# Patient Record
Sex: FEMALE | Race: WHITE | NOT HISPANIC OR LATINO | Employment: OTHER | URBAN - METROPOLITAN AREA
[De-identification: names, ages, dates, MRNs, and addresses within clinical notes are randomized per-mention and may not be internally consistent; named-entity substitution may affect disease eponyms.]

---

## 2019-10-14 LAB
CHOLEST SERPL-MSCNC: 119 MG/DL (ref 0–200)
CHOLEST/HDLC SERPL: 2.59 {RATIO}
HBA1C MFR BLD: 6.4 %
HDLC SERPL-MCNC: 46 MG/DL
LDLC SERPL CALC-MCNC: 55 MG/DL
TRIGL SERPL-MCNC: 89 MG/DL

## 2019-11-05 ENCOUNTER — PATIENT OUTREACH (OUTPATIENT)
Dept: ADMINISTRATIVE | Facility: HOSPITAL | Age: 69
End: 2019-11-05

## 2019-11-05 ENCOUNTER — OFFICE VISIT (OUTPATIENT)
Dept: INTERNAL MEDICINE | Facility: CLINIC | Age: 69
End: 2019-11-05
Payer: MEDICARE

## 2019-11-05 VITALS
OXYGEN SATURATION: 95 % | WEIGHT: 285.69 LBS | TEMPERATURE: 97 F | HEART RATE: 79 BPM | BODY MASS INDEX: 47.6 KG/M2 | DIASTOLIC BLOOD PRESSURE: 72 MMHG | SYSTOLIC BLOOD PRESSURE: 138 MMHG | HEIGHT: 65 IN

## 2019-11-05 DIAGNOSIS — Z79.4 TYPE 2 DIABETES MELLITUS WITH MICROALBUMINURIA, WITH LONG-TERM CURRENT USE OF INSULIN: ICD-10-CM

## 2019-11-05 DIAGNOSIS — Z12.31 ENCOUNTER FOR SCREENING MAMMOGRAM FOR BREAST CANCER: ICD-10-CM

## 2019-11-05 DIAGNOSIS — E11.29 TYPE 2 DIABETES MELLITUS WITH MICROALBUMINURIA, WITH LONG-TERM CURRENT USE OF INSULIN: ICD-10-CM

## 2019-11-05 DIAGNOSIS — I10 ESSENTIAL HYPERTENSION: ICD-10-CM

## 2019-11-05 DIAGNOSIS — Z12.11 COLON CANCER SCREENING: ICD-10-CM

## 2019-11-05 DIAGNOSIS — E03.9 HYPOTHYROIDISM, UNSPECIFIED TYPE: ICD-10-CM

## 2019-11-05 DIAGNOSIS — R80.9 TYPE 2 DIABETES MELLITUS WITH MICROALBUMINURIA, WITH LONG-TERM CURRENT USE OF INSULIN: ICD-10-CM

## 2019-11-05 DIAGNOSIS — Z78.0 POSTMENOPAUSAL: Primary | ICD-10-CM

## 2019-11-05 DIAGNOSIS — Z01.818 PREOP EXAMINATION: ICD-10-CM

## 2019-11-05 PROCEDURE — 99204 OFFICE O/P NEW MOD 45 MIN: CPT | Mod: 25,S$GLB,, | Performed by: FAMILY MEDICINE

## 2019-11-05 PROCEDURE — 90662 FLU VACCINE - HIGH DOSE (65+) PRESERVATIVE FREE IM: ICD-10-PCS | Mod: S$GLB,,, | Performed by: FAMILY MEDICINE

## 2019-11-05 PROCEDURE — 90662 IIV NO PRSV INCREASED AG IM: CPT | Mod: S$GLB,,, | Performed by: FAMILY MEDICINE

## 2019-11-05 PROCEDURE — 3078F DIAST BP <80 MM HG: CPT | Mod: S$GLB,,, | Performed by: FAMILY MEDICINE

## 2019-11-05 PROCEDURE — G0008 ADMIN INFLUENZA VIRUS VAC: HCPCS | Mod: S$GLB,,, | Performed by: FAMILY MEDICINE

## 2019-11-05 PROCEDURE — 99999 PR PBB SHADOW E&M-NEW PATIENT-LVL V: ICD-10-PCS | Mod: PBBFAC,,, | Performed by: FAMILY MEDICINE

## 2019-11-05 PROCEDURE — 99999 PR PBB SHADOW E&M-NEW PATIENT-LVL V: CPT | Mod: PBBFAC,,, | Performed by: FAMILY MEDICINE

## 2019-11-05 PROCEDURE — 99499 UNLISTED E&M SERVICE: CPT | Mod: S$GLB,,, | Performed by: FAMILY MEDICINE

## 2019-11-05 PROCEDURE — 1101F PR PT FALLS ASSESS DOC 0-1 FALLS W/OUT INJ PAST YR: ICD-10-PCS | Mod: S$GLB,,, | Performed by: FAMILY MEDICINE

## 2019-11-05 PROCEDURE — G0008 FLU VACCINE - HIGH DOSE (65+) PRESERVATIVE FREE IM: ICD-10-PCS | Mod: S$GLB,,, | Performed by: FAMILY MEDICINE

## 2019-11-05 PROCEDURE — 3078F PR MOST RECENT DIASTOLIC BLOOD PRESSURE < 80 MM HG: ICD-10-PCS | Mod: S$GLB,,, | Performed by: FAMILY MEDICINE

## 2019-11-05 PROCEDURE — 3044F HG A1C LEVEL LT 7.0%: CPT | Mod: S$GLB,,, | Performed by: FAMILY MEDICINE

## 2019-11-05 PROCEDURE — 1101F PT FALLS ASSESS-DOCD LE1/YR: CPT | Mod: S$GLB,,, | Performed by: FAMILY MEDICINE

## 2019-11-05 PROCEDURE — 3044F PR MOST RECENT HEMOGLOBIN A1C LEVEL <7.0%: ICD-10-PCS | Mod: S$GLB,,, | Performed by: FAMILY MEDICINE

## 2019-11-05 PROCEDURE — 99204 PR OFFICE/OUTPT VISIT, NEW, LEVL IV, 45-59 MIN: ICD-10-PCS | Mod: 25,S$GLB,, | Performed by: FAMILY MEDICINE

## 2019-11-05 PROCEDURE — 99499 RISK ADDL DX/OHS AUDIT: ICD-10-PCS | Mod: S$GLB,,, | Performed by: FAMILY MEDICINE

## 2019-11-05 PROCEDURE — 3075F SYST BP GE 130 - 139MM HG: CPT | Mod: S$GLB,,, | Performed by: FAMILY MEDICINE

## 2019-11-05 PROCEDURE — 3075F PR MOST RECENT SYSTOLIC BLOOD PRESS GE 130-139MM HG: ICD-10-PCS | Mod: S$GLB,,, | Performed by: FAMILY MEDICINE

## 2019-11-05 RX ORDER — BENZONATATE 100 MG/1
1-2 CAPSULE ORAL
COMMUNITY
Start: 2019-11-01 | End: 2020-10-28 | Stop reason: ALTCHOICE

## 2019-11-05 NOTE — PROGRESS NOTES
"Subjective:       Patient ID: Lakshmi Campbell is a 68 y.o. female.    Chief Complaint: Establish Care and Pre op clearance eye surgery    HPI  Here for preop but also wants to establish care.  Has past medical history of diabetes hypothyroidism, cataracts and hypertension.  Taking medications as listed below.  Has been mostly managed by Endocrinology.  Getting cataract sx. With lamendola.  Sees endocrine (Dr. Prince). Last a1c was 6.4.     Family History   Problem Relation Age of Onset    Diabetes Mother     Leukemia Father     Diabetes Father        Current Outpatient Medications:     benzonatate (TESSALON PERLES) 100 MG capsule, 1-2 capsules., Disp: , Rfl:     blood sugar diagnostic (ONE TOUCH ULTRA TEST) Strp, Use ac bid, Disp: , Rfl:     blood-glucose meter kit, Use as instructed, Disp: , Rfl:     hydrochlorothiazide (HYDRODIURIL) 25 MG tablet, , Disp: , Rfl:     INSULIN NPL/INSULIN LISPRO (HUMALOG MIX 75-25 KWIKPEN SUBQ), Inject 50 Units into the skin Twice daily., Disp: , Rfl:     levothyroxine (SYNTHROID) 125 MCG tablet, , Disp: , Rfl:     metformin (GLUCOPHAGE) 1000 MG tablet, , Disp: , Rfl:     perindopril erbumine (ACEON) 4 mg tablet, , Disp: , Rfl:     pravastatin (PRAVACHOL) 20 MG tablet, , Disp: , Rfl: 11    SURE COMFORT PEN NEEDLE 31 X 5/16 " Ndle, , Disp: , Rfl: 0    albuterol 90 mcg/actuation inhaler, Inhale 2 puffs into the lungs every 6 (six) hours as needed for Wheezing., Disp: 18 g, Rfl: 0    aspirin (ECOTRIN) 81 MG EC tablet, , Disp: , Rfl:     cetirizine (ZYRTEC) 10 MG tablet, Take 1 tablet (10 mg total) by mouth once daily., Disp: 30 tablet, Rfl: 0    fluticasone (FLONASE) 50 mcg/actuation nasal spray, 1 spray by Each Nare route once daily. (Patient not taking: Reported on 11/5/2019), Disp: 1 Bottle, Rfl: 0    Review of Systems   Constitutional: Negative for chills and fever.   HENT: Negative for congestion and sore throat.    Eyes: Negative for visual disturbance.   Respiratory: " "Negative for cough and shortness of breath.    Cardiovascular: Negative for chest pain.   Gastrointestinal: Negative for abdominal pain, constipation and diarrhea.   Endocrine: Negative for polydipsia and polyuria.   Genitourinary: Negative for difficulty urinating and menstrual problem.   Skin: Negative for rash.   Neurological: Negative for dizziness.   Hematological: Does not bruise/bleed easily.       Objective:   /72 Comment: manual on right arm  Pulse 79   Temp 96.5 °F (35.8 °C) (Tympanic)   Ht 5' 5" (1.651 m)   Wt 129.6 kg (285 lb 11.5 oz)   SpO2 95%   BMI 47.55 kg/m²      Physical Exam   Constitutional: She is oriented to person, place, and time. She appears well-developed and well-nourished. No distress.   HENT:   Head: Normocephalic and atraumatic.   Nose: Nose normal.   Eyes: Pupils are equal, round, and reactive to light. Conjunctivae and EOM are normal. Right eye exhibits no discharge. Left eye exhibits no discharge.   Neck: No thyromegaly present.   Cardiovascular: Normal rate and regular rhythm.   No murmur heard.  Pulmonary/Chest: Effort normal and breath sounds normal. No respiratory distress.   Abdominal: Soft. She exhibits no distension.   Musculoskeletal: She exhibits no edema.   Neurological: She is alert and oriented to person, place, and time.   Skin: No rash noted. She is not diaphoretic.   Psychiatric: She has a normal mood and affect. Her behavior is normal.       Assessment & Plan     Problem List Items Addressed This Visit        Cardiac/Vascular    Essential hypertension    Overview     ICD-10 Transition         Current Assessment & Plan     Quite elevated today.  Have her follow up in 2 weeks for recheck.  For now continue taking current medication.            Endocrine    Diabetes mellitus type II    Overview     Dx updated per 2019 IMO Load         Current Assessment & Plan     Has been well controlled.  Most recent A1c was 6.4.  She continues to follow with " Endocrinology.         Hypothyroidism    Current Assessment & Plan     Continues to take levothyroxine and managed by Endocrinology.            Other    Preop examination    Current Assessment & Plan     No concerns for her to have cataract surgery            Other Visit Diagnoses     Postmenopausal    -  Primary    Relevant Orders    DXA Bone Density Spine And Hip    Encounter for screening mammogram for breast cancer        Relevant Orders    Mammo Digital Screening Bilat    Colon cancer screening        Relevant Orders    Case request GI: COLONOSCOPY (Completed)            No follow-ups on file.    Disclaimer:  This note may have been prepared using voice recognition software, it may have not been extensively proofed, as such there could be errors within the text such as sound alike errors.

## 2019-11-06 ENCOUNTER — TELEPHONE (OUTPATIENT)
Dept: ENDOSCOPY | Facility: HOSPITAL | Age: 69
End: 2019-11-06

## 2019-11-07 ENCOUNTER — TELEPHONE (OUTPATIENT)
Dept: ENDOSCOPY | Facility: HOSPITAL | Age: 69
End: 2019-11-07

## 2019-11-07 PROBLEM — Z01.818 PREOP EXAMINATION: Status: ACTIVE | Noted: 2019-11-07

## 2019-11-07 PROBLEM — S92.302A CLOSED FRACTURE OF METATARSAL BONE OF LEFT FOOT: Status: ACTIVE | Noted: 2019-11-07

## 2019-11-07 PROBLEM — E78.5 HYPERLIPIDEMIA: Status: ACTIVE | Noted: 2019-11-07

## 2019-11-07 PROBLEM — I10 ESSENTIAL HYPERTENSION: Status: ACTIVE | Noted: 2019-11-07

## 2019-11-07 NOTE — ASSESSMENT & PLAN NOTE
Quite elevated today.  Have her follow up in 2 weeks for recheck.  For now continue taking current medication.

## 2019-11-08 NOTE — PROGRESS NOTES
Patient, Lakshmi Campbell (MRN #[id number]), presented with a recorded BMI of 47.55 kg/m^2 consistent with the definition of morbid obesity (ICD-10 E66.01). The patient's morbid obesity was monitored, evaluated, addressed and/or treated. This addendum to the medical record is made on 11/08/2019.

## 2019-11-11 ENCOUNTER — PES CALL (OUTPATIENT)
Dept: ADMINISTRATIVE | Facility: CLINIC | Age: 69
End: 2019-11-11

## 2019-11-19 ENCOUNTER — PES CALL (OUTPATIENT)
Dept: ADMINISTRATIVE | Facility: CLINIC | Age: 69
End: 2019-11-19

## 2019-11-29 ENCOUNTER — PES CALL (OUTPATIENT)
Dept: ADMINISTRATIVE | Facility: CLINIC | Age: 69
End: 2019-11-29

## 2019-12-04 ENCOUNTER — PES CALL (OUTPATIENT)
Dept: ADMINISTRATIVE | Facility: CLINIC | Age: 69
End: 2019-12-04

## 2020-01-07 ENCOUNTER — LAB VISIT (OUTPATIENT)
Dept: LAB | Facility: HOSPITAL | Age: 70
End: 2020-01-07
Attending: FAMILY MEDICINE
Payer: MEDICARE

## 2020-01-07 ENCOUNTER — OFFICE VISIT (OUTPATIENT)
Dept: INTERNAL MEDICINE | Facility: CLINIC | Age: 70
End: 2020-01-07
Payer: MEDICARE

## 2020-01-07 VITALS
RESPIRATION RATE: 19 BRPM | SYSTOLIC BLOOD PRESSURE: 162 MMHG | WEIGHT: 280.63 LBS | DIASTOLIC BLOOD PRESSURE: 75 MMHG | HEIGHT: 65 IN | OXYGEN SATURATION: 95 % | TEMPERATURE: 98 F | BODY MASS INDEX: 46.75 KG/M2 | HEART RATE: 79 BPM

## 2020-01-07 DIAGNOSIS — Z11.59 ENCOUNTER FOR HEPATITIS C SCREENING TEST FOR LOW RISK PATIENT: ICD-10-CM

## 2020-01-07 DIAGNOSIS — Z79.4 TYPE 2 DIABETES MELLITUS WITH MICROALBUMINURIA, WITH LONG-TERM CURRENT USE OF INSULIN: ICD-10-CM

## 2020-01-07 DIAGNOSIS — E11.29 TYPE 2 DIABETES MELLITUS WITH MICROALBUMINURIA, WITH LONG-TERM CURRENT USE OF INSULIN: ICD-10-CM

## 2020-01-07 DIAGNOSIS — Z01.818 PREOP EXAMINATION: ICD-10-CM

## 2020-01-07 DIAGNOSIS — Z12.11 COLON CANCER SCREENING: ICD-10-CM

## 2020-01-07 DIAGNOSIS — Z11.59 ENCOUNTER FOR HEPATITIS C SCREENING TEST FOR LOW RISK PATIENT: Primary | ICD-10-CM

## 2020-01-07 DIAGNOSIS — R80.9 TYPE 2 DIABETES MELLITUS WITH MICROALBUMINURIA, WITH LONG-TERM CURRENT USE OF INSULIN: ICD-10-CM

## 2020-01-07 PROCEDURE — 90670 PCV13 VACCINE IM: CPT | Mod: S$GLB,,, | Performed by: FAMILY MEDICINE

## 2020-01-07 PROCEDURE — 36415 COLL VENOUS BLD VENIPUNCTURE: CPT | Mod: PO

## 2020-01-07 PROCEDURE — G0009 PNEUMOCOCCAL CONJUGATE VACCINE 13-VALENT LESS THAN 5YO & GREATER THAN: ICD-10-PCS | Mod: S$GLB,,, | Performed by: FAMILY MEDICINE

## 2020-01-07 PROCEDURE — 86803 HEPATITIS C AB TEST: CPT

## 2020-01-07 PROCEDURE — 1126F PR PAIN SEVERITY QUANTIFIED, NO PAIN PRESENT: ICD-10-PCS | Mod: S$GLB,,, | Performed by: FAMILY MEDICINE

## 2020-01-07 PROCEDURE — 99999 PR PBB SHADOW E&M-EST. PATIENT-LVL V: CPT | Mod: PBBFAC,,, | Performed by: FAMILY MEDICINE

## 2020-01-07 PROCEDURE — 1159F PR MEDICATION LIST DOCUMENTED IN MEDICAL RECORD: ICD-10-PCS | Mod: S$GLB,,, | Performed by: FAMILY MEDICINE

## 2020-01-07 PROCEDURE — 1159F MED LIST DOCD IN RCRD: CPT | Mod: S$GLB,,, | Performed by: FAMILY MEDICINE

## 2020-01-07 PROCEDURE — 1101F PT FALLS ASSESS-DOCD LE1/YR: CPT | Mod: S$GLB,,, | Performed by: FAMILY MEDICINE

## 2020-01-07 PROCEDURE — 3078F DIAST BP <80 MM HG: CPT | Mod: S$GLB,,, | Performed by: FAMILY MEDICINE

## 2020-01-07 PROCEDURE — 3078F PR MOST RECENT DIASTOLIC BLOOD PRESSURE < 80 MM HG: ICD-10-PCS | Mod: S$GLB,,, | Performed by: FAMILY MEDICINE

## 2020-01-07 PROCEDURE — 3044F HG A1C LEVEL LT 7.0%: CPT | Mod: S$GLB,,, | Performed by: FAMILY MEDICINE

## 2020-01-07 PROCEDURE — 1101F PR PT FALLS ASSESS DOC 0-1 FALLS W/OUT INJ PAST YR: ICD-10-PCS | Mod: S$GLB,,, | Performed by: FAMILY MEDICINE

## 2020-01-07 PROCEDURE — 1126F AMNT PAIN NOTED NONE PRSNT: CPT | Mod: S$GLB,,, | Performed by: FAMILY MEDICINE

## 2020-01-07 PROCEDURE — G0009 ADMIN PNEUMOCOCCAL VACCINE: HCPCS | Mod: S$GLB,,, | Performed by: FAMILY MEDICINE

## 2020-01-07 PROCEDURE — 3077F PR MOST RECENT SYSTOLIC BLOOD PRESSURE >= 140 MM HG: ICD-10-PCS | Mod: S$GLB,,, | Performed by: FAMILY MEDICINE

## 2020-01-07 PROCEDURE — 3044F PR MOST RECENT HEMOGLOBIN A1C LEVEL <7.0%: ICD-10-PCS | Mod: S$GLB,,, | Performed by: FAMILY MEDICINE

## 2020-01-07 PROCEDURE — 99999 PR PBB SHADOW E&M-EST. PATIENT-LVL V: ICD-10-PCS | Mod: PBBFAC,,, | Performed by: FAMILY MEDICINE

## 2020-01-07 PROCEDURE — 90670 PNEUMOCOCCAL CONJUGATE VACCINE 13-VALENT LESS THAN 5YO & GREATER THAN: ICD-10-PCS | Mod: S$GLB,,, | Performed by: FAMILY MEDICINE

## 2020-01-07 PROCEDURE — 99214 PR OFFICE/OUTPT VISIT, EST, LEVL IV, 30-39 MIN: ICD-10-PCS | Mod: 25,S$GLB,, | Performed by: FAMILY MEDICINE

## 2020-01-07 PROCEDURE — 99214 OFFICE O/P EST MOD 30 MIN: CPT | Mod: 25,S$GLB,, | Performed by: FAMILY MEDICINE

## 2020-01-07 PROCEDURE — 3077F SYST BP >= 140 MM HG: CPT | Mod: S$GLB,,, | Performed by: FAMILY MEDICINE

## 2020-01-07 RX ORDER — LEVOTHYROXINE SODIUM 175 UG/1
TABLET ORAL
COMMUNITY
Start: 2019-12-27 | End: 2023-09-25 | Stop reason: SDUPTHER

## 2020-01-07 NOTE — PROGRESS NOTES
"Subjective:       Patient ID: Lakshmi Campbell is a 69 y.o. female.    Chief Complaint: Pre-op Exam    HPI  Here today for preop evaluation.  Will be having cataract surgery in 2 days.  She already had the other eye done and had no problems.  No chest pain or shortness of breath.  No other problems.    Past Surgical History:   Procedure Laterality Date    CATARACT EXTRACTION       Family History   Problem Relation Age of Onset    Diabetes Mother     Leukemia Father     Diabetes Father        Current Outpatient Medications:     blood sugar diagnostic (ONE TOUCH ULTRA TEST) Strp, Use ac bid, Disp: , Rfl:     blood-glucose meter kit, Use as instructed, Disp: , Rfl:     cetirizine (ZYRTEC) 10 MG tablet, Take 1 tablet (10 mg total) by mouth once daily., Disp: 30 tablet, Rfl: 0    fluticasone (FLONASE) 50 mcg/actuation nasal spray, 1 spray by Each Nare route once daily., Disp: 1 Bottle, Rfl: 0    hydrochlorothiazide (HYDRODIURIL) 25 MG tablet, Take 25 mg by mouth once daily. , Disp: , Rfl:     INSULIN NPL/INSULIN LISPRO (HUMALOG MIX 75-25 KWIKPEN SUBQ), Inject 85 Units into the skin Twice daily. , Disp: , Rfl:     levothyroxine (SYNTHROID) 175 MCG tablet, TAKE 1 TABLET BY MOUTH DAILY, Disp: , Rfl:     metformin (GLUCOPHAGE) 1000 MG tablet, 1,000 mg 2 (two) times daily with meals. , Disp: , Rfl:     perindopril erbumine (ACEON) 4 mg tablet, Take 4 mg by mouth once daily. , Disp: , Rfl:     pravastatin (PRAVACHOL) 20 MG tablet, Take 20 mg by mouth once daily. , Disp: , Rfl: 11    SURE COMFORT PEN NEEDLE 31 X 5/16 " Ndle, , Disp: , Rfl: 0    albuterol 90 mcg/actuation inhaler, Inhale 2 puffs into the lungs every 6 (six) hours as needed for Wheezing., Disp: 18 g, Rfl: 0    aspirin (ECOTRIN) 81 MG EC tablet, , Disp: , Rfl:     benzonatate (TESSALON PERLES) 100 MG capsule, 1-2 capsules., Disp: , Rfl:     Review of Systems   Constitutional: Negative for chills and fever.   Respiratory: Negative for cough and " "shortness of breath.    Cardiovascular: Negative for chest pain.   Gastrointestinal: Negative for abdominal pain.   Skin: Negative for rash.   Neurological: Negative for dizziness.       Objective:   BP (!) 162/75 (BP Location: Right arm, Patient Position: Sitting, BP Method: Large (Automatic))   Pulse 79   Temp 97.5 °F (36.4 °C) (Tympanic)   Resp 19   Ht 5' 5" (1.651 m)   Wt 127.3 kg (280 lb 10.3 oz)   SpO2 95%   BMI 46.70 kg/m²      Physical Exam   Constitutional: She is oriented to person, place, and time. She appears well-developed and well-nourished.   HENT:   Head: Normocephalic and atraumatic.   Eyes: Conjunctivae are normal.   Cardiovascular: Normal rate.   Pulmonary/Chest: Effort normal. No respiratory distress.   Musculoskeletal: She exhibits no edema.   Neurological: She is alert and oriented to person, place, and time. Coordination normal.   Skin: Skin is warm and dry. No rash noted.   Psychiatric: She has a normal mood and affect. Her behavior is normal.   Vitals reviewed.      Assessment & Plan     Problem List Items Addressed This Visit        Endocrine    Type 2 diabetes mellitus with microalbuminuria, with long-term current use of insulin    Overview     Dx updated per 2019 IMO Load         Current Assessment & Plan     Diabetes has been well controlled.  Continue on same medication.  Not yet due for labs at this time.         Body mass index (BMI) 45.0-49.9, adult    Current Assessment & Plan     Counseled on importance on diet and exercise to decrease risk of comorbid conditions and for improved quality of life.              Other    Preop examination    Current Assessment & Plan     Cleared for low risk cataract procedure.           Other Visit Diagnoses     Encounter for hepatitis C screening test for low risk patient    -  Primary    Relevant Orders    Hepatitis C antibody    Colon cancer screening        Relevant Orders    Case request GI: COLONOSCOPY (Completed)            No " follow-ups on file.    Disclaimer:  This note may have been prepared using voice recognition software, it may have not been extensively proofed, as such there could be errors within the text such as sound alike errors.

## 2020-01-07 NOTE — ASSESSMENT & PLAN NOTE
Diabetes has been well controlled.  Continue on same medication.  Not yet due for labs at this time.

## 2020-01-08 LAB — HCV AB SERPL QL IA: NEGATIVE

## 2020-03-13 ENCOUNTER — HOSPITAL ENCOUNTER (OUTPATIENT)
Dept: RADIOLOGY | Facility: HOSPITAL | Age: 70
Discharge: HOME | End: 2020-03-13
Attending: FAMILY MEDICINE
Payer: MEDICARE

## 2020-03-13 VITALS — WEIGHT: 280.63 LBS | BODY MASS INDEX: 46.75 KG/M2 | HEIGHT: 65 IN

## 2020-03-13 DIAGNOSIS — Z12.31 ENCOUNTER FOR SCREENING MAMMOGRAM FOR BREAST CANCER: ICD-10-CM

## 2020-03-13 PROCEDURE — 77067 SCR MAMMO BI INCL CAD: CPT | Mod: 26,,, | Performed by: RADIOLOGY

## 2020-03-13 PROCEDURE — 77067 MAMMO DIGITAL SCREENING BILAT WITH TOMOSYNTHESIS_CAD: ICD-10-PCS | Mod: 26,,, | Performed by: RADIOLOGY

## 2020-03-13 PROCEDURE — 77063 MAMMO DIGITAL SCREENING BILAT WITH TOMOSYNTHESIS_CAD: ICD-10-PCS | Mod: 26,,, | Performed by: RADIOLOGY

## 2020-03-13 PROCEDURE — 77063 BREAST TOMOSYNTHESIS BI: CPT | Mod: 26,,, | Performed by: RADIOLOGY

## 2020-03-13 PROCEDURE — 77067 SCR MAMMO BI INCL CAD: CPT | Mod: TC,PO

## 2020-03-20 ENCOUNTER — TELEPHONE (OUTPATIENT)
Dept: RADIOLOGY | Facility: HOSPITAL | Age: 70
End: 2020-03-20

## 2020-04-02 ENCOUNTER — TELEPHONE (OUTPATIENT)
Dept: INTERNAL MEDICINE | Facility: CLINIC | Age: 70
End: 2020-04-02

## 2020-04-02 ENCOUNTER — TELEPHONE (OUTPATIENT)
Dept: RADIOLOGY | Facility: HOSPITAL | Age: 70
End: 2020-04-02

## 2020-04-02 DIAGNOSIS — R92.2 INCONCLUSIVE MAMMOGRAM: Primary | ICD-10-CM

## 2020-04-02 NOTE — TELEPHONE ENCOUNTER
Spoke with patient on the phone regarding mammogram from 3/14/20, patient states that she will call back to schedule diagnostic mammogram after the COVID-19 situation, patient has my contact information.

## 2020-04-09 ENCOUNTER — TELEPHONE (OUTPATIENT)
Dept: INTERNAL MEDICINE | Facility: CLINIC | Age: 70
End: 2020-04-09

## 2020-04-09 NOTE — TELEPHONE ENCOUNTER
Home number not a working number  LM on mobile for pt to return call  Emergency contact number not a working number.

## 2020-04-09 NOTE — TELEPHONE ENCOUNTER
Can we please try reaching out to her again. I know last week efforts were made to contact her. May need to call emergency contact or send a letter.    She is still needing follow up imaging for inconclusive mammogram.     Must get done soon as if she has cancer it cannot wait.

## 2020-04-09 NOTE — LETTER
April 16, 2020    Lakshmi Campbell  [address] [place] [address]             [facility]  [address]  [place] [address]  Phone: [phone]  Fax: [phone] Dear Mrs. Campbell:    We have been making multiple attempts to reach you via phone without success. Your recent mammogram needs follow up as there were some areas that need better imaging. It is important to do this in a timely manner. If it is breast cancer we need to get you started on treatment. If it is benign (not cancer) then we can relax with the reassurance knowing we can continue routine follow up. We cannot make decisions until we get you in for follow up testing.     Despite the COVID crisis we have a safe way to get you taken care of. Please call ASAP.    If you have any questions or concerns, please don't hesitate to call.    Sincerely,        Karan Ribeiro, DO

## 2020-04-16 ENCOUNTER — PATIENT MESSAGE (OUTPATIENT)
Dept: INTERNAL MEDICINE | Facility: CLINIC | Age: 70
End: 2020-04-16

## 2020-04-30 ENCOUNTER — TELEPHONE (OUTPATIENT)
Dept: RADIOLOGY | Facility: HOSPITAL | Age: 70
End: 2020-04-30

## 2020-06-26 DIAGNOSIS — E11.9 TYPE 2 DIABETES MELLITUS WITHOUT COMPLICATION: ICD-10-CM

## 2020-09-23 ENCOUNTER — PATIENT OUTREACH (OUTPATIENT)
Dept: ADMINISTRATIVE | Facility: HOSPITAL | Age: 70
End: 2020-09-23

## 2020-09-23 DIAGNOSIS — I10 ESSENTIAL HYPERTENSION: Primary | ICD-10-CM

## 2020-09-23 NOTE — PROGRESS NOTES
Working HTN Report       Called Pt for BP Reading, No answer Portal Message sent       Ashley OLSEN LPN Care Coordinator  Care Coordination Department  [facility]  [phone]

## 2020-10-06 ENCOUNTER — PATIENT MESSAGE (OUTPATIENT)
Dept: ADMINISTRATIVE | Facility: HOSPITAL | Age: 70
End: 2020-10-06

## 2020-10-08 ENCOUNTER — PATIENT OUTREACH (OUTPATIENT)
Dept: ADMINISTRATIVE | Facility: HOSPITAL | Age: 70
End: 2020-10-08

## 2020-10-12 LAB — HBA1C MFR BLD: 5.7 % (ref 4.2–5.8)

## 2020-10-14 ENCOUNTER — PATIENT OUTREACH (OUTPATIENT)
Dept: ADMINISTRATIVE | Facility: HOSPITAL | Age: 70
End: 2020-10-14

## 2020-10-14 NOTE — PROGRESS NOTES
Working HTN Report   2nd Attempt to contact pt L/M for Pt to call office     Pt Scheduled 10/30/2020    Ashley OLSEN LPN Care Coordinator  Care Coordination Department  [facility]  [phone]

## 2020-10-16 DIAGNOSIS — E11.9 TYPE 2 DIABETES MELLITUS WITHOUT COMPLICATION: ICD-10-CM

## 2020-10-24 ENCOUNTER — HOSPITAL ENCOUNTER (EMERGENCY)
Facility: HOSPITAL | Age: 70
Discharge: HOME | End: 2020-10-24
Attending: FAMILY MEDICINE
Payer: MEDICARE

## 2020-10-24 VITALS
HEART RATE: 86 BPM | SYSTOLIC BLOOD PRESSURE: 173 MMHG | RESPIRATION RATE: 16 BRPM | BODY MASS INDEX: 46.65 KG/M2 | OXYGEN SATURATION: 97 % | HEIGHT: 65 IN | WEIGHT: 280 LBS | TEMPERATURE: 98 F | DIASTOLIC BLOOD PRESSURE: 73 MMHG

## 2020-10-24 DIAGNOSIS — S82.041A CLOSED DISPLACED COMMINUTED FRACTURE OF RIGHT PATELLA, INITIAL ENCOUNTER: Primary | ICD-10-CM

## 2020-10-24 DIAGNOSIS — M25.561 RIGHT KNEE PAIN: ICD-10-CM

## 2020-10-24 LAB — HCV AB SERPL QL IA: NEGATIVE

## 2020-10-24 PROCEDURE — 90471 IMMUNIZATION ADMIN: CPT | Performed by: FAMILY MEDICINE

## 2020-10-24 PROCEDURE — 25000003 PHARM REV CODE 250: Performed by: FAMILY MEDICINE

## 2020-10-24 PROCEDURE — 96372 THER/PROPH/DIAG INJ SC/IM: CPT | Mod: 59

## 2020-10-24 PROCEDURE — 90715 TDAP VACCINE 7 YRS/> IM: CPT | Performed by: FAMILY MEDICINE

## 2020-10-24 PROCEDURE — 63600175 PHARM REV CODE 636 W HCPCS: Performed by: FAMILY MEDICINE

## 2020-10-24 PROCEDURE — 29505 APPLICATION LONG LEG SPLINT: CPT

## 2020-10-24 PROCEDURE — 99284 EMERGENCY DEPT VISIT MOD MDM: CPT | Mod: 25

## 2020-10-24 PROCEDURE — 86803 HEPATITIS C AB TEST: CPT

## 2020-10-24 PROCEDURE — 96374 THER/PROPH/DIAG INJ IV PUSH: CPT | Mod: 59

## 2020-10-24 PROCEDURE — 96375 TX/PRO/DX INJ NEW DRUG ADDON: CPT | Mod: 59

## 2020-10-24 PROCEDURE — 29580 STRAPPING UNNA BOOT: CPT | Mod: RT

## 2020-10-24 RX ORDER — HYDROCODONE BITARTRATE AND ACETAMINOPHEN 5; 325 MG/1; MG/1
1 TABLET ORAL
Status: COMPLETED | OUTPATIENT
Start: 2020-10-24 | End: 2020-10-24

## 2020-10-24 RX ORDER — KETOROLAC TROMETHAMINE 30 MG/ML
30 INJECTION, SOLUTION INTRAMUSCULAR; INTRAVENOUS
Status: COMPLETED | OUTPATIENT
Start: 2020-10-24 | End: 2020-10-24

## 2020-10-24 RX ORDER — HYDROCODONE BITARTRATE AND ACETAMINOPHEN 5; 325 MG/1; MG/1
1 TABLET ORAL EVERY 8 HOURS PRN
Qty: 15 TABLET | Refills: 0 | Status: ON HOLD | OUTPATIENT
Start: 2020-10-24 | End: 2020-11-11

## 2020-10-24 RX ORDER — MORPHINE SULFATE 4 MG/ML
4 INJECTION, SOLUTION INTRAMUSCULAR; INTRAVENOUS
Status: DISCONTINUED | OUTPATIENT
Start: 2020-10-24 | End: 2020-10-24

## 2020-10-24 RX ORDER — HYDRALAZINE HYDROCHLORIDE 20 MG/ML
10 INJECTION INTRAMUSCULAR; INTRAVENOUS
Status: COMPLETED | OUTPATIENT
Start: 2020-10-24 | End: 2020-10-24

## 2020-10-24 RX ORDER — ONDANSETRON 2 MG/ML
4 INJECTION INTRAMUSCULAR; INTRAVENOUS
Status: COMPLETED | OUTPATIENT
Start: 2020-10-24 | End: 2020-10-24

## 2020-10-24 RX ORDER — ONDANSETRON 4 MG/1
4 TABLET, FILM COATED ORAL EVERY 6 HOURS
Qty: 20 TABLET | Refills: 0 | Status: ON HOLD | OUTPATIENT
Start: 2020-10-24 | End: 2020-11-11 | Stop reason: HOSPADM

## 2020-10-24 RX ADMIN — KETOROLAC TROMETHAMINE 30 MG: 30 INJECTION, SOLUTION INTRAMUSCULAR; INTRAVENOUS at 09:10

## 2020-10-24 RX ADMIN — ONDANSETRON 4 MG: 2 INJECTION INTRAMUSCULAR; INTRAVENOUS at 10:10

## 2020-10-24 RX ADMIN — HYDRALAZINE HYDROCHLORIDE 10 MG: 20 INJECTION, SOLUTION INTRAMUSCULAR; INTRAVENOUS at 10:10

## 2020-10-24 RX ADMIN — HYDROCODONE BITARTRATE AND ACETAMINOPHEN 1 TABLET: 5; 325 TABLET ORAL at 10:10

## 2020-10-24 RX ADMIN — CLOSTRIDIUM TETANI TOXOID ANTIGEN (FORMALDEHYDE INACTIVATED), CORYNEBACTERIUM DIPHTHERIAE TOXOID ANTIGEN (FORMALDEHYDE INACTIVATED), BORDETELLA PERTUSSIS TOXOID ANTIGEN (GLUTARALDEHYDE INACTIVATED), BORDETELLA PERTUSSIS FILAMENTOUS HEMAGGLUTININ ANTIGEN (FORMALDEHYDE INACTIVATED), BORDETELLA PERTUSSIS PERTACTIN ANTIGEN, AND BORDETELLA PERTUSSIS FIMBRIAE 2/3 ANTIGEN 0.5 ML: 5; 2; 2.5; 5; 3; 5 INJECTION, SUSPENSION INTRAMUSCULAR at 09:10

## 2020-10-25 NOTE — ED PROVIDER NOTES
SCRIBE #1 NOTE: I, Cecy Pettit, am scribing for, and in the presence of, Carline Tan MD. I have scribed the entire note.       History     Chief Complaint   Patient presents with    Fall     pt reports tripping over a towel that was on floor, pt reports hitting her face, mild reddness noted L side of face. + pain to R knee, abrasion and swelling noted. pt reports pain to rib cage after fall.      Review of patient's allergies indicates:   Allergen Reactions    Codeine Nausea Only     Other reaction(s): Nausea  Other reaction(s): Elevated blood pressure         History of Present Illness     HPI    10/24/2020, 9:01 PM  History obtained from the patient      History of Present Illness: Lakshmi Campbell is a 69 y.o. female patient with a PMHx of cataract, DM Type 1, HTN, and thyroid disease who presents to the Emergency Department for evaluation s/p mechanical fall which onset gradually just PTA. Pt states she slipped on a towel and hit her face. Reports she could not stand up due to the pain in her R knee. Pt also c/o chest soreness. Symptoms are constant and moderate in severity. No mitigating or exacerbating factors reported. Patient denies any fever, chills, LOC, SOB, cough, HA, dizziness, lightheadedness, extremity weakness/numbnes, visual disturbance, abd pain, n/v, neck pain/stiffness, back pain and all other sxs at this time. No prior Tx included. Pt is not on blood thinners and does not remember date of her most recent tetanus shot. No further complaints or concerns at this time.       Arrival mode: AASI    PCP: Karan Ribeiro DO        Past Medical History:  Past Medical History:   Diagnosis Date    Cataract     Diabetes mellitus type I     Hypertension     Thyroid disease        Past Surgical History:  Past Surgical History:   Procedure Laterality Date    APPENDECTOMY      BREAST BIOPSY      CATARACT EXTRACTION      EYE SURGERY      HERNIA REPAIR           Family History:  Family History    Problem Relation Age of Onset    Diabetes Mother     Leukemia Father     Diabetes Father        Social History:  Social History     Tobacco Use    Smoking status: Former Smoker    Smokeless tobacco: Never Used   Substance and Sexual Activity    Alcohol use: No    Drug use: No    Sexual activity: Yes     Partners: Male        Review of Systems     Review of Systems   Constitutional: Negative for chills and fever.   HENT: Negative for sore throat.    Eyes: Negative for visual disturbance.   Respiratory: Negative for cough and shortness of breath.    Cardiovascular: Positive for chest pain (soreness).   Gastrointestinal: Negative for abdominal pain, nausea and vomiting.   Genitourinary: Negative for dysuria.   Musculoskeletal: Negative for back pain, neck pain and neck stiffness.        (+) R knee pain   Skin: Negative for rash.   Neurological: Negative for dizziness, syncope, weakness, light-headedness, numbness and headaches.   Hematological: Does not bruise/bleed easily.   All other systems reviewed and are negative.       Physical Exam     Initial Vitals [10/24/20 2058]   BP Pulse Resp Temp SpO2   133/64 78 18 98.2 °F (36.8 °C) 98 %      MAP       --          Physical Exam  Nursing Notes and Vital Signs Reviewed.  Constitutional: Patient is in no acute distress. Obese.  Head: Normocephalic. Small abrasion to L maxillary region.  Eyes: PERRL. EOM intact. Conjunctivae are not pale. No scleral icterus. Negative [place] eyes.  ENT: Mucous membranes are moist. No hemotympanum. Negative Garland's sign.  Neck: Supple. Full ROM.  Cardiovascular: Regular rate. Regular rhythm. No murmurs, rubs, or gallops. Distal pulses are 2+ and symmetric.  Pulmonary/Chest: No respiratory distress. Clear to auscultation bilaterally. No wheezing or rales.  Abdominal: Soft and non-distended.  There is no tenderness.  No rebound, guarding, or rigidity. Good bowel sounds.  Genitourinary: No CVA tenderness  Musculoskeletal: Moves all  "extremities. No obvious deformities. TTP to midsternum, no ecchymosis. TTP to medial and lateral aspect to R patella. Moderate effusion to R knee, limited ROM secondary to pain. No calf tenderness.  Skin: Warm and dry.  Neurological:  Alert, awake, and appropriate. GCS 15. Cranial nerves 2-12 intact. Normal speech.  No acute focal neurological deficits are appreciated.  Psychiatric: Normal affect. Good eye contact. Appropriate in content.     ED Course   Procedures  ED Vital Signs:  Vitals:    10/24/20 2058 10/24/20 2110 10/24/20 2120 10/24/20 2137   BP: 133/64 (!) 206/93 (!) 217/79 (!) 193/81   Pulse: 78 77 77 82   Resp: 18      Temp: 98.2 °F (36.8 °C)      TempSrc: Oral      SpO2: 98% 98% 99% 99%   Weight: 127 kg (280 lb)      Height: 5' 5" (1.651 m)       10/24/20 2150 10/24/20 2210 10/24/20 2220 10/24/20 2230   BP: (!) 224/91 (!) 185/75 (!) 194/79 (!) 188/81   Pulse: 81 79 84 89   Resp:    16   Temp:       TempSrc:       SpO2: 97% 98% 98% 98%   Weight:       Height:        10/24/20 2300   BP: (!) 173/73   Pulse: 86   Resp:    Temp:    TempSrc:    SpO2: 97%   Weight:    Height:          Imaging Results:  Imaging Results          X-Ray Knee Complete 4 Or More Views Right (Final result)  Result time 10/24/20 22:04:27    Final result by Chepe Barnes MD (10/24/20 22:04:27)                 Impression:      Comminuted fracture of the patella as above.      Electronically signed by: Chepe Barnes  Date:    10/24/2020  Time:    22:04             Narrative:    EXAMINATION:  XR KNEE COMP 4 OR MORE VIEWS RIGHT    CLINICAL HISTORY:  Pain in right knee    TECHNIQUE:  Five views of the right knee.    COMPARISON:  None    FINDINGS:  There is a comminuted fracture of the patella with both transverse and longitudinal components.  Displacement measures approximately 6 mm at maximum.    Expected joint effusion.    No other fracture identified.                               X-Ray Chest AP Portable (Final result)  Result time " 10/24/20 21:34:41    Final result by Chepe Barnes MD (10/24/20 21:34:41)                 Impression:      No acute abnormality.      Electronically signed by: Chepe Barnes  Date:    10/24/2020  Time:    21:34             Narrative:    EXAMINATION:  XR CHEST AP PORTABLE    CLINICAL HISTORY:  fall;    TECHNIQUE:  Single frontal view of the chest was performed.    COMPARISON:  10/19/2011    FINDINGS:  The lungs are clear, with normal appearance of pulmonary vasculature and no pleural effusion or pneumothorax.    The cardiac silhouette is borderline enlarged possibly exaggerated by AP technique.  The hilar and mediastinal contours are unremarkable.    Bones are grossly intact.                               CT Head Without Contrast (Final result)  Result time 10/24/20 21:27:45    Final result by Chepe Barnes MD (10/24/20 21:27:45)                 Impression:      No acute intracranial CT abnormality.    Mild microvascular ischemic changes.    All CT scans at this facility are performed  using dose modulation techniques as appropriate to performed exam including the following:  automated exposure control; adjustment of mA and/or kV according to the patients size (this includes techniques or standardized protocols for targeted exams where dose is matched to indication/reason for exam: i.e. extremities or head);  iterative reconstruction technique.      Electronically signed by: Chepe Barnes  Date:    10/24/2020  Time:    21:27             Narrative:    EXAMINATION:  CT HEAD WITHOUT CONTRAST    CLINICAL HISTORY:  s/p fall;    TECHNIQUE:  Low dose axial CT images obtained throughout the head without intravenous contrast. Sagittal and coronal reconstructions were performed.    COMPARISON:  None.    FINDINGS:  Intracranial compartment:    Ventricles and sulci are normal in size for age without evidence of hydrocephalus. No extra-axial blood or fluid collections.    Mild microvascular ischemic changes.  No  parenchymal mass, hemorrhage, edema or major vascular distribution infarct.    Skull/extracranial contents (limited evaluation): No fracture. Mastoid air cells and paranasal sinuses are essentially clear.                                      The Emergency Provider reviewed the vital signs and test results, which are outlined above.     ED Discussion     10:19 PM: Discussed pt's case with Dr. Phillip (Orthopedic Surgery) who recommends knee immbolizer and f/u in clinic.    10:30 PM: Reassessed pt at this time. Discussed with pt all pertinent ED information and results. Discussed pt dx and plan of tx. Gave pt all f/u and return to the ED instructions. All questions and concerns were addressed at this time. Pt expresses understanding of information and instructions, and is comfortable with plan to discharge. Pt is stable for discharge.    I discussed with patient and/or family/caretaker that evaluation in the ED does not suggest any emergent or life threatening medical conditions requiring immediate intervention beyond what was provided in the ED, and I believe patient is safe for discharge.  Regardless, an unremarkable evaluation in the ED does not preclude the development or presence of a serious of life threatening condition. As such, patient was instructed to return immediately for any worsening or change in current symptoms.       Medical Decision Making:   Clinical Tests:   Radiological Study: Ordered and Reviewed           ED Medication(s):  Medications   Tdap vaccine injection 0.5 mL (0.5 mLs Intramuscular Given 10/24/20 2138)   ketorolac injection 30 mg (30 mg Intramuscular Given 10/24/20 2139)   hydrALAZINE injection 10 mg (10 mg Intravenous Given 10/24/20 2201)   ondansetron injection 4 mg (4 mg Intravenous Given 10/24/20 2230)   HYDROcodone-acetaminophen 5-325 mg per tablet 1 tablet (1 tablet Oral Given 10/24/20 2230)       Discharge Medication List as of 10/24/2020 10:57 PM      START taking these  "medications    Details   HYDROcodone-acetaminophen (NORCO) 5-325 mg per tablet Take 1 tablet by mouth every 8 (eight) hours as needed for Pain., Starting Sat 10/24/2020, Until Thu 10/29/2020, Print      ondansetron (ZOFRAN) 4 MG tablet Take 1 tablet (4 mg total) by mouth every 6 (six) hours. for 5 days, Starting Sat 10/24/2020, Until Thu 10/29/2020, Print             Follow-up Information     Bunyn Phillip DO. Call in 1 day.    Specialty: Orthopedic Surgery  Contact information:  [address] [place] [address] [address] [place] [address]  [phone]                       Scribe Attestation:   Scribe #1: I performed the above scribed service and the documentation accurately describes the services I performed. I attest to the accuracy of the note.     Attending:   Physician Attestation Statement for Scribe #1: I, Carline Tan MD, personally performed the services described in this documentation, as scribed by Cecy Pettit, in my presence, and it is both accurate and complete.           Clinical Impression       ICD-10-CM ICD-9-CM   1. Closed displaced comminuted fracture of right patella, initial encounter  S82.041A 822.0   2. Right knee pain  M25.561 719.46       Disposition:   Disposition: Discharged  Condition: Stable    Portions of this note may have been created with voice recognition software. Occasional "wrong-word" or "sound-a-like" substitutions may have occurred due to the inherent limitations of voice recognition software. Please, read the note carefully and recognize, using context, where substitutions have occurred.          Carline Tan MD  10/25/20 3614    "

## 2020-10-25 NOTE — DISCHARGE INSTRUCTIONS
Please f/u with orthopedics, take norco as needed for pain and zofran for nausea. You may take ibuprofen if you do not want to take norco. No weight bearing on right knee until cleared by ortho.

## 2020-10-26 DIAGNOSIS — M25.561 RIGHT KNEE PAIN, UNSPECIFIED CHRONICITY: Primary | ICD-10-CM

## 2020-10-27 ENCOUNTER — OFFICE VISIT (OUTPATIENT)
Dept: ORTHOPEDICS | Facility: CLINIC | Age: 70
DRG: 493 | End: 2020-10-27
Payer: MEDICARE

## 2020-10-27 ENCOUNTER — HOSPITAL ENCOUNTER (OUTPATIENT)
Dept: RADIOLOGY | Facility: HOSPITAL | Age: 70
Discharge: HOME | DRG: 493 | End: 2020-10-27
Attending: STUDENT IN AN ORGANIZED HEALTH CARE EDUCATION/TRAINING PROGRAM
Payer: MEDICARE

## 2020-10-27 ENCOUNTER — HOSPITAL ENCOUNTER (OUTPATIENT)
Dept: PREADMISSION TESTING | Facility: HOSPITAL | Age: 70
Discharge: HOME | DRG: 493 | End: 2020-10-27
Attending: STUDENT IN AN ORGANIZED HEALTH CARE EDUCATION/TRAINING PROGRAM
Payer: MEDICARE

## 2020-10-27 VITALS
HEART RATE: 68 BPM | WEIGHT: 280 LBS | BODY MASS INDEX: 46.65 KG/M2 | HEIGHT: 65 IN | SYSTOLIC BLOOD PRESSURE: 185 MMHG | DIASTOLIC BLOOD PRESSURE: 75 MMHG

## 2020-10-27 VITALS
OXYGEN SATURATION: 96 % | TEMPERATURE: 97 F | DIASTOLIC BLOOD PRESSURE: 78 MMHG | HEART RATE: 70 BPM | SYSTOLIC BLOOD PRESSURE: 188 MMHG | RESPIRATION RATE: 16 BRPM

## 2020-10-27 DIAGNOSIS — E11.29 TYPE 2 DIABETES MELLITUS WITH MICROALBUMINURIA, WITH LONG-TERM CURRENT USE OF INSULIN: ICD-10-CM

## 2020-10-27 DIAGNOSIS — S82.041A CLOSED DISPLACED COMMINUTED FRACTURE OF RIGHT PATELLA, INITIAL ENCOUNTER: ICD-10-CM

## 2020-10-27 DIAGNOSIS — M25.561 RIGHT KNEE PAIN, UNSPECIFIED CHRONICITY: ICD-10-CM

## 2020-10-27 DIAGNOSIS — I10 ESSENTIAL HYPERTENSION: ICD-10-CM

## 2020-10-27 DIAGNOSIS — Z01.818 PREOP EXAMINATION: Primary | ICD-10-CM

## 2020-10-27 DIAGNOSIS — R80.9 TYPE 2 DIABETES MELLITUS WITH MICROALBUMINURIA, WITH LONG-TERM CURRENT USE OF INSULIN: ICD-10-CM

## 2020-10-27 DIAGNOSIS — M25.562 ACUTE PAIN OF LEFT KNEE: ICD-10-CM

## 2020-10-27 DIAGNOSIS — Z12.11 SPECIAL SCREENING FOR MALIGNANT NEOPLASM OF COLON: Primary | ICD-10-CM

## 2020-10-27 DIAGNOSIS — S82.041A CLOSED DISPLACED COMMINUTED FRACTURE OF RIGHT PATELLA, INITIAL ENCOUNTER: Primary | ICD-10-CM

## 2020-10-27 DIAGNOSIS — Z79.4 TYPE 2 DIABETES MELLITUS WITH MICROALBUMINURIA, WITH LONG-TERM CURRENT USE OF INSULIN: ICD-10-CM

## 2020-10-27 LAB
ALBUMIN SERPL BCP-MCNC: 3.4 G/DL (ref 3.5–5.2)
ALP SERPL-CCNC: 70 U/L (ref 55–135)
ALT SERPL W/O P-5'-P-CCNC: 19 U/L (ref 10–44)
ANION GAP SERPL CALC-SCNC: 13 MMOL/L (ref 8–16)
AST SERPL-CCNC: 26 U/L (ref 10–40)
BASOPHILS # BLD AUTO: 0.03 K/UL (ref 0–0.2)
BASOPHILS NFR BLD: 0.5 % (ref 0–1.9)
BILIRUB SERPL-MCNC: 0.9 MG/DL (ref 0.1–1)
BUN SERPL-MCNC: 21 MG/DL (ref 8–23)
CALCIUM SERPL-MCNC: 9.5 MG/DL (ref 8.7–10.5)
CHLORIDE SERPL-SCNC: 103 MMOL/L (ref 95–110)
CO2 SERPL-SCNC: 28 MMOL/L (ref 23–29)
CREAT SERPL-MCNC: 0.9 MG/DL (ref 0.5–1.4)
DIFFERENTIAL METHOD: ABNORMAL
EOSINOPHIL # BLD AUTO: 0.1 K/UL (ref 0–0.5)
EOSINOPHIL NFR BLD: 1.7 % (ref 0–8)
ERYTHROCYTE [DISTWIDTH] IN BLOOD BY AUTOMATED COUNT: 13.4 % (ref 11.5–14.5)
EST. GFR  (AFRICAN AMERICAN): >60 ML/MIN/1.73 M^2
EST. GFR  (NON AFRICAN AMERICAN): >60 ML/MIN/1.73 M^2
GLUCOSE SERPL-MCNC: 83 MG/DL (ref 70–110)
HCT VFR BLD AUTO: 37.8 % (ref 37–48.5)
HGB BLD-MCNC: 12.7 G/DL (ref 12–16)
IMM GRANULOCYTES # BLD AUTO: 0.01 K/UL (ref 0–0.04)
IMM GRANULOCYTES NFR BLD AUTO: 0.2 % (ref 0–0.5)
LYMPHOCYTES # BLD AUTO: 1.2 K/UL (ref 1–4.8)
LYMPHOCYTES NFR BLD: 19.9 % (ref 18–48)
MCH RBC QN AUTO: 29.5 PG (ref 27–31)
MCHC RBC AUTO-ENTMCNC: 33.6 G/DL (ref 32–36)
MCV RBC AUTO: 88 FL (ref 82–98)
MONOCYTES # BLD AUTO: 0.4 K/UL (ref 0.3–1)
MONOCYTES NFR BLD: 6.7 % (ref 4–15)
NEUTROPHILS # BLD AUTO: 4.3 K/UL (ref 1.8–7.7)
NEUTROPHILS NFR BLD: 71 % (ref 38–73)
NRBC BLD-RTO: 0 /100 WBC
PLATELET # BLD AUTO: 76 K/UL (ref 150–350)
PLATELET BLD QL SMEAR: ABNORMAL
PMV BLD AUTO: 11 FL (ref 9.2–12.9)
POTASSIUM SERPL-SCNC: 3.8 MMOL/L (ref 3.5–5.1)
PROT SERPL-MCNC: 6.9 G/DL (ref 6–8.4)
RBC # BLD AUTO: 4.3 M/UL (ref 4–5.4)
SODIUM SERPL-SCNC: 144 MMOL/L (ref 136–145)
WBC # BLD AUTO: 5.98 K/UL (ref 3.9–12.7)

## 2020-10-27 PROCEDURE — 93010 EKG 12-LEAD: ICD-10-PCS | Mod: ,,, | Performed by: INTERNAL MEDICINE

## 2020-10-27 PROCEDURE — 73560 X-RAY EXAM OF KNEE 1 OR 2: CPT | Mod: TC,RT

## 2020-10-27 PROCEDURE — 3008F BODY MASS INDEX DOCD: CPT | Mod: S$GLB,,, | Performed by: STUDENT IN AN ORGANIZED HEALTH CARE EDUCATION/TRAINING PROGRAM

## 2020-10-27 PROCEDURE — 93010 ELECTROCARDIOGRAM REPORT: CPT | Mod: ,,, | Performed by: INTERNAL MEDICINE

## 2020-10-27 PROCEDURE — 85025 COMPLETE CBC W/AUTO DIFF WBC: CPT

## 2020-10-27 PROCEDURE — 3078F PR MOST RECENT DIASTOLIC BLOOD PRESSURE < 80 MM HG: ICD-10-PCS | Mod: S$GLB,,, | Performed by: STUDENT IN AN ORGANIZED HEALTH CARE EDUCATION/TRAINING PROGRAM

## 2020-10-27 PROCEDURE — 1125F PR PAIN SEVERITY QUANTIFIED, PAIN PRESENT: ICD-10-PCS | Mod: S$GLB,,, | Performed by: STUDENT IN AN ORGANIZED HEALTH CARE EDUCATION/TRAINING PROGRAM

## 2020-10-27 PROCEDURE — 1101F PT FALLS ASSESS-DOCD LE1/YR: CPT | Mod: S$GLB,,, | Performed by: STUDENT IN AN ORGANIZED HEALTH CARE EDUCATION/TRAINING PROGRAM

## 2020-10-27 PROCEDURE — 1159F PR MEDICATION LIST DOCUMENTED IN MEDICAL RECORD: ICD-10-PCS | Mod: S$GLB,,, | Performed by: STUDENT IN AN ORGANIZED HEALTH CARE EDUCATION/TRAINING PROGRAM

## 2020-10-27 PROCEDURE — 99999 PR PBB SHADOW E&M-EST. PATIENT-LVL V: CPT | Mod: PBBFAC,,, | Performed by: STUDENT IN AN ORGANIZED HEALTH CARE EDUCATION/TRAINING PROGRAM

## 2020-10-27 PROCEDURE — 3078F DIAST BP <80 MM HG: CPT | Mod: S$GLB,,, | Performed by: STUDENT IN AN ORGANIZED HEALTH CARE EDUCATION/TRAINING PROGRAM

## 2020-10-27 PROCEDURE — 73560 X-RAY EXAM OF KNEE 1 OR 2: CPT | Mod: 26,RT,, | Performed by: RADIOLOGY

## 2020-10-27 PROCEDURE — 3077F PR MOST RECENT SYSTOLIC BLOOD PRESSURE >= 140 MM HG: ICD-10-PCS | Mod: S$GLB,,, | Performed by: STUDENT IN AN ORGANIZED HEALTH CARE EDUCATION/TRAINING PROGRAM

## 2020-10-27 PROCEDURE — 73560 XR KNEE 1 OR 2 VIEW RIGHT: ICD-10-PCS | Mod: 26,RT,, | Performed by: RADIOLOGY

## 2020-10-27 PROCEDURE — 80053 COMPREHEN METABOLIC PANEL: CPT

## 2020-10-27 PROCEDURE — 99999 PR PBB SHADOW E&M-EST. PATIENT-LVL V: ICD-10-PCS | Mod: PBBFAC,,, | Performed by: STUDENT IN AN ORGANIZED HEALTH CARE EDUCATION/TRAINING PROGRAM

## 2020-10-27 PROCEDURE — 99203 OFFICE O/P NEW LOW 30 MIN: CPT | Mod: S$GLB,,, | Performed by: STUDENT IN AN ORGANIZED HEALTH CARE EDUCATION/TRAINING PROGRAM

## 2020-10-27 PROCEDURE — 99203 PR OFFICE/OUTPT VISIT, NEW, LEVL III, 30-44 MIN: ICD-10-PCS | Mod: S$GLB,,, | Performed by: STUDENT IN AN ORGANIZED HEALTH CARE EDUCATION/TRAINING PROGRAM

## 2020-10-27 PROCEDURE — 73700 CT LOWER EXTREMITY W/O DYE: CPT | Mod: TC,RT

## 2020-10-27 PROCEDURE — 1159F MED LIST DOCD IN RCRD: CPT | Mod: S$GLB,,, | Performed by: STUDENT IN AN ORGANIZED HEALTH CARE EDUCATION/TRAINING PROGRAM

## 2020-10-27 PROCEDURE — 3008F PR BODY MASS INDEX (BMI) DOCUMENTED: ICD-10-PCS | Mod: S$GLB,,, | Performed by: STUDENT IN AN ORGANIZED HEALTH CARE EDUCATION/TRAINING PROGRAM

## 2020-10-27 PROCEDURE — 3077F SYST BP >= 140 MM HG: CPT | Mod: S$GLB,,, | Performed by: STUDENT IN AN ORGANIZED HEALTH CARE EDUCATION/TRAINING PROGRAM

## 2020-10-27 PROCEDURE — 93005 ELECTROCARDIOGRAM TRACING: CPT

## 2020-10-27 PROCEDURE — 1101F PR PT FALLS ASSESS DOC 0-1 FALLS W/OUT INJ PAST YR: ICD-10-PCS | Mod: S$GLB,,, | Performed by: STUDENT IN AN ORGANIZED HEALTH CARE EDUCATION/TRAINING PROGRAM

## 2020-10-27 PROCEDURE — 73700 CT KNEE WITHOUT CONTRAST RIGHT: ICD-10-PCS | Mod: 26,RT,, | Performed by: RADIOLOGY

## 2020-10-27 PROCEDURE — 73700 CT LOWER EXTREMITY W/O DYE: CPT | Mod: 26,RT,, | Performed by: RADIOLOGY

## 2020-10-27 PROCEDURE — 1125F AMNT PAIN NOTED PAIN PRSNT: CPT | Mod: S$GLB,,, | Performed by: STUDENT IN AN ORGANIZED HEALTH CARE EDUCATION/TRAINING PROGRAM

## 2020-10-27 RX ORDER — HUMAN INSULIN 100 [USP'U]/ML
INJECTION, SUSPENSION SUBCUTANEOUS
COMMUNITY
Start: 2020-10-14 | End: 2020-10-28 | Stop reason: SDUPTHER

## 2020-10-27 RX ORDER — SYRINGE,SAFETY WITH NEEDLE,1ML 25GX1"
SYRINGE (EA) MISCELLANEOUS
COMMUNITY
Start: 2020-06-08

## 2020-10-27 NOTE — ASSESSMENT & PLAN NOTE
The patient is scheduled for ORIF of the Right knee, procedure will be performed by Dr. Wolf on 10/29/2020.     Known risk factors for perioperative complications: Morbid obesity, Hypertension.     Difficulty with intubation is not anticipated.  Mallampati 1   Cardiac Risk Estimation: Low risk for low risk surgery.     1.) Preoperative workup as follows: CBC, CMP, EKG, Rapid COVID-19 will be performed morning of surgery .  2.) Change in medication regimen before surgery: Discontinue NSAIDs 5 days before surgery, discontinue Metformin the day of surgery, Patient will receive 80% of long acting insulin dose the night before surgery, discontinue ACE inhibitor morning of surgery to avoid hypotension from anesthesia   3.) Prophylaxis for cardiac events with perioperative beta-blockers: not indicated.  4.) Invasive hemodynamic monitoring perioperatively: at the discretion of anesthesiologist.  5.) Deep vein thrombosis prophylaxis postoperatively: regimen to be chosen by surgical team.  6.) Surveillance for postoperative MI with ECG immediately postoperatively and on postoperati ve days 1 and 2 AND troponin levels 24 hours postoperatively and on day 4 or hospital discharge (whichever comes first): not indicated.  7.) Current medications which may produce withdrawal symptoms if withheld perioperatively: None  8.) Other measures: None

## 2020-10-27 NOTE — ASSESSMENT & PLAN NOTE
BMI 46.59.  Normal rang of motion of the neck.  Mallampati 1.  Recommend calorie reduction and weight loss.

## 2020-10-27 NOTE — ASSESSMENT & PLAN NOTE
Uncontrolled with current medication regiment. 188/78 in clinic today, patient is a-symptomatic.   Patient reports she is complaint with current medication regiment.  Continue HCTZ and ACE inhibitor.  Patient will follow-up ASAP with PCP Dr. Ribeiro for medication adjustment for uncontrolled blood pressure.

## 2020-10-27 NOTE — H&P
Preoperative History and Physical                                                             Hospital Medicine      Chief Complaint: Preoperative evaluation     History of Present Illness:      Lakshmi Campbell is a 69 y.o. female who presents to the office today for a preoperative consultation at the request of Dr. Wolf who plans on performing ORIF of the Right Knee on .     Functional Status:      The patient is able to climb a flight of stairs prior to left knee injury on 10/24/20. The patient is able to ambulate >500 feet without difficulty prior to left knee injury on 10/24/20. The patient's functional status is affected by the surgical problem. The patient's functional status is not affected by shortness of breath, chest pain, dyspnea on exertion and fatigue.    MET score greater than 4    Past Medical History:      Past Medical History:   Diagnosis Date    Cataract     Diabetes mellitus type I     Hyperlipidemia     Hypertension     Morbid obesity     Right knee pain     Thyroid disease         Past Surgical History:      Past Surgical History:   Procedure Laterality Date    APPENDECTOMY      BREAST BIOPSY      CATARACT EXTRACTION      EYE SURGERY      HERNIA REPAIR          Social History:      Social History     Socioeconomic History    Marital status:      Spouse name: Not on file    Number of children: Not on file    Years of education: Not on file    Highest education level: Not on file   Occupational History    Not on file   Social Needs    Financial resource strain: Not on file    Food insecurity     Worry: Not on file     Inability: Not on file    Transportation needs     Medical: Not on file     Non-medical: Not on file   Tobacco Use    Smoking status: Former Smoker     Packs/day: 1.00     Types: Cigarettes     Quit date:      Years since quittin.8    Smokeless tobacco: Never Used   Substance and Sexual  "Activity    Alcohol use: Yes     Frequency: Monthly or less    Drug use: No    Sexual activity: Yes     Partners: Male   Lifestyle    Physical activity     Days per week: Not on file     Minutes per session: Not on file    Stress: Not at all   Relationships    Social connections     Talks on phone: Not on file     Gets together: Not on file     Attends Mosque service: Not on file     Active member of club or organization: Not on file     Attends meetings of clubs or organizations: Not on file     Relationship status: Not on file   Other Topics Concern    Not on file   Social History Narrative    Not on file        Family History:      Family History   Problem Relation Age of Onset    Diabetes Mother     Leukemia Father     Diabetes Father        Allergies:      Review of patient's allergies indicates:   Allergen Reactions    Codeine Nausea Only     Other reaction(s): Nausea  Other reaction(s): Elevated blood pressure       Medications:      Current Outpatient Medications   Medication Sig    blood sugar diagnostic (ONE TOUCH ULTRA TEST) Strp Use ac bid    hydrochlorothiazide (HYDRODIURIL) 25 MG tablet Take 25 mg by mouth once daily.     HYDROcodone-acetaminophen (NORCO) 5-325 mg per tablet Take 1 tablet by mouth every 8 (eight) hours as needed for Pain.    insulin NPH-insulin regular, 70/30, (NOVOLIN 70/30 U-100 INSULIN) 100 unit/mL (70-30) injection Inject 75 units sc ac supper.    insulin syringe-needle U-100 1 mL 29 gauge x 1/2" Syrg Use bid    levothyroxine (SYNTHROID) 175 MCG tablet TAKE 1 TABLET BY MOUTH DAILY    metformin (GLUCOPHAGE) 1000 MG tablet 1,000 mg 2 (two) times daily with meals.     ondansetron (ZOFRAN) 4 MG tablet Take 1 tablet (4 mg total) by mouth every 6 (six) hours. for 5 days    perindopril erbumine (ACEON) 4 mg tablet Take 4 mg by mouth once daily.     pravastatin (PRAVACHOL) 20 MG tablet Take 20 mg by mouth once daily.     SURE COMFORT PEN NEEDLE 31 X 5/16 " Ndle  "    albuterol 90 mcg/actuation inhaler Inhale 2 puffs into the lungs every 6 (six) hours as needed for Wheezing.    aspirin (ECOTRIN) 81 MG EC tablet     benzonatate (TESSALON PERLES) 100 MG capsule 1-2 capsules.    blood-glucose meter kit Use as instructed    cetirizine (ZYRTEC) 10 MG tablet Take 1 tablet (10 mg total) by mouth once daily.    fluticasone (FLONASE) 50 mcg/actuation nasal spray 1 spray by Each Nare route once daily. (Patient not taking: Reported on 10/27/2020)    INSULIN NPL/INSULIN LISPRO (HUMALOG MIX 75-25 KWIKPEN SUBQ) Inject 85 Units into the skin Twice daily.     NOVOLIN 70/30 U-100 INSULIN 100 unit/mL (70-30) injection      No current facility-administered medications for this encounter.        Vitals:      Vitals:    10/27/20 1424   BP: (!) 188/78   Pulse:    Resp:    Temp:        Review of Systems:        Constitutional: Negative for fever, chills, weight loss, malaise/fatigue and diaphoresis.   HENT: Negative for hearing loss, ear pain, nosebleeds, congestion, sore throat, neck pain, tinnitus and ear discharge.    Eyes: Negative for blurred vision, double vision, photophobia, pain, discharge and redness.   Respiratory: Negative for cough, hemoptysis, sputum production, shortness of breath, wheezing and stridor.    Cardiovascular: Negative for chest pain, palpitations, orthopnea, claudication, leg swelling and PND.   Gastrointestinal: Negative for heartburn, nausea, vomiting, abdominal pain, diarrhea, constipation, blood in stool and melena.   Genitourinary: Negative for dysuria, urgency, frequency, hematuria and flank pain.   Musculoskeletal: Negative for myalgias, back pain. Positive for slip and fall on 10/24/20, with trauma to the right knee, reports right knee pain, patient is scheduled for an ORIF of the right knee with Dr. Wolf on 10/29/2020.   Skin: Negative for itching and rash.   Neurological: Negative for dizziness, tingling, tremors, sensory change, speech change, focal  weakness, seizures, loss of consciousness, weakness and headaches.   Endo/Heme/Allergies: Negative for environmental allergies and polydipsia. Does not bruise/bleed easily.   Psychiatric/Behavioral: Negative for depression, suicidal ideas, hallucinations, memory loss and substance abuse. The patient is not nervous/anxious and does not have insomnia.    All 14 systems reviewed and negative except as noted above.    Physical Exam:      Constitutional: Appears well-developed, well-nourished and in no acute distress.  Patient is oriented to person, place, and time.   Head: Normocephalic and atraumatic. Mucous membranes moist.  Neck: Neck supple no mass.   Cardiovascular: Normal rate and regular rhythm.  S1 S2 appreciated by ascultation.  Pulmonary/Chest: Effort normal and clear to auscultation bilaterally. No respiratory distress.   Abdomen: Soft. Non-tender and non-distended. Bowel sounds are normal.   Neurological: Patient is alert and oriented to person, place and time. Moves all extremities.  Skin: Warm and dry. No lesions.  Extremities: No clubbing, cyanosis or edema. Right knee immobilizer in place, bruising noted at the right knee.     Laboratory data:      Reviewed and noted in plan where applicable. Please see chart for full laboratory data.    No results for input(s): CPK, CPKMB, TROPONINI, MB in the last 24 hours. No results for input(s): POCTGLUCOSE in the last 24 hours.     No results found for: INR, PROTIME    Lab Results   Component Value Date    WBC 5.98 10/27/2020    HGB 12.7 10/27/2020    HCT 37.8 10/27/2020    MCV 88 10/27/2020    PLT 76 (L) 10/27/2020       No results for input(s): GLU, NA, K, CL, CO2, BUN, CREATININE, CALCIUM, MG in the last 24 hours.    Predictors of intubation difficulty:       Morbid obesity? yes - BMI 46.5. Mallampati 1. Normal ROM of the neck. The patient is active.    Anatomically abnormal facies? no   Prominent incisors? no   Receding mandible? no   Short, thick neck? no    Neck range of motion: normal   Dentition: No chipped, loose, or missing teeth.    Cardiographics:      ECG: Normal Sinus Rhythm  Echocardiogram: Not indicated.     Imaging:      Chest x-ray:   10/24/20  The lungs are clear, with normal appearance of pulmonary vasculature and no pleural effusion or pneumothorax.     The cardiac silhouette is borderline enlarged possibly exaggerated by AP technique.  The hilar and mediastinal contours are unremarkable.     Bones are grossly intact.     Impression:     No acute abnormality.    Assessment and Plan:      Left knee pain  The patient is scheduled for ORIF of the Right knee, procedure will be performed by Dr. Wolf on 10/29/2020.     Known risk factors for perioperative complications: Morbid obesity, Hypertension.     Difficulty with intubation is not anticipated.  Mallampati 1   Cardiac Risk Estimation: Low risk for low risk surgery.     1.) Preoperative workup as follows: CBC, CMP, EKG, Rapid COVID-19 will be performed morning of surgery .  2.) Change in medication regimen before surgery: Discontinue NSAIDs 5 days before surgery, discontinue Metformin the day of surgery, Patient will receive 80% of long acting insulin dose the night before surgery, discontinue ACE inhibitor morning of surgery to avoid hypotension from anesthesia   3.) Prophylaxis for cardiac events with perioperative beta-blockers: not indicated.  4.) Invasive hemodynamic monitoring perioperatively: at the discretion of anesthesiologist.  5.) Deep vein thrombosis prophylaxis postoperatively: regimen to be chosen by surgical team.  6.) Surveillance for postoperative MI with ECG immediately postoperatively and on postoperati ve days 1 and 2 AND troponin levels 24 hours postoperatively and on day 4 or hospital discharge (whichever comes first): not indicated.  7.) Current medications which may produce withdrawal symptoms if withheld perioperatively: None  8.) Other measures: None    Essential  hypertension  Uncontrolled with current medication regiment. 188/78 in clinic today, patient is a-symptomatic. Denies chest pain, SOB, palpitations, fever, malaise, weakness, fatigue, syncope, dizziness, lightheadedness, tremors, falls, abdominal pain, constipation, diarrhea, and edema.    Patient reports she is complaint with current medication regiment.  Continue HCTZ and ACE inhibitor.  Patient will follow-up ASAP with PCP Dr. Ribeiro for medication adjustment for uncontrolled blood pressure.   Discussed with the patient in great detail the importance of urgent follow-up with PCP for poorly controlled BP. Discussed signs and symptoms to prompting presentation to the ED in great detail. Patient confirmed understanding.     Body mass index (BMI) 45.0-49.9, adult  BMI 46.59.  Normal rang of motion of the neck.  Mallampati 1.  Recommend calorie reduction and weight loss.     Type 2 diabetes mellitus with microalbuminuria, with long-term current use of insulin  Controlled with medication.  HgA1C 5.7.  Continue insulin and metformin.  Reduce NPH insulin dose to 80% the night before surgery. Hold metformin the morning of surgery.

## 2020-10-27 NOTE — ASSESSMENT & PLAN NOTE
Controlled with medication.  HgA1C 5.7.  Continue insulin and metformin.  Reduce NPH insulin dose to 80% the night before surgery. Hold metformin the morning of surgery.

## 2020-10-27 NOTE — PROGRESS NOTES
Orthopaedics Sports Medicine     Knee Initial Visit         10/27/2020  4:47 PM    Referring MD: Self, Aaareferral    Chief Complaint   Patient presents with    Right Knee - Pain       History of Present Illness:   Lakshmi Campbell is a 69 y.o. year old female patient presents with pain and dysfunction involving the RIGHT knee. Onset of the symptoms was 10/24/2020. Inciting event:fall directly onto knee. Current symptoms include pain, swelling, inability to ambulate. Pain is aggravated by movement of the leg.  Evaluation to date: XR. Treatment to date: rest, activity modification, bracing.    This is a pleasant 69-year-old female who had a fall directly onto her right knee over the weekend.  She presents to the emergency department and was found to have a comminuted right patella fracture.  She is placed into a knee immobilizer and she is here today in clinic for follow-up.        Past Medical History:   Past Medical History:   Diagnosis Date    Cataract     Diabetes mellitus type I     Hyperlipidemia     Hypertension     Morbid obesity     Right knee pain     Thyroid disease        Past Surgical History:   Past Surgical History:   Procedure Laterality Date    APPENDECTOMY      BREAST BIOPSY      CATARACT EXTRACTION      EYE SURGERY      HERNIA REPAIR         Medications:  Patient's Medications   New Prescriptions    No medications on file   Previous Medications    ALBUTEROL 90 MCG/ACTUATION INHALER    Inhale 2 puffs into the lungs every 6 (six) hours as needed for Wheezing.    ASPIRIN (ECOTRIN) 81 MG EC TABLET        BENZONATATE (TESSALON PERLES) 100 MG CAPSULE    1-2 capsules.    BLOOD SUGAR DIAGNOSTIC (ONE TOUCH ULTRA TEST) STRP    Use ac bid    BLOOD-GLUCOSE METER KIT    Use as instructed    CETIRIZINE (ZYRTEC) 10 MG TABLET    Take 1 tablet (10 mg total) by mouth once daily.    FLUTICASONE (FLONASE) 50 MCG/ACTUATION NASAL SPRAY    1 spray by Each Nare route once daily.    HYDROCHLOROTHIAZIDE  "(HYDRODIURIL) 25 MG TABLET    Take 25 mg by mouth once daily.     HYDROCODONE-ACETAMINOPHEN (NORCO) 5-325 MG PER TABLET    Take 1 tablet by mouth every 8 (eight) hours as needed for Pain.    INSULIN NPH-INSULIN REGULAR, 70/30, (NOVOLIN 70/30 U-100 INSULIN) 100 UNIT/ML (70-30) INJECTION    Inject 75 units sc ac supper.    INSULIN NPL/INSULIN LISPRO (HUMALOG MIX 75-25 KWIKPEN SUBQ)    Inject 85 Units into the skin Twice daily.     INSULIN SYRINGE-NEEDLE U-100 1 ML 29 GAUGE X 1/2" SYRG    Use bid    LEVOTHYROXINE (SYNTHROID) 175 MCG TABLET    TAKE 1 TABLET BY MOUTH DAILY    METFORMIN (GLUCOPHAGE) 1000 MG TABLET    1,000 mg 2 (two) times daily with meals.     NOVOLIN 70/30 U-100 INSULIN 100 UNIT/ML (70-30) INJECTION        ONDANSETRON (ZOFRAN) 4 MG TABLET    Take 1 tablet (4 mg total) by mouth every 6 (six) hours. for 5 days    PERINDOPRIL ERBUMINE (ACEON) 4 MG TABLET    Take 4 mg by mouth once daily.     PRAVASTATIN (PRAVACHOL) 20 MG TABLET    Take 20 mg by mouth once daily.     SURE COMFORT PEN NEEDLE 31 X 5/16 " NDLE       Modified Medications    No medications on file   Discontinued Medications    No medications on file        Allergies:   Review of patient's allergies indicates:   Allergen Reactions    Codeine Nausea Only     Other reaction(s): Nausea  Other reaction(s): Elevated blood pressure       Social History:   [place]: claudia lilly  occupation: retired  alcohol use: She reports current alcohol use.  tobacco use: She reports that she quit smoking about 31 years ago. Her smoking use included cigarettes. She smoked 1.00 pack per day. She has never used smokeless tobacco.    Review of systems:  history of recent illness, fevers, shakes, or chills: no  history of cardiac problems or chest pain: no  history of of pulmonary problems or asthma: no  history of diabetes: YES  history of prior DVT or clotting problems: no  history of sleep apnea: no    Physical Examination:  Estimated body mass index is 46.59 kg/m² " "as calculated from the following:    Height as of this encounter: 5' 5" (1.651 m).    Weight as of this encounter: 127 kg (280 lb).    Skin intact, significant swelling and ecchymosis    Knee      RIGHT  LEFT  Skin:     Intact   Intact  ROM:     0  0-130  Effusion:    ++   Neg  Batsheva:     Neg   Neg  Patella crepitus:   Neg   Neg  Patella tenderness:   +++   Neg  N-V               intact  intact  Hip:    nml    nml   Lower extremity edema: Negative negative    Neurovascular exam  - motor function grossly intact bilaterally to hip flexion, knee extension and flexion, ankle dorsiflexion and plantarflexion  - sensation intact to light touch bilaterally to femoral, tibial, tibial and peroneal distributions  - symmetrical pedal pulses    Imaging:  X-Ray Knee 1 or 2 View Right  Narrative: EXAMINATION:  XR KNEE 1 OR 2 VIEW RIGHT    CLINICAL HISTORY:  Pain in right knee    TECHNIQUE:  AP and lateral views of the right knee were performed.    COMPARISON:  October 24, 2020    FINDINGS:  Comminuted fracture of the patella again noted with mild displacement of the fracture fragments.  Persistent effusion.  Juxta-articular osteopenia.  Articulation appears maintained at the patellofemoral and femoral tibial joints.  Impression: As above.    Electronically signed by: Ismael Ritchie MD  Date:    10/27/2020  Time:    15:30  CT Knee Without Contrast Right  Narrative: EXAMINATION:  CT KNEE WITHOUT CONTRAST RIGHT    CLINICAL HISTORY:  comminuted patella fracture, preop evaluation;  Displaced comminuted fracture of right patella, initial encounter for closed fracture    TECHNIQUE:  Transaxial computed tomographic images of the right knee were performed without the administration of intravenous contrast.  Subsequently, coronal and sagittal reconstructions were performed.    COMPARISON:  Knee radiographs October 27, 2020.    FINDINGS:  There is a comminuted right patellar fracture.  There is 2 cm x 1.4 cm central gap within the patella " about which the patellar fracture fragments are circumferentially positioned.  Distance spanning the margin of the most superior fracture fragment to the most inferior fracture fragment is approximately 4.7 cm.  Distance spanning the margin of the medial most fracture fragment to the lateral-most fracture fragment measures approximately 4.2 cm.    Generalized osteopenia is present.  No fracture identified within the visualized femur, tibia, and fibula.  Small right knee effusion.  Soft tissue stranding is seen adjacent to the right patellar fracture.  No CT evidence of disruption of the distal quadriceps and patellar tendons.  Atherosclerotic calcifications are seen within the visualized femoral, popliteal, and calf arteries.  Impression: Acute comminuted right patellar fracture with associated right knee effusion.    Electronically signed by: Valentín Melendez  Date:    10/27/2020  Time:    14:12       Impression:  69 y.o. female with     ICD-10-CM ICD-9-CM    1. Closed displaced comminuted fracture of right patella, initial encounter  S82.041A 822.0 Case Request Operating Room: ORIF, FRACTURE, PATELLA      CT Knee Without Contrast Right       Plan:  Discussed with her and her  today that her patella fracture is displaced and will require operative fixation.  She has significant comminution to it that I can see on the x-ray and so I would like to get a CT scan for better evaluation of the fracture fragments and preoperative planning.  I think we should do the surgery sooner rather than later so I would like to do it this Thursday.  I anticipate that this will be an outpatient surgery but she will have to be nonweightbearing with her knee immobilized afterwards.        Sage Wolf MD

## 2020-10-27 NOTE — DISCHARGE INSTRUCTIONS
To confirm, Your doctor has instructed you that surgery is scheduled for 10/29/20 **.       Please report to [facility] .  Pre admit office will call afternoon prior to surgery with final arrival time    INSTRUCTIONS IMPORTANT!!!   Do not eat, drink, or smoke after 12 midnight-including water. OK to brush teeth, no gum, candy or mints!    ¨ Take only these medicines with a small swallow of water-morning of surgery.   Synthroid, Hydrochlorothiazide    Pre operative instructions:  Please review the Pre-Operative Instruction booklet that you were given.        Bathing Instructions--See page 6 in the Pre-operative booklet.      Prevention of surgical site infections:     -Keep incisions clean and dry.   -Do not soak/submerge incisions in water until completely healed.   -Do not apply lotions, powders, creams, or deodorants to site.   -Always make sure hands are cleaned with antibacterial soap/ alcohol-based  prior to touching the surgical site.  (This includes doctors, nurses, staff, and yourself.)    Signs and symptoms:   -Redness and pain around the area where you had surgery   -Drainage of cloudy fluid from your surgical wound   -Fever over 100.4       I have read or had read and explained to me, and understand the above information.  Additional comments or instructions:  Received a copy of Pre-operative instructions booklet, FAQ surgical site infection sheet, and packets of hibiclens (if indicated).

## 2020-10-28 ENCOUNTER — OFFICE VISIT (OUTPATIENT)
Dept: INTERNAL MEDICINE | Facility: CLINIC | Age: 70
DRG: 493 | End: 2020-10-28
Payer: MEDICARE

## 2020-10-28 ENCOUNTER — TELEPHONE (OUTPATIENT)
Dept: PREADMISSION TESTING | Facility: HOSPITAL | Age: 70
End: 2020-10-28

## 2020-10-28 VITALS
TEMPERATURE: 97 F | DIASTOLIC BLOOD PRESSURE: 88 MMHG | OXYGEN SATURATION: 96 % | HEART RATE: 66 BPM | HEIGHT: 65 IN | SYSTOLIC BLOOD PRESSURE: 156 MMHG | BODY MASS INDEX: 46.59 KG/M2

## 2020-10-28 DIAGNOSIS — R80.9 TYPE 2 DIABETES MELLITUS WITH MICROALBUMINURIA, WITH LONG-TERM CURRENT USE OF INSULIN: ICD-10-CM

## 2020-10-28 DIAGNOSIS — S82.041A CLOSED DISPLACED COMMINUTED FRACTURE OF RIGHT PATELLA, INITIAL ENCOUNTER: ICD-10-CM

## 2020-10-28 DIAGNOSIS — E11.29 TYPE 2 DIABETES MELLITUS WITH MICROALBUMINURIA, WITH LONG-TERM CURRENT USE OF INSULIN: ICD-10-CM

## 2020-10-28 DIAGNOSIS — Z79.4 TYPE 2 DIABETES MELLITUS WITH MICROALBUMINURIA, WITH LONG-TERM CURRENT USE OF INSULIN: ICD-10-CM

## 2020-10-28 DIAGNOSIS — I10 ESSENTIAL HYPERTENSION: Primary | ICD-10-CM

## 2020-10-28 PROBLEM — S92.302A CLOSED FRACTURE OF METATARSAL BONE OF LEFT FOOT: Status: RESOLVED | Noted: 2019-11-07 | Resolved: 2020-10-28

## 2020-10-28 PROCEDURE — 3008F PR BODY MASS INDEX (BMI) DOCUMENTED: ICD-10-PCS | Mod: S$GLB,,, | Performed by: NURSE PRACTITIONER

## 2020-10-28 PROCEDURE — 1101F PT FALLS ASSESS-DOCD LE1/YR: CPT | Mod: S$GLB,,, | Performed by: NURSE PRACTITIONER

## 2020-10-28 PROCEDURE — 3077F SYST BP >= 140 MM HG: CPT | Mod: S$GLB,,, | Performed by: NURSE PRACTITIONER

## 2020-10-28 PROCEDURE — 99499 UNLISTED E&M SERVICE: CPT | Mod: S$GLB,,, | Performed by: NURSE PRACTITIONER

## 2020-10-28 PROCEDURE — 3008F BODY MASS INDEX DOCD: CPT | Mod: S$GLB,,, | Performed by: NURSE PRACTITIONER

## 2020-10-28 PROCEDURE — 3077F PR MOST RECENT SYSTOLIC BLOOD PRESSURE >= 140 MM HG: ICD-10-PCS | Mod: S$GLB,,, | Performed by: NURSE PRACTITIONER

## 2020-10-28 PROCEDURE — 99499 RISK ADDL DX/OHS AUDIT: ICD-10-PCS | Mod: S$GLB,,, | Performed by: NURSE PRACTITIONER

## 2020-10-28 PROCEDURE — 1101F PR PT FALLS ASSESS DOC 0-1 FALLS W/OUT INJ PAST YR: ICD-10-PCS | Mod: S$GLB,,, | Performed by: NURSE PRACTITIONER

## 2020-10-28 PROCEDURE — 99999 PR PBB SHADOW E&M-EST. PATIENT-LVL IV: ICD-10-PCS | Mod: PBBFAC,,, | Performed by: NURSE PRACTITIONER

## 2020-10-28 PROCEDURE — 1125F AMNT PAIN NOTED PAIN PRSNT: CPT | Mod: S$GLB,,, | Performed by: NURSE PRACTITIONER

## 2020-10-28 PROCEDURE — 99214 PR OFFICE/OUTPT VISIT, EST, LEVL IV, 30-39 MIN: ICD-10-PCS | Mod: S$GLB,,, | Performed by: NURSE PRACTITIONER

## 2020-10-28 PROCEDURE — 1159F PR MEDICATION LIST DOCUMENTED IN MEDICAL RECORD: ICD-10-PCS | Mod: S$GLB,,, | Performed by: NURSE PRACTITIONER

## 2020-10-28 PROCEDURE — 1125F PR PAIN SEVERITY QUANTIFIED, PAIN PRESENT: ICD-10-PCS | Mod: S$GLB,,, | Performed by: NURSE PRACTITIONER

## 2020-10-28 PROCEDURE — 99999 PR PBB SHADOW E&M-EST. PATIENT-LVL IV: CPT | Mod: PBBFAC,,, | Performed by: NURSE PRACTITIONER

## 2020-10-28 PROCEDURE — 3079F DIAST BP 80-89 MM HG: CPT | Mod: S$GLB,,, | Performed by: NURSE PRACTITIONER

## 2020-10-28 PROCEDURE — 3079F PR MOST RECENT DIASTOLIC BLOOD PRESSURE 80-89 MM HG: ICD-10-PCS | Mod: S$GLB,,, | Performed by: NURSE PRACTITIONER

## 2020-10-28 PROCEDURE — 99214 OFFICE O/P EST MOD 30 MIN: CPT | Mod: S$GLB,,, | Performed by: NURSE PRACTITIONER

## 2020-10-28 PROCEDURE — 1159F MED LIST DOCD IN RCRD: CPT | Mod: S$GLB,,, | Performed by: NURSE PRACTITIONER

## 2020-10-28 RX ORDER — AMLODIPINE BESYLATE 5 MG/1
5 TABLET ORAL DAILY
Qty: 30 TABLET | Refills: 0 | Status: ON HOLD | OUTPATIENT
Start: 2020-10-28 | End: 2020-11-11 | Stop reason: HOSPADM

## 2020-10-28 NOTE — PROGRESS NOTES
Subjective:       Patient ID: Lakshmi Campbell is a 69 y.o. female.    Chief Complaint: Hypertension    Mrs. Campbell presents to clinic for HTN. She is supposed to have R patella fracture sx tomorrow, but surgery was post postoned to 11/5/2020 due to elevated BP and low platelet count. She had preop labs and evaluation with Dr. Wolf, orthopedics. Labs stable except thrombocytopenia. She has appt with hem/onc tomorrow for further evaluation of this. She has had significantly elevated BP at most recent ER visit, last appt with Dr. Ribeiro, and appt with ortho. Taking HCTZ.     Hypertension  This is a chronic problem. The problem has been waxing and waning since onset. The problem is uncontrolled. Pertinent negatives include no anxiety, blurred vision, chest pain, headaches, malaise/fatigue, palpitations, peripheral edema or shortness of breath. There are no associated agents to hypertension. Risk factors for coronary artery disease include obesity, sedentary lifestyle and diabetes mellitus. Past treatments include diuretics. The current treatment provides moderate improvement. There are no compliance problems.  Hypertensive end-organ damage includes kidney disease. There is no history of angina, CAD/MI, CVA, heart failure or PVD.       Patient Active Problem List   Diagnosis    Type 2 diabetes mellitus with microalbuminuria, with long-term current use of insulin    Hypothyroidism    Essential hypertension    Hyperlipidemia    Microalbuminuria    Preop examination    Body mass index (BMI) 45.0-49.9, adult    Left knee pain    Closed displaced comminuted fracture of right patella       Family History   Problem Relation Age of Onset    Diabetes Mother     Leukemia Father     Diabetes Father      Past Surgical History:   Procedure Laterality Date    APPENDECTOMY      BREAST BIOPSY      CATARACT EXTRACTION      EYE SURGERY      HERNIA REPAIR           Current Outpatient Medications:     blood sugar  "diagnostic (ONE TOUCH ULTRA TEST) Strp, Use ac bid, Disp: , Rfl:     blood-glucose meter kit, Use as instructed, Disp: , Rfl:     hydrochlorothiazide (HYDRODIURIL) 25 MG tablet, Take 25 mg by mouth once daily. , Disp: , Rfl:     HYDROcodone-acetaminophen (NORCO) 5-325 mg per tablet, Take 1 tablet by mouth every 8 (eight) hours as needed for Pain., Disp: 15 tablet, Rfl: 0    insulin NPH-insulin regular, 70/30, (NOVOLIN 70/30 U-100 INSULIN) 100 unit/mL (70-30) injection, Inject 75 units sc ac supper., Disp: , Rfl:     insulin syringe-needle U-100 1 mL 29 gauge x 1/2" Syrg, Use bid, Disp: , Rfl:     levothyroxine (SYNTHROID) 175 MCG tablet, TAKE 1 TABLET BY MOUTH DAILY, Disp: , Rfl:     metformin (GLUCOPHAGE) 1000 MG tablet, 1,000 mg 2 (two) times daily with meals. , Disp: , Rfl:     ondansetron (ZOFRAN) 4 MG tablet, Take 1 tablet (4 mg total) by mouth every 6 (six) hours. for 5 days, Disp: 20 tablet, Rfl: 0    perindopril erbumine (ACEON) 4 mg tablet, Take 4 mg by mouth once daily. , Disp: , Rfl:     pravastatin (PRAVACHOL) 20 MG tablet, Take 20 mg by mouth once daily. , Disp: , Rfl: 11    SURE COMFORT PEN NEEDLE 31 X 5/16 " Ndle, , Disp: , Rfl: 0    amLODIPine (NORVASC) 5 MG tablet, Take 1 tablet (5 mg total) by mouth once daily., Disp: 30 tablet, Rfl: 0    Review of Systems   Constitutional: Negative for chills, diaphoresis, fatigue, fever and malaise/fatigue.   Eyes: Negative for blurred vision.   Respiratory: Negative for shortness of breath.    Cardiovascular: Negative for chest pain and palpitations.   Musculoskeletal: Positive for arthralgias and gait problem (patellar fraction). Negative for joint swelling.   Neurological: Negative for dizziness, light-headedness and headaches.       Objective:   BP (!) 156/88 (BP Location: Left arm, Patient Position: Sitting, BP Method: Medium (Manual))   Pulse 66   Temp 97.3 °F (36.3 °C) (Temporal)   Ht 5' 5" (1.651 m)   SpO2 96%   BMI 46.59 kg/m²    " "  Physical Exam  Constitutional:       General: She is not in acute distress.     Appearance: Normal appearance. She is not ill-appearing.   HENT:      Head: Normocephalic and atraumatic.   Cardiovascular:      Rate and Rhythm: Normal rate and regular rhythm.      Pulses: Normal pulses.      Heart sounds: Normal heart sounds. No murmur. No friction rub. No gallop.    Pulmonary:      Effort: Pulmonary effort is normal. No respiratory distress.      Breath sounds: Normal breath sounds.   Skin:     General: Skin is warm and dry.      Coloration: Skin is not pale.      Findings: No erythema.   Neurological:      General: No focal deficit present.      Mental Status: She is alert and oriented to person, place, and time.         Assessment & Plan     Problem List Items Addressed This Visit        Cardiac/Vascular    Essential hypertension - Primary    Current Assessment & Plan     Multiple elevated readings again today, and in most recent visits with ortho and ER. Follow up on Monday to evaluate prior to sx.          Relevant Medications    amLODIPine (NORVASC) 5 MG tablet       Endocrine    Type 2 diabetes mellitus with microalbuminuria, with long-term current use of insulin    Current Assessment & Plan     Most recent A1C 5.7 with Dr. Reynolds. Managed by endocrine. Follows up every 6 months.             Orthopedic    Closed displaced comminuted fracture of right patella    Current Assessment & Plan     Upcoming surgery on 11/5/2020. Sx was rescheduled due to thrombocytopenia and HTN.              Follow up in about 5 days (around 11/2/2020).            Portions of this note may have been created with voice recognition software. Occasional "wrong-word" or "sound-a-like" substitutions may have occurred due to the inherent limitations of voice recognition software. Please, read the note carefully and recognize, using context, where substitutions have occurred.       "

## 2020-10-28 NOTE — TELEPHONE ENCOUNTER
Spoke with pt. Pt notified of surgery being postponed until next Thursday. Will keep Hem/Onc appointment tomorrow morning for clearance. Pt agreed.

## 2020-10-28 NOTE — ASSESSMENT & PLAN NOTE
Multiple elevated readings again today, and in most recent visits with ortho and ER. Follow up on Monday to evaluate prior to sx.

## 2020-10-28 NOTE — PROGRESS NOTES
Patient, Lakshmi Campbell (MRN #[id number]), presented with a recent Platelet count less than 150 K/uL consistent with the definition of thrombocytopenia (ICD10 - D69.6).    Platelets   Date Value Ref Range Status   10/27/2020 76 (L) 150 - 350 K/uL Final     The patient's thrombocytopenia was monitored, evaluated, addressed and/or treated. This addendum to the medical record is made on 10/28/2020.

## 2020-10-29 ENCOUNTER — HOSPITAL ENCOUNTER (OUTPATIENT)
Dept: RADIOLOGY | Facility: HOSPITAL | Age: 70
Discharge: HOME | End: 2020-10-29
Attending: INTERNAL MEDICINE | Admitting: STUDENT IN AN ORGANIZED HEALTH CARE EDUCATION/TRAINING PROGRAM
Payer: MEDICARE

## 2020-10-29 ENCOUNTER — PATIENT OUTREACH (OUTPATIENT)
Dept: ADMINISTRATIVE | Facility: HOSPITAL | Age: 70
End: 2020-10-29

## 2020-10-29 ENCOUNTER — OFFICE VISIT (OUTPATIENT)
Dept: HEMATOLOGY/ONCOLOGY | Facility: CLINIC | Age: 70
DRG: 493 | End: 2020-10-29
Payer: MEDICARE

## 2020-10-29 VITALS
BODY MASS INDEX: 44.98 KG/M2 | HEART RATE: 75 BPM | DIASTOLIC BLOOD PRESSURE: 72 MMHG | TEMPERATURE: 97 F | HEIGHT: 65 IN | OXYGEN SATURATION: 98 % | SYSTOLIC BLOOD PRESSURE: 180 MMHG | WEIGHT: 270 LBS | RESPIRATION RATE: 18 BRPM

## 2020-10-29 DIAGNOSIS — D69.6 THROMBOCYTOPENIA: ICD-10-CM

## 2020-10-29 DIAGNOSIS — R93.2 ABNORMAL FINDINGS ON DIAGNOSTIC IMAGING OF LIVER AND BILIARY TRACT: ICD-10-CM

## 2020-10-29 DIAGNOSIS — R92.8 ABNORMAL MAMMOGRAM OF LEFT BREAST: ICD-10-CM

## 2020-10-29 DIAGNOSIS — D69.6 THROMBOCYTOPENIA: Primary | ICD-10-CM

## 2020-10-29 DIAGNOSIS — K76.89 LIVER NODULE: ICD-10-CM

## 2020-10-29 DIAGNOSIS — K76.0 FATTY LIVER: ICD-10-CM

## 2020-10-29 PROCEDURE — 3078F DIAST BP <80 MM HG: CPT | Mod: S$GLB,,, | Performed by: INTERNAL MEDICINE

## 2020-10-29 PROCEDURE — 1159F PR MEDICATION LIST DOCUMENTED IN MEDICAL RECORD: ICD-10-PCS | Mod: S$GLB,,, | Performed by: INTERNAL MEDICINE

## 2020-10-29 PROCEDURE — 1101F PR PT FALLS ASSESS DOC 0-1 FALLS W/OUT INJ PAST YR: ICD-10-PCS | Mod: S$GLB,,, | Performed by: INTERNAL MEDICINE

## 2020-10-29 PROCEDURE — 1159F MED LIST DOCD IN RCRD: CPT | Mod: S$GLB,,, | Performed by: INTERNAL MEDICINE

## 2020-10-29 PROCEDURE — 3077F SYST BP >= 140 MM HG: CPT | Mod: S$GLB,,, | Performed by: INTERNAL MEDICINE

## 2020-10-29 PROCEDURE — 99999 PR PBB SHADOW E&M-EST. PATIENT-LVL V: ICD-10-PCS | Mod: PBBFAC,,, | Performed by: INTERNAL MEDICINE

## 2020-10-29 PROCEDURE — 3008F BODY MASS INDEX DOCD: CPT | Mod: S$GLB,,, | Performed by: INTERNAL MEDICINE

## 2020-10-29 PROCEDURE — 99204 PR OFFICE/OUTPT VISIT, NEW, LEVL IV, 45-59 MIN: ICD-10-PCS | Mod: S$GLB,,, | Performed by: INTERNAL MEDICINE

## 2020-10-29 PROCEDURE — 76705 ECHO EXAM OF ABDOMEN: CPT | Mod: TC

## 2020-10-29 PROCEDURE — 3008F PR BODY MASS INDEX (BMI) DOCUMENTED: ICD-10-PCS | Mod: S$GLB,,, | Performed by: INTERNAL MEDICINE

## 2020-10-29 PROCEDURE — 1101F PT FALLS ASSESS-DOCD LE1/YR: CPT | Mod: S$GLB,,, | Performed by: INTERNAL MEDICINE

## 2020-10-29 PROCEDURE — 99999 PR PBB SHADOW E&M-EST. PATIENT-LVL V: CPT | Mod: PBBFAC,,, | Performed by: INTERNAL MEDICINE

## 2020-10-29 PROCEDURE — 3077F PR MOST RECENT SYSTOLIC BLOOD PRESSURE >= 140 MM HG: ICD-10-PCS | Mod: S$GLB,,, | Performed by: INTERNAL MEDICINE

## 2020-10-29 PROCEDURE — 99204 OFFICE O/P NEW MOD 45 MIN: CPT | Mod: S$GLB,,, | Performed by: INTERNAL MEDICINE

## 2020-10-29 PROCEDURE — 1125F AMNT PAIN NOTED PAIN PRSNT: CPT | Mod: S$GLB,,, | Performed by: INTERNAL MEDICINE

## 2020-10-29 PROCEDURE — 1125F PR PAIN SEVERITY QUANTIFIED, PAIN PRESENT: ICD-10-PCS | Mod: S$GLB,,, | Performed by: INTERNAL MEDICINE

## 2020-10-29 PROCEDURE — 3078F PR MOST RECENT DIASTOLIC BLOOD PRESSURE < 80 MM HG: ICD-10-PCS | Mod: S$GLB,,, | Performed by: INTERNAL MEDICINE

## 2020-10-29 NOTE — Clinical Note
Please call to let her know repeat blood work showed improvement in platelet count from 76 to 101 which is good news.  Please let her know we will need to contact her surgeon about these results and if higher platelet count is needed for surgery may consider platelet transfusion just prior to procedure depending on their discretion.  As for cause for her low platelet count the ultrasound of her abdomen does show potential changes in the liver which may be early cirrhosis.  I would like some additional blood work and CT of the abdomen.  Katheryn carmichael you please arrange for this additional labs and CT and follow-up with me afterwards.  Please also arrange for hepatology referral.

## 2020-10-29 NOTE — PATIENT INSTRUCTIONS
Labs today  Us abd liver and spleen ->  CT abdomen    hepatology referral  RV in 3 months with cbc prior

## 2020-10-29 NOTE — PROGRESS NOTES
Subjective:      DATE OF VISIT: 10/29/20     ?  Patient ID:?Lakshmi Campbell is a 69 y.o. female.?? MR#: [id number]   ?   REFERRING PROVIDER: No referring provider defined for this encounter.     ? Primary Care Providers:  Karan Ribeiro, , DO (General)     CHIEF COMPLAINT: ?  Thrombocytopenia??   ?   HPI    I had the pleasure meeting Ms. Campbell a 69-year-old woman with type 2 diabetes, hypertension, obesity.  She suffered mechanical fall requiring surgery to right knee.  Preoperative testing revealed thrombocytopenia to 76 K for which she was referred to Hematology.    Per review of lab work her last CBC was in 2012. Platelet count from 7331-0461 range 150-200 K.      She notes longstanding  bilateral arms with ecchymoses/trace excoriation.    She denies prior notable alcohol use, known liver disease/cirrhosis.  No fevers, chills, night sweats or unintentional weight loss.    Review of Systems    ?   A comprehensive 14-point review of systems was reviewed with patient and was negative other than as specified above.   ?   PAST MEDICAL HISTORY:   Past Medical History:   Diagnosis Date    Cataract     Diabetes mellitus type I     Hyperlipidemia     Hypertension     Morbid obesity     Right knee pain     Thyroid disease     ?     PAST SURGICAL HISTORY:   Past Surgical History:   Procedure Laterality Date    APPENDECTOMY      BREAST BIOPSY      CATARACT EXTRACTION      EYE SURGERY      HERNIA REPAIR        ?   ALLERGIES:   Allergies as of 10/29/2020 - Reviewed 10/29/2020   Allergen Reaction Noted    Codeine Nausea Only 09/23/2013      ?   MEDICATIONS:?   Outpatient Medications Marked as Taking for the 10/29/20 encounter (Office Visit) with Ivon Lala MD   Medication Sig Dispense Refill    amLODIPine (NORVASC) 5 MG tablet Take 1 tablet (5 mg total) by mouth once daily. 30 tablet 0    blood sugar diagnostic (ONE TOUCH ULTRA TEST) Strp Use ac bid      hydrochlorothiazide (HYDRODIURIL) 25 MG tablet  "Take 25 mg by mouth once daily.       HYDROcodone-acetaminophen (NORCO) 5-325 mg per tablet Take 1 tablet by mouth every 8 (eight) hours as needed for Pain. 15 tablet 0    insulin NPH-insulin regular, 70/30, (NOVOLIN 70/30 U-100 INSULIN) 100 unit/mL (70-30) injection Inject 75 units sc ac supper.      insulin syringe-needle U-100 1 mL 29 gauge x 1/2" Syrg Use bid      levothyroxine (SYNTHROID) 175 MCG tablet TAKE 1 TABLET BY MOUTH DAILY      metformin (GLUCOPHAGE) 1000 MG tablet 1,000 mg 2 (two) times daily with meals.       ondansetron (ZOFRAN) 4 MG tablet Take 1 tablet (4 mg total) by mouth every 6 (six) hours. for 5 days 20 tablet 0    perindopril erbumine (ACEON) 4 mg tablet Take 4 mg by mouth once daily.       pravastatin (PRAVACHOL) 20 MG tablet Take 20 mg by mouth once daily.   11    SURE COMFORT PEN NEEDLE 31 X 5/16 " Ndle   0      ?   SOCIAL HISTORY:?   Social History     Tobacco Use    Smoking status: Former Smoker     Packs/day: 1.00     Types: Cigarettes     Quit date:      Years since quittin.8    Smokeless tobacco: Never Used   Substance Use Topics    Alcohol use: Yes     Frequency: Monthly or less      ?      ?   FAMILY HISTORY:   family history includes Diabetes in her father and mother; Leukemia in her father.   ?        Objective:      Physical Exam      ?   Vitals:    10/29/20 0803   BP: (!) 180/72   Pulse: 75   Resp: 18   Temp: 97 °F (36.1 °C)      ?   ECOG:?2   General appearance: Generally well appearing, in no acute distress, sitting in wheelchair.   Head, eyes, ears, nose, and throat: Pupils round and equally reactive to light. Oropharynx clear with moist mucous membranes.   Lymph: No palpable cervical or supraclavicular lymphadenopathy.   Cardiovascular: Regular rate and rhythm, S1, S2, no audible murmurs.   Respiratory: Lungs clear to auscultation bilaterally.   Abdomen:  Obese, nontender, nondistended  Extremities: Warm, upper extremities with mild excoriations, " ecchymosis on right knee with overlying brace.  Neurologic: Alert and oriented. Grossly normal strength, coordination, and gait.   Skin: No rashes, ecchymoses or petechial lesion.   ?     ?   Laboratory:  ?   Lab Visit on 10/29/2020   Component Date Value Ref Range Status    WBC 10/29/2020 5.58  3.90 - 12.70 K/uL Final    RBC 10/29/2020 4.07  4.00 - 5.40 M/uL Final    Hemoglobin 10/29/2020 11.9* 12.0 - 16.0 g/dL Final    Hematocrit 10/29/2020 35.7* 37.0 - 48.5 % Final    MCV 10/29/2020 88  82 - 98 fL Final    MCH 10/29/2020 29.2  27.0 - 31.0 pg Final    MCHC 10/29/2020 33.3  32.0 - 36.0 g/dL Final    RDW 10/29/2020 13.2  11.5 - 14.5 % Final    Platelets 10/29/2020 104* 150 - 350 K/uL Final    MPV 10/29/2020 9.9  9.2 - 12.9 fL Final    Immature Granulocytes 10/29/2020 0.4  0.0 - 0.5 % Final    Gran # (ANC) 10/29/2020 4.0  1.8 - 7.7 K/uL Final    Immature Grans (Abs) 10/29/2020 0.02  0.00 - 0.04 K/uL Final    Lymph # 10/29/2020 1.1  1.0 - 4.8 K/uL Final    Mono # 10/29/2020 0.4  0.3 - 1.0 K/uL Final    Eos # 10/29/2020 0.1  0.0 - 0.5 K/uL Final    Baso # 10/29/2020 0.04  0.00 - 0.20 K/uL Final    nRBC 10/29/2020 0  0 /100 WBC Final    Gran % 10/29/2020 71.0  38.0 - 73.0 % Final    Lymph % 10/29/2020 19.5  18.0 - 48.0 % Final    Mono % 10/29/2020 6.8  4.0 - 15.0 % Final    Eosinophil % 10/29/2020 1.6  0.0 - 8.0 % Final    Basophil % 10/29/2020 0.7  0.0 - 1.9 % Final    Differential Method 10/29/2020 Automated   Final    Sodium 10/29/2020 142  136 - 145 mmol/L Final    Potassium 10/29/2020 3.9  3.5 - 5.1 mmol/L Final    Chloride 10/29/2020 102  95 - 110 mmol/L Final    CO2 10/29/2020 32* 23 - 29 mmol/L Final    Glucose 10/29/2020 138* 70 - 110 mg/dL Final    BUN 10/29/2020 19  8 - 23 mg/dL Final    Creatinine 10/29/2020 0.9  0.5 - 1.4 mg/dL Final    Calcium 10/29/2020 9.3  8.7 - 10.5 mg/dL Final    Total Protein 10/29/2020 6.6  6.0 - 8.4 g/dL Final    Albumin 10/29/2020 3.2* 3.5 - 5.2  g/dL Final    Total Bilirubin 10/29/2020 1.2* 0.1 - 1.0 mg/dL Final    Alkaline Phosphatase 10/29/2020 72  55 - 135 U/L Final    AST 10/29/2020 24  10 - 40 U/L Final    ALT 10/29/2020 16  10 - 44 U/L Final    Anion Gap 10/29/2020 8  8 - 16 mmol/L Final    eGFR if African American 10/29/2020 >60  >60 mL/min/1.73 m^2 Final    eGFR if non African American 10/29/2020 >60  >60 mL/min/1.73 m^2 Final    Pathologist Review 10/29/2020 Review required   Final   Patient Outreach on 10/29/2020   Component Date Value Ref Range Status    Hemoglobin A1C 10/12/2020 5.7  4.2 - 5.8 % Final      Platelets   Date Value Ref Range Status   10/29/2020 104 (L) 150 - 350 K/uL Final   10/27/2020 76 (L) 150 - 350 K/uL Final   03/05/2012 156 150 - 350 K/uL Final   10/19/2011 198 150 - 350 K/uL Final   09/06/2011 152 150 - 350 K/uL Final   04/18/2008 187 150 - 350 K/uL Final   08/03/2006 207 150 - 350 K/uL Final       IMAGING    10/29/20  US ABDOMEN LIMITED_LIVER     CLINICAL HISTORY:  Thrombocytopenia, unspecified     TECHNIQUE:  Limited ultrasound of the right upper quadrant of the abdomen (including pancreas, liver, gallbladder, common bile duct, and right kidney) was performed.     COMPARISON:  Abdomen CT 10/11/2011     FINDINGS:  Some areas of limited visualization lead body habitus/bowel gas.     Liver: Markedly heterogeneous.  Nodular contour.  Portions of the liver more notably the right lobe difficult to visualize.  Suggestion of a couple subcentimeter hypoechoic lesions left hepatic lobe.  Top-normal sized.     Gallbladder: Poorly visualized.     Biliary system: The common duct is not dilated, measuring 5 mm.  No intrahepatic ductal dilatation.     Pancreas: The visualized portions of pancreas appear normal.  Some areas obscured.     Right kidney: Normal in size with no hydronephrosis and measures 11.5 cm.     Miscellaneous: No ascites evident.  Spleen top normal size at 12.8 cm.     Impression:     Heterogeneous liver with  suspected cirrhosis.  Couple of possible subcentimeter hypoechoic lesions left hepatic lobe.  No ascites.  Spleen top normal size.     Further evaluation with a dedicated pre and post-contrast abdomen CT recommended with liver protocol to include arterial phase of enhancement.       ?   Assessment/Plan:   Thrombocytopenia  -     Hepatitis C Antibody; Future; Expected date: 10/29/2020  -     Hepatitis B Core AB Total (REFL); Future; Expected date: 10/29/2020  -     Hepatitis B Surface Ab, Qualitative; Future; Expected date: 10/29/2020  -     CBC Auto Differential; Future; Expected date: 10/29/2020  -     Comprehensive Metabolic Panel; Future; Expected date: 10/29/2020  -     Pathologist Interpretation Differential; Future; Expected date: 10/29/2020  -     HIV 1/2 Ag/Ab (4th Gen); Future; Expected date: 10/29/2020  -     US Abdomen Limited_Liver; Future; Expected date: 10/29/2020  -     Cancel: US Abdomen Limited_Spleen; Future; Expected date: 10/29/2020  -     CBC auto differential; Future; Expected date: 01/29/2021  -     Protime-INR; Future; Expected date: 10/29/2020  -     APTT; Future; Expected date: 10/29/2020  -     Fibrinogen; Future; Expected date: 10/29/2020  -     Ambulatory referral/consult to Hepatology; Future; Expected date: 11/05/2020    Abnormal mammogram of left breast  -     Ambulatory referral/consult to Hepatology; Future; Expected date: 11/05/2020    Fatty liver  -     CT Abdomen With Contrast; Future; Expected date: 10/29/2020  -     Ambulatory referral/consult to Hepatology; Future; Expected date: 11/05/2020    Liver nodule  -     AFP - tumor marker; Future; Expected date: 10/29/2020    Abnormal findings on diagnostic imaging of liver and biliary tract   -     AFP - tumor marker; Future; Expected date: 10/29/2020       1. Thrombocytopenia    2. Abnormal mammogram of left breast    3. Fatty liver    4. Liver nodule    5. Abnormal findings on diagnostic imaging of liver and biliary tract            Plan:     # thrombocytopenia:  There is a gap in available records from 2012 to present however [id number] platelet count 150-200 K. preoperative testing with platelet count 76 K. Repeat today 104 K.  We discussed differential diagnosis including occult liver disease/cirrhosis, viral, immune mediated and bone marrow disorder.  After clinic we obtained abdominal ultrasound which showed heterogeneous liver with suspected cirrhosis and couple of subcentimeter have a put lesions left hepatic lobe without ascites.  Recommend further evaluation with CT liver protocol further evaluation of liver lesions/ascites and GI referral accordingly.  Viral serologies hepatitis B, C and HIV pending.  Pathology review of peripheral blood smear pending.  Repeat platelet count 104 K which may be sufficient for proceeding with her planned surgery next week and if not surgery may consider platelet transfusion prior to procedure.    # calcification in left breast: in review of her records I notice 3/2020 screening mammogram with left calcifications in lower central position. I discussed with recommendation for dedicated diagnostic mammogram and ultrasound.  She has delayed due to concerns with COVID.  I discussed with her importance of following up on this   And she expressed understanding agree with this plan following orthopedic surgery upcoming.         Follow-Up:   Patient Instructions   Labs today  Us abd liver and spleen ->  CT abdomen    hepatology referral  RV in 3 months with cbc prior

## 2020-10-30 ENCOUNTER — HOSPITAL ENCOUNTER (INPATIENT)
Facility: HOSPITAL | Age: 70
LOS: 12 days | Discharge: NURSING, REHAB OR LONG-TERM CARE | DRG: 493 | End: 2020-11-11
Attending: EMERGENCY MEDICINE | Admitting: INTERNAL MEDICINE
Payer: MEDICARE

## 2020-10-30 DIAGNOSIS — E11.29 TYPE 2 DIABETES MELLITUS WITH MICROALBUMINURIA, WITH LONG-TERM CURRENT USE OF INSULIN: ICD-10-CM

## 2020-10-30 DIAGNOSIS — Z01.818 PREOP TESTING: ICD-10-CM

## 2020-10-30 DIAGNOSIS — M25.512 SHOULDER PAIN, LEFT: ICD-10-CM

## 2020-10-30 DIAGNOSIS — S82.041G CLOSED DISPLACED COMMINUTED FRACTURE OF RIGHT PATELLA WITH DELAYED HEALING, SUBSEQUENT ENCOUNTER: ICD-10-CM

## 2020-10-30 DIAGNOSIS — M25.522 ELBOW PAIN, LEFT: ICD-10-CM
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
## 2020-10-30 DIAGNOSIS — S52.572A OTHER CLOSED INTRA-ARTICULAR FRACTURE OF DISTAL END OF LEFT RADIUS, INITIAL ENCOUNTER: Primary | ICD-10-CM

## 2020-10-30 DIAGNOSIS — S42.222A CLOSED 2-PART DISPLACED FRACTURE OF SURGICAL NECK OF LEFT HUMERUS, INITIAL ENCOUNTER: ICD-10-CM

## 2020-10-30 DIAGNOSIS — K74.60 HEPATIC CIRRHOSIS, UNSPECIFIED HEPATIC CIRRHOSIS TYPE, UNSPECIFIED WHETHER ASCITES PRESENT: Primary | ICD-10-CM

## 2020-10-30 DIAGNOSIS — M25.532 WRIST PAIN, ACUTE, LEFT: ICD-10-CM

## 2020-10-30 DIAGNOSIS — S82.045A CLOSED NONDISPLACED COMMINUTED FRACTURE OF LEFT PATELLA, INITIAL ENCOUNTER: ICD-10-CM

## 2020-10-30 DIAGNOSIS — R80.9 TYPE 2 DIABETES MELLITUS WITH MICROALBUMINURIA, WITH LONG-TERM CURRENT USE OF INSULIN: ICD-10-CM
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
## 2020-10-30 DIAGNOSIS — S52.592A OTHER CLOSED FRACTURE OF DISTAL END OF LEFT RADIUS, INITIAL ENCOUNTER: ICD-10-CM

## 2020-10-30 DIAGNOSIS — S42.212A: ICD-10-CM

## 2020-10-30 DIAGNOSIS — Z79.4 TYPE 2 DIABETES MELLITUS WITH MICROALBUMINURIA, WITH LONG-TERM CURRENT USE OF INSULIN: ICD-10-CM

## 2020-10-30 PROBLEM — S52.92XA CLOSED LEFT RADIAL FRACTURE: Status: ACTIVE | Noted: 2020-10-30

## 2020-10-30 LAB
ALBUMIN SERPL BCP-MCNC: 3 G/DL (ref 3.5–5.2)
ALP SERPL-CCNC: 67 U/L (ref 55–135)
ALT SERPL W/O P-5'-P-CCNC: 16 U/L (ref 10–44)
ANION GAP SERPL CALC-SCNC: 11 MMOL/L (ref 8–16)
AST SERPL-CCNC: 24 U/L (ref 10–40)
BACTERIA #/AREA URNS HPF: NORMAL /HPF
BASOPHILS # BLD AUTO: 0.03 K/UL (ref 0–0.2)
BASOPHILS NFR BLD: 0.5 % (ref 0–1.9)
BILIRUB SERPL-MCNC: 1.1 MG/DL (ref 0.1–1)
BILIRUB UR QL STRIP: NEGATIVE
BUN SERPL-MCNC: 15 MG/DL (ref 8–23)
CALCIUM SERPL-MCNC: 9 MG/DL (ref 8.7–10.5)
CHLORIDE SERPL-SCNC: 102 MMOL/L (ref 95–110)
CLARITY UR: CLEAR
CO2 SERPL-SCNC: 28 MMOL/L (ref 23–29)
COLOR UR: YELLOW
CREAT SERPL-MCNC: 0.8 MG/DL (ref 0.5–1.4)
DIFFERENTIAL METHOD: ABNORMAL
EOSINOPHIL # BLD AUTO: 0.1 K/UL (ref 0–0.5)
EOSINOPHIL NFR BLD: 1.4 % (ref 0–8)
ERYTHROCYTE [DISTWIDTH] IN BLOOD BY AUTOMATED COUNT: 13.2 % (ref 11.5–14.5)
EST. GFR  (AFRICAN AMERICAN): >60 ML/MIN/1.73 M^2
EST. GFR  (NON AFRICAN AMERICAN): >60 ML/MIN/1.73 M^2
GLUCOSE SERPL-MCNC: 118 MG/DL (ref 70–110)
GLUCOSE UR QL STRIP: NEGATIVE
HCT VFR BLD AUTO: 35.4 % (ref 37–48.5)
HGB BLD-MCNC: 11.6 G/DL (ref 12–16)
HGB UR QL STRIP: ABNORMAL
HYALINE CASTS #/AREA URNS LPF: 0 /LPF
IMM GRANULOCYTES # BLD AUTO: 0.04 K/UL (ref 0–0.04)
IMM GRANULOCYTES NFR BLD AUTO: 0.6 % (ref 0–0.5)
KETONES UR QL STRIP: NEGATIVE
LEUKOCYTE ESTERASE UR QL STRIP: NEGATIVE
LYMPHOCYTES # BLD AUTO: 0.9 K/UL (ref 1–4.8)
LYMPHOCYTES NFR BLD: 13.6 % (ref 18–48)
MCH RBC QN AUTO: 29.1 PG (ref 27–31)
MCHC RBC AUTO-ENTMCNC: 32.8 G/DL (ref 32–36)
MCV RBC AUTO: 89 FL (ref 82–98)
MICROSCOPIC COMMENT: NORMAL
MONOCYTES # BLD AUTO: 0.4 K/UL (ref 0.3–1)
MONOCYTES NFR BLD: 5.8 % (ref 4–15)
NEUTROPHILS # BLD AUTO: 5.1 K/UL (ref 1.8–7.7)
NEUTROPHILS NFR BLD: 78.1 % (ref 38–73)
NITRITE UR QL STRIP: NEGATIVE
NRBC BLD-RTO: 0 /100 WBC
PH UR STRIP: 6 [PH] (ref 5–8)
PLATELET # BLD AUTO: 110 K/UL (ref 150–350)
PMV BLD AUTO: 9.7 FL (ref 9.2–12.9)
POCT GLUCOSE: 221 MG/DL (ref 70–110)
POTASSIUM SERPL-SCNC: 3.9 MMOL/L (ref 3.5–5.1)
PROT SERPL-MCNC: 6.3 G/DL (ref 6–8.4)
PROT UR QL STRIP: ABNORMAL
RBC # BLD AUTO: 3.99 M/UL (ref 4–5.4)
RBC #/AREA URNS HPF: 4 /HPF (ref 0–4)
SARS-COV-2 RDRP RESP QL NAA+PROBE: NEGATIVE
SODIUM SERPL-SCNC: 141 MMOL/L (ref 136–145)
SP GR UR STRIP: >=1.03 (ref 1–1.03)
URN SPEC COLLECT METH UR: ABNORMAL
UROBILINOGEN UR STRIP-ACNC: 1 EU/DL
WBC # BLD AUTO: 6.55 K/UL (ref 3.9–12.7)
WBC #/AREA URNS HPF: 2 /HPF (ref 0–5)

## 2020-10-30 PROCEDURE — 96374 THER/PROPH/DIAG INJ IV PUSH: CPT

## 2020-10-30 PROCEDURE — 93010 ELECTROCARDIOGRAM REPORT: CPT | Mod: ,,, | Performed by: INTERNAL MEDICINE

## 2020-10-30 PROCEDURE — 25000003 PHARM REV CODE 250: Performed by: EMERGENCY MEDICINE

## 2020-10-30 PROCEDURE — 63600175 PHARM REV CODE 636 W HCPCS: Performed by: EMERGENCY MEDICINE

## 2020-10-30 PROCEDURE — 85025 COMPLETE CBC W/AUTO DIFF WBC: CPT

## 2020-10-30 PROCEDURE — 99285 EMERGENCY DEPT VISIT HI MDM: CPT | Mod: 25

## 2020-10-30 PROCEDURE — 36415 COLL VENOUS BLD VENIPUNCTURE: CPT

## 2020-10-30 PROCEDURE — 25000003 PHARM REV CODE 250: Performed by: NURSE PRACTITIONER

## 2020-10-30 PROCEDURE — 93010 EKG 12-LEAD: ICD-10-PCS | Mod: ,,, | Performed by: INTERNAL MEDICINE

## 2020-10-30 PROCEDURE — 93005 ELECTROCARDIOGRAM TRACING: CPT

## 2020-10-30 PROCEDURE — 96372 THER/PROPH/DIAG INJ SC/IM: CPT

## 2020-10-30 PROCEDURE — 63600175 PHARM REV CODE 636 W HCPCS: Performed by: NURSE PRACTITIONER

## 2020-10-30 PROCEDURE — U0002 COVID-19 LAB TEST NON-CDC: HCPCS

## 2020-10-30 PROCEDURE — 63600175 PHARM REV CODE 636 W HCPCS: Performed by: ORTHOPAEDIC SURGERY

## 2020-10-30 PROCEDURE — C9399 UNCLASSIFIED DRUGS OR BIOLOG: HCPCS | Performed by: NURSE PRACTITIONER

## 2020-10-30 PROCEDURE — 11000001 HC ACUTE MED/SURG PRIVATE ROOM

## 2020-10-30 PROCEDURE — G0378 HOSPITAL OBSERVATION PER HR: HCPCS

## 2020-10-30 PROCEDURE — 29125 APPL SHORT ARM SPLINT STATIC: CPT | Mod: LT

## 2020-10-30 PROCEDURE — 96372 THER/PROPH/DIAG INJ SC/IM: CPT | Mod: 59

## 2020-10-30 PROCEDURE — 96376 TX/PRO/DX INJ SAME DRUG ADON: CPT

## 2020-10-30 PROCEDURE — 81000 URINALYSIS NONAUTO W/SCOPE: CPT

## 2020-10-30 PROCEDURE — 80053 COMPREHEN METABOLIC PANEL: CPT

## 2020-10-30 RX ORDER — HYDROCODONE BITARTRATE AND ACETAMINOPHEN 10; 325 MG/1; MG/1
1 TABLET ORAL EVERY 6 HOURS PRN
Status: DISCONTINUED | OUTPATIENT
Start: 2020-10-30 | End: 2020-11-11 | Stop reason: HOSPADM

## 2020-10-30 RX ORDER — IBUPROFEN 200 MG
16 TABLET ORAL
Status: DISCONTINUED | OUTPATIENT
Start: 2020-10-30 | End: 2020-11-11 | Stop reason: HOSPADM

## 2020-10-30 RX ORDER — HEPARIN SODIUM 5000 [USP'U]/ML
5000 INJECTION, SOLUTION INTRAVENOUS; SUBCUTANEOUS EVERY 8 HOURS
Status: COMPLETED | OUTPATIENT
Start: 2020-10-30 | End: 2020-11-01

## 2020-10-30 RX ORDER — LIDOCAINE HYDROCHLORIDE 10 MG/ML
1 INJECTION, SOLUTION EPIDURAL; INFILTRATION; INTRACAUDAL; PERINEURAL
Status: COMPLETED | OUTPATIENT
Start: 2020-10-30 | End: 2020-10-30

## 2020-10-30 RX ORDER — PANTOPRAZOLE SODIUM 40 MG/1
40 TABLET, DELAYED RELEASE ORAL DAILY
Status: DISCONTINUED | OUTPATIENT
Start: 2020-10-31 | End: 2020-11-11 | Stop reason: HOSPADM

## 2020-10-30 RX ORDER — MORPHINE SULFATE 2 MG/ML
2 INJECTION, SOLUTION INTRAMUSCULAR; INTRAVENOUS EVERY 4 HOURS PRN
Status: DISCONTINUED | OUTPATIENT
Start: 2020-10-30 | End: 2020-11-02

## 2020-10-30 RX ORDER — PRAVASTATIN SODIUM 20 MG/1
20 TABLET ORAL DAILY
Status: DISCONTINUED | OUTPATIENT
Start: 2020-10-31 | End: 2020-11-11 | Stop reason: HOSPADM

## 2020-10-30 RX ORDER — MAG HYDROX/ALUMINUM HYD/SIMETH 200-200-20
30 SUSPENSION, ORAL (FINAL DOSE FORM) ORAL EVERY 6 HOURS PRN
Status: DISCONTINUED | OUTPATIENT
Start: 2020-10-30 | End: 2020-11-11 | Stop reason: HOSPADM

## 2020-10-30 RX ORDER — BUPIVACAINE HYDROCHLORIDE 5 MG/ML
5 INJECTION, SOLUTION EPIDURAL; INTRACAUDAL
Status: COMPLETED | OUTPATIENT
Start: 2020-10-30 | End: 2020-10-30

## 2020-10-30 RX ORDER — MORPHINE SULFATE 4 MG/ML
4 INJECTION, SOLUTION INTRAMUSCULAR; INTRAVENOUS
Status: COMPLETED | OUTPATIENT
Start: 2020-10-30 | End: 2020-10-30

## 2020-10-30 RX ORDER — GLUCAGON 1 MG
1 KIT INJECTION
Status: DISCONTINUED | OUTPATIENT
Start: 2020-10-30 | End: 2020-11-11 | Stop reason: HOSPADM

## 2020-10-30 RX ORDER — ACETAMINOPHEN 325 MG/1
650 TABLET ORAL EVERY 6 HOURS PRN
Status: DISCONTINUED | OUTPATIENT
Start: 2020-10-30 | End: 2020-11-11 | Stop reason: HOSPADM

## 2020-10-30 RX ORDER — IBUPROFEN 200 MG
24 TABLET ORAL
Status: DISCONTINUED | OUTPATIENT
Start: 2020-10-30 | End: 2020-11-11 | Stop reason: HOSPADM

## 2020-10-30 RX ORDER — ONDANSETRON 2 MG/ML
4 INJECTION INTRAMUSCULAR; INTRAVENOUS EVERY 8 HOURS PRN
Status: DISCONTINUED | OUTPATIENT
Start: 2020-10-30 | End: 2020-11-11 | Stop reason: HOSPADM

## 2020-10-30 RX ORDER — AMLODIPINE BESYLATE 5 MG/1
5 TABLET ORAL DAILY
Status: DISCONTINUED | OUTPATIENT
Start: 2020-10-31 | End: 2020-11-08

## 2020-10-30 RX ORDER — DIPHENHYDRAMINE HCL 25 MG
25 CAPSULE ORAL EVERY 6 HOURS PRN
Status: DISCONTINUED | OUTPATIENT
Start: 2020-10-30 | End: 2020-10-30

## 2020-10-30 RX ORDER — INSULIN ASPART 100 [IU]/ML
1-10 INJECTION, SOLUTION INTRAVENOUS; SUBCUTANEOUS
Status: DISCONTINUED | OUTPATIENT
Start: 2020-10-30 | End: 2020-11-11 | Stop reason: HOSPADM

## 2020-10-30 RX ORDER — DIPHENHYDRAMINE HCL 25 MG
25 CAPSULE ORAL EVERY 6 HOURS PRN
Status: DISCONTINUED | OUTPATIENT
Start: 2020-10-30 | End: 2020-11-11 | Stop reason: HOSPADM

## 2020-10-30 RX ADMIN — INSULIN DETEMIR 10 UNITS: 100 INJECTION, SOLUTION SUBCUTANEOUS at 09:10

## 2020-10-30 RX ADMIN — MORPHINE SULFATE 2 MG: 2 INJECTION, SOLUTION INTRAMUSCULAR; INTRAVENOUS at 06:10

## 2020-10-30 RX ADMIN — INSULIN ASPART 2 UNITS: 100 INJECTION, SOLUTION INTRAVENOUS; SUBCUTANEOUS at 09:10

## 2020-10-30 RX ADMIN — MORPHINE SULFATE 4 MG: 4 INJECTION INTRAVENOUS at 12:10

## 2020-10-30 RX ADMIN — MORPHINE SULFATE 2 MG: 2 INJECTION, SOLUTION INTRAMUSCULAR; INTRAVENOUS at 10:10

## 2020-10-30 RX ADMIN — DIPHENHYDRAMINE HYDROCHLORIDE 25 MG: 25 CAPSULE ORAL at 10:10

## 2020-10-30 RX ADMIN — HEPARIN SODIUM 5000 UNITS: 5000 INJECTION INTRAVENOUS; SUBCUTANEOUS at 09:10

## 2020-10-30 RX ADMIN — LIDOCAINE HYDROCHLORIDE 10 MG: 10 INJECTION, SOLUTION EPIDURAL; INFILTRATION; INTRACAUDAL; PERINEURAL at 01:10

## 2020-10-30 RX ADMIN — BUPIVACAINE HYDROCHLORIDE 25 MG: 5 INJECTION, SOLUTION EPIDURAL; INTRACAUDAL; PERINEURAL at 01:10

## 2020-10-30 NOTE — ED NOTES
Report received from FROILAN Villa. Pt c/o pain to left shoulder and wrist at this time. Awaiting results of imaging. Family at bedside.

## 2020-10-30 NOTE — ASSESSMENT & PLAN NOTE
Lab Results   Component Value Date    HGBA1C 5.7 10/12/2020   accuchecks  Sliding scale insulin  Hold metformin for now  Continue Novolin but at a lower dose

## 2020-10-30 NOTE — CONSULTS
Asked by ED MD to evaluate this 69-year-old female status post fall onto her left side today.  The patient is well-known to the [facility] orthopedic service having been scheduled for an upcoming surgery due to a closed fracture of her right patella sustained earlier in the week.    The patient again fell today onto her left side.  She had severe pain in her left arm and deformity of her left wrist.  She was brought to oxygen or clinic emergency room where she was evaluated by ED staff.  Secondary survey revealed closed fractures of her left shoulder and wrist.  The wrist was treated with closed reduction and splinting in the emergency room.    There was no report of syncope.  No loss of consciousness.    Past Medical History:   Diagnosis Date    Cataract      Diabetes mellitus type I      Hyperlipidemia      Hypertension      Morbid obesity      Right knee pain      Thyroid disease                 Past Surgical History:   Procedure Laterality Date    APPENDECTOMY        BREAST BIOPSY        CATARACT EXTRACTION        EYE SURGERY        HERNIA REPAIR                   Review of patient's allergies indicates:   Allergen Reactions    Codeine Nausea Only       Other reaction(s): Nausea  Other reaction(s): Elevated blood pressure         No current facility-administered medications on file prior to encounter.            Current Outpatient Medications on File Prior to Encounter   Medication Sig    amLODIPine (NORVASC) 5 MG tablet Take 1 tablet (5 mg total) by mouth once daily.    blood sugar diagnostic (ONE TOUCH ULTRA TEST) Strp Use ac bid    blood-glucose meter kit Use as instructed    hydrochlorothiazide (HYDRODIURIL) 25 MG tablet Take 25 mg by mouth once daily.     [] HYDROcodone-acetaminophen (NORCO) 5-325 mg per tablet Take 1 tablet by mouth every 8 (eight) hours as needed for Pain.    insulin NPH-insulin regular, 70/30, (NOVOLIN 70/30 U-100 INSULIN) 100 unit/mL (70-30) injection Inject 75  "units sc ac supper.    insulin syringe-needle U-100 1 mL 29 gauge x 1/2" Syrg Use bid    levothyroxine (SYNTHROID) 175 MCG tablet TAKE 1 TABLET BY MOUTH DAILY    metformin (GLUCOPHAGE) 1000 MG tablet 1,000 mg 2 (two) times daily with meals.     [] ondansetron (ZOFRAN) 4 MG tablet Take 1 tablet (4 mg total) by mouth every 6 (six) hours. for 5 days    perindopril erbumine (ACEON) 4 mg tablet Take 4 mg by mouth once daily.     pravastatin (PRAVACHOL) 20 MG tablet Take 20 mg by mouth once daily.     SURE COMFORT PEN NEEDLE 31 X 5/16 " Ndle             Family History      Problem Relation (Age of Onset)     Diabetes Mother, Father     Leukemia Father                Tobacco Use    Smoking status: Former Smoker       Packs/day: 1.00       Types: Cigarettes       Quit date:        Years since quittin.    Smokeless tobacco: Never Used   Substance and Sexual Activity    Alcohol use: Yes       Frequency: Monthly or less    Drug use: No    Sexual activity: Yes       Partners: Male      Review of Systems   No nausea, vomiting, chest pain, shortness of breath, fever chills, night sweats, weight gain or weight loss.  No sensory changes to arms or legs.  No head ache, neck ache or visual field changes.      Objective:      Vital Signs (Most Recent):  Temp: 99.1 °F (37.3 °C) (10/30/20 1238)  Pulse: 87 (10/30/20 1454)  Resp: 18 (10/30/20 1454)  BP: 136/70 (10/30/20 1454)  SpO2: 96 % (10/30/20 1454) Vital Signs (24h Range):  Temp:  [98 °F (36.7 °C)-99.1 °F (37.3 °C)] 99.1 °F (37.3 °C)  Pulse:  [84-87] 87  Resp:  [18] 18  SpO2:  [95 %-99 %] 96 %  BP: (136-180)/(63-90) 136/70      Ht: 5' 5" (165.1 cm)   Last Wt: 123 kg (271 lb 2.7 oz)   BMI: 45.12 kg/m²      Significant Labs:   CBC:        Recent Labs   Lab 10/29/20  0855 10/30/20  1040   WBC 5.58 6.55   HGB 11.9* 11.6*   HCT 35.7* 35.4*   * 110*      CMP:        Recent Labs   Lab 10/29/20  0855 10/30/20  1040    141   K 3.9 3.9    102 "   CO2 32* 28   * 118*   BUN 19 15   CREATININE 0.9 0.8   CALCIUM 9.3 9.0   PROT 6.6 6.3   ALBUMIN 3.2* 3.0*   BILITOT 1.2* 1.1*   ALKPHOS 72 67   AST 24 24   ALT 16 16   ANIONGAP 8 11   EGFRNONAA >60 >60      All pertinent labs within the past 24 hours have been reviewed.       IMAGING:   CLINICAL HISTORY:  knee pain; fractures of the patella     COMPARISON:  10/27/2020     FINDINGS:  There has been no significant interval change in the appearance of the complex fractures of the patella.  There is no dislocation.  There is moderate narrowing of the joint space of the medial compartment of the knee.  There is a mild amount of atherosclerosis.  There is a small joint effusion in the right knee.     Impression:     1. There has been no significant interval change in the appearance of the complex fractures of the patella.  2. There is a small joint effusion in the right knee.  3. There is moderate narrowing of the joint space of the medial compartment of the knee.  4. There is a mild amount of atherosclerosis.   TECHNIQUE:  PA, lateral, and oblique views of the left wrist were performed.     COMPARISON:  None     FINDINGS:  Osteopenia.  There is a distal radial impaction fracture with dorsal angulation of the distal fracture fragment.  Associated ulnar styloid fracture.  No definite carpal fracture.     Impression:     Acute distal radius and ulnar fractures.   TECHNIQUE:  Three views of the left shoulder were performed.     COMPARISON  None     FINDINGS:  There is an acute fracture through the surgical neck of the humerus.  The distal fracture fragment is overriding by 3 cm.  It is displaced medially.  No sign of clavicular or scapular fracture.     Impression:     Displaced and overriding fracture of the surgical neck of the left humerus.     Electronically signed by: Kelby Saeed Jr., MD  Date:                                            10/30/2020      EXAM:  Pleasant, no distress.  Resting comfortably sitting  up in hospital bed propped up on pillows.  She appears stated age.  She is morbidly obese.     is at bedside      Left upper extremity no gross deformity of shoulder or arm.  + edema.  Sugar-tong sprint to left wrist is well applied.     (+) thumbs up wish okay    Distal light touch intact axillary median radial and ulnar nerve distributions    Fingers warm and well perfused    Right knee; edema negative wrinkle sign prepatellar region--knee immobilizer well applied    Calf soft non tender  Skin otherwise intact  Toes warm and well perfused  (+) Gastroc soleus, tibialis anterior, extensor hallucis longus  Distal light touch intact sural, plantar and tibial nerve distributions    Assessment and plan:  Closed fractures of right patella, left proximal humerus and left wrist--morbidly obese 69-year-old female with thrombocytopenia    The patient's left wrist has been temporized with a closed reduction performed in the emergency department.  A post reduction x-ray has not yet been performed.  This is now ordered.    She may prefer a sling to support her left shoulder which may provide additional comfort.    Ice pack and pain control    SCDs    SQ heparin    Multimodal pain control.      Treatment options with the patient's multiple injuries were reviewed.  Complexity of presentation is significant particularly given comorbidities and recent history of multiple falls.    Patient has made previous plans for surgical fixation of her right patella.  She requests today that her surgeon Dr. Sage Wolf be involved in her care.    Certainly her injuries are non emergent in nature and this is a reasonable request.    Discussion regarding recommendations for surgical or conservative care for her left wrist will require review of postreduction images.    Non weight-bearing left upper extremity.      For now the patient may continue her diet with any contemplation for any form of surgical care to be planned for  Monday at the earliest.

## 2020-10-30 NOTE — ED PROVIDER NOTES
SCRIBE #1 NOTE: I, Martha Bell, am scribing for, and in the presence of, Tani Palomo MD. I have scribed the entire note.       History     Chief Complaint   Patient presents with    Fall     fall and left shoulder pain     Review of patient's allergies indicates:   Allergen Reactions    Codeine Nausea Only     Other reaction(s): Nausea  Other reaction(s): Elevated blood pressure         History of Present Illness     HPI    10/30/2020, 10:06 AM  History obtained from the patient      History of Present Illness: Lakshmi Campbell is a 69 y.o. female patient with a PMHx of DM, HLD, and HTN who presents to the Emergency Department for evaluation of L shoulder pain which onset suddenly after a fall. States she lost her balance prior to the fall. Symptoms are constant and moderate in severity. No mitigating or exacerbating factors reported. No associated sxs. Patient denies any head injury/trauma, LOC, dizziness, lightheadedness, n/v, extremity weakness/numbness, SOB, CP, and all other sxs at this time. No prior Tx. Patient also had a fall on 10/24 and sustained a R patellar fracture. No further complaints or concerns at this time.       Arrival mode: AASI    PCP: Karan Ribeiro DO        Past Medical History:  Past Medical History:   Diagnosis Date    Cataract     Diabetes mellitus type I     Hyperlipidemia     Hypertension     Morbid obesity     Right knee pain     Thyroid disease        Past Surgical History:  Past Surgical History:   Procedure Laterality Date    APPENDECTOMY      BREAST BIOPSY      CATARACT EXTRACTION      EYE SURGERY      HERNIA REPAIR           Family History:  Family History   Problem Relation Age of Onset    Diabetes Mother     Leukemia Father     Diabetes Father        Social History:  Social History     Tobacco Use    Smoking status: Former Smoker     Packs/day: 1.00     Types: Cigarettes     Quit date:      Years since quittin.8    Smokeless tobacco: Never  Used   Substance and Sexual Activity    Alcohol use: Yes     Frequency: Monthly or less    Drug use: No    Sexual activity: Yes     Partners: Male        Review of Systems     Review of Systems   Constitutional: Negative for activity change, appetite change, chills, diaphoresis, fatigue and fever.   HENT: Negative for congestion, ear pain, nosebleeds, rhinorrhea, sinus pain, sore throat and trouble swallowing.    Eyes: Negative for pain and discharge.   Respiratory: Negative for cough, chest tightness, shortness of breath, wheezing and stridor.    Cardiovascular: Negative for chest pain, palpitations and leg swelling.   Gastrointestinal: Negative for abdominal distention, abdominal pain, blood in stool, constipation, diarrhea, nausea and vomiting.   Genitourinary: Negative for difficulty urinating, dysuria, flank pain, frequency, hematuria and urgency.   Musculoskeletal: Positive for arthralgias (L shoulder). Negative for back pain, myalgias and neck pain.   Skin: Negative for pallor, rash and wound.   Neurological: Negative for dizziness, syncope, weakness, light-headedness, numbness and headaches.        (-) head trauma/injury   Hematological: Does not bruise/bleed easily.   Psychiatric/Behavioral: Negative for confusion and self-injury.   All other systems reviewed and are negative.     Physical Exam     Initial Vitals [10/30/20 0945]   BP Pulse Resp Temp SpO2   (!) 180/90 87 18 98 °F (36.7 °C) 99 %      MAP       --          Physical Exam  Nursing Notes and Vital Signs Reviewed.  Constitutional: Patient is in no acute distress. Well-developed and well-nourished.  Head: Atraumatic. Normocephalic.  Eyes: PERRL. EOM intact. Conjunctivae are not pale. No scleral icterus.  ENT: Mucous membranes are moist. Oropharynx is clear and symmetric.    Neck: Supple. Full ROM. No lymphadenopathy.  Cardiovascular: Regular rate. Regular rhythm. No murmurs, rubs, or gallops. Distal pulses are 2+ and symmetric.  Pulmonary/Chest:  No respiratory distress. Clear to auscultation bilaterally. No wheezing or rales.  Abdominal: Soft and non-distended.  There is no tenderness.  No rebound, guarding, or rigidity. Good bowel sounds.  Genitourinary: No CVA tenderness  Musculoskeletal: Moves all extremities. No edema. No calf tenderness. Deformity, tenderness, and swelling to L shoulder.   Skin: Warm and dry.  Neurological:  Alert, awake, and appropriate.  Normal speech.  No acute focal neurological deficits are appreciated.  Psychiatric: Normal affect. Good eye contact. Appropriate in content.       ED Course   Splint Application    Date/Time: 10/30/2020 2:40 PM  Performed by: Tani Palomo MD  Authorized by: Tani Palomo MD   Location details: left arm  Splint type: sugar tong  Supplies used: cotton padding,  elastic bandage and Ortho-Glass  Post-procedure: The splinted body part was neurovascularly unchanged following the procedure.  Patient tolerance: Patient tolerated the procedure well with no immediate complications    Orthopedic Injury    Date/Time: 10/30/2020 2:40 PM  Performed by: Tani Palomo MD  Authorized by: Tani Palomo MD     Location procedure was performed:  [facility] EMERGENCY DEPARTMENT  Injury:     Injury location:  Wrist    Location details:  Left wrist    Injury type:  Fracture    Fracture type: distal radius      Fracture type: distal radius        Pre-procedure assessment:     Neurovascular status: Neurovascularly intact      Local anesthesia used?: Yes      Anesthesia:  Hematoma block    Local anesthetic:  Lidocaine 1% without epinephrine and bupivacaine 0.5% without epinephrine    Anesthetic total (ml):  10      Selections made in this section will also lock the Injury type section above.:     Manipulation performed?: Yes      Skin traction used?: Yes      Immobilization:  Splint    Splint type:  Sugar tong  Post-procedure assessment:     Neurovascular status: Neurovascularly intact      Patient tolerance:   Patient tolerated the procedure well with no immediate complications      ED Vital Signs:  Vitals:    10/30/20 0945 10/30/20 1209 10/30/20 1238   BP: (!) 180/90  (!) 149/63   Pulse: 87  84   Resp: 18 18 18   Temp: 98 °F (36.7 °C)  99.1 °F (37.3 °C)   TempSrc: Oral  Oral   SpO2: 99%  95%       Abnormal Lab Results:  Labs Reviewed   CBC W/ AUTO DIFFERENTIAL - Abnormal; Notable for the following components:       Result Value    RBC 3.99 (*)     Hemoglobin 11.6 (*)     Hematocrit 35.4 (*)     Platelets 110 (*)     Immature Granulocytes 0.6 (*)     Lymph # 0.9 (*)     Gran % 78.1 (*)     Lymph % 13.6 (*)     All other components within normal limits   COMPREHENSIVE METABOLIC PANEL - Abnormal; Notable for the following components:    Glucose 118 (*)     Albumin 3.0 (*)     Total Bilirubin 1.1 (*)     All other components within normal limits   URINALYSIS, REFLEX TO URINE CULTURE - Abnormal; Notable for the following components:    Specific Gravity, UA >=1.030 (*)     Protein, UA 3+ (*)     Occult Blood UA 1+ (*)     All other components within normal limits    Narrative:     Specimen Source->Urine   URINALYSIS MICROSCOPIC    Narrative:     Specimen Source->Urine   SARS-COV-2 RNA AMPLIFICATION, QUAL        All Lab Results:  Results for orders placed or performed during the hospital encounter of 10/30/20   CBC Auto Differential   Result Value Ref Range    WBC 6.55 3.90 - 12.70 K/uL    RBC 3.99 (L) 4.00 - 5.40 M/uL    Hemoglobin 11.6 (L) 12.0 - 16.0 g/dL    Hematocrit 35.4 (L) 37.0 - 48.5 %    MCV 89 82 - 98 fL    MCH 29.1 27.0 - 31.0 pg    MCHC 32.8 32.0 - 36.0 g/dL    RDW 13.2 11.5 - 14.5 %    Platelets 110 (L) 150 - 350 K/uL    MPV 9.7 9.2 - 12.9 fL    Immature Granulocytes 0.6 (H) 0.0 - 0.5 %    Gran # (ANC) 5.1 1.8 - 7.7 K/uL    Immature Grans (Abs) 0.04 0.00 - 0.04 K/uL    Lymph # 0.9 (L) 1.0 - 4.8 K/uL    Mono # 0.4 0.3 - 1.0 K/uL    Eos # 0.1 0.0 - 0.5 K/uL    Baso # 0.03 0.00 - 0.20 K/uL    nRBC 0 0 /100 WBC     Gran % 78.1 (H) 38.0 - 73.0 %    Lymph % 13.6 (L) 18.0 - 48.0 %    Mono % 5.8 4.0 - 15.0 %    Eosinophil % 1.4 0.0 - 8.0 %    Basophil % 0.5 0.0 - 1.9 %    Differential Method Automated    Comprehensive Metabolic Panel   Result Value Ref Range    Sodium 141 136 - 145 mmol/L    Potassium 3.9 3.5 - 5.1 mmol/L    Chloride 102 95 - 110 mmol/L    CO2 28 23 - 29 mmol/L    Glucose 118 (H) 70 - 110 mg/dL    BUN 15 8 - 23 mg/dL    Creatinine 0.8 0.5 - 1.4 mg/dL    Calcium 9.0 8.7 - 10.5 mg/dL    Total Protein 6.3 6.0 - 8.4 g/dL    Albumin 3.0 (L) 3.5 - 5.2 g/dL    Total Bilirubin 1.1 (H) 0.1 - 1.0 mg/dL    Alkaline Phosphatase 67 55 - 135 U/L    AST 24 10 - 40 U/L    ALT 16 10 - 44 U/L    Anion Gap 11 8 - 16 mmol/L    eGFR if African American >60 >60 mL/min/1.73 m^2    eGFR if non African American >60 >60 mL/min/1.73 m^2   Urinalysis, Reflex to Urine Culture Urine, Clean Catch    Specimen: Urine   Result Value Ref Range    Specimen UA Urine, Clean Catch     Color, UA Yellow Yellow, Straw, Saray    Appearance, UA Clear Clear    pH, UA 6.0 5.0 - 8.0    Specific Gravity, UA >=1.030 (A) 1.005 - 1.030    Protein, UA 3+ (A) Negative    Glucose, UA Negative Negative    Ketones, UA Negative Negative    Bilirubin (UA) Negative Negative    Occult Blood UA 1+ (A) Negative    Nitrite, UA Negative Negative    Urobilinogen, UA 1.0 <2.0 EU/dL    Leukocytes, UA Negative Negative   Urinalysis Microscopic   Result Value Ref Range    RBC, UA 4 0 - 4 /hpf    WBC, UA 2 0 - 5 /hpf    Bacteria Rare None-Occ /hpf    Hyaline Casts, UA 0 0-1/lpf /lpf    Microscopic Comment SEE COMMENT    COVID-19 Rapid Screening   Result Value Ref Range    SARS-CoV-2 RNA, Amplification, Qual Negative Negative         Imaging Results:  Imaging Results          X-Ray Knee 1 or 2 View Right (Final result)  Result time 10/30/20 14:24:11    Final result by LAYLA Meredith Sr., MD (10/30/20 14:24:11)                 Impression:      1. There has been no significant  interval change in the appearance of the complex fractures of the patella.  2. There is a small joint effusion in the right knee.  3. There is moderate narrowing of the joint space of the medial compartment of the knee.  4. There is a mild amount of atherosclerosis.      Electronically signed by: Michel Meredith MD  Date:    10/30/2020  Time:    14:24             Narrative:    EXAMINATION:  XR KNEE 1 OR 2 VIEW RIGHT    CLINICAL HISTORY:  knee pain; fractures of the patella    COMPARISON:  10/27/2020    FINDINGS:  There has been no significant interval change in the appearance of the complex fractures of the patella.  There is no dislocation.  There is moderate narrowing of the joint space of the medial compartment of the knee.  There is a mild amount of atherosclerosis.  There is a small joint effusion in the right knee.                               X-Ray Wrist Complete Left (Final result)  Result time 10/30/20 11:26:10    Final result by Kelby Saeed Jr., MD (10/30/20 11:26:10)                 Impression:      Acute distal radius and ulnar fractures.      Electronically signed by: Kelby Saeed Jr., MD  Date:    10/30/2020  Time:    11:26             Narrative:    EXAMINATION:  XR WRIST COMPLETE 3 VIEWS LEFT    CLINICAL HISTORY:  Pain in left wrist    TECHNIQUE:  PA, lateral, and oblique views of the left wrist were performed.    COMPARISON:  None    FINDINGS:  Osteopenia.  There is a distal radial impaction fracture with dorsal angulation of the distal fracture fragment.  Associated ulnar styloid fracture.  No definite carpal fracture.                               X-Ray Shoulder Trauma Left (Final result)  Result time 10/30/20 11:27:08    Final result by Kelby Saeed Jr., MD (10/30/20 11:27:08)                 Impression:      Displaced and overriding fracture of the surgical neck of the left humerus.      Electronically signed by: Kelby Saeed Jr., MD  Date:    10/30/2020  Time:    11:27              Narrative:    EXAMINATION:  XR SHOULDER TRAUMA 3 VIEW LEFT    CLINICAL HISTORY:  Pain in left shoulder    TECHNIQUE:  Three views of the left shoulder were performed.    COMPARISON  None    FINDINGS:  There is an acute fracture through the surgical neck of the humerus.  The distal fracture fragment is overriding by 3 cm.  It is displaced medially.  No sign of clavicular or scapular fracture.                               X-Ray Elbow Complete Left (Final result)  Result time 10/30/20 11:24:38    Final result by Adrian Taylor MD (10/30/20 11:24:38)                 Impression:      Limited single lateral view of the left elbow as above.      Electronically signed by: Adrian Taylor MD  Date:    10/30/2020  Time:    11:24             Narrative:    EXAMINATION:  XR ELBOW COMPLETE 3 VIEW LEFT    CLINICAL HISTORY:  XR ELBOW COMPLETE 3 VIEW LEFTPain in left elbow    COMPARISON:  None    FINDINGS:  Single lateral views of the left elbow were obtained.    No definite acute fracture or dislocation.  Bony mineralization is normal.  Soft tissues are unremarkable.   No gross joint effusion.  Mild degenerative changes.                                          The Emergency Provider reviewed the vital signs and test results, which are outlined above.     ED Discussion     11:54 AM: Discussed pt's case with Dr. Montesinos (ortho) who recommends admission for pain management. No recommendations from surgical standpoint. Pt may benefit from closed reduction of L wrist.    1:35 PM: Discussed case with Christine Dash NP (Hospital Medicine). Dr. Lizarraga agrees with current care and management of pt and accepts admission.   Admitting Service: hospital medicine  Admitting Physician: Dr. Lizarraga  Admit to: obs standard    1:36 PM: Re-evaluated pt. I have discussed test results, shared treatment plan, and the need for admission with patient and family at bedside. Pt and family express understanding at this time and agree with all  information. All questions answered. Pt and family have no further questions or concerns at this time. Pt is ready for admit.       Medical Decision Making:   Clinical Tests:   Lab Tests: Ordered and Reviewed  Radiological Study: Ordered and Reviewed           ED Medication(s):  Medications   lidocaine (PF) 10 mg/ml (1%) injection 10 mg (has no administration in time range)   bupivacaine (PF) 0.5% (5 mg/mL) injection 25 mg (has no administration in time range)   morphine injection 4 mg (4 mg Intravenous Given 10/30/20 1209)       Scribe Attestation:   Scribe #1: I performed the above scribed service and the documentation accurately describes the services I performed. I attest to the accuracy of the note.     Attending:   Physician Attestation Statement for Scribe #1: I, Tani Palomo MD, personally performed the services described in this documentation, as scribed by Martha Bell, in my presence, and it is both accurate and complete.           Clinical Impression       ICD-10-CM ICD-9-CM   1. Fall, initial encounter  W19.XXXA E888.9   2. Shoulder pain, left  M25.512 719.41   3. Elbow pain, left  M25.522 719.42   4. Wrist pain, acute, left  M25.532 719.43   5. Other closed fracture of distal end of left radius, initial encounter  S52.592A 813.42   6. Closed 2-part displaced fracture of surgical neck of left humerus, initial encounter  S42.222A 812.01   7. Closed nondisplaced comminuted fracture of left patella, initial encounter  S82.045A 822.0       Disposition:   Disposition: Placed in Observation  Condition: Fair         Tani Palomo MD  10/30/20 1401       Tani Palomo MD  10/30/20 1440

## 2020-10-30 NOTE — PLAN OF CARE
10/30/20 1228   ED Admissions Case Approval   ED Admissions Case Approval In Process       ADDITIONAL CLINICAL INFORMATION IS NEEDED IN ORDER TO COMPLETE A REVIEW FOR POSSIBLE HOSPITALIZATION.

## 2020-10-30 NOTE — H&P
[facility] Hospital Medicine  History & Physical    Patient Name: Lakshmi Campbell  MRN: [id number]  Admission Date: 10/30/2020  Attending Physician:Ori Lizarraga MD  Primary Care Provider: Karan Ribeiro DO         Patient information was obtained from patient, past medical records and ER records.     Subjective:     Principal Problem:Closed fracture of surgical neck of left humerus    Chief Complaint:   Chief Complaint   Patient presents with    Fall     fall and left shoulder pain        HPI: Pt is a 68 yo female with PMHx of DM II, HTN, HPL, hypothyroidism and morbid obesity who fractured her right patella 1 week ago and is wearing a knee immobilizer. Today, she had a second mishap when trying to get back in bed after using the bedside commode. Pt fell on her left side and immediately developed left shoulder and left wrist pain. She denies any presyncopal symptoms, fever, chest pain, gross neuro deficits, recent nausea/vomiting/diarrhea or urinary symptoms. Xrays found a left humerus fracture and left radius/ulnar fracture.   Vital signs stable and lab work largely normal except for thrombocytopenia which has improved from last level. Pt was placed on Observation for further evaluation by Orthopedics and possible need for surgical intervention.     Past Medical History:   Diagnosis Date    Cataract     Diabetes mellitus type I     Hyperlipidemia     Hypertension     Morbid obesity     Right knee pain     Thyroid disease        Past Surgical History:   Procedure Laterality Date    APPENDECTOMY      BREAST BIOPSY      CATARACT EXTRACTION      EYE SURGERY      HERNIA REPAIR         Review of patient's allergies indicates:   Allergen Reactions    Codeine Nausea Only     Other reaction(s): Nausea  Other reaction(s): Elevated blood pressure       No current facility-administered medications on file prior to encounter.      Current Outpatient Medications on File Prior to Encounter  "  Medication Sig    amLODIPine (NORVASC) 5 MG tablet Take 1 tablet (5 mg total) by mouth once daily.    blood sugar diagnostic (ONE TOUCH ULTRA TEST) Strp Use ac bid    blood-glucose meter kit Use as instructed    hydrochlorothiazide (HYDRODIURIL) 25 MG tablet Take 25 mg by mouth once daily.     [] HYDROcodone-acetaminophen (NORCO) 5-325 mg per tablet Take 1 tablet by mouth every 8 (eight) hours as needed for Pain.    insulin NPH-insulin regular, 70/30, (NOVOLIN 70/30 U-100 INSULIN) 100 unit/mL (70-30) injection Inject 75 units sc ac supper.    insulin syringe-needle U-100 1 mL 29 gauge x 1/2" Syrg Use bid    levothyroxine (SYNTHROID) 175 MCG tablet TAKE 1 TABLET BY MOUTH DAILY    metformin (GLUCOPHAGE) 1000 MG tablet 1,000 mg 2 (two) times daily with meals.     [] ondansetron (ZOFRAN) 4 MG tablet Take 1 tablet (4 mg total) by mouth every 6 (six) hours. for 5 days    perindopril erbumine (ACEON) 4 mg tablet Take 4 mg by mouth once daily.     pravastatin (PRAVACHOL) 20 MG tablet Take 20 mg by mouth once daily.     SURE COMFORT PEN NEEDLE 31 X 516 " Ndle      Family History     Problem Relation (Age of Onset)    Diabetes Mother, Father    Leukemia Father        Tobacco Use    Smoking status: Former Smoker     Packs/day: 1.00     Types: Cigarettes     Quit date:      Years since quittin.8    Smokeless tobacco: Never Used   Substance and Sexual Activity    Alcohol use: Yes     Frequency: Monthly or less    Drug use: No    Sexual activity: Yes     Partners: Male     Review of Systems   Constitutional: Positive for appetite change (decreased ). Negative for diaphoresis, fatigue and fever.   HENT: Negative.    Respiratory: Negative.    Cardiovascular: Negative.    Gastrointestinal: Negative.    Genitourinary: Negative.    Musculoskeletal: Positive for arthralgias, gait problem and myalgias.   Skin: Negative for pallor, rash and wound.   Neurological: Negative for dizziness, " syncope, speech difficulty, weakness and light-headedness.   Psychiatric/Behavioral: Negative.      Objective:     Vital Signs (Most Recent):  Temp: 99.1 °F (37.3 °C) (10/30/20 1238)  Pulse: 87 (10/30/20 1454)  Resp: 18 (10/30/20 1454)  BP: 136/70 (10/30/20 1454)  SpO2: 96 % (10/30/20 1454) Vital Signs (24h Range):  Temp:  [98 °F (36.7 °C)-99.1 °F (37.3 °C)] 99.1 °F (37.3 °C)  Pulse:  [84-87] 87  Resp:  [18] 18  SpO2:  [95 %-99 %] 96 %  BP: (136-180)/(63-90) 136/70        There is no height or weight on file to calculate BMI.    Physical Exam  Vitals signs and nursing note reviewed.   Constitutional:       Appearance: Normal appearance. She is well-developed. She is obese.   HENT:      Head: Normocephalic and atraumatic.      Nose: Nose normal.   Eyes:      General: No scleral icterus.     Conjunctiva/sclera: Conjunctivae normal.   Neck:      Musculoskeletal: Normal range of motion and neck supple. No muscular tenderness.   Cardiovascular:      Rate and Rhythm: Normal rate and regular rhythm.      Pulses: Normal pulses.      Heart sounds: Normal heart sounds. No murmur. No friction rub. No gallop.    Pulmonary:      Effort: Pulmonary effort is normal.      Breath sounds: Normal breath sounds.   Abdominal:      General: Bowel sounds are normal. There is no distension.      Palpations: Abdomen is soft.      Tenderness: There is no abdominal tenderness.   Musculoskeletal:         General: No tenderness.      Left wrist: She exhibits decreased range of motion, bony tenderness and deformity.      Right knee: She exhibits decreased range of motion and bony tenderness.      Left upper arm: She exhibits bony tenderness and deformity.      Comments: Bruising to anterior knee - knee immobilizer intact  No midline spinal tenderness   Skin:     General: Skin is warm and dry.   Neurological:      General: No focal deficit present.      Mental Status: She is alert and oriented to person, place, and time.   Psychiatric:          Behavior: Behavior normal.         Thought Content: Thought content normal.             Significant Labs:   CBC:   Recent Labs   Lab 10/29/20  0855 10/30/20  1040   WBC 5.58 6.55   HGB 11.9* 11.6*   HCT 35.7* 35.4*   * 110*     CMP:   Recent Labs   Lab 10/29/20  0855 10/30/20  1040    141   K 3.9 3.9    102   CO2 32* 28   * 118*   BUN 19 15   CREATININE 0.9 0.8   CALCIUM 9.3 9.0   PROT 6.6 6.3   ALBUMIN 3.2* 3.0*   BILITOT 1.2* 1.1*   ALKPHOS 72 67   AST 24 24   ALT 16 16   ANIONGAP 8 11   EGFRNONAA >60 >60     All pertinent labs within the past 24 hours have been reviewed.    Significant Imaging: I have reviewed all pertinent imaging results/findings within the past 24 hours.    Assessment/Plan:     Closed left radial fracture  Associated with ulnar styloid fracture  Ortho consult pending for possible surgical intervention  NPO after MN  Prn analgesia  PT/OT eval and treat after Ortho consult      Closed displaced comminuted fracture of right patella  Was planned for surgery on 10/28/2020 however, was postponed due to thrombocytopenia  Saw Dr. Bangura regarding  Right leg in knee immobilzer      Hyperlipidemia  Continue pravastatin      Essential hypertension  Elevated on arrival - trended to normal  Continue home meds      Hypothyroidism  Continue levothyroxine    Type 2 diabetes mellitus with microalbuminuria, with long-term current use of insulin  Lab Results   Component Value Date    HGBA1C 5.7 10/12/2020   accuchecks  Sliding scale insulin  Hold metformin for now  Continue Novolin but at a lower dose            VTE Risk Mitigation (From admission, onward)    None             Christine Dash, NP  Department of Hospital Medicine   [facility]

## 2020-10-30 NOTE — HPI
Pt is a 70 yo female with PMHx of DM II, HTN, HPL, hypothyroidism and morbid obesity who fractured her right patella 1 week ago and is wearing a knee immobilizer. Today, she had a second mishap when trying to get back in bed after using the bedside commode. Pt fell on her left side and immediately developed left shoulder and left wrist pain. She denies any presyncopal symptoms, fever, chest pain, gross neuro deficits, recent nausea/vomiting/diarrhea or urinary symptoms. Xrays found a left humerus fracture and left radius/ulnar fracture.   Vital signs stable and lab work largely normal except for thrombocytopenia which has improved from last level. Pt was placed on Observation for further evaluation by Orthopedics and possible need for surgical intervention.  On 11/6/2020, pt stable post procedure.  Social work consulted to assist with discharge planning and Rehab placement.

## 2020-10-30 NOTE — ED NOTES
meds at bedside for MD use. Pt repositioned in the bed. Pt resting in bed comfortably, VSS, pt in NAD. Call light in reach, bed in lowest position, rails up X2. Pt denies any other needs at this time. Will continue to monitor.

## 2020-10-30 NOTE — ASSESSMENT & PLAN NOTE
Associated with ulnar styloid fracture  Ortho consult pending for possible surgical intervention  NPO after MN  Prn analgesia  PT/OT eval and treat after Ortho consult

## 2020-10-30 NOTE — SUBJECTIVE & OBJECTIVE
"Past Medical History:   Diagnosis Date    Cataract     Diabetes mellitus type I     Hyperlipidemia     Hypertension     Morbid obesity     Right knee pain     Thyroid disease        Past Surgical History:   Procedure Laterality Date    APPENDECTOMY      BREAST BIOPSY      CATARACT EXTRACTION      EYE SURGERY      HERNIA REPAIR         Review of patient's allergies indicates:   Allergen Reactions    Codeine Nausea Only     Other reaction(s): Nausea  Other reaction(s): Elevated blood pressure       No current facility-administered medications on file prior to encounter.      Current Outpatient Medications on File Prior to Encounter   Medication Sig    amLODIPine (NORVASC) 5 MG tablet Take 1 tablet (5 mg total) by mouth once daily.    blood sugar diagnostic (ONE TOUCH ULTRA TEST) Strp Use ac bid    blood-glucose meter kit Use as instructed    hydrochlorothiazide (HYDRODIURIL) 25 MG tablet Take 25 mg by mouth once daily.     [] HYDROcodone-acetaminophen (NORCO) 5-325 mg per tablet Take 1 tablet by mouth every 8 (eight) hours as needed for Pain.    insulin NPH-insulin regular, 70/30, (NOVOLIN 70/30 U-100 INSULIN) 100 unit/mL (70-30) injection Inject 75 units sc ac supper.    insulin syringe-needle U-100 1 mL 29 gauge x 1/2" Syrg Use bid    levothyroxine (SYNTHROID) 175 MCG tablet TAKE 1 TABLET BY MOUTH DAILY    metformin (GLUCOPHAGE) 1000 MG tablet 1,000 mg 2 (two) times daily with meals.     [] ondansetron (ZOFRAN) 4 MG tablet Take 1 tablet (4 mg total) by mouth every 6 (six) hours. for 5 days    perindopril erbumine (ACEON) 4 mg tablet Take 4 mg by mouth once daily.     pravastatin (PRAVACHOL) 20 MG tablet Take 20 mg by mouth once daily.     SURE COMFORT PEN NEEDLE 31 X 5/16 " Ndle      Family History     Problem Relation (Age of Onset)    Diabetes Mother, Father    Leukemia Father        Tobacco Use    Smoking status: Former Smoker     Packs/day: 1.00     Types: Cigarettes     " Quit date:      Years since quittin.8    Smokeless tobacco: Never Used   Substance and Sexual Activity    Alcohol use: Yes     Frequency: Monthly or less    Drug use: No    Sexual activity: Yes     Partners: Male     Review of Systems   Constitutional: Positive for appetite change (decreased ). Negative for diaphoresis, fatigue and fever.   HENT: Negative.    Respiratory: Negative.    Cardiovascular: Negative.    Gastrointestinal: Negative.    Genitourinary: Negative.    Musculoskeletal: Positive for arthralgias, gait problem and myalgias.   Skin: Negative for pallor, rash and wound.   Neurological: Negative for dizziness, syncope, speech difficulty, weakness and light-headedness.   Psychiatric/Behavioral: Negative.      Objective:     Vital Signs (Most Recent):  Temp: 99.1 °F (37.3 °C) (10/30/20 1238)  Pulse: 87 (10/30/20 1454)  Resp: 18 (10/30/20 1454)  BP: 136/70 (10/30/20 1454)  SpO2: 96 % (10/30/20 1454) Vital Signs (24h Range):  Temp:  [98 °F (36.7 °C)-99.1 °F (37.3 °C)] 99.1 °F (37.3 °C)  Pulse:  [84-87] 87  Resp:  [18] 18  SpO2:  [95 %-99 %] 96 %  BP: (136-180)/(63-90) 136/70        There is no height or weight on file to calculate BMI.    Physical Exam  Vitals signs and nursing note reviewed.   Constitutional:       Appearance: Normal appearance. She is well-developed. She is obese.   HENT:      Head: Normocephalic and atraumatic.      Nose: Nose normal.   Eyes:      General: No scleral icterus.     Conjunctiva/sclera: Conjunctivae normal.   Neck:      Musculoskeletal: Normal range of motion and neck supple. No muscular tenderness.   Cardiovascular:      Rate and Rhythm: Normal rate and regular rhythm.      Pulses: Normal pulses.      Heart sounds: Normal heart sounds. No murmur. No friction rub. No gallop.    Pulmonary:      Effort: Pulmonary effort is normal.      Breath sounds: Normal breath sounds.   Abdominal:      General: Bowel sounds are normal. There is no distension.      Palpations:  Abdomen is soft.      Tenderness: There is no abdominal tenderness.   Musculoskeletal:         General: No tenderness.      Left wrist: She exhibits decreased range of motion, bony tenderness and deformity.      Right knee: She exhibits decreased range of motion and bony tenderness.      Left upper arm: She exhibits bony tenderness and deformity.      Comments: Bruising to anterior knee - knee immobilizer intact  No midline spinal tenderness   Skin:     General: Skin is warm and dry.   Neurological:      General: No focal deficit present.      Mental Status: She is alert and oriented to person, place, and time.   Psychiatric:         Behavior: Behavior normal.         Thought Content: Thought content normal.             Significant Labs:   CBC:   Recent Labs   Lab 10/29/20  0855 10/30/20  1040   WBC 5.58 6.55   HGB 11.9* 11.6*   HCT 35.7* 35.4*   * 110*     CMP:   Recent Labs   Lab 10/29/20  0855 10/30/20  1040    141   K 3.9 3.9    102   CO2 32* 28   * 118*   BUN 19 15   CREATININE 0.9 0.8   CALCIUM 9.3 9.0   PROT 6.6 6.3   ALBUMIN 3.2* 3.0*   BILITOT 1.2* 1.1*   ALKPHOS 72 67   AST 24 24   ALT 16 16   ANIONGAP 8 11   EGFRNONAA >60 >60     All pertinent labs within the past 24 hours have been reviewed.    Significant Imaging: I have reviewed all pertinent imaging results/findings within the past 24 hours.

## 2020-10-30 NOTE — ED NOTES
Patient identifiers verified and correct for Lakshmi Campbell.    LOC: The patient is awake, alert and aware of environment with an appropriate affect, the patient is oriented x 3 and speaking appropriately.  APPEARANCE: Patient resting comfortably and in no acute distress, patient is clean and well groomed, patient's clothing is properly fastened.  SKIN: The skin is warm and dry, color consistent with ethnicity, patient has normal skin turgor and moist mucus membranes, skin intact, no breakdown or bruising noted.  MUSCULOSKELETAL: Patient moving all extremities spontaneously.  RESPIRATORY: Airway is open and patent, respirations are spontaneous.  CARDIAC: Patient has a normal rate, no periphreal edema noted, capillary refill < 3 seconds.  ABDOMEN: Soft and non tender to palpation.    Pt reports fall Today with lt shoulder pain at this time.She also has a Rt knee immobilizer d/t a fall on Saturday. Family at the bedside.

## 2020-10-30 NOTE — ASSESSMENT & PLAN NOTE
Was planned for surgery on 10/28/2020 however, was postponed due to thrombocytopenia  Saw Dr. Bangura regarding  Right leg in knee immobilzer

## 2020-10-30 NOTE — PLAN OF CARE
Discussed poc with pt, pt verbalized understanding    modified visitor policy per CDC guidelines  allowing one visitor from 8a-6pm    Purposeful rounding every 2hours    VS wnl    Blood glucose monitoring   Fall precautions in place, remains injury free  Pt denies c/o nausea   Pain under control with PRN meds    Accurate I&Os    Bed locked at lowest position  Call light within reach    Chart check complete  Will cont to monitor

## 2020-10-30 NOTE — ED NOTES
Pt assisted with using the bedpan. Pillow provided for support of left arm/shoulder per pt request. Pt resting in bed comfortably, VSS, pt in NAD. Call light in reach, bed in lowest position, rails up X2. Pt denies any other needs at this time. Will continue to monitor.

## 2020-10-31 PROBLEM — M25.512 SHOULDER PAIN, LEFT: Status: ACTIVE | Noted: 2020-10-31

## 2020-10-31 LAB
ALBUMIN SERPL BCP-MCNC: 2.7 G/DL (ref 3.5–5.2)
ALP SERPL-CCNC: 60 U/L (ref 55–135)
ALT SERPL W/O P-5'-P-CCNC: 14 U/L (ref 10–44)
ANION GAP SERPL CALC-SCNC: 10 MMOL/L (ref 8–16)
AST SERPL-CCNC: 20 U/L (ref 10–40)
BASOPHILS # BLD AUTO: 0.05 K/UL (ref 0–0.2)
BASOPHILS NFR BLD: 0.7 % (ref 0–1.9)
BILIRUB SERPL-MCNC: 1.2 MG/DL (ref 0.1–1)
BUN SERPL-MCNC: 19 MG/DL (ref 8–23)
CALCIUM SERPL-MCNC: 8.4 MG/DL (ref 8.7–10.5)
CHLORIDE SERPL-SCNC: 99 MMOL/L (ref 95–110)
CO2 SERPL-SCNC: 29 MMOL/L (ref 23–29)
CREAT SERPL-MCNC: 0.9 MG/DL (ref 0.5–1.4)
DIFFERENTIAL METHOD: ABNORMAL
EOSINOPHIL # BLD AUTO: 0.2 K/UL (ref 0–0.5)
EOSINOPHIL NFR BLD: 2.5 % (ref 0–8)
ERYTHROCYTE [DISTWIDTH] IN BLOOD BY AUTOMATED COUNT: 13.8 % (ref 11.5–14.5)
EST. GFR  (AFRICAN AMERICAN): >60 ML/MIN/1.73 M^2
EST. GFR  (NON AFRICAN AMERICAN): >60 ML/MIN/1.73 M^2
GLUCOSE SERPL-MCNC: 151 MG/DL (ref 70–110)
HCT VFR BLD AUTO: 30.8 % (ref 37–48.5)
HGB BLD-MCNC: 10.4 G/DL (ref 12–16)
IMM GRANULOCYTES # BLD AUTO: 0.02 K/UL (ref 0–0.04)
IMM GRANULOCYTES NFR BLD AUTO: 0.3 % (ref 0–0.5)
LYMPHOCYTES # BLD AUTO: 1.9 K/UL (ref 1–4.8)
LYMPHOCYTES NFR BLD: 27.3 % (ref 18–48)
MAGNESIUM SERPL-MCNC: 1.3 MG/DL (ref 1.6–2.6)
MCH RBC QN AUTO: 29.7 PG (ref 27–31)
MCHC RBC AUTO-ENTMCNC: 33.8 G/DL (ref 32–36)
MCV RBC AUTO: 88 FL (ref 82–98)
MONOCYTES # BLD AUTO: 0.7 K/UL (ref 0.3–1)
MONOCYTES NFR BLD: 10.3 % (ref 4–15)
NEUTROPHILS # BLD AUTO: 4.2 K/UL (ref 1.8–7.7)
NEUTROPHILS NFR BLD: 58.9 % (ref 38–73)
NRBC BLD-RTO: 0 /100 WBC
PHOSPHATE SERPL-MCNC: 3.9 MG/DL (ref 2.7–4.5)
PLATELET # BLD AUTO: 129 K/UL (ref 150–350)
PMV BLD AUTO: 10.3 FL (ref 9.2–12.9)
POCT GLUCOSE: 150 MG/DL (ref 70–110)
POCT GLUCOSE: 157 MG/DL (ref 70–110)
POCT GLUCOSE: 276 MG/DL (ref 70–110)
POCT GLUCOSE: 297 MG/DL (ref 70–110)
POTASSIUM SERPL-SCNC: 4.1 MMOL/L (ref 3.5–5.1)
PROT SERPL-MCNC: 5.7 G/DL (ref 6–8.4)
RBC # BLD AUTO: 3.5 M/UL (ref 4–5.4)
SODIUM SERPL-SCNC: 138 MMOL/L (ref 136–145)
WBC # BLD AUTO: 7.1 K/UL (ref 3.9–12.7)

## 2020-10-31 PROCEDURE — 63600175 PHARM REV CODE 636 W HCPCS: Performed by: ORTHOPAEDIC SURGERY

## 2020-10-31 PROCEDURE — 80053 COMPREHEN METABOLIC PANEL: CPT

## 2020-10-31 PROCEDURE — 96376 TX/PRO/DX INJ SAME DRUG ADON: CPT

## 2020-10-31 PROCEDURE — 63600175 PHARM REV CODE 636 W HCPCS: Performed by: NURSE PRACTITIONER

## 2020-10-31 PROCEDURE — 83735 ASSAY OF MAGNESIUM: CPT

## 2020-10-31 PROCEDURE — 36415 COLL VENOUS BLD VENIPUNCTURE: CPT

## 2020-10-31 PROCEDURE — 25000003 PHARM REV CODE 250: Performed by: NURSE PRACTITIONER

## 2020-10-31 PROCEDURE — 85025 COMPLETE CBC W/AUTO DIFF WBC: CPT

## 2020-10-31 PROCEDURE — 96372 THER/PROPH/DIAG INJ SC/IM: CPT

## 2020-10-31 PROCEDURE — 11000001 HC ACUTE MED/SURG PRIVATE ROOM

## 2020-10-31 PROCEDURE — 84100 ASSAY OF PHOSPHORUS: CPT

## 2020-10-31 RX ADMIN — MORPHINE SULFATE 2 MG: 2 INJECTION, SOLUTION INTRAMUSCULAR; INTRAVENOUS at 09:10

## 2020-10-31 RX ADMIN — PRAVASTATIN SODIUM 20 MG: 20 TABLET ORAL at 08:10

## 2020-10-31 RX ADMIN — INSULIN DETEMIR 10 UNITS: 100 INJECTION, SOLUTION SUBCUTANEOUS at 09:10

## 2020-10-31 RX ADMIN — MORPHINE SULFATE 2 MG: 2 INJECTION, SOLUTION INTRAMUSCULAR; INTRAVENOUS at 12:10

## 2020-10-31 RX ADMIN — HEPARIN SODIUM 5000 UNITS: 5000 INJECTION INTRAVENOUS; SUBCUTANEOUS at 01:10

## 2020-10-31 RX ADMIN — PANTOPRAZOLE SODIUM 40 MG: 40 TABLET, DELAYED RELEASE ORAL at 08:10

## 2020-10-31 RX ADMIN — HEPARIN SODIUM 5000 UNITS: 5000 INJECTION INTRAVENOUS; SUBCUTANEOUS at 06:10

## 2020-10-31 RX ADMIN — AMLODIPINE BESYLATE 5 MG: 5 TABLET ORAL at 08:10

## 2020-10-31 RX ADMIN — INSULIN ASPART 2 UNITS: 100 INJECTION, SOLUTION INTRAVENOUS; SUBCUTANEOUS at 06:10

## 2020-10-31 RX ADMIN — INSULIN ASPART 4 UNITS: 100 INJECTION, SOLUTION INTRAVENOUS; SUBCUTANEOUS at 05:10

## 2020-10-31 RX ADMIN — MORPHINE SULFATE 2 MG: 2 INJECTION, SOLUTION INTRAMUSCULAR; INTRAVENOUS at 07:10

## 2020-10-31 RX ADMIN — LEVOTHYROXINE SODIUM 175 MCG: 150 TABLET ORAL at 06:10

## 2020-10-31 RX ADMIN — INSULIN ASPART 3 UNITS: 100 INJECTION, SOLUTION INTRAVENOUS; SUBCUTANEOUS at 09:10

## 2020-10-31 RX ADMIN — DIPHENHYDRAMINE HYDROCHLORIDE 25 MG: 25 CAPSULE ORAL at 09:10

## 2020-10-31 RX ADMIN — HEPARIN SODIUM 5000 UNITS: 5000 INJECTION INTRAVENOUS; SUBCUTANEOUS at 09:10

## 2020-10-31 NOTE — ASSESSMENT & PLAN NOTE
--ortho surgery consulted (Dr. Montesinos)  --no surgery as new ortho on call tomorrow  --PRN analgesia  --NPO after MN in the event that Ortho wants to perform surgery tomorrow

## 2020-10-31 NOTE — PROGRESS NOTES
No new events overnight.  No chest pain or shortness of breath.    Vital Signs     Report  View Graph    10/30 0700   10/31 0659 10/31 0700   10/31 1158  Most Recent     Temp (°F) 96.5-99.1 98.6  98.6 (37)  10/31 0736   Pulse 67-87 70  70  10/31 0736   Resp 16-19 15  15  10/31 0755   //77 169/74  169/74   10/31 0736   MAP (mmHg)  106  106  10/31 0736   SpO2 (%) 92-99 92  92   10/31 0736   Weight (kg) 123             EXAM: Good spirits.  Alert and oriented.      Left wrist splint clean and intact   (+) thumbs up wish okay  Distal light touch intact median radial and ulnar nerve distributions  Fingers warm and well perfused    Right leg knee immobilizer well applied    Calf soft non tender  Skin intact  Toes warm and well perfused  (+) Gastroc soleus, tibialis anterior, extensor hallucis longus  Distal light touch intact sural, plantar and tibial nerve distributions    + SCDs        Medications      amLODIPine tablet 5 mg   5 mg, Oral, Daily   Last Action:  Given, 5 mg at 10/31 0833   heparin (porcine) injection 5,000 Units   5,000 Units, SubQ, Q8H   Last Action:  Given, 5,000 Units at 10/31 0601   insulin detemir U-100 pen 10 Units   10 Units, SubQ, QHS   Last Action:  Given, 10 Units at 10/30 2138   levothyroxine tablet 175 mcg   175 mcg, Oral, Before breakfast   Last Action:  Given, 175 mcg at 10/31 0601   pantoprazole EC tablet 40 mg   40 mg, Oral, Daily   Last Action:  Given, 40 mg at 10/31 0833   pravastatin tablet 20 mg   20 mg, Oral, Daily   Last Action:  Given, 20 mg at 10/31 0832         IMAGING--POST REDUCTION LEFT WRIST  TECHNIQUE:  Two views of the left wrist.     COMPARISON:  Radiographs from October 30, 2020.     FINDINGS:  Cast material has been placed about the left wrist.  Again demonstrated is the comminuted distal radial impaction fracture.  The fracture fragments are improved alignment compared to the prior study.  Likely associated ulnar styloid fracture.  No definite carpal  fracture or dislocation.  Degenerative change of the trapezial metacarpal joint.     Impression:     Cast material about the wrist with improved alignment of the distal radial impaction fracture.        Electronically signed by: Kelby Saeed Jr., MD  Date:                                            10/31/2020  Time:                                           08:09    LABS:     Magnesium  1.3  10/31 0716  WBC  7.10  10/31 0716  Hemoglobin  10.4  10/31 0716  Hematocrit  30.8  10/31 0716  Platelets  129  10/31 0716    A/P multiple closed fractures--left wrist, left proximal humerus and right patella.      In my opinion injuries to the right patella and left wrist are surgical in nature.  In addition, additional fixation for the left proximal humerus would likely be beneficial during a single anesthesia event.    SCD and Sub Q heparin prophylaxis    Pain control    Monitor platelets--trend during Heparin administration--patient states recent low of 70.    Bed mobility    Plan of care and hand-off reviewed with Hospital Medicine                              ---TRANSITION OF CARE NOTE---      Patient is informed that ongoing clinical care with be assumed by [facility] (Dr. King) FROM 7AM on November 1st (tomorrow)  and that myself, and other members of [facility] will not be participating in Mrs. Campbell's care from that point in time going forward:  Accordingly all questions and concerns should be addressed through [facility].       Reasonable insight and understanding of plan of care as described above was achieved.

## 2020-10-31 NOTE — PLAN OF CARE
Patient received on shift sitting up in bed awake, x4, in no acute distress. Vitals present within stable limits. Medications/pain management accepted and tolerated well. Safety precautions maintained; brace to RLE remains in place.  Will continue to monitor progress.

## 2020-10-31 NOTE — HOSPITAL COURSE
Patient was kept on OBS for closed fx of surgical neck of L humerus under the care of hospital medicine. Ortho surgery was consulted. 10/31: Patient asking for Dr. Wolf to be involved in her care. I explained that he is not on call today - sent him secure chat but he likely will not see it until Monday. Ordered diet as no surgical intervention today. Will discuss with Dr. King, who is on call tomorrow for ortho. 11/1: Discussed with Dr. King - who spoke with Dr. Wolf. Pt will have surgery performed by Dr. Wolf tomorrow afternoon. Will be NPO after MN and stop heparin at MN. 11/2: Patient scheduled for surgery this afternoon by Dr. Wolf. She is currently NPO with spouse at bedside. Pain is controlled. 11/3: Patient had surgery to R knee and L wrist yesterday. PRN analgesia. Plans for L shoulder surgery on Thursday of this week. DVT profil restarted. PT/OT pending 11/4: patient to have L shoulder surgery tomorrow. She will dc early next week to rehab - Case management to start working on placement tomorrow. PT/OT to see her post op. Spoke with Dr. Wolf via telephone - he recommends weight based lovenox post op for DVT prophilaxis. Daughter at bedside, verbalized understanding of all. 11/5: Patient scheduled for L shoulder repair this afternoon. Currently NPO. Heparin held. PT/OT aware pt requesting rehab on dc. She sat on side of bed today for 90 minutes. Case management working on placement - likely Mon. Dr. Wolf requesting lovenox injections for DVT prophylaxis as she will need weight based.   On 11/7/2020, H/H stable with mild decline noted.  Lovenox given.  CM to assist with discharge planning and placement.  On 11/8/2020, pt stable with current plan of care continued.  H/H stable.  Pain at surgical sites and back improved on prescribed narcotic regime.  On 11/9/2020, placement to Rehab vs SNF in process due to NWB.  CM assisting with placement.  H/H stable.  BP elevated with Norvasc  dose increased and Hydralazine as needed.  HCTZ also resumed.  On 11/10/2020, case discussed with Orthopedic Surgery and pt/ updated on current plan of care for Ortho per Dr. Borrero ([facility]).  Pt accepted to [facility] with authorization pending.  On 11/11/2020,pt remained stable with no signs of acute distress.  Pt seen and examined on the date of discharge and deemed stable for discharge to SNF for continued care.  Current medications resumed with Norvasc dose adjusted and Colace and Lovenox prescribed.  Pt instructed to follow up with PCP and Orthopedic Surgery upon discharge for further evaluation.

## 2020-10-31 NOTE — PLAN OF CARE
Informed patient of observation status , advised patient status may change per provider if needed . Patient understand ,signed , and received written notification . Informed patient of financial services contact number [phone] if needed. No further action taken

## 2020-10-31 NOTE — PLAN OF CARE
Patient remains free from injury/fall, BG being monitored, pain controlled at this time, will continue to monitor, administer meds as ordered, provide comfort/safety measures and notify MD of any changes in condition.

## 2020-10-31 NOTE — SUBJECTIVE & OBJECTIVE
Interval History: Patient asking for Dr. Wolf to be involved in her care. I explained that he is not on call today - sent him secure chat but he likely will not see it until Monday. Ordered diet as no surgical intervention today. Will discuss with Dr. King, who is on call tomorrow for ortho.    Review of Systems   Constitutional: Negative for chills, diaphoresis, fatigue and fever.   HENT: Negative for congestion, rhinorrhea and sore throat.    Eyes: Negative for pain, discharge and itching.   Respiratory: Negative for cough, shortness of breath and wheezing.    Cardiovascular: Negative for chest pain, palpitations and leg swelling.   Gastrointestinal: Negative for abdominal distention, abdominal pain, diarrhea, nausea and vomiting.   Endocrine: Negative for cold intolerance and heat intolerance.   Genitourinary: Negative for difficulty urinating, dysuria and flank pain.   Musculoskeletal: Positive for arthralgias, gait problem and myalgias.   Skin: Negative for color change and wound.   Allergic/Immunologic: Negative.    Neurological: Negative for dizziness, syncope, speech difficulty, weakness and light-headedness.   Hematological: Negative.    Psychiatric/Behavioral: Negative for agitation, behavioral problems and confusion.     Objective:     Vital Signs (Most Recent):  Temp: 98.6 °F (37 °C) (10/31/20 0736)  Pulse: 70 (10/31/20 0736)  Resp: 15 (10/31/20 0755)  BP: (!) 169/74 (10/31/20 0736)  SpO2: (!) 92 % (10/31/20 0736) Vital Signs (24h Range):  Temp:  [96.5 °F (35.8 °C)-99.1 °F (37.3 °C)] 98.6 °F (37 °C)  Pulse:  [67-87] 70  Resp:  [15-19] 15  SpO2:  [92 %-96 %] 92 %  BP: (136-186)/(63-77) 169/74     Weight: 123 kg (271 lb 2.7 oz)  Body mass index is 45.12 kg/m².    Intake/Output Summary (Last 24 hours) at 10/31/2020 1209  Last data filed at 10/31/2020 0600  Gross per 24 hour   Intake 100 ml   Output 400 ml   Net -300 ml      Physical Exam  Vitals signs and nursing note reviewed.   Constitutional:        Appearance: Normal appearance. She is well-developed. She is morbidly obese.   HENT:      Head: Normocephalic and atraumatic.      Nose: Nose normal.   Eyes:      General: No scleral icterus.     Extraocular Movements: Extraocular movements intact.      Conjunctiva/sclera: Conjunctivae normal.   Neck:      Musculoskeletal: Normal range of motion and neck supple. No muscular tenderness.   Cardiovascular:      Rate and Rhythm: Normal rate and regular rhythm.      Pulses: Normal pulses.      Heart sounds: Normal heart sounds. No murmur. No friction rub. No gallop.    Pulmonary:      Effort: Pulmonary effort is normal.      Breath sounds: Normal breath sounds.   Abdominal:      General: Bowel sounds are normal. There is no distension.      Palpations: Abdomen is soft.      Tenderness: There is no abdominal tenderness.   Genitourinary:     Comments: deferred  Musculoskeletal:         General: No tenderness.      Left wrist: She exhibits decreased range of motion, bony tenderness and deformity.      Right knee: She exhibits decreased range of motion and bony tenderness.      Left upper arm: She exhibits bony tenderness and deformity.      Comments: Bruising to anterior knee - knee immobilizer intact  No midline spinal tenderness   Skin:     General: Skin is warm and dry.      Capillary Refill: Capillary refill takes 2 to 3 seconds.   Neurological:      General: No focal deficit present.      Mental Status: She is alert and oriented to person, place, and time.   Psychiatric:         Mood and Affect: Mood normal.         Behavior: Behavior normal.         Thought Content: Thought content normal.         Significant Labs:   Recent Lab Results       10/31/20  1138   10/31/20  0716   10/31/20  0601   10/30/20  2133   10/30/20  1215        Albumin   2.7           Alkaline Phosphatase   60           ALT   14           Anion Gap   10           AST   20           Baso #   0.05           Basophil %   0.7           BILIRUBIN TOTAL    1.2  Comment:  For infants and newborns, interpretation of results should be based  on gestational age, weight and in agreement with clinical  observations.  Premature Infant recommended reference ranges:  Up to 24 hours.............<8.0 mg/dL  Up to 48 hours............<12.0 mg/dL  3-5 days..................<15.0 mg/dL  6-29 days.................<15.0 mg/dL             BUN   19           Calcium   8.4           Chloride   99           CO2   29           Creatinine   0.9           Differential Method   Automated           eGFR if    >60           eGFR if non    >60  Comment:  Calculation used to obtain the estimated glomerular filtration  rate (eGFR) is the CKD-EPI equation.              Eos #   0.2           Eosinophil %   2.5           Glucose   151           Gran # (ANC)   4.2           Gran %   58.9           Hematocrit   30.8           Hemoglobin   10.4           Immature Grans (Abs)   0.02  Comment:  Mild elevation in immature granulocytes is non specific and   can be seen in a variety of conditions including stress response,   acute inflammation, trauma and pregnancy. Correlation with other   laboratory and clinical findings is essential.             Immature Granulocytes   0.3           Lymph #   1.9           Lymph %   27.3           Magnesium   1.3           MCH   29.7           MCHC   33.8           MCV   88           Mono #   0.7           Mono %   10.3           MPV   10.3           nRBC   0           Phosphorus   3.9           Platelets   129           POCT Glucose 150   157 221       Potassium   4.1           PROTEIN TOTAL   5.7           RBC   3.50           RDW   13.8           SARS-CoV-2 RNA, Amplification, Qual         Negative  Comment:  This test utilizes isothermal nucleic acid amplification   technology to detect the SARS-CoV-2 RdRp nucleic acid segment.   The analytical sensitivity (limit of detection) is 125 genome   equivalents/mL.   A POSITIVE result implies  infection with the SARS-CoV-2 virus;  the patient is presumed to be contagious.    A NEGATIVE result means that SARS-CoV-2 nucleic acids are not  present above the limit of detection. A NEGATIVE result should be   treated as presumptive. It does not rule out the possibility of   COVID-19 and should not be the sole basis for treatment decisions.   If COVID-19 is strongly suspected based on clinical and exposure   history, re-testing using an alternate molecular assay should be   considered.   This test is only for use under the Food and Drug   Administration s Emergency Use Authorization (EUA).   Commercial kits are provided by [employer].   Performance characteristics of the EUA have been independently  verified by [facility] Department of  Pathology and Laboratory Medicine.   _________________________________________________________________  The ID NOW COVID-19 Letter of Authorization, along with the   authorized Fact Sheet for Healthcare Providers, the authorized Fact  Sheet for Patients, and authorized labeling are available on the FDA   website:  www.fda.gov/MedicalDevices/Safety/EmergencySituations/xwl820941.htm       Sodium   138           WBC   7.10                              All pertinent labs within the past 24 hours have been reviewed.    Significant Imaging: I have reviewed all pertinent imaging results/findings within the past 24 hours.

## 2020-10-31 NOTE — ASSESSMENT & PLAN NOTE
--Was planned for surgery on 10/28/2020 with Dr. Wolf - was postponed due to thrombocytopenia  --Right leg in knee immobilzer

## 2020-10-31 NOTE — PROGRESS NOTES
[facility] Hospital Medicine  Progress Note    Patient Name: Lakshmi Campbell  MRN: [id number]  Patient Class: OP- Observation   Admission Date: 10/30/2020  Length of Stay: 0 days  Attending Physician: Ori Lizarraga MD  Primary Care Provider: Karan Ribeiro DO        Subjective:     Principal Problem:Closed fracture of surgical neck of left humerus        HPI:  Pt is a 68 yo female with PMHx of DM II, HTN, HPL, hypothyroidism and morbid obesity who fractured her right patella 1 week ago and is wearing a knee immobilizer. Today, she had a second mishap when trying to get back in bed after using the bedside commode. Pt fell on her left side and immediately developed left shoulder and left wrist pain. She denies any presyncopal symptoms, fever, chest pain, gross neuro deficits, recent nausea/vomiting/diarrhea or urinary symptoms. Xrays found a left humerus fracture and left radius/ulnar fracture.   Vital signs stable and lab work largely normal except for thrombocytopenia which has improved from last level. Pt was placed on Observation for further evaluation by Orthopedics and possible need for surgical intervention.     Overview/Hospital Course:  Patient was kept on OBS for closed fx of surgical neck of L humerus under the care of hospital medicine. Ortho surgery was consulted.    Interval History: Patient asking for Dr. Wolf to be involved in her care. I explained that he is not on call today - sent him secure chat but he likely will not see it until Monday. Ordered diet as no surgical intervention today. Will discuss with Dr. King, who is on call tomorrow for ortho.    Review of Systems   Constitutional: Negative for chills, diaphoresis, fatigue and fever.   HENT: Negative for congestion, rhinorrhea and sore throat.    Eyes: Negative for pain, discharge and itching.   Respiratory: Negative for cough, shortness of breath and wheezing.    Cardiovascular: Negative for chest pain, palpitations  and leg swelling.   Gastrointestinal: Negative for abdominal distention, abdominal pain, diarrhea, nausea and vomiting.   Endocrine: Negative for cold intolerance and heat intolerance.   Genitourinary: Negative for difficulty urinating, dysuria and flank pain.   Musculoskeletal: Positive for arthralgias, gait problem and myalgias.   Skin: Negative for color change and wound.   Allergic/Immunologic: Negative.    Neurological: Negative for dizziness, syncope, speech difficulty, weakness and light-headedness.   Hematological: Negative.    Psychiatric/Behavioral: Negative for agitation, behavioral problems and confusion.     Objective:     Vital Signs (Most Recent):  Temp: 98.6 °F (37 °C) (10/31/20 0736)  Pulse: 70 (10/31/20 0736)  Resp: 15 (10/31/20 0755)  BP: (!) 169/74 (10/31/20 0736)  SpO2: (!) 92 % (10/31/20 0736) Vital Signs (24h Range):  Temp:  [96.5 °F (35.8 °C)-99.1 °F (37.3 °C)] 98.6 °F (37 °C)  Pulse:  [67-87] 70  Resp:  [15-19] 15  SpO2:  [92 %-96 %] 92 %  BP: (136-186)/(63-77) 169/74     Weight: 123 kg (271 lb 2.7 oz)  Body mass index is 45.12 kg/m².    Intake/Output Summary (Last 24 hours) at 10/31/2020 1209  Last data filed at 10/31/2020 0600  Gross per 24 hour   Intake 100 ml   Output 400 ml   Net -300 ml      Physical Exam  Vitals signs and nursing note reviewed.   Constitutional:       Appearance: Normal appearance. She is well-developed. She is morbidly obese.   HENT:      Head: Normocephalic and atraumatic.      Nose: Nose normal.   Eyes:      General: No scleral icterus.     Extraocular Movements: Extraocular movements intact.      Conjunctiva/sclera: Conjunctivae normal.   Neck:      Musculoskeletal: Normal range of motion and neck supple. No muscular tenderness.   Cardiovascular:      Rate and Rhythm: Normal rate and regular rhythm.      Pulses: Normal pulses.      Heart sounds: Normal heart sounds. No murmur. No friction rub. No gallop.    Pulmonary:      Effort: Pulmonary effort is normal.       Breath sounds: Normal breath sounds.   Abdominal:      General: Bowel sounds are normal. There is no distension.      Palpations: Abdomen is soft.      Tenderness: There is no abdominal tenderness.   Genitourinary:     Comments: deferred  Musculoskeletal:         General: No tenderness.      Left wrist: She exhibits decreased range of motion, bony tenderness and deformity.      Right knee: She exhibits decreased range of motion and bony tenderness.      Left upper arm: She exhibits bony tenderness and deformity.      Comments: Bruising to anterior knee - knee immobilizer intact  No midline spinal tenderness   Skin:     General: Skin is warm and dry.      Capillary Refill: Capillary refill takes 2 to 3 seconds.   Neurological:      General: No focal deficit present.      Mental Status: She is alert and oriented to person, place, and time.   Psychiatric:         Mood and Affect: Mood normal.         Behavior: Behavior normal.         Thought Content: Thought content normal.         Significant Labs:   Recent Lab Results       10/31/20  1138   10/31/20  0716   10/31/20  0601   10/30/20  2133   10/30/20  1215        Albumin   2.7           Alkaline Phosphatase   60           ALT   14           Anion Gap   10           AST   20           Baso #   0.05           Basophil %   0.7           BILIRUBIN TOTAL   1.2  Comment:  For infants and newborns, interpretation of results should be based  on gestational age, weight and in agreement with clinical  observations.  Premature Infant recommended reference ranges:  Up to 24 hours.............<8.0 mg/dL  Up to 48 hours............<12.0 mg/dL  3-5 days..................<15.0 mg/dL  6-29 days.................<15.0 mg/dL             BUN   19           Calcium   8.4           Chloride   99           CO2   29           Creatinine   0.9           Differential Method   Automated           eGFR if    >60           eGFR if non    >60  Comment:  Calculation  used to obtain the estimated glomerular filtration  rate (eGFR) is the CKD-EPI equation.              Eos #   0.2           Eosinophil %   2.5           Glucose   151           Gran # (ANC)   4.2           Gran %   58.9           Hematocrit   30.8           Hemoglobin   10.4           Immature Grans (Abs)   0.02  Comment:  Mild elevation in immature granulocytes is non specific and   can be seen in a variety of conditions including stress response,   acute inflammation, trauma and pregnancy. Correlation with other   laboratory and clinical findings is essential.             Immature Granulocytes   0.3           Lymph #   1.9           Lymph %   27.3           Magnesium   1.3           MCH   29.7           MCHC   33.8           MCV   88           Mono #   0.7           Mono %   10.3           MPV   10.3           nRBC   0           Phosphorus   3.9           Platelets   129           POCT Glucose 150   157 221       Potassium   4.1           PROTEIN TOTAL   5.7           RBC   3.50           RDW   13.8           SARS-CoV-2 RNA, Amplification, Qual         Negative  Comment:  This test utilizes isothermal nucleic acid amplification   technology to detect the SARS-CoV-2 RdRp nucleic acid segment.   The analytical sensitivity (limit of detection) is 125 genome   equivalents/mL.   A POSITIVE result implies infection with the SARS-CoV-2 virus;  the patient is presumed to be contagious.    A NEGATIVE result means that SARS-CoV-2 nucleic acids are not  present above the limit of detection. A NEGATIVE result should be   treated as presumptive. It does not rule out the possibility of   COVID-19 and should not be the sole basis for treatment decisions.   If COVID-19 is strongly suspected based on clinical and exposure   history, re-testing using an alternate molecular assay should be   considered.   This test is only for use under the Food and Drug   Administration s Emergency Use Authorization (EUA).   Commercial kits are  provided by [employer].   Performance characteristics of the EUA have been independently  verified by [facility] Department of  Pathology and Laboratory Medicine.   _________________________________________________________________  The ID NOW COVID-19 Letter of Authorization, along with the   authorized Fact Sheet for Healthcare Providers, the authorized Fact  Sheet for Patients, and authorized labeling are available on the FDA   website:  www.fda.gov/MedicalDevices/Safety/EmergencySituations/fst966921.htm       Sodium   138           WBC   7.10                              All pertinent labs within the past 24 hours have been reviewed.    Significant Imaging: I have reviewed all pertinent imaging results/findings within the past 24 hours.      Assessment/Plan:      * Closed fracture of surgical neck of left humerus  --ortho surgery consulted (Dr. Montesinos)  --no surgery as new ortho on call tomorrow  --PRN analgesia  --NPO after MN in the event that Ortho wants to perform surgery tomorrow      Closed left radial fracture  --Associated with ulnar styloid fracture  --Ortho consulted  --NPO after MN  --Prn analgesia  --PT/OT eval and treat after Ortho consult      Closed displaced comminuted fracture of right patella  --Was planned for surgery on 10/28/2020 with Dr. Wolf - was postponed due to thrombocytopenia  --Right leg in knee immobilzer      Hyperlipidemia  --continue statin  --cardiac diet      Essential hypertension  --continue amlodipine  --cardiac diet      Hypothyroidism  Continue levothyroxine    Type 2 diabetes mellitus with microalbuminuria, with long-term current use of insulin  --accuchecks and SSI  --diabetic diet  --continue novolin at lower dose  --last HA1C 5.8            VTE Risk Mitigation (From admission, onward)         Ordered     heparin (porcine) injection 5,000 Units  Every 8 hours      10/30/20 9449     Place sequential compression device  Until discontinued       10/30/20 1855                Discharge Planning   KYA:      Code Status: Not on file   Is the patient medically ready for discharge?:     Reason for patient still in hospital (select all that apply): Patient trending condition and Consult recommendations                     BRUNA Kruse  Department of Hospital Medicine   [facility]

## 2020-10-31 NOTE — ASSESSMENT & PLAN NOTE
--Associated with ulnar styloid fracture  --Ortho consulted  --NPO after MN  --Prn analgesia  --PT/OT eval and treat after Ortho consult

## 2020-11-01 LAB
ALBUMIN SERPL BCP-MCNC: 2.7 G/DL (ref 3.5–5.2)
ALP SERPL-CCNC: 60 U/L (ref 55–135)
ALT SERPL W/O P-5'-P-CCNC: 12 U/L (ref 10–44)
ANION GAP SERPL CALC-SCNC: 6 MMOL/L (ref 8–16)
AST SERPL-CCNC: 16 U/L (ref 10–40)
BASOPHILS # BLD AUTO: 0.03 K/UL (ref 0–0.2)
BASOPHILS NFR BLD: 0.5 % (ref 0–1.9)
BILIRUB SERPL-MCNC: 1.1 MG/DL (ref 0.1–1)
BUN SERPL-MCNC: 17 MG/DL (ref 8–23)
CALCIUM SERPL-MCNC: 8.6 MG/DL (ref 8.7–10.5)
CHLORIDE SERPL-SCNC: 100 MMOL/L (ref 95–110)
CO2 SERPL-SCNC: 32 MMOL/L (ref 23–29)
CREAT SERPL-MCNC: 0.8 MG/DL (ref 0.5–1.4)
DIFFERENTIAL METHOD: ABNORMAL
EOSINOPHIL # BLD AUTO: 0.2 K/UL (ref 0–0.5)
EOSINOPHIL NFR BLD: 2.9 % (ref 0–8)
ERYTHROCYTE [DISTWIDTH] IN BLOOD BY AUTOMATED COUNT: 13.9 % (ref 11.5–14.5)
EST. GFR  (AFRICAN AMERICAN): >60 ML/MIN/1.73 M^2
EST. GFR  (NON AFRICAN AMERICAN): >60 ML/MIN/1.73 M^2
GLUCOSE SERPL-MCNC: 170 MG/DL (ref 70–110)
HCT VFR BLD AUTO: 31.2 % (ref 37–48.5)
HGB BLD-MCNC: 10.3 G/DL (ref 12–16)
IMM GRANULOCYTES # BLD AUTO: 0.04 K/UL (ref 0–0.04)
IMM GRANULOCYTES NFR BLD AUTO: 0.6 % (ref 0–0.5)
LYMPHOCYTES # BLD AUTO: 1.6 K/UL (ref 1–4.8)
LYMPHOCYTES NFR BLD: 26.2 % (ref 18–48)
MCH RBC QN AUTO: 28.9 PG (ref 27–31)
MCHC RBC AUTO-ENTMCNC: 33 G/DL (ref 32–36)
MCV RBC AUTO: 88 FL (ref 82–98)
MONOCYTES # BLD AUTO: 0.7 K/UL (ref 0.3–1)
MONOCYTES NFR BLD: 10.9 % (ref 4–15)
NEUTROPHILS # BLD AUTO: 3.7 K/UL (ref 1.8–7.7)
NEUTROPHILS NFR BLD: 58.9 % (ref 38–73)
NRBC BLD-RTO: 0 /100 WBC
PLATELET # BLD AUTO: 109 K/UL (ref 150–350)
PMV BLD AUTO: 9.6 FL (ref 9.2–12.9)
POCT GLUCOSE: 187 MG/DL (ref 70–110)
POCT GLUCOSE: 214 MG/DL (ref 70–110)
POCT GLUCOSE: 282 MG/DL (ref 70–110)
POCT GLUCOSE: 342 MG/DL (ref 70–110)
POTASSIUM SERPL-SCNC: 4 MMOL/L (ref 3.5–5.1)
PROT SERPL-MCNC: 5.7 G/DL (ref 6–8.4)
RBC # BLD AUTO: 3.56 M/UL (ref 4–5.4)
SODIUM SERPL-SCNC: 138 MMOL/L (ref 136–145)
WBC # BLD AUTO: 6.25 K/UL (ref 3.9–12.7)

## 2020-11-01 PROCEDURE — 63600175 PHARM REV CODE 636 W HCPCS: Performed by: NURSE PRACTITIONER

## 2020-11-01 PROCEDURE — 96372 THER/PROPH/DIAG INJ SC/IM: CPT

## 2020-11-01 PROCEDURE — 11000001 HC ACUTE MED/SURG PRIVATE ROOM

## 2020-11-01 PROCEDURE — 25000003 PHARM REV CODE 250: Performed by: NURSE PRACTITIONER

## 2020-11-01 PROCEDURE — 80053 COMPREHEN METABOLIC PANEL: CPT

## 2020-11-01 PROCEDURE — 36415 COLL VENOUS BLD VENIPUNCTURE: CPT

## 2020-11-01 PROCEDURE — 85025 COMPLETE CBC W/AUTO DIFF WBC: CPT

## 2020-11-01 PROCEDURE — 63600175 PHARM REV CODE 636 W HCPCS: Performed by: ORTHOPAEDIC SURGERY

## 2020-11-01 RX ORDER — LABETALOL HYDROCHLORIDE 5 MG/ML
10 INJECTION, SOLUTION INTRAVENOUS EVERY 6 HOURS PRN
Status: DISCONTINUED | OUTPATIENT
Start: 2020-11-01 | End: 2020-11-01

## 2020-11-01 RX ORDER — HYDRALAZINE HYDROCHLORIDE 25 MG/1
25 TABLET, FILM COATED ORAL EVERY 8 HOURS PRN
Status: DISCONTINUED | OUTPATIENT
Start: 2020-11-01 | End: 2020-11-09

## 2020-11-01 RX ADMIN — INSULIN ASPART 4 UNITS: 100 INJECTION, SOLUTION INTRAVENOUS; SUBCUTANEOUS at 04:11

## 2020-11-01 RX ADMIN — PANTOPRAZOLE SODIUM 40 MG: 40 TABLET, DELAYED RELEASE ORAL at 08:11

## 2020-11-01 RX ADMIN — PRAVASTATIN SODIUM 20 MG: 20 TABLET ORAL at 08:11

## 2020-11-01 RX ADMIN — INSULIN DETEMIR 10 UNITS: 100 INJECTION, SOLUTION SUBCUTANEOUS at 09:11

## 2020-11-01 RX ADMIN — MORPHINE SULFATE 2 MG: 2 INJECTION, SOLUTION INTRAMUSCULAR; INTRAVENOUS at 08:11

## 2020-11-01 RX ADMIN — HEPARIN SODIUM 5000 UNITS: 5000 INJECTION INTRAVENOUS; SUBCUTANEOUS at 05:11

## 2020-11-01 RX ADMIN — LEVOTHYROXINE SODIUM 175 MCG: 150 TABLET ORAL at 05:11

## 2020-11-01 RX ADMIN — INSULIN ASPART 2 UNITS: 100 INJECTION, SOLUTION INTRAVENOUS; SUBCUTANEOUS at 05:11

## 2020-11-01 RX ADMIN — DIPHENHYDRAMINE HYDROCHLORIDE 25 MG: 25 CAPSULE ORAL at 10:11

## 2020-11-01 RX ADMIN — INSULIN ASPART 6 UNITS: 100 INJECTION, SOLUTION INTRAVENOUS; SUBCUTANEOUS at 10:11

## 2020-11-01 RX ADMIN — MORPHINE SULFATE 2 MG: 2 INJECTION, SOLUTION INTRAMUSCULAR; INTRAVENOUS at 04:11

## 2020-11-01 RX ADMIN — INSULIN ASPART 4 UNITS: 100 INJECTION, SOLUTION INTRAVENOUS; SUBCUTANEOUS at 09:11

## 2020-11-01 RX ADMIN — HEPARIN SODIUM 5000 UNITS: 5000 INJECTION INTRAVENOUS; SUBCUTANEOUS at 01:11

## 2020-11-01 RX ADMIN — AMLODIPINE BESYLATE 5 MG: 5 TABLET ORAL at 08:11

## 2020-11-01 RX ADMIN — MORPHINE SULFATE 2 MG: 2 INJECTION, SOLUTION INTRAMUSCULAR; INTRAVENOUS at 02:11

## 2020-11-01 RX ADMIN — HYDRALAZINE HYDROCHLORIDE 25 MG: 25 TABLET, FILM COATED ORAL at 01:11

## 2020-11-01 RX ADMIN — HEPARIN SODIUM 5000 UNITS: 5000 INJECTION INTRAVENOUS; SUBCUTANEOUS at 09:11

## 2020-11-01 NOTE — ASSESSMENT & PLAN NOTE
--Was planned for surgery on 10/28/2020 with Dr. Wolf - was postponed due to thrombocytopenia  --Right leg in knee immobilzer  11/1:   --Surgery with Dr. Wolf tomorrow afternoon

## 2020-11-01 NOTE — PLAN OF CARE
CM spoke with pt to complete initial assessment.  Pt independent of ADLs.  Has WC, glucometer, BSC and bath bench in home.  May need home health, SNF or rehab pending therapy recommendations.  Pt lives with  Dillon and adult grand son who are her help at home.  Pt drove to appointments and managed healthcare needs independently prior to admit.  No advanced directives.  Denies discharge needs at this time.  Updated white board with CM contact information provided.  Transitional care folder given to pt, information on bedside delivery, [facility], discharge begins on admit pamphlet, and  advance directive information provided to pt.  Instructed to call CM with questions or concerns.       D/c plan: home with home health vs SNF vs Rehab  Transport home: family  PCP: [facility]in [facility]sey [facility]: [facility]  Bedside delivery: declined  [facility]:  active     11/01/20 0935   Discharge Assessment   Assessment Type Discharge Planning Assessment   Confirmed/corrected address and phone number on facesheet? Yes   Assessment information obtained from? Patient   Expected Length of Stay (days)   (TBD)   Communicated expected length of stay with patient/caregiver yes   Prior to hospitilization cognitive status: Alert/Oriented;No Deficits   Prior to hospitalization functional status: Independent   Current cognitive status: Alert/Oriented;No Deficits   Current Functional Status: Independent   Facility Arrived From: home   Lives With spouse;grandchild(karuna)   Able to Return to Prior Arrangements yes   Is patient able to care for self after discharge? Unable to determine at this time (comments)   Who are your caregiver(s) and their phone number(s)? Dillon Campbell, spouse, [phone]   Patient's perception of discharge disposition home or selfcare   Readmission Within the Last 30 Days no previous admission in last 30 days   Patient currently being followed by outpatient case management? No   Patient currently receives any other  outside agency services? No   Equipment Currently Used at Home glucometer;wheelchair;bedside commode;bath bench   Part D Coverage NA   Do you have any problems affording any of your prescribed medications? No   Is the patient taking medications as prescribed? yes   Does the patient have transportation home? Yes   Transportation Anticipated family or friend will provide   Dialysis Name and Scheduled days NA   Does the patient receive services at the [facility]? No   Discharge Plan A Home;Home with family;Home Health   Discharge Plan B Skilled Nursing Facility;Rehab   DME Needed Upon Discharge  other (see comments)  (TBD)   Patient/Family in Agreement with Plan yes

## 2020-11-01 NOTE — PLAN OF CARE
Patient received on shift sleeping but easily awoken, x4, in no acute distress. Vitals present within stable limits. Medications/pain management accepted and tolerated well. Frequent weight shifting encouraged by staff and pt. Verbalized understanding and that she will call for assistance when required. Safety precautions maintained; brace to RLE remains in place.  Will continue to monitor progress.

## 2020-11-01 NOTE — ASSESSMENT & PLAN NOTE
--ortho surgery consulted (Dr. Montesinos)  --no surgery as new ortho on call tomorrow  --PRN analgesia  11/1:   --Surgery with Dr. Wolf tomorrow afternoon

## 2020-11-01 NOTE — PROGRESS NOTES
Dr. Jared King dictating orthopedic consult follow up. November 1, 2020. Patient with displaced fracture left proximal humerus, comminuted displaced fracture left distal radius, comminuted stellate fracture with displacement of the right patella. Currently shes comfortable complaining primarily of pain at the left shoulder. Scheduled for surgery tomorrow p.m.    Left shoulder was swelling, tenderness, bruising into the upper arm . Left wrist is in a splint. The fingers are neurovascularly intact. Moves them well. Right knee with swelling and bruising. The limb is neurovascularly intact.      Discussed with Dr. Wolf. He plans surgery tomorrow. Will just continue heparin after midnight. Patient is aware of this.

## 2020-11-01 NOTE — SUBJECTIVE & OBJECTIVE
Interval History: Discussed with Dr. King - who spoke with Dr. Wolf. Pt will have surgery performed by Dr. Wolf tomorrow afternoon. Will be NPO after MN and stop heparin at MN.      Review of Systems   Constitutional: Negative for chills, diaphoresis, fatigue and fever.   HENT: Negative for congestion, rhinorrhea and sore throat.    Eyes: Negative for pain, discharge and itching.   Respiratory: Negative for cough, shortness of breath and wheezing.    Cardiovascular: Negative for chest pain, palpitations and leg swelling.   Gastrointestinal: Negative for abdominal distention, abdominal pain, diarrhea, nausea and vomiting.   Endocrine: Negative for cold intolerance and heat intolerance.   Genitourinary: Negative for difficulty urinating, dysuria and flank pain.   Musculoskeletal: Positive for arthralgias, gait problem and myalgias.   Skin: Negative for color change and wound.   Allergic/Immunologic: Negative.    Neurological: Negative for dizziness, syncope, speech difficulty, weakness and light-headedness.   Hematological: Negative.    Psychiatric/Behavioral: Negative for agitation, behavioral problems and confusion.     Objective:     Vital Signs (Most Recent):  Temp: 98.6 °F (37 °C) (11/01/20 0721)  Pulse: 70 (11/01/20 0721)  Resp: 15 (11/01/20 0721)  BP: (!) 194/77 (11/01/20 0721)  SpO2: (!) 91 % (11/01/20 0721) Vital Signs (24h Range):  Temp:  [97.7 °F (36.5 °C)-98.6 °F (37 °C)] 98.6 °F (37 °C)  Pulse:  [70-86] 70  Resp:  [14-18] 15  SpO2:  [91 %-95 %] 91 %  BP: (138-198)/(64-78) 194/77     Weight: 123 kg (271 lb 2.7 oz)  Body mass index is 45.12 kg/m².    Intake/Output Summary (Last 24 hours) at 11/1/2020 1110  Last data filed at 11/1/2020 1000  Gross per 24 hour   Intake 180 ml   Output 1000 ml   Net -820 ml      Physical Exam  Vitals signs and nursing note reviewed.   Constitutional:       Appearance: Normal appearance. She is well-developed. She is morbidly obese.   HENT:      Head: Normocephalic  and atraumatic.      Nose: Nose normal.   Eyes:      General: No scleral icterus.     Extraocular Movements: Extraocular movements intact.      Conjunctiva/sclera: Conjunctivae normal.   Neck:      Musculoskeletal: Normal range of motion and neck supple. No muscular tenderness.   Cardiovascular:      Rate and Rhythm: Normal rate and regular rhythm.      Pulses: Normal pulses.      Heart sounds: Normal heart sounds. No murmur. No friction rub. No gallop.    Pulmonary:      Effort: Pulmonary effort is normal.      Breath sounds: Normal breath sounds.   Abdominal:      General: Bowel sounds are normal. There is no distension.      Palpations: Abdomen is soft.      Tenderness: There is no abdominal tenderness.   Genitourinary:     Comments: deferred  Musculoskeletal:         General: No tenderness.      Left wrist: She exhibits decreased range of motion, bony tenderness and deformity.      Right knee: She exhibits decreased range of motion and bony tenderness.      Left upper arm: She exhibits bony tenderness and deformity.      Comments: Bruising to anterior knee - knee immobilizer intact  No midline spinal tenderness   Skin:     General: Skin is warm and dry.      Capillary Refill: Capillary refill takes 2 to 3 seconds.   Neurological:      General: No focal deficit present.      Mental Status: She is alert and oriented to person, place, and time.   Psychiatric:         Mood and Affect: Mood normal.         Behavior: Behavior normal.         Thought Content: Thought content normal.         Significant Labs:   Recent Lab Results       11/01/20  1028   11/01/20  0709   11/01/20  0535   10/31/20  2107   10/31/20  1746        Albumin   2.7           Alkaline Phosphatase   60           ALT   12           Anion Gap   6           AST   16           Baso #   0.03           Basophil %   0.5           BILIRUBIN TOTAL   1.1  Comment:  For infants and newborns, interpretation of results should be based  on gestational age, weight  and in agreement with clinical  observations.  Premature Infant recommended reference ranges:  Up to 24 hours.............<8.0 mg/dL  Up to 48 hours............<12.0 mg/dL  3-5 days..................<15.0 mg/dL  6-29 days.................<15.0 mg/dL             BUN   17           Calcium   8.6           Chloride   100           CO2   32           Creatinine   0.8           Differential Method   Automated           eGFR if    >60           eGFR if non    >60  Comment:  Calculation used to obtain the estimated glomerular filtration  rate (eGFR) is the CKD-EPI equation.              Eos #   0.2           Eosinophil %   2.9           Glucose   170           Gran # (ANC)   3.7           Gran %   58.9           Hematocrit   31.2           Hemoglobin   10.3           Immature Grans (Abs)   0.04  Comment:  Mild elevation in immature granulocytes is non specific and   can be seen in a variety of conditions including stress response,   acute inflammation, trauma and pregnancy. Correlation with other   laboratory and clinical findings is essential.             Immature Granulocytes   0.6           Lymph #   1.6           Lymph %   26.2           MCH   28.9           MCHC   33.0           MCV   88           Mono #   0.7           Mono %   10.9           MPV   9.6           nRBC   0           Platelets   109           POCT Glucose 282   187 276 297     Potassium   4.0           PROTEIN TOTAL   5.7           RBC   3.56           RDW   13.9           Sodium   138           WBC   6.25                              All pertinent labs within the past 24 hours have been reviewed.    Significant Imaging: I have reviewed all pertinent imaging results/findings within the past 24 hours.

## 2020-11-01 NOTE — PROGRESS NOTES
[facility] Medicine  Progress Note    Patient Name: Lakshmi Campbell  MRN: [id number]  Patient Class: IP- Inpatient   Admission Date: 10/30/2020  Length of Stay: 1 days  Attending Physician: Ori Lizarraga MD  Primary Care Provider: Karan Ribeiro DO        Subjective:     Principal Problem:Closed fracture of surgical neck of left humerus        HPI:  Pt is a 70 yo female with PMHx of DM II, HTN, HPL, hypothyroidism and morbid obesity who fractured her right patella 1 week ago and is wearing a knee immobilizer. Today, she had a second mishap when trying to get back in bed after using the bedside commode. Pt fell on her left side and immediately developed left shoulder and left wrist pain. She denies any presyncopal symptoms, fever, chest pain, gross neuro deficits, recent nausea/vomiting/diarrhea or urinary symptoms. Xrays found a left humerus fracture and left radius/ulnar fracture.   Vital signs stable and lab work largely normal except for thrombocytopenia which has improved from last level. Pt was placed on Observation for further evaluation by Orthopedics and possible need for surgical intervention.     Overview/Hospital Course:  Patient was kept on OBS for closed fx of surgical neck of L humerus under the care of hospital medicine. Ortho surgery was consulted. 10/31: Patient asking for Dr. Wolf to be involved in her care. I explained that he is not on call today - sent him secure chat but he likely will not see it until Monday. Ordered diet as no surgical intervention today. Will discuss with Dr. King, who is on call tomorrow for ortho    Interval History: Discussed with Dr. King - who spoke with Dr. Wolf. Pt will have surgery performed by Dr. Wolf tomorrow afternoon. Will be NPO after MN and stop heparin at MN.      Review of Systems   Constitutional: Negative for chills, diaphoresis, fatigue and fever.   HENT: Negative for congestion, rhinorrhea and sore throat.     Eyes: Negative for pain, discharge and itching.   Respiratory: Negative for cough, shortness of breath and wheezing.    Cardiovascular: Negative for chest pain, palpitations and leg swelling.   Gastrointestinal: Negative for abdominal distention, abdominal pain, diarrhea, nausea and vomiting.   Endocrine: Negative for cold intolerance and heat intolerance.   Genitourinary: Negative for difficulty urinating, dysuria and flank pain.   Musculoskeletal: Positive for arthralgias, gait problem and myalgias.   Skin: Negative for color change and wound.   Allergic/Immunologic: Negative.    Neurological: Negative for dizziness, syncope, speech difficulty, weakness and light-headedness.   Hematological: Negative.    Psychiatric/Behavioral: Negative for agitation, behavioral problems and confusion.     Objective:     Vital Signs (Most Recent):  Temp: 98.6 °F (37 °C) (11/01/20 0721)  Pulse: 70 (11/01/20 0721)  Resp: 15 (11/01/20 0721)  BP: (!) 194/77 (11/01/20 0721)  SpO2: (!) 91 % (11/01/20 0721) Vital Signs (24h Range):  Temp:  [97.7 °F (36.5 °C)-98.6 °F (37 °C)] 98.6 °F (37 °C)  Pulse:  [70-86] 70  Resp:  [14-18] 15  SpO2:  [91 %-95 %] 91 %  BP: (138-198)/(64-78) 194/77     Weight: 123 kg (271 lb 2.7 oz)  Body mass index is 45.12 kg/m².    Intake/Output Summary (Last 24 hours) at 11/1/2020 1110  Last data filed at 11/1/2020 1000  Gross per 24 hour   Intake 180 ml   Output 1000 ml   Net -820 ml      Physical Exam  Vitals signs and nursing note reviewed.   Constitutional:       Appearance: Normal appearance. She is well-developed. She is morbidly obese.   HENT:      Head: Normocephalic and atraumatic.      Nose: Nose normal.   Eyes:      General: No scleral icterus.     Extraocular Movements: Extraocular movements intact.      Conjunctiva/sclera: Conjunctivae normal.   Neck:      Musculoskeletal: Normal range of motion and neck supple. No muscular tenderness.   Cardiovascular:      Rate and Rhythm: Normal rate and regular  rhythm.      Pulses: Normal pulses.      Heart sounds: Normal heart sounds. No murmur. No friction rub. No gallop.    Pulmonary:      Effort: Pulmonary effort is normal.      Breath sounds: Normal breath sounds.   Abdominal:      General: Bowel sounds are normal. There is no distension.      Palpations: Abdomen is soft.      Tenderness: There is no abdominal tenderness.   Genitourinary:     Comments: deferred  Musculoskeletal:         General: No tenderness.      Left wrist: She exhibits decreased range of motion, bony tenderness and deformity.      Right knee: She exhibits decreased range of motion and bony tenderness.      Left upper arm: She exhibits bony tenderness and deformity.      Comments: Bruising to anterior knee - knee immobilizer intact  No midline spinal tenderness   Skin:     General: Skin is warm and dry.      Capillary Refill: Capillary refill takes 2 to 3 seconds.   Neurological:      General: No focal deficit present.      Mental Status: She is alert and oriented to person, place, and time.   Psychiatric:         Mood and Affect: Mood normal.         Behavior: Behavior normal.         Thought Content: Thought content normal.         Significant Labs:   Recent Lab Results       11/01/20  1028   11/01/20  0709   11/01/20  0535   10/31/20  2107   10/31/20  1746        Albumin   2.7           Alkaline Phosphatase   60           ALT   12           Anion Gap   6           AST   16           Baso #   0.03           Basophil %   0.5           BILIRUBIN TOTAL   1.1  Comment:  For infants and newborns, interpretation of results should be based  on gestational age, weight and in agreement with clinical  observations.  Premature Infant recommended reference ranges:  Up to 24 hours.............<8.0 mg/dL  Up to 48 hours............<12.0 mg/dL  3-5 days..................<15.0 mg/dL  6-29 days.................<15.0 mg/dL             BUN   17           Calcium   8.6           Chloride   100           CO2   32            Creatinine   0.8           Differential Method   Automated           eGFR if    >60           eGFR if non    >60  Comment:  Calculation used to obtain the estimated glomerular filtration  rate (eGFR) is the CKD-EPI equation.              Eos #   0.2           Eosinophil %   2.9           Glucose   170           Gran # (ANC)   3.7           Gran %   58.9           Hematocrit   31.2           Hemoglobin   10.3           Immature Grans (Abs)   0.04  Comment:  Mild elevation in immature granulocytes is non specific and   can be seen in a variety of conditions including stress response,   acute inflammation, trauma and pregnancy. Correlation with other   laboratory and clinical findings is essential.             Immature Granulocytes   0.6           Lymph #   1.6           Lymph %   26.2           MCH   28.9           MCHC   33.0           MCV   88           Mono #   0.7           Mono %   10.9           MPV   9.6           nRBC   0           Platelets   109           POCT Glucose 282   187 276 297     Potassium   4.0           PROTEIN TOTAL   5.7           RBC   3.56           RDW   13.9           Sodium   138           WBC   6.25                              All pertinent labs within the past 24 hours have been reviewed.    Significant Imaging: I have reviewed all pertinent imaging results/findings within the past 24 hours.      Assessment/Plan:      * Closed fracture of surgical neck of left humerus  --ortho surgery consulted (Dr. Montesinos)  --no surgery as new ortho on call tomorrow  --PRN analgesia  11/1:   --Surgery with Dr. Wolf tomorrow afternoon      Closed left radial fracture  --Associated with ulnar styloid fracture  --Ortho consulted  --NPO after MN  --Prn analgesia  --PT/OT eval and treat after Ortho consult  11/1:   --Surgery with Dr. Wolf tomorrow afternoon      Closed displaced comminuted fracture of right patella  --Was planned for surgery on 10/28/2020 with  Dr. Wolf - was postponed due to thrombocytopenia  --Right leg in knee immobilzer  11/1:   --Surgery with Dr. Wolf tomorrow afternoon      Hyperlipidemia  --continue statin  --cardiac diet      Essential hypertension  --continue amlodipine  --cardiac diet      Hypothyroidism  Continue levothyroxine    Type 2 diabetes mellitus with microalbuminuria, with long-term current use of insulin  --accuchecks and SSI  --diabetic diet  --continue novolin at lower dose  --last HA1C 5.8            VTE Risk Mitigation (From admission, onward)         Ordered     heparin (porcine) injection 5,000 Units  Every 8 hours      10/30/20 1749     Place sequential compression device  Until discontinued      10/30/20 1635                Discharge Planning   KYA:      Code Status: Not on file   Is the patient medically ready for discharge?:     Reason for patient still in hospital (select all that apply): Patient trending condition and Consult recommendations                     MOO Kruse-C  Department of Hospital Medicine   [facility] -

## 2020-11-01 NOTE — ASSESSMENT & PLAN NOTE
--Associated with ulnar styloid fracture  --Ortho consulted  --NPO after MN  --Prn analgesia  --PT/OT eval and treat after Ortho consult  11/1:   --Surgery with Dr. Wolf tomorrow afternoon

## 2020-11-02 ENCOUNTER — ANESTHESIA EVENT (OUTPATIENT)
Dept: SURGERY | Facility: HOSPITAL | Age: 70
DRG: 493 | End: 2020-11-02
Payer: MEDICARE

## 2020-11-02 ENCOUNTER — ANESTHESIA (OUTPATIENT)
Dept: SURGERY | Facility: HOSPITAL | Age: 70
DRG: 493 | End: 2020-11-02
Payer: MEDICARE

## 2020-11-02 LAB
ABO + RH BLD: NORMAL
ALBUMIN SERPL BCP-MCNC: 2.5 G/DL (ref 3.5–5.2)
ALP SERPL-CCNC: 67 U/L (ref 55–135)
ALT SERPL W/O P-5'-P-CCNC: 12 U/L (ref 10–44)
ANION GAP SERPL CALC-SCNC: 6 MMOL/L (ref 8–16)
AST SERPL-CCNC: 14 U/L (ref 10–40)
BASOPHILS # BLD AUTO: 0.04 K/UL (ref 0–0.2)
BASOPHILS NFR BLD: 0.7 % (ref 0–1.9)
BILIRUB SERPL-MCNC: 1.1 MG/DL (ref 0.1–1)
BLD GP AB SCN CELLS X3 SERPL QL: NORMAL
BUN SERPL-MCNC: 17 MG/DL (ref 8–23)
CALCIUM SERPL-MCNC: 8.4 MG/DL (ref 8.7–10.5)
CHLORIDE SERPL-SCNC: 101 MMOL/L (ref 95–110)
CO2 SERPL-SCNC: 30 MMOL/L (ref 23–29)
CREAT SERPL-MCNC: 0.9 MG/DL (ref 0.5–1.4)
DIFFERENTIAL METHOD: ABNORMAL
EOSINOPHIL # BLD AUTO: 0.2 K/UL (ref 0–0.5)
EOSINOPHIL NFR BLD: 2.4 % (ref 0–8)
ERYTHROCYTE [DISTWIDTH] IN BLOOD BY AUTOMATED COUNT: 14.1 % (ref 11.5–14.5)
EST. GFR  (AFRICAN AMERICAN): >60 ML/MIN/1.73 M^2
EST. GFR  (NON AFRICAN AMERICAN): >60 ML/MIN/1.73 M^2
GLUCOSE SERPL-MCNC: 225 MG/DL (ref 70–110)
HCT VFR BLD AUTO: 30.5 % (ref 37–48.5)
HGB BLD-MCNC: 9.7 G/DL (ref 12–16)
IMM GRANULOCYTES # BLD AUTO: 0.03 K/UL (ref 0–0.04)
IMM GRANULOCYTES NFR BLD AUTO: 0.5 % (ref 0–0.5)
LYMPHOCYTES # BLD AUTO: 1.6 K/UL (ref 1–4.8)
LYMPHOCYTES NFR BLD: 26.2 % (ref 18–48)
MCH RBC QN AUTO: 28.2 PG (ref 27–31)
MCHC RBC AUTO-ENTMCNC: 31.8 G/DL (ref 32–36)
MCV RBC AUTO: 89 FL (ref 82–98)
MONOCYTES # BLD AUTO: 0.7 K/UL (ref 0.3–1)
MONOCYTES NFR BLD: 11.1 % (ref 4–15)
NEUTROPHILS # BLD AUTO: 3.6 K/UL (ref 1.8–7.7)
NEUTROPHILS NFR BLD: 59.1 % (ref 38–73)
NRBC BLD-RTO: 0 /100 WBC
PLATELET # BLD AUTO: 117 K/UL (ref 150–350)
PMV BLD AUTO: 9.7 FL (ref 9.2–12.9)
POCT GLUCOSE: 205 MG/DL (ref 70–110)
POCT GLUCOSE: 227 MG/DL (ref 70–110)
POCT GLUCOSE: 248 MG/DL (ref 70–110)
POTASSIUM SERPL-SCNC: 4.2 MMOL/L (ref 3.5–5.1)
PROT SERPL-MCNC: 5.7 G/DL (ref 6–8.4)
RBC # BLD AUTO: 3.44 M/UL (ref 4–5.4)
SODIUM SERPL-SCNC: 137 MMOL/L (ref 136–145)
WBC # BLD AUTO: 6.15 K/UL (ref 3.9–12.7)

## 2020-11-02 PROCEDURE — 63600175 PHARM REV CODE 636 W HCPCS: Performed by: NURSE PRACTITIONER

## 2020-11-02 PROCEDURE — 27524 PR OPEN RX PATELLA FX: ICD-10-PCS | Mod: RT,,, | Performed by: STUDENT IN AN ORGANIZED HEALTH CARE EDUCATION/TRAINING PROGRAM

## 2020-11-02 PROCEDURE — 80053 COMPREHEN METABOLIC PANEL: CPT

## 2020-11-02 PROCEDURE — 25000003 PHARM REV CODE 250: Performed by: NURSE PRACTITIONER

## 2020-11-02 PROCEDURE — 36415 COLL VENOUS BLD VENIPUNCTURE: CPT

## 2020-11-02 PROCEDURE — 36000710: Performed by: STUDENT IN AN ORGANIZED HEALTH CARE EDUCATION/TRAINING PROGRAM

## 2020-11-02 PROCEDURE — 27201423 OPTIME MED/SURG SUP & DEVICES STERILE SUPPLY: Performed by: STUDENT IN AN ORGANIZED HEALTH CARE EDUCATION/TRAINING PROGRAM

## 2020-11-02 PROCEDURE — C1713 ANCHOR/SCREW BN/BN,TIS/BN: HCPCS | Performed by: STUDENT IN AN ORGANIZED HEALTH CARE EDUCATION/TRAINING PROGRAM

## 2020-11-02 PROCEDURE — C1769 GUIDE WIRE: HCPCS | Performed by: STUDENT IN AN ORGANIZED HEALTH CARE EDUCATION/TRAINING PROGRAM

## 2020-11-02 PROCEDURE — 25000003 PHARM REV CODE 250: Performed by: NURSE ANESTHETIST, CERTIFIED REGISTERED

## 2020-11-02 PROCEDURE — 37000009 HC ANESTHESIA EA ADD 15 MINS: Performed by: STUDENT IN AN ORGANIZED HEALTH CARE EDUCATION/TRAINING PROGRAM

## 2020-11-02 PROCEDURE — 63600175 PHARM REV CODE 636 W HCPCS: Performed by: ANESTHESIOLOGY

## 2020-11-02 PROCEDURE — 63600175 PHARM REV CODE 636 W HCPCS: Performed by: STUDENT IN AN ORGANIZED HEALTH CARE EDUCATION/TRAINING PROGRAM

## 2020-11-02 PROCEDURE — 36000711: Performed by: STUDENT IN AN ORGANIZED HEALTH CARE EDUCATION/TRAINING PROGRAM

## 2020-11-02 PROCEDURE — 25000003 PHARM REV CODE 250: Performed by: ANESTHESIOLOGY

## 2020-11-02 PROCEDURE — 85025 COMPLETE CBC W/AUTO DIFF WBC: CPT

## 2020-11-02 PROCEDURE — 25609 OPTX DST RD XART FX/EP SEP3+: CPT | Mod: 51,LT,, | Performed by: STUDENT IN AN ORGANIZED HEALTH CARE EDUCATION/TRAINING PROGRAM

## 2020-11-02 PROCEDURE — 27524 TREAT KNEECAP FRACTURE: CPT | Mod: RT,,, | Performed by: STUDENT IN AN ORGANIZED HEALTH CARE EDUCATION/TRAINING PROGRAM

## 2020-11-02 PROCEDURE — 25609 PR OPEN RX DISTAL RADIUS FX, INTRA-ARTICULAR, 3+ FRAG: ICD-10-PCS | Mod: 51,LT,, | Performed by: STUDENT IN AN ORGANIZED HEALTH CARE EDUCATION/TRAINING PROGRAM

## 2020-11-02 PROCEDURE — 11000001 HC ACUTE MED/SURG PRIVATE ROOM

## 2020-11-02 PROCEDURE — 86850 RBC ANTIBODY SCREEN: CPT

## 2020-11-02 PROCEDURE — 27800903 OPTIME MED/SURG SUP & DEVICES OTHER IMPLANTS: Performed by: STUDENT IN AN ORGANIZED HEALTH CARE EDUCATION/TRAINING PROGRAM

## 2020-11-02 PROCEDURE — 63600175 PHARM REV CODE 636 W HCPCS: Performed by: NURSE ANESTHETIST, CERTIFIED REGISTERED

## 2020-11-02 PROCEDURE — 21400001 HC TELEMETRY ROOM

## 2020-11-02 PROCEDURE — 71000033 HC RECOVERY, INTIAL HOUR: Performed by: STUDENT IN AN ORGANIZED HEALTH CARE EDUCATION/TRAINING PROGRAM

## 2020-11-02 PROCEDURE — 37000008 HC ANESTHESIA 1ST 15 MINUTES: Performed by: STUDENT IN AN ORGANIZED HEALTH CARE EDUCATION/TRAINING PROGRAM

## 2020-11-02 DEVICE — SCREW STRDRV REC T8 2.4X12 SS
Type: IMPLANTABLE DEVICE | Site: RADIUS | Status: NON-FUNCTIONAL
Removed: 2021-03-11

## 2020-11-02 DEVICE — SCREW STRDRV REC T8 2.4X14 SS
Type: IMPLANTABLE DEVICE | Site: RADIUS | Status: NON-FUNCTIONAL
Removed: 2021-03-11

## 2020-11-02 DEVICE — IMPLANTABLE DEVICE: Type: IMPLANTABLE DEVICE | Site: PATELLA | Status: FUNCTIONAL

## 2020-11-02 DEVICE — SCREW LCK STRDRV REC 2.4X12 SS
Type: IMPLANTABLE DEVICE | Site: RADIUS | Status: NON-FUNCTIONAL
Removed: 2021-03-11

## 2020-11-02 RX ORDER — ONDANSETRON 2 MG/ML
INJECTION INTRAMUSCULAR; INTRAVENOUS
Status: DISCONTINUED | OUTPATIENT
Start: 2020-11-02 | End: 2020-11-02

## 2020-11-02 RX ORDER — HYDROMORPHONE HYDROCHLORIDE 2 MG/ML
0.2 INJECTION, SOLUTION INTRAMUSCULAR; INTRAVENOUS; SUBCUTANEOUS EVERY 5 MIN PRN
Status: DISCONTINUED | OUTPATIENT
Start: 2020-11-02 | End: 2020-11-03

## 2020-11-02 RX ORDER — ONDANSETRON 2 MG/ML
4 INJECTION INTRAMUSCULAR; INTRAVENOUS DAILY PRN
Status: DISCONTINUED | OUTPATIENT
Start: 2020-11-02 | End: 2020-11-05 | Stop reason: HOSPADM

## 2020-11-02 RX ORDER — MIDAZOLAM HYDROCHLORIDE 1 MG/ML
INJECTION, SOLUTION INTRAMUSCULAR; INTRAVENOUS
Status: DISCONTINUED | OUTPATIENT
Start: 2020-11-02 | End: 2020-11-02

## 2020-11-02 RX ORDER — VECURONIUM BROMIDE FOR INJECTION 1 MG/ML
INJECTION, POWDER, LYOPHILIZED, FOR SOLUTION INTRAVENOUS
Status: DISCONTINUED | OUTPATIENT
Start: 2020-11-02 | End: 2020-11-02

## 2020-11-02 RX ORDER — PROPOFOL 10 MG/ML
VIAL (ML) INTRAVENOUS
Status: DISCONTINUED | OUTPATIENT
Start: 2020-11-02 | End: 2020-11-02

## 2020-11-02 RX ORDER — SODIUM CHLORIDE, SODIUM LACTATE, POTASSIUM CHLORIDE, CALCIUM CHLORIDE 600; 310; 30; 20 MG/100ML; MG/100ML; MG/100ML; MG/100ML
INJECTION, SOLUTION INTRAVENOUS CONTINUOUS PRN
Status: DISCONTINUED | OUTPATIENT
Start: 2020-11-02 | End: 2020-11-02

## 2020-11-02 RX ORDER — CEFAZOLIN SODIUM 1 G/3ML
INJECTION, POWDER, FOR SOLUTION INTRAMUSCULAR; INTRAVENOUS
Status: DISCONTINUED | OUTPATIENT
Start: 2020-11-02 | End: 2020-11-02

## 2020-11-02 RX ORDER — FENTANYL CITRATE 50 UG/ML
INJECTION, SOLUTION INTRAMUSCULAR; INTRAVENOUS
Status: DISCONTINUED | OUTPATIENT
Start: 2020-11-02 | End: 2020-11-02

## 2020-11-02 RX ORDER — MORPHINE SULFATE 4 MG/ML
4 INJECTION, SOLUTION INTRAMUSCULAR; INTRAVENOUS EVERY 4 HOURS PRN
Status: DISCONTINUED | OUTPATIENT
Start: 2020-11-02 | End: 2020-11-11 | Stop reason: HOSPADM

## 2020-11-02 RX ORDER — NEOSTIGMINE METHYLSULFATE 1 MG/ML
INJECTION, SOLUTION INTRAVENOUS
Status: DISCONTINUED | OUTPATIENT
Start: 2020-11-02 | End: 2020-11-02

## 2020-11-02 RX ORDER — CEFAZOLIN SODIUM 1 G/50ML
1 SOLUTION INTRAVENOUS
Status: DISCONTINUED | OUTPATIENT
Start: 2020-11-02 | End: 2020-11-11 | Stop reason: HOSPADM

## 2020-11-02 RX ORDER — LIDOCAINE HYDROCHLORIDE 10 MG/ML
INJECTION, SOLUTION EPIDURAL; INFILTRATION; INTRACAUDAL; PERINEURAL
Status: DISCONTINUED | OUTPATIENT
Start: 2020-11-02 | End: 2020-11-02

## 2020-11-02 RX ORDER — SUCCINYLCHOLINE CHLORIDE 20 MG/ML
INJECTION INTRAMUSCULAR; INTRAVENOUS
Status: DISCONTINUED | OUTPATIENT
Start: 2020-11-02 | End: 2020-11-02

## 2020-11-02 RX ADMIN — SODIUM CHLORIDE, SODIUM LACTATE, POTASSIUM CHLORIDE, AND CALCIUM CHLORIDE: 600; 310; 30; 20 INJECTION, SOLUTION INTRAVENOUS at 05:11

## 2020-11-02 RX ADMIN — HYDROMORPHONE HYDROCHLORIDE 0.2 MG: 2 INJECTION INTRAMUSCULAR; INTRAVENOUS; SUBCUTANEOUS at 10:11

## 2020-11-02 RX ADMIN — LIDOCAINE HYDROCHLORIDE 50 MG: 10 INJECTION, SOLUTION EPIDURAL; INFILTRATION; INTRACAUDAL; PERINEURAL at 05:11

## 2020-11-02 RX ADMIN — HYDRALAZINE HYDROCHLORIDE 25 MG: 25 TABLET, FILM COATED ORAL at 12:11

## 2020-11-02 RX ADMIN — CEFAZOLIN SODIUM 1 G: 1 SOLUTION INTRAVENOUS at 11:11

## 2020-11-02 RX ADMIN — GLYCOPYRROLATE 0.6 MG: 0.2 INJECTION, SOLUTION INTRAMUSCULAR; INTRAVENOUS at 09:11

## 2020-11-02 RX ADMIN — FENTANYL CITRATE 50 MCG: 50 INJECTION, SOLUTION INTRAMUSCULAR; INTRAVENOUS at 06:11

## 2020-11-02 RX ADMIN — AMLODIPINE BESYLATE 5 MG: 5 TABLET ORAL at 09:11

## 2020-11-02 RX ADMIN — PRAVASTATIN SODIUM 20 MG: 20 TABLET ORAL at 09:11

## 2020-11-02 RX ADMIN — PANTOPRAZOLE SODIUM 40 MG: 40 TABLET, DELAYED RELEASE ORAL at 09:11

## 2020-11-02 RX ADMIN — MORPHINE SULFATE 2 MG: 2 INJECTION, SOLUTION INTRAMUSCULAR; INTRAVENOUS at 12:11

## 2020-11-02 RX ADMIN — INSULIN ASPART 4 UNITS: 100 INJECTION, SOLUTION INTRAVENOUS; SUBCUTANEOUS at 06:11

## 2020-11-02 RX ADMIN — MORPHINE SULFATE 4 MG: 4 INJECTION INTRAVENOUS at 04:11

## 2020-11-02 RX ADMIN — SODIUM CHLORIDE, SODIUM LACTATE, POTASSIUM CHLORIDE, AND CALCIUM CHLORIDE: 600; 310; 30; 20 INJECTION, SOLUTION INTRAVENOUS at 08:11

## 2020-11-02 RX ADMIN — VECURONIUM BROMIDE 5 MG: 10 INJECTION, POWDER, LYOPHILIZED, FOR SOLUTION INTRAVENOUS at 05:11

## 2020-11-02 RX ADMIN — PROPOFOL 150 MG: 10 INJECTION, EMULSION INTRAVENOUS at 05:11

## 2020-11-02 RX ADMIN — FENTANYL CITRATE 50 MCG: 50 INJECTION, SOLUTION INTRAMUSCULAR; INTRAVENOUS at 09:11

## 2020-11-02 RX ADMIN — NEOSTIGMINE METHYLSULFATE 4 MG: 1 INJECTION INTRAVENOUS at 09:11

## 2020-11-02 RX ADMIN — INSULIN ASPART 4 UNITS: 100 INJECTION, SOLUTION INTRAVENOUS; SUBCUTANEOUS at 10:11

## 2020-11-02 RX ADMIN — MORPHINE SULFATE 4 MG: 4 INJECTION INTRAVENOUS at 09:11

## 2020-11-02 RX ADMIN — MIDAZOLAM 2 MG: 1 INJECTION INTRAMUSCULAR; INTRAVENOUS at 05:11

## 2020-11-02 RX ADMIN — INSULIN DETEMIR 10 UNITS: 100 INJECTION, SOLUTION SUBCUTANEOUS at 11:11

## 2020-11-02 RX ADMIN — SUCCINYLCHOLINE CHLORIDE 140 MG: 20 INJECTION, SOLUTION INTRAMUSCULAR; INTRAVENOUS at 05:11

## 2020-11-02 RX ADMIN — SODIUM CHLORIDE, SODIUM LACTATE, POTASSIUM CHLORIDE, AND CALCIUM CHLORIDE: 600; 310; 30; 20 INJECTION, SOLUTION INTRAVENOUS at 07:11

## 2020-11-02 RX ADMIN — FENTANYL CITRATE 50 MCG: 50 INJECTION, SOLUTION INTRAMUSCULAR; INTRAVENOUS at 07:11

## 2020-11-02 RX ADMIN — CEFAZOLIN 3 G: 1 INJECTION, POWDER, FOR SOLUTION INTRAMUSCULAR; INTRAVENOUS at 06:11

## 2020-11-02 RX ADMIN — FENTANYL CITRATE 100 MCG: 50 INJECTION, SOLUTION INTRAMUSCULAR; INTRAVENOUS at 05:11

## 2020-11-02 RX ADMIN — INSULIN HUMAN 4 UNITS: 100 INJECTION, SOLUTION PARENTERAL at 11:11

## 2020-11-02 RX ADMIN — HYDRALAZINE HYDROCHLORIDE 25 MG: 25 TABLET, FILM COATED ORAL at 11:11

## 2020-11-02 RX ADMIN — VECURONIUM BROMIDE 2 MG: 10 INJECTION, POWDER, LYOPHILIZED, FOR SOLUTION INTRAVENOUS at 08:11

## 2020-11-02 RX ADMIN — ONDANSETRON 8 MG: 2 INJECTION, SOLUTION INTRAMUSCULAR; INTRAVENOUS at 09:11

## 2020-11-02 RX ADMIN — HYDRALAZINE HYDROCHLORIDE 25 MG: 25 TABLET, FILM COATED ORAL at 09:11

## 2020-11-02 NOTE — PROGRESS NOTES
[facility] Medicine  Progress Note    Patient Name: Lakshmi Campbell  MRN: [id number]  Patient Class: IP- Inpatient   Admission Date: 10/30/2020  Length of Stay: 2 days  Attending Physician: Ori Lizarraga MD  Primary Care Provider: Karan Ribeiro DO        Subjective:     Principal Problem:Closed fracture of surgical neck of left humerus        HPI:  Pt is a 70 yo female with PMHx of DM II, HTN, HPL, hypothyroidism and morbid obesity who fractured her right patella 1 week ago and is wearing a knee immobilizer. Today, she had a second mishap when trying to get back in bed after using the bedside commode. Pt fell on her left side and immediately developed left shoulder and left wrist pain. She denies any presyncopal symptoms, fever, chest pain, gross neuro deficits, recent nausea/vomiting/diarrhea or urinary symptoms. Xrays found a left humerus fracture and left radius/ulnar fracture.   Vital signs stable and lab work largely normal except for thrombocytopenia which has improved from last level. Pt was placed on Observation for further evaluation by Orthopedics and possible need for surgical intervention.     Overview/Hospital Course:  Patient was kept on OBS for closed fx of surgical neck of L humerus under the care of hospital medicine. Ortho surgery was consulted. 10/31: Patient asking for Dr. Wolf to be involved in her care. I explained that he is not on call today - sent him secure chat but he likely will not see it until Monday. Ordered diet as no surgical intervention today. Will discuss with Dr. King, who is on call tomorrow for ortho. 11/1: Discussed with Dr. King - who spoke with Dr. Wolf. Pt will have surgery performed by Dr. Wolf tomorrow afternoon. Will be NPO after MN and stop heparin at MN.    Interval History: Patient scheduled for surgery this afternoon by Dr. Wolf. She is currently NPO with spouse at bedside. Pain is controlled.     Review of Systems    Constitutional: Negative for chills, diaphoresis, fatigue and fever.   HENT: Negative for congestion, rhinorrhea and sore throat.    Eyes: Negative for pain, discharge and itching.   Respiratory: Negative for cough, shortness of breath and wheezing.    Cardiovascular: Negative for chest pain, palpitations and leg swelling.   Gastrointestinal: Negative for abdominal distention, abdominal pain, diarrhea, nausea and vomiting.   Endocrine: Negative for cold intolerance and heat intolerance.   Genitourinary: Negative for difficulty urinating, dysuria and flank pain.   Musculoskeletal: Positive for arthralgias, gait problem and myalgias.   Skin: Negative for color change and wound.   Allergic/Immunologic: Negative.    Neurological: Negative for dizziness, syncope, speech difficulty, weakness and light-headedness.   Hematological: Negative.    Psychiatric/Behavioral: Negative for agitation, behavioral problems and confusion.     Objective:     Vital Signs (Most Recent):  Temp: 98.3 °F (36.8 °C) (11/02/20 0715)  Pulse: 72 (11/02/20 0715)  Resp: 14 (11/02/20 0907)  BP: (!) 168/72 (11/02/20 0715)  SpO2: (!) 93 % (11/02/20 0715) Vital Signs (24h Range):  Temp:  [96.4 °F (35.8 °C)-98.7 °F (37.1 °C)] 98.3 °F (36.8 °C)  Pulse:  [72-99] 72  Resp:  [14-18] 14  SpO2:  [93 %-96 %] 93 %  BP: (141-202)/(70-97) 168/72     Weight: 123 kg (271 lb 2.7 oz)  Body mass index is 45.12 kg/m².    Intake/Output Summary (Last 24 hours) at 11/2/2020 1403  Last data filed at 11/2/2020 0500  Gross per 24 hour   Intake 200 ml   Output 1450 ml   Net -1250 ml      Physical Exam  Vitals signs and nursing note reviewed.   Constitutional:       Appearance: Normal appearance. She is well-developed. She is morbidly obese.   HENT:      Head: Normocephalic and atraumatic.      Nose: Nose normal.   Eyes:      General: No scleral icterus.     Extraocular Movements: Extraocular movements intact.      Conjunctiva/sclera: Conjunctivae normal.   Neck:       Musculoskeletal: Normal range of motion and neck supple. No muscular tenderness.   Cardiovascular:      Rate and Rhythm: Normal rate and regular rhythm.      Pulses: Normal pulses.      Heart sounds: Normal heart sounds. No murmur. No friction rub. No gallop.    Pulmonary:      Effort: Pulmonary effort is normal.      Breath sounds: Normal breath sounds.   Abdominal:      General: Bowel sounds are normal. There is no distension.      Palpations: Abdomen is soft.      Tenderness: There is no abdominal tenderness.   Genitourinary:     Comments: deferred  Musculoskeletal:         General: No tenderness.      Left wrist: She exhibits decreased range of motion, bony tenderness and deformity.      Right knee: She exhibits decreased range of motion and bony tenderness.      Left upper arm: She exhibits bony tenderness and deformity.      Comments: Bruising to anterior knee - knee immobilizer intact  No midline spinal tenderness   Skin:     General: Skin is warm and dry.      Capillary Refill: Capillary refill takes 2 to 3 seconds.   Neurological:      General: No focal deficit present.      Mental Status: She is alert and oriented to person, place, and time.   Psychiatric:         Mood and Affect: Mood normal.         Behavior: Behavior normal.         Thought Content: Thought content normal.         Significant Labs:   Recent Lab Results       11/02/20  1046   11/02/20  0644   11/02/20  0603   11/01/20  2116   11/01/20  1647        Albumin   2.5           Alkaline Phosphatase   67           ALT   12           Anion Gap   6           AST   14           Baso #   0.04           Basophil %   0.7           BILIRUBIN TOTAL   1.1  Comment:  For infants and newborns, interpretation of results should be based  on gestational age, weight and in agreement with clinical  observations.  Premature Infant recommended reference ranges:  Up to 24 hours.............<8.0 mg/dL  Up to 48 hours............<12.0 mg/dL  3-5  days..................<15.0 mg/dL  6-29 days.................<15.0 mg/dL             BUN   17           Calcium   8.4           Chloride   101           CO2   30           Creatinine   0.9           Differential Method   Automated           eGFR if    >60           eGFR if non    >60  Comment:  Calculation used to obtain the estimated glomerular filtration  rate (eGFR) is the CKD-EPI equation.              Eos #   0.2           Eosinophil %   2.4           Glucose   225           Gran # (ANC)   3.6           Gran %   59.1           Hematocrit   30.5           Hemoglobin   9.7           Immature Grans (Abs)   0.03  Comment:  Mild elevation in immature granulocytes is non specific and   can be seen in a variety of conditions including stress response,   acute inflammation, trauma and pregnancy. Correlation with other   laboratory and clinical findings is essential.             Immature Granulocytes   0.5           Lymph #   1.6           Lymph %   26.2           MCH   28.2           MCHC   31.8           MCV   89           Mono #   0.7           Mono %   11.1           MPV   9.7           nRBC   0           Platelets   117           POCT Glucose 205   227 342 214     Potassium   4.2           PROTEIN TOTAL   5.7           RBC   3.44           RDW   14.1           Sodium   137           WBC   6.15                              All pertinent labs within the past 24 hours have been reviewed.    Significant Imaging: I have reviewed all pertinent imaging results/findings within the past 24 hours.      Assessment/Plan:      * Closed fracture of surgical neck of left humerus  --ortho surgery consulted (Dr. Montesinos)  --no surgery as new ortho on call tomorrow  --PRN analgesia  11/2:  --surgery with Dr. Wolf today      Closed left radial fracture  --Associated with ulnar styloid fracture  --Ortho consulted  --NPO after MN  --Prn analgesia  --PT/OT eval and treat after Ortho  consult  11/2:  --surgery with Dr. Wolf today      Closed displaced comminuted fracture of right patella  --Was planned for surgery on 10/28/2020 with Dr. Wolf - was postponed due to thrombocytopenia  --Right leg in knee immobilzer  11/2:  --surgery with Dr. Wolf today      Hyperlipidemia  --continue statin  --cardiac diet      Essential hypertension  --continue amlodipine  --cardiac diet      Hypothyroidism  Continue levothyroxine    Type 2 diabetes mellitus with microalbuminuria, with long-term current use of insulin  --accuchecks and SSI  --diabetic diet  --continue novolin at lower dose  --last HA1C 5.8          VTE Risk Mitigation (From admission, onward)         Ordered     Place sequential compression device  Until discontinued      10/30/20 9779                Discharge Planning   KYA:      Code Status: Not on file   Is the patient medically ready for discharge?:     Reason for patient still in hospital (select all that apply): Patient trending condition and Consult recommendations  Discharge Plan A: Home, Home with family, Home Health                  BRUNA Kruse  Department of Hospital Medicine   [facility] -

## 2020-11-02 NOTE — NURSING
Patient assessed for diabetes educational needs following chart review   at bedside for info  She reports A1C normally 5.5-6    Her insulin was recently decreased  She has a home glucose monitor and is consistent with administering her insulin  She does not identify any diabetes educational needs at this time

## 2020-11-02 NOTE — ASSESSMENT & PLAN NOTE
--Associated with ulnar styloid fracture  --Ortho consulted  --NPO after MN  --Prn analgesia  --PT/OT eval and treat after Ortho consult  11/2:  --surgery with Dr. Wolf today

## 2020-11-02 NOTE — NURSING
Pct notified me of elevated BP.  Notified ANIL Hubbard NP.  NP concern that pain may be inadequately controlled. NP increased morphine dosage from 2 mg to 4 mg.  Will have dose administered and will check BP 1 hour post administration.

## 2020-11-02 NOTE — ASSESSMENT & PLAN NOTE
--ortho surgery consulted (Dr. Montesinos)  --no surgery as new ortho on call tomorrow  --PRN analgesia  11/2:  --surgery with Dr. Wolf today

## 2020-11-02 NOTE — ANESTHESIA PREPROCEDURE EVALUATION
11/02/2020  Lakshmi Campbell is a 69 y.o., female.      Patient Active Problem List   Diagnosis    Type 2 diabetes mellitus with microalbuminuria, with long-term current use of insulin    Hypothyroidism    Essential hypertension    Hyperlipidemia    Microalbuminuria    Preop examination    Body mass index (BMI) 45.0-49.9, adult    Left knee pain    Closed displaced comminuted fracture of right patella    Closed fracture of surgical neck of left humerus    Closed left radial fracture    Shoulder pain, left         Anesthesia Evaluation    I have reviewed the Patient Summary Reports.    I have reviewed the Nursing Notes.    I have reviewed the Medications.     Review of Systems  Anesthesia Hx:  No problems with previous Anesthesia    Social:  Non-Smoker    Hematology/Oncology:         -- Anemia:   Cardiovascular:  Cardiovascular Normal Hypertension  hyperlipidemia ECG has been reviewed.    Pulmonary:  Pulmonary Normal    Renal/:  Renal/ Normal     Hepatic/GI:  Hepatic/GI Normal    Musculoskeletal:   Closed displaced comminuted fracture of right patella  Closed fracture of surgical neck of left humerus  Closed left radial fracture  Shoulder pain, left     Neurological:  Neurology Normal    Endocrine:   Diabetes Hypothyroidism        Physical Exam  General:  Morbid Obesity    Airway/Jaw/Neck:  Airway Findings: Mouth Opening: Normal Tongue: Normal  General Airway Assessment: Adult  Mallampati: II  TM Distance: 4 - 6 cm  Jaw/Neck Findings:  Neck ROM: Normal ROM      Dental:  Dental Findings: In tact   Chest/Lungs:  Chest/Lungs Findings: Clear to auscultation, Normal Respiratory Rate     Heart/Vascular:  Heart Findings: Rate: Normal  Rhythm: Regular Rhythm  Sounds: Normal        Mental Status:  Mental Status Findings:  Cooperative, Alert and Oriented         Anesthesia Plan  Type of Anesthesia, risks &  benefits discussed:  Anesthesia Type:  general  Patient's Preference:   Intra-op Monitoring Plan: standard ASA monitors  Intra-op Monitoring Plan Comments: Possible central line, depending on IV access.  Post Op Pain Control Plan: multimodal analgesia and per primary service following discharge from PACU  Post Op Pain Control Plan Comments:   Induction:   IV  Beta Blocker:  Patient is not currently on a Beta-Blocker (No further documentation required).       Informed Consent: Patient understands risks and agrees with Anesthesia plan.  Questions answered. Anesthesia consent signed with patient.  ASA Score: 2     Day of Surgery Review of History & Physical:  There are no significant changes.          Ready For Surgery From Anesthesia Perspective.       Chemistry        Component Value Date/Time     11/02/2020 0644    K 4.2 11/02/2020 0644     11/02/2020 0644    CO2 30 (H) 11/02/2020 0644    BUN 17 11/02/2020 0644    CREATININE 0.9 11/02/2020 0644     (H) 11/02/2020 0644        Component Value Date/Time    CALCIUM 8.4 (L) 11/02/2020 0644    ALKPHOS 67 11/02/2020 0644    AST 14 11/02/2020 0644    ALT 12 11/02/2020 0644    BILITOT 1.1 (H) 11/02/2020 0644    ESTGFRAFRICA >60 11/02/2020 0644    EGFRNONAA >60 11/02/2020 0644          Lab Results   Component Value Date    WBC 6.15 11/02/2020    HGB 9.7 (L) 11/02/2020    HCT 30.5 (L) 11/02/2020    MCV 89 11/02/2020     (L) 11/02/2020         Normal sinus rhythm   Minimal voltage criteria for LVH, may be normal variant   Borderline Abnormal ECG   When compared with ECG of 27-OCT-2020 14:50,   No significant change was found   Confirmed by MIGDALIA GROSS MD ([id number]) on 11/1/2020 5:17:01 PM

## 2020-11-02 NOTE — SUBJECTIVE & OBJECTIVE
Interval History: Patient scheduled for surgery this afternoon by Dr. Wolf. She is currently NPO with spouse at bedside. Pain is controlled.     Review of Systems   Constitutional: Negative for chills, diaphoresis, fatigue and fever.   HENT: Negative for congestion, rhinorrhea and sore throat.    Eyes: Negative for pain, discharge and itching.   Respiratory: Negative for cough, shortness of breath and wheezing.    Cardiovascular: Negative for chest pain, palpitations and leg swelling.   Gastrointestinal: Negative for abdominal distention, abdominal pain, diarrhea, nausea and vomiting.   Endocrine: Negative for cold intolerance and heat intolerance.   Genitourinary: Negative for difficulty urinating, dysuria and flank pain.   Musculoskeletal: Positive for arthralgias, gait problem and myalgias.   Skin: Negative for color change and wound.   Allergic/Immunologic: Negative.    Neurological: Negative for dizziness, syncope, speech difficulty, weakness and light-headedness.   Hematological: Negative.    Psychiatric/Behavioral: Negative for agitation, behavioral problems and confusion.     Objective:     Vital Signs (Most Recent):  Temp: 98.3 °F (36.8 °C) (11/02/20 0715)  Pulse: 72 (11/02/20 0715)  Resp: 14 (11/02/20 0907)  BP: (!) 168/72 (11/02/20 0715)  SpO2: (!) 93 % (11/02/20 0715) Vital Signs (24h Range):  Temp:  [96.4 °F (35.8 °C)-98.7 °F (37.1 °C)] 98.3 °F (36.8 °C)  Pulse:  [72-99] 72  Resp:  [14-18] 14  SpO2:  [93 %-96 %] 93 %  BP: (141-202)/(70-97) 168/72     Weight: 123 kg (271 lb 2.7 oz)  Body mass index is 45.12 kg/m².    Intake/Output Summary (Last 24 hours) at 11/2/2020 1403  Last data filed at 11/2/2020 0500  Gross per 24 hour   Intake 200 ml   Output 1450 ml   Net -1250 ml      Physical Exam  Vitals signs and nursing note reviewed.   Constitutional:       Appearance: Normal appearance. She is well-developed. She is morbidly obese.   HENT:      Head: Normocephalic and atraumatic.      Nose: Nose  normal.   Eyes:      General: No scleral icterus.     Extraocular Movements: Extraocular movements intact.      Conjunctiva/sclera: Conjunctivae normal.   Neck:      Musculoskeletal: Normal range of motion and neck supple. No muscular tenderness.   Cardiovascular:      Rate and Rhythm: Normal rate and regular rhythm.      Pulses: Normal pulses.      Heart sounds: Normal heart sounds. No murmur. No friction rub. No gallop.    Pulmonary:      Effort: Pulmonary effort is normal.      Breath sounds: Normal breath sounds.   Abdominal:      General: Bowel sounds are normal. There is no distension.      Palpations: Abdomen is soft.      Tenderness: There is no abdominal tenderness.   Genitourinary:     Comments: deferred  Musculoskeletal:         General: No tenderness.      Left wrist: She exhibits decreased range of motion, bony tenderness and deformity.      Right knee: She exhibits decreased range of motion and bony tenderness.      Left upper arm: She exhibits bony tenderness and deformity.      Comments: Bruising to anterior knee - knee immobilizer intact  No midline spinal tenderness   Skin:     General: Skin is warm and dry.      Capillary Refill: Capillary refill takes 2 to 3 seconds.   Neurological:      General: No focal deficit present.      Mental Status: She is alert and oriented to person, place, and time.   Psychiatric:         Mood and Affect: Mood normal.         Behavior: Behavior normal.         Thought Content: Thought content normal.         Significant Labs:   Recent Lab Results       11/02/20  1046   11/02/20  0644   11/02/20  0603   11/01/20  2116   11/01/20  1647        Albumin   2.5           Alkaline Phosphatase   67           ALT   12           Anion Gap   6           AST   14           Baso #   0.04           Basophil %   0.7           BILIRUBIN TOTAL   1.1  Comment:  For infants and newborns, interpretation of results should be based  on gestational age, weight and in agreement with  clinical  observations.  Premature Infant recommended reference ranges:  Up to 24 hours.............<8.0 mg/dL  Up to 48 hours............<12.0 mg/dL  3-5 days..................<15.0 mg/dL  6-29 days.................<15.0 mg/dL             BUN   17           Calcium   8.4           Chloride   101           CO2   30           Creatinine   0.9           Differential Method   Automated           eGFR if    >60           eGFR if non    >60  Comment:  Calculation used to obtain the estimated glomerular filtration  rate (eGFR) is the CKD-EPI equation.              Eos #   0.2           Eosinophil %   2.4           Glucose   225           Gran # (ANC)   3.6           Gran %   59.1           Hematocrit   30.5           Hemoglobin   9.7           Immature Grans (Abs)   0.03  Comment:  Mild elevation in immature granulocytes is non specific and   can be seen in a variety of conditions including stress response,   acute inflammation, trauma and pregnancy. Correlation with other   laboratory and clinical findings is essential.             Immature Granulocytes   0.5           Lymph #   1.6           Lymph %   26.2           MCH   28.2           MCHC   31.8           MCV   89           Mono #   0.7           Mono %   11.1           MPV   9.7           nRBC   0           Platelets   117           POCT Glucose 205   227 342 214     Potassium   4.2           PROTEIN TOTAL   5.7           RBC   3.44           RDW   14.1           Sodium   137           WBC   6.15                              All pertinent labs within the past 24 hours have been reviewed.    Significant Imaging: I have reviewed all pertinent imaging results/findings within the past 24 hours.

## 2020-11-02 NOTE — PLAN OF CARE
Safety precautions and comfort measures in place. VSS. Brace to R knee at all times.  BG monitoring.  Weight changing positions encouraged with assistance.  Verbalized understanding. NPO. Will continue to monitor.  Chart reviewed.

## 2020-11-02 NOTE — INTERVAL H&P NOTE
The patient has been examined and the H&P has been reviewed:    I concur with the findings and no changes have occurred since H&P was written.    Surgery risks, benefits and alternative options discussed and understood by patient/family.          Active Hospital Problems    Diagnosis  POA    *Closed fracture of surgical neck of left humerus [S42.212A]  Yes    Shoulder pain, left [M25.512]  Yes    Closed left radial fracture [S52.92XA]  Yes    Closed displaced comminuted fracture of right patella [S82.041A]  Yes    Essential hypertension [I10]  Yes    Hyperlipidemia [E78.5]  Yes     ICD-10 Transition      Type 2 diabetes mellitus with microalbuminuria, with long-term current use of insulin [E11.29, R80.9, Z79.4]  Not Applicable    Hypothyroidism [E03.9]  Yes      Resolved Hospital Problems   No resolved problems to display.

## 2020-11-02 NOTE — NURSING
BSR done with FROILAN Andre. Attempted to do 4 eyes on skin. Pt refused to fully turn in either direction. Only partial view of sacral area possible d/t pt's refusal.

## 2020-11-03 LAB
ALBUMIN SERPL BCP-MCNC: 2.6 G/DL (ref 3.5–5.2)
ALP SERPL-CCNC: 76 U/L (ref 55–135)
ALT SERPL W/O P-5'-P-CCNC: 13 U/L (ref 10–44)
ANION GAP SERPL CALC-SCNC: 10 MMOL/L (ref 8–16)
AST SERPL-CCNC: 17 U/L (ref 10–40)
BASOPHILS # BLD AUTO: 0.02 K/UL (ref 0–0.2)
BASOPHILS NFR BLD: 0.2 % (ref 0–1.9)
BILIRUB SERPL-MCNC: 1.4 MG/DL (ref 0.1–1)
BUN SERPL-MCNC: 20 MG/DL (ref 8–23)
CALCIUM SERPL-MCNC: 8.6 MG/DL (ref 8.7–10.5)
CHLORIDE SERPL-SCNC: 102 MMOL/L (ref 95–110)
CO2 SERPL-SCNC: 24 MMOL/L (ref 23–29)
CREAT SERPL-MCNC: 1 MG/DL (ref 0.5–1.4)
DIFFERENTIAL METHOD: ABNORMAL
EOSINOPHIL # BLD AUTO: 0 K/UL (ref 0–0.5)
EOSINOPHIL NFR BLD: 0.1 % (ref 0–8)
ERYTHROCYTE [DISTWIDTH] IN BLOOD BY AUTOMATED COUNT: 14.5 % (ref 11.5–14.5)
EST. GFR  (AFRICAN AMERICAN): >60 ML/MIN/1.73 M^2
EST. GFR  (NON AFRICAN AMERICAN): 58 ML/MIN/1.73 M^2
GLUCOSE SERPL-MCNC: 377 MG/DL (ref 70–110)
HCT VFR BLD AUTO: 32.5 % (ref 37–48.5)
HGB BLD-MCNC: 10.6 G/DL (ref 12–16)
IMM GRANULOCYTES # BLD AUTO: 0.05 K/UL (ref 0–0.04)
IMM GRANULOCYTES NFR BLD AUTO: 0.5 % (ref 0–0.5)
LYMPHOCYTES # BLD AUTO: 0.7 K/UL (ref 1–4.8)
LYMPHOCYTES NFR BLD: 6.2 % (ref 18–48)
MCH RBC QN AUTO: 28.9 PG (ref 27–31)
MCHC RBC AUTO-ENTMCNC: 32.6 G/DL (ref 32–36)
MCV RBC AUTO: 89 FL (ref 82–98)
MONOCYTES # BLD AUTO: 0.9 K/UL (ref 0.3–1)
MONOCYTES NFR BLD: 8.7 % (ref 4–15)
NEUTROPHILS # BLD AUTO: 9.1 K/UL (ref 1.8–7.7)
NEUTROPHILS NFR BLD: 84.3 % (ref 38–73)
NRBC BLD-RTO: 0 /100 WBC
PLATELET # BLD AUTO: 146 K/UL (ref 150–350)
PMV BLD AUTO: 9.9 FL (ref 9.2–12.9)
POCT GLUCOSE: 277 MG/DL (ref 70–110)
POCT GLUCOSE: 316 MG/DL (ref 70–110)
POCT GLUCOSE: 324 MG/DL (ref 70–110)
POCT GLUCOSE: 366 MG/DL (ref 70–110)
POTASSIUM SERPL-SCNC: 4.4 MMOL/L (ref 3.5–5.1)
PROT SERPL-MCNC: 6.2 G/DL (ref 6–8.4)
RBC # BLD AUTO: 3.67 M/UL (ref 4–5.4)
SODIUM SERPL-SCNC: 136 MMOL/L (ref 136–145)
WBC # BLD AUTO: 10.82 K/UL (ref 3.9–12.7)

## 2020-11-03 PROCEDURE — 25000003 PHARM REV CODE 250: Performed by: NURSE PRACTITIONER

## 2020-11-03 PROCEDURE — 80053 COMPREHEN METABOLIC PANEL: CPT

## 2020-11-03 PROCEDURE — 63600175 PHARM REV CODE 636 W HCPCS: Performed by: NURSE PRACTITIONER

## 2020-11-03 PROCEDURE — 85025 COMPLETE CBC W/AUTO DIFF WBC: CPT

## 2020-11-03 PROCEDURE — 21400001 HC TELEMETRY ROOM

## 2020-11-03 PROCEDURE — 36415 COLL VENOUS BLD VENIPUNCTURE: CPT

## 2020-11-03 PROCEDURE — 96372 THER/PROPH/DIAG INJ SC/IM: CPT

## 2020-11-03 PROCEDURE — 63600175 PHARM REV CODE 636 W HCPCS: Performed by: STUDENT IN AN ORGANIZED HEALTH CARE EDUCATION/TRAINING PROGRAM

## 2020-11-03 RX ORDER — LABETALOL HYDROCHLORIDE 5 MG/ML
10 INJECTION, SOLUTION INTRAVENOUS EVERY 4 HOURS PRN
Status: DISCONTINUED | OUTPATIENT
Start: 2020-11-03 | End: 2020-11-11 | Stop reason: HOSPADM

## 2020-11-03 RX ORDER — HEPARIN SODIUM 5000 [USP'U]/ML
5000 INJECTION, SOLUTION INTRAVENOUS; SUBCUTANEOUS EVERY 8 HOURS
Status: COMPLETED | OUTPATIENT
Start: 2020-11-03 | End: 2020-11-04

## 2020-11-03 RX ADMIN — HYDRALAZINE HYDROCHLORIDE 25 MG: 25 TABLET, FILM COATED ORAL at 10:11

## 2020-11-03 RX ADMIN — CEFAZOLIN SODIUM 1 G: 1 SOLUTION INTRAVENOUS at 02:11

## 2020-11-03 RX ADMIN — LEVOTHYROXINE SODIUM 175 MCG: 150 TABLET ORAL at 05:11

## 2020-11-03 RX ADMIN — INSULIN ASPART 8 UNITS: 100 INJECTION, SOLUTION INTRAVENOUS; SUBCUTANEOUS at 11:11

## 2020-11-03 RX ADMIN — MORPHINE SULFATE 4 MG: 4 INJECTION INTRAVENOUS at 08:11

## 2020-11-03 RX ADMIN — MORPHINE SULFATE 4 MG: 4 INJECTION INTRAVENOUS at 05:11

## 2020-11-03 RX ADMIN — PRAVASTATIN SODIUM 20 MG: 20 TABLET ORAL at 08:11

## 2020-11-03 RX ADMIN — LABETALOL HYDROCHLORIDE 10 MG: 5 INJECTION INTRAVENOUS at 05:11

## 2020-11-03 RX ADMIN — CEFAZOLIN SODIUM 1 G: 1 SOLUTION INTRAVENOUS at 09:11

## 2020-11-03 RX ADMIN — DIPHENHYDRAMINE HYDROCHLORIDE 25 MG: 25 CAPSULE ORAL at 11:11

## 2020-11-03 RX ADMIN — MORPHINE SULFATE 4 MG: 4 INJECTION INTRAVENOUS at 01:11

## 2020-11-03 RX ADMIN — PANTOPRAZOLE SODIUM 40 MG: 40 TABLET, DELAYED RELEASE ORAL at 08:11

## 2020-11-03 RX ADMIN — HEPARIN SODIUM 5000 UNITS: 5000 INJECTION INTRAVENOUS; SUBCUTANEOUS at 09:11

## 2020-11-03 RX ADMIN — DIPHENHYDRAMINE HYDROCHLORIDE 25 MG: 25 CAPSULE ORAL at 04:11

## 2020-11-03 RX ADMIN — INSULIN ASPART 8 UNITS: 100 INJECTION, SOLUTION INTRAVENOUS; SUBCUTANEOUS at 03:11

## 2020-11-03 RX ADMIN — INSULIN DETEMIR 10 UNITS: 100 INJECTION, SOLUTION SUBCUTANEOUS at 08:11

## 2020-11-03 RX ADMIN — INSULIN ASPART 3 UNITS: 100 INJECTION, SOLUTION INTRAVENOUS; SUBCUTANEOUS at 08:11

## 2020-11-03 RX ADMIN — INSULIN ASPART 10 UNITS: 100 INJECTION, SOLUTION INTRAVENOUS; SUBCUTANEOUS at 06:11

## 2020-11-03 RX ADMIN — AMLODIPINE BESYLATE 5 MG: 5 TABLET ORAL at 08:11

## 2020-11-03 RX ADMIN — MORPHINE SULFATE 4 MG: 4 INJECTION INTRAVENOUS at 10:11

## 2020-11-03 RX ADMIN — CEFAZOLIN SODIUM 1 G: 1 SOLUTION INTRAVENOUS at 11:11

## 2020-11-03 RX ADMIN — MORPHINE SULFATE 4 MG: 4 INJECTION INTRAVENOUS at 03:11

## 2020-11-03 NOTE — HOSPITAL COURSE
Admitted over the weekend after a fall resulted in multiple left upper extremity fractures.  She underwent operative fixation of right patella and left distal radius yesterday.  Plan for left proximal humerus fixation on Thursday.

## 2020-11-03 NOTE — ANESTHESIA POSTPROCEDURE EVALUATION
Anesthesia Post Evaluation    Patient: Lakshmi Campebll    Procedure(s) Performed: Procedure(s) (LRB):  ORIF, FRACTURE, PATELLA (Right)  ORIF, FRACTURE, RADIUS, DISTAL (Left)  APPLICATION, ACELLULAR HUMAN DERMAL ALLOGRAFT (Right)    Final Anesthesia Type: general    Patient location during evaluation: PACU  Patient participation: Yes- Able to Participate  Level of consciousness: awake and alert  Post-procedure vital signs: reviewed and stable  Pain management: adequate  Airway patency: patent  TISHA mitigation strategies: Extubation while patient is awake  PONV status at discharge: No PONV  Anesthetic complications: no      Cardiovascular status: hemodynamically stable  Respiratory status: spontaneous ventilation  Hydration status: euvolemic  Follow-up not needed.          Vitals Value Taken Time   /55 11/03/20 0845   Temp 36.7 °C (98.1 °F) 11/03/20 0743   Pulse 67 11/03/20 0847   Resp 18 11/03/20 0856   SpO2 93 % 11/03/20 0847   Vitals shown include unvalidated device data.      Event Time   Out of Recovery 11/02/2020 23:01:53         Pain/Jordana Score: Pain Rating Prior to Med Admin: 10 (11/3/2020  8:56 AM)  Pain Rating Post Med Admin: 3 (11/3/2020  4:21 AM)  Jordana Score: 8 (11/2/2020 10:45 PM)

## 2020-11-03 NOTE — PLAN OF CARE
Pt resting on bed s/p ORIF Rt patella, open fixation lt distal wrist performed under general anesthesia by Dr. Wolf. Respirations even and unlabored on room air and tolerating well with o2 sats of 92%. VSS. Will cont to monitor.

## 2020-11-03 NOTE — PLAN OF CARE
RLE dressing, CDI. LUE dressing, CDI. PRN medication administered for pain, insomnia, and BP (tmax 185/79). Waffle mattress in use. Pt refuses to turn with wedge. Pt refused heel boots. Educated pt on the important of wedge, turning and heel boots, pt states she understands the risks and still declines. Hassan catheter intact hanging to gravity. Afebrile. Cardiac monitoring NSR - sinus tachy. Monitoring blood glucose per SSI. No acute adverse events noted. Pt remained free from falls this shift. Will continue to monitor. Chart reviewed.

## 2020-11-03 NOTE — ASSESSMENT & PLAN NOTE
--ortho surgery consulted (Dr. Montesinos)  --no surgery as new ortho on call tomorrow  --PRN analgesia  11/2:  --surgery with Dr. Wolf today  11/3:  --Non-weight bearing right leg, brace at all times, locked in extension  --Non-weight bearing left arm, gauntlet brace  --Ok to restart dvt ppx in am  --Plan to return to OR, likely 10/5 for ORIF left proximal humerus

## 2020-11-03 NOTE — ASSESSMENT & PLAN NOTE
--Associated with ulnar styloid fracture  --Ortho consulted  --NPO after MN  --Prn analgesia  --PT/OT eval and treat after Ortho consult  11/2:  --surgery with Dr. Wolf today  11/3:  --Non-weight bearing right leg, brace at all times, locked in extension  --Non-weight bearing left arm, gauntlet brace  --Ok to restart dvt ppx in am  --Plan to return to OR, likely 10/5 for ORIF left proximal humerus

## 2020-11-03 NOTE — SUBJECTIVE & OBJECTIVE
Interval History: Patient had surgery to R knee and L wrist yesterday. PRN analgesia. Plans for L shoulder surgery on Thursday of this week. DVT profil restarted. PT/OT pending    Review of Systems   Constitutional: Negative for chills, diaphoresis, fatigue and fever.   HENT: Negative for congestion, rhinorrhea and sore throat.    Eyes: Negative for pain, discharge and itching.   Respiratory: Negative for cough, shortness of breath and wheezing.    Cardiovascular: Negative for chest pain, palpitations and leg swelling.   Gastrointestinal: Negative for abdominal distention, abdominal pain, diarrhea, nausea and vomiting.   Endocrine: Negative for cold intolerance and heat intolerance.   Genitourinary: Negative for difficulty urinating, dysuria and flank pain.   Musculoskeletal: Positive for arthralgias, gait problem and myalgias.   Skin: Negative for color change and wound.   Allergic/Immunologic: Negative.    Neurological: Negative for dizziness, syncope, speech difficulty, weakness and light-headedness.   Hematological: Negative.    Psychiatric/Behavioral: Negative for agitation, behavioral problems and confusion.     Objective:     Vital Signs (Most Recent):  Temp: 98.4 °F (36.9 °C) (11/03/20 1227)  Pulse: 92 (11/03/20 1227)  Resp: 18 (11/03/20 1330)  BP: (!) 144/65 (11/03/20 1227)  SpO2: (!) 93 % (11/03/20 1227) Vital Signs (24h Range):  Temp:  [96.5 °F (35.8 °C)-98.4 °F (36.9 °C)] 98.4 °F (36.9 °C)  Pulse:  [] 92  Resp:  [11-35] 18  SpO2:  [91 %-100 %] 93 %  BP: (144-231)/(65-97) 144/65     Weight: 123 kg (271 lb 2.7 oz)  Body mass index is 45.12 kg/m².    Intake/Output Summary (Last 24 hours) at 11/3/2020 1400  Last data filed at 11/3/2020 0518  Gross per 24 hour   Intake 2600 ml   Output 700 ml   Net 1900 ml      Physical Exam  Vitals signs and nursing note reviewed.   Constitutional:       Appearance: Normal appearance. She is well-developed. She is morbidly obese.   HENT:      Head: Normocephalic and  atraumatic.      Nose: Nose normal.   Eyes:      General: No scleral icterus.     Extraocular Movements: Extraocular movements intact.      Conjunctiva/sclera: Conjunctivae normal.   Neck:      Musculoskeletal: Normal range of motion and neck supple. No muscular tenderness.   Cardiovascular:      Rate and Rhythm: Normal rate and regular rhythm.      Pulses: Normal pulses.      Heart sounds: Normal heart sounds. No murmur. No friction rub. No gallop.    Pulmonary:      Effort: Pulmonary effort is normal.      Breath sounds: Normal breath sounds.   Abdominal:      General: Bowel sounds are normal. There is no distension.      Palpations: Abdomen is soft.      Tenderness: There is no abdominal tenderness.   Genitourinary:     Comments: deferred  Musculoskeletal:         General: No tenderness.      Left wrist: She exhibits decreased range of motion, bony tenderness and deformity.      Right knee: She exhibits decreased range of motion and bony tenderness.      Left upper arm: She exhibits bony tenderness and deformity.      Comments: Bruising to anterior knee - knee immobilizer intact  No midline spinal tenderness   Skin:     General: Skin is warm and dry.      Capillary Refill: Capillary refill takes 2 to 3 seconds.   Neurological:      General: No focal deficit present.      Mental Status: She is alert and oriented to person, place, and time.   Psychiatric:         Mood and Affect: Mood normal.         Behavior: Behavior normal.         Thought Content: Thought content normal.         Significant Labs:   Recent Lab Results       11/03/20  1125   11/03/20  0645   11/03/20  0631   11/02/20  2221   11/02/20  1622        Albumin   2.6           Alkaline Phosphatase   76           ALT   13           Anion Gap   10           AST   17           Baso #   0.02           Basophil %   0.2           BILIRUBIN TOTAL   1.4  Comment:  For infants and newborns, interpretation of results should be based  on gestational age, weight  and in agreement with clinical  observations.  Premature Infant recommended reference ranges:  Up to 24 hours.............<8.0 mg/dL  Up to 48 hours............<12.0 mg/dL  3-5 days..................<15.0 mg/dL  6-29 days.................<15.0 mg/dL             BUN   20           Calcium   8.6           Chloride   102           CO2   24           Creatinine   1.0           Differential Method   Automated           eGFR if    >60           eGFR if non    58  Comment:  Calculation used to obtain the estimated glomerular filtration  rate (eGFR) is the CKD-EPI equation.              Eos #   0.0           Eosinophil %   0.1           Glucose   377           Gran # (ANC)   9.1           Gran %   84.3           Group & Rh         B POS     Hematocrit   32.5           Hemoglobin   10.6           Immature Grans (Abs)   0.05  Comment:  Mild elevation in immature granulocytes is non specific and   can be seen in a variety of conditions including stress response,   acute inflammation, trauma and pregnancy. Correlation with other   laboratory and clinical findings is essential.             Immature Granulocytes   0.5           INDIRECT RONALD         NEG     Lymph #   0.7           Lymph %   6.2           MCH   28.9           MCHC   32.6           MCV   89           Mono #   0.9           Mono %   8.7           MPV   9.9           nRBC   0           Platelets   146           POCT Glucose 316   366 248       Potassium   4.4           PROTEIN TOTAL   6.2           RBC   3.67           RDW   14.5           Sodium   136           WBC   10.82                              All pertinent labs within the past 24 hours have been reviewed.    Significant Imaging: I have reviewed all pertinent imaging results/findings within the past 24 hours.

## 2020-11-03 NOTE — ASSESSMENT & PLAN NOTE
Status post ORIF of right patella fracture, postop day 1  Continue knee immobilizer, keep knee locked in extension  Nonweightbearing right lower extremity  Dressing is clean, dry, and intact.  Will plan to change on postop day 2 or 3  DVT prophylaxis per primary team

## 2020-11-03 NOTE — BRIEF OP NOTE
[facility] -   Brief Operative Note  Orthopaedic Surgery    SUMMARY     Surgery Date: 11/2/2020     Surgeon(s) and Role:     * Sage Wolf MD - Primary    Assisting Surgeon: None    Pre-op Diagnosis:   1. Closed displaced comminuted fracture of right patella with delayed healing, subsequent encounter [S82.041G]  2. Closed displaced comminuted fracture of left distal radius  3. Closed displaced transverse fracture of left proximal humerus    Post-op Diagnosis: Post-Op Diagnosis Codes:  1. Closed displaced comminuted fracture of right patella with delayed healing, subsequent encounter [S82.041G]  2. Closed displaced comminuted fracture of left distal radius  3. Closed displaced transverse fracture of left proximal humerus    Procedure Performed:     Procedure(s) (LRB):  ORIF, FRACTURE, PATELLA (Right)  ORIF, FRACTURE, RADIUS, DISTAL (Left)  APPLICATION, ACELLULAR HUMAN DERMAL ALLOGRAFT (Right)      Estimated Blood Loss: 150cc         Specimens:   Specimen (12h ago, onward)    None        Plan:  Non-weight bearing right leg, brace at all times, locked in extension  Non-weight bearing left arm, gauntlet brace  Ok to restart dvt ppx in am  Plan to return to OR, likely 10/5 for ORIF left proximal humerus        Sage Wolf MD

## 2020-11-03 NOTE — TRANSFER OF CARE
"Anesthesia Transfer of Care Note    Patient: Lakshmi Campbell    Procedure(s) Performed: Procedure(s) (LRB):  ORIF, FRACTURE, PATELLA (Right)  ORIF, FRACTURE, RADIUS, DISTAL (Left)    Patient location: PACU    Anesthesia Type: general    Transport from OR: Transported from OR on room air with adequate spontaneous ventilation    Post pain: adequate analgesia    Post assessment: no apparent anesthetic complications    Post vital signs: stable    Level of consciousness: responds to stimulation    Nausea/Vomiting: no nausea/vomiting    Complications: none    Transfer of care protocol was followed      Last vitals:   Visit Vitals  BP (!) 163/67 (BP Location: Right arm, Patient Position: Lying)   Pulse 83   Temp 36.7 °C (98.1 °F) (Oral)   Resp 18   Ht 5' 5" (1.651 m)   Wt 123 kg (271 lb 2.7 oz)   SpO2 97%   Breastfeeding No   BMI 45.12 kg/m²     "

## 2020-11-03 NOTE — HPI
69-year-old female with right patella fracture status post ORIF, left distal radius fracture status post ORIF, left proximal humerus fracture

## 2020-11-03 NOTE — ASSESSMENT & PLAN NOTE
Status post ORIF of left distal radius fracture, postop day 1  Continue wrist gauntlet brace  Nonweightbearing left wrist  Finger range of motion encouraged, would benefit from OT  Dressing is clean, dry, and intact.  Will plan to change on postop day 2 or 3  DVT prophylaxis per primary team

## 2020-11-03 NOTE — PROGRESS NOTES
[facility]  Orthopedics  Progress Note    Patient Name: Lakshmi Campbell  MRN: [id number]  Admission Date: 10/30/2020  Hospital Length of Stay: 3 days  Attending Provider: Ori Lizarraga MD  Primary Care Provider: Karan Ribeiro DO  Follow-up For: Procedure(s) (LRB):  ORIF, FRACTURE, PATELLA (Right)  ORIF, FRACTURE, RADIUS, DISTAL (Left)  APPLICATION, ACELLULAR HUMAN DERMAL ALLOGRAFT (Right)    Post-Operative Day: 1 Day Post-Op  Subjective:     Principal Problem:  Polytrauma with multiple medical comorbidities, a right patella fracture, left distal radius fracture, left proximal humerus fracture    Principal Orthopedic Problem:  Right patella fracture, left distal radius fracture, left proximal humerus fracture    Interval History:  Postop day 1 after right patella open reduction internal fixation, left distal radius open reduction internal fixation    Review of patient's allergies indicates:   Allergen Reactions    Codeine Nausea Only     Other reaction(s): Nausea  Other reaction(s): Elevated blood pressure       Current Facility-Administered Medications   Medication    acetaminophen tablet 650 mg    aluminum-magnesium hydroxide-simethicone 200-200-20 mg/5 mL suspension 30 mL    amLODIPine tablet 5 mg    ceFAZolin (ANCEF) 1 gram in dextrose 5 % 50 mL IVPB (premix)    dextrose 50% injection 12.5 g    dextrose 50% injection 25 g    diphenhydrAMINE capsule 25 mg    glucagon (human recombinant) injection 1 mg    glucose chewable tablet 16 g    glucose chewable tablet 24 g    heparin (porcine) injection 5,000 Units    hydrALAZINE tablet 25 mg    HYDROcodone-acetaminophen  mg per tablet 1 tablet    influenza (QUADRIVALENT ADJUVANTED PF) vaccine 0.5 mL    insulin aspart U-100 pen 1-10 Units    insulin detemir U-100 pen 10 Units    labetaloL injection 10 mg    levothyroxine tablet 175 mcg    morphine injection 4 mg    nozaseptin (NOZIN) nasal     ondansetron injection  "4 mg    ondansetron injection 4 mg    pantoprazole EC tablet 40 mg    pravastatin tablet 20 mg    promethazine (PHENERGAN) 6.25 mg in dextrose 5 % 50 mL IVPB     Objective:     Vital Signs (Most Recent):  Temp: 98.4 °F (36.9 °C) (11/03/20 1227)  Pulse: 92 (11/03/20 1227)  Resp: 18 (11/03/20 1330)  BP: (!) 144/65 (11/03/20 1227)  SpO2: (!) 93 % (11/03/20 1227) Vital Signs (24h Range):  Temp:  [96.5 °F (35.8 °C)-98.4 °F (36.9 °C)] 98.4 °F (36.9 °C)  Pulse:  [] 92  Resp:  [11-35] 18  SpO2:  [91 %-100 %] 93 %  BP: (144-231)/(65-97) 144/65     Weight: 123 kg (271 lb 2.7 oz)  Height: 5' 5" (165.1 cm)  Body mass index is 45.12 kg/m².      Intake/Output Summary (Last 24 hours) at 11/3/2020 1628  Last data filed at 11/3/2020 0518  Gross per 24 hour   Intake 2600 ml   Output 700 ml   Net 1900 ml       General    Vitals reviewed.  Constitutional: She is oriented to person, place, and time.   Neurological: She is alert and oriented to person, place, and time.           Right Knee Exam     Comments:  Dressing in place clean, dry, intact  Knee immobilizer in place locked in extension  Sensation intact to light touch over the dorsum of the foot, 1st webspace, and plantar foot  Demonstrates ability to extend the toes and dorsiflex and plantar flex the footLeft Hand/Wrist Exam     Comments:  Left wrist dressing in place clean, dry, and intact  Gauntlet brace intact  Sensation intact to light touch over the fingers  Able to flex the index finger at the DIP joint, thumb at the IP joint, able to extend the thumb and fingers  Fingers are warm and well perfused with good capillary refill          Left Shoulder Exam     Inspection/Observation   Swelling: present  Bruising: present        Significant Labs:   BMP:   Recent Labs   Lab 11/03/20  0645   *      K 4.4      CO2 24   BUN 20   CREATININE 1.0   CALCIUM 8.6*     All pertinent labs within the past 24 hours have been reviewed.    Significant Imaging: X-Ray: " I have reviewed all pertinent results/findings and my personal findings are:  Appropriate reduction and fixation of patella and distal radius fractures    Assessment/Plan:     * Closed fracture of surgical neck of left humerus  Plan for operative fixation 11/05/2020  Will need to be NPO after midnight on 11/04/2020  Will to hold heparin dose that morning    Closed left radial fracture  Status post ORIF of left distal radius fracture, postop day 1  Continue wrist gauntlet brace  Nonweightbearing left wrist  Finger range of motion encouraged, would benefit from OT  Dressing is clean, dry, and intact.  Will plan to change on postop day 2 or 3  DVT prophylaxis per primary team    Closed displaced comminuted fracture of right patella  Status post ORIF of right patella fracture, postop day 1  Continue knee immobilizer, keep knee locked in extension  Nonweightbearing right lower extremity  Dressing is clean, dry, and intact.  Will plan to change on postop day 2 or 3  DVT prophylaxis per primary team          Sage Wolf MD  Orthopedics  [facility]

## 2020-11-03 NOTE — OP NOTE
Patient Name: Lakshmi Campbell    Date of Surgery: 11/2/2020    Medical Record #: [id number]     Pre-operative Diagnosis:    1.  Comminuted right patella fracture  2.  Comminuted left distal radius and distal ulna fracture  3.  Transverse left proximal humerus fracture    Post-operative Diagnosis:    1.  Comminuted right patella fracture  2.  Comminuted left distal radius and distal ulna fracture  3.  Transverse left proximal humerus fracture    Procedure:    1.  Open reduction internal fixation of right patella fracture  2.  Open reduction internal fixation of left distal radius fracture    Primary Surgeon: Sage Wolf MD    Implants Utilized:    Patella:  [place] 2.7 mm screws, lagged in the patella  Anjel 0.6 mm thickness maxillofacial plate with assorted screws    Distal radius:   Synthes dorsal spanning wrist plate with assorted screws    Anesthesia:  General endotracheal anesthetic with accompanying regional block.    Complications:  None    Estimated Blood Loss: 150 mL    Indications for Procedure:   Lakshmi Campbell is a 69 y.o. year old female who presents at this time after a ground level fall.  She had originally sustained a right patella fracture from a previous ground level fall.  She then was trying to get back on her wheelchair when she fell onto her left side and sustained a distal radius and proximal humerus fracture.  She now has 3 fragility type fractures all which are displaced and comminuted and limiting her function.  Recommended operative treatment all fractures to give her the best chance of mobilizing soon and have a good outcome after this significant injury.     Description of Procedure:    The patient was intubated and positioned supine on the operative table.  All bony problems were well padded.  Patient was prepped and draped in standard sterile fashion.  A time-out was performed to correctly verify the patient, procedure, operative site, and operative side.  Once the time-out was  completed we began the procedure.    A vertical incision directly over the patella was made with the 10 blade scalpel.  This was carried down sharply through the subcutaneous layers and full-thickness skin flaps were raised.  Bovie electrocautery was then used to perform a subperiosteal dissection of the patella anteriorly.  The bony fragments of the fractured patella were visualized.  A combination of [place] elevator and rongeur was used to debride some of the clot and early callus.  Pointed reduction clamps were then used to sequentially reduce the smaller fragments to each other.  Once reduced, temporary fixation with K-wires was performed.  The K-wires were then switched out for lag screws.  Each fragment was lagged by technique using 2.7 mm screws.  Periodic x-rays were taken in both AP and lateral planes to assess fracture alignment.  After the proximal fragments were well fixed, there was still a distal pole articular step-off.  Therefore, a medial arthrotomy was created for direct visualization of the articular surface.  With the surface visualized and palpated, the inferior pole fragments were reduced and lag screw fixation was performed here as well.  Next, a Anjel maxillofacial plate was cut and used as a combination of neutralization type plate and anterior tension band type plate.  Lastly, a 5.  FiberWire was passed in a pursestring fashion around the patella to cerclage it together.  AP and lateral x-rays were taken which verified anatomic reduction of the articular surface.  The incision was then thoroughly irrigated and closed in a layered fashion.  A light sterile dressing was applied.  The lower extremity drapes were then taken down and the patient was repositioned for reduction fixation of left distal radius fracture.    The patient was prepped and draped in standard sterile fashion for the left distal radius.  Three dorsal incisions were made over the left hand and wrist.  One was made just  radial to the 3rd metacarpal shaft so as not to place an incision directly over the extensor tendon.  Access was then obtained to the 3rd metacarpal shaft.  Next an incision just ulnar to Zulema's tubercle was made in the wrist.  Full-thickness skin flaps were raised here in the dissection was carried down to the level of the extensor retinaculum.  The retinaculum was transected overlying the EPL tendon.  The EPL was transposed to allow for appropriate placement of the dorsal spanning plate.  From within the 3rd compartment incision was made ulnarly to get underneath the floor of the 4th compartment.  To assist with plate passage.  A [place] elevator was then used to connect the floor of the 4th compartment with the shaft of the 3rd metacarpal.  We then directed our attention to the forearm.  Incision was made at the level of the proximal holes of our dorsal spanning plate.  Care was taken not to damage the lateral antebrachial cutaneous nerve and the superficial radial nerve.  Dissection was carried down to the brachioradialis.  The interval between the brachioradialis and the extensor carpi radialis longus was then used to gain access to the radial shaft.  Both dorsal and volar access the shaft was obtained.  The dorsal spanning plate was then placed on the dorsal shaft of the 3rd metacarpal and slid proximally directly on top of the bone through the floor of the 4th compartment and dorsally on the shaft of the radius.  Position of the plate was verified with C-arm fluoroscopy.  Cortical screw was then placed in the 3rd metacarpal shaft.  With 1 screw in position, traction could be pulled on the hand and reduction maneuver was performed.  Once the fracture was reduced it was clamped on the radial shaft for temporary fixation.  A cortical screw was then placed through the plate on the radial shaft.  Reduction was verified in the AP and lateral planes.  Once we were satisfied with the reduction 2 additional screws  were placed both proximally and distally.  All these screws were locking screws.  Final fluoroscopic images were taken to again verify reduction.  Once this was complete the incisions were thoroughly irrigated with sterile saline.  The incisions were then closed in a layered fashion and a light sterile dressing was applied.    There were no intraoperative complications. Needles and sponge counts were correct. IV antibiotics were started and completed within 30 min of skin incision.    POSTOPERATIVE PLAN:    NWB RLE, LUE  RLE knee immobilizer locked in extension  LUE in gauntlet brace  DVT ppx per primary team  Plan for left proximal humerus ORIF 11/5/2020          [facility] [place]

## 2020-11-03 NOTE — ASSESSMENT & PLAN NOTE
11/3:  --Non-weight bearing right leg, brace at all times, locked in extension  --Non-weight bearing left arm, gauntlet brace  --Ok to restart dvt ppx in am  --Plan to return to OR, likely 10/5 for ORIF left proximal humerus

## 2020-11-03 NOTE — PROGRESS NOTES
[facility] Medicine  Progress Note    Patient Name: Lakshmi Campbell  MRN: [id number]  Patient Class: IP- Inpatient   Admission Date: 10/30/2020  Length of Stay: 3 days  Attending Physician: Ori Lizarraga MD  Primary Care Provider: Karan Ribeiro DO        Subjective:     Principal Problem:Closed fracture of surgical neck of left humerus        HPI:  Pt is a 70 yo female with PMHx of DM II, HTN, HPL, hypothyroidism and morbid obesity who fractured her right patella 1 week ago and is wearing a knee immobilizer. Today, she had a second mishap when trying to get back in bed after using the bedside commode. Pt fell on her left side and immediately developed left shoulder and left wrist pain. She denies any presyncopal symptoms, fever, chest pain, gross neuro deficits, recent nausea/vomiting/diarrhea or urinary symptoms. Xrays found a left humerus fracture and left radius/ulnar fracture.   Vital signs stable and lab work largely normal except for thrombocytopenia which has improved from last level. Pt was placed on Observation for further evaluation by Orthopedics and possible need for surgical intervention.     Overview/Hospital Course:  Patient was kept on OBS for closed fx of surgical neck of L humerus under the care of hospital medicine. Ortho surgery was consulted. 10/31: Patient asking for Dr. Wolf to be involved in her care. I explained that he is not on call today - sent him secure chat but he likely will not see it until Monday. Ordered diet as no surgical intervention today. Will discuss with Dr. King, who is on call tomorrow for ortho. 11/1: Discussed with Dr. King - who spoke with Dr. Wolf. Pt will have surgery performed by Dr. Wolf tomorrow afternoon. Will be NPO after MN and stop heparin at MN. 11/2: Patient scheduled for surgery this afternoon by Dr. Wolf. She is currently NPO with spouse at bedside. Pain is controlled.     Interval History: Patient had  surgery to R knee and L wrist yesterday. PRN analgesia. Plans for L shoulder surgery on Thursday of this week. DVT profil restarted. PT/OT pending    Review of Systems   Constitutional: Negative for chills, diaphoresis, fatigue and fever.   HENT: Negative for congestion, rhinorrhea and sore throat.    Eyes: Negative for pain, discharge and itching.   Respiratory: Negative for cough, shortness of breath and wheezing.    Cardiovascular: Negative for chest pain, palpitations and leg swelling.   Gastrointestinal: Negative for abdominal distention, abdominal pain, diarrhea, nausea and vomiting.   Endocrine: Negative for cold intolerance and heat intolerance.   Genitourinary: Negative for difficulty urinating, dysuria and flank pain.   Musculoskeletal: Positive for arthralgias, gait problem and myalgias.   Skin: Negative for color change and wound.   Allergic/Immunologic: Negative.    Neurological: Negative for dizziness, syncope, speech difficulty, weakness and light-headedness.   Hematological: Negative.    Psychiatric/Behavioral: Negative for agitation, behavioral problems and confusion.     Objective:     Vital Signs (Most Recent):  Temp: 98.4 °F (36.9 °C) (11/03/20 1227)  Pulse: 92 (11/03/20 1227)  Resp: 18 (11/03/20 1330)  BP: (!) 144/65 (11/03/20 1227)  SpO2: (!) 93 % (11/03/20 1227) Vital Signs (24h Range):  Temp:  [96.5 °F (35.8 °C)-98.4 °F (36.9 °C)] 98.4 °F (36.9 °C)  Pulse:  [] 92  Resp:  [11-35] 18  SpO2:  [91 %-100 %] 93 %  BP: (144-231)/(65-97) 144/65     Weight: 123 kg (271 lb 2.7 oz)  Body mass index is 45.12 kg/m².    Intake/Output Summary (Last 24 hours) at 11/3/2020 1400  Last data filed at 11/3/2020 0518  Gross per 24 hour   Intake 2600 ml   Output 700 ml   Net 1900 ml      Physical Exam  Vitals signs and nursing note reviewed.   Constitutional:       Appearance: Normal appearance. She is well-developed. She is morbidly obese.   HENT:      Head: Normocephalic and atraumatic.      Nose: Nose  normal.   Eyes:      General: No scleral icterus.     Extraocular Movements: Extraocular movements intact.      Conjunctiva/sclera: Conjunctivae normal.   Neck:      Musculoskeletal: Normal range of motion and neck supple. No muscular tenderness.   Cardiovascular:      Rate and Rhythm: Normal rate and regular rhythm.      Pulses: Normal pulses.      Heart sounds: Normal heart sounds. No murmur. No friction rub. No gallop.    Pulmonary:      Effort: Pulmonary effort is normal.      Breath sounds: Normal breath sounds.   Abdominal:      General: Bowel sounds are normal. There is no distension.      Palpations: Abdomen is soft.      Tenderness: There is no abdominal tenderness.   Genitourinary:     Comments: deferred  Musculoskeletal:         General: No tenderness.      Left wrist: She exhibits decreased range of motion, bony tenderness and deformity.      Right knee: She exhibits decreased range of motion and bony tenderness.      Left upper arm: She exhibits bony tenderness and deformity.      Comments: Bruising to anterior knee - knee immobilizer intact  No midline spinal tenderness   Skin:     General: Skin is warm and dry.      Capillary Refill: Capillary refill takes 2 to 3 seconds.   Neurological:      General: No focal deficit present.      Mental Status: She is alert and oriented to person, place, and time.   Psychiatric:         Mood and Affect: Mood normal.         Behavior: Behavior normal.         Thought Content: Thought content normal.         Significant Labs:   Recent Lab Results       11/03/20  1125   11/03/20  0645   11/03/20  0631   11/02/20  2221   11/02/20  1622        Albumin   2.6           Alkaline Phosphatase   76           ALT   13           Anion Gap   10           AST   17           Baso #   0.02           Basophil %   0.2           BILIRUBIN TOTAL   1.4  Comment:  For infants and newborns, interpretation of results should be based  on gestational age, weight and in agreement with  clinical  observations.  Premature Infant recommended reference ranges:  Up to 24 hours.............<8.0 mg/dL  Up to 48 hours............<12.0 mg/dL  3-5 days..................<15.0 mg/dL  6-29 days.................<15.0 mg/dL             BUN   20           Calcium   8.6           Chloride   102           CO2   24           Creatinine   1.0           Differential Method   Automated           eGFR if    >60           eGFR if non    58  Comment:  Calculation used to obtain the estimated glomerular filtration  rate (eGFR) is the CKD-EPI equation.              Eos #   0.0           Eosinophil %   0.1           Glucose   377           Gran # (ANC)   9.1           Gran %   84.3           Group & Rh         B POS     Hematocrit   32.5           Hemoglobin   10.6           Immature Grans (Abs)   0.05  Comment:  Mild elevation in immature granulocytes is non specific and   can be seen in a variety of conditions including stress response,   acute inflammation, trauma and pregnancy. Correlation with other   laboratory and clinical findings is essential.             Immature Granulocytes   0.5           INDIRECT RONALD         NEG     Lymph #   0.7           Lymph %   6.2           MCH   28.9           MCHC   32.6           MCV   89           Mono #   0.9           Mono %   8.7           MPV   9.9           nRBC   0           Platelets   146           POCT Glucose 316   366 248       Potassium   4.4           PROTEIN TOTAL   6.2           RBC   3.67           RDW   14.5           Sodium   136           WBC   10.82                              All pertinent labs within the past 24 hours have been reviewed.    Significant Imaging: I have reviewed all pertinent imaging results/findings within the past 24 hours.      Assessment/Plan:      * Closed fracture of surgical neck of left humerus  --ortho surgery consulted (Dr. Montesinos)  --no surgery as new ortho on call tomorrow  --PRN  analgesia  11/2:  --surgery with Dr. Wolf today  11/3:  --Non-weight bearing right leg, brace at all times, locked in extension  --Non-weight bearing left arm, gauntlet brace  --Ok to restart dvt ppx in am  --Plan to return to OR, likely 10/5 for ORIF left proximal humerus      Shoulder pain, left  11/3:  --Non-weight bearing right leg, brace at all times, locked in extension  --Non-weight bearing left arm, gauntlet brace  --Ok to restart dvt ppx in am  --Plan to return to OR, likely 10/5 for ORIF left proximal humerus      Closed left radial fracture  --Associated with ulnar styloid fracture  --Ortho consulted  --NPO after MN  --Prn analgesia  --PT/OT eval and treat after Ortho consult  11/2:  --surgery with Dr. Wolf today  11/3:  --Non-weight bearing right leg, brace at all times, locked in extension  --Non-weight bearing left arm, gauntlet brace  --Ok to restart dvt ppx in am  --Plan to return to OR, likely 10/5 for ORIF left proximal humerus      Closed displaced comminuted fracture of right patella  --Was planned for surgery on 10/28/2020 with Dr. Wolf - was postponed due to thrombocytopenia  --Right leg in knee immobilzer  11/2:  --surgery with Dr. Wolf today  11/3:  --Non-weight bearing right leg, brace at all times, locked in extension  --Non-weight bearing left arm, gauntlet brace  --Ok to restart dvt ppx in am  --Plan to return to OR, likely 10/5 for ORIF left proximal humerus      Hyperlipidemia  --continue statin  --cardiac diet      Essential hypertension  --continue amlodipine  --cardiac diet      Hypothyroidism  Continue levothyroxine    Type 2 diabetes mellitus with microalbuminuria, with long-term current use of insulin  --accuchecks and SSI  --diabetic diet  --continue novolin at lower dose  --last HA1C 5.8            VTE Risk Mitigation (From admission, onward)         Ordered     heparin (porcine) injection 5,000 Units  Every 8 hours      11/03/20 1423     Place sequential  compression device  Until discontinued      10/30/20 5808                Discharge Planning   KYA:      Code Status: Not on file   Is the patient medically ready for discharge?:     Reason for patient still in hospital (select all that apply): Patient trending condition and Consult recommendations  Discharge Plan A: Home, Home with family, Home Health                  BRUNA Kruse  Department of Hospital Medicine   [facility]

## 2020-11-03 NOTE — PLAN OF CARE
Free from falls and injuries this shift. Pain controlled at this time. Patient refuses to turn in bed. Patient educated on the importance of turning. Patient also refused the waffle mattress and had staff remove from her bed. Heel protectors refused also. She will allow the right heel protector on at times.   Hassan catheter inplace. Dressing to the right knee with knee immobilizer and dressing also to the  left wrist with brace secure. Patient did left me turn and do skin assessment this morning but doesn't want to turn.  at bedside. Patient also states she called for pain medication and had to wait 40 minutes. I educated patient on as needed pain medication and also informed patient to call my phone.  at bedside and informed patient that she just called 10 minutes before I was in room. Patient got upset when he said that and said nevermind. Patient sleeping at intervals. Will monitor.

## 2020-11-03 NOTE — ASSESSMENT & PLAN NOTE
--Was planned for surgery on 10/28/2020 with Dr. Wolf - was postponed due to thrombocytopenia  --Right leg in knee immobilzer  11/2:  --surgery with Dr. Wolf today  11/3:  --Non-weight bearing right leg, brace at all times, locked in extension  --Non-weight bearing left arm, gauntlet brace  --Ok to restart dvt ppx in am  --Plan to return to OR, likely 10/5 for ORIF left proximal humerus

## 2020-11-03 NOTE — ANESTHESIA PROCEDURE NOTES
Airway Management  Performed by: Bunny Monaco II, MD  Authorized by: Bunny Monaco II, MD     Intubation:     Induction:  Intravenous    Intubated:  Postinduction    Mask Ventilation:  Easy mask    Attempts:  1    Attempted By:  Staff anesthesiologist    Method of Intubation:  Direct    Blade:  Pena 2    Laryngeal View Grade: Grade I - full view of chords      Difficult Airway Encountered?: No      Complications:  None    Airway Device:  Oral endotracheal tube    Airway Device Size:  7.5    Style/Cuff Inflation:  Cuffed    Tube secured:  21    Secured at:  The lips    Placement Verified By:  Capnometry    Complicating Factors:  None    Findings Post-Intubation:  BS equal bilateral and atraumatic/condition of teeth unchanged

## 2020-11-03 NOTE — ASSESSMENT & PLAN NOTE
Plan for operative fixation 11/05/2020  Will need to be NPO after midnight on 11/04/2020  Will to hold heparin dose that morning

## 2020-11-03 NOTE — SUBJECTIVE & OBJECTIVE
Principal Problem:  Polytrauma with multiple medical comorbidities, a right patella fracture, left distal radius fracture, left proximal humerus fracture    Principal Orthopedic Problem:  Right patella fracture, left distal radius fracture, left proximal humerus fracture    Interval History:  Postop day 1 after right patella open reduction internal fixation, left distal radius open reduction internal fixation    Review of patient's allergies indicates:   Allergen Reactions    Codeine Nausea Only     Other reaction(s): Nausea  Other reaction(s): Elevated blood pressure       Current Facility-Administered Medications   Medication    acetaminophen tablet 650 mg    aluminum-magnesium hydroxide-simethicone 200-200-20 mg/5 mL suspension 30 mL    amLODIPine tablet 5 mg    ceFAZolin (ANCEF) 1 gram in dextrose 5 % 50 mL IVPB (premix)    dextrose 50% injection 12.5 g    dextrose 50% injection 25 g    diphenhydrAMINE capsule 25 mg    glucagon (human recombinant) injection 1 mg    glucose chewable tablet 16 g    glucose chewable tablet 24 g    heparin (porcine) injection 5,000 Units    hydrALAZINE tablet 25 mg    HYDROcodone-acetaminophen  mg per tablet 1 tablet    influenza (QUADRIVALENT ADJUVANTED PF) vaccine 0.5 mL    insulin aspart U-100 pen 1-10 Units    insulin detemir U-100 pen 10 Units    labetaloL injection 10 mg    levothyroxine tablet 175 mcg    morphine injection 4 mg    nozaseptin (NOZIN) nasal     ondansetron injection 4 mg    ondansetron injection 4 mg    pantoprazole EC tablet 40 mg    pravastatin tablet 20 mg    promethazine (PHENERGAN) 6.25 mg in dextrose 5 % 50 mL IVPB     Objective:     Vital Signs (Most Recent):  Temp: 98.4 °F (36.9 °C) (11/03/20 1227)  Pulse: 92 (11/03/20 1227)  Resp: 18 (11/03/20 1330)  BP: (!) 144/65 (11/03/20 1227)  SpO2: (!) 93 % (11/03/20 1227) Vital Signs (24h Range):  Temp:  [96.5 °F (35.8 °C)-98.4 °F (36.9 °C)] 98.4 °F (36.9 °C)  Pulse:   "[] 92  Resp:  [11-35] 18  SpO2:  [91 %-100 %] 93 %  BP: (144-231)/(65-97) 144/65     Weight: 123 kg (271 lb 2.7 oz)  Height: 5' 5" (165.1 cm)  Body mass index is 45.12 kg/m².      Intake/Output Summary (Last 24 hours) at 11/3/2020 1628  Last data filed at 11/3/2020 0518  Gross per 24 hour   Intake 2600 ml   Output 700 ml   Net 1900 ml       General    Vitals reviewed.  Constitutional: She is oriented to person, place, and time.   Neurological: She is alert and oriented to person, place, and time.           Right Knee Exam     Comments:  Dressing in place clean, dry, intact  Knee immobilizer in place locked in extension  Sensation intact to light touch over the dorsum of the foot, 1st webspace, and plantar foot  Demonstrates ability to extend the toes and dorsiflex and plantar flex the footLeft Hand/Wrist Exam     Comments:  Left wrist dressing in place clean, dry, and intact  Gauntlet brace intact  Sensation intact to light touch over the fingers  Able to flex the index finger at the DIP joint, thumb at the IP joint, able to extend the thumb and fingers  Fingers are warm and well perfused with good capillary refill          Left Shoulder Exam     Inspection/Observation   Swelling: present  Bruising: present        Significant Labs:   BMP:   Recent Labs   Lab 11/03/20  0645   *      K 4.4      CO2 24   BUN 20   CREATININE 1.0   CALCIUM 8.6*     All pertinent labs within the past 24 hours have been reviewed.    Significant Imaging: X-Ray: I have reviewed all pertinent results/findings and my personal findings are:  Appropriate reduction and fixation of patella and distal radius fractures  "

## 2020-11-04 LAB
ALBUMIN SERPL BCP-MCNC: 2.2 G/DL (ref 3.5–5.2)
ALP SERPL-CCNC: 67 U/L (ref 55–135)
ALT SERPL W/O P-5'-P-CCNC: 8 U/L (ref 10–44)
ANION GAP SERPL CALC-SCNC: 7 MMOL/L (ref 8–16)
AST SERPL-CCNC: 17 U/L (ref 10–40)
BASOPHILS # BLD AUTO: 0.04 K/UL (ref 0–0.2)
BASOPHILS NFR BLD: 0.6 % (ref 0–1.9)
BILIRUB SERPL-MCNC: 1.3 MG/DL (ref 0.1–1)
BUN SERPL-MCNC: 20 MG/DL (ref 8–23)
CALCIUM SERPL-MCNC: 8.5 MG/DL (ref 8.7–10.5)
CHLORIDE SERPL-SCNC: 102 MMOL/L (ref 95–110)
CO2 SERPL-SCNC: 27 MMOL/L (ref 23–29)
CREAT SERPL-MCNC: 0.8 MG/DL (ref 0.5–1.4)
DIFFERENTIAL METHOD: ABNORMAL
EOSINOPHIL # BLD AUTO: 0.1 K/UL (ref 0–0.5)
EOSINOPHIL NFR BLD: 2 % (ref 0–8)
ERYTHROCYTE [DISTWIDTH] IN BLOOD BY AUTOMATED COUNT: 14.5 % (ref 11.5–14.5)
EST. GFR  (AFRICAN AMERICAN): >60 ML/MIN/1.73 M^2
EST. GFR  (NON AFRICAN AMERICAN): >60 ML/MIN/1.73 M^2
GLUCOSE SERPL-MCNC: 204 MG/DL (ref 70–110)
HCT VFR BLD AUTO: 31 % (ref 37–48.5)
HGB BLD-MCNC: 9.9 G/DL (ref 12–16)
IMM GRANULOCYTES # BLD AUTO: 0.03 K/UL (ref 0–0.04)
IMM GRANULOCYTES NFR BLD AUTO: 0.4 % (ref 0–0.5)
LYMPHOCYTES # BLD AUTO: 1 K/UL (ref 1–4.8)
LYMPHOCYTES NFR BLD: 13.4 % (ref 18–48)
MCH RBC QN AUTO: 28.6 PG (ref 27–31)
MCHC RBC AUTO-ENTMCNC: 31.9 G/DL (ref 32–36)
MCV RBC AUTO: 90 FL (ref 82–98)
MONOCYTES # BLD AUTO: 0.7 K/UL (ref 0.3–1)
MONOCYTES NFR BLD: 9.8 % (ref 4–15)
NEUTROPHILS # BLD AUTO: 5.3 K/UL (ref 1.8–7.7)
NEUTROPHILS NFR BLD: 73.8 % (ref 38–73)
NRBC BLD-RTO: 0 /100 WBC
PLATELET # BLD AUTO: 117 K/UL (ref 150–350)
PMV BLD AUTO: 9.9 FL (ref 9.2–12.9)
POCT GLUCOSE: 206 MG/DL (ref 70–110)
POCT GLUCOSE: 242 MG/DL (ref 70–110)
POCT GLUCOSE: 256 MG/DL (ref 70–110)
POCT GLUCOSE: 292 MG/DL (ref 70–110)
POTASSIUM SERPL-SCNC: 4.2 MMOL/L (ref 3.5–5.1)
PROT SERPL-MCNC: 5.8 G/DL (ref 6–8.4)
RBC # BLD AUTO: 3.46 M/UL (ref 4–5.4)
SODIUM SERPL-SCNC: 136 MMOL/L (ref 136–145)
WBC # BLD AUTO: 7.14 K/UL (ref 3.9–12.7)

## 2020-11-04 PROCEDURE — 80053 COMPREHEN METABOLIC PANEL: CPT

## 2020-11-04 PROCEDURE — 63600175 PHARM REV CODE 636 W HCPCS: Performed by: NURSE PRACTITIONER

## 2020-11-04 PROCEDURE — 36415 COLL VENOUS BLD VENIPUNCTURE: CPT

## 2020-11-04 PROCEDURE — 85025 COMPLETE CBC W/AUTO DIFF WBC: CPT

## 2020-11-04 PROCEDURE — 63600175 PHARM REV CODE 636 W HCPCS: Performed by: STUDENT IN AN ORGANIZED HEALTH CARE EDUCATION/TRAINING PROGRAM

## 2020-11-04 PROCEDURE — 96372 THER/PROPH/DIAG INJ SC/IM: CPT

## 2020-11-04 PROCEDURE — 21400001 HC TELEMETRY ROOM

## 2020-11-04 PROCEDURE — 25000003 PHARM REV CODE 250: Performed by: NURSE PRACTITIONER

## 2020-11-04 RX ADMIN — INSULIN ASPART 6 UNITS: 100 INJECTION, SOLUTION INTRAVENOUS; SUBCUTANEOUS at 05:11

## 2020-11-04 RX ADMIN — LEVOTHYROXINE SODIUM 175 MCG: 150 TABLET ORAL at 05:11

## 2020-11-04 RX ADMIN — DIPHENHYDRAMINE HYDROCHLORIDE 25 MG: 25 CAPSULE ORAL at 09:11

## 2020-11-04 RX ADMIN — MORPHINE SULFATE 4 MG: 4 INJECTION INTRAVENOUS at 09:11

## 2020-11-04 RX ADMIN — CEFAZOLIN SODIUM 1 G: 1 SOLUTION INTRAVENOUS at 03:11

## 2020-11-04 RX ADMIN — CEFAZOLIN SODIUM 1 G: 1 SOLUTION INTRAVENOUS at 07:11

## 2020-11-04 RX ADMIN — INSULIN DETEMIR 10 UNITS: 100 INJECTION, SOLUTION SUBCUTANEOUS at 09:11

## 2020-11-04 RX ADMIN — INSULIN ASPART 4 UNITS: 100 INJECTION, SOLUTION INTRAVENOUS; SUBCUTANEOUS at 11:11

## 2020-11-04 RX ADMIN — AMLODIPINE BESYLATE 5 MG: 5 TABLET ORAL at 09:11

## 2020-11-04 RX ADMIN — HEPARIN SODIUM 5000 UNITS: 5000 INJECTION INTRAVENOUS; SUBCUTANEOUS at 05:11

## 2020-11-04 RX ADMIN — PRAVASTATIN SODIUM 20 MG: 20 TABLET ORAL at 09:11

## 2020-11-04 RX ADMIN — MORPHINE SULFATE 4 MG: 4 INJECTION INTRAVENOUS at 03:11

## 2020-11-04 RX ADMIN — INSULIN ASPART 4 UNITS: 100 INJECTION, SOLUTION INTRAVENOUS; SUBCUTANEOUS at 05:11

## 2020-11-04 RX ADMIN — MORPHINE SULFATE 4 MG: 4 INJECTION INTRAVENOUS at 07:11

## 2020-11-04 RX ADMIN — PANTOPRAZOLE SODIUM 40 MG: 40 TABLET, DELAYED RELEASE ORAL at 09:11

## 2020-11-04 RX ADMIN — HEPARIN SODIUM 5000 UNITS: 5000 INJECTION INTRAVENOUS; SUBCUTANEOUS at 03:11

## 2020-11-04 RX ADMIN — CEFAZOLIN SODIUM 1 G: 1 SOLUTION INTRAVENOUS at 11:11

## 2020-11-04 RX ADMIN — HEPARIN SODIUM 5000 UNITS: 5000 INJECTION INTRAVENOUS; SUBCUTANEOUS at 09:11

## 2020-11-04 RX ADMIN — INSULIN ASPART 3 UNITS: 100 INJECTION, SOLUTION INTRAVENOUS; SUBCUTANEOUS at 09:11

## 2020-11-04 NOTE — PLAN OF CARE
Chart reviewed, pt resting in bed with call light and personal belongings within reach.  Vitals stable, heart monitor 8591.  Pain controlled with ordered meds in MAR.  Blood sugar checks as ordered.  Diabetic diet.  Hassan cath in place, clean and intact.  Pt refuses to turn, only wears heel boot to RLE, requested waffle mattress be removed from bed, and refuses it being placed.  IV antibiotics given as ordered.  RLE immobilizer inplace, LUE dressing intact.  4 eyes on skin completed.  Pt absent of injury throughout shift, will continue to monitor.

## 2020-11-04 NOTE — ASSESSMENT & PLAN NOTE
11/3:  --Non-weight bearing right leg, brace at all times, locked in extension  --Non-weight bearing left arm, gauntlet brace  --Ok to restart dvt ppx in am  --Plan to return to OR, likely 10/5 for ORIF left proximal humerus  11/4  --surgery tomorrow by Dr. Wolf

## 2020-11-04 NOTE — PROGRESS NOTES
[facility] Medicine  Progress Note    Patient Name: Lakshmi Campbell  MRN: [id number]  Patient Class: IP- Inpatient   Admission Date: 10/30/2020  Length of Stay: 4 days  Attending Physician: Ori Lizarraga MD  Primary Care Provider: Karan Ribeiro DO        Subjective:     Principal Problem:Closed fracture of surgical neck of left humerus        HPI:  Pt is a 68 yo female with PMHx of DM II, HTN, HPL, hypothyroidism and morbid obesity who fractured her right patella 1 week ago and is wearing a knee immobilizer. Today, she had a second mishap when trying to get back in bed after using the bedside commode. Pt fell on her left side and immediately developed left shoulder and left wrist pain. She denies any presyncopal symptoms, fever, chest pain, gross neuro deficits, recent nausea/vomiting/diarrhea or urinary symptoms. Xrays found a left humerus fracture and left radius/ulnar fracture.   Vital signs stable and lab work largely normal except for thrombocytopenia which has improved from last level. Pt was placed on Observation for further evaluation by Orthopedics and possible need for surgical intervention.     Overview/Hospital Course:  Patient was kept on OBS for closed fx of surgical neck of L humerus under the care of hospital medicine. Ortho surgery was consulted. 10/31: Patient asking for Dr. Wolf to be involved in her care. I explained that he is not on call today - sent him secure chat but he likely will not see it until Monday. Ordered diet as no surgical intervention today. Will discuss with Dr. King, who is on call tomorrow for ortho. 11/1: Discussed with Dr. King - who spoke with Dr. Wolf. Pt will have surgery performed by Dr. Wolf tomorrow afternoon. Will be NPO after MN and stop heparin at MN. 11/2: Patient scheduled for surgery this afternoon by Dr. Wolf. She is currently NPO with spouse at bedside. Pain is controlled. 11/3: Patient had surgery to R knee  and L wrist yesterday. PRN analgesia. Plans for L shoulder surgery on Thursday of this week. DVT profil restarted. PT/OT pending       Interval History: patient to have L shoulder surgery tomorrow. She will dc early next week to rehab - Case management to start working on placement tomorrow. PT/OT to see her post op. Spoke with Dr. Wolf via telephone - he recommends weight based lovenox post op for DVT prophilaxis. Daughter at bedside, verbalized understanding of all.    Review of Systems   Constitutional: Negative for chills, diaphoresis, fatigue and fever.   HENT: Negative for congestion, rhinorrhea and sore throat.    Eyes: Negative for pain, discharge and itching.   Respiratory: Negative for cough, shortness of breath and wheezing.    Cardiovascular: Negative for chest pain, palpitations and leg swelling.   Gastrointestinal: Negative for abdominal distention, abdominal pain, diarrhea, nausea and vomiting.   Endocrine: Negative for cold intolerance and heat intolerance.   Genitourinary: Negative for difficulty urinating, dysuria and flank pain.   Musculoskeletal: Positive for arthralgias, gait problem and myalgias.   Skin: Negative for color change and wound.   Allergic/Immunologic: Negative.    Neurological: Negative for dizziness, syncope, speech difficulty, weakness and light-headedness.   Hematological: Negative.    Psychiatric/Behavioral: Negative for agitation, behavioral problems and confusion.     Objective:     Vital Signs (Most Recent):  Temp: 98.1 °F (36.7 °C) (11/04/20 1113)  Pulse: 97 (11/04/20 1142)  Resp: 14 (11/04/20 1113)  BP: (!) 152/66 (11/04/20 1142)  SpO2: (!) 92 % (11/04/20 1113) Vital Signs (24h Range):  Temp:  [98.1 °F (36.7 °C)-98.6 °F (37 °C)] 98.1 °F (36.7 °C)  Pulse:  [] 97  Resp:  [14-19] 14  SpO2:  [92 %-96 %] 92 %  BP: (132-169)/(60-78) 152/66     Weight: 123 kg (271 lb 2.7 oz)  Body mass index is 45.12 kg/m².    Intake/Output Summary (Last 24 hours) at 11/4/2020  1618  Last data filed at 11/4/2020 1300  Gross per 24 hour   Intake 640 ml   Output 1800 ml   Net -1160 ml      Physical Exam  Vitals signs and nursing note reviewed.   Constitutional:       Appearance: Normal appearance. She is well-developed. She is morbidly obese.   HENT:      Head: Normocephalic and atraumatic.      Nose: Nose normal.   Eyes:      General: No scleral icterus.     Extraocular Movements: Extraocular movements intact.      Conjunctiva/sclera: Conjunctivae normal.   Neck:      Musculoskeletal: Normal range of motion and neck supple. No muscular tenderness.   Cardiovascular:      Rate and Rhythm: Normal rate and regular rhythm.      Pulses: Normal pulses.      Heart sounds: Normal heart sounds. No murmur. No friction rub. No gallop.    Pulmonary:      Effort: Pulmonary effort is normal.      Breath sounds: Normal breath sounds.   Abdominal:      General: Bowel sounds are normal. There is no distension.      Palpations: Abdomen is soft.      Tenderness: There is no abdominal tenderness.   Genitourinary:     Comments: deferred  Musculoskeletal:         General: No tenderness.      Left wrist: She exhibits decreased range of motion, bony tenderness and deformity.      Right knee: She exhibits decreased range of motion and bony tenderness.      Left upper arm: She exhibits bony tenderness and deformity.      Comments: Bruising to anterior knee - knee immobilizer intact  No midline spinal tenderness   Skin:     General: Skin is warm and dry.      Capillary Refill: Capillary refill takes 2 to 3 seconds.   Neurological:      General: No focal deficit present.      Mental Status: She is alert and oriented to person, place, and time.   Psychiatric:         Mood and Affect: Mood normal.         Behavior: Behavior normal.         Thought Content: Thought content normal.         Significant Labs:   Recent Lab Results       11/04/20  1120   11/04/20  0708   11/04/20  0507   11/03/20  2057        Albumin   2.2          Alkaline Phosphatase   67         ALT   8         Anion Gap   7         AST   17         Baso #   0.04         Basophil %   0.6         BILIRUBIN TOTAL   1.3  Comment:  For infants and newborns, interpretation of results should be based  on gestational age, weight and in agreement with clinical  observations.  Premature Infant recommended reference ranges:  Up to 24 hours.............<8.0 mg/dL  Up to 48 hours............<12.0 mg/dL  3-5 days..................<15.0 mg/dL  6-29 days.................<15.0 mg/dL           BUN   20         Calcium   8.5         Chloride   102         CO2   27         Creatinine   0.8         Differential Method   Automated         eGFR if    >60         eGFR if non    >60  Comment:  Calculation used to obtain the estimated glomerular filtration  rate (eGFR) is the CKD-EPI equation.            Eos #   0.1         Eosinophil %   2.0         Glucose   204         Gran # (ANC)   5.3         Gran %   73.8         Hematocrit   31.0         Hemoglobin   9.9         Immature Grans (Abs)   0.03  Comment:  Mild elevation in immature granulocytes is non specific and   can be seen in a variety of conditions including stress response,   acute inflammation, trauma and pregnancy. Correlation with other   laboratory and clinical findings is essential.           Immature Granulocytes   0.4         Lymph #   1.0         Lymph %   13.4         MCH   28.6         MCHC   31.9         MCV   90         Mono #   0.7         Mono %   9.8         MPV   9.9         nRBC   0         Platelets   117         POCT Glucose 242   206 277     Potassium   4.2         PROTEIN TOTAL   5.8         RBC   3.46         RDW   14.5         Sodium   136         WBC   7.14             All pertinent labs within the past 24 hours have been reviewed.    Significant Imaging: I have reviewed all pertinent imaging results/findings within the past 24 hours.      Assessment/Plan:      * Closed fracture  of surgical neck of left humerus  --ortho surgery consulted (Dr. Montesinos)  --no surgery as new ortho on call tomorrow  --PRN analgesia  11/2:  --surgery with Dr. Wolf today  11/3:  --Non-weight bearing right leg, brace at all times, locked in extension  --Non-weight bearing left arm, gauntlet brace  --Ok to restart dvt ppx in am  --Plan to return to OR, likely 10/5 for ORIF left proximal humerus      Shoulder pain, left  11/3:  --Non-weight bearing right leg, brace at all times, locked in extension  --Non-weight bearing left arm, gauntlet brace  --Ok to restart dvt ppx in am  --Plan to return to OR, likely 10/5 for ORIF left proximal humerus  11/4  --surgery tomorrow by Dr. Wolf      Closed left radial fracture  --Associated with ulnar styloid fracture  --Ortho consulted  --NPO after MN  --Prn analgesia  --PT/OT eval and treat after Ortho consult  11/2:  --surgery with Dr. Wolf today  11/3:  --Non-weight bearing right leg, brace at all times, locked in extension  --Non-weight bearing left arm, gauntlet brace  --Ok to restart dvt ppx in am  --Plan to return to OR, likely 10/5 for ORIF left proximal humerus      Closed displaced comminuted fracture of right patella  --Was planned for surgery on 10/28/2020 with Dr. Wolf - was postponed due to thrombocytopenia  --Right leg in knee immobilzer  11/2:  --surgery with Dr. Wolf today  11/3:  --Non-weight bearing right leg, brace at all times, locked in extension  --Non-weight bearing left arm, gauntlet brace  --Ok to restart dvt ppx in am  --Plan to return to OR, likely 10/5 for ORIF left proximal humerus      Hyperlipidemia  --continue statin  --cardiac diet      Essential hypertension  --continue amlodipine  --cardiac diet      Hypothyroidism  Continue levothyroxine    Type 2 diabetes mellitus with microalbuminuria, with long-term current use of insulin  --accuchecks and SSI  --diabetic diet  --continue novolin at lower dose  --last HA1C  5.8            VTE Risk Mitigation (From admission, onward)         Ordered     heparin (porcine) injection 5,000 Units  Every 8 hours      11/03/20 1423     Place sequential compression device  Until discontinued      10/30/20 1855                Discharge Planning   KYA:      Code Status: Not on file   Is the patient medically ready for discharge?:     Reason for patient still in hospital (select all that apply): Patient trending condition, Consult recommendations and Pending disposition  Discharge Plan A: Home, Home with family, Home Health                  BRUNA Kruse  Department of Hospital Medicine   [facility]

## 2020-11-04 NOTE — PLAN OF CARE
"Pt refused to turn, stated " I move side to side in the bed, I don't need to fully turn." Refused to use wedge and the waffle mattress. Educated pt on importance of turning and risks for breakdown.   "

## 2020-11-04 NOTE — SUBJECTIVE & OBJECTIVE
Interval History: patient to have L shoulder surgery tomorrow. She will dc early next week to rehab - Case management to start working on placement tomorrow. PT/OT to see her post op. Spoke with Dr. Wolf via telephone - he recommends weight based lovenox post op for DVT prophilaxis. Daughter at bedside, verbalized understanding of all.    Review of Systems   Constitutional: Negative for chills, diaphoresis, fatigue and fever.   HENT: Negative for congestion, rhinorrhea and sore throat.    Eyes: Negative for pain, discharge and itching.   Respiratory: Negative for cough, shortness of breath and wheezing.    Cardiovascular: Negative for chest pain, palpitations and leg swelling.   Gastrointestinal: Negative for abdominal distention, abdominal pain, diarrhea, nausea and vomiting.   Endocrine: Negative for cold intolerance and heat intolerance.   Genitourinary: Negative for difficulty urinating, dysuria and flank pain.   Musculoskeletal: Positive for arthralgias, gait problem and myalgias.   Skin: Negative for color change and wound.   Allergic/Immunologic: Negative.    Neurological: Negative for dizziness, syncope, speech difficulty, weakness and light-headedness.   Hematological: Negative.    Psychiatric/Behavioral: Negative for agitation, behavioral problems and confusion.     Objective:     Vital Signs (Most Recent):  Temp: 98.1 °F (36.7 °C) (11/04/20 1113)  Pulse: 97 (11/04/20 1142)  Resp: 14 (11/04/20 1113)  BP: (!) 152/66 (11/04/20 1142)  SpO2: (!) 92 % (11/04/20 1113) Vital Signs (24h Range):  Temp:  [98.1 °F (36.7 °C)-98.6 °F (37 °C)] 98.1 °F (36.7 °C)  Pulse:  [] 97  Resp:  [14-19] 14  SpO2:  [92 %-96 %] 92 %  BP: (132-169)/(60-78) 152/66     Weight: 123 kg (271 lb 2.7 oz)  Body mass index is 45.12 kg/m².    Intake/Output Summary (Last 24 hours) at 11/4/2020 1618  Last data filed at 11/4/2020 1300  Gross per 24 hour   Intake 640 ml   Output 1800 ml   Net -1160 ml      Physical Exam  Vitals signs and  nursing note reviewed.   Constitutional:       Appearance: Normal appearance. She is well-developed. She is morbidly obese.   HENT:      Head: Normocephalic and atraumatic.      Nose: Nose normal.   Eyes:      General: No scleral icterus.     Extraocular Movements: Extraocular movements intact.      Conjunctiva/sclera: Conjunctivae normal.   Neck:      Musculoskeletal: Normal range of motion and neck supple. No muscular tenderness.   Cardiovascular:      Rate and Rhythm: Normal rate and regular rhythm.      Pulses: Normal pulses.      Heart sounds: Normal heart sounds. No murmur. No friction rub. No gallop.    Pulmonary:      Effort: Pulmonary effort is normal.      Breath sounds: Normal breath sounds.   Abdominal:      General: Bowel sounds are normal. There is no distension.      Palpations: Abdomen is soft.      Tenderness: There is no abdominal tenderness.   Genitourinary:     Comments: deferred  Musculoskeletal:         General: No tenderness.      Left wrist: She exhibits decreased range of motion, bony tenderness and deformity.      Right knee: She exhibits decreased range of motion and bony tenderness.      Left upper arm: She exhibits bony tenderness and deformity.      Comments: Bruising to anterior knee - knee immobilizer intact  No midline spinal tenderness   Skin:     General: Skin is warm and dry.      Capillary Refill: Capillary refill takes 2 to 3 seconds.   Neurological:      General: No focal deficit present.      Mental Status: She is alert and oriented to person, place, and time.   Psychiatric:         Mood and Affect: Mood normal.         Behavior: Behavior normal.         Thought Content: Thought content normal.         Significant Labs:   Recent Lab Results       11/04/20  1120   11/04/20  0708   11/04/20  0507   11/03/20  2057        Albumin   2.2         Alkaline Phosphatase   67         ALT   8         Anion Gap   7         AST   17         Baso #   0.04         Basophil %   0.6          BILIRUBIN TOTAL   1.3  Comment:  For infants and newborns, interpretation of results should be based  on gestational age, weight and in agreement with clinical  observations.  Premature Infant recommended reference ranges:  Up to 24 hours.............<8.0 mg/dL  Up to 48 hours............<12.0 mg/dL  3-5 days..................<15.0 mg/dL  6-29 days.................<15.0 mg/dL           BUN   20         Calcium   8.5         Chloride   102         CO2   27         Creatinine   0.8         Differential Method   Automated         eGFR if    >60         eGFR if non    >60  Comment:  Calculation used to obtain the estimated glomerular filtration  rate (eGFR) is the CKD-EPI equation.            Eos #   0.1         Eosinophil %   2.0         Glucose   204         Gran # (ANC)   5.3         Gran %   73.8         Hematocrit   31.0         Hemoglobin   9.9         Immature Grans (Abs)   0.03  Comment:  Mild elevation in immature granulocytes is non specific and   can be seen in a variety of conditions including stress response,   acute inflammation, trauma and pregnancy. Correlation with other   laboratory and clinical findings is essential.           Immature Granulocytes   0.4         Lymph #   1.0         Lymph %   13.4         MCH   28.6         MCHC   31.9         MCV   90         Mono #   0.7         Mono %   9.8         MPV   9.9         nRBC   0         Platelets   117         POCT Glucose 242   206 277     Potassium   4.2         PROTEIN TOTAL   5.8         RBC   3.46         RDW   14.5         Sodium   136         WBC   7.14             All pertinent labs within the past 24 hours have been reviewed.    Significant Imaging: I have reviewed all pertinent imaging results/findings within the past 24 hours.

## 2020-11-04 NOTE — NURSING
"Pt is refusing to turn. She refuses heel boots, only has RLE in heel boots. Pt requested to have waffle mattress removed and did not want it on. Education was given regarding the importance to prevent skin breakdown. Pt responded with, " she shifts in the bed enough to prevent any damage".  Will continue to offer pt to turn every 2 hour rounding.  "

## 2020-11-04 NOTE — PLAN OF CARE
Pt refuses to turn or use wedge. Knee immobilizer to RLE. Splint to left wrist. Hassan cath intact. Accu checks AC&HS. PRN pain meds adm as needed to manage pain level. Call light in reach. Side rails up x2.

## 2020-11-05 ENCOUNTER — ANESTHESIA EVENT (OUTPATIENT)
Dept: SURGERY | Facility: HOSPITAL | Age: 70
DRG: 493 | End: 2020-11-05
Payer: MEDICARE

## 2020-11-05 ENCOUNTER — ANESTHESIA (OUTPATIENT)
Dept: SURGERY | Facility: HOSPITAL | Age: 70
DRG: 493 | End: 2020-11-05
Payer: MEDICARE

## 2020-11-05 LAB
ALBUMIN SERPL BCP-MCNC: 2 G/DL (ref 3.5–5.2)
ALP SERPL-CCNC: 77 U/L (ref 55–135)
ALT SERPL W/O P-5'-P-CCNC: 10 U/L (ref 10–44)
ANION GAP SERPL CALC-SCNC: 8 MMOL/L (ref 8–16)
AST SERPL-CCNC: 16 U/L (ref 10–40)
BASOPHILS # BLD AUTO: 0.03 K/UL (ref 0–0.2)
BASOPHILS NFR BLD: 0.6 % (ref 0–1.9)
BILIRUB SERPL-MCNC: 1.1 MG/DL (ref 0.1–1)
BUN SERPL-MCNC: 20 MG/DL (ref 8–23)
CALCIUM SERPL-MCNC: 8.4 MG/DL (ref 8.7–10.5)
CHLORIDE SERPL-SCNC: 102 MMOL/L (ref 95–110)
CO2 SERPL-SCNC: 25 MMOL/L (ref 23–29)
CREAT SERPL-MCNC: 0.8 MG/DL (ref 0.5–1.4)
DIFFERENTIAL METHOD: ABNORMAL
EOSINOPHIL # BLD AUTO: 0.2 K/UL (ref 0–0.5)
EOSINOPHIL NFR BLD: 3.7 % (ref 0–8)
ERYTHROCYTE [DISTWIDTH] IN BLOOD BY AUTOMATED COUNT: 13.9 % (ref 11.5–14.5)
EST. GFR  (AFRICAN AMERICAN): >60 ML/MIN/1.73 M^2
EST. GFR  (NON AFRICAN AMERICAN): >60 ML/MIN/1.73 M^2
GLUCOSE SERPL-MCNC: 213 MG/DL (ref 70–110)
HCT VFR BLD AUTO: 28.6 % (ref 37–48.5)
HGB BLD-MCNC: 9 G/DL (ref 12–16)
IMM GRANULOCYTES # BLD AUTO: 0.02 K/UL (ref 0–0.04)
IMM GRANULOCYTES NFR BLD AUTO: 0.4 % (ref 0–0.5)
LYMPHOCYTES # BLD AUTO: 1 K/UL (ref 1–4.8)
LYMPHOCYTES NFR BLD: 19.3 % (ref 18–48)
MCH RBC QN AUTO: 28.6 PG (ref 27–31)
MCHC RBC AUTO-ENTMCNC: 31.5 G/DL (ref 32–36)
MCV RBC AUTO: 91 FL (ref 82–98)
MONOCYTES # BLD AUTO: 0.5 K/UL (ref 0.3–1)
MONOCYTES NFR BLD: 10.3 % (ref 4–15)
NEUTROPHILS # BLD AUTO: 3.4 K/UL (ref 1.8–7.7)
NEUTROPHILS NFR BLD: 65.7 % (ref 38–73)
NRBC BLD-RTO: 0 /100 WBC
PLATELET # BLD AUTO: 115 K/UL (ref 150–350)
PMV BLD AUTO: 10.1 FL (ref 9.2–12.9)
POCT GLUCOSE: 160 MG/DL (ref 70–110)
POCT GLUCOSE: 181 MG/DL (ref 70–110)
POCT GLUCOSE: 201 MG/DL (ref 70–110)
POCT GLUCOSE: 225 MG/DL (ref 70–110)
POTASSIUM SERPL-SCNC: 3.9 MMOL/L (ref 3.5–5.1)
PROT SERPL-MCNC: 5.7 G/DL (ref 6–8.4)
RBC # BLD AUTO: 3.15 M/UL (ref 4–5.4)
SARS-COV-2 RDRP RESP QL NAA+PROBE: NEGATIVE
SODIUM SERPL-SCNC: 135 MMOL/L (ref 136–145)
WBC # BLD AUTO: 5.13 K/UL (ref 3.9–12.7)

## 2020-11-05 PROCEDURE — 25000003 PHARM REV CODE 250: Performed by: NURSE ANESTHETIST, CERTIFIED REGISTERED

## 2020-11-05 PROCEDURE — 21400001 HC TELEMETRY ROOM

## 2020-11-05 PROCEDURE — 85025 COMPLETE CBC W/AUTO DIFF WBC: CPT

## 2020-11-05 PROCEDURE — 76942 ECHO GUIDE FOR BIOPSY: CPT | Performed by: ANESTHESIOLOGY

## 2020-11-05 PROCEDURE — 63600175 PHARM REV CODE 636 W HCPCS: Performed by: NURSE ANESTHETIST, CERTIFIED REGISTERED

## 2020-11-05 PROCEDURE — 25000003 PHARM REV CODE 250: Performed by: NURSE PRACTITIONER

## 2020-11-05 PROCEDURE — 27201423 OPTIME MED/SURG SUP & DEVICES STERILE SUPPLY: Performed by: STUDENT IN AN ORGANIZED HEALTH CARE EDUCATION/TRAINING PROGRAM

## 2020-11-05 PROCEDURE — 37000009 HC ANESTHESIA EA ADD 15 MINS: Performed by: STUDENT IN AN ORGANIZED HEALTH CARE EDUCATION/TRAINING PROGRAM

## 2020-11-05 PROCEDURE — 36415 COLL VENOUS BLD VENIPUNCTURE: CPT

## 2020-11-05 PROCEDURE — 36000711: Performed by: STUDENT IN AN ORGANIZED HEALTH CARE EDUCATION/TRAINING PROGRAM

## 2020-11-05 PROCEDURE — 97530 THERAPEUTIC ACTIVITIES: CPT

## 2020-11-05 PROCEDURE — 63600175 PHARM REV CODE 636 W HCPCS: Performed by: NURSE PRACTITIONER

## 2020-11-05 PROCEDURE — 63600175 PHARM REV CODE 636 W HCPCS: Performed by: ANESTHESIOLOGY

## 2020-11-05 PROCEDURE — 36000710: Performed by: STUDENT IN AN ORGANIZED HEALTH CARE EDUCATION/TRAINING PROGRAM

## 2020-11-05 PROCEDURE — 63600175 PHARM REV CODE 636 W HCPCS: Performed by: STUDENT IN AN ORGANIZED HEALTH CARE EDUCATION/TRAINING PROGRAM

## 2020-11-05 PROCEDURE — 71000033 HC RECOVERY, INTIAL HOUR: Performed by: STUDENT IN AN ORGANIZED HEALTH CARE EDUCATION/TRAINING PROGRAM

## 2020-11-05 PROCEDURE — 23615 PR OPEN TREATMENT PROX HUMERAL FRACTURE: ICD-10-PCS | Mod: 58,LT,, | Performed by: STUDENT IN AN ORGANIZED HEALTH CARE EDUCATION/TRAINING PROGRAM

## 2020-11-05 PROCEDURE — 97163 PT EVAL HIGH COMPLEX 45 MIN: CPT

## 2020-11-05 PROCEDURE — 37000008 HC ANESTHESIA 1ST 15 MINUTES: Performed by: STUDENT IN AN ORGANIZED HEALTH CARE EDUCATION/TRAINING PROGRAM

## 2020-11-05 PROCEDURE — 23615 OPTX PROX HUMRL FX W/INT FIX: CPT | Mod: 58,LT,, | Performed by: STUDENT IN AN ORGANIZED HEALTH CARE EDUCATION/TRAINING PROGRAM

## 2020-11-05 PROCEDURE — C1713 ANCHOR/SCREW BN/BN,TIS/BN: HCPCS | Performed by: STUDENT IN AN ORGANIZED HEALTH CARE EDUCATION/TRAINING PROGRAM

## 2020-11-05 PROCEDURE — C1769 GUIDE WIRE: HCPCS | Performed by: STUDENT IN AN ORGANIZED HEALTH CARE EDUCATION/TRAINING PROGRAM

## 2020-11-05 PROCEDURE — 80053 COMPREHEN METABOLIC PANEL: CPT

## 2020-11-05 PROCEDURE — 97167 OT EVAL HIGH COMPLEX 60 MIN: CPT

## 2020-11-05 PROCEDURE — 96372 THER/PROPH/DIAG INJ SC/IM: CPT

## 2020-11-05 PROCEDURE — 64415 NJX AA&/STRD BRCH PLXS IMG: CPT | Performed by: ANESTHESIOLOGY

## 2020-11-05 PROCEDURE — U0002 COVID-19 LAB TEST NON-CDC: HCPCS

## 2020-11-05 DEVICE — SCREW LOCKING T2 F/T 5X32.5MM: Type: IMPLANTABLE DEVICE | Site: HUMERUS | Status: FUNCTIONAL

## 2020-11-05 DEVICE — IMPLANTABLE DEVICE
Type: IMPLANTABLE DEVICE | Site: HUMERUS | Status: NON-FUNCTIONAL
Removed: 2021-03-11

## 2020-11-05 DEVICE — GUIDEWIRE BALL TIP 2.5X800MM: Type: IMPLANTABLE DEVICE | Site: HUMERUS | Status: FUNCTIONAL

## 2020-11-05 DEVICE — SCREW LOCKING 5 X 42.5: Type: IMPLANTABLE DEVICE | Site: HUMERUS | Status: FUNCTIONAL

## 2020-11-05 DEVICE — GUIDE WIRE SMOOTH TIP 22X800MM: Type: IMPLANTABLE DEVICE | Site: HUMERUS | Status: FUNCTIONAL

## 2020-11-05 RX ORDER — ROPIVACAINE HYDROCHLORIDE 5 MG/ML
INJECTION, SOLUTION EPIDURAL; INFILTRATION; PERINEURAL
Status: DISCONTINUED | OUTPATIENT
Start: 2020-11-05 | End: 2020-11-05

## 2020-11-05 RX ORDER — ONDANSETRON 2 MG/ML
INJECTION INTRAMUSCULAR; INTRAVENOUS
Status: DISCONTINUED | OUTPATIENT
Start: 2020-11-05 | End: 2020-11-05

## 2020-11-05 RX ORDER — FENTANYL CITRATE 50 UG/ML
INJECTION, SOLUTION INTRAMUSCULAR; INTRAVENOUS
Status: DISCONTINUED | OUTPATIENT
Start: 2020-11-05 | End: 2020-11-05

## 2020-11-05 RX ORDER — CEFAZOLIN SODIUM 1 G/3ML
INJECTION, POWDER, FOR SOLUTION INTRAMUSCULAR; INTRAVENOUS
Status: DISCONTINUED | OUTPATIENT
Start: 2020-11-05 | End: 2020-11-05

## 2020-11-05 RX ORDER — MIDAZOLAM HYDROCHLORIDE 1 MG/ML
INJECTION, SOLUTION INTRAMUSCULAR; INTRAVENOUS
Status: DISCONTINUED | OUTPATIENT
Start: 2020-11-05 | End: 2020-11-05

## 2020-11-05 RX ORDER — PHENYLEPHRINE HYDROCHLORIDE 10 MG/ML
INJECTION INTRAVENOUS
Status: DISCONTINUED | OUTPATIENT
Start: 2020-11-05 | End: 2020-11-05

## 2020-11-05 RX ORDER — EPHEDRINE SULFATE 50 MG/ML
INJECTION, SOLUTION INTRAVENOUS
Status: DISCONTINUED | OUTPATIENT
Start: 2020-11-05 | End: 2020-11-05

## 2020-11-05 RX ORDER — KETAMINE HYDROCHLORIDE 10 MG/ML
INJECTION, SOLUTION INTRAMUSCULAR; INTRAVENOUS
Status: DISCONTINUED | OUTPATIENT
Start: 2020-11-05 | End: 2020-11-05

## 2020-11-05 RX ORDER — SUCCINYLCHOLINE CHLORIDE 20 MG/ML
INJECTION INTRAMUSCULAR; INTRAVENOUS
Status: DISCONTINUED | OUTPATIENT
Start: 2020-11-05 | End: 2020-11-05

## 2020-11-05 RX ORDER — NEOSTIGMINE METHYLSULFATE 1 MG/ML
INJECTION, SOLUTION INTRAVENOUS
Status: DISCONTINUED | OUTPATIENT
Start: 2020-11-05 | End: 2020-11-05

## 2020-11-05 RX ORDER — ROCURONIUM BROMIDE 10 MG/ML
INJECTION, SOLUTION INTRAVENOUS
Status: DISCONTINUED | OUTPATIENT
Start: 2020-11-05 | End: 2020-11-05

## 2020-11-05 RX ORDER — ONDANSETRON 2 MG/ML
4 INJECTION INTRAMUSCULAR; INTRAVENOUS DAILY PRN
Status: DISCONTINUED | OUTPATIENT
Start: 2020-11-05 | End: 2020-11-11 | Stop reason: HOSPADM

## 2020-11-05 RX ORDER — HYDROMORPHONE HYDROCHLORIDE 2 MG/ML
0.2 INJECTION, SOLUTION INTRAMUSCULAR; INTRAVENOUS; SUBCUTANEOUS EVERY 5 MIN PRN
Status: DISCONTINUED | OUTPATIENT
Start: 2020-11-05 | End: 2020-11-08

## 2020-11-05 RX ORDER — LIDOCAINE HYDROCHLORIDE 20 MG/ML
INJECTION, SOLUTION EPIDURAL; INFILTRATION; INTRACAUDAL; PERINEURAL
Status: DISCONTINUED | OUTPATIENT
Start: 2020-11-05 | End: 2020-11-05

## 2020-11-05 RX ORDER — SODIUM CHLORIDE, SODIUM LACTATE, POTASSIUM CHLORIDE, CALCIUM CHLORIDE 600; 310; 30; 20 MG/100ML; MG/100ML; MG/100ML; MG/100ML
INJECTION, SOLUTION INTRAVENOUS CONTINUOUS PRN
Status: DISCONTINUED | OUTPATIENT
Start: 2020-11-05 | End: 2020-11-05

## 2020-11-05 RX ORDER — PROPOFOL 10 MG/ML
VIAL (ML) INTRAVENOUS
Status: DISCONTINUED | OUTPATIENT
Start: 2020-11-05 | End: 2020-11-05

## 2020-11-05 RX ADMIN — FENTANYL CITRATE 50 MCG: 50 INJECTION, SOLUTION INTRAMUSCULAR; INTRAVENOUS at 04:11

## 2020-11-05 RX ADMIN — PRAVASTATIN SODIUM 20 MG: 20 TABLET ORAL at 08:11

## 2020-11-05 RX ADMIN — FENTANYL CITRATE 50 MCG: 50 INJECTION, SOLUTION INTRAMUSCULAR; INTRAVENOUS at 07:11

## 2020-11-05 RX ADMIN — PHENYLEPHRINE HYDROCHLORIDE 100 MCG: 10 INJECTION INTRAVENOUS at 05:11

## 2020-11-05 RX ADMIN — MORPHINE SULFATE 4 MG: 4 INJECTION INTRAVENOUS at 09:11

## 2020-11-05 RX ADMIN — INSULIN ASPART 1 UNITS: 100 INJECTION, SOLUTION INTRAVENOUS; SUBCUTANEOUS at 10:11

## 2020-11-05 RX ADMIN — ROPIVACAINE HYDROCHLORIDE 30 ML: 5 INJECTION, SOLUTION EPIDURAL; INFILTRATION; PERINEURAL at 04:11

## 2020-11-05 RX ADMIN — PHENYLEPHRINE HYDROCHLORIDE 200 MCG: 10 INJECTION INTRAVENOUS at 06:11

## 2020-11-05 RX ADMIN — CEFAZOLIN SODIUM 1 G: 1 SOLUTION INTRAVENOUS at 08:11

## 2020-11-05 RX ADMIN — GLYCOPYRROLATE 0.6 MG: 0.2 INJECTION, SOLUTION INTRAMUSCULAR; INTRAVENOUS at 07:11

## 2020-11-05 RX ADMIN — PHENYLEPHRINE HYDROCHLORIDE 100 MCG: 10 INJECTION INTRAVENOUS at 06:11

## 2020-11-05 RX ADMIN — ONDANSETRON 4 MG: 2 INJECTION, SOLUTION INTRAMUSCULAR; INTRAVENOUS at 05:11

## 2020-11-05 RX ADMIN — CEFAZOLIN 1 G: 1 INJECTION, POWDER, FOR SOLUTION INTRAMUSCULAR; INTRAVENOUS at 05:11

## 2020-11-05 RX ADMIN — PROPOFOL 150 MG: 10 INJECTION, EMULSION INTRAVENOUS at 04:11

## 2020-11-05 RX ADMIN — GLYCOPYRROLATE 0.2 MG: 0.2 INJECTION, SOLUTION INTRAMUSCULAR; INTRAVENOUS at 05:11

## 2020-11-05 RX ADMIN — EPHEDRINE SULFATE 20 MG: 50 INJECTION INTRAVENOUS at 06:11

## 2020-11-05 RX ADMIN — ROCURONIUM BROMIDE 25 MG: 10 INJECTION, SOLUTION INTRAVENOUS at 05:11

## 2020-11-05 RX ADMIN — ROCURONIUM BROMIDE 5 MG: 10 INJECTION, SOLUTION INTRAVENOUS at 04:11

## 2020-11-05 RX ADMIN — ROCURONIUM BROMIDE 10 MG: 10 INJECTION, SOLUTION INTRAVENOUS at 06:11

## 2020-11-05 RX ADMIN — CEFAZOLIN SODIUM 1 G: 1 SOLUTION INTRAVENOUS at 11:11

## 2020-11-05 RX ADMIN — NEOSTIGMINE METHYLSULFATE 4 MG: 1 INJECTION INTRAVENOUS at 07:11

## 2020-11-05 RX ADMIN — SODIUM CHLORIDE, SODIUM LACTATE, POTASSIUM CHLORIDE, AND CALCIUM CHLORIDE: .6; .31; .03; .02 INJECTION, SOLUTION INTRAVENOUS at 06:11

## 2020-11-05 RX ADMIN — EPHEDRINE SULFATE 10 MG: 50 INJECTION INTRAVENOUS at 06:11

## 2020-11-05 RX ADMIN — CALCIUM CHLORIDE 500 MG: 100 INJECTION, SOLUTION INTRAVENOUS at 05:11

## 2020-11-05 RX ADMIN — PANTOPRAZOLE SODIUM 40 MG: 40 TABLET, DELAYED RELEASE ORAL at 08:11

## 2020-11-05 RX ADMIN — ROCURONIUM BROMIDE 10 MG: 10 INJECTION, SOLUTION INTRAVENOUS at 05:11

## 2020-11-05 RX ADMIN — MIDAZOLAM 2 MG: 1 INJECTION INTRAMUSCULAR; INTRAVENOUS at 04:11

## 2020-11-05 RX ADMIN — KETAMINE HYDROCHLORIDE 30 MG: 10 INJECTION, SOLUTION INTRAMUSCULAR; INTRAVENOUS at 05:11

## 2020-11-05 RX ADMIN — LEVOTHYROXINE SODIUM 175 MCG: 150 TABLET ORAL at 05:11

## 2020-11-05 RX ADMIN — SUCCINYLCHOLINE CHLORIDE 200 MG: 20 INJECTION, SOLUTION INTRAMUSCULAR; INTRAVENOUS at 04:11

## 2020-11-05 RX ADMIN — CALCIUM CHLORIDE 500 MG: 100 INJECTION, SOLUTION INTRAVENOUS at 06:11

## 2020-11-05 RX ADMIN — PROPOFOL 50 MG: 10 INJECTION, EMULSION INTRAVENOUS at 06:11

## 2020-11-05 RX ADMIN — INSULIN ASPART 4 UNITS: 100 INJECTION, SOLUTION INTRAVENOUS; SUBCUTANEOUS at 11:11

## 2020-11-05 RX ADMIN — PHENYLEPHRINE HYDROCHLORIDE 200 MCG: 10 INJECTION INTRAVENOUS at 05:11

## 2020-11-05 RX ADMIN — LIDOCAINE HYDROCHLORIDE 100 MG: 20 INJECTION, SOLUTION EPIDURAL; INFILTRATION; INTRACAUDAL; PERINEURAL at 04:11

## 2020-11-05 RX ADMIN — SODIUM CHLORIDE, SODIUM LACTATE, POTASSIUM CHLORIDE, AND CALCIUM CHLORIDE: .6; .31; .03; .02 INJECTION, SOLUTION INTRAVENOUS at 04:11

## 2020-11-05 RX ADMIN — MORPHINE SULFATE 4 MG: 4 INJECTION INTRAVENOUS at 05:11

## 2020-11-05 RX ADMIN — INSULIN ASPART 4 UNITS: 100 INJECTION, SOLUTION INTRAVENOUS; SUBCUTANEOUS at 05:11

## 2020-11-05 RX ADMIN — MORPHINE SULFATE 4 MG: 4 INJECTION INTRAVENOUS at 10:11

## 2020-11-05 RX ADMIN — DIPHENHYDRAMINE HYDROCHLORIDE 25 MG: 25 CAPSULE ORAL at 10:11

## 2020-11-05 RX ADMIN — INSULIN DETEMIR 10 UNITS: 100 INJECTION, SOLUTION SUBCUTANEOUS at 10:11

## 2020-11-05 RX ADMIN — AMLODIPINE BESYLATE 5 MG: 5 TABLET ORAL at 08:11

## 2020-11-05 NOTE — SUBJECTIVE & OBJECTIVE
Interval History: Patient scheduled for L shoulder repair this afternoon. Currently NPO. Heparin held. PT/OT aware pt requesting rehab on dc. Case management working on placement - likely Mon. Dr. Wolf requesting lovenox injections for DVT prophilactive as she will need weight based.     Review of Systems   Constitutional: Negative for chills, diaphoresis, fatigue and fever.   HENT: Negative for congestion, rhinorrhea and sore throat.    Eyes: Negative for pain, discharge and itching.   Respiratory: Negative for cough, shortness of breath and wheezing.    Cardiovascular: Negative for chest pain, palpitations and leg swelling.   Gastrointestinal: Negative for abdominal distention, abdominal pain, diarrhea, nausea and vomiting.   Endocrine: Negative for cold intolerance and heat intolerance.   Genitourinary: Negative for difficulty urinating, dysuria and flank pain.   Musculoskeletal: Positive for arthralgias, gait problem and myalgias.   Skin: Negative for color change and wound.   Allergic/Immunologic: Negative.    Neurological: Negative for dizziness, syncope, speech difficulty, weakness and light-headedness.   Hematological: Negative.    Psychiatric/Behavioral: Negative for agitation, behavioral problems and confusion.     Objective:     Vital Signs (Most Recent):  Temp: 98.4 °F (36.9 °C) (11/05/20 0710)  Pulse: 80 (11/05/20 0710)  Resp: 16 (11/05/20 0710)  BP: (!) 163/65 (11/05/20 0710)  SpO2: 95 % (11/05/20 0710) Vital Signs (24h Range):  Temp:  [98 °F (36.7 °C)-98.7 °F (37.1 °C)] 98.4 °F (36.9 °C)  Pulse:  [75-99] 80  Resp:  [14-20] 16  SpO2:  [92 %-95 %] 95 %  BP: (148-171)/(65-95) 163/65     Weight: 123 kg (271 lb 2.7 oz)  Body mass index is 45.12 kg/m².    Intake/Output Summary (Last 24 hours) at 11/5/2020 0751  Last data filed at 11/5/2020 0400  Gross per 24 hour   Intake 970 ml   Output 1050 ml   Net -80 ml      Physical Exam  Vitals signs and nursing note reviewed.   Constitutional:       Appearance:  Normal appearance. She is well-developed. She is morbidly obese.   HENT:      Head: Normocephalic and atraumatic.      Nose: Nose normal.   Eyes:      General: No scleral icterus.     Extraocular Movements: Extraocular movements intact.      Conjunctiva/sclera: Conjunctivae normal.   Neck:      Musculoskeletal: Normal range of motion and neck supple. No muscular tenderness.   Cardiovascular:      Rate and Rhythm: Normal rate and regular rhythm.      Pulses: Normal pulses.      Heart sounds: Normal heart sounds. No murmur. No friction rub. No gallop.    Pulmonary:      Effort: Pulmonary effort is normal.      Breath sounds: Normal breath sounds.   Abdominal:      General: Bowel sounds are normal. There is no distension.      Palpations: Abdomen is soft.      Tenderness: There is no abdominal tenderness.   Genitourinary:     Comments: deferred  Musculoskeletal:         General: No tenderness.      Left wrist: She exhibits decreased range of motion, bony tenderness and deformity.      Right knee: She exhibits decreased range of motion and bony tenderness.      Left upper arm: She exhibits bony tenderness and deformity.      Comments: Bruising to anterior knee - knee immobilizer intact  No midline spinal tenderness   Skin:     General: Skin is warm and dry.      Capillary Refill: Capillary refill takes 2 to 3 seconds.   Neurological:      General: No focal deficit present.      Mental Status: She is alert and oriented to person, place, and time.   Psychiatric:         Mood and Affect: Mood normal.         Behavior: Behavior normal.         Thought Content: Thought content normal.         Significant Labs:   Recent Lab Results       11/05/20  0722   11/05/20  0511   11/04/20  2107   11/04/20  1730   11/04/20  1120        Baso # 0.03             Basophil % 0.6             Differential Method Automated             Eos # 0.2             Eosinophil % 3.7             Gran # (ANC) 3.4             Gran % 65.7              Hematocrit 28.6             Hemoglobin 9.0             Immature Grans (Abs) 0.02  Comment:  Mild elevation in immature granulocytes is non specific and   can be seen in a variety of conditions including stress response,   acute inflammation, trauma and pregnancy. Correlation with other   laboratory and clinical findings is essential.               Immature Granulocytes 0.4             Lymph # 1.0             Lymph % 19.3             MCH 28.6             MCHC 31.5             MCV 91             Mono # 0.5             Mono % 10.3             MPV 10.1             nRBC 0             Platelets 115             POCT Glucose   225 292 256 242     RBC 3.15             RDW 13.9             WBC 5.13                                All pertinent labs within the past 24 hours have been reviewed.    Significant Imaging: I have reviewed all pertinent imaging results/findings within the past 24 hours.

## 2020-11-05 NOTE — NURSING
Pt refused to turn or change position. Refused to have waffle mattress on bed. Would only wear heel boot to RLE. Education was given and pt refused.

## 2020-11-05 NOTE — ANESTHESIA PROCEDURE NOTES
Peripheral Block    Patient location during procedure: pre-op   Block not for primary anesthetic.  Reason for block: at surgeon's request and post-op pain management   Post-op Pain Location: Right brachial plexus  Start time: 11/5/2020 4:32 PM  Timeout: 11/5/2020 4:31 PM   End time: 11/5/2020 4:39 PM    Staffing  Authorizing Provider: Timoteo Ceballos MD  Performing Provider: Timoteo Ceballos MD    Staffing  Other anesthesia staff: Breanne Burk CRNA  Preanesthetic Checklist  Completed: patient identified, site marked, surgical consent, pre-op evaluation, timeout performed, IV checked, risks and benefits discussed and monitors and equipment checked  Peripheral Block  Patient position: supine  Prep: ChloraPrep  Patient monitoring: heart rate, continuous pulse ox and frequent blood pressure checks  Block type: supraclavicular  Laterality: right  Injection technique: single shot  Needle  Needle type: Stimuplex   Needle gauge: 21 G  Needle localization: anatomical landmarks and ultrasound guidance  Needle insertion depth: 2 cm   -ultrasound image captured on disc.  Assessment  Injection assessment: negative aspiration, negative parasthesia and local visualized surrounding nerve  Paresthesia pain: none  Heart rate change: no  Slow fractionated injection: yes

## 2020-11-05 NOTE — PLAN OF CARE
Swer met with pt at bedside to discuss rehab consult. Swer provided Rehab list. Swer asked that pt choose three options. Swer will f/u on rehab options.        11/05/20 1316   Post-Acute Status   Post-Acute Authorization Placement   Post-Acute Placement Status Patient List Provided   Patient choice form signed by patient/caregiver List from [employer] Compare   Discharge Plan   Discharge Plan A Rehab

## 2020-11-05 NOTE — PLAN OF CARE
Chart reviewed, pt resting in bed with call light and personal belongings within reach. Vitals stable, heart monitor 8591.  NPO since midnight.  Hassan in place and draining.  Blood sugar checks as ordered.  Pain controlled with ordered meds.  Surgical dressings intact with no drainage noted, immobilizer on LLE.  Pt refuses to turn or use waffle mattress. Pt will only wear heel boot on RLE.  4 eyes on skin completed.  Pt absent of injury throughout shift, will continue to monitor.

## 2020-11-05 NOTE — PT/OT/SLP EVAL
Physical Therapy Evaluation    Patient Name:  Lakshmi Campbell   MRN:  [id number]    Recommendations:     Discharge Recommendations:  nursing facility, skilled   Discharge Equipment Recommendations: wheelchair   Barriers to discharge: Decreased caregiver support    Assessment:     Lakshmi Campbell is a 69 y.o. female admitted with a medical diagnosis of Closed fracture of surgical neck of left humerus.  She presents with the following impairments/functional limitations:  weakness, impaired endurance, impaired self care skills, decreased lower extremity function, impaired balance, decreased ROM, orthopedic precautions, edema, decreased upper extremity function, pain, decreased safety awareness, impaired functional mobilty .    Rehab Prognosis: Good; patient would benefit from acute skilled PT services to address these deficits and reach maximum level of function.    Recent Surgery: Procedure(s) (LRB):  ORIF, FRACTURE, PATELLA (Right)  ORIF, FRACTURE, RADIUS, DISTAL (Left)  APPLICATION, ACELLULAR HUMAN DERMAL ALLOGRAFT (Right) 3 Days Post-Op    Plan:     During this hospitalization, patient to be seen   to address the identified rehab impairments via gait training, therapeutic activities, therapeutic exercises and progress toward the following goals:    · Plan of Care Expires:  11/12/20    Subjective     Chief Complaint: PAIN   Patient/Family Comments/goals: INC MOBILITY / DEC PAIN   Pain/Comfort:  · Pain Rating 1: 8/10  · Location - Side 1: Left  · Location 1: arm  · Pain Rating Post-Intervention 1: 8/10    Patients cultural, spiritual, Jehovah's witness conflicts given the current situation:      Living Environment:  PT LIVES AT HOME WITH  AND HAS NO STEPS TO ENTER HOME   Prior to admission, patients level of function was IND AND DRIVES.  Equipment used at home: none.  DME owned (not currently used): rolling walker, single point cane, bedside commode and TUB BENCH.  Upon discharge, patient will have assistance from  UNKNOWN.    Objective:     Communicated with NURSE STARKS AND [facility] CHART REVIEW  prior to session.  Patient found supine with telemetry, peripheral IV, berriso catheter, knee immobilizer  upon PT entry to room.    General Precautions: Standard, fall   Orthopedic Precautions:LUE non weight bearing, RLE non weight bearing   Braces: Knee immobilizer     Exams:  · RLE ROM: LIMITED AT KNEE WITH KNEE IMMOBILIZER ONE ,HIP AND ANKLE WFL  · RLE Strength: NA  · LLE ROM: WFL  · LLE Strength: WFL    Functional Mobility:  PT MET IN RM SUP>SIT EOB WITH MAX A X 2 AND INC CUES FOR BRIDGING WITH L LE TO SCOOT TO SIDE OF BED. PT EDUCATED ON NWB PRECAUTIONS AND GOALS FOR P.T. PT ALSO COMPLETED B LE AP AND L LE TKE SEATED EOB. PT LEFT SEATED EOB PROPPED WITH PILLOWS AND ALL NEEDS MET     AM-PAC 6 CLICK MOBILITY  Total Score:8     Patient left SEATED EOB  with call button in reach and NURSE RAUDEL notified.    GOALS:   Multidisciplinary Problems     Physical Therapy Goals        Problem: Physical Therapy Goal    Goal Priority Disciplines Outcome Goal Variances Interventions   Physical Therapy Goal     PT, PT/OT      Description: PT WILL BE SEEN FOR P.T. FOR A MIN OF 5 OUT OF 7 DAYS A WEEK  LT20  1. PT WILL COMPLETE BED MOBILITY WITH MOD A  2. PT WILL T/F TO CHAIR WITH MAX A X 2 NWB L UE AND R LE.  3. PT WILL COMPLETE B LE TE X 10 REPS FOR STRENGTHENING.                    History:     Past Medical History:   Diagnosis Date    Cataract     Diabetes mellitus type I     Hyperlipidemia     Hypertension     Morbid obesity     Right knee pain     Thyroid disease        Past Surgical History:   Procedure Laterality Date    APPENDECTOMY      BREAST BIOPSY      CATARACT EXTRACTION      EYE SURGERY      HERNIA REPAIR      OPEN REDUCTION AND INTERNAL FIXATION (ORIF) OF FRACTURE OF DISTAL RADIUS Left 2020    Procedure: ORIF, FRACTURE, RADIUS, DISTAL;  Surgeon: Sage Wolf MD;  Location: [facility] OR;  Service:  Orthopedics;  Laterality: Left;    OPEN REDUCTION AND INTERNAL FIXATION (ORIF) OF FRACTURE OF PATELLA Right 11/2/2020    Procedure: ORIF, FRACTURE, PATELLA;  Surgeon: Sage Wolf MD;  Location: [facility] OR;  Service: Orthopedics;  Laterality: Right;    PLACEMENT OF ACELLULAR HUMAN DERMAL ALLOGRAFT Right 11/2/2020    Procedure: APPLICATION, ACELLULAR HUMAN DERMAL ALLOGRAFT;  Surgeon: Sage Wolf MD;  Location: [facility];  Service: Orthopedics;  Laterality: Right;  Right Patella       Time Tracking:     PT Received On: 11/05/20  PT Start Time: 1005     PT Stop Time: 1030  PT Total Time (min): 25 min     Billable Minutes: Evaluation 15 and Therapeutic Activity 10      Luciana Trevino, PT  11/05/2020

## 2020-11-05 NOTE — PLAN OF CARE
IV ABT therapy remains in progress. No adverse reactions noted, remains afebrile. Able to sit up on the side of the bed with max assist. Pt refuses to turn or use wedge despite education of importance of turning.  Knee immobilizer to RLE. Splint to left wrist. Hassan cath intact. Accu checks AC&HS. PRN pain meds adm as needed to manage pain level. Call light in reach. Side rails up x2.

## 2020-11-05 NOTE — ANESTHESIA PREPROCEDURE EVALUATION
11/05/2020  Lakshmi Campbell is a 69 y.o., female.    Anesthesia Evaluation    I have reviewed the Patient Summary Reports.    I have reviewed the Nursing Notes. I have reviewed the NPO Status.   I have reviewed the Medications.     Review of Systems  Anesthesia Hx:  No problems with previous Anesthesia Denies Hx of Anesthetic complications  Denies Family Hx of Anesthesia complications.   Denies Personal Hx of Anesthesia complications.   Social:  Non-Smoker, No Alcohol Use    Cardiovascular:  Cardiovascular Normal  ECG has been reviewed.    Pulmonary:  Pulmonary Normal    Renal/:  Renal/ Normal     Hepatic/GI:  Hepatic/GI Normal    Neurological:  Neurology Normal    Endocrine:  Endocrine Normal    Psych:  Psychiatric Normal         Patient Active Problem List   Diagnosis    Type 2 diabetes mellitus with microalbuminuria, with long-term current use of insulin    Hypothyroidism    Essential hypertension    Hyperlipidemia    Microalbuminuria    Preop examination    Body mass index (BMI) 45.0-49.9, adult    Left knee pain    Closed displaced comminuted fracture of right patella    Closed fracture of surgical neck of left humerus    Closed left radial fracture    Shoulder pain, left     No current facility-administered medications on file prior to encounter.      Current Outpatient Medications on File Prior to Encounter   Medication Sig Dispense Refill    amLODIPine (NORVASC) 5 MG tablet Take 1 tablet (5 mg total) by mouth once daily. 30 tablet 0    blood sugar diagnostic (ONE TOUCH ULTRA TEST) Strp Use ac bid      blood-glucose meter kit Use as instructed      hydrochlorothiazide (HYDRODIURIL) 25 MG tablet Take 25 mg by mouth once daily.       insulin NPH-insulin regular, 70/30, (NOVOLIN 70/30 U-100 INSULIN) 100 unit/mL (70-30) injection Inject 75 units sc ac supper.      insulin syringe-needle  "U-100 1 mL 29 gauge x 1/2" Syrg Use bid      levothyroxine (SYNTHROID) 175 MCG tablet TAKE 1 TABLET BY MOUTH DAILY      metformin (GLUCOPHAGE) 1000 MG tablet 1,000 mg 2 (two) times daily with meals.       perindopril erbumine (ACEON) 4 mg tablet Take 4 mg by mouth once daily.       pravastatin (PRAVACHOL) 20 MG tablet Take 20 mg by mouth once daily.   11    SURE COMFORT PEN NEEDLE 31 X 5/16 " Ndle   0     Past Surgical History:   Procedure Laterality Date    APPENDECTOMY      BREAST BIOPSY      CATARACT EXTRACTION      EYE SURGERY      HERNIA REPAIR      OPEN REDUCTION AND INTERNAL FIXATION (ORIF) OF FRACTURE OF DISTAL RADIUS Left 11/2/2020    Procedure: ORIF, FRACTURE, RADIUS, DISTAL;  Surgeon: Sage Wolf MD;  Location: [facility] OR;  Service: Orthopedics;  Laterality: Left;    OPEN REDUCTION AND INTERNAL FIXATION (ORIF) OF FRACTURE OF PATELLA Right 11/2/2020    Procedure: ORIF, FRACTURE, PATELLA;  Surgeon: Sage Wolf MD;  Location: [facility] OR;  Service: Orthopedics;  Laterality: Right;    PLACEMENT OF ACELLULAR HUMAN DERMAL ALLOGRAFT Right 11/2/2020    Procedure: APPLICATION, ACELLULAR HUMAN DERMAL ALLOGRAFT;  Surgeon: Sage Wolf MD;  Location: [facility] OR;  Service: Orthopedics;  Laterality: Right;  Right Patella         Physical Exam  General:  Well nourished, Morbid Obesity    Airway/Jaw/Neck:  Airway Findings: Mouth Opening: Normal Tongue: Normal  General Airway Assessment: Adult  Mallampati: II  TM Distance: 4 - 6 cm  Jaw/Neck Findings:  Neck ROM: Normal ROM      Dental:  Dental Findings: In tact   Chest/Lungs:  Chest/Lungs Findings: Clear to auscultation, Normal Respiratory Rate     Heart/Vascular:  Heart Findings: Rate: Normal  Rhythm: Regular Rhythm  Sounds: Normal        Mental Status:  Mental Status Findings:  Cooperative, Alert and Oriented       Lab Results   Component Value Date    WBC 5.13 11/05/2020    HGB 9.0 (L) 11/05/2020    HCT 28.6 (L) 11/05/2020    " MCV 91 11/05/2020     (L) 11/05/2020         Chemistry        Component Value Date/Time     (L) 11/05/2020 0722    K 3.9 11/05/2020 0722     11/05/2020 0722    CO2 25 11/05/2020 0722    BUN 20 11/05/2020 0722    CREATININE 0.8 11/05/2020 0722     (H) 11/05/2020 0722        Component Value Date/Time    CALCIUM 8.4 (L) 11/05/2020 0722    ALKPHOS 77 11/05/2020 0722    AST 16 11/05/2020 0722    ALT 10 11/05/2020 0722    BILITOT 1.1 (H) 11/05/2020 0722    ESTGFRAFRICA >60 11/05/2020 0722    EGFRNONAA >60 11/05/2020 0722            Anesthesia Plan  Type of Anesthesia, risks & benefits discussed:  Anesthesia Type:  general  Patient's Preference:   Intra-op Monitoring Plan: standard ASA monitors  Intra-op Monitoring Plan Comments:   Post Op Pain Control Plan: per primary service following discharge from PACU and peripheral nerve block  Post Op Pain Control Plan Comments:   Induction:   IV  Beta Blocker:  Patient is not currently on a Beta-Blocker (No further documentation required).       Informed Consent: Patient understands risks and agrees with Anesthesia plan.  Questions answered. Anesthesia consent signed with patient.  ASA Score: 3     Day of Surgery Review of History & Physical: I have interviewed and examined the patient. I have reviewed the patient's H&P dated:  There are no significant changes.  H&P update referred to the surgeon.         Ready For Surgery From Anesthesia Perspective.

## 2020-11-05 NOTE — PROGRESS NOTES
[facility] Medicine  Progress Note    Patient Name: Lakshmi Campbell  MRN: [id number]  Patient Class: IP- Inpatient   Admission Date: 10/30/2020  Length of Stay: 5 days  Attending Physician: Ori Lizarraga MD  Primary Care Provider: Karan Ribeiro DO        Subjective:     Principal Problem:Closed fracture of surgical neck of left humerus        HPI:  Pt is a 70 yo female with PMHx of DM II, HTN, HPL, hypothyroidism and morbid obesity who fractured her right patella 1 week ago and is wearing a knee immobilizer. Today, she had a second mishap when trying to get back in bed after using the bedside commode. Pt fell on her left side and immediately developed left shoulder and left wrist pain. She denies any presyncopal symptoms, fever, chest pain, gross neuro deficits, recent nausea/vomiting/diarrhea or urinary symptoms. Xrays found a left humerus fracture and left radius/ulnar fracture.   Vital signs stable and lab work largely normal except for thrombocytopenia which has improved from last level. Pt was placed on Observation for further evaluation by Orthopedics and possible need for surgical intervention.     Overview/Hospital Course:  Patient was kept on OBS for closed fx of surgical neck of L humerus under the care of hospital medicine. Ortho surgery was consulted. 10/31: Patient asking for Dr. Wolf to be involved in her care. I explained that he is not on call today - sent him secure chat but he likely will not see it until Monday. Ordered diet as no surgical intervention today. Will discuss with Dr. King, who is on call tomorrow for ortho. 11/1: Discussed with Dr. King - who spoke with Dr. Wolf. Pt will have surgery performed by Dr. Wolf tomorrow afternoon. Will be NPO after MN and stop heparin at MN. 11/2: Patient scheduled for surgery this afternoon by Dr. Wolf. She is currently NPO with spouse at bedside. Pain is controlled. 11/3: Patient had surgery to R knee  and L wrist yesterday. PRN analgesia. Plans for L shoulder surgery on Thursday of this week. DVT profil restarted. PT/OT pending 11/4: patient to have L shoulder surgery tomorrow. She will dc early next week to rehab - Case management to start working on placement tomorrow. PT/OT to see her post op. Spoke with Dr. Wolf via telephone - he recommends weight based lovenox post op for DVT prophilaxis. Daughter at bedside, verbalized understanding of all.    Interval History: Patient scheduled for L shoulder repair this afternoon. Currently NPO. Heparin held. PT/OT aware pt requesting rehab on dc. She sat on side of bed today for 90 minutes. Case management working on placement - likely Mon. Dr. Wolf requesting lovenox injections for DVT prophilactive as she will need weight based.     Review of Systems   Constitutional: Negative for chills, diaphoresis, fatigue and fever.   HENT: Negative for congestion, rhinorrhea and sore throat.    Eyes: Negative for pain, discharge and itching.   Respiratory: Negative for cough, shortness of breath and wheezing.    Cardiovascular: Negative for chest pain, palpitations and leg swelling.   Gastrointestinal: Negative for abdominal distention, abdominal pain, diarrhea, nausea and vomiting.   Endocrine: Negative for cold intolerance and heat intolerance.   Genitourinary: Negative for difficulty urinating, dysuria and flank pain.   Musculoskeletal: Positive for arthralgias, gait problem and myalgias.   Skin: Negative for color change and wound.   Allergic/Immunologic: Negative.    Neurological: Negative for dizziness, syncope, speech difficulty, weakness and light-headedness.   Hematological: Negative.    Psychiatric/Behavioral: Negative for agitation, behavioral problems and confusion.     Objective:     Vital Signs (Most Recent):  Temp: 98.4 °F (36.9 °C) (11/05/20 0710)  Pulse: 80 (11/05/20 0710)  Resp: 16 (11/05/20 0710)  BP: (!) 163/65 (11/05/20 0710)  SpO2: 95 % (11/05/20  0710) Vital Signs (24h Range):  Temp:  [98 °F (36.7 °C)-98.7 °F (37.1 °C)] 98.4 °F (36.9 °C)  Pulse:  [75-99] 80  Resp:  [14-20] 16  SpO2:  [92 %-95 %] 95 %  BP: (148-171)/(65-95) 163/65     Weight: 123 kg (271 lb 2.7 oz)  Body mass index is 45.12 kg/m².    Intake/Output Summary (Last 24 hours) at 11/5/2020 0751  Last data filed at 11/5/2020 0400  Gross per 24 hour   Intake 970 ml   Output 1050 ml   Net -80 ml      Physical Exam  Vitals signs and nursing note reviewed.   Constitutional:       Appearance: Normal appearance. She is well-developed. She is morbidly obese.   HENT:      Head: Normocephalic and atraumatic.      Nose: Nose normal.   Eyes:      General: No scleral icterus.     Extraocular Movements: Extraocular movements intact.      Conjunctiva/sclera: Conjunctivae normal.   Neck:      Musculoskeletal: Normal range of motion and neck supple. No muscular tenderness.   Cardiovascular:      Rate and Rhythm: Normal rate and regular rhythm.      Pulses: Normal pulses.      Heart sounds: Normal heart sounds. No murmur. No friction rub. No gallop.    Pulmonary:      Effort: Pulmonary effort is normal.      Breath sounds: Normal breath sounds.   Abdominal:      General: Bowel sounds are normal. There is no distension.      Palpations: Abdomen is soft.      Tenderness: There is no abdominal tenderness.   Genitourinary:     Comments: deferred  Musculoskeletal:         General: No tenderness.      Left wrist: She exhibits decreased range of motion, bony tenderness and deformity.      Right knee: She exhibits decreased range of motion and bony tenderness.      Left upper arm: She exhibits bony tenderness and deformity.      Comments: Bruising to anterior knee - knee immobilizer intact  No midline spinal tenderness   Skin:     General: Skin is warm and dry.      Capillary Refill: Capillary refill takes 2 to 3 seconds.   Neurological:      General: No focal deficit present.      Mental Status: She is alert and oriented  to person, place, and time.   Psychiatric:         Mood and Affect: Mood normal.         Behavior: Behavior normal.         Thought Content: Thought content normal.         Significant Labs:   Recent Lab Results       11/05/20  0722   11/05/20  0511   11/04/20  2107   11/04/20  1730   11/04/20  1120        Baso # 0.03             Basophil % 0.6             Differential Method Automated             Eos # 0.2             Eosinophil % 3.7             Gran # (ANC) 3.4             Gran % 65.7             Hematocrit 28.6             Hemoglobin 9.0             Immature Grans (Abs) 0.02  Comment:  Mild elevation in immature granulocytes is non specific and   can be seen in a variety of conditions including stress response,   acute inflammation, trauma and pregnancy. Correlation with other   laboratory and clinical findings is essential.               Immature Granulocytes 0.4             Lymph # 1.0             Lymph % 19.3             MCH 28.6             MCHC 31.5             MCV 91             Mono # 0.5             Mono % 10.3             MPV 10.1             nRBC 0             Platelets 115             POCT Glucose   225 292 256 242     RBC 3.15             RDW 13.9             WBC 5.13                                All pertinent labs within the past 24 hours have been reviewed.    Significant Imaging: I have reviewed all pertinent imaging results/findings within the past 24 hours.      Assessment/Plan:      * Closed fracture of surgical neck of left humerus  --ortho surgery consulted (Dr. Montesinos)  --no surgery as new ortho on call tomorrow  --PRN analgesia  11/2:  --surgery with Dr. Wolf today  11/3:  --Non-weight bearing right leg, brace at all times, locked in extension  --Non-weight bearing left arm, gauntlet brace  --Ok to restart dvt ppx in am  --Plan to return to OR, likely 10/5 for ORIF left proximal humerus      Shoulder pain, left  11/3:  --Non-weight bearing right leg, brace at all times, locked in  extension  --Non-weight bearing left arm, gauntlet brace  --Ok to restart dvt ppx in am  --Plan to return to OR, likely 10/5 for ORIF left proximal humerus  11/5  --surgery today by Dr. Wolf      Closed left radial fracture  --Associated with ulnar styloid fracture  --Ortho consulted  --NPO after MN  --Prn analgesia  --PT/OT eval and treat after Ortho consult  11/2:  --surgery with Dr. Wolf today  11/3:  --Non-weight bearing right leg, brace at all times, locked in extension  --Non-weight bearing left arm, gauntlet brace  --Ok to restart dvt ppx in am  --Plan to return to OR, likely 10/5 for ORIF left proximal humerus      Closed displaced comminuted fracture of right patella  --Was planned for surgery on 10/28/2020 with Dr. Wolf - was postponed due to thrombocytopenia  --Right leg in knee immobilzer  11/2:  --surgery with Dr. Wolf today  11/3:  --Non-weight bearing right leg, brace at all times, locked in extension  --Non-weight bearing left arm, gauntlet brace  --Ok to restart dvt ppx in am  --Plan to return to OR, likely 10/5 for ORIF left proximal humerus      Hyperlipidemia  --continue statin  --cardiac diet      Essential hypertension  --continue amlodipine  --cardiac diet      Hypothyroidism  Continue levothyroxine    Type 2 diabetes mellitus with microalbuminuria, with long-term current use of insulin  --accuchecks and SSI  --diabetic diet  --continue novolin at lower dose  --last HA1C 5.8            VTE Risk Mitigation (From admission, onward)         Ordered     Place sequential compression device  Until discontinued      10/30/20 8965                Discharge Planning   KYA:      Code Status: Not on file   Is the patient medically ready for discharge?:     Reason for patient still in hospital (select all that apply): Patient trending condition, Consult recommendations and Pending disposition  Discharge Plan A: Rehab                  BRUNA Kruse  Department of [facility]  Medicine   [facility]

## 2020-11-05 NOTE — PT/OT/SLP EVAL
Occupational Therapy   Evaluation    Name: Lakshmi Campbell  MRN: [id number]  Admitting Diagnosis:  Closed fracture of surgical neck of left humerus 3 Days Post-Op    Recommendations:     Discharge Recommendations: nursing facility, skilled  Discharge Equipment Recommendations:  none(ACCESS TO RW, SPC, BSC, TTB)  Barriers to discharge:       Assessment:     Lakshmi Campbell is a 69 y.o. female with a medical diagnosis of Closed fracture of surgical neck of left humerus.  She presents with DEBILITY AND GENERALIZED WEAKNESS. Performance deficits affecting function: weakness, impaired functional mobilty, impaired endurance, impaired balance, impaired self care skills, decreased safety awareness.      Rehab Prognosis: Fair; patient would benefit from acute skilled OT services to address these deficits and reach maximum level of function.       Plan:     Patient to be seen 3 x/week to address the above listed problems via self-care/home management, therapeutic activities, therapeutic exercises  · Plan of Care Expires: 11/12/20  · Plan of Care Reviewed with: patient, spouse    Subjective     Chief Complaint: DEBILITY AND GENERALIZED WEAKNESS  Patient/Family Comments/goals:     Occupational Profile:  Living Environment: LIVES WITH SPOUSE  IN 1 STORY HOUSE   Previous level of function: (I) WITH ADL'S AND FUNCTIONAL MOBILITY . PT REPORTS STILL DRIVES  Roles and Routines: OCCUPATIONAL THERAPY  Equipment Used at Home:  none  Assistance upon Discharge:    Pain/Comfort:  · Pain Rating 1: 8/10  · Location - Orientation 1: lower  · Location 1: leg    Patients cultural, spiritual, Congregational conflicts given the current situation:      Objective:     Communicated with: NURSE AND Epic CHART REVIEW prior to session.  Patient found HOB elevated with telemetry, berrios catheter upon OT entry to room.    General Precautions: Standard, fall   Orthopedic Precautions:N/A   Braces: N/A     Occupational Performance:    Bed Mobility:    · Patient  completed Rolling/Turning to Right with maximal assistance and 2 persons  · Patient completed Scooting/Bridging with maximal assistance and 2 persons  · Patient completed Supine to Sit with maximal assistance and 2 persons    Activities of Daily Living:  · Upper Body Dressing: maximal assistance HOSPITAL GOWN  · Lower Body Dressing: total assistance JAIR/SOCKS  · Toileting: total assistance [employer] PLACEMENT    Cognitive/Visual Perceptual:  Cognitive/Psychosocial Skills:     -       Oriented to: Person, Place, Time and Situation   -       Follows Commands/attention:Follows one-step commands  -       Communication: clear/fluent  -       Memory: No Deficits noted  -       Safety awareness/insight to disability: impaired     Physical Exam:  Upper Extremity Range of Motion:     -       Right Upper Extremity: WFL  Upper Extremity Strength:    -       Right Upper Extremity: MMT: 4/5 GROSSLY  -       Left Upper Extremity: NOT TESTED   Strength: -       Right Upper Extremity: MMTY: 4/5 GROSSLY  -       Left Upper Extremity: NOT TESTED    AMPAC 6 Click ADL:  AMPAC Total Score: 11    Treatment & Education:  Education:  PT SUPINE IN BED WITH HOB ELEVATED UPON OT ARRIVAL. OT EVALUATION COMPLETED AS DESCRIBED ABOVE. PT EDUCATED ON ROLE OF OT AND OT POC. PT EDUCATED ON UB EXERCISES TO COMPLETE TO TOLERANCE. PT LEFT SITTING EOB WITH ALL NEEDS MET.    Patient left SITTING EOB with all lines intact, call button in reach, bed alarm on, NURSE RAUDEL notified and SPOUSE present    GOALS:   Multidisciplinary Problems     Occupational Therapy Goals        Problem: Occupational Therapy Goal    Goal Priority Disciplines Outcome Interventions   Occupational Therapy Goal     OT, PT/OT     Description: OT GOALS  TO BE MET BY 11-12-20  MOD A WITH UE DRESSING  MOD A WITH LE DRESSING  PT WILL TOLERATE 1 SET X 15 REPS L UE ROM EXERCISE                   History:     Past Medical History:   Diagnosis Date    Cataract     Diabetes mellitus  type I     Hyperlipidemia     Hypertension     Morbid obesity     Right knee pain     Thyroid disease        Past Surgical History:   Procedure Laterality Date    APPENDECTOMY      BREAST BIOPSY      CATARACT EXTRACTION      EYE SURGERY      HERNIA REPAIR      OPEN REDUCTION AND INTERNAL FIXATION (ORIF) OF FRACTURE OF DISTAL RADIUS Left 11/2/2020    Procedure: ORIF, FRACTURE, RADIUS, DISTAL;  Surgeon: Sage Wolf MD;  Location: [facility];  Service: Orthopedics;  Laterality: Left;    OPEN REDUCTION AND INTERNAL FIXATION (ORIF) OF FRACTURE OF PATELLA Right 11/2/2020    Procedure: ORIF, FRACTURE, PATELLA;  Surgeon: Sage Wolf MD;  Location: [facility];  Service: Orthopedics;  Laterality: Right;    PLACEMENT OF ACELLULAR HUMAN DERMAL ALLOGRAFT Right 11/2/2020    Procedure: APPLICATION, ACELLULAR HUMAN DERMAL ALLOGRAFT;  Surgeon: Sage Wolf MD;  Location: [facility];  Service: Orthopedics;  Laterality: Right;  Right Patella       Time Tracking:     OT Date of Treatment: 11/05/20  OT Start Time: 0926  OT Stop Time: 0950  OT Total Time (min): 24 min    Billable Minutes:Evaluation 10 MINUTES  Therapeutic Activity 14 MINUTES    Gloria Rosado OT  11/5/2020

## 2020-11-05 NOTE — ASSESSMENT & PLAN NOTE
11/3:  --Non-weight bearing right leg, brace at all times, locked in extension  --Non-weight bearing left arm, gauntlet brace  --Ok to restart dvt ppx in am  --Plan to return to OR, likely 10/5 for ORIF left proximal humerus  11/5  --surgery today by Dr. Wolf

## 2020-11-06 DIAGNOSIS — M25.561 RIGHT KNEE PAIN, UNSPECIFIED CHRONICITY: Primary | ICD-10-CM

## 2020-11-06 LAB
ALBUMIN SERPL BCP-MCNC: 2 G/DL (ref 3.5–5.2)
ALP SERPL-CCNC: 84 U/L (ref 55–135)
ALT SERPL W/O P-5'-P-CCNC: 9 U/L (ref 10–44)
ANION GAP SERPL CALC-SCNC: 9 MMOL/L (ref 8–16)
AST SERPL-CCNC: 17 U/L (ref 10–40)
BASOPHILS # BLD AUTO: 0.02 K/UL (ref 0–0.2)
BASOPHILS NFR BLD: 0.3 % (ref 0–1.9)
BILIRUB SERPL-MCNC: 1.5 MG/DL (ref 0.1–1)
BUN SERPL-MCNC: 22 MG/DL (ref 8–23)
CALCIUM SERPL-MCNC: 8.8 MG/DL (ref 8.7–10.5)
CHLORIDE SERPL-SCNC: 102 MMOL/L (ref 95–110)
CO2 SERPL-SCNC: 25 MMOL/L (ref 23–29)
CREAT SERPL-MCNC: 0.8 MG/DL (ref 0.5–1.4)
DIFFERENTIAL METHOD: ABNORMAL
EOSINOPHIL # BLD AUTO: 0.2 K/UL (ref 0–0.5)
EOSINOPHIL NFR BLD: 2.8 % (ref 0–8)
ERYTHROCYTE [DISTWIDTH] IN BLOOD BY AUTOMATED COUNT: 14 % (ref 11.5–14.5)
EST. GFR  (AFRICAN AMERICAN): >60 ML/MIN/1.73 M^2
EST. GFR  (NON AFRICAN AMERICAN): >60 ML/MIN/1.73 M^2
GLUCOSE SERPL-MCNC: 177 MG/DL (ref 70–110)
HCT VFR BLD AUTO: 27.8 % (ref 37–48.5)
HGB BLD-MCNC: 8.6 G/DL (ref 12–16)
IMM GRANULOCYTES # BLD AUTO: 0.02 K/UL (ref 0–0.04)
IMM GRANULOCYTES NFR BLD AUTO: 0.3 % (ref 0–0.5)
LYMPHOCYTES # BLD AUTO: 1 K/UL (ref 1–4.8)
LYMPHOCYTES NFR BLD: 16 % (ref 18–48)
MCH RBC QN AUTO: 28.2 PG (ref 27–31)
MCHC RBC AUTO-ENTMCNC: 30.9 G/DL (ref 32–36)
MCV RBC AUTO: 91 FL (ref 82–98)
MONOCYTES # BLD AUTO: 0.6 K/UL (ref 0.3–1)
MONOCYTES NFR BLD: 9.5 % (ref 4–15)
NEUTROPHILS # BLD AUTO: 4.3 K/UL (ref 1.8–7.7)
NEUTROPHILS NFR BLD: 71.1 % (ref 38–73)
NRBC BLD-RTO: 0 /100 WBC
PLATELET # BLD AUTO: 148 K/UL (ref 150–350)
PMV BLD AUTO: 9.8 FL (ref 9.2–12.9)
POCT GLUCOSE: 178 MG/DL (ref 70–110)
POCT GLUCOSE: 222 MG/DL (ref 70–110)
POCT GLUCOSE: 280 MG/DL (ref 70–110)
POCT GLUCOSE: 338 MG/DL (ref 70–110)
POTASSIUM SERPL-SCNC: 4 MMOL/L (ref 3.5–5.1)
PROT SERPL-MCNC: 5.5 G/DL (ref 6–8.4)
RBC # BLD AUTO: 3.05 M/UL (ref 4–5.4)
SODIUM SERPL-SCNC: 136 MMOL/L (ref 136–145)
WBC # BLD AUTO: 6.08 K/UL (ref 3.9–12.7)

## 2020-11-06 PROCEDURE — 97530 THERAPEUTIC ACTIVITIES: CPT

## 2020-11-06 PROCEDURE — 25000003 PHARM REV CODE 250: Performed by: NURSE PRACTITIONER

## 2020-11-06 PROCEDURE — C9399 UNCLASSIFIED DRUGS OR BIOLOG: HCPCS | Performed by: NURSE PRACTITIONER

## 2020-11-06 PROCEDURE — 80053 COMPREHEN METABOLIC PANEL: CPT

## 2020-11-06 PROCEDURE — 85025 COMPLETE CBC W/AUTO DIFF WBC: CPT

## 2020-11-06 PROCEDURE — 36415 COLL VENOUS BLD VENIPUNCTURE: CPT

## 2020-11-06 PROCEDURE — 63600175 PHARM REV CODE 636 W HCPCS: Performed by: STUDENT IN AN ORGANIZED HEALTH CARE EDUCATION/TRAINING PROGRAM

## 2020-11-06 PROCEDURE — 99900035 HC TECH TIME PER 15 MIN (STAT)

## 2020-11-06 PROCEDURE — 63600175 PHARM REV CODE 636 W HCPCS: Performed by: NURSE PRACTITIONER

## 2020-11-06 PROCEDURE — 21400001 HC TELEMETRY ROOM

## 2020-11-06 PROCEDURE — 27000221 HC OXYGEN, UP TO 24 HOURS

## 2020-11-06 PROCEDURE — 97110 THERAPEUTIC EXERCISES: CPT

## 2020-11-06 RX ADMIN — MORPHINE SULFATE 4 MG: 4 INJECTION INTRAVENOUS at 04:11

## 2020-11-06 RX ADMIN — MORPHINE SULFATE 4 MG: 4 INJECTION INTRAVENOUS at 09:11

## 2020-11-06 RX ADMIN — PANTOPRAZOLE SODIUM 40 MG: 40 TABLET, DELAYED RELEASE ORAL at 09:11

## 2020-11-06 RX ADMIN — DIPHENHYDRAMINE HYDROCHLORIDE 25 MG: 25 CAPSULE ORAL at 09:11

## 2020-11-06 RX ADMIN — INSULIN DETEMIR 10 UNITS: 100 INJECTION, SOLUTION SUBCUTANEOUS at 09:11

## 2020-11-06 RX ADMIN — CEFAZOLIN SODIUM 1 G: 1 SOLUTION INTRAVENOUS at 11:11

## 2020-11-06 RX ADMIN — INSULIN ASPART 2 UNITS: 100 INJECTION, SOLUTION INTRAVENOUS; SUBCUTANEOUS at 05:11

## 2020-11-06 RX ADMIN — LEVOTHYROXINE SODIUM 175 MCG: 150 TABLET ORAL at 05:11

## 2020-11-06 RX ADMIN — INSULIN ASPART 4 UNITS: 100 INJECTION, SOLUTION INTRAVENOUS; SUBCUTANEOUS at 11:11

## 2020-11-06 RX ADMIN — HYDROCODONE BITARTRATE AND ACETAMINOPHEN 1 TABLET: 10; 325 TABLET ORAL at 09:11

## 2020-11-06 RX ADMIN — CEFAZOLIN SODIUM 1 G: 1 SOLUTION INTRAVENOUS at 04:11

## 2020-11-06 RX ADMIN — PRAVASTATIN SODIUM 20 MG: 20 TABLET ORAL at 09:11

## 2020-11-06 RX ADMIN — INSULIN ASPART 4 UNITS: 100 INJECTION, SOLUTION INTRAVENOUS; SUBCUTANEOUS at 09:11

## 2020-11-06 RX ADMIN — MORPHINE SULFATE 4 MG: 4 INJECTION INTRAVENOUS at 03:11

## 2020-11-06 RX ADMIN — INSULIN ASPART 6 UNITS: 100 INJECTION, SOLUTION INTRAVENOUS; SUBCUTANEOUS at 05:11

## 2020-11-06 RX ADMIN — AMLODIPINE BESYLATE 5 MG: 5 TABLET ORAL at 09:11

## 2020-11-06 RX ADMIN — CEFAZOLIN SODIUM 1 G: 1 SOLUTION INTRAVENOUS at 09:11

## 2020-11-06 NOTE — SUBJECTIVE & OBJECTIVE
Interval History: pt stable post procedure.  Pain reported at surgical site and controlled on prescribed regime.  CM to assist with discharge planning and Rehab placement.     Review of Systems   Constitutional: Negative for chills, diaphoresis, fatigue and fever.   HENT: Negative for congestion, rhinorrhea and sore throat.    Eyes: Negative for pain, discharge and itching.   Respiratory: Negative for cough, shortness of breath and wheezing.    Cardiovascular: Negative for chest pain, palpitations and leg swelling.   Gastrointestinal: Negative for abdominal distention, abdominal pain, diarrhea, nausea and vomiting.   Endocrine: Negative for cold intolerance and heat intolerance.   Genitourinary: Negative for difficulty urinating, dysuria and flank pain.   Musculoskeletal: Positive for arthralgias, gait problem and myalgias.   Skin: Negative for color change and wound.   Allergic/Immunologic: Negative.    Neurological: Negative for dizziness, syncope, speech difficulty, weakness and light-headedness.   Hematological: Negative.    Psychiatric/Behavioral: Negative for agitation, behavioral problems, confusion and sleep disturbance. The patient is not nervous/anxious.      Objective:     Vital Signs (Most Recent):  Temp: 97.9 °F (36.6 °C) (11/06/20 1220)  Pulse: 93 (11/06/20 1220)  Resp: 18 (11/06/20 1220)  BP: 134/62 (11/06/20 1220)  SpO2: (!) 94 % (11/06/20 1220) Vital Signs (24h Range):  Temp:  [96.5 °F (35.8 °C)-99.3 °F (37.4 °C)] 97.9 °F (36.6 °C)  Pulse:  [] 93  Resp:  [14-22] 18  SpO2:  [94 %-100 %] 94 %  BP: (109-168)/(51-74) 134/62     Weight: 123 kg (271 lb 2.7 oz)  Body mass index is 45.12 kg/m².    Intake/Output Summary (Last 24 hours) at 11/6/2020 1237  Last data filed at 11/6/2020 0930  Gross per 24 hour   Intake 1600 ml   Output 1500 ml   Net 100 ml      Physical Exam  Vitals signs and nursing note reviewed.   Constitutional:       Appearance: Normal appearance. She is well-developed. She is obese.    HENT:      Head: Normocephalic and atraumatic.      Nose: Nose normal.      Mouth/Throat:      Mouth: Mucous membranes are moist.      Pharynx: Oropharynx is clear.   Eyes:      General: No scleral icterus.     Extraocular Movements: Extraocular movements intact.      Conjunctiva/sclera: Conjunctivae normal.   Neck:      Musculoskeletal: Normal range of motion and neck supple. No muscular tenderness.   Cardiovascular:      Rate and Rhythm: Normal rate and regular rhythm.      Pulses: Normal pulses.      Heart sounds: Normal heart sounds. No murmur. No friction rub. No gallop.    Pulmonary:      Effort: Pulmonary effort is normal.      Breath sounds: Normal breath sounds.   Abdominal:      General: Bowel sounds are normal. There is no distension.      Palpations: Abdomen is soft.      Tenderness: There is no abdominal tenderness.   Genitourinary:     Comments: deferred  Musculoskeletal:         General: No tenderness.      Left wrist: She exhibits decreased range of motion, bony tenderness and deformity.      Right knee: She exhibits decreased range of motion and bony tenderness.      Left upper arm: She exhibits bony tenderness and deformity.      Comments: Bruising to anterior knee - knee immobilizer intact  No midline spinal tenderness.  Limited ROM to L shoulder with surgical dressing and sling in place.     Skin:     General: Skin is warm and dry.      Capillary Refill: Capillary refill takes 2 to 3 seconds.      Comments: ACE wrap to knee with immobilizer in place.  Surgical dressing to L shoulder and L wrist    Neurological:      General: No focal deficit present.      Mental Status: She is alert and oriented to person, place, and time.   Psychiatric:         Mood and Affect: Mood normal.         Behavior: Behavior normal.         Thought Content: Thought content normal.         Significant Labs:   BMP:   Recent Labs   Lab 11/06/20  0653   *      K 4.0      CO2 25   BUN 22   CREATININE 0.8    CALCIUM 8.8     CBC:   Recent Labs   Lab 11/05/20  0722 11/06/20  0653   WBC 5.13 6.08   HGB 9.0* 8.6*   HCT 28.6* 27.8*   * 148*       Significant Imaging:   Imaging Results          X-Ray Knee 1 or 2 View Right (Final result)  Result time 10/30/20 14:24:11    Final result by LAYAL Meredith Sr., MD (10/30/20 14:24:11)                 Impression:      1. There has been no significant interval change in the appearance of the complex fractures of the patella.  2. There is a small joint effusion in the right knee.  3. There is moderate narrowing of the joint space of the medial compartment of the knee.  4. There is a mild amount of atherosclerosis.      Electronically signed by: Michel Meredith MD  Date:    10/30/2020  Time:    14:24             Narrative:    EXAMINATION:  XR KNEE 1 OR 2 VIEW RIGHT    CLINICAL HISTORY:  knee pain; fractures of the patella    COMPARISON:  10/27/2020    FINDINGS:  There has been no significant interval change in the appearance of the complex fractures of the patella.  There is no dislocation.  There is moderate narrowing of the joint space of the medial compartment of the knee.  There is a mild amount of atherosclerosis.  There is a small joint effusion in the right knee.                               X-Ray Wrist Complete Left (Final result)  Result time 10/30/20 11:26:10    Final result by Kelby Saeed Jr., MD (10/30/20 11:26:10)                 Impression:      Acute distal radius and ulnar fractures.      Electronically signed by: Kelby Saeed Jr., MD  Date:    10/30/2020  Time:    11:26             Narrative:    EXAMINATION:  XR WRIST COMPLETE 3 VIEWS LEFT    CLINICAL HISTORY:  Pain in left wrist    TECHNIQUE:  PA, lateral, and oblique views of the left wrist were performed.    COMPARISON:  None    FINDINGS:  Osteopenia.  There is a distal radial impaction fracture with dorsal angulation of the distal fracture fragment.  Associated ulnar styloid fracture.  No definite  carpal fracture.                               X-Ray Shoulder Trauma Left (Final result)  Result time 10/30/20 11:27:08    Final result by Kelby Saeed Jr., MD (10/30/20 11:27:08)                 Impression:      Displaced and overriding fracture of the surgical neck of the left humerus.      Electronically signed by: Kelby Saeed Jr., MD  Date:    10/30/2020  Time:    11:27             Narrative:    EXAMINATION:  XR SHOULDER TRAUMA 3 VIEW LEFT    CLINICAL HISTORY:  Pain in left shoulder    TECHNIQUE:  Three views of the left shoulder were performed.    COMPARISON  None    FINDINGS:  There is an acute fracture through the surgical neck of the humerus.  The distal fracture fragment is overriding by 3 cm.  It is displaced medially.  No sign of clavicular or scapular fracture.                               X-Ray Elbow Complete Left (Final result)  Result time 10/30/20 11:24:38    Final result by Adrian Taylor MD (10/30/20 11:24:38)                 Impression:      Limited single lateral view of the left elbow as above.      Electronically signed by: Adrian Taylor MD  Date:    10/30/2020  Time:    11:24             Narrative:    EXAMINATION:  XR ELBOW COMPLETE 3 VIEW LEFT    CLINICAL HISTORY:  XR ELBOW COMPLETE 3 VIEW LEFTPain in left elbow    COMPARISON:  None    FINDINGS:  Single lateral views of the left elbow were obtained.    No definite acute fracture or dislocation.  Bony mineralization is normal.  Soft tissues are unremarkable.   No gross joint effusion.  Mild degenerative changes.

## 2020-11-06 NOTE — NURSING
Informed of OC about suspected HAPI from IV site updated lda flowsheet and will consult wound care, informed primary nurse ty as well    Will fill out a sos

## 2020-11-06 NOTE — OP NOTE
Patient Name: Lakshmi Campbell    Date of Surgery: 11/6/2020    Medical Record #: [id number]     Pre-operative Diagnosis:  Left displaced proximal humerus fracture    Post-operative Diagnosis:  Left displaced proximal humerus fracture    Procedure:  Left proximal humerus closed reduction with intramedullary fixation    Primary Surgeon: Sage Wolf MD    Implants Utilized:    Anjel Left 8x150 PHN Nail, 3 proximal interlocking screws, 2 distal interlocking screws    Anesthesia:  General endotracheal anesthetic with accompanying regional block.    Complications:  None    Estimated Blood Loss: 150 mL    Indications for Procedure:   Lakshmi Campbell is a 69 y.o. year old female who sustained a fall onto her left upper extremity, resulting in multiple fractures including a left proximal humerus fracture and left distal radius fracture.  The distal radius fracture has already been treated operatively with internal fixation.  She has a 2 part fracture of the proximal humerus.  Considering her polytrauma status, I recommend treatment of the left proximal humerus fracture with operative fixation.  I think would be appropriate for intramedullary nail.  This will give her a better opportunity to mobilize earlier.    Description of Procedure:  This patient was taken to the operating room and placed in a supine position on the operating table.  The patient was correctly identified upon entry and the operative site had been marked by the operating surgeon, and this was verified.  Intravenous antibiotics were administered prior to skin incision.  At this time general endotracheal anesthesia was initiated by the attending anesthesiologist and after successful induction of anesthetic, the patient was then positioned in the modified upright beach chair position.  The head was placed at neutral, all bony prominences were well padded, and the arm was supported in an articulated arm positioner.  After timeout was called the operative  extremity was correctly identified by the operating surgeon and was subsequently sterilely prepped and draped in the usual surgical fashion and the procedure was then commenced.    After the patient was positioned in the modified beach chair, C-arm fluoroscopy was used to assess the closed reduction.  Closed reduction maneuver was performed and verified under C-arm fluoroscopy.  Orthogonal views were taken, but we were somewhat limit due to body habitus.  Once adequate closed reduction was obtained incision was made laterally over the shoulder near the center of the acromion.  This was carried down sharply to the level of the deltoid and full-thickness skin flaps were raised.  The deltoid was then split in line with its fibers.  Self-retaining retractor was placed to move the deltoid out of the field of view.  The supraspinatus was then visualized and this was also incised sharply in line with its fibers.  At this point, a view of the greater tuberosity was obtained.  A K-wire was then positioned midway between the lateral edge of the greater tuberosity and the articular surface of the cartilage for the starting point for the intramedullary nail.  Orthogonal C-arm views were then used to verify appropriate position of the pin both in the mediolateral plane and anterior posterior plane.  Once appropriate position was obtained of the K-wire was advanced into the proximal fragment.    The the the awl was then slid over the K-wire to create a starting point for the nail and to give a more rigid tool for positioning of the guidewire down the canal of the humerus shaft.  The K-wire was then removed and the ball-tipped guide was positioned through our starting point and into the distal shaft.  Position was verified using C-arm fluoroscopy in both the AP and lateral planes.  With ball-tip guide in place, the entry Reamer was advanced through the proximal fragment.  The ball-tipped guide was then exchanged for a center wire  so that the nail could be slid down over it.  At this point the nail was advanced over the wire and into appropriate position to maintain fracture reduction.  Position of the nail and reduction of the fracture were verified with C-arm fluoroscopy.  With the nail appropriately positioned, the interlocking screws were placed starting with the superior screws.  Three superior and locking screws were placed using the targeting guide.  Length of the screws was verified using C-arm fluoroscopy in all planes to ensure that they were not through the articular surface.  With the proximal screws placed the distal interlocking screws were then placed also using targeting guide.  Again, linked the screws was verified under C-arm fluoroscopy a bicortical fixation was achieved with the distal interlocking screws.  Once all the screws were locked in place, final fluoroscopic images were taken to verify maintain reduction of the fracture and also position of the screws.  We were happy with reduction and position of the implant.  Copious irrigation was then performed with sterile saline.    After copious irrigation we proceeded with superficial closure.  A layered closure was carried out utilizing 2 Fiberwire sutures to re-approximate the supraspinatus incision followed by 0 and 2-0 Vicryl sutures in a layered fashion for the deltoid, skin, and a sub-cuticular 3-0 prolene suture for final closure.  Steri-strips were placed and a sterile dressing was placed subsequently.  The patient was then placed in an ultra-sling device and brought into the supine position where she was successfully awakened from anesthetic, transferred to a gurney, and taken in stable condition to the recovery room.      There were no intraoperative complications. Needles and sponge counts were correct. IV antibiotics were started and completed within 30 min of skin incision.    POSTOPERATIVE PLAN:    Left proximal humerus IMN  TRAY KEENEE in sling  Ok to be out of  sling for pendulums, elbow, wrist ROM  ASA 81mg BID x 2wks for DVT ppx  F/u 10-14 days for wound check/suture removal        Sage Wolf MD

## 2020-11-06 NOTE — ANESTHESIA POSTPROCEDURE EVALUATION
Anesthesia Post Evaluation    Patient: Lakshmi Campbell    Procedure(s) Performed: Procedure(s) (LRB):  ORIF, FRACTURE, HUMERUS (Left)    Final Anesthesia Type: general    Patient location during evaluation: PACU  Patient participation: Yes- Able to Participate  Level of consciousness: awake and alert  Post-procedure vital signs: reviewed and stable  Pain management: adequate  Airway patency: patent  TISHA mitigation strategies: Extubation while patient is awake  PONV status at discharge: No PONV  Anesthetic complications: no      Cardiovascular status: hemodynamically stable  Respiratory status: spontaneous ventilation  Hydration status: euvolemic  Follow-up not needed.          Vitals Value Taken Time   /74 11/06/20 0740   Temp 36.9 °C (98.4 °F) 11/06/20 0740   Pulse 100 11/06/20 0740   Resp 18 11/06/20 0740   SpO2 95 % 11/06/20 0740         Event Time   Out of Recovery 11/05/2020 21:01:31         Pain/Jordana Score: Pain Rating Prior to Med Admin: 8 (11/6/2020  4:21 AM)  Pain Rating Post Med Admin: 3 (11/6/2020  4:51 AM)  Jordana Score: 9 (11/5/2020  8:45 PM)

## 2020-11-06 NOTE — TRANSFER OF CARE
"Anesthesia Transfer of Care Note    Patient: Lakshmi Campbell    Procedure(s) Performed: Procedure(s) (LRB):  ORIF, FRACTURE, HUMERUS (Left)    Patient location: PACU    Anesthesia Type: general    Transport from OR: Transported from OR on room air with adequate spontaneous ventilation    Post pain: adequate analgesia    Post assessment: no apparent anesthetic complications    Post vital signs: stable    Level of consciousness: awake    Complications: none    Transfer of care protocol was followed      Last vitals:   Visit Vitals  BP (!) 111/54   Pulse 89   Temp 37.4 °C (99.3 °F) (Temporal)   Resp 17   Ht 5' 5" (1.651 m)   Wt 123 kg (271 lb 2.7 oz)   SpO2 100%   Breastfeeding No   BMI 45.12 kg/m²     "

## 2020-11-06 NOTE — ASSESSMENT & PLAN NOTE
--Was planned for surgery on 10/28/2020 with Dr. Wolf - was postponed due to thrombocytopenia  --Right leg in knee immobilzer  11/2:  --surgery with Dr. Wolf today  11/3:  --Non-weight bearing right leg, brace at all times, locked in extension  --Non-weight bearing left arm, gauntlet brace  --Ok to restart dvt ppx in am  --Plan to return to OR, likely 10/5 for ORIF left proximal humerus  11/6-- stable with dressing and knee immobilizer in place

## 2020-11-06 NOTE — PLAN OF CARE
Problem: Adult Inpatient Plan of Care  Goal: Plan of Care Review  Outcome: Ongoing, Progressing  Flowsheets (Taken 11/6/2020 0312)  Plan of Care Reviewed With: patient  Pt had no adverse events during shift. Pt free from falls. Call light in reach. SR's x2. Pain well controlled w/ PRN meds. Pt refusing to turn and waffle mattress. IVF/abx administered as ordered. VTE prophylaxis - SCD's in use. VSS. Surgical dressing CDI. Heart monitor in use (NSR. 80's - 90's). Chart reviewed. Will continue to monitor.

## 2020-11-06 NOTE — ASSESSMENT & PLAN NOTE
--Associated with ulnar styloid fracture  --Ortho consulted  --NPO after MN  --Prn analgesia  --PT/OT eval and treat after Ortho consult  11/2:  --surgery with Dr. Wolf today  11/3:  --Non-weight bearing right leg, brace at all times, locked in extension  --Non-weight bearing left arm, gauntlet brace  --Ok to restart dvt ppx in am  --Plan to return to OR, likely 10/5 for ORIF left proximal humerus  11/6:  --dressing and sling in place with analgesia as needed-

## 2020-11-06 NOTE — PT/OT/SLP PROGRESS
Physical Therapy  Treatment    Lakshmi Campbell   MRN: [id number]   Admitting Diagnosis: Closed fracture of surgical neck of left humerus    PT Received On: 11/06/20  PT Start Time: 0800     PT Stop Time: 0825    PT Total Time (min): 25 min       Billable Minutes:  Therapeutic Activity 15 and Therapeutic Exercise 10    Treatment Type: Treatment  PT/PTA: PT     PTA Visit Number: 0       General Precautions: Standard, fall  Orthopedic Precautions: LUE non weight bearing, RLE non weight bearing   Braces: Knee immobilizer         Subjective:  Communicated with NURSE TY AND Epic CHART REVIEW  prior to session.   PT AGREED TO TX     Pain/Comfort  Pain Rating 1: 7/10  Location - Side 1: Left  Location 1: arm  Pain Rating Post-Intervention 1: 7/10  Pain Rating 2: 7/10  Location - Side 2: Right  Location 2: leg  Pain Rating Post-Intervention 2: 7/10    Objective:   Patient found with: telemetry, peripheral IV, berrios catheter, knee immobilizer    Functional Mobility:  PT MET IN RM BRIDGED WITH L LE AND SCOOTED TO EOB AND SEATED WITH MAX A . PT WITH INC TIME TO COMPLETE TASK. PT SEATED EOB AND COMPLETED B LE TE 2X 15 REPS OF GLUT SETS, AP, AND L LE TKE FOR STRENGTHENING. PT ALSO COMPLETED L UE HAND AND ELBOW FLEX/EXT X 10 REPS. PT COMPLETED T/F TRAINING ACTIVITY 2X10 REPS OF ANT WT SHIFT FOR WB THROUGH L LE AND R UE TO INITIATE STAND WITH MAX A AND UNABLE TO CLEAR BOTTOM FROM BED. PT PROPPED SEATED EOB WITH PILLOWS AND LEFT FOR SITTING TOLERANCE WITH R LE ELEVATED AND ALL NEEDS MET     AM-PAC 6 CLICK MOBILITY  How much help from another person does this patient currently need?   1 = Unable, Total/Dependent Assistance  2 = A lot, Maximum/Moderate Assistance  3 = A little, Minimum/Contact Guard/Supervision  4 = None, Modified [place]/Independent    Turning over in bed (including adjusting bedclothes, sheets and blankets)?: 2  Sitting down on and standing up from a chair with arms (e.g., wheelchair, bedside commode, etc.):  1  Moving from lying on back to sitting on the side of the bed?: 2  Moving to and from a bed to a chair (including a wheelchair)?: 1  Need to walk in hospital room?: 1  Climbing 3-5 steps with a railing?: 1  Basic Mobility Total Score: 8    AM-PAC Raw Score CMS G-Code Modifier Level of Impairment Assistance   6 % Total / Unable   7 - 9 CM 80 - 100% Maximal Assist   10 - 14 CL 60 - 80% Moderate Assist   15 - 19 CK 40 - 60% Moderate Assist   20 - 22 CJ 20 - 40% Minimal Assist   23 CI 1-20% SBA / CGA   24 CH 0% Independent/ Mod I     Patient left SEATED EOB with all lines intact and call button in reach.    Assessment:  PT PROGRESSING WITH BED MOBILITY     Rehab identified problem list/impairments: Rehab identified problem list/impairments: weakness, impaired endurance, impaired self care skills, decreased lower extremity function, decreased ROM, impaired balance, gait instability, impaired functional mobilty, decreased coordination, pain, orthopedic precautions, decreased safety awareness    Rehab potential is good.    Activity tolerance: Fair    Discharge recommendations: Discharge Facility/Level of Care Needs: nursing facility, skilled     Barriers to discharge:      Equipment recommendations: Equipment Needed After Discharge: wheelchair     GOALS:   Multidisciplinary Problems     Physical Therapy Goals        Problem: Physical Therapy Goal    Goal Priority Disciplines Outcome Goal Variances Interventions   Physical Therapy Goal     PT, PT/OT Ongoing, Progressing     Description: PT WILL BE SEEN FOR P.T. FOR A MIN OF 5 OUT OF 7 DAYS A WEEK  LT20  1. PT WILL COMPLETE BED MOBILITY WITH MOD A  2. PT WILL T/F TO CHAIR WITH MAX A X 2 NWB L UE AND R LE.  3. PT WILL COMPLETE B LE TE X 10 REPS FOR STRENGTHENING.                    PLAN:    Patient to be seen to address the above listed problems via therapeutic exercises, therapeutic activities, wheelchair management/training  Plan of Care expires:  11/12/20  Plan of Care reviewed with: patient         Luciana Trevino, PT  11/06/2020

## 2020-11-06 NOTE — CONSULTS
Screening performed on this 69 year old female patient. PMHx of DM II, HTN, HPL, hypothyroidism and morbid obesity. She is awake and alert. Sacrum, coccyx, bilateral buttocks and heels intact with no redness noted.  Patient refuses waffle mattress and heel boots and is resistant to turning. Preventative foam dressings applied. Educated patient on skin breakdown risk and encouraged frequent repositioning. Please re consult if needed.

## 2020-11-06 NOTE — BRIEF OP NOTE
[facility] -   Brief Operative Note    SUMMARY     Surgery Date: 11/5/2020     Surgeon(s) and Role:     * Sage Wolf MD - Primary    Assisting Surgeon: None    Pre-op Diagnosis:  Closed 2-part displaced fracture of surgical neck of left humerus, initial encounter [S42.222A]    Post-op Diagnosis:  Post-Op Diagnosis Codes:     * Closed 2-part displaced fracture of surgical neck of left humerus, initial encounter [S42.222A]    Procedure(s) (LRB):  ORIF, FRACTURE, HUMERUS (Left)    Anesthesia: General    Description of Procedure: ORIF left proximal humerus fracture with intramedullary nail    Description of the findings of the procedure: displaced proximal humerus fracture, poor bone mineralization    Estimated Blood Loss: 150 mL    Estimated Blood Loss has been documented.         Specimens:   Specimen (12h ago, onward)    None        Plan:  NWB JASSIE  Ok to come out of sling for elbow and finger ROM  Restart DVT ppx POD#1  D/c berrios POD#1  Continue prophylactic abx 24hrs post-op  PT/OT for mobilization out of bed          Sage Wolf MD

## 2020-11-06 NOTE — ASSESSMENT & PLAN NOTE
11/3:  --Non-weight bearing right leg, brace at all times, locked in extension  --Non-weight bearing left arm, gauntlet brace  --Ok to restart dvt ppx in am  --Plan to return to OR, likely 10/5 for ORIF left proximal humerus  11/5  --surgery today by Dr. Wolf  11/6-H/H stable post procedure- sling in place- analgesia as needed

## 2020-11-06 NOTE — PROGRESS NOTES
[facility]  Progress Note    Patient Name: Lakshmi Campbell  MRN: [id number]  Patient Class: IP- Inpatient   Admission Date: 10/30/2020  Length of Stay: 6 days  Attending Physician: Connie Ochoa MD  Primary Care Provider: Karan Ribeiro DO        Subjective:     Principal Problem:Closed fracture of surgical neck of left humerus        HPI:  Pt is a 68 yo female with PMHx of DM II, HTN, HPL, hypothyroidism and morbid obesity who fractured her right patella 1 week ago and is wearing a knee immobilizer. Today, she had a second mishap when trying to get back in bed after using the bedside commode. Pt fell on her left side and immediately developed left shoulder and left wrist pain. She denies any presyncopal symptoms, fever, chest pain, gross neuro deficits, recent nausea/vomiting/diarrhea or urinary symptoms. Xrays found a left humerus fracture and left radius/ulnar fracture.   Vital signs stable and lab work largely normal except for thrombocytopenia which has improved from last level. Pt was placed on Observation for further evaluation by Orthopedics and possible need for surgical intervention.  On 11/6/2020, pt stable post procedure.  Social work consulted to assist with discharge planning and Rehab placement.      Overview/Hospital Course:  Patient was kept on OBS for closed fx of surgical neck of L humerus under the care of hospital medicine. Ortho surgery was consulted. 10/31: Patient asking for Dr. Wolf to be involved in her care. I explained that he is not on call today - sent him secure chat but he likely will not see it until Monday. Ordered diet as no surgical intervention today. Will discuss with Dr. King, who is on call tomorrow for ortho. 11/1: Discussed with Dr. King - who spoke with Dr. Wolf. Pt will have surgery performed by Dr. Wolf tomorrow afternoon. Will be NPO after MN and stop heparin at MN. 11/2: Patient scheduled for surgery this afternoon by  Dr. Wolf. She is currently NPO with spouse at bedside. Pain is controlled. 11/3: Patient had surgery to R knee and L wrist yesterday. PRN analgesia. Plans for L shoulder surgery on Thursday of this week. DVT profil restarted. PT/OT pending 11/4: patient to have L shoulder surgery tomorrow. She will dc early next week to rehab - Case management to start working on placement tomorrow. PT/OT to see her post op. Spoke with Dr. Wolf via telephone - he recommends weight based lovenox post op for DVT prophilaxis. Daughter at bedside, verbalized understanding of all. 11/5: Patient scheduled for L shoulder repair this afternoon. Currently NPO. Heparin held. PT/OT aware pt requesting rehab on dc. She sat on side of bed today for 90 minutes. Case management working on placement - likely Mon. Dr. Wolf requesting lovenox injections for DVT prophilactive as she will need weight based.        Interval History: pt stable post procedure.  Pain reported at surgical site and controlled on prescribed regime.  CM to assist with discharge planning and Rehab placement.     Review of Systems   Constitutional: Negative for chills, diaphoresis, fatigue and fever.   HENT: Negative for congestion, rhinorrhea and sore throat.    Eyes: Negative for pain, discharge and itching.   Respiratory: Negative for cough, shortness of breath and wheezing.    Cardiovascular: Negative for chest pain, palpitations and leg swelling.   Gastrointestinal: Negative for abdominal distention, abdominal pain, diarrhea, nausea and vomiting.   Endocrine: Negative for cold intolerance and heat intolerance.   Genitourinary: Negative for difficulty urinating, dysuria and flank pain.   Musculoskeletal: Positive for arthralgias, gait problem and myalgias.   Skin: Negative for color change and wound.   Allergic/Immunologic: Negative.    Neurological: Negative for dizziness, syncope, speech difficulty, weakness and light-headedness.   Hematological: Negative.     Psychiatric/Behavioral: Negative for agitation, behavioral problems, confusion and sleep disturbance. The patient is not nervous/anxious.      Objective:     Vital Signs (Most Recent):  Temp: 97.9 °F (36.6 °C) (11/06/20 1220)  Pulse: 93 (11/06/20 1220)  Resp: 18 (11/06/20 1220)  BP: 134/62 (11/06/20 1220)  SpO2: (!) 94 % (11/06/20 1220) Vital Signs (24h Range):  Temp:  [96.5 °F (35.8 °C)-99.3 °F (37.4 °C)] 97.9 °F (36.6 °C)  Pulse:  [] 93  Resp:  [14-22] 18  SpO2:  [94 %-100 %] 94 %  BP: (109-168)/(51-74) 134/62     Weight: 123 kg (271 lb 2.7 oz)  Body mass index is 45.12 kg/m².    Intake/Output Summary (Last 24 hours) at 11/6/2020 1237  Last data filed at 11/6/2020 0930  Gross per 24 hour   Intake 1600 ml   Output 1500 ml   Net 100 ml      Physical Exam  Vitals signs and nursing note reviewed.   Constitutional:       Appearance: Normal appearance. She is well-developed. She is obese.   HENT:      Head: Normocephalic and atraumatic.      Nose: Nose normal.      Mouth/Throat:      Mouth: Mucous membranes are moist.      Pharynx: Oropharynx is clear.   Eyes:      General: No scleral icterus.     Extraocular Movements: Extraocular movements intact.      Conjunctiva/sclera: Conjunctivae normal.   Neck:      Musculoskeletal: Normal range of motion and neck supple. No muscular tenderness.   Cardiovascular:      Rate and Rhythm: Normal rate and regular rhythm.      Pulses: Normal pulses.      Heart sounds: Normal heart sounds. No murmur. No friction rub. No gallop.    Pulmonary:      Effort: Pulmonary effort is normal.      Breath sounds: Normal breath sounds.   Abdominal:      General: Bowel sounds are normal. There is no distension.      Palpations: Abdomen is soft.      Tenderness: There is no abdominal tenderness.   Genitourinary:     Comments: deferred  Musculoskeletal:         General: No tenderness.      Left wrist: She exhibits decreased range of motion, bony tenderness and deformity.      Right knee: She  exhibits decreased range of motion and bony tenderness.      Left upper arm: She exhibits bony tenderness and deformity.      Comments: Bruising to anterior knee - knee immobilizer intact  No midline spinal tenderness.  Limited ROM to L shoulder with surgical dressing and sling in place.     Skin:     General: Skin is warm and dry.      Capillary Refill: Capillary refill takes 2 to 3 seconds.      Comments: ACE wrap to knee with immobilizer in place.  Surgical dressing to L shoulder and L wrist    Neurological:      General: No focal deficit present.      Mental Status: She is alert and oriented to person, place, and time.   Psychiatric:         Mood and Affect: Mood normal.         Behavior: Behavior normal.         Thought Content: Thought content normal.         Significant Labs:   BMP:   Recent Labs   Lab 11/06/20  0653   *      K 4.0      CO2 25   BUN 22   CREATININE 0.8   CALCIUM 8.8     CBC:   Recent Labs   Lab 11/05/20  0722 11/06/20  0653   WBC 5.13 6.08   HGB 9.0* 8.6*   HCT 28.6* 27.8*   * 148*       Significant Imaging:   Imaging Results          X-Ray Knee 1 or 2 View Right (Final result)  Result time 10/30/20 14:24:11    Final result by LAYLA Meredith Sr., MD (10/30/20 14:24:11)                 Impression:      1. There has been no significant interval change in the appearance of the complex fractures of the patella.  2. There is a small joint effusion in the right knee.  3. There is moderate narrowing of the joint space of the medial compartment of the knee.  4. There is a mild amount of atherosclerosis.      Electronically signed by: Michel Meredith MD  Date:    10/30/2020  Time:    14:24             Narrative:    EXAMINATION:  XR KNEE 1 OR 2 VIEW RIGHT    CLINICAL HISTORY:  knee pain; fractures of the patella    COMPARISON:  10/27/2020    FINDINGS:  There has been no significant interval change in the appearance of the complex fractures of the patella.  There is no  dislocation.  There is moderate narrowing of the joint space of the medial compartment of the knee.  There is a mild amount of atherosclerosis.  There is a small joint effusion in the right knee.                               X-Ray Wrist Complete Left (Final result)  Result time 10/30/20 11:26:10    Final result by Kelby Saeed Jr., MD (10/30/20 11:26:10)                 Impression:      Acute distal radius and ulnar fractures.      Electronically signed by: Kelby Saeed Jr., MD  Date:    10/30/2020  Time:    11:26             Narrative:    EXAMINATION:  XR WRIST COMPLETE 3 VIEWS LEFT    CLINICAL HISTORY:  Pain in left wrist    TECHNIQUE:  PA, lateral, and oblique views of the left wrist were performed.    COMPARISON:  None    FINDINGS:  Osteopenia.  There is a distal radial impaction fracture with dorsal angulation of the distal fracture fragment.  Associated ulnar styloid fracture.  No definite carpal fracture.                               X-Ray Shoulder Trauma Left (Final result)  Result time 10/30/20 11:27:08    Final result by Kelby Saeed Jr., MD (10/30/20 11:27:08)                 Impression:      Displaced and overriding fracture of the surgical neck of the left humerus.      Electronically signed by: Kelby Saeed Jr., MD  Date:    10/30/2020  Time:    11:27             Narrative:    EXAMINATION:  XR SHOULDER TRAUMA 3 VIEW LEFT    CLINICAL HISTORY:  Pain in left shoulder    TECHNIQUE:  Three views of the left shoulder were performed.    COMPARISON  None    FINDINGS:  There is an acute fracture through the surgical neck of the humerus.  The distal fracture fragment is overriding by 3 cm.  It is displaced medially.  No sign of clavicular or scapular fracture.                               X-Ray Elbow Complete Left (Final result)  Result time 10/30/20 11:24:38    Final result by Adrian Taylor MD (10/30/20 11:24:38)                 Impression:      Limited single lateral view of the left elbow as  above.      Electronically signed by: Adrian Taylor MD  Date:    10/30/2020  Time:    11:24             Narrative:    EXAMINATION:  XR ELBOW COMPLETE 3 VIEW LEFT    CLINICAL HISTORY:  XR ELBOW COMPLETE 3 VIEW LEFTPain in left elbow    COMPARISON:  None    FINDINGS:  Single lateral views of the left elbow were obtained.    No definite acute fracture or dislocation.  Bony mineralization is normal.  Soft tissues are unremarkable.   No gross joint effusion.  Mild degenerative changes.                                Assessment/Plan:      * Closed fracture of surgical neck of left humerus  --ortho surgery consulted (Dr. Montesinos)  --no surgery as new ortho on call tomorrow  --PRN analgesia  11/2:  --surgery with Dr. Wolf today  11/3:  --Non-weight bearing right leg, brace at all times, locked in extension  --Non-weight bearing left arm, gauntlet brace  --Ok to restart dvt ppx in am  --Plan to return to OR, likely 11/5 for ORIF left proximal humerus  11/6: Surgical dressing intact with sling in place         Shoulder pain, left  11/3:  --Non-weight bearing right leg, brace at all times, locked in extension  --Non-weight bearing left arm, gauntlet brace  --Ok to restart dvt ppx in am  --Plan to return to OR, likely 10/5 for ORIF left proximal humerus  11/5  --surgery today by Dr. Wolf  11/6-H/H stable post procedure- sling in place- analgesia as needed       Closed left radial fracture  --Associated with ulnar styloid fracture  --Ortho consulted  --NPO after MN  --Prn analgesia  --PT/OT eval and treat after Ortho consult  11/2:  --surgery with Dr. Wolf today  11/3:  --Non-weight bearing right leg, brace at all times, locked in extension  --Non-weight bearing left arm, gauntlet brace  --Ok to restart dvt ppx in am  --Plan to return to OR, likely 10/5 for ORIF left proximal humerus  11/6:  --dressing and sling in place with analgesia as needed-       Closed displaced comminuted fracture of right patella  --Was  planned for surgery on 10/28/2020 with Dr. Wolf - was postponed due to thrombocytopenia  --Right leg in knee immobilzer  11/2:  --surgery with Dr. Wolf today  11/3:  --Non-weight bearing right leg, brace at all times, locked in extension  --Non-weight bearing left arm, gauntlet brace  --Ok to restart dvt ppx in am  --Plan to return to OR, likely 10/5 for ORIF left proximal humerus  11/6-- stable with dressing and knee immobilizer in place    Hyperlipidemia  --continue statin  --cardiac diet      Essential hypertension  --continue amlodipine  --cardiac diet      Hypothyroidism  Continue levothyroxine    Type 2 diabetes mellitus with microalbuminuria, with long-term current use of insulin  --accuchecks and SSI  --diabetic diet  --continue novolin at lower dose  --last HA1C 5.8            VTE Risk Mitigation (From admission, onward)         Ordered     Place sequential compression device  Until discontinued      10/30/20 5102                Discharge Planning   KYA:      Code Status: Not on file   Is the patient medically ready for discharge?:     Reason for patient still in hospital (select all that apply): Awaiting Rehab placement   Discharge Plan A: Rehab                  Ceci Pelaez NP  Department of Hospital Medicine   [facility]

## 2020-11-06 NOTE — PHYSICIAN QUERY
PT Name: Lakshmi Campbell  MR #: [id number]    HEMATOLOGY CLARIFICATION      CDS/: Akiko Oviedo RN               Contact information:[email or web address]    This form is a permanent document in the medical record.      Query Date: November 6, 2020    By submitting this query, we are merely seeking further clarification of documentation. Please utilize your independent clinical judgment when addressing the question(s) below.    The Medical Record contains the following:   Indicators  Supporting Clinical Findings Location in Medical Record   x Anemia documented Anemia Anesthesia record      x H&H H&H=  12.7/37.8  H&H=  11.6/35.4  H&H=    9.7/30.5  H&H=    9.0/28.6  H&H=    8.6/27.8  H&H=    8.3/26.1 Labs 10/27  Labs 10/30  Labs 11/2  Labs 11/5  Labs 11/6  Labs 11/7   x BP                    HR BP: (138-198)/(64-78)  Pulse:  [70-86]  BP  169/72  HR 94  BP  109/52  HR 95  BP  123/58  HR 95 [facility] 11/1  Vital signs 11/4@0350  Vital signs 11/5@2030  Vital signs 11/6@0422    GI bleeding documented      Acute bleeding (Non GI site)      Transfusion(s)     x Acute/Chronic illness DM2 with microalbuminuria, HTN, hypothyroidism, morbid obesity [facility] 11/1    Treatments     x Other -Left displaced proximal humerus fracture  -Left proximal humerus closed reduction with intramedullary fixation  -Estimated Blood Loss: 150 mL    1.  Comminuted right patella fracture  2.  Comminuted left distal radius and distal ulna fracture  3.  Transverse left proximal humerus fracture  Procedure:    1.  Open reduction internal fixation of right patella fracture  2.  Open reduction internal fixation of left distal radius fracture  Estimated Blood Loss: 150 mL OP note 11/5          OP note 11/2               Provider, please specify diagnosis or diagnoses associated with above clinical findings.   [   ] Acute blood loss anemia    [ x ] Acute blood loss anemia expected post-operatively    [   ] Other  Hematological Diagnosis (please specify): _________________   [   ] Clinically Undetermined            Please document in your progress notes daily for the duration of treatment, until resolved, and include in your discharge summary.    Form No. [id number]

## 2020-11-06 NOTE — PLAN OF CARE
Kathleen spoke with pt to obtain rehab choice. Pt [facility]. Kathleen sent referrals.     [facility] stated pt will benefit from snf, being that she in nwb on ue and le.    Kathleen spoke with Yumi Liaison with [facility], she verbalized concerns about pt being nwb and didn't know if she would be about to tolerate rehab. Yumi stated she will assess pt this afternoon. Kathleen met with pt at bedside. Pt reported to kathleen that she wasn't able to do a lot with therapy. Kathleen spoke about skilled nursing options. Kathleen informed Yumi that pt will probably not be able to tolerate rehab. Yumi agreed. Kathleen will work on snf placements.      11/06/20 1611   Post-Acute Status   Post-Acute Authorization Placement   Post-Acute Placement Status Referrals Sent   Discharge Plan   Discharge Plan A Rehab

## 2020-11-06 NOTE — ASSESSMENT & PLAN NOTE
--ortho surgery consulted (Dr. Montesinos)  --no surgery as new ortho on call tomorrow  --PRN analgesia  11/2:  --surgery with Dr. Wolf today  11/3:  --Non-weight bearing right leg, brace at all times, locked in extension  --Non-weight bearing left arm, gauntlet brace  --Ok to restart dvt ppx in am  --Plan to return to OR, likely 11/5 for ORIF left proximal humerus  11/6: Surgical dressing intact with sling in place

## 2020-11-07 LAB
ALBUMIN SERPL BCP-MCNC: 1.9 G/DL (ref 3.5–5.2)
ALP SERPL-CCNC: 100 U/L (ref 55–135)
ALT SERPL W/O P-5'-P-CCNC: 8 U/L (ref 10–44)
ANION GAP SERPL CALC-SCNC: 5 MMOL/L (ref 8–16)
AST SERPL-CCNC: 16 U/L (ref 10–40)
BASOPHILS # BLD AUTO: 0.03 K/UL (ref 0–0.2)
BASOPHILS NFR BLD: 0.6 % (ref 0–1.9)
BILIRUB SERPL-MCNC: 1.1 MG/DL (ref 0.1–1)
BUN SERPL-MCNC: 17 MG/DL (ref 8–23)
CALCIUM SERPL-MCNC: 8.4 MG/DL (ref 8.7–10.5)
CHLORIDE SERPL-SCNC: 101 MMOL/L (ref 95–110)
CO2 SERPL-SCNC: 26 MMOL/L (ref 23–29)
CREAT SERPL-MCNC: 0.8 MG/DL (ref 0.5–1.4)
DIFFERENTIAL METHOD: ABNORMAL
EOSINOPHIL # BLD AUTO: 0.2 K/UL (ref 0–0.5)
EOSINOPHIL NFR BLD: 3.4 % (ref 0–8)
ERYTHROCYTE [DISTWIDTH] IN BLOOD BY AUTOMATED COUNT: 14 % (ref 11.5–14.5)
EST. GFR  (AFRICAN AMERICAN): >60 ML/MIN/1.73 M^2
EST. GFR  (NON AFRICAN AMERICAN): >60 ML/MIN/1.73 M^2
GLUCOSE SERPL-MCNC: 205 MG/DL (ref 70–110)
HCT VFR BLD AUTO: 26.1 % (ref 37–48.5)
HGB BLD-MCNC: 8.3 G/DL (ref 12–16)
IMM GRANULOCYTES # BLD AUTO: 0.03 K/UL (ref 0–0.04)
IMM GRANULOCYTES NFR BLD AUTO: 0.6 % (ref 0–0.5)
LYMPHOCYTES # BLD AUTO: 0.9 K/UL (ref 1–4.8)
LYMPHOCYTES NFR BLD: 17.2 % (ref 18–48)
MCH RBC QN AUTO: 28.5 PG (ref 27–31)
MCHC RBC AUTO-ENTMCNC: 31.8 G/DL (ref 32–36)
MCV RBC AUTO: 90 FL (ref 82–98)
MONOCYTES # BLD AUTO: 0.6 K/UL (ref 0.3–1)
MONOCYTES NFR BLD: 11.8 % (ref 4–15)
NEUTROPHILS # BLD AUTO: 3.3 K/UL (ref 1.8–7.7)
NEUTROPHILS NFR BLD: 66.4 % (ref 38–73)
NRBC BLD-RTO: 0 /100 WBC
PLATELET # BLD AUTO: 135 K/UL (ref 150–350)
PMV BLD AUTO: 9.4 FL (ref 9.2–12.9)
POCT GLUCOSE: 209 MG/DL (ref 70–110)
POCT GLUCOSE: 225 MG/DL (ref 70–110)
POCT GLUCOSE: 242 MG/DL (ref 70–110)
POCT GLUCOSE: 242 MG/DL (ref 70–110)
POTASSIUM SERPL-SCNC: 3.8 MMOL/L (ref 3.5–5.1)
PROT SERPL-MCNC: 5.4 G/DL (ref 6–8.4)
RBC # BLD AUTO: 2.91 M/UL (ref 4–5.4)
SODIUM SERPL-SCNC: 132 MMOL/L (ref 136–145)
WBC # BLD AUTO: 5.01 K/UL (ref 3.9–12.7)

## 2020-11-07 PROCEDURE — 63600175 PHARM REV CODE 636 W HCPCS: Performed by: NURSE PRACTITIONER

## 2020-11-07 PROCEDURE — 25000003 PHARM REV CODE 250: Performed by: NURSE PRACTITIONER

## 2020-11-07 PROCEDURE — 94760 N-INVAS EAR/PLS OXIMETRY 1: CPT

## 2020-11-07 PROCEDURE — 36415 COLL VENOUS BLD VENIPUNCTURE: CPT

## 2020-11-07 PROCEDURE — 97530 THERAPEUTIC ACTIVITIES: CPT

## 2020-11-07 PROCEDURE — 63600175 PHARM REV CODE 636 W HCPCS: Performed by: STUDENT IN AN ORGANIZED HEALTH CARE EDUCATION/TRAINING PROGRAM

## 2020-11-07 PROCEDURE — 80053 COMPREHEN METABOLIC PANEL: CPT

## 2020-11-07 PROCEDURE — 96372 THER/PROPH/DIAG INJ SC/IM: CPT

## 2020-11-07 PROCEDURE — 85025 COMPLETE CBC W/AUTO DIFF WBC: CPT

## 2020-11-07 PROCEDURE — 21400001 HC TELEMETRY ROOM

## 2020-11-07 RX ORDER — ENOXAPARIN SODIUM 100 MG/ML
40 INJECTION SUBCUTANEOUS EVERY 24 HOURS
Status: DISCONTINUED | OUTPATIENT
Start: 2020-11-07 | End: 2020-11-11 | Stop reason: HOSPADM

## 2020-11-07 RX ADMIN — MORPHINE SULFATE 4 MG: 4 INJECTION INTRAVENOUS at 07:11

## 2020-11-07 RX ADMIN — LEVOTHYROXINE SODIUM 175 MCG: 150 TABLET ORAL at 05:11

## 2020-11-07 RX ADMIN — ENOXAPARIN SODIUM 40 MG: 40 INJECTION SUBCUTANEOUS at 04:11

## 2020-11-07 RX ADMIN — PRAVASTATIN SODIUM 20 MG: 20 TABLET ORAL at 08:11

## 2020-11-07 RX ADMIN — HYDRALAZINE HYDROCHLORIDE 25 MG: 25 TABLET, FILM COATED ORAL at 09:11

## 2020-11-07 RX ADMIN — INSULIN ASPART 2 UNITS: 100 INJECTION, SOLUTION INTRAVENOUS; SUBCUTANEOUS at 09:11

## 2020-11-07 RX ADMIN — INSULIN DETEMIR 10 UNITS: 100 INJECTION, SOLUTION SUBCUTANEOUS at 08:11

## 2020-11-07 RX ADMIN — MORPHINE SULFATE 4 MG: 4 INJECTION INTRAVENOUS at 09:11

## 2020-11-07 RX ADMIN — INSULIN ASPART 4 UNITS: 100 INJECTION, SOLUTION INTRAVENOUS; SUBCUTANEOUS at 05:11

## 2020-11-07 RX ADMIN — DIPHENHYDRAMINE HYDROCHLORIDE 25 MG: 25 CAPSULE ORAL at 07:11

## 2020-11-07 RX ADMIN — INSULIN ASPART 4 UNITS: 100 INJECTION, SOLUTION INTRAVENOUS; SUBCUTANEOUS at 04:11

## 2020-11-07 RX ADMIN — AMLODIPINE BESYLATE 5 MG: 5 TABLET ORAL at 08:11

## 2020-11-07 RX ADMIN — INSULIN ASPART 4 UNITS: 100 INJECTION, SOLUTION INTRAVENOUS; SUBCUTANEOUS at 12:11

## 2020-11-07 RX ADMIN — CEFAZOLIN SODIUM 1 G: 1 SOLUTION INTRAVENOUS at 04:11

## 2020-11-07 RX ADMIN — CEFAZOLIN SODIUM 1 G: 1 SOLUTION INTRAVENOUS at 08:11

## 2020-11-07 RX ADMIN — PANTOPRAZOLE SODIUM 40 MG: 40 TABLET, DELAYED RELEASE ORAL at 08:11

## 2020-11-07 NOTE — ASSESSMENT & PLAN NOTE
--ortho surgery consulted (Dr. Montesinos)  --no surgery as new ortho on call tomorrow  --PRN analgesia  11/2:  --surgery with Dr. Wolf today  11/3:  --Non-weight bearing right leg, brace at all times, locked in extension  --Non-weight bearing left arm, gauntlet brace  --Ok to restart dvt ppx in am  --Plan to return to OR, likely 11/5 for ORIF left proximal humerus  11/6: Surgical dressing intact with sling in place   11/7: Stable

## 2020-11-07 NOTE — PLAN OF CARE
Recommendations     Recommendation:   1. Continue with Diabetic & Cardiac diet restrictions for BG management/wound healing.   2. RD to follow up.      Goals: 1. Po intake continued at >75%  Nutrition Goal Status: new  Communication of RD Recs: (Plan of Care)    Bri Christian RD, LDN

## 2020-11-07 NOTE — PLAN OF CARE
Pt in bed and agreeable to PT. Pt was transferred from supine to sitting and pt performed seated exercises in all available planes. Pt left sitting with all needs with in reach and  present.

## 2020-11-07 NOTE — ASSESSMENT & PLAN NOTE
11/3:  --Non-weight bearing right leg, brace at all times, locked in extension  --Non-weight bearing left arm, gauntlet brace  --Ok to restart dvt ppx in am  --Plan to return to OR, likely 10/5 for ORIF left proximal humerus  11/5  --surgery today by Dr. Wolf  11/6-H/H stable post procedure- sling in place- analgesia as needed   11/7- stable

## 2020-11-07 NOTE — PT/OT/SLP PROGRESS
Physical Therapy  Treatment    Lakshmi Campbell   MRN: [id number]   Admitting Diagnosis: Closed fracture of surgical neck of left humerus    PT Received On: 11/07/20  PT Start Time: 1000     PT Stop Time: 1015    PT Total Time (min): 15 min       Billable Minutes:  Therapeutic Activity 10 and Therapeutic Exercise 5    Treatment Type: Treatment  PT/PTA: PTA     PTA Visit Number: 1       General Precautions: Standard, fall  Orthopedic Precautions: LUE non weight bearing, RLE non weight bearing   Braces: Knee immobilizer, UE Sling         Subjective:  Communicated with epic and nurse Crowley prior to session.      Pain/Comfort  Pain Rating 1: 0/10  Pain Rating Post-Intervention 1: 0/10    Objective:   Patient found with: oxygen, peripheral IV    Functional Mobility:  Bed Mobility:        Transfers:       Gait:        Stairs:          Balance:   Static Sit: FAIR: Maintains without assist, but unable to take any challenges   Dynamic Sit: FAIR+: Maintains balance through MINIMAL excursions of active trunk motion  Static Stand:   Dynamic stand:        Therapeutic Activities and Exercises:  Pt transferred to sitting EOB and performed seated exercises.      AM-PAC 6 CLICK MOBILITY  How much help from another person does this patient currently need?   1 = Unable, Total/Dependent Assistance  2 = A lot, Maximum/Moderate Assistance  3 = A little, Minimum/Contact Guard/Supervision  4 = None, Modified [place]/Independent    Turning over in bed (including adjusting bedclothes, sheets and blankets)?: 2  Sitting down on and standing up from a chair with arms (e.g., wheelchair, bedside commode, etc.): 1(NT)  Moving from lying on back to sitting on the side of the bed?: 2  Moving to and from a bed to a chair (including a wheelchair)?: 1(NT)  Need to walk in hospital room?: 1(NT)  Climbing 3-5 steps with a railing?: 1(NT)  Basic Mobility Total Score: 8    AM-PAC Raw Score CMS G-Code Modifier Level of Impairment Assistance   6 %  Total / Unable   7 - 9 CM 80 - 100% Maximal Assist   10 - 14 CL 60 - 80% Moderate Assist   15 - 19 CK 40 - 60% Moderate Assist   20 - 22 CJ 20 - 40% Minimal Assist   23 CI 1-20% SBA / CGA   24 CH 0% Independent/ Mod I     Patient left sitting EOB with all lines intact, call button in reach and  present.    Assessment:  Lakshmi Campbell is a 69 y.o. female with a medical diagnosis of Closed fracture of surgical neck of left humerus and presents with decreased mobility.    Rehab identified problem list/impairments: Rehab identified problem list/impairments: weakness, impaired endurance, impaired self care skills, impaired functional mobilty, impaired balance, gait instability, decreased upper extremity function, decreased lower extremity function, decreased safety awareness    Rehab potential is good.    Activity tolerance: Good    Discharge recommendations: Discharge Facility/Level of Care Needs: nursing facility, skilled, rehabilitation facility     Barriers to discharge:      Equipment recommendations:       GOALS:   Multidisciplinary Problems     Physical Therapy Goals        Problem: Physical Therapy Goal    Goal Priority Disciplines Outcome Goal Variances Interventions   Physical Therapy Goal     PT, PT/OT Ongoing, Progressing     Description: PT WILL BE SEEN FOR P.T. FOR A MIN OF 5 OUT OF 7 DAYS A WEEK  LT20  1. PT WILL COMPLETE BED MOBILITY WITH MOD A  2. PT WILL T/F TO CHAIR WITH MAX A X 2 NWB L UE AND R LE.  3. PT WILL COMPLETE B LE TE X 10 REPS FOR STRENGTHENING.                    PLAN:    Patient to be seen 5 x/week  to address the above listed problems via therapeutic exercises, therapeutic activities, wheelchair management/training  Plan of Care expires: 20  Plan of Care reviewed with: patient, spouse         Adrian Bib, PT  2020

## 2020-11-07 NOTE — ASSESSMENT & PLAN NOTE
--Associated with ulnar styloid fracture  --Ortho consulted  --NPO after MN  --Prn analgesia  --PT/OT eval and treat after Ortho consult  11/2:  --surgery with Dr. Wolf today  11/3:  --Non-weight bearing right leg, brace at all times, locked in extension  --Non-weight bearing left arm, gauntlet brace  --Ok to restart dvt ppx in am  --Plan to return to OR, likely 10/5 for ORIF left proximal humerus  11/6:  --dressing and sling in place with analgesia as needed-   11/7- stable-

## 2020-11-07 NOTE — PLAN OF CARE
VSS. Incision to RUE, CDI. Incision to RLE, CDI. Pt has refused to turn all day. Cardiac monitoring running NSR. Glucose monitoring ACHS. IV antibiotics. Pain control with pain meds PRN. Call light in reach. 12 hour chart check. Will continue to monitor.

## 2020-11-07 NOTE — SUBJECTIVE & OBJECTIVE
Interval History: pt stable and sitting up to side of bed during exam.      Review of Systems   Constitutional: Negative for chills, diaphoresis, fatigue and fever.   HENT: Negative for congestion, rhinorrhea and sore throat.    Eyes: Negative for pain, discharge and itching.   Respiratory: Negative for cough, shortness of breath and wheezing.    Cardiovascular: Negative for chest pain, palpitations and leg swelling.   Gastrointestinal: Negative for abdominal distention, abdominal pain, diarrhea, nausea and vomiting.   Endocrine: Negative for cold intolerance and heat intolerance.   Genitourinary: Negative for difficulty urinating, dysuria and flank pain.   Musculoskeletal: Positive for arthralgias, gait problem and myalgias.   Skin: Negative for color change and wound.   Allergic/Immunologic: Negative.    Neurological: Negative for dizziness, syncope, speech difficulty, weakness and light-headedness.   Hematological: Negative.    Psychiatric/Behavioral: Negative for agitation, behavioral problems, confusion and sleep disturbance. The patient is not nervous/anxious.      Objective:     Vital Signs (Most Recent):  Temp: 98.6 °F (37 °C) (11/07/20 1143)  Pulse: 91 (11/07/20 1143)  Resp: 16 (11/07/20 1143)  BP: (!) 168/69 (11/07/20 1143)  SpO2: (!) 92 % (11/07/20 1143) Vital Signs (24h Range):  Temp:  [97.3 °F (36.3 °C)-99.3 °F (37.4 °C)] 98.6 °F (37 °C)  Pulse:  [80-93] 91  Resp:  [16-19] 16  SpO2:  [90 %-100 %] 92 %  BP: (126-168)/(53-70) 168/69     Weight: 123 kg (271 lb 2.7 oz)  Body mass index is 45.12 kg/m².    Intake/Output Summary (Last 24 hours) at 11/7/2020 1208  Last data filed at 11/7/2020 0852  Gross per 24 hour   Intake 1170 ml   Output 150 ml   Net 1020 ml      Physical Exam  Vitals signs and nursing note reviewed.   Constitutional:       Appearance: Normal appearance. She is well-developed. She is obese.   HENT:      Head: Normocephalic and atraumatic.      Nose: Nose normal.      Mouth/Throat:       Mouth: Mucous membranes are moist.      Pharynx: Oropharynx is clear.   Eyes:      General: No scleral icterus.     Extraocular Movements: Extraocular movements intact.      Conjunctiva/sclera: Conjunctivae normal.   Neck:      Musculoskeletal: Normal range of motion and neck supple. No muscular tenderness.   Cardiovascular:      Rate and Rhythm: Normal rate and regular rhythm.      Pulses: Normal pulses.      Heart sounds: Normal heart sounds. No murmur. No friction rub. No gallop.    Pulmonary:      Effort: Pulmonary effort is normal.      Breath sounds: Examination of the right-lower field reveals decreased breath sounds. Examination of the left-lower field reveals decreased breath sounds. Decreased breath sounds present.   Abdominal:      General: Bowel sounds are normal. There is no distension.      Palpations: Abdomen is soft.      Tenderness: There is no abdominal tenderness.   Genitourinary:     Comments: deferred  Musculoskeletal:      Left wrist: She exhibits decreased range of motion. She exhibits no bony tenderness and no deformity.      Right knee: She exhibits decreased range of motion. She exhibits no bony tenderness.      Left upper arm: She exhibits tenderness and swelling. She exhibits no bony tenderness and no deformity.      Comments: Bruising to anterior knee - knee immobilizer intact  No midline spinal tenderness.  Limited ROM to L shoulder with surgical dressing and sling in place.     Skin:     General: Skin is warm and dry.      Capillary Refill: Capillary refill takes 2 to 3 seconds.      Comments: ACE wrap to knee with immobilizer in place.  Surgical dressing to L shoulder and L wrist    Neurological:      General: No focal deficit present.      Mental Status: She is alert and oriented to person, place, and time.   Psychiatric:         Mood and Affect: Mood normal.         Behavior: Behavior normal.         Thought Content: Thought content normal.         Significant Labs:   CBC:   Recent  Labs   Lab 11/06/20  0653 11/07/20  0722   WBC 6.08 5.01   HGB 8.6* 8.3*   HCT 27.8* 26.1*   * 135*     CMP:   Recent Labs   Lab 11/06/20  0653 11/07/20  0722    132*   K 4.0 3.8    101   CO2 25 26   * 205*   BUN 22 17   CREATININE 0.8 0.8   CALCIUM 8.8 8.4*   PROT 5.5* 5.4*   ALBUMIN 2.0* 1.9*   BILITOT 1.5* 1.1*   ALKPHOS 84 100   AST 17 16   ALT 9* 8*   ANIONGAP 9 5*   EGFRNONAA >60 >60       Significant Imaging:   Imaging Results          X-Ray Knee 1 or 2 View Right (Final result)  Result time 10/30/20 14:24:11    Final result by LAYLA Meredith Sr., MD (10/30/20 14:24:11)                 Impression:      1. There has been no significant interval change in the appearance of the complex fractures of the patella.  2. There is a small joint effusion in the right knee.  3. There is moderate narrowing of the joint space of the medial compartment of the knee.  4. There is a mild amount of atherosclerosis.      Electronically signed by: Michel Meredith MD  Date:    10/30/2020  Time:    14:24             Narrative:    EXAMINATION:  XR KNEE 1 OR 2 VIEW RIGHT    CLINICAL HISTORY:  knee pain; fractures of the patella    COMPARISON:  10/27/2020    FINDINGS:  There has been no significant interval change in the appearance of the complex fractures of the patella.  There is no dislocation.  There is moderate narrowing of the joint space of the medial compartment of the knee.  There is a mild amount of atherosclerosis.  There is a small joint effusion in the right knee.                               X-Ray Wrist Complete Left (Final result)  Result time 10/30/20 11:26:10    Final result by Kelby Saeed Jr., MD (10/30/20 11:26:10)                 Impression:      Acute distal radius and ulnar fractures.      Electronically signed by: Kelby Saeed Jr., MD  Date:    10/30/2020  Time:    11:26             Narrative:    EXAMINATION:  XR WRIST COMPLETE 3 VIEWS LEFT    CLINICAL HISTORY:  Pain in left  wrist    TECHNIQUE:  PA, lateral, and oblique views of the left wrist were performed.    COMPARISON:  None    FINDINGS:  Osteopenia.  There is a distal radial impaction fracture with dorsal angulation of the distal fracture fragment.  Associated ulnar styloid fracture.  No definite carpal fracture.                               X-Ray Shoulder Trauma Left (Final result)  Result time 10/30/20 11:27:08    Final result by Kelby Saeed Jr., MD (10/30/20 11:27:08)                 Impression:      Displaced and overriding fracture of the surgical neck of the left humerus.      Electronically signed by: Kelby Saeed Jr., MD  Date:    10/30/2020  Time:    11:27             Narrative:    EXAMINATION:  XR SHOULDER TRAUMA 3 VIEW LEFT    CLINICAL HISTORY:  Pain in left shoulder    TECHNIQUE:  Three views of the left shoulder were performed.    COMPARISON  None    FINDINGS:  There is an acute fracture through the surgical neck of the humerus.  The distal fracture fragment is overriding by 3 cm.  It is displaced medially.  No sign of clavicular or scapular fracture.                               X-Ray Elbow Complete Left (Final result)  Result time 10/30/20 11:24:38    Final result by Adrian Taylor MD (10/30/20 11:24:38)                 Impression:      Limited single lateral view of the left elbow as above.      Electronically signed by: Adrian Taylor MD  Date:    10/30/2020  Time:    11:24             Narrative:    EXAMINATION:  XR ELBOW COMPLETE 3 VIEW LEFT    CLINICAL HISTORY:  XR ELBOW COMPLETE 3 VIEW LEFTPain in left elbow    COMPARISON:  None    FINDINGS:  Single lateral views of the left elbow were obtained.    No definite acute fracture or dislocation.  Bony mineralization is normal.  Soft tissues are unremarkable.   No gross joint effusion.  Mild degenerative changes.

## 2020-11-07 NOTE — PROGRESS NOTES
[facility] Hospital Medicine  Progress Note    Patient Name: Lakshmi Campbell  MRN: [id number]  Patient Class: IP- Inpatient   Admission Date: 10/30/2020  Length of Stay: 7 days  Attending Physician: Connie Ochoa MD  Primary Care Provider: Karan Ribeiro DO        Subjective:     Principal Problem:Closed fracture of surgical neck of left humerus        HPI:  Pt is a 68 yo female with PMHx of DM II, HTN, HPL, hypothyroidism and morbid obesity who fractured her right patella 1 week ago and is wearing a knee immobilizer. Today, she had a second mishap when trying to get back in bed after using the bedside commode. Pt fell on her left side and immediately developed left shoulder and left wrist pain. She denies any presyncopal symptoms, fever, chest pain, gross neuro deficits, recent nausea/vomiting/diarrhea or urinary symptoms. Xrays found a left humerus fracture and left radius/ulnar fracture.   Vital signs stable and lab work largely normal except for thrombocytopenia which has improved from last level. Pt was placed on Observation for further evaluation by Orthopedics and possible need for surgical intervention.  On 11/6/2020, pt stable post procedure.  Social work consulted to assist with discharge planning and Rehab placement.      Overview/Hospital Course:  Patient was kept on OBS for closed fx of surgical neck of L humerus under the care of hospital medicine. Ortho surgery was consulted. 10/31: Patient asking for Dr. Wolf to be involved in her care. I explained that he is not on call today - sent him secure chat but he likely will not see it until Monday. Ordered diet as no surgical intervention today. Will discuss with Dr. King, who is on call tomorrow for ortho. 11/1: Discussed with Dr. King - who spoke with Dr. Wolf. Pt will have surgery performed by Dr. Wolf tomorrow afternoon. Will be NPO after MN and stop heparin at MN. 11/2: Patient scheduled for surgery this afternoon by  Dr. Wolf. She is currently NPO with spouse at bedside. Pain is controlled. 11/3: Patient had surgery to R knee and L wrist yesterday. PRN analgesia. Plans for L shoulder surgery on Thursday of this week. DVT profil restarted. PT/OT pending 11/4: patient to have L shoulder surgery tomorrow. She will dc early next week to rehab - Case management to start working on placement tomorrow. PT/OT to see her post op. Spoke with Dr. Wolf via telephone - he recommends weight based lovenox post op for DVT prophilaxis. Daughter at bedside, verbalized understanding of all. 11/5: Patient scheduled for L shoulder repair this afternoon. Currently NPO. Heparin held. PT/OT aware pt requesting rehab on dc. She sat on side of bed today for 90 minutes. Case management working on placement - likely Mon. Dr. Wolf requesting lovenox injections for DVT prophylaxis as she will need weight based.   On 11/7/2020, H/H stable with mild decline noted.  Lovenox given.  CM to assist with discharge planning and placement.         Interval History: pt stable and sitting up to side of bed during exam.      Review of Systems   Constitutional: Negative for chills, diaphoresis, fatigue and fever.   HENT: Negative for congestion, rhinorrhea and sore throat.    Eyes: Negative for pain, discharge and itching.   Respiratory: Negative for cough, shortness of breath and wheezing.    Cardiovascular: Negative for chest pain, palpitations and leg swelling.   Gastrointestinal: Negative for abdominal distention, abdominal pain, diarrhea, nausea and vomiting.   Endocrine: Negative for cold intolerance and heat intolerance.   Genitourinary: Negative for difficulty urinating, dysuria and flank pain.   Musculoskeletal: Positive for arthralgias, gait problem and myalgias.   Skin: Negative for color change and wound.   Allergic/Immunologic: Negative.    Neurological: Negative for dizziness, syncope, speech difficulty, weakness and light-headedness.    Hematological: Negative.    Psychiatric/Behavioral: Negative for agitation, behavioral problems, confusion and sleep disturbance. The patient is not nervous/anxious.      Objective:     Vital Signs (Most Recent):  Temp: 98.6 °F (37 °C) (11/07/20 1143)  Pulse: 91 (11/07/20 1143)  Resp: 16 (11/07/20 1143)  BP: (!) 168/69 (11/07/20 1143)  SpO2: (!) 92 % (11/07/20 1143) Vital Signs (24h Range):  Temp:  [97.3 °F (36.3 °C)-99.3 °F (37.4 °C)] 98.6 °F (37 °C)  Pulse:  [80-93] 91  Resp:  [16-19] 16  SpO2:  [90 %-100 %] 92 %  BP: (126-168)/(53-70) 168/69     Weight: 123 kg (271 lb 2.7 oz)  Body mass index is 45.12 kg/m².    Intake/Output Summary (Last 24 hours) at 11/7/2020 1208  Last data filed at 11/7/2020 0852  Gross per 24 hour   Intake 1170 ml   Output 150 ml   Net 1020 ml      Physical Exam  Vitals signs and nursing note reviewed.   Constitutional:       Appearance: Normal appearance. She is well-developed. She is obese.   HENT:      Head: Normocephalic and atraumatic.      Nose: Nose normal.      Mouth/Throat:      Mouth: Mucous membranes are moist.      Pharynx: Oropharynx is clear.   Eyes:      General: No scleral icterus.     Extraocular Movements: Extraocular movements intact.      Conjunctiva/sclera: Conjunctivae normal.   Neck:      Musculoskeletal: Normal range of motion and neck supple. No muscular tenderness.   Cardiovascular:      Rate and Rhythm: Normal rate and regular rhythm.      Pulses: Normal pulses.      Heart sounds: Normal heart sounds. No murmur. No friction rub. No gallop.    Pulmonary:      Effort: Pulmonary effort is normal.      Breath sounds: Examination of the right-lower field reveals decreased breath sounds. Examination of the left-lower field reveals decreased breath sounds. Decreased breath sounds present.   Abdominal:      General: Bowel sounds are normal. There is no distension.      Palpations: Abdomen is soft.      Tenderness: There is no abdominal tenderness.   Genitourinary:      Comments: deferred  Musculoskeletal:      Left wrist: She exhibits decreased range of motion. She exhibits no bony tenderness and no deformity.      Right knee: She exhibits decreased range of motion. She exhibits no bony tenderness.      Left upper arm: She exhibits tenderness and swelling. She exhibits no bony tenderness and no deformity.      Comments: Bruising to anterior knee - knee immobilizer intact  No midline spinal tenderness.  Limited ROM to L shoulder with surgical dressing and sling in place.     Skin:     General: Skin is warm and dry.      Capillary Refill: Capillary refill takes 2 to 3 seconds.      Comments: ACE wrap to knee with immobilizer in place.  Surgical dressing to L shoulder and L wrist    Neurological:      General: No focal deficit present.      Mental Status: She is alert and oriented to person, place, and time.   Psychiatric:         Mood and Affect: Mood normal.         Behavior: Behavior normal.         Thought Content: Thought content normal.         Significant Labs:   CBC:   Recent Labs   Lab 11/06/20  0653 11/07/20  0722   WBC 6.08 5.01   HGB 8.6* 8.3*   HCT 27.8* 26.1*   * 135*     CMP:   Recent Labs   Lab 11/06/20 0653 11/07/20  0722    132*   K 4.0 3.8    101   CO2 25 26   * 205*   BUN 22 17   CREATININE 0.8 0.8   CALCIUM 8.8 8.4*   PROT 5.5* 5.4*   ALBUMIN 2.0* 1.9*   BILITOT 1.5* 1.1*   ALKPHOS 84 100   AST 17 16   ALT 9* 8*   ANIONGAP 9 5*   EGFRNONAA >60 >60       Significant Imaging:   Imaging Results          X-Ray Knee 1 or 2 View Right (Final result)  Result time 10/30/20 14:24:11    Final result by LAYLA Meredith Sr., MD (10/30/20 14:24:11)                 Impression:      1. There has been no significant interval change in the appearance of the complex fractures of the patella.  2. There is a small joint effusion in the right knee.  3. There is moderate narrowing of the joint space of the medial compartment of the knee.  4. There is a mild  amount of atherosclerosis.      Electronically signed by: Michel Meredith MD  Date:    10/30/2020  Time:    14:24             Narrative:    EXAMINATION:  XR KNEE 1 OR 2 VIEW RIGHT    CLINICAL HISTORY:  knee pain; fractures of the patella    COMPARISON:  10/27/2020    FINDINGS:  There has been no significant interval change in the appearance of the complex fractures of the patella.  There is no dislocation.  There is moderate narrowing of the joint space of the medial compartment of the knee.  There is a mild amount of atherosclerosis.  There is a small joint effusion in the right knee.                               X-Ray Wrist Complete Left (Final result)  Result time 10/30/20 11:26:10    Final result by Kelby Saeed Jr., MD (10/30/20 11:26:10)                 Impression:      Acute distal radius and ulnar fractures.      Electronically signed by: Kelby Saeed Jr., MD  Date:    10/30/2020  Time:    11:26             Narrative:    EXAMINATION:  XR WRIST COMPLETE 3 VIEWS LEFT    CLINICAL HISTORY:  Pain in left wrist    TECHNIQUE:  PA, lateral, and oblique views of the left wrist were performed.    COMPARISON:  None    FINDINGS:  Osteopenia.  There is a distal radial impaction fracture with dorsal angulation of the distal fracture fragment.  Associated ulnar styloid fracture.  No definite carpal fracture.                               X-Ray Shoulder Trauma Left (Final result)  Result time 10/30/20 11:27:08    Final result by Kelby Saeed Jr., MD (10/30/20 11:27:08)                 Impression:      Displaced and overriding fracture of the surgical neck of the left humerus.      Electronically signed by: Kelby Saeed Jr., MD  Date:    10/30/2020  Time:    11:27             Narrative:    EXAMINATION:  XR SHOULDER TRAUMA 3 VIEW LEFT    CLINICAL HISTORY:  Pain in left shoulder    TECHNIQUE:  Three views of the left shoulder were performed.    COMPARISON  None    FINDINGS:  There is an acute fracture through the  surgical neck of the humerus.  The distal fracture fragment is overriding by 3 cm.  It is displaced medially.  No sign of clavicular or scapular fracture.                               X-Ray Elbow Complete Left (Final result)  Result time 10/30/20 11:24:38    Final result by Adrian Taylor MD (10/30/20 11:24:38)                 Impression:      Limited single lateral view of the left elbow as above.      Electronically signed by: Adrian Taylor MD  Date:    10/30/2020  Time:    11:24             Narrative:    EXAMINATION:  XR ELBOW COMPLETE 3 VIEW LEFT    CLINICAL HISTORY:  XR ELBOW COMPLETE 3 VIEW LEFTPain in left elbow    COMPARISON:  None    FINDINGS:  Single lateral views of the left elbow were obtained.    No definite acute fracture or dislocation.  Bony mineralization is normal.  Soft tissues are unremarkable.   No gross joint effusion.  Mild degenerative changes.                                Assessment/Plan:      * Closed fracture of surgical neck of left humerus  --ortho surgery consulted (Dr. Montesinos)  --no surgery as new ortho on call tomorrow  --PRN analgesia  11/2:  --surgery with Dr. Wolf today  11/3:  --Non-weight bearing right leg, brace at all times, locked in extension  --Non-weight bearing left arm, gauntlet brace  --Ok to restart dvt ppx in am  --Plan to return to OR, likely 11/5 for ORIF left proximal humerus  11/6: Surgical dressing intact with sling in place   11/7: Stable         Shoulder pain, left  11/3:  --Non-weight bearing right leg, brace at all times, locked in extension  --Non-weight bearing left arm, gauntlet brace  --Ok to restart dvt ppx in am  --Plan to return to OR, likely 10/5 for ORIF left proximal humerus  11/5  --surgery today by Dr. Wolf  11/6-H/H stable post procedure- sling in place- analgesia as needed   11/7- stable      Closed left radial fracture  --Associated with ulnar styloid fracture  --Ortho consulted  --NPO after MN  --Prn analgesia  --PT/OT eval and  treat after Ortho consult  11/2:  --surgery with Dr. Wolf today  11/3:  --Non-weight bearing right leg, brace at all times, locked in extension  --Non-weight bearing left arm, gauntlet brace  --Ok to restart dvt ppx in am  --Plan to return to OR, likely 10/5 for ORIF left proximal humerus  11/6:  --dressing and sling in place with analgesia as needed-   11/7- stable-      Closed displaced comminuted fracture of right patella  --Was planned for surgery on 10/28/2020 with Dr. Wolf - was postponed due to thrombocytopenia  --Right leg in knee immobilzer  11/2:  --surgery with Dr. Wolf today  11/3:  --Non-weight bearing right leg, brace at all times, locked in extension  --Non-weight bearing left arm, gauntlet brace  --Ok to restart dvt ppx in am  --Plan to return to OR, likely 10/5 for ORIF left proximal humerus  11/6-- stable with dressing and knee immobilizer in place  11/7-- stable     Hyperlipidemia  --continue statin  --cardiac diet      Essential hypertension  --continue amlodipine  --cardiac diet      Hypothyroidism  Continue levothyroxine    Type 2 diabetes mellitus with microalbuminuria, with long-term current use of insulin  --accuchecks and SSI  --diabetic diet  --continue novolin at lower dose  --last HA1C 5.8            VTE Risk Mitigation (From admission, onward)         Ordered     enoxaparin injection 40 mg  Every 24 hours      11/07/20 1204     Place sequential compression device  Until discontinued      10/30/20 1855                Discharge Planning   KYA:      Code Status: Not on file   Is the patient medically ready for discharge?:     Reason for patient still in hospital (select all that apply): Pending disposition- awaiting Rehab placement   Discharge Plan A: Rehab                  Ceci Pelaez NP  Department of Hospital Medicine   [facility] [place]

## 2020-11-07 NOTE — PLAN OF CARE
IV ABT therapy remains in progress. No adverse reactions noted, remains afebrile. Able to sit up on the side of the bed with max assist. Pt refuses to turn or use wedge despite education of importance of turning.  Knee immobilizer to RLE. Splint to left wrist, sling to left arm.  Accu checks AC&HS, sliding scale insulin adm as ordered. PRN pain meds adm as needed to manage pain level. Call light in reach. Side rails up x2.

## 2020-11-07 NOTE — CONSULTS
"  [facility] -   Adult Nutrition  Consult Note    SUMMARY     Recommendations    Recommendation/Intervention:   1. Continue with Diabetic & Cardiac diet restrictions for BG management/wound healing.   2. RD to follow up.     Goals: 1. Po intake continued at >75%  Nutrition Goal Status: new  Communication of RD Recs: (Plan of Care)    Reason for Assessment    Reason For Assessment: consult  Diagnosis: (Closed fracture of surgical neck of left humerus)  Relevant Medical History: DMI, Thyroid disease, morbid obesity, hyperlipidemia    General Information Comments: RD consulted to assess patient's nutrition status due to recent wounds. Procedure for R patella surgery on 11/3. She states that her appetite is good with no complaints of nvcd. LBM 3 days ago-she reports her "regular" bowel regimen as every 3-4 days. RD verbalized education for recovery (consuming lean sources of protein first, managing BG levels, and maintaining hydration status). She reports that her BG levels have been "higher than normal" s/p surgery. RD explained factors that can elevate BG levels. RD also encouraged patient to monitor BG levels and be more conscious of food choices that elevate Blood sugar to aid in  recovery-she verbalized understanding.      Nutrition Discharge Planning: Discharge goal: Home on usual diet as tolerated.     Nutrition Risk Screen    Nutrition Risk Screen: no indicators present    Nutrition/Diet History    Spiritual, Cultural Beliefs, Buddhism Practices, Values that Affect Care: no  Food Allergies: NKFA    Anthropometrics    Temp: 98.4 °F (36.9 °C)  Height Method: Stated  Height: 5' 5" (165.1 cm)  Height (inches): 65 in  Weight Method: Bed Scale  Weight: 123 kg (271 lb 2.7 oz)  Weight (lb): 271.17 lb  Ideal Body Weight (IBW), Female: 125 lb  % Ideal Body Weight, Female (lb): 216.94 %  BMI (Calculated): 45.1  BMI Grade: greater than 40 - morbid obesity       Lab/Procedures/Meds    Pertinent Labs Reviewed: " reviewed  Pertinent Labs Comments: Total Pro 5.5L; Alb 2.0L; Bilirubin 1.5H; HgbA1c 5.7 (3 weeks ago); ; 178; 280; 209; 205  Pertinent Medications Reviewed: reviewed  Pertinent Medications Comments: Pepcid, Pantoprazole    Physical Findings/Assessment      Multiple new incision sites from recent procedure.     Estimated/Assessed Needs    Weight Used For Calorie Calculations: 123 kg (271 lb 2.7 oz)  Energy Calorie Requirements (kcal): 1760  Energy Need Method: Hancock-St Jeor     Weight Used For Protein Calculations: 73 kg (160 lb 15 oz)(((CBW-IBW)*.25) + IBW)  Fluid Requirements (mL): 1760  Estimated Fluid Requirement Method: RDA Method(or PER MD/NP)  RDA Method (mL): 1760  CHO Requirement: 220      Nutrition Prescription Ordered    Current Diet Order: 2000 calorie diabetic; Cardiac    Evaluation of Received Nutrient/Fluid Intake      Intake/Output Summary (Last 24 hours) at 11/7/2020 0926  Last data filed at 11/7/2020 0600  Gross per 24 hour   Intake 810 ml   Output 600 ml   Net 210 ml     % Intake of Estimated Energy Needs: 50 - 75 %  % Meal Intake: 50 - 75 %    Nutrition Risk    Level of Risk/Frequency of Follow-up: (1x week)     Assessment and Plan    Nutrition Problem  Increased nutrient needs    Related to (etiology):   Increased demand for protein     Signs and Symptoms (as evidenced by):   Multiple new incisions s/p surgery;   Need for protein to help with elevated BG level management.     Interventions/Recommendations (treatment strategy):  Nutrition prescription: CHO consistent diet  Collaboration with other providers.     Nutrition Diagnosis Status:   New      Monitor and Evaluation    Food and Nutrient Intake: energy intake, food and beverage intake  Food and Nutrient Adminstration: diet order  Anthropometric Measurements: weight  Biochemical Data, Medical Tests and Procedures: electrolyte and renal panel, gastrointestinal profile, glucose/endocrine profile, inflammatory profile, lipid  profile  Nutrition-Focused Physical Findings: overall appearance     Malnutrition Assessment                 Orbital Region (Subcutaneous Fat Loss): well nourished  Upper Arm Region (Subcutaneous Fat Loss): well nourished   Mandaen Region (Muscle Loss): well nourished  Clavicle Bone Region (Muscle Loss): well nourished  Clavicle and Acromion Bone Region (Muscle Loss): well nourished                 Nutrition Follow-Up    RD Follow-up?: Yes     Bri Christian RD, REY

## 2020-11-07 NOTE — PLAN OF CARE
Fall prevention precautions maintained, pt remained free of falls throughout shift, call bell and personal items within reach, pt's pain adequately controlled by prn pain medication, pt refuses to have waffle mattress and heel boots, also refuses to turn despite staff repeatedly educating the need for it. Knee immobilizer on R knee and sling with wrist splint present to ELIZABETH. 24 hour chart check completed. Will continue to monitor

## 2020-11-07 NOTE — ASSESSMENT & PLAN NOTE
--Was planned for surgery on 10/28/2020 with Dr. Wolf - was postponed due to thrombocytopenia  --Right leg in knee immobilzer  11/2:  --surgery with Dr. Wolf today  11/3:  --Non-weight bearing right leg, brace at all times, locked in extension  --Non-weight bearing left arm, gauntlet brace  --Ok to restart dvt ppx in am  --Plan to return to OR, likely 10/5 for ORIF left proximal humerus  11/6-- stable with dressing and knee immobilizer in place  11/7-- stable

## 2020-11-08 LAB
ALBUMIN SERPL BCP-MCNC: 1.9 G/DL (ref 3.5–5.2)
ALP SERPL-CCNC: 112 U/L (ref 55–135)
ALT SERPL W/O P-5'-P-CCNC: 9 U/L (ref 10–44)
ANION GAP SERPL CALC-SCNC: 7 MMOL/L (ref 8–16)
AST SERPL-CCNC: 16 U/L (ref 10–40)
BASOPHILS # BLD AUTO: 0.03 K/UL (ref 0–0.2)
BASOPHILS NFR BLD: 0.6 % (ref 0–1.9)
BILIRUB SERPL-MCNC: 1.2 MG/DL (ref 0.1–1)
BUN SERPL-MCNC: 15 MG/DL (ref 8–23)
CALCIUM SERPL-MCNC: 8.5 MG/DL (ref 8.7–10.5)
CHLORIDE SERPL-SCNC: 102 MMOL/L (ref 95–110)
CO2 SERPL-SCNC: 26 MMOL/L (ref 23–29)
CREAT SERPL-MCNC: 0.8 MG/DL (ref 0.5–1.4)
DIFFERENTIAL METHOD: ABNORMAL
EOSINOPHIL # BLD AUTO: 0.1 K/UL (ref 0–0.5)
EOSINOPHIL NFR BLD: 2.4 % (ref 0–8)
ERYTHROCYTE [DISTWIDTH] IN BLOOD BY AUTOMATED COUNT: 13.8 % (ref 11.5–14.5)
EST. GFR  (AFRICAN AMERICAN): >60 ML/MIN/1.73 M^2
EST. GFR  (NON AFRICAN AMERICAN): >60 ML/MIN/1.73 M^2
GLUCOSE SERPL-MCNC: 204 MG/DL (ref 70–110)
HCT VFR BLD AUTO: 26.6 % (ref 37–48.5)
HGB BLD-MCNC: 8.6 G/DL (ref 12–16)
IMM GRANULOCYTES # BLD AUTO: 0.03 K/UL (ref 0–0.04)
IMM GRANULOCYTES NFR BLD AUTO: 0.6 % (ref 0–0.5)
LYMPHOCYTES # BLD AUTO: 0.9 K/UL (ref 1–4.8)
LYMPHOCYTES NFR BLD: 17.8 % (ref 18–48)
MCH RBC QN AUTO: 28.7 PG (ref 27–31)
MCHC RBC AUTO-ENTMCNC: 32.3 G/DL (ref 32–36)
MCV RBC AUTO: 89 FL (ref 82–98)
MONOCYTES # BLD AUTO: 0.5 K/UL (ref 0.3–1)
MONOCYTES NFR BLD: 9.2 % (ref 4–15)
NEUTROPHILS # BLD AUTO: 3.4 K/UL (ref 1.8–7.7)
NEUTROPHILS NFR BLD: 69.4 % (ref 38–73)
NRBC BLD-RTO: 0 /100 WBC
PLATELET # BLD AUTO: 147 K/UL (ref 150–350)
PMV BLD AUTO: 9.6 FL (ref 9.2–12.9)
POCT GLUCOSE: 205 MG/DL (ref 70–110)
POCT GLUCOSE: 291 MG/DL (ref 70–110)
POCT GLUCOSE: 298 MG/DL (ref 70–110)
POCT GLUCOSE: 300 MG/DL (ref 70–110)
POTASSIUM SERPL-SCNC: 3.8 MMOL/L (ref 3.5–5.1)
PROT SERPL-MCNC: 5.6 G/DL (ref 6–8.4)
RBC # BLD AUTO: 3 M/UL (ref 4–5.4)
SODIUM SERPL-SCNC: 135 MMOL/L (ref 136–145)
WBC # BLD AUTO: 4.9 K/UL (ref 3.9–12.7)

## 2020-11-08 PROCEDURE — 63600175 PHARM REV CODE 636 W HCPCS: Performed by: STUDENT IN AN ORGANIZED HEALTH CARE EDUCATION/TRAINING PROGRAM

## 2020-11-08 PROCEDURE — 80053 COMPREHEN METABOLIC PANEL: CPT

## 2020-11-08 PROCEDURE — 25000003 PHARM REV CODE 250: Performed by: NURSE PRACTITIONER

## 2020-11-08 PROCEDURE — 85025 COMPLETE CBC W/AUTO DIFF WBC: CPT

## 2020-11-08 PROCEDURE — 21400001 HC TELEMETRY ROOM

## 2020-11-08 PROCEDURE — 63600175 PHARM REV CODE 636 W HCPCS: Performed by: NURSE PRACTITIONER

## 2020-11-08 PROCEDURE — 36415 COLL VENOUS BLD VENIPUNCTURE: CPT

## 2020-11-08 PROCEDURE — 97530 THERAPEUTIC ACTIVITIES: CPT

## 2020-11-08 RX ORDER — AMLODIPINE BESYLATE 10 MG/1
10 TABLET ORAL DAILY
Status: DISCONTINUED | OUTPATIENT
Start: 2020-11-09 | End: 2020-11-11 | Stop reason: HOSPADM

## 2020-11-08 RX ADMIN — PRAVASTATIN SODIUM 20 MG: 20 TABLET ORAL at 08:11

## 2020-11-08 RX ADMIN — MORPHINE SULFATE 4 MG: 4 INJECTION INTRAVENOUS at 09:11

## 2020-11-08 RX ADMIN — CEFAZOLIN SODIUM 1 G: 1 SOLUTION INTRAVENOUS at 02:11

## 2020-11-08 RX ADMIN — LEVOTHYROXINE SODIUM 175 MCG: 150 TABLET ORAL at 05:11

## 2020-11-08 RX ADMIN — LABETALOL HYDROCHLORIDE 10 MG: 5 INJECTION INTRAVENOUS at 04:11

## 2020-11-08 RX ADMIN — HYDRALAZINE HYDROCHLORIDE 25 MG: 25 TABLET, FILM COATED ORAL at 05:11

## 2020-11-08 RX ADMIN — CEFAZOLIN SODIUM 1 G: 1 SOLUTION INTRAVENOUS at 07:11

## 2020-11-08 RX ADMIN — AMLODIPINE BESYLATE 5 MG: 5 TABLET ORAL at 08:11

## 2020-11-08 RX ADMIN — ENOXAPARIN SODIUM 40 MG: 40 INJECTION SUBCUTANEOUS at 05:11

## 2020-11-08 RX ADMIN — CEFAZOLIN SODIUM 1 G: 1 SOLUTION INTRAVENOUS at 11:11

## 2020-11-08 RX ADMIN — PANTOPRAZOLE SODIUM 40 MG: 40 TABLET, DELAYED RELEASE ORAL at 08:11

## 2020-11-08 RX ADMIN — INSULIN ASPART 3 UNITS: 100 INJECTION, SOLUTION INTRAVENOUS; SUBCUTANEOUS at 09:11

## 2020-11-08 RX ADMIN — INSULIN ASPART 6 UNITS: 100 INJECTION, SOLUTION INTRAVENOUS; SUBCUTANEOUS at 05:11

## 2020-11-08 RX ADMIN — INSULIN DETEMIR 10 UNITS: 100 INJECTION, SOLUTION SUBCUTANEOUS at 09:11

## 2020-11-08 RX ADMIN — CEFAZOLIN SODIUM 1 G: 1 SOLUTION INTRAVENOUS at 12:11

## 2020-11-08 RX ADMIN — INSULIN ASPART 4 UNITS: 100 INJECTION, SOLUTION INTRAVENOUS; SUBCUTANEOUS at 05:11

## 2020-11-08 RX ADMIN — DIPHENHYDRAMINE HYDROCHLORIDE 25 MG: 25 CAPSULE ORAL at 09:11

## 2020-11-08 RX ADMIN — MORPHINE SULFATE 4 MG: 4 INJECTION INTRAVENOUS at 08:11

## 2020-11-08 RX ADMIN — INSULIN ASPART 6 UNITS: 100 INJECTION, SOLUTION INTRAVENOUS; SUBCUTANEOUS at 11:11

## 2020-11-08 NOTE — ASSESSMENT & PLAN NOTE
--Associated with ulnar styloid fracture  --Ortho consulted  --NPO after MN  --Prn analgesia  --PT/OT eval and treat after Ortho consult  11/2:  --surgery with Dr. Wolf today  11/3:  --Non-weight bearing right leg, brace at all times, locked in extension  --Non-weight bearing left arm, gauntlet brace  --Ok to restart dvt ppx in am  --Plan to return to OR, likely 10/5 for ORIF left proximal humerus  11/6:  --dressing and sling in place with analgesia as needed-   11/7- stable  -stable

## 2020-11-08 NOTE — ASSESSMENT & PLAN NOTE
--ortho surgery consulted (Dr. Montesinos)  --no surgery as new ortho on call tomorrow  --PRN analgesia  11/2:  --surgery with Dr. Wolf today  11/3:  --Non-weight bearing right leg, brace at all times, locked in extension  --Non-weight bearing left arm, gauntlet brace  --Ok to restart dvt ppx in am  --Plan to return to OR, likely 11/5 for ORIF left proximal humerus  11/6: Surgical dressing intact with sling in place   11/7: Stable   -stable

## 2020-11-08 NOTE — PROGRESS NOTES
[facility] Hospital Medicine  Progress Note    Patient Name: Lakshmi Campbell  MRN: [id number]  Patient Class: IP- Inpatient   Admission Date: 10/30/2020  Length of Stay: 8 days  Attending Physician: Connie Ochoa MD  Primary Care Provider: Karan Ribeiro DO        Subjective:     Principal Problem:Closed fracture of surgical neck of left humerus        HPI:  Pt is a 70 yo female with PMHx of DM II, HTN, HPL, hypothyroidism and morbid obesity who fractured her right patella 1 week ago and is wearing a knee immobilizer. Today, she had a second mishap when trying to get back in bed after using the bedside commode. Pt fell on her left side and immediately developed left shoulder and left wrist pain. She denies any presyncopal symptoms, fever, chest pain, gross neuro deficits, recent nausea/vomiting/diarrhea or urinary symptoms. Xrays found a left humerus fracture and left radius/ulnar fracture.   Vital signs stable and lab work largely normal except for thrombocytopenia which has improved from last level. Pt was placed on Observation for further evaluation by Orthopedics and possible need for surgical intervention.  On 11/6/2020, pt stable post procedure.  Social work consulted to assist with discharge planning and Rehab placement.      Overview/Hospital Course:  Patient was kept on OBS for closed fx of surgical neck of L humerus under the care of hospital medicine. Ortho surgery was consulted. 10/31: Patient asking for Dr. Wolf to be involved in her care. I explained that he is not on call today - sent him secure chat but he likely will not see it until Monday. Ordered diet as no surgical intervention today. Will discuss with Dr. King, who is on call tomorrow for ortho. 11/1: Discussed with Dr. King - who spoke with Dr. Wolf. Pt will have surgery performed by Dr. Wolf tomorrow afternoon. Will be NPO after MN and stop heparin at MN. 11/2: Patient scheduled for surgery this afternoon by  Dr. Wolf. She is currently NPO with spouse at bedside. Pain is controlled. 11/3: Patient had surgery to R knee and L wrist yesterday. PRN analgesia. Plans for L shoulder surgery on Thursday of this week. DVT profil restarted. PT/OT pending 11/4: patient to have L shoulder surgery tomorrow. She will dc early next week to rehab - Case management to start working on placement tomorrow. PT/OT to see her post op. Spoke with Dr. Wolf via telephone - he recommends weight based lovenox post op for DVT prophilaxis. Daughter at bedside, verbalized understanding of all. 11/5: Patient scheduled for L shoulder repair this afternoon. Currently NPO. Heparin held. PT/OT aware pt requesting rehab on dc. She sat on side of bed today for 90 minutes. Case management working on placement - likely Mon. Dr. Wolf requesting lovenox injections for DVT prophylaxis as she will need weight based.   On 11/7/2020, H/H stable with mild decline noted.  Lovenox given.  CM to assist with discharge planning and placement.  On 11/8/2020, pt stable with current plan of care continued.  H/H stable.  Pain at surgical sites and back improved on prescribed narcotic regime.         Interval History: pt stable and sitting up to side of bed during exam.  H/H stable.  Pt awaiting placement.  Current plan of care continued.      Review of Systems   Constitutional: Negative for chills, diaphoresis, fatigue and fever.   HENT: Negative for congestion, rhinorrhea and sore throat.    Eyes: Negative for pain, discharge and itching.   Respiratory: Negative for cough, shortness of breath and wheezing.    Cardiovascular: Negative for chest pain, palpitations and leg swelling.   Gastrointestinal: Negative for abdominal distention, abdominal pain, diarrhea, nausea and vomiting.   Endocrine: Negative for cold intolerance and heat intolerance.   Genitourinary: Negative for difficulty urinating, dysuria and flank pain.   Musculoskeletal: Positive for arthralgias,  back pain, gait problem, joint swelling and myalgias.   Skin: Negative for color change and wound.   Allergic/Immunologic: Negative.    Neurological: Negative for dizziness, syncope, speech difficulty, weakness and light-headedness.   Hematological: Negative.    Psychiatric/Behavioral: Negative for agitation, behavioral problems, confusion and sleep disturbance. The patient is not nervous/anxious.      Objective:     Vital Signs (Most Recent):  Temp: 98 °F (36.7 °C) (11/08/20 1239)  Pulse: 78 (11/08/20 1302)  Resp: 18 (11/08/20 1239)  BP: (!) 166/69 (11/08/20 1239)  SpO2: (!) 94 % (11/08/20 1239) Vital Signs (24h Range):  Temp:  [98 °F (36.7 °C)-98.6 °F (37 °C)] 98 °F (36.7 °C)  Pulse:  [71-90] 78  Resp:  [14-20] 18  SpO2:  [92 %-94 %] 94 %  BP: (133-189)/() 166/69     Weight: 123 kg (271 lb 2.7 oz)  Body mass index is 45.12 kg/m².    Intake/Output Summary (Last 24 hours) at 11/8/2020 1438  Last data filed at 11/8/2020 0900  Gross per 24 hour   Intake 1010 ml   Output 1200 ml   Net -190 ml      Physical Exam  Vitals signs and nursing note reviewed.   Constitutional:       Appearance: Normal appearance. She is well-developed. She is obese.   HENT:      Head: Normocephalic and atraumatic.      Nose: Nose normal.      Mouth/Throat:      Mouth: Mucous membranes are moist.      Pharynx: Oropharynx is clear.   Eyes:      General: No scleral icterus.     Extraocular Movements: Extraocular movements intact.      Conjunctiva/sclera: Conjunctivae normal.   Neck:      Musculoskeletal: Normal range of motion and neck supple. No muscular tenderness.   Cardiovascular:      Rate and Rhythm: Normal rate and regular rhythm.      Pulses: Normal pulses.      Heart sounds: Normal heart sounds. No murmur. No friction rub. No gallop.    Pulmonary:      Effort: Pulmonary effort is normal.      Breath sounds: Examination of the right-lower field reveals decreased breath sounds. Examination of the left-lower field reveals decreased  breath sounds. Decreased breath sounds present.   Abdominal:      General: Bowel sounds are normal. There is no distension.      Palpations: Abdomen is soft.      Tenderness: There is no abdominal tenderness.   Genitourinary:     Comments: deferred  Musculoskeletal:      Left wrist: She exhibits decreased range of motion. She exhibits no bony tenderness and no deformity.      Right knee: She exhibits decreased range of motion. She exhibits no bony tenderness.      Left upper arm: She exhibits tenderness and swelling. She exhibits no bony tenderness and no deformity.      Comments: Bruising to anterior knee - knee immobilizer intact  No midline spinal tenderness.  Limited ROM to L shoulder with surgical dressing and sling in place.     Skin:     General: Skin is warm and dry.      Capillary Refill: Capillary refill takes 2 to 3 seconds.      Comments: ACE wrap to knee with immobilizer in place.  Surgical dressing to L shoulder and L wrist    Neurological:      General: No focal deficit present.      Mental Status: She is alert and oriented to person, place, and time.   Psychiatric:         Mood and Affect: Mood normal.         Behavior: Behavior normal.         Thought Content: Thought content normal.         Significant Labs:   CBC:   Recent Labs   Lab 11/07/20 0722 11/08/20  0656   WBC 5.01 4.90   HGB 8.3* 8.6*   HCT 26.1* 26.6*   * 147*     CMP:   Recent Labs   Lab 11/07/20 0722 11/08/20  0656   * 135*   K 3.8 3.8    102   CO2 26 26   * 204*   BUN 17 15   CREATININE 0.8 0.8   CALCIUM 8.4* 8.5*   PROT 5.4* 5.6*   ALBUMIN 1.9* 1.9*   BILITOT 1.1* 1.2*   ALKPHOS 100 112   AST 16 16   ALT 8* 9*   ANIONGAP 5* 7*   EGFRNONAA >60 >60       Significant Imaging:   Imaging Results          X-Ray Knee 1 or 2 View Right (Final result)  Result time 10/30/20 14:24:11    Final result by LAYLA Meredith Sr., MD (10/30/20 14:24:11)                 Impression:      1. There has been no significant  interval change in the appearance of the complex fractures of the patella.  2. There is a small joint effusion in the right knee.  3. There is moderate narrowing of the joint space of the medial compartment of the knee.  4. There is a mild amount of atherosclerosis.      Electronically signed by: Michel Meredith MD  Date:    10/30/2020  Time:    14:24             Narrative:    EXAMINATION:  XR KNEE 1 OR 2 VIEW RIGHT    CLINICAL HISTORY:  knee pain; fractures of the patella    COMPARISON:  10/27/2020    FINDINGS:  There has been no significant interval change in the appearance of the complex fractures of the patella.  There is no dislocation.  There is moderate narrowing of the joint space of the medial compartment of the knee.  There is a mild amount of atherosclerosis.  There is a small joint effusion in the right knee.                               X-Ray Wrist Complete Left (Final result)  Result time 10/30/20 11:26:10    Final result by Kelby Saeed Jr., MD (10/30/20 11:26:10)                 Impression:      Acute distal radius and ulnar fractures.      Electronically signed by: Kelby Saeed Jr., MD  Date:    10/30/2020  Time:    11:26             Narrative:    EXAMINATION:  XR WRIST COMPLETE 3 VIEWS LEFT    CLINICAL HISTORY:  Pain in left wrist    TECHNIQUE:  PA, lateral, and oblique views of the left wrist were performed.    COMPARISON:  None    FINDINGS:  Osteopenia.  There is a distal radial impaction fracture with dorsal angulation of the distal fracture fragment.  Associated ulnar styloid fracture.  No definite carpal fracture.                               X-Ray Shoulder Trauma Left (Final result)  Result time 10/30/20 11:27:08    Final result by Kelby Saeed Jr., MD (10/30/20 11:27:08)                 Impression:      Displaced and overriding fracture of the surgical neck of the left humerus.      Electronically signed by: Kelby Saeed Jr., MD  Date:    10/30/2020  Time:    11:27              Narrative:    EXAMINATION:  XR SHOULDER TRAUMA 3 VIEW LEFT    CLINICAL HISTORY:  Pain in left shoulder    TECHNIQUE:  Three views of the left shoulder were performed.    COMPARISON  None    FINDINGS:  There is an acute fracture through the surgical neck of the humerus.  The distal fracture fragment is overriding by 3 cm.  It is displaced medially.  No sign of clavicular or scapular fracture.                               X-Ray Elbow Complete Left (Final result)  Result time 10/30/20 11:24:38    Final result by Adrian Taylor MD (10/30/20 11:24:38)                 Impression:      Limited single lateral view of the left elbow as above.      Electronically signed by: Adrian Taylor MD  Date:    10/30/2020  Time:    11:24             Narrative:    EXAMINATION:  XR ELBOW COMPLETE 3 VIEW LEFT    CLINICAL HISTORY:  XR ELBOW COMPLETE 3 VIEW LEFTPain in left elbow    COMPARISON:  None    FINDINGS:  Single lateral views of the left elbow were obtained.    No definite acute fracture or dislocation.  Bony mineralization is normal.  Soft tissues are unremarkable.   No gross joint effusion.  Mild degenerative changes.                                Assessment/Plan:      * Closed fracture of surgical neck of left humerus  --ortho surgery consulted (Dr. Montesinos)  --no surgery as new ortho on call tomorrow  --PRN analgesia  11/2:  --surgery with Dr. Wolf today  11/3:  --Non-weight bearing right leg, brace at all times, locked in extension  --Non-weight bearing left arm, gauntlet brace  --Ok to restart dvt ppx in am  --Plan to return to OR, likely 11/5 for ORIF left proximal humerus  11/6: Surgical dressing intact with sling in place   11/7: Stable   -stable      Shoulder pain, left  11/3:  --Non-weight bearing right leg, brace at all times, locked in extension  --Non-weight bearing left arm, gauntlet brace  --Ok to restart dvt ppx in am  --Plan to return to OR, likely 10/5 for ORIF left proximal humerus  11/5  --surgery today  by Dr. Wolf  11/6-H/H stable post procedure- sling in place- analgesia as needed   11/7- stable  -stable       Closed left radial fracture  --Associated with ulnar styloid fracture  --Ortho consulted  --NPO after MN  --Prn analgesia  --PT/OT eval and treat after Ortho consult  11/2:  --surgery with Dr. Wolf today  11/3:  --Non-weight bearing right leg, brace at all times, locked in extension  --Non-weight bearing left arm, gauntlet brace  --Ok to restart dvt ppx in am  --Plan to return to OR, likely 10/5 for ORIF left proximal humerus  11/6:  --dressing and sling in place with analgesia as needed-   11/7- stable  -stable       Closed displaced comminuted fracture of right patella  --Was planned for surgery on 10/28/2020 with Dr. Wolf - was postponed due to thrombocytopenia  --Right leg in knee immobilzer  11/2:  --surgery with Dr. Wolf today  11/3:  --Non-weight bearing right leg, brace at all times, locked in extension  --Non-weight bearing left arm, gauntlet brace  --Ok to restart dvt ppx in am  --Plan to return to OR, likely 10/5 for ORIF left proximal humerus  11/6-- stable with dressing and knee immobilizer in place  11/7-- stable   -stable     Hyperlipidemia  --continue statin  --cardiac diet      Essential hypertension  --BP remains elevated   --continue amlodipine-dose increased   --cardiac diet      Hypothyroidism  Continue levothyroxine    Type 2 diabetes mellitus with microalbuminuria, with long-term current use of insulin  --accuchecks and SSI  --diabetic diet  --continue novolin at lower dose  --last HA1C 5.8            VTE Risk Mitigation (From admission, onward)         Ordered     enoxaparin injection 40 mg  Every 24 hours      11/07/20 1204     Place sequential compression device  Until discontinued      10/30/20 3845                Discharge Planning   KYA:      Code Status: Not on file   Is the patient medically ready for discharge?:     Reason for patient still in hospital (select  all that apply): Pending disposition  Discharge Plan A: Rehab                  Ceci Pelaez NP  Department of Hospital Medicine   [facility]

## 2020-11-08 NOTE — PLAN OF CARE
Fall prevention precautions maintained, pt remained free of falls throughout shift, call bell and personal items within reach, pt's pain adequately controlled by prn pain medication, pt refuses to turn in bed on every round and at bedside shift report. 24 hour chart check completed. Will continue to monitor

## 2020-11-08 NOTE — ASSESSMENT & PLAN NOTE
--Was planned for surgery on 10/28/2020 with Dr. Wolf - was postponed due to thrombocytopenia  --Right leg in knee immobilzer  11/2:  --surgery with Dr. Wolf today  11/3:  --Non-weight bearing right leg, brace at all times, locked in extension  --Non-weight bearing left arm, gauntlet brace  --Ok to restart dvt ppx in am  --Plan to return to OR, likely 10/5 for ORIF left proximal humerus  11/6-- stable with dressing and knee immobilizer in place  11/7-- stable   -stable

## 2020-11-08 NOTE — ASSESSMENT & PLAN NOTE
11/3:  --Non-weight bearing right leg, brace at all times, locked in extension  --Non-weight bearing left arm, gauntlet brace  --Ok to restart dvt ppx in am  --Plan to return to OR, likely 10/5 for ORIF left proximal humerus  11/5  --surgery today by Dr. Wolf  11/6-H/H stable post procedure- sling in place- analgesia as needed   11/7- stable  -stable

## 2020-11-08 NOTE — PT/OT/SLP PROGRESS
Physical Therapy  Treatment    Lakshmi Campbell   MRN: [id number]   Admitting Diagnosis: Closed fracture of surgical neck of left humerus    PT Received On: 11/08/20  PT Start Time: 1000     PT Stop Time: 1012    PT Total Time (min): 12 min       Billable Minutes:  Therapeutic Activity 8 and Therapeutic Exercise 4    Treatment Type: Treatment  PT/PTA: PTA     PTA Visit Number: 2       General Precautions: Standard, fall  Orthopedic Precautions: LUE non weight bearing, RLE non weight bearing   Braces: UE Sling, Knee immobilizer         Subjective:  Communicated with epic and nurse Ty prior to session.      Pain/Comfort  Pain Rating 1: 0/10  Pain Rating Post-Intervention 1: 0/10    Objective:   Patient found with: peripheral IV, oxygen    Functional Mobility:  Bed Mobility:        Transfers:       Gait:        Stairs:          Balance:   Static Sit: FAIR-: Maintains without assist but inconsistent   Dynamic Sit: FAIR+: Maintains balance through MINIMAL excursions of active trunk motion  Static Stand:   Dynamic stand:        Therapeutic Activities and Exercises:  Pt transferred to sitting EOB and performed seated exercises.      AM-PAC 6 CLICK MOBILITY  How much help from another person does this patient currently need?   1 = Unable, Total/Dependent Assistance  2 = A lot, Maximum/Moderate Assistance  3 = A little, Minimum/Contact Guard/Supervision  4 = None, Modified [place]/Independent    Turning over in bed (including adjusting bedclothes, sheets and blankets)?: 2  Sitting down on and standing up from a chair with arms (e.g., wheelchair, bedside commode, etc.): 1  Moving from lying on back to sitting on the side of the bed?: 2  Moving to and from a bed to a chair (including a wheelchair)?: 1  Need to walk in hospital room?: 1  Climbing 3-5 steps with a railing?: 1  Basic Mobility Total Score: 8    AM-PAC Raw Score CMS G-Code Modifier Level of Impairment Assistance   6 % Total / Unable   7 - 9 CM 80 - 100%  Maximal Assist   10 - 14 CL 60 - 80% Moderate Assist   15 - 19 CK 40 - 60% Moderate Assist   20 - 22 CJ 20 - 40% Minimal Assist   23 CI 1-20% SBA / CGA   24 CH 0% Independent/ Mod I     Patient left sitting EOB with all lines intact, call button in reach and  present.    Assessment:  Lakshmi Campbell is a 69 y.o. female with a medical diagnosis of Closed fracture of surgical neck of left humerus and presents with decreased mobility.    Rehab identified problem list/impairments: Rehab identified problem list/impairments: weakness, impaired endurance, impaired sensation, impaired self care skills, impaired functional mobilty, gait instability, impaired balance, decreased safety awareness, decreased upper extremity function, decreased lower extremity function    Rehab potential is good.    Activity tolerance: Good    Discharge recommendations: Discharge Facility/Level of Care Needs: nursing facility, skilled, rehabilitation facility     Barriers to discharge:      Equipment recommendations: Equipment Needed After Discharge: wheelchair     GOALS:   Multidisciplinary Problems     Physical Therapy Goals        Problem: Physical Therapy Goal    Goal Priority Disciplines Outcome Goal Variances Interventions   Physical Therapy Goal     PT, PT/OT Ongoing, Progressing     Description: PT WILL BE SEEN FOR P.T. FOR A MIN OF 5 OUT OF 7 DAYS A WEEK  LT20  1. PT WILL COMPLETE BED MOBILITY WITH MOD A  2. PT WILL T/F TO CHAIR WITH MAX A X 2 NWB L UE AND R LE.  3. PT WILL COMPLETE B LE TE X 10 REPS FOR STRENGTHENING.                    PLAN:    Patient to be seen 5 x/week  to address the above listed problems via gait training, therapeutic activities, therapeutic exercises  Plan of Care expires: 20  Plan of Care reviewed with: patient, spouse         Adrian Kanteriperlita, PT  2020

## 2020-11-08 NOTE — PLAN OF CARE
Pt in bed and agreeable to PT. Pt was transferred from supine to sitting and performed seated exercises in all available planes. Pt was left sitting EOB with all needs with in reach and  present.

## 2020-11-08 NOTE — SUBJECTIVE & OBJECTIVE
Interval History: pt stable and sitting up to side of bed during exam.  H/H stable.  Pt awaiting placement.  Current plan of care continued.      Review of Systems   Constitutional: Negative for chills, diaphoresis, fatigue and fever.   HENT: Negative for congestion, rhinorrhea and sore throat.    Eyes: Negative for pain, discharge and itching.   Respiratory: Negative for cough, shortness of breath and wheezing.    Cardiovascular: Negative for chest pain, palpitations and leg swelling.   Gastrointestinal: Negative for abdominal distention, abdominal pain, diarrhea, nausea and vomiting.   Endocrine: Negative for cold intolerance and heat intolerance.   Genitourinary: Negative for difficulty urinating, dysuria and flank pain.   Musculoskeletal: Positive for arthralgias, back pain, gait problem, joint swelling and myalgias.   Skin: Negative for color change and wound.   Allergic/Immunologic: Negative.    Neurological: Negative for dizziness, syncope, speech difficulty, weakness and light-headedness.   Hematological: Negative.    Psychiatric/Behavioral: Negative for agitation, behavioral problems, confusion and sleep disturbance. The patient is not nervous/anxious.      Objective:     Vital Signs (Most Recent):  Temp: 98 °F (36.7 °C) (11/08/20 1239)  Pulse: 78 (11/08/20 1302)  Resp: 18 (11/08/20 1239)  BP: (!) 166/69 (11/08/20 1239)  SpO2: (!) 94 % (11/08/20 1239) Vital Signs (24h Range):  Temp:  [98 °F (36.7 °C)-98.6 °F (37 °C)] 98 °F (36.7 °C)  Pulse:  [71-90] 78  Resp:  [14-20] 18  SpO2:  [92 %-94 %] 94 %  BP: (133-189)/() 166/69     Weight: 123 kg (271 lb 2.7 oz)  Body mass index is 45.12 kg/m².    Intake/Output Summary (Last 24 hours) at 11/8/2020 1438  Last data filed at 11/8/2020 0900  Gross per 24 hour   Intake 1010 ml   Output 1200 ml   Net -190 ml      Physical Exam  Vitals signs and nursing note reviewed.   Constitutional:       Appearance: Normal appearance. She is well-developed. She is obese.   HENT:       Head: Normocephalic and atraumatic.      Nose: Nose normal.      Mouth/Throat:      Mouth: Mucous membranes are moist.      Pharynx: Oropharynx is clear.   Eyes:      General: No scleral icterus.     Extraocular Movements: Extraocular movements intact.      Conjunctiva/sclera: Conjunctivae normal.   Neck:      Musculoskeletal: Normal range of motion and neck supple. No muscular tenderness.   Cardiovascular:      Rate and Rhythm: Normal rate and regular rhythm.      Pulses: Normal pulses.      Heart sounds: Normal heart sounds. No murmur. No friction rub. No gallop.    Pulmonary:      Effort: Pulmonary effort is normal.      Breath sounds: Examination of the right-lower field reveals decreased breath sounds. Examination of the left-lower field reveals decreased breath sounds. Decreased breath sounds present.   Abdominal:      General: Bowel sounds are normal. There is no distension.      Palpations: Abdomen is soft.      Tenderness: There is no abdominal tenderness.   Genitourinary:     Comments: deferred  Musculoskeletal:      Left wrist: She exhibits decreased range of motion. She exhibits no bony tenderness and no deformity.      Right knee: She exhibits decreased range of motion. She exhibits no bony tenderness.      Left upper arm: She exhibits tenderness and swelling. She exhibits no bony tenderness and no deformity.      Comments: Bruising to anterior knee - knee immobilizer intact  No midline spinal tenderness.  Limited ROM to L shoulder with surgical dressing and sling in place.     Skin:     General: Skin is warm and dry.      Capillary Refill: Capillary refill takes 2 to 3 seconds.      Comments: ACE wrap to knee with immobilizer in place.  Surgical dressing to L shoulder and L wrist    Neurological:      General: No focal deficit present.      Mental Status: She is alert and oriented to person, place, and time.   Psychiatric:         Mood and Affect: Mood normal.         Behavior: Behavior normal.          Thought Content: Thought content normal.         Significant Labs:   CBC:   Recent Labs   Lab 11/07/20  0722 11/08/20  0656   WBC 5.01 4.90   HGB 8.3* 8.6*   HCT 26.1* 26.6*   * 147*     CMP:   Recent Labs   Lab 11/07/20  0722 11/08/20  0656   * 135*   K 3.8 3.8    102   CO2 26 26   * 204*   BUN 17 15   CREATININE 0.8 0.8   CALCIUM 8.4* 8.5*   PROT 5.4* 5.6*   ALBUMIN 1.9* 1.9*   BILITOT 1.1* 1.2*   ALKPHOS 100 112   AST 16 16   ALT 8* 9*   ANIONGAP 5* 7*   EGFRNONAA >60 >60       Significant Imaging:   Imaging Results          X-Ray Knee 1 or 2 View Right (Final result)  Result time 10/30/20 14:24:11    Final result by LAYLA Meredith Sr., MD (10/30/20 14:24:11)                 Impression:      1. There has been no significant interval change in the appearance of the complex fractures of the patella.  2. There is a small joint effusion in the right knee.  3. There is moderate narrowing of the joint space of the medial compartment of the knee.  4. There is a mild amount of atherosclerosis.      Electronically signed by: Michel Meredith MD  Date:    10/30/2020  Time:    14:24             Narrative:    EXAMINATION:  XR KNEE 1 OR 2 VIEW RIGHT    CLINICAL HISTORY:  knee pain; fractures of the patella    COMPARISON:  10/27/2020    FINDINGS:  There has been no significant interval change in the appearance of the complex fractures of the patella.  There is no dislocation.  There is moderate narrowing of the joint space of the medial compartment of the knee.  There is a mild amount of atherosclerosis.  There is a small joint effusion in the right knee.                               X-Ray Wrist Complete Left (Final result)  Result time 10/30/20 11:26:10    Final result by Kelby Saeed Jr., MD (10/30/20 11:26:10)                 Impression:      Acute distal radius and ulnar fractures.      Electronically signed by: Kelby Saeed Jr., MD  Date:    10/30/2020  Time:    11:26              Narrative:    EXAMINATION:  XR WRIST COMPLETE 3 VIEWS LEFT    CLINICAL HISTORY:  Pain in left wrist    TECHNIQUE:  PA, lateral, and oblique views of the left wrist were performed.    COMPARISON:  None    FINDINGS:  Osteopenia.  There is a distal radial impaction fracture with dorsal angulation of the distal fracture fragment.  Associated ulnar styloid fracture.  No definite carpal fracture.                               X-Ray Shoulder Trauma Left (Final result)  Result time 10/30/20 11:27:08    Final result by Kelby Saeed Jr., MD (10/30/20 11:27:08)                 Impression:      Displaced and overriding fracture of the surgical neck of the left humerus.      Electronically signed by: Kelby Saeed Jr., MD  Date:    10/30/2020  Time:    11:27             Narrative:    EXAMINATION:  XR SHOULDER TRAUMA 3 VIEW LEFT    CLINICAL HISTORY:  Pain in left shoulder    TECHNIQUE:  Three views of the left shoulder were performed.    COMPARISON  None    FINDINGS:  There is an acute fracture through the surgical neck of the humerus.  The distal fracture fragment is overriding by 3 cm.  It is displaced medially.  No sign of clavicular or scapular fracture.                               X-Ray Elbow Complete Left (Final result)  Result time 10/30/20 11:24:38    Final result by Adrian Taylor MD (10/30/20 11:24:38)                 Impression:      Limited single lateral view of the left elbow as above.      Electronically signed by: Adrian Taylor MD  Date:    10/30/2020  Time:    11:24             Narrative:    EXAMINATION:  XR ELBOW COMPLETE 3 VIEW LEFT    CLINICAL HISTORY:  XR ELBOW COMPLETE 3 VIEW LEFTPain in left elbow    COMPARISON:  None    FINDINGS:  Single lateral views of the left elbow were obtained.    No definite acute fracture or dislocation.  Bony mineralization is normal.  Soft tissues are unremarkable.   No gross joint effusion.  Mild degenerative changes.

## 2020-11-09 LAB
ALBUMIN SERPL BCP-MCNC: 2 G/DL (ref 3.5–5.2)
ALP SERPL-CCNC: 122 U/L (ref 55–135)
ALT SERPL W/O P-5'-P-CCNC: 6 U/L (ref 10–44)
ANION GAP SERPL CALC-SCNC: 9 MMOL/L (ref 8–16)
AST SERPL-CCNC: 14 U/L (ref 10–40)
BASOPHILS # BLD AUTO: 0.03 K/UL (ref 0–0.2)
BASOPHILS NFR BLD: 0.6 % (ref 0–1.9)
BILIRUB SERPL-MCNC: 1.2 MG/DL (ref 0.1–1)
BUN SERPL-MCNC: 16 MG/DL (ref 8–23)
CALCIUM SERPL-MCNC: 8.3 MG/DL (ref 8.7–10.5)
CHLORIDE SERPL-SCNC: 102 MMOL/L (ref 95–110)
CO2 SERPL-SCNC: 25 MMOL/L (ref 23–29)
CREAT SERPL-MCNC: 0.8 MG/DL (ref 0.5–1.4)
DIFFERENTIAL METHOD: ABNORMAL
EOSINOPHIL # BLD AUTO: 0.2 K/UL (ref 0–0.5)
EOSINOPHIL NFR BLD: 3.2 % (ref 0–8)
ERYTHROCYTE [DISTWIDTH] IN BLOOD BY AUTOMATED COUNT: 14.1 % (ref 11.5–14.5)
EST. GFR  (AFRICAN AMERICAN): >60 ML/MIN/1.73 M^2
EST. GFR  (NON AFRICAN AMERICAN): >60 ML/MIN/1.73 M^2
GLUCOSE SERPL-MCNC: 234 MG/DL (ref 70–110)
HCT VFR BLD AUTO: 27.6 % (ref 37–48.5)
HGB BLD-MCNC: 8.8 G/DL (ref 12–16)
IMM GRANULOCYTES # BLD AUTO: 0.03 K/UL (ref 0–0.04)
IMM GRANULOCYTES NFR BLD AUTO: 0.6 % (ref 0–0.5)
LYMPHOCYTES # BLD AUTO: 0.9 K/UL (ref 1–4.8)
LYMPHOCYTES NFR BLD: 18.5 % (ref 18–48)
MCH RBC QN AUTO: 28.5 PG (ref 27–31)
MCHC RBC AUTO-ENTMCNC: 31.9 G/DL (ref 32–36)
MCV RBC AUTO: 89 FL (ref 82–98)
MONOCYTES # BLD AUTO: 0.5 K/UL (ref 0.3–1)
MONOCYTES NFR BLD: 9.5 % (ref 4–15)
NEUTROPHILS # BLD AUTO: 3.2 K/UL (ref 1.8–7.7)
NEUTROPHILS NFR BLD: 67.6 % (ref 38–73)
NRBC BLD-RTO: 0 /100 WBC
PLATELET # BLD AUTO: 160 K/UL (ref 150–350)
PMV BLD AUTO: 9.6 FL (ref 9.2–12.9)
POCT GLUCOSE: 212 MG/DL (ref 70–110)
POCT GLUCOSE: 267 MG/DL (ref 70–110)
POCT GLUCOSE: 277 MG/DL (ref 70–110)
POCT GLUCOSE: 288 MG/DL (ref 70–110)
POTASSIUM SERPL-SCNC: 3.9 MMOL/L (ref 3.5–5.1)
PROT SERPL-MCNC: 5.6 G/DL (ref 6–8.4)
RBC # BLD AUTO: 3.09 M/UL (ref 4–5.4)
SODIUM SERPL-SCNC: 136 MMOL/L (ref 136–145)
WBC # BLD AUTO: 4.76 K/UL (ref 3.9–12.7)

## 2020-11-09 PROCEDURE — 85025 COMPLETE CBC W/AUTO DIFF WBC: CPT

## 2020-11-09 PROCEDURE — 97110 THERAPEUTIC EXERCISES: CPT | Performed by: PHYSICAL THERAPIST

## 2020-11-09 PROCEDURE — 97530 THERAPEUTIC ACTIVITIES: CPT

## 2020-11-09 PROCEDURE — 63600175 PHARM REV CODE 636 W HCPCS: Performed by: STUDENT IN AN ORGANIZED HEALTH CARE EDUCATION/TRAINING PROGRAM

## 2020-11-09 PROCEDURE — 25000003 PHARM REV CODE 250: Performed by: NURSE PRACTITIONER

## 2020-11-09 PROCEDURE — 63600175 PHARM REV CODE 636 W HCPCS: Performed by: NURSE PRACTITIONER

## 2020-11-09 PROCEDURE — 36415 COLL VENOUS BLD VENIPUNCTURE: CPT

## 2020-11-09 PROCEDURE — 97530 THERAPEUTIC ACTIVITIES: CPT | Performed by: PHYSICAL THERAPIST

## 2020-11-09 PROCEDURE — 21400001 HC TELEMETRY ROOM

## 2020-11-09 PROCEDURE — 80053 COMPREHEN METABOLIC PANEL: CPT

## 2020-11-09 RX ORDER — HYDROCHLOROTHIAZIDE 25 MG/1
25 TABLET ORAL DAILY
Status: DISCONTINUED | OUTPATIENT
Start: 2020-11-09 | End: 2020-11-11 | Stop reason: HOSPADM

## 2020-11-09 RX ORDER — HYDRALAZINE HYDROCHLORIDE 50 MG/1
50 TABLET, FILM COATED ORAL EVERY 8 HOURS PRN
Status: DISCONTINUED | OUTPATIENT
Start: 2020-11-09 | End: 2020-11-11 | Stop reason: HOSPADM

## 2020-11-09 RX ADMIN — PRAVASTATIN SODIUM 20 MG: 20 TABLET ORAL at 08:11

## 2020-11-09 RX ADMIN — ENOXAPARIN SODIUM 40 MG: 40 INJECTION SUBCUTANEOUS at 04:11

## 2020-11-09 RX ADMIN — DIPHENHYDRAMINE HYDROCHLORIDE 25 MG: 25 CAPSULE ORAL at 08:11

## 2020-11-09 RX ADMIN — AMLODIPINE BESYLATE 10 MG: 10 TABLET ORAL at 08:11

## 2020-11-09 RX ADMIN — INSULIN ASPART 3 UNITS: 100 INJECTION, SOLUTION INTRAVENOUS; SUBCUTANEOUS at 08:11

## 2020-11-09 RX ADMIN — INSULIN ASPART 6 UNITS: 100 INJECTION, SOLUTION INTRAVENOUS; SUBCUTANEOUS at 11:11

## 2020-11-09 RX ADMIN — PANTOPRAZOLE SODIUM 40 MG: 40 TABLET, DELAYED RELEASE ORAL at 08:11

## 2020-11-09 RX ADMIN — MORPHINE SULFATE 4 MG: 4 INJECTION INTRAVENOUS at 08:11

## 2020-11-09 RX ADMIN — CEFAZOLIN SODIUM 1 G: 1 SOLUTION INTRAVENOUS at 08:11

## 2020-11-09 RX ADMIN — LEVOTHYROXINE SODIUM 175 MCG: 150 TABLET ORAL at 06:11

## 2020-11-09 RX ADMIN — INSULIN ASPART 6 UNITS: 100 INJECTION, SOLUTION INTRAVENOUS; SUBCUTANEOUS at 04:11

## 2020-11-09 RX ADMIN — CEFAZOLIN SODIUM 1 G: 1 SOLUTION INTRAVENOUS at 03:11

## 2020-11-09 RX ADMIN — HYDROCHLOROTHIAZIDE 25 MG: 25 TABLET ORAL at 11:11

## 2020-11-09 RX ADMIN — INSULIN ASPART 2 UNITS: 100 INJECTION, SOLUTION INTRAVENOUS; SUBCUTANEOUS at 06:11

## 2020-11-09 RX ADMIN — MORPHINE SULFATE 4 MG: 4 INJECTION INTRAVENOUS at 12:11

## 2020-11-09 RX ADMIN — HYDRALAZINE HYDROCHLORIDE 25 MG: 25 TABLET, FILM COATED ORAL at 03:11

## 2020-11-09 NOTE — PLAN OF CARE
VSS. Incision to LUE and RLE, CDI. Cardiac monitoring running NSR. Glucose monitoring. Pt to turn q 2 hr but still refuses. IV antibiotics. Pain control with PRN pain meds. Call light in reach. Will continue to monitor. 12 hour chart check.

## 2020-11-09 NOTE — ASSESSMENT & PLAN NOTE
--Associated with ulnar styloid fracture  --Ortho consulted  --NPO after MN  --Prn analgesia  --PT/OT eval and treat after Ortho consult  11/2:  --surgery with Dr. Wolf today  11/3:  --Non-weight bearing right leg, brace at all times, locked in extension  --Non-weight bearing left arm, gauntlet brace  --Ok to restart dvt ppx in am  --Plan to return to OR, likely 10/5 for ORIF left proximal humerus  11/6:  --dressing and sling in place with analgesia as needed-   11/7- stable  -stable-placement to Rehab pending

## 2020-11-09 NOTE — ASSESSMENT & PLAN NOTE
--Was planned for surgery on 10/28/2020 with Dr. Wolf - was postponed due to thrombocytopenia  --Right leg in knee immobilzer  11/2:  --surgery with Dr. Wolf today  11/3:  --Non-weight bearing right leg, brace at all times, locked in extension  --Non-weight bearing left arm, gauntlet brace  --Ok to restart dvt ppx in am  --Plan to return to OR, likely 10/5 for ORIF left proximal humerus  11/6-- stable with dressing and knee immobilizer in place  11/7-- stable   -stable-placement to Rehab pending

## 2020-11-09 NOTE — CONSULTS
New consult received for Ms Campbell. She is awake and alert, sitting up at bedside with P.T. right AC with small flaky dry scab measuring less than 0.5x0.5cm. no redness to periwoun, ss infection, or evidence of pressure injury. Other scattered scabs and ecchymosis to BUE. No intervention required at this time. Please re consult if needed.

## 2020-11-09 NOTE — SUBJECTIVE & OBJECTIVE
Interval History: pt sitting n bed during exam.  BP elevated with Norvasc increased and HCTZ resumed.  Hydralazine as needed.  Levemir dose increased to assist with glucose control. Pt awaiting acceptance to Rehab facility.     Review of Systems   Constitutional: Negative for chills, diaphoresis, fatigue and fever.   HENT: Negative for congestion, rhinorrhea and sore throat.    Eyes: Negative for pain, discharge and itching.   Respiratory: Negative for cough, shortness of breath and wheezing.    Cardiovascular: Negative for chest pain, palpitations and leg swelling.   Gastrointestinal: Negative for abdominal distention, abdominal pain, diarrhea, nausea and vomiting.   Endocrine: Negative for cold intolerance and heat intolerance.   Genitourinary: Negative for difficulty urinating, dysuria and flank pain.   Musculoskeletal: Positive for arthralgias, back pain, gait problem, joint swelling and myalgias.   Skin: Negative for color change and wound.   Allergic/Immunologic: Negative.    Neurological: Negative for dizziness, syncope, speech difficulty, weakness and light-headedness.   Hematological: Negative.    Psychiatric/Behavioral: Negative for agitation, behavioral problems, confusion and sleep disturbance. The patient is not nervous/anxious.      Objective:     Vital Signs (Most Recent):  Temp: 97.8 °F (36.6 °C) (11/09/20 1151)  Pulse: 95 (11/09/20 1151)  Resp: 18 (11/09/20 1151)  BP: (!) 169/85 (11/09/20 1151)  SpO2: (!) 94 % (11/09/20 1151) Vital Signs (24h Range):  Temp:  [97.8 °F (36.6 °C)-98.9 °F (37.2 °C)] 97.8 °F (36.6 °C)  Pulse:  [74-95] 95  Resp:  [18] 18  SpO2:  [93 %-96 %] 94 %  BP: (150-204)/(69-91) 169/85     Weight: 123 kg (271 lb 2.7 oz)  Body mass index is 45.12 kg/m².    Intake/Output Summary (Last 24 hours) at 11/9/2020 1232  Last data filed at 11/9/2020 0749  Gross per 24 hour   Intake 640 ml   Output 0 ml   Net 640 ml      Physical Exam  Vitals signs and nursing note reviewed.   Constitutional:        Appearance: Normal appearance. She is well-developed. She is obese.   HENT:      Head: Normocephalic and atraumatic.      Nose: Nose normal.      Mouth/Throat:      Mouth: Mucous membranes are moist.      Pharynx: Oropharynx is clear.   Eyes:      General: No scleral icterus.     Extraocular Movements: Extraocular movements intact.      Conjunctiva/sclera: Conjunctivae normal.   Neck:      Musculoskeletal: Normal range of motion and neck supple. No muscular tenderness.   Cardiovascular:      Rate and Rhythm: Normal rate and regular rhythm.      Pulses: Normal pulses.      Heart sounds: Normal heart sounds. No murmur. No friction rub. No gallop.    Pulmonary:      Effort: Pulmonary effort is normal.      Breath sounds: Examination of the right-lower field reveals decreased breath sounds. Examination of the left-lower field reveals decreased breath sounds. Decreased breath sounds present.   Abdominal:      General: Bowel sounds are normal. There is no distension.      Palpations: Abdomen is soft.      Tenderness: There is no abdominal tenderness.   Genitourinary:     Comments: deferred  Musculoskeletal:      Left wrist: She exhibits decreased range of motion. She exhibits no bony tenderness and no deformity.      Right knee: She exhibits decreased range of motion. She exhibits no bony tenderness.      Left upper arm: She exhibits tenderness and swelling. She exhibits no bony tenderness and no deformity.      Comments: ACE wrap in place with knee immobilizer intact  Limited ROM to L shoulder and wrist with surgical dressing and sling in place.     Skin:     General: Skin is warm and dry.      Capillary Refill: Capillary refill takes 2 to 3 seconds.      Comments: ACE wrap to knee with immobilizer in place.  Surgical dressing to L shoulder and L wrist    Neurological:      General: No focal deficit present.      Mental Status: She is alert and oriented to person, place, and time.   Psychiatric:         Mood and  Affect: Mood normal.         Behavior: Behavior normal.         Thought Content: Thought content normal.         Significant Labs:   CBC:   Recent Labs   Lab 11/08/20  0656 11/09/20  0701   WBC 4.90 4.76   HGB 8.6* 8.8*   HCT 26.6* 27.6*   * 160     CMP:   Recent Labs   Lab 11/08/20  0656 11/09/20  0701   * 136   K 3.8 3.9    102   CO2 26 25   * 234*   BUN 15 16   CREATININE 0.8 0.8   CALCIUM 8.5* 8.3*   PROT 5.6* 5.6*   ALBUMIN 1.9* 2.0*   BILITOT 1.2* 1.2*   ALKPHOS 112 122   AST 16 14   ALT 9* 6*   ANIONGAP 7* 9   EGFRNONAA >60 >60       Significant Imaging:   Imaging Results          X-Ray Knee 1 or 2 View Right (Final result)  Result time 10/30/20 14:24:11    Final result by LAYLA Meredith Sr., MD (10/30/20 14:24:11)                 Impression:      1. There has been no significant interval change in the appearance of the complex fractures of the patella.  2. There is a small joint effusion in the right knee.  3. There is moderate narrowing of the joint space of the medial compartment of the knee.  4. There is a mild amount of atherosclerosis.      Electronically signed by: Michel Meredith MD  Date:    10/30/2020  Time:    14:24             Narrative:    EXAMINATION:  XR KNEE 1 OR 2 VIEW RIGHT    CLINICAL HISTORY:  knee pain; fractures of the patella    COMPARISON:  10/27/2020    FINDINGS:  There has been no significant interval change in the appearance of the complex fractures of the patella.  There is no dislocation.  There is moderate narrowing of the joint space of the medial compartment of the knee.  There is a mild amount of atherosclerosis.  There is a small joint effusion in the right knee.                               X-Ray Wrist Complete Left (Final result)  Result time 10/30/20 11:26:10    Final result by Kelby Saeed Jr., MD (10/30/20 11:26:10)                 Impression:      Acute distal radius and ulnar fractures.      Electronically signed by: Kelby Saeed Jr.  MD  Date:    10/30/2020  Time:    11:26             Narrative:    EXAMINATION:  XR WRIST COMPLETE 3 VIEWS LEFT    CLINICAL HISTORY:  Pain in left wrist    TECHNIQUE:  PA, lateral, and oblique views of the left wrist were performed.    COMPARISON:  None    FINDINGS:  Osteopenia.  There is a distal radial impaction fracture with dorsal angulation of the distal fracture fragment.  Associated ulnar styloid fracture.  No definite carpal fracture.                               X-Ray Shoulder Trauma Left (Final result)  Result time 10/30/20 11:27:08    Final result by Kelby Saeed Jr., MD (10/30/20 11:27:08)                 Impression:      Displaced and overriding fracture of the surgical neck of the left humerus.      Electronically signed by: Kelby Saeed Jr., MD  Date:    10/30/2020  Time:    11:27             Narrative:    EXAMINATION:  XR SHOULDER TRAUMA 3 VIEW LEFT    CLINICAL HISTORY:  Pain in left shoulder    TECHNIQUE:  Three views of the left shoulder were performed.    COMPARISON  None    FINDINGS:  There is an acute fracture through the surgical neck of the humerus.  The distal fracture fragment is overriding by 3 cm.  It is displaced medially.  No sign of clavicular or scapular fracture.                               X-Ray Elbow Complete Left (Final result)  Result time 10/30/20 11:24:38    Final result by Adrian Taylor MD (10/30/20 11:24:38)                 Impression:      Limited single lateral view of the left elbow as above.      Electronically signed by: Adrian Taylor MD  Date:    10/30/2020  Time:    11:24             Narrative:    EXAMINATION:  XR ELBOW COMPLETE 3 VIEW LEFT    CLINICAL HISTORY:  XR ELBOW COMPLETE 3 VIEW LEFTPain in left elbow    COMPARISON:  None    FINDINGS:  Single lateral views of the left elbow were obtained.    No definite acute fracture or dislocation.  Bony mineralization is normal.  Soft tissues are unremarkable.   No gross joint effusion.  Mild degenerative changes.

## 2020-11-09 NOTE — ASSESSMENT & PLAN NOTE
--BP remains elevated   --continue amlodipine-dose increased   --HCTZ resumed   --Hydralazine as needed   --cardiac diet

## 2020-11-09 NOTE — PLAN OF CARE
"Pt had no adverse events during shift. Pt free of falls. Call light in reach. Side rails x 2. Pain well controlled w/ prn meds. Pt refusing q2 turns and skin assessment to back and buttocks; stated she "may" let it be assessed in morning if no pain. Pt educated on pressure injuries and need for frequent weight shifting. Pt verbalized understanding. Cardiac and glucose monitoring per orders. IVF/abx administered as ordered. VTE prophylaxis- . VSS. Safety promoted. Chart reviewed, will continue to monitor.    "

## 2020-11-09 NOTE — PLAN OF CARE
Swer met with pt and pt's spouse to discuss Skilled Nursing placements. Swer provided list. Swer to f/u on options.        11/09/20 5373   Post-Acute Status   Post-Acute Authorization Placement   Post-Acute Placement Status Patient List Provided   Discharge Plan   Discharge Plan A Skilled Nursing Facility

## 2020-11-09 NOTE — ASSESSMENT & PLAN NOTE
--ortho surgery consulted (Dr. Montesinos)  --no surgery as new ortho on call tomorrow  --PRN analgesia  11/2:  --surgery with Dr. Wolf today  11/3:  --Non-weight bearing right leg, brace at all times, locked in extension  --Non-weight bearing left arm, gauntlet brace  --Ok to restart dvt ppx in am  --Plan to return to OR, likely 11/5 for ORIF left proximal humerus  11/6: Surgical dressing intact with sling in place   11/7: Stable   -stable- placement to Rehab pending

## 2020-11-09 NOTE — PT/OT/SLP PROGRESS
Occupational Therapy   Treatment    Name: Lakshmi Campbell  MRN: [id number]  Admitting Diagnosis:  Closed fracture of surgical neck of left humerus  4 Days Post-Op    Recommendations:     Discharge Recommendations: nursing facility, skilled, rehabilitation facility  Discharge Equipment Recommendations:  wheelchair  Barriers to discharge:       Assessment:     Lakshmi Campbell is a 69 y.o. female with a medical diagnosis of Closed fracture of surgical neck of left humerus.  She presents with DEBILITY AND GENERALIZED WEAKNESS. Performance deficits affecting function are weakness, impaired functional mobilty, impaired endurance, impaired balance, impaired self care skills.     Rehab Prognosis:  Fair; patient would benefit from acute skilled OT services to address these deficits and reach maximum level of function.       Plan:     Patient to be seen 3 x/week to address the above listed problems via self-care/home management, therapeutic activities, therapeutic exercises  · Plan of Care Expires: 11/12/20  · Plan of Care Reviewed with: patient    Subjective     Pain/Comfort:  · Pain Rating 1: 0/10    Objective:     Communicated with: NURSE AND Epic CHART REVIEW prior to session.  Patient found HOB elevated with telemetry, berrios catheter upon OT entry to room.    General Precautions: Standard, fall   Orthopedic Precautions:LUE non weight bearing   Braces: UE Sling, Knee immobilizer     Occupational Performance:     Bed Mobility:    · Patient completed Rolling/Turning to Right with moderate assistance  · Patient completed Scooting/Bridging with moderate assistance and with leg lift  · Patient completed Supine to Sit with moderate assistance and with leg lift       Activities of Daily Living:  · Feeding:  supervision FOR SET UP ONLY  · Upper Body Dressing: maximal assistance  JAIR HOSPTAL GOWN  · Lower Body Dressing: minimum assistance JAIR/DOFF SOCKS      AMPA 6 Click ADL: 11    Treatment and Education:  Pt educated to remain  sitting EOB to increase endurance.     Patient left sitting eob with all lines intact, call button in reach, nurse notified and spouse presentEducation:      GOALS:   Multidisciplinary Problems     Occupational Therapy Goals        Problem: Occupational Therapy Goal    Goal Priority Disciplines Outcome Interventions   Occupational Therapy Goal     OT, PT/OT Ongoing, Progressing    Description: OT GOALS  TO BE MET BY 11-12-20  MOD A WITH UE DRESSING  MOD A WITH LE DRESSING  PT WILL TOLERATE 1 SET X 15 REPS L UE ROM EXERCISE                   Time Tracking:     OT Date of Treatment: 11/09/20  OT Start Time: 1140  OT Stop Time: 1205  OT Total Time (min): 25 min    Billable Minutes:Therapeutic Activity 25 minutes    Gloria Rosado OT  11/9/2020     Dr. Hayes [phone]

## 2020-11-09 NOTE — PLAN OF CARE
Swer spoke with pt to f/u on SNF options. Pt chose [facility] (1st), [facility] (2nd), and [facility] (3rd). Swer sent referrals and notified liaisons.        11/09/20 1600   Post-Acute Status   Post-Acute Authorization Placement   Post-Acute Placement Status Referrals Sent   Discharge Plan   Discharge Plan A Skilled Nursing Facility

## 2020-11-09 NOTE — ASSESSMENT & PLAN NOTE
--accuchecks and SSI  --diabetic diet  --Levemir dose increased   --continue novolin at lower dose  --last HA1C 5.8

## 2020-11-09 NOTE — PLAN OF CARE
VSS. Cardiac monitoring running NSR. Incision to LUE and RLE, CDI. Glucose monitoring ACHS. IV antibiotics. Pt continues to refuse to turn. Will continue to monitor. Call light in reach. 12 hour chart check.

## 2020-11-09 NOTE — PT/OT/SLP PROGRESS
Physical Therapy  Treatment    Lakshmi Campbell   MRN: [id number]   Admitting Diagnosis: Closed fracture of surgical neck of left humerus    PT Received On: 11/09/20  PT Start Time: 1130     PT Stop Time: 1153    PT Total Time (min): 23 min       Billable Minutes:  Therapeutic Activity 13 min and Therapeutic Exercise 10 min    Treatment Type: Treatment  PT/PTA: PT     PTA Visit Number: 0       General Precautions: Standard, fall  Orthopedic Precautions: RLE non weight bearing, LUE non weight bearing   Braces: UE Sling, Knee immobilizer         Subjective:  Communicated with Nurse Ty and epic chart review prior to session.  Pt found supine in bed and agreeable to tx at this time.     Pain/Comfort  Pain Rating 1: 0/10    Objective:   Patient found with: Derikck, peripheral IV    Functional Mobility:  Therapeutic Activities and Exercises:  Pt donned socks in bed with Total A, donned gown with Max A. Pt performed supine>sit with Mod A and extended time to complete, scooted to EOB with Mod A. Pt sat EOB and performed (L) LE therapeutic exercises 1 x 15 reps: MIP, TKE, AP; and (R) AP. Pt set up to eat lunch while sitting EOB.     AM-PAC 6 CLICK MOBILITY  How much help from another person does this patient currently need?   1 = Unable, Total/Dependent Assistance  2 = A lot, Maximum/Moderate Assistance  3 = A little, Minimum/Contact Guard/Supervision  4 = None, Modified [facility]/Independent    Turning over in bed (including adjusting bedclothes, sheets and blankets)?: 2  Sitting down on and standing up from a chair with arms (e.g., wheelchair, bedside commode, etc.): 1  Moving from lying on back to sitting on the side of the bed?: 2  Moving to and from a bed to a chair (including a wheelchair)?: 1  Need to walk in hospital room?: 1  Climbing 3-5 steps with a railing?: 1  Basic Mobility Total Score: 8    AM-PAC Raw Score CMS G-Code Modifier Level of Impairment Assistance   6 % Total / Unable   7 - 9 CM 80 - 100%  Maximal Assist   10 - 14 CL 60 - 80% Moderate Assist   15 - 19 CK 40 - 60% Moderate Assist   20 - 22 CJ 20 - 40% Minimal Assist   23 CI 1-20% SBA / CGA   24 CH 0% Independent/ Mod I     Patient left sitting EOB with all lines intact, call button in reach and Nurse TY notified.    Assessment:  Lakshmi Campbell is a 69 y.o. female with a medical diagnosis of Closed fracture of surgical neck of left humerus and presents with impaired functional mobility. Pt will benefit from continued skilled PT in order to address the listed impairments.     Rehab identified problem list/impairments: Rehab identified problem list/impairments: weakness, impaired endurance, impaired self care skills, impaired functional mobilty, gait instability, impaired balance, impaired cognition, decreased coordination, decreased upper extremity function, decreased lower extremity function, decreased safety awareness, pain, decreased ROM, edema, orthopedic precautions    Rehab potential is fair.    Activity tolerance: Fair    Discharge recommendations: Discharge Facility/Level of Care Needs: nursing facility, skilled, rehabilitation facility     Barriers to discharge:   UNKNOWN    Equipment recommendations: Equipment Needed After Discharge: wheelchair     GOALS:   Multidisciplinary Problems     Physical Therapy Goals        Problem: Physical Therapy Goal    Goal Priority Disciplines Outcome Goal Variances Interventions   Physical Therapy Goal     PT, PT/OT Ongoing, Progressing     Description: PT WILL BE SEEN FOR P.T. FOR A MIN OF 5 OUT OF 7 DAYS A WEEK  LT20  1. PT WILL COMPLETE BED MOBILITY WITH MOD A  2. PT WILL T/F TO CHAIR WITH MAX A X 2 NWB L UE AND R LE.  3. PT WILL COMPLETE B LE TE X 10 REPS FOR STRENGTHENING.                    PLAN:    Patient to be seen 5 x/week  to address the above listed problems via gait training, therapeutic activities, therapeutic exercises  Plan of Care expires: 20  Plan of Care reviewed with:  patient         Thea Hare, PT/OT  11/09/2020

## 2020-11-10 LAB
ALBUMIN SERPL BCP-MCNC: 2.1 G/DL (ref 3.5–5.2)
ALP SERPL-CCNC: 130 U/L (ref 55–135)
ALT SERPL W/O P-5'-P-CCNC: 9 U/L (ref 10–44)
ANION GAP SERPL CALC-SCNC: 7 MMOL/L (ref 8–16)
AST SERPL-CCNC: 16 U/L (ref 10–40)
BASOPHILS # BLD AUTO: 0.02 K/UL (ref 0–0.2)
BASOPHILS NFR BLD: 0.5 % (ref 0–1.9)
BILIRUB SERPL-MCNC: 1.2 MG/DL (ref 0.1–1)
BUN SERPL-MCNC: 14 MG/DL (ref 8–23)
CALCIUM SERPL-MCNC: 8.4 MG/DL (ref 8.7–10.5)
CHLORIDE SERPL-SCNC: 101 MMOL/L (ref 95–110)
CO2 SERPL-SCNC: 27 MMOL/L (ref 23–29)
CREAT SERPL-MCNC: 0.8 MG/DL (ref 0.5–1.4)
DIFFERENTIAL METHOD: ABNORMAL
EOSINOPHIL # BLD AUTO: 0.1 K/UL (ref 0–0.5)
EOSINOPHIL NFR BLD: 3.3 % (ref 0–8)
ERYTHROCYTE [DISTWIDTH] IN BLOOD BY AUTOMATED COUNT: 14.3 % (ref 11.5–14.5)
EST. GFR  (AFRICAN AMERICAN): >60 ML/MIN/1.73 M^2
EST. GFR  (NON AFRICAN AMERICAN): >60 ML/MIN/1.73 M^2
GLUCOSE SERPL-MCNC: 208 MG/DL (ref 70–110)
HCT VFR BLD AUTO: 29.2 % (ref 37–48.5)
HGB BLD-MCNC: 9.1 G/DL (ref 12–16)
IMM GRANULOCYTES # BLD AUTO: 0.04 K/UL (ref 0–0.04)
IMM GRANULOCYTES NFR BLD AUTO: 1 % (ref 0–0.5)
LYMPHOCYTES # BLD AUTO: 1.1 K/UL (ref 1–4.8)
LYMPHOCYTES NFR BLD: 25.2 % (ref 18–48)
MCH RBC QN AUTO: 28.3 PG (ref 27–31)
MCHC RBC AUTO-ENTMCNC: 31.2 G/DL (ref 32–36)
MCV RBC AUTO: 91 FL (ref 82–98)
MONOCYTES # BLD AUTO: 0.4 K/UL (ref 0.3–1)
MONOCYTES NFR BLD: 10 % (ref 4–15)
NEUTROPHILS # BLD AUTO: 2.5 K/UL (ref 1.8–7.7)
NEUTROPHILS NFR BLD: 60 % (ref 38–73)
NRBC BLD-RTO: 0 /100 WBC
PLATELET # BLD AUTO: 171 K/UL (ref 150–350)
PMV BLD AUTO: 9.8 FL (ref 9.2–12.9)
POCT GLUCOSE: 190 MG/DL (ref 70–110)
POCT GLUCOSE: 248 MG/DL (ref 70–110)
POCT GLUCOSE: 274 MG/DL (ref 70–110)
POCT GLUCOSE: 283 MG/DL (ref 70–110)
POTASSIUM SERPL-SCNC: 3.7 MMOL/L (ref 3.5–5.1)
PROT SERPL-MCNC: 5.9 G/DL (ref 6–8.4)
RBC # BLD AUTO: 3.22 M/UL (ref 4–5.4)
SODIUM SERPL-SCNC: 135 MMOL/L (ref 136–145)
WBC # BLD AUTO: 4.2 K/UL (ref 3.9–12.7)

## 2020-11-10 PROCEDURE — 25000003 PHARM REV CODE 250: Performed by: NURSE PRACTITIONER

## 2020-11-10 PROCEDURE — 85025 COMPLETE CBC W/AUTO DIFF WBC: CPT

## 2020-11-10 PROCEDURE — 63600175 PHARM REV CODE 636 W HCPCS: Performed by: NURSE PRACTITIONER

## 2020-11-10 PROCEDURE — 63600175 PHARM REV CODE 636 W HCPCS: Performed by: STUDENT IN AN ORGANIZED HEALTH CARE EDUCATION/TRAINING PROGRAM

## 2020-11-10 PROCEDURE — 80053 COMPREHEN METABOLIC PANEL: CPT

## 2020-11-10 PROCEDURE — 97110 THERAPEUTIC EXERCISES: CPT

## 2020-11-10 PROCEDURE — 96372 THER/PROPH/DIAG INJ SC/IM: CPT

## 2020-11-10 PROCEDURE — 21400001 HC TELEMETRY ROOM

## 2020-11-10 PROCEDURE — 36415 COLL VENOUS BLD VENIPUNCTURE: CPT

## 2020-11-10 PROCEDURE — 97530 THERAPEUTIC ACTIVITIES: CPT

## 2020-11-10 RX ORDER — DOCUSATE SODIUM 100 MG/1
100 CAPSULE, LIQUID FILLED ORAL DAILY
Status: DISCONTINUED | OUTPATIENT
Start: 2020-11-10 | End: 2020-11-11 | Stop reason: HOSPADM

## 2020-11-10 RX ADMIN — PANTOPRAZOLE SODIUM 40 MG: 40 TABLET, DELAYED RELEASE ORAL at 08:11

## 2020-11-10 RX ADMIN — CEFAZOLIN SODIUM 1 G: 1 SOLUTION INTRAVENOUS at 10:11

## 2020-11-10 RX ADMIN — HYDROCHLOROTHIAZIDE 25 MG: 25 TABLET ORAL at 08:11

## 2020-11-10 RX ADMIN — MORPHINE SULFATE 4 MG: 4 INJECTION INTRAVENOUS at 08:11

## 2020-11-10 RX ADMIN — HYDRALAZINE HYDROCHLORIDE 50 MG: 50 TABLET, FILM COATED ORAL at 06:11

## 2020-11-10 RX ADMIN — ENOXAPARIN SODIUM 40 MG: 40 INJECTION SUBCUTANEOUS at 05:11

## 2020-11-10 RX ADMIN — HYDRALAZINE HYDROCHLORIDE 50 MG: 50 TABLET, FILM COATED ORAL at 08:11

## 2020-11-10 RX ADMIN — LEVOTHYROXINE SODIUM 175 MCG: 150 TABLET ORAL at 06:11

## 2020-11-10 RX ADMIN — PRAVASTATIN SODIUM 20 MG: 20 TABLET ORAL at 08:11

## 2020-11-10 RX ADMIN — INSULIN ASPART 4 UNITS: 100 INJECTION, SOLUTION INTRAVENOUS; SUBCUTANEOUS at 05:11

## 2020-11-10 RX ADMIN — MORPHINE SULFATE 4 MG: 4 INJECTION INTRAVENOUS at 09:11

## 2020-11-10 RX ADMIN — AMLODIPINE BESYLATE 10 MG: 10 TABLET ORAL at 08:11

## 2020-11-10 RX ADMIN — CEFAZOLIN SODIUM 1 G: 1 SOLUTION INTRAVENOUS at 12:11

## 2020-11-10 RX ADMIN — DOCUSATE SODIUM 100 MG: 100 CAPSULE, LIQUID FILLED ORAL at 08:11

## 2020-11-10 RX ADMIN — INSULIN ASPART 6 UNITS: 100 INJECTION, SOLUTION INTRAVENOUS; SUBCUTANEOUS at 11:11

## 2020-11-10 RX ADMIN — DIPHENHYDRAMINE HYDROCHLORIDE 25 MG: 25 CAPSULE ORAL at 08:11

## 2020-11-10 RX ADMIN — INSULIN ASPART 2 UNITS: 100 INJECTION, SOLUTION INTRAVENOUS; SUBCUTANEOUS at 06:11

## 2020-11-10 RX ADMIN — CEFAZOLIN SODIUM 1 G: 1 SOLUTION INTRAVENOUS at 08:11

## 2020-11-10 RX ADMIN — INSULIN ASPART 3 UNITS: 100 INJECTION, SOLUTION INTRAVENOUS; SUBCUTANEOUS at 10:11

## 2020-11-10 RX ADMIN — CEFAZOLIN SODIUM 1 G: 1 SOLUTION INTRAVENOUS at 03:11

## 2020-11-10 NOTE — ASSESSMENT & PLAN NOTE
--Was planned for surgery on 10/28/2020 with Dr. Wolf - was postponed due to thrombocytopenia  --Right leg in knee immobilzer  11/2:  --surgery with Dr. Wolf today  11/3:  --Non-weight bearing right leg, brace at all times, locked in extension  --Non-weight bearing left arm, gauntlet brace  --Ok to restart dvt ppx in am  --Plan to return to OR, likely 10/5 for ORIF left proximal humerus  11/6-- stable with dressing and knee immobilizer in place  11/7-- stable   -stable-placement to [facility] with authorization pending

## 2020-11-10 NOTE — SUBJECTIVE & OBJECTIVE
Interval History: pt stable in bed upon exam with pain controlled on prescribed narcotic regime.  Pt/ updated on plan of care per Dr. Borrero (Orthopedic Surgery).  Pt accepted to [facility] for SNF with authorization pending.      Review of Systems   Constitutional: Negative for chills, diaphoresis, fatigue and fever.   HENT: Negative for congestion, rhinorrhea and sore throat.    Eyes: Negative for pain, discharge and itching.   Respiratory: Negative for cough, shortness of breath and wheezing.    Cardiovascular: Negative for chest pain, palpitations and leg swelling.   Gastrointestinal: Negative for abdominal distention, abdominal pain, diarrhea, nausea and vomiting.   Endocrine: Negative for cold intolerance and heat intolerance.   Genitourinary: Negative for difficulty urinating, dysuria and flank pain.   Musculoskeletal: Positive for arthralgias, back pain, gait problem, joint swelling and myalgias.   Skin: Negative for color change and wound.   Allergic/Immunologic: Negative.    Neurological: Negative for dizziness, syncope, speech difficulty, weakness and light-headedness.   Hematological: Negative.    Psychiatric/Behavioral: Negative for agitation, behavioral problems, confusion and sleep disturbance. The patient is not nervous/anxious.      Objective:     Vital Signs (Most Recent):  Temp: 98.1 °F (36.7 °C) (11/10/20 1116)  Pulse: 95 (11/10/20 1310)  Resp: 16 (11/10/20 1116)  BP: (!) 149/64 (11/10/20 1116)  SpO2: 95 % (11/10/20 1116) Vital Signs (24h Range):  Temp:  [97.8 °F (36.6 °C)-98.6 °F (37 °C)] 98.1 °F (36.7 °C)  Pulse:  [69-99] 95  Resp:  [16-18] 16  SpO2:  [94 %-96 %] 95 %  BP: (137-178)/(62-78) 149/64     Weight: 123 kg (271 lb 2.7 oz)  Body mass index is 45.12 kg/m².    Intake/Output Summary (Last 24 hours) at 11/10/2020 1527  Last data filed at 11/10/2020 0800  Gross per 24 hour   Intake 180 ml   Output 1000 ml   Net -820 ml      Physical Exam  Vitals signs and nursing note reviewed.    Constitutional:       Appearance: Normal appearance. She is well-developed. She is obese.   HENT:      Head: Normocephalic and atraumatic.      Nose: Nose normal.      Mouth/Throat:      Mouth: Mucous membranes are moist.      Pharynx: Oropharynx is clear.   Eyes:      General: No scleral icterus.     Extraocular Movements: Extraocular movements intact.      Conjunctiva/sclera: Conjunctivae normal.   Neck:      Musculoskeletal: Normal range of motion and neck supple. No muscular tenderness.   Cardiovascular:      Rate and Rhythm: Normal rate and regular rhythm.      Pulses: Normal pulses.      Heart sounds: Normal heart sounds. No murmur. No friction rub. No gallop.    Pulmonary:      Effort: Pulmonary effort is normal.      Breath sounds: Examination of the right-lower field reveals decreased breath sounds. Examination of the left-lower field reveals decreased breath sounds. Decreased breath sounds present.   Abdominal:      General: Bowel sounds are normal. There is no distension.      Palpations: Abdomen is soft.      Tenderness: There is no abdominal tenderness.   Genitourinary:     Comments: deferred  Musculoskeletal:      Left wrist: She exhibits decreased range of motion. She exhibits no bony tenderness and no deformity.      Right knee: She exhibits decreased range of motion. She exhibits no bony tenderness.      Left upper arm: She exhibits tenderness and swelling. She exhibits no bony tenderness and no deformity.      Comments: ACE wrap in place with knee immobilizer intact  Limited ROM to L shoulder and wrist with surgical dressing and sling in place.     Skin:     General: Skin is warm and dry.      Capillary Refill: Capillary refill takes 2 to 3 seconds.      Comments: ACE wrap to knee with immobilizer in place.  Surgical dressing to L shoulder and L wrist    Neurological:      General: No focal deficit present.      Mental Status: She is alert and oriented to person, place, and time.   Psychiatric:          Mood and Affect: Mood normal.         Behavior: Behavior normal.         Thought Content: Thought content normal.         Significant Labs:   CBC:   Recent Labs   Lab 11/09/20  0701 11/10/20  0745   WBC 4.76 4.20   HGB 8.8* 9.1*   HCT 27.6* 29.2*    171     CMP:   Recent Labs   Lab 11/09/20  0701 11/10/20  0745    135*   K 3.9 3.7    101   CO2 25 27   * 208*   BUN 16 14   CREATININE 0.8 0.8   CALCIUM 8.3* 8.4*   PROT 5.6* 5.9*   ALBUMIN 2.0* 2.1*   BILITOT 1.2* 1.2*   ALKPHOS 122 130   AST 14 16   ALT 6* 9*   ANIONGAP 9 7*   EGFRNONAA >60 >60       Significant Imaging:   Imaging Results          X-Ray Knee 1 or 2 View Right (Final result)  Result time 10/30/20 14:24:11    Final result by LAYLA Meredith Sr., MD (10/30/20 14:24:11)                 Impression:      1. There has been no significant interval change in the appearance of the complex fractures of the patella.  2. There is a small joint effusion in the right knee.  3. There is moderate narrowing of the joint space of the medial compartment of the knee.  4. There is a mild amount of atherosclerosis.      Electronically signed by: Michel Meredith MD  Date:    10/30/2020  Time:    14:24             Narrative:    EXAMINATION:  XR KNEE 1 OR 2 VIEW RIGHT    CLINICAL HISTORY:  knee pain; fractures of the patella    COMPARISON:  10/27/2020    FINDINGS:  There has been no significant interval change in the appearance of the complex fractures of the patella.  There is no dislocation.  There is moderate narrowing of the joint space of the medial compartment of the knee.  There is a mild amount of atherosclerosis.  There is a small joint effusion in the right knee.                               X-Ray Wrist Complete Left (Final result)  Result time 10/30/20 11:26:10    Final result by Kelby Saeed Jr., MD (10/30/20 11:26:10)                 Impression:      Acute distal radius and ulnar fractures.      Electronically signed by: Kelby  Christophe Wilson MD  Date:    10/30/2020  Time:    11:26             Narrative:    EXAMINATION:  XR WRIST COMPLETE 3 VIEWS LEFT    CLINICAL HISTORY:  Pain in left wrist    TECHNIQUE:  PA, lateral, and oblique views of the left wrist were performed.    COMPARISON:  None    FINDINGS:  Osteopenia.  There is a distal radial impaction fracture with dorsal angulation of the distal fracture fragment.  Associated ulnar styloid fracture.  No definite carpal fracture.                               X-Ray Shoulder Trauma Left (Final result)  Result time 10/30/20 11:27:08    Final result by Kelby Saeed Jr., MD (10/30/20 11:27:08)                 Impression:      Displaced and overriding fracture of the surgical neck of the left humerus.      Electronically signed by: Kelby Saeed Jr., MD  Date:    10/30/2020  Time:    11:27             Narrative:    EXAMINATION:  XR SHOULDER TRAUMA 3 VIEW LEFT    CLINICAL HISTORY:  Pain in left shoulder    TECHNIQUE:  Three views of the left shoulder were performed.    COMPARISON  None    FINDINGS:  There is an acute fracture through the surgical neck of the humerus.  The distal fracture fragment is overriding by 3 cm.  It is displaced medially.  No sign of clavicular or scapular fracture.                               X-Ray Elbow Complete Left (Final result)  Result time 10/30/20 11:24:38    Final result by Adrian Taylor MD (10/30/20 11:24:38)                 Impression:      Limited single lateral view of the left elbow as above.      Electronically signed by: Adrian Taylor MD  Date:    10/30/2020  Time:    11:24             Narrative:    EXAMINATION:  XR ELBOW COMPLETE 3 VIEW LEFT    CLINICAL HISTORY:  XR ELBOW COMPLETE 3 VIEW LEFTPain in left elbow    COMPARISON:  None    FINDINGS:  Single lateral views of the left elbow were obtained.    No definite acute fracture or dislocation.  Bony mineralization is normal.  Soft tissues are unremarkable.   No gross joint effusion.  Mild degenerative  changes.

## 2020-11-10 NOTE — PT/OT/SLP PROGRESS
Physical Therapy      Patient Name:  Lakshmi Campbell   MRN:  [id number]    [id number]  S: PT MET IN  AGREED TO TX HOWEVER REPORTED SHE DIDN'T WANT TO REMAIN SEATED AFTER TX WAS COMPLETED.   O: PT BRIDGED TO SCOOT TO RIGHT SIDE OF BED WITH L LE AND MIN A. PT SEATED EOB WITH MAX A AND GIVEN CUES FOR RIGHT WT SHIFT TO SCOOT FORWARD. P.T. DOFFED SHOULDER BRACE AS PT HAS DRAINAGE FROM LEFT ARM. P.T. NOTICED A SKIN TEAR AT LEFT ELBOW AND NURSING CALLED TO ADDRESS. PT COMPLETED LEFT ELBOW AND HAND FLEX/EXT X 10 REPS. P.T. DONNED SPLINT FOR CONT OF TX. PT COMPLETED B LE AP, GLUT SETS X 15 REPS OF L LE TKE AND MIP FOR LE STRENGTHENING. PT COMPLETED ANT WT SHIFT FOR PRE- T/F TRAINING. PT WITH L LE WB AND SCOOT TO HOB X 5 TRIALS WITH MOD A. PT RETURNED SUP IN BED WITH MOD A AND SCOOTED TO HOB WITH L LE BRIDGE AND R UE ASSIST AT RAILING. PT LEFT WITH ALL NEEDS MET AND CALL BELL IN REACH.   A: PT LUIS ALFREDO TX WELL   P: CONT PER POC      Luciana Trevino, PT,11/10/2020

## 2020-11-10 NOTE — PLAN OF CARE
[facility] accepted and submitted for auth.   Swer callet in [place] and faxed Pas[facility]; awaiting 142.      [facility] f/u with [facility] auth is still pending  Received 142; scanned 142 and pasrr into [email or web address]  Moncapri Rabago LMSW 11/10/2020 1:24 PM      11/10/20 1113   Post-Acute Status   Post-Acute Authorization Placement   Post-Acute Placement Status Pending Payor Review   Discharge Plan   Discharge Plan A Skilled Nursing Facility

## 2020-11-10 NOTE — PLAN OF CARE
Pt had no adverse events during shift. Pt free of falls. Call light in reach. Side rails x 2. Pain well controlled w/ prn meds.  Blood glucose monitored, Coverage per SSI.  Pt encouraged to turn q2 and assisted with weight shifts, yet pt still refusing turns.  IV abx administered as ordered. NSR on cardiac monitor.  VSS. Safety promoted. Will continue to monitor.

## 2020-11-10 NOTE — PLAN OF CARE
Pt had no adverse events during shift. Pt free of falls. Call light in reach. Side rails x 2. Pain well controlled w/ prn meds. Pt encouraged to turn q2 and assisted with weight shifts, yet pt still refusing turns. Pt educated on possible outcome of skin breakdown. IV abx administered as ordered. Cardiac and glucose monitoring per orders. VTE prophylaxis- . VSS. Safety promoted. Chart reviewed, will continue to monitor.

## 2020-11-10 NOTE — PROGRESS NOTES
Post repair fracture patella, left wrist, and left shoulder by Dr Wolf. Good results. Awaiting Placement in SNF. Plan is to change dressings  SNF.

## 2020-11-10 NOTE — ASSESSMENT & PLAN NOTE
--ortho surgery consulted (Dr. Montesinos)  --no surgery as new ortho on call tomorrow  --PRN analgesia  11/2:  --surgery with Dr. Wolf today  11/3:  --Non-weight bearing right leg, brace at all times, locked in extension  --Non-weight bearing left arm, gauntlet brace  --Ok to restart dvt ppx in am  --Plan to return to OR, likely 11/5 for ORIF left proximal humerus  11/6: Surgical dressing intact with sling in place   11/7: Stable   -stable- pt accepted to [facility] with authorization pending

## 2020-11-10 NOTE — ASSESSMENT & PLAN NOTE
--Associated with ulnar styloid fracture  --Ortho consulted  --NPO after MN  --Prn analgesia  --PT/OT eval and treat after Ortho consult  11/2:  --surgery with Dr. Wolf today  11/3:  --Non-weight bearing right leg, brace at all times, locked in extension  --Non-weight bearing left arm, gauntlet brace  --Ok to restart dvt ppx in am  --Plan to return to OR, likely 10/5 for ORIF left proximal humerus  11/6:  --dressing and sling in place with analgesia as needed-   11/7- stable  -stable-placement to [facility] for SNF pending authorization

## 2020-11-10 NOTE — ASSESSMENT & PLAN NOTE
--BP improved   --continue amlodipine  --HCTZ resumed   --Hydralazine as needed   --cardiac diet

## 2020-11-10 NOTE — PROGRESS NOTES
[facility] Hospital Medicine  Progress Note    Patient Name: Lakshmi Campbell  MRN: [id number]  Patient Class: IP- Inpatient   Admission Date: 10/30/2020  Length of Stay: 10 days  Attending Physician: Connie Ochoa MD  Primary Care Provider: Karan Ribeiro DO        Subjective:     Principal Problem:Closed fracture of surgical neck of left humerus        HPI:  Pt is a 70 yo female with PMHx of DM II, HTN, HPL, hypothyroidism and morbid obesity who fractured her right patella 1 week ago and is wearing a knee immobilizer. Today, she had a second mishap when trying to get back in bed after using the bedside commode. Pt fell on her left side and immediately developed left shoulder and left wrist pain. She denies any presyncopal symptoms, fever, chest pain, gross neuro deficits, recent nausea/vomiting/diarrhea or urinary symptoms. Xrays found a left humerus fracture and left radius/ulnar fracture.   Vital signs stable and lab work largely normal except for thrombocytopenia which has improved from last level. Pt was placed on Observation for further evaluation by Orthopedics and possible need for surgical intervention.  On 11/6/2020, pt stable post procedure.  Social work consulted to assist with discharge planning and Rehab placement.      Overview/Hospital Course:  Patient was kept on OBS for closed fx of surgical neck of L humerus under the care of hospital medicine. Ortho surgery was consulted. 10/31: Patient asking for Dr. Wolf to be involved in her care. I explained that he is not on call today - sent him secure chat but he likely will not see it until Monday. Ordered diet as no surgical intervention today. Will discuss with Dr. King, who is on call tomorrow for ortho. 11/1: Discussed with Dr. King - who spoke with Dr. Wolf. Pt will have surgery performed by Dr. Wolf tomorrow afternoon. Will be NPO after MN and stop heparin at MN. 11/2: Patient scheduled for surgery this afternoon by  Dr. Wolf. She is currently NPO with spouse at bedside. Pain is controlled. 11/3: Patient had surgery to R knee and L wrist yesterday. PRN analgesia. Plans for L shoulder surgery on Thursday of this week. DVT profil restarted. PT/OT pending 11/4: patient to have L shoulder surgery tomorrow. She will dc early next week to rehab - Case management to start working on placement tomorrow. PT/OT to see her post op. Spoke with Dr. Wolf via telephone - he recommends weight based lovenox post op for DVT prophilaxis. Daughter at bedside, verbalized understanding of all. 11/5: Patient scheduled for L shoulder repair this afternoon. Currently NPO. Heparin held. PT/OT aware pt requesting rehab on dc. She sat on side of bed today for 90 minutes. Case management working on placement - likely Mon. Dr. Wolf requesting lovenox injections for DVT prophylaxis as she will need weight based.   On 11/7/2020, H/H stable with mild decline noted.  Lovenox given.  CM to assist with discharge planning and placement.  On 11/8/2020, pt stable with current plan of care continued.  H/H stable.  Pain at surgical sites and back improved on prescribed narcotic regime.  On 11/9/2020, placement to Rehab vs SNF in process due to NWB.  CM assisting with placement.  H/H stable.  BP elevated with Norvasc dose increased and Hydralazine as needed.  HCTZ also resumed.  On 11/10/2020, case discussed with Orthopedic Surgery and pt/ updated on current plan of care for Ortho per Dr[facility]  Pt accepted to [facility] with authorization pending.         Interval History: pt stable in bed upon exam with pain controlled on prescribed narcotic regime.  Pt/ updated on plan of care per Dr. Borrero (Orthopedic Surgery).  Pt accepted to [facility] for SNF with authorization pending.      Review of Systems   Constitutional: Negative for chills, diaphoresis, fatigue and fever.   HENT: Negative for congestion, rhinorrhea  and sore throat.    Eyes: Negative for pain, discharge and itching.   Respiratory: Negative for cough, shortness of breath and wheezing.    Cardiovascular: Negative for chest pain, palpitations and leg swelling.   Gastrointestinal: Negative for abdominal distention, abdominal pain, diarrhea, nausea and vomiting.   Endocrine: Negative for cold intolerance and heat intolerance.   Genitourinary: Negative for difficulty urinating, dysuria and flank pain.   Musculoskeletal: Positive for arthralgias, back pain, gait problem, joint swelling and myalgias.   Skin: Negative for color change and wound.   Allergic/Immunologic: Negative.    Neurological: Negative for dizziness, syncope, speech difficulty, weakness and light-headedness.   Hematological: Negative.    Psychiatric/Behavioral: Negative for agitation, behavioral problems, confusion and sleep disturbance. The patient is not nervous/anxious.      Objective:     Vital Signs (Most Recent):  Temp: 98.1 °F (36.7 °C) (11/10/20 1116)  Pulse: 95 (11/10/20 1310)  Resp: 16 (11/10/20 1116)  BP: (!) 149/64 (11/10/20 1116)  SpO2: 95 % (11/10/20 1116) Vital Signs (24h Range):  Temp:  [97.8 °F (36.6 °C)-98.6 °F (37 °C)] 98.1 °F (36.7 °C)  Pulse:  [69-99] 95  Resp:  [16-18] 16  SpO2:  [94 %-96 %] 95 %  BP: (137-178)/(62-78) 149/64     Weight: 123 kg (271 lb 2.7 oz)  Body mass index is 45.12 kg/m².    Intake/Output Summary (Last 24 hours) at 11/10/2020 1527  Last data filed at 11/10/2020 0800  Gross per 24 hour   Intake 180 ml   Output 1000 ml   Net -820 ml      Physical Exam  Vitals signs and nursing note reviewed.   Constitutional:       Appearance: Normal appearance. She is well-developed. She is obese.   HENT:      Head: Normocephalic and atraumatic.      Nose: Nose normal.      Mouth/Throat:      Mouth: Mucous membranes are moist.      Pharynx: Oropharynx is clear.   Eyes:      General: No scleral icterus.     Extraocular Movements: Extraocular movements intact.       Conjunctiva/sclera: Conjunctivae normal.   Neck:      Musculoskeletal: Normal range of motion and neck supple. No muscular tenderness.   Cardiovascular:      Rate and Rhythm: Normal rate and regular rhythm.      Pulses: Normal pulses.      Heart sounds: Normal heart sounds. No murmur. No friction rub. No gallop.    Pulmonary:      Effort: Pulmonary effort is normal.      Breath sounds: Examination of the right-lower field reveals decreased breath sounds. Examination of the left-lower field reveals decreased breath sounds. Decreased breath sounds present.   Abdominal:      General: Bowel sounds are normal. There is no distension.      Palpations: Abdomen is soft.      Tenderness: There is no abdominal tenderness.   Genitourinary:     Comments: deferred  Musculoskeletal:      Left wrist: She exhibits decreased range of motion. She exhibits no bony tenderness and no deformity.      Right knee: She exhibits decreased range of motion. She exhibits no bony tenderness.      Left upper arm: She exhibits tenderness and swelling. She exhibits no bony tenderness and no deformity.      Comments: ACE wrap in place with knee immobilizer intact  Limited ROM to L shoulder and wrist with surgical dressing and sling in place.     Skin:     General: Skin is warm and dry.      Capillary Refill: Capillary refill takes 2 to 3 seconds.      Comments: ACE wrap to knee with immobilizer in place.  Surgical dressing to L shoulder and L wrist    Neurological:      General: No focal deficit present.      Mental Status: She is alert and oriented to person, place, and time.   Psychiatric:         Mood and Affect: Mood normal.         Behavior: Behavior normal.         Thought Content: Thought content normal.         Significant Labs:   CBC:   Recent Labs   Lab 11/09/20  0701 11/10/20  0745   WBC 4.76 4.20   HGB 8.8* 9.1*   HCT 27.6* 29.2*    171     CMP:   Recent Labs   Lab 11/09/20  0701 11/10/20  0745    135*   K 3.9 3.7     101   CO2 25 27   * 208*   BUN 16 14   CREATININE 0.8 0.8   CALCIUM 8.3* 8.4*   PROT 5.6* 5.9*   ALBUMIN 2.0* 2.1*   BILITOT 1.2* 1.2*   ALKPHOS 122 130   AST 14 16   ALT 6* 9*   ANIONGAP 9 7*   EGFRNONAA >60 >60       Significant Imaging:   Imaging Results          X-Ray Knee 1 or 2 View Right (Final result)  Result time 10/30/20 14:24:11    Final result by LAYLA Meredith Sr., MD (10/30/20 14:24:11)                 Impression:      1. There has been no significant interval change in the appearance of the complex fractures of the patella.  2. There is a small joint effusion in the right knee.  3. There is moderate narrowing of the joint space of the medial compartment of the knee.  4. There is a mild amount of atherosclerosis.      Electronically signed by: Michel Meredith MD  Date:    10/30/2020  Time:    14:24             Narrative:    EXAMINATION:  XR KNEE 1 OR 2 VIEW RIGHT    CLINICAL HISTORY:  knee pain; fractures of the patella    COMPARISON:  10/27/2020    FINDINGS:  There has been no significant interval change in the appearance of the complex fractures of the patella.  There is no dislocation.  There is moderate narrowing of the joint space of the medial compartment of the knee.  There is a mild amount of atherosclerosis.  There is a small joint effusion in the right knee.                               X-Ray Wrist Complete Left (Final result)  Result time 10/30/20 11:26:10    Final result by Kelby Saeed Jr., MD (10/30/20 11:26:10)                 Impression:      Acute distal radius and ulnar fractures.      Electronically signed by: Kelby Saeed Jr., MD  Date:    10/30/2020  Time:    11:26             Narrative:    EXAMINATION:  XR WRIST COMPLETE 3 VIEWS LEFT    CLINICAL HISTORY:  Pain in left wrist    TECHNIQUE:  PA, lateral, and oblique views of the left wrist were performed.    COMPARISON:  None    FINDINGS:  Osteopenia.  There is a distal radial impaction fracture with dorsal angulation of  the distal fracture fragment.  Associated ulnar styloid fracture.  No definite carpal fracture.                               X-Ray Shoulder Trauma Left (Final result)  Result time 10/30/20 11:27:08    Final result by Kelby Saeed Jr., MD (10/30/20 11:27:08)                 Impression:      Displaced and overriding fracture of the surgical neck of the left humerus.      Electronically signed by: Kelby Saeed Jr., MD  Date:    10/30/2020  Time:    11:27             Narrative:    EXAMINATION:  XR SHOULDER TRAUMA 3 VIEW LEFT    CLINICAL HISTORY:  Pain in left shoulder    TECHNIQUE:  Three views of the left shoulder were performed.    COMPARISON  None    FINDINGS:  There is an acute fracture through the surgical neck of the humerus.  The distal fracture fragment is overriding by 3 cm.  It is displaced medially.  No sign of clavicular or scapular fracture.                               X-Ray Elbow Complete Left (Final result)  Result time 10/30/20 11:24:38    Final result by Adrian Taylor MD (10/30/20 11:24:38)                 Impression:      Limited single lateral view of the left elbow as above.      Electronically signed by: Adrian Taylor MD  Date:    10/30/2020  Time:    11:24             Narrative:    EXAMINATION:  XR ELBOW COMPLETE 3 VIEW LEFT    CLINICAL HISTORY:  XR ELBOW COMPLETE 3 VIEW LEFTPain in left elbow    COMPARISON:  None    FINDINGS:  Single lateral views of the left elbow were obtained.    No definite acute fracture or dislocation.  Bony mineralization is normal.  Soft tissues are unremarkable.   No gross joint effusion.  Mild degenerative changes.                                Assessment/Plan:      * Closed fracture of surgical neck of left humerus  --ortho surgery consulted (Dr. Montesinos)  --no surgery as new ortho on call tomorrow  --PRN analgesia  11/2:  --surgery with Dr. Wolf today  11/3:  --Non-weight bearing right leg, brace at all times, locked in extension  --Non-weight bearing left  arm, gauntlet brace  --Ok to restart dvt ppx in am  --Plan to return to OR, likely 11/5 for ORIF left proximal humerus  11/6: Surgical dressing intact with sling in place   11/7: Stable   -stable- pt accepted to [facility] with authorization pending       Shoulder pain, left  11/3:  --Non-weight bearing right leg, brace at all times, locked in extension  --Non-weight bearing left arm, gauntlet brace  --Ok to restart dvt ppx in am  --Plan to return to OR, likely 10/5 for ORIF left proximal humerus  11/5  --surgery today by Dr. Wolf  11/6-H/H stable post procedure- sling in place- analgesia as needed   11/7- stable  -stable       Closed left radial fracture  --Associated with ulnar styloid fracture  --Ortho consulted  --NPO after MN  --Prn analgesia  --PT/OT eval and treat after Ortho consult  11/2:  --surgery with Dr. Wolf today  11/3:  --Non-weight bearing right leg, brace at all times, locked in extension  --Non-weight bearing left arm, gauntlet brace  --Ok to restart dvt ppx in am  --Plan to return to OR, likely 10/5 for ORIF left proximal humerus  11/6:  --dressing and sling in place with analgesia as needed-   11/7- stable  -stable-placement to [facility] for SNF pending authorization       Closed displaced comminuted fracture of right patella  --Was planned for surgery on 10/28/2020 with Dr. Wolf - was postponed due to thrombocytopenia  --Right leg in knee immobilzer  11/2:  --surgery with Dr. Wolf today  11/3:  --Non-weight bearing right leg, brace at all times, locked in extension  --Non-weight bearing left arm, gauntlet brace  --Ok to restart dvt ppx in am  --Plan to return to OR, likely 10/5 for ORIF left proximal humerus  11/6-- stable with dressing and knee immobilizer in place  11/7-- stable   -stable-placement to [facility] with authorization pending     Hyperlipidemia  --continue statin  --cardiac diet      Essential hypertension  --BP improved   --continue  amlodipine  --HCTZ resumed   --Hydralazine as needed   --cardiac diet      Hypothyroidism  Continue levothyroxine    Type 2 diabetes mellitus with microalbuminuria, with long-term current use of insulin  --accuchecks and SSI  --diabetic diet  --Levemir dose increased   --continue novolin at lower dose  --last HA1C 5.8            VTE Risk Mitigation (From admission, onward)         Ordered     enoxaparin injection 40 mg  Every 24 hours      11/07/20 1204     Place sequential compression device  Until discontinued      10/30/20 5509                Discharge Planning   KYA:      Code Status: Not on file   Is the patient medically ready for discharge?:     Reason for patient still in hospital (select all that apply): Patient trending condition and Pending disposition  Discharge Plan A: Skilled Nursing Facility                  Ceci Pelaez NP  Department of Hospital Medicine   [facility]

## 2020-11-10 NOTE — PLAN OF CARE
Natasha received a phone call from Jen at [facility]. Auth has been approved for [facility].  Auth number is [id number]. [facility] notified.        11/10/20 1735   Post-Acute Status   Post-Acute Authorization Placement   Post-Acute Placement Status Set-up Complete   Discharge Plan   Discharge Plan A Skilled Nursing Facility

## 2020-11-11 VITALS
DIASTOLIC BLOOD PRESSURE: 63 MMHG | HEART RATE: 82 BPM | HEIGHT: 65 IN | RESPIRATION RATE: 16 BRPM | SYSTOLIC BLOOD PRESSURE: 140 MMHG | BODY MASS INDEX: 45.18 KG/M2 | TEMPERATURE: 98 F | WEIGHT: 271.19 LBS | OXYGEN SATURATION: 96 %

## 2020-11-11 LAB
ALBUMIN SERPL BCP-MCNC: 2.2 G/DL (ref 3.5–5.2)
ALP SERPL-CCNC: 144 U/L (ref 55–135)
ALT SERPL W/O P-5'-P-CCNC: 9 U/L (ref 10–44)
ANION GAP SERPL CALC-SCNC: 7 MMOL/L (ref 8–16)
AST SERPL-CCNC: 21 U/L (ref 10–40)
BASOPHILS # BLD AUTO: 0.04 K/UL (ref 0–0.2)
BASOPHILS NFR BLD: 0.8 % (ref 0–1.9)
BILIRUB SERPL-MCNC: 1.2 MG/DL (ref 0.1–1)
BUN SERPL-MCNC: 14 MG/DL (ref 8–23)
CALCIUM SERPL-MCNC: 8.3 MG/DL (ref 8.7–10.5)
CHLORIDE SERPL-SCNC: 101 MMOL/L (ref 95–110)
CO2 SERPL-SCNC: 26 MMOL/L (ref 23–29)
CREAT SERPL-MCNC: 0.8 MG/DL (ref 0.5–1.4)
DIFFERENTIAL METHOD: ABNORMAL
EOSINOPHIL # BLD AUTO: 0.1 K/UL (ref 0–0.5)
EOSINOPHIL NFR BLD: 2.6 % (ref 0–8)
ERYTHROCYTE [DISTWIDTH] IN BLOOD BY AUTOMATED COUNT: 14.5 % (ref 11.5–14.5)
EST. GFR  (AFRICAN AMERICAN): >60 ML/MIN/1.73 M^2
EST. GFR  (NON AFRICAN AMERICAN): >60 ML/MIN/1.73 M^2
GLUCOSE SERPL-MCNC: 180 MG/DL (ref 70–110)
HCT VFR BLD AUTO: 28.9 % (ref 37–48.5)
HGB BLD-MCNC: 8.9 G/DL (ref 12–16)
IMM GRANULOCYTES # BLD AUTO: 0.04 K/UL (ref 0–0.04)
IMM GRANULOCYTES NFR BLD AUTO: 0.8 % (ref 0–0.5)
LYMPHOCYTES # BLD AUTO: 1.1 K/UL (ref 1–4.8)
LYMPHOCYTES NFR BLD: 21.7 % (ref 18–48)
MCH RBC QN AUTO: 28.2 PG (ref 27–31)
MCHC RBC AUTO-ENTMCNC: 30.8 G/DL (ref 32–36)
MCV RBC AUTO: 92 FL (ref 82–98)
MONOCYTES # BLD AUTO: 0.6 K/UL (ref 0.3–1)
MONOCYTES NFR BLD: 11.5 % (ref 4–15)
NEUTROPHILS # BLD AUTO: 3.1 K/UL (ref 1.8–7.7)
NEUTROPHILS NFR BLD: 62.6 % (ref 38–73)
NRBC BLD-RTO: 0 /100 WBC
PLATELET # BLD AUTO: 165 K/UL (ref 150–350)
PMV BLD AUTO: 9.5 FL (ref 9.2–12.9)
POCT GLUCOSE: 197 MG/DL (ref 70–110)
POCT GLUCOSE: 230 MG/DL (ref 70–110)
POTASSIUM SERPL-SCNC: 3.7 MMOL/L (ref 3.5–5.1)
PROT SERPL-MCNC: 5.8 G/DL (ref 6–8.4)
RBC # BLD AUTO: 3.16 M/UL (ref 4–5.4)
SODIUM SERPL-SCNC: 134 MMOL/L (ref 136–145)
WBC # BLD AUTO: 4.94 K/UL (ref 3.9–12.7)

## 2020-11-11 PROCEDURE — 36415 COLL VENOUS BLD VENIPUNCTURE: CPT

## 2020-11-11 PROCEDURE — 80053 COMPREHEN METABOLIC PANEL: CPT

## 2020-11-11 PROCEDURE — 97530 THERAPEUTIC ACTIVITIES: CPT

## 2020-11-11 PROCEDURE — 63600175 PHARM REV CODE 636 W HCPCS: Performed by: NURSE PRACTITIONER

## 2020-11-11 PROCEDURE — 63600175 PHARM REV CODE 636 W HCPCS: Performed by: STUDENT IN AN ORGANIZED HEALTH CARE EDUCATION/TRAINING PROGRAM

## 2020-11-11 PROCEDURE — 97110 THERAPEUTIC EXERCISES: CPT

## 2020-11-11 PROCEDURE — 25000003 PHARM REV CODE 250: Performed by: NURSE PRACTITIONER

## 2020-11-11 PROCEDURE — 85025 COMPLETE CBC W/AUTO DIFF WBC: CPT

## 2020-11-11 RX ORDER — AMLODIPINE BESYLATE 10 MG/1
10 TABLET ORAL DAILY
Qty: 30 TABLET | Refills: 0 | Status: SHIPPED | OUTPATIENT
Start: 2020-11-12 | End: 2020-11-24

## 2020-11-11 RX ORDER — HYDROCODONE BITARTRATE AND ACETAMINOPHEN 5; 325 MG/1; MG/1
1 TABLET ORAL EVERY 8 HOURS PRN
Qty: 9 TABLET | Refills: 0 | Status: SHIPPED | OUTPATIENT
Start: 2020-11-11 | End: 2020-11-14

## 2020-11-11 RX ORDER — ENOXAPARIN SODIUM 100 MG/ML
40 INJECTION SUBCUTANEOUS DAILY
Start: 2020-11-11 | End: 2021-03-02

## 2020-11-11 RX ORDER — FUROSEMIDE 10 MG/ML
40 INJECTION INTRAMUSCULAR; INTRAVENOUS ONCE
Status: COMPLETED | OUTPATIENT
Start: 2020-11-11 | End: 2020-11-11

## 2020-11-11 RX ORDER — DOCUSATE SODIUM 100 MG/1
100 CAPSULE, LIQUID FILLED ORAL DAILY
Qty: 30 CAPSULE | Refills: 0 | Status: SHIPPED | OUTPATIENT
Start: 2020-11-12 | End: 2020-12-12

## 2020-11-11 RX ADMIN — AMLODIPINE BESYLATE 10 MG: 10 TABLET ORAL at 08:11

## 2020-11-11 RX ADMIN — MORPHINE SULFATE 4 MG: 4 INJECTION INTRAVENOUS at 04:11

## 2020-11-11 RX ADMIN — LEVOTHYROXINE SODIUM 175 MCG: 150 TABLET ORAL at 05:11

## 2020-11-11 RX ADMIN — HYDROCHLOROTHIAZIDE 25 MG: 25 TABLET ORAL at 08:11

## 2020-11-11 RX ADMIN — FUROSEMIDE 40 MG: 10 INJECTION, SOLUTION INTRAMUSCULAR; INTRAVENOUS at 04:11

## 2020-11-11 RX ADMIN — DOCUSATE SODIUM 100 MG: 100 CAPSULE, LIQUID FILLED ORAL at 08:11

## 2020-11-11 RX ADMIN — INSULIN ASPART 2 UNITS: 100 INJECTION, SOLUTION INTRAVENOUS; SUBCUTANEOUS at 04:11

## 2020-11-11 RX ADMIN — CEFAZOLIN SODIUM 1 G: 1 SOLUTION INTRAVENOUS at 08:11

## 2020-11-11 RX ADMIN — PRAVASTATIN SODIUM 20 MG: 20 TABLET ORAL at 08:11

## 2020-11-11 RX ADMIN — PANTOPRAZOLE SODIUM 40 MG: 40 TABLET, DELAYED RELEASE ORAL at 08:11

## 2020-11-11 NOTE — NURSING
Pt Iv removed and tele box returned to basket at nursing station.  Pt discharged to [facility].  Transported to facility wheelchair via danette lift.  Pt left unit via wheel chair by [facility] staff.

## 2020-11-11 NOTE — PT/OT/SLP PROGRESS
Occupational Therapy   Treatment    Name: Lakshmi Campbell  MRN: [id number]  Admitting Diagnosis:  Closed fracture of surgical neck of left humerus  6 Days Post-Op    Recommendations:     Discharge Recommendations: nursing facility, skilled  Discharge Equipment Recommendations:  wheelchair  Barriers to discharge:  None    Assessment:     Lakshmi Campbell is a 69 y.o. female with a medical diagnosis of Closed fracture of surgical neck of left humerus.  She presents with DEBILITY AND GENERALIZED WEAKNESS. Performance deficits affecting function are weakness, gait instability, decreased upper extremity function, impaired endurance, impaired balance, impaired self care skills, impaired functional mobilty.     Rehab Prognosis:  Good; patient would benefit from acute skilled OT services to address these deficits and reach maximum level of function.       Plan:     Patient to be seen 3 x/week to address the above listed problems via self-care/home management, therapeutic activities, therapeutic exercises  · Plan of Care Expires: 11/12/20  · Plan of Care Reviewed with: patient    Subjective     Pain/Comfort:  · Pain Rating 1: 0/10    Objective:     Communicated with: DEBILITY AND GENERALIZED WEAKNESS prior to session.  Patient found HOB elevated with PureWick, peripheral IV upon OT entry to room.    General Precautions: Standard, fall   Orthopedic Precautions:RLE non weight bearing, LUE non weight bearing   Braces: Knee immobilizer     Occupational Performance:     Bed Mobility:    · Patient completed Rolling/Turning to Right with moderate assistance  · Patient completed Scooting/Bridging with moderate assistance  · Patient completed Supine to Sit with moderate assistance     Activities of Daily Living:  · Upper Body Dressing: maximal assistance L UE BRACE AND HOSPITAL GOWN  · Lower Body Dressing: total assistance JAIR SOCKS      [facility] 6 Click ADL: 11    Treatment & Education:  PT EDUCATED ON CHAIR PUSH UPS. PT VERBALIZED  UNDERSTANDING AND RETURNED DEMONSTRATION WITH ASSISTANCE. PT PERFORMED 2 SET X 5 REPS CHAIR PUS ON L LE AND R UE ONLY.    Patient left up in chair with all lines intact, call button in reach, bed alarm on and NURSE notifiedEducation:      GOALS:   Multidisciplinary Problems     Occupational Therapy Goals        Problem: Occupational Therapy Goal    Goal Priority Disciplines Outcome Interventions   Occupational Therapy Goal     OT, PT/OT Ongoing, Progressing    Description: OT GOALS  TO BE MET BY 11-12-20  MOD A WITH UE DRESSING  MOD A WITH LE DRESSING  PT WILL TOLERATE 1 SET X 15 REPS L UE ROM EXERCISE                   Time Tracking:     OT Date of Treatment: 11/11/20  OT Start Time: 1106  OT Stop Time: 1130  OT Total Time (min): 24 min    Billable Minutes:Therapeutic Activity 24 MINUTES    Gloria Rosado OT  11/11/2020

## 2020-11-11 NOTE — PLAN OF CARE
11/11/20 1354   Final Note   Assessment Type Final Discharge Note   Anticipated Discharge Disposition SNF   Right Care Referral Info   Post Acute Recommendation SNF / Sub-Acute Rehab   Facility Name [facility]

## 2020-11-11 NOTE — PLAN OF CARE
Swer notified [facility] of pt's discharge. Swer sent d/c orders, summary, avs to [facility] and faxed hard script. Swer placed packet on side on pt's blue folder to take to [facility].      Swer gave pt's nurse number for report ([phone]). Transport is en route. Kelli Rabago LMSW 11/11/2020 1:54 PM        11/11/20 1240   Post-Acute Status   Post-Acute Authorization Placement   Post-Acute Placement Status Set-up Complete   Discharge Plan   Discharge Plan A Skilled Nursing Facility

## 2020-11-11 NOTE — PLAN OF CARE
Fall prevention precautions maintained, pt remained free of falls throughout shift, call bell and personal items within reach, pt's pain adequately controlled by prn pain medication, pt refuses to turn with staff. 24 hour chart check completed. Will continue to monitor

## 2020-11-11 NOTE — PT/OT/SLP PROGRESS
Physical Therapy  Treatment    Lakshmi Campbell   MRN: [id number]   Admitting Diagnosis: Closed fracture of surgical neck of left humerus    PT Received On: 11/11/20  PT Start Time: 1150     PT Stop Time: 1215    PT Total Time (min): 25 min       Billable Minutes:  Therapeutic Activity 15 and Therapeutic Exercise 10    Treatment Type: Treatment  PT/PTA: PT     PTA Visit Number: 0       General Precautions: Standard, fall  Orthopedic Precautions: RLE non weight bearing, LUE non weight bearing   Braces: Knee immobilizer, UE Sling         Subjective:  Communicated with NURSE TREVIN AND Epic CHART REVIEW  prior to session.   PT AGREED TO TX     Pain/Comfort  Pain Rating 1: 0/10  Pain Rating Post-Intervention 1: 0/10  Pain Rating 2: 0/10    Objective:   Patient found with: peripheral IV, PureWick    Functional Mobility:  PT MET IN RM SUP.SIT EOB WITH MOD A AND DONNED SLING . PT SEATED EOB FOR B LE TE X 15 REPS OF AP, AND L LE TKE AND MIP. PT WITH ANT WT SHIFT PRE-STAND HOWEVER UNABLE TO CLEAR BOTTOM 2X5 TRIALS. PT SCOOTED TO HOB X 3 WITH MOD A. PT SUP IN BED WITH MOD A AND SCOOTED TO HOB WITH MIN A AND L UNILATERAL BRIDGING. PT LEFT WITH HOB ELEVATED AND ALL NEEDS MET.     AM-PAC 6 CLICK MOBILITY  How much help from another person does this patient currently need?   1 = Unable, Total/Dependent Assistance  2 = A lot, Maximum/Moderate Assistance  3 = A little, Minimum/Contact Guard/Supervision  4 = None, Modified Kimberly/Independent    Turning over in bed (including adjusting bedclothes, sheets and blankets)?: 2  Sitting down on and standing up from a chair with arms (e.g., wheelchair, bedside commode, etc.): 1  Moving from lying on back to sitting on the side of the bed?: 2  Moving to and from a bed to a chair (including a wheelchair)?: 1  Need to walk in hospital room?: 1  Climbing 3-5 steps with a railing?: 1  Basic Mobility Total Score: 8    AM-PAC Raw Score CMS G-Code Modifier Level of Impairment Assistance   6 %  Total / Unable   7 - 9 CM 80 - 100% Maximal Assist   10 - 14 CL 60 - 80% Moderate Assist   15 - 19 CK 40 - 60% Moderate Assist   20 - 22 CJ 20 - 40% Minimal Assist   23 CI 1-20% SBA / CGA   24 CH 0% Independent/ Mod I     Patient left HOB elevated with call button in reach and bed alarm on.    Assessment:  PT LUIS ALFREDO TX WELL   Rehab identified problem list/impairments: Rehab identified problem list/impairments: weakness, impaired endurance, impaired self care skills, impaired functional mobilty, gait instability, impaired balance, pain, decreased safety awareness, decreased lower extremity function, decreased upper extremity function, decreased ROM    Rehab potential is good.    Activity tolerance: Fair    Discharge recommendations: Discharge Facility/Level of Care Needs: nursing facility, skilled     Barriers to discharge:      Equipment recommendations: Equipment Needed After Discharge: wheelchair     GOALS:   Multidisciplinary Problems     Physical Therapy Goals        Problem: Physical Therapy Goal    Goal Priority Disciplines Outcome Goal Variances Interventions   Physical Therapy Goal     PT, PT/OT Ongoing, Progressing     Description: PT WILL BE SEEN FOR P.T. FOR A MIN OF 5 OUT OF 7 DAYS A WEEK  LT20  1. PT WILL COMPLETE BED MOBILITY WITH MOD A  2. PT WILL T/F TO CHAIR WITH MAX A X 2 NWB L UE AND R LE.  3. PT WILL COMPLETE B LE TE X 10 REPS FOR STRENGTHENING.                    PLAN:    Patient to be seen 5 x/week  to address the above listed problems via therapeutic activities, therapeutic exercises  Plan of Care expires: 20  Plan of Care reviewed with: patient         Luciana Trevino, PT  2020

## 2020-11-11 NOTE — DISCHARGE SUMMARY
[facility] Hospital Medicine  Discharge Summary      Patient Name: Lakshmi Campbell  MRN: [id number]  Admission Date: 10/30/2020  Hospital Length of Stay: 12 days  Discharge Date and Time:  11/17/2020 10:18 AM  Attending Physician: Dr. Connie Ochoa    Discharging Provider: Ceci Pelaez NP  Primary Care Provider: Karan Ribeiro DO      HPI:   Pt is a 68 yo female with PMHx of DM II, HTN, HPL, hypothyroidism and morbid obesity who fractured her right patella 1 week ago and is wearing a knee immobilizer. Today, she had a second mishap when trying to get back in bed after using the bedside commode. Pt fell on her left side and immediately developed left shoulder and left wrist pain. She denies any presyncopal symptoms, fever, chest pain, gross neuro deficits, recent nausea/vomiting/diarrhea or urinary symptoms. Xrays found a left humerus fracture and left radius/ulnar fracture.   Vital signs stable and lab work largely normal except for thrombocytopenia which has improved from last level. Pt was placed on Observation for further evaluation by Orthopedics and possible need for surgical intervention.  On 11/6/2020, pt stable post procedure.  Social work consulted to assist with discharge planning and Rehab placement.      Procedure(s) (LRB):  ORIF, FRACTURE, HUMERUS (Left)      Hospital Course:   Patient was kept on OBS for closed fx of surgical neck of L humerus under the care of hospital medicine. Ortho surgery was consulted. 10/31: Patient asking for Dr. Wolf to be involved in her care. I explained that he is not on call today - sent him secure chat but he likely will not see it until Monday. Ordered diet as no surgical intervention today. Will discuss with Dr. King, who is on call tomorrow for ortho. 11/1: Discussed with Dr. King - who spoke with Dr. Wolf. Pt will have surgery performed by Dr. Wolf tomorrow afternoon. Will be NPO after MN and stop heparin at MN. 11/2: Patient scheduled  for surgery this afternoon by Dr. Wolf. She is currently NPO with spouse at bedside. Pain is controlled. 11/3: Patient had surgery to R knee and L wrist yesterday. PRN analgesia. Plans for L shoulder surgery on Thursday of this week. DVT profil restarted. PT/OT pending 11/4: patient to have L shoulder surgery tomorrow. She will dc early next week to rehab - Case management to start working on placement tomorrow. PT/OT to see her post op. Spoke with Dr. Wolf via telephone - he recommends weight based lovenox post op for DVT prophilaxis. Daughter at bedside, verbalized understanding of all. 11/5: Patient scheduled for L shoulder repair this afternoon. Currently NPO. Heparin held. PT/OT aware pt requesting rehab on dc. She sat on side of bed today for 90 minutes. Case management working on placement - likely Mon. Dr. Wolf requesting lovenox injections for DVT prophylaxis as she will need weight based.   On 11/7/2020, H/H stable with mild decline noted.  Lovenox given.  CM to assist with discharge planning and placement.  On 11/8/2020, pt stable with current plan of care continued.  H/H stable.  Pain at surgical sites and back improved on prescribed narcotic regime.  On 11/9/2020, placement to Rehab vs SNF in process due to NWB.  CM assisting with placement.  H/H stable.  BP elevated with Norvasc dose increased and Hydralazine as needed.  HCTZ also resumed.  On 11/10/2020, case discussed with Orthopedic Surgery and pt/ updated on current [facility]  Pt accepted to [facility] with authorization pending.  On 11/11/2020,pt remained stable with no signs of acute distress.  Pt seen and examined on the date of discharge and deemed stable for discharge to SNF for continued care.  Current medications resumed with Norvasc dose adjusted and Colace and Lovenox prescribed.  Pt instructed to follow up with PCP and Orthopedic Surgery upon discharge for further  evaluation.          Consults:   Consults (From admission, onward)        Status Ordering Provider     Inpatient consult to Registered Dietitian/Nutritionist  Once     Provider:  (Not yet assigned)    Completed PRIAYNKA HAJI     Inpatient consult to Social Work  Once     Provider:  (Not yet assigned)    Completed LISA BUI consult to case management  Once     Provider:  (Not yet assigned)    Completed ROBIN CHAN          Final Active Diagnoses:    Diagnosis Date Noted POA    PRINCIPAL PROBLEM:  Closed fracture of surgical neck of left humerus [S42.212A] 10/30/2020 Yes    Shoulder pain, left [M25.512] 10/31/2020 Yes    Closed left radial fracture [S52.92XA] 10/30/2020 Yes    Closed displaced comminuted fracture of right patella [S82.041A] 10/28/2020 Yes    Essential hypertension [I10] 11/07/2019 Yes    Hyperlipidemia [E78.5] 11/07/2019 Yes    Type 2 diabetes mellitus with microalbuminuria, with long-term current use of insulin [E11.29, R80.9, Z79.4] 09/23/2013 Not Applicable    Hypothyroidism [E03.9] 09/23/2013 Yes      Problems Resolved During this Admission:       Discharged Condition: stable    Disposition: Skilled Nursing Facility    Follow Up:  Follow-up Information     Karan Ribeiro DO In 3 days.    Specialty: Family Medicine  Why: -hospital follow up and medication review  Contact information:  [address] [address] [address] [phone]             Sage Wolf MD In 2 weeks.    Specialties: Orthopedic Surgery, Sports Medicine  Why: -hospital follow up   Contact information:  [address]  [place] [address] [phone]                 Patient Instructions:      Diet Cardiac     Diet diabetic     Notify your health care provider if you experience any of the following:  temperature >100.4     Notify your health care provider if you experience any of the following:  persistent nausea and vomiting or diarrhea     Notify your health care  "provider if you experience any of the following:  redness, tenderness, or signs of infection (pain, swelling, redness, odor or green/yellow discharge around incision site)     Notify your health care provider if you experience any of the following:  severe uncontrolled pain     Notify your health care provider if you experience any of the following:  increased confusion or weakness     Notify your health care provider if you experience any of the following:  persistent dizziness, light-headedness, or visual disturbances     Notify your health care provider if you experience any of the following:  difficulty breathing or increased cough     Activity as tolerated       Significant Diagnostic Studies: Labs: All labs within the past 24 hours have been reviewed    Pending Diagnostic Studies:     None         Medications:  Reconciled Home Medications:      Medication List      START taking these medications    docusate sodium 100 MG capsule  Commonly known as: COLACE  Take 1 capsule (100 mg total) by mouth once daily.     enoxaparin 40 mg/0.4 mL Syrg  Commonly known as: LOVENOX  Inject 0.4 mLs (40 mg total) into the skin once daily.        CHANGE how you take these medications    amLODIPine 10 MG tablet  Commonly known as: NORVASC  Take 1 tablet (10 mg total) by mouth once daily.  What changed:   · medication strength  · how much to take        CONTINUE taking these medications    ACEON 4 mg tablet  Generic drug: perindopril erbumine  Take 4 mg by mouth once daily.     blood-glucose meter kit  Use as instructed     hydroCHLOROthiazide 25 MG tablet  Commonly known as: HYDRODIURIL  Take 25 mg by mouth once daily.     insulin syringe-needle U-100 1 mL 29 gauge x 1/2" Syrg  Use bid     metFORMIN 1000 MG tablet  Commonly known as: GLUCOPHAGE  1,000 mg 2 (two) times daily with meals.     NovoLIN 70/30 U-100 Insulin 100 unit/mL (70-30) injection  Generic drug: insulin NPH-insulin regular (70/30)  Inject 75 units sc ac supper.   " "  ONETOUCH ULTRA TEST Strp  Generic drug: blood sugar diagnostic  Use ac bid     pravastatin 20 MG tablet  Commonly known as: PRAVACHOL  Take 20 mg by mouth once daily.     SURE COMFORT PEN NEEDLE 31 gauge x 5/16" Ndle  Generic drug: pen needle, diabetic     SYNTHROID 175 MCG tablet  Generic drug: levothyroxine  TAKE 1 TABLET BY MOUTH DAILY        STOP taking these medications    HYDROcodone-acetaminophen 5-325 mg per tablet  Commonly known as: NORCO     ondansetron 4 MG tablet  Commonly known as: ZOFRAN        ASK your doctor about these medications    HYDROcodone-acetaminophen 5-325 mg per tablet  Commonly known as: NORCO  Take 1 tablet by mouth every 8 (eight) hours as needed for Pain.  Ask about: Should I take this medication?            Indwelling Lines/Drains at time of discharge:   Lines/Drains/Airways     Drain            Female External Urinary Catheter 11/06/20 1600 4 days                Time spent on the discharge of patient: > 42 minutes  Patient was seen and examined on the date of discharge and determined to be suitable for discharge.         Ceci Pelaez NP  Department of Hospital Medicine  [facility]  "

## 2020-11-18 ENCOUNTER — TELEPHONE (OUTPATIENT)
Dept: ORTHOPEDICS | Facility: CLINIC | Age: 70
End: 2020-11-18

## 2020-11-18 DIAGNOSIS — M79.602 LEFT ARM PAIN: ICD-10-CM

## 2020-11-18 DIAGNOSIS — M25.532 LEFT WRIST PAIN: ICD-10-CM

## 2020-11-18 DIAGNOSIS — S82.041A CLOSED DISPLACED COMMINUTED FRACTURE OF RIGHT PATELLA, INITIAL ENCOUNTER: Primary | ICD-10-CM

## 2020-11-18 NOTE — TELEPHONE ENCOUNTER
Spoke w/ Sarah at [facility] to schedule a folllow up appt for Ms. Campbell. Patient was scheduled and was notified to arrive 30min prior to appt to have xrays done. Sarah verbalized understanding and was grateful for the call_DD      ----- Message from Marilia Bella sent at 11/18/2020  9:55 AM CST -----  Contact: Sarah Smith from [facility] is calling to schedule a follow up appointment for Lakshmi. Please call her back at [phone].        Thanks  DD

## 2020-11-20 ENCOUNTER — PES CALL (OUTPATIENT)
Dept: ADMINISTRATIVE | Facility: CLINIC | Age: 70
End: 2020-11-20

## 2020-11-23 ENCOUNTER — PES CALL (OUTPATIENT)
Dept: ADMINISTRATIVE | Facility: CLINIC | Age: 70
End: 2020-11-23

## 2020-11-24 ENCOUNTER — HOSPITAL ENCOUNTER (OUTPATIENT)
Dept: RADIOLOGY | Facility: HOSPITAL | Age: 70
Discharge: HOME | End: 2020-11-24
Attending: STUDENT IN AN ORGANIZED HEALTH CARE EDUCATION/TRAINING PROGRAM
Payer: MEDICARE

## 2020-11-24 ENCOUNTER — OFFICE VISIT (OUTPATIENT)
Dept: ORTHOPEDICS | Facility: CLINIC | Age: 70
End: 2020-11-24
Payer: MEDICARE

## 2020-11-24 VITALS
BODY MASS INDEX: 45.15 KG/M2 | HEIGHT: 65 IN | DIASTOLIC BLOOD PRESSURE: 70 MMHG | WEIGHT: 271 LBS | SYSTOLIC BLOOD PRESSURE: 151 MMHG | HEART RATE: 73 BPM

## 2020-11-24 DIAGNOSIS — S82.041A CLOSED DISPLACED COMMINUTED FRACTURE OF RIGHT PATELLA, INITIAL ENCOUNTER: Primary | ICD-10-CM

## 2020-11-24 DIAGNOSIS — M79.602 LEFT ARM PAIN: ICD-10-CM

## 2020-11-24 DIAGNOSIS — S52.502S CLOSED FRACTURE OF DISTAL ENDS OF LEFT RADIUS AND ULNA, SEQUELA: ICD-10-CM

## 2020-11-24 DIAGNOSIS — S52.602S CLOSED FRACTURE OF DISTAL ENDS OF LEFT RADIUS AND ULNA, SEQUELA: ICD-10-CM

## 2020-11-24 DIAGNOSIS — S82.041A CLOSED DISPLACED COMMINUTED FRACTURE OF RIGHT PATELLA, INITIAL ENCOUNTER: ICD-10-CM

## 2020-11-24 DIAGNOSIS — S42.292S OTHER CLOSED DISPLACED FRACTURE OF PROXIMAL END OF LEFT HUMERUS, SEQUELA: ICD-10-CM

## 2020-11-24 DIAGNOSIS — M25.532 LEFT WRIST PAIN: ICD-10-CM

## 2020-11-24 PROCEDURE — 73060 XR HUMERUS 2 VIEW LEFT: ICD-10-PCS | Mod: 26,LT,, | Performed by: RADIOLOGY

## 2020-11-24 PROCEDURE — 1100F PTFALLS ASSESS-DOCD GE2>/YR: CPT | Mod: S$GLB,,, | Performed by: STUDENT IN AN ORGANIZED HEALTH CARE EDUCATION/TRAINING PROGRAM

## 2020-11-24 PROCEDURE — 73110 X-RAY EXAM OF WRIST: CPT | Mod: 26,LT,, | Performed by: RADIOLOGY

## 2020-11-24 PROCEDURE — 1126F PR PAIN SEVERITY QUANTIFIED, NO PAIN PRESENT: ICD-10-PCS | Mod: S$GLB,,, | Performed by: STUDENT IN AN ORGANIZED HEALTH CARE EDUCATION/TRAINING PROGRAM

## 2020-11-24 PROCEDURE — 73060 X-RAY EXAM OF HUMERUS: CPT | Mod: TC,LT

## 2020-11-24 PROCEDURE — 99024 POSTOP FOLLOW-UP VISIT: CPT | Mod: S$GLB,,, | Performed by: STUDENT IN AN ORGANIZED HEALTH CARE EDUCATION/TRAINING PROGRAM

## 2020-11-24 PROCEDURE — 3288F FALL RISK ASSESSMENT DOCD: CPT | Mod: S$GLB,,, | Performed by: STUDENT IN AN ORGANIZED HEALTH CARE EDUCATION/TRAINING PROGRAM

## 2020-11-24 PROCEDURE — 73060 X-RAY EXAM OF HUMERUS: CPT | Mod: 26,LT,, | Performed by: RADIOLOGY

## 2020-11-24 PROCEDURE — 73562 X-RAY EXAM OF KNEE 3: CPT | Mod: TC,LT

## 2020-11-24 PROCEDURE — 73564 X-RAY EXAM KNEE 4 OR MORE: CPT | Mod: 26,RT,, | Performed by: RADIOLOGY

## 2020-11-24 PROCEDURE — 73110 XR WRIST COMPLETE 3 VIEWS LEFT: ICD-10-PCS | Mod: 26,LT,, | Performed by: RADIOLOGY

## 2020-11-24 PROCEDURE — 73562 XR KNEE ORTHO RIGHT WITH FLEXION: ICD-10-PCS | Mod: 26,59,LT, | Performed by: RADIOLOGY

## 2020-11-24 PROCEDURE — 1126F AMNT PAIN NOTED NONE PRSNT: CPT | Mod: S$GLB,,, | Performed by: STUDENT IN AN ORGANIZED HEALTH CARE EDUCATION/TRAINING PROGRAM

## 2020-11-24 PROCEDURE — 99024 PR POST-OP FOLLOW-UP VISIT: ICD-10-PCS | Mod: S$GLB,,, | Performed by: STUDENT IN AN ORGANIZED HEALTH CARE EDUCATION/TRAINING PROGRAM

## 2020-11-24 PROCEDURE — 99999 PR PBB SHADOW E&M-EST. PATIENT-LVL IV: ICD-10-PCS | Mod: PBBFAC,,, | Performed by: STUDENT IN AN ORGANIZED HEALTH CARE EDUCATION/TRAINING PROGRAM

## 2020-11-24 PROCEDURE — 3288F PR FALLS RISK ASSESSMENT DOCUMENTED: ICD-10-PCS | Mod: S$GLB,,, | Performed by: STUDENT IN AN ORGANIZED HEALTH CARE EDUCATION/TRAINING PROGRAM

## 2020-11-24 PROCEDURE — 99999 PR PBB SHADOW E&M-EST. PATIENT-LVL IV: CPT | Mod: PBBFAC,,, | Performed by: STUDENT IN AN ORGANIZED HEALTH CARE EDUCATION/TRAINING PROGRAM

## 2020-11-24 PROCEDURE — 73564 XR KNEE ORTHO RIGHT WITH FLEXION: ICD-10-PCS | Mod: 26,RT,, | Performed by: RADIOLOGY

## 2020-11-24 PROCEDURE — 73110 X-RAY EXAM OF WRIST: CPT | Mod: TC,LT

## 2020-11-24 PROCEDURE — 3008F BODY MASS INDEX DOCD: CPT | Mod: S$GLB,,, | Performed by: STUDENT IN AN ORGANIZED HEALTH CARE EDUCATION/TRAINING PROGRAM

## 2020-11-24 PROCEDURE — 1100F PR PT FALLS ASSESS DOC 2+ FALLS/FALL W/INJURY/YR: ICD-10-PCS | Mod: S$GLB,,, | Performed by: STUDENT IN AN ORGANIZED HEALTH CARE EDUCATION/TRAINING PROGRAM

## 2020-11-24 PROCEDURE — 3008F PR BODY MASS INDEX (BMI) DOCUMENTED: ICD-10-PCS | Mod: S$GLB,,, | Performed by: STUDENT IN AN ORGANIZED HEALTH CARE EDUCATION/TRAINING PROGRAM

## 2020-11-24 PROCEDURE — 73562 X-RAY EXAM OF KNEE 3: CPT | Mod: 26,59,LT, | Performed by: RADIOLOGY

## 2020-11-24 NOTE — PROGRESS NOTES
Orthopaedics  Post-operative follow-up    Procedure Performed:   11/3/2020  1.  Open reduction internal fixation of right patella fracture  2.  Open reduction internal fixation of left distal radius fracture    11/6/2020  2.  Left proximal humerus closed reduction with intramedullary fixation    Subjective: Lakshmi Campbell is now almost 3 weeks out from her multiple surgeries.  She is doing well with no specific complaints other than the expected post-operative pain and stiffness.  She has been compliant with post-operative restrictions.  She is currently staying in a skilled nursing facility.  She is doing some PT there.  She has been ambulating with assistance and using a side rail.  She is ambulating with the right knee locked in extension and no weight-bearing on the left upper extremity.    Exam:  RLE:  Sutures removed from right knee today, all incision sites sites benign with no drainage or redness  Mild bruising as anticipated  ROM fluid in the right knee from 0-30  Ft is warm and well perfused  Calf is nontender, tolerates ankle range of motion  Motor and sensory is intact distally    LUE:  left elbow and shoulder range of motion fluid  Axillary, median, radial nerve sensation and motor intact  Strong radial pulse, fingers warm and well perfused    Imaging:  X-rays of the right knee, left wrist, left shoulder show no interval displacement of fractures, hardware is in place with no evidence of loosening or failure    Impression:  Polytrauma patient, right patella ORIF, left distal radius ORIF, left proximal humerus IMN, initial post-operative visit - doing well    Plan:  Discussed surgical findings, operative procedure  Reviewed post-operative instructions, restrictions, and rehabilitation  Weightbearing as tolerated right lower extremity while locked in extension  She may begin progressing range of motion of the right knee with therapy.  Range of motion 0-50 degrees now, may progress 10-15 degrees per week  with the goal of 90° at 6 weeks.  Continue nonweightbearing to left upper extremity  Elbow and shoulder range of motion as tolerated  Encouraged finger range of motion  Symptomatic treatment for pain / swelling  Instructed patient to call clinic if questions or concerns    Follow-up 3 weeks with x-rays of the right knee, left wrist, left shoulder        Sage Wolf MD

## 2020-12-11 ENCOUNTER — DOCUMENT SCAN (OUTPATIENT)
Dept: HOME HEALTH SERVICES | Facility: HOSPITAL | Age: 70
End: 2020-12-11
Payer: MEDICARE

## 2020-12-14 NOTE — PROGRESS NOTES
PreVisit Chart Audit Performed         Ashley OLSEN LPN Care Coordinator  Care Coordination Department  [facility]  [phone]    Enbrel Counseling:  I discussed with the patient the risks of etanercept including but not limited to myelosuppression, immunosuppression, autoimmune hepatitis, demyelinating diseases, lymphoma, and infections.  The patient understands that monitoring is required including a PPD at baseline and must alert us or the primary physician if symptoms of infection or other concerning signs are noted.

## 2020-12-15 ENCOUNTER — HOSPITAL ENCOUNTER (OUTPATIENT)
Dept: RADIOLOGY | Facility: HOSPITAL | Age: 70
Discharge: HOME | End: 2020-12-15
Attending: STUDENT IN AN ORGANIZED HEALTH CARE EDUCATION/TRAINING PROGRAM
Payer: MEDICARE

## 2020-12-15 ENCOUNTER — OFFICE VISIT (OUTPATIENT)
Dept: ORTHOPEDICS | Facility: CLINIC | Age: 70
End: 2020-12-15
Payer: MEDICARE

## 2020-12-15 VITALS
SYSTOLIC BLOOD PRESSURE: 157 MMHG | DIASTOLIC BLOOD PRESSURE: 76 MMHG | HEIGHT: 65 IN | HEART RATE: 78 BPM | BODY MASS INDEX: 45.15 KG/M2 | WEIGHT: 271 LBS

## 2020-12-15 DIAGNOSIS — S82.041S CLOSED DISPLACED COMMINUTED FRACTURE OF RIGHT PATELLA, SEQUELA: Primary | ICD-10-CM

## 2020-12-15 DIAGNOSIS — M25.532 LEFT WRIST PAIN: ICD-10-CM

## 2020-12-15 DIAGNOSIS — Z01.818 PREOPERATIVE CLEARANCE: Primary | ICD-10-CM

## 2020-12-15 DIAGNOSIS — S82.041A CLOSED DISPLACED COMMINUTED FRACTURE OF RIGHT PATELLA, INITIAL ENCOUNTER: ICD-10-CM

## 2020-12-15 DIAGNOSIS — M79.602 LEFT ARM PAIN: ICD-10-CM

## 2020-12-15 PROCEDURE — 99024 PR POST-OP FOLLOW-UP VISIT: ICD-10-PCS | Mod: S$GLB,,, | Performed by: STUDENT IN AN ORGANIZED HEALTH CARE EDUCATION/TRAINING PROGRAM

## 2020-12-15 PROCEDURE — 99999 PR PBB SHADOW E&M-EST. PATIENT-LVL V: CPT | Mod: PBBFAC,,, | Performed by: STUDENT IN AN ORGANIZED HEALTH CARE EDUCATION/TRAINING PROGRAM

## 2020-12-15 PROCEDURE — 1126F PR PAIN SEVERITY QUANTIFIED, NO PAIN PRESENT: ICD-10-PCS | Mod: S$GLB,,, | Performed by: STUDENT IN AN ORGANIZED HEALTH CARE EDUCATION/TRAINING PROGRAM

## 2020-12-15 PROCEDURE — 1126F AMNT PAIN NOTED NONE PRSNT: CPT | Mod: S$GLB,,, | Performed by: STUDENT IN AN ORGANIZED HEALTH CARE EDUCATION/TRAINING PROGRAM

## 2020-12-15 PROCEDURE — 73060 XR HUMERUS 2 VIEW LEFT: ICD-10-PCS | Mod: 26,LT,, | Performed by: RADIOLOGY

## 2020-12-15 PROCEDURE — 73110 X-RAY EXAM OF WRIST: CPT | Mod: 26,LT,, | Performed by: RADIOLOGY

## 2020-12-15 PROCEDURE — 73560 XR KNEE 1 OR 2 VIEW RIGHT: ICD-10-PCS | Mod: 26,RT,, | Performed by: RADIOLOGY

## 2020-12-15 PROCEDURE — 73060 X-RAY EXAM OF HUMERUS: CPT | Mod: TC,LT

## 2020-12-15 PROCEDURE — 73560 X-RAY EXAM OF KNEE 1 OR 2: CPT | Mod: 26,RT,, | Performed by: RADIOLOGY

## 2020-12-15 PROCEDURE — 73110 X-RAY EXAM OF WRIST: CPT | Mod: TC,LT

## 2020-12-15 PROCEDURE — 73060 X-RAY EXAM OF HUMERUS: CPT | Mod: 26,LT,, | Performed by: RADIOLOGY

## 2020-12-15 PROCEDURE — 1100F PTFALLS ASSESS-DOCD GE2>/YR: CPT | Mod: S$GLB,,, | Performed by: STUDENT IN AN ORGANIZED HEALTH CARE EDUCATION/TRAINING PROGRAM

## 2020-12-15 PROCEDURE — 73560 X-RAY EXAM OF KNEE 1 OR 2: CPT | Mod: TC,RT

## 2020-12-15 PROCEDURE — 3008F BODY MASS INDEX DOCD: CPT | Mod: S$GLB,,, | Performed by: STUDENT IN AN ORGANIZED HEALTH CARE EDUCATION/TRAINING PROGRAM

## 2020-12-15 PROCEDURE — 99999 PR PBB SHADOW E&M-EST. PATIENT-LVL V: ICD-10-PCS | Mod: PBBFAC,,, | Performed by: STUDENT IN AN ORGANIZED HEALTH CARE EDUCATION/TRAINING PROGRAM

## 2020-12-15 PROCEDURE — 3008F PR BODY MASS INDEX (BMI) DOCUMENTED: ICD-10-PCS | Mod: S$GLB,,, | Performed by: STUDENT IN AN ORGANIZED HEALTH CARE EDUCATION/TRAINING PROGRAM

## 2020-12-15 PROCEDURE — 3288F FALL RISK ASSESSMENT DOCD: CPT | Mod: S$GLB,,, | Performed by: STUDENT IN AN ORGANIZED HEALTH CARE EDUCATION/TRAINING PROGRAM

## 2020-12-15 PROCEDURE — 1100F PR PT FALLS ASSESS DOC 2+ FALLS/FALL W/INJURY/YR: ICD-10-PCS | Mod: S$GLB,,, | Performed by: STUDENT IN AN ORGANIZED HEALTH CARE EDUCATION/TRAINING PROGRAM

## 2020-12-15 PROCEDURE — 99024 POSTOP FOLLOW-UP VISIT: CPT | Mod: S$GLB,,, | Performed by: STUDENT IN AN ORGANIZED HEALTH CARE EDUCATION/TRAINING PROGRAM

## 2020-12-15 PROCEDURE — 73110 XR WRIST COMPLETE 3 VIEWS LEFT: ICD-10-PCS | Mod: 26,LT,, | Performed by: RADIOLOGY

## 2020-12-15 PROCEDURE — 3288F PR FALLS RISK ASSESSMENT DOCUMENTED: ICD-10-PCS | Mod: S$GLB,,, | Performed by: STUDENT IN AN ORGANIZED HEALTH CARE EDUCATION/TRAINING PROGRAM

## 2020-12-15 RX ORDER — FUROSEMIDE 20 MG/1
TABLET ORAL
COMMUNITY
Start: 2020-12-10 | End: 2021-03-02

## 2020-12-15 RX ORDER — NYSTATIN 100000 U/G
OINTMENT TOPICAL
COMMUNITY
Start: 2020-11-30 | End: 2021-03-02

## 2020-12-16 ENCOUNTER — LAB VISIT (OUTPATIENT)
Dept: OTOLARYNGOLOGY | Facility: CLINIC | Age: 70
End: 2020-12-16
Payer: MEDICARE

## 2020-12-16 DIAGNOSIS — S52.502S CLOSED FRACTURE OF DISTAL ENDS OF LEFT RADIUS AND ULNA, SEQUELA: ICD-10-CM

## 2020-12-16 DIAGNOSIS — S52.602S CLOSED FRACTURE OF DISTAL ENDS OF LEFT RADIUS AND ULNA, SEQUELA: ICD-10-CM

## 2020-12-16 DIAGNOSIS — Z01.818 PREOPERATIVE CLEARANCE: ICD-10-CM

## 2020-12-16 DIAGNOSIS — S82.041S CLOSED DISPLACED COMMINUTED FRACTURE OF RIGHT PATELLA, SEQUELA: Primary | ICD-10-CM

## 2020-12-16 DIAGNOSIS — S42.292S OTHER CLOSED DISPLACED FRACTURE OF PROXIMAL END OF LEFT HUMERUS, SEQUELA: ICD-10-CM

## 2020-12-16 PROCEDURE — U0003 INFECTIOUS AGENT DETECTION BY NUCLEIC ACID (DNA OR RNA); SEVERE ACUTE RESPIRATORY SYNDROME CORONAVIRUS 2 (SARS-COV-2) (CORONAVIRUS DISEASE [COVID-19]), AMPLIFIED PROBE TECHNIQUE, MAKING USE OF HIGH THROUGHPUT TECHNOLOGIES AS DESCRIBED BY CMS-2020-01-R: HCPCS

## 2020-12-16 NOTE — PROGRESS NOTES
Orthopaedics  Post-operative follow-up    Procedure Performed:   11/3/2020  1.  Open reduction internal fixation of right patella fracture  2.  Open reduction internal fixation of left distal radius fracture    11/6/2020  2.  Left proximal humerus closed reduction with intramedullary fixation    Subjective: Lakshmi Campbell is now almost 6 weeks out from her multiple surgeries.  She is doing well with no specific complaints other than the expected post-operative pain and stiffness.  She has returned home and is working with physical therapy.  She is using a amaris walker on the right side for ambulation.  She reports using her knee brace while she is up and about, however, she does take it off at night and I am not tried much she is bending at that time. She has still been nonweightbearing on the left upper extremity and is requesting some physical therapy guidance regarding that.    Exam:  RLE:  Skin is intact with no abrasions, there is some slight erythema over the anterior knee where it appears her knee brace straps has been rubbing  ROM fluid in the right knee from 0-60  She is able to extend the knee from 60 to full extension actively  Ft is warm and well perfused  Calf is nontender, tolerates ankle range of motion  Motor and sensory is intact distally    LUE:  left elbow and shoulder range of motion fluid  Axillary, median, radial nerve sensation and motor intact  Strong radial pulse, fingers warm and well perfused    Imaging:  X-Ray Knee 1 or 2 View Right  Narrative: EXAMINATION:  XR KNEE 1 OR 2 VIEW RIGHT    CLINICAL HISTORY:  Displaced comminuted fracture of right patella, initial encounter for closed fracture    TECHNIQUE:  AP and lateral views of the right knee were performed.    COMPARISON:  November 24, 2020    FINDINGS:  Postsurgical changes ORIF the comminuted patellar fracture.  There is note made of the metallic mesh in numerous threaded screws.  There is note made of displaced fracture fragments with  cephalad extension of the is superior fragment.  Moderate effusion and diffuse soft tissue swelling noted.  Best visualized on the lateral view is additional note made of the superior screw which appears to have backed out slightly.  Correlate clinically.  Impression: Postsurgical changes ORIF the patellar fracture with the displaced or distracted fragments as detailed above.    Moderate suprapatellar effusion and soft tissue swelling.    Additional findings as above.    This report was flagged in Epic as abnormal.    Electronically signed by: Ismael Ritchie MD  Date:    12/15/2020  Time:    13:54  X-Ray Humerus 2 View Left  Narrative: EXAMINATION:  XR HUMERUS 2 VIEW LEFT    CLINICAL HISTORY:  Displaced comminuted fracture of right patella, initial encounter for closed fracture    COMPARISON:  November 24, 2020    FINDINGS:  Postsurgical changes ORIF comminuted fracture proximal humeral head neck.  There is callus formation along the fracture fragments and stable positioning of the orthopedic hardware and fracture fragments.  Articulation maintained at the shoulder and elbow.  Remaining included osseous structures appear intact with normal articulation at the elbow.  Degenerative changes at the glenohumeral and AC joints noted.  Impression: As above.    Electronically signed by: Ismael Ritchie MD  Date:    12/15/2020  Time:    13:50  X-Ray Wrist Complete Left  Narrative: EXAMINATION:  XR WRIST COMPLETE 3 VIEWS LEFT    CLINICAL HISTORY:  Displaced comminuted fracture of right patella, initial encounter for closed fracture    TECHNIQUE:  PA, lateral, and oblique views of the left wrist were performed.    COMPARISON:  November 24, 2020    FINDINGS:  Juxta-articular osteopenia.  Postsurgical changes again noted with stabilization plate extending from the mid 3rd of the radius to the shaft of the 3rd metacarpal and stabilized by 3 proximal and distal metallic screws.  There is stable approximation the fracture fragments  involving the distal radial metaphysis with intra-articular extension laterally.  Radiocarpal joint maintained.  Bony resorption along fracture line of the base of the ulnar styloid noted.  Remaining osseous structures well demineralized otherwise appear intact.  Mild persistent soft tissue swelling.  Impression: As above.    Electronically signed by: Ismael Ritchie MD  Date:    12/15/2020  Time:    13:48        Impression:  Polytrauma patient, right patella ORIF, left distal radius ORIF, left proximal humerus IMN, interval displacement of right patella fracture    Plan:  Discussed her right knee x-rays at length today.  Her right patella fracture has displaced since last imaging.  It was a comminuted, stellate pattern, and it appears that two of the proximal fragments have not healed.  She has been taking off her brace at night, and I am not sure how much range of motion she has been doing with it off.  I discussed with her that we cannot leave the patella that displaced.  I recommend surgical treatment with revision fixation of the right patella.  I would prefer to do it sooner rather than later.  I am hopeful that several of the fragments are healed enough that they will behave as 1 unit and I can use a more traditional patella fixation technique.  I did inform her that I would put her into a cylinder cast after this surgery as her brace does not fit very well, which is why she removes it.  All risks, benefits, limitations, and alternatives were discussed with her today.  Despite the risks, she elected proceed for with the surgery and the consent was freely signed.  We will plan to do the surgery 12/18/2020 in the early afternoon.  It will be outpatient, she will go home after that.      Sage Wolf MD

## 2020-12-17 ENCOUNTER — ANESTHESIA EVENT (OUTPATIENT)
Dept: SURGERY | Facility: HOSPITAL | Age: 70
End: 2020-12-17
Payer: MEDICARE

## 2020-12-17 ENCOUNTER — TELEPHONE (OUTPATIENT)
Dept: PREADMISSION TESTING | Facility: HOSPITAL | Age: 70
End: 2020-12-17

## 2020-12-17 LAB — SARS-COV-2 RNA RESP QL NAA+PROBE: NOT DETECTED

## 2020-12-18 ENCOUNTER — HOSPITAL ENCOUNTER (OUTPATIENT)
Facility: HOSPITAL | Age: 70
Discharge: HOME | End: 2020-12-18
Attending: STUDENT IN AN ORGANIZED HEALTH CARE EDUCATION/TRAINING PROGRAM | Admitting: STUDENT IN AN ORGANIZED HEALTH CARE EDUCATION/TRAINING PROGRAM
Payer: MEDICARE

## 2020-12-18 ENCOUNTER — HOSPITAL ENCOUNTER (OUTPATIENT)
Dept: RADIOLOGY | Facility: HOSPITAL | Age: 70
Discharge: HOME | End: 2020-12-18
Attending: STUDENT IN AN ORGANIZED HEALTH CARE EDUCATION/TRAINING PROGRAM | Admitting: STUDENT IN AN ORGANIZED HEALTH CARE EDUCATION/TRAINING PROGRAM
Payer: MEDICARE

## 2020-12-18 ENCOUNTER — ANESTHESIA (OUTPATIENT)
Dept: SURGERY | Facility: HOSPITAL | Age: 70
End: 2020-12-18
Payer: MEDICARE

## 2020-12-18 ENCOUNTER — TELEPHONE (OUTPATIENT)
Dept: ORTHOPEDICS | Facility: CLINIC | Age: 70
End: 2020-12-18

## 2020-12-18 VITALS
HEIGHT: 65 IN | OXYGEN SATURATION: 94 % | HEART RATE: 83 BPM | WEIGHT: 260.31 LBS | TEMPERATURE: 98 F | DIASTOLIC BLOOD PRESSURE: 65 MMHG | BODY MASS INDEX: 43.37 KG/M2 | RESPIRATION RATE: 16 BRPM | SYSTOLIC BLOOD PRESSURE: 155 MMHG

## 2020-12-18 DIAGNOSIS — S82.041S CLOSED DISPLACED COMMINUTED FRACTURE OF RIGHT PATELLA, SEQUELA: Primary | ICD-10-CM

## 2020-12-18 DIAGNOSIS — K74.60 HEPATIC CIRRHOSIS, UNSPECIFIED HEPATIC CIRRHOSIS TYPE, UNSPECIFIED WHETHER ASCITES PRESENT: ICD-10-CM

## 2020-12-18 LAB
INR PPP: 1 (ref 0.8–1.2)
POCT GLUCOSE: 84 MG/DL (ref 70–110)
POCT GLUCOSE: 86 MG/DL (ref 70–110)
PROTHROMBIN TIME: 11.1 SEC (ref 9–12.5)

## 2020-12-18 PROCEDURE — 88300 SURGICAL PATH GROSS: CPT | Performed by: PATHOLOGY

## 2020-12-18 PROCEDURE — 73560 XR KNEE 1 OR 2 VIEW RIGHT: ICD-10-PCS | Mod: 26,RT,, | Performed by: RADIOLOGY

## 2020-12-18 PROCEDURE — C1713 ANCHOR/SCREW BN/BN,TIS/BN: HCPCS | Performed by: STUDENT IN AN ORGANIZED HEALTH CARE EDUCATION/TRAINING PROGRAM

## 2020-12-18 PROCEDURE — 27524 TREAT KNEECAP FRACTURE: CPT | Mod: 78,RT,, | Performed by: STUDENT IN AN ORGANIZED HEALTH CARE EDUCATION/TRAINING PROGRAM

## 2020-12-18 PROCEDURE — 64450 NJX AA&/STRD OTHER PN/BRANCH: CPT | Performed by: STUDENT IN AN ORGANIZED HEALTH CARE EDUCATION/TRAINING PROGRAM

## 2020-12-18 PROCEDURE — C1769 GUIDE WIRE: HCPCS | Performed by: STUDENT IN AN ORGANIZED HEALTH CARE EDUCATION/TRAINING PROGRAM

## 2020-12-18 PROCEDURE — 63600175 PHARM REV CODE 636 W HCPCS: Performed by: ANESTHESIOLOGY

## 2020-12-18 PROCEDURE — 25000003 PHARM REV CODE 250: Performed by: STUDENT IN AN ORGANIZED HEALTH CARE EDUCATION/TRAINING PROGRAM

## 2020-12-18 PROCEDURE — 82962 GLUCOSE BLOOD TEST: CPT | Performed by: STUDENT IN AN ORGANIZED HEALTH CARE EDUCATION/TRAINING PROGRAM

## 2020-12-18 PROCEDURE — D9220A PRA ANESTHESIA: Mod: CRNA,,, | Performed by: NURSE ANESTHETIST, CERTIFIED REGISTERED

## 2020-12-18 PROCEDURE — 73560 X-RAY EXAM OF KNEE 1 OR 2: CPT | Mod: TC,RT

## 2020-12-18 PROCEDURE — 71000015 HC POSTOP RECOV 1ST HR: Performed by: STUDENT IN AN ORGANIZED HEALTH CARE EDUCATION/TRAINING PROGRAM

## 2020-12-18 PROCEDURE — 88300 SURGICAL PATH GROSS: CPT | Mod: 26,,, | Performed by: PATHOLOGY

## 2020-12-18 PROCEDURE — 71000033 HC RECOVERY, INTIAL HOUR: Performed by: STUDENT IN AN ORGANIZED HEALTH CARE EDUCATION/TRAINING PROGRAM

## 2020-12-18 PROCEDURE — 63600175 PHARM REV CODE 636 W HCPCS: Performed by: STUDENT IN AN ORGANIZED HEALTH CARE EDUCATION/TRAINING PROGRAM

## 2020-12-18 PROCEDURE — 88300 PR  SURG PATH,GROSS,LEVEL I: ICD-10-PCS | Mod: 26,,, | Performed by: PATHOLOGY

## 2020-12-18 PROCEDURE — 27200651 HC AIRWAY, LMA: Performed by: ANESTHESIOLOGY

## 2020-12-18 PROCEDURE — 25000003 PHARM REV CODE 250: Performed by: NURSE ANESTHETIST, CERTIFIED REGISTERED

## 2020-12-18 PROCEDURE — 36000710: Performed by: STUDENT IN AN ORGANIZED HEALTH CARE EDUCATION/TRAINING PROGRAM

## 2020-12-18 PROCEDURE — 37000008 HC ANESTHESIA 1ST 15 MINUTES: Performed by: STUDENT IN AN ORGANIZED HEALTH CARE EDUCATION/TRAINING PROGRAM

## 2020-12-18 PROCEDURE — 37000009 HC ANESTHESIA EA ADD 15 MINS: Performed by: STUDENT IN AN ORGANIZED HEALTH CARE EDUCATION/TRAINING PROGRAM

## 2020-12-18 PROCEDURE — 36000711: Performed by: STUDENT IN AN ORGANIZED HEALTH CARE EDUCATION/TRAINING PROGRAM

## 2020-12-18 PROCEDURE — D9220A PRA ANESTHESIA: ICD-10-PCS | Mod: ANES,,, | Performed by: ANESTHESIOLOGY

## 2020-12-18 PROCEDURE — 63600175 PHARM REV CODE 636 W HCPCS: Performed by: NURSE ANESTHETIST, CERTIFIED REGISTERED

## 2020-12-18 PROCEDURE — D9220A PRA ANESTHESIA: Mod: ANES,,, | Performed by: ANESTHESIOLOGY

## 2020-12-18 PROCEDURE — 27524 PR OPEN RX PATELLA FX: ICD-10-PCS | Mod: 78,RT,, | Performed by: STUDENT IN AN ORGANIZED HEALTH CARE EDUCATION/TRAINING PROGRAM

## 2020-12-18 PROCEDURE — 85610 PROTHROMBIN TIME: CPT

## 2020-12-18 PROCEDURE — 73560 X-RAY EXAM OF KNEE 1 OR 2: CPT | Mod: 26,RT,, | Performed by: RADIOLOGY

## 2020-12-18 PROCEDURE — 27201423 OPTIME MED/SURG SUP & DEVICES STERILE SUPPLY: Performed by: STUDENT IN AN ORGANIZED HEALTH CARE EDUCATION/TRAINING PROGRAM

## 2020-12-18 PROCEDURE — D9220A PRA ANESTHESIA: ICD-10-PCS | Mod: CRNA,,, | Performed by: NURSE ANESTHETIST, CERTIFIED REGISTERED

## 2020-12-18 DEVICE — IMPLANTABLE DEVICE: Type: IMPLANTABLE DEVICE | Site: KNEE | Status: FUNCTIONAL

## 2020-12-18 RX ORDER — PROPOFOL 10 MG/ML
INJECTION, EMULSION INTRAVENOUS
Status: DISCONTINUED | OUTPATIENT
Start: 2020-12-18 | End: 2020-12-18

## 2020-12-18 RX ORDER — MIDAZOLAM HYDROCHLORIDE 1 MG/ML
INJECTION INTRAMUSCULAR; INTRAVENOUS
Status: DISCONTINUED | OUTPATIENT
Start: 2020-12-18 | End: 2020-12-18

## 2020-12-18 RX ORDER — LIDOCAINE HCL/PF 100 MG/5ML
SYRINGE (ML) INTRAVENOUS
Status: DISCONTINUED | OUTPATIENT
Start: 2020-12-18 | End: 2020-12-18

## 2020-12-18 RX ORDER — OXYCODONE HYDROCHLORIDE 5 MG/1
5 TABLET ORAL ONCE AS NEEDED
Status: COMPLETED | OUTPATIENT
Start: 2020-12-18 | End: 2020-12-18

## 2020-12-18 RX ORDER — OXYCODONE HYDROCHLORIDE 5 MG/1
5 TABLET ORAL EVERY 6 HOURS PRN
Qty: 30 TABLET | Refills: 0 | Status: SHIPPED | OUTPATIENT
Start: 2020-12-18 | End: 2021-03-02

## 2020-12-18 RX ORDER — ONDANSETRON 2 MG/ML
INJECTION INTRAMUSCULAR; INTRAVENOUS
Status: DISCONTINUED | OUTPATIENT
Start: 2020-12-18 | End: 2020-12-18

## 2020-12-18 RX ORDER — HYDROMORPHONE HYDROCHLORIDE 2 MG/ML
0.2 INJECTION, SOLUTION INTRAMUSCULAR; INTRAVENOUS; SUBCUTANEOUS EVERY 5 MIN PRN
Status: DISCONTINUED | OUTPATIENT
Start: 2020-12-18 | End: 2020-12-18 | Stop reason: HOSPADM

## 2020-12-18 RX ORDER — ONDANSETRON 2 MG/ML
4 INJECTION INTRAMUSCULAR; INTRAVENOUS ONCE
Status: COMPLETED | OUTPATIENT
Start: 2020-12-18 | End: 2020-12-18

## 2020-12-18 RX ORDER — SODIUM CHLORIDE, SODIUM LACTATE, POTASSIUM CHLORIDE, CALCIUM CHLORIDE 600; 310; 30; 20 MG/100ML; MG/100ML; MG/100ML; MG/100ML
INJECTION, SOLUTION INTRAVENOUS CONTINUOUS PRN
Status: DISCONTINUED | OUTPATIENT
Start: 2020-12-18 | End: 2020-12-18

## 2020-12-18 RX ORDER — CEFAZOLIN SODIUM 1 G/3ML
INJECTION, POWDER, FOR SOLUTION INTRAMUSCULAR; INTRAVENOUS
Status: DISCONTINUED | OUTPATIENT
Start: 2020-12-18 | End: 2020-12-18

## 2020-12-18 RX ORDER — FENTANYL CITRATE 50 UG/ML
INJECTION, SOLUTION INTRAMUSCULAR; INTRAVENOUS
Status: DISCONTINUED | OUTPATIENT
Start: 2020-12-18 | End: 2020-12-18

## 2020-12-18 RX ORDER — PHENYLEPHRINE HYDROCHLORIDE 10 MG/ML
INJECTION INTRAVENOUS
Status: DISCONTINUED | OUTPATIENT
Start: 2020-12-18 | End: 2020-12-18

## 2020-12-18 RX ORDER — EPHEDRINE SULFATE 50 MG/ML
INJECTION, SOLUTION INTRAVENOUS
Status: DISCONTINUED | OUTPATIENT
Start: 2020-12-18 | End: 2020-12-18

## 2020-12-18 RX ORDER — ASPIRIN 81 MG/1
81 TABLET ORAL 2 TIMES DAILY
Qty: 28 TABLET | Refills: 0 | Status: SHIPPED | OUTPATIENT
Start: 2020-12-18 | End: 2021-03-10 | Stop reason: CLARIF

## 2020-12-18 RX ORDER — ONDANSETRON 2 MG/ML
INJECTION INTRAMUSCULAR; INTRAVENOUS
Status: DISCONTINUED
Start: 2020-12-18 | End: 2020-12-18 | Stop reason: HOSPADM

## 2020-12-18 RX ADMIN — EPHEDRINE SULFATE 10 MG: 50 INJECTION INTRAVENOUS at 01:12

## 2020-12-18 RX ADMIN — PROPOFOL 50 MG: 10 INJECTION, EMULSION INTRAVENOUS at 01:12

## 2020-12-18 RX ADMIN — HYDROMORPHONE HYDROCHLORIDE 0.2 MG: 2 INJECTION INTRAMUSCULAR; INTRAVENOUS; SUBCUTANEOUS at 04:12

## 2020-12-18 RX ADMIN — ONDANSETRON 4 MG: 2 INJECTION INTRAMUSCULAR; INTRAVENOUS at 04:12

## 2020-12-18 RX ADMIN — PHENYLEPHRINE HYDROCHLORIDE 100 MCG: 10 INJECTION INTRAVENOUS at 01:12

## 2020-12-18 RX ADMIN — EPHEDRINE SULFATE 5 MG: 50 INJECTION INTRAVENOUS at 01:12

## 2020-12-18 RX ADMIN — CEFAZOLIN 3 G: 1 INJECTION, POWDER, FOR SOLUTION INTRAMUSCULAR; INTRAVENOUS at 01:12

## 2020-12-18 RX ADMIN — FENTANYL CITRATE 50 MCG: 50 INJECTION, SOLUTION INTRAMUSCULAR; INTRAVENOUS at 02:12

## 2020-12-18 RX ADMIN — SODIUM CHLORIDE, SODIUM LACTATE, POTASSIUM CHLORIDE, AND CALCIUM CHLORIDE: 600; 310; 30; 20 INJECTION, SOLUTION INTRAVENOUS at 12:12

## 2020-12-18 RX ADMIN — MIDAZOLAM HYDROCHLORIDE 1 MG: 1 INJECTION INTRAMUSCULAR; INTRAVENOUS at 12:12

## 2020-12-18 RX ADMIN — PHENYLEPHRINE HYDROCHLORIDE 200 MCG: 10 INJECTION INTRAVENOUS at 01:12

## 2020-12-18 RX ADMIN — ONDANSETRON 4 MG: 2 INJECTION, SOLUTION INTRAMUSCULAR; INTRAVENOUS at 03:12

## 2020-12-18 RX ADMIN — OXYCODONE HYDROCHLORIDE 5 MG: 5 TABLET ORAL at 04:12

## 2020-12-18 RX ADMIN — FENTANYL CITRATE 50 MCG: 50 INJECTION, SOLUTION INTRAMUSCULAR; INTRAVENOUS at 03:12

## 2020-12-18 RX ADMIN — PROPOFOL 100 MG: 10 INJECTION, EMULSION INTRAVENOUS at 01:12

## 2020-12-18 RX ADMIN — Medication 100 MG: at 01:12

## 2020-12-18 RX ADMIN — GLYCOPYRROLATE 0.2 MG: 0.2 INJECTION, SOLUTION INTRAMUSCULAR; INTRAVITREAL at 01:12

## 2020-12-18 RX ADMIN — FENTANYL CITRATE 50 MCG: 50 INJECTION, SOLUTION INTRAMUSCULAR; INTRAVENOUS at 01:12

## 2020-12-18 RX ADMIN — FENTANYL CITRATE 50 MCG: 50 INJECTION, SOLUTION INTRAMUSCULAR; INTRAVENOUS at 12:12

## 2020-12-18 NOTE — ANESTHESIA PROCEDURE NOTES
Intubation  Performed by: Fatuma Amor CRNA  Authorized by: Denys Gimenez MD     Intubation:     Induction:  Intravenous    Intubated:  Postinduction    Mask Ventilation:  Easy mask    Attempts:  1    Attempted By:  CRNA    Difficult Airway Encountered?: No      Complications:  None    Airway Device:  Supraglottic airway/LMA    Airway Device Size:  4.0    Style/Cuff Inflation:  Cuffed    Secured at:  The lips    Placement Verified By:  Capnometry    Complicating Factors:  None    Findings Post-Intubation:  Atraumatic/condition of teeth unchanged

## 2020-12-18 NOTE — OP NOTE
Patient Name: Lakshmi Campbell     Date of Surgery: 12/18/2020     Medical Record #: [id number]      Pre-operative Diagnosis:    1.  Right patella fracture after previous open reduction, internal fixation     Post-operative Diagnosis:    1.  Right patella fracture after previous open reduction, internal fixation     Procedure:    1.  Open reduction internal fixation of right patella fracture  2.  Removal of deep/buried hardware     Primary Surgeon: Sage Wolf MD     Implants Utilized:    Patella:  Anjel 4.0 mm cannulated screws  Arthrex FiberTape for tension band     Anesthesia:  LMA with accompanying regional block.     Complications:  None     Estimated Blood Loss: 50 mL     Indications for Procedure:   Lakshmi Campbell is a 69 y.o. year old female who presented to clinic about 5 weeks after her original patella ORIF.  Her right patella fracture has displaced since last imaging.  It was a comminuted, stellate pattern, and it appears that two of the proximal fragments have not healed. I discussed with her that we cannot leave the patella that displaced.  I recommend surgical treatment with revision fixation of the right patella.      Description of Procedure:    The patient was intubated and positioned supine on the operative table.  All bony problems were well padded.  Patient was prepped and draped in standard sterile fashion.  A time-out was performed to correctly verify the patient, procedure, operative site, and operative side.  Once the time-out was completed we began the procedure.     A vertical incision directly over the patella was made with the 10 blade scalpel.  This was carried down sharply through the subcutaneous layers and full-thickness skin flaps were raised.  Bovie electrocautery was then used to perform a subperiosteal dissection of the patella anteriorly.  The previous hardware was encountered and exposed. The faceplate proximal screws were removed with a hemostat as they no longer had any  fixation. The distal faceplate screws were backed out using a freer and the plate was removed. There were 2 x 2.7mm screws that had also lost fixation and these were removed as well. The bony fragments of the fractured patella were visualized.  A combination of [place] elevator and rongeur was used to debride some of the clot and callus.  Pointed reduction clamps were then used to reduce the  fragments to each other.  Once reduced, temporary fixation with K-wires was performed.  Orthogonal views were used to verify appropriate placement of the wires. A cannulated drill was then used to over-drill the wires and 4.0mm cannulated screws were placed. Fibertape was shuttled through the cannulated screws to create a tension band construct. It was tied over the top of the patella. Additional #5 FiberWire was passed in a pursestring fashion around the patella to cerclage it together and another was tied medial to lateral in a figure of 8 configuration around the patella.  AP and lateral x-rays were taken which verified anatomic reduction of the articular surface.  The incision was then thoroughly irrigated and closed in a layered fashion.  A light sterile dressing was applied.     The patient was then placed into a straight leg cast and a window was cut for visualization of the incision.        There were no intraoperative complications. Needles and sponge counts were correct. IV antibiotics were started and completed within 30 min of skin incision.     POSTOPERATIVE PLAN:    WBAT RLE using amaris-walker  RLE cast in place  ASA 81mg BID for dvt ppx  F/u 10-14 days with XR in cast

## 2020-12-18 NOTE — DISCHARGE INSTRUCTIONS
DISCHARGE INSTRUCTIONS FOR   PATELLA ORIF    Contact the [facility] at [phone] if you have questions about your instructions or follow-up appointment.    DIET:   Start with clear liquids and light foods to minimize nausea. Once these are tolerated, advance to a regular diet.     TRIP HOME:   To encourage blood flow and decrease your risk of blood clots, do the following on your ride home:    If possible, perform ankle pumps by moving your foot up and down.    If your ride home will be longer than 2 hours, it is best to stop at least once to get out of the car and move your leg.    While in the car, consider riding in the back seat with your surgical leg elevated.     DRESSING AND WOUND CARE:   Keep the cast and dressing clean and dry. It is normal for there to be some drainage after surgery since the knee was irrigated with large amounts of fluid. Reinforce with additional gauze and/or ACE wraps as necessary.  Keep the cast and dressing in place. Keep your incisions covered until you follow up in clinic.   If you have Steri-Strips in place of stitches, allow them to stay in place as long as possible. Steri-Strips are made of a fabric material that can get wet in the shower and pat dry with a towel. They usually fall off on their own within 7 to 10 days. You may trim the edges as they begin to curl.      BATHING:   You may bathe or shower but must keep the cast dry, but do not scrub or soak the incisions. Dry the area by gently blotting it with a gauze or towel. After it is completely dry, cover the wound with clean gauze or Band-Aids®. Do NOT submerge the incisions (bath/swim) until after the sutures are removed and the wound has completely healed.     ACTIVITY:    Ice should be applied to the knee for 20-30 minutes, 5-6 times a day, to help control pain and swelling. Apply additional times as needed, especially after exercise, for the first 3-4 weeks. Do not apply ice directly to the  skin; use a thin barrier in between. Also, do not use heat.    Compression applied to the knee with an ACE bandage can help with swelling. When wrapping, start below the knee and work up.   Elevate the leg when possible with pillows under the ankle. While it may seem comfortable, do not put pillows under the knee for long periods of time. It is important to be able to straighten your knee and having the knee bent while elevating can make this difficult. Elevation, as a rule, should be higher than your heart. You can stop elevating when comfortable.   Use crutches to walk with weight bearing as tolerated. You must use both crutches at all times. It is important to move after surgery. Do not plan to walk long distances, climb multiple flights of stairs, kneel, squat, crawl, or carry heavy objects until cleared by your surgeon.   Physical therapy will be started after your 1st follow-up visit. At that time, you will be given a prescription & rehabilitation protocol to take to the PT clinic of your choice. Plan to visit with a physical therapist within 3 days after your follow-up visit.     PAIN CONTROL:   It is important to stay ahead of pain as it becomes challenging to get under control if you fall behind. Ice and elevation can help and should be used as much as possible in the first few days.   Narcotic pain medications, such as hydrocodone or oxycodone, should be taken as prescribed. Wean off as soon as possible. Take these with food to decrease the chances of nausea and vomiting. Do not drink alcohol, drive a vehicle, or use heavy machinery while taking narcotic pain medications.    NSAID medications are used for pain control and to decrease inflammation. You may be prescribed an NSAID such as ketorolac (Toradol). Take as instructed. Other NSAID medications such as ibuprofen, Motrin, Advil, naproxen, or Aleve can be used once you have finished the Toradol, or if a prescription for Toradol was not  provided.   Acetaminophen (Tylenol) is an effective over-the-counter pain medication that can be used with NSAID medications and non-acetaminophen containing narcotics such as plain oxycodone.    ASPIRIN FOR PREVENTION OF BLOOD CLOTS:   You should take one 81 mg baby aspirin twice daily for two weeks starting the evening of the day you have surgery unless instructed otherwise or taking a different blood thinner such as enoxaparin or warfarin. If you are aware that you are at high risk for a blood clot, notify your physician as soon as possible.  Take aspirin at least 30 minutes before taking ibuprofen or Toradol.    CONSTIPATION PREVENTION:  Anesthesia and pain medications, changes in eating and drinking, and less activity can all lead to constipation after surgery. To prevent or reduce constipation, take an over-the-counter stool softener (brands include Colace and Miralax).  Follow the directions on the bottle. Drink plenty of water and eat high fiber foods including whole grains, fresh fruits, vegetables, beans, prunes or prune juice.     PROBLEMS TO REPORT:  1. Persistent bloody drainage that soaks through reinforced dressings.  2. Fever greater than 101F or 38C.   3. Incision that is very red, swollen, draining pus, shows red streaks, or feels hot.  4. Inability to urinate within 8 hours of surgery (a rare effect of the anesthesia).  5. If you develop a rash, generalized itching or swelling from the medications, STOP the medication and call the clinic or the orthopedic surgery resident on call.    Daytime calls should be directed to the [facility] at [phone].   Night-time and weekend calls should be directed to the after hours nurse on call, who can be reached through the at [phone].       FREQUENTLY ASKED QUESTIONS     WHAT DAILY ACTIVITIES CAN I DO?  After knee surgery, daily activities such as walking, going up/down stairs, or desk work should not cause damage to your knee. Use the  brace and crutches as directed.    CAN I DRIVE OR RIDE BY CAR/ TRAIN/ PLANE?   You should not drive until you are off crutches (6 weeks). If you had surgery on the right leg, you should not drive until you are no longer wearing the IROM brace. You should not drive while taking narcotic pain medications. You may ride in a car after surgery as needed. Keep the knee elevated with pillows under the ankle. You may take a train or even fly the day after your surgery as long as you feel secure and comfortable. If you do fly, expect some extra swelling due the drop in air pressure. A compressive bandage, ice, and elevation should help this resolve.    WHAT ABOUT WORK?   You may return to an office-type job or to school whenever comfortable. For most patients this occurs 1 week after surgery. For more active jobs that require extended walking, squatting, or lifting, you can wait until after your follow-up appointment. Any other unusual types of jobs should be discussed to determine a date for return to work.     WHAT ABOUT SWELLING?   Expect swelling as a normal process after surgery. Ice, compression, elevation, and other treatments provided at physical therapy will allow this to improve in time. Some swelling may remain for several weeks, and this is normal.     WHAT IF IT REALLY HURTS TOO MUCH?   Surgery hurts and you cannot expect to be pain free, but our goal is for it to be tolerable. Try to use all available pain therapies such as narcotics, NSAIDS, and acetaminophen. Always try more ice and elevation. If the pain is not tolerable, call the clinic or the after hours nurse on call.

## 2020-12-18 NOTE — DISCHARGE SUMMARY
[facility]  Discharge Note  Short Stay    Procedure(s) (LRB):  ORIF, FRACTURE, PATELLA (Right)    OUTCOME: Patient tolerated treatment/procedure well without complication and is now ready for discharge.    DISPOSITION: Home or Self Care    FINAL DIAGNOSIS:    1.  Open reduction internal fixation of right patella fracture  2.  Removal of deep/buried hardware    FOLLOWUP: In clinic    DISCHARGE INSTRUCTIONS:  No discharge procedures on file.     Clinical Reference Documents Added to Patient Instructions       Document    ASPIRIN, ASA CHEWABLE TABLETS (ENGLISH)    OXYCODONE TABLETS OR CAPSULES (ENGLISH)

## 2020-12-18 NOTE — TELEPHONE ENCOUNTER
----- Message from Andreina Khan sent at 12/18/2020  3:27 PM CST -----  Contact: [facility]  .Type:  Needs Medical Advice    Who Called: candy   Symptoms (please be specific)   How long has patient had these symptoms:   Pharmacy name and phone #:    Would the patient rather a call back or a response via [facility]?  Call    Best Call Back Number:   [phone] or fax [phone]  Additional Information: Caller is requesting a call back from the nurse in regards to them knowing if there are any home health orders for the pt with them please

## 2020-12-18 NOTE — BRIEF OP NOTE
[facility]  Brief Operative Note    Surgery Date: 12/18/2020     Surgeon(s) and Role:     * Sage Wolf MD - Primary    Assisting Surgeon: None    Pre-op Diagnosis:  Closed displaced comminuted fracture of right patella, sequela [S82.041S]    Post-op Diagnosis:  Post-Op Diagnosis Codes:     * Closed displaced comminuted fracture of right patella, sequela [S82.041S]    Procedure(s) (LRB):  ORIF, FRACTURE, PATELLA (Right)    Anesthesia: General    Description of the findings of the procedure(s): revision right patella open reduction internal fixation, removal of previous hardward    Estimated Blood Loss: 50 cc         Specimens:   Specimen (12h ago, onward)    None            Discharge Note    OUTCOME: Patient tolerated treatment/procedure well without complication and is now ready for discharge.    DISPOSITION: Home or Self Care    FINAL DIAGNOSIS:  <principal problem not specified>    FOLLOWUP: In clinic    DISCHARGE INSTRUCTIONS:  No discharge procedures on file.     Clinical Reference Documents Added to Patient Instructions       Document    ASPIRIN, ASA CHEWABLE TABLETS (ENGLISH)    OXYCODONE TABLETS OR CAPSULES (ENGLISH)

## 2020-12-18 NOTE — PLAN OF CARE
Peripheral nerve block complete at this time with Jenifer Acevedo RN and myself at bedside. Cardiac monitoring in place. Patient tolerated well.

## 2020-12-18 NOTE — ANESTHESIA PREPROCEDURE EVALUATION
"                                                                                                             12/18/2020  Lakshmi Campbell is a 69 y.o., female.  Pre-operative evaluation for Procedure(s) (LRB):  ORIF, FRACTURE, PATELLA (Right)    Lakshmi Campbell is a 69 y.o. female with history of falling injuring right patella. Denies ongoing acute comorbidity. She ambulates poorly with brace. Denies SOB or chst pain.    LDA:     Prev airway:     Drips:     Patient Active Problem List   Diagnosis    Type 2 diabetes mellitus with microalbuminuria, with long-term current use of insulin    Hypothyroidism    Essential hypertension    Hyperlipidemia    Microalbuminuria    Preop examination    Body mass index (BMI) 45.0-49.9, adult    Left knee pain    Closed displaced comminuted fracture of right patella    Closed fracture of surgical neck of left humerus    Closed left radial fracture    Shoulder pain, left       Review of patient's allergies indicates:   Allergen Reactions    Codeine Nausea Only     Other reaction(s): Nausea  Other reaction(s): Elevated blood pressure        No current facility-administered medications on file prior to encounter.      Current Outpatient Medications on File Prior to Encounter   Medication Sig Dispense Refill    amLODIPine (NORVASC) 10 MG tablet Take 0.5 tablets (5 mg total) by mouth once daily. 30 tablet 0    blood sugar diagnostic (ONE TOUCH ULTRA TEST) Strp Use ac bid      blood-glucose meter kit Use as instructed      enoxaparin (LOVENOX) 40 mg/0.4 mL Syrg Inject 0.4 mLs (40 mg total) into the skin once daily.      furosemide (LASIX) 20 MG tablet       hydrochlorothiazide (HYDRODIURIL) 25 MG tablet Take 25 mg by mouth once daily.       insulin NPH-insulin regular, 70/30, (NOVOLIN 70/30 U-100 INSULIN) 100 unit/mL (70-30) injection Inject 75 units sc ac supper.      insulin syringe-needle U-100 1 mL 29 gauge x 1/2" Syrg Use bid      levothyroxine (SYNTHROID) 175 MCG " "tablet TAKE 1 TABLET BY MOUTH DAILY      metformin (GLUCOPHAGE) 1000 MG tablet 1,000 mg 2 (two) times daily with meals.       nystatin (MYCOSTATIN) ointment SEBASTIÁN EXT AA BID      perindopril erbumine (ACEON) 4 mg tablet Take 4 mg by mouth once daily.       pravastatin (PRAVACHOL) 20 MG tablet Take 20 mg by mouth once daily.   11    SURE COMFORT PEN NEEDLE 31 X 5/16 " Ndle   0       Past Surgical History:   Procedure Laterality Date    APPENDECTOMY      BREAST BIOPSY      CATARACT EXTRACTION      EYE SURGERY      HERNIA REPAIR      OPEN REDUCTION AND INTERNAL FIXATION (ORIF) OF FRACTURE OF DISTAL RADIUS Left 2020    Procedure: ORIF, FRACTURE, RADIUS, DISTAL;  Surgeon: Sage Wolf MD;  Location: [facility] OR;  Service: Orthopedics;  Laterality: Left;    OPEN REDUCTION AND INTERNAL FIXATION (ORIF) OF FRACTURE OF PATELLA Right 2020    Procedure: ORIF, FRACTURE, PATELLA;  Surgeon: Sage Wolf MD;  Location: [facility] OR;  Service: Orthopedics;  Laterality: Right;    ORIF HUMERUS FRACTURE Left 2020    Procedure: ORIF, FRACTURE, HUMERUS;  Surgeon: Sage Wolf MD;  Location: [facility] OR;  Service: Orthopedics;  Laterality: Left;    PLACEMENT OF ACELLULAR HUMAN DERMAL ALLOGRAFT Right 2020    Procedure: APPLICATION, ACELLULAR HUMAN DERMAL ALLOGRAFT;  Surgeon: Sage Wolf MD;  Location: [facility] OR;  Service: Orthopedics;  Laterality: Right;  Right Patella           Vital Signs Range (Last 24H):  Temp:  [36.8 °C (98.2 °F)]   Pulse:  [69]   Resp:  [18]   BP: (198)/(88)   SpO2:  [97 %]             Diagnostic Studies:      EKD Echo:        Anesthesia Evaluation    I have reviewed the Patient Summary Reports.    I have reviewed the Nursing Notes.    I have reviewed the Medications.     Review of Systems  Anesthesia Hx:  No problems with previous Anesthesia    Social:  Non-Smoker    Hematology/Oncology:  Hematology Normal   Oncology Normal "     EENT/Dental:EENT/Dental Normal   Pulmonary:  Pulmonary Normal    Renal/:  Renal/ Normal     Hepatic/GI:  Hepatic/GI Normal    Musculoskeletal:  Musculoskeletal Normal    Neurological:  Neurology Normal    Dermatological:  Skin Normal    Psych:  Psychiatric Normal           Physical Exam  General:  Obesity, Morbid Obesity    Airway/Jaw/Neck:  Airway Findings: Mouth Opening: Normal Tongue: Normal  General Airway Assessment: Adult  Mallampati: III  Improves to II with phonation.  TM Distance: Normal, at least 6 cm      Dental:  Dental Findings: In tact        Mental Status:  Mental Status Findings:  Cooperative, Alert and Oriented         Anesthesia Plan  Type of Anesthesia, risks & benefits discussed:  Anesthesia Type:  general  Patient's Preference:   Intra-op Monitoring Plan:   Intra-op Monitoring Plan Comments:   Post Op Pain Control Plan:   Post Op Pain Control Plan Comments:   Induction:   IV  Beta Blocker:         Informed Consent: Patient understands risks and agrees with Anesthesia plan.  Questions answered. Anesthesia consent signed with patient.  ASA Score: 3     Day of Surgery Review of History & Physical:            Ready For Surgery From Anesthesia Perspective.

## 2020-12-18 NOTE — TRANSFER OF CARE
"Anesthesia Transfer of Care Note    Patient: Lakshmi Campbell    Procedure(s) Performed: Procedure(s) (LRB):  ORIF, FRACTURE, PATELLA (Right)    Patient location: PACU    Anesthesia Type: general    Transport from OR: Transported from OR on room air with adequate spontaneous ventilation    Post pain: adequate analgesia    Post assessment: no apparent anesthetic complications and tolerated procedure well    Post vital signs: stable    Level of consciousness: awake, alert and oriented    Nausea/Vomiting: no nausea/vomiting    Complications: none    Transfer of care protocol was followed      Last vitals:   Visit Vitals  BP (!) 155/67 (BP Location: Right arm, Patient Position: Lying)   Pulse 80   Temp 36.7 °C (98.1 °F) (Temporal)   Resp 14   Ht 5' 5" (1.651 m)   Wt 118.1 kg (260 lb 5 oz)   SpO2 100%   Breastfeeding No   BMI 43.32 kg/m²     "

## 2020-12-19 ENCOUNTER — HOSPITAL ENCOUNTER (EMERGENCY)
Facility: HOSPITAL | Age: 70
Discharge: HOME | End: 2020-12-19
Attending: EMERGENCY MEDICINE
Payer: MEDICARE

## 2020-12-19 ENCOUNTER — NURSE TRIAGE (OUTPATIENT)
Dept: ADMINISTRATIVE | Facility: CLINIC | Age: 70
End: 2020-12-19

## 2020-12-19 VITALS
RESPIRATION RATE: 18 BRPM | OXYGEN SATURATION: 96 % | SYSTOLIC BLOOD PRESSURE: 197 MMHG | HEART RATE: 96 BPM | DIASTOLIC BLOOD PRESSURE: 79 MMHG | WEIGHT: 261 LBS | TEMPERATURE: 98 F | BODY MASS INDEX: 43.43 KG/M2

## 2020-12-19 DIAGNOSIS — Z47.89 CAST DISCOMFORT: Primary | ICD-10-CM

## 2020-12-19 PROCEDURE — 99282 EMERGENCY DEPT VISIT SF MDM: CPT | Mod: ER

## 2020-12-19 NOTE — ANESTHESIA POSTPROCEDURE EVALUATION
Anesthesia Post Evaluation    Patient: Lakshmi Campbell    Procedure(s) Performed: Procedure(s) (LRB):  ORIF, FRACTURE, PATELLA (Right)    Final Anesthesia Type: general      Patient location during evaluation: PACU  Patient participation: Yes- Able to Participate  Level of consciousness: awake and alert  Post-procedure vital signs: reviewed and stable  Pain management: adequate  Airway patency: patent    PONV status at discharge: No PONV  Anesthetic complications: no      Cardiovascular status: blood pressure returned to baseline  Respiratory status: unassisted  Hydration status: euvolemic  Follow-up not needed.          Vitals Value Taken Time   /65 12/18/20 1710   Temp 36.6 °C (97.9 °F) 12/18/20 1710   Pulse 83 12/18/20 1710   Resp 16 12/18/20 1710   SpO2 94 % 12/18/20 1710         Event Time   Out of Recovery 16:55:00         Pain/Jordana Score: Pain Rating Prior to Med Admin: 7 (12/18/2020  4:49 PM)  Jordana Score: 10 (12/18/2020  5:15 PM)

## 2020-12-19 NOTE — TELEPHONE ENCOUNTER
Reason for Disposition   [1] Increasing pain under cast AND [2] cast put on > 24 hours ago AND [3] not improved after elevation    Additional Information   Negative: Chest pain   Negative: Difficulty breathing   Negative: [1] SEVERE pain (e.g., excruciating, pain scale 8-10) under cast AND [2] not improved after pain medications and elevation   Negative: [1] SEVERE pain of fingers or toes AND [2] not improved after pain medications and elevation   Negative: [1] Numbness (loss of sensation) of fingers or toes AND [2] persists after 1 hour of elevation   Negative: [1] Tingling (pins and needles sensation) of fingers or toes AND [2] persists after 1 hour of elevation   Negative: Blueness or pallor of fingers or toes (compared to non-injured side)   Negative: Patient sounds very sick or weak to the triager    Protocols used: CAST SYMPTOMS AND [id number]-MD    Pt stated she has a cast on her leg that is too tight. Stated she has pain in her foot because it is so tight. Per triage protocol, advised to see a MD within 4 hrs. Pt verbalized understanding.

## 2020-12-20 NOTE — ED PROVIDER NOTES
Encounter Date: 12/19/2020       History     Chief Complaint   Patient presents with    Foot Pain     c/o back of foot pain onset yesterday. pt states cast is too tight and is causing pain. +2 pedal pulses.      The history is provided by the patient.   Leg Pain   Incident location: surgery  There was no injury mechanism. The incident occurred yesterday (right knee surgery, full cast from ankle to mid thigh, cast to right ankle causing pain ). The pain is present in the right ankle and right knee. The quality of the pain is described as aching. The pain is at a severity of 3/10. The pain has been intermittent since onset. Pertinent negatives include no numbness, no inability to bear weight, no loss of motion, no muscle weakness, no loss of sensation and no tingling. She reports no foreign bodies present. The symptoms are aggravated by activity. She has tried nothing for the symptoms. The treatment provided no relief.     Review of patient's allergies indicates:   Allergen Reactions    Codeine Nausea Only     Other reaction(s): Nausea  Other reaction(s): Elevated blood pressure     Past Medical History:   Diagnosis Date    Cataract     Diabetes mellitus type I     Hyperlipidemia     Hypertension     Morbid obesity     Right knee pain     Thyroid disease      Past Surgical History:   Procedure Laterality Date    APPENDECTOMY      BREAST BIOPSY      CATARACT EXTRACTION      EYE SURGERY      HERNIA REPAIR      OPEN REDUCTION AND INTERNAL FIXATION (ORIF) OF FRACTURE OF DISTAL RADIUS Left 11/2/2020    Procedure: ORIF, FRACTURE, RADIUS, DISTAL;  Surgeon: Sage Wolf MD;  Location: [facility] OR;  Service: Orthopedics;  Laterality: Left;    OPEN REDUCTION AND INTERNAL FIXATION (ORIF) OF FRACTURE OF PATELLA Right 11/2/2020    Procedure: ORIF, FRACTURE, PATELLA;  Surgeon: Sage Wolf MD;  Location: [facility] OR;  Service: Orthopedics;  Laterality: Right;    ORIF HUMERUS FRACTURE Left 11/5/2020     Procedure: ORIF, FRACTURE, HUMERUS;  Surgeon: Sage Wolf MD;  Location: [facility] OR;  Service: Orthopedics;  Laterality: Left;    PLACEMENT OF ACELLULAR HUMAN DERMAL ALLOGRAFT Right 2020    Procedure: APPLICATION, ACELLULAR HUMAN DERMAL ALLOGRAFT;  Surgeon: Sage Wolf MD;  Location: [facility] OR;  Service: Orthopedics;  Laterality: Right;  Right Patella     Family History   Problem Relation Age of Onset    Diabetes Mother     Leukemia Father     Diabetes Father      Social History     Tobacco Use    Smoking status: Former Smoker     Packs/day: 1.00     Types: Cigarettes     Quit date:      Years since quittin.9    Smokeless tobacco: Never Used   Substance Use Topics    Alcohol use: Yes     Frequency: Monthly or less    Drug use: No     Review of Systems   Constitutional: Negative for fever.   HENT: Negative for sore throat.    Respiratory: Negative for shortness of breath.    Cardiovascular: Negative for chest pain.   Gastrointestinal: Negative for nausea.   Genitourinary: Negative for dysuria.   Musculoskeletal: Negative for back pain.        Right ankle discomfort cast to tight    Skin: Negative for rash.   Neurological: Negative for tingling, weakness and numbness.   Hematological: Does not bruise/bleed easily.   All other systems reviewed and are negative.      Physical Exam     Initial Vitals [20 1821]   BP Pulse Resp Temp SpO2   (!) 197/79 96 18 98 °F (36.7 °C) 96 %      MAP       --         Physical Exam    Nursing note and vitals reviewed.  Constitutional: She appears well-developed and well-nourished.   HENT:   Head: Normocephalic and atraumatic.   Mouth/Throat: No oropharyngeal exudate.   Eyes: Conjunctivae, EOM and lids are normal. Pupils are equal, round, and reactive to light. Lids are everted and swept, no foreign bodies found.   Neck: Trachea normal, normal range of motion, full passive range of motion without pain and phonation normal. Neck supple.    Cardiovascular: Normal rate, regular rhythm, S1 normal, S2 normal, normal heart sounds, intact distal pulses and normal pulses.   Pulmonary/Chest: Effort normal and breath sounds normal. No respiratory distress.   Abdominal: Soft. Bowel sounds are normal.   Musculoskeletal:        Right foot: Tenderness present. No bony tenderness, swelling, crepitus, deformity or laceration.        Feet:       Comments: Window cut to right achilles area to allowed cast to be less constricting    Lymphadenopathy:     She has no cervical adenopathy.     She has no axillary adenopathy.   Neurological: She is alert and oriented to person, place, and time. She has normal strength and normal reflexes. No cranial nerve deficit or sensory deficit. She displays a negative Romberg sign.   Skin: Skin is warm and dry. Capillary refill takes less than 2 seconds.         ED Course   Procedures  Labs Reviewed - No data to display       Imaging Results    None                  Rest and elevate the affected painful area.  Apply cold compresses intermittently as needed.  As pain recedes, begin normal activities slowly as tolerated.  Call if symptoms persist.         All historical, clinical, radiographic, and laboratory findings were reviewed with the patient in detail.  Findings are consistent with a diagnosis of Cast Issues.  All remaining questions and concerns were addressed at that time.  Patient has been counseled regarding the need for follow-up as well as the indication to return to the emergency room should new or worrisome developments occur.              Clinical Impression:       ICD-10-CM ICD-9-CM   1. Cast discomfort  Z47.89 V54.89                          ED Disposition Condition    Discharge Stable        ED Prescriptions     None        Follow-up Information     Follow up With Specialties Details Why Contact Info    [facility] Emergency Medicine  If symptoms worsen [address]  [facility]  [id number]  [phone]    Sage Wolf MD Orthopedic Surgery, Sports Medicine In 1 day  [address]  [place] [address]  [phone]                                         Ashok Blanchard NP  12/19/20 0588

## 2020-12-22 ENCOUNTER — DOCUMENTATION ONLY (OUTPATIENT)
Dept: TRANSPLANT | Facility: CLINIC | Age: 70
End: 2020-12-22

## 2020-12-22 NOTE — NURSING
Pt records reviewed.   Pt will be referred to Hepatology.  Fatty liver  Thrombocytopenia  Initial referral received  from the workque.   Referring Provider/diagnosis  Ivon Lala MD      Referral letter sent to patient.

## 2020-12-22 NOTE — LETTER
December 22, 2020    Lakshmi Campbell  [id number]  [place] [address]      Dear Lakshmi Campbell:    Your doctor has referred you to the [facility]. We are sending this letter to remind you to make an appointment with us to complete the referral process.     Please call us at [phone] to schedule an appointment. We look forward to seeing you soon.     If you received a call and have been scheduled, please disregard this letter.       Sincerely,        [facility]   [address] [place] [id number]  [phone]

## 2020-12-23 ENCOUNTER — OFFICE VISIT (OUTPATIENT)
Dept: ORTHOPEDICS | Facility: CLINIC | Age: 70
End: 2020-12-23
Payer: MEDICARE

## 2020-12-23 DIAGNOSIS — S82.041S CLOSED DISPLACED COMMINUTED FRACTURE OF RIGHT PATELLA, SEQUELA: Primary | ICD-10-CM

## 2020-12-23 PROCEDURE — 99999 PR PBB SHADOW E&M-EST. PATIENT-LVL I: ICD-10-PCS | Mod: PBBFAC,,, | Performed by: STUDENT IN AN ORGANIZED HEALTH CARE EDUCATION/TRAINING PROGRAM

## 2020-12-23 PROCEDURE — 99999 PR PBB SHADOW E&M-EST. PATIENT-LVL I: CPT | Mod: PBBFAC,,, | Performed by: STUDENT IN AN ORGANIZED HEALTH CARE EDUCATION/TRAINING PROGRAM

## 2020-12-23 PROCEDURE — 99024 POSTOP FOLLOW-UP VISIT: CPT | Mod: S$GLB,,, | Performed by: STUDENT IN AN ORGANIZED HEALTH CARE EDUCATION/TRAINING PROGRAM

## 2020-12-23 PROCEDURE — 99024 PR POST-OP FOLLOW-UP VISIT: ICD-10-PCS | Mod: S$GLB,,, | Performed by: STUDENT IN AN ORGANIZED HEALTH CARE EDUCATION/TRAINING PROGRAM

## 2020-12-24 DIAGNOSIS — M25.561 RIGHT KNEE PAIN, UNSPECIFIED CHRONICITY: Primary | ICD-10-CM

## 2020-12-24 NOTE — PROGRESS NOTES
Orthopaedics  Post-operative follow-up    Procedure Performed:   Revision open reduction internal fixation of right patella fracture    Date of Surgery: 12/18/2020    Subjective: Lakshmi Campbell is now almost 1 week out from her knee surgery.  She presents to clinic because of an issue with her post-op cast. She reports that it has slipped down and is rubbing on her heel and ankle. She presented to the ED at [facility] where they modified her cast but she continued to have symptoms so she is here today to see what else can be done.     Exam:  Cast in place  Foot warm and well perfused  Cast has migrated distally, foot is in equinus, there is evidence of abrasion to the heel cord area  Cast removed, incision well approximated, sutures in place, no erythema or induration    Impression:  S/p revision patella ORIF, overall doing well    Plan:  Cast removed, dressing changed, transitioned to knee immobilizer  Discussed surgical findings, operative procedure  Reviewed post-operative instructions, rehabilitation, needs to be in knee immobilizer at all times  Weight bearing status: WBAT with knee locked in extension  Symptomatic treatment for pain / swelling  Instructed patient to call clinic if questions or concerns    Follow-up as scheduled in 1 week      Sage Wolf MD

## 2020-12-29 ENCOUNTER — HOSPITAL ENCOUNTER (OUTPATIENT)
Dept: RADIOLOGY | Facility: HOSPITAL | Age: 70
Discharge: HOME | End: 2020-12-29
Attending: STUDENT IN AN ORGANIZED HEALTH CARE EDUCATION/TRAINING PROGRAM
Payer: MEDICARE

## 2020-12-29 ENCOUNTER — OFFICE VISIT (OUTPATIENT)
Dept: ORTHOPEDICS | Facility: CLINIC | Age: 70
End: 2020-12-29
Payer: MEDICARE

## 2020-12-29 VITALS
WEIGHT: 261 LBS | DIASTOLIC BLOOD PRESSURE: 69 MMHG | SYSTOLIC BLOOD PRESSURE: 153 MMHG | HEART RATE: 75 BPM | HEIGHT: 65 IN | BODY MASS INDEX: 43.49 KG/M2

## 2020-12-29 DIAGNOSIS — M25.561 RIGHT KNEE PAIN, UNSPECIFIED CHRONICITY: Primary | ICD-10-CM

## 2020-12-29 DIAGNOSIS — M25.561 RIGHT KNEE PAIN, UNSPECIFIED CHRONICITY: ICD-10-CM

## 2020-12-29 DIAGNOSIS — S82.041S CLOSED DISPLACED COMMINUTED FRACTURE OF RIGHT PATELLA, SEQUELA: Primary | ICD-10-CM

## 2020-12-29 LAB
FINAL PATHOLOGIC DIAGNOSIS: NORMAL
GROSS: NORMAL
Lab: NORMAL

## 2020-12-29 PROCEDURE — 3288F FALL RISK ASSESSMENT DOCD: CPT | Mod: S$GLB,,, | Performed by: STUDENT IN AN ORGANIZED HEALTH CARE EDUCATION/TRAINING PROGRAM

## 2020-12-29 PROCEDURE — 3288F PR FALLS RISK ASSESSMENT DOCUMENTED: ICD-10-PCS | Mod: S$GLB,,, | Performed by: STUDENT IN AN ORGANIZED HEALTH CARE EDUCATION/TRAINING PROGRAM

## 2020-12-29 PROCEDURE — 99024 PR POST-OP FOLLOW-UP VISIT: ICD-10-PCS | Mod: S$GLB,,, | Performed by: STUDENT IN AN ORGANIZED HEALTH CARE EDUCATION/TRAINING PROGRAM

## 2020-12-29 PROCEDURE — 73560 XR KNEE 1 OR 2 VIEW RIGHT: ICD-10-PCS | Mod: 26,RT,, | Performed by: RADIOLOGY

## 2020-12-29 PROCEDURE — 1101F PT FALLS ASSESS-DOCD LE1/YR: CPT | Mod: S$GLB,,, | Performed by: STUDENT IN AN ORGANIZED HEALTH CARE EDUCATION/TRAINING PROGRAM

## 2020-12-29 PROCEDURE — 3008F BODY MASS INDEX DOCD: CPT | Mod: S$GLB,,, | Performed by: STUDENT IN AN ORGANIZED HEALTH CARE EDUCATION/TRAINING PROGRAM

## 2020-12-29 PROCEDURE — 1125F PR PAIN SEVERITY QUANTIFIED, PAIN PRESENT: ICD-10-PCS | Mod: S$GLB,,, | Performed by: STUDENT IN AN ORGANIZED HEALTH CARE EDUCATION/TRAINING PROGRAM

## 2020-12-29 PROCEDURE — 99999 PR PBB SHADOW E&M-EST. PATIENT-LVL IV: ICD-10-PCS | Mod: PBBFAC,,, | Performed by: STUDENT IN AN ORGANIZED HEALTH CARE EDUCATION/TRAINING PROGRAM

## 2020-12-29 PROCEDURE — 1125F AMNT PAIN NOTED PAIN PRSNT: CPT | Mod: S$GLB,,, | Performed by: STUDENT IN AN ORGANIZED HEALTH CARE EDUCATION/TRAINING PROGRAM

## 2020-12-29 PROCEDURE — 99024 POSTOP FOLLOW-UP VISIT: CPT | Mod: S$GLB,,, | Performed by: STUDENT IN AN ORGANIZED HEALTH CARE EDUCATION/TRAINING PROGRAM

## 2020-12-29 PROCEDURE — 99999 PR PBB SHADOW E&M-EST. PATIENT-LVL IV: CPT | Mod: PBBFAC,,, | Performed by: STUDENT IN AN ORGANIZED HEALTH CARE EDUCATION/TRAINING PROGRAM

## 2020-12-29 PROCEDURE — 73560 X-RAY EXAM OF KNEE 1 OR 2: CPT | Mod: TC,RT

## 2020-12-29 PROCEDURE — 3008F PR BODY MASS INDEX (BMI) DOCUMENTED: ICD-10-PCS | Mod: S$GLB,,, | Performed by: STUDENT IN AN ORGANIZED HEALTH CARE EDUCATION/TRAINING PROGRAM

## 2020-12-29 PROCEDURE — 73560 X-RAY EXAM OF KNEE 1 OR 2: CPT | Mod: 26,RT,, | Performed by: RADIOLOGY

## 2020-12-29 PROCEDURE — 1101F PR PT FALLS ASSESS DOC 0-1 FALLS W/OUT INJ PAST YR: ICD-10-PCS | Mod: S$GLB,,, | Performed by: STUDENT IN AN ORGANIZED HEALTH CARE EDUCATION/TRAINING PROGRAM

## 2020-12-31 ENCOUNTER — TELEPHONE (OUTPATIENT)
Dept: GASTROENTEROLOGY | Facility: CLINIC | Age: 70
End: 2020-12-31

## 2020-12-31 NOTE — TELEPHONE ENCOUNTER
Spoke with patient and she declined scheduling an appointment at this time. Patient states that she has recently underwent knee surgery and will have to postpone scheduling an appointment at this time.

## 2020-12-31 NOTE — TELEPHONE ENCOUNTER
----- Message from Akiko Borden LPN sent at 12/22/2020 12:32 PM CST -----  Pt records reviewed.   Pt will be referred to Hepatology.  Fatty liver  Thrombocytopenia  Initial referral received  from the [facility]que.   Referring Provider/diagnosis  Ivon Lala MD

## 2021-01-05 ENCOUNTER — DOCUMENT SCAN (OUTPATIENT)
Dept: HOME HEALTH SERVICES | Facility: HOSPITAL | Age: 71
End: 2021-01-05
Payer: MEDICARE

## 2021-01-13 ENCOUNTER — HOSPITAL ENCOUNTER (OUTPATIENT)
Dept: RADIOLOGY | Facility: HOSPITAL | Age: 71
Discharge: HOME | End: 2021-01-13
Attending: STUDENT IN AN ORGANIZED HEALTH CARE EDUCATION/TRAINING PROGRAM
Payer: MEDICARE

## 2021-01-13 ENCOUNTER — OFFICE VISIT (OUTPATIENT)
Dept: ORTHOPEDICS | Facility: CLINIC | Age: 71
End: 2021-01-13
Payer: COMMERCIAL

## 2021-01-13 VITALS — HEIGHT: 65 IN | BODY MASS INDEX: 43.49 KG/M2 | WEIGHT: 261 LBS

## 2021-01-13 DIAGNOSIS — S82.041S CLOSED DISPLACED COMMINUTED FRACTURE OF RIGHT PATELLA, SEQUELA: Primary | ICD-10-CM

## 2021-01-13 DIAGNOSIS — S52.502S CLOSED FRACTURE OF DISTAL ENDS OF LEFT RADIUS AND ULNA, SEQUELA: ICD-10-CM

## 2021-01-13 DIAGNOSIS — M25.561 RIGHT KNEE PAIN, UNSPECIFIED CHRONICITY: ICD-10-CM

## 2021-01-13 DIAGNOSIS — S42.292S OTHER CLOSED DISPLACED FRACTURE OF PROXIMAL END OF LEFT HUMERUS, SEQUELA: ICD-10-CM

## 2021-01-13 DIAGNOSIS — S52.602S CLOSED FRACTURE OF DISTAL ENDS OF LEFT RADIUS AND ULNA, SEQUELA: ICD-10-CM

## 2021-01-13 PROCEDURE — 99999 PR PBB SHADOW E&M-EST. PATIENT-LVL IV: ICD-10-PCS | Mod: PBBFAC,,, | Performed by: STUDENT IN AN ORGANIZED HEALTH CARE EDUCATION/TRAINING PROGRAM

## 2021-01-13 PROCEDURE — 73560 X-RAY EXAM OF KNEE 1 OR 2: CPT | Mod: TC,RT

## 2021-01-13 PROCEDURE — 73560 XR KNEE 1 OR 2 VIEW RIGHT: ICD-10-PCS | Mod: 26,RT,, | Performed by: RADIOLOGY

## 2021-01-13 PROCEDURE — 99024 POSTOP FOLLOW-UP VISIT: CPT | Mod: S$GLB,,, | Performed by: STUDENT IN AN ORGANIZED HEALTH CARE EDUCATION/TRAINING PROGRAM

## 2021-01-13 PROCEDURE — 99024 PR POST-OP FOLLOW-UP VISIT: ICD-10-PCS | Mod: S$GLB,,, | Performed by: STUDENT IN AN ORGANIZED HEALTH CARE EDUCATION/TRAINING PROGRAM

## 2021-01-13 PROCEDURE — 99999 PR PBB SHADOW E&M-EST. PATIENT-LVL IV: CPT | Mod: PBBFAC,,, | Performed by: STUDENT IN AN ORGANIZED HEALTH CARE EDUCATION/TRAINING PROGRAM

## 2021-01-13 PROCEDURE — 73560 X-RAY EXAM OF KNEE 1 OR 2: CPT | Mod: 26,RT,, | Performed by: RADIOLOGY

## 2021-01-13 RX ORDER — LANCETS 33 GAUGE
EACH MISCELLANEOUS 2 TIMES DAILY
COMMUNITY
Start: 2021-01-06 | End: 2021-10-04 | Stop reason: SDUPTHER

## 2021-01-13 RX ORDER — PEN NEEDLE, DIABETIC 30 GX3/16"
NEEDLE, DISPOSABLE MISCELLANEOUS
COMMUNITY
Start: 2021-01-08 | End: 2021-03-10 | Stop reason: CLARIF

## 2021-01-13 RX ORDER — LANCETS 33 GAUGE
EACH MISCELLANEOUS
COMMUNITY
Start: 2021-01-06

## 2021-01-15 ENCOUNTER — IMMUNIZATION (OUTPATIENT)
Dept: INTERNAL MEDICINE | Facility: CLINIC | Age: 71
End: 2021-01-15
Payer: MEDICARE

## 2021-01-15 DIAGNOSIS — Z23 NEED FOR VACCINATION: Primary | ICD-10-CM

## 2021-01-15 PROCEDURE — 91300 COVID-19, MRNA, LNP-S, PF, 30 MCG/0.3 ML DOSE VACCINE: CPT | Mod: PBBFAC | Performed by: FAMILY MEDICINE

## 2021-01-25 ENCOUNTER — PATIENT MESSAGE (OUTPATIENT)
Dept: HEPATOLOGY | Facility: CLINIC | Age: 71
End: 2021-01-25

## 2021-01-27 ENCOUNTER — HOSPITAL ENCOUNTER (OUTPATIENT)
Dept: RADIOLOGY | Facility: HOSPITAL | Age: 71
Discharge: HOME | End: 2021-01-27
Attending: STUDENT IN AN ORGANIZED HEALTH CARE EDUCATION/TRAINING PROGRAM
Payer: MEDICARE

## 2021-01-27 ENCOUNTER — TELEPHONE (OUTPATIENT)
Dept: ORTHOPEDICS | Facility: CLINIC | Age: 71
End: 2021-01-27

## 2021-01-27 ENCOUNTER — OFFICE VISIT (OUTPATIENT)
Dept: ORTHOPEDICS | Facility: CLINIC | Age: 71
End: 2021-01-27
Payer: MEDICARE

## 2021-01-27 VITALS — HEIGHT: 65 IN | WEIGHT: 261 LBS | BODY MASS INDEX: 43.49 KG/M2

## 2021-01-27 DIAGNOSIS — S52.502S CLOSED FRACTURE OF DISTAL ENDS OF LEFT RADIUS AND ULNA, SEQUELA: ICD-10-CM

## 2021-01-27 DIAGNOSIS — S42.292S OTHER CLOSED DISPLACED FRACTURE OF PROXIMAL END OF LEFT HUMERUS, SEQUELA: ICD-10-CM

## 2021-01-27 DIAGNOSIS — S52.602S CLOSED FRACTURE OF DISTAL ENDS OF LEFT RADIUS AND ULNA, SEQUELA: ICD-10-CM

## 2021-01-27 DIAGNOSIS — S82.041S CLOSED DISPLACED COMMINUTED FRACTURE OF RIGHT PATELLA, SEQUELA: ICD-10-CM

## 2021-01-27 DIAGNOSIS — S82.041S CLOSED DISPLACED COMMINUTED FRACTURE OF RIGHT PATELLA, SEQUELA: Primary | ICD-10-CM

## 2021-01-27 DIAGNOSIS — S52.502S CLOSED FRACTURE OF DISTAL ENDS OF LEFT RADIUS AND ULNA, SEQUELA: Primary | ICD-10-CM

## 2021-01-27 DIAGNOSIS — S52.602S CLOSED FRACTURE OF DISTAL ENDS OF LEFT RADIUS AND ULNA, SEQUELA: Primary | ICD-10-CM

## 2021-01-27 PROCEDURE — 1126F PR PAIN SEVERITY QUANTIFIED, NO PAIN PRESENT: ICD-10-PCS | Mod: S$GLB,,, | Performed by: STUDENT IN AN ORGANIZED HEALTH CARE EDUCATION/TRAINING PROGRAM

## 2021-01-27 PROCEDURE — 3008F BODY MASS INDEX DOCD: CPT | Mod: S$GLB,,, | Performed by: STUDENT IN AN ORGANIZED HEALTH CARE EDUCATION/TRAINING PROGRAM

## 2021-01-27 PROCEDURE — 99024 POSTOP FOLLOW-UP VISIT: CPT | Mod: S$GLB,,, | Performed by: STUDENT IN AN ORGANIZED HEALTH CARE EDUCATION/TRAINING PROGRAM

## 2021-01-27 PROCEDURE — 99999 PR PBB SHADOW E&M-EST. PATIENT-LVL IV: CPT | Mod: PBBFAC,,, | Performed by: STUDENT IN AN ORGANIZED HEALTH CARE EDUCATION/TRAINING PROGRAM

## 2021-01-27 PROCEDURE — 99999 PR PBB SHADOW E&M-EST. PATIENT-LVL IV: ICD-10-PCS | Mod: PBBFAC,,, | Performed by: STUDENT IN AN ORGANIZED HEALTH CARE EDUCATION/TRAINING PROGRAM

## 2021-01-27 PROCEDURE — 73030 X-RAY EXAM OF SHOULDER: CPT | Mod: TC,LT

## 2021-01-27 PROCEDURE — 3288F FALL RISK ASSESSMENT DOCD: CPT | Mod: S$GLB,,, | Performed by: STUDENT IN AN ORGANIZED HEALTH CARE EDUCATION/TRAINING PROGRAM

## 2021-01-27 PROCEDURE — 73030 XR SHOULDER COMPLETE 2 OR MORE VIEWS LEFT: ICD-10-PCS | Mod: 26,LT,, | Performed by: RADIOLOGY

## 2021-01-27 PROCEDURE — 3008F PR BODY MASS INDEX (BMI) DOCUMENTED: ICD-10-PCS | Mod: S$GLB,,, | Performed by: STUDENT IN AN ORGANIZED HEALTH CARE EDUCATION/TRAINING PROGRAM

## 2021-01-27 PROCEDURE — 73560 X-RAY EXAM OF KNEE 1 OR 2: CPT | Mod: TC,RT

## 2021-01-27 PROCEDURE — 1126F AMNT PAIN NOTED NONE PRSNT: CPT | Mod: S$GLB,,, | Performed by: STUDENT IN AN ORGANIZED HEALTH CARE EDUCATION/TRAINING PROGRAM

## 2021-01-27 PROCEDURE — 73110 XR WRIST COMPLETE 3 VIEWS LEFT: ICD-10-PCS | Mod: 26,LT,, | Performed by: RADIOLOGY

## 2021-01-27 PROCEDURE — 73560 X-RAY EXAM OF KNEE 1 OR 2: CPT | Mod: 26,RT,, | Performed by: RADIOLOGY

## 2021-01-27 PROCEDURE — 3288F PR FALLS RISK ASSESSMENT DOCUMENTED: ICD-10-PCS | Mod: S$GLB,,, | Performed by: STUDENT IN AN ORGANIZED HEALTH CARE EDUCATION/TRAINING PROGRAM

## 2021-01-27 PROCEDURE — 1100F PR PT FALLS ASSESS DOC 2+ FALLS/FALL W/INJURY/YR: ICD-10-PCS | Mod: S$GLB,,, | Performed by: STUDENT IN AN ORGANIZED HEALTH CARE EDUCATION/TRAINING PROGRAM

## 2021-01-27 PROCEDURE — 1100F PTFALLS ASSESS-DOCD GE2>/YR: CPT | Mod: S$GLB,,, | Performed by: STUDENT IN AN ORGANIZED HEALTH CARE EDUCATION/TRAINING PROGRAM

## 2021-01-27 PROCEDURE — 73030 X-RAY EXAM OF SHOULDER: CPT | Mod: 26,LT,, | Performed by: RADIOLOGY

## 2021-01-27 PROCEDURE — 73560 XR KNEE 1 OR 2 VIEW RIGHT: ICD-10-PCS | Mod: 26,RT,, | Performed by: RADIOLOGY

## 2021-01-27 PROCEDURE — 73110 X-RAY EXAM OF WRIST: CPT | Mod: 26,LT,, | Performed by: RADIOLOGY

## 2021-01-27 PROCEDURE — 73110 X-RAY EXAM OF WRIST: CPT | Mod: TC,LT

## 2021-01-27 PROCEDURE — 99024 PR POST-OP FOLLOW-UP VISIT: ICD-10-PCS | Mod: S$GLB,,, | Performed by: STUDENT IN AN ORGANIZED HEALTH CARE EDUCATION/TRAINING PROGRAM

## 2021-01-30 PROCEDURE — G0180 PR HOME HEALTH MD CERTIFICATION: ICD-10-PCS | Mod: ,,, | Performed by: FAMILY MEDICINE

## 2021-01-30 PROCEDURE — G0180 MD CERTIFICATION HHA PATIENT: HCPCS | Mod: ,,, | Performed by: FAMILY MEDICINE

## 2021-02-02 ENCOUNTER — DOCUMENT SCAN (OUTPATIENT)
Dept: HOME HEALTH SERVICES | Facility: HOSPITAL | Age: 71
End: 2021-02-02
Payer: MEDICARE

## 2021-02-05 ENCOUNTER — IMMUNIZATION (OUTPATIENT)
Dept: INTERNAL MEDICINE | Facility: CLINIC | Age: 71
End: 2021-02-05
Payer: MEDICARE

## 2021-02-05 DIAGNOSIS — Z23 NEED FOR VACCINATION: Primary | ICD-10-CM

## 2021-02-05 PROCEDURE — 91300 COVID-19, MRNA, LNP-S, PF, 30 MCG/0.3 ML DOSE VACCINE: CPT | Mod: PBBFAC | Performed by: FAMILY MEDICINE

## 2021-02-05 PROCEDURE — 0002A COVID-19, MRNA, LNP-S, PF, 30 MCG/0.3 ML DOSE VACCINE: CPT | Mod: PBBFAC | Performed by: FAMILY MEDICINE

## 2021-02-08 ENCOUNTER — EXTERNAL HOME HEALTH (OUTPATIENT)
Dept: HOME HEALTH SERVICES | Facility: HOSPITAL | Age: 71
End: 2021-02-08
Payer: MEDICARE

## 2021-03-02 ENCOUNTER — OFFICE VISIT (OUTPATIENT)
Dept: INTERNAL MEDICINE | Facility: CLINIC | Age: 71
End: 2021-03-02
Payer: MEDICARE

## 2021-03-02 VITALS
HEIGHT: 65 IN | OXYGEN SATURATION: 97 % | TEMPERATURE: 98 F | DIASTOLIC BLOOD PRESSURE: 72 MMHG | WEIGHT: 268.94 LBS | BODY MASS INDEX: 44.81 KG/M2 | HEART RATE: 63 BPM | SYSTOLIC BLOOD PRESSURE: 136 MMHG

## 2021-03-02 DIAGNOSIS — E03.9 HYPOTHYROIDISM, UNSPECIFIED TYPE: ICD-10-CM

## 2021-03-02 DIAGNOSIS — E11.29 TYPE 2 DIABETES MELLITUS WITH MICROALBUMINURIA, WITH LONG-TERM CURRENT USE OF INSULIN: ICD-10-CM

## 2021-03-02 DIAGNOSIS — D69.6 THROMBOCYTOPENIA: ICD-10-CM

## 2021-03-02 DIAGNOSIS — Z78.0 POSTMENOPAUSAL: Primary | ICD-10-CM

## 2021-03-02 DIAGNOSIS — I10 ESSENTIAL HYPERTENSION: ICD-10-CM

## 2021-03-02 DIAGNOSIS — R80.9 TYPE 2 DIABETES MELLITUS WITH MICROALBUMINURIA, WITH LONG-TERM CURRENT USE OF INSULIN: ICD-10-CM

## 2021-03-02 DIAGNOSIS — Z79.4 TYPE 2 DIABETES MELLITUS WITH MICROALBUMINURIA, WITH LONG-TERM CURRENT USE OF INSULIN: ICD-10-CM

## 2021-03-02 PROCEDURE — 1159F PR MEDICATION LIST DOCUMENTED IN MEDICAL RECORD: ICD-10-PCS | Mod: S$GLB,,, | Performed by: NURSE PRACTITIONER

## 2021-03-02 PROCEDURE — 1126F AMNT PAIN NOTED NONE PRSNT: CPT | Mod: S$GLB,,, | Performed by: NURSE PRACTITIONER

## 2021-03-02 PROCEDURE — 99214 OFFICE O/P EST MOD 30 MIN: CPT | Mod: S$GLB,,, | Performed by: NURSE PRACTITIONER

## 2021-03-02 PROCEDURE — 1126F PR PAIN SEVERITY QUANTIFIED, NO PAIN PRESENT: ICD-10-PCS | Mod: S$GLB,,, | Performed by: NURSE PRACTITIONER

## 2021-03-02 PROCEDURE — 3008F PR BODY MASS INDEX (BMI) DOCUMENTED: ICD-10-PCS | Mod: S$GLB,,, | Performed by: NURSE PRACTITIONER

## 2021-03-02 PROCEDURE — 3288F PR FALLS RISK ASSESSMENT DOCUMENTED: ICD-10-PCS | Mod: S$GLB,,, | Performed by: NURSE PRACTITIONER

## 2021-03-02 PROCEDURE — 3075F SYST BP GE 130 - 139MM HG: CPT | Mod: S$GLB,,, | Performed by: NURSE PRACTITIONER

## 2021-03-02 PROCEDURE — 3044F PR MOST RECENT HEMOGLOBIN A1C LEVEL <7.0%: ICD-10-PCS | Mod: S$GLB,,, | Performed by: NURSE PRACTITIONER

## 2021-03-02 PROCEDURE — 3078F DIAST BP <80 MM HG: CPT | Mod: S$GLB,,, | Performed by: NURSE PRACTITIONER

## 2021-03-02 PROCEDURE — 99999 PR PBB SHADOW E&M-EST. PATIENT-LVL V: ICD-10-PCS | Mod: PBBFAC,,, | Performed by: NURSE PRACTITIONER

## 2021-03-02 PROCEDURE — 1100F PR PT FALLS ASSESS DOC 2+ FALLS/FALL W/INJURY/YR: ICD-10-PCS | Mod: S$GLB,,, | Performed by: NURSE PRACTITIONER

## 2021-03-02 PROCEDURE — 99999 PR PBB SHADOW E&M-EST. PATIENT-LVL V: CPT | Mod: PBBFAC,,, | Performed by: NURSE PRACTITIONER

## 2021-03-02 PROCEDURE — 99214 PR OFFICE/OUTPT VISIT, EST, LEVL IV, 30-39 MIN: ICD-10-PCS | Mod: S$GLB,,, | Performed by: NURSE PRACTITIONER

## 2021-03-02 PROCEDURE — 3075F PR MOST RECENT SYSTOLIC BLOOD PRESS GE 130-139MM HG: ICD-10-PCS | Mod: S$GLB,,, | Performed by: NURSE PRACTITIONER

## 2021-03-02 PROCEDURE — 3078F PR MOST RECENT DIASTOLIC BLOOD PRESSURE < 80 MM HG: ICD-10-PCS | Mod: S$GLB,,, | Performed by: NURSE PRACTITIONER

## 2021-03-02 PROCEDURE — 1100F PTFALLS ASSESS-DOCD GE2>/YR: CPT | Mod: S$GLB,,, | Performed by: NURSE PRACTITIONER

## 2021-03-02 PROCEDURE — 3044F HG A1C LEVEL LT 7.0%: CPT | Mod: S$GLB,,, | Performed by: NURSE PRACTITIONER

## 2021-03-02 PROCEDURE — 1159F MED LIST DOCD IN RCRD: CPT | Mod: S$GLB,,, | Performed by: NURSE PRACTITIONER

## 2021-03-02 PROCEDURE — 3288F FALL RISK ASSESSMENT DOCD: CPT | Mod: S$GLB,,, | Performed by: NURSE PRACTITIONER

## 2021-03-02 PROCEDURE — 3008F BODY MASS INDEX DOCD: CPT | Mod: S$GLB,,, | Performed by: NURSE PRACTITIONER

## 2021-03-02 RX ORDER — AMLODIPINE BESYLATE 10 MG/1
5 TABLET ORAL DAILY
Qty: 30 TABLET | Refills: 0 | Status: CANCELLED | OUTPATIENT
Start: 2021-03-02

## 2021-03-02 RX ORDER — AMLODIPINE BESYLATE 10 MG/1
10 TABLET ORAL DAILY
Qty: 90 TABLET | Refills: 1 | Status: SHIPPED | OUTPATIENT
Start: 2021-03-02 | End: 2021-09-02

## 2021-03-03 ENCOUNTER — PATIENT MESSAGE (OUTPATIENT)
Dept: ORTHOPEDICS | Facility: CLINIC | Age: 71
End: 2021-03-03

## 2021-03-03 ENCOUNTER — OFFICE VISIT (OUTPATIENT)
Dept: ORTHOPEDICS | Facility: CLINIC | Age: 71
End: 2021-03-03
Payer: MEDICARE

## 2021-03-03 ENCOUNTER — HOSPITAL ENCOUNTER (OUTPATIENT)
Dept: RADIOLOGY | Facility: HOSPITAL | Age: 71
Discharge: HOME | End: 2021-03-03
Attending: STUDENT IN AN ORGANIZED HEALTH CARE EDUCATION/TRAINING PROGRAM
Payer: MEDICARE

## 2021-03-03 VITALS — WEIGHT: 268 LBS | BODY MASS INDEX: 44.65 KG/M2 | HEIGHT: 65 IN

## 2021-03-03 DIAGNOSIS — S42.292S OTHER CLOSED DISPLACED FRACTURE OF PROXIMAL END OF LEFT HUMERUS, SEQUELA: ICD-10-CM

## 2021-03-03 DIAGNOSIS — S82.041S CLOSED DISPLACED COMMINUTED FRACTURE OF RIGHT PATELLA, SEQUELA: ICD-10-CM

## 2021-03-03 DIAGNOSIS — S82.041S CLOSED DISPLACED COMMINUTED FRACTURE OF RIGHT PATELLA, SEQUELA: Primary | ICD-10-CM

## 2021-03-03 DIAGNOSIS — Z01.818 PREOP TESTING: Primary | ICD-10-CM

## 2021-03-03 DIAGNOSIS — S52.502S CLOSED FRACTURE OF DISTAL ENDS OF LEFT RADIUS AND ULNA, SEQUELA: ICD-10-CM

## 2021-03-03 DIAGNOSIS — S52.602S CLOSED FRACTURE OF DISTAL ENDS OF LEFT RADIUS AND ULNA, SEQUELA: ICD-10-CM

## 2021-03-03 PROCEDURE — 73030 X-RAY EXAM OF SHOULDER: CPT | Mod: 26,LT,, | Performed by: RADIOLOGY

## 2021-03-03 PROCEDURE — 3008F BODY MASS INDEX DOCD: CPT | Mod: S$GLB,,, | Performed by: STUDENT IN AN ORGANIZED HEALTH CARE EDUCATION/TRAINING PROGRAM

## 2021-03-03 PROCEDURE — 99999 PR PBB SHADOW E&M-EST. PATIENT-LVL III: CPT | Mod: PBBFAC,,, | Performed by: STUDENT IN AN ORGANIZED HEALTH CARE EDUCATION/TRAINING PROGRAM

## 2021-03-03 PROCEDURE — 1101F PR PT FALLS ASSESS DOC 0-1 FALLS W/OUT INJ PAST YR: ICD-10-PCS | Mod: S$GLB,,, | Performed by: STUDENT IN AN ORGANIZED HEALTH CARE EDUCATION/TRAINING PROGRAM

## 2021-03-03 PROCEDURE — 73030 X-RAY EXAM OF SHOULDER: CPT | Mod: TC,LT

## 2021-03-03 PROCEDURE — 73562 X-RAY EXAM OF KNEE 3: CPT | Mod: 26,LT,, | Performed by: RADIOLOGY

## 2021-03-03 PROCEDURE — 3288F FALL RISK ASSESSMENT DOCD: CPT | Mod: S$GLB,,, | Performed by: STUDENT IN AN ORGANIZED HEALTH CARE EDUCATION/TRAINING PROGRAM

## 2021-03-03 PROCEDURE — 1126F PR PAIN SEVERITY QUANTIFIED, NO PAIN PRESENT: ICD-10-PCS | Mod: S$GLB,,, | Performed by: STUDENT IN AN ORGANIZED HEALTH CARE EDUCATION/TRAINING PROGRAM

## 2021-03-03 PROCEDURE — 73564 XR KNEE ORTHO RIGHT WITH FLEXION: ICD-10-PCS | Mod: 26,RT,, | Performed by: RADIOLOGY

## 2021-03-03 PROCEDURE — 1126F AMNT PAIN NOTED NONE PRSNT: CPT | Mod: S$GLB,,, | Performed by: STUDENT IN AN ORGANIZED HEALTH CARE EDUCATION/TRAINING PROGRAM

## 2021-03-03 PROCEDURE — 73110 X-RAY EXAM OF WRIST: CPT | Mod: TC,LT

## 2021-03-03 PROCEDURE — 1101F PT FALLS ASSESS-DOCD LE1/YR: CPT | Mod: S$GLB,,, | Performed by: STUDENT IN AN ORGANIZED HEALTH CARE EDUCATION/TRAINING PROGRAM

## 2021-03-03 PROCEDURE — 73110 XR WRIST COMPLETE 3 VIEWS LEFT: ICD-10-PCS | Mod: 26,LT,, | Performed by: RADIOLOGY

## 2021-03-03 PROCEDURE — 73562 XR KNEE ORTHO RIGHT WITH FLEXION: ICD-10-PCS | Mod: 26,LT,, | Performed by: RADIOLOGY

## 2021-03-03 PROCEDURE — 99024 PR POST-OP FOLLOW-UP VISIT: ICD-10-PCS | Mod: S$GLB,,, | Performed by: STUDENT IN AN ORGANIZED HEALTH CARE EDUCATION/TRAINING PROGRAM

## 2021-03-03 PROCEDURE — 73110 X-RAY EXAM OF WRIST: CPT | Mod: 26,LT,, | Performed by: RADIOLOGY

## 2021-03-03 PROCEDURE — 3288F PR FALLS RISK ASSESSMENT DOCUMENTED: ICD-10-PCS | Mod: S$GLB,,, | Performed by: STUDENT IN AN ORGANIZED HEALTH CARE EDUCATION/TRAINING PROGRAM

## 2021-03-03 PROCEDURE — 73564 X-RAY EXAM KNEE 4 OR MORE: CPT | Mod: 26,RT,, | Performed by: RADIOLOGY

## 2021-03-03 PROCEDURE — 99024 POSTOP FOLLOW-UP VISIT: CPT | Mod: S$GLB,,, | Performed by: STUDENT IN AN ORGANIZED HEALTH CARE EDUCATION/TRAINING PROGRAM

## 2021-03-03 PROCEDURE — 73030 XR SHOULDER COMPLETE 2 OR MORE VIEWS LEFT: ICD-10-PCS | Mod: 26,LT,, | Performed by: RADIOLOGY

## 2021-03-03 PROCEDURE — 73564 X-RAY EXAM KNEE 4 OR MORE: CPT | Mod: TC,RT

## 2021-03-03 PROCEDURE — 3008F PR BODY MASS INDEX (BMI) DOCUMENTED: ICD-10-PCS | Mod: S$GLB,,, | Performed by: STUDENT IN AN ORGANIZED HEALTH CARE EDUCATION/TRAINING PROGRAM

## 2021-03-03 PROCEDURE — 99999 PR PBB SHADOW E&M-EST. PATIENT-LVL III: ICD-10-PCS | Mod: PBBFAC,,, | Performed by: STUDENT IN AN ORGANIZED HEALTH CARE EDUCATION/TRAINING PROGRAM

## 2021-03-08 ENCOUNTER — LAB VISIT (OUTPATIENT)
Dept: OTOLARYNGOLOGY | Facility: CLINIC | Age: 71
End: 2021-03-08
Payer: MEDICARE

## 2021-03-08 DIAGNOSIS — Z01.818 PREOP TESTING: ICD-10-CM

## 2021-03-08 PROCEDURE — U0003 INFECTIOUS AGENT DETECTION BY NUCLEIC ACID (DNA OR RNA); SEVERE ACUTE RESPIRATORY SYNDROME CORONAVIRUS 2 (SARS-COV-2) (CORONAVIRUS DISEASE [COVID-19]), AMPLIFIED PROBE TECHNIQUE, MAKING USE OF HIGH THROUGHPUT TECHNOLOGIES AS DESCRIBED BY CMS-2020-01-R: HCPCS | Performed by: STUDENT IN AN ORGANIZED HEALTH CARE EDUCATION/TRAINING PROGRAM

## 2021-03-08 PROCEDURE — U0005 INFEC AGEN DETEC AMPLI PROBE: HCPCS | Performed by: STUDENT IN AN ORGANIZED HEALTH CARE EDUCATION/TRAINING PROGRAM

## 2021-03-09 DIAGNOSIS — S42.292S OTHER CLOSED DISPLACED FRACTURE OF PROXIMAL END OF LEFT HUMERUS, SEQUELA: ICD-10-CM

## 2021-03-09 DIAGNOSIS — S52.502S CLOSED FRACTURE OF DISTAL ENDS OF LEFT RADIUS AND ULNA, SEQUELA: Primary | ICD-10-CM

## 2021-03-09 DIAGNOSIS — S52.602S CLOSED FRACTURE OF DISTAL ENDS OF LEFT RADIUS AND ULNA, SEQUELA: Primary | ICD-10-CM

## 2021-03-09 LAB — SARS-COV-2 RNA RESP QL NAA+PROBE: NOT DETECTED

## 2021-03-10 ENCOUNTER — LAB VISIT (OUTPATIENT)
Dept: LAB | Facility: HOSPITAL | Age: 71
End: 2021-03-10
Attending: STUDENT IN AN ORGANIZED HEALTH CARE EDUCATION/TRAINING PROGRAM
Payer: MEDICARE

## 2021-03-10 ENCOUNTER — TELEPHONE (OUTPATIENT)
Dept: PREADMISSION TESTING | Facility: HOSPITAL | Age: 71
End: 2021-03-10

## 2021-03-10 DIAGNOSIS — Z01.818 PREOP TESTING: Primary | ICD-10-CM

## 2021-03-10 DIAGNOSIS — E08.9 DIABETES MELLITUS DUE TO UNDERLYING CONDITION WITHOUT COMPLICATIONS: ICD-10-CM

## 2021-03-10 DIAGNOSIS — Z01.818 PREOP TESTING: ICD-10-CM

## 2021-03-10 LAB
ALBUMIN SERPL BCP-MCNC: 3.2 G/DL (ref 3.5–5.2)
ALP SERPL-CCNC: 91 U/L (ref 55–135)
ALT SERPL W/O P-5'-P-CCNC: 15 U/L (ref 10–44)
ANION GAP SERPL CALC-SCNC: 12 MMOL/L (ref 8–16)
AST SERPL-CCNC: 22 U/L (ref 10–40)
BILIRUB SERPL-MCNC: 0.8 MG/DL (ref 0.1–1)
BUN SERPL-MCNC: 10 MG/DL (ref 8–23)
CALCIUM SERPL-MCNC: 9.2 MG/DL (ref 8.7–10.5)
CHLORIDE SERPL-SCNC: 106 MMOL/L (ref 95–110)
CO2 SERPL-SCNC: 24 MMOL/L (ref 23–29)
CREAT SERPL-MCNC: 0.8 MG/DL (ref 0.5–1.4)
EST. GFR  (AFRICAN AMERICAN): >60 ML/MIN/1.73 M^2
EST. GFR  (NON AFRICAN AMERICAN): >60 ML/MIN/1.73 M^2
GLUCOSE SERPL-MCNC: 106 MG/DL (ref 70–110)
POTASSIUM SERPL-SCNC: 4 MMOL/L (ref 3.5–5.1)
PROT SERPL-MCNC: 6.5 G/DL (ref 6–8.4)
SODIUM SERPL-SCNC: 142 MMOL/L (ref 136–145)

## 2021-03-10 PROCEDURE — 80053 COMPREHEN METABOLIC PANEL: CPT | Performed by: STUDENT IN AN ORGANIZED HEALTH CARE EDUCATION/TRAINING PROGRAM

## 2021-03-10 PROCEDURE — 36415 COLL VENOUS BLD VENIPUNCTURE: CPT | Performed by: STUDENT IN AN ORGANIZED HEALTH CARE EDUCATION/TRAINING PROGRAM

## 2021-03-10 PROCEDURE — 85025 COMPLETE CBC W/AUTO DIFF WBC: CPT | Performed by: STUDENT IN AN ORGANIZED HEALTH CARE EDUCATION/TRAINING PROGRAM

## 2021-03-10 PROCEDURE — 83036 HEMOGLOBIN GLYCOSYLATED A1C: CPT | Performed by: STUDENT IN AN ORGANIZED HEALTH CARE EDUCATION/TRAINING PROGRAM

## 2021-03-11 ENCOUNTER — HOSPITAL ENCOUNTER (OUTPATIENT)
Dept: RADIOLOGY | Facility: HOSPITAL | Age: 71
Discharge: HOME | End: 2021-03-11
Attending: STUDENT IN AN ORGANIZED HEALTH CARE EDUCATION/TRAINING PROGRAM | Admitting: STUDENT IN AN ORGANIZED HEALTH CARE EDUCATION/TRAINING PROGRAM
Payer: MEDICARE

## 2021-03-11 ENCOUNTER — HOSPITAL ENCOUNTER (OUTPATIENT)
Facility: HOSPITAL | Age: 71
Discharge: HOME | End: 2021-03-11
Attending: STUDENT IN AN ORGANIZED HEALTH CARE EDUCATION/TRAINING PROGRAM | Admitting: STUDENT IN AN ORGANIZED HEALTH CARE EDUCATION/TRAINING PROGRAM
Payer: MEDICARE

## 2021-03-11 ENCOUNTER — ANESTHESIA (OUTPATIENT)
Dept: SURGERY | Facility: HOSPITAL | Age: 71
End: 2021-03-11
Payer: MEDICARE

## 2021-03-11 ENCOUNTER — ANESTHESIA EVENT (OUTPATIENT)
Dept: SURGERY | Facility: HOSPITAL | Age: 71
End: 2021-03-11
Payer: MEDICARE

## 2021-03-11 VITALS
DIASTOLIC BLOOD PRESSURE: 65 MMHG | TEMPERATURE: 98 F | WEIGHT: 270.5 LBS | OXYGEN SATURATION: 96 % | BODY MASS INDEX: 45.07 KG/M2 | SYSTOLIC BLOOD PRESSURE: 141 MMHG | HEIGHT: 65 IN | HEART RATE: 72 BPM | RESPIRATION RATE: 17 BRPM

## 2021-03-11 DIAGNOSIS — M25.632 WRIST STIFFNESS, LEFT: ICD-10-CM

## 2021-03-11 DIAGNOSIS — S52.572S OTHER CLOSED INTRA-ARTICULAR FRACTURE OF DISTAL END OF LEFT RADIUS, SEQUELA: Primary | ICD-10-CM

## 2021-03-11 DIAGNOSIS — S42.222S CLOSED 2-PART DISPLACED FRACTURE OF SURGICAL NECK OF LEFT HUMERUS, SEQUELA: ICD-10-CM

## 2021-03-11 LAB
BASOPHILS # BLD AUTO: 0.02 K/UL (ref 0–0.2)
BASOPHILS NFR BLD: 0.4 % (ref 0–1.9)
DIFFERENTIAL METHOD: ABNORMAL
EOSINOPHIL # BLD AUTO: 0.1 K/UL (ref 0–0.5)
EOSINOPHIL NFR BLD: 1.5 % (ref 0–8)
ERYTHROCYTE [DISTWIDTH] IN BLOOD BY AUTOMATED COUNT: 13.4 % (ref 11.5–14.5)
ESTIMATED AVG GLUCOSE: 117 MG/DL (ref 68–131)
HBA1C MFR BLD: 5.7 % (ref 4–5.6)
HCT VFR BLD AUTO: 36.8 % (ref 37–48.5)
HGB BLD-MCNC: 11.7 G/DL (ref 12–16)
IMM GRANULOCYTES # BLD AUTO: 0.01 K/UL (ref 0–0.04)
IMM GRANULOCYTES NFR BLD AUTO: 0.2 % (ref 0–0.5)
LYMPHOCYTES # BLD AUTO: 1.2 K/UL (ref 1–4.8)
LYMPHOCYTES NFR BLD: 25.1 % (ref 18–48)
MCH RBC QN AUTO: 28.5 PG (ref 27–31)
MCHC RBC AUTO-ENTMCNC: 31.8 G/DL (ref 32–36)
MCV RBC AUTO: 90 FL (ref 82–98)
MONOCYTES # BLD AUTO: 0.4 K/UL (ref 0.3–1)
MONOCYTES NFR BLD: 8.8 % (ref 4–15)
NEUTROPHILS # BLD AUTO: 3 K/UL (ref 1.8–7.7)
NEUTROPHILS NFR BLD: 64 % (ref 38–73)
NRBC BLD-RTO: 0 /100 WBC
PLATELET # BLD AUTO: 116 K/UL (ref 150–350)
PMV BLD AUTO: 10.5 FL (ref 9.2–12.9)
POCT GLUCOSE: 103 MG/DL (ref 70–110)
RBC # BLD AUTO: 4.11 M/UL (ref 4–5.4)
WBC # BLD AUTO: 4.67 K/UL (ref 3.9–12.7)

## 2021-03-11 PROCEDURE — 36000706: Performed by: STUDENT IN AN ORGANIZED HEALTH CARE EDUCATION/TRAINING PROGRAM

## 2021-03-11 PROCEDURE — D9220A PRA ANESTHESIA: Mod: CRNA,,, | Performed by: NURSE ANESTHETIST, CERTIFIED REGISTERED

## 2021-03-11 PROCEDURE — 82962 GLUCOSE BLOOD TEST: CPT | Performed by: STUDENT IN AN ORGANIZED HEALTH CARE EDUCATION/TRAINING PROGRAM

## 2021-03-11 PROCEDURE — 73060 X-RAY EXAM OF HUMERUS: CPT | Mod: 26,LT,, | Performed by: RADIOLOGY

## 2021-03-11 PROCEDURE — 37000009 HC ANESTHESIA EA ADD 15 MINS: Performed by: STUDENT IN AN ORGANIZED HEALTH CARE EDUCATION/TRAINING PROGRAM

## 2021-03-11 PROCEDURE — D9220A PRA ANESTHESIA: ICD-10-PCS | Mod: ANES,,, | Performed by: ANESTHESIOLOGY

## 2021-03-11 PROCEDURE — D9220A PRA ANESTHESIA: Mod: ANES,,, | Performed by: ANESTHESIOLOGY

## 2021-03-11 PROCEDURE — 71000015 HC POSTOP RECOV 1ST HR: Performed by: STUDENT IN AN ORGANIZED HEALTH CARE EDUCATION/TRAINING PROGRAM

## 2021-03-11 PROCEDURE — 63600175 PHARM REV CODE 636 W HCPCS: Performed by: NURSE ANESTHETIST, CERTIFIED REGISTERED

## 2021-03-11 PROCEDURE — 20680 REMOVAL OF IMPLANT DEEP: CPT | Mod: 78,,, | Performed by: STUDENT IN AN ORGANIZED HEALTH CARE EDUCATION/TRAINING PROGRAM

## 2021-03-11 PROCEDURE — 20680 PR REMOVAL DEEP IMPLANT: ICD-10-PCS | Mod: 78,,, | Performed by: STUDENT IN AN ORGANIZED HEALTH CARE EDUCATION/TRAINING PROGRAM

## 2021-03-11 PROCEDURE — 25000003 PHARM REV CODE 250: Performed by: NURSE ANESTHETIST, CERTIFIED REGISTERED

## 2021-03-11 PROCEDURE — 88300 PR  SURG PATH,GROSS,LEVEL I: ICD-10-PCS | Mod: 26,,, | Performed by: STUDENT IN AN ORGANIZED HEALTH CARE EDUCATION/TRAINING PROGRAM

## 2021-03-11 PROCEDURE — 88300 SURGICAL PATH GROSS: CPT | Performed by: STUDENT IN AN ORGANIZED HEALTH CARE EDUCATION/TRAINING PROGRAM

## 2021-03-11 PROCEDURE — 73060 X-RAY EXAM OF HUMERUS: CPT | Mod: TC,LT

## 2021-03-11 PROCEDURE — 73100 X-RAY EXAM OF WRIST: CPT | Mod: TC,LT

## 2021-03-11 PROCEDURE — 73100 XR WRIST 2 VIEW LEFT: ICD-10-PCS | Mod: 26,LT,, | Performed by: RADIOLOGY

## 2021-03-11 PROCEDURE — 73100 X-RAY EXAM OF WRIST: CPT | Mod: 26,LT,, | Performed by: RADIOLOGY

## 2021-03-11 PROCEDURE — 71000033 HC RECOVERY, INTIAL HOUR: Performed by: STUDENT IN AN ORGANIZED HEALTH CARE EDUCATION/TRAINING PROGRAM

## 2021-03-11 PROCEDURE — 73060 XR HUMERUS 2 VIEW LEFT: ICD-10-PCS | Mod: 26,LT,, | Performed by: RADIOLOGY

## 2021-03-11 PROCEDURE — 88300 SURGICAL PATH GROSS: CPT | Mod: 26,,, | Performed by: STUDENT IN AN ORGANIZED HEALTH CARE EDUCATION/TRAINING PROGRAM

## 2021-03-11 PROCEDURE — D9220A PRA ANESTHESIA: ICD-10-PCS | Mod: CRNA,,, | Performed by: NURSE ANESTHETIST, CERTIFIED REGISTERED

## 2021-03-11 PROCEDURE — 25000003 PHARM REV CODE 250: Performed by: STUDENT IN AN ORGANIZED HEALTH CARE EDUCATION/TRAINING PROGRAM

## 2021-03-11 PROCEDURE — 37000008 HC ANESTHESIA 1ST 15 MINUTES: Performed by: STUDENT IN AN ORGANIZED HEALTH CARE EDUCATION/TRAINING PROGRAM

## 2021-03-11 PROCEDURE — 63600175 PHARM REV CODE 636 W HCPCS: Performed by: STUDENT IN AN ORGANIZED HEALTH CARE EDUCATION/TRAINING PROGRAM

## 2021-03-11 PROCEDURE — 36000707: Performed by: STUDENT IN AN ORGANIZED HEALTH CARE EDUCATION/TRAINING PROGRAM

## 2021-03-11 RX ORDER — ACETAMINOPHEN 10 MG/ML
INJECTION, SOLUTION INTRAVENOUS
Status: DISCONTINUED | OUTPATIENT
Start: 2021-03-11 | End: 2021-03-11

## 2021-03-11 RX ORDER — SODIUM CHLORIDE, SODIUM LACTATE, POTASSIUM CHLORIDE, CALCIUM CHLORIDE 600; 310; 30; 20 MG/100ML; MG/100ML; MG/100ML; MG/100ML
INJECTION, SOLUTION INTRAVENOUS CONTINUOUS
Status: DISCONTINUED | OUTPATIENT
Start: 2021-03-11 | End: 2021-03-11 | Stop reason: HOSPADM

## 2021-03-11 RX ORDER — ASPIRIN 81 MG/1
81 TABLET ORAL 2 TIMES DAILY
Qty: 28 TABLET | Refills: 0 | Status: SHIPPED | OUTPATIENT
Start: 2021-03-11 | End: 2021-07-13

## 2021-03-11 RX ORDER — EPHEDRINE SULFATE 50 MG/ML
INJECTION, SOLUTION INTRAVENOUS
Status: DISCONTINUED | OUTPATIENT
Start: 2021-03-11 | End: 2021-03-11

## 2021-03-11 RX ORDER — FENTANYL CITRATE 50 UG/ML
INJECTION, SOLUTION INTRAMUSCULAR; INTRAVENOUS
Status: DISCONTINUED | OUTPATIENT
Start: 2021-03-11 | End: 2021-03-11

## 2021-03-11 RX ORDER — SODIUM CHLORIDE 0.9 % (FLUSH) 0.9 %
10 SYRINGE (ML) INJECTION
Status: DISCONTINUED | OUTPATIENT
Start: 2021-03-11 | End: 2021-03-11 | Stop reason: HOSPADM

## 2021-03-11 RX ORDER — BUPIVACAINE HYDROCHLORIDE 2.5 MG/ML
INJECTION, SOLUTION EPIDURAL; INFILTRATION; INTRACAUDAL
Status: DISCONTINUED
Start: 2021-03-11 | End: 2021-03-11 | Stop reason: HOSPADM

## 2021-03-11 RX ORDER — DEXAMETHASONE SODIUM PHOSPHATE 4 MG/ML
INJECTION, SOLUTION INTRA-ARTICULAR; INTRALESIONAL; INTRAMUSCULAR; INTRAVENOUS; SOFT TISSUE
Status: DISCONTINUED | OUTPATIENT
Start: 2021-03-11 | End: 2021-03-11

## 2021-03-11 RX ORDER — SUCCINYLCHOLINE CHLORIDE 20 MG/ML
INJECTION INTRAMUSCULAR; INTRAVENOUS
Status: DISCONTINUED | OUTPATIENT
Start: 2021-03-11 | End: 2021-03-11

## 2021-03-11 RX ORDER — CEFAZOLIN SODIUM 2 G/50ML
2 SOLUTION INTRAVENOUS
Status: DISCONTINUED | OUTPATIENT
Start: 2021-03-11 | End: 2021-03-11 | Stop reason: HOSPADM

## 2021-03-11 RX ORDER — PROPOFOL 10 MG/ML
VIAL (ML) INTRAVENOUS
Status: DISCONTINUED | OUTPATIENT
Start: 2021-03-11 | End: 2021-03-11

## 2021-03-11 RX ORDER — OXYCODONE HYDROCHLORIDE 5 MG/1
5 TABLET ORAL EVERY 4 HOURS PRN
Qty: 20 TABLET | Refills: 0 | Status: SHIPPED | OUTPATIENT
Start: 2021-03-11 | End: 2021-06-25

## 2021-03-11 RX ORDER — ONDANSETRON 2 MG/ML
INJECTION INTRAMUSCULAR; INTRAVENOUS
Status: DISCONTINUED | OUTPATIENT
Start: 2021-03-11 | End: 2021-03-11

## 2021-03-11 RX ORDER — NEOSTIGMINE METHYLSULFATE 1 MG/ML
INJECTION, SOLUTION INTRAVENOUS
Status: DISCONTINUED | OUTPATIENT
Start: 2021-03-11 | End: 2021-03-11

## 2021-03-11 RX ORDER — CHLORHEXIDINE GLUCONATE ORAL RINSE 1.2 MG/ML
10 SOLUTION DENTAL
Status: DISCONTINUED | OUTPATIENT
Start: 2021-03-11 | End: 2021-03-11 | Stop reason: HOSPADM

## 2021-03-11 RX ORDER — MIDAZOLAM HYDROCHLORIDE 1 MG/ML
INJECTION INTRAMUSCULAR; INTRAVENOUS
Status: DISCONTINUED | OUTPATIENT
Start: 2021-03-11 | End: 2021-03-11

## 2021-03-11 RX ORDER — ROCURONIUM BROMIDE 10 MG/ML
INJECTION, SOLUTION INTRAVENOUS
Status: DISCONTINUED | OUTPATIENT
Start: 2021-03-11 | End: 2021-03-11

## 2021-03-11 RX ORDER — LIDOCAINE HYDROCHLORIDE 20 MG/ML
INJECTION INTRAVENOUS
Status: DISCONTINUED | OUTPATIENT
Start: 2021-03-11 | End: 2021-03-11

## 2021-03-11 RX ORDER — FENTANYL CITRATE 50 UG/ML
25 INJECTION, SOLUTION INTRAMUSCULAR; INTRAVENOUS EVERY 5 MIN PRN
Status: DISCONTINUED | OUTPATIENT
Start: 2021-03-11 | End: 2021-03-11 | Stop reason: HOSPADM

## 2021-03-11 RX ORDER — SODIUM CHLORIDE 9 MG/ML
INJECTION, SOLUTION INTRAVENOUS CONTINUOUS
Status: DISCONTINUED | OUTPATIENT
Start: 2021-03-11 | End: 2021-03-11 | Stop reason: HOSPADM

## 2021-03-11 RX ADMIN — ROCURONIUM BROMIDE 5 MG: 10 INJECTION, SOLUTION INTRAVENOUS at 11:03

## 2021-03-11 RX ADMIN — ONDANSETRON 4 MG: 2 INJECTION, SOLUTION INTRAMUSCULAR; INTRAVENOUS at 12:03

## 2021-03-11 RX ADMIN — FENTANYL CITRATE 50 MCG: 50 INJECTION, SOLUTION INTRAMUSCULAR; INTRAVENOUS at 12:03

## 2021-03-11 RX ADMIN — PROPOFOL 200 MG: 10 INJECTION, EMULSION INTRAVENOUS at 11:03

## 2021-03-11 RX ADMIN — NEOSTIGMINE METHYLSULFATE 3 MG: 1 INJECTION INTRAVENOUS at 01:03

## 2021-03-11 RX ADMIN — PROPOFOL 50 MG: 10 INJECTION, EMULSION INTRAVENOUS at 02:03

## 2021-03-11 RX ADMIN — SUCCINYLCHOLINE CHLORIDE 140 MG: 20 INJECTION, SOLUTION INTRAMUSCULAR; INTRAVENOUS at 11:03

## 2021-03-11 RX ADMIN — ROCURONIUM BROMIDE 25 MG: 10 INJECTION, SOLUTION INTRAVENOUS at 11:03

## 2021-03-11 RX ADMIN — FENTANYL CITRATE 100 MCG: 50 INJECTION, SOLUTION INTRAMUSCULAR; INTRAVENOUS at 11:03

## 2021-03-11 RX ADMIN — EPHEDRINE SULFATE 15 MG: 50 INJECTION INTRAVENOUS at 11:03

## 2021-03-11 RX ADMIN — DEXAMETHASONE SODIUM PHOSPHATE 4 MG: 4 INJECTION, SOLUTION INTRA-ARTICULAR; INTRALESIONAL; INTRAMUSCULAR; INTRAVENOUS; SOFT TISSUE at 12:03

## 2021-03-11 RX ADMIN — Medication 60 MG: at 11:03

## 2021-03-11 RX ADMIN — PROPOFOL 50 MG: 10 INJECTION, EMULSION INTRAVENOUS at 01:03

## 2021-03-11 RX ADMIN — CHLORHEXIDINE GLUCONATE 0.12% ORAL RINSE 10 ML: 1.2 LIQUID ORAL at 09:03

## 2021-03-11 RX ADMIN — PROPOFOL 50 MG: 10 INJECTION, EMULSION INTRAVENOUS at 12:03

## 2021-03-11 RX ADMIN — GLYCOPYRROLATE 0.4 MG: 0.2 INJECTION, SOLUTION INTRAMUSCULAR; INTRAVITREAL at 01:03

## 2021-03-11 RX ADMIN — SODIUM CHLORIDE, SODIUM LACTATE, POTASSIUM CHLORIDE, AND CALCIUM CHLORIDE: .6; .31; .03; .02 INJECTION, SOLUTION INTRAVENOUS at 09:03

## 2021-03-11 RX ADMIN — EPHEDRINE SULFATE 10 MG: 50 INJECTION INTRAVENOUS at 12:03

## 2021-03-11 RX ADMIN — ACETAMINOPHEN 1000 MG: 10 INJECTION, SOLUTION INTRAVENOUS at 12:03

## 2021-03-11 RX ADMIN — DEXTROSE 3 G: 50 INJECTION, SOLUTION INTRAVENOUS at 11:03

## 2021-03-11 RX ADMIN — MIDAZOLAM HYDROCHLORIDE 2 MG: 1 INJECTION INTRAMUSCULAR; INTRAVENOUS at 11:03

## 2021-03-12 LAB
FINAL PATHOLOGIC DIAGNOSIS: NORMAL
GROSS: NORMAL
Lab: NORMAL
POCT GLUCOSE: 126 MG/DL (ref 70–110)

## 2021-03-17 ENCOUNTER — DOCUMENT SCAN (OUTPATIENT)
Dept: HOME HEALTH SERVICES | Facility: HOSPITAL | Age: 71
End: 2021-03-17
Payer: MEDICARE

## 2021-03-24 ENCOUNTER — OFFICE VISIT (OUTPATIENT)
Dept: ORTHOPEDICS | Facility: CLINIC | Age: 71
End: 2021-03-24
Payer: MEDICARE

## 2021-03-24 VITALS — WEIGHT: 270 LBS | HEIGHT: 65 IN | BODY MASS INDEX: 44.98 KG/M2

## 2021-03-24 DIAGNOSIS — S52.502S CLOSED FRACTURE OF DISTAL ENDS OF LEFT RADIUS AND ULNA, SEQUELA: Primary | ICD-10-CM

## 2021-03-24 DIAGNOSIS — S42.292S OTHER CLOSED DISPLACED FRACTURE OF PROXIMAL END OF LEFT HUMERUS, SEQUELA: ICD-10-CM

## 2021-03-24 DIAGNOSIS — S52.602S CLOSED FRACTURE OF DISTAL ENDS OF LEFT RADIUS AND ULNA, SEQUELA: Primary | ICD-10-CM

## 2021-03-24 PROCEDURE — 99024 PR POST-OP FOLLOW-UP VISIT: ICD-10-PCS | Mod: S$GLB,,, | Performed by: STUDENT IN AN ORGANIZED HEALTH CARE EDUCATION/TRAINING PROGRAM

## 2021-03-24 PROCEDURE — 1101F PR PT FALLS ASSESS DOC 0-1 FALLS W/OUT INJ PAST YR: ICD-10-PCS | Mod: S$GLB,,, | Performed by: STUDENT IN AN ORGANIZED HEALTH CARE EDUCATION/TRAINING PROGRAM

## 2021-03-24 PROCEDURE — 3288F FALL RISK ASSESSMENT DOCD: CPT | Mod: S$GLB,,, | Performed by: STUDENT IN AN ORGANIZED HEALTH CARE EDUCATION/TRAINING PROGRAM

## 2021-03-24 PROCEDURE — 99024 POSTOP FOLLOW-UP VISIT: CPT | Mod: S$GLB,,, | Performed by: STUDENT IN AN ORGANIZED HEALTH CARE EDUCATION/TRAINING PROGRAM

## 2021-03-24 PROCEDURE — 3288F PR FALLS RISK ASSESSMENT DOCUMENTED: ICD-10-PCS | Mod: S$GLB,,, | Performed by: STUDENT IN AN ORGANIZED HEALTH CARE EDUCATION/TRAINING PROGRAM

## 2021-03-24 PROCEDURE — 99999 PR PBB SHADOW E&M-EST. PATIENT-LVL III: ICD-10-PCS | Mod: PBBFAC,,, | Performed by: STUDENT IN AN ORGANIZED HEALTH CARE EDUCATION/TRAINING PROGRAM

## 2021-03-24 PROCEDURE — 1101F PT FALLS ASSESS-DOCD LE1/YR: CPT | Mod: S$GLB,,, | Performed by: STUDENT IN AN ORGANIZED HEALTH CARE EDUCATION/TRAINING PROGRAM

## 2021-03-24 PROCEDURE — 3008F BODY MASS INDEX DOCD: CPT | Mod: S$GLB,,, | Performed by: STUDENT IN AN ORGANIZED HEALTH CARE EDUCATION/TRAINING PROGRAM

## 2021-03-24 PROCEDURE — 99999 PR PBB SHADOW E&M-EST. PATIENT-LVL III: CPT | Mod: PBBFAC,,, | Performed by: STUDENT IN AN ORGANIZED HEALTH CARE EDUCATION/TRAINING PROGRAM

## 2021-03-24 PROCEDURE — 3008F PR BODY MASS INDEX (BMI) DOCUMENTED: ICD-10-PCS | Mod: S$GLB,,, | Performed by: STUDENT IN AN ORGANIZED HEALTH CARE EDUCATION/TRAINING PROGRAM

## 2021-03-24 PROCEDURE — 1125F AMNT PAIN NOTED PAIN PRSNT: CPT | Mod: S$GLB,,, | Performed by: STUDENT IN AN ORGANIZED HEALTH CARE EDUCATION/TRAINING PROGRAM

## 2021-03-24 PROCEDURE — 1125F PR PAIN SEVERITY QUANTIFIED, PAIN PRESENT: ICD-10-PCS | Mod: S$GLB,,, | Performed by: STUDENT IN AN ORGANIZED HEALTH CARE EDUCATION/TRAINING PROGRAM

## 2021-03-25 ENCOUNTER — PATIENT MESSAGE (OUTPATIENT)
Dept: HEMATOLOGY/ONCOLOGY | Facility: CLINIC | Age: 71
End: 2021-03-25

## 2021-04-29 ENCOUNTER — OFFICE VISIT (OUTPATIENT)
Dept: ORTHOPEDICS | Facility: CLINIC | Age: 71
End: 2021-04-29
Payer: MEDICARE

## 2021-04-29 VITALS — BODY MASS INDEX: 44.98 KG/M2 | WEIGHT: 270 LBS | HEIGHT: 65 IN

## 2021-04-29 DIAGNOSIS — S52.502S CLOSED FRACTURE OF DISTAL ENDS OF LEFT RADIUS AND ULNA, SEQUELA: Primary | ICD-10-CM

## 2021-04-29 DIAGNOSIS — M25.532 LEFT WRIST PAIN: ICD-10-CM

## 2021-04-29 DIAGNOSIS — S42.292S OTHER CLOSED DISPLACED FRACTURE OF PROXIMAL END OF LEFT HUMERUS, SEQUELA: ICD-10-CM

## 2021-04-29 DIAGNOSIS — S52.602S CLOSED FRACTURE OF DISTAL ENDS OF LEFT RADIUS AND ULNA, SEQUELA: Primary | ICD-10-CM

## 2021-04-29 DIAGNOSIS — M25.561 RIGHT KNEE PAIN, UNSPECIFIED CHRONICITY: ICD-10-CM

## 2021-04-29 DIAGNOSIS — M25.512 LEFT SHOULDER PAIN, UNSPECIFIED CHRONICITY: ICD-10-CM

## 2021-04-29 DIAGNOSIS — S82.041S CLOSED DISPLACED COMMINUTED FRACTURE OF RIGHT PATELLA, SEQUELA: ICD-10-CM

## 2021-04-29 PROCEDURE — 1125F PR PAIN SEVERITY QUANTIFIED, PAIN PRESENT: ICD-10-PCS | Mod: S$GLB,,, | Performed by: STUDENT IN AN ORGANIZED HEALTH CARE EDUCATION/TRAINING PROGRAM

## 2021-04-29 PROCEDURE — 3288F PR FALLS RISK ASSESSMENT DOCUMENTED: ICD-10-PCS | Mod: S$GLB,,, | Performed by: STUDENT IN AN ORGANIZED HEALTH CARE EDUCATION/TRAINING PROGRAM

## 2021-04-29 PROCEDURE — 1125F AMNT PAIN NOTED PAIN PRSNT: CPT | Mod: S$GLB,,, | Performed by: STUDENT IN AN ORGANIZED HEALTH CARE EDUCATION/TRAINING PROGRAM

## 2021-04-29 PROCEDURE — 1101F PT FALLS ASSESS-DOCD LE1/YR: CPT | Mod: S$GLB,,, | Performed by: STUDENT IN AN ORGANIZED HEALTH CARE EDUCATION/TRAINING PROGRAM

## 2021-04-29 PROCEDURE — 99999 PR PBB SHADOW E&M-EST. PATIENT-LVL III: ICD-10-PCS | Mod: PBBFAC,,, | Performed by: STUDENT IN AN ORGANIZED HEALTH CARE EDUCATION/TRAINING PROGRAM

## 2021-04-29 PROCEDURE — 99999 PR PBB SHADOW E&M-EST. PATIENT-LVL III: CPT | Mod: PBBFAC,,, | Performed by: STUDENT IN AN ORGANIZED HEALTH CARE EDUCATION/TRAINING PROGRAM

## 2021-04-29 PROCEDURE — 99024 POSTOP FOLLOW-UP VISIT: CPT | Mod: S$GLB,,, | Performed by: STUDENT IN AN ORGANIZED HEALTH CARE EDUCATION/TRAINING PROGRAM

## 2021-04-29 PROCEDURE — 99024 PR POST-OP FOLLOW-UP VISIT: ICD-10-PCS | Mod: S$GLB,,, | Performed by: STUDENT IN AN ORGANIZED HEALTH CARE EDUCATION/TRAINING PROGRAM

## 2021-04-29 PROCEDURE — 3008F PR BODY MASS INDEX (BMI) DOCUMENTED: ICD-10-PCS | Mod: S$GLB,,, | Performed by: STUDENT IN AN ORGANIZED HEALTH CARE EDUCATION/TRAINING PROGRAM

## 2021-04-29 PROCEDURE — 1101F PR PT FALLS ASSESS DOC 0-1 FALLS W/OUT INJ PAST YR: ICD-10-PCS | Mod: S$GLB,,, | Performed by: STUDENT IN AN ORGANIZED HEALTH CARE EDUCATION/TRAINING PROGRAM

## 2021-04-29 PROCEDURE — 3008F BODY MASS INDEX DOCD: CPT | Mod: S$GLB,,, | Performed by: STUDENT IN AN ORGANIZED HEALTH CARE EDUCATION/TRAINING PROGRAM

## 2021-04-29 PROCEDURE — 3288F FALL RISK ASSESSMENT DOCD: CPT | Mod: S$GLB,,, | Performed by: STUDENT IN AN ORGANIZED HEALTH CARE EDUCATION/TRAINING PROGRAM

## 2021-06-25 ENCOUNTER — OFFICE VISIT (OUTPATIENT)
Dept: INTERNAL MEDICINE | Facility: CLINIC | Age: 71
End: 2021-06-25
Payer: MEDICARE

## 2021-06-25 ENCOUNTER — LAB VISIT (OUTPATIENT)
Dept: LAB | Facility: HOSPITAL | Age: 71
End: 2021-06-25
Attending: FAMILY MEDICINE
Payer: MEDICARE

## 2021-06-25 VITALS
DIASTOLIC BLOOD PRESSURE: 60 MMHG | HEIGHT: 65 IN | SYSTOLIC BLOOD PRESSURE: 138 MMHG | OXYGEN SATURATION: 95 % | HEART RATE: 67 BPM | WEIGHT: 268.31 LBS | RESPIRATION RATE: 19 BRPM | BODY MASS INDEX: 44.7 KG/M2 | TEMPERATURE: 99 F

## 2021-06-25 DIAGNOSIS — Z12.31 ENCOUNTER FOR SCREENING MAMMOGRAM FOR BREAST CANCER: ICD-10-CM

## 2021-06-25 DIAGNOSIS — Z79.4 TYPE 2 DIABETES MELLITUS WITH MICROALBUMINURIA, WITH LONG-TERM CURRENT USE OF INSULIN: ICD-10-CM

## 2021-06-25 DIAGNOSIS — K74.60 HEPATIC CIRRHOSIS, UNSPECIFIED HEPATIC CIRRHOSIS TYPE, UNSPECIFIED WHETHER ASCITES PRESENT: ICD-10-CM

## 2021-06-25 DIAGNOSIS — R60.0 LOWER EXTREMITY EDEMA: ICD-10-CM

## 2021-06-25 DIAGNOSIS — I10 ESSENTIAL HYPERTENSION: ICD-10-CM

## 2021-06-25 DIAGNOSIS — E11.29 TYPE 2 DIABETES MELLITUS WITH MICROALBUMINURIA, WITH LONG-TERM CURRENT USE OF INSULIN: ICD-10-CM

## 2021-06-25 DIAGNOSIS — Z12.11 COLON CANCER SCREENING: Primary | ICD-10-CM

## 2021-06-25 DIAGNOSIS — R80.9 TYPE 2 DIABETES MELLITUS WITH MICROALBUMINURIA, WITH LONG-TERM CURRENT USE OF INSULIN: ICD-10-CM

## 2021-06-25 DIAGNOSIS — Z78.0 POSTMENOPAUSAL: ICD-10-CM

## 2021-06-25 LAB — D DIMER PPP IA.FEU-MCNC: 0.73 MG/L FEU

## 2021-06-25 PROCEDURE — 90732 PNEUMOCOCCAL POLYSACCHARIDE VACCINE 23-VALENT =>2YO SQ IM: ICD-10-PCS | Mod: S$GLB,,, | Performed by: FAMILY MEDICINE

## 2021-06-25 PROCEDURE — 1159F MED LIST DOCD IN RCRD: CPT | Mod: S$GLB,,, | Performed by: FAMILY MEDICINE

## 2021-06-25 PROCEDURE — 1126F AMNT PAIN NOTED NONE PRSNT: CPT | Mod: S$GLB,,, | Performed by: FAMILY MEDICINE

## 2021-06-25 PROCEDURE — 1159F PR MEDICATION LIST DOCUMENTED IN MEDICAL RECORD: ICD-10-PCS | Mod: S$GLB,,, | Performed by: FAMILY MEDICINE

## 2021-06-25 PROCEDURE — G0009 PNEUMOCOCCAL POLYSACCHARIDE VACCINE 23-VALENT =>2YO SQ IM: ICD-10-PCS | Mod: S$GLB,,, | Performed by: FAMILY MEDICINE

## 2021-06-25 PROCEDURE — 99999 PR PBB SHADOW E&M-EST. PATIENT-LVL V: CPT | Mod: PBBFAC,,, | Performed by: FAMILY MEDICINE

## 2021-06-25 PROCEDURE — 99214 PR OFFICE/OUTPT VISIT, EST, LEVL IV, 30-39 MIN: ICD-10-PCS | Mod: 25,S$GLB,, | Performed by: FAMILY MEDICINE

## 2021-06-25 PROCEDURE — 36415 COLL VENOUS BLD VENIPUNCTURE: CPT | Mod: PO | Performed by: FAMILY MEDICINE

## 2021-06-25 PROCEDURE — 3288F FALL RISK ASSESSMENT DOCD: CPT | Mod: S$GLB,,, | Performed by: FAMILY MEDICINE

## 2021-06-25 PROCEDURE — 85379 FIBRIN DEGRADATION QUANT: CPT | Mod: PO | Performed by: FAMILY MEDICINE

## 2021-06-25 PROCEDURE — 99999 PR PBB SHADOW E&M-EST. PATIENT-LVL V: ICD-10-PCS | Mod: PBBFAC,,, | Performed by: FAMILY MEDICINE

## 2021-06-25 PROCEDURE — 99214 OFFICE O/P EST MOD 30 MIN: CPT | Mod: 25,S$GLB,, | Performed by: FAMILY MEDICINE

## 2021-06-25 PROCEDURE — G0009 ADMIN PNEUMOCOCCAL VACCINE: HCPCS | Mod: S$GLB,,, | Performed by: FAMILY MEDICINE

## 2021-06-25 PROCEDURE — 3008F BODY MASS INDEX DOCD: CPT | Mod: S$GLB,,, | Performed by: FAMILY MEDICINE

## 2021-06-25 PROCEDURE — 3288F PR FALLS RISK ASSESSMENT DOCUMENTED: ICD-10-PCS | Mod: S$GLB,,, | Performed by: FAMILY MEDICINE

## 2021-06-25 PROCEDURE — 3008F PR BODY MASS INDEX (BMI) DOCUMENTED: ICD-10-PCS | Mod: S$GLB,,, | Performed by: FAMILY MEDICINE

## 2021-06-25 PROCEDURE — 1100F PR PT FALLS ASSESS DOC 2+ FALLS/FALL W/INJURY/YR: ICD-10-PCS | Mod: S$GLB,,, | Performed by: FAMILY MEDICINE

## 2021-06-25 PROCEDURE — 1100F PTFALLS ASSESS-DOCD GE2>/YR: CPT | Mod: S$GLB,,, | Performed by: FAMILY MEDICINE

## 2021-06-25 PROCEDURE — 90732 PPSV23 VACC 2 YRS+ SUBQ/IM: CPT | Mod: S$GLB,,, | Performed by: FAMILY MEDICINE

## 2021-06-25 PROCEDURE — 1126F PR PAIN SEVERITY QUANTIFIED, NO PAIN PRESENT: ICD-10-PCS | Mod: S$GLB,,, | Performed by: FAMILY MEDICINE

## 2021-06-25 RX ORDER — FUROSEMIDE 20 MG/1
20 TABLET ORAL 2 TIMES DAILY
Qty: 6 TABLET | Refills: 0 | Status: SHIPPED | OUTPATIENT
Start: 2021-06-25 | End: 2021-06-25 | Stop reason: SDUPTHER

## 2021-06-25 RX ORDER — FUROSEMIDE 20 MG/1
20 TABLET ORAL 2 TIMES DAILY
Qty: 6 TABLET | Refills: 0 | Status: SHIPPED | OUTPATIENT
Start: 2021-06-25 | End: 2021-07-26

## 2021-06-26 ENCOUNTER — PATIENT MESSAGE (OUTPATIENT)
Dept: ENDOSCOPY | Facility: HOSPITAL | Age: 71
End: 2021-06-26

## 2021-06-28 ENCOUNTER — HOSPITAL ENCOUNTER (OUTPATIENT)
Dept: RADIOLOGY | Facility: HOSPITAL | Age: 71
Discharge: HOME | End: 2021-06-28
Attending: FAMILY MEDICINE
Payer: MEDICARE

## 2021-06-28 ENCOUNTER — TELEPHONE (OUTPATIENT)
Dept: INTERNAL MEDICINE | Facility: CLINIC | Age: 71
End: 2021-06-28

## 2021-06-28 VITALS — HEIGHT: 65 IN | WEIGHT: 268.31 LBS | BODY MASS INDEX: 44.7 KG/M2

## 2021-06-28 DIAGNOSIS — R60.0 LOWER EXTREMITY EDEMA: ICD-10-CM

## 2021-06-28 DIAGNOSIS — R79.89 ELEVATED D-DIMER: Primary | ICD-10-CM

## 2021-06-28 DIAGNOSIS — Z12.31 ENCOUNTER FOR SCREENING MAMMOGRAM FOR BREAST CANCER: ICD-10-CM

## 2021-06-28 DIAGNOSIS — R79.89 ELEVATED D-DIMER: ICD-10-CM

## 2021-06-28 PROCEDURE — 77067 SCR MAMMO BI INCL CAD: CPT | Mod: TC,PO

## 2021-06-28 PROCEDURE — 77067 MAMMO DIGITAL SCREENING BILAT WITH TOMO: ICD-10-PCS | Mod: 26,,, | Performed by: RADIOLOGY

## 2021-06-28 PROCEDURE — 77063 MAMMO DIGITAL SCREENING BILAT WITH TOMO: ICD-10-PCS | Mod: 26,,, | Performed by: RADIOLOGY

## 2021-06-28 PROCEDURE — 93970 US LOWER EXTREMITY VEINS BILATERAL: ICD-10-PCS | Mod: 26,,, | Performed by: RADIOLOGY

## 2021-06-28 PROCEDURE — 77067 SCR MAMMO BI INCL CAD: CPT | Mod: 26,,, | Performed by: RADIOLOGY

## 2021-06-28 PROCEDURE — 93970 EXTREMITY STUDY: CPT | Mod: TC,PO

## 2021-06-28 PROCEDURE — 77063 BREAST TOMOSYNTHESIS BI: CPT | Mod: 26,,, | Performed by: RADIOLOGY

## 2021-06-28 PROCEDURE — 93970 EXTREMITY STUDY: CPT | Mod: 26,,, | Performed by: RADIOLOGY

## 2021-06-29 NOTE — PLAN OF CARE
11/01/20 1022   Final Note   Assessment Type Final Discharge Note   Anticipated Discharge Disposition Home   Right Care Referral Info   Post Acute Recommendation No Care       Donavon Wharton LMSW 11/1/2020 10:22 AM       Please follow up with your pulmonologist within 2 weeks of discharge from hospital. Please follow up with your pulmonologist and infectious disease within 2 weeks of discharge from hospital.

## 2021-06-30 ENCOUNTER — HOSPITAL ENCOUNTER (OUTPATIENT)
Dept: RADIOLOGY | Facility: HOSPITAL | Age: 71
Discharge: HOME | End: 2021-06-30
Attending: STUDENT IN AN ORGANIZED HEALTH CARE EDUCATION/TRAINING PROGRAM
Payer: MEDICARE

## 2021-06-30 ENCOUNTER — OFFICE VISIT (OUTPATIENT)
Dept: ORTHOPEDICS | Facility: CLINIC | Age: 71
End: 2021-06-30
Payer: MEDICARE

## 2021-06-30 DIAGNOSIS — S52.502S CLOSED FRACTURE OF DISTAL ENDS OF LEFT RADIUS AND ULNA, SEQUELA: Primary | ICD-10-CM

## 2021-06-30 DIAGNOSIS — M25.561 RIGHT KNEE PAIN, UNSPECIFIED CHRONICITY: ICD-10-CM

## 2021-06-30 DIAGNOSIS — S82.041S CLOSED DISPLACED COMMINUTED FRACTURE OF RIGHT PATELLA, SEQUELA: ICD-10-CM

## 2021-06-30 DIAGNOSIS — S52.602S CLOSED FRACTURE OF DISTAL ENDS OF LEFT RADIUS AND ULNA, SEQUELA: Primary | ICD-10-CM

## 2021-06-30 DIAGNOSIS — M25.512 LEFT SHOULDER PAIN, UNSPECIFIED CHRONICITY: ICD-10-CM

## 2021-06-30 DIAGNOSIS — M25.532 LEFT WRIST PAIN: ICD-10-CM

## 2021-06-30 DIAGNOSIS — S42.292S OTHER CLOSED DISPLACED FRACTURE OF PROXIMAL END OF LEFT HUMERUS, SEQUELA: ICD-10-CM

## 2021-06-30 PROCEDURE — 73564 XR KNEE ORTHO RIGHT WITH FLEXION: ICD-10-PCS | Mod: 26,RT,, | Performed by: RADIOLOGY

## 2021-06-30 PROCEDURE — 99999 PR PBB SHADOW E&M-EST. PATIENT-LVL I: CPT | Mod: PBBFAC,,, | Performed by: STUDENT IN AN ORGANIZED HEALTH CARE EDUCATION/TRAINING PROGRAM

## 2021-06-30 PROCEDURE — 73562 X-RAY EXAM OF KNEE 3: CPT | Mod: 26,LT,, | Performed by: RADIOLOGY

## 2021-06-30 PROCEDURE — 73110 XR WRIST COMPLETE 3 VIEWS LEFT: ICD-10-PCS | Mod: 26,LT,, | Performed by: RADIOLOGY

## 2021-06-30 PROCEDURE — 1159F PR MEDICATION LIST DOCUMENTED IN MEDICAL RECORD: ICD-10-PCS | Mod: S$GLB,,, | Performed by: STUDENT IN AN ORGANIZED HEALTH CARE EDUCATION/TRAINING PROGRAM

## 2021-06-30 PROCEDURE — 99214 OFFICE O/P EST MOD 30 MIN: CPT | Mod: S$GLB,,, | Performed by: STUDENT IN AN ORGANIZED HEALTH CARE EDUCATION/TRAINING PROGRAM

## 2021-06-30 PROCEDURE — 73564 X-RAY EXAM KNEE 4 OR MORE: CPT | Mod: TC,FY,PO,RT

## 2021-06-30 PROCEDURE — 73030 XR SHOULDER COMPLETE 2 OR MORE VIEWS LEFT: ICD-10-PCS | Mod: 26,LT,, | Performed by: RADIOLOGY

## 2021-06-30 PROCEDURE — 73110 X-RAY EXAM OF WRIST: CPT | Mod: 26,LT,, | Performed by: RADIOLOGY

## 2021-06-30 PROCEDURE — 73110 X-RAY EXAM OF WRIST: CPT | Mod: TC,FY,PO,LT

## 2021-06-30 PROCEDURE — 73564 X-RAY EXAM KNEE 4 OR MORE: CPT | Mod: 26,RT,, | Performed by: RADIOLOGY

## 2021-06-30 PROCEDURE — 99999 PR PBB SHADOW E&M-EST. PATIENT-LVL I: ICD-10-PCS | Mod: PBBFAC,,, | Performed by: STUDENT IN AN ORGANIZED HEALTH CARE EDUCATION/TRAINING PROGRAM

## 2021-06-30 PROCEDURE — 73030 X-RAY EXAM OF SHOULDER: CPT | Mod: TC,FY,PO,LT

## 2021-06-30 PROCEDURE — 73562 XR KNEE ORTHO RIGHT WITH FLEXION: ICD-10-PCS | Mod: 26,LT,, | Performed by: RADIOLOGY

## 2021-06-30 PROCEDURE — 73030 X-RAY EXAM OF SHOULDER: CPT | Mod: 26,LT,, | Performed by: RADIOLOGY

## 2021-06-30 PROCEDURE — 1159F MED LIST DOCD IN RCRD: CPT | Mod: S$GLB,,, | Performed by: STUDENT IN AN ORGANIZED HEALTH CARE EDUCATION/TRAINING PROGRAM

## 2021-06-30 PROCEDURE — 99214 PR OFFICE/OUTPT VISIT, EST, LEVL IV, 30-39 MIN: ICD-10-PCS | Mod: S$GLB,,, | Performed by: STUDENT IN AN ORGANIZED HEALTH CARE EDUCATION/TRAINING PROGRAM

## 2021-07-01 DIAGNOSIS — R92.8 ABNORMAL MAMMOGRAM: Primary | ICD-10-CM

## 2021-07-01 PROBLEM — R60.0 LOWER EXTREMITY EDEMA: Status: ACTIVE | Noted: 2021-07-01

## 2021-07-01 PROBLEM — K74.60 HEPATIC CIRRHOSIS: Status: ACTIVE | Noted: 2021-07-01

## 2021-07-08 ENCOUNTER — HOSPITAL ENCOUNTER (OUTPATIENT)
Dept: RADIOLOGY | Facility: HOSPITAL | Age: 71
Discharge: HOME | End: 2021-07-08
Attending: FAMILY MEDICINE
Payer: MEDICARE

## 2021-07-08 ENCOUNTER — TELEPHONE (OUTPATIENT)
Dept: RADIOLOGY | Facility: HOSPITAL | Age: 71
End: 2021-07-08

## 2021-07-08 DIAGNOSIS — R92.8 ABNORMAL MAMMOGRAM: ICD-10-CM

## 2021-07-08 PROCEDURE — 77061 BREAST TOMOSYNTHESIS UNI: CPT | Mod: TC,LT

## 2021-07-08 PROCEDURE — 77065 DX MAMMO INCL CAD UNI: CPT | Mod: 26,LT,, | Performed by: RADIOLOGY

## 2021-07-08 PROCEDURE — 77061 MAMMO DIGITAL DIAGNOSTIC LEFT WITH TOMO: ICD-10-PCS | Mod: 26,,, | Performed by: RADIOLOGY

## 2021-07-08 PROCEDURE — 77061 BREAST TOMOSYNTHESIS UNI: CPT | Mod: 26,,, | Performed by: RADIOLOGY

## 2021-07-08 PROCEDURE — 77065 MAMMO DIGITAL DIAGNOSTIC LEFT WITH TOMO: ICD-10-PCS | Mod: 26,LT,, | Performed by: RADIOLOGY

## 2021-07-13 ENCOUNTER — TELEPHONE (OUTPATIENT)
Dept: RADIOLOGY | Facility: HOSPITAL | Age: 71
End: 2021-07-13

## 2021-07-13 ENCOUNTER — LAB VISIT (OUTPATIENT)
Dept: LAB | Facility: HOSPITAL | Age: 71
End: 2021-07-13
Attending: INTERNAL MEDICINE
Payer: MEDICARE

## 2021-07-13 ENCOUNTER — OFFICE VISIT (OUTPATIENT)
Dept: HEMATOLOGY/ONCOLOGY | Facility: CLINIC | Age: 71
End: 2021-07-13
Payer: MEDICARE

## 2021-07-13 VITALS
HEIGHT: 65 IN | OXYGEN SATURATION: 97 % | HEART RATE: 53 BPM | DIASTOLIC BLOOD PRESSURE: 53 MMHG | WEIGHT: 267.88 LBS | RESPIRATION RATE: 18 BRPM | BODY MASS INDEX: 44.63 KG/M2 | SYSTOLIC BLOOD PRESSURE: 143 MMHG | TEMPERATURE: 98 F

## 2021-07-13 DIAGNOSIS — R92.1 BREAST CALCIFICATION SEEN ON MAMMOGRAM: ICD-10-CM

## 2021-07-13 DIAGNOSIS — D69.6 THROMBOCYTOPENIA: Primary | ICD-10-CM

## 2021-07-13 DIAGNOSIS — D69.6 THROMBOCYTOPENIA: ICD-10-CM

## 2021-07-13 DIAGNOSIS — K76.0 FATTY LIVER: ICD-10-CM

## 2021-07-13 LAB
BASOPHILS # BLD AUTO: 0.04 K/UL (ref 0–0.2)
BASOPHILS NFR BLD: 0.8 % (ref 0–1.9)
DIFFERENTIAL METHOD: ABNORMAL
EOSINOPHIL # BLD AUTO: 0.2 K/UL (ref 0–0.5)
EOSINOPHIL NFR BLD: 3.8 % (ref 0–8)
ERYTHROCYTE [DISTWIDTH] IN BLOOD BY AUTOMATED COUNT: 13 % (ref 11.5–14.5)
HCT VFR BLD AUTO: 36.9 % (ref 37–48.5)
HGB BLD-MCNC: 12.2 G/DL (ref 12–16)
IMM GRANULOCYTES # BLD AUTO: 0.01 K/UL (ref 0–0.04)
IMM GRANULOCYTES NFR BLD AUTO: 0.2 % (ref 0–0.5)
LYMPHOCYTES # BLD AUTO: 1.6 K/UL (ref 1–4.8)
LYMPHOCYTES NFR BLD: 30.1 % (ref 18–48)
MCH RBC QN AUTO: 28.6 PG (ref 27–31)
MCHC RBC AUTO-ENTMCNC: 33.1 G/DL (ref 32–36)
MCV RBC AUTO: 86 FL (ref 82–98)
MONOCYTES # BLD AUTO: 0.5 K/UL (ref 0.3–1)
MONOCYTES NFR BLD: 8.7 % (ref 4–15)
NEUTROPHILS # BLD AUTO: 3 K/UL (ref 1.8–7.7)
NEUTROPHILS NFR BLD: 56.4 % (ref 38–73)
NRBC BLD-RTO: 0 /100 WBC
PLATELET # BLD AUTO: 107 K/UL (ref 150–450)
PMV BLD AUTO: 10.1 FL (ref 9.2–12.9)
RBC # BLD AUTO: 4.27 M/UL (ref 4–5.4)
WBC # BLD AUTO: 5.29 K/UL (ref 3.9–12.7)

## 2021-07-13 PROCEDURE — 36415 COLL VENOUS BLD VENIPUNCTURE: CPT | Performed by: INTERNAL MEDICINE

## 2021-07-13 PROCEDURE — 99214 PR OFFICE/OUTPT VISIT, EST, LEVL IV, 30-39 MIN: ICD-10-PCS | Mod: S$GLB,,, | Performed by: INTERNAL MEDICINE

## 2021-07-13 PROCEDURE — 1101F PT FALLS ASSESS-DOCD LE1/YR: CPT | Mod: S$GLB,,, | Performed by: INTERNAL MEDICINE

## 2021-07-13 PROCEDURE — 3044F PR MOST RECENT HEMOGLOBIN A1C LEVEL <7.0%: ICD-10-PCS | Mod: S$GLB,,, | Performed by: INTERNAL MEDICINE

## 2021-07-13 PROCEDURE — 99214 OFFICE O/P EST MOD 30 MIN: CPT | Mod: S$GLB,,, | Performed by: INTERNAL MEDICINE

## 2021-07-13 PROCEDURE — 3288F FALL RISK ASSESSMENT DOCD: CPT | Mod: S$GLB,,, | Performed by: INTERNAL MEDICINE

## 2021-07-13 PROCEDURE — 85025 COMPLETE CBC W/AUTO DIFF WBC: CPT | Performed by: INTERNAL MEDICINE

## 2021-07-13 PROCEDURE — 3008F PR BODY MASS INDEX (BMI) DOCUMENTED: ICD-10-PCS | Mod: S$GLB,,, | Performed by: INTERNAL MEDICINE

## 2021-07-13 PROCEDURE — 1126F PR PAIN SEVERITY QUANTIFIED, NO PAIN PRESENT: ICD-10-PCS | Mod: S$GLB,,, | Performed by: INTERNAL MEDICINE

## 2021-07-13 PROCEDURE — 99999 PR PBB SHADOW E&M-EST. PATIENT-LVL V: CPT | Mod: PBBFAC,,, | Performed by: INTERNAL MEDICINE

## 2021-07-13 PROCEDURE — 1159F MED LIST DOCD IN RCRD: CPT | Mod: S$GLB,,, | Performed by: INTERNAL MEDICINE

## 2021-07-13 PROCEDURE — 1101F PR PT FALLS ASSESS DOC 0-1 FALLS W/OUT INJ PAST YR: ICD-10-PCS | Mod: S$GLB,,, | Performed by: INTERNAL MEDICINE

## 2021-07-13 PROCEDURE — 99999 PR PBB SHADOW E&M-EST. PATIENT-LVL V: ICD-10-PCS | Mod: PBBFAC,,, | Performed by: INTERNAL MEDICINE

## 2021-07-13 PROCEDURE — 1126F AMNT PAIN NOTED NONE PRSNT: CPT | Mod: S$GLB,,, | Performed by: INTERNAL MEDICINE

## 2021-07-13 PROCEDURE — 3044F HG A1C LEVEL LT 7.0%: CPT | Mod: S$GLB,,, | Performed by: INTERNAL MEDICINE

## 2021-07-13 PROCEDURE — 1159F PR MEDICATION LIST DOCUMENTED IN MEDICAL RECORD: ICD-10-PCS | Mod: S$GLB,,, | Performed by: INTERNAL MEDICINE

## 2021-07-13 PROCEDURE — 3288F PR FALLS RISK ASSESSMENT DOCUMENTED: ICD-10-PCS | Mod: S$GLB,,, | Performed by: INTERNAL MEDICINE

## 2021-07-13 PROCEDURE — 3008F BODY MASS INDEX DOCD: CPT | Mod: S$GLB,,, | Performed by: INTERNAL MEDICINE

## 2021-07-14 ENCOUNTER — HOSPITAL ENCOUNTER (OUTPATIENT)
Dept: RADIOLOGY | Facility: HOSPITAL | Age: 71
Discharge: HOME | End: 2021-07-14
Attending: INTERNAL MEDICINE
Payer: MEDICARE

## 2021-07-14 ENCOUNTER — TELEPHONE (OUTPATIENT)
Dept: HEMATOLOGY/ONCOLOGY | Facility: CLINIC | Age: 71
End: 2021-07-14

## 2021-07-14 DIAGNOSIS — D69.6 THROMBOCYTOPENIA: ICD-10-CM

## 2021-07-14 DIAGNOSIS — K76.0 FATTY LIVER: ICD-10-CM

## 2021-07-14 PROCEDURE — 76705 ECHO EXAM OF ABDOMEN: CPT | Mod: 26,,, | Performed by: RADIOLOGY

## 2021-07-14 PROCEDURE — 76705 ECHO EXAM OF ABDOMEN: CPT | Mod: TC

## 2021-07-14 PROCEDURE — 76705 US ABDOMEN LIMITED: ICD-10-PCS | Mod: 26,,, | Performed by: RADIOLOGY

## 2021-07-21 ENCOUNTER — OFFICE VISIT (OUTPATIENT)
Dept: SURGERY | Facility: CLINIC | Age: 71
End: 2021-07-21
Payer: MEDICARE

## 2021-07-21 ENCOUNTER — PATIENT OUTREACH (OUTPATIENT)
Dept: ADMINISTRATIVE | Facility: OTHER | Age: 71
End: 2021-07-21

## 2021-07-21 VITALS
TEMPERATURE: 98 F | BODY MASS INDEX: 44.48 KG/M2 | HEART RATE: 61 BPM | DIASTOLIC BLOOD PRESSURE: 63 MMHG | WEIGHT: 267 LBS | HEIGHT: 65 IN | SYSTOLIC BLOOD PRESSURE: 151 MMHG

## 2021-07-21 DIAGNOSIS — R92.8 ABNORMAL MAMMOGRAM OF LEFT BREAST: Primary | ICD-10-CM

## 2021-07-21 PROCEDURE — 1126F AMNT PAIN NOTED NONE PRSNT: CPT | Mod: S$GLB,,, | Performed by: SURGERY

## 2021-07-21 PROCEDURE — 99204 OFFICE O/P NEW MOD 45 MIN: CPT | Mod: S$GLB,,, | Performed by: SURGERY

## 2021-07-21 PROCEDURE — 3288F FALL RISK ASSESSMENT DOCD: CPT | Mod: S$GLB,,, | Performed by: SURGERY

## 2021-07-21 PROCEDURE — 1126F PR PAIN SEVERITY QUANTIFIED, NO PAIN PRESENT: ICD-10-PCS | Mod: S$GLB,,, | Performed by: SURGERY

## 2021-07-21 PROCEDURE — 3008F BODY MASS INDEX DOCD: CPT | Mod: S$GLB,,, | Performed by: SURGERY

## 2021-07-21 PROCEDURE — 1159F PR MEDICATION LIST DOCUMENTED IN MEDICAL RECORD: ICD-10-PCS | Mod: S$GLB,,, | Performed by: SURGERY

## 2021-07-21 PROCEDURE — 3008F PR BODY MASS INDEX (BMI) DOCUMENTED: ICD-10-PCS | Mod: S$GLB,,, | Performed by: SURGERY

## 2021-07-21 PROCEDURE — 3288F PR FALLS RISK ASSESSMENT DOCUMENTED: ICD-10-PCS | Mod: S$GLB,,, | Performed by: SURGERY

## 2021-07-21 PROCEDURE — 1100F PTFALLS ASSESS-DOCD GE2>/YR: CPT | Mod: S$GLB,,, | Performed by: SURGERY

## 2021-07-21 PROCEDURE — 99999 PR PBB SHADOW E&M-EST. PATIENT-LVL IV: ICD-10-PCS | Mod: PBBFAC,,, | Performed by: SURGERY

## 2021-07-21 PROCEDURE — 3044F PR MOST RECENT HEMOGLOBIN A1C LEVEL <7.0%: ICD-10-PCS | Mod: S$GLB,,, | Performed by: SURGERY

## 2021-07-21 PROCEDURE — 1100F PR PT FALLS ASSESS DOC 2+ FALLS/FALL W/INJURY/YR: ICD-10-PCS | Mod: S$GLB,,, | Performed by: SURGERY

## 2021-07-21 PROCEDURE — 3044F HG A1C LEVEL LT 7.0%: CPT | Mod: S$GLB,,, | Performed by: SURGERY

## 2021-07-21 PROCEDURE — 3078F PR MOST RECENT DIASTOLIC BLOOD PRESSURE < 80 MM HG: ICD-10-PCS | Mod: S$GLB,,, | Performed by: SURGERY

## 2021-07-21 PROCEDURE — 99999 PR PBB SHADOW E&M-EST. PATIENT-LVL IV: CPT | Mod: PBBFAC,,, | Performed by: SURGERY

## 2021-07-21 PROCEDURE — 1159F MED LIST DOCD IN RCRD: CPT | Mod: S$GLB,,, | Performed by: SURGERY

## 2021-07-21 PROCEDURE — 3078F DIAST BP <80 MM HG: CPT | Mod: S$GLB,,, | Performed by: SURGERY

## 2021-07-21 PROCEDURE — 3077F SYST BP >= 140 MM HG: CPT | Mod: S$GLB,,, | Performed by: SURGERY

## 2021-07-21 PROCEDURE — 99204 PR OFFICE/OUTPT VISIT, NEW, LEVL IV, 45-59 MIN: ICD-10-PCS | Mod: S$GLB,,, | Performed by: SURGERY

## 2021-07-21 PROCEDURE — 3077F PR MOST RECENT SYSTOLIC BLOOD PRESSURE >= 140 MM HG: ICD-10-PCS | Mod: S$GLB,,, | Performed by: SURGERY

## 2021-07-26 ENCOUNTER — OFFICE VISIT (OUTPATIENT)
Dept: INTERNAL MEDICINE | Facility: CLINIC | Age: 71
End: 2021-07-26
Payer: MEDICARE

## 2021-07-26 VITALS
WEIGHT: 268.31 LBS | HEART RATE: 62 BPM | RESPIRATION RATE: 18 BRPM | OXYGEN SATURATION: 98 % | TEMPERATURE: 98 F | DIASTOLIC BLOOD PRESSURE: 56 MMHG | BODY MASS INDEX: 44.7 KG/M2 | SYSTOLIC BLOOD PRESSURE: 136 MMHG | HEIGHT: 65 IN

## 2021-07-26 DIAGNOSIS — K74.60 HEPATIC CIRRHOSIS, UNSPECIFIED HEPATIC CIRRHOSIS TYPE, UNSPECIFIED WHETHER ASCITES PRESENT: ICD-10-CM

## 2021-07-26 DIAGNOSIS — R92.8 ABNORMAL MAMMOGRAM: ICD-10-CM

## 2021-07-26 DIAGNOSIS — R60.0 LOWER EXTREMITY EDEMA: ICD-10-CM

## 2021-07-26 DIAGNOSIS — I10 ESSENTIAL HYPERTENSION: ICD-10-CM

## 2021-07-26 PROBLEM — Z01.818 PREOP EXAMINATION: Status: RESOLVED | Noted: 2019-11-07 | Resolved: 2021-07-26

## 2021-07-26 PROCEDURE — 99214 OFFICE O/P EST MOD 30 MIN: CPT | Mod: S$GLB,,, | Performed by: FAMILY MEDICINE

## 2021-07-26 PROCEDURE — 3078F DIAST BP <80 MM HG: CPT | Mod: S$GLB,,, | Performed by: FAMILY MEDICINE

## 2021-07-26 PROCEDURE — 1159F MED LIST DOCD IN RCRD: CPT | Mod: S$GLB,,, | Performed by: FAMILY MEDICINE

## 2021-07-26 PROCEDURE — 3008F BODY MASS INDEX DOCD: CPT | Mod: S$GLB,,, | Performed by: FAMILY MEDICINE

## 2021-07-26 PROCEDURE — 1101F PT FALLS ASSESS-DOCD LE1/YR: CPT | Mod: S$GLB,,, | Performed by: FAMILY MEDICINE

## 2021-07-26 PROCEDURE — 1159F PR MEDICATION LIST DOCUMENTED IN MEDICAL RECORD: ICD-10-PCS | Mod: S$GLB,,, | Performed by: FAMILY MEDICINE

## 2021-07-26 PROCEDURE — 3288F PR FALLS RISK ASSESSMENT DOCUMENTED: ICD-10-PCS | Mod: S$GLB,,, | Performed by: FAMILY MEDICINE

## 2021-07-26 PROCEDURE — 3044F PR MOST RECENT HEMOGLOBIN A1C LEVEL <7.0%: ICD-10-PCS | Mod: S$GLB,,, | Performed by: FAMILY MEDICINE

## 2021-07-26 PROCEDURE — 1126F PR PAIN SEVERITY QUANTIFIED, NO PAIN PRESENT: ICD-10-PCS | Mod: S$GLB,,, | Performed by: FAMILY MEDICINE

## 2021-07-26 PROCEDURE — 99999 PR PBB SHADOW E&M-EST. PATIENT-LVL IV: ICD-10-PCS | Mod: PBBFAC,,, | Performed by: FAMILY MEDICINE

## 2021-07-26 PROCEDURE — 3008F PR BODY MASS INDEX (BMI) DOCUMENTED: ICD-10-PCS | Mod: S$GLB,,, | Performed by: FAMILY MEDICINE

## 2021-07-26 PROCEDURE — 1160F PR REVIEW ALL MEDS BY PRESCRIBER/CLIN PHARMACIST DOCUMENTED: ICD-10-PCS | Mod: S$GLB,,, | Performed by: FAMILY MEDICINE

## 2021-07-26 PROCEDURE — 3075F SYST BP GE 130 - 139MM HG: CPT | Mod: S$GLB,,, | Performed by: FAMILY MEDICINE

## 2021-07-26 PROCEDURE — 1126F AMNT PAIN NOTED NONE PRSNT: CPT | Mod: S$GLB,,, | Performed by: FAMILY MEDICINE

## 2021-07-26 PROCEDURE — 3288F FALL RISK ASSESSMENT DOCD: CPT | Mod: S$GLB,,, | Performed by: FAMILY MEDICINE

## 2021-07-26 PROCEDURE — 1160F RVW MEDS BY RX/DR IN RCRD: CPT | Mod: S$GLB,,, | Performed by: FAMILY MEDICINE

## 2021-07-26 PROCEDURE — 99999 PR PBB SHADOW E&M-EST. PATIENT-LVL IV: CPT | Mod: PBBFAC,,, | Performed by: FAMILY MEDICINE

## 2021-07-26 PROCEDURE — 99214 PR OFFICE/OUTPT VISIT, EST, LEVL IV, 30-39 MIN: ICD-10-PCS | Mod: S$GLB,,, | Performed by: FAMILY MEDICINE

## 2021-07-26 PROCEDURE — 1101F PR PT FALLS ASSESS DOC 0-1 FALLS W/OUT INJ PAST YR: ICD-10-PCS | Mod: S$GLB,,, | Performed by: FAMILY MEDICINE

## 2021-07-26 PROCEDURE — 3044F HG A1C LEVEL LT 7.0%: CPT | Mod: S$GLB,,, | Performed by: FAMILY MEDICINE

## 2021-07-26 PROCEDURE — 3078F PR MOST RECENT DIASTOLIC BLOOD PRESSURE < 80 MM HG: ICD-10-PCS | Mod: S$GLB,,, | Performed by: FAMILY MEDICINE

## 2021-07-26 PROCEDURE — 3075F PR MOST RECENT SYSTOLIC BLOOD PRESS GE 130-139MM HG: ICD-10-PCS | Mod: S$GLB,,, | Performed by: FAMILY MEDICINE

## 2021-08-04 ENCOUNTER — HOSPITAL ENCOUNTER (OUTPATIENT)
Dept: RADIOLOGY | Facility: HOSPITAL | Age: 71
Discharge: HOME | End: 2021-08-04
Attending: SURGERY
Payer: MEDICARE

## 2021-08-04 DIAGNOSIS — R92.8 ABNORMAL MAMMOGRAM OF LEFT BREAST: ICD-10-CM

## 2021-08-04 PROCEDURE — 88305 TISSUE EXAM BY PATHOLOGIST: CPT | Mod: 26,,, | Performed by: PATHOLOGY

## 2021-08-04 PROCEDURE — 88305 TISSUE EXAM BY PATHOLOGIST: ICD-10-PCS | Mod: 26,,, | Performed by: PATHOLOGY

## 2021-08-04 PROCEDURE — 19081 BX BREAST 1ST LESION STRTCTC: CPT | Mod: LT,,, | Performed by: RADIOLOGY

## 2021-08-04 PROCEDURE — 19081 BX BREAST 1ST LESION STRTCTC: CPT | Mod: LT

## 2021-08-04 PROCEDURE — 88305 TISSUE EXAM BY PATHOLOGIST: CPT | Performed by: PATHOLOGY

## 2021-08-04 PROCEDURE — 19081 MAMMO BREAST STEREOTACTIC BREAST BIOPSY LEFT: ICD-10-PCS | Mod: LT,,, | Performed by: RADIOLOGY

## 2021-08-05 ENCOUNTER — PATIENT MESSAGE (OUTPATIENT)
Dept: HEPATOLOGY | Facility: CLINIC | Age: 71
End: 2021-08-05

## 2021-08-06 ENCOUNTER — PATIENT MESSAGE (OUTPATIENT)
Dept: SURGERY | Facility: CLINIC | Age: 71
End: 2021-08-06

## 2021-08-06 DIAGNOSIS — R92.8 ABNORMAL MAMMOGRAM OF LEFT BREAST: Primary | ICD-10-CM

## 2021-08-06 LAB
FINAL PATHOLOGIC DIAGNOSIS: NORMAL
GROSS: NORMAL
Lab: NORMAL

## 2021-08-19 ENCOUNTER — PATIENT MESSAGE (OUTPATIENT)
Dept: HEPATOLOGY | Facility: CLINIC | Age: 71
End: 2021-08-19

## 2021-09-01 ENCOUNTER — LAB VISIT (OUTPATIENT)
Dept: LAB | Facility: HOSPITAL | Age: 71
End: 2021-09-01
Attending: NURSE PRACTITIONER
Payer: MEDICARE

## 2021-09-01 ENCOUNTER — OFFICE VISIT (OUTPATIENT)
Dept: HEPATOLOGY | Facility: CLINIC | Age: 71
End: 2021-09-01
Payer: MEDICARE

## 2021-09-01 VITALS
BODY MASS INDEX: 44.41 KG/M2 | HEART RATE: 71 BPM | HEIGHT: 65 IN | DIASTOLIC BLOOD PRESSURE: 74 MMHG | SYSTOLIC BLOOD PRESSURE: 116 MMHG | WEIGHT: 266.56 LBS

## 2021-09-01 DIAGNOSIS — K74.60 LIVER CIRRHOSIS SECONDARY TO NASH: Primary | ICD-10-CM

## 2021-09-01 DIAGNOSIS — K75.81 LIVER CIRRHOSIS SECONDARY TO NASH: Primary | ICD-10-CM

## 2021-09-01 DIAGNOSIS — E11.29 TYPE 2 DIABETES MELLITUS WITH MICROALBUMINURIA, WITH LONG-TERM CURRENT USE OF INSULIN: ICD-10-CM

## 2021-09-01 DIAGNOSIS — K74.60 LIVER CIRRHOSIS SECONDARY TO NASH: ICD-10-CM

## 2021-09-01 DIAGNOSIS — R80.9 TYPE 2 DIABETES MELLITUS WITH MICROALBUMINURIA, WITH LONG-TERM CURRENT USE OF INSULIN: ICD-10-CM

## 2021-09-01 DIAGNOSIS — Z79.4 TYPE 2 DIABETES MELLITUS WITH MICROALBUMINURIA, WITH LONG-TERM CURRENT USE OF INSULIN: ICD-10-CM

## 2021-09-01 DIAGNOSIS — D69.6 THROMBOCYTOPENIA: ICD-10-CM

## 2021-09-01 DIAGNOSIS — K75.81 LIVER CIRRHOSIS SECONDARY TO NASH: ICD-10-CM

## 2021-09-01 DIAGNOSIS — E78.5 HYPERLIPIDEMIA, UNSPECIFIED HYPERLIPIDEMIA TYPE: ICD-10-CM

## 2021-09-01 DIAGNOSIS — I10 ESSENTIAL HYPERTENSION: ICD-10-CM

## 2021-09-01 LAB
AFP SERPL-MCNC: 4 NG/ML (ref 0–8.4)
ALBUMIN SERPL BCP-MCNC: 3 G/DL (ref 3.5–5.2)
ALP SERPL-CCNC: 83 U/L (ref 55–135)
ALT SERPL W/O P-5'-P-CCNC: 19 U/L (ref 10–44)
ANION GAP SERPL CALC-SCNC: 9 MMOL/L (ref 8–16)
AST SERPL-CCNC: 19 U/L (ref 10–40)
BASOPHILS # BLD AUTO: 0.03 K/UL (ref 0–0.2)
BASOPHILS NFR BLD: 0.6 % (ref 0–1.9)
BILIRUB SERPL-MCNC: 0.7 MG/DL (ref 0.1–1)
BUN SERPL-MCNC: 21 MG/DL (ref 8–23)
CALCIUM SERPL-MCNC: 9.4 MG/DL (ref 8.7–10.5)
CHLORIDE SERPL-SCNC: 105 MMOL/L (ref 95–110)
CO2 SERPL-SCNC: 25 MMOL/L (ref 23–29)
CREAT SERPL-MCNC: 1 MG/DL (ref 0.5–1.4)
DIFFERENTIAL METHOD: ABNORMAL
EOSINOPHIL # BLD AUTO: 0.1 K/UL (ref 0–0.5)
EOSINOPHIL NFR BLD: 2.3 % (ref 0–8)
ERYTHROCYTE [DISTWIDTH] IN BLOOD BY AUTOMATED COUNT: 12.9 % (ref 11.5–14.5)
EST. GFR  (AFRICAN AMERICAN): >60 ML/MIN/1.73 M^2
EST. GFR  (NON AFRICAN AMERICAN): 57 ML/MIN/1.73 M^2
GLUCOSE SERPL-MCNC: 101 MG/DL (ref 70–110)
HCT VFR BLD AUTO: 35.8 % (ref 37–48.5)
HGB BLD-MCNC: 12.3 G/DL (ref 12–16)
IMM GRANULOCYTES # BLD AUTO: 0.02 K/UL (ref 0–0.04)
IMM GRANULOCYTES NFR BLD AUTO: 0.4 % (ref 0–0.5)
INR PPP: 1 (ref 0.8–1.2)
LYMPHOCYTES # BLD AUTO: 1.4 K/UL (ref 1–4.8)
LYMPHOCYTES NFR BLD: 27.1 % (ref 18–48)
MCH RBC QN AUTO: 28.9 PG (ref 27–31)
MCHC RBC AUTO-ENTMCNC: 34.4 G/DL (ref 32–36)
MCV RBC AUTO: 84 FL (ref 82–98)
MONOCYTES # BLD AUTO: 0.4 K/UL (ref 0.3–1)
MONOCYTES NFR BLD: 7.6 % (ref 4–15)
NEUTROPHILS # BLD AUTO: 3.3 K/UL (ref 1.8–7.7)
NEUTROPHILS NFR BLD: 62 % (ref 38–73)
NRBC BLD-RTO: 0 /100 WBC
PLATELET # BLD AUTO: 115 K/UL (ref 150–450)
PMV BLD AUTO: 9.7 FL (ref 9.2–12.9)
POTASSIUM SERPL-SCNC: 3.9 MMOL/L (ref 3.5–5.1)
PROT SERPL-MCNC: 6.6 G/DL (ref 6–8.4)
PROTHROMBIN TIME: 10.7 SEC (ref 9–12.5)
RBC # BLD AUTO: 4.25 M/UL (ref 4–5.4)
SODIUM SERPL-SCNC: 139 MMOL/L (ref 136–145)
WBC # BLD AUTO: 5.24 K/UL (ref 3.9–12.7)

## 2021-09-01 PROCEDURE — 3078F DIAST BP <80 MM HG: CPT | Mod: S$GLB,,, | Performed by: NURSE PRACTITIONER

## 2021-09-01 PROCEDURE — 1159F PR MEDICATION LIST DOCUMENTED IN MEDICAL RECORD: ICD-10-PCS | Mod: S$GLB,,, | Performed by: NURSE PRACTITIONER

## 2021-09-01 PROCEDURE — 1126F PR PAIN SEVERITY QUANTIFIED, NO PAIN PRESENT: ICD-10-PCS | Mod: S$GLB,,, | Performed by: NURSE PRACTITIONER

## 2021-09-01 PROCEDURE — 1100F PTFALLS ASSESS-DOCD GE2>/YR: CPT | Mod: S$GLB,,, | Performed by: NURSE PRACTITIONER

## 2021-09-01 PROCEDURE — 3008F PR BODY MASS INDEX (BMI) DOCUMENTED: ICD-10-PCS | Mod: S$GLB,,, | Performed by: NURSE PRACTITIONER

## 2021-09-01 PROCEDURE — 3078F PR MOST RECENT DIASTOLIC BLOOD PRESSURE < 80 MM HG: ICD-10-PCS | Mod: S$GLB,,, | Performed by: NURSE PRACTITIONER

## 2021-09-01 PROCEDURE — 1100F PR PT FALLS ASSESS DOC 2+ FALLS/FALL W/INJURY/YR: ICD-10-PCS | Mod: S$GLB,,, | Performed by: NURSE PRACTITIONER

## 2021-09-01 PROCEDURE — 3044F PR MOST RECENT HEMOGLOBIN A1C LEVEL <7.0%: ICD-10-PCS | Mod: S$GLB,,, | Performed by: NURSE PRACTITIONER

## 2021-09-01 PROCEDURE — 3074F PR MOST RECENT SYSTOLIC BLOOD PRESSURE < 130 MM HG: ICD-10-PCS | Mod: S$GLB,,, | Performed by: NURSE PRACTITIONER

## 2021-09-01 PROCEDURE — 3074F SYST BP LT 130 MM HG: CPT | Mod: S$GLB,,, | Performed by: NURSE PRACTITIONER

## 2021-09-01 PROCEDURE — 99999 PR PBB SHADOW E&M-EST. PATIENT-LVL IV: ICD-10-PCS | Mod: PBBFAC,,, | Performed by: NURSE PRACTITIONER

## 2021-09-01 PROCEDURE — 3288F FALL RISK ASSESSMENT DOCD: CPT | Mod: S$GLB,,, | Performed by: NURSE PRACTITIONER

## 2021-09-01 PROCEDURE — 99204 PR OFFICE/OUTPT VISIT, NEW, LEVL IV, 45-59 MIN: ICD-10-PCS | Mod: S$GLB,,, | Performed by: NURSE PRACTITIONER

## 2021-09-01 PROCEDURE — 3288F PR FALLS RISK ASSESSMENT DOCUMENTED: ICD-10-PCS | Mod: S$GLB,,, | Performed by: NURSE PRACTITIONER

## 2021-09-01 PROCEDURE — 85610 PROTHROMBIN TIME: CPT | Performed by: NURSE PRACTITIONER

## 2021-09-01 PROCEDURE — 1126F AMNT PAIN NOTED NONE PRSNT: CPT | Mod: S$GLB,,, | Performed by: NURSE PRACTITIONER

## 2021-09-01 PROCEDURE — 1159F MED LIST DOCD IN RCRD: CPT | Mod: S$GLB,,, | Performed by: NURSE PRACTITIONER

## 2021-09-01 PROCEDURE — 99204 OFFICE O/P NEW MOD 45 MIN: CPT | Mod: S$GLB,,, | Performed by: NURSE PRACTITIONER

## 2021-09-01 PROCEDURE — 36415 COLL VENOUS BLD VENIPUNCTURE: CPT | Performed by: NURSE PRACTITIONER

## 2021-09-01 PROCEDURE — 80053 COMPREHEN METABOLIC PANEL: CPT | Performed by: NURSE PRACTITIONER

## 2021-09-01 PROCEDURE — 99999 PR PBB SHADOW E&M-EST. PATIENT-LVL IV: CPT | Mod: PBBFAC,,, | Performed by: NURSE PRACTITIONER

## 2021-09-01 PROCEDURE — 3008F BODY MASS INDEX DOCD: CPT | Mod: S$GLB,,, | Performed by: NURSE PRACTITIONER

## 2021-09-01 PROCEDURE — 3044F HG A1C LEVEL LT 7.0%: CPT | Mod: S$GLB,,, | Performed by: NURSE PRACTITIONER

## 2021-09-01 PROCEDURE — 85025 COMPLETE CBC W/AUTO DIFF WBC: CPT | Performed by: NURSE PRACTITIONER

## 2021-09-01 PROCEDURE — 82105 ALPHA-FETOPROTEIN SERUM: CPT | Performed by: NURSE PRACTITIONER

## 2021-09-23 ENCOUNTER — LAB VISIT (OUTPATIENT)
Dept: PRIMARY CARE CLINIC | Facility: OTHER | Age: 71
End: 2021-09-23
Payer: MEDICARE

## 2021-09-23 DIAGNOSIS — R50.9 FEVER, UNSPECIFIED FEVER CAUSE: ICD-10-CM

## 2021-09-23 DIAGNOSIS — Z20.822 ENCOUNTER FOR LABORATORY TESTING FOR COVID-19 VIRUS: ICD-10-CM

## 2021-09-23 DIAGNOSIS — R68.83 CHILLS: ICD-10-CM

## 2021-09-23 DIAGNOSIS — R05.9 COUGH: Primary | ICD-10-CM

## 2021-09-23 PROCEDURE — U0003 INFECTIOUS AGENT DETECTION BY NUCLEIC ACID (DNA OR RNA); SEVERE ACUTE RESPIRATORY SYNDROME CORONAVIRUS 2 (SARS-COV-2) (CORONAVIRUS DISEASE [COVID-19]), AMPLIFIED PROBE TECHNIQUE, MAKING USE OF HIGH THROUGHPUT TECHNOLOGIES AS DESCRIBED BY CMS-2020-01-R: HCPCS | Performed by: INTERNAL MEDICINE

## 2021-09-24 DIAGNOSIS — U07.1 COVID-19 VIRUS DETECTED: ICD-10-CM

## 2021-09-24 LAB
SARS-COV-2 RNA RESP QL NAA+PROBE: DETECTED
SARS-COV-2- CYCLE NUMBER: 17

## 2021-09-28 ENCOUNTER — PATIENT MESSAGE (OUTPATIENT)
Dept: INTERNAL MEDICINE | Facility: CLINIC | Age: 71
End: 2021-09-28

## 2021-09-28 DIAGNOSIS — Z28.21 COVID-19 VIRUS VACCINATION DECLINED: Primary | ICD-10-CM

## 2021-09-28 DIAGNOSIS — U07.1 COVID-19: ICD-10-CM

## 2021-09-29 ENCOUNTER — INFUSION (OUTPATIENT)
Dept: INFECTIOUS DISEASES | Facility: HOSPITAL | Age: 71
End: 2021-09-29
Attending: FAMILY MEDICINE
Payer: MEDICARE

## 2021-09-29 ENCOUNTER — PATIENT OUTREACH (OUTPATIENT)
Dept: INFECTIOUS DISEASES | Facility: HOSPITAL | Age: 71
End: 2021-09-29

## 2021-09-29 VITALS
RESPIRATION RATE: 18 BRPM | HEART RATE: 58 BPM | TEMPERATURE: 98 F | SYSTOLIC BLOOD PRESSURE: 147 MMHG | DIASTOLIC BLOOD PRESSURE: 69 MMHG | OXYGEN SATURATION: 93 %

## 2021-09-29 DIAGNOSIS — U07.1 COVID-19: ICD-10-CM

## 2021-09-29 PROCEDURE — 25000003 PHARM REV CODE 250: Performed by: INTERNAL MEDICINE

## 2021-09-29 PROCEDURE — 63600175 PHARM REV CODE 636 W HCPCS: Performed by: INTERNAL MEDICINE

## 2021-09-29 PROCEDURE — M0243 CASIRIVI AND IMDEVI INFUSION: HCPCS | Performed by: INTERNAL MEDICINE

## 2021-09-29 RX ORDER — ACETAMINOPHEN 325 MG/1
650 TABLET ORAL ONCE AS NEEDED
Status: DISCONTINUED | OUTPATIENT
Start: 2021-09-29 | End: 2022-08-15

## 2021-09-29 RX ORDER — ONDANSETRON 4 MG/1
4 TABLET, ORALLY DISINTEGRATING ORAL ONCE AS NEEDED
Status: DISCONTINUED | OUTPATIENT
Start: 2021-09-29 | End: 2022-08-15

## 2021-09-29 RX ORDER — DIPHENHYDRAMINE HYDROCHLORIDE 50 MG/ML
25 INJECTION INTRAMUSCULAR; INTRAVENOUS ONCE AS NEEDED
Status: DISCONTINUED | OUTPATIENT
Start: 2021-09-29 | End: 2022-10-12

## 2021-09-29 RX ORDER — EPINEPHRINE 0.3 MG/.3ML
0.3 INJECTION SUBCUTANEOUS
Status: DISCONTINUED | OUTPATIENT
Start: 2021-09-29 | End: 2022-10-12

## 2021-09-29 RX ORDER — ALBUTEROL SULFATE 90 UG/1
2 AEROSOL, METERED RESPIRATORY (INHALATION)
Status: DISCONTINUED | OUTPATIENT
Start: 2021-09-29 | End: 2022-08-15

## 2021-09-29 RX ORDER — SODIUM CHLORIDE 0.9 % (FLUSH) 0.9 %
10 SYRINGE (ML) INJECTION
Status: DISCONTINUED | OUTPATIENT
Start: 2021-09-29 | End: 2022-10-12

## 2021-09-29 RX ADMIN — CASIRIVIMAB AND IMDEVIMAB 600 MG: 600; 600 INJECTION, SOLUTION, CONCENTRATE INTRAVENOUS at 09:09

## 2021-10-04 ENCOUNTER — OFFICE VISIT (OUTPATIENT)
Dept: INTERNAL MEDICINE | Facility: CLINIC | Age: 71
End: 2021-10-04
Payer: MEDICARE

## 2021-10-04 VITALS
WEIGHT: 258.63 LBS | BODY MASS INDEX: 43.09 KG/M2 | HEART RATE: 84 BPM | HEIGHT: 65 IN | DIASTOLIC BLOOD PRESSURE: 58 MMHG | TEMPERATURE: 99 F | RESPIRATION RATE: 19 BRPM | SYSTOLIC BLOOD PRESSURE: 136 MMHG | OXYGEN SATURATION: 95 %

## 2021-10-04 DIAGNOSIS — R80.9 TYPE 2 DIABETES MELLITUS WITH MICROALBUMINURIA, WITH LONG-TERM CURRENT USE OF INSULIN: Primary | ICD-10-CM

## 2021-10-04 DIAGNOSIS — E11.29 TYPE 2 DIABETES MELLITUS WITH MICROALBUMINURIA, WITH LONG-TERM CURRENT USE OF INSULIN: Primary | ICD-10-CM

## 2021-10-04 DIAGNOSIS — I10 ESSENTIAL HYPERTENSION: ICD-10-CM

## 2021-10-04 DIAGNOSIS — R92.8 ABNORMAL MAMMOGRAM: ICD-10-CM

## 2021-10-04 DIAGNOSIS — Z79.4 TYPE 2 DIABETES MELLITUS WITH MICROALBUMINURIA, WITH LONG-TERM CURRENT USE OF INSULIN: Primary | ICD-10-CM

## 2021-10-04 PROCEDURE — 3044F HG A1C LEVEL LT 7.0%: CPT | Mod: S$GLB,,, | Performed by: FAMILY MEDICINE

## 2021-10-04 PROCEDURE — 90694 VACC AIIV4 NO PRSRV 0.5ML IM: CPT | Mod: S$GLB,,, | Performed by: FAMILY MEDICINE

## 2021-10-04 PROCEDURE — 3075F PR MOST RECENT SYSTOLIC BLOOD PRESS GE 130-139MM HG: ICD-10-PCS | Mod: S$GLB,,, | Performed by: FAMILY MEDICINE

## 2021-10-04 PROCEDURE — 1160F RVW MEDS BY RX/DR IN RCRD: CPT | Mod: S$GLB,,, | Performed by: FAMILY MEDICINE

## 2021-10-04 PROCEDURE — 1126F AMNT PAIN NOTED NONE PRSNT: CPT | Mod: S$GLB,,, | Performed by: FAMILY MEDICINE

## 2021-10-04 PROCEDURE — 3008F PR BODY MASS INDEX (BMI) DOCUMENTED: ICD-10-PCS | Mod: S$GLB,,, | Performed by: FAMILY MEDICINE

## 2021-10-04 PROCEDURE — 3078F DIAST BP <80 MM HG: CPT | Mod: S$GLB,,, | Performed by: FAMILY MEDICINE

## 2021-10-04 PROCEDURE — 3288F PR FALLS RISK ASSESSMENT DOCUMENTED: ICD-10-PCS | Mod: S$GLB,,, | Performed by: FAMILY MEDICINE

## 2021-10-04 PROCEDURE — 3075F SYST BP GE 130 - 139MM HG: CPT | Mod: S$GLB,,, | Performed by: FAMILY MEDICINE

## 2021-10-04 PROCEDURE — G0008 ADMIN INFLUENZA VIRUS VAC: HCPCS | Mod: S$GLB,,, | Performed by: FAMILY MEDICINE

## 2021-10-04 PROCEDURE — 1159F PR MEDICATION LIST DOCUMENTED IN MEDICAL RECORD: ICD-10-PCS | Mod: S$GLB,,, | Performed by: FAMILY MEDICINE

## 2021-10-04 PROCEDURE — G0008 FLU VACCINE - QUADRIVALENT - ADJUVANTED: ICD-10-PCS | Mod: S$GLB,,, | Performed by: FAMILY MEDICINE

## 2021-10-04 PROCEDURE — 90694 FLU VACCINE - QUADRIVALENT - ADJUVANTED: ICD-10-PCS | Mod: S$GLB,,, | Performed by: FAMILY MEDICINE

## 2021-10-04 PROCEDURE — 1126F PR PAIN SEVERITY QUANTIFIED, NO PAIN PRESENT: ICD-10-PCS | Mod: S$GLB,,, | Performed by: FAMILY MEDICINE

## 2021-10-04 PROCEDURE — 99999 PR PBB SHADOW E&M-EST. PATIENT-LVL V: CPT | Mod: PBBFAC,,, | Performed by: FAMILY MEDICINE

## 2021-10-04 PROCEDURE — 4010F PR ACE/ARB THEARPY RXD/TAKEN: ICD-10-PCS | Mod: S$GLB,,, | Performed by: FAMILY MEDICINE

## 2021-10-04 PROCEDURE — 3288F FALL RISK ASSESSMENT DOCD: CPT | Mod: S$GLB,,, | Performed by: FAMILY MEDICINE

## 2021-10-04 PROCEDURE — 1101F PR PT FALLS ASSESS DOC 0-1 FALLS W/OUT INJ PAST YR: ICD-10-PCS | Mod: S$GLB,,, | Performed by: FAMILY MEDICINE

## 2021-10-04 PROCEDURE — 3044F PR MOST RECENT HEMOGLOBIN A1C LEVEL <7.0%: ICD-10-PCS | Mod: S$GLB,,, | Performed by: FAMILY MEDICINE

## 2021-10-04 PROCEDURE — 99214 PR OFFICE/OUTPT VISIT, EST, LEVL IV, 30-39 MIN: ICD-10-PCS | Mod: 25,S$GLB,, | Performed by: FAMILY MEDICINE

## 2021-10-04 PROCEDURE — 4010F ACE/ARB THERAPY RXD/TAKEN: CPT | Mod: S$GLB,,, | Performed by: FAMILY MEDICINE

## 2021-10-04 PROCEDURE — 3008F BODY MASS INDEX DOCD: CPT | Mod: S$GLB,,, | Performed by: FAMILY MEDICINE

## 2021-10-04 PROCEDURE — 99214 OFFICE O/P EST MOD 30 MIN: CPT | Mod: 25,S$GLB,, | Performed by: FAMILY MEDICINE

## 2021-10-04 PROCEDURE — 1159F MED LIST DOCD IN RCRD: CPT | Mod: S$GLB,,, | Performed by: FAMILY MEDICINE

## 2021-10-04 PROCEDURE — 1160F PR REVIEW ALL MEDS BY PRESCRIBER/CLIN PHARMACIST DOCUMENTED: ICD-10-PCS | Mod: S$GLB,,, | Performed by: FAMILY MEDICINE

## 2021-10-04 PROCEDURE — 99999 PR PBB SHADOW E&M-EST. PATIENT-LVL V: ICD-10-PCS | Mod: PBBFAC,,, | Performed by: FAMILY MEDICINE

## 2021-10-04 PROCEDURE — 1101F PT FALLS ASSESS-DOCD LE1/YR: CPT | Mod: S$GLB,,, | Performed by: FAMILY MEDICINE

## 2021-10-04 PROCEDURE — 3078F PR MOST RECENT DIASTOLIC BLOOD PRESSURE < 80 MM HG: ICD-10-PCS | Mod: S$GLB,,, | Performed by: FAMILY MEDICINE

## 2021-10-18 ENCOUNTER — OFFICE VISIT (OUTPATIENT)
Dept: HEMATOLOGY/ONCOLOGY | Facility: CLINIC | Age: 71
End: 2021-10-18
Payer: MEDICARE

## 2021-10-18 ENCOUNTER — LAB VISIT (OUTPATIENT)
Dept: LAB | Facility: HOSPITAL | Age: 71
End: 2021-10-18
Attending: INTERNAL MEDICINE
Payer: MEDICARE

## 2021-10-18 VITALS
HEIGHT: 65 IN | DIASTOLIC BLOOD PRESSURE: 80 MMHG | OXYGEN SATURATION: 94 % | WEIGHT: 259.25 LBS | SYSTOLIC BLOOD PRESSURE: 149 MMHG | BODY MASS INDEX: 43.19 KG/M2 | HEART RATE: 90 BPM | TEMPERATURE: 98 F

## 2021-10-18 DIAGNOSIS — D69.6 THROMBOCYTOPENIA: Primary | ICD-10-CM

## 2021-10-18 DIAGNOSIS — R92.8 ABNORMAL MAMMOGRAM: ICD-10-CM

## 2021-10-18 DIAGNOSIS — K76.0 FATTY LIVER: ICD-10-CM

## 2021-10-18 DIAGNOSIS — D69.6 THROMBOCYTOPENIA: ICD-10-CM

## 2021-10-18 DIAGNOSIS — K74.60 HEPATIC CIRRHOSIS, UNSPECIFIED HEPATIC CIRRHOSIS TYPE, UNSPECIFIED WHETHER ASCITES PRESENT: ICD-10-CM

## 2021-10-18 DIAGNOSIS — D72.819 LEUKOPENIA, UNSPECIFIED TYPE: ICD-10-CM

## 2021-10-18 LAB
BASOPHILS # BLD AUTO: 0.02 K/UL (ref 0–0.2)
BASOPHILS NFR BLD: 0.5 % (ref 0–1.9)
DIFFERENTIAL METHOD: ABNORMAL
EOSINOPHIL # BLD AUTO: 0.1 K/UL (ref 0–0.5)
EOSINOPHIL NFR BLD: 2.7 % (ref 0–8)
ERYTHROCYTE [DISTWIDTH] IN BLOOD BY AUTOMATED COUNT: 13.2 % (ref 11.5–14.5)
HCT VFR BLD AUTO: 39.1 % (ref 37–48.5)
HGB BLD-MCNC: 13.2 G/DL (ref 12–16)
IMM GRANULOCYTES # BLD AUTO: 0.01 K/UL (ref 0–0.04)
IMM GRANULOCYTES NFR BLD AUTO: 0.3 % (ref 0–0.5)
LYMPHOCYTES # BLD AUTO: 1.1 K/UL (ref 1–4.8)
LYMPHOCYTES NFR BLD: 29.7 % (ref 18–48)
MCH RBC QN AUTO: 28.9 PG (ref 27–31)
MCHC RBC AUTO-ENTMCNC: 33.8 G/DL (ref 32–36)
MCV RBC AUTO: 86 FL (ref 82–98)
MONOCYTES # BLD AUTO: 0.3 K/UL (ref 0.3–1)
MONOCYTES NFR BLD: 9.3 % (ref 4–15)
NEUTROPHILS # BLD AUTO: 2.1 K/UL (ref 1.8–7.7)
NEUTROPHILS NFR BLD: 57.5 % (ref 38–73)
NRBC BLD-RTO: 0 /100 WBC
PLATELET # BLD AUTO: 96 K/UL (ref 150–450)
PMV BLD AUTO: 10.3 FL (ref 9.2–12.9)
RBC # BLD AUTO: 4.57 M/UL (ref 4–5.4)
WBC # BLD AUTO: 3.67 K/UL (ref 3.9–12.7)

## 2021-10-18 PROCEDURE — 3044F HG A1C LEVEL LT 7.0%: CPT | Mod: S$GLB,,, | Performed by: INTERNAL MEDICINE

## 2021-10-18 PROCEDURE — 1160F PR REVIEW ALL MEDS BY PRESCRIBER/CLIN PHARMACIST DOCUMENTED: ICD-10-PCS | Mod: S$GLB,,, | Performed by: INTERNAL MEDICINE

## 2021-10-18 PROCEDURE — 3288F PR FALLS RISK ASSESSMENT DOCUMENTED: ICD-10-PCS | Mod: S$GLB,,, | Performed by: INTERNAL MEDICINE

## 2021-10-18 PROCEDURE — 1126F PR PAIN SEVERITY QUANTIFIED, NO PAIN PRESENT: ICD-10-PCS | Mod: S$GLB,,, | Performed by: INTERNAL MEDICINE

## 2021-10-18 PROCEDURE — 1126F AMNT PAIN NOTED NONE PRSNT: CPT | Mod: S$GLB,,, | Performed by: INTERNAL MEDICINE

## 2021-10-18 PROCEDURE — 99999 PR PBB SHADOW E&M-EST. PATIENT-LVL IV: ICD-10-PCS | Mod: PBBFAC,,, | Performed by: INTERNAL MEDICINE

## 2021-10-18 PROCEDURE — 3079F DIAST BP 80-89 MM HG: CPT | Mod: S$GLB,,, | Performed by: INTERNAL MEDICINE

## 2021-10-18 PROCEDURE — 3077F SYST BP >= 140 MM HG: CPT | Mod: S$GLB,,, | Performed by: INTERNAL MEDICINE

## 2021-10-18 PROCEDURE — 1160F RVW MEDS BY RX/DR IN RCRD: CPT | Mod: S$GLB,,, | Performed by: INTERNAL MEDICINE

## 2021-10-18 PROCEDURE — 3288F FALL RISK ASSESSMENT DOCD: CPT | Mod: S$GLB,,, | Performed by: INTERNAL MEDICINE

## 2021-10-18 PROCEDURE — 3008F PR BODY MASS INDEX (BMI) DOCUMENTED: ICD-10-PCS | Mod: S$GLB,,, | Performed by: INTERNAL MEDICINE

## 2021-10-18 PROCEDURE — 1101F PT FALLS ASSESS-DOCD LE1/YR: CPT | Mod: S$GLB,,, | Performed by: INTERNAL MEDICINE

## 2021-10-18 PROCEDURE — 3008F BODY MASS INDEX DOCD: CPT | Mod: S$GLB,,, | Performed by: INTERNAL MEDICINE

## 2021-10-18 PROCEDURE — 1101F PR PT FALLS ASSESS DOC 0-1 FALLS W/OUT INJ PAST YR: ICD-10-PCS | Mod: S$GLB,,, | Performed by: INTERNAL MEDICINE

## 2021-10-18 PROCEDURE — 4010F ACE/ARB THERAPY RXD/TAKEN: CPT | Mod: S$GLB,,, | Performed by: INTERNAL MEDICINE

## 2021-10-18 PROCEDURE — 99214 OFFICE O/P EST MOD 30 MIN: CPT | Mod: S$GLB,,, | Performed by: INTERNAL MEDICINE

## 2021-10-18 PROCEDURE — 99999 PR PBB SHADOW E&M-EST. PATIENT-LVL IV: CPT | Mod: PBBFAC,,, | Performed by: INTERNAL MEDICINE

## 2021-10-18 PROCEDURE — 85025 COMPLETE CBC W/AUTO DIFF WBC: CPT | Performed by: INTERNAL MEDICINE

## 2021-10-18 PROCEDURE — 99214 PR OFFICE/OUTPT VISIT, EST, LEVL IV, 30-39 MIN: ICD-10-PCS | Mod: S$GLB,,, | Performed by: INTERNAL MEDICINE

## 2021-10-18 PROCEDURE — 1159F MED LIST DOCD IN RCRD: CPT | Mod: S$GLB,,, | Performed by: INTERNAL MEDICINE

## 2021-10-18 PROCEDURE — 36415 COLL VENOUS BLD VENIPUNCTURE: CPT | Performed by: INTERNAL MEDICINE

## 2021-10-18 PROCEDURE — 3077F PR MOST RECENT SYSTOLIC BLOOD PRESSURE >= 140 MM HG: ICD-10-PCS | Mod: S$GLB,,, | Performed by: INTERNAL MEDICINE

## 2021-10-18 PROCEDURE — 1159F PR MEDICATION LIST DOCUMENTED IN MEDICAL RECORD: ICD-10-PCS | Mod: S$GLB,,, | Performed by: INTERNAL MEDICINE

## 2021-10-18 PROCEDURE — 3044F PR MOST RECENT HEMOGLOBIN A1C LEVEL <7.0%: ICD-10-PCS | Mod: S$GLB,,, | Performed by: INTERNAL MEDICINE

## 2021-10-18 PROCEDURE — 3079F PR MOST RECENT DIASTOLIC BLOOD PRESSURE 80-89 MM HG: ICD-10-PCS | Mod: S$GLB,,, | Performed by: INTERNAL MEDICINE

## 2021-10-18 PROCEDURE — 4010F PR ACE/ARB THEARPY RXD/TAKEN: ICD-10-PCS | Mod: S$GLB,,, | Performed by: INTERNAL MEDICINE

## 2021-10-20 DIAGNOSIS — E11.9 TYPE 2 DIABETES MELLITUS WITHOUT COMPLICATION: ICD-10-CM

## 2021-12-01 ENCOUNTER — OFFICE VISIT (OUTPATIENT)
Dept: HEPATOLOGY | Facility: CLINIC | Age: 71
End: 2021-12-01
Payer: MEDICARE

## 2021-12-01 VITALS
OXYGEN SATURATION: 98 % | SYSTOLIC BLOOD PRESSURE: 146 MMHG | HEIGHT: 65 IN | BODY MASS INDEX: 43.64 KG/M2 | DIASTOLIC BLOOD PRESSURE: 64 MMHG | HEART RATE: 60 BPM | WEIGHT: 261.94 LBS

## 2021-12-01 DIAGNOSIS — K75.81 LIVER CIRRHOSIS SECONDARY TO NASH: Primary | ICD-10-CM

## 2021-12-01 DIAGNOSIS — Z12.11 COLON CANCER SCREENING: ICD-10-CM

## 2021-12-01 DIAGNOSIS — K74.60 LIVER CIRRHOSIS SECONDARY TO NASH: Primary | ICD-10-CM

## 2021-12-01 PROCEDURE — 99214 PR OFFICE/OUTPT VISIT, EST, LEVL IV, 30-39 MIN: ICD-10-PCS | Mod: S$GLB,,, | Performed by: NURSE PRACTITIONER

## 2021-12-01 PROCEDURE — 4010F PR ACE/ARB THEARPY RXD/TAKEN: ICD-10-PCS | Mod: CPTII,S$GLB,, | Performed by: NURSE PRACTITIONER

## 2021-12-01 PROCEDURE — 99999 PR PBB SHADOW E&M-EST. PATIENT-LVL IV: ICD-10-PCS | Mod: PBBFAC,,, | Performed by: NURSE PRACTITIONER

## 2021-12-01 PROCEDURE — 99214 OFFICE O/P EST MOD 30 MIN: CPT | Mod: S$GLB,,, | Performed by: NURSE PRACTITIONER

## 2021-12-01 PROCEDURE — 4010F ACE/ARB THERAPY RXD/TAKEN: CPT | Mod: CPTII,S$GLB,, | Performed by: NURSE PRACTITIONER

## 2021-12-01 PROCEDURE — 99999 PR PBB SHADOW E&M-EST. PATIENT-LVL IV: CPT | Mod: PBBFAC,,, | Performed by: NURSE PRACTITIONER

## 2021-12-01 RX ORDER — SOD SULF/POT CHLORIDE/MAG SULF 1.479 G
12 TABLET ORAL DAILY
Qty: 24 TABLET | Refills: 0 | Status: ON HOLD | OUTPATIENT
Start: 2021-12-01 | End: 2021-12-21 | Stop reason: ALTCHOICE

## 2021-12-02 ENCOUNTER — TELEPHONE (OUTPATIENT)
Dept: HEPATOLOGY | Facility: CLINIC | Age: 71
End: 2021-12-02
Payer: MEDICARE

## 2021-12-02 ENCOUNTER — PATIENT MESSAGE (OUTPATIENT)
Dept: GASTROENTEROLOGY | Facility: CLINIC | Age: 71
End: 2021-12-02
Payer: MEDICARE

## 2021-12-08 DIAGNOSIS — I10 ESSENTIAL HYPERTENSION: ICD-10-CM

## 2021-12-09 RX ORDER — AMLODIPINE BESYLATE 10 MG/1
TABLET ORAL
Qty: 90 TABLET | Refills: 1 | Status: SHIPPED | OUTPATIENT
Start: 2021-12-09 | End: 2022-09-07

## 2021-12-17 ENCOUNTER — PATIENT MESSAGE (OUTPATIENT)
Dept: PREADMISSION TESTING | Facility: HOSPITAL | Age: 71
End: 2021-12-17
Payer: MEDICARE

## 2021-12-21 ENCOUNTER — ANESTHESIA EVENT (OUTPATIENT)
Dept: ENDOSCOPY | Facility: HOSPITAL | Age: 71
End: 2021-12-21
Payer: MEDICARE

## 2021-12-21 ENCOUNTER — ANESTHESIA (OUTPATIENT)
Dept: ENDOSCOPY | Facility: HOSPITAL | Age: 71
End: 2021-12-21
Payer: MEDICARE

## 2021-12-21 ENCOUNTER — HOSPITAL ENCOUNTER (OUTPATIENT)
Facility: HOSPITAL | Age: 71
Discharge: HOME | End: 2021-12-21
Attending: INTERNAL MEDICINE | Admitting: INTERNAL MEDICINE
Payer: MEDICARE

## 2021-12-21 VITALS
RESPIRATION RATE: 20 BRPM | WEIGHT: 260 LBS | BODY MASS INDEX: 43.32 KG/M2 | TEMPERATURE: 98 F | HEART RATE: 74 BPM | SYSTOLIC BLOOD PRESSURE: 140 MMHG | DIASTOLIC BLOOD PRESSURE: 89 MMHG | HEIGHT: 65 IN | OXYGEN SATURATION: 97 %

## 2021-12-21 DIAGNOSIS — Z12.11 COLON CANCER SCREENING: ICD-10-CM

## 2021-12-21 LAB
GLUCOSE SERPL-MCNC: 121 MG/DL (ref 70–110)
POCT GLUCOSE: 121 MG/DL (ref 70–110)

## 2021-12-21 PROCEDURE — 43239 PR EGD, FLEX, W/BIOPSY, SGL/MULTI: ICD-10-PCS | Mod: 51,,, | Performed by: INTERNAL MEDICINE

## 2021-12-21 PROCEDURE — 43239 EGD BIOPSY SINGLE/MULTIPLE: CPT | Performed by: INTERNAL MEDICINE

## 2021-12-21 PROCEDURE — 43239 EGD BIOPSY SINGLE/MULTIPLE: CPT | Mod: 51,,, | Performed by: INTERNAL MEDICINE

## 2021-12-21 PROCEDURE — 88342 IMHCHEM/IMCYTCHM 1ST ANTB: CPT | Performed by: PATHOLOGY

## 2021-12-21 PROCEDURE — 88342 CHG IMMUNOCYTOCHEMISTRY: ICD-10-PCS | Mod: 26,,, | Performed by: PATHOLOGY

## 2021-12-21 PROCEDURE — 45385 COLONOSCOPY W/LESION REMOVAL: CPT | Performed by: INTERNAL MEDICINE

## 2021-12-21 PROCEDURE — 88305 TISSUE EXAM BY PATHOLOGIST: CPT | Performed by: PATHOLOGY

## 2021-12-21 PROCEDURE — 63600175 PHARM REV CODE 636 W HCPCS: Performed by: NURSE ANESTHETIST, CERTIFIED REGISTERED

## 2021-12-21 PROCEDURE — 27201012 HC FORCEPS, HOT/COLD, DISP: Performed by: INTERNAL MEDICINE

## 2021-12-21 PROCEDURE — 25000003 PHARM REV CODE 250: Performed by: NURSE ANESTHETIST, CERTIFIED REGISTERED

## 2021-12-21 PROCEDURE — 88305 TISSUE EXAM BY PATHOLOGIST: CPT | Mod: 26,,, | Performed by: PATHOLOGY

## 2021-12-21 PROCEDURE — 88305 TISSUE EXAM BY PATHOLOGIST: ICD-10-PCS | Mod: 26,,, | Performed by: PATHOLOGY

## 2021-12-21 PROCEDURE — 37000009 HC ANESTHESIA EA ADD 15 MINS: Performed by: INTERNAL MEDICINE

## 2021-12-21 PROCEDURE — 45385 COLONOSCOPY W/LESION REMOVAL: CPT | Mod: PT,,, | Performed by: INTERNAL MEDICINE

## 2021-12-21 PROCEDURE — 88342 IMHCHEM/IMCYTCHM 1ST ANTB: CPT | Mod: 26,,, | Performed by: PATHOLOGY

## 2021-12-21 PROCEDURE — 37000008 HC ANESTHESIA 1ST 15 MINUTES: Performed by: INTERNAL MEDICINE

## 2021-12-21 PROCEDURE — 45385 PR COLONOSCOPY,REMV LESN,SNARE: ICD-10-PCS | Mod: PT,,, | Performed by: INTERNAL MEDICINE

## 2021-12-21 PROCEDURE — 27201089 HC SNARE, DISP (ANY): Performed by: INTERNAL MEDICINE

## 2021-12-21 RX ORDER — PROPOFOL 10 MG/ML
VIAL (ML) INTRAVENOUS
Status: DISCONTINUED | OUTPATIENT
Start: 2021-12-21 | End: 2021-12-21

## 2021-12-21 RX ORDER — LIDOCAINE HCL/PF 100 MG/5ML
SYRINGE (ML) INTRAVENOUS
Status: DISCONTINUED | OUTPATIENT
Start: 2021-12-21 | End: 2021-12-21

## 2021-12-21 RX ADMIN — SODIUM CHLORIDE, POTASSIUM CHLORIDE, SODIUM LACTATE AND CALCIUM CHLORIDE: 600; 310; 30; 20 INJECTION, SOLUTION INTRAVENOUS at 01:12

## 2021-12-21 RX ADMIN — LIDOCAINE HYDROCHLORIDE 50 MG: 20 INJECTION, SOLUTION INTRAVENOUS at 01:12

## 2021-12-21 RX ADMIN — PROPOFOL 50 MG: 10 INJECTION, EMULSION INTRAVENOUS at 01:12

## 2021-12-21 RX ADMIN — PROPOFOL 100 MG: 10 INJECTION, EMULSION INTRAVENOUS at 01:12

## 2021-12-22 DIAGNOSIS — E11.9 TYPE 2 DIABETES MELLITUS WITHOUT COMPLICATION: ICD-10-CM

## 2021-12-23 LAB
HBA1C MFR BLD: 6.9 % (ref 4.2–5.8)
LEFT EYE DM RETINOPATHY: POSITIVE
RIGHT EYE DM RETINOPATHY: POSITIVE

## 2021-12-29 LAB
FINAL PATHOLOGIC DIAGNOSIS: NORMAL
GROSS: NORMAL
Lab: NORMAL

## 2022-01-05 ENCOUNTER — PATIENT MESSAGE (OUTPATIENT)
Dept: INTERNAL MEDICINE | Facility: CLINIC | Age: 72
End: 2022-01-05
Payer: MEDICARE

## 2022-01-05 RX ORDER — OSELTAMIVIR PHOSPHATE 75 MG/1
75 CAPSULE ORAL DAILY
Qty: 10 CAPSULE | Refills: 0 | Status: SHIPPED | OUTPATIENT
Start: 2022-01-05 | End: 2022-01-15

## 2022-01-10 ENCOUNTER — PATIENT OUTREACH (OUTPATIENT)
Dept: ADMINISTRATIVE | Facility: OTHER | Age: 72
End: 2022-01-10
Payer: MEDICARE

## 2022-01-11 ENCOUNTER — OFFICE VISIT (OUTPATIENT)
Dept: ORTHOPEDICS | Facility: CLINIC | Age: 72
End: 2022-01-11
Payer: MEDICARE

## 2022-01-11 ENCOUNTER — HOSPITAL ENCOUNTER (OUTPATIENT)
Dept: RADIOLOGY | Facility: HOSPITAL | Age: 72
Discharge: HOME | End: 2022-01-11
Attending: STUDENT IN AN ORGANIZED HEALTH CARE EDUCATION/TRAINING PROGRAM
Payer: MEDICARE

## 2022-01-11 ENCOUNTER — PATIENT MESSAGE (OUTPATIENT)
Dept: ENDOSCOPY | Facility: HOSPITAL | Age: 72
End: 2022-01-11
Payer: MEDICARE

## 2022-01-11 VITALS — BODY MASS INDEX: 43.32 KG/M2 | HEIGHT: 65 IN | WEIGHT: 260 LBS

## 2022-01-11 DIAGNOSIS — S52.502S CLOSED FRACTURE OF DISTAL ENDS OF LEFT RADIUS AND ULNA, SEQUELA: ICD-10-CM

## 2022-01-11 DIAGNOSIS — S82.041S CLOSED DISPLACED COMMINUTED FRACTURE OF RIGHT PATELLA, SEQUELA: Primary | ICD-10-CM

## 2022-01-11 DIAGNOSIS — S82.041S CLOSED DISPLACED COMMINUTED FRACTURE OF RIGHT PATELLA, SEQUELA: ICD-10-CM

## 2022-01-11 DIAGNOSIS — S42.292S OTHER CLOSED DISPLACED FRACTURE OF PROXIMAL END OF LEFT HUMERUS, SEQUELA: ICD-10-CM

## 2022-01-11 DIAGNOSIS — S52.602S CLOSED FRACTURE OF DISTAL ENDS OF LEFT RADIUS AND ULNA, SEQUELA: ICD-10-CM

## 2022-01-11 PROCEDURE — 73562 X-RAY EXAM OF KNEE 3: CPT | Mod: TC,LT

## 2022-01-11 PROCEDURE — 1101F PT FALLS ASSESS-DOCD LE1/YR: CPT | Mod: CPTII,S$GLB,, | Performed by: STUDENT IN AN ORGANIZED HEALTH CARE EDUCATION/TRAINING PROGRAM

## 2022-01-11 PROCEDURE — 99213 PR OFFICE/OUTPT VISIT, EST, LEVL III, 20-29 MIN: ICD-10-PCS | Mod: S$GLB,,, | Performed by: STUDENT IN AN ORGANIZED HEALTH CARE EDUCATION/TRAINING PROGRAM

## 2022-01-11 PROCEDURE — 1159F PR MEDICATION LIST DOCUMENTED IN MEDICAL RECORD: ICD-10-PCS | Mod: CPTII,S$GLB,, | Performed by: STUDENT IN AN ORGANIZED HEALTH CARE EDUCATION/TRAINING PROGRAM

## 2022-01-11 PROCEDURE — 73564 X-RAY EXAM KNEE 4 OR MORE: CPT | Mod: 26,RT,, | Performed by: RADIOLOGY

## 2022-01-11 PROCEDURE — 1160F RVW MEDS BY RX/DR IN RCRD: CPT | Mod: CPTII,S$GLB,, | Performed by: STUDENT IN AN ORGANIZED HEALTH CARE EDUCATION/TRAINING PROGRAM

## 2022-01-11 PROCEDURE — 73562 X-RAY EXAM OF KNEE 3: CPT | Mod: 26,LT,, | Performed by: RADIOLOGY

## 2022-01-11 PROCEDURE — 99999 PR PBB SHADOW E&M-EST. PATIENT-LVL III: CPT | Mod: PBBFAC,,, | Performed by: STUDENT IN AN ORGANIZED HEALTH CARE EDUCATION/TRAINING PROGRAM

## 2022-01-11 PROCEDURE — 3008F BODY MASS INDEX DOCD: CPT | Mod: CPTII,S$GLB,, | Performed by: STUDENT IN AN ORGANIZED HEALTH CARE EDUCATION/TRAINING PROGRAM

## 2022-01-11 PROCEDURE — 1159F MED LIST DOCD IN RCRD: CPT | Mod: CPTII,S$GLB,, | Performed by: STUDENT IN AN ORGANIZED HEALTH CARE EDUCATION/TRAINING PROGRAM

## 2022-01-11 PROCEDURE — 73564 XR KNEE ORTHO RIGHT WITH FLEXION: ICD-10-PCS | Mod: 26,RT,, | Performed by: RADIOLOGY

## 2022-01-11 PROCEDURE — 99213 OFFICE O/P EST LOW 20 MIN: CPT | Mod: S$GLB,,, | Performed by: STUDENT IN AN ORGANIZED HEALTH CARE EDUCATION/TRAINING PROGRAM

## 2022-01-11 PROCEDURE — 3288F FALL RISK ASSESSMENT DOCD: CPT | Mod: CPTII,S$GLB,, | Performed by: STUDENT IN AN ORGANIZED HEALTH CARE EDUCATION/TRAINING PROGRAM

## 2022-01-11 PROCEDURE — 99999 PR PBB SHADOW E&M-EST. PATIENT-LVL III: ICD-10-PCS | Mod: PBBFAC,,, | Performed by: STUDENT IN AN ORGANIZED HEALTH CARE EDUCATION/TRAINING PROGRAM

## 2022-01-11 PROCEDURE — 73562 XR KNEE ORTHO RIGHT WITH FLEXION: ICD-10-PCS | Mod: 26,LT,, | Performed by: RADIOLOGY

## 2022-01-11 PROCEDURE — 1101F PR PT FALLS ASSESS DOC 0-1 FALLS W/OUT INJ PAST YR: ICD-10-PCS | Mod: CPTII,S$GLB,, | Performed by: STUDENT IN AN ORGANIZED HEALTH CARE EDUCATION/TRAINING PROGRAM

## 2022-01-11 PROCEDURE — 3288F PR FALLS RISK ASSESSMENT DOCUMENTED: ICD-10-PCS | Mod: CPTII,S$GLB,, | Performed by: STUDENT IN AN ORGANIZED HEALTH CARE EDUCATION/TRAINING PROGRAM

## 2022-01-11 PROCEDURE — 1160F PR REVIEW ALL MEDS BY PRESCRIBER/CLIN PHARMACIST DOCUMENTED: ICD-10-PCS | Mod: CPTII,S$GLB,, | Performed by: STUDENT IN AN ORGANIZED HEALTH CARE EDUCATION/TRAINING PROGRAM

## 2022-01-11 PROCEDURE — 3008F PR BODY MASS INDEX (BMI) DOCUMENTED: ICD-10-PCS | Mod: CPTII,S$GLB,, | Performed by: STUDENT IN AN ORGANIZED HEALTH CARE EDUCATION/TRAINING PROGRAM

## 2022-02-01 ENCOUNTER — PATIENT OUTREACH (OUTPATIENT)
Dept: ADMINISTRATIVE | Facility: HOSPITAL | Age: 72
End: 2022-02-01
Payer: MEDICARE

## 2022-02-09 ENCOUNTER — HOSPITAL ENCOUNTER (OUTPATIENT)
Dept: RADIOLOGY | Facility: HOSPITAL | Age: 72
Discharge: HOME | End: 2022-02-09
Attending: SURGERY
Payer: MEDICARE

## 2022-02-09 ENCOUNTER — OFFICE VISIT (OUTPATIENT)
Dept: SURGERY | Facility: CLINIC | Age: 72
End: 2022-02-09
Payer: MEDICARE

## 2022-02-09 VITALS
TEMPERATURE: 97 F | SYSTOLIC BLOOD PRESSURE: 146 MMHG | BODY MASS INDEX: 43.25 KG/M2 | DIASTOLIC BLOOD PRESSURE: 61 MMHG | HEART RATE: 62 BPM | WEIGHT: 259.94 LBS

## 2022-02-09 DIAGNOSIS — R92.8 ABNORMAL MAMMOGRAM: Primary | ICD-10-CM

## 2022-02-09 DIAGNOSIS — R92.8 ABNORMAL MAMMOGRAM OF LEFT BREAST: ICD-10-CM

## 2022-02-09 PROCEDURE — 1126F PR PAIN SEVERITY QUANTIFIED, NO PAIN PRESENT: ICD-10-PCS | Mod: CPTII,S$GLB,,

## 2022-02-09 PROCEDURE — 1159F PR MEDICATION LIST DOCUMENTED IN MEDICAL RECORD: ICD-10-PCS | Mod: CPTII,S$GLB,,

## 2022-02-09 PROCEDURE — 3008F PR BODY MASS INDEX (BMI) DOCUMENTED: ICD-10-PCS | Mod: CPTII,S$GLB,,

## 2022-02-09 PROCEDURE — 77065 DX MAMMO INCL CAD UNI: CPT | Mod: 26,LT,, | Performed by: RADIOLOGY

## 2022-02-09 PROCEDURE — 3077F SYST BP >= 140 MM HG: CPT | Mod: CPTII,S$GLB,,

## 2022-02-09 PROCEDURE — 99213 OFFICE O/P EST LOW 20 MIN: CPT | Mod: S$GLB,,,

## 2022-02-09 PROCEDURE — 1160F RVW MEDS BY RX/DR IN RCRD: CPT | Mod: CPTII,S$GLB,,

## 2022-02-09 PROCEDURE — 1160F PR REVIEW ALL MEDS BY PRESCRIBER/CLIN PHARMACIST DOCUMENTED: ICD-10-PCS | Mod: CPTII,S$GLB,,

## 2022-02-09 PROCEDURE — 77065 MAMMO DIGITAL DIAGNOSTIC LEFT WITH TOMO: ICD-10-PCS | Mod: 26,LT,, | Performed by: RADIOLOGY

## 2022-02-09 PROCEDURE — 99999 PR PBB SHADOW E&M-EST. PATIENT-LVL IV: ICD-10-PCS | Mod: PBBFAC,,,

## 2022-02-09 PROCEDURE — 99213 PR OFFICE/OUTPT VISIT, EST, LEVL III, 20-29 MIN: ICD-10-PCS | Mod: S$GLB,,,

## 2022-02-09 PROCEDURE — 77065 DX MAMMO INCL CAD UNI: CPT | Mod: TC,LT

## 2022-02-09 PROCEDURE — 3008F BODY MASS INDEX DOCD: CPT | Mod: CPTII,S$GLB,,

## 2022-02-09 PROCEDURE — 77061 MAMMO DIGITAL DIAGNOSTIC LEFT WITH TOMO: ICD-10-PCS | Mod: 26,LT,, | Performed by: RADIOLOGY

## 2022-02-09 PROCEDURE — 3077F PR MOST RECENT SYSTOLIC BLOOD PRESSURE >= 140 MM HG: ICD-10-PCS | Mod: CPTII,S$GLB,,

## 2022-02-09 PROCEDURE — 1159F MED LIST DOCD IN RCRD: CPT | Mod: CPTII,S$GLB,,

## 2022-02-09 PROCEDURE — 1126F AMNT PAIN NOTED NONE PRSNT: CPT | Mod: CPTII,S$GLB,,

## 2022-02-09 PROCEDURE — 99999 PR PBB SHADOW E&M-EST. PATIENT-LVL IV: CPT | Mod: PBBFAC,,,

## 2022-02-09 PROCEDURE — 77061 BREAST TOMOSYNTHESIS UNI: CPT | Mod: 26,LT,, | Performed by: RADIOLOGY

## 2022-02-09 PROCEDURE — 3078F DIAST BP <80 MM HG: CPT | Mod: CPTII,S$GLB,,

## 2022-02-09 PROCEDURE — 3078F PR MOST RECENT DIASTOLIC BLOOD PRESSURE < 80 MM HG: ICD-10-PCS | Mod: CPTII,S$GLB,,

## 2022-02-09 NOTE — PROGRESS NOTES
"Abnormal Mammogram Follow-up     SUBJECTIVE:     History of Present Illness:  Patient is a 71 y.o. female referred for abnormal mammogram of left breast.  She denies any breast masses, nipple discharge, breast pain or any other changes.  Prior breast biopsy reported benign No family history of breast or ovarian cancer.  Menarche age 15,  1st at 24, menopause at 50 no HRT    Chief Complaint   Patient presents with    Follow-up     Interval History:  Since the last clinic visit, the patient has done well. Her left breast biopsy returned as normal, and she had her 6 month follow-up left diagnostic mammogram today before her visit. She has not noticed any changes to her breast including nipple pain, nipple discharge, masses, skin changes. Denies fevers, chills, nausea, and vomiting.   Review of patient's allergies indicates:   Allergen Reactions    Codeine Nausea Only     Other reaction(s): Nausea  Other reaction(s): Elevated blood pressure       Current Outpatient Medications   Medication Sig Dispense Refill    amLODIPine (NORVASC) 10 MG tablet TAKE 1 TABLET(10 MG) BY MOUTH EVERY DAY 90 tablet 1    blood sugar diagnostic Strp Use ac bid      hydrochlorothiazide (HYDRODIURIL) 25 MG tablet Take 25 mg by mouth once daily.       insulin syringe-needle U-100 1 mL 29 gauge x 1/2" Syrg Use bid      lancets 33 gauge Misc Use as BID      levothyroxine (SYNTHROID, LEVOTHROID) 175 MCG tablet TAKE 1 TABLET BY MOUTH DAILY      metformin (GLUCOPHAGE) 1000 MG tablet 1,000 mg 2 (two) times daily with meals.       perindopril erbumine (ACEON) 4 mg tablet Take 4 mg by mouth once daily.       pravastatin (PRAVACHOL) 20 MG tablet Take 20 mg by mouth once daily.   11    blood-glucose meter kit Use as instructed      insulin NPH-insulin regular, 70/30, (NOVOLIN 70/30) 100 unit/mL (70-30) injection Inject 75 units sc ac supper.       Current Facility-Administered Medications   Medication Dose Route Frequency Provider Last " Rate Last Admin    acetaminophen tablet 650 mg  650 mg Oral Once PRN Fito Medrano MD        albuterol inhaler 2 puff  2 puff Inhalation Q20 Min PRN Fito Medrano MD        diphenhydrAMINE injection 25 mg  25 mg Intravenous Once PRN Fito Medrano MD        EPINEPHrine (EPIPEN) 0.3 mg/0.3 mL pen injection 0.3 mg  0.3 mg Intramuscular PRN Fito Medrano MD        methylPREDNISolone sodium succinate injection 40 mg  40 mg Intravenous Once PRN Fito Medrano MD        ondansetron disintegrating tablet 4 mg  4 mg Oral Once PRN Fito Medrano MD        sodium chloride 0.9% 500 mL flush bag   Intravenous PRN Fito Medrano MD        sodium chloride 0.9% flush 10 mL  10 mL Intravenous PRN Fito Medrano MD           Past Medical History:   Diagnosis Date    Cataract     Diabetes mellitus type I     Hyperlipidemia     Hypertension     Morbid obesity     Right knee pain     Thyroid disease      Past Surgical History:   Procedure Laterality Date    APPENDECTOMY      BREAST BIOPSY      CATARACT EXTRACTION      COLONOSCOPY N/A 12/21/2021    Procedure: COLONOSCOPY screening;  Surgeon: Moises Patterson MD;  Location: [facility];  Service: Endoscopy;  Laterality: N/A;    ESOPHAGOGASTRODUODENOSCOPY N/A 12/21/2021    Procedure: EGD (ESOPHAGOGASTRODUODENOSCOPY) EV screening;  Surgeon: Moises Patterson MD;  Location: [facility];  Service: Endoscopy;  Laterality: N/A;    EYE SURGERY      HERNIA REPAIR      OPEN REDUCTION AND INTERNAL FIXATION (ORIF) OF FRACTURE OF DISTAL RADIUS Left 11/2/2020    Procedure: ORIF, FRACTURE, RADIUS, DISTAL;  Surgeon: Sage Wolf MD;  Location: [facility];  Service: Orthopedics;  Laterality: Left;    OPEN REDUCTION AND INTERNAL FIXATION (ORIF) OF FRACTURE OF PATELLA Right 11/2/2020    Procedure: ORIF, FRACTURE, PATELLA;  Surgeon: Sage Wolf MD;  Location: [facility];  Service: Orthopedics;  Laterality: Right;    OPEN REDUCTION AND  INTERNAL FIXATION (ORIF) OF FRACTURE OF PATELLA Right 2020    Procedure: ORIF, FRACTURE, PATELLA;  Surgeon: Sage Wolf MD;  Location: [facility] OR;  Service: Orthopedics;  Laterality: Right;    ORIF HUMERUS FRACTURE Left 2020    Procedure: ORIF, FRACTURE, HUMERUS;  Surgeon: Sage Wolf MD;  Location: [facility] OR;  Service: Orthopedics;  Laterality: Left;    PLACEMENT OF ACELLULAR HUMAN DERMAL ALLOGRAFT Right 2020    Procedure: APPLICATION, ACELLULAR HUMAN DERMAL ALLOGRAFT;  Surgeon: Sage Wolf MD;  Location: [facility] OR;  Service: Orthopedics;  Laterality: Right;  Right Patella    REMOVAL OF HARDWARE FROM HAND Left 3/11/2021    Procedure: REMOVAL, HARDWARE, HAND;  Surgeon: Sage Wolf MD;  Location: [facility] OR;  Service: Orthopedics;  Laterality: Left;  Removal of dorsal spanning wrist plate left distal radius    REMOVAL OF HARDWARE FROM LOWER EXTREMITY Right 2020    Procedure: REMOVAL, HARDWARE, LOWER EXTREMITY;  Surgeon: Sage Wolf MD;  Location: [facility];  Service: Orthopedics;  Laterality: Right;     Family History   Problem Relation Age of Onset    Diabetes Mother     Leukemia Father     Diabetes Father      Social History     Tobacco Use    Smoking status: Former Smoker     Packs/day: 1.00     Years: 8.00     Pack years: 8.00     Types: Cigarettes     Quit date:      Years since quittin.1    Smokeless tobacco: Never Used   Substance Use Topics    Alcohol use: Yes    Drug use: No        Review of Systems:  Review of Systems   Constitutional: Negative for activity change, appetite change, chills, diaphoresis, fatigue, fever and unexpected weight change.   HENT: Negative for congestion, dental problem, hearing loss, rhinorrhea, sore throat and trouble swallowing.    Eyes: Negative for discharge and visual disturbance.   Respiratory: Negative for cough, chest tightness, shortness of breath and wheezing.    Cardiovascular:  Negative for chest pain, palpitations and leg swelling.   Gastrointestinal: Negative for abdominal distention, abdominal pain, blood in stool, constipation, diarrhea, nausea and vomiting.   Endocrine: Negative for cold intolerance, heat intolerance, polydipsia, polyphagia and polyuria.   Genitourinary: Negative for difficulty urinating, dysuria, frequency, hematuria, menstrual problem and urgency.   Musculoskeletal: Negative for arthralgias, gait problem, joint swelling, myalgias and neck pain.   Skin: Negative for color change, pallor, rash and wound.   Neurological: Negative for dizziness, syncope, weakness, light-headedness, numbness and headaches.   Hematological: Negative for adenopathy. Does not bruise/bleed easily.   Psychiatric/Behavioral: Negative for confusion, decreased concentration, dysphoric mood and sleep disturbance. The patient is not nervous/anxious.        OBJECTIVE:     Vital Signs (Most Recent)  Temp: 97.3 °F (36.3 °C) (02/09/22 1129)  Pulse: 62 (02/09/22 1129)  BP: (!) 146/61 (02/09/22 1129)     117.9 kg (259 lb 14.8 oz)     Physical Exam:  Physical Exam  Vitals reviewed.   Constitutional:       General: She is not in acute distress.     Appearance: She is well-developed. She is not diaphoretic.   HENT:      Head: Normocephalic and atraumatic.      Right Ear: External ear normal.      Left Ear: External ear normal.   Eyes:      General: No scleral icterus.     Conjunctiva/sclera: Conjunctivae normal.      Pupils: Pupils are equal, round, and reactive to light.   Neck:      Thyroid: No thyromegaly.      Trachea: No tracheal deviation.   Cardiovascular:      Rate and Rhythm: Normal rate and regular rhythm.      Heart sounds: Normal heart sounds. No murmur heard.  No friction rub. No gallop.    Pulmonary:      Effort: Pulmonary effort is normal. No respiratory distress.      Breath sounds: Normal breath sounds. No wheezing or rales.   Chest:      Chest wall: No tenderness.   Breasts:      Right:  No inverted nipple, mass, nipple discharge, skin change or tenderness.      Left: No inverted nipple, mass, nipple discharge, skin change or tenderness.       Abdominal:      General: Bowel sounds are normal. There is no distension.      Palpations: Abdomen is soft.      Tenderness: There is no abdominal tenderness.      Hernia: No hernia is present.   Musculoskeletal:         General: No tenderness or deformity. Normal range of motion.      Cervical back: Normal range of motion and neck supple.   Lymphadenopathy:      Cervical: No cervical adenopathy.   Skin:     General: Skin is warm and dry.      Coloration: Skin is not pale.      Findings: No erythema or rash.   Neurological:      Mental Status: She is alert and oriented to person, place, and time.   Psychiatric:         Behavior: Behavior normal.         Thought Content: Thought content normal.         Judgment: Judgment normal.         Diagnostic Results:  Result:   Mammo Digital Diagnostic Left with Alec     History:  Patient is 70 y.o. and is seen for diagnostic imaging.     Films Compared:  Compared to: 06/28/2021 Mammo Digital Screening Bilat w/ Alec and 03/13/2020 Mammo Digital Screening Bilat w/ Alec     Findings:  This procedure was performed using tomosynthesis. Computer-aided detection was utilized in the interpretation of this examination.  The left breast is almost entirely fatty.     There are coarse heterogeneous calcifications in a grouped distribution seen in the lower central region of the left breast.      Impression:  Left  Calcifications: Left breast calcifications at the lower central position. Assessment: 4 - Suspicious finding. Biopsy is recommended.      BI-RADS Category:   Overall: 4 - Suspicious     Recommendation:  Stereotactic Biopsy is recommended.        Your estimated lifetime risk of breast cancer (to age 85) based on Tyrer-Cuzick risk assessment model is Tyrer-Cuzick: 3.57 %. According to the [employer] Society, patients  with a lifetime breast cancer risk of 20% or higher might benefit from supplemental screening tests.     Pathology:  Final Pathologic Diagnosis LEFT BREAST, BIOPSY:   -Negative for atypia or malignancy.   -Benign breast tissue with fibroadenomatoid nodules and associated   microcalcifications.            ASSESSMENT/PLAN:     70-year-old female with abnormal mammogram of left breast followed by benign biopsy.    - Will call with results of follow-up left breast diagnostic mammogram.  - If above is normal, return to normal annual screening ( BL screening mammo in 6 mos.)  - Follow-up LOIS Love PA-C  [facility]

## 2022-02-18 ENCOUNTER — PATIENT MESSAGE (OUTPATIENT)
Dept: SURGERY | Facility: CLINIC | Age: 72
End: 2022-02-18
Payer: MEDICARE

## 2022-03-03 ENCOUNTER — OFFICE VISIT (OUTPATIENT)
Dept: HEPATOLOGY | Facility: CLINIC | Age: 72
End: 2022-03-03
Payer: MEDICARE

## 2022-03-03 ENCOUNTER — HOSPITAL ENCOUNTER (OUTPATIENT)
Dept: RADIOLOGY | Facility: HOSPITAL | Age: 72
Discharge: HOME | End: 2022-03-03
Attending: NURSE PRACTITIONER
Payer: MEDICARE

## 2022-03-03 VITALS
DIASTOLIC BLOOD PRESSURE: 64 MMHG | BODY MASS INDEX: 42.57 KG/M2 | WEIGHT: 255.5 LBS | HEIGHT: 65 IN | SYSTOLIC BLOOD PRESSURE: 120 MMHG | HEART RATE: 72 BPM

## 2022-03-03 DIAGNOSIS — K74.60 LIVER CIRRHOSIS SECONDARY TO NASH: Primary | ICD-10-CM

## 2022-03-03 DIAGNOSIS — K75.81 LIVER CIRRHOSIS SECONDARY TO NASH: Primary | ICD-10-CM

## 2022-03-03 DIAGNOSIS — K75.81 LIVER CIRRHOSIS SECONDARY TO NASH: ICD-10-CM

## 2022-03-03 DIAGNOSIS — K74.60 LIVER CIRRHOSIS SECONDARY TO NASH: ICD-10-CM

## 2022-03-03 PROCEDURE — 3078F DIAST BP <80 MM HG: CPT | Mod: CPTII,S$GLB,, | Performed by: NURSE PRACTITIONER

## 2022-03-03 PROCEDURE — 3078F PR MOST RECENT DIASTOLIC BLOOD PRESSURE < 80 MM HG: ICD-10-PCS | Mod: CPTII,S$GLB,, | Performed by: NURSE PRACTITIONER

## 2022-03-03 PROCEDURE — 76705 ECHO EXAM OF ABDOMEN: CPT | Mod: 26,,, | Performed by: RADIOLOGY

## 2022-03-03 PROCEDURE — 3008F BODY MASS INDEX DOCD: CPT | Mod: CPTII,S$GLB,, | Performed by: NURSE PRACTITIONER

## 2022-03-03 PROCEDURE — 99214 PR OFFICE/OUTPT VISIT, EST, LEVL IV, 30-39 MIN: ICD-10-PCS | Mod: S$GLB,,, | Performed by: NURSE PRACTITIONER

## 2022-03-03 PROCEDURE — 76705 US ABDOMEN LIMITED: ICD-10-PCS | Mod: 26,,, | Performed by: RADIOLOGY

## 2022-03-03 PROCEDURE — 3288F PR FALLS RISK ASSESSMENT DOCUMENTED: ICD-10-PCS | Mod: CPTII,S$GLB,, | Performed by: NURSE PRACTITIONER

## 2022-03-03 PROCEDURE — 1101F PT FALLS ASSESS-DOCD LE1/YR: CPT | Mod: CPTII,S$GLB,, | Performed by: NURSE PRACTITIONER

## 2022-03-03 PROCEDURE — 1159F PR MEDICATION LIST DOCUMENTED IN MEDICAL RECORD: ICD-10-PCS | Mod: CPTII,S$GLB,, | Performed by: NURSE PRACTITIONER

## 2022-03-03 PROCEDURE — 3074F SYST BP LT 130 MM HG: CPT | Mod: CPTII,S$GLB,, | Performed by: NURSE PRACTITIONER

## 2022-03-03 PROCEDURE — 99999 PR PBB SHADOW E&M-EST. PATIENT-LVL IV: ICD-10-PCS | Mod: PBBFAC,,, | Performed by: NURSE PRACTITIONER

## 2022-03-03 PROCEDURE — 1126F AMNT PAIN NOTED NONE PRSNT: CPT | Mod: CPTII,S$GLB,, | Performed by: NURSE PRACTITIONER

## 2022-03-03 PROCEDURE — 3288F FALL RISK ASSESSMENT DOCD: CPT | Mod: CPTII,S$GLB,, | Performed by: NURSE PRACTITIONER

## 2022-03-03 PROCEDURE — 76705 ECHO EXAM OF ABDOMEN: CPT | Mod: TC

## 2022-03-03 PROCEDURE — 99214 OFFICE O/P EST MOD 30 MIN: CPT | Mod: S$GLB,,, | Performed by: NURSE PRACTITIONER

## 2022-03-03 PROCEDURE — 1159F MED LIST DOCD IN RCRD: CPT | Mod: CPTII,S$GLB,, | Performed by: NURSE PRACTITIONER

## 2022-03-03 PROCEDURE — 1101F PR PT FALLS ASSESS DOC 0-1 FALLS W/OUT INJ PAST YR: ICD-10-PCS | Mod: CPTII,S$GLB,, | Performed by: NURSE PRACTITIONER

## 2022-03-03 PROCEDURE — 3008F PR BODY MASS INDEX (BMI) DOCUMENTED: ICD-10-PCS | Mod: CPTII,S$GLB,, | Performed by: NURSE PRACTITIONER

## 2022-03-03 PROCEDURE — 3074F PR MOST RECENT SYSTOLIC BLOOD PRESSURE < 130 MM HG: ICD-10-PCS | Mod: CPTII,S$GLB,, | Performed by: NURSE PRACTITIONER

## 2022-03-03 PROCEDURE — 99999 PR PBB SHADOW E&M-EST. PATIENT-LVL IV: CPT | Mod: PBBFAC,,, | Performed by: NURSE PRACTITIONER

## 2022-03-03 PROCEDURE — 1126F PR PAIN SEVERITY QUANTIFIED, NO PAIN PRESENT: ICD-10-PCS | Mod: CPTII,S$GLB,, | Performed by: NURSE PRACTITIONER

## 2022-03-03 NOTE — PROGRESS NOTES
Clinic Follow Up:  [facility] Follow Up Note    Reason for Follow Up:  The primary encounter diagnosis was Liver cirrhosis secondary to YO. A diagnosis of Body mass index (BMI) 45.0-49.9, adult was also pertinent to this visit.    PCP: Karan Ribeiro       HPI:  This is a 71 y.o. female here for follow up of the above  Pt states that she has been feeling overall well without any new complaints.   Waiting on labs and US to be resulted today  No upper GI bleeding.  No ascites or BLE.  No overt confusion.        Review of Systems   Constitutional: Negative for chills, fever, malaise/fatigue and weight loss.   Respiratory: Negative for cough.    Cardiovascular: Negative for chest pain.   Gastrointestinal:        Per HPI   Musculoskeletal: Negative for myalgias.   Skin: Negative for itching and rash.   Neurological: Negative for headaches.   Psychiatric/Behavioral: The patient is not nervous/anxious.        Medical History:  Past Medical History:   Diagnosis Date    Cataract     Diabetes mellitus type I     Hyperlipidemia     Hypertension     Morbid obesity     Right knee pain     Thyroid disease        Surgical History:   Past Surgical History:   Procedure Laterality Date    APPENDECTOMY      BREAST BIOPSY      CATARACT EXTRACTION      COLONOSCOPY N/A 12/21/2021    Procedure: COLONOSCOPY screening;  Surgeon: Moises Patterson MD;  Location: [facility];  Service: Endoscopy;  Laterality: N/A;    ESOPHAGOGASTRODUODENOSCOPY N/A 12/21/2021    Procedure: EGD (ESOPHAGOGASTRODUODENOSCOPY) EV screening;  Surgeon: Moises Patterson MD;  Location: [facility];  Service: Endoscopy;  Laterality: N/A;    EYE SURGERY      HERNIA REPAIR      OPEN REDUCTION AND INTERNAL FIXATION (ORIF) OF FRACTURE OF DISTAL RADIUS Left 11/2/2020    Procedure: ORIF, FRACTURE, RADIUS, DISTAL;  Surgeon: Sage Wolf MD;  Location: [facility];  Service: Orthopedics;  Laterality: Left;    OPEN REDUCTION AND  INTERNAL FIXATION (ORIF) OF FRACTURE OF PATELLA Right 2020    Procedure: ORIF, FRACTURE, PATELLA;  Surgeon: Sage Wolf MD;  Location: [facility] OR;  Service: Orthopedics;  Laterality: Right;    OPEN REDUCTION AND INTERNAL FIXATION (ORIF) OF FRACTURE OF PATELLA Right 2020    Procedure: ORIF, FRACTURE, PATELLA;  Surgeon: Sage Wolf MD;  Location: [facility] OR;  Service: Orthopedics;  Laterality: Right;    ORIF HUMERUS FRACTURE Left 2020    Procedure: ORIF, FRACTURE, HUMERUS;  Surgeon: Sage Wolf MD;  Location: [facility] OR;  Service: Orthopedics;  Laterality: Left;    PLACEMENT OF ACELLULAR HUMAN DERMAL ALLOGRAFT Right 2020    Procedure: APPLICATION, ACELLULAR HUMAN DERMAL ALLOGRAFT;  Surgeon: Sage Wolf MD;  Location: [facility] OR;  Service: Orthopedics;  Laterality: Right;  Right Patella    REMOVAL OF HARDWARE FROM HAND Left 3/11/2021    Procedure: REMOVAL, HARDWARE, HAND;  Surgeon: Sage Wolf MD;  Location: [facility] OR;  Service: Orthopedics;  Laterality: Left;  Removal of dorsal spanning wrist plate left distal radius    REMOVAL OF HARDWARE FROM LOWER EXTREMITY Right 2020    Procedure: REMOVAL, HARDWARE, LOWER EXTREMITY;  Surgeon: Sage Wolf MD;  Location: [facility];  Service: Orthopedics;  Laterality: Right;       Family History:   Family History   Problem Relation Age of Onset    Diabetes Mother     Leukemia Father     Diabetes Father        Social History:   Social History     Tobacco Use    Smoking status: Former Smoker     Packs/day: 1.00     Years: 8.00     Pack years: 8.00     Types: Cigarettes     Quit date:      Years since quittin.1    Smokeless tobacco: Never Used   Substance Use Topics    Alcohol use: Yes    Drug use: No       Allergies: Reviewed    Home Medications:  Current Outpatient Medications on File Prior to Visit   Medication Sig Dispense Refill    amLODIPine (NORVASC) 10 MG tablet TAKE 1  "TABLET(10 MG) BY MOUTH EVERY DAY 90 tablet 1    blood sugar diagnostic Strp Use ac bid      hydrochlorothiazide (HYDRODIURIL) 25 MG tablet Take 25 mg by mouth once daily.       insulin NPH-insulin regular, 70/30, (NOVOLIN 70/30) 100 unit/mL (70-30) injection Inject 75 units sc ac supper.      insulin syringe-needle U-100 1 mL 29 gauge x 1/2" Syrg Use bid      lancets 33 gauge Misc Use as BID      levothyroxine (SYNTHROID, LEVOTHROID) 175 MCG tablet TAKE 1 TABLET BY MOUTH DAILY      metformin (GLUCOPHAGE) 1000 MG tablet 1,000 mg 2 (two) times daily with meals.       perindopril erbumine (ACEON) 4 mg tablet Take 4 mg by mouth once daily.       pravastatin (PRAVACHOL) 20 MG tablet Take 20 mg by mouth once daily.   11    blood-glucose meter kit Use as instructed       Current Facility-Administered Medications on File Prior to Visit   Medication Dose Route Frequency Provider Last Rate Last Admin    acetaminophen tablet 650 mg  650 mg Oral Once PRN Fito Medrano MD        albuterol inhaler 2 puff  2 puff Inhalation Q20 Min PRN Fito Medrano MD        diphenhydrAMINE injection 25 mg  25 mg Intravenous Once PRN Fito Medrano MD        EPINEPHrine (EPIPEN) 0.3 mg/0.3 mL pen injection 0.3 mg  0.3 mg Intramuscular PRN Fito Medrano MD        methylPREDNISolone sodium succinate injection 40 mg  40 mg Intravenous Once PRN Fito Medrano MD        ondansetron disintegrating tablet 4 mg  4 mg Oral Once PRN Fito Medrano MD        sodium chloride 0.9% 500 mL flush bag   Intravenous PRN Fito Medrano MD        sodium chloride 0.9% flush 10 mL  10 mL Intravenous PRN Fito Medrano MD           Physical Exam:  Vital Signs:  /64   Pulse 72   Ht 5' 5" (1.651 m)   Wt 115.9 kg (255 lb 8.2 oz)   BMI 42.52 kg/m²   Body mass index is 42.52 kg/m².  Physical Exam  Vitals reviewed.   Constitutional:       Appearance: She is well-developed. She is obese.   HENT:      Head: Normocephalic. "   Eyes:      General: No scleral icterus.  Cardiovascular:      Rate and Rhythm: Normal rate.   Pulmonary:      Effort: Pulmonary effort is normal.   Abdominal:      General: There is no distension.   Musculoskeletal:         General: Normal range of motion.      Cervical back: Normal range of motion.   Skin:     General: Skin is dry.   Neurological:      Mental Status: She is alert and oriented to person, place, and time.         Labs: Pertinent labs reviewed.  MELD-Na score: 6 at 9/1/2021 10:14 AM  MELD score: 6 at 9/1/2021 10:14 AM  Calculated from:  Serum Creatinine: 1.0 mg/dL at 9/1/2021 10:14 AM  Serum Sodium: 139 mmol/L (Using max of 137 mmol/L) at 9/1/2021 10:14 AM  Total Bilirubin: 0.7 mg/dL (Using min of 1 mg/dL) at 9/1/2021 10:14 AM  INR(ratio): 1.0 at 9/1/2021 10:14 AM  Age: 70 years  EV: EGD 12/21 without EV  HCC: US 9/21 without lesion or mass       Assessment:  1. Liver cirrhosis secondary to YO    2. Body mass index (BMI) 45.0-49.9, adult        Recommendations:  Stable without any new complaints  - continue with MELD labs and HCC screening every 6 months  - weight loss encouraged with improved nutrition and exercise.   - repeat EGD 3 years for EV screening     Return to Clinic:    6 months with pre-clinic labs and imaging.

## 2022-03-07 ENCOUNTER — PATIENT MESSAGE (OUTPATIENT)
Dept: HEPATOLOGY | Facility: CLINIC | Age: 72
End: 2022-03-07
Payer: MEDICARE

## 2022-04-26 ENCOUNTER — PATIENT MESSAGE (OUTPATIENT)
Dept: ADMINISTRATIVE | Facility: HOSPITAL | Age: 72
End: 2022-04-26
Payer: MEDICARE

## 2022-06-29 LAB
CREATININE URINE: 82 MG/DL
HBA1C MFR BLD: 6.3 % (ref 4.2–5.8)
MICROALB/CREAT RATIO: 2140 (ref 0–30)

## 2022-07-01 LAB
LEFT EYE DM RETINOPATHY: POSITIVE
RIGHT EYE DM RETINOPATHY: POSITIVE

## 2022-08-15 ENCOUNTER — LAB VISIT (OUTPATIENT)
Dept: LAB | Facility: HOSPITAL | Age: 72
End: 2022-08-15
Attending: INTERNAL MEDICINE
Payer: MEDICARE

## 2022-08-15 ENCOUNTER — OFFICE VISIT (OUTPATIENT)
Dept: HEMATOLOGY/ONCOLOGY | Facility: CLINIC | Age: 72
End: 2022-08-15
Payer: MEDICARE

## 2022-08-15 VITALS
DIASTOLIC BLOOD PRESSURE: 61 MMHG | OXYGEN SATURATION: 97 % | SYSTOLIC BLOOD PRESSURE: 177 MMHG | BODY MASS INDEX: 42.42 KG/M2 | WEIGHT: 254.63 LBS | HEIGHT: 65 IN | TEMPERATURE: 97 F | HEART RATE: 53 BPM

## 2022-08-15 DIAGNOSIS — D69.6 THROMBOCYTOPENIA: Primary | ICD-10-CM

## 2022-08-15 DIAGNOSIS — K74.60 HEPATIC CIRRHOSIS, UNSPECIFIED HEPATIC CIRRHOSIS TYPE, UNSPECIFIED WHETHER ASCITES PRESENT: ICD-10-CM

## 2022-08-15 DIAGNOSIS — D72.819 LEUKOPENIA, UNSPECIFIED TYPE: ICD-10-CM

## 2022-08-15 DIAGNOSIS — K76.0 FATTY LIVER: ICD-10-CM

## 2022-08-15 DIAGNOSIS — D69.6 THROMBOCYTOPENIA: ICD-10-CM

## 2022-08-15 DIAGNOSIS — D64.9 ANEMIA, UNSPECIFIED TYPE: ICD-10-CM

## 2022-08-15 LAB
BASOPHILS # BLD AUTO: 0.02 K/UL (ref 0–0.2)
BASOPHILS NFR BLD: 0.4 % (ref 0–1.9)
DIFFERENTIAL METHOD: ABNORMAL
EOSINOPHIL # BLD AUTO: 0.1 K/UL (ref 0–0.5)
EOSINOPHIL NFR BLD: 2.4 % (ref 0–8)
ERYTHROCYTE [DISTWIDTH] IN BLOOD BY AUTOMATED COUNT: 12.3 % (ref 11.5–14.5)
HCT VFR BLD AUTO: 35 % (ref 37–48.5)
HGB BLD-MCNC: 11.5 G/DL (ref 12–16)
IMM GRANULOCYTES # BLD AUTO: 0.02 K/UL (ref 0–0.04)
IMM GRANULOCYTES NFR BLD AUTO: 0.4 % (ref 0–0.5)
LYMPHOCYTES # BLD AUTO: 1.3 K/UL (ref 1–4.8)
LYMPHOCYTES NFR BLD: 28.4 % (ref 18–48)
MCH RBC QN AUTO: 28.8 PG (ref 27–31)
MCHC RBC AUTO-ENTMCNC: 32.9 G/DL (ref 32–36)
MCV RBC AUTO: 88 FL (ref 82–98)
MONOCYTES # BLD AUTO: 0.3 K/UL (ref 0.3–1)
MONOCYTES NFR BLD: 7.5 % (ref 4–15)
NEUTROPHILS # BLD AUTO: 2.7 K/UL (ref 1.8–7.7)
NEUTROPHILS NFR BLD: 60.9 % (ref 38–73)
NRBC BLD-RTO: 0 /100 WBC
PLATELET # BLD AUTO: 117 K/UL (ref 150–450)
PMV BLD AUTO: 9.8 FL (ref 9.2–12.9)
RBC # BLD AUTO: 4 M/UL (ref 4–5.4)
WBC # BLD AUTO: 4.51 K/UL (ref 3.9–12.7)

## 2022-08-15 PROCEDURE — 3077F SYST BP >= 140 MM HG: CPT | Mod: CPTII,S$GLB,, | Performed by: INTERNAL MEDICINE

## 2022-08-15 PROCEDURE — 85025 COMPLETE CBC W/AUTO DIFF WBC: CPT | Performed by: INTERNAL MEDICINE

## 2022-08-15 PROCEDURE — 4010F ACE/ARB THERAPY RXD/TAKEN: CPT | Mod: CPTII,S$GLB,, | Performed by: INTERNAL MEDICINE

## 2022-08-15 PROCEDURE — 4010F PR ACE/ARB THEARPY RXD/TAKEN: ICD-10-PCS | Mod: CPTII,S$GLB,, | Performed by: INTERNAL MEDICINE

## 2022-08-15 PROCEDURE — 3288F PR FALLS RISK ASSESSMENT DOCUMENTED: ICD-10-PCS | Mod: CPTII,S$GLB,, | Performed by: INTERNAL MEDICINE

## 2022-08-15 PROCEDURE — 3008F PR BODY MASS INDEX (BMI) DOCUMENTED: ICD-10-PCS | Mod: CPTII,S$GLB,, | Performed by: INTERNAL MEDICINE

## 2022-08-15 PROCEDURE — 99214 OFFICE O/P EST MOD 30 MIN: CPT | Mod: S$GLB,,, | Performed by: INTERNAL MEDICINE

## 2022-08-15 PROCEDURE — 99214 PR OFFICE/OUTPT VISIT, EST, LEVL IV, 30-39 MIN: ICD-10-PCS | Mod: S$GLB,,, | Performed by: INTERNAL MEDICINE

## 2022-08-15 PROCEDURE — 1126F AMNT PAIN NOTED NONE PRSNT: CPT | Mod: CPTII,S$GLB,, | Performed by: INTERNAL MEDICINE

## 2022-08-15 PROCEDURE — 1159F MED LIST DOCD IN RCRD: CPT | Mod: CPTII,S$GLB,, | Performed by: INTERNAL MEDICINE

## 2022-08-15 PROCEDURE — 99999 PR PBB SHADOW E&M-EST. PATIENT-LVL IV: CPT | Mod: PBBFAC,,, | Performed by: INTERNAL MEDICINE

## 2022-08-15 PROCEDURE — 1101F PR PT FALLS ASSESS DOC 0-1 FALLS W/OUT INJ PAST YR: ICD-10-PCS | Mod: CPTII,S$GLB,, | Performed by: INTERNAL MEDICINE

## 2022-08-15 PROCEDURE — 3078F DIAST BP <80 MM HG: CPT | Mod: CPTII,S$GLB,, | Performed by: INTERNAL MEDICINE

## 2022-08-15 PROCEDURE — 1159F PR MEDICATION LIST DOCUMENTED IN MEDICAL RECORD: ICD-10-PCS | Mod: CPTII,S$GLB,, | Performed by: INTERNAL MEDICINE

## 2022-08-15 PROCEDURE — 3077F PR MOST RECENT SYSTOLIC BLOOD PRESSURE >= 140 MM HG: ICD-10-PCS | Mod: CPTII,S$GLB,, | Performed by: INTERNAL MEDICINE

## 2022-08-15 PROCEDURE — 1160F PR REVIEW ALL MEDS BY PRESCRIBER/CLIN PHARMACIST DOCUMENTED: ICD-10-PCS | Mod: CPTII,S$GLB,, | Performed by: INTERNAL MEDICINE

## 2022-08-15 PROCEDURE — 3008F BODY MASS INDEX DOCD: CPT | Mod: CPTII,S$GLB,, | Performed by: INTERNAL MEDICINE

## 2022-08-15 PROCEDURE — 1160F RVW MEDS BY RX/DR IN RCRD: CPT | Mod: CPTII,S$GLB,, | Performed by: INTERNAL MEDICINE

## 2022-08-15 PROCEDURE — 1126F PR PAIN SEVERITY QUANTIFIED, NO PAIN PRESENT: ICD-10-PCS | Mod: CPTII,S$GLB,, | Performed by: INTERNAL MEDICINE

## 2022-08-15 PROCEDURE — 3288F FALL RISK ASSESSMENT DOCD: CPT | Mod: CPTII,S$GLB,, | Performed by: INTERNAL MEDICINE

## 2022-08-15 PROCEDURE — 1101F PT FALLS ASSESS-DOCD LE1/YR: CPT | Mod: CPTII,S$GLB,, | Performed by: INTERNAL MEDICINE

## 2022-08-15 PROCEDURE — 36415 COLL VENOUS BLD VENIPUNCTURE: CPT | Performed by: INTERNAL MEDICINE

## 2022-08-15 PROCEDURE — 99999 PR PBB SHADOW E&M-EST. PATIENT-LVL IV: ICD-10-PCS | Mod: PBBFAC,,, | Performed by: INTERNAL MEDICINE

## 2022-08-15 PROCEDURE — 3078F PR MOST RECENT DIASTOLIC BLOOD PRESSURE < 80 MM HG: ICD-10-PCS | Mod: CPTII,S$GLB,, | Performed by: INTERNAL MEDICINE

## 2022-08-15 RX ORDER — DAPAGLIFLOZIN 5 MG/1
5 TABLET, FILM COATED ORAL
COMMUNITY
Start: 2022-04-05 | End: 2023-09-25 | Stop reason: SDUPTHER

## 2022-08-15 RX ORDER — SPIRONOLACTONE 25 MG/1
25 TABLET ORAL
COMMUNITY
Start: 2022-04-05 | End: 2023-09-25 | Stop reason: SDUPTHER

## 2022-08-15 NOTE — PROGRESS NOTES
Subjective:      DATE OF VISIT: 8/15/22     ?  Patient ID:?Lakshmi Campbell is a 71 y.o. female.?? MR#: [id number]   ?   REFERRING PROVIDER: No referring provider defined for this encounter.     ? Primary Care Providers:  Keely Silva, NP, NP (General)     CHIEF COMPLAINT: ?  Thrombocytopenia??   ?   HPI    No interval changes in her health.  Chronic ecchymoses, easy bruisability but no bleeding.    Review of Systems    ?   A comprehensive 14-point review of systems was reviewed with patient and was negative other than as specified above.   ?     Objective:      Physical Exam      ?   Vitals:    08/15/22 1422   BP: (!) 177/61   Pulse: (!) 53   Temp: 97.2 °F (36.2 °C)      ?   ECOG:?1   General appearance: Generally well appearing, in no acute distress.  Head, eyes, ears, nose, and throat: Pupils round and equally reactive to light. Oropharynx clear with moist mucous membranes.   Respiratory:  Normal work of breathing  Abdomen:  Obese, nontender, nondistended  Extremities: Warm, upper extremities with mild ecchymoses  Neurologic: Alert and oriented. Grossly normal strength, coordination, and gait.   Skin: No rashes, ecchymoses or petechial lesion.   ?     ?   Laboratory:  ?   Lab Visit on 08/15/2022   Component Date Value Ref Range Status    WBC 08/15/2022 4.51  3.90 - 12.70 K/uL Final    RBC 08/15/2022 4.00  4.00 - 5.40 M/uL Final    Hemoglobin 08/15/2022 11.5 (A) 12.0 - 16.0 g/dL Final    Hematocrit 08/15/2022 35.0 (A) 37.0 - 48.5 % Final    MCV 08/15/2022 88  82 - 98 fL Final    MCH 08/15/2022 28.8  27.0 - 31.0 pg Final    MCHC 08/15/2022 32.9  32.0 - 36.0 g/dL Final    RDW 08/15/2022 12.3  11.5 - 14.5 % Final    Platelets 08/15/2022 117 (A) 150 - 450 K/uL Final    MPV 08/15/2022 9.8  9.2 - 12.9 fL Final    Immature Granulocytes 08/15/2022 0.4  0.0 - 0.5 % Final    Gran # (ANC) 08/15/2022 2.7  1.8 - 7.7 K/uL Final    Immature Grans (Abs) 08/15/2022 0.02  0.00 - 0.04 K/uL Final    Lymph # 08/15/2022  1.3  1.0 - 4.8 K/uL Final    Mono # 08/15/2022 0.3  0.3 - 1.0 K/uL Final    Eos # 08/15/2022 0.1  0.0 - 0.5 K/uL Final    Baso # 08/15/2022 0.02  0.00 - 0.20 K/uL Final    nRBC 08/15/2022 0  0 /100 WBC Final    Gran % 08/15/2022 60.9  38.0 - 73.0 % Final    Lymph % 08/15/2022 28.4  18.0 - 48.0 % Final    Mono % 08/15/2022 7.5  4.0 - 15.0 % Final    Eosinophil % 08/15/2022 2.4  0.0 - 8.0 % Final    Basophil % 08/15/2022 0.4  0.0 - 1.9 % Final    Differential Method 08/15/2022 Automated   Final      Platelets   Date Value Ref Range Status   08/15/2022 117 (L) 150 - 450 K/uL Final   03/03/2022 140 (L) 150 - 450 K/uL Final   10/18/2021 96 (L) 150 - 450 K/uL Final   09/01/2021 115 (L) 150 - 450 K/uL Final   07/13/2021 107 (L) 150 - 450 K/uL Final   03/10/2021 116 (L) 150 - 350 K/uL Final   11/11/2020 165 150 - 350 K/uL Final   11/10/2020 171 150 - 350 K/uL Final   11/09/2020 160 150 - 350 K/uL Final   11/08/2020 147 (L) 150 - 350 K/uL Final   11/07/2020 135 (L) 150 - 350 K/uL Final   11/06/2020 148 (L) 150 - 350 K/uL Final   11/05/2020 115 (L) 150 - 350 K/uL Final   11/04/2020 117 (L) 150 - 350 K/uL Final   11/03/2020 146 (L) 150 - 350 K/uL Final   11/02/2020 117 (L) 150 - 350 K/uL Final   11/01/2020 109 (L) 150 - 350 K/uL Final   10/31/2020 129 (L) 150 - 350 K/uL Final   10/30/2020 110 (L) 150 - 350 K/uL Final   10/29/2020 104 (L) 150 - 350 K/uL Final       IMAGING    10/29/20  US ABDOMEN LIMITED_LIVER     CLINICAL HISTORY:  Thrombocytopenia, unspecified     TECHNIQUE:  Limited ultrasound of the right upper quadrant of the abdomen (including pancreas, liver, gallbladder, common bile duct, and right kidney) was performed.     COMPARISON:  Abdomen CT 10/11/2011     FINDINGS:  Some areas of limited visualization lead body habitus/bowel gas.     Liver: Markedly heterogeneous.  Nodular contour.  Portions of the liver more notably the right lobe difficult to visualize.  Suggestion of a couple subcentimeter  hypoechoic lesions left hepatic lobe.  Top-normal sized.     Gallbladder: Poorly visualized.     Biliary system: The common duct is not dilated, measuring 5 mm.  No intrahepatic ductal dilatation.     Pancreas: The visualized portions of pancreas appear normal.  Some areas obscured.     Right kidney: Normal in size with no hydronephrosis and measures 11.5 cm.     Miscellaneous: No ascites evident.  Spleen top normal size at 12.8 cm.     Impression:     Heterogeneous liver with suspected cirrhosis.  Couple of possible subcentimeter hypoechoic lesions left hepatic lobe.  No ascites.  Spleen top normal size.     Further evaluation with a dedicated pre and post-contrast abdomen CT recommended with liver protocol to include arterial phase of enhancement.       ?   Assessment/Plan:   Thrombocytopenia  -     CBC W/ AUTO DIFFERENTIAL; Future; Expected date: 02/15/2023    Fatty liver    Hepatic cirrhosis, unspecified hepatic cirrhosis type, unspecified whether ascites present    Leukopenia, unspecified type  -     CBC W/ AUTO DIFFERENTIAL; Future; Expected date: 02/15/2023    Anemia, unspecified type  -     CBC W/ AUTO DIFFERENTIAL; Future; Expected date: 02/15/2023       1. Thrombocytopenia    2. Fatty liver    3. Hepatic cirrhosis, unspecified hepatic cirrhosis type, unspecified whether ascites present    4. Leukopenia, unspecified type    5. Anemia, unspecified type          Plan:     # thrombocytopenia:  Likely secondary to hepatosplenomegaly, cirrhosis, recommend follow-up with hepatology.  Labs with relative stability.  No concerning bleeding.  Continue follow-up with hepatology and primary care.    # calcification in left breast:  Repeat mammogram with left breast lower central calcifications recommended biopsy 08/04/2021 returned without atypia or malignancy.  Recommend continued follow-up with surveillance/surgery.          Follow-Up:   Patient Instructions   RV in 6 mo labs cbc, prior, npok

## 2022-09-08 ENCOUNTER — OFFICE VISIT (OUTPATIENT)
Dept: HEPATOLOGY | Facility: CLINIC | Age: 72
End: 2022-09-08
Payer: MEDICARE

## 2022-09-08 ENCOUNTER — PATIENT MESSAGE (OUTPATIENT)
Dept: HEPATOLOGY | Facility: CLINIC | Age: 72
End: 2022-09-08

## 2022-09-08 ENCOUNTER — HOSPITAL ENCOUNTER (OUTPATIENT)
Dept: RADIOLOGY | Facility: HOSPITAL | Age: 72
Discharge: HOME | End: 2022-09-08
Attending: NURSE PRACTITIONER
Payer: MEDICARE

## 2022-09-08 VITALS
SYSTOLIC BLOOD PRESSURE: 118 MMHG | HEIGHT: 65 IN | DIASTOLIC BLOOD PRESSURE: 70 MMHG | HEART RATE: 66 BPM | BODY MASS INDEX: 40.96 KG/M2 | WEIGHT: 245.81 LBS

## 2022-09-08 DIAGNOSIS — K74.60 LIVER CIRRHOSIS SECONDARY TO NASH: ICD-10-CM

## 2022-09-08 DIAGNOSIS — R80.9 TYPE 2 DIABETES MELLITUS WITH MICROALBUMINURIA, WITH LONG-TERM CURRENT USE OF INSULIN: ICD-10-CM

## 2022-09-08 DIAGNOSIS — K74.60 LIVER CIRRHOSIS SECONDARY TO NASH: Primary | ICD-10-CM

## 2022-09-08 DIAGNOSIS — E78.5 HYPERLIPIDEMIA, UNSPECIFIED HYPERLIPIDEMIA TYPE: ICD-10-CM

## 2022-09-08 DIAGNOSIS — K75.81 LIVER CIRRHOSIS SECONDARY TO NASH: Primary | ICD-10-CM

## 2022-09-08 DIAGNOSIS — E11.29 TYPE 2 DIABETES MELLITUS WITH MICROALBUMINURIA, WITH LONG-TERM CURRENT USE OF INSULIN: ICD-10-CM

## 2022-09-08 DIAGNOSIS — K75.81 LIVER CIRRHOSIS SECONDARY TO NASH: ICD-10-CM

## 2022-09-08 DIAGNOSIS — I10 ESSENTIAL HYPERTENSION: ICD-10-CM

## 2022-09-08 DIAGNOSIS — Z79.4 TYPE 2 DIABETES MELLITUS WITH MICROALBUMINURIA, WITH LONG-TERM CURRENT USE OF INSULIN: ICD-10-CM

## 2022-09-08 PROCEDURE — 3288F PR FALLS RISK ASSESSMENT DOCUMENTED: ICD-10-PCS | Mod: CPTII,S$GLB,, | Performed by: NURSE PRACTITIONER

## 2022-09-08 PROCEDURE — 3078F PR MOST RECENT DIASTOLIC BLOOD PRESSURE < 80 MM HG: ICD-10-PCS | Mod: CPTII,S$GLB,, | Performed by: NURSE PRACTITIONER

## 2022-09-08 PROCEDURE — 1126F PR PAIN SEVERITY QUANTIFIED, NO PAIN PRESENT: ICD-10-PCS | Mod: CPTII,S$GLB,, | Performed by: NURSE PRACTITIONER

## 2022-09-08 PROCEDURE — 1159F MED LIST DOCD IN RCRD: CPT | Mod: CPTII,S$GLB,, | Performed by: NURSE PRACTITIONER

## 2022-09-08 PROCEDURE — 99214 OFFICE O/P EST MOD 30 MIN: CPT | Mod: S$GLB,,, | Performed by: NURSE PRACTITIONER

## 2022-09-08 PROCEDURE — 99999 PR PBB SHADOW E&M-EST. PATIENT-LVL IV: ICD-10-PCS | Mod: PBBFAC,,, | Performed by: NURSE PRACTITIONER

## 2022-09-08 PROCEDURE — 3008F PR BODY MASS INDEX (BMI) DOCUMENTED: ICD-10-PCS | Mod: CPTII,S$GLB,, | Performed by: NURSE PRACTITIONER

## 2022-09-08 PROCEDURE — 3074F SYST BP LT 130 MM HG: CPT | Mod: CPTII,S$GLB,, | Performed by: NURSE PRACTITIONER

## 2022-09-08 PROCEDURE — 3074F PR MOST RECENT SYSTOLIC BLOOD PRESSURE < 130 MM HG: ICD-10-PCS | Mod: CPTII,S$GLB,, | Performed by: NURSE PRACTITIONER

## 2022-09-08 PROCEDURE — 4010F ACE/ARB THERAPY RXD/TAKEN: CPT | Mod: CPTII,S$GLB,, | Performed by: NURSE PRACTITIONER

## 2022-09-08 PROCEDURE — 3078F DIAST BP <80 MM HG: CPT | Mod: CPTII,S$GLB,, | Performed by: NURSE PRACTITIONER

## 2022-09-08 PROCEDURE — 1101F PR PT FALLS ASSESS DOC 0-1 FALLS W/OUT INJ PAST YR: ICD-10-PCS | Mod: CPTII,S$GLB,, | Performed by: NURSE PRACTITIONER

## 2022-09-08 PROCEDURE — 3288F FALL RISK ASSESSMENT DOCD: CPT | Mod: CPTII,S$GLB,, | Performed by: NURSE PRACTITIONER

## 2022-09-08 PROCEDURE — 1101F PT FALLS ASSESS-DOCD LE1/YR: CPT | Mod: CPTII,S$GLB,, | Performed by: NURSE PRACTITIONER

## 2022-09-08 PROCEDURE — 1159F PR MEDICATION LIST DOCUMENTED IN MEDICAL RECORD: ICD-10-PCS | Mod: CPTII,S$GLB,, | Performed by: NURSE PRACTITIONER

## 2022-09-08 PROCEDURE — 3008F BODY MASS INDEX DOCD: CPT | Mod: CPTII,S$GLB,, | Performed by: NURSE PRACTITIONER

## 2022-09-08 PROCEDURE — 4010F PR ACE/ARB THEARPY RXD/TAKEN: ICD-10-PCS | Mod: CPTII,S$GLB,, | Performed by: NURSE PRACTITIONER

## 2022-09-08 PROCEDURE — 1126F AMNT PAIN NOTED NONE PRSNT: CPT | Mod: CPTII,S$GLB,, | Performed by: NURSE PRACTITIONER

## 2022-09-08 PROCEDURE — 76705 ECHO EXAM OF ABDOMEN: CPT | Mod: 26,,, | Performed by: RADIOLOGY

## 2022-09-08 PROCEDURE — 99214 PR OFFICE/OUTPT VISIT, EST, LEVL IV, 30-39 MIN: ICD-10-PCS | Mod: S$GLB,,, | Performed by: NURSE PRACTITIONER

## 2022-09-08 PROCEDURE — 99999 PR PBB SHADOW E&M-EST. PATIENT-LVL IV: CPT | Mod: PBBFAC,,, | Performed by: NURSE PRACTITIONER

## 2022-09-08 PROCEDURE — 76705 US ABDOMEN LIMITED: ICD-10-PCS | Mod: 26,,, | Performed by: RADIOLOGY

## 2022-09-08 PROCEDURE — 76705 ECHO EXAM OF ABDOMEN: CPT | Mod: TC

## 2022-09-08 NOTE — PROGRESS NOTES
Clinic Follow Up:  [facility] Follow Up Note    Reason for Follow Up:  The primary encounter diagnosis was Liver cirrhosis secondary to YO. Diagnoses of Type 2 diabetes mellitus with microalbuminuria, with long-term current use of insulin, Essential hypertension, and Hyperlipidemia, unspecified hyperlipidemia type were also pertinent to this visit.    PCP: Keely Silva       HPI:  This is a 71 y.o. female here for follow up of the above  Pt states that she has been feeling overall stable without any decompensating events  Labs and US today are pending results  No upper GI bleeding.  No ascites or BLE.  No overt confusion.      Review of Systems   Constitutional:  Negative for chills, fever, malaise/fatigue and weight loss.   Respiratory:  Negative for cough.    Cardiovascular:  Negative for chest pain.   Gastrointestinal:         Per HPI   Musculoskeletal:  Negative for myalgias.   Skin:  Negative for itching and rash.   Neurological:  Negative for headaches.   Psychiatric/Behavioral:  The patient is not nervous/anxious.      Medical History:  Past Medical History:   Diagnosis Date    Cataract     Diabetes mellitus type I     Hyperlipidemia     Hypertension     Morbid obesity     Right knee pain     Thyroid disease        Surgical History:   Past Surgical History:   Procedure Laterality Date    APPENDECTOMY      BREAST BIOPSY      CATARACT EXTRACTION      COLONOSCOPY N/A 12/21/2021    Procedure: COLONOSCOPY screening;  Surgeon: Moises Patterson MD;  Location: [facility];  Service: Endoscopy;  Laterality: N/A;    ESOPHAGOGASTRODUODENOSCOPY N/A 12/21/2021    Procedure: EGD (ESOPHAGOGASTRODUODENOSCOPY) EV screening;  Surgeon: Moises Patterson MD;  Location: [facility];  Service: Endoscopy;  Laterality: N/A;    EYE SURGERY      HERNIA REPAIR      OPEN REDUCTION AND INTERNAL FIXATION (ORIF) OF FRACTURE OF DISTAL RADIUS Left 11/2/2020    Procedure: ORIF, FRACTURE, RADIUS, DISTAL;  Surgeon:  Sage Wolf MD;  Location: [facility] OR;  Service: Orthopedics;  Laterality: Left;    OPEN REDUCTION AND INTERNAL FIXATION (ORIF) OF FRACTURE OF PATELLA Right 2020    Procedure: ORIF, FRACTURE, PATELLA;  Surgeon: Sage Wolf MD;  Location: [facility] OR;  Service: Orthopedics;  Laterality: Right;    OPEN REDUCTION AND INTERNAL FIXATION (ORIF) OF FRACTURE OF PATELLA Right 2020    Procedure: ORIF, FRACTURE, PATELLA;  Surgeon: Sage Wolf MD;  Location: [facility] OR;  Service: Orthopedics;  Laterality: Right;    ORIF HUMERUS FRACTURE Left 2020    Procedure: ORIF, FRACTURE, HUMERUS;  Surgeon: Sage Wolf MD;  Location: [facility] OR;  Service: Orthopedics;  Laterality: Left;    PLACEMENT OF ACELLULAR HUMAN DERMAL ALLOGRAFT Right 2020    Procedure: APPLICATION, ACELLULAR HUMAN DERMAL ALLOGRAFT;  Surgeon: Sage Wolf MD;  Location: [facility] OR;  Service: Orthopedics;  Laterality: Right;  Right Patella    REMOVAL OF HARDWARE FROM HAND Left 3/11/2021    Procedure: REMOVAL, HARDWARE, HAND;  Surgeon: Sage Wolf MD;  Location: [facility] OR;  Service: Orthopedics;  Laterality: Left;  Removal of dorsal spanning wrist plate left distal radius    REMOVAL OF HARDWARE FROM LOWER EXTREMITY Right 2020    Procedure: REMOVAL, HARDWARE, LOWER EXTREMITY;  Surgeon: Sage Wolf MD;  Location: [facility];  Service: Orthopedics;  Laterality: Right;       Family History:   Family History   Problem Relation Age of Onset    Diabetes Mother     Leukemia Father     Diabetes Father        Social History:   Social History     Tobacco Use    Smoking status: Former     Packs/day: 1.00     Years: 8.00     Pack years: 8.00     Types: Cigarettes     Quit date:      Years since quittin.7    Smokeless tobacco: Never   Substance Use Topics    Alcohol use: Yes    Drug use: No       Allergies: Reviewed    Home Medications:  Current Outpatient Medications on File  "Prior to Visit   Medication Sig Dispense Refill    amLODIPine (NORVASC) 10 MG tablet TAKE 1 TABLET(10 MG) BY MOUTH EVERY DAY 90 tablet 1    blood sugar diagnostic Strp Use ac bid      dapagliflozin (FARXIGA) 5 mg Tab tablet Take 5 mg by mouth.      hydrochlorothiazide (HYDRODIURIL) 25 MG tablet Take 25 mg by mouth once daily.       insulin syringe-needle U-100 1 mL 29 gauge x 1/2" Syrg Use bid      lancets 33 gauge Misc Use as BID      levothyroxine (SYNTHROID, LEVOTHROID) 175 MCG tablet TAKE 1 TABLET BY MOUTH DAILY      metformin (GLUCOPHAGE) 1000 MG tablet 1,000 mg 2 (two) times daily with meals.       perindopril erbumine (ACEON) 4 mg tablet Take 4 mg by mouth once daily.       pravastatin (PRAVACHOL) 20 MG tablet Take 20 mg by mouth once daily.   11    spironolactone (ALDACTONE) 25 MG tablet Take 25 mg by mouth.      blood-glucose meter kit Use as instructed      insulin NPH-insulin regular, 70/30, (NOVOLIN 70/30) 100 unit/mL (70-30) injection Inject 75 units sc ac supper.       Current Facility-Administered Medications on File Prior to Visit   Medication Dose Route Frequency Provider Last Rate Last Admin    diphenhydrAMINE injection 25 mg  25 mg Intravenous Once PRN Fito Medrano MD        EPINEPHrine (EPIPEN) 0.3 mg/0.3 mL pen injection 0.3 mg  0.3 mg Intramuscular PRN Fito Medrano MD        methylPREDNISolone sodium succinate injection 40 mg  40 mg Intravenous Once PRN Fito Medrano MD        sodium chloride 0.9% 500 mL flush bag   Intravenous PRN Fito Medrano MD        sodium chloride 0.9% flush 10 mL  10 mL Intravenous PRN Fito Medrano MD           Physical Exam:  Vital Signs:  /70 (BP Location: Left arm, Patient Position: Sitting, BP Method: Large (Manual))   Pulse 66   Ht 5' 5" (1.651 m)   Wt 111.5 kg (245 lb 13 oz)   BMI 40.91 kg/m²   Body mass index is 40.91 kg/m².  Physical Exam  Vitals reviewed.   Constitutional:       Appearance: She is well-developed. She is obese. "   HENT:      Head: Normocephalic.   Eyes:      General: No scleral icterus.  Cardiovascular:      Rate and Rhythm: Normal rate.   Pulmonary:      Effort: Pulmonary effort is normal.   Abdominal:      General: There is no distension.   Musculoskeletal:         General: Normal range of motion.      Cervical back: Normal range of motion.   Skin:     General: Skin is dry.   Neurological:      Mental Status: She is alert and oriented to person, place, and time.       Labs: Pertinent labs reviewed.  MELD-Na score: 6 at 3/3/2022  9:57 AM  MELD score: 6 at 3/3/2022  9:57 AM  Calculated from:  Serum Creatinine: 1.0 mg/dL at 3/3/2022  9:54 AM  Serum Sodium: 142 mmol/L (Using max of 137 mmol/L) at 3/3/2022  9:54 AM  Total Bilirubin: 0.5 mg/dL (Using min of 1 mg/dL) at 3/3/2022  9:54 AM  INR(ratio): 0.9 (Using min of 1) at 3/3/2022  9:57 AM  Age: 71 years  EV: EGD 2021 without EV, repeat 3 years  HCC: US pending today    Assessment:  1. Liver cirrhosis secondary to YO    2. Type 2 diabetes mellitus with microalbuminuria, with long-term current use of insulin    3. Essential hypertension    4. Hyperlipidemia, unspecified hyperlipidemia type        Recommendations:  Stable without any decompensating events  - labs and US pending.  Will update pt with results when available  - continue with MELD labs and HCC screening every 6 months  - repeat EGD 2024 for EV screening.   - continue POC per PCP for other underlying chronic disease.     Return to Clinic:  6 months with pre-clinic labs and imaging.

## 2022-10-12 ENCOUNTER — PATIENT OUTREACH (OUTPATIENT)
Dept: ADMINISTRATIVE | Facility: HOSPITAL | Age: 72
End: 2022-10-12
Payer: MEDICARE

## 2022-10-12 ENCOUNTER — OFFICE VISIT (OUTPATIENT)
Dept: INTERNAL MEDICINE | Facility: CLINIC | Age: 72
End: 2022-10-12
Payer: MEDICARE

## 2022-10-12 VITALS
BODY MASS INDEX: 41.03 KG/M2 | SYSTOLIC BLOOD PRESSURE: 114 MMHG | RESPIRATION RATE: 16 BRPM | OXYGEN SATURATION: 99 % | DIASTOLIC BLOOD PRESSURE: 62 MMHG | WEIGHT: 246.25 LBS | HEART RATE: 97 BPM | HEIGHT: 65 IN | TEMPERATURE: 98 F

## 2022-10-12 DIAGNOSIS — R80.9 MICROALBUMINURIA: ICD-10-CM

## 2022-10-12 DIAGNOSIS — E66.01 MORBID OBESITY WITH BMI OF 40.0-44.9, ADULT: ICD-10-CM

## 2022-10-12 DIAGNOSIS — R01.1 HEART MURMUR: ICD-10-CM

## 2022-10-12 DIAGNOSIS — I10 ESSENTIAL HYPERTENSION: Primary | ICD-10-CM

## 2022-10-12 DIAGNOSIS — E11.3293 MILD NONPROLIFERATIVE DIABETIC RETINOPATHY OF BOTH EYES WITHOUT MACULAR EDEMA ASSOCIATED WITH TYPE 2 DIABETES MELLITUS: ICD-10-CM

## 2022-10-12 DIAGNOSIS — Z79.4 TYPE 2 DIABETES MELLITUS WITH MICROALBUMINURIA, WITH LONG-TERM CURRENT USE OF INSULIN: ICD-10-CM

## 2022-10-12 DIAGNOSIS — E11.29 TYPE 2 DIABETES MELLITUS WITH MICROALBUMINURIA, WITH LONG-TERM CURRENT USE OF INSULIN: ICD-10-CM

## 2022-10-12 DIAGNOSIS — D69.6 THROMBOCYTOPENIA: ICD-10-CM

## 2022-10-12 DIAGNOSIS — E03.9 HYPOTHYROIDISM, UNSPECIFIED TYPE: ICD-10-CM

## 2022-10-12 DIAGNOSIS — K74.60 HEPATIC CIRRHOSIS, UNSPECIFIED HEPATIC CIRRHOSIS TYPE, UNSPECIFIED WHETHER ASCITES PRESENT: ICD-10-CM

## 2022-10-12 DIAGNOSIS — N18.32 STAGE 3B CHRONIC KIDNEY DISEASE: ICD-10-CM

## 2022-10-12 DIAGNOSIS — E78.5 HYPERLIPIDEMIA, UNSPECIFIED HYPERLIPIDEMIA TYPE: ICD-10-CM

## 2022-10-12 DIAGNOSIS — R80.9 TYPE 2 DIABETES MELLITUS WITH MICROALBUMINURIA, WITH LONG-TERM CURRENT USE OF INSULIN: ICD-10-CM

## 2022-10-12 PROBLEM — S42.212A CLOSED FRACTURE OF SURGICAL NECK OF LEFT HUMERUS: Status: RESOLVED | Noted: 2020-10-30 | Resolved: 2022-10-12

## 2022-10-12 PROBLEM — S82.041A CLOSED DISPLACED COMMINUTED FRACTURE OF RIGHT PATELLA: Status: RESOLVED | Noted: 2020-10-28 | Resolved: 2022-10-12

## 2022-10-12 PROBLEM — S52.92XA CLOSED LEFT RADIAL FRACTURE: Status: RESOLVED | Noted: 2020-10-30 | Resolved: 2022-10-12

## 2022-10-12 PROCEDURE — 3074F SYST BP LT 130 MM HG: CPT | Mod: CPTII,S$GLB,, | Performed by: NURSE PRACTITIONER

## 2022-10-12 PROCEDURE — 1101F PT FALLS ASSESS-DOCD LE1/YR: CPT | Mod: CPTII,S$GLB,, | Performed by: NURSE PRACTITIONER

## 2022-10-12 PROCEDURE — 99214 PR OFFICE/OUTPT VISIT, EST, LEVL IV, 30-39 MIN: ICD-10-PCS | Mod: S$GLB,,, | Performed by: NURSE PRACTITIONER

## 2022-10-12 PROCEDURE — 3288F FALL RISK ASSESSMENT DOCD: CPT | Mod: CPTII,S$GLB,, | Performed by: NURSE PRACTITIONER

## 2022-10-12 PROCEDURE — 90694 VACC AIIV4 NO PRSRV 0.5ML IM: CPT | Mod: S$GLB,,, | Performed by: NURSE PRACTITIONER

## 2022-10-12 PROCEDURE — 1126F PR PAIN SEVERITY QUANTIFIED, NO PAIN PRESENT: ICD-10-PCS | Mod: CPTII,S$GLB,, | Performed by: NURSE PRACTITIONER

## 2022-10-12 PROCEDURE — 3044F PR MOST RECENT HEMOGLOBIN A1C LEVEL <7.0%: ICD-10-PCS | Mod: CPTII,S$GLB,, | Performed by: NURSE PRACTITIONER

## 2022-10-12 PROCEDURE — G0008 FLU VACCINE - QUADRIVALENT - ADJUVANTED: ICD-10-PCS | Mod: S$GLB,,, | Performed by: NURSE PRACTITIONER

## 2022-10-12 PROCEDURE — 3044F HG A1C LEVEL LT 7.0%: CPT | Mod: CPTII,S$GLB,, | Performed by: NURSE PRACTITIONER

## 2022-10-12 PROCEDURE — 1159F PR MEDICATION LIST DOCUMENTED IN MEDICAL RECORD: ICD-10-PCS | Mod: CPTII,S$GLB,, | Performed by: NURSE PRACTITIONER

## 2022-10-12 PROCEDURE — 99999 PR PBB SHADOW E&M-EST. PATIENT-LVL V: ICD-10-PCS | Mod: PBBFAC,,, | Performed by: NURSE PRACTITIONER

## 2022-10-12 PROCEDURE — 1159F MED LIST DOCD IN RCRD: CPT | Mod: CPTII,S$GLB,, | Performed by: NURSE PRACTITIONER

## 2022-10-12 PROCEDURE — 3074F PR MOST RECENT SYSTOLIC BLOOD PRESSURE < 130 MM HG: ICD-10-PCS | Mod: CPTII,S$GLB,, | Performed by: NURSE PRACTITIONER

## 2022-10-12 PROCEDURE — 4010F PR ACE/ARB THEARPY RXD/TAKEN: ICD-10-PCS | Mod: CPTII,S$GLB,, | Performed by: NURSE PRACTITIONER

## 2022-10-12 PROCEDURE — 1160F PR REVIEW ALL MEDS BY PRESCRIBER/CLIN PHARMACIST DOCUMENTED: ICD-10-PCS | Mod: CPTII,S$GLB,, | Performed by: NURSE PRACTITIONER

## 2022-10-12 PROCEDURE — 3078F DIAST BP <80 MM HG: CPT | Mod: CPTII,S$GLB,, | Performed by: NURSE PRACTITIONER

## 2022-10-12 PROCEDURE — 3288F PR FALLS RISK ASSESSMENT DOCUMENTED: ICD-10-PCS | Mod: CPTII,S$GLB,, | Performed by: NURSE PRACTITIONER

## 2022-10-12 PROCEDURE — 90694 FLU VACCINE - QUADRIVALENT - ADJUVANTED: ICD-10-PCS | Mod: S$GLB,,, | Performed by: NURSE PRACTITIONER

## 2022-10-12 PROCEDURE — 1126F AMNT PAIN NOTED NONE PRSNT: CPT | Mod: CPTII,S$GLB,, | Performed by: NURSE PRACTITIONER

## 2022-10-12 PROCEDURE — 1160F RVW MEDS BY RX/DR IN RCRD: CPT | Mod: CPTII,S$GLB,, | Performed by: NURSE PRACTITIONER

## 2022-10-12 PROCEDURE — 3078F PR MOST RECENT DIASTOLIC BLOOD PRESSURE < 80 MM HG: ICD-10-PCS | Mod: CPTII,S$GLB,, | Performed by: NURSE PRACTITIONER

## 2022-10-12 PROCEDURE — 1101F PR PT FALLS ASSESS DOC 0-1 FALLS W/OUT INJ PAST YR: ICD-10-PCS | Mod: CPTII,S$GLB,, | Performed by: NURSE PRACTITIONER

## 2022-10-12 PROCEDURE — 4010F ACE/ARB THERAPY RXD/TAKEN: CPT | Mod: CPTII,S$GLB,, | Performed by: NURSE PRACTITIONER

## 2022-10-12 PROCEDURE — 99999 PR PBB SHADOW E&M-EST. PATIENT-LVL V: CPT | Mod: PBBFAC,,, | Performed by: NURSE PRACTITIONER

## 2022-10-12 PROCEDURE — G0008 ADMIN INFLUENZA VIRUS VAC: HCPCS | Mod: S$GLB,,, | Performed by: NURSE PRACTITIONER

## 2022-10-12 PROCEDURE — 99214 OFFICE O/P EST MOD 30 MIN: CPT | Mod: S$GLB,,, | Performed by: NURSE PRACTITIONER

## 2022-10-12 NOTE — ASSESSMENT & PLAN NOTE
Followed by nephrology. Most recent diabetic urine screening showed significant microalbuminuria.

## 2022-10-12 NOTE — PROGRESS NOTES
Subjective:       Patient ID: Lakshmi Campbell is a 71 y.o. female.    Chief Complaint: Annual Exam    Mrs. Campbell presents to visit for routine DM follow up. Labs reviewed from recent endo appt, and recent hepatology appt.  Also continues to follow up with Dr. Estrada, nephrology. Foot exam today.     Diabetes Management Status    Statin: Taking  ACE/ARB: Taking    Screening or Prevention Patient's value Goal Complete/Controlled?   HgA1C Testing and Control   Lab Results   Component Value Date    HGBA1C 6.3 (H) 06/29/2022      Annually/Less than 8% Yes     Lipid profile : 06/29/2022 Annually No     LDL control Lab Results   Component Value Date    LDLCALC 55 10/14/2019    Annually/Less than 100 mg/dl  No     Nephropathy screening Lab Results   Component Value Date    LABMICR 33 03/05/2012     Lab Results   Component Value Date    PROTEINUA 3+ (A) 10/30/2020     No results found for: UTPCR   Annually No     Blood pressure BP Readings from Last 1 Encounters:   10/12/22 114/62    Less than 140/90 Yes     Dilated retinal exam : 12/23/2021 Annually No     Foot exam   : 10/12/2022 Annually Yes         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 with type 2 diabetes mellitus       Family History   Problem Relation Age of Onset    Diabetes Mother     Leukemia Father     Diabetes Father      Past Surgical History:   Procedure Laterality Date    APPENDECTOMY      BREAST BIOPSY      CATARACT EXTRACTION      COLONOSCOPY N/A 12/21/2021     Procedure: COLONOSCOPY screening;  Surgeon: Moises Patterson MD;  Location: [facility];  Service: Endoscopy;  Laterality: N/A;    ESOPHAGOGASTRODUODENOSCOPY N/A 12/21/2021    Procedure: EGD (ESOPHAGOGASTRODUODENOSCOPY) EV screening;  Surgeon: Moises Patterson MD;  Location: [facility] ENDO;  Service: Endoscopy;  Laterality: N/A;    EYE SURGERY      HERNIA REPAIR      OPEN REDUCTION AND INTERNAL FIXATION (ORIF) OF FRACTURE OF DISTAL RADIUS Left 11/2/2020    Procedure: ORIF, FRACTURE, RADIUS, DISTAL;  Surgeon: Sage Wolf MD;  Location: [facility];  Service: Orthopedics;  Laterality: Left;    OPEN REDUCTION AND INTERNAL FIXATION (ORIF) OF FRACTURE OF PATELLA Right 11/2/2020    Procedure: ORIF, FRACTURE, PATELLA;  Surgeon: Sage Wolf MD;  Location: [facility];  Service: Orthopedics;  Laterality: Right;    OPEN REDUCTION AND INTERNAL FIXATION (ORIF) OF FRACTURE OF PATELLA Right 12/18/2020    Procedure: ORIF, FRACTURE, PATELLA;  Surgeon: Sage Wolf MD;  Location: [facility] OR;  Service: Orthopedics;  Laterality: Right;    ORIF HUMERUS FRACTURE Left 11/5/2020    Procedure: ORIF, FRACTURE, HUMERUS;  Surgeon: Sage Wolf MD;  Location: [facility] OR;  Service: Orthopedics;  Laterality: Left;    PLACEMENT OF ACELLULAR HUMAN DERMAL ALLOGRAFT Right 11/2/2020    Procedure: APPLICATION, ACELLULAR HUMAN DERMAL ALLOGRAFT;  Surgeon: Sage Wolf MD;  Location: [facility] OR;  Service: Orthopedics;  Laterality: Right;  Right Patella    REMOVAL OF HARDWARE FROM HAND Left 3/11/2021    Procedure: REMOVAL, HARDWARE, HAND;  Surgeon: Sage Wolf MD;  Location: [facility] OR;  Service: Orthopedics;  Laterality: Left;  Removal of dorsal spanning wrist plate left distal radius    REMOVAL OF HARDWARE FROM LOWER EXTREMITY Right 12/18/2020    Procedure: REMOVAL, HARDWARE, LOWER EXTREMITY;  Surgeon: Sage Wolf MD;  Location: [facility] OR;  Service: Orthopedics;  Laterality: Right;  "        Current Outpatient Medications:     amLODIPine (NORVASC) 10 MG tablet, TAKE 1 TABLET(10 MG) BY MOUTH EVERY DAY, Disp: 90 tablet, Rfl: 1    blood sugar diagnostic Strp, Use ac bid, Disp: , Rfl:     dapagliflozin (FARXIGA) 5 mg Tab tablet, Take 5 mg by mouth., Disp: , Rfl:     hydrochlorothiazide (HYDRODIURIL) 25 MG tablet, Take 25 mg by mouth once daily. , Disp: , Rfl:     insulin syringe-needle U-100 1 mL 29 gauge x 1/2" Syrg, Use bid, Disp: , Rfl:     lancets 33 gauge Misc, Use as BID, Disp: , Rfl:     levothyroxine (SYNTHROID, LEVOTHROID) 175 MCG tablet, TAKE 1 TABLET BY MOUTH DAILY, Disp: , Rfl:     metformin (GLUCOPHAGE) 1000 MG tablet, 1,000 mg 2 (two) times daily with meals. , Disp: , Rfl:     perindopril erbumine (ACEON) 4 mg tablet, Take 4 mg by mouth once daily. , Disp: , Rfl:     pravastatin (PRAVACHOL) 20 MG tablet, Take 20 mg by mouth once daily. , Disp: , Rfl: 11    spironolactone (ALDACTONE) 25 MG tablet, Take 25 mg by mouth., Disp: , Rfl:     blood-glucose meter kit, Use as instructed, Disp: , Rfl:     insulin NPH-insulin regular, 70/30, (NOVOLIN 70/30) 100 unit/mL (70-30) injection, Inject 75 units sc ac supper., Disp: , Rfl:     Review of Systems   Constitutional:  Negative for activity change, appetite change, chills, fatigue, fever and unexpected weight change.   HENT:  Negative for hearing loss, rhinorrhea and trouble swallowing.    Eyes:  Negative for discharge and visual disturbance.   Respiratory:  Negative for chest tightness, shortness of breath and wheezing.    Cardiovascular:  Negative for chest pain and palpitations.   Gastrointestinal:  Negative for abdominal pain, blood in stool, constipation, diarrhea, nausea and vomiting.   Endocrine: Negative for polydipsia and polyuria.   Genitourinary:  Negative for difficulty urinating, dysuria, frequency, hematuria and menstrual problem.   Musculoskeletal:  Negative for arthralgias, joint swelling and neck pain.   Neurological:  Negative " "for dizziness, weakness, light-headedness and headaches.   Psychiatric/Behavioral:  Negative for confusion and dysphoric mood.      Objective:   /62   Pulse 97   Temp 98.2 °F (36.8 °C) (Temporal)   Resp 16   Ht 5' 5" (1.651 m)   Wt 111.7 kg (246 lb 4.1 oz)   SpO2 99%   BMI 40.98 kg/m²      Physical Exam  Constitutional:       General: She is not in acute distress.     Appearance: Normal appearance. She is obese. She is not ill-appearing.   Cardiovascular:      Rate and Rhythm: Normal rate and regular rhythm.      Pulses:           Dorsalis pedis pulses are 2+ on the right side and 2+ on the left side.      Heart sounds: Murmur heard.   Pulmonary:      Effort: Pulmonary effort is normal. No respiratory distress.      Breath sounds: Normal breath sounds. No wheezing.   Abdominal:      Palpations: Abdomen is soft.      Tenderness: There is no abdominal tenderness.   Feet:      Right foot:      Protective Sensation: 8 sites tested.  8 sites sensed.      Skin integrity: Dry skin present. No ulcer or erythema.      Toenail Condition: Right toenails are normal.      Left foot:      Protective Sensation: 8 sites tested.  8 sites sensed.      Skin integrity: Dry skin present. No ulcer or erythema.      Toenail Condition: Left toenails are normal.   Skin:     General: Skin is warm and dry.      Coloration: Skin is not pale.      Findings: No erythema.   Neurological:      Mental Status: She is alert and oriented to person, place, and time.   Psychiatric:         Mood and Affect: Mood normal.         Behavior: Behavior normal.       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 Continue current medication.          Hyperlipidemia    Current " "Assessment & Plan     Labs reviewed from Dr. Reynolds. Stable on statin.             Renal/    Microalbuminuria    Current Assessment & Plan     Followed by nephrology. Most recent diabetic urine screening showed significant microalbuminuria.          Stage 3b chronic kidney disease    Current Assessment & Plan     Most recent GFR 44. Followed by endo and nephro.            Hematology    Thrombocytopenia    Current Assessment & Plan     Stable. Most recent platelets 117. Followed by hem/onc.             Endocrine    Type 2 diabetes mellitus with microalbuminuria, with long-term current use of insulin    Current Assessment & Plan     Labs reviewed from maryann. Kale.          Hypothyroidism    Current Assessment & Plan     Followed by Dr. Reynolds.          Morbid obesity with BMI of 40.0-44.9, adult    Current Assessment & Plan     Counseled on importance of diet and exercise in order to improve overall quality of life, and reduce risk of future comorbidities.              GI    Hepatic cirrhosis    Current Assessment & Plan     Followed by hepatology.           Other Visit Diagnoses       Heart murmur        Relevant Orders    Ambulatory referral/consult to Cardiology        Murmur noted on exam. Refer to cardio since she has not seen in past.     Follow up in about 6 months (around 4/12/2023) for diabetes. .            Portions of this note may have been created with voice recognition software. Occasional "wrong-word" or "sound-a-like" substitutions may have occurred due to the inherent limitations of voice recognition software. Please, read the note carefully and recognize, using context, where substitutions have occurred.         "

## 2023-01-10 ENCOUNTER — PATIENT OUTREACH (OUTPATIENT)
Dept: ADMINISTRATIVE | Facility: HOSPITAL | Age: 73
End: 2023-01-10
Payer: MEDICARE

## 2023-01-10 NOTE — PROGRESS NOTES
BR DM Eye exam:  Attempted to contact pt in regards to DM eye exam, no ans, lvm, last exam on file 12/23/21.

## 2023-01-11 DIAGNOSIS — E11.9 TYPE 2 DIABETES MELLITUS WITHOUT COMPLICATION: ICD-10-CM

## 2023-01-25 ENCOUNTER — PATIENT MESSAGE (OUTPATIENT)
Dept: ADMINISTRATIVE | Facility: HOSPITAL | Age: 73
End: 2023-01-25
Payer: MEDICARE

## 2023-02-13 ENCOUNTER — PATIENT OUTREACH (OUTPATIENT)
Dept: ADMINISTRATIVE | Facility: HOSPITAL | Age: 73
End: 2023-02-13
Payer: MEDICARE

## 2023-02-15 ENCOUNTER — OFFICE VISIT (OUTPATIENT)
Dept: HEMATOLOGY/ONCOLOGY | Facility: CLINIC | Age: 73
End: 2023-02-15
Payer: MEDICARE

## 2023-02-15 ENCOUNTER — LAB VISIT (OUTPATIENT)
Dept: LAB | Facility: HOSPITAL | Age: 73
End: 2023-02-15
Attending: INTERNAL MEDICINE
Payer: MEDICARE

## 2023-02-15 VITALS
SYSTOLIC BLOOD PRESSURE: 146 MMHG | HEIGHT: 65 IN | OXYGEN SATURATION: 95 % | BODY MASS INDEX: 40.4 KG/M2 | TEMPERATURE: 97 F | HEART RATE: 47 BPM | DIASTOLIC BLOOD PRESSURE: 64 MMHG | WEIGHT: 242.5 LBS

## 2023-02-15 DIAGNOSIS — E11.9 TYPE 2 DIABETES MELLITUS WITHOUT COMPLICATION: ICD-10-CM

## 2023-02-15 DIAGNOSIS — D69.6 THROMBOCYTOPENIA: ICD-10-CM

## 2023-02-15 DIAGNOSIS — D64.9 ANEMIA, UNSPECIFIED TYPE: ICD-10-CM

## 2023-02-15 DIAGNOSIS — R92.8 ABNORMAL MAMMOGRAM: ICD-10-CM

## 2023-02-15 DIAGNOSIS — D72.819 LEUKOPENIA, UNSPECIFIED TYPE: ICD-10-CM

## 2023-02-15 LAB
BASOPHILS # BLD AUTO: 0.04 K/UL (ref 0–0.2)
BASOPHILS NFR BLD: 0.6 % (ref 0–1.9)
DIFFERENTIAL METHOD: ABNORMAL
EOSINOPHIL # BLD AUTO: 0.2 K/UL (ref 0–0.5)
EOSINOPHIL NFR BLD: 2.2 % (ref 0–8)
ERYTHROCYTE [DISTWIDTH] IN BLOOD BY AUTOMATED COUNT: 12.4 % (ref 11.5–14.5)
HCT VFR BLD AUTO: 36.8 % (ref 37–48.5)
HGB BLD-MCNC: 12.3 G/DL (ref 12–16)
IMM GRANULOCYTES # BLD AUTO: 0.02 K/UL (ref 0–0.04)
IMM GRANULOCYTES NFR BLD AUTO: 0.3 % (ref 0–0.5)
LYMPHOCYTES # BLD AUTO: 1.7 K/UL (ref 1–4.8)
LYMPHOCYTES NFR BLD: 25.7 % (ref 18–48)
MCH RBC QN AUTO: 28.6 PG (ref 27–31)
MCHC RBC AUTO-ENTMCNC: 33.4 G/DL (ref 32–36)
MCV RBC AUTO: 86 FL (ref 82–98)
MONOCYTES # BLD AUTO: 0.4 K/UL (ref 0.3–1)
MONOCYTES NFR BLD: 6.6 % (ref 4–15)
NEUTROPHILS # BLD AUTO: 4.3 K/UL (ref 1.8–7.7)
NEUTROPHILS NFR BLD: 64.6 % (ref 38–73)
NRBC BLD-RTO: 0 /100 WBC
PLATELET # BLD AUTO: 160 K/UL (ref 150–450)
PMV BLD AUTO: 10 FL (ref 9.2–12.9)
RBC # BLD AUTO: 4.3 M/UL (ref 4–5.4)
WBC # BLD AUTO: 6.69 K/UL (ref 3.9–12.7)

## 2023-02-15 PROCEDURE — 36415 COLL VENOUS BLD VENIPUNCTURE: CPT | Performed by: INTERNAL MEDICINE

## 2023-02-15 PROCEDURE — 3288F PR FALLS RISK ASSESSMENT DOCUMENTED: ICD-10-PCS | Mod: CPTII,S$GLB,,

## 2023-02-15 PROCEDURE — 1159F PR MEDICATION LIST DOCUMENTED IN MEDICAL RECORD: ICD-10-PCS | Mod: CPTII,S$GLB,,

## 2023-02-15 PROCEDURE — 83036 HEMOGLOBIN GLYCOSYLATED A1C: CPT | Performed by: NURSE PRACTITIONER

## 2023-02-15 PROCEDURE — 1126F PR PAIN SEVERITY QUANTIFIED, NO PAIN PRESENT: ICD-10-PCS | Mod: CPTII,S$GLB,,

## 2023-02-15 PROCEDURE — 99214 PR OFFICE/OUTPT VISIT, EST, LEVL IV, 30-39 MIN: ICD-10-PCS | Mod: S$GLB,,,

## 2023-02-15 PROCEDURE — 3077F PR MOST RECENT SYSTOLIC BLOOD PRESSURE >= 140 MM HG: ICD-10-PCS | Mod: CPTII,S$GLB,,

## 2023-02-15 PROCEDURE — 1160F PR REVIEW ALL MEDS BY PRESCRIBER/CLIN PHARMACIST DOCUMENTED: ICD-10-PCS | Mod: CPTII,S$GLB,,

## 2023-02-15 PROCEDURE — 1101F PT FALLS ASSESS-DOCD LE1/YR: CPT | Mod: CPTII,S$GLB,,

## 2023-02-15 PROCEDURE — 99214 OFFICE O/P EST MOD 30 MIN: CPT | Mod: S$GLB,,,

## 2023-02-15 PROCEDURE — 3077F SYST BP >= 140 MM HG: CPT | Mod: CPTII,S$GLB,,

## 2023-02-15 PROCEDURE — 1101F PR PT FALLS ASSESS DOC 0-1 FALLS W/OUT INJ PAST YR: ICD-10-PCS | Mod: CPTII,S$GLB,,

## 2023-02-15 PROCEDURE — 1126F AMNT PAIN NOTED NONE PRSNT: CPT | Mod: CPTII,S$GLB,,

## 2023-02-15 PROCEDURE — 99999 PR PBB SHADOW E&M-EST. PATIENT-LVL IV: CPT | Mod: PBBFAC,,,

## 2023-02-15 PROCEDURE — 1160F RVW MEDS BY RX/DR IN RCRD: CPT | Mod: CPTII,S$GLB,,

## 2023-02-15 PROCEDURE — 3288F FALL RISK ASSESSMENT DOCD: CPT | Mod: CPTII,S$GLB,,

## 2023-02-15 PROCEDURE — 3008F BODY MASS INDEX DOCD: CPT | Mod: CPTII,S$GLB,,

## 2023-02-15 PROCEDURE — 3078F PR MOST RECENT DIASTOLIC BLOOD PRESSURE < 80 MM HG: ICD-10-PCS | Mod: CPTII,S$GLB,,

## 2023-02-15 PROCEDURE — 3008F PR BODY MASS INDEX (BMI) DOCUMENTED: ICD-10-PCS | Mod: CPTII,S$GLB,,

## 2023-02-15 PROCEDURE — 1159F MED LIST DOCD IN RCRD: CPT | Mod: CPTII,S$GLB,,

## 2023-02-15 PROCEDURE — 3078F DIAST BP <80 MM HG: CPT | Mod: CPTII,S$GLB,,

## 2023-02-15 PROCEDURE — 85025 COMPLETE CBC W/AUTO DIFF WBC: CPT | Performed by: INTERNAL MEDICINE

## 2023-02-15 PROCEDURE — 99999 PR PBB SHADOW E&M-EST. PATIENT-LVL IV: ICD-10-PCS | Mod: PBBFAC,,,

## 2023-02-15 NOTE — ASSESSMENT & PLAN NOTE
Calcification in left breast  Biopsy 08/04/2021 returned without atypia or malignancy.    -most recent MMG of left breast 02/09/22 with no mammographic evidence of malignancy in the left breast  --due for annual bilateral screening mammogram in July 2022--OVERDUE--orders entered  Recommend continued follow-up with surveillance/surgery

## 2023-02-15 NOTE — ASSESSMENT & PLAN NOTE
Lab Results   Component Value Date     02/15/2023   Most recent platelet count WNL  As previously noted, thrombocytopenia likely secondary to hepatosplenomegaly, cirrhosis.    Follow-up in 9 months with repeat labs a few days prior

## 2023-02-15 NOTE — PROGRESS NOTES
Subjective:      Patient ID: Lakshmi Campbell is a 72 y.o. female.    Chief Complaint: Thrombocytopenia      HPI: Ms. Campbell is a pleasant 72-year old female w ith DM II, HTN, liver cirrhosis secondary to YO, and HLD who presents today for follow-up of thrombocytopenia. Initially referred to Heme/Onc clinic 10/2020 after suffering a knee injury that required surgery when pre-op testing revealed a platelet count of 76k. She has been seen for routine surveillance since that time. Pt states overall she is feeling well and voices no complaints today. She does note ecchymoses/trace excoriation to bue which is not new or worsening. Denies n/v/d/c, fever, chills, night sweats, unintentional weight loss.      Social History     Socioeconomic History    Marital status:    Tobacco Use    Smoking status: Former     Packs/day: 1.00     Years: 8.00     Pack years: 8.00     Types: Cigarettes     Quit date:      Years since quittin.1    Smokeless tobacco: Never   Substance and Sexual Activity    Alcohol use: Yes    Drug use: No       Family History   Problem Relation Age of Onset    Diabetes Mother     Leukemia Father     Diabetes Father        Past Surgical History:   Procedure Laterality Date    APPENDECTOMY      BREAST BIOPSY      CATARACT EXTRACTION      COLONOSCOPY N/A 2021    Procedure: COLONOSCOPY screening;  Surgeon: Moises Patterson MD;  Location: [facility];  Service: Endoscopy;  Laterality: N/A;    ESOPHAGOGASTRODUODENOSCOPY N/A 2021    Procedure: EGD (ESOPHAGOGASTRODUODENOSCOPY) EV screening;  Surgeon: Moises Patterson MD;  Location: [facility];  Service: Endoscopy;  Laterality: N/A;    EYE SURGERY      HERNIA REPAIR      OPEN REDUCTION AND INTERNAL FIXATION (ORIF) OF FRACTURE OF DISTAL RADIUS Left 2020    Procedure: ORIF, FRACTURE, RADIUS, DISTAL;  Surgeon: Sage Wolf MD;  Location: [facility] OR;  Service: Orthopedics;  Laterality: Left;    OPEN REDUCTION AND INTERNAL  FIXATION (ORIF) OF FRACTURE OF PATELLA Right 11/2/2020    Procedure: ORIF, FRACTURE, PATELLA;  Surgeon: Sage Wolf MD;  Location: [facility] OR;  Service: Orthopedics;  Laterality: Right;    OPEN REDUCTION AND INTERNAL FIXATION (ORIF) OF FRACTURE OF PATELLA Right 12/18/2020    Procedure: ORIF, FRACTURE, PATELLA;  Surgeon: Sage Wolf MD;  Location: [facility] OR;  Service: Orthopedics;  Laterality: Right;    ORIF HUMERUS FRACTURE Left 11/5/2020    Procedure: ORIF, FRACTURE, HUMERUS;  Surgeon: Sage Wolf MD;  Location: [facility] OR;  Service: Orthopedics;  Laterality: Left;    PLACEMENT OF ACELLULAR HUMAN DERMAL ALLOGRAFT Right 11/2/2020    Procedure: APPLICATION, ACELLULAR HUMAN DERMAL ALLOGRAFT;  Surgeon: Sage Wolf MD;  Location: [facility] OR;  Service: Orthopedics;  Laterality: Right;  Right Patella    REMOVAL OF HARDWARE FROM HAND Left 3/11/2021    Procedure: REMOVAL, HARDWARE, HAND;  Surgeon: Sage Wolf MD;  Location: [facility] OR;  Service: Orthopedics;  Laterality: Left;  Removal of dorsal spanning wrist plate left distal radius    REMOVAL OF HARDWARE FROM LOWER EXTREMITY Right 12/18/2020    Procedure: REMOVAL, HARDWARE, LOWER EXTREMITY;  Surgeon: Sage Wolf MD;  Location: [facility];  Service: Orthopedics;  Laterality: Right;       Past Medical History:   Diagnosis Date    Cataract     Closed displaced comminuted fracture of right patella 10/28/2020    Closed fracture of surgical neck of left humerus 10/30/2020    Closed left radial fracture 10/30/2020    Diabetes mellitus type I     Hyperlipidemia     Hypertension     Morbid obesity     Right knee pain     Thyroid disease        Review of Systems   Constitutional:  Negative for activity change, appetite change, chills, fatigue, fever and unexpected weight change.   HENT:  Negative for hearing loss, rhinorrhea and trouble swallowing.    Eyes:  Negative for discharge and visual disturbance.   Respiratory:   "Negative for chest tightness, shortness of breath and wheezing.    Cardiovascular:  Negative for chest pain and palpitations.   Gastrointestinal:  Negative for abdominal pain, blood in stool, constipation, diarrhea, nausea and vomiting.   Endocrine: Negative for polydipsia and polyuria.   Genitourinary:  Negative for difficulty urinating, dysuria, frequency, hematuria and menstrual problem.   Musculoskeletal:  Negative for arthralgias, joint swelling and neck pain.   Neurological:  Negative for dizziness, weakness, light-headedness and headaches.   Psychiatric/Behavioral:  Negative for confusion and dysphoric mood.      Medication List with Changes/Refills   Current Medications    AMLODIPINE (NORVASC) 10 MG TABLET    TAKE 1 TABLET(10 MG) BY MOUTH EVERY DAY    BLOOD SUGAR DIAGNOSTIC STRP    Use ac bid    BLOOD-GLUCOSE METER KIT    Use as instructed    DAPAGLIFLOZIN (FARXIGA) 5 MG TAB TABLET    Take 5 mg by mouth.    HYDROCHLOROTHIAZIDE (HYDRODIURIL) 25 MG TABLET    Take 25 mg by mouth once daily.     INSULIN NPH-INSULIN REGULAR, 70/30, (NOVOLIN 70/30) 100 UNIT/ML (70-30) INJECTION    Inject 75 units sc ac supper.    INSULIN SYRINGE-NEEDLE U-100 1 ML 29 GAUGE X 1/2" SYRG    Use bid    LANCETS 33 GAUGE MISC    Use as BID    LEVOTHYROXINE (SYNTHROID, LEVOTHROID) 175 MCG TABLET    TAKE 1 TABLET BY MOUTH DAILY    METFORMIN (GLUCOPHAGE) 1000 MG TABLET    1,000 mg 2 (two) times daily with meals.     PERINDOPRIL ERBUMINE (ACEON) 4 MG TABLET    Take 4 mg by mouth once daily.     PRAVASTATIN (PRAVACHOL) 20 MG TABLET    Take 20 mg by mouth once daily.     SPIRONOLACTONE (ALDACTONE) 25 MG TABLET    Take 25 mg by mouth.        Objective:     Vitals:    02/15/23 1321   BP: (!) 146/64   Pulse: (!) 47   Temp: 96.9 °F (36.1 °C)       Physical Exam  Constitutional:       Appearance: Normal appearance.   HENT:      Head: Normocephalic.      Right Ear: External ear normal.      Left Ear: External ear normal.   Eyes:      " Conjunctiva/sclera: Conjunctivae normal.   Cardiovascular:      Rate and Rhythm: Bradycardia present.   Pulmonary:      Effort: Pulmonary effort is normal.   Musculoskeletal:         General: Normal range of motion.   Skin:     General: Skin is warm and dry.   Neurological:      Mental Status: She is alert.   Psychiatric:         Behavior: Behavior normal.         Judgment: Judgment normal.       Lab Results   Component Value Date    WBC 6.69 02/15/2023    HGB 12.3 02/15/2023    HCT 36.8 (L) 02/15/2023    MCV 86 02/15/2023     02/15/2023       Lab Results   Component Value Date     09/08/2022    K 4.5 09/08/2022     09/08/2022    CO2 25 09/08/2022    BUN 41 (H) 09/08/2022    CREATININE 1.3 09/08/2022    CALCIUM 9.7 09/08/2022    ANIONGAP 9 09/08/2022    ESTGFRAFRICA >60.0 03/03/2022    EGFRNONAA 56.8 (A) 03/03/2022     Lab Results   Component Value Date    ALT 16 09/08/2022    AST 18 09/08/2022    ALKPHOS 74 09/08/2022    BILITOT 0.9 09/08/2022       Assessment/Plan:     Problem List Items Addressed This Visit          Renal/    Abnormal mammogram     Calcification in left breast  Biopsy 08/04/2021 returned without atypia or malignancy.    -most recent MMG of left breast 02/09/22 with no mammographic evidence of malignancy in the left breast  --due for annual bilateral screening mammogram in July 2022--OVERDUE--orders entered  Recommend continued follow-up with surveillance/surgery           Relevant Orders    Mammo Digital Diagnostic Bilat       Hematology    Thrombocytopenia     Lab Results   Component Value Date     02/15/2023   Most recent platelet count WNL  As previously noted, thrombocytopenia likely secondary to hepatosplenomegaly, cirrhosis.    Follow-up in 9 months with repeat labs a few days prior          Relevant Orders    CBC Auto Differential           Med Onc Chart Routing      Follow up with physician    Follow up with [facility] Other. 9 months with repeat labs prior   Infusion  scheduling note    Injection scheduling note    Labs CBC   Lab interval:     Imaging    Pharmacy appointment    Other referrals           ADALGISA Hirsch, HUYENP-C  Hematology/Oncology

## 2023-02-16 LAB
ESTIMATED AVG GLUCOSE: 160 MG/DL (ref 68–131)
HBA1C MFR BLD: 7.2 % (ref 4–5.6)

## 2023-03-09 ENCOUNTER — OFFICE VISIT (OUTPATIENT)
Dept: HEPATOLOGY | Facility: CLINIC | Age: 73
End: 2023-03-09
Payer: MEDICARE

## 2023-03-09 ENCOUNTER — HOSPITAL ENCOUNTER (OUTPATIENT)
Dept: RADIOLOGY | Facility: HOSPITAL | Age: 73
Discharge: HOME | End: 2023-03-09
Attending: NURSE PRACTITIONER
Payer: MEDICARE

## 2023-03-09 VITALS
SYSTOLIC BLOOD PRESSURE: 152 MMHG | HEIGHT: 65 IN | DIASTOLIC BLOOD PRESSURE: 76 MMHG | WEIGHT: 241.88 LBS | BODY MASS INDEX: 40.3 KG/M2 | HEART RATE: 59 BPM

## 2023-03-09 DIAGNOSIS — E87.5 HYPERKALEMIA: ICD-10-CM

## 2023-03-09 DIAGNOSIS — K74.60 LIVER CIRRHOSIS SECONDARY TO NASH: Primary | ICD-10-CM

## 2023-03-09 DIAGNOSIS — K75.81 LIVER CIRRHOSIS SECONDARY TO NASH: Primary | ICD-10-CM

## 2023-03-09 DIAGNOSIS — K74.60 LIVER CIRRHOSIS SECONDARY TO NASH: ICD-10-CM

## 2023-03-09 DIAGNOSIS — K75.81 LIVER CIRRHOSIS SECONDARY TO NASH: ICD-10-CM

## 2023-03-09 PROCEDURE — 1159F PR MEDICATION LIST DOCUMENTED IN MEDICAL RECORD: ICD-10-PCS | Mod: CPTII,S$GLB,, | Performed by: NURSE PRACTITIONER

## 2023-03-09 PROCEDURE — 1126F AMNT PAIN NOTED NONE PRSNT: CPT | Mod: CPTII,S$GLB,, | Performed by: NURSE PRACTITIONER

## 2023-03-09 PROCEDURE — 3078F DIAST BP <80 MM HG: CPT | Mod: CPTII,S$GLB,, | Performed by: NURSE PRACTITIONER

## 2023-03-09 PROCEDURE — 1101F PR PT FALLS ASSESS DOC 0-1 FALLS W/OUT INJ PAST YR: ICD-10-PCS | Mod: CPTII,S$GLB,, | Performed by: NURSE PRACTITIONER

## 2023-03-09 PROCEDURE — 3051F PR MOST RECENT HEMOGLOBIN A1C LEVEL 7.0 - < 8.0%: ICD-10-PCS | Mod: CPTII,S$GLB,, | Performed by: NURSE PRACTITIONER

## 2023-03-09 PROCEDURE — 4010F ACE/ARB THERAPY RXD/TAKEN: CPT | Mod: CPTII,S$GLB,, | Performed by: NURSE PRACTITIONER

## 2023-03-09 PROCEDURE — 1101F PT FALLS ASSESS-DOCD LE1/YR: CPT | Mod: CPTII,S$GLB,, | Performed by: NURSE PRACTITIONER

## 2023-03-09 PROCEDURE — 3008F PR BODY MASS INDEX (BMI) DOCUMENTED: ICD-10-PCS | Mod: CPTII,S$GLB,, | Performed by: NURSE PRACTITIONER

## 2023-03-09 PROCEDURE — 76705 ECHO EXAM OF ABDOMEN: CPT | Mod: 26,,, | Performed by: RADIOLOGY

## 2023-03-09 PROCEDURE — 99999 PR PBB SHADOW E&M-EST. PATIENT-LVL IV: CPT | Mod: PBBFAC,,, | Performed by: NURSE PRACTITIONER

## 2023-03-09 PROCEDURE — 3078F PR MOST RECENT DIASTOLIC BLOOD PRESSURE < 80 MM HG: ICD-10-PCS | Mod: CPTII,S$GLB,, | Performed by: NURSE PRACTITIONER

## 2023-03-09 PROCEDURE — 1126F PR PAIN SEVERITY QUANTIFIED, NO PAIN PRESENT: ICD-10-PCS | Mod: CPTII,S$GLB,, | Performed by: NURSE PRACTITIONER

## 2023-03-09 PROCEDURE — 3288F FALL RISK ASSESSMENT DOCD: CPT | Mod: CPTII,S$GLB,, | Performed by: NURSE PRACTITIONER

## 2023-03-09 PROCEDURE — 99214 OFFICE O/P EST MOD 30 MIN: CPT | Mod: S$GLB,,, | Performed by: NURSE PRACTITIONER

## 2023-03-09 PROCEDURE — 76705 ECHO EXAM OF ABDOMEN: CPT | Mod: TC

## 2023-03-09 PROCEDURE — 3288F PR FALLS RISK ASSESSMENT DOCUMENTED: ICD-10-PCS | Mod: CPTII,S$GLB,, | Performed by: NURSE PRACTITIONER

## 2023-03-09 PROCEDURE — 1159F MED LIST DOCD IN RCRD: CPT | Mod: CPTII,S$GLB,, | Performed by: NURSE PRACTITIONER

## 2023-03-09 PROCEDURE — 76705 US ABDOMEN LIMITED: ICD-10-PCS | Mod: 26,,, | Performed by: RADIOLOGY

## 2023-03-09 PROCEDURE — 99999 PR PBB SHADOW E&M-EST. PATIENT-LVL IV: ICD-10-PCS | Mod: PBBFAC,,, | Performed by: NURSE PRACTITIONER

## 2023-03-09 PROCEDURE — 3077F PR MOST RECENT SYSTOLIC BLOOD PRESSURE >= 140 MM HG: ICD-10-PCS | Mod: CPTII,S$GLB,, | Performed by: NURSE PRACTITIONER

## 2023-03-09 PROCEDURE — 3051F HG A1C>EQUAL 7.0%<8.0%: CPT | Mod: CPTII,S$GLB,, | Performed by: NURSE PRACTITIONER

## 2023-03-09 PROCEDURE — 3008F BODY MASS INDEX DOCD: CPT | Mod: CPTII,S$GLB,, | Performed by: NURSE PRACTITIONER

## 2023-03-09 PROCEDURE — 4010F PR ACE/ARB THEARPY RXD/TAKEN: ICD-10-PCS | Mod: CPTII,S$GLB,, | Performed by: NURSE PRACTITIONER

## 2023-03-09 PROCEDURE — 99214 PR OFFICE/OUTPT VISIT, EST, LEVL IV, 30-39 MIN: ICD-10-PCS | Mod: S$GLB,,, | Performed by: NURSE PRACTITIONER

## 2023-03-09 PROCEDURE — 3077F SYST BP >= 140 MM HG: CPT | Mod: CPTII,S$GLB,, | Performed by: NURSE PRACTITIONER

## 2023-03-09 NOTE — PROGRESS NOTES
Clinic Follow Up:  [facility] Follow Up Note    Reason for Follow Up:  The primary encounter diagnosis was Liver cirrhosis secondary to YO. A diagnosis of Hyperkalemia was also pertinent to this visit.    PCP: Keely Silva       HPI:  This is a 72 y.o. female here for follow up of the above  Pt states that she has been feeling overall well without any decompensating events  Labs today are stable with the exception of elevated potassium and creatinine.   Pt denies any new or change in medication.  She reports eating a banana yesterday.  No other high potassium foods known.   She is on aldactone 25mg per day.    Labs otherwise stable  US without lesion or mass  No upper GI bleeding.  No ascites or BLE.  No overt confusion.      Review of Systems   Constitutional:  Negative for chills, fever, malaise/fatigue and weight loss.   Respiratory:  Negative for cough.    Cardiovascular:  Negative for chest pain.   Gastrointestinal:         Per HPI   Musculoskeletal:  Negative for myalgias.   Skin:  Negative for itching and rash.   Neurological:  Negative for headaches.   Psychiatric/Behavioral:  The patient is not nervous/anxious.      Medical History:  Past Medical History:   Diagnosis Date    Cataract     Closed displaced comminuted fracture of right patella 10/28/2020    Closed fracture of surgical neck of left humerus 10/30/2020    Closed left radial fracture 10/30/2020    Diabetes mellitus type I     Hyperlipidemia     Hypertension     Morbid obesity     Right knee pain     Thyroid disease        Surgical History:   Past Surgical History:   Procedure Laterality Date    APPENDECTOMY      BREAST BIOPSY      CATARACT EXTRACTION      COLONOSCOPY N/A 12/21/2021    Procedure: COLONOSCOPY screening;  Surgeon: Moises Patterson MD;  Location: [facility];  Service: Endoscopy;  Laterality: N/A;    ESOPHAGOGASTRODUODENOSCOPY N/A 12/21/2021    Procedure: EGD (ESOPHAGOGASTRODUODENOSCOPY) EV screening;   Surgeon: Mioses Patterson MD;  Location: [facility] ENDO;  Service: Endoscopy;  Laterality: N/A;    EYE SURGERY      HERNIA REPAIR      OPEN REDUCTION AND INTERNAL FIXATION (ORIF) OF FRACTURE OF DISTAL RADIUS Left 11/2/2020    Procedure: ORIF, FRACTURE, RADIUS, DISTAL;  Surgeon: Sage Wolf MD;  Location: [facility] OR;  Service: Orthopedics;  Laterality: Left;    OPEN REDUCTION AND INTERNAL FIXATION (ORIF) OF FRACTURE OF PATELLA Right 11/2/2020    Procedure: ORIF, FRACTURE, PATELLA;  Surgeon: Sage Wolf MD;  Location: [facility] OR;  Service: Orthopedics;  Laterality: Right;    OPEN REDUCTION AND INTERNAL FIXATION (ORIF) OF FRACTURE OF PATELLA Right 12/18/2020    Procedure: ORIF, FRACTURE, PATELLA;  Surgeon: Sage Wolf MD;  Location: [facility] OR;  Service: Orthopedics;  Laterality: Right;    ORIF HUMERUS FRACTURE Left 11/5/2020    Procedure: ORIF, FRACTURE, HUMERUS;  Surgeon: Sage Wolf MD;  Location: [facility] OR;  Service: Orthopedics;  Laterality: Left;    PLACEMENT OF ACELLULAR HUMAN DERMAL ALLOGRAFT Right 11/2/2020    Procedure: APPLICATION, ACELLULAR HUMAN DERMAL ALLOGRAFT;  Surgeon: Sage Wolf MD;  Location: [facility] OR;  Service: Orthopedics;  Laterality: Right;  Right Patella    REMOVAL OF HARDWARE FROM HAND Left 3/11/2021    Procedure: REMOVAL, HARDWARE, HAND;  Surgeon: Sage Wolf MD;  Location: [facility] OR;  Service: Orthopedics;  Laterality: Left;  Removal of dorsal spanning wrist plate left distal radius    REMOVAL OF HARDWARE FROM LOWER EXTREMITY Right 12/18/2020    Procedure: REMOVAL, HARDWARE, LOWER EXTREMITY;  Surgeon: Sage Wolf MD;  Location: [facility];  Service: Orthopedics;  Laterality: Right;       Family History:   Family History   Problem Relation Age of Onset    Diabetes Mother     Leukemia Father     Diabetes Father        Social History:   Social History     Tobacco Use    Smoking status: Former     Packs/day: 1.00     Years:  "8.00     Pack years: 8.00     Types: Cigarettes     Quit date:      Years since quittin.2    Smokeless tobacco: Never   Substance Use Topics    Alcohol use: Yes    Drug use: No       Allergies: Reviewed    Home Medications:  Current Outpatient Medications on File Prior to Visit   Medication Sig Dispense Refill    amLODIPine (NORVASC) 10 MG tablet TAKE 1 TABLET(10 MG) BY MOUTH EVERY DAY 90 tablet 1    blood sugar diagnostic Strp Use ac bid      dapagliflozin (FARXIGA) 5 mg Tab tablet Take 5 mg by mouth.      hydrochlorothiazide (HYDRODIURIL) 25 MG tablet Take 25 mg by mouth once daily.       insulin NPH-insulin regular, 70/30, (NOVOLIN 70/30) 100 unit/mL (70-30) injection Inject 75 units sc ac supper.      insulin syringe-needle U-100 1 mL 29 gauge x 1/2" Syrg Use bid      lancets 33 gauge Misc Use as BID      levothyroxine (SYNTHROID, LEVOTHROID) 175 MCG tablet TAKE 1 TABLET BY MOUTH DAILY      metformin (GLUCOPHAGE) 1000 MG tablet 1,000 mg 2 (two) times daily with meals.       perindopril erbumine (ACEON) 4 mg tablet Take 4 mg by mouth once daily.       pravastatin (PRAVACHOL) 20 MG tablet Take 20 mg by mouth once daily.   11    spironolactone (ALDACTONE) 25 MG tablet Take 25 mg by mouth.      blood-glucose meter kit Use as instructed       No current facility-administered medications on file prior to visit.       Physical Exam:  Vital Signs:  BP (!) 152/76 (BP Location: Right arm, Patient Position: Sitting, BP Method: Large (Manual))   Pulse (!) 59   Ht 5' 5" (1.651 m)   Wt 109.7 kg (241 lb 13.5 oz)   BMI 40.25 kg/m²   Body mass index is 40.25 kg/m².  Physical Exam  Vitals reviewed.   Constitutional:       Appearance: She is well-developed.   HENT:      Head: Normocephalic.   Eyes:      General: No scleral icterus.  Cardiovascular:      Rate and Rhythm: Normal rate.   Pulmonary:      Effort: Pulmonary effort is normal.   Abdominal:      General: There is no distension.   Musculoskeletal:         " General: Normal range of motion.      Cervical back: Normal range of motion.   Skin:     General: Skin is dry.   Neurological:      Mental Status: She is alert and oriented to person, place, and time.       Labs: Pertinent labs reviewed.  MELD-Na score: 12 at 3/9/2023  9:14 AM  MELD score: 12 at 3/9/2023  9:14 AM  Calculated from:  Serum Creatinine: 1.8 mg/dL at 3/9/2023  9:14 AM  Serum Sodium: 142 mmol/L (Using max of 137 mmol/L) at 3/9/2023  9:14 AM  Total Bilirubin: 0.8 mg/dL (Using min of 1 mg/dL) at 3/9/2023  9:14 AM  INR(ratio): 1.0 at 3/9/2023  9:14 AM  Age: 72 years  EV: repeat EGD 2024 for EV screening  HCC: US 3/23 without lesion or mass       Assessment:  1. Liver cirrhosis secondary to YO    2. Hyperkalemia        Recommendations:  Stable without decompensating events  - hold aldactone for now  - repeat BMP on Monday  - if K+ continues to be elevated will have her see PCP for further evaluation and management.   - otherwise, continue with MELD labs and HCC screening every 6 months  - repeat EGD 2024 for EV screening     Return to Clinic:  6 months with pre-clinic labs and imaging.

## 2023-03-13 ENCOUNTER — LAB VISIT (OUTPATIENT)
Dept: LAB | Facility: HOSPITAL | Age: 73
End: 2023-03-13
Attending: NURSE PRACTITIONER
Payer: MEDICARE

## 2023-03-13 DIAGNOSIS — E87.5 HYPERKALEMIA: ICD-10-CM

## 2023-03-13 LAB
ANION GAP SERPL CALC-SCNC: 9 MMOL/L (ref 8–16)
BUN SERPL-MCNC: 32 MG/DL (ref 8–23)
CALCIUM SERPL-MCNC: 9.6 MG/DL (ref 8.7–10.5)
CHLORIDE SERPL-SCNC: 108 MMOL/L (ref 95–110)
CO2 SERPL-SCNC: 21 MMOL/L (ref 23–29)
CREAT SERPL-MCNC: 1.4 MG/DL (ref 0.5–1.4)
EST. GFR  (NO RACE VARIABLE): 40 ML/MIN/1.73 M^2
GLUCOSE SERPL-MCNC: 207 MG/DL (ref 70–110)
POTASSIUM SERPL-SCNC: 5.3 MMOL/L (ref 3.5–5.1)
SODIUM SERPL-SCNC: 138 MMOL/L (ref 136–145)

## 2023-03-13 PROCEDURE — 36415 COLL VENOUS BLD VENIPUNCTURE: CPT | Mod: PO | Performed by: NURSE PRACTITIONER

## 2023-03-13 PROCEDURE — 80048 BASIC METABOLIC PNL TOTAL CA: CPT | Mod: PO | Performed by: NURSE PRACTITIONER

## 2023-03-16 ENCOUNTER — PATIENT OUTREACH (OUTPATIENT)
Dept: ADMINISTRATIVE | Facility: HOSPITAL | Age: 73
End: 2023-03-16
Payer: MEDICARE

## 2023-03-16 ENCOUNTER — LAB VISIT (OUTPATIENT)
Dept: LAB | Facility: HOSPITAL | Age: 73
End: 2023-03-16
Attending: NURSE PRACTITIONER
Payer: MEDICARE

## 2023-03-16 DIAGNOSIS — E87.5 HYPERKALEMIA: ICD-10-CM

## 2023-03-16 LAB
ANION GAP SERPL CALC-SCNC: 11 MMOL/L (ref 8–16)
BUN SERPL-MCNC: 33 MG/DL (ref 8–23)
CALCIUM SERPL-MCNC: 9 MG/DL (ref 8.7–10.5)
CHLORIDE SERPL-SCNC: 107 MMOL/L (ref 95–110)
CO2 SERPL-SCNC: 21 MMOL/L (ref 23–29)
CREAT SERPL-MCNC: 1.6 MG/DL (ref 0.5–1.4)
EST. GFR  (NO RACE VARIABLE): 34.1 ML/MIN/1.73 M^2
GLUCOSE SERPL-MCNC: 210 MG/DL (ref 70–110)
POTASSIUM SERPL-SCNC: 4.9 MMOL/L (ref 3.5–5.1)
SODIUM SERPL-SCNC: 139 MMOL/L (ref 136–145)

## 2023-03-16 PROCEDURE — 80048 BASIC METABOLIC PNL TOTAL CA: CPT | Mod: PO | Performed by: NURSE PRACTITIONER

## 2023-03-16 PROCEDURE — 36415 COLL VENOUS BLD VENIPUNCTURE: CPT | Mod: PO | Performed by: NURSE PRACTITIONER

## 2023-03-16 NOTE — PROGRESS NOTES
DM Eye Exam: Per chart review pt is overdue for exam, last DM eye exam 12/08/20 attempted to contact pt no ans, lvm.

## 2023-04-19 ENCOUNTER — PATIENT MESSAGE (OUTPATIENT)
Dept: ADMINISTRATIVE | Facility: HOSPITAL | Age: 73
End: 2023-04-19
Payer: MEDICARE

## 2023-05-22 ENCOUNTER — PATIENT MESSAGE (OUTPATIENT)
Dept: RESEARCH | Facility: HOSPITAL | Age: 73
End: 2023-05-22
Payer: MEDICARE

## 2023-06-01 ENCOUNTER — PATIENT OUTREACH (OUTPATIENT)
Dept: ADMINISTRATIVE | Facility: HOSPITAL | Age: 73
End: 2023-06-01
Payer: MEDICARE

## 2023-06-01 NOTE — PROGRESS NOTES
DM EYE EXAM REPORT: per chart review pt had DM eye exam at [facility] 07/01/22, manually uploaded and hyperlinked to HM.  Pt up to date.  Teams updated

## 2023-06-29 LAB — CREATININE RANDOM URINE: 77 MG/DL

## 2023-08-11 ENCOUNTER — TELEPHONE (OUTPATIENT)
Dept: GASTROENTEROLOGY | Facility: CLINIC | Age: 73
End: 2023-08-11
Payer: MEDICARE

## 2023-09-21 ENCOUNTER — TELEPHONE (OUTPATIENT)
Dept: HEPATOLOGY | Facility: CLINIC | Age: 73
End: 2023-09-21
Payer: MEDICARE

## 2023-09-21 ENCOUNTER — HOSPITAL ENCOUNTER (OUTPATIENT)
Dept: RADIOLOGY | Facility: HOSPITAL | Age: 73
Discharge: HOME | End: 2023-09-21
Attending: NURSE PRACTITIONER
Payer: MEDICARE

## 2023-09-21 DIAGNOSIS — K75.81 LIVER CIRRHOSIS SECONDARY TO NASH: ICD-10-CM

## 2023-09-21 DIAGNOSIS — K74.60 LIVER CIRRHOSIS SECONDARY TO NASH: ICD-10-CM

## 2023-09-21 PROCEDURE — 76705 US ABDOMEN LIMITED: ICD-10-PCS | Mod: 26,,, | Performed by: RADIOLOGY

## 2023-09-21 PROCEDURE — 76705 ECHO EXAM OF ABDOMEN: CPT | Mod: 26,,, | Performed by: RADIOLOGY

## 2023-09-21 PROCEDURE — 76705 ECHO EXAM OF ABDOMEN: CPT | Mod: TC

## 2023-09-21 NOTE — TELEPHONE ENCOUNTER
----- Message from Jennifer Brown MA sent at 9/15/2023  3:28 PM CDT -----  Is there anything else? Someone else scheduled there already. I will let the patient know once I get an update back from you.    ----- Message -----  From: Uzma Merchant, FROILAN  Sent: 9/15/2023   2:31 PM CDT  To: Mike SANCHEZ Staff    It looks like Graciela has availability for morning appointment on 09/18.  ----- Message -----  From: Jennifer Brown MA  Sent: 9/14/2023   3:16 PM CDT  To: [facility] Hepatology Coordinator    Good afternoon. I was rescheduling patients and I advised Graciela of the changes made. Graciela states she needed to see this patient within the next 2 weeks for her 6 month follow up. Patient had already been rescheduled from the Gastro next week schedule to Hepatology in November. Per BRUNA Cool,. Please over-ride an opening for patient within 2 weeks for her 6 month follow up.

## 2023-09-21 NOTE — TELEPHONE ENCOUNTER
Spoke with patient on [phone] in reference to scheduled appointment and further recommendations. Patient voices understanding.

## 2023-09-25 ENCOUNTER — OFFICE VISIT (OUTPATIENT)
Dept: INTERNAL MEDICINE | Facility: CLINIC | Age: 73
End: 2023-09-25
Payer: MEDICARE

## 2023-09-25 VITALS
HEART RATE: 56 BPM | DIASTOLIC BLOOD PRESSURE: 60 MMHG | WEIGHT: 229.5 LBS | TEMPERATURE: 98 F | BODY MASS INDEX: 38.24 KG/M2 | SYSTOLIC BLOOD PRESSURE: 126 MMHG | OXYGEN SATURATION: 96 % | HEIGHT: 65 IN

## 2023-09-25 DIAGNOSIS — Z79.4 TYPE 2 DIABETES MELLITUS WITH MICROALBUMINURIA, WITH LONG-TERM CURRENT USE OF INSULIN: ICD-10-CM

## 2023-09-25 DIAGNOSIS — I10 ESSENTIAL HYPERTENSION: ICD-10-CM

## 2023-09-25 DIAGNOSIS — J02.9 SORE THROAT: ICD-10-CM

## 2023-09-25 DIAGNOSIS — N18.32 STAGE 3B CHRONIC KIDNEY DISEASE: ICD-10-CM

## 2023-09-25 DIAGNOSIS — J06.9 VIRAL UPPER RESPIRATORY INFECTION: Primary | ICD-10-CM

## 2023-09-25 DIAGNOSIS — E11.29 TYPE 2 DIABETES MELLITUS WITH MICROALBUMINURIA, WITH LONG-TERM CURRENT USE OF INSULIN: ICD-10-CM

## 2023-09-25 DIAGNOSIS — R80.9 TYPE 2 DIABETES MELLITUS WITH MICROALBUMINURIA, WITH LONG-TERM CURRENT USE OF INSULIN: ICD-10-CM

## 2023-09-25 PROBLEM — E55.9 VITAMIN D DEFICIENCY: Status: ACTIVE | Noted: 2023-08-11

## 2023-09-25 LAB
CTP QC/QA: YES
CTP QC/QA: YES
MOLECULAR STREP A: NEGATIVE
SARS-COV-2 RDRP RESP QL NAA+PROBE: NEGATIVE

## 2023-09-25 PROCEDURE — 1125F PR PAIN SEVERITY QUANTIFIED, PAIN PRESENT: ICD-10-PCS | Mod: CPTII,S$GLB,, | Performed by: NURSE PRACTITIONER

## 2023-09-25 PROCEDURE — 3066F NEPHROPATHY DOC TX: CPT | Mod: CPTII,S$GLB,, | Performed by: NURSE PRACTITIONER

## 2023-09-25 PROCEDURE — 99214 OFFICE O/P EST MOD 30 MIN: CPT | Mod: S$GLB,,, | Performed by: NURSE PRACTITIONER

## 2023-09-25 PROCEDURE — 3061F PR NEG MICROALBUMINURIA RESULT DOCUMENTED/REVIEW: ICD-10-PCS | Mod: CPTII,S$GLB,, | Performed by: NURSE PRACTITIONER

## 2023-09-25 PROCEDURE — 87635: ICD-10-PCS | Mod: QW,S$GLB,, | Performed by: NURSE PRACTITIONER

## 2023-09-25 PROCEDURE — 3074F PR MOST RECENT SYSTOLIC BLOOD PRESSURE < 130 MM HG: ICD-10-PCS | Mod: CPTII,S$GLB,, | Performed by: NURSE PRACTITIONER

## 2023-09-25 PROCEDURE — 3008F BODY MASS INDEX DOCD: CPT | Mod: CPTII,S$GLB,, | Performed by: NURSE PRACTITIONER

## 2023-09-25 PROCEDURE — 99214 PR OFFICE/OUTPT VISIT, EST, LEVL IV, 30-39 MIN: ICD-10-PCS | Mod: S$GLB,,, | Performed by: NURSE PRACTITIONER

## 2023-09-25 PROCEDURE — 1160F PR REVIEW ALL MEDS BY PRESCRIBER/CLIN PHARMACIST DOCUMENTED: ICD-10-PCS | Mod: CPTII,S$GLB,, | Performed by: NURSE PRACTITIONER

## 2023-09-25 PROCEDURE — 3051F PR MOST RECENT HEMOGLOBIN A1C LEVEL 7.0 - < 8.0%: ICD-10-PCS | Mod: CPTII,S$GLB,, | Performed by: NURSE PRACTITIONER

## 2023-09-25 PROCEDURE — 1160F RVW MEDS BY RX/DR IN RCRD: CPT | Mod: CPTII,S$GLB,, | Performed by: NURSE PRACTITIONER

## 2023-09-25 PROCEDURE — 1101F PT FALLS ASSESS-DOCD LE1/YR: CPT | Mod: CPTII,S$GLB,, | Performed by: NURSE PRACTITIONER

## 2023-09-25 PROCEDURE — 1125F AMNT PAIN NOTED PAIN PRSNT: CPT | Mod: CPTII,S$GLB,, | Performed by: NURSE PRACTITIONER

## 2023-09-25 PROCEDURE — 87635 SARS-COV-2 COVID-19 AMP PRB: CPT | Mod: QW,S$GLB,, | Performed by: NURSE PRACTITIONER

## 2023-09-25 PROCEDURE — 4010F ACE/ARB THERAPY RXD/TAKEN: CPT | Mod: CPTII,S$GLB,, | Performed by: NURSE PRACTITIONER

## 2023-09-25 PROCEDURE — 3074F SYST BP LT 130 MM HG: CPT | Mod: CPTII,S$GLB,, | Performed by: NURSE PRACTITIONER

## 2023-09-25 PROCEDURE — 4010F PR ACE/ARB THEARPY RXD/TAKEN: ICD-10-PCS | Mod: CPTII,S$GLB,, | Performed by: NURSE PRACTITIONER

## 2023-09-25 PROCEDURE — 1159F PR MEDICATION LIST DOCUMENTED IN MEDICAL RECORD: ICD-10-PCS | Mod: CPTII,S$GLB,, | Performed by: NURSE PRACTITIONER

## 2023-09-25 PROCEDURE — 3066F PR DOCUMENTATION OF TREATMENT FOR NEPHROPATHY: ICD-10-PCS | Mod: CPTII,S$GLB,, | Performed by: NURSE PRACTITIONER

## 2023-09-25 PROCEDURE — 99999 PR PBB SHADOW E&M-EST. PATIENT-LVL IV: CPT | Mod: PBBFAC,,, | Performed by: NURSE PRACTITIONER

## 2023-09-25 PROCEDURE — 3078F PR MOST RECENT DIASTOLIC BLOOD PRESSURE < 80 MM HG: ICD-10-PCS | Mod: CPTII,S$GLB,, | Performed by: NURSE PRACTITIONER

## 2023-09-25 PROCEDURE — 1159F MED LIST DOCD IN RCRD: CPT | Mod: CPTII,S$GLB,, | Performed by: NURSE PRACTITIONER

## 2023-09-25 PROCEDURE — 3051F HG A1C>EQUAL 7.0%<8.0%: CPT | Mod: CPTII,S$GLB,, | Performed by: NURSE PRACTITIONER

## 2023-09-25 PROCEDURE — 3061F NEG MICROALBUMINURIA REV: CPT | Mod: CPTII,S$GLB,, | Performed by: NURSE PRACTITIONER

## 2023-09-25 PROCEDURE — 99999 PR PBB SHADOW E&M-EST. PATIENT-LVL IV: ICD-10-PCS | Mod: PBBFAC,,, | Performed by: NURSE PRACTITIONER

## 2023-09-25 PROCEDURE — 87651 POCT STREP A MOLECULAR: ICD-10-PCS | Mod: QW,S$GLB,, | Performed by: NURSE PRACTITIONER

## 2023-09-25 PROCEDURE — 1101F PR PT FALLS ASSESS DOC 0-1 FALLS W/OUT INJ PAST YR: ICD-10-PCS | Mod: CPTII,S$GLB,, | Performed by: NURSE PRACTITIONER

## 2023-09-25 PROCEDURE — 3288F FALL RISK ASSESSMENT DOCD: CPT | Mod: CPTII,S$GLB,, | Performed by: NURSE PRACTITIONER

## 2023-09-25 PROCEDURE — 3008F PR BODY MASS INDEX (BMI) DOCUMENTED: ICD-10-PCS | Mod: CPTII,S$GLB,, | Performed by: NURSE PRACTITIONER

## 2023-09-25 PROCEDURE — 3078F DIAST BP <80 MM HG: CPT | Mod: CPTII,S$GLB,, | Performed by: NURSE PRACTITIONER

## 2023-09-25 PROCEDURE — 3288F PR FALLS RISK ASSESSMENT DOCUMENTED: ICD-10-PCS | Mod: CPTII,S$GLB,, | Performed by: NURSE PRACTITIONER

## 2023-09-25 PROCEDURE — 87651 STREP A DNA AMP PROBE: CPT | Mod: QW,S$GLB,, | Performed by: NURSE PRACTITIONER

## 2023-09-25 RX ORDER — FLUTICASONE PROPIONATE 50 MCG
2 SPRAY, SUSPENSION (ML) NASAL DAILY
Qty: 16 G | Refills: 0 | Status: SHIPPED | OUTPATIENT
Start: 2023-09-25 | End: 2023-10-16

## 2023-09-25 RX ORDER — BENZONATATE 100 MG/1
100 CAPSULE ORAL 3 TIMES DAILY PRN
Qty: 30 CAPSULE | Refills: 0 | Status: SHIPPED | OUTPATIENT
Start: 2023-09-25

## 2023-09-25 RX ORDER — METFORMIN HYDROCHLORIDE 500 MG/1
500 TABLET ORAL 2 TIMES DAILY
COMMUNITY
Start: 2023-08-11

## 2023-09-25 RX ORDER — ERGOCALCIFEROL 1.25 MG/1
50000 CAPSULE ORAL
COMMUNITY
Start: 2023-08-11

## 2023-09-25 RX ORDER — SPIRONOLACTONE 25 MG/1
25 TABLET ORAL
COMMUNITY
Start: 2023-06-28 | End: 2024-06-27

## 2023-09-25 RX ORDER — CETIRIZINE HYDROCHLORIDE 10 MG/1
10 TABLET ORAL DAILY
Qty: 30 TABLET | Refills: 0 | Status: SHIPPED | OUTPATIENT
Start: 2023-09-25 | End: 2023-10-16

## 2023-09-25 RX ORDER — INSULIN HUMAN 100 [IU]/ML
INJECTION, SUSPENSION SUBCUTANEOUS
COMMUNITY
Start: 2023-09-12

## 2023-09-25 RX ORDER — EMPAGLIFLOZIN 25 MG/1
25 TABLET, FILM COATED ORAL EVERY MORNING
COMMUNITY
Start: 2023-09-15

## 2023-09-25 RX ORDER — CHLORTHALIDONE 25 MG/1
25 TABLET ORAL
COMMUNITY
Start: 2023-08-11 | End: 2024-08-10

## 2023-09-25 RX ORDER — LEVOTHYROXINE SODIUM 175 UG/1
1 TABLET ORAL DAILY
COMMUNITY
Start: 2023-08-23

## 2023-09-25 NOTE — PROGRESS NOTES
Subjective:       Patient ID: Lakshmi Campbell is a 72 y.o. female.    Chief Complaint: Sore Throat (Crusty eye in morning, nasal drip)    Mrs. Campbell presents to visit for complaint of sore throat and cough, onset approx 4 days ago. No relief with robitussin. Denies fever, chills or body aches. Son and grandchild had strep last week, wants to make sure it is not strep.     Sore Throat   This is a new problem. The current episode started in the past 7 days (4 days). The problem has been waxing and waning. The pain is worse on the right side. There has been no fever. The pain is at a severity of 6/10. The pain is mild. Associated symptoms include coughing. Pertinent negatives include no abdominal pain, congestion, diarrhea, drooling, ear discharge, ear pain, headaches, hoarse voice, plugged ear sensation, neck pain, shortness of breath, stridor, swollen glands, trouble swallowing or vomiting. She has had no exposure to strep or mono. She has tried gargles for the symptoms. The treatment provided no relief.       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 with type 2 diabetes mellitus    Vitamin D deficiency       Family History   Problem Relation Age of Onset    Diabetes Mother     Leukemia Father     Diabetes Father      Past Surgical History:   Procedure Laterality Date    APPENDECTOMY      BREAST BIOPSY      CATARACT EXTRACTION      COLONOSCOPY N/A 12/21/2021    Procedure: COLONOSCOPY screening;  Surgeon: Moises Patterson MD;   Location: [facility];  Service: Endoscopy;  Laterality: N/A;    ESOPHAGOGASTRODUODENOSCOPY N/A 12/21/2021    Procedure: EGD (ESOPHAGOGASTRODUODENOSCOPY) EV screening;  Surgeon: Moises Patterson MD;  Location: [facility];  Service: Endoscopy;  Laterality: N/A;    EYE SURGERY      HERNIA REPAIR      OPEN REDUCTION AND INTERNAL FIXATION (ORIF) OF FRACTURE OF DISTAL RADIUS Left 11/2/2020    Procedure: ORIF, FRACTURE, RADIUS, DISTAL;  Surgeon: Sage Wolf MD;  Location: [facility];  Service: Orthopedics;  Laterality: Left;    OPEN REDUCTION AND INTERNAL FIXATION (ORIF) OF FRACTURE OF PATELLA Right 11/2/2020    Procedure: ORIF, FRACTURE, PATELLA;  Surgeon: Sage Wolf MD;  Location: [facility];  Service: Orthopedics;  Laterality: Right;    OPEN REDUCTION AND INTERNAL FIXATION (ORIF) OF FRACTURE OF PATELLA Right 12/18/2020    Procedure: ORIF, FRACTURE, PATELLA;  Surgeon: Sage Wolf MD;  Location: [facility];  Service: Orthopedics;  Laterality: Right;    ORIF HUMERUS FRACTURE Left 11/5/2020    Procedure: ORIF, FRACTURE, HUMERUS;  Surgeon: Sage Wolf MD;  Location: [facility];  Service: Orthopedics;  Laterality: Left;    PLACEMENT OF ACELLULAR HUMAN DERMAL ALLOGRAFT Right 11/2/2020    Procedure: APPLICATION, ACELLULAR HUMAN DERMAL ALLOGRAFT;  Surgeon: Sage Wolf MD;  Location: [facility] OR;  Service: Orthopedics;  Laterality: Right;  Right Patella    REMOVAL OF HARDWARE FROM HAND Left 3/11/2021    Procedure: REMOVAL, HARDWARE, HAND;  Surgeon: Sage Wolf MD;  Location: [facility] OR;  Service: Orthopedics;  Laterality: Left;  Removal of dorsal spanning wrist plate left distal radius    REMOVAL OF HARDWARE FROM LOWER EXTREMITY Right 12/18/2020    Procedure: REMOVAL, HARDWARE, LOWER EXTREMITY;  Surgeon: Sage Wolf MD;  Location: [facility];  Service: Orthopedics;  Laterality: Right;         Current Outpatient Medications:     amLODIPine (NORVASC) 10 MG  "tablet, TAKE 1 TABLET(10 MG) BY MOUTH EVERY DAY, Disp: 90 tablet, Rfl: 1    blood sugar diagnostic Strp, Use ac bid, Disp: , Rfl:     chlorthalidone (HYGROTEN) 25 MG Tab, Take 25 mg by mouth., Disp: , Rfl:     ergocalciferol (ERGOCALCIFEROL) 50,000 unit Cap, Take 50,000 Units by mouth every 7 days., Disp: , Rfl:     HUMULIN 70/30 U-100 KWIKPEN 100 unit/mL (70-30) InPn pen, SMARTSI Unit(s) SUB-Q Every Evening, Disp: , Rfl:     insulin syringe-needle U-100 1 mL 29 gauge x 1/2" Syrg, Use bid, Disp: , Rfl:     JARDIANCE 25 mg tablet, Take 25 mg by mouth every morning., Disp: , Rfl:     lancets 33 gauge Misc, Use as BID, Disp: , Rfl:     levothyroxine (SYNTHROID, LEVOTHROID) 175 MCG tablet, Take 1 tablet by mouth once daily., Disp: , Rfl:     metFORMIN (GLUCOPHAGE) 500 MG tablet, Take 500 mg by mouth 2 (two) times daily., Disp: , Rfl:     perindopril erbumine (ACEON) 4 mg tablet, Take 4 mg by mouth once daily. , Disp: , Rfl:     pravastatin (PRAVACHOL) 20 MG tablet, Take 20 mg by mouth once daily. , Disp: , Rfl: 11    spironolactone (ALDACTONE) 25 MG tablet, Take 25 mg by mouth., Disp: , Rfl:     benzonatate (TESSALON) 100 MG capsule, Take 1 capsule (100 mg total) by mouth 3 (three) times daily as needed for Cough., Disp: 30 capsule, Rfl: 0    blood-glucose meter kit, Use as instructed, Disp: , Rfl:     cetirizine (ZYRTEC) 10 MG tablet, Take 1 tablet (10 mg total) by mouth once daily., Disp: 30 tablet, Rfl: 0    fluticasone propionate (FLONASE) 50 mcg/actuation nasal spray, 2 sprays (100 mcg total) by Each Nostril route once daily., Disp: 16 g, Rfl: 0    Review of Systems   Constitutional:  Negative for appetite change, chills, fatigue and fever.   HENT:  Positive for postnasal drip, rhinorrhea and sore throat. Negative for congestion, drooling, ear discharge, ear pain, hoarse voice, sinus pressure, sinus pain, sneezing and trouble swallowing.    Eyes:  Positive for discharge. Negative for pain and redness. " "  Respiratory:  Positive for cough. Negative for shortness of breath and stridor.    Cardiovascular:  Negative for chest pain and palpitations.   Gastrointestinal:  Negative for abdominal pain, diarrhea, nausea and vomiting.   Musculoskeletal:  Negative for neck pain.   Neurological:  Negative for dizziness, light-headedness and headaches.       Objective:   /60 (BP Location: Left arm, Patient Position: Sitting, BP Method: Large (Manual))   Pulse (!) 56   Temp 98.1 °F (36.7 °C) (Temporal)   Ht 5' 5" (1.651 m)   Wt 104.1 kg (229 lb 8 oz)   SpO2 96%   BMI 38.19 kg/m²      Physical Exam  Constitutional:       General: She is not in acute distress.     Appearance: Normal appearance. She is obese. She is not ill-appearing.   HENT:      Head: Normocephalic.      Right Ear: No middle ear effusion. Tympanic membrane is not erythematous or bulging.      Left Ear: A middle ear effusion is present. Tympanic membrane is bulging (ild). Tympanic membrane is not erythematous. Left ear retracted TM: mild.     Nose: Rhinorrhea present. No congestion.      Mouth/Throat:      Mouth: Mucous membranes are moist.      Pharynx: Oropharynx is clear. Posterior oropharyngeal erythema (mild) present. No oropharyngeal exudate.   Eyes:      General:         Right eye: No discharge.         Left eye: No discharge.      Extraocular Movements: Extraocular movements intact.      Conjunctiva/sclera: Conjunctivae normal.      Pupils: Pupils are equal, round, and reactive to light.   Cardiovascular:      Rate and Rhythm: Normal rate and regular rhythm.      Pulses: Normal pulses.      Heart sounds: Murmur heard.      No friction rub. No gallop.   Pulmonary:      Effort: Pulmonary effort is normal. No respiratory distress.      Breath sounds: Normal breath sounds. No wheezing.   Musculoskeletal:      Cervical back: Normal range of motion and neck supple. No tenderness.   Lymphadenopathy:      Cervical: No cervical adenopathy.   Skin:     " General: Skin is warm and dry.      Coloration: Skin is not pale.      Findings: No erythema.   Neurological:      Mental Status: She is alert and oriented to person, place, and time.   Psychiatric:         Mood and Affect: Mood normal.         Behavior: Behavior normal.         Assessment & Plan     Results for orders placed or performed in visit on 09/25/23   Microalbumin/Creatinine Ratio, Urine   Result Value Ref Range    Creatinine, Urine 77.0 mg/dL   POCT COVID-19 Rapid Screening   Result Value Ref Range    POC Rapid COVID Negative Negative     Acceptable Yes          Problem List Items Addressed This Visit          Cardiac/Vascular    Essential hypertension    Current Assessment & Plan     Blood pressure stable. Continue current medications.             Renal/    Stage 3b chronic kidney disease    Current Assessment & Plan     Stable. BP and DM controlled.             Endocrine    Type 2 diabetes mellitus with microalbuminuria, with long-term current use of insulin    Current Assessment & Plan     Lab Results   Component Value Date    HGBA1C 6.4 (H) 06/29/2023   Followed by maryann. Stable.          Relevant Medications    HUMULIN 70/30 U-100 KWIKPEN 100 unit/mL (70-30) InPn pen    metFORMIN (GLUCOPHAGE) 500 MG tablet     Other Visit Diagnoses       Viral upper respiratory infection    -  Primary    Relevant Medications    cetirizine (ZYRTEC) 10 MG tablet    fluticasone propionate (FLONASE) 50 mcg/actuation nasal spray    benzonatate (TESSALON) 100 MG capsule    Sore throat        Relevant Orders    POCT COVID-19 Rapid Screening (Completed)    POCT Strep A, Molecular        Tylenol/ibuprofen for pain and fever.   Hand hygiene.   Maintain adequate hydration.   Antihistamine and flonase for nasal congestion and upper respiratory symptoms.   Rest.   No abx needed at this time.     Follow up if symptoms worsen or fail to improve.          Portions of this note may have been created with voice  "recognition software. Occasional "wrong-word" or "sound-a-like" substitutions may have occurred due to the inherent limitations of voice recognition software. Please, read the note carefully and recognize, using context, where substitutions have occurred.       "

## 2023-10-16 DIAGNOSIS — J06.9 VIRAL UPPER RESPIRATORY INFECTION: ICD-10-CM

## 2023-10-16 RX ORDER — FLUTICASONE PROPIONATE 50 MCG
SPRAY, SUSPENSION (ML) NASAL
Qty: 48 G | Refills: 1 | Status: SHIPPED | OUTPATIENT
Start: 2023-10-16

## 2023-10-16 RX ORDER — CETIRIZINE HYDROCHLORIDE 10 MG/1
10 TABLET ORAL
Qty: 30 TABLET | Refills: 0 | Status: SHIPPED | OUTPATIENT
Start: 2023-10-16

## 2023-10-17 ENCOUNTER — OFFICE VISIT (OUTPATIENT)
Dept: HEPATOLOGY | Facility: CLINIC | Age: 73
End: 2023-10-17
Payer: MEDICARE

## 2023-10-17 VITALS
SYSTOLIC BLOOD PRESSURE: 132 MMHG | HEIGHT: 65 IN | DIASTOLIC BLOOD PRESSURE: 68 MMHG | BODY MASS INDEX: 37.54 KG/M2 | HEART RATE: 57 BPM | WEIGHT: 225.31 LBS

## 2023-10-17 DIAGNOSIS — K75.81 LIVER CIRRHOSIS SECONDARY TO NASH: Primary | ICD-10-CM

## 2023-10-17 DIAGNOSIS — K74.60 LIVER CIRRHOSIS SECONDARY TO NASH: Primary | ICD-10-CM

## 2023-10-17 PROCEDURE — 3078F DIAST BP <80 MM HG: CPT | Mod: CPTII,S$GLB,, | Performed by: NURSE PRACTITIONER

## 2023-10-17 PROCEDURE — 3075F SYST BP GE 130 - 139MM HG: CPT | Mod: CPTII,S$GLB,, | Performed by: NURSE PRACTITIONER

## 2023-10-17 PROCEDURE — 1159F MED LIST DOCD IN RCRD: CPT | Mod: CPTII,S$GLB,, | Performed by: NURSE PRACTITIONER

## 2023-10-17 PROCEDURE — 3288F PR FALLS RISK ASSESSMENT DOCUMENTED: ICD-10-PCS | Mod: CPTII,S$GLB,, | Performed by: NURSE PRACTITIONER

## 2023-10-17 PROCEDURE — 1126F AMNT PAIN NOTED NONE PRSNT: CPT | Mod: CPTII,S$GLB,, | Performed by: NURSE PRACTITIONER

## 2023-10-17 PROCEDURE — 99999 PR PBB SHADOW E&M-EST. PATIENT-LVL IV: ICD-10-PCS | Mod: PBBFAC,,, | Performed by: NURSE PRACTITIONER

## 2023-10-17 PROCEDURE — 1159F PR MEDICATION LIST DOCUMENTED IN MEDICAL RECORD: ICD-10-PCS | Mod: CPTII,S$GLB,, | Performed by: NURSE PRACTITIONER

## 2023-10-17 PROCEDURE — 3061F NEG MICROALBUMINURIA REV: CPT | Mod: CPTII,S$GLB,, | Performed by: NURSE PRACTITIONER

## 2023-10-17 PROCEDURE — 3066F NEPHROPATHY DOC TX: CPT | Mod: CPTII,S$GLB,, | Performed by: NURSE PRACTITIONER

## 2023-10-17 PROCEDURE — 99999 PR PBB SHADOW E&M-EST. PATIENT-LVL IV: CPT | Mod: PBBFAC,,, | Performed by: NURSE PRACTITIONER

## 2023-10-17 PROCEDURE — 3078F PR MOST RECENT DIASTOLIC BLOOD PRESSURE < 80 MM HG: ICD-10-PCS | Mod: CPTII,S$GLB,, | Performed by: NURSE PRACTITIONER

## 2023-10-17 PROCEDURE — 3008F PR BODY MASS INDEX (BMI) DOCUMENTED: ICD-10-PCS | Mod: CPTII,S$GLB,, | Performed by: NURSE PRACTITIONER

## 2023-10-17 PROCEDURE — 1101F PT FALLS ASSESS-DOCD LE1/YR: CPT | Mod: CPTII,S$GLB,, | Performed by: NURSE PRACTITIONER

## 2023-10-17 PROCEDURE — 1101F PR PT FALLS ASSESS DOC 0-1 FALLS W/OUT INJ PAST YR: ICD-10-PCS | Mod: CPTII,S$GLB,, | Performed by: NURSE PRACTITIONER

## 2023-10-17 PROCEDURE — 3044F PR MOST RECENT HEMOGLOBIN A1C LEVEL <7.0%: ICD-10-PCS | Mod: CPTII,S$GLB,, | Performed by: NURSE PRACTITIONER

## 2023-10-17 PROCEDURE — 4010F ACE/ARB THERAPY RXD/TAKEN: CPT | Mod: CPTII,S$GLB,, | Performed by: NURSE PRACTITIONER

## 2023-10-17 PROCEDURE — 99214 PR OFFICE/OUTPT VISIT, EST, LEVL IV, 30-39 MIN: ICD-10-PCS | Mod: S$GLB,,, | Performed by: NURSE PRACTITIONER

## 2023-10-17 PROCEDURE — 3066F PR DOCUMENTATION OF TREATMENT FOR NEPHROPATHY: ICD-10-PCS | Mod: CPTII,S$GLB,, | Performed by: NURSE PRACTITIONER

## 2023-10-17 PROCEDURE — 99214 OFFICE O/P EST MOD 30 MIN: CPT | Mod: S$GLB,,, | Performed by: NURSE PRACTITIONER

## 2023-10-17 PROCEDURE — 3008F BODY MASS INDEX DOCD: CPT | Mod: CPTII,S$GLB,, | Performed by: NURSE PRACTITIONER

## 2023-10-17 PROCEDURE — 3061F PR NEG MICROALBUMINURIA RESULT DOCUMENTED/REVIEW: ICD-10-PCS | Mod: CPTII,S$GLB,, | Performed by: NURSE PRACTITIONER

## 2023-10-17 PROCEDURE — 4010F PR ACE/ARB THEARPY RXD/TAKEN: ICD-10-PCS | Mod: CPTII,S$GLB,, | Performed by: NURSE PRACTITIONER

## 2023-10-17 PROCEDURE — 3044F HG A1C LEVEL LT 7.0%: CPT | Mod: CPTII,S$GLB,, | Performed by: NURSE PRACTITIONER

## 2023-10-17 PROCEDURE — 1126F PR PAIN SEVERITY QUANTIFIED, NO PAIN PRESENT: ICD-10-PCS | Mod: CPTII,S$GLB,, | Performed by: NURSE PRACTITIONER

## 2023-10-17 PROCEDURE — 3288F FALL RISK ASSESSMENT DOCD: CPT | Mod: CPTII,S$GLB,, | Performed by: NURSE PRACTITIONER

## 2023-10-17 PROCEDURE — 3075F PR MOST RECENT SYSTOLIC BLOOD PRESS GE 130-139MM HG: ICD-10-PCS | Mod: CPTII,S$GLB,, | Performed by: NURSE PRACTITIONER

## 2023-10-17 NOTE — PROGRESS NOTES
Clinic Follow Up:  [facility] Follow Up Note    Reason for Follow Up:  The encounter diagnosis was Liver cirrhosis secondary to YO.    PCP: Keely Silva       HPI:  This is a 72 y.o. female here for follow up of the above  Pt states that she has been feeling overall stable without any new complaints.   Recent labs and US are stable  Denies any abdominal pain.  No nausea or vomiting.  No change in bowel pattern.  No melena or hematochezia. No weight loss.  No upper GI bleeding.  No ascites or BLE.  No overt confusion.      Review of Systems   Constitutional:  Negative for chills, fever, malaise/fatigue and weight loss.   Respiratory:  Negative for cough.    Cardiovascular:  Negative for chest pain.   Gastrointestinal:         Per HPI   Musculoskeletal:  Negative for myalgias.   Skin:  Negative for itching and rash.   Neurological:  Negative for headaches.   Psychiatric/Behavioral:  The patient is not nervous/anxious.        Medical History:  Past Medical History:   Diagnosis Date    Cataract     Closed displaced comminuted fracture of right patella 10/28/2020    Closed fracture of surgical neck of left humerus 10/30/2020    Closed left radial fracture 10/30/2020    Diabetes mellitus type I     Hyperlipidemia     Hypertension     Morbid obesity     Right knee pain     Thyroid disease        Surgical History:   Past Surgical History:   Procedure Laterality Date    APPENDECTOMY      BREAST BIOPSY      CATARACT EXTRACTION      COLONOSCOPY N/A 12/21/2021    Procedure: COLONOSCOPY screening;  Surgeon: Moises Patterson MD;  Location: [facility];  Service: Endoscopy;  Laterality: N/A;    ESOPHAGOGASTRODUODENOSCOPY N/A 12/21/2021    Procedure: EGD (ESOPHAGOGASTRODUODENOSCOPY) EV screening;  Surgeon: Moises Patterson MD;  Location: [facility];  Service: Endoscopy;  Laterality: N/A;    EYE SURGERY      HERNIA REPAIR      OPEN REDUCTION AND INTERNAL FIXATION (ORIF) OF FRACTURE OF DISTAL RADIUS  Left 2020    Procedure: ORIF, FRACTURE, RADIUS, DISTAL;  Surgeon: Sage Wolf MD;  Location: [facility] OR;  Service: Orthopedics;  Laterality: Left;    OPEN REDUCTION AND INTERNAL FIXATION (ORIF) OF FRACTURE OF PATELLA Right 2020    Procedure: ORIF, FRACTURE, PATELLA;  Surgeon: Sage Wolf MD;  Location: [facility] OR;  Service: Orthopedics;  Laterality: Right;    OPEN REDUCTION AND INTERNAL FIXATION (ORIF) OF FRACTURE OF PATELLA Right 2020    Procedure: ORIF, FRACTURE, PATELLA;  Surgeon: Sage Wolf MD;  Location: [facility] OR;  Service: Orthopedics;  Laterality: Right;    ORIF HUMERUS FRACTURE Left 2020    Procedure: ORIF, FRACTURE, HUMERUS;  Surgeon: Sage Wolf MD;  Location: [facility] OR;  Service: Orthopedics;  Laterality: Left;    PLACEMENT OF ACELLULAR HUMAN DERMAL ALLOGRAFT Right 2020    Procedure: APPLICATION, ACELLULAR HUMAN DERMAL ALLOGRAFT;  Surgeon: Sage Wolf MD;  Location: [facility] OR;  Service: Orthopedics;  Laterality: Right;  Right Patella    REMOVAL OF HARDWARE FROM HAND Left 3/11/2021    Procedure: REMOVAL, HARDWARE, HAND;  Surgeon: Sage Wolf MD;  Location: [facility] OR;  Service: Orthopedics;  Laterality: Left;  Removal of dorsal spanning wrist plate left distal radius    REMOVAL OF HARDWARE FROM LOWER EXTREMITY Right 2020    Procedure: REMOVAL, HARDWARE, LOWER EXTREMITY;  Surgeon: Sage Wolf MD;  Location: [facility];  Service: Orthopedics;  Laterality: Right;       Family History:   Family History   Problem Relation Age of Onset    Diabetes Mother     Leukemia Father     Diabetes Father        Social History:   Social History     Tobacco Use    Smoking status: Former     Current packs/day: 0.00     Average packs/day: 1 pack/day for 8.0 years (8.0 ttl pk-yrs)     Types: Cigarettes     Start date:      Quit date:      Years since quittin.8    Smokeless tobacco: Never   Substance Use Topics  "   Alcohol use: Yes    Drug use: No       Allergies: Reviewed    Home Medications:  Current Outpatient Medications on File Prior to Visit   Medication Sig Dispense Refill    amLODIPine (NORVASC) 10 MG tablet TAKE 1 TABLET(10 MG) BY MOUTH EVERY DAY 90 tablet 1    benzonatate (TESSALON) 100 MG capsule Take 1 capsule (100 mg total) by mouth 3 (three) times daily as needed for Cough. 30 capsule 0    blood sugar diagnostic Strp Use ac bid      blood-glucose meter kit Use as instructed      cetirizine (ZYRTEC) 10 MG tablet TAKE 1 TABLET BY MOUTH ONCE DAILY 30 tablet 0    chlorthalidone (HYGROTEN) 25 MG Tab Take 25 mg by mouth.      ergocalciferol (ERGOCALCIFEROL) 50,000 unit Cap Take 50,000 Units by mouth every 7 days.      fluticasone propionate (FLONASE) 50 mcg/actuation nasal spray SHAKE LIQUID AND USE 2 SPRAYS(100 MCG) IN EACH NOSTRIL EVERY DAY 48 g 1    HUMULIN 70/30 U-100 KWIKPEN 100 unit/mL (70-30) InPn pen SMARTSI Unit(s) SUB-Q Every Evening      insulin syringe-needle U-100 1 mL 29 gauge x 1/2" Syrg Use bid      JARDIANCE 25 mg tablet Take 25 mg by mouth every morning.      lancets 33 gauge Misc Use as BID      levothyroxine (SYNTHROID, LEVOTHROID) 175 MCG tablet Take 1 tablet by mouth once daily.      metFORMIN (GLUCOPHAGE) 500 MG tablet Take 500 mg by mouth 2 (two) times daily.      perindopril erbumine (ACEON) 4 mg tablet Take 4 mg by mouth once daily.       pravastatin (PRAVACHOL) 20 MG tablet Take 20 mg by mouth once daily.   11    spironolactone (ALDACTONE) 25 MG tablet Take 25 mg by mouth.       No current facility-administered medications on file prior to visit.       Physical Exam:  Vital Signs:  /68 (BP Location: Left arm, Patient Position: Sitting, BP Method: Medium (Manual))   Pulse (!) 57   Ht 5' 5" (1.651 m)   Wt 102.2 kg (225 lb 5 oz)   BMI 37.49 kg/m²   Body mass index is 37.49 kg/m².  Physical Exam  Vitals reviewed.   Constitutional:       Appearance: She is well-developed. She is " obese.   HENT:      Head: Normocephalic.   Eyes:      General: No scleral icterus.  Cardiovascular:      Rate and Rhythm: Normal rate.   Pulmonary:      Effort: Pulmonary effort is normal.   Abdominal:      General: There is no distension.   Musculoskeletal:         General: Normal range of motion.      Cervical back: Normal range of motion.   Skin:     General: Skin is dry.   Neurological:      Mental Status: She is alert and oriented to person, place, and time.         Labs: Pertinent labs reviewed.  MELD 3.0: 14 at 9/21/2023  1:36 PM  MELD-Na: 12 at 9/21/2023  1:36 PM  Calculated from:  Serum Creatinine: 1.7 mg/dL at 9/21/2023  1:36 PM  Serum Sodium: 139 mmol/L (Using max of 137 mmol/L) at 9/21/2023  1:36 PM  Total Bilirubin: 0.5 mg/dL (Using min of 1 mg/dL) at 9/21/2023  1:36 PM  Serum Albumin: 3.2 g/dL at 9/21/2023  1:36 PM  INR(ratio): 1.0 at 9/21/2023  1:36 PM  Age at listing (hypothetical): 72 years  Sex: Female at 9/21/2023  1:36 PM  EV: EGD 2021 without EV, repeat 3 years  HCC: US 9/23 without lesion or mass         Assessment:  1. Liver cirrhosis secondary to YO        Recommendations:  Stable without any decompensating events  Continue with MELD labs and HCC screening every 6 monts.   Repeat EGD 2025 for EV screening     Return to Clinic:  6 months with pre-clinic labs and imaging.

## 2023-11-09 ENCOUNTER — PATIENT OUTREACH (OUTPATIENT)
Dept: ADMINISTRATIVE | Facility: HOSPITAL | Age: 73
End: 2023-11-09
Payer: MEDICARE

## 2023-11-09 NOTE — PROGRESS NOTES
Working eye exam report; Chart searched; Spoke with pt who states that she goes to [facility] for her eye exams. She is scheduled 12/2023. She will have her records faxed over once completed.

## 2023-11-21 NOTE — ASSESSMENT & PLAN NOTE
Problem: Adult Inpatient Plan of Care  Goal: Plan of Care Review  Outcome: Ongoing, Progressing  Goal: Patient-Specific Goal (Individualized)  Outcome: Ongoing, Progressing  Goal: Absence of Hospital-Acquired Illness or Injury  Outcome: Ongoing, Progressing  Goal: Optimal Comfort and Wellbeing  Outcome: Ongoing, Progressing  Goal: Readiness for Transition of Care  Outcome: Ongoing, Progressing     Problem: Pain Acute  Goal: Acceptable Pain Control and Functional Ability  Outcome: Ongoing, Progressing     Problem: Fall Injury Risk  Goal: Absence of Fall and Fall-Related Injury  Outcome: Ongoing, Progressing      11/3:  --Non-weight bearing right leg, brace at all times, locked in extension  --Non-weight bearing left arm, gauntlet brace  --Ok to restart dvt ppx in am  --Plan to return to OR, likely 10/5 for ORIF left proximal humerus  11/5  --surgery today by Dr. Wolf  11/6-H/H stable post procedure- sling in place- analgesia as needed   11/7- stable  -stable

## 2024-01-11 DIAGNOSIS — Z00.00 ENCOUNTER FOR MEDICARE ANNUAL WELLNESS EXAM: ICD-10-CM

## 2024-01-17 DIAGNOSIS — E11.9 TYPE 2 DIABETES MELLITUS WITHOUT COMPLICATION: ICD-10-CM

## 2024-03-28 ENCOUNTER — PATIENT MESSAGE (OUTPATIENT)
Dept: ADMINISTRATIVE | Facility: HOSPITAL | Age: 74
End: 2024-03-28
Payer: MEDICARE

## 2024-04-20 DIAGNOSIS — I10 ESSENTIAL HYPERTENSION: ICD-10-CM

## 2024-04-22 ENCOUNTER — HOSPITAL ENCOUNTER (OUTPATIENT)
Dept: RADIOLOGY | Facility: HOSPITAL | Age: 74
Discharge: HOME | End: 2024-04-22
Attending: NURSE PRACTITIONER
Payer: MEDICARE

## 2024-04-22 DIAGNOSIS — K75.81 LIVER CIRRHOSIS SECONDARY TO NASH: ICD-10-CM

## 2024-04-22 DIAGNOSIS — K74.60 LIVER CIRRHOSIS SECONDARY TO NASH: ICD-10-CM

## 2024-04-22 PROCEDURE — 76705 ECHO EXAM OF ABDOMEN: CPT | Mod: TC

## 2024-04-22 PROCEDURE — 76705 ECHO EXAM OF ABDOMEN: CPT | Mod: 26,,, | Performed by: RADIOLOGY

## 2024-04-22 RX ORDER — AMLODIPINE BESYLATE 10 MG/1
TABLET ORAL
Qty: 90 TABLET | Refills: 1 | Status: SHIPPED | OUTPATIENT
Start: 2024-04-22

## 2024-04-22 NOTE — TELEPHONE ENCOUNTER
Refill Routing Note   Medication(s) are not appropriate for processing by [facility] for the following reason(s):        Non-participating provider    [facility] action(s):  Route               Appointments  past 12m or future 3m with PCP    Date Provider   Last Visit   9/25/2023 Keely Silva NP   Next Visit   Visit date not found Keely Silva NP   ED visits in past 90 days: 0        Note composed:9:56 AM 04/22/2024

## 2024-06-26 ENCOUNTER — HOSPITAL ENCOUNTER (INPATIENT)
Facility: HOSPITAL | Age: 74
LOS: 5 days | Discharge: HOME HEALTH | DRG: 193 | End: 2024-07-02
Attending: FAMILY MEDICINE | Admitting: HOSPITALIST
Payer: MEDICARE

## 2024-06-26 DIAGNOSIS — J18.9 PNEUMONIA OF RIGHT LOWER LOBE DUE TO INFECTIOUS ORGANISM: Primary | ICD-10-CM

## 2024-06-26 DIAGNOSIS — J44.1 COPD WITH ACUTE EXACERBATION: ICD-10-CM

## 2024-06-26 DIAGNOSIS — R09.02 HYPOXIA: ICD-10-CM

## 2024-06-26 DIAGNOSIS — R79.89 ELEVATED TROPONIN: ICD-10-CM

## 2024-06-26 DIAGNOSIS — I50.31 ACUTE DIASTOLIC CONGESTIVE HEART FAILURE: ICD-10-CM

## 2024-06-26 DIAGNOSIS — J18.9 COMMUNITY ACQUIRED PNEUMONIA, UNSPECIFIED LATERALITY: ICD-10-CM

## 2024-06-26 DIAGNOSIS — I50.9 CHF (CONGESTIVE HEART FAILURE): ICD-10-CM

## 2024-06-26 DIAGNOSIS — R06.02 SOB (SHORTNESS OF BREATH): ICD-10-CM

## 2024-06-26 DIAGNOSIS — I50.9 CONGESTIVE HEART FAILURE, UNSPECIFIED HF CHRONICITY, UNSPECIFIED HEART FAILURE TYPE: ICD-10-CM

## 2024-06-26 DIAGNOSIS — J96.01 ACUTE HYPOXIC RESPIRATORY FAILURE: ICD-10-CM

## 2024-06-26 DIAGNOSIS — R07.9 CHEST PAIN: ICD-10-CM

## 2024-06-26 LAB
ALBUMIN SERPL BCP-MCNC: 3 G/DL (ref 3.5–5.2)
ALLENS TEST: ABNORMAL
ALP SERPL-CCNC: 53 U/L (ref 55–135)
ALT SERPL W/O P-5'-P-CCNC: 12 U/L (ref 10–44)
ANION GAP SERPL CALC-SCNC: 19 MMOL/L (ref 8–16)
ANISOCYTOSIS BLD QL SMEAR: SLIGHT
AST SERPL-CCNC: 14 U/L (ref 10–40)
BACTERIA #/AREA URNS HPF: NORMAL /HPF
BASOPHILS # BLD AUTO: 0.02 K/UL (ref 0–0.2)
BASOPHILS NFR BLD: 0.2 % (ref 0–1.9)
BILIRUB SERPL-MCNC: 1.9 MG/DL (ref 0.1–1)
BILIRUB UR QL STRIP: NEGATIVE
BNP SERPL-MCNC: 1003 PG/ML (ref 0–99)
BUN SERPL-MCNC: 45 MG/DL (ref 8–23)
CALCIUM SERPL-MCNC: 9.2 MG/DL (ref 8.7–10.5)
CHLORIDE SERPL-SCNC: 113 MMOL/L (ref 95–110)
CLARITY UR: CLEAR
CO2 SERPL-SCNC: 11 MMOL/L (ref 23–29)
COLOR UR: YELLOW
CREAT SERPL-MCNC: 2.2 MG/DL (ref 0.5–1.4)
DELSYS: ABNORMAL
DIFFERENTIAL METHOD BLD: ABNORMAL
EOSINOPHIL # BLD AUTO: 0 K/UL (ref 0–0.5)
EOSINOPHIL NFR BLD: 0 % (ref 0–8)
ERYTHROCYTE [DISTWIDTH] IN BLOOD BY AUTOMATED COUNT: 13 % (ref 11.5–14.5)
EST. GFR  (NO RACE VARIABLE): 23 ML/MIN/1.73 M^2
FIO2: 21
GLUCOSE SERPL-MCNC: 293 MG/DL (ref 70–110)
GLUCOSE UR QL STRIP: ABNORMAL
HCO3 UR-SCNC: 11.6 MMOL/L (ref 24–28)
HCT VFR BLD AUTO: 37.7 % (ref 37–48.5)
HGB BLD-MCNC: 12.1 G/DL (ref 12–16)
HGB UR QL STRIP: ABNORMAL
HYALINE CASTS #/AREA URNS LPF: 0 /LPF
IMM GRANULOCYTES # BLD AUTO: 0.01 K/UL (ref 0–0.04)
IMM GRANULOCYTES NFR BLD AUTO: 0.1 % (ref 0–0.5)
KETONES UR QL STRIP: NEGATIVE
LEUKOCYTE ESTERASE UR QL STRIP: NEGATIVE
LYMPHOCYTES # BLD AUTO: 0.5 K/UL (ref 1–4.8)
LYMPHOCYTES NFR BLD: 6 % (ref 18–48)
MCH RBC QN AUTO: 29.2 PG (ref 27–31)
MCHC RBC AUTO-ENTMCNC: 32.1 G/DL (ref 32–36)
MCV RBC AUTO: 91 FL (ref 82–98)
MICROSCOPIC COMMENT: NORMAL
MODE: ABNORMAL
MONOCYTES # BLD AUTO: 0.5 K/UL (ref 0.3–1)
MONOCYTES NFR BLD: 5.8 % (ref 4–15)
NEUTROPHILS # BLD AUTO: 7.5 K/UL (ref 1.8–7.7)
NEUTROPHILS NFR BLD: 87.9 % (ref 38–73)
NITRITE UR QL STRIP: NEGATIVE
NRBC BLD-RTO: 0 /100 WBC
PCO2 BLDA: 27.3 MMHG (ref 35–45)
PH SMN: 7.24 [PH] (ref 7.35–7.45)
PH UR STRIP: 6 [PH] (ref 5–8)
PLATELET # BLD AUTO: 89 K/UL (ref 150–450)
PLATELET BLD QL SMEAR: ABNORMAL
PMV BLD AUTO: 10.9 FL (ref 9.2–12.9)
PO2 BLDA: 60 MMHG (ref 80–100)
POC BE: -16 MMOL/L
POC SATURATED O2: 87 % (ref 95–100)
POTASSIUM SERPL-SCNC: 4.7 MMOL/L (ref 3.5–5.1)
PROT SERPL-MCNC: 6.1 G/DL (ref 6–8.4)
PROT UR QL STRIP: ABNORMAL
RBC # BLD AUTO: 4.15 M/UL (ref 4–5.4)
RBC #/AREA URNS HPF: 1 /HPF (ref 0–4)
SAMPLE: ABNORMAL
SITE: ABNORMAL
SODIUM SERPL-SCNC: 143 MMOL/L (ref 136–145)
SP GR UR STRIP: 1.02 (ref 1–1.03)
SQUAMOUS #/AREA URNS HPF: 1 /HPF
TROPONIN I SERPL DL<=0.01 NG/ML-MCNC: 0.05 NG/ML (ref 0–0.03)
URN SPEC COLLECT METH UR: ABNORMAL
UROBILINOGEN UR STRIP-ACNC: NEGATIVE EU/DL
WBC # BLD AUTO: 8.52 K/UL (ref 3.9–12.7)
WBC #/AREA URNS HPF: 1 /HPF (ref 0–5)
YEAST URNS QL MICRO: NORMAL

## 2024-06-26 PROCEDURE — 36600 WITHDRAWAL OF ARTERIAL BLOOD: CPT

## 2024-06-26 PROCEDURE — 81000 URINALYSIS NONAUTO W/SCOPE: CPT | Performed by: FAMILY MEDICINE

## 2024-06-26 PROCEDURE — 25000003 PHARM REV CODE 250: Performed by: FAMILY MEDICINE

## 2024-06-26 PROCEDURE — 93005 ELECTROCARDIOGRAM TRACING: CPT

## 2024-06-26 PROCEDURE — 93010 ELECTROCARDIOGRAM REPORT: CPT | Mod: ,,, | Performed by: STUDENT IN AN ORGANIZED HEALTH CARE EDUCATION/TRAINING PROGRAM

## 2024-06-26 PROCEDURE — 99900035 HC TECH TIME PER 15 MIN (STAT)

## 2024-06-26 PROCEDURE — 83036 HEMOGLOBIN GLYCOSYLATED A1C: CPT | Performed by: FAMILY MEDICINE

## 2024-06-26 PROCEDURE — 25000242 PHARM REV CODE 250 ALT 637 W/ HCPCS: Performed by: FAMILY MEDICINE

## 2024-06-26 PROCEDURE — 85025 COMPLETE CBC W/AUTO DIFF WBC: CPT | Performed by: FAMILY MEDICINE

## 2024-06-26 PROCEDURE — 83880 ASSAY OF NATRIURETIC PEPTIDE: CPT | Performed by: FAMILY MEDICINE

## 2024-06-26 PROCEDURE — 63600175 PHARM REV CODE 636 W HCPCS: Performed by: FAMILY MEDICINE

## 2024-06-26 PROCEDURE — 80053 COMPREHEN METABOLIC PANEL: CPT | Performed by: FAMILY MEDICINE

## 2024-06-26 PROCEDURE — 82803 BLOOD GASES ANY COMBINATION: CPT

## 2024-06-26 PROCEDURE — 84484 ASSAY OF TROPONIN QUANT: CPT | Performed by: FAMILY MEDICINE

## 2024-06-26 PROCEDURE — 94640 AIRWAY INHALATION TREATMENT: CPT | Mod: XB

## 2024-06-26 RX ORDER — IPRATROPIUM BROMIDE AND ALBUTEROL SULFATE 2.5; .5 MG/3ML; MG/3ML
3 SOLUTION RESPIRATORY (INHALATION)
Status: COMPLETED | OUTPATIENT
Start: 2024-06-26 | End: 2024-06-26

## 2024-06-26 RX ORDER — FUROSEMIDE 10 MG/ML
40 INJECTION INTRAMUSCULAR; INTRAVENOUS
Status: COMPLETED | OUTPATIENT
Start: 2024-06-26 | End: 2024-06-26

## 2024-06-26 RX ORDER — METHYLPREDNISOLONE SOD SUCC 125 MG
125 VIAL (EA) INJECTION
Status: COMPLETED | OUTPATIENT
Start: 2024-06-26 | End: 2024-06-26

## 2024-06-26 RX ADMIN — CEFTRIAXONE 1 G: 1 INJECTION, POWDER, FOR SOLUTION INTRAMUSCULAR; INTRAVENOUS at 10:06

## 2024-06-26 RX ADMIN — IPRATROPIUM BROMIDE AND ALBUTEROL SULFATE 3 ML: 2.5; .5 SOLUTION RESPIRATORY (INHALATION) at 10:06

## 2024-06-26 RX ADMIN — METHYLPREDNISOLONE SODIUM SUCCINATE 125 MG: 125 INJECTION, POWDER, FOR SOLUTION INTRAMUSCULAR; INTRAVENOUS at 10:06

## 2024-06-26 RX ADMIN — FUROSEMIDE 40 MG: 10 INJECTION, SOLUTION INTRAMUSCULAR; INTRAVENOUS at 10:06

## 2024-06-26 RX ADMIN — AZITHROMYCIN MONOHYDRATE 500 MG: 500 INJECTION, POWDER, LYOPHILIZED, FOR SOLUTION INTRAVENOUS at 11:06

## 2024-06-27 ENCOUNTER — DOCUMENTATION ONLY (OUTPATIENT)
Dept: CARDIOLOGY | Facility: CLINIC | Age: 74
End: 2024-06-27
Payer: MEDICARE

## 2024-06-27 PROBLEM — J18.9 COMMUNITY ACQUIRED PNEUMONIA: Status: ACTIVE | Noted: 2024-06-27

## 2024-06-27 PROBLEM — N18.9 ACUTE KIDNEY INJURY SUPERIMPOSED ON CHRONIC KIDNEY DISEASE: Status: ACTIVE | Noted: 2024-06-27

## 2024-06-27 PROBLEM — N17.9 ACUTE KIDNEY INJURY SUPERIMPOSED ON CHRONIC KIDNEY DISEASE: Status: ACTIVE | Noted: 2024-06-27

## 2024-06-27 PROBLEM — I50.9 NEW ONSET OF CONGESTIVE HEART FAILURE: Status: ACTIVE | Noted: 2024-06-27

## 2024-06-27 PROBLEM — J96.01 ACUTE HYPOXIC RESPIRATORY FAILURE: Status: ACTIVE | Noted: 2024-06-27

## 2024-06-27 LAB
ALBUMIN SERPL BCP-MCNC: 2.7 G/DL (ref 3.5–5.2)
ALP SERPL-CCNC: 47 U/L (ref 55–135)
ALT SERPL W/O P-5'-P-CCNC: 12 U/L (ref 10–44)
ANION GAP SERPL CALC-SCNC: 17 MMOL/L (ref 8–16)
AORTIC ROOT ANNULUS: 2.85 CM
ASCENDING AORTA: 3.12 CM
AST SERPL-CCNC: 12 U/L (ref 10–40)
AV INDEX (PROSTH): 0.45
AV MEAN GRADIENT: 17 MMHG
AV PEAK GRADIENT: 28 MMHG
AV VALVE AREA BY VELOCITY RATIO: 1.2 CM²
AV VALVE AREA: 1.24 CM²
AV VELOCITY RATIO: 0.44
BILIRUB SERPL-MCNC: 1.3 MG/DL (ref 0.1–1)
BSA FOR ECHO PROCEDURE: 2.22 M2
BUN SERPL-MCNC: 52 MG/DL (ref 8–23)
CALCIUM SERPL-MCNC: 8.9 MG/DL (ref 8.7–10.5)
CHLORIDE SERPL-SCNC: 112 MMOL/L (ref 95–110)
CHOLEST SERPL-MCNC: 71 MG/DL (ref 120–199)
CHOLEST/HDLC SERPL: 2.1 {RATIO} (ref 2–5)
CO2 SERPL-SCNC: 11 MMOL/L (ref 23–29)
CREAT SERPL-MCNC: 2.4 MG/DL (ref 0.5–1.4)
CV ECHO LV RWT: 0.44 CM
DOP CALC AO PEAK VEL: 2.66 M/S
DOP CALC AO VTI: 52.1 CM
DOP CALC LVOT AREA: 2.7 CM2
DOP CALC LVOT DIAMETER: 1.87 CM
DOP CALC LVOT PEAK VEL: 1.16 M/S
DOP CALC LVOT STROKE VOLUME: 64.78 CM3
DOP CALC MV VTI: 47.5 CM
DOP CALC RVOT PEAK VEL: 0.56 M/S
DOP CALC RVOT VTI: 11.6 CM
DOP CALCLVOT PEAK VEL VTI: 23.6 CM
E WAVE DECELERATION TIME: 169.37 MSEC
E/A RATIO: 0.9
E/E' RATIO: 23.33 M/S
ECHO LV POSTERIOR WALL: 1.15 CM (ref 0.6–1.1)
EJECTION FRACTION: 60 %
EST. GFR  (NO RACE VARIABLE): 21 ML/MIN/1.73 M^2
ESTIMATED AVG GLUCOSE: 123 MG/DL (ref 68–131)
FRACTIONAL SHORTENING: 32 % (ref 28–44)
GLUCOSE SERPL-MCNC: 337 MG/DL (ref 70–110)
HBA1C MFR BLD: 5.9 % (ref 4–5.6)
HDLC SERPL-MCNC: 34 MG/DL (ref 40–75)
HDLC SERPL: 47.9 % (ref 20–50)
INTERVENTRICULAR SEPTUM: 1.15 CM (ref 0.6–1.1)
IVC DIAMETER: 1.73 CM
IVRT: 43.77 MSEC
LA MAJOR: 5.82 CM
LA MINOR: 5.98 CM
LA WIDTH: 4.9 CM
LDLC SERPL CALC-MCNC: 24 MG/DL (ref 63–159)
LEFT ATRIUM SIZE: 4.45 CM
LEFT ATRIUM VOLUME INDEX: 51.3 ML/M2
LEFT ATRIUM VOLUME: 109.33 CM3
LEFT INTERNAL DIMENSION IN SYSTOLE: 3.57 CM (ref 2.1–4)
LEFT VENTRICLE DIASTOLIC VOLUME INDEX: 62.21 ML/M2
LEFT VENTRICLE DIASTOLIC VOLUME: 132.5 ML
LEFT VENTRICLE MASS INDEX: 112 G/M2
LEFT VENTRICLE SYSTOLIC VOLUME INDEX: 25 ML/M2
LEFT VENTRICLE SYSTOLIC VOLUME: 53.18 ML
LEFT VENTRICULAR INTERNAL DIMENSION IN DIASTOLE: 5.25 CM (ref 3.5–6)
LEFT VENTRICULAR MASS: 238.27 G
LV LATERAL E/E' RATIO: 28 M/S
LV SEPTAL E/E' RATIO: 20 M/S
LVED V (TEICH): 132.5 ML
LVES V (TEICH): 53.18 ML
LVOT MG: 2.87 MMHG
LVOT MV: 0.79 CM/S
MAGNESIUM SERPL-MCNC: 1.7 MG/DL (ref 1.6–2.6)
MV MEAN GRADIENT: 5 MMHG
MV PEAK A VEL: 1.55 M/S
MV PEAK E VEL: 1.4 M/S
MV PEAK GRADIENT: 11 MMHG
MV STENOSIS PRESSURE HALF TIME: 53.78 MS
MV VALVE AREA BY CONTINUITY EQUATION: 1.36 CM2
MV VALVE AREA P 1/2 METHOD: 4.09 CM2
NONHDLC SERPL-MCNC: 37 MG/DL
OHS QRS DURATION: 82 MS
OHS QTC CALCULATION: 423 MS
PISA MRMAX VEL: 4.48 M/S
POCT GLUCOSE: 255 MG/DL (ref 70–110)
POCT GLUCOSE: 297 MG/DL (ref 70–110)
POCT GLUCOSE: 312 MG/DL (ref 70–110)
POCT GLUCOSE: 314 MG/DL (ref 70–110)
POTASSIUM SERPL-SCNC: 4.5 MMOL/L (ref 3.5–5.1)
PROT SERPL-MCNC: 5.9 G/DL (ref 6–8.4)
PV MEAN GRADIENT: 1 MMHG
RA MAJOR: 4.79 CM
RA PRESSURE ESTIMATED: 3 MMHG
RA WIDTH: 3.4 CM
SODIUM SERPL-SCNC: 140 MMOL/L (ref 136–145)
STJ: 3.08 CM
T4 FREE SERPL-MCNC: 1.3 NG/DL (ref 0.71–1.51)
TDI LATERAL: 0.05 M/S
TDI SEPTAL: 0.07 M/S
TDI: 0.06 M/S
TRICUSPID ANNULAR PLANE SYSTOLIC EXCURSION: 2.7 CM
TRIGL SERPL-MCNC: 65 MG/DL (ref 30–150)
TROPONIN I SERPL DL<=0.01 NG/ML-MCNC: 0.05 NG/ML (ref 0–0.03)
TROPONIN I SERPL DL<=0.01 NG/ML-MCNC: 0.06 NG/ML (ref 0–0.03)
TSH SERPL DL<=0.005 MIU/L-ACNC: 0.03 UIU/ML (ref 0.4–4)
Z-SCORE OF LEFT VENTRICULAR DIMENSION IN END DIASTOLE: -2.57
Z-SCORE OF LEFT VENTRICULAR DIMENSION IN END SYSTOLE: -1.17

## 2024-06-27 PROCEDURE — 94799 UNLISTED PULMONARY SVC/PX: CPT

## 2024-06-27 PROCEDURE — 80061 LIPID PANEL: CPT | Performed by: NURSE PRACTITIONER

## 2024-06-27 PROCEDURE — 36415 COLL VENOUS BLD VENIPUNCTURE: CPT | Performed by: NURSE PRACTITIONER

## 2024-06-27 PROCEDURE — 80053 COMPREHEN METABOLIC PANEL: CPT | Performed by: STUDENT IN AN ORGANIZED HEALTH CARE EDUCATION/TRAINING PROGRAM

## 2024-06-27 PROCEDURE — 63600175 PHARM REV CODE 636 W HCPCS: Performed by: NURSE PRACTITIONER

## 2024-06-27 PROCEDURE — 27000221 HC OXYGEN, UP TO 24 HOURS

## 2024-06-27 PROCEDURE — 99900035 HC TECH TIME PER 15 MIN (STAT)

## 2024-06-27 PROCEDURE — 25000003 PHARM REV CODE 250: Performed by: NURSE PRACTITIONER

## 2024-06-27 PROCEDURE — 11000001 HC ACUTE MED/SURG PRIVATE ROOM

## 2024-06-27 PROCEDURE — 99223 1ST HOSP IP/OBS HIGH 75: CPT | Mod: 25,,, | Performed by: STUDENT IN AN ORGANIZED HEALTH CARE EDUCATION/TRAINING PROGRAM

## 2024-06-27 PROCEDURE — 36415 COLL VENOUS BLD VENIPUNCTURE: CPT | Mod: XB | Performed by: STUDENT IN AN ORGANIZED HEALTH CARE EDUCATION/TRAINING PROGRAM

## 2024-06-27 PROCEDURE — 84439 ASSAY OF FREE THYROXINE: CPT | Performed by: NURSE PRACTITIONER

## 2024-06-27 PROCEDURE — 83735 ASSAY OF MAGNESIUM: CPT | Performed by: NURSE PRACTITIONER

## 2024-06-27 PROCEDURE — 84484 ASSAY OF TROPONIN QUANT: CPT | Performed by: NURSE PRACTITIONER

## 2024-06-27 PROCEDURE — 94761 N-INVAS EAR/PLS OXIMETRY MLT: CPT

## 2024-06-27 PROCEDURE — 84443 ASSAY THYROID STIM HORMONE: CPT | Performed by: NURSE PRACTITIONER

## 2024-06-27 RX ORDER — SIMETHICONE 80 MG
1 TABLET,CHEWABLE ORAL 4 TIMES DAILY PRN
Status: DISCONTINUED | OUTPATIENT
Start: 2024-06-27 | End: 2024-07-02 | Stop reason: HOSPADM

## 2024-06-27 RX ORDER — ACETAMINOPHEN 650 MG/1
650 SUPPOSITORY RECTAL EVERY 4 HOURS PRN
Status: DISCONTINUED | OUTPATIENT
Start: 2024-06-27 | End: 2024-07-02 | Stop reason: HOSPADM

## 2024-06-27 RX ORDER — ACETAMINOPHEN 325 MG/1
650 TABLET ORAL EVERY 8 HOURS PRN
Status: DISCONTINUED | OUTPATIENT
Start: 2024-06-27 | End: 2024-07-02 | Stop reason: HOSPADM

## 2024-06-27 RX ORDER — IPRATROPIUM BROMIDE AND ALBUTEROL SULFATE 2.5; .5 MG/3ML; MG/3ML
3 SOLUTION RESPIRATORY (INHALATION) EVERY 4 HOURS PRN
Status: DISCONTINUED | OUTPATIENT
Start: 2024-06-27 | End: 2024-07-02 | Stop reason: HOSPADM

## 2024-06-27 RX ORDER — FUROSEMIDE 10 MG/ML
40 INJECTION INTRAMUSCULAR; INTRAVENOUS EVERY 12 HOURS
Status: DISCONTINUED | OUTPATIENT
Start: 2024-06-27 | End: 2024-06-28

## 2024-06-27 RX ORDER — ONDANSETRON HYDROCHLORIDE 2 MG/ML
4 INJECTION, SOLUTION INTRAVENOUS EVERY 8 HOURS PRN
Status: DISCONTINUED | OUTPATIENT
Start: 2024-06-27 | End: 2024-07-02 | Stop reason: HOSPADM

## 2024-06-27 RX ORDER — AMLODIPINE BESYLATE 10 MG/1
10 TABLET ORAL DAILY
Status: DISCONTINUED | OUTPATIENT
Start: 2024-06-27 | End: 2024-07-02 | Stop reason: HOSPADM

## 2024-06-27 RX ORDER — NALOXONE HCL 0.4 MG/ML
0.02 VIAL (ML) INJECTION
Status: DISCONTINUED | OUTPATIENT
Start: 2024-06-27 | End: 2024-07-02 | Stop reason: HOSPADM

## 2024-06-27 RX ORDER — IBUPROFEN 200 MG
16 TABLET ORAL
Status: DISCONTINUED | OUTPATIENT
Start: 2024-06-27 | End: 2024-07-02 | Stop reason: HOSPADM

## 2024-06-27 RX ORDER — ALUMINUM HYDROXIDE, MAGNESIUM HYDROXIDE, AND SIMETHICONE 1200; 120; 1200 MG/30ML; MG/30ML; MG/30ML
30 SUSPENSION ORAL 4 TIMES DAILY PRN
Status: DISCONTINUED | OUTPATIENT
Start: 2024-06-27 | End: 2024-07-02 | Stop reason: HOSPADM

## 2024-06-27 RX ORDER — SODIUM CHLORIDE 0.9 % (FLUSH) 0.9 %
3 SYRINGE (ML) INJECTION EVERY 12 HOURS PRN
Status: DISCONTINUED | OUTPATIENT
Start: 2024-06-27 | End: 2024-07-02 | Stop reason: HOSPADM

## 2024-06-27 RX ORDER — TALC
6 POWDER (GRAM) TOPICAL NIGHTLY PRN
Status: DISCONTINUED | OUTPATIENT
Start: 2024-06-27 | End: 2024-07-02 | Stop reason: HOSPADM

## 2024-06-27 RX ORDER — PRAVASTATIN SODIUM 20 MG/1
20 TABLET ORAL DAILY
Status: DISCONTINUED | OUTPATIENT
Start: 2024-06-27 | End: 2024-07-02 | Stop reason: HOSPADM

## 2024-06-27 RX ORDER — PROMETHAZINE HYDROCHLORIDE 25 MG/1
25 TABLET ORAL EVERY 6 HOURS PRN
Status: DISCONTINUED | OUTPATIENT
Start: 2024-06-27 | End: 2024-07-02 | Stop reason: HOSPADM

## 2024-06-27 RX ORDER — IBUPROFEN 200 MG
24 TABLET ORAL
Status: DISCONTINUED | OUTPATIENT
Start: 2024-06-27 | End: 2024-07-02 | Stop reason: HOSPADM

## 2024-06-27 RX ORDER — POLYETHYLENE GLYCOL 3350 17 G/17G
17 POWDER, FOR SOLUTION ORAL DAILY PRN
Status: DISCONTINUED | OUTPATIENT
Start: 2024-06-27 | End: 2024-07-02 | Stop reason: HOSPADM

## 2024-06-27 RX ORDER — INSULIN ASPART 100 [IU]/ML
0-5 INJECTION, SOLUTION INTRAVENOUS; SUBCUTANEOUS
Status: DISCONTINUED | OUTPATIENT
Start: 2024-06-27 | End: 2024-07-02 | Stop reason: HOSPADM

## 2024-06-27 RX ORDER — ENOXAPARIN SODIUM 100 MG/ML
30 INJECTION SUBCUTANEOUS EVERY 24 HOURS
Status: DISCONTINUED | OUTPATIENT
Start: 2024-06-27 | End: 2024-07-01

## 2024-06-27 RX ORDER — GLUCAGON 1 MG
1 KIT INJECTION
Status: DISCONTINUED | OUTPATIENT
Start: 2024-06-27 | End: 2024-07-02 | Stop reason: HOSPADM

## 2024-06-27 RX ORDER — BENZONATATE 100 MG/1
100 CAPSULE ORAL 3 TIMES DAILY PRN
Status: DISCONTINUED | OUTPATIENT
Start: 2024-06-27 | End: 2024-07-02 | Stop reason: HOSPADM

## 2024-06-27 RX ADMIN — PRAVASTATIN SODIUM 20 MG: 20 TABLET ORAL at 09:06

## 2024-06-27 RX ADMIN — FUROSEMIDE 40 MG: 10 INJECTION, SOLUTION INTRAMUSCULAR; INTRAVENOUS at 09:06

## 2024-06-27 RX ADMIN — DEXTROSE MONOHYDRATE 500 MG: 50 INJECTION, SOLUTION INTRAVENOUS at 10:06

## 2024-06-27 RX ADMIN — INSULIN ASPART 3 UNITS: 100 INJECTION, SOLUTION INTRAVENOUS; SUBCUTANEOUS at 05:06

## 2024-06-27 RX ADMIN — ENOXAPARIN SODIUM 30 MG: 40 INJECTION SUBCUTANEOUS at 04:06

## 2024-06-27 RX ADMIN — AMLODIPINE BESYLATE 10 MG: 10 TABLET ORAL at 09:06

## 2024-06-27 RX ADMIN — INSULIN ASPART 4 UNITS: 100 INJECTION, SOLUTION INTRAVENOUS; SUBCUTANEOUS at 12:06

## 2024-06-27 RX ADMIN — INSULIN ASPART 1 UNITS: 100 INJECTION, SOLUTION INTRAVENOUS; SUBCUTANEOUS at 09:06

## 2024-06-27 RX ADMIN — INSULIN ASPART 4 UNITS: 100 INJECTION, SOLUTION INTRAVENOUS; SUBCUTANEOUS at 05:06

## 2024-06-27 RX ADMIN — LEVOTHYROXINE SODIUM 175 MCG: 150 TABLET ORAL at 05:06

## 2024-06-27 NOTE — ASSESSMENT & PLAN NOTE
Patient has chronic hypothyroidism. TFTs reviewed-   Lab Results   Component Value Date    TSH 0.02 (L) 03/19/2024   . Will continue chronic levothyroxine and adjust for and clinical changes.

## 2024-06-27 NOTE — CONSULTS
[facility]  Cardiology  Consult Note    Patient Name: Lakshmi Campbell  MRN: [id number]  Admission Date: 6/26/2024  Hospital Length of Stay: 0 days  Code Status: Full Code   Attending Provider: Rich Hernadez MD   Consulting Provider: Radha Sanders NP  Primary Care Physician: Keely Silva NP  Principal Problem:Acute hypoxic respiratory failure    Patient information was obtained from patient and ER records.     Inpatient consult to Cardiology  Consult performed by: Radha Sanders NP  Consult ordered by: Leonardo Peña NP        Subjective:     Chief Complaint:  SOB     HPI:   Lakshmi Campbell is a 73 y.o. female with a PMH  has a past medical history of Cataract, Closed displaced comminuted fracture of right patella (10/28/2020), Closed fracture of surgical neck of left humerus (10/30/2020), Closed left radial fracture (10/30/2020), Diabetes mellitus type I, Hyperlipidemia, Hypertension, Morbid obesity, Right knee pain, and Thyroid disease.  Presented to the ER for evaluation of sudden onset of shortness of breath with nonproductive cough and 1 episode of nonbloody/nonbilious vomiting as a result from coughing.  Patient reports his symptoms worsened when she exerts herself and with lying flat.  Denies any recent illnesses or sick contacts.  Denies history of CHF for COPD.  Reports she was a previous smoker, but quit years ago.  Denies any use of home oxygen for use of CPAP/BiPAP to sleep at night.  Denies swelling in her lower extremities.  Denies fever, aches, chills, sweats, chest pain, palpitations, abdominal pain common bladder/bowel complaints, or any other symptoms at this time.     ER workup revealed CBC to be unremarkable, BUN/creatinine of 45/2.2 with CBG of 293 mg/dL, BNP of 1, 003, troponin of 0.054, UA negative, and ABG revealing pH of 7.237, pCO2 of 27.3, PO2 of 60, and HC03 of 11.6.  Chest x-ray findings consistent with multilobar pneumonia.  EKG revealed sinus rhythm with  first-degree AV block with a ventricular rate of 77 beats per minute and a QT/QTC of 374/423.  O2 sats initially 88% on room air, but improved to 97% on 2 liters/minute via nasal cannula.  Patient received a total of 3 DuoNebs, 500 mg azithromycin, 1 g Rocephin, 40 mg Lasix IV, 125 mg Solu-Medrol IV in ED. hospital Medicine consulted to admit patient for acute hypoxic respiratory failure in setting of new onset CHF and multilobar pneumonia.  Patient in agreement with treatment plan.  Patient admitted under inpatient status.     Cardiology consulted to assist with management. Pt seen and examined today sitting up in bed on O2 c/o SOB, cough and 1 episode of vomiting yesterday. Denies any significant cardiac history. Labs reviewed Mg 1.7, BNP 1003, Crt 2.2, Plt 89, troponin 0.054->.052->->055, echo pending    Past Medical History:   Diagnosis Date    Cataract     Closed displaced comminuted fracture of right patella 10/28/2020    Closed fracture of surgical neck of left humerus 10/30/2020    Closed left radial fracture 10/30/2020    Diabetes mellitus type I     Hyperlipidemia     Hypertension     Morbid obesity     Right knee pain     Thyroid disease        Past Surgical History:   Procedure Laterality Date    APPENDECTOMY      BREAST BIOPSY      CATARACT EXTRACTION      COLONOSCOPY N/A 12/21/2021    Procedure: COLONOSCOPY screening;  Surgeon: Moises aPtterson MD;  Location: [facility];  Service: Endoscopy;  Laterality: N/A;    ESOPHAGOGASTRODUODENOSCOPY N/A 12/21/2021    Procedure: EGD (ESOPHAGOGASTRODUODENOSCOPY) EV screening;  Surgeon: Moises Patterson MD;  Location: [facility];  Service: Endoscopy;  Laterality: N/A;    EYE SURGERY      HERNIA REPAIR      OPEN REDUCTION AND INTERNAL FIXATION (ORIF) OF FRACTURE OF DISTAL RADIUS Left 11/2/2020    Procedure: ORIF, FRACTURE, RADIUS, DISTAL;  Surgeon: Sage Wolf MD;  Location: [facility];  Service: Orthopedics;  Laterality: Left;    OPEN REDUCTION AND  INTERNAL FIXATION (ORIF) OF FRACTURE OF PATELLA Right 11/2/2020    Procedure: ORIF, FRACTURE, PATELLA;  Surgeon: Sage Wolf MD;  Location: [facility] OR;  Service: Orthopedics;  Laterality: Right;    OPEN REDUCTION AND INTERNAL FIXATION (ORIF) OF FRACTURE OF PATELLA Right 12/18/2020    Procedure: ORIF, FRACTURE, PATELLA;  Surgeon: Sage Wolf MD;  Location: [facility] OR;  Service: Orthopedics;  Laterality: Right;    ORIF HUMERUS FRACTURE Left 11/5/2020    Procedure: ORIF, FRACTURE, HUMERUS;  Surgeon: Sage Wolf MD;  Location: [facility] OR;  Service: Orthopedics;  Laterality: Left;    PLACEMENT OF ACELLULAR HUMAN DERMAL ALLOGRAFT Right 11/2/2020    Procedure: APPLICATION, ACELLULAR HUMAN DERMAL ALLOGRAFT;  Surgeon: Sage Wolf MD;  Location: [facility] OR;  Service: Orthopedics;  Laterality: Right;  Right Patella    REMOVAL OF HARDWARE FROM HAND Left 3/11/2021    Procedure: REMOVAL, HARDWARE, HAND;  Surgeon: Sage Wolf MD;  Location: [facility] OR;  Service: Orthopedics;  Laterality: Left;  Removal of dorsal spanning wrist plate left distal radius    REMOVAL OF HARDWARE FROM LOWER EXTREMITY Right 12/18/2020    Procedure: REMOVAL, HARDWARE, LOWER EXTREMITY;  Surgeon: Sage Wolf MD;  Location: [facility];  Service: Orthopedics;  Laterality: Right;       Review of patient's allergies indicates:   Allergen Reactions    Codeine Nausea Only     Other reaction(s): Nausea  Other reaction(s): Elevated blood pressure       No current facility-administered medications on file prior to encounter.     Current Outpatient Medications on File Prior to Encounter   Medication Sig    amLODIPine (NORVASC) 10 MG tablet TAKE 1 TABLET(10 MG) BY MOUTH EVERY DAY    benzonatate (TESSALON) 100 MG capsule Take 1 capsule (100 mg total) by mouth 3 (three) times daily as needed for Cough.    blood sugar diagnostic Strp Use ac bid    blood-glucose meter kit Use as instructed    cetirizine (ZYRTEC)  "10 MG tablet TAKE 1 TABLET BY MOUTH ONCE DAILY    chlorthalidone (HYGROTEN) 25 MG Tab Take 25 mg by mouth once daily.    ergocalciferol (ERGOCALCIFEROL) 50,000 unit Cap Take 50,000 Units by mouth every 7 days.    fluticasone propionate (FLONASE) 50 mcg/actuation nasal spray SHAKE LIQUID AND USE 2 SPRAYS(100 MCG) IN EACH NOSTRIL EVERY DAY    HUMULIN 70/30 U-100 KWIKPEN 100 unit/mL (70-30) InPn pen SMARTSI Unit(s) SUB-Q Every Evening    insulin syringe-needle U-100 1 mL 29 gauge x 1/2" Syrg Use bid    JARDIANCE 25 mg tablet Take 25 mg by mouth every morning.    lancets 33 gauge Misc Use as BID    levothyroxine (SYNTHROID) 150 MCG tablet Take 1 tablet by mouth once daily.    metFORMIN (GLUCOPHAGE) 500 MG tablet Take 500 mg by mouth 2 (two) times daily.    perindopril erbumine (ACEON) 4 mg tablet Take 4 mg by mouth once daily.     pravastatin (PRAVACHOL) 20 MG tablet Take 20 mg by mouth once daily.     spironolactone (ALDACTONE) 25 MG tablet Take 25 mg by mouth.     Family History       Problem Relation (Age of Onset)    Diabetes Mother, Father    Leukemia Father          Tobacco Use    Smoking status: Former     Current packs/day: 0.00     Average packs/day: 1 pack/day for 8.0 years (8.0 ttl pk-yrs)     Types: Cigarettes     Start date:      Quit date:      Years since quittin.5    Smokeless tobacco: Never   Substance and Sexual Activity    Alcohol use: Yes     Comment: rarely    Drug use: No    Sexual activity: Not on file     Review of Systems   Constitutional: Positive for malaise/fatigue.   HENT: Negative.     Eyes: Negative.    Cardiovascular:  Positive for dyspnea on exertion.   Respiratory:  Positive for cough and shortness of breath.    Skin: Negative.    Musculoskeletal:  Positive for arthritis, back pain, muscle cramps and stiffness.   Gastrointestinal:  Positive for abdominal pain and vomiting.   Genitourinary: Negative.    Neurological:  Positive for weakness.   Psychiatric/Behavioral: " Negative.       Objective:     Vital Signs (Most Recent):  Temp: 97.4 °F (36.3 °C) (06/27/24 1138)  Pulse: 82 (06/27/24 1138)  Resp: (!) 22 (06/27/24 1138)  BP: (!) 116/58 (06/27/24 1138)  SpO2: (!) 90 % (06/27/24 1138) Vital Signs (24h Range):  Temp:  [97.4 °F (36.3 °C)-98.9 °F (37.2 °C)] 97.4 °F (36.3 °C)  Pulse:  [70-84] 82  Resp:  [16-31] 22  SpO2:  [88 %-97 %] 90 %  BP: ()/(49-68) 116/58     Weight: 107.5 kg (237 lb)  Body mass index is 39.44 kg/m².    SpO2: (!) 90 %         Intake/Output Summary (Last 24 hours) at 6/27/2024 1359  Last data filed at 6/27/2024 0638  Gross per 24 hour   Intake --   Output 800 ml   Net -800 ml       Lines/Drains/Airways       Airway  Duration                  Airway - Non-Surgical 12/21/21 1256 919 days         Airway - Non-Surgical 12/21/21 1256 Nasal Cannula 919 days              Peripheral Intravenous Line  Duration                  Peripheral IV - Single Lumen 06/26/24 2146 20 G Left Antecubital <1 day                     Physical Exam  Vitals and nursing note reviewed.   Constitutional:       Appearance: Normal appearance. She is obese.   HENT:      Head: Normocephalic.   Eyes:      Pupils: Pupils are equal, round, and reactive to light.   Cardiovascular:      Rate and Rhythm: Normal rate and regular rhythm.      Heart sounds: Normal heart sounds, S1 normal and S2 normal. No murmur heard.     No S3 or S4 sounds.   Pulmonary:      Effort: Pulmonary effort is normal.      Breath sounds: Rales present.   Abdominal:      General: Bowel sounds are normal.      Palpations: Abdomen is soft.   Musculoskeletal:         General: Normal range of motion.      Cervical back: Normal range of motion.   Skin:     Capillary Refill: Capillary refill takes less than 2 seconds.   Neurological:      General: No focal deficit present.      Mental Status: She is alert and oriented to person, place, and time.      Motor: Weakness present.   Psychiatric:         Mood and Affect: Mood normal.     "     Behavior: Behavior normal.         Thought Content: Thought content normal.          Significant Labs: BMP:   Recent Labs   Lab 06/26/24 2145 06/27/24  0653   * 337*    140   K 4.7 4.5   * 112*   CO2 11* 11*   BUN 45* 52*   CREATININE 2.2* 2.4*   CALCIUM 9.2 8.9   MG  --  1.7   , CMP   Recent Labs   Lab 06/26/24 2145 06/27/24  0653    140   K 4.7 4.5   * 112*   CO2 11* 11*   * 337*   BUN 45* 52*   CREATININE 2.2* 2.4*   CALCIUM 9.2 8.9   PROT 6.1 5.9*   ALBUMIN 3.0* 2.7*   BILITOT 1.9* 1.3*   ALKPHOS 53* 47*   AST 14 12   ALT 12 12   ANIONGAP 19* 17*   , CBC   Recent Labs   Lab 06/26/24 2145   WBC 8.52   HGB 12.1   HCT 37.7   PLT 89*   , INR No results for input(s): "INR", "PROTIME" in the last 48 hours., Lipid Panel   Recent Labs   Lab 06/27/24  0539   CHOL 71*   HDL 34*   LDLCALC 24.0*   TRIG 65   CHOLHDL 47.9   , Troponin   Recent Labs   Lab 06/26/24 2145 06/27/24  0129 06/27/24  0539   TROPONINI 0.054* 0.052* 0.055*   , and All pertinent lab results from the last 24 hours have been reviewed.    Significant Imaging: Cardiac Cath: reviewed, Echocardiogram: Transthoracic echo (TTE) complete (Cupid Only):   Results for orders placed or performed during the hospital encounter of 06/26/24   Echo   Result Value Ref Range    BSA 2.22 m2    LVOT stroke volume 64.78 cm3    LVIDd 5.25 3.5 - 6.0 cm    LV Systolic Volume 53.18 mL    LV Systolic Volume Index 25.0 mL/m2    LVIDs 3.57 2.1 - 4.0 cm    LV Diastolic Volume 132.50 mL    LV Diastolic Volume Index 62.21 mL/m2    Left Ventricular End Systolic Volume by Teichholz Method 53.18 mL    Left Ventricular End Diastolic Volume by Teichholz Method 132.50 mL    IVS 1.15 (A) 0.6 - 1.1 cm    LVOT diameter 1.87 cm    LVOT area 2.7 cm2    FS 32 28 - 44 %    Left Ventricle Relative Wall Thickness 0.44 cm    Posterior Wall 1.15 (A) 0.6 - 1.1 cm    LV mass 238.27 g    LV Mass Index 112 g/m2    MV Peak E Tenzin 1.40 m/s    TDI LATERAL 0.05 m/s "    TDI SEPTAL 0.07 m/s    E/E' ratio 23.33 m/s    MV Peak A Tenzin 1.55 m/s    E/A ratio 0.90     IVRT 43.77 msec    E wave deceleration time 169.37 msec    LV SEPTAL E/E' RATIO 20.00 m/s    LV LATERAL E/E' RATIO 28.00 m/s    LVOT peak tenzin 1.16 m/s    Left Ventricular Outflow Tract Mean Velocity 0.79 cm/s    Left Ventricular Outflow Tract Mean Gradient 2.87 mmHg    RVOT peak VTI 11.6 cm    TAPSE 2.70 cm    LA size 4.45 cm    Left Atrium Minor Axis 5.98 cm    Left Atrium Major Axis 5.82 cm    RA Major Axis 4.79 cm    AV mean gradient 17 mmHg    AV peak gradient 28 mmHg    Ao peak tenzin 2.66 m/s    Ao VTI 52.10 cm    LVOT peak VTI 23.60 cm    AV valve area 1.24 cm²    AV Velocity Ratio 0.44     AV index (prosthetic) 0.45     LAYO by Velocity Ratio 1.20 cm²    Mr max tenzin 4.48 m/s    MV mean gradient 5 mmHg    MV peak gradient 11 mmHg    MV stenosis pressure 1/2 time 53.78 ms    MV valve area p 1/2 method 4.09 cm2    MV valve area by continuity eq 1.36 cm2    MV VTI 47.5 cm    PV mean gradient 1 mmHg    PV peak gradient 1     RVOT peak tenzin 0.56 m/s    Ao root annulus 2.85 cm    STJ 3.08 cm    Ascending aorta 3.12 cm    IVC diameter 1.73 cm    Mean e' 0.06 m/s    ZLVIDS -1.17     ZLVIDD -2.57     LA Volume Index 51.3 mL/m2    LA volume 109.33 cm3    LA WIDTH 4.9 cm    RA Width 3.4 cm   , EKG: reviewed, Stress Test: reviewed, and X-Ray: CXR: X-Ray Chest 1 View (CXR): No results found for this visit on 06/26/24.  Assessment and Plan:     Acute kidney injury superimposed on chronic kidney disease  Monitor with diuresis    New onset of congestive heart failure  BNP 1003  Echo pending  OMT IV diuresis  ACEi and aldaconte held given hypotension  No h/o CHF per pt  Strict I/Os  Low Na diet      Hyperlipidemia  statin    Essential hypertension  Titrate medications    Type 2 diabetes mellitus with microalbuminuria, with long-term current use of insulin  Management per primary team        VTE Risk Mitigation (From admission, onward)            Ordered     enoxaparin injection 30 mg  Daily         06/27/24 0103     IP VTE HIGH RISK PATIENT  Once         06/27/24 0103     Place sequential compression device  Until discontinued         06/27/24 0103                    Thank you for your consult. I will follow-up with patient. Please contact us if you have any additional questions.    Radha Sanders NP  Cardiology   [facility]

## 2024-06-27 NOTE — SUBJECTIVE & OBJECTIVE
Past Medical History:   Diagnosis Date    Cataract     Closed displaced comminuted fracture of right patella 10/28/2020    Closed fracture of surgical neck of left humerus 10/30/2020    Closed left radial fracture 10/30/2020    Diabetes mellitus type I     Hyperlipidemia     Hypertension     Morbid obesity     Right knee pain     Thyroid disease        Past Surgical History:   Procedure Laterality Date    APPENDECTOMY      BREAST BIOPSY      CATARACT EXTRACTION      COLONOSCOPY N/A 12/21/2021    Procedure: COLONOSCOPY screening;  Surgeon: Moises Patterson MD;  Location: [facility];  Service: Endoscopy;  Laterality: N/A;    ESOPHAGOGASTRODUODENOSCOPY N/A 12/21/2021    Procedure: EGD (ESOPHAGOGASTRODUODENOSCOPY) EV screening;  Surgeon: Moises Patterson MD;  Location: [facility] ENDO;  Service: Endoscopy;  Laterality: N/A;    EYE SURGERY      HERNIA REPAIR      OPEN REDUCTION AND INTERNAL FIXATION (ORIF) OF FRACTURE OF DISTAL RADIUS Left 11/2/2020    Procedure: ORIF, FRACTURE, RADIUS, DISTAL;  Surgeon: Sage Wolf MD;  Location: [facility];  Service: Orthopedics;  Laterality: Left;    OPEN REDUCTION AND INTERNAL FIXATION (ORIF) OF FRACTURE OF PATELLA Right 11/2/2020    Procedure: ORIF, FRACTURE, PATELLA;  Surgeon: Sage Wolf MD;  Location: [facility] OR;  Service: Orthopedics;  Laterality: Right;    OPEN REDUCTION AND INTERNAL FIXATION (ORIF) OF FRACTURE OF PATELLA Right 12/18/2020    Procedure: ORIF, FRACTURE, PATELLA;  Surgeon: Sage Wolf MD;  Location: [facility] OR;  Service: Orthopedics;  Laterality: Right;    ORIF HUMERUS FRACTURE Left 11/5/2020    Procedure: ORIF, FRACTURE, HUMERUS;  Surgeon: Sage Wolf MD;  Location: [facility] OR;  Service: Orthopedics;  Laterality: Left;    PLACEMENT OF ACELLULAR HUMAN DERMAL ALLOGRAFT Right 11/2/2020    Procedure: APPLICATION, ACELLULAR HUMAN DERMAL ALLOGRAFT;  Surgeon: Sage Wolf MD;  Location: [facility] OR;  Service: Orthopedics;   "Laterality: Right;  Right Patella    REMOVAL OF HARDWARE FROM HAND Left 3/11/2021    Procedure: REMOVAL, HARDWARE, HAND;  Surgeon: Sage Wolf MD;  Location: [facility] OR;  Service: Orthopedics;  Laterality: Left;  Removal of dorsal spanning wrist plate left distal radius    REMOVAL OF HARDWARE FROM LOWER EXTREMITY Right 2020    Procedure: REMOVAL, HARDWARE, LOWER EXTREMITY;  Surgeon: Sage Wolf MD;  Location: [facility] OR;  Service: Orthopedics;  Laterality: Right;       Review of patient's allergies indicates:   Allergen Reactions    Codeine Nausea Only     Other reaction(s): Nausea  Other reaction(s): Elevated blood pressure       No current facility-administered medications on file prior to encounter.     Current Outpatient Medications on File Prior to Encounter   Medication Sig    amLODIPine (NORVASC) 10 MG tablet TAKE 1 TABLET(10 MG) BY MOUTH EVERY DAY    benzonatate (TESSALON) 100 MG capsule Take 1 capsule (100 mg total) by mouth 3 (three) times daily as needed for Cough.    blood sugar diagnostic Strp Use ac bid    blood-glucose meter kit Use as instructed    cetirizine (ZYRTEC) 10 MG tablet TAKE 1 TABLET BY MOUTH ONCE DAILY    chlorthalidone (HYGROTEN) 25 MG Tab Take 25 mg by mouth once daily.    ergocalciferol (ERGOCALCIFEROL) 50,000 unit Cap Take 50,000 Units by mouth every 7 days.    fluticasone propionate (FLONASE) 50 mcg/actuation nasal spray SHAKE LIQUID AND USE 2 SPRAYS(100 MCG) IN EACH NOSTRIL EVERY DAY    HUMULIN 70/30 U-100 KWIKPEN 100 unit/mL (70-30) InPn pen SMARTSI Unit(s) SUB-Q Every Evening    insulin syringe-needle U-100 1 mL 29 gauge x 1/2" Syrg Use bid    JARDIANCE 25 mg tablet Take 25 mg by mouth every morning.    lancets 33 gauge Misc Use as BID    levothyroxine (SYNTHROID) 150 MCG tablet Take 1 tablet by mouth once daily.    metFORMIN (GLUCOPHAGE) 500 MG tablet Take 500 mg by mouth 2 (two) times daily.    perindopril erbumine (ACEON) 4 mg tablet Take 4 mg by " mouth once daily.     pravastatin (PRAVACHOL) 20 MG tablet Take 20 mg by mouth once daily.     spironolactone (ALDACTONE) 25 MG tablet Take 25 mg by mouth.     Family History       Problem Relation (Age of Onset)    Diabetes Mother, Father    Leukemia Father          Tobacco Use    Smoking status: Former     Current packs/day: 0.00     Average packs/day: 1 pack/day for 8.0 years (8.0 ttl pk-yrs)     Types: Cigarettes     Start date:      Quit date:      Years since quittin.5    Smokeless tobacco: Never   Substance and Sexual Activity    Alcohol use: Yes     Comment: rarely    Drug use: No    Sexual activity: Not on file     Review of Systems   Constitutional: Positive for malaise/fatigue.   HENT: Negative.     Eyes: Negative.    Cardiovascular:  Positive for dyspnea on exertion.   Respiratory:  Positive for cough and shortness of breath.    Skin: Negative.    Musculoskeletal:  Positive for arthritis, back pain, muscle cramps and stiffness.   Gastrointestinal:  Positive for abdominal pain and vomiting.   Genitourinary: Negative.    Neurological:  Positive for weakness.   Psychiatric/Behavioral: Negative.       Objective:     Vital Signs (Most Recent):  Temp: 97.4 °F (36.3 °C) (24 1138)  Pulse: 82 (24 1138)  Resp: (!) 22 (24 1138)  BP: (!) 116/58 (24 1138)  SpO2: (!) 90 % (24 1138) Vital Signs (24h Range):  Temp:  [97.4 °F (36.3 °C)-98.9 °F (37.2 °C)] 97.4 °F (36.3 °C)  Pulse:  [70-84] 82  Resp:  [16-31] 22  SpO2:  [88 %-97 %] 90 %  BP: ()/(49-68) 116/58     Weight: 107.5 kg (237 lb)  Body mass index is 39.44 kg/m².    SpO2: (!) 90 %         Intake/Output Summary (Last 24 hours) at 2024 1350  Last data filed at 2024 0638  Gross per 24 hour   Intake --   Output 800 ml   Net -800 ml       Lines/Drains/Airways       Airway  Duration                  Airway - Non-Surgical 21 1256 919 days         Airway - Non-Surgical 21 1256 Nasal Cannula 919 days  "             Peripheral Intravenous Line  Duration                  Peripheral IV - Single Lumen 06/26/24 2146 20 G Left Antecubital <1 day                     Physical Exam  Vitals and nursing note reviewed.   Constitutional:       Appearance: Normal appearance. She is obese.   HENT:      Head: Normocephalic.   Eyes:      Pupils: Pupils are equal, round, and reactive to light.   Cardiovascular:      Rate and Rhythm: Normal rate and regular rhythm.      Heart sounds: Normal heart sounds, S1 normal and S2 normal. No murmur heard.     No S3 or S4 sounds.   Pulmonary:      Effort: Pulmonary effort is normal.      Breath sounds: Rales present.   Abdominal:      General: Bowel sounds are normal.      Palpations: Abdomen is soft.   Musculoskeletal:         General: Normal range of motion.      Cervical back: Normal range of motion.   Skin:     Capillary Refill: Capillary refill takes less than 2 seconds.   Neurological:      General: No focal deficit present.      Mental Status: She is alert and oriented to person, place, and time.      Motor: Weakness present.   Psychiatric:         Mood and Affect: Mood normal.         Behavior: Behavior normal.         Thought Content: Thought content normal.          Significant Labs: BMP:   Recent Labs   Lab 06/26/24 2145 06/27/24  0653   * 337*    140   K 4.7 4.5   * 112*   CO2 11* 11*   BUN 45* 52*   CREATININE 2.2* 2.4*   CALCIUM 9.2 8.9   MG  --  1.7   , CMP   Recent Labs   Lab 06/26/24 2145 06/27/24  0653    140   K 4.7 4.5   * 112*   CO2 11* 11*   * 337*   BUN 45* 52*   CREATININE 2.2* 2.4*   CALCIUM 9.2 8.9   PROT 6.1 5.9*   ALBUMIN 3.0* 2.7*   BILITOT 1.9* 1.3*   ALKPHOS 53* 47*   AST 14 12   ALT 12 12   ANIONGAP 19* 17*   , CBC   Recent Labs   Lab 06/26/24 2145   WBC 8.52   HGB 12.1   HCT 37.7   PLT 89*   , INR No results for input(s): "INR", "PROTIME" in the last 48 hours., Lipid Panel   Recent Labs   Lab 06/27/24  0539   CHOL 71* "   HDL 34*   LDLCALC 24.0*   TRIG 65   CHOLHDL 47.9   , Troponin   Recent Labs   Lab 06/26/24  2145 06/27/24  0129 06/27/24  0539   TROPONINI 0.054* 0.052* 0.055*   , and All pertinent lab results from the last 24 hours have been reviewed.    Significant Imaging: Cardiac Cath: reviewed, Echocardiogram: Transthoracic echo (TTE) complete (Cupid Only):   Results for orders placed or performed during the hospital encounter of 06/26/24   Echo   Result Value Ref Range    BSA 2.22 m2    LVOT stroke volume 64.78 cm3    LVIDd 5.25 3.5 - 6.0 cm    LV Systolic Volume 53.18 mL    LV Systolic Volume Index 25.0 mL/m2    LVIDs 3.57 2.1 - 4.0 cm    LV Diastolic Volume 132.50 mL    LV Diastolic Volume Index 62.21 mL/m2    Left Ventricular End Systolic Volume by Teichholz Method 53.18 mL    Left Ventricular End Diastolic Volume by Teichholz Method 132.50 mL    IVS 1.15 (A) 0.6 - 1.1 cm    LVOT diameter 1.87 cm    LVOT area 2.7 cm2    FS 32 28 - 44 %    Left Ventricle Relative Wall Thickness 0.44 cm    Posterior Wall 1.15 (A) 0.6 - 1.1 cm    LV mass 238.27 g    LV Mass Index 112 g/m2    MV Peak E Tenzin 1.40 m/s    TDI LATERAL 0.05 m/s    TDI SEPTAL 0.07 m/s    E/E' ratio 23.33 m/s    MV Peak A Tenzin 1.55 m/s    E/A ratio 0.90     IVRT 43.77 msec    E wave deceleration time 169.37 msec    LV SEPTAL E/E' RATIO 20.00 m/s    LV LATERAL E/E' RATIO 28.00 m/s    LVOT peak tenzin 1.16 m/s    Left Ventricular Outflow Tract Mean Velocity 0.79 cm/s    Left Ventricular Outflow Tract Mean Gradient 2.87 mmHg    RVOT peak VTI 11.6 cm    TAPSE 2.70 cm    LA size 4.45 cm    Left Atrium Minor Axis 5.98 cm    Left Atrium Major Axis 5.82 cm    RA Major Axis 4.79 cm    AV mean gradient 17 mmHg    AV peak gradient 28 mmHg    Ao peak tenzin 2.66 m/s    Ao VTI 52.10 cm    LVOT peak VTI 23.60 cm    AV valve area 1.24 cm²    AV Velocity Ratio 0.44     AV index (prosthetic) 0.45     LAYO by Velocity Ratio 1.20 cm²    Mr max tenzin 4.48 m/s    MV mean gradient 5 mmHg    MV  peak gradient 11 mmHg    MV stenosis pressure 1/2 time 53.78 ms    MV valve area p 1/2 method 4.09 cm2    MV valve area by continuity eq 1.36 cm2    MV VTI 47.5 cm    PV mean gradient 1 mmHg    PV peak gradient 1     RVOT peak shane 0.56 m/s    Ao root annulus 2.85 cm    STJ 3.08 cm    Ascending aorta 3.12 cm    IVC diameter 1.73 cm    Mean e' 0.06 m/s    ZLVIDS -1.17     ZLVIDD -2.57     LA Volume Index 51.3 mL/m2    LA volume 109.33 cm3    LA WIDTH 4.9 cm    RA Width 3.4 cm   , EKG: reviewed, Stress Test: reviewed, and X-Ray: CXR: X-Ray Chest 1 View (CXR): No results found for this visit on 06/26/24.

## 2024-06-27 NOTE — CONSULTS
"                    Food & Nutrition Education    Diet Education: Heart Failure  Time Spent: 15 minutes.    Learners: Pt, spouse    Nutrition Education provided with handouts:  Healthy-Heart Nutrition Therapy, Fluid-Restricted Diet, Low-Sodium Nutrition Therapy ([email or web address])    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 with type 2 diabetes mellitus    Vitamin D deficiency    Acute hypoxic respiratory failure    New onset of congestive heart failure    Community acquired pneumonia    Acute kidney injury superimposed on chronic kidney disease     Past Medical History:   Diagnosis Date    Cataract     Closed displaced comminuted fracture of right patella 10/28/2020    Closed fracture of surgical neck of left humerus 10/30/2020    Closed left radial fracture 10/30/2020    Diabetes mellitus type I     Hyperlipidemia     Hypertension     Morbid obesity     Right knee pain     Thyroid disease      Nutrition Related Social Determinants of Health:   SDOH: Adequate food in home environment    Comments:  Dietitian educated patient on low sodium diet and fluid restriction related to hospital diagnosis. Discussed the importance of limiting sodium to 2,000 mg per day and reading food labels to avoid further complications of CHF. Discussed using salt free seasonings (Mrs. [employer]ac[employer] "No Salt") and other herbs and spices in meals to enhance flavor without additional sodium. Discussed 1500 ml fluid restriction per MD " and dietary sources of fluid. Dietitian recommended using a cup with measurements for fluids and to try to consume small sips spread throughout the day rather than a lot at one time.    The pt remained engaged throughout entire nutrition education. The pt states she has not followed fluid restrictions prior to current admission however, she states she understands the information presented and will begin accounting for all sources of fluids/sodium throughout the day. The pt states she has no additional questions currently and will reach out through provided contacts if she does. She states her appetite has been decreased since yesterday d/t fluid build-up. Pt states she was only able to eat half of a banana for breakfast this morning. I ordered the pt Suplena w/ carb steady BID. Dietitian will continue to follow pt and monitor nutritional status during current admission.     NFPE not performed, pt appears well nourished.  All questions and concerns answered.  Provided handout with dietitian's contact information.  Please re-consult as needed.    Thank You!   Efren Mayberry MS, Registration Eligible, Provisional LDN

## 2024-06-27 NOTE — PLAN OF CARE
[facility] - Med Surg  Initial Discharge Assessment       Primary Care Provider: Keely Silva NP    Admission Diagnosis: SOB (shortness of breath) [R06.02]  Hypoxia [R09.02]  Elevated troponin [R79.89]  COPD with acute exacerbation [J44.1]  Pneumonia of right lower lobe due to infectious organism [J18.9]    Admission Date: 6/26/2024  Expected Discharge Date: per attending         Payor: BCBS MGD MEDICARE / Plan: [facility] [employer] [place] / Product Type: Medicare Advantage /     Extended Emergency Contact Information  Primary Emergency Contact: Dillon Campbell  Address: [id number] [place] [address] [address] [place] [place] [address] [place]  Mobile Phone: [phone]  Relation: Spouse    Discharge Plan A: Home         [id number] - [place], [place] - [address] AT [facility]  [address] [place] [place] [address]  Phone: [phone] Fax: [phone]    [id number] - [place], [place] [facility] [facility] [place] [address] [facility]  [place] [address]  Phone: [phone] Fax: [phone]      Initial Assessment (most recent)       Adult Discharge Assessment - 06/27/24 1111          Discharge Assessment    Assessment Type Discharge Planning Assessment     Confirmed/corrected address, phone number and insurance Yes     Confirmed Demographics Correct on Facesheet     Source of Information patient     When was your last doctors appointment? --   n/a    Communicated KYA with patient/caregiver Date not available/Unable to determine     Reason For Admission sob     People in Home spouse     Do you expect to return to your current living situation? Yes     Do you have help at home or someone to help you manage your care at home? Yes     Who are your caregiver(s) and their phone number(s)? spouse     Prior to hospitilization cognitive status: Alert/Oriented     Current cognitive status: Alert/Oriented     Walking or Climbing Stairs  Difficulty no     Dressing/Bathing Difficulty no     Equipment Currently Used at Home none     Readmission within 30 days? No     Patient currently being followed by outpatient case management? No     Do you currently have service(s) that help you manage your care at home? No     Do you take prescription medications? Yes     Do you have prescription coverage? Yes     Coverage bcbs     Do you have any problems affording any of your prescribed medications? No     Is the patient taking medications as prescribed? yes     Who is going to help you get home at discharge? spouse     How do you get to doctors appointments? family or friend will provide;car, drives self     Are you on dialysis? No     Do you take coumadin? No     Discharge Plan A Home

## 2024-06-27 NOTE — ASSESSMENT & PLAN NOTE
Patient with Hypercapnic and Hypoxic Respiratory failure which is Acute.  she is not on home oxygen. Supplemental oxygen was provided and noted-      .   Signs/symptoms of respiratory failure include- tachypnea and respiratory distress. Contributing diagnoses includes - CHF and Pneumonia Labs and images were reviewed. Patient Has recent ABG, which has been reviewed. Will treat underlying causes and adjust management of respiratory failure as follows- supplemental oxygen, duonebs, steroids, diuretics, antibiotics.

## 2024-06-27 NOTE — HPI
Lakshmi Campbell is a 73 y.o. female with a PMH  has a past medical history of Cataract, Closed displaced comminuted fracture of right patella (10/28/2020), Closed fracture of surgical neck of left humerus (10/30/2020), Closed left radial fracture (10/30/2020), Diabetes mellitus type I, Hyperlipidemia, Hypertension, Morbid obesity, Right knee pain, and Thyroid disease.  Presented to the ER for evaluation of sudden onset of shortness of breath with nonproductive cough and 1 episode of nonbloody/nonbilious vomiting as a result from coughing.  Patient reports his symptoms worsened when she exerts herself and with lying flat.  Denies any recent illnesses or sick contacts.  Denies history of CHF for COPD.  Reports she was a previous smoker, but quit years ago.  Denies any use of home oxygen for use of CPAP/BiPAP to sleep at night.  Denies swelling in her lower extremities.  Denies fever, aches, chills, sweats, chest pain, palpitations, abdominal pain common bladder/bowel complaints, or any other symptoms at this time.    ER workup revealed CBC to be unremarkable, BUN/creatinine of 45/2.2 with CBG of 293 mg/dL, BNP of 1, 003, troponin of 0.054, UA negative, and ABG revealing pH of 7.237, pCO2 of 27.3, PO2 of 60, and HC03 of 11.6.  Chest x-ray findings consistent with multilobar pneumonia.  EKG revealed sinus rhythm with first-degree AV block with a ventricular rate of 77 beats per minute and a QT/QTC of 374/423.  O2 sats initially 88% on room air, but improved to 97% on 2 liters/minute via nasal cannula.  Patient received a total of 3 DuoNebs, 500 mg azithromycin, 1 g Rocephin, 40 mg Lasix IV, 125 mg Solu-Medrol IV in ED. hospital Medicine consulted to admit patient for acute hypoxic respiratory failure in setting of new onset CHF and multilobar pneumonia.  Patient in agreement with treatment plan.  Patient admitted under inpatient status.    PCP: Keely Silva

## 2024-06-27 NOTE — H&P
[facility]  History & Physical    Patient Name: Lakshmi Campbell  MRN: [id number]  Patient Class: IP- Inpatient  Admission Date: 6/26/2024  Attending Physician: Cedrick Peña MD   Primary Care Provider: Keely Silva NP         Patient information was obtained from patient, past medical records, and ER records.     Subjective:     Principal Problem:Acute hypoxic respiratory failure    Chief Complaint:   Chief Complaint   Patient presents with    Shortness of Breath     Pt c/o shortness of breath started this morning. Began the more pt was coughing and vomiting which also began this morning. Denies hx of COPD or HF.         HPI: Lakshmi Campbell is a 73 y.o. female with a PMH  has a past medical history of Cataract, Closed displaced comminuted fracture of right patella (10/28/2020), Closed fracture of surgical neck of left humerus (10/30/2020), Closed left radial fracture (10/30/2020), Diabetes mellitus type I, Hyperlipidemia, Hypertension, Morbid obesity, Right knee pain, and Thyroid disease.  Presented to the ER for evaluation of sudden onset of shortness of breath with nonproductive cough and 1 episode of nonbloody/nonbilious vomiting as a result from coughing.  Patient reports his symptoms worsened when she exerts herself and with lying flat.  Denies any recent illnesses or sick contacts.  Denies history of CHF for COPD.  Reports she was a previous smoker, but quit years ago.  Denies any use of home oxygen for use of CPAP/BiPAP to sleep at night.  Denies swelling in her lower extremities.  Denies fever, aches, chills, sweats, chest pain, palpitations, abdominal pain common bladder/bowel complaints, or any other symptoms at this time.    ER workup revealed CBC to be unremarkable, BUN/creatinine of 45/2.2 with CBG of 293 mg/dL, BNP of 1, 003, troponin of 0.054, UA negative, and ABG revealing pH of 7.237, pCO2 of 27.3, PO2 of 60, and HC03 of 11.6.  Chest x-ray findings consistent  with multilobar pneumonia.  EKG revealed sinus rhythm with first-degree AV block with a ventricular rate of 77 beats per minute and a QT/QTC of 374/423.  O2 sats initially 88% on room air, but improved to 97% on 2 liters/minute via nasal cannula.  Patient received a total of 3 DuoNebs, 500 mg azithromycin, 1 g Rocephin, 40 mg Lasix IV, 125 mg Solu-Medrol IV in ED. hospital Medicine consulted to admit patient for acute hypoxic respiratory failure in setting of new onset CHF and multilobar pneumonia.  Patient in agreement with treatment plan.  Patient admitted under inpatient status.    PCP: Keely Silva    Past Medical History:   Diagnosis Date    Cataract     Closed displaced comminuted fracture of right patella 10/28/2020    Closed fracture of surgical neck of left humerus 10/30/2020    Closed left radial fracture 10/30/2020    Diabetes mellitus type I     Hyperlipidemia     Hypertension     Morbid obesity     Right knee pain     Thyroid disease        Past Surgical History:   Procedure Laterality Date    APPENDECTOMY      BREAST BIOPSY      CATARACT EXTRACTION      COLONOSCOPY N/A 12/21/2021    Procedure: COLONOSCOPY screening;  Surgeon: Moises Patterson MD;  Location: [facility];  Service: Endoscopy;  Laterality: N/A;    ESOPHAGOGASTRODUODENOSCOPY N/A 12/21/2021    Procedure: EGD (ESOPHAGOGASTRODUODENOSCOPY) EV screening;  Surgeon: Moises Patterson MD;  Location: [facility];  Service: Endoscopy;  Laterality: N/A;    EYE SURGERY      HERNIA REPAIR      OPEN REDUCTION AND INTERNAL FIXATION (ORIF) OF FRACTURE OF DISTAL RADIUS Left 11/2/2020    Procedure: ORIF, FRACTURE, RADIUS, DISTAL;  Surgeon: Sage Wolf MD;  Location: [facility];  Service: Orthopedics;  Laterality: Left;    OPEN REDUCTION AND INTERNAL FIXATION (ORIF) OF FRACTURE OF PATELLA Right 11/2/2020    Procedure: ORIF, FRACTURE, PATELLA;  Surgeon: Sage Wolf MD;  Location: [facility];  Service: Orthopedics;  Laterality:  Right;    OPEN REDUCTION AND INTERNAL FIXATION (ORIF) OF FRACTURE OF PATELLA Right 12/18/2020    Procedure: ORIF, FRACTURE, PATELLA;  Surgeon: Sage Wolf MD;  Location: [facility];  Service: Orthopedics;  Laterality: Right;    ORIF HUMERUS FRACTURE Left 11/5/2020    Procedure: ORIF, FRACTURE, HUMERUS;  Surgeon: Sage Wolf MD;  Location: [facility] OR;  Service: Orthopedics;  Laterality: Left;    PLACEMENT OF ACELLULAR HUMAN DERMAL ALLOGRAFT Right 11/2/2020    Procedure: APPLICATION, ACELLULAR HUMAN DERMAL ALLOGRAFT;  Surgeon: Sage Wolf MD;  Location: [facility] OR;  Service: Orthopedics;  Laterality: Right;  Right Patella    REMOVAL OF HARDWARE FROM HAND Left 3/11/2021    Procedure: REMOVAL, HARDWARE, HAND;  Surgeon: Sage Wolf MD;  Location: [facility];  Service: Orthopedics;  Laterality: Left;  Removal of dorsal spanning wrist plate left distal radius    REMOVAL OF HARDWARE FROM LOWER EXTREMITY Right 12/18/2020    Procedure: REMOVAL, HARDWARE, LOWER EXTREMITY;  Surgeon: Sage Wolf MD;  Location: [facility];  Service: Orthopedics;  Laterality: Right;       Review of patient's allergies indicates:   Allergen Reactions    Codeine Nausea Only     Other reaction(s): Nausea  Other reaction(s): Elevated blood pressure       No current facility-administered medications on file prior to encounter.     Current Outpatient Medications on File Prior to Encounter   Medication Sig    amLODIPine (NORVASC) 10 MG tablet TAKE 1 TABLET(10 MG) BY MOUTH EVERY DAY    benzonatate (TESSALON) 100 MG capsule Take 1 capsule (100 mg total) by mouth 3 (three) times daily as needed for Cough.    blood sugar diagnostic Strp Use ac bid    blood-glucose meter kit Use as instructed    cetirizine (ZYRTEC) 10 MG tablet TAKE 1 TABLET BY MOUTH ONCE DAILY    chlorthalidone (HYGROTEN) 25 MG Tab Take 25 mg by mouth once daily.    ergocalciferol (ERGOCALCIFEROL) 50,000 unit Cap Take 50,000 Units by mouth  "every 7 days.    fluticasone propionate (FLONASE) 50 mcg/actuation nasal spray SHAKE LIQUID AND USE 2 SPRAYS(100 MCG) IN EACH NOSTRIL EVERY DAY    HUMULIN 70/30 U-100 KWIKPEN 100 unit/mL (70-30) InPn pen SMARTSI Unit(s) SUB-Q Every Evening    insulin syringe-needle U-100 1 mL 29 gauge x 1/2" Syrg Use bid    JARDIANCE 25 mg tablet Take 25 mg by mouth every morning.    lancets 33 gauge Misc Use as BID    levothyroxine (SYNTHROID) 150 MCG tablet Take 1 tablet by mouth once daily.    metFORMIN (GLUCOPHAGE) 500 MG tablet Take 500 mg by mouth 2 (two) times daily.    perindopril erbumine (ACEON) 4 mg tablet Take 4 mg by mouth once daily.     pravastatin (PRAVACHOL) 20 MG tablet Take 20 mg by mouth once daily.     spironolactone (ALDACTONE) 25 MG tablet Take 25 mg by mouth.     Family History       Problem Relation (Age of Onset)    Diabetes Mother, Father    Leukemia Father          Tobacco Use    Smoking status: Former     Current packs/day: 0.00     Average packs/day: 1 pack/day for 8.0 years (8.0 ttl pk-yrs)     Types: Cigarettes     Start date:      Quit date:      Years since quittin.5    Smokeless tobacco: Never   Substance and Sexual Activity    Alcohol use: Yes     Comment: rarely    Drug use: No    Sexual activity: Not on file     Review of Systems   Constitutional:  Negative for chills, diaphoresis, fatigue and fever.   HENT:  Negative for congestion and sore throat.    Respiratory:  Positive for cough and shortness of breath. Negative for wheezing.    Cardiovascular:  Negative for chest pain, palpitations and leg swelling.   Gastrointestinal:  Negative for abdominal pain, diarrhea, nausea and vomiting.   Genitourinary:  Negative for dysuria, flank pain, hematuria and urgency.   Neurological:  Negative for dizziness, light-headedness and headaches.   All other systems reviewed and are negative.    Objective:     Vital Signs (Most Recent):  Temp: 98.9 °F (37.2 °C) (24 0054)  Pulse: 82 " (06/27/24 0434)  Resp: (!) 24 (06/27/24 0054)  BP: 131/60 (06/27/24 0054)  SpO2: (!) 90 % (06/27/24 0054) Vital Signs (24h Range):  Temp:  [98.7 °F (37.1 °C)-98.9 °F (37.2 °C)] 98.9 °F (37.2 °C)  Pulse:  [70-83] 82  Resp:  [20-31] 24  SpO2:  [88 %-97 %] 90 %  BP: (108-137)/(58-66) 131/60     Weight: 107.5 kg (237 lb)  Body mass index is 39.44 kg/m².     Physical Exam  Vitals and nursing note reviewed.   Constitutional:       General: She is awake. She is not in acute distress.     Appearance: Normal appearance. She is well-developed and well-groomed. She is not ill-appearing, toxic-appearing or diaphoretic.   HENT:      Head: Normocephalic and atraumatic.   Eyes:      Extraocular Movements: Extraocular movements intact.      Conjunctiva/sclera: Conjunctivae normal.   Cardiovascular:      Rate and Rhythm: Normal rate and regular rhythm.      Heart sounds: Normal heart sounds. No murmur heard.  Pulmonary:      Effort: Pulmonary effort is normal. No respiratory distress.      Breath sounds: Rhonchi and rales present.      Comments: 94% on 2 liters/minute via nasal cannula.  Abdominal:      General: Bowel sounds are normal.      Palpations: Abdomen is soft.      Tenderness: There is no abdominal tenderness.   Musculoskeletal:      Cervical back: Normal range of motion and neck supple.      Right lower leg: No edema.      Left lower leg: No edema.      Comments: 5/5 strength throughout   Skin:     General: Skin is warm and dry.      Capillary Refill: Capillary refill takes less than 2 seconds.   Neurological:      General: No focal deficit present.      Mental Status: She is alert and oriented to person, place, and time. Mental status is at baseline.      GCS: GCS eye subscore is 4. GCS verbal subscore is 5. GCS motor subscore is 6.      Cranial Nerves: Cranial nerves 2-12 are intact.      Sensory: Sensation is intact.      Motor: Motor function is intact.   Psychiatric:         Mood and Affect: Mood normal.          Speech: Speech normal.         Behavior: Behavior normal. Behavior is cooperative.              LABS:  Recent Results (from the past 24 hour(s))   CBC auto differential    Collection Time: 06/26/24  9:45 PM   Result Value Ref Range    WBC 8.52 3.90 - 12.70 K/uL    RBC 4.15 4.00 - 5.40 M/uL    Hemoglobin 12.1 12.0 - 16.0 g/dL    Hematocrit 37.7 37.0 - 48.5 %    MCV 91 82 - 98 fL    MCH 29.2 27.0 - 31.0 pg    MCHC 32.1 32.0 - 36.0 g/dL    RDW 13.0 11.5 - 14.5 %    Platelets 89 (L) 150 - 450 K/uL    MPV 10.9 9.2 - 12.9 fL    Immature Granulocytes 0.1 0.0 - 0.5 %    Gran # (ANC) 7.5 1.8 - 7.7 K/uL    Immature Grans (Abs) 0.01 0.00 - 0.04 K/uL    Lymph # 0.5 (L) 1.0 - 4.8 K/uL    Mono # 0.5 0.3 - 1.0 K/uL    Eos # 0.0 0.0 - 0.5 K/uL    Baso # 0.02 0.00 - 0.20 K/uL    nRBC 0 0 /100 WBC    Gran % 87.9 (H) 38.0 - 73.0 %    Lymph % 6.0 (L) 18.0 - 48.0 %    Mono % 5.8 4.0 - 15.0 %    Eosinophil % 0.0 0.0 - 8.0 %    Basophil % 0.2 0.0 - 1.9 %    Platelet Estimate Decreased (A)     Aniso Slight     Differential Method Automated    Comprehensive metabolic panel    Collection Time: 06/26/24  9:45 PM   Result Value Ref Range    Sodium 143 136 - 145 mmol/L    Potassium 4.7 3.5 - 5.1 mmol/L    Chloride 113 (H) 95 - 110 mmol/L    CO2 11 (L) 23 - 29 mmol/L    Glucose 293 (H) 70 - 110 mg/dL    BUN 45 (H) 8 - 23 mg/dL    Creatinine 2.2 (H) 0.5 - 1.4 mg/dL    Calcium 9.2 8.7 - 10.5 mg/dL    Total Protein 6.1 6.0 - 8.4 g/dL    Albumin 3.0 (L) 3.5 - 5.2 g/dL    Total Bilirubin 1.9 (H) 0.1 - 1.0 mg/dL    Alkaline Phosphatase 53 (L) 55 - 135 U/L    AST 14 10 - 40 U/L    ALT 12 10 - 44 U/L    eGFR 23 (A) >60 mL/min/1.73 m^2    Anion Gap 19 (H) 8 - 16 mmol/L   B-Type natriuretic peptide (BNP)    Collection Time: 06/26/24  9:45 PM   Result Value Ref Range    BNP 1,003 (H) 0 - 99 pg/mL   Troponin I    Collection Time: 06/26/24  9:45 PM   Result Value Ref Range    Troponin I 0.054 (H) 0.000 - 0.026 ng/mL   ISTAT PROCEDURE    Collection Time:  06/26/24 10:03 PM   Result Value Ref Range    POC PH 7.237 (LL) 7.35 - 7.45    POC PCO2 27.3 (LL) 35 - 45 mmHg    POC PO2 60 (L) 80 - 100 mmHg    POC HCO3 11.6 (L) 24 - 28 mmol/L    POC BE -16 (L) -2 to 2 mmol/L    POC SATURATED O2 87 95 - 100 %    Sample ARTERIAL     Site LR     Allens Test Pass     DelSys Room Air     Mode SPONT     FiO2 21    Urinalysis - Clean Catch    Collection Time: 06/26/24 11:07 PM   Result Value Ref Range    Specimen UA Urine, Clean Catch     Color, UA Yellow Yellow, Straw, Saray    Appearance, UA Clear Clear    pH, UA 6.0 5.0 - 8.0    Specific Gravity, UA 1.020 1.005 - 1.030    Protein, UA 2+ (A) Negative    Glucose, UA 4+ (A) Negative    Ketones, UA Negative Negative    Bilirubin (UA) Negative Negative    Occult Blood UA Trace (A) Negative    Nitrite, UA Negative Negative    Urobilinogen, UA Negative <2.0 EU/dL    Leukocytes, UA Negative Negative   Urinalysis Microscopic    Collection Time: 06/26/24 11:07 PM   Result Value Ref Range    RBC, UA 1 0 - 4 /hpf    WBC, UA 1 0 - 5 /hpf    Bacteria Rare None-Occ /hpf    Yeast, UA None None    Squam Epithel, UA 1 /hpf    Hyaline Casts, UA 0 0-1/lpf /lpf    Microscopic Comment SEE COMMENT    Troponin I    Collection Time: 06/27/24  1:29 AM   Result Value Ref Range    Troponin I 0.052 (H) 0.000 - 0.026 ng/mL   POCT glucose    Collection Time: 06/27/24  5:38 AM   Result Value Ref Range    POCT Glucose 297 (H) 70 - 110 mg/dL       RADIOLOGY  X-Ray Chest AP Portable    Result Date: 6/26/2024  EXAMINATION: XR CHEST AP PORTABLE CLINICAL HISTORY: Chest Pain; TECHNIQUE: Single frontal portable view of the chest was performed. COMPARISON: 2 October 2020 FINDINGS: There is airspace opacity right midlung and lesser inferior thorax.  Left lung well aerated.  No sizable pleural effusion     Right lung airspace opacity consistent with pneumonia multilobar Electronically signed by: Nallely Long Date:    06/26/2024  Time:    22:41      EKG    MICROBIOLOGY    MDM     Amount and/or Complexity of Data Reviewed  Clinical lab tests: reviewed  Tests in the radiology section of CPT®: reviewed  Tests in the medicine section of CPT®: reviewed  Discussion of test results with the performing providers: yes  Decide to obtain previous medical records or to obtain history from someone other than the patient: yes  Obtain history from someone other than the patient: yes  Review and summarize past medical records: yes  Discuss the patient with other providers: yes  Independent visualization of images, tracings, or specimens: yes        Assessment/Plan:     * Acute hypoxic respiratory failure  Patient with Hypercapnic and Hypoxic Respiratory failure which is Acute.  she is not on home oxygen. Supplemental oxygen was provided and noted-      .   Signs/symptoms of respiratory failure include- tachypnea and respiratory distress. Contributing diagnoses includes - CHF and Pneumonia Labs and images were reviewed. Patient Has recent ABG, which has been reviewed. Will treat underlying causes and adjust management of respiratory failure as follows- supplemental oxygen, duonebs, steroids, diuretics, antibiotics.    New onset of congestive heart failure  Patient is identified as having  unspecified  heart failure that is Acute. CHF is currently uncontrolled due to Weight gain of unknown pounds, Dyspnea not returned to baseline after 40mg lasix doses of IV diuretic, Rales/crackles on pulmonary exam, and Pulmonary edema/pleural effusion on CXR. Latest ECHO performed and demonstrates- No results found for this or any previous visit.  . Continue ACE/ARB and Aldactone and monitor clinical status closely. Monitor on telemetry. Patient is on CHF pathway.  Monitor strict Is&Os and daily weights.  Place on fluid restriction of 1.5 L. Cardiology has been consulted. Continue to stress to patient importance of self efficacy and  on diet for CHF. Last BNP  reviewed- and noted below   Recent Labs   Lab 06/26/24  2145   BNP 1,003*       Community acquired pneumonia  Patient has a diagnosis of pneumonia. The cause of the pneumonia is unknown at this time. The pneumonia is stable. The patient has the following signs/symptoms of pneumonia: persistent hypoxia , cough, and shortness of breath. The patient does have a current oxygen requirement and the patient does not have a home oxygen requirement. I have reviewed the pertinent imaging. The following cultures have been collected: Blood cultures The culture results are listed below.     Current antimicrobial regimen consists of the antibiotics listed below. Will monitor patient closely and continue current treatment plan unchanged.    Antibiotics (From admission, onward)      Start     Stop Route Frequency Ordered    06/27/24 2300  cefTRIAXone (Rocephin) 1 g in D5W 100 mL IVPB (MB+)         06/29/24 2259 IV Every 24 hours (non-standard times) 06/27/24 0058    06/27/24 2300  azithromycin (ZITHROMAX) 500 mg in D5W 250 mL IVPB (Vial-Mate)         07/01/24 2259 IV Every 24 hours (non-standard times) 06/27/24 0058            Microbiology Results (last 7 days)       ** No results found for the last 168 hours. **            Acute kidney injury superimposed on chronic kidney disease  Patient with acute kidney injury/acute renal failure. MIKA is currently  being treated . Baseline creatinine  between 1.3-1.7  - Labs reviewed- Renal function/electrolytes with Estimated Creatinine Clearance: 27.8 mL/min (A) (based on SCr of 2.2 mg/dL (H)). according to latest data. Monitor urine output and serial BMP and adjust therapy as needed. Avoid nephrotoxins and renally dose meds for GFR listed above.    Type 2 diabetes mellitus with microalbuminuria, with long-term current use of insulin  Patient's FSGs are uncontrolled due to hyperglycemia on current medication regimen.  Last A1c reviewed-   Lab Results   Component Value Date    HGBA1C 6.8 (H)  03/19/2024     Most recent fingerstick glucose reviewed-   Recent Labs   Lab 06/27/24  0538   POCTGLUCOSE 297*     Current correctional scale  Low  Maintain anti-hyperglycemic dose as follows-   Antihyperglycemics (From admission, onward)      Start     Stop Route Frequency Ordered    06/27/24 0203  insulin aspart U-100 pen 0-5 Units         -- SubQ Before meals & nightly PRN 06/27/24 0103          Plan:  -SSI  -A1c  -Accu-checks  -Hold oral hypoglycemics while patient is in the hospital  -Continue home long-acting insulin at 20% decrease, titrate up as needed  -Hypoglycemic protocol          Essential hypertension  Chronic, controlled. Latest blood pressure and vitals reviewed-     Temp:  [98.7 °F (37.1 °C)-98.9 °F (37.2 °C)]   Pulse:  [70-83]   Resp:  [20-31]   BP: (108-137)/(58-66)   SpO2:  [88 %-97 %] .   Home meds for hypertension were reviewed and noted below.   Hypertension Medications               amLODIPine (NORVASC) 10 MG tablet TAKE 1 TABLET(10 MG) BY MOUTH EVERY DAY    chlorthalidone (HYGROTEN) 25 MG Tab Take 25 mg by mouth once daily.    perindopril erbumine (ACEON) 4 mg tablet Take 4 mg by mouth once daily.     spironolactone (ALDACTONE) 25 MG tablet Take 25 mg by mouth.            While in the hospital, will manage blood pressure as follows; Continue home antihypertensive regimen (norvasc), but hold chlorthalidone, Aldactone, Aceon. Receiving iv Lasix for diuresing of CHF exacerbation    Will utilize p.r.n. blood pressure medication only if patient's blood pressure greater than 160/100 and she develops symptoms such as worsening chest pain or shortness of breath.    Hypothyroidism  Patient has chronic hypothyroidism. TFTs reviewed-   Lab Results   Component Value Date    TSH 0.02 (L) 03/19/2024   . Will continue chronic levothyroxine and adjust for and clinical changes.        Hyperlipidemia  Patient is chronically on statin.will continue for now. Last Lipid Panel:   Lab Results   Component Value  Date    CHOL 119 10/14/2019    HDL 46 10/14/2019    LDLCALC 55 10/14/2019    TRIG 89 10/14/2019    CHOLHDL 29.1 03/05/2012     Plan:  -Continue home medication  -low fat/low calorie diet          VTE Risk Mitigation (From admission, onward)           Ordered     enoxaparin injection 30 mg  Daily         06/27/24 0103     IP VTE HIGH RISK PATIENT  Once         06/27/24 0103     Place sequential compression device  Until discontinued         06/27/24 0103                  //Core Measures   -DVT proph: SCDs, lovenox  -Code status Full    -Surrogate:none present    Components of this note were documented using a voice recognition system and are subject to errors not corrected at the time the document was proof read. Please contact the author for any clarifications.        Leonardo Peña NP  Department of Hospital Medicine  [facility]

## 2024-06-27 NOTE — ASSESSMENT & PLAN NOTE
Patient's FSGs are uncontrolled due to hyperglycemia on current medication regimen.  Last A1c reviewed-   Lab Results   Component Value Date    HGBA1C 6.8 (H) 03/19/2024     Most recent fingerstick glucose reviewed-   Recent Labs   Lab 06/27/24  0538   POCTGLUCOSE 297*     Current correctional scale  Low  Maintain anti-hyperglycemic dose as follows-   Antihyperglycemics (From admission, onward)      Start     Stop Route Frequency Ordered    06/27/24 0203  insulin aspart U-100 pen 0-5 Units         -- SubQ Before meals & nightly PRN 06/27/24 0103          Plan:  -SSI  -A1c  -Accu-checks  -Hold oral hypoglycemics while patient is in the hospital  -Continue home long-acting insulin at 20% decrease, titrate up as needed  -Hypoglycemic protocol

## 2024-06-27 NOTE — ASSESSMENT & PLAN NOTE
Patient is identified as having  unspecified  heart failure that is Acute. CHF is currently uncontrolled due to Weight gain of unknown pounds, Dyspnea not returned to baseline after 40mg lasix doses of IV diuretic, Rales/crackles on pulmonary exam, and Pulmonary edema/pleural effusion on CXR. Latest ECHO performed and demonstrates- No results found for this or any previous visit.  . Continue ACE/ARB and Aldactone and monitor clinical status closely. Monitor on telemetry. Patient is on CHF pathway.  Monitor strict Is&Os and daily weights.  Place on fluid restriction of 1.5 L. Cardiology has been consulted. Continue to stress to patient importance of self efficacy and  on diet for CHF. Last BNP reviewed- and noted below   Recent Labs   Lab 06/26/24  2145   BNP 1,003*

## 2024-06-27 NOTE — PROGRESS NOTES
"[facility]) nurse navigator notified of HFT[facility] candidate in need of education and introduction to 4-6 week program.      PT aao x 3 while lying in bed  with  at bedside. Introduced self to pt as HFT[facility] nurse navigator.     Patient given "Home Care Guide for Heart Failure Patients" , "[facility]" flyer and "Daily weight and symptom tracker".  Encouraged pt and  to review information.      Reviewed the following key points of HFT[facility] program with pt and family:   1.) Take your medications as directed.    2.) Weight yourself daily   3.) Follow low salt and limited fluid diet.    4.) Stop smoking and start exercising   5.) Go to your appointments and call your team.      Pt reminded to follow Symptom tracker and to call at the onset of symptoms according to tracker.     Reviewed plan for follow up once discharged to include phone calls, in person and virtual visits to assist pt optimizing their heart failure medication regimen and encouraging healthy lifestyle modifications.  Reminded pt that program will assist them over the next 4-6 weeks and then patient will be transferred to long term care provider .  Reminded pt how to contact HFT[facility] navigator via phone and or via [employer].     Pt given appointment or instructed appointment will be printed on hospital discharge paperwork.     Pt also reminded HF nurse will call 48-72 hours after discharge to check on them.     PT and  verbalize read back of information given.  Encouraged pt and family to read over information often and contact team with any questions or concerns.      "

## 2024-06-27 NOTE — ED PROVIDER NOTES
SCRIBE #1 NOTE: I, Roosevelt Maloney, am scribing for, and in the presence of, Tara Whitfield MD. I have scribed the entire note.       History     Chief Complaint   Patient presents with    Shortness of Breath     Pt c/o shortness of breath started this morning. Began the more pt was coughing and vomiting which also began this morning. Denies hx of COPD or HF.      Review of patient's allergies indicates:   Allergen Reactions    Codeine Nausea Only     Other reaction(s): Nausea  Other reaction(s): Elevated blood pressure         History of Present Illness     HPI    6/26/2024, 9:49 PM  History obtained from the patient      History of Present Illness: Lakshmi Campbell is a 73 y.o. female patient with a PMHx of DM and HTN who presents to the Emergency Department for evaluation of SOB which onset this morning PTA. Patient began coughing and vomiting.  Symptoms are constant and moderate in severity. No mitigating or exacerbating factors reported. Patient denies any fever, cp, ha, diarrhea, chills, and all other sxs at this time. No further complaints or concerns at this time.       Arrival mode: Personal vehicle    PCP: Keely Silva NP        Past Medical History:  Past Medical History:   Diagnosis Date    Cataract     Closed displaced comminuted fracture of right patella 10/28/2020    Closed fracture of surgical neck of left humerus 10/30/2020    Closed left radial fracture 10/30/2020    Diabetes mellitus type I     Hyperlipidemia     Hypertension     Morbid obesity     Right knee pain     Thyroid disease        Past Surgical History:  Past Surgical History:   Procedure Laterality Date    APPENDECTOMY      BREAST BIOPSY      CATARACT EXTRACTION      COLONOSCOPY N/A 12/21/2021    Procedure: COLONOSCOPY screening;  Surgeon: Moises Patterson MD;  Location: [facility];  Service: Endoscopy;  Laterality: N/A;    ESOPHAGOGASTRODUODENOSCOPY N/A 12/21/2021    Procedure: EGD (ESOPHAGOGASTRODUODENOSCOPY) EV screening;   Surgeon: Moises Patterson MD;  Location: [facility] ENDO;  Service: Endoscopy;  Laterality: N/A;    EYE SURGERY      HERNIA REPAIR      OPEN REDUCTION AND INTERNAL FIXATION (ORIF) OF FRACTURE OF DISTAL RADIUS Left 11/2/2020    Procedure: ORIF, FRACTURE, RADIUS, DISTAL;  Surgeon: Sage Wolf MD;  Location: [facility] OR;  Service: Orthopedics;  Laterality: Left;    OPEN REDUCTION AND INTERNAL FIXATION (ORIF) OF FRACTURE OF PATELLA Right 11/2/2020    Procedure: ORIF, FRACTURE, PATELLA;  Surgeon: Sage Wolf MD;  Location: [facility] OR;  Service: Orthopedics;  Laterality: Right;    OPEN REDUCTION AND INTERNAL FIXATION (ORIF) OF FRACTURE OF PATELLA Right 12/18/2020    Procedure: ORIF, FRACTURE, PATELLA;  Surgeon: Sage Wolf MD;  Location: [facility] OR;  Service: Orthopedics;  Laterality: Right;    ORIF HUMERUS FRACTURE Left 11/5/2020    Procedure: ORIF, FRACTURE, HUMERUS;  Surgeon: Sage Wolf MD;  Location: [facility] OR;  Service: Orthopedics;  Laterality: Left;    PLACEMENT OF ACELLULAR HUMAN DERMAL ALLOGRAFT Right 11/2/2020    Procedure: APPLICATION, ACELLULAR HUMAN DERMAL ALLOGRAFT;  Surgeon: Sage Wolf MD;  Location: [facility] OR;  Service: Orthopedics;  Laterality: Right;  Right Patella    REMOVAL OF HARDWARE FROM HAND Left 3/11/2021    Procedure: REMOVAL, HARDWARE, HAND;  Surgeon: Sage Wolf MD;  Location: [facility] OR;  Service: Orthopedics;  Laterality: Left;  Removal of dorsal spanning wrist plate left distal radius    REMOVAL OF HARDWARE FROM LOWER EXTREMITY Right 12/18/2020    Procedure: REMOVAL, HARDWARE, LOWER EXTREMITY;  Surgeon: Sage Wolf MD;  Location: [facility];  Service: Orthopedics;  Laterality: Right;         Family History:  Family History   Problem Relation Name Age of Onset    Diabetes Mother      Leukemia Father      Diabetes Father         Social History:  Social History     Tobacco Use    Smoking status: Former     Current packs/day:  0.00     Average packs/day: 1 pack/day for 8.0 years (8.0 ttl pk-yrs)     Types: Cigarettes     Start date:      Quit date:      Years since quittin.5    Smokeless tobacco: Never   Substance and Sexual Activity    Alcohol use: Yes     Comment: rarely    Drug use: No    Sexual activity: Not on file        Review of Systems     Review of Systems   Constitutional:  Negative for chills and fever.   HENT:  Negative for sore throat.    Respiratory:  Positive for cough and shortness of breath.    Cardiovascular:  Negative for chest pain.   Gastrointestinal:  Positive for vomiting. Negative for diarrhea and nausea.   Genitourinary:  Negative for dysuria.   Musculoskeletal:  Negative for back pain.   Skin:  Negative for rash.   Neurological:  Negative for weakness and headaches.   Hematological:  Does not bruise/bleed easily.   All other systems reviewed and are negative.       Physical Exam     Initial Vitals   BP Pulse Resp Temp SpO2   24 2130 24 2130 24 2130 24 2345 24 213   108/61 83 (!) 22 98.7 °F (37.1 °C) (!) 88 %      MAP       --                 Physical Exam  Nursing Notes and Vital Signs Reviewed.  Constitutional: Patient is in no apparent distress. Well-developed and well-nourished.  Head: Atraumatic. Normocephalic.  Eyes: PERRL. EOM intact. Conjunctivae are not pale. No scleral icterus.  ENT: Mucous membranes are moist. Oropharynx is clear and symmetric. 2 L nasal cannula   Neck: Supple. Full ROM. No lymphadenopathy.  Cardiovascular: Regular rate. Regular rhythm. No murmurs, rubs, or gallops. Distal pulses are 2+ and symmetric.  Pulmonary/Chest: No respiratory distress. Bilateral expiratory wheezing, crackles or rales greater on the R side.  Abdominal: Soft and non-distended.  There is no tenderness.  No rebound, guarding, or rigidity. Good bowel sounds.  Genitourinary: No CVA tenderness  Musculoskeletal: Moves all extremities. No obvious deformities. No edema. No calf  tenderness.  Skin: Warm and dry.  Neurological:  Alert, awake, and appropriate.  Normal speech.  No acute focal neurological deficits are appreciated.  Psychiatric: Normal affect. Good eye contact. Appropriate in content.     ED Course   Procedures  ED Vital Signs:  Vitals:    06/27/24 1600 06/27/24 1628 06/27/24 1701 06/27/24 2000   BP:  130/60     Pulse: 81 82 76 80   Resp:  (!) 22     Temp:  98 °F (36.7 °C)     TempSrc:  Oral     SpO2:  (!) 90%     Weight:       Height:        06/27/24 2007 06/27/24 2103 06/27/24 2337 06/28/24 0400   BP:  (!) 145/65 (!) 121/95    Pulse: 77 82 90 86   Resp: 20 18 18    Temp:  98.3 °F (36.8 °C) 97.7 °F (36.5 °C)    TempSrc:  Oral Oral    SpO2: 95% (!) 93% (!) 93%    Weight:       Height:        06/28/24 0432 06/28/24 0711 06/28/24 0758 06/28/24 0821   BP: 135/63 135/60     Pulse: 88 85  87   Resp: 18 16     Temp: 98.2 °F (36.8 °C) 97.9 °F (36.6 °C)     TempSrc: Oral      SpO2: (!) 92% (!) 94% (!) 94%    Weight:       Height:        06/28/24 1223 06/28/24 1549 06/28/24 1550   BP: (!) 152/65 (!) 142/65    Pulse: 81 85 89   Resp: 18 18    Temp: 100.2 °F (37.9 °C) 98.4 °F (36.9 °C)    TempSrc: Oral Oral    SpO2: (!) 93% (!) 92%    Weight:      Height:          Abnormal Lab Results:  Labs Reviewed   CBC W/ AUTO DIFFERENTIAL - Abnormal; Notable for the following components:       Result Value    Platelets 89 (*)     Lymph # 0.5 (*)     Gran % 87.9 (*)     Lymph % 6.0 (*)     Platelet Estimate Decreased (*)     All other components within normal limits   COMPREHENSIVE METABOLIC PANEL - Abnormal; Notable for the following components:    Chloride 113 (*)     CO2 11 (*)     Glucose 293 (*)     BUN 45 (*)     Creatinine 2.2 (*)     Albumin 3.0 (*)     Total Bilirubin 1.9 (*)     Alkaline Phosphatase 53 (*)     eGFR 23 (*)     Anion Gap 19 (*)     All other components within normal limits   URINALYSIS - Abnormal; Notable for the following components:    Protein, UA 2+ (*)     Glucose, UA 4+  (*)     Occult Blood UA Trace (*)     All other components within normal limits   B-TYPE NATRIURETIC PEPTIDE - Abnormal; Notable for the following components:    BNP 1,003 (*)     All other components within normal limits   TROPONIN I - Abnormal; Notable for the following components:    Troponin I 0.054 (*)     All other components within normal limits   ISTAT PROCEDURE - Abnormal; Notable for the following components:    POC PH 7.237 (*)     POC PCO2 27.3 (*)     POC PO2 60 (*)     POC HCO3 11.6 (*)     POC BE -16 (*)     All other components within normal limits   URINALYSIS MICROSCOPIC        All Lab Results:  Results for orders placed or performed during the hospital encounter of 06/26/24   CBC auto differential   Result Value Ref Range    WBC 8.52 3.90 - 12.70 K/uL    RBC 4.15 4.00 - 5.40 M/uL    Hemoglobin 12.1 12.0 - 16.0 g/dL    Hematocrit 37.7 37.0 - 48.5 %    MCV 91 82 - 98 fL    MCH 29.2 27.0 - 31.0 pg    MCHC 32.1 32.0 - 36.0 g/dL    RDW 13.0 11.5 - 14.5 %    Platelets 89 (L) 150 - 450 K/uL    MPV 10.9 9.2 - 12.9 fL    Immature Granulocytes 0.1 0.0 - 0.5 %    Gran # (ANC) 7.5 1.8 - 7.7 K/uL    Immature Grans (Abs) 0.01 0.00 - 0.04 K/uL    Lymph # 0.5 (L) 1.0 - 4.8 K/uL    Mono # 0.5 0.3 - 1.0 K/uL    Eos # 0.0 0.0 - 0.5 K/uL    Baso # 0.02 0.00 - 0.20 K/uL    nRBC 0 0 /100 WBC    Gran % 87.9 (H) 38.0 - 73.0 %    Lymph % 6.0 (L) 18.0 - 48.0 %    Mono % 5.8 4.0 - 15.0 %    Eosinophil % 0.0 0.0 - 8.0 %    Basophil % 0.2 0.0 - 1.9 %    Platelet Estimate Decreased (A)     Aniso Slight     Differential Method Automated    Comprehensive metabolic panel   Result Value Ref Range    Sodium 143 136 - 145 mmol/L    Potassium 4.7 3.5 - 5.1 mmol/L    Chloride 113 (H) 95 - 110 mmol/L    CO2 11 (L) 23 - 29 mmol/L    Glucose 293 (H) 70 - 110 mg/dL    BUN 45 (H) 8 - 23 mg/dL    Creatinine 2.2 (H) 0.5 - 1.4 mg/dL    Calcium 9.2 8.7 - 10.5 mg/dL    Total Protein 6.1 6.0 - 8.4 g/dL    Albumin 3.0 (L) 3.5 - 5.2 g/dL    Total  Bilirubin 1.9 (H) 0.1 - 1.0 mg/dL    Alkaline Phosphatase 53 (L) 55 - 135 U/L    AST 14 10 - 40 U/L    ALT 12 10 - 44 U/L    eGFR 23 (A) >60 mL/min/1.73 m^2    Anion Gap 19 (H) 8 - 16 mmol/L   Urinalysis - Clean Catch   Result Value Ref Range    Specimen UA Urine, Clean Catch     Color, UA Yellow Yellow, Straw, Saray    Appearance, UA Clear Clear    pH, UA 6.0 5.0 - 8.0    Specific Gravity, UA 1.020 1.005 - 1.030    Protein, UA 2+ (A) Negative    Glucose, UA 4+ (A) Negative    Ketones, UA Negative Negative    Bilirubin (UA) Negative Negative    Occult Blood UA Trace (A) Negative    Nitrite, UA Negative Negative    Urobilinogen, UA Negative <2.0 EU/dL    Leukocytes, UA Negative Negative   B-Type natriuretic peptide (BNP)   Result Value Ref Range    BNP 1,003 (H) 0 - 99 pg/mL   Troponin I   Result Value Ref Range    Troponin I 0.054 (H) 0.000 - 0.026 ng/mL   Urinalysis Microscopic   Result Value Ref Range    RBC, UA 1 0 - 4 /hpf    WBC, UA 1 0 - 5 /hpf    Bacteria Rare None-Occ /hpf    Yeast, UA None None    Squam Epithel, UA 1 /hpf    Hyaline Casts, UA 0 0-1/lpf /lpf    Microscopic Comment SEE COMMENT    Troponin I   Result Value Ref Range    Troponin I 0.052 (H) 0.000 - 0.026 ng/mL   Troponin I   Result Value Ref Range    Troponin I 0.055 (H) 0.000 - 0.026 ng/mL   Lipid panel   Result Value Ref Range    Cholesterol 71 (L) 120 - 199 mg/dL    Triglycerides 65 30 - 150 mg/dL    HDL 34 (L) 40 - 75 mg/dL    LDL Cholesterol 24.0 (L) 63.0 - 159.0 mg/dL    HDL/Cholesterol Ratio 47.9 20.0 - 50.0 %    Total Cholesterol/HDL Ratio 2.1 2.0 - 5.0    Non-HDL Cholesterol 37 mg/dL   Hemoglobin A1C   Result Value Ref Range    Hemoglobin A1C 5.9 (H) 4.0 - 5.6 %    Estimated Avg Glucose 123 68 - 131 mg/dL   Magnesium   Result Value Ref Range    Magnesium 1.7 1.6 - 2.6 mg/dL   TSH   Result Value Ref Range    TSH 0.032 (L) 0.400 - 4.000 uIU/mL   T4, Free   Result Value Ref Range    Free T4 1.30 0.71 - 1.51 ng/dL   Comprehensive metabolic  panel   Result Value Ref Range    Sodium 140 136 - 145 mmol/L    Potassium 4.5 3.5 - 5.1 mmol/L    Chloride 112 (H) 95 - 110 mmol/L    CO2 11 (L) 23 - 29 mmol/L    Glucose 337 (H) 70 - 110 mg/dL    BUN 52 (H) 8 - 23 mg/dL    Creatinine 2.4 (H) 0.5 - 1.4 mg/dL    Calcium 8.9 8.7 - 10.5 mg/dL    Total Protein 5.9 (L) 6.0 - 8.4 g/dL    Albumin 2.7 (L) 3.5 - 5.2 g/dL    Total Bilirubin 1.3 (H) 0.1 - 1.0 mg/dL    Alkaline Phosphatase 47 (L) 55 - 135 U/L    AST 12 10 - 40 U/L    ALT 12 10 - 44 U/L    eGFR 21 (A) >60 mL/min/1.73 m^2    Anion Gap 17 (H) 8 - 16 mmol/L   Basic metabolic panel   Result Value Ref Range    Sodium 139 136 - 145 mmol/L    Potassium 4.8 3.5 - 5.1 mmol/L    Chloride 108 95 - 110 mmol/L    CO2 17 (L) 23 - 29 mmol/L    Glucose 230 (H) 70 - 110 mg/dL    BUN 67 (H) 8 - 23 mg/dL    Creatinine 2.6 (H) 0.5 - 1.4 mg/dL    Calcium 8.8 8.7 - 10.5 mg/dL    Anion Gap 14 8 - 16 mmol/L    eGFR 19 (A) >60 mL/min/1.73 m^2   Brain natriuretic peptide   Result Value Ref Range     (H) 0 - 99 pg/mL   Procalcitonin   Result Value Ref Range    Procalcitonin 50.73 (H) <0.25 ng/mL   EKG 12-lead   Result Value Ref Range    QRS Duration 82 ms    OHS QTC Calculation 423 ms   Echo   Result Value Ref Range    BSA 2.22 m2    LVOT stroke volume 64.78 cm3    LVIDd 5.25 3.5 - 6.0 cm    LV Systolic Volume 53.18 mL    LV Systolic Volume Index 25.0 mL/m2    LVIDs 3.57 2.1 - 4.0 cm    LV Diastolic Volume 132.50 mL    LV Diastolic Volume Index 62.21 mL/m2    Left Ventricular End Systolic Volume by Teichholz Method 53.18 mL    Left Ventricular End Diastolic Volume by Teichholz Method 132.50 mL    IVS 1.15 (A) 0.6 - 1.1 cm    LVOT diameter 1.87 cm    LVOT area 2.7 cm2    FS 32 28 - 44 %    Left Ventricle Relative Wall Thickness 0.44 cm    Posterior Wall 1.15 (A) 0.6 - 1.1 cm    LV mass 238.27 g    LV Mass Index 112 g/m2    MV Peak E Tenzin 1.40 m/s    TDI LATERAL 0.05 m/s    TDI SEPTAL 0.07 m/s    E/E' ratio 23.33 m/s    MV Peak A Tenzin  1.55 m/s    E/A ratio 0.90     IVRT 43.77 msec    E wave deceleration time 169.37 msec    LV SEPTAL E/E' RATIO 20.00 m/s    LV LATERAL E/E' RATIO 28.00 m/s    LVOT peak shane 1.16 m/s    Left Ventricular Outflow Tract Mean Velocity 0.79 cm/s    Left Ventricular Outflow Tract Mean Gradient 2.87 mmHg    RVOT peak VTI 11.6 cm    TAPSE 2.70 cm    LA size 4.45 cm    Left Atrium Minor Axis 5.98 cm    Left Atrium Major Axis 5.82 cm    RA Major Axis 4.79 cm    AV mean gradient 17 mmHg    AV peak gradient 28 mmHg    Ao peak shane 2.66 m/s    Ao VTI 52.10 cm    LVOT peak VTI 23.60 cm    AV valve area 1.24 cm²    AV Velocity Ratio 0.44     AV index (prosthetic) 0.45     LAYO by Velocity Ratio 1.20 cm²    Mr max shane 4.48 m/s    MV mean gradient 5 mmHg    MV peak gradient 11 mmHg    MV stenosis pressure 1/2 time 53.78 ms    MV valve area p 1/2 method 4.09 cm2    MV valve area by continuity eq 1.36 cm2    MV VTI 47.5 cm    PV mean gradient 1 mmHg    RVOT peak shane 0.56 m/s    Ao root annulus 2.85 cm    STJ 3.08 cm    Ascending aorta 3.12 cm    IVC diameter 1.73 cm    Mean e' 0.06 m/s    ZLVIDS -1.17     ZLVIDD -2.57     LA Volume Index 51.3 mL/m2    LA volume 109.33 cm3    LA WIDTH 4.9 cm    RA Width 3.4 cm    EF 60 %    Est. RA pres 3 mmHg   ISTAT PROCEDURE   Result Value Ref Range    POC PH 7.237 (LL) 7.35 - 7.45    POC PCO2 27.3 (LL) 35 - 45 mmHg    POC PO2 60 (L) 80 - 100 mmHg    POC HCO3 11.6 (L) 24 - 28 mmol/L    POC BE -16 (L) -2 to 2 mmol/L    POC SATURATED O2 87 95 - 100 %    Sample ARTERIAL     Site LR     Allens Test Pass     DelSys Room Air     Mode SPONT     FiO2 21    POCT glucose   Result Value Ref Range    POCT Glucose 297 (H) 70 - 110 mg/dL   POCT glucose   Result Value Ref Range    POCT Glucose 312 (H) 70 - 110 mg/dL   POCT glucose   Result Value Ref Range    POCT Glucose 314 (H) 70 - 110 mg/dL   POCT glucose   Result Value Ref Range    POCT Glucose 255 (H) 70 - 110 mg/dL   POCT glucose   Result Value Ref Range     POCT Glucose 258 (H) 70 - 110 mg/dL   POCT glucose   Result Value Ref Range    POCT Glucose 202 (H) 70 - 110 mg/dL   POCT glucose   Result Value Ref Range    POCT Glucose 171 (H) 70 - 110 mg/dL         Imaging Results:  Imaging Results              X-Ray Chest AP Portable (Final result)  Result time 06/26/24 22:41:22      Final result by Nallely Long MD (06/26/24 22:41:22)                   Impression:      Right lung airspace opacity consistent with pneumonia multilobar      Electronically signed by: Nallely Long  Date:    06/26/2024  Time:    22:41               Narrative:    EXAMINATION:  XR CHEST AP PORTABLE    CLINICAL HISTORY:  Chest Pain;    TECHNIQUE:  Single frontal portable view of the chest was performed.    COMPARISON:  2 October 2020    FINDINGS:  There is airspace opacity right midlung and lesser inferior thorax.  Left lung well aerated.  No sizable pleural effusion                                       The EKG was ordered, reviewed, and independently interpreted by the ED provider.  Interpretation time: 09:44  Rate: 77 BPM  Rhythm: Sinus rhythm with 1st degree AV block.   Interpretation: Low voltage QRS. Cannot rule out Anterior infarct. No STEMI.             The Emergency Provider reviewed the vital signs and test results, which are outlined above.     ED Discussion     12:01 AM: Discussed case with Dr. Peña ([facility]). Dr. Peña agrees with current care and management of pt and accepts admission.   Admitting Service: Inpatient  Admitting Physician: Dr. Peña  Admit to: Med Tele             Medical Decision Making  72 yo female c/o SOB and coughing that started this am. CXR right multlobar pneumonia. Room air sats 88%, now on 2L NC. pH 7.237, pO2 60 pCO2 27.3. 1st troponin 0.054, bnp 1003. rocephin and zithromax given. Lasix 40 mg IV given for diuresis.    Amount and/or Complexity of Data Reviewed  Independent Historian: caregiver     Details: Family members at  bedside  External Data Reviewed: labs, radiology, ECG and notes.  Labs: ordered. Decision-making details documented in ED Course.  Radiology: ordered and independent interpretation performed. Decision-making details documented in ED Course.  ECG/medicine tests: ordered and independent interpretation performed. Decision-making details documented in ED Course.    Risk  OTC drugs.  Prescription drug management.  Decision regarding hospitalization.    Critical Care  Total time providing critical care: 60 minutes                ED Medication(s):  Medications   cefTRIAXone (Rocephin) 1 g in D5W 100 mL IVPB (MB+) (0 g Intravenous Stopped 6/28/24 9173)   azithromycin (ZITHROMAX) 500 mg in D5W 250 mL IVPB (Vial-Mate) (0 mg Intravenous Stopped 6/27/24 9452)   sodium chloride 0.9% flush 3 mL (has no administration in time range)   albuterol-ipratropium 2.5 mg-0.5 mg/3 mL nebulizer solution 3 mL (has no administration in time range)   melatonin tablet 6 mg (has no administration in time range)   ondansetron injection 4 mg (has no administration in time range)   promethazine tablet 25 mg (has no administration in time range)   polyethylene glycol packet 17 g (has no administration in time range)   acetaminophen tablet 650 mg (has no administration in time range)   simethicone chewable tablet 80 mg (has no administration in time range)   aluminum-magnesium hydroxide-simethicone 200-200-20 mg/5 mL suspension 30 mL (has no administration in time range)   acetaminophen suppository 650 mg (has no administration in time range)   naloxone 0.4 mg/mL injection 0.02 mg (has no administration in time range)   glucose chewable tablet 16 g (has no administration in time range)   glucose chewable tablet 24 g (has no administration in time range)   glucagon (human recombinant) injection 1 mg (has no administration in time range)   enoxaparin injection 30 mg (30 mg Subcutaneous Given 6/28/24 1601)   insulin aspart U-100 pen 0-5 Units (2 Units  Subcutaneous Given 6/28/24 1552)   dextrose 10% bolus 125 mL 125 mL (has no administration in time range)   dextrose 10% bolus 250 mL 250 mL (has no administration in time range)   amLODIPine tablet 10 mg (10 mg Oral Given 6/28/24 0844)   benzonatate capsule 100 mg (has no administration in time range)   pravastatin tablet 20 mg (20 mg Oral Given 6/28/24 0844)   arformoteroL nebulizer solution 15 mcg ( Nebulization Canceled Entry 6/28/24 1230)   insulin glargine U-100 (Lantus) pen 10 Units (has no administration in time range)   albuterol-ipratropium 2.5 mg-0.5 mg/3 mL nebulizer solution 3 mL (3 mLs Nebulization Given 6/26/24 2243)   methylPREDNISolone sodium succinate injection 125 mg (125 mg Intravenous Given 6/26/24 2218)   cefTRIAXone (Rocephin) 1 g in D5W 100 mL IVPB (MB+) (0 g Intravenous Stopped 6/26/24 2311)   azithromycin (ZITHROMAX) 500 mg in D5W 250 mL IVPB (Vial-Mate) (0 mg Intravenous Stopped 6/27/24 0020)   furosemide injection 40 mg (40 mg Intravenous Given 6/26/24 2251)       Current Discharge Medication List                  Scribe Attestation:   Scribe #1: I performed the above scribed service and the documentation accurately describes the services I performed. I attest to the accuracy of the note.     Attending:   Physician Attestation Statement for Scribe #1: I, Tara Whitfield MD, personally performed the services described in this documentation, as scribed by Roosevelt Maloney, in my presence, and it is both accurate and complete.           Clinical Impression       ICD-10-CM ICD-9-CM   1. Pneumonia of right lower lobe due to infectious organism  J18.9 486   2. SOB (shortness of breath)  R06.02 786.05   3. Hypoxia  R09.02 799.02   4. COPD with acute exacerbation  J44.1 491.21   5. Elevated troponin  R79.89 790.6   6. Chest pain  R07.9 786.50   7. CHF (congestive heart failure)  I50.9 428.0       Disposition:   Disposition: Admitted  Condition: Stable         Tara Whitfield MD  06/28/24  1973

## 2024-06-27 NOTE — ASSESSMENT & PLAN NOTE
Patient is chronically on statin.will continue for now. Last Lipid Panel:   Lab Results   Component Value Date    CHOL 119 10/14/2019    HDL 46 10/14/2019    LDLCALC 55 10/14/2019    TRIG 89 10/14/2019    CHOLHDL 29.1 03/05/2012     Plan:  -Continue home medication  -low fat/low calorie diet

## 2024-06-27 NOTE — ASSESSMENT & PLAN NOTE
Patient with acute kidney injury/acute renal failure. MIKA is currently  being treated . Baseline creatinine  between 1.3-1.7  - Labs reviewed- Renal function/electrolytes with Estimated Creatinine Clearance: 27.8 mL/min (A) (based on SCr of 2.2 mg/dL (H)). according to latest data. Monitor urine output and serial BMP and adjust therapy as needed. Avoid nephrotoxins and renally dose meds for GFR listed above.

## 2024-06-27 NOTE — PLAN OF CARE
Problem: Adult Inpatient Plan of Care  Goal: Plan of Care Review  6/27/2024 0354 by Cecilia Stafford RN  Outcome: Progressing  6/27/2024 0352 by Cecilia Stafford RN  Outcome: Progressing  Goal: Patient-Specific Goal (Individualized)  6/27/2024 0354 by Cecilia Stafford RN  Outcome: Progressing  6/27/2024 0352 by Cecilia Stafford RN  Outcome: Progressing  Goal: Absence of Hospital-Acquired Illness or Injury  6/27/2024 0354 by Cecilia Stafford RN  Outcome: Progressing  6/27/2024 0352 by Cecilia Stafford RN  Outcome: Progressing  Goal: Optimal Comfort and Wellbeing  6/27/2024 0354 by Cecilia Stafford RN  Outcome: Progressing  6/27/2024 0352 by Cecilia Stafford RN  Outcome: Progressing  Goal: Readiness for Transition of Care  6/27/2024 0354 by Cecilia Stafford RN  Outcome: Progressing  6/27/2024 0352 by Cecilia Stafford RN  Outcome: Progressing     Problem: Diabetes Comorbidity  Goal: Blood Glucose Level Within Targeted Range  6/27/2024 0354 by Cecilia Stafford RN  Outcome: Progressing  6/27/2024 0352 by Cecilia Stafford RN  Outcome: Progressing     Problem: Skin Injury Risk Increased  Goal: Skin Health and Integrity  6/27/2024 0354 by Cecilia Stafford RN  Outcome: Progressing  6/27/2024 0352 by Cecilia Stafford RN  Outcome: Progressing     Problem: Pneumonia  Goal: Fluid Balance  Outcome: Progressing  Goal: Resolution of Infection Signs and Symptoms  Outcome: Progressing  Goal: Effective Oxygenation and Ventilation  Outcome: Progressing

## 2024-06-27 NOTE — SUBJECTIVE & OBJECTIVE
Past Medical History:   Diagnosis Date    Cataract     Closed displaced comminuted fracture of right patella 10/28/2020    Closed fracture of surgical neck of left humerus 10/30/2020    Closed left radial fracture 10/30/2020    Diabetes mellitus type I     Hyperlipidemia     Hypertension     Morbid obesity     Right knee pain     Thyroid disease        Past Surgical History:   Procedure Laterality Date    APPENDECTOMY      BREAST BIOPSY      CATARACT EXTRACTION      COLONOSCOPY N/A 12/21/2021    Procedure: COLONOSCOPY screening;  Surgeon: Moises Patterson MD;  Location: [facility];  Service: Endoscopy;  Laterality: N/A;    ESOPHAGOGASTRODUODENOSCOPY N/A 12/21/2021    Procedure: EGD (ESOPHAGOGASTRODUODENOSCOPY) EV screening;  Surgeon: Moises Patterson MD;  Location: [facility] ENDO;  Service: Endoscopy;  Laterality: N/A;    EYE SURGERY      HERNIA REPAIR      OPEN REDUCTION AND INTERNAL FIXATION (ORIF) OF FRACTURE OF DISTAL RADIUS Left 11/2/2020    Procedure: ORIF, FRACTURE, RADIUS, DISTAL;  Surgeon: Sage Wolf MD;  Location: [facility];  Service: Orthopedics;  Laterality: Left;    OPEN REDUCTION AND INTERNAL FIXATION (ORIF) OF FRACTURE OF PATELLA Right 11/2/2020    Procedure: ORIF, FRACTURE, PATELLA;  Surgeon: Sage Wolf MD;  Location: [facility] OR;  Service: Orthopedics;  Laterality: Right;    OPEN REDUCTION AND INTERNAL FIXATION (ORIF) OF FRACTURE OF PATELLA Right 12/18/2020    Procedure: ORIF, FRACTURE, PATELLA;  Surgeon: Sage Wolf MD;  Location: [facility] OR;  Service: Orthopedics;  Laterality: Right;    ORIF HUMERUS FRACTURE Left 11/5/2020    Procedure: ORIF, FRACTURE, HUMERUS;  Surgeon: Sage Wolf MD;  Location: [facility] OR;  Service: Orthopedics;  Laterality: Left;    PLACEMENT OF ACELLULAR HUMAN DERMAL ALLOGRAFT Right 11/2/2020    Procedure: APPLICATION, ACELLULAR HUMAN DERMAL ALLOGRAFT;  Surgeon: Sage Wolf MD;  Location: [facility] OR;  Service: Orthopedics;   "Laterality: Right;  Right Patella    REMOVAL OF HARDWARE FROM HAND Left 3/11/2021    Procedure: REMOVAL, HARDWARE, HAND;  Surgeon: Sage Wolf MD;  Location: [facility] OR;  Service: Orthopedics;  Laterality: Left;  Removal of dorsal spanning wrist plate left distal radius    REMOVAL OF HARDWARE FROM LOWER EXTREMITY Right 2020    Procedure: REMOVAL, HARDWARE, LOWER EXTREMITY;  Surgeon: Sage Wolf MD;  Location: [facility] OR;  Service: Orthopedics;  Laterality: Right;       Review of patient's allergies indicates:   Allergen Reactions    Codeine Nausea Only     Other reaction(s): Nausea  Other reaction(s): Elevated blood pressure       No current facility-administered medications on file prior to encounter.     Current Outpatient Medications on File Prior to Encounter   Medication Sig    amLODIPine (NORVASC) 10 MG tablet TAKE 1 TABLET(10 MG) BY MOUTH EVERY DAY    benzonatate (TESSALON) 100 MG capsule Take 1 capsule (100 mg total) by mouth 3 (three) times daily as needed for Cough.    blood sugar diagnostic Strp Use ac bid    blood-glucose meter kit Use as instructed    cetirizine (ZYRTEC) 10 MG tablet TAKE 1 TABLET BY MOUTH ONCE DAILY    chlorthalidone (HYGROTEN) 25 MG Tab Take 25 mg by mouth once daily.    ergocalciferol (ERGOCALCIFEROL) 50,000 unit Cap Take 50,000 Units by mouth every 7 days.    fluticasone propionate (FLONASE) 50 mcg/actuation nasal spray SHAKE LIQUID AND USE 2 SPRAYS(100 MCG) IN EACH NOSTRIL EVERY DAY    HUMULIN 70/30 U-100 KWIKPEN 100 unit/mL (70-30) InPn pen SMARTSI Unit(s) SUB-Q Every Evening    insulin syringe-needle U-100 1 mL 29 gauge x 1/2" Syrg Use bid    JARDIANCE 25 mg tablet Take 25 mg by mouth every morning.    lancets 33 gauge Misc Use as BID    levothyroxine (SYNTHROID) 150 MCG tablet Take 1 tablet by mouth once daily.    metFORMIN (GLUCOPHAGE) 500 MG tablet Take 500 mg by mouth 2 (two) times daily.    perindopril erbumine (ACEON) 4 mg tablet Take 4 mg by " mouth once daily.     pravastatin (PRAVACHOL) 20 MG tablet Take 20 mg by mouth once daily.     spironolactone (ALDACTONE) 25 MG tablet Take 25 mg by mouth.     Family History       Problem Relation (Age of Onset)    Diabetes Mother, Father    Leukemia Father          Tobacco Use    Smoking status: Former     Current packs/day: 0.00     Average packs/day: 1 pack/day for 8.0 years (8.0 ttl pk-yrs)     Types: Cigarettes     Start date:      Quit date:      Years since quittin.5    Smokeless tobacco: Never   Substance and Sexual Activity    Alcohol use: Yes     Comment: rarely    Drug use: No    Sexual activity: Not on file     Review of Systems   Constitutional:  Negative for chills, diaphoresis, fatigue and fever.   HENT:  Negative for congestion and sore throat.    Respiratory:  Positive for cough and shortness of breath. Negative for wheezing.    Cardiovascular:  Negative for chest pain, palpitations and leg swelling.   Gastrointestinal:  Negative for abdominal pain, diarrhea, nausea and vomiting.   Genitourinary:  Negative for dysuria, flank pain, hematuria and urgency.   Neurological:  Negative for dizziness, light-headedness and headaches.   All other systems reviewed and are negative.    Objective:     Vital Signs (Most Recent):  Temp: 98.9 °F (37.2 °C) (24 005)  Pulse: 82 (24 0434)  Resp: (!) 24 (24)  BP: 131/60 (24)  SpO2: (!) 90 % (24) Vital Signs (24h Range):  Temp:  [98.7 °F (37.1 °C)-98.9 °F (37.2 °C)] 98.9 °F (37.2 °C)  Pulse:  [70-83] 82  Resp:  [20-31] 24  SpO2:  [88 %-97 %] 90 %  BP: (108-137)/(58-66) 131/60     Weight: 107.5 kg (237 lb)  Body mass index is 39.44 kg/m².     Physical Exam  Vitals and nursing note reviewed.   Constitutional:       General: She is awake. She is not in acute distress.     Appearance: Normal appearance. She is well-developed and well-groomed. She is not ill-appearing, toxic-appearing or diaphoretic.   HENT:       Head: Normocephalic and atraumatic.   Eyes:      Extraocular Movements: Extraocular movements intact.      Conjunctiva/sclera: Conjunctivae normal.   Cardiovascular:      Rate and Rhythm: Normal rate and regular rhythm.      Heart sounds: Normal heart sounds. No murmur heard.  Pulmonary:      Effort: Pulmonary effort is normal. No respiratory distress.      Breath sounds: Rhonchi and rales present.      Comments: 94% on 2 liters/minute via nasal cannula.  Abdominal:      General: Bowel sounds are normal.      Palpations: Abdomen is soft.      Tenderness: There is no abdominal tenderness.   Musculoskeletal:      Cervical back: Normal range of motion and neck supple.      Right lower leg: No edema.      Left lower leg: No edema.      Comments: 5/5 strength throughout   Skin:     General: Skin is warm and dry.      Capillary Refill: Capillary refill takes less than 2 seconds.   Neurological:      General: No focal deficit present.      Mental Status: She is alert and oriented to person, place, and time. Mental status is at baseline.      GCS: GCS eye subscore is 4. GCS verbal subscore is 5. GCS motor subscore is 6.      Cranial Nerves: Cranial nerves 2-12 are intact.      Sensory: Sensation is intact.      Motor: Motor function is intact.   Psychiatric:         Mood and Affect: Mood normal.         Speech: Speech normal.         Behavior: Behavior normal. Behavior is cooperative.              LABS:  Recent Results (from the past 24 hour(s))   CBC auto differential    Collection Time: 06/26/24  9:45 PM   Result Value Ref Range    WBC 8.52 3.90 - 12.70 K/uL    RBC 4.15 4.00 - 5.40 M/uL    Hemoglobin 12.1 12.0 - 16.0 g/dL    Hematocrit 37.7 37.0 - 48.5 %    MCV 91 82 - 98 fL    MCH 29.2 27.0 - 31.0 pg    MCHC 32.1 32.0 - 36.0 g/dL    RDW 13.0 11.5 - 14.5 %    Platelets 89 (L) 150 - 450 K/uL    MPV 10.9 9.2 - 12.9 fL    Immature Granulocytes 0.1 0.0 - 0.5 %    Gran # (ANC) 7.5 1.8 - 7.7 K/uL    Immature Grans (Abs) 0.01  0.00 - 0.04 K/uL    Lymph # 0.5 (L) 1.0 - 4.8 K/uL    Mono # 0.5 0.3 - 1.0 K/uL    Eos # 0.0 0.0 - 0.5 K/uL    Baso # 0.02 0.00 - 0.20 K/uL    nRBC 0 0 /100 WBC    Gran % 87.9 (H) 38.0 - 73.0 %    Lymph % 6.0 (L) 18.0 - 48.0 %    Mono % 5.8 4.0 - 15.0 %    Eosinophil % 0.0 0.0 - 8.0 %    Basophil % 0.2 0.0 - 1.9 %    Platelet Estimate Decreased (A)     Aniso Slight     Differential Method Automated    Comprehensive metabolic panel    Collection Time: 06/26/24  9:45 PM   Result Value Ref Range    Sodium 143 136 - 145 mmol/L    Potassium 4.7 3.5 - 5.1 mmol/L    Chloride 113 (H) 95 - 110 mmol/L    CO2 11 (L) 23 - 29 mmol/L    Glucose 293 (H) 70 - 110 mg/dL    BUN 45 (H) 8 - 23 mg/dL    Creatinine 2.2 (H) 0.5 - 1.4 mg/dL    Calcium 9.2 8.7 - 10.5 mg/dL    Total Protein 6.1 6.0 - 8.4 g/dL    Albumin 3.0 (L) 3.5 - 5.2 g/dL    Total Bilirubin 1.9 (H) 0.1 - 1.0 mg/dL    Alkaline Phosphatase 53 (L) 55 - 135 U/L    AST 14 10 - 40 U/L    ALT 12 10 - 44 U/L    eGFR 23 (A) >60 mL/min/1.73 m^2    Anion Gap 19 (H) 8 - 16 mmol/L   B-Type natriuretic peptide (BNP)    Collection Time: 06/26/24  9:45 PM   Result Value Ref Range    BNP 1,003 (H) 0 - 99 pg/mL   Troponin I    Collection Time: 06/26/24  9:45 PM   Result Value Ref Range    Troponin I 0.054 (H) 0.000 - 0.026 ng/mL   ISTAT PROCEDURE    Collection Time: 06/26/24 10:03 PM   Result Value Ref Range    POC PH 7.237 (LL) 7.35 - 7.45    POC PCO2 27.3 (LL) 35 - 45 mmHg    POC PO2 60 (L) 80 - 100 mmHg    POC HCO3 11.6 (L) 24 - 28 mmol/L    POC BE -16 (L) -2 to 2 mmol/L    POC SATURATED O2 87 95 - 100 %    Sample ARTERIAL     Site LR     Allens Test Pass     DelSys Room Air     Mode SPONT     FiO2 21    Urinalysis - Clean Catch    Collection Time: 06/26/24 11:07 PM   Result Value Ref Range    Specimen UA Urine, Clean Catch     Color, UA Yellow Yellow, Straw, Saray    Appearance, UA Clear Clear    pH, UA 6.0 5.0 - 8.0    Specific Gravity, UA 1.020 1.005 - 1.030    Protein, UA 2+ (A)  Negative    Glucose, UA 4+ (A) Negative    Ketones, UA Negative Negative    Bilirubin (UA) Negative Negative    Occult Blood UA Trace (A) Negative    Nitrite, UA Negative Negative    Urobilinogen, UA Negative <2.0 EU/dL    Leukocytes, UA Negative Negative   Urinalysis Microscopic    Collection Time: 06/26/24 11:07 PM   Result Value Ref Range    RBC, UA 1 0 - 4 /hpf    WBC, UA 1 0 - 5 /hpf    Bacteria Rare None-Occ /hpf    Yeast, UA None None    Squam Epithel, UA 1 /hpf    Hyaline Casts, UA 0 0-1/lpf /lpf    Microscopic Comment SEE COMMENT    Troponin I    Collection Time: 06/27/24  1:29 AM   Result Value Ref Range    Troponin I 0.052 (H) 0.000 - 0.026 ng/mL   POCT glucose    Collection Time: 06/27/24  5:38 AM   Result Value Ref Range    POCT Glucose 297 (H) 70 - 110 mg/dL       RADIOLOGY  X-Ray Chest AP Portable    Result Date: 6/26/2024  EXAMINATION: XR CHEST AP PORTABLE CLINICAL HISTORY: Chest Pain; TECHNIQUE: Single frontal portable view of the chest was performed. COMPARISON: 2 October 2020 FINDINGS: There is airspace opacity right midlung and lesser inferior thorax.  Left lung well aerated.  No sizable pleural effusion     Right lung airspace opacity consistent with pneumonia multilobar Electronically signed by: Nallely Long Date:    06/26/2024 Time:    22:41      EKG    MICROBIOLOGY    MDM     Amount and/or Complexity of Data Reviewed  Clinical lab tests: reviewed  Tests in the radiology section of CPT®: reviewed  Tests in the medicine section of CPT®: reviewed  Discussion of test results with the performing providers: yes  Decide to obtain previous medical records or to obtain history from someone other than the patient: yes  Obtain history from someone other than the patient: yes  Review and summarize past medical records: yes  Discuss the patient with other providers: yes  Independent visualization of images, tracings, or specimens: yes

## 2024-06-27 NOTE — ASSESSMENT & PLAN NOTE
Patient has a diagnosis of pneumonia. The cause of the pneumonia is unknown at this time. The pneumonia is stable. The patient has the following signs/symptoms of pneumonia: persistent hypoxia , cough, and shortness of breath. The patient does have a current oxygen requirement and the patient does not have a home oxygen requirement. I have reviewed the pertinent imaging. The following cultures have been collected: Blood cultures The culture results are listed below.     Current antimicrobial regimen consists of the antibiotics listed below. Will monitor patient closely and continue current treatment plan unchanged.    Antibiotics (From admission, onward)      Start     Stop Route Frequency Ordered    06/27/24 2300  cefTRIAXone (Rocephin) 1 g in D5W 100 mL IVPB (MB+)         06/29/24 2259 IV Every 24 hours (non-standard times) 06/27/24 0058    06/27/24 2300  azithromycin (ZITHROMAX) 500 mg in D5W 250 mL IVPB (Vial-Mate)         07/01/24 2259 IV Every 24 hours (non-standard times) 06/27/24 0058            Microbiology Results (last 7 days)       ** No results found for the last 168 hours. **

## 2024-06-27 NOTE — ASSESSMENT & PLAN NOTE
BNP 1003  Echo pending  OMT IV diuresis  ACEi and aldaconte held given hypotension  No h/o CHF per pt  Strict I/Os  Low Na diet

## 2024-06-27 NOTE — PLAN OF CARE
Discussed poc with pt, pt verbalized understanding    Purposeful rounding every 2hours    VS wnl  Cardiac monitoring in use, pt is NSR, tele monitor # [id number]  Blood glucose monitoring   Fall precautions in place, remains injury free  Pt denies c/o pain, nausea, SOB    Accurate I&Os  Bed locked at lowest position  Call light within reach    Chart check complete  Will cont with POC

## 2024-06-27 NOTE — ASSESSMENT & PLAN NOTE
Chronic, controlled. Latest blood pressure and vitals reviewed-     Temp:  [98.7 °F (37.1 °C)-98.9 °F (37.2 °C)]   Pulse:  [70-83]   Resp:  [20-31]   BP: (108-137)/(58-66)   SpO2:  [88 %-97 %] .   Home meds for hypertension were reviewed and noted below.   Hypertension Medications               amLODIPine (NORVASC) 10 MG tablet TAKE 1 TABLET(10 MG) BY MOUTH EVERY DAY    chlorthalidone (HYGROTEN) 25 MG Tab Take 25 mg by mouth once daily.    perindopril erbumine (ACEON) 4 mg tablet Take 4 mg by mouth once daily.     spironolactone (ALDACTONE) 25 MG tablet Take 25 mg by mouth.            While in the hospital, will manage blood pressure as follows; Continue home antihypertensive regimen (norvasc), but hold chlorthalidone, Aldactone, Aceon. Receiving iv Lasix for diuresing of CHF exacerbation    Will utilize p.r.n. blood pressure medication only if patient's blood pressure greater than 160/100 and she develops symptoms such as worsening chest pain or shortness of breath.

## 2024-06-27 NOTE — HPI
Lakshmi Campbell is a 73 y.o. female with a PMH  has a past medical history of Cataract, Closed displaced comminuted fracture of right patella (10/28/2020), Closed fracture of surgical neck of left humerus (10/30/2020), Closed left radial fracture (10/30/2020), Diabetes mellitus type I, Hyperlipidemia, Hypertension, Morbid obesity, Right knee pain, and Thyroid disease.  Presented to the ER for evaluation of sudden onset of shortness of breath with nonproductive cough and 1 episode of nonbloody/nonbilious vomiting as a result from coughing.  Patient reports his symptoms worsened when she exerts herself and with lying flat.  Denies any recent illnesses or sick contacts.  Denies history of CHF for COPD.  Reports she was a previous smoker, but quit years ago.  Denies any use of home oxygen for use of CPAP/BiPAP to sleep at night.  Denies swelling in her lower extremities.  Denies fever, aches, chills, sweats, chest pain, palpitations, abdominal pain common bladder/bowel complaints, or any other symptoms at this time.     ER workup revealed CBC to be unremarkable, BUN/creatinine of 45/2.2 with CBG of 293 mg/dL, BNP of 1, 003, troponin of 0.054, UA negative, and ABG revealing pH of 7.237, pCO2 of 27.3, PO2 of 60, and HC03 of 11.6.  Chest x-ray findings consistent with multilobar pneumonia.  EKG revealed sinus rhythm with first-degree AV block with a ventricular rate of 77 beats per minute and a QT/QTC of 374/423.  O2 sats initially 88% on room air, but improved to 97% on 2 liters/minute via nasal cannula.  Patient received a total of 3 DuoNebs, 500 mg azithromycin, 1 g Rocephin, 40 mg Lasix IV, 125 mg Solu-Medrol IV in ED. hospital Medicine consulted to admit patient for acute hypoxic respiratory failure in setting of new onset CHF and multilobar pneumonia.  Patient in agreement with treatment plan.  Patient admitted under inpatient status.     Cardiology consulted to assist with management. Pt seen and examined today  sitting up in bed on O2 c/o SOB, cough and 1 episode of vomiting yesterday. Denies any significant cardiac history. Labs reviewed Mg 1.7, BNP 1003, Crt 2.2, Plt 89, troponin 0.054->.052->->055, echo pending

## 2024-06-28 LAB
ANION GAP SERPL CALC-SCNC: 14 MMOL/L (ref 8–16)
BNP SERPL-MCNC: 297 PG/ML (ref 0–99)
BUN SERPL-MCNC: 67 MG/DL (ref 8–23)
CALCIUM SERPL-MCNC: 8.8 MG/DL (ref 8.7–10.5)
CHLORIDE SERPL-SCNC: 108 MMOL/L (ref 95–110)
CO2 SERPL-SCNC: 17 MMOL/L (ref 23–29)
CREAT SERPL-MCNC: 2.6 MG/DL (ref 0.5–1.4)
EST. GFR  (NO RACE VARIABLE): 19 ML/MIN/1.73 M^2
GLUCOSE SERPL-MCNC: 230 MG/DL (ref 70–110)
POCT GLUCOSE: 171 MG/DL (ref 70–110)
POCT GLUCOSE: 176 MG/DL (ref 70–110)
POCT GLUCOSE: 202 MG/DL (ref 70–110)
POCT GLUCOSE: 258 MG/DL (ref 70–110)
POTASSIUM SERPL-SCNC: 4.8 MMOL/L (ref 3.5–5.1)
PROCALCITONIN SERPL IA-MCNC: 50.73 NG/ML
SODIUM SERPL-SCNC: 139 MMOL/L (ref 136–145)

## 2024-06-28 PROCEDURE — 25000003 PHARM REV CODE 250: Performed by: NURSE PRACTITIONER

## 2024-06-28 PROCEDURE — 94761 N-INVAS EAR/PLS OXIMETRY MLT: CPT

## 2024-06-28 PROCEDURE — 94640 AIRWAY INHALATION TREATMENT: CPT

## 2024-06-28 PROCEDURE — 25000242 PHARM REV CODE 250 ALT 637 W/ HCPCS: Performed by: NURSE PRACTITIONER

## 2024-06-28 PROCEDURE — 84145 PROCALCITONIN (PCT): CPT | Performed by: NURSE PRACTITIONER

## 2024-06-28 PROCEDURE — 63600175 PHARM REV CODE 636 W HCPCS: Performed by: NURSE PRACTITIONER

## 2024-06-28 PROCEDURE — 94799 UNLISTED PULMONARY SVC/PX: CPT

## 2024-06-28 PROCEDURE — 11000001 HC ACUTE MED/SURG PRIVATE ROOM

## 2024-06-28 PROCEDURE — 36415 COLL VENOUS BLD VENIPUNCTURE: CPT | Performed by: NURSE PRACTITIONER

## 2024-06-28 PROCEDURE — 99900035 HC TECH TIME PER 15 MIN (STAT)

## 2024-06-28 PROCEDURE — 27000221 HC OXYGEN, UP TO 24 HOURS

## 2024-06-28 PROCEDURE — 36415 COLL VENOUS BLD VENIPUNCTURE: CPT | Mod: XB

## 2024-06-28 PROCEDURE — 80048 BASIC METABOLIC PNL TOTAL CA: CPT | Performed by: NURSE PRACTITIONER

## 2024-06-28 PROCEDURE — 36415 COLL VENOUS BLD VENIPUNCTURE: CPT | Mod: XB | Performed by: NURSE PRACTITIONER

## 2024-06-28 PROCEDURE — 83880 ASSAY OF NATRIURETIC PEPTIDE: CPT

## 2024-06-28 PROCEDURE — 87040 BLOOD CULTURE FOR BACTERIA: CPT | Mod: 59 | Performed by: NURSE PRACTITIONER

## 2024-06-28 RX ORDER — INSULIN GLARGINE 100 [IU]/ML
10 INJECTION, SOLUTION SUBCUTANEOUS NIGHTLY
Status: DISCONTINUED | OUTPATIENT
Start: 2024-06-28 | End: 2024-07-02 | Stop reason: HOSPADM

## 2024-06-28 RX ORDER — ARFORMOTEROL TARTRATE 15 UG/2ML
15 SOLUTION RESPIRATORY (INHALATION) 2 TIMES DAILY
Status: DISCONTINUED | OUTPATIENT
Start: 2024-06-28 | End: 2024-07-02 | Stop reason: HOSPADM

## 2024-06-28 RX ADMIN — INSULIN ASPART 3 UNITS: 100 INJECTION, SOLUTION INTRAVENOUS; SUBCUTANEOUS at 05:06

## 2024-06-28 RX ADMIN — ARFORMOTEROL TARTRATE 15 MCG: 15 SOLUTION RESPIRATORY (INHALATION) at 07:06

## 2024-06-28 RX ADMIN — INSULIN ASPART 2 UNITS: 100 INJECTION, SOLUTION INTRAVENOUS; SUBCUTANEOUS at 03:06

## 2024-06-28 RX ADMIN — PRAVASTATIN SODIUM 20 MG: 20 TABLET ORAL at 08:06

## 2024-06-28 RX ADMIN — INSULIN GLARGINE 10 UNITS: 100 INJECTION, SOLUTION SUBCUTANEOUS at 08:06

## 2024-06-28 RX ADMIN — DEXTROSE MONOHYDRATE 500 MG: 50 INJECTION, SOLUTION INTRAVENOUS at 11:06

## 2024-06-28 RX ADMIN — AMLODIPINE BESYLATE 10 MG: 10 TABLET ORAL at 08:06

## 2024-06-28 RX ADMIN — CEFTRIAXONE 1 G: 1 INJECTION, POWDER, FOR SOLUTION INTRAMUSCULAR; INTRAVENOUS at 03:06

## 2024-06-28 RX ADMIN — CEFTRIAXONE 1 G: 1 INJECTION, POWDER, FOR SOLUTION INTRAMUSCULAR; INTRAVENOUS at 10:06

## 2024-06-28 RX ADMIN — ENOXAPARIN SODIUM 30 MG: 40 INJECTION SUBCUTANEOUS at 04:06

## 2024-06-28 NOTE — ASSESSMENT & PLAN NOTE
Patient is identified as having  diastolic  heart failure that is Acute. CHF is currently uncontrolled due to Weight gain of unknown pounds, Dyspnea not returned to baseline after 40mg lasix doses of IV diuretic, Rales/crackles on pulmonary exam, and Pulmonary edema/pleural effusion on CXR. Latest ECHO performed and demonstrates- No results found for this or any previous visit.  . Continue ACE/ARB and Aldactone and monitor clinical status closely. Monitor on telemetry. Patient is on CHF pathway.  Monitor strict Is&Os and daily weights.  Place on fluid restriction of 1.5 L. Cardiology has been consulted. Continue to stress to patient importance of self efficacy and  on diet for CHF. Last BNP reviewed- and noted below   Recent Labs   Lab 06/28/24  1052   *     Cardiology consulted  BNP improving  Echo with normal EF  Strict I/Os  Renal diet  Cont treatment for PNA  Resume ACEi and aldactone once tolerating

## 2024-06-28 NOTE — ASSESSMENT & PLAN NOTE
Chronic, controlled. Latest blood pressure and vitals reviewed-     Temp:  [97.7 °F (36.5 °C)-100.2 °F (37.9 °C)]   Pulse:  [76-90]   Resp:  [16-22]   BP: (121-152)/(60-95)   SpO2:  [90 %-95 %] .   Home meds for hypertension were reviewed and noted below.   Hypertension Medications               amLODIPine (NORVASC) 10 MG tablet TAKE 1 TABLET(10 MG) BY MOUTH EVERY DAY    chlorthalidone (HYGROTEN) 25 MG Tab Take 25 mg by mouth once daily.    perindopril erbumine (ACEON) 4 mg tablet Take 4 mg by mouth once daily.     spironolactone (ALDACTONE) 25 MG tablet Take 25 mg by mouth.            While in the hospital, will manage blood pressure as follows; Continue home antihypertensive regimen (norvasc), but hold chlorthalidone, Aldactone, Aceon. Receiving iv Lasix for diuresing of CHF exacerbation    Will utilize p.r.n. blood pressure medication only if patient's blood pressure greater than 160/100 and she develops symptoms such as worsening chest pain or shortness of breath.

## 2024-06-28 NOTE — ASSESSMENT & PLAN NOTE
Patient with acute kidney injury/acute renal failure. MIKA is currently  being treated . Baseline creatinine  between 1.3-1.7  - Labs reviewed- Renal function/electrolytes with Estimated Creatinine Clearance: 23.5 mL/min (A) (based on SCr of 2.6 mg/dL (H)). according to latest data. Monitor urine output and serial BMP and adjust therapy as needed. Avoid nephrotoxins and renally dose meds for GFR listed above.

## 2024-06-28 NOTE — ASSESSMENT & PLAN NOTE
Patient has chronic hypothyroidism. TFTs reviewed-   Lab Results   Component Value Date    TSH 0.032 (L) 06/27/2024   . Will continue chronic levothyroxine and adjust for and clinical changes.

## 2024-06-28 NOTE — PLAN OF CARE
Discussed plan of care with patient and this patient , verbalized understanding.  Patient remains free from injury.  Safety and comfort precautions maintained this shift.   Call light and personal belongings within reach, bed in low position with bed wheels locked.  No s/s of acute distress.   Purposeful rounding continued this shift.  Pain levels are controlled per MD order. IVF infusing.  Cardiac monitoring in place,   Diet orders continue  Blood glucose monitoring continued this shift.  Vital signs continued per order.  Q2 repositioning   Chart and orders review completed.   Patient education about care completed.     Problem: Adult Inpatient Plan of Care  Goal: Plan of Care Review  Outcome: Progressing  Goal: Patient-Specific Goal (Individualized)  Outcome: Progressing  Flowsheets (Taken 6/28/2024 0230)  Individualized Care Needs: none at this time  Anxieties, Fears or Concerns: none at this time  Patient/Family-Specific Goals (Include Timeframe): none at this time  Goal: Absence of Hospital-Acquired Illness or Injury  Outcome: Progressing  Goal: Optimal Comfort and Wellbeing  Outcome: Progressing  Goal: Readiness for Transition of Care  Outcome: Progressing     Problem: Diabetes Comorbidity  Goal: Blood Glucose Level Within Targeted Range  Outcome: Progressing     Problem: Skin Injury Risk Increased  Goal: Skin Health and Integrity  Outcome: Progressing     Problem: Pneumonia  Goal: Fluid Balance  Outcome: Progressing  Goal: Resolution of Infection Signs and Symptoms  Outcome: Progressing  Goal: Effective Oxygenation and Ventilation  Outcome: Progressing     Problem: Acute Kidney Injury/Impairment  Goal: Fluid and Electrolyte Balance  Outcome: Progressing  Goal: Improved Oral Intake  Outcome: Progressing  Goal: Effective Renal Function  Outcome: Progressing

## 2024-06-28 NOTE — SUBJECTIVE & OBJECTIVE
Past Medical History:   Diagnosis Date    Cataract     Closed displaced comminuted fracture of right patella 10/28/2020    Closed fracture of surgical neck of left humerus 10/30/2020    Closed left radial fracture 10/30/2020    Diabetes mellitus type I     Hyperlipidemia     Hypertension     Morbid obesity     Right knee pain     Thyroid disease        Past Surgical History:   Procedure Laterality Date    APPENDECTOMY      BREAST BIOPSY      CATARACT EXTRACTION      COLONOSCOPY N/A 12/21/2021    Procedure: COLONOSCOPY screening;  Surgeon: Moises Patterson MD;  Location: [facility];  Service: Endoscopy;  Laterality: N/A;    ESOPHAGOGASTRODUODENOSCOPY N/A 12/21/2021    Procedure: EGD (ESOPHAGOGASTRODUODENOSCOPY) EV screening;  Surgeon: Moises Patterson MD;  Location: [facility] ENDO;  Service: Endoscopy;  Laterality: N/A;    EYE SURGERY      HERNIA REPAIR      OPEN REDUCTION AND INTERNAL FIXATION (ORIF) OF FRACTURE OF DISTAL RADIUS Left 11/2/2020    Procedure: ORIF, FRACTURE, RADIUS, DISTAL;  Surgeon: Sage Wolf MD;  Location: [facility];  Service: Orthopedics;  Laterality: Left;    OPEN REDUCTION AND INTERNAL FIXATION (ORIF) OF FRACTURE OF PATELLA Right 11/2/2020    Procedure: ORIF, FRACTURE, PATELLA;  Surgeon: Sage Wolf MD;  Location: [facility] OR;  Service: Orthopedics;  Laterality: Right;    OPEN REDUCTION AND INTERNAL FIXATION (ORIF) OF FRACTURE OF PATELLA Right 12/18/2020    Procedure: ORIF, FRACTURE, PATELLA;  Surgeon: Sage Wolf MD;  Location: [facility] OR;  Service: Orthopedics;  Laterality: Right;    ORIF HUMERUS FRACTURE Left 11/5/2020    Procedure: ORIF, FRACTURE, HUMERUS;  Surgeon: Sage Wolf MD;  Location: [facility] OR;  Service: Orthopedics;  Laterality: Left;    PLACEMENT OF ACELLULAR HUMAN DERMAL ALLOGRAFT Right 11/2/2020    Procedure: APPLICATION, ACELLULAR HUMAN DERMAL ALLOGRAFT;  Surgeon: Sage Wolf MD;  Location: [facility] OR;  Service: Orthopedics;   "Laterality: Right;  Right Patella    REMOVAL OF HARDWARE FROM HAND Left 3/11/2021    Procedure: REMOVAL, HARDWARE, HAND;  Surgeon: Sage Wofl MD;  Location: [facility] OR;  Service: Orthopedics;  Laterality: Left;  Removal of dorsal spanning wrist plate left distal radius    REMOVAL OF HARDWARE FROM LOWER EXTREMITY Right 2020    Procedure: REMOVAL, HARDWARE, LOWER EXTREMITY;  Surgeon: Sage Wolf MD;  Location: [facility] OR;  Service: Orthopedics;  Laterality: Right;       Review of patient's allergies indicates:   Allergen Reactions    Codeine Nausea Only     Other reaction(s): Nausea  Other reaction(s): Elevated blood pressure       No current facility-administered medications on file prior to encounter.     Current Outpatient Medications on File Prior to Encounter   Medication Sig    amLODIPine (NORVASC) 10 MG tablet TAKE 1 TABLET(10 MG) BY MOUTH EVERY DAY    benzonatate (TESSALON) 100 MG capsule Take 1 capsule (100 mg total) by mouth 3 (three) times daily as needed for Cough.    blood sugar diagnostic Strp Use ac bid    blood-glucose meter kit Use as instructed    cetirizine (ZYRTEC) 10 MG tablet TAKE 1 TABLET BY MOUTH ONCE DAILY    chlorthalidone (HYGROTEN) 25 MG Tab Take 25 mg by mouth once daily.    ergocalciferol (ERGOCALCIFEROL) 50,000 unit Cap Take 50,000 Units by mouth every 7 days.    fluticasone propionate (FLONASE) 50 mcg/actuation nasal spray SHAKE LIQUID AND USE 2 SPRAYS(100 MCG) IN EACH NOSTRIL EVERY DAY    HUMULIN 70/30 U-100 KWIKPEN 100 unit/mL (70-30) InPn pen SMARTSI Unit(s) SUB-Q Every Evening    insulin syringe-needle U-100 1 mL 29 gauge x 1/2" Syrg Use bid    JARDIANCE 25 mg tablet Take 25 mg by mouth every morning.    lancets 33 gauge Misc Use as BID    levothyroxine (SYNTHROID) 150 MCG tablet Take 1 tablet by mouth once daily.    metFORMIN (GLUCOPHAGE) 500 MG tablet Take 500 mg by mouth 2 (two) times daily.    perindopril erbumine (ACEON) 4 mg tablet Take 4 mg by " mouth once daily.     pravastatin (PRAVACHOL) 20 MG tablet Take 20 mg by mouth once daily.     spironolactone (ALDACTONE) 25 MG tablet Take 25 mg by mouth.     Family History       Problem Relation (Age of Onset)    Diabetes Mother, Father    Leukemia Father          Tobacco Use    Smoking status: Former     Current packs/day: 0.00     Average packs/day: 1 pack/day for 8.0 years (8.0 ttl pk-yrs)     Types: Cigarettes     Start date:      Quit date:      Years since quittin.5    Smokeless tobacco: Never   Substance and Sexual Activity    Alcohol use: Yes     Comment: rarely    Drug use: No    Sexual activity: Not on file     Review of Systems   Constitutional: Positive for malaise/fatigue.   HENT: Negative.     Eyes: Negative.    Cardiovascular:  Positive for dyspnea on exertion.   Respiratory:  Positive for cough and shortness of breath.    Skin: Negative.    Musculoskeletal:  Positive for arthritis, back pain, muscle cramps and stiffness.   Gastrointestinal:  Positive for abdominal pain and vomiting.   Genitourinary: Negative.    Neurological:  Positive for weakness.   Psychiatric/Behavioral: Negative.       Objective:     Vital Signs (Most Recent):  Temp: 100.2 °F (37.9 °C) (24 1223)  Pulse: 81 (24 1223)  Resp: 18 (24 1223)  BP: (!) 152/65 (24 1223)  SpO2: (!) 93 % (24 1223) Vital Signs (24h Range):  Temp:  [97.7 °F (36.5 °C)-100.2 °F (37.9 °C)] 100.2 °F (37.9 °C)  Pulse:  [76-90] 81  Resp:  [16-22] 18  SpO2:  [90 %-95 %] 93 %  BP: (121-152)/(60-95) 152/65     Weight: 107.5 kg (237 lb)  Body mass index is 39.44 kg/m².    SpO2: (!) 93 %         Intake/Output Summary (Last 24 hours) at 2024 1308  Last data filed at 2024 1223  Gross per 24 hour   Intake 387 ml   Output 750 ml   Net -363 ml       Lines/Drains/Airways       Airway  Duration                  Airway - Non-Surgical 21 1256 919 days         Airway - Non-Surgical 21 1256 Nasal Cannula 919 days               Peripheral Intravenous Line  Duration                  Peripheral IV - Single Lumen 06/26/24 2146 20 G Left Antecubital 1 day                     Physical Exam  Vitals and nursing note reviewed.   Constitutional:       Appearance: Normal appearance. She is obese.   HENT:      Head: Normocephalic.   Eyes:      Pupils: Pupils are equal, round, and reactive to light.   Cardiovascular:      Rate and Rhythm: Normal rate and regular rhythm.      Heart sounds: Normal heart sounds, S1 normal and S2 normal. No murmur heard.     No S3 or S4 sounds.   Pulmonary:      Effort: Pulmonary effort is normal.      Breath sounds: Rales present.   Abdominal:      General: Bowel sounds are normal.      Palpations: Abdomen is soft.   Musculoskeletal:         General: Normal range of motion.      Cervical back: Normal range of motion.   Skin:     Capillary Refill: Capillary refill takes less than 2 seconds.   Neurological:      General: No focal deficit present.      Mental Status: She is alert and oriented to person, place, and time.      Motor: Weakness present.   Psychiatric:         Mood and Affect: Mood normal.         Behavior: Behavior normal.         Thought Content: Thought content normal.          Significant Labs: BMP:   Recent Labs   Lab 06/26/24 2145 06/27/24  0653 06/28/24  0511   * 337* 230*    140 139   K 4.7 4.5 4.8   * 112* 108   CO2 11* 11* 17*   BUN 45* 52* 67*   CREATININE 2.2* 2.4* 2.6*   CALCIUM 9.2 8.9 8.8   MG  --  1.7  --    , CMP   Recent Labs   Lab 06/26/24 2145 06/27/24 0653 06/28/24  0511    140 139   K 4.7 4.5 4.8   * 112* 108   CO2 11* 11* 17*   * 337* 230*   BUN 45* 52* 67*   CREATININE 2.2* 2.4* 2.6*   CALCIUM 9.2 8.9 8.8   PROT 6.1 5.9*  --    ALBUMIN 3.0* 2.7*  --    BILITOT 1.9* 1.3*  --    ALKPHOS 53* 47*  --    AST 14 12  --    ALT 12 12  --    ANIONGAP 19* 17* 14   , CBC   Recent Labs   Lab 06/26/24 2145   WBC 8.52   HGB 12.1   HCT 37.7   PLT  "89*   , INR No results for input(s): "INR", "PROTIME" in the last 48 hours., Lipid Panel   Recent Labs   Lab 06/27/24  0539   CHOL 71*   HDL 34*   LDLCALC 24.0*   TRIG 65   CHOLHDL 47.9   , Troponin   Recent Labs   Lab 06/26/24  2145 06/27/24  0129 06/27/24  0539   TROPONINI 0.054* 0.052* 0.055*   , and All pertinent lab results from the last 24 hours have been reviewed.    Significant Imaging: Cardiac Cath: reviewed, Echocardiogram: Transthoracic echo (TTE) complete (Cupid Only):   Results for orders placed or performed during the hospital encounter of 06/26/24   Echo   Result Value Ref Range    BSA 2.22 m2    LVOT stroke volume 64.78 cm3    LVIDd 5.25 3.5 - 6.0 cm    LV Systolic Volume 53.18 mL    LV Systolic Volume Index 25.0 mL/m2    LVIDs 3.57 2.1 - 4.0 cm    LV Diastolic Volume 132.50 mL    LV Diastolic Volume Index 62.21 mL/m2    Left Ventricular End Systolic Volume by Teichholz Method 53.18 mL    Left Ventricular End Diastolic Volume by Teichholz Method 132.50 mL    IVS 1.15 (A) 0.6 - 1.1 cm    LVOT diameter 1.87 cm    LVOT area 2.7 cm2    FS 32 28 - 44 %    Left Ventricle Relative Wall Thickness 0.44 cm    Posterior Wall 1.15 (A) 0.6 - 1.1 cm    LV mass 238.27 g    LV Mass Index 112 g/m2    MV Peak E Tenzin 1.40 m/s    TDI LATERAL 0.05 m/s    TDI SEPTAL 0.07 m/s    E/E' ratio 23.33 m/s    MV Peak A Tenzin 1.55 m/s    E/A ratio 0.90     IVRT 43.77 msec    E wave deceleration time 169.37 msec    LV SEPTAL E/E' RATIO 20.00 m/s    LV LATERAL E/E' RATIO 28.00 m/s    LVOT peak tenzin 1.16 m/s    Left Ventricular Outflow Tract Mean Velocity 0.79 cm/s    Left Ventricular Outflow Tract Mean Gradient 2.87 mmHg    RVOT peak VTI 11.6 cm    TAPSE 2.70 cm    LA size 4.45 cm    Left Atrium Minor Axis 5.98 cm    Left Atrium Major Axis 5.82 cm    RA Major Axis 4.79 cm    AV mean gradient 17 mmHg    AV peak gradient 28 mmHg    Ao peak tenzin 2.66 m/s    Ao VTI 52.10 cm    LVOT peak VTI 23.60 cm    AV valve area 1.24 cm²    AV Velocity " Ratio 0.44     AV index (prosthetic) 0.45     LAYO by Velocity Ratio 1.20 cm²    Mr max shane 4.48 m/s    MV mean gradient 5 mmHg    MV peak gradient 11 mmHg    MV stenosis pressure 1/2 time 53.78 ms    MV valve area p 1/2 method 4.09 cm2    MV valve area by continuity eq 1.36 cm2    MV VTI 47.5 cm    PV mean gradient 1 mmHg    RVOT peak shane 0.56 m/s    Ao root annulus 2.85 cm    STJ 3.08 cm    Ascending aorta 3.12 cm    IVC diameter 1.73 cm    Mean e' 0.06 m/s    ZLVIDS -1.17     ZLVIDD -2.57     LA Volume Index 51.3 mL/m2    LA volume 109.33 cm3    LA WIDTH 4.9 cm    RA Width 3.4 cm    EF 60 %    Est. RA pres 3 mmHg    Narrative      Left Ventricle: The left ventricle is normal in size. Normal wall   thickness. There is concentric hypertrophy. Normal wall motion. Ejection   fraction by visual approximation is 60%. Grade II diastolic dysfunction.    Right Ventricle: Normal right ventricular cavity size. Wall thickness   is normal. Systolic function is normal.    Left Atrium: Left atrium is severely dilated.    Aortic Valve: The aortic valve is a trileaflet valve. Mildly restricted   motion. There is moderate stenosis. Aortic valve area by VTI is 1.24 cm².   Aortic valve peak velocity is 2.66 m/s. Mean gradient is 17 mmHg. The   dimensionless index is 0.45.    Mitral Valve: There is moderate mitral annular calcification present.   Mildly restricted motion. There is mild to moderate regurgitation.     , EKG: reviewed, Stress Test: reviewed, and X-Ray: CXR: X-Ray Chest 1 View (CXR): No results found for this visit on 06/26/24.

## 2024-06-28 NOTE — ASSESSMENT & PLAN NOTE
Patient with Hypercapnic and Hypoxic Respiratory failure which is Acute.  she is not on home oxygen. Supplemental oxygen was provided and noted- Oxygen Concentration (%):  [32] 32    Signs/symptoms of respiratory failure include- tachypnea and respiratory distress. Contributing diagnoses includes - CHF and Pneumonia Labs and images were reviewed. Patient Has recent ABG, which has been reviewed. Will treat underlying causes and adjust management of respiratory failure as follows- supplemental oxygen, duonebs, steroids, diuretics, antibiotics.    Pt on 3L NC (SpO2 93%)  IS at bedside  Brovana inhaler ordered  CT chest - pending

## 2024-06-28 NOTE — ASSESSMENT & PLAN NOTE
BNP 1003  Echo pending  OMT IV diuresis  ACEi and aldaconte held given hypotension  No h/o CHF per pt  Strict I/Os  Low Na diet    6/28/24  BNP improving  Cont treatment for PNA  Resume ACEi and aldactone once tolerating  Echo with nml EF

## 2024-06-28 NOTE — HOSPITAL COURSE
A 73 y.o. female patient with DM, HTN, HLP, CKD3, Thyroid disease, [place] Cirrhosis, Chronic thrombocytopenia, and Morbid obesity admitted for Acute hypoxic respiratory failure, Multilobar pneumonia, and diastolic CHF on IV Cefepime and Azithromycin. WBC normal. Afebrile. Pt required 4 liters o2 NC. CT Chest showed multilobar PNA most extensive in the right upper, right lower and left lower lobes, incidental cholelithiasis. Blood cultures showed NGTD. Sputum culture and RVP pending. Acapella, IS, and OOB ordered. On 6/30/24, IV abx changed to IV cefepime, Vanco and Azithromycin due to extensive PNA/Immunocompromised     Cardiology consulted. Echo showed moderate AS and diastolic dysfunction. Pt received IV lasix x 3 doses. BNP is down-trending from 1003 to 297. Troponin mildly elevated but flat (0.052>0.055). Cr elevated (1.7>2.6). Diuresis held. Cardiology recommended f/u in [facility] clinic and BB, po lasix as OP     MIKA/CKD3 slowly improving -hold diuresis for now

## 2024-06-28 NOTE — PROGRESS NOTES
[facility] Medicine  Progress Note    Patient Name: Lakshmi Campbell  MRN: [id number]  Patient Class: IP- Inpatient   Admission Date: 6/26/2024  Length of Stay: 1 days  Attending Physician: Rich Hernadez MD  Primary Care Provider: Keely Silva NP        Subjective:     Principal Problem:Acute hypoxic respiratory failure        HPI:  Lakshmi Campbell is a 73 y.o. female with a PMH  has a past medical history of Cataract, Closed displaced comminuted fracture of right patella (10/28/2020), Closed fracture of surgical neck of left humerus (10/30/2020), Closed left radial fracture (10/30/2020), Diabetes mellitus type I, Hyperlipidemia, Hypertension, Morbid obesity, Right knee pain, and Thyroid disease.  Presented to the ER for evaluation of sudden onset of shortness of breath with nonproductive cough and 1 episode of nonbloody/nonbilious vomiting as a result from coughing.  Patient reports his symptoms worsened when she exerts herself and with lying flat.  Denies any recent illnesses or sick contacts.  Denies history of CHF for COPD.  Reports she was a previous smoker, but quit years ago.  Denies any use of home oxygen for use of CPAP/BiPAP to sleep at night.  Denies swelling in her lower extremities.  Denies fever, aches, chills, sweats, chest pain, palpitations, abdominal pain common bladder/bowel complaints, or any other symptoms at this time.    ER workup revealed CBC to be unremarkable, BUN/creatinine of 45/2.2 with CBG of 293 mg/dL, BNP of 1, 003, troponin of 0.054, UA negative, and ABG revealing pH of 7.237, pCO2 of 27.3, PO2 of 60, and HC03 of 11.6.  Chest x-ray findings consistent with multilobar pneumonia.  EKG revealed sinus rhythm with first-degree AV block with a ventricular rate of 77 beats per minute and a QT/QTC of 374/423.  O2 sats initially 88% on room air, but improved to 97% on 2 liters/minute via nasal cannula.  Patient received a total of 3 DuoNebs, 500 mg azithromycin, 1 g  Rocephin, 40 mg Lasix IV, 125 mg Solu-Medrol IV in ED. hospital Medicine consulted to admit patient for acute hypoxic respiratory failure in setting of new onset CHF and multilobar pneumonia.  Patient in agreement with treatment plan.  Patient admitted under inpatient status.    PCP: Keely Silva    Overview/Hospital Course:  A 73 y.o. female patient with a hx of T1DM, HTN, HLP, thyroid disease, and morbid obesity admitted for pneumonia and  diastolic CHF    X-ray findings: Right multilobar pneumonia. CT Chest ordered to evaluate for possible pleural effusion - results pending. Afebrile. SpO2 94% (3L NC). Pt symptomatically SOB with conversational dyspnea and cough. Diffuse wheezing with diminished breath sounds. Brovana inhaler ordered. IS at bedside. IV Rocephin and azithromycin initiated.    Cardiology consulted. Echo showed moderate AS and diastolic dysfunction. BNP is down-trending from >1000 but remains elevated at 297. Troponin mildly elevated but flat (0.052>0.055). Crt elevated (1.7>2.2). Agree with POC. Hold Lasix for now       Interval History: 73 y.o.female patient admitted for multilobar pneumonia and acute diastolic CHF (EF 30%). Afebrile. + Cough. + Conversational dyspnea. + Diffuse wheezing and diminished breath sounds. Cardiology consulted. Echo performed - showed moderate AS and diastolic dysfunction. Awaiting results of CT chest for evaluation of pleural effusion. BNP remains elevated, but down-trending. Troponin elevated but flat (0.052>0.055). sCr 2.2. Continue IV azithromycin and rocephin. Continue IS and Brovana. Waiting on CT results     Review of Systems   Constitutional:  Positive for fatigue. Negative for appetite change, chills, diaphoresis, fever and unexpected weight change.   HENT:  Negative for congestion, nosebleeds, sinus pressure and sore throat.    Eyes:  Negative for pain, discharge and visual disturbance.   Respiratory:  Positive for cough, shortness of breath and  wheezing. Negative for chest tightness and stridor.    Cardiovascular:  Negative for chest pain, palpitations and leg swelling.   Gastrointestinal:  Positive for abdominal pain-resolved and vomiting-resolved. Negative for abdominal distention, blood in stool, constipation, diarrhea and nausea.   Endocrine: Negative for cold intolerance and heat intolerance.   Genitourinary:  Negative for difficulty urinating, dysuria, flank pain, frequency and urgency.   Musculoskeletal:  Positive for arthralgias and back pain. Negative for joint swelling, myalgias, neck pain and neck stiffness.   Skin:  Negative for rash and wound.   Allergic/Immunologic: Negative for food allergies and immunocompromised state.   Neurological:  Negative for dizziness, seizures, syncope, facial asymmetry, speech difficulty, weakness, light-headedness, numbness and headaches.   Hematological:  Negative for adenopathy.   Psychiatric/Behavioral:  Negative for agitation, confusion and hallucinations.      Objective:     Vital Signs (Most Recent):  Temp: 100.2 °F (37.9 °C) (06/28/24 1223)  Pulse: 81 (06/28/24 1223)  Resp: 18 (06/28/24 1223)  BP: (!) 152/65 (06/28/24 1223)  SpO2: (!) 93 % (06/28/24 1223) Vital Signs (24h Range):  Temp:  [97.7 °F (36.5 °C)-100.2 °F (37.9 °C)] 100.2 °F (37.9 °C)  Pulse:  [76-90] 81  Resp:  [16-22] 18  SpO2:  [90 %-95 %] 93 %  BP: (121-152)/(60-95) 152/65     Weight: 107.5 kg (237 lb)  Body mass index is 39.44 kg/m².    Intake/Output Summary (Last 24 hours) at 6/28/2024 1327  Last data filed at 6/28/2024 1223  Gross per 24 hour   Intake 387 ml   Output 750 ml   Net -363 ml         Physical Exam  Constitutional:       General: She is not in acute distress.     Appearance: She is well-developed. She is obese. She is not diaphoretic.   HENT:      Head: Normocephalic and atraumatic.      Nose: Nose normal.   Eyes:      General: No scleral icterus.     Conjunctiva/sclera: Conjunctivae normal.   Neck:      Trachea: No tracheal  deviation.   Cardiovascular:      Rate and Rhythm: Normal rate and regular rhythm.      Heart sounds: Normal heart sounds. No murmur heard.     No friction rub. No gallop.   Pulmonary:      Effort: Accessory muscle usage present. No respiratory distress.      Breath sounds: No stridor. Examination of the right-middle field reveals decreased breath sounds. Examination of the right-lower field reveals decreased breath sounds. Decreased breath sounds and wheezing present. No rales.      Comments: conversational dyspnea   Chest:      Chest wall: No tenderness.   Abdominal:      General: Bowel sounds are normal. There is no distension.      Palpations: Abdomen is soft. There is no mass.      Tenderness: There is no abdominal tenderness. There is no guarding or rebound.   Musculoskeletal:         General: No tenderness or deformity. Normal range of motion.      Cervical back: Normal range of motion and neck supple.   Skin:     General: Skin is warm and dry.      Coloration: Skin is not pale.      Findings: No erythema or rash.   Neurological:      Mental Status: She is alert and oriented to person, place, and time.      Cranial Nerves: No cranial nerve deficit.      Motor: No abnormal muscle tone.      Coordination: Coordination normal.   Psychiatric:         Behavior: Behavior normal.         Thought Content: Thought content normal.             Significant Labs: All pertinent labs within the past 24 hours have been reviewed.    Significant Imaging: I have reviewed all pertinent imaging results/findings within the past 24 hours.    Assessment/Plan:      * Acute hypoxic respiratory failure  Patient with Hypercapnic and Hypoxic Respiratory failure which is Acute.  she is not on home oxygen. Supplemental oxygen was provided and noted- Oxygen Concentration (%):  [32] 32    Signs/symptoms of respiratory failure include- tachypnea and respiratory distress. Contributing diagnoses includes - CHF and Pneumonia Labs and images were  reviewed. Patient Has recent ABG, which has been reviewed. Will treat underlying causes and adjust management of respiratory failure as follows- supplemental oxygen, duonebs, steroids, diuretics, antibiotics.    Pt on 3L NC (SpO2 93%)  Cont IV abx   IS at bedside  Brovana inhaler ordered  CT chest - pending    Community acquired pneumonia  Patient has a diagnosis of pneumonia. The cause of the pneumonia is unknown at this time. The pneumonia is stable. The patient has the following signs/symptoms of pneumonia: persistent hypoxia , cough, and shortness of breath. The patient does have a current oxygen requirement and the patient does not have a home oxygen requirement. I have reviewed the pertinent imaging. The following cultures have been collected: Blood cultures The culture results are listed below.     Current antimicrobial regimen consists of the antibiotics listed below. Will monitor patient closely and continue current treatment plan unchanged.    Antibiotics (From admission, onward)      Start     Stop Route Frequency Ordered    06/27/24 2300  cefTRIAXone (Rocephin) 1 g in D5W 100 mL IVPB (MB+)         06/29/24 2259 IV Every 24 hours (non-standard times) 06/27/24 0058    06/27/24 2300  azithromycin (ZITHROMAX) 500 mg in D5W 250 mL IVPB (Vial-Mate)         07/01/24 2259 IV Every 24 hours (non-standard times) 06/27/24 0058            Microbiology Results (last 7 days)       ** No results found for the last 168 hours. **            New onset of congestive heart failure  Patient is identified as having  diastolic  heart failure that is Acute. CHF is currently uncontrolled due to Weight gain of unknown pounds, Dyspnea not returned to baseline after 40mg lasix doses of IV diuretic, Rales/crackles on pulmonary exam, and Pulmonary edema/pleural effusion on CXR. Latest ECHO performed and demonstrates- No results found for this or any previous visit.  . Continue ACE/ARB and Aldactone and monitor clinical status  closely. Monitor on telemetry. Patient is on CHF pathway.  Monitor strict Is&Os and daily weights.  Place on fluid restriction of 1.5 L. Cardiology has been consulted. Continue to stress to patient importance of self efficacy and  on diet for CHF. Last BNP reviewed- and noted below   Recent Labs   Lab 06/28/24  1052   *     Cardiology consulted  BNP improving  Echo with normal EF, diastolic dysfunction, moderate AS  Strict I/Os  Renal diet  Cont treatment for PNA  Resume ACEi and aldactone once tolerating            Acute kidney injury superimposed on chronic kidney disease  Patient with acute kidney injury/acute renal failure. MIKA is currently  being treated . Baseline creatinine  between 1.3-1.7  - Labs reviewed- Renal function/electrolytes with Estimated Creatinine Clearance: 23.5 mL/min (A) (based on SCr of 2.6 mg/dL (H)). according to latest data. Monitor urine output and serial BMP and adjust therapy as needed. Avoid nephrotoxins and renally dose meds for GFR listed above.    Hyperlipidemia  Patient is chronically on statin.will continue for now. Last Lipid Panel:   Lab Results   Component Value Date    CHOL 71 (L) 06/27/2024    HDL 34 (L) 06/27/2024    LDLCALC 24.0 (L) 06/27/2024    TRIG 65 06/27/2024    CHOLHDL 47.9 06/27/2024     Plan:  -Continue home medication  -low fat/low calorie diet        Essential hypertension  Chronic, controlled. Latest blood pressure and vitals reviewed-     Temp:  [97.7 °F (36.5 °C)-100.2 °F (37.9 °C)]   Pulse:  [76-90]   Resp:  [16-22]   BP: (121-152)/(60-95)   SpO2:  [90 %-95 %] .   Home meds for hypertension were reviewed and noted below.   Hypertension Medications               amLODIPine (NORVASC) 10 MG tablet TAKE 1 TABLET(10 MG) BY MOUTH EVERY DAY    chlorthalidone (HYGROTEN) 25 MG Tab Take 25 mg by mouth once daily.    perindopril erbumine (ACEON) 4 mg tablet Take 4 mg by mouth once daily.     spironolactone (ALDACTONE) 25 MG tablet Take 25 mg by mouth.             While in the hospital, will manage blood pressure as follows; Continue home antihypertensive regimen (norvasc), but hold chlorthalidone, Aldactone, Aceon. Receiving iv Lasix for diuresing of CHF exacerbation    Will utilize p.r.n. blood pressure medication only if patient's blood pressure greater than 160/100 and she develops symptoms such as worsening chest pain or shortness of breath.    Hypothyroidism  Patient has chronic hypothyroidism. TFTs reviewed-   Lab Results   Component Value Date    TSH 0.032 (L) 06/27/2024   . Will continue chronic levothyroxine and adjust for and clinical changes.        Type 2 diabetes mellitus with microalbuminuria, with long-term current use of insulin  Patient's FSGs are uncontrolled due to hyperglycemia on current medication regimen.  Last A1c reviewed-   Lab Results   Component Value Date    HGBA1C 5.9 (H) 06/26/2024     Most recent fingerstick glucose reviewed-   Recent Labs   Lab 06/27/24  1720 06/27/24  2104 06/28/24  0505 06/28/24  1102   POCTGLUCOSE 314* 255* 258* 202*       Current correctional scale  Low  Maintain anti-hyperglycemic dose as follows-   Antihyperglycemics (From admission, onward)      Start     Stop Route Frequency Ordered    06/27/24 0203  insulin aspart U-100 pen 0-5 Units         -- SubQ Before meals & nightly PRN 06/27/24 0103          Plan:  -SSI  -A1c  -Accu-checks  -Hold oral hypoglycemics while patient is in the hospital  -Continue home long-acting insulin at 20% decrease, titrate up as needed  -Hypoglycemic protocol            VTE Risk Mitigation (From admission, onward)           Ordered     enoxaparin injection 30 mg  Daily         06/27/24 0103     IP VTE HIGH RISK PATIENT  Once         06/27/24 0103     Place sequential compression device  Until discontinued         06/27/24 0103                    Discharge Planning   KYA:      Code Status: Full Code   Is the patient medically ready for discharge?:     Reason for patient still in  hospital (select all that apply): Patient trending condition  Discharge Plan A: Home                  Yue Whitfield NP  Department of Hospital Medicine   [facility] [facility]

## 2024-06-28 NOTE — PROGRESS NOTES
[place] [facility]  Cardiology  Progress Note    Patient Name: Lakshmi Campbell  MRN: [id number]  Admission Date: 6/26/2024  Hospital Length of Stay: 1 days  Code Status: Full Code   Attending Physician: Rich Hernadez MD   Primary Care Physician: Keely Silva NP  Expected Discharge Date:   Principal Problem:Acute hypoxic respiratory failure    Subjective:     Hospital Course:   6/28/24 pt seen and examined today sitting up in bed, still SOB and coughing some chest discomfort from coughing. Labs reviewed, BNP down to 297, Crt bumped    Past Medical History:   Diagnosis Date    Cataract     Closed displaced comminuted fracture of right patella 10/28/2020    Closed fracture of surgical neck of left humerus 10/30/2020    Closed left radial fracture 10/30/2020    Diabetes mellitus type I     Hyperlipidemia     Hypertension     Morbid obesity     Right knee pain     Thyroid disease        Past Surgical History:   Procedure Laterality Date    APPENDECTOMY      BREAST BIOPSY      CATARACT EXTRACTION      COLONOSCOPY N/A 12/21/2021    Procedure: COLONOSCOPY screening;  Surgeon: Moises Patterson MD;  Location: [facility];  Service: Endoscopy;  Laterality: N/A;    ESOPHAGOGASTRODUODENOSCOPY N/A 12/21/2021    Procedure: EGD (ESOPHAGOGASTRODUODENOSCOPY) EV screening;  Surgeon: Moises Patterson MD;  Location: [facility];  Service: Endoscopy;  Laterality: N/A;    EYE SURGERY      HERNIA REPAIR      OPEN REDUCTION AND INTERNAL FIXATION (ORIF) OF FRACTURE OF DISTAL RADIUS Left 11/2/2020    Procedure: ORIF, FRACTURE, RADIUS, DISTAL;  Surgeon: Sage Wolf MD;  Location: [facility] OR;  Service: Orthopedics;  Laterality: Left;    OPEN REDUCTION AND INTERNAL FIXATION (ORIF) OF FRACTURE OF PATELLA Right 11/2/2020    Procedure: ORIF, FRACTURE, PATELLA;  Surgeon: Sage Wolf MD;  Location: [facility] OR;  Service: Orthopedics;  Laterality: Right;    OPEN REDUCTION AND INTERNAL FIXATION (ORIF) OF FRACTURE OF PATELLA  Right 12/18/2020    Procedure: ORIF, FRACTURE, PATELLA;  Surgeon: Sage Wolf MD;  Location: [facility] OR;  Service: Orthopedics;  Laterality: Right;    ORIF HUMERUS FRACTURE Left 11/5/2020    Procedure: ORIF, FRACTURE, HUMERUS;  Surgeon: Sage Wolf MD;  Location: [facility] OR;  Service: Orthopedics;  Laterality: Left;    PLACEMENT OF ACELLULAR HUMAN DERMAL ALLOGRAFT Right 11/2/2020    Procedure: APPLICATION, ACELLULAR HUMAN DERMAL ALLOGRAFT;  Surgeon: Sage Wolf MD;  Location: [facility] OR;  Service: Orthopedics;  Laterality: Right;  Right Patella    REMOVAL OF HARDWARE FROM HAND Left 3/11/2021    Procedure: REMOVAL, HARDWARE, HAND;  Surgeon: Sage Wolf MD;  Location: [facility] OR;  Service: Orthopedics;  Laterality: Left;  Removal of dorsal spanning wrist plate left distal radius    REMOVAL OF HARDWARE FROM LOWER EXTREMITY Right 12/18/2020    Procedure: REMOVAL, HARDWARE, LOWER EXTREMITY;  Surgeon: Sage Wofl MD;  Location: [facility];  Service: Orthopedics;  Laterality: Right;       Review of patient's allergies indicates:   Allergen Reactions    Codeine Nausea Only     Other reaction(s): Nausea  Other reaction(s): Elevated blood pressure       No current facility-administered medications on file prior to encounter.     Current Outpatient Medications on File Prior to Encounter   Medication Sig    amLODIPine (NORVASC) 10 MG tablet TAKE 1 TABLET(10 MG) BY MOUTH EVERY DAY    benzonatate (TESSALON) 100 MG capsule Take 1 capsule (100 mg total) by mouth 3 (three) times daily as needed for Cough.    blood sugar diagnostic Strp Use ac bid    blood-glucose meter kit Use as instructed    cetirizine (ZYRTEC) 10 MG tablet TAKE 1 TABLET BY MOUTH ONCE DAILY    chlorthalidone (HYGROTEN) 25 MG Tab Take 25 mg by mouth once daily.    ergocalciferol (ERGOCALCIFEROL) 50,000 unit Cap Take 50,000 Units by mouth every 7 days.    fluticasone propionate (FLONASE) 50 mcg/actuation nasal spray  "SHAKE LIQUID AND USE 2 SPRAYS(100 MCG) IN EACH NOSTRIL EVERY DAY    HUMULIN 70/30 U-100 KWIKPEN 100 unit/mL (70-30) InPn pen SMARTSI Unit(s) SUB-Q Every Evening    insulin syringe-needle U-100 1 mL 29 gauge x 1/2" Syrg Use bid    JARDIANCE 25 mg tablet Take 25 mg by mouth every morning.    lancets 33 gauge Misc Use as BID    levothyroxine (SYNTHROID) 150 MCG tablet Take 1 tablet by mouth once daily.    metFORMIN (GLUCOPHAGE) 500 MG tablet Take 500 mg by mouth 2 (two) times daily.    perindopril erbumine (ACEON) 4 mg tablet Take 4 mg by mouth once daily.     pravastatin (PRAVACHOL) 20 MG tablet Take 20 mg by mouth once daily.     spironolactone (ALDACTONE) 25 MG tablet Take 25 mg by mouth.     Family History       Problem Relation (Age of Onset)    Diabetes Mother, Father    Leukemia Father          Tobacco Use    Smoking status: Former     Current packs/day: 0.00     Average packs/day: 1 pack/day for 8.0 years (8.0 ttl pk-yrs)     Types: Cigarettes     Start date:      Quit date:      Years since quittin.5    Smokeless tobacco: Never   Substance and Sexual Activity    Alcohol use: Yes     Comment: rarely    Drug use: No    Sexual activity: Not on file     Review of Systems   Constitutional: Positive for malaise/fatigue.   HENT: Negative.     Eyes: Negative.    Cardiovascular:  Positive for dyspnea on exertion.   Respiratory:  Positive for cough and shortness of breath.    Skin: Negative.    Musculoskeletal:  Positive for arthritis, back pain, muscle cramps and stiffness.   Gastrointestinal:  Positive for abdominal pain and vomiting.   Genitourinary: Negative.    Neurological:  Positive for weakness.   Psychiatric/Behavioral: Negative.       Objective:     Vital Signs (Most Recent):  Temp: 100.2 °F (37.9 °C) (24 1223)  Pulse: 81 (24 1223)  Resp: 18 (24 1223)  BP: (!) 152/65 (24 1223)  SpO2: (!) 93 % (24 1223) Vital Signs (24h Range):  Temp:  [97.7 °F (36.5 °C)-100.2 °F " (37.9 °C)] 100.2 °F (37.9 °C)  Pulse:  [76-90] 81  Resp:  [16-22] 18  SpO2:  [90 %-95 %] 93 %  BP: (121-152)/(60-95) 152/65     Weight: 107.5 kg (237 lb)  Body mass index is 39.44 kg/m².    SpO2: (!) 93 %         Intake/Output Summary (Last 24 hours) at 6/28/2024 1308  Last data filed at 6/28/2024 1223  Gross per 24 hour   Intake 387 ml   Output 750 ml   Net -363 ml       Lines/Drains/Airways       Airway  Duration                  Airway - Non-Surgical 12/21/21 1256 919 days         Airway - Non-Surgical 12/21/21 1256 Nasal Cannula 919 days              Peripheral Intravenous Line  Duration                  Peripheral IV - Single Lumen 06/26/24 2146 20 G Left Antecubital 1 day                     Physical Exam  Vitals and nursing note reviewed.   Constitutional:       Appearance: Normal appearance. She is obese.   HENT:      Head: Normocephalic.   Eyes:      Pupils: Pupils are equal, round, and reactive to light.   Cardiovascular:      Rate and Rhythm: Normal rate and regular rhythm.      Heart sounds: Normal heart sounds, S1 normal and S2 normal. No murmur heard.     No S3 or S4 sounds.   Pulmonary:      Effort: Pulmonary effort is normal.      Breath sounds: Rales and wheezing present.   Abdominal:      General: Bowel sounds are normal.      Palpations: Abdomen is soft.   Musculoskeletal:         General: Normal range of motion.      Cervical back: Normal range of motion.   Skin:     Capillary Refill: Capillary refill takes less than 2 seconds.   Neurological:      General: No focal deficit present.      Mental Status: She is alert and oriented to person, place, and time.      Motor: Weakness present.   Psychiatric:         Mood and Affect: Mood normal.         Behavior: Behavior normal.         Thought Content: Thought content normal.          Significant Labs: BMP:   Recent Labs   Lab 06/26/24  2145 06/27/24  0653 06/28/24  0511   * 337* 230*    140 139   K 4.7 4.5 4.8   * 112* 108   CO2 11*  "11* 17*   BUN 45* 52* 67*   CREATININE 2.2* 2.4* 2.6*   CALCIUM 9.2 8.9 8.8   MG  --  1.7  --    , CMP   Recent Labs   Lab 06/26/24  2145 06/27/24  0653 06/28/24  0511    140 139   K 4.7 4.5 4.8   * 112* 108   CO2 11* 11* 17*   * 337* 230*   BUN 45* 52* 67*   CREATININE 2.2* 2.4* 2.6*   CALCIUM 9.2 8.9 8.8   PROT 6.1 5.9*  --    ALBUMIN 3.0* 2.7*  --    BILITOT 1.9* 1.3*  --    ALKPHOS 53* 47*  --    AST 14 12  --    ALT 12 12  --    ANIONGAP 19* 17* 14   , CBC   Recent Labs   Lab 06/26/24 2145   WBC 8.52   HGB 12.1   HCT 37.7   PLT 89*   , INR No results for input(s): "INR", "PROTIME" in the last 48 hours., Lipid Panel   Recent Labs   Lab 06/27/24  0539   CHOL 71*   HDL 34*   LDLCALC 24.0*   TRIG 65   CHOLHDL 47.9   , Troponin   Recent Labs   Lab 06/26/24  2145 06/27/24  0129 06/27/24  0539   TROPONINI 0.054* 0.052* 0.055*   , and All pertinent lab results from the last 24 hours have been reviewed.    Significant Imaging: Cardiac Cath: reviewed, Echocardiogram: Transthoracic echo (TTE) complete ([facility] Only):   Results for orders placed or performed during the hospital encounter of 06/26/24   Echo   Result Value Ref Range    BSA 2.22 m2    LVOT stroke volume 64.78 cm3    LVIDd 5.25 3.5 - 6.0 cm    LV Systolic Volume 53.18 mL    LV Systolic Volume Index 25.0 mL/m2    LVIDs 3.57 2.1 - 4.0 cm    LV Diastolic Volume 132.50 mL    LV Diastolic Volume Index 62.21 mL/m2    Left Ventricular End Systolic Volume by Teichholz Method 53.18 mL    Left Ventricular End Diastolic Volume by Teichholz Method 132.50 mL    IVS 1.15 (A) 0.6 - 1.1 cm    LVOT diameter 1.87 cm    LVOT area 2.7 cm2    FS 32 28 - 44 %    Left Ventricle Relative Wall Thickness 0.44 cm    Posterior Wall 1.15 (A) 0.6 - 1.1 cm    LV mass 238.27 g    LV Mass Index 112 g/m2    MV Peak E Tenzin 1.40 m/s    TDI LATERAL 0.05 m/s    TDI SEPTAL 0.07 m/s    E/E' ratio 23.33 m/s    MV Peak A Tenzin 1.55 m/s    E/A ratio 0.90     IVRT 43.77 msec    E wave " deceleration time 169.37 msec    LV SEPTAL E/E' RATIO 20.00 m/s    LV LATERAL E/E' RATIO 28.00 m/s    LVOT peak shane 1.16 m/s    Left Ventricular Outflow Tract Mean Velocity 0.79 cm/s    Left Ventricular Outflow Tract Mean Gradient 2.87 mmHg    RVOT peak VTI 11.6 cm    TAPSE 2.70 cm    LA size 4.45 cm    Left Atrium Minor Axis 5.98 cm    Left Atrium Major Axis 5.82 cm    RA Major Axis 4.79 cm    AV mean gradient 17 mmHg    AV peak gradient 28 mmHg    Ao peak shane 2.66 m/s    Ao VTI 52.10 cm    LVOT peak VTI 23.60 cm    AV valve area 1.24 cm²    AV Velocity Ratio 0.44     AV index (prosthetic) 0.45     LAYO by Velocity Ratio 1.20 cm²    Mr max shane 4.48 m/s    MV mean gradient 5 mmHg    MV peak gradient 11 mmHg    MV stenosis pressure 1/2 time 53.78 ms    MV valve area p 1/2 method 4.09 cm2    MV valve area by continuity eq 1.36 cm2    MV VTI 47.5 cm    PV mean gradient 1 mmHg    RVOT peak shane 0.56 m/s    Ao root annulus 2.85 cm    STJ 3.08 cm    Ascending aorta 3.12 cm    IVC diameter 1.73 cm    Mean e' 0.06 m/s    ZLVIDS -1.17     ZLVIDD -2.57     LA Volume Index 51.3 mL/m2    LA volume 109.33 cm3    LA WIDTH 4.9 cm    RA Width 3.4 cm    EF 60 %    Est. RA pres 3 mmHg    Narrative      Left Ventricle: The left ventricle is normal in size. Normal wall   thickness. There is concentric hypertrophy. Normal wall motion. Ejection   fraction by visual approximation is 60%. Grade II diastolic dysfunction.    Right Ventricle: Normal right ventricular cavity size. Wall thickness   is normal. Systolic function is normal.    Left Atrium: Left atrium is severely dilated.    Aortic Valve: The aortic valve is a trileaflet valve. Mildly restricted   motion. There is moderate stenosis. Aortic valve area by VTI is 1.24 cm².   Aortic valve peak velocity is 2.66 m/s. Mean gradient is 17 mmHg. The   dimensionless index is 0.45.    Mitral Valve: There is moderate mitral annular calcification present.   Mildly restricted motion. There is  mild to moderate regurgitation.     , EKG: reviewed, Stress Test: reviewed, and X-Ray: CXR: X-Ray Chest 1 View (CXR): No results found for this visit on 06/26/24.  Assessment and Plan:       Acute kidney injury superimposed on chronic kidney disease  Monitor with diuresis    New onset of congestive heart failure  BNP 1003  Echo pending  OMT IV diuresis  ACEi and aldaconte held given hypotension  No h/o CHF per pt  Strict I/Os  Low Na diet    6/28/24  BNP improving  Cont treatment for PNA  Resume ACEi and aldactone once tolerating  Echo with nml EF      Hyperlipidemia  statin    Essential hypertension  Titrate medications    Type 2 diabetes mellitus with microalbuminuria, with long-term current use of insulin  Management per primary team        VTE Risk Mitigation (From admission, onward)           Ordered     enoxaparin injection 30 mg  Daily         06/27/24 0103     IP VTE HIGH RISK PATIENT  Once         06/27/24 0103     Place sequential compression device  Until discontinued         06/27/24 0103                    Radha Sanders NP  Cardiology  [facility] - Med S[facility]

## 2024-06-28 NOTE — HOSPITAL COURSE
6/28/24 pt seen and examined today sitting up in bed, still SOB and coughing some chest discomfort from coughing. Labs reviewed, BNP down to 297, Crt bumped

## 2024-06-28 NOTE — SUBJECTIVE & OBJECTIVE
Interval History: 73 y.o.female patient admitted for multilobar pneumonia and acute diastolic CHF (EF 30%). Afebrile. + Cough. + Conversational dyspnea. + Diffuse wheezing and diminished breath sounds. Cardiology consulted. Echo performed - showed moderate AS and diastolic dysfunction. Awaiting results of CT chest for evaluation of pleural effusion. BNP remains elevated, but down-trending. Troponin elevated but flat (0.052>0.055). Crt 2.2. Continue IV azithromycin and rocephin. Continue IS and Brovana. Waiting on CT results before re-administering Lasix. Renal diet.     Review of Systems   Constitutional:  Positive for fatigue. Negative for appetite change, chills, diaphoresis, fever and unexpected weight change.   HENT:  Negative for congestion, nosebleeds, sinus pressure and sore throat.    Eyes:  Negative for pain, discharge and visual disturbance.   Respiratory:  Positive for cough, shortness of breath and wheezing. Negative for chest tightness and stridor.    Cardiovascular:  Negative for chest pain, palpitations and leg swelling.   Gastrointestinal:  Positive for abdominal pain and vomiting. Negative for abdominal distention, blood in stool, constipation, diarrhea and nausea.   Endocrine: Negative for cold intolerance and heat intolerance.   Genitourinary:  Negative for difficulty urinating, dysuria, flank pain, frequency and urgency.   Musculoskeletal:  Positive for arthralgias and back pain. Negative for joint swelling, myalgias, neck pain and neck stiffness.   Skin:  Negative for rash and wound.   Allergic/Immunologic: Negative for food allergies and immunocompromised state.   Neurological:  Negative for dizziness, seizures, syncope, facial asymmetry, speech difficulty, weakness, light-headedness, numbness and headaches.   Hematological:  Negative for adenopathy.   Psychiatric/Behavioral:  Negative for agitation, confusion and hallucinations.      Objective:     Vital Signs (Most Recent):  Temp: 100.2 °F  (37.9 °C) (06/28/24 1223)  Pulse: 81 (06/28/24 1223)  Resp: 18 (06/28/24 1223)  BP: (!) 152/65 (06/28/24 1223)  SpO2: (!) 93 % (06/28/24 1223) Vital Signs (24h Range):  Temp:  [97.7 °F (36.5 °C)-100.2 °F (37.9 °C)] 100.2 °F (37.9 °C)  Pulse:  [76-90] 81  Resp:  [16-22] 18  SpO2:  [90 %-95 %] 93 %  BP: (121-152)/(60-95) 152/65     Weight: 107.5 kg (237 lb)  Body mass index is 39.44 kg/m².    Intake/Output Summary (Last 24 hours) at 6/28/2024 1327  Last data filed at 6/28/2024 1223  Gross per 24 hour   Intake 387 ml   Output 750 ml   Net -363 ml         Physical Exam  Constitutional:       General: She is not in acute distress.     Appearance: She is well-developed. She is obese. She is not diaphoretic.   HENT:      Head: Normocephalic and atraumatic.      Nose: Nose normal.   Eyes:      General: No scleral icterus.     Conjunctiva/sclera: Conjunctivae normal.   Neck:      Trachea: No tracheal deviation.   Cardiovascular:      Rate and Rhythm: Normal rate and regular rhythm.      Heart sounds: Normal heart sounds. No murmur heard.     No friction rub. No gallop.   Pulmonary:      Effort: Accessory muscle usage present. No respiratory distress.      Breath sounds: No stridor. Examination of the right-middle field reveals decreased breath sounds. Examination of the right-lower field reveals decreased breath sounds. Decreased breath sounds and wheezing present. No rales.      Comments: conversational dyspnea   Chest:      Chest wall: No tenderness.   Abdominal:      General: Bowel sounds are normal. There is no distension.      Palpations: Abdomen is soft. There is no mass.      Tenderness: There is no abdominal tenderness. There is no guarding or rebound.   Musculoskeletal:         General: No tenderness or deformity. Normal range of motion.      Cervical back: Normal range of motion and neck supple.   Skin:     General: Skin is warm and dry.      Coloration: Skin is not pale.      Findings: No erythema or rash.    Neurological:      Mental Status: She is alert and oriented to person, place, and time.      Cranial Nerves: No cranial nerve deficit.      Motor: No abnormal muscle tone.      Coordination: Coordination normal.   Psychiatric:         Behavior: Behavior normal.         Thought Content: Thought content normal.             Significant Labs: All pertinent labs within the past 24 hours have been reviewed.    Significant Imaging: I have reviewed all pertinent imaging results/findings within the past 24 hours.

## 2024-06-28 NOTE — ASSESSMENT & PLAN NOTE
Patient's FSGs are uncontrolled due to hyperglycemia on current medication regimen.  Last A1c reviewed-   Lab Results   Component Value Date    HGBA1C 5.9 (H) 06/26/2024     Most recent fingerstick glucose reviewed-   Recent Labs   Lab 06/27/24  1720 06/27/24  2104 06/28/24  0505 06/28/24  1102   POCTGLUCOSE 314* 255* 258* 202*       Current correctional scale  Low  Maintain anti-hyperglycemic dose as follows-   Antihyperglycemics (From admission, onward)    Start     Stop Route Frequency Ordered    06/27/24 0203  insulin aspart U-100 pen 0-5 Units         -- SubQ Before meals & nightly PRN 06/27/24 0103        Plan:  -SSI  -A1c  -Accu-checks  -Hold oral hypoglycemics while patient is in the hospital  -Continue home long-acting insulin at 20% decrease, titrate up as needed  -Hypoglycemic protocol

## 2024-06-28 NOTE — ASSESSMENT & PLAN NOTE
Patient is chronically on statin.will continue for now. Last Lipid Panel:   Lab Results   Component Value Date    CHOL 71 (L) 06/27/2024    HDL 34 (L) 06/27/2024    LDLCALC 24.0 (L) 06/27/2024    TRIG 65 06/27/2024    CHOLHDL 47.9 06/27/2024     Plan:  -Continue home medication  -low fat/low calorie diet

## 2024-06-28 NOTE — PLAN OF CARE
Problem: Adult Inpatient Plan of Care  Goal: Plan of Care Review  Outcome: Not Progressing     Problem: Pneumonia  Goal: Fluid Balance  Outcome: Not Progressing  Goal: Resolution of Infection Signs and Symptoms  Outcome: Not Progressing  Goal: Effective Oxygenation and Ventilation  Outcome: Not Progressing

## 2024-06-29 PROBLEM — I50.31 ACUTE DIASTOLIC CONGESTIVE HEART FAILURE: Status: ACTIVE | Noted: 2024-06-27

## 2024-06-29 LAB
ANION GAP SERPL CALC-SCNC: 12 MMOL/L (ref 8–16)
ANISOCYTOSIS BLD QL SMEAR: SLIGHT
BASOPHILS # BLD AUTO: 0.04 K/UL (ref 0–0.2)
BASOPHILS NFR BLD: 0.6 % (ref 0–1.9)
BUN SERPL-MCNC: 78 MG/DL (ref 8–23)
CALCIUM SERPL-MCNC: 9 MG/DL (ref 8.7–10.5)
CHLORIDE SERPL-SCNC: 107 MMOL/L (ref 95–110)
CO2 SERPL-SCNC: 19 MMOL/L (ref 23–29)
CREAT SERPL-MCNC: 2.2 MG/DL (ref 0.5–1.4)
DIFFERENTIAL METHOD BLD: ABNORMAL
EOSINOPHIL # BLD AUTO: 0 K/UL (ref 0–0.5)
EOSINOPHIL NFR BLD: 0.6 % (ref 0–8)
ERYTHROCYTE [DISTWIDTH] IN BLOOD BY AUTOMATED COUNT: 13.1 % (ref 11.5–14.5)
EST. GFR  (NO RACE VARIABLE): 23 ML/MIN/1.73 M^2
GLUCOSE SERPL-MCNC: 152 MG/DL (ref 70–110)
HCT VFR BLD AUTO: 31.1 % (ref 37–48.5)
HGB BLD-MCNC: 10.2 G/DL (ref 12–16)
IMM GRANULOCYTES # BLD AUTO: 0.03 K/UL (ref 0–0.04)
IMM GRANULOCYTES NFR BLD AUTO: 0.5 % (ref 0–0.5)
LACTATE SERPL-SCNC: 0.8 MMOL/L (ref 0.5–2.2)
LYMPHOCYTES # BLD AUTO: 0.4 K/UL (ref 1–4.8)
LYMPHOCYTES NFR BLD: 6.4 % (ref 18–48)
MAGNESIUM SERPL-MCNC: 2.3 MG/DL (ref 1.6–2.6)
MCH RBC QN AUTO: 29.3 PG (ref 27–31)
MCHC RBC AUTO-ENTMCNC: 32.8 G/DL (ref 32–36)
MCV RBC AUTO: 89 FL (ref 82–98)
MONOCYTES # BLD AUTO: 0.2 K/UL (ref 0.3–1)
MONOCYTES NFR BLD: 3 % (ref 4–15)
NEUTROPHILS # BLD AUTO: 5.8 K/UL (ref 1.8–7.7)
NEUTROPHILS NFR BLD: 88.9 % (ref 38–73)
NRBC BLD-RTO: 0 /100 WBC
PHOSPHATE SERPL-MCNC: 4.5 MG/DL (ref 2.7–4.5)
PLATELET # BLD AUTO: 68 K/UL (ref 150–450)
PLATELET BLD QL SMEAR: ABNORMAL
PMV BLD AUTO: 11.3 FL (ref 9.2–12.9)
POCT GLUCOSE: 165 MG/DL (ref 70–110)
POCT GLUCOSE: 165 MG/DL (ref 70–110)
POCT GLUCOSE: 185 MG/DL (ref 70–110)
POCT GLUCOSE: 234 MG/DL (ref 70–110)
POTASSIUM SERPL-SCNC: 4.2 MMOL/L (ref 3.5–5.1)
RBC # BLD AUTO: 3.48 M/UL (ref 4–5.4)
SODIUM SERPL-SCNC: 138 MMOL/L (ref 136–145)
WBC # BLD AUTO: 6.57 K/UL (ref 3.9–12.7)

## 2024-06-29 PROCEDURE — 94761 N-INVAS EAR/PLS OXIMETRY MLT: CPT

## 2024-06-29 PROCEDURE — 11000001 HC ACUTE MED/SURG PRIVATE ROOM

## 2024-06-29 PROCEDURE — 97165 OT EVAL LOW COMPLEX 30 MIN: CPT

## 2024-06-29 PROCEDURE — 99900035 HC TECH TIME PER 15 MIN (STAT)

## 2024-06-29 PROCEDURE — 27000646 HC AEROBIKA DEVICE

## 2024-06-29 PROCEDURE — 97530 THERAPEUTIC ACTIVITIES: CPT

## 2024-06-29 PROCEDURE — 25000242 PHARM REV CODE 250 ALT 637 W/ HCPCS: Performed by: NURSE PRACTITIONER

## 2024-06-29 PROCEDURE — 83605 ASSAY OF LACTIC ACID: CPT | Performed by: NURSE PRACTITIONER

## 2024-06-29 PROCEDURE — 80048 BASIC METABOLIC PNL TOTAL CA: CPT | Performed by: NURSE PRACTITIONER

## 2024-06-29 PROCEDURE — 25000003 PHARM REV CODE 250: Performed by: NURSE PRACTITIONER

## 2024-06-29 PROCEDURE — 36415 COLL VENOUS BLD VENIPUNCTURE: CPT | Performed by: NURSE PRACTITIONER

## 2024-06-29 PROCEDURE — 83735 ASSAY OF MAGNESIUM: CPT | Performed by: NURSE PRACTITIONER

## 2024-06-29 PROCEDURE — 87070 CULTURE OTHR SPECIMN AEROBIC: CPT | Performed by: NURSE PRACTITIONER

## 2024-06-29 PROCEDURE — 27000221 HC OXYGEN, UP TO 24 HOURS

## 2024-06-29 PROCEDURE — 94799 UNLISTED PULMONARY SVC/PX: CPT

## 2024-06-29 PROCEDURE — 25000003 PHARM REV CODE 250: Performed by: STUDENT IN AN ORGANIZED HEALTH CARE EDUCATION/TRAINING PROGRAM

## 2024-06-29 PROCEDURE — 87205 SMEAR GRAM STAIN: CPT | Performed by: NURSE PRACTITIONER

## 2024-06-29 PROCEDURE — 94664 DEMO&/EVAL PT USE INHALER: CPT

## 2024-06-29 PROCEDURE — 97162 PT EVAL MOD COMPLEX 30 MIN: CPT

## 2024-06-29 PROCEDURE — 97535 SELF CARE MNGMENT TRAINING: CPT

## 2024-06-29 PROCEDURE — 84100 ASSAY OF PHOSPHORUS: CPT | Performed by: NURSE PRACTITIONER

## 2024-06-29 PROCEDURE — 85025 COMPLETE CBC W/AUTO DIFF WBC: CPT | Performed by: NURSE PRACTITIONER

## 2024-06-29 PROCEDURE — 94640 AIRWAY INHALATION TREATMENT: CPT

## 2024-06-29 PROCEDURE — 63600175 PHARM REV CODE 636 W HCPCS: Performed by: NURSE PRACTITIONER

## 2024-06-29 RX ORDER — FUROSEMIDE 20 MG/1
20 TABLET ORAL DAILY
Status: DISCONTINUED | OUTPATIENT
Start: 2024-06-30 | End: 2024-06-29

## 2024-06-29 RX ORDER — METOPROLOL SUCCINATE 25 MG/1
25 TABLET, EXTENDED RELEASE ORAL DAILY
Status: DISCONTINUED | OUTPATIENT
Start: 2024-06-29 | End: 2024-07-02 | Stop reason: HOSPADM

## 2024-06-29 RX ADMIN — METOPROLOL SUCCINATE 25 MG: 25 TABLET, EXTENDED RELEASE ORAL at 09:06

## 2024-06-29 RX ADMIN — ENOXAPARIN SODIUM 30 MG: 40 INJECTION SUBCUTANEOUS at 05:06

## 2024-06-29 RX ADMIN — INSULIN ASPART 2 UNITS: 100 INJECTION, SOLUTION INTRAVENOUS; SUBCUTANEOUS at 11:06

## 2024-06-29 RX ADMIN — ARFORMOTEROL TARTRATE 15 MCG: 15 SOLUTION RESPIRATORY (INHALATION) at 07:06

## 2024-06-29 RX ADMIN — AMLODIPINE BESYLATE 10 MG: 10 TABLET ORAL at 09:06

## 2024-06-29 RX ADMIN — INSULIN GLARGINE 10 UNITS: 100 INJECTION, SOLUTION SUBCUTANEOUS at 09:06

## 2024-06-29 RX ADMIN — PRAVASTATIN SODIUM 20 MG: 20 TABLET ORAL at 09:06

## 2024-06-29 RX ADMIN — ARFORMOTEROL TARTRATE 15 MCG: 15 SOLUTION RESPIRATORY (INHALATION) at 08:06

## 2024-06-29 NOTE — PLAN OF CARE
OT EVAL COMPLETE.  PATIENT FEELS NO SKILLED OT INTERVENTION REQUIRED AT ACUTE LEVEL.  PATIENT STATED SHE MAINLY NEEDS TO WORK ON HER BREATHING AND AMBULATION.  OT MAY BENEFIT FROM  OT HH AT D/C TO ASSESS SAFETY IN HER HOME ENVIRONMENT.

## 2024-06-29 NOTE — PLAN OF CARE
P.T. EVAL COMPLETE.  PT CURRENTLY REQUIRES SBA FOR BED MOBILITY AND TF'S, CG/SBA FOR GAIT WITH RW.  P.T. RECOMMENDS LOW INTENSITY THERAPY UPON DISCHARGE

## 2024-06-29 NOTE — PT/OT/SLP EVAL
Occupational Therapy   Evaluation and Discharge Note    Name: Lakshmi Campbell  MRN: [id number]  Admitting Diagnosis: Acute hypoxic respiratory failure  Recent Surgery: * No surgery found *      Recommendations:     Discharge Recommendations: Low Intensity Therapy  Discharge Equipment Recommendations: none  Barriers to discharge:  None    Assessment:     Lakshmi Campbell is a 73 y.o. female with a medical diagnosis of Acute hypoxic respiratory failure. At this time, patient is functioning at their prior level of function and does not require further acute OT services.     Plan:     During this hospitalization, patient does not require further acute OT services.  Please re-consult if situation changes.    Plan of Care Reviewed with: patient, spouse, daughter, grandchild(karuna)    Subjective     Chief Complaint: PATIENT C/O NEED TO IMPROVE HER BREATHING AND AMBULATION.  Patient/Family Comments/goals:  PATIENT C/O NEED TO IMPROVE HER BREATHING AND AMBULATION.    Occupational Profile:  Living Environment: PATIENT LIVES WITH HER  IN A 1 STORY HOUSE WITH NO STEPS.   Previous level of function: PATIENT STATED SHE WAS (I) WITH ALL LEVELS OF SELF CARE AND WAS DRIVING.   Roles and Routines: PATIENT STATED SHE USES NO DME NOR AMB DEVICES.  Equipment Used at home: none (PATIENT HAS RW AND CANE BUT NEVER USES IT.)  Assistance upon Discharge: /FAMILY    Pain/Comfort:  Pain Rating 1: 0/10    Patients cultural, spiritual, Buddhist conflicts given the current situation:      Objective:     Communicated with: PHYSICAL THERAPIST [place] prior to session.  Patient found supine with telemetry, oxygen, peripheral IV, PureWick upon OT entry to room.    General Precautions: Standard, fall, respiratory  Orthopedic Precautions: N/A  Braces: N/A  Respiratory Status: Nasal cannula, flow 3 L/min     Occupational Performance:    Bed Mobility:    SBA WITH ALL LEVELS OF BED MOBILITY WITH USE OF BED RAIL    Functional  "Mobility/Transfers:  Patient completed Sit <> Stand Transfer with stand by assistance  with  no assistive device   Patient completed Bed <> Chair Transfer using Stand Pivot technique with stand by assistance with USE OF B/S CHAIR ARMRESTS FOR BALANCE.  Patient completed Toilet Transfer Stand Pivot technique with contact guard assistance with  (A) OF O2 HOSE  Functional Mobility: SBA/CGA WITH STAND PIVOT T/F    Activities of Daily Living:  Feeding:  PATIENT DID NOT WANT TO EAT AT TIME OF EVAL DUE TO C/O NOT LIKING THE JAMBALYA    Grooming: SET UP    Lower Body Dressing: stand by assistance WITH FOOTWEAR MANAGEMENT TO DON/DOFF SOCKS SEATED EOB  Toileting: SBA/CGA      Cognitive/Visual Perceptual:  NO DEFICITS NOTED.  Physical Exam:  Balance:    -       SBA/CGA WITH STAND PIVOT T/F  Upper Extremity Range of Motion:     -       Right Upper Extremity: WFL  -       Left Upper Extremity: WFL    AMPAC 6 Click ADL:  AMPAC Total Score: 23    Treatment & Education:  OT EVAL PERFORMED.  PATIENT EDUCATED RE:  PURPOSE OF OT AND IMPORTANCE OF "CALL--DON'T FALL" TO DECREASE FALL RISK.  PATIENT PARTICIPATED IN [facility] MOBILITY AND SELF CARE TASKS.     Patient left HOB elevated with all lines intact, call button in reach, chair alarm off, and FAMILY present    GOALS:   Multidisciplinary Problems       Occupational Therapy Goals          Problem: Occupational Therapy    Goal Priority Disciplines Outcome Interventions   Occupational Therapy Goal     OT, PT/OT     Description: OT EVAL COMPLETE.  NO SKILLED OT INTERVENTION NEEDS IDENTIFIED. PATIENT STATED SHE ONLY NEEDS TO IMPROVE BREATHING AND AMBULATION.                         History:     Past Medical History:   Diagnosis Date    Cataract     Closed displaced comminuted fracture of right patella 10/28/2020    Closed fracture of surgical neck of left humerus 10/30/2020    Closed left radial fracture 10/30/2020    Diabetes mellitus type I     Hyperlipidemia     Hypertension     Morbid " obesity     Right knee pain     Thyroid disease          Past Surgical History:   Procedure Laterality Date    APPENDECTOMY      BREAST BIOPSY      CATARACT EXTRACTION      COLONOSCOPY N/A 12/21/2021    Procedure: COLONOSCOPY screening;  Surgeon: Moises Patterson MD;  Location: [facility];  Service: Endoscopy;  Laterality: N/A;    ESOPHAGOGASTRODUODENOSCOPY N/A 12/21/2021    Procedure: EGD (ESOPHAGOGASTRODUODENOSCOPY) EV screening;  Surgeon: Moises Patterson MD;  Location: [facility];  Service: Endoscopy;  Laterality: N/A;    EYE SURGERY      HERNIA REPAIR      OPEN REDUCTION AND INTERNAL FIXATION (ORIF) OF FRACTURE OF DISTAL RADIUS Left 11/2/2020    Procedure: ORIF, FRACTURE, RADIUS, DISTAL;  Surgeon: Sage Wolf MD;  Location: [facility];  Service: Orthopedics;  Laterality: Left;    OPEN REDUCTION AND INTERNAL FIXATION (ORIF) OF FRACTURE OF PATELLA Right 11/2/2020    Procedure: ORIF, FRACTURE, PATELLA;  Surgeon: Sage Wolf MD;  Location: [facility];  Service: Orthopedics;  Laterality: Right;    OPEN REDUCTION AND INTERNAL FIXATION (ORIF) OF FRACTURE OF PATELLA Right 12/18/2020    Procedure: ORIF, FRACTURE, PATELLA;  Surgeon: Sage Wolf MD;  Location: [facility] OR;  Service: Orthopedics;  Laterality: Right;    ORIF HUMERUS FRACTURE Left 11/5/2020    Procedure: ORIF, FRACTURE, HUMERUS;  Surgeon: Sage Wolf MD;  Location: [facility] OR;  Service: Orthopedics;  Laterality: Left;    PLACEMENT OF ACELLULAR HUMAN DERMAL ALLOGRAFT Right 11/2/2020    Procedure: APPLICATION, ACELLULAR HUMAN DERMAL ALLOGRAFT;  Surgeon: Sage Wolf MD;  Location: [facility] OR;  Service: Orthopedics;  Laterality: Right;  Right Patella    REMOVAL OF HARDWARE FROM HAND Left 3/11/2021    Procedure: REMOVAL, HARDWARE, HAND;  Surgeon: Sage Wolf MD;  Location: [facility] OR;  Service: Orthopedics;  Laterality: Left;  Removal of dorsal spanning wrist plate left distal radius    REMOVAL OF  HARDWARE FROM LOWER EXTREMITY Right 12/18/2020    Procedure: REMOVAL, HARDWARE, LOWER EXTREMITY;  Surgeon: Sage Wolf MD;  Location: [facility];  Service: Orthopedics;  Laterality: Right;       Time Tracking:     OT Date of Treatment: 06/29/24  OT Start Time: 1202  OT Stop Time: 1225  OT Total Time (min): 23 min    Billable Minutes:Evaluation 7  Therapeutic Activity 8          Self care 8    6/29/2024

## 2024-06-29 NOTE — SUBJECTIVE & OBJECTIVE
Interval History: Afebrile. WBC normal.  Currently on 3 liters NC + Cough. Mild improvement in SOB.     Review of Systems   Constitutional:  Positive for fatigue. Negative for appetite change, chills, diaphoresis, fever and unexpected weight change.   HENT:  Negative for congestion, nosebleeds, sinus pressure and sore throat.    Eyes:  Negative for pain, discharge and visual disturbance.   Respiratory:  Positive for cough and shortness of breath. Negative for chest tightness, wheezing and stridor.    Cardiovascular:  Negative for chest pain, palpitations and leg swelling.   Gastrointestinal:  Negative for abdominal distention, abdominal pain, blood in stool, constipation, diarrhea, nausea and vomiting.   Endocrine: Negative for cold intolerance and heat intolerance.   Genitourinary:  Negative for difficulty urinating, dysuria, flank pain, frequency and urgency.   Musculoskeletal:  Positive for arthralgias and back pain. Negative for joint swelling, myalgias, neck pain and neck stiffness.   Skin:  Negative for rash and wound.   Allergic/Immunologic: Negative for food allergies and immunocompromised state.   Neurological:  Negative for dizziness, seizures, syncope, facial asymmetry, speech difficulty, weakness, light-headedness, numbness and headaches.   Hematological:  Negative for adenopathy.   Psychiatric/Behavioral:  Negative for agitation, confusion and hallucinations.      Objective:     Vital Signs (Most Recent):  Temp: 97.9 °F (36.6 °C) (06/29/24 1154)  Pulse: 67 (06/29/24 1335)  Resp: 18 (06/29/24 1154)  BP: (!) 140/63 (06/29/24 1154)  SpO2: (!) 93 % (06/29/24 1154) Vital Signs (24h Range):  Temp:  [97.9 °F (36.6 °C)-98.7 °F (37.1 °C)] 97.9 °F (36.6 °C)  Pulse:  [62-90] 67  Resp:  [17-20] 18  SpO2:  [91 %-96 %] 93 %  BP: (135-175)/(62-75) 140/63     Weight: 107.5 kg (237 lb)  Body mass index is 39.44 kg/m².    Intake/Output Summary (Last 24 hours) at 6/29/2024 0517  Last data filed at 6/29/2024 0951  Gross  per 24 hour   Intake --   Output 1200 ml   Net -1200 ml         Physical Exam  Constitutional:       General: She is not in acute distress.     Appearance: She is well-developed. She is obese. She is not diaphoretic.   HENT:      Head: Normocephalic and atraumatic.      Nose: Nose normal.   Eyes:      General: No scleral icterus.     Conjunctiva/sclera: Conjunctivae normal.   Neck:      Trachea: No tracheal deviation.   Cardiovascular:      Rate and Rhythm: Normal rate and regular rhythm.      Heart sounds: Normal heart sounds. No murmur heard.     No friction rub. No gallop.   Pulmonary:      Breath sounds: No stridor. Rhonchi (mid to lower lobes) and rales (mid to lower lobes) present.      Comments: conversational dyspnea   Chest:      Chest wall: No tenderness.   Abdominal:      General: Bowel sounds are normal. There is no distension.      Palpations: Abdomen is soft. There is no mass.      Tenderness: There is no abdominal tenderness. There is no guarding or rebound.   Musculoskeletal:         General: No tenderness or deformity. Normal range of motion.      Cervical back: Normal range of motion and neck supple.   Skin:     General: Skin is warm and dry.      Coloration: Skin is not pale.      Findings: No erythema or rash.   Neurological:      Mental Status: She is alert and oriented to person, place, and time.      Cranial Nerves: No cranial nerve deficit.      Motor: No abnormal muscle tone.      Coordination: Coordination normal.   Psychiatric:         Behavior: Behavior normal.         Thought Content: Thought content normal.             Significant Labs: All pertinent labs within the past 24 hours have been reviewed.    Significant Imaging: I have reviewed all pertinent imaging results/findings within the past 24 hours.

## 2024-06-29 NOTE — ASSESSMENT & PLAN NOTE
Patient with acute kidney injury/acute renal failure. MIKA is currently  being treated . Baseline creatinine  between 1.3-1.7  - Labs reviewed- Renal function/electrolytes with Estimated Creatinine Clearance: 27.8 mL/min (A) (based on SCr of 2.2 mg/dL (H)). according to latest data. Monitor urine output and serial BMP and adjust therapy as needed. Avoid nephrotoxins and renally dose meds for GFR listed above.    Hold IV diuretics   Monitor

## 2024-06-29 NOTE — PROGRESS NOTES
[facility] Medicine  Progress Note    Patient Name: Lakshmi Campbell  MRN: [id number]  Patient Class: IP- Inpatient   Admission Date: 6/26/2024  Length of Stay: 2 days  Attending Physician: Madelaine Mercer MD  Primary Care Provider: Keely Silva NP        Subjective:     Principal Problem:Acute hypoxic respiratory failure        HPI:  Lakshmi Campbell is a 73 y.o. female with a PMH  has a past medical history of Cataract, Closed displaced comminuted fracture of right patella (10/28/2020), Closed fracture of surgical neck of left humerus (10/30/2020), Closed left radial fracture (10/30/2020), Diabetes mellitus type I, Hyperlipidemia, Hypertension, Morbid obesity, Right knee pain, and Thyroid disease.  Presented to the ER for evaluation of sudden onset of shortness of breath with nonproductive cough and 1 episode of nonbloody/nonbilious vomiting as a result from coughing.  Patient reports his symptoms worsened when she exerts herself and with lying flat.  Denies any recent illnesses or sick contacts.  Denies history of CHF for COPD.  Reports she was a previous smoker, but quit years ago.  Denies any use of home oxygen for use of CPAP/BiPAP to sleep at night.  Denies swelling in her lower extremities.  Denies fever, aches, chills, sweats, chest pain, palpitations, abdominal pain common bladder/bowel complaints, or any other symptoms at this time.    ER workup revealed CBC to be unremarkable, BUN/creatinine of 45/2.2 with CBG of 293 mg/dL, BNP of 1, 003, troponin of 0.054, UA negative, and ABG revealing pH of 7.237, pCO2 of 27.3, PO2 of 60, and HC03 of 11.6.  Chest x-ray findings consistent with multilobar pneumonia.  EKG revealed sinus rhythm with first-degree AV block with a ventricular rate of 77 beats per minute and a QT/QTC of 374/423.  O2 sats initially 88% on room air, but improved to 97% on 2 liters/minute via nasal cannula.  Patient received a total of 3 DuoNebs, 500 mg azithromycin, 1 g  Rocephin, 40 mg Lasix IV, 125 mg Solu-Medrol IV in ED. hospital Medicine consulted to admit patient for acute hypoxic respiratory failure in setting of new onset CHF and multilobar pneumonia.  Patient in agreement with treatment plan.  Patient admitted under inpatient status.    PCP: Keely Silva    Overview/Hospital Course:  A 73 y.o. female patient with DM, HTN, HLP, CKD3, thyroid disease, and morbid obesity admitted for Acute hypoxic respiratory failure, Multilobar pneumonia, and diastolic CHF on IV Rocephin and Azithromycin. WBC normal. Afebrile. Pt required 4 liters o2 NC. CT Chest showed multilobar PNA most extensive in the right upper, right lower and left lower lobes, incidental cholelithiasis. Blood cultures showed NGTD. Sputum culture pending. Procalcitonin over 50. Acapella, IS, and OOB ordered.     Cardiology consulted. Echo showed moderate AS and diastolic dysfunction. Pt received IV lasix x 3 doses. BNP is down-trending from 1003 to 297. Troponin mildly elevated but flat (0.052>0.055). Cr elevated (1.7>2.6). Diuresis held. Cardiology recommended f/u in [facility] clinic and BB, po lasix as OP     MIKA/CKD3 slowly improving -hold diuresis for now     Interval History: Afebrile. WBC normal.  Currently on 3 liters NC + Cough. Mild improvement in SOB.     Review of Systems   Constitutional:  Positive for fatigue. Negative for appetite change, chills, diaphoresis, fever and unexpected weight change.   HENT:  Negative for congestion, nosebleeds, sinus pressure and sore throat.    Eyes:  Negative for pain, discharge and visual disturbance.   Respiratory:  Positive for cough and shortness of breath. Negative for chest tightness, wheezing and stridor.    Cardiovascular:  Negative for chest pain, palpitations and leg swelling.   Gastrointestinal:  Negative for abdominal distention, abdominal pain, blood in stool, constipation, diarrhea, nausea and vomiting.   Endocrine: Negative for cold intolerance and heat  intolerance.   Genitourinary:  Negative for difficulty urinating, dysuria, flank pain, frequency and urgency.   Musculoskeletal:  Positive for arthralgias and back pain. Negative for joint swelling, myalgias, neck pain and neck stiffness.   Skin:  Negative for rash and wound.   Allergic/Immunologic: Negative for food allergies and immunocompromised state.   Neurological:  Negative for dizziness, seizures, syncope, facial asymmetry, speech difficulty, weakness, light-headedness, numbness and headaches.   Hematological:  Negative for adenopathy.   Psychiatric/Behavioral:  Negative for agitation, confusion and hallucinations.      Objective:     Vital Signs (Most Recent):  Temp: 97.9 °F (36.6 °C) (06/29/24 1154)  Pulse: 67 (06/29/24 1335)  Resp: 18 (06/29/24 1154)  BP: (!) 140/63 (06/29/24 1154)  SpO2: (!) 93 % (06/29/24 1154) Vital Signs (24h Range):  Temp:  [97.9 °F (36.6 °C)-98.7 °F (37.1 °C)] 97.9 °F (36.6 °C)  Pulse:  [62-90] 67  Resp:  [17-20] 18  SpO2:  [91 %-96 %] 93 %  BP: (135-175)/(62-75) 140/63     Weight: 107.5 kg (237 lb)  Body mass index is 39.44 kg/m².    Intake/Output Summary (Last 24 hours) at 6/29/2024 1540  Last data filed at 6/29/2024 0934  Gross per 24 hour   Intake --   Output 1200 ml   Net -1200 ml         Physical Exam  Constitutional:       General: She is not in acute distress.     Appearance: She is well-developed. She is obese. She is not diaphoretic.   HENT:      Head: Normocephalic and atraumatic.      Nose: Nose normal.   Eyes:      General: No scleral icterus.     Conjunctiva/sclera: Conjunctivae normal.   Neck:      Trachea: No tracheal deviation.   Cardiovascular:      Rate and Rhythm: Normal rate and regular rhythm.      Heart sounds: Normal heart sounds. No murmur heard.     No friction rub. No gallop.   Pulmonary:      Breath sounds: No stridor. Rhonchi (mid to lower lobes) and rales (mid to lower lobes) present.      Comments: conversational dyspnea   Chest:      Chest wall: No  tenderness.   Abdominal:      General: Bowel sounds are normal. There is no distension.      Palpations: Abdomen is soft. There is no mass.      Tenderness: There is no abdominal tenderness. There is no guarding or rebound.   Musculoskeletal:         General: No tenderness or deformity. Normal range of motion.      Cervical back: Normal range of motion and neck supple.   Skin:     General: Skin is warm and dry.      Coloration: Skin is not pale.      Findings: No erythema or rash.   Neurological:      Mental Status: She is alert and oriented to person, place, and time.      Cranial Nerves: No cranial nerve deficit.      Motor: No abnormal muscle tone.      Coordination: Coordination normal.   Psychiatric:         Behavior: Behavior normal.         Thought Content: Thought content normal.             Significant Labs: All pertinent labs within the past 24 hours have been reviewed.    Significant Imaging: I have reviewed all pertinent imaging results/findings within the past 24 hours.    Assessment/Plan:      * Acute hypoxic respiratory failure  Patient with Hypercapnic and Hypoxic Respiratory failure which is Acute.  she is not on home oxygen. Supplemental oxygen was provided and noted- Oxygen Concentration (%):  [32] 32    Signs/symptoms of respiratory failure include- tachypnea and respiratory distress. Contributing diagnoses includes - CHF and Pneumonia Labs and images were reviewed. Patient Has recent ABG, which has been reviewed. Will treat underlying causes and adjust management of respiratory failure as follows- supplemental oxygen, neb tx, IV antibiotics.        Community acquired pneumonia  Patient has a diagnosis of pneumonia. The cause of the pneumonia is unknown at this time. The pneumonia is stable. The patient has the following signs/symptoms of pneumonia: persistent hypoxia , cough, and shortness of breath. The patient does have a current oxygen requirement and the patient does not have a home oxygen  requirement. I have reviewed the pertinent imaging. The following cultures have been collected: Blood cultures The culture results are listed below.     Current antimicrobial regimen consists of the antibiotics listed below. Will monitor patient closely and continue current treatment plan unchanged.    Antibiotics (From admission, onward)      Start     Stop Route Frequency Ordered    06/27/24 2300  azithromycin (ZITHROMAX) 500 mg in D5W 250 mL IVPB (Vial-Mate)         07/01/24 2259 IV Every 24 hours (non-standard times) 06/27/24 0058            Microbiology Results (last 7 days)       Procedure Component Value Units Date/Time    Culture, Respiratory with Gram Stain [[id number]] Collected: 06/29/24 1147    Order Status: Sent Specimen: Respiratory from Sputum Updated: 06/29/24 1223    Blood culture [[id number]] Collected: 06/28/24 1559    Order Status: Completed Specimen: Blood Updated: 06/29/24 0355     Blood Culture, Routine No Growth to date    Blood culture [[id number]] Collected: 06/28/24 1559    Order Status: Completed Specimen: Blood Updated: 06/29/24 0355     Blood Culture, Routine No Growth to date          Extensive multilobar pneumonia   Cont IV Abx  Acapella and IS added   Encouraged OOB     Acute diastolic congestive heart failure  Patient is identified as having  diastolic  heart failure that is Acute. CHF is currently uncontrolled due to Weight gain of unknown pounds, Dyspnea not returned to baseline after 40mg lasix doses of IV diuretic, Rales/crackles on pulmonary exam, and Pulmonary edema/pleural effusion on CXR. Latest ECHO performed and demonstrates- No results found for this or any previous visit.  . Continue ACE/ARB and Aldactone and monitor clinical status closely. Monitor on telemetry. Patient is on CHF pathway.  Monitor strict Is&Os and daily weights.  Place on fluid restriction of 1.5 L. Cardiology has been consulted. Continue to stress to patient importance of self efficacy and  on  diet for CHF. Last BNP reviewed- and noted below   Recent Labs   Lab 06/28/24  1052   *     Cardiology consulted  BNP improved  Echo with normal EF, grade 2 diastolic dysfunction and Moderate AS  Strict I/Os  Resume ACEi when renal fxn improves     Cardiology recommended f/u in [facility] clinic and p.o. Lasix and BB    Acute kidney injury superimposed on chronic kidney disease  Patient with acute kidney injury/acute renal failure. MIKA is currently  being treated . Baseline creatinine  between 1.3-1.7  - Labs reviewed- Renal function/electrolytes with Estimated Creatinine Clearance: 27.8 mL/min (A) (based on SCr of 2.2 mg/dL (H)). according to latest data. Monitor urine output and serial BMP and adjust therapy as needed. Avoid nephrotoxins and renally dose meds for GFR listed above.    Hold IV diuretics   Monitor     Hyperlipidemia  Patient is chronically on statin.will continue for now. Last Lipid Panel:   Lab Results   Component Value Date    CHOL 71 (L) 06/27/2024    HDL 34 (L) 06/27/2024    LDLCALC 24.0 (L) 06/27/2024    TRIG 65 06/27/2024    CHOLHDL 47.9 06/27/2024     Plan:  -Continue home medication  -low fat/low calorie diet        Essential hypertension  Chronic, controlled. Latest blood pressure and vitals reviewed-     Temp:  [97.7 °F (36.5 °C)-100.2 °F (37.9 °C)]   Pulse:  [76-90]   Resp:  [16-22]   BP: (121-152)/(60-95)   SpO2:  [90 %-95 %] .   Home meds for hypertension were reviewed and noted below.   Hypertension Medications               amLODIPine (NORVASC) 10 MG tablet TAKE 1 TABLET(10 MG) BY MOUTH EVERY DAY    chlorthalidone (HYGROTEN) 25 MG Tab Take 25 mg by mouth once daily.    perindopril erbumine (ACEON) 4 mg tablet Take 4 mg by mouth once daily.     spironolactone (ALDACTONE) 25 MG tablet Take 25 mg by mouth.            While in the hospital, will manage blood pressure as follows; Continue home antihypertensive regimen (norvasc), but hold chlorthalidone, Aldactone, Ace, Diuresis for now      Will utilize p.r.n. blood pressure medication only if patient's blood pressure greater than 160/100 and she develops symptoms such as worsening chest pain or shortness of breath.    Hypothyroidism  Patient has chronic hypothyroidism. TFTs reviewed-   Lab Results   Component Value Date    TSH 0.032 (L) 06/27/2024   . Will continue chronic levothyroxine and adjust for and clinical changes.        Type 2 diabetes mellitus with microalbuminuria, with long-term current use of insulin  Patient's FSGs are uncontrolled due to hyperglycemia on current medication regimen.  Last A1c reviewed-   Lab Results   Component Value Date    HGBA1C 5.9 (H) 06/26/2024     Most recent fingerstick glucose reviewed-   Recent Labs   Lab 06/28/24  2050 06/29/24  0712 06/29/24  1126   POCTGLUCOSE 176* 165* 234*       Current correctional scale  Low  Maintain anti-hyperglycemic dose as follows-   Antihyperglycemics (From admission, onward)      Start     Stop Route Frequency Ordered    06/28/24 2100  insulin glargine U-100 (Lantus) pen 10 Units         -- SubQ Nightly 06/28/24 1402    06/27/24 0203  insulin aspart U-100 pen 0-5 Units         -- SubQ Before meals & nightly PRN 06/27/24 0103          -Continue home long-acting insulin at 20% decrease, titrate up as needed  -Hypoglycemic protocol            VTE Risk Mitigation (From admission, onward)           Ordered     enoxaparin injection 30 mg  Daily         06/27/24 0103     IP VTE HIGH RISK PATIENT  Once         06/27/24 0103     Place sequential compression device  Until discontinued         06/27/24 0103                    Discharge Planning   KYA:      Code Status: Full Code   Is the patient medically ready for discharge?:     Reason for patient still in hospital (select all that apply): Patient trending condition  Discharge Plan A: Home                  Yue Whitfield NP  Department of Hospital Medicine   [facility] - Med Surg

## 2024-06-29 NOTE — ASSESSMENT & PLAN NOTE
Patient is identified as having  diastolic  heart failure that is Acute. CHF is currently uncontrolled due to Weight gain of unknown pounds, Dyspnea not returned to baseline after 40mg lasix doses of IV diuretic, Rales/crackles on pulmonary exam, and Pulmonary edema/pleural effusion on CXR. Latest ECHO performed and demonstrates- No results found for this or any previous visit.  . Continue ACE/ARB and Aldactone and monitor clinical status closely. Monitor on telemetry. Patient is on CHF pathway.  Monitor strict Is&Os and daily weights.  Place on fluid restriction of 1.5 L. Cardiology has been consulted. Continue to stress to patient importance of self efficacy and  on diet for CHF. Last BNP reviewed- and noted below   Recent Labs   Lab 06/28/24  1052   *     Cardiology consulted  BNP improved  Echo with normal EF, grade 2 diastolic dysfunction and Moderate AS  Strict I/Os  Resume ACEi when renal fxn improves     Cardiology recommended f/u in [facility] clinic and p.o. Lasix and BB

## 2024-06-29 NOTE — ASSESSMENT & PLAN NOTE
Patient has a diagnosis of pneumonia. The cause of the pneumonia is unknown at this time. The pneumonia is stable. The patient has the following signs/symptoms of pneumonia: persistent hypoxia , cough, and shortness of breath. The patient does have a current oxygen requirement and the patient does not have a home oxygen requirement. I have reviewed the pertinent imaging. The following cultures have been collected: Blood cultures The culture results are listed below.     Current antimicrobial regimen consists of the antibiotics listed below. Will monitor patient closely and continue current treatment plan unchanged.    Antibiotics (From admission, onward)      Start     Stop Route Frequency Ordered    06/27/24 2300  azithromycin (ZITHROMAX) 500 mg in D5W 250 mL IVPB (Vial-Mate)         07/01/24 2259 IV Every 24 hours (non-standard times) 06/27/24 0058            Microbiology Results (last 7 days)       Procedure Component Value Units Date/Time    Culture, Respiratory with Gram Stain [[id number]] Collected: 06/29/24 1147    Order Status: Sent Specimen: Respiratory from Sputum Updated: 06/29/24 1223    Blood culture [[id number]] Collected: 06/28/24 1559    Order Status: Completed Specimen: Blood Updated: 06/29/24 0355     Blood Culture, Routine No Growth to date    Blood culture [[id number]] Collected: 06/28/24 1559    Order Status: Completed Specimen: Blood Updated: 06/29/24 0355     Blood Culture, Routine No Growth to date          Extensive multilobar pneumonia   Cont IV Abx  Acapella and IS added   Encouraged OOB

## 2024-06-29 NOTE — PLAN OF CARE
Discussed poc with pt, pt verbalized understanding    Purposeful rounding every 2hours    VS wnl  Cardiac monitoring in use, pt is NSR, tele monitor # [id number]  Blood glucose monitoring   Fall precautions in place, remains injury free    IVFs  Accurate I&Os    Bed locked at lowest position  Call light within reach    Chart check complete  Will cont with POC      Problem: Adult Inpatient Plan of Care  Goal: Plan of Care Review  Outcome: Progressing  Goal: Patient-Specific Goal (Individualized)  Outcome: Progressing  Flowsheets (Taken 6/29/2024 0123)  Individualized Care Needs: wants to rest  Goal: Absence of Hospital-Acquired Illness or Injury  Outcome: Progressing  Goal: Optimal Comfort and Wellbeing  Outcome: Progressing  Goal: Readiness for Transition of Care  Outcome: Progressing     Problem: Diabetes Comorbidity  Goal: Blood Glucose Level Within Targeted Range  Outcome: Progressing     Problem: Skin Injury Risk Increased  Goal: Skin Health and Integrity  Outcome: Progressing     Problem: Pneumonia  Goal: Fluid Balance  Outcome: Progressing  Goal: Resolution of Infection Signs and Symptoms  Outcome: Progressing  Goal: Effective Oxygenation and Ventilation  Outcome: Progressing     Problem: Acute Kidney Injury/Impairment  Goal: Fluid and Electrolyte Balance  Outcome: Progressing  Goal: Improved Oral Intake  Outcome: Progressing  Goal: Effective Renal Function  Outcome: Progressing

## 2024-06-29 NOTE — ASSESSMENT & PLAN NOTE
Patient with Hypercapnic and Hypoxic Respiratory failure which is Acute.  she is not on home oxygen. Supplemental oxygen was provided and noted- Oxygen Concentration (%):  [32] 32    Signs/symptoms of respiratory failure include- tachypnea and respiratory distress. Contributing diagnoses includes - CHF and Pneumonia Labs and images were reviewed. Patient Has recent ABG, which has been reviewed. Will treat underlying causes and adjust management of respiratory failure as follows- supplemental oxygen, neb tx, IV antibiotics.

## 2024-06-29 NOTE — ASSESSMENT & PLAN NOTE
Patient's FSGs are uncontrolled due to hyperglycemia on current medication regimen.  Last A1c reviewed-   Lab Results   Component Value Date    HGBA1C 5.9 (H) 06/26/2024     Most recent fingerstick glucose reviewed-   Recent Labs   Lab 06/28/24 2050 06/29/24  0712 06/29/24  1126   POCTGLUCOSE 176* 165* 234*       Current correctional scale  Low  Maintain anti-hyperglycemic dose as follows-   Antihyperglycemics (From admission, onward)      Start     Stop Route Frequency Ordered    06/28/24 2100  insulin glargine U-100 (Lantus) pen 10 Units         -- SubQ Nightly 06/28/24 1402    06/27/24 0203  insulin aspart U-100 pen 0-5 Units         -- SubQ Before meals & nightly PRN 06/27/24 0103          -Continue home long-acting insulin at 20% decrease, titrate up as needed  -Hypoglycemic protocol

## 2024-06-29 NOTE — PT/OT/SLP EVAL
Physical Therapy Evaluation and Treatment    Patient Name:  Lakshmi Campbell   MRN:  [id number]    Recommendations:     Discharge Recommendations: Low Intensity Therapy   Discharge Equipment Recommendations: none   Barriers to discharge: None    Assessment:     Lakshmi Campbell is a 73 y.o. female admitted with a medical diagnosis of Acute hypoxic respiratory failure.  She presents with the following impairments/functional limitations: weakness, impaired endurance, impaired balance, gait instability, impaired functional mobility, decreased safety awareness, decreased coordination.    Rehab Prognosis: Good; patient would benefit from acute skilled PT services to address these deficits and reach maximum level of function.    Recent Surgery: * No surgery found *     Plan:     During this hospitalization, patient to be seen 3 x/week to address the identified rehab impairments via gait training, therapeutic activities, therapeutic exercises and progress toward the following goals:    Plan of Care Expires:  07/13/24    Subjective     Chief Complaint: NONE, EAGER TO GET OOB  Patient/Family Comments/goals:   Pain/Comfort:  Pain Rating 1: 0/10    Patients cultural, spiritual, Anabaptism conflicts given the current situation:      Living Environment:  PT LIVES WITH  1 STORY HOUSE NO STEPS TO ENTER, PT AMB INDEP COMMUNITY DISTANCES, DRIVES, RETIRED, INDEP WITH ADL'S.  NOT O2 USED AT HOME PTA  Prior to admission, patients level of function was INDEP.  Equipment used at home: none (HAS RW AND SPC BUT DID NOT USE PTA).  DME owned (not currently used): rolling walker and single point cane.  Upon discharge, patient will have assistance from .    Objective:     Communicated with NURSE SARABIA prior to session.  Patient found supine with telemetry, oxygen, peripheral IV, PureWick  upon PT entry to room.    General Precautions: Standard, fall, respiratory  Orthopedic Precautions:N/A   Braces: N/A  Respiratory Status: Nasal  "cannula, flow 3 L/min    Exams:  Cognitive Exam:  Patient is oriented to Person, Place, Time, and Situation  Postural Exam:  Patient presented with the following abnormalities:    -       Rounded shoulders  Sensation:    -       Intact  RLE ROM: WFL  RLE Strength: WFL  LLE ROM: WFL  LLE Strength: WFL    Functional Mobility:  Bed Mobility:     Rolling Left:  stand by assistance  Scooting: stand by assistance  Supine to Sit: stand by assistance  Transfers:     Sit to Stand:  contact guard assistance with rolling walker  Bed to Chair: contact guard assistance with  rolling walker  using  Step Transfer  Gait: PT AMB 50' X 2 TRIALS WITH RW AND CG/SBA, SLOW PACE, QUICK TO FATIGUE, CUES FOR UPRIGHT POSTURE AND RW SAFETY, NO SOB WITH O2 IN TOW.  1 SMALL LOB INITIALLY REQUIRING LEROY TO RECOVER  Balance: GOOD SITTING BALANCE, FAIR DYNAMIC BALANCE DURING GAIT  PT EDUCATED IN AND PERFORMED SEATED BLE THEREX X 10 REPS: HIP FLEX/EXT, LAQ, AP'S    AM-PAC 6 CLICK MOBILITY  Total Score:20     Treatment & Education:  PT EDUCATION:  - ROLE OF P.T. AND POC IN ACUTE CARE HOSPITAL SETTING  - RW USE AND SAFETY DURING TF'S AND GAIT  - ENCOURAGED TO INCREASE TIME OOB IN CHAIR TO TOLERANCE   - TO CONTINUE THERAPUETIC EXERCISES THROUGHOUT THE DAY TO INCREASE ACTIVITY TOLERANCE AND DECREASE RISK FOR PNEUMONIA AND BLOOD CLOTS: HIP FLEX/EXT, HIP ABD/ADD, QUAD SET, HEEL SLIDE, AP  - RISK FOR FALLS DUE TO GENERALIZED WEAKNESS, EDUCATED ON "CALL DON'T FALL", ENCOURAGED TO CALL FOR ASSISTANCE WITH ALL NEEDS SUCH AS BED<>CHAIR TRANSFERS OR TRIPS TO BATHROOM, PT AGREEABLE TO SAFETY PRECAUTIONS    Patient left up in chair with all lines intact, call button in reach, chair alarm on, NURSE notified, and  present.    GOALS:   Multidisciplinary Problems       Physical Therapy Goals          Problem: Physical Therapy    Goal Priority Disciplines Outcome Goal Variances Interventions   Physical Therapy Goal     PT, PT/OT      Description: LTG'S TO BE " MET IN 14 DAYS (7-13-24)  PT WILL BE AUSTEN FOR BED MOBILITY  PT WILL BE AUSTEN FOR BED<>CHAIR TF'S  PT WILL  FEET WITH RW AND AUSTEN  PT WILL INC AMPAC SCORE BY 2 POINTS TO PROGRESS GROSS FUNC MOBILITY                         History:     Past Medical History:   Diagnosis Date    Cataract     Closed displaced comminuted fracture of right patella 10/28/2020    Closed fracture of surgical neck of left humerus 10/30/2020    Closed left radial fracture 10/30/2020    Diabetes mellitus type I     Hyperlipidemia     Hypertension     Morbid obesity     Right knee pain     Thyroid disease        Past Surgical History:   Procedure Laterality Date    APPENDECTOMY      BREAST BIOPSY      CATARACT EXTRACTION      COLONOSCOPY N/A 12/21/2021    Procedure: COLONOSCOPY screening;  Surgeon: Moises Patterson MD;  Location: [facility];  Service: Endoscopy;  Laterality: N/A;    ESOPHAGOGASTRODUODENOSCOPY N/A 12/21/2021    Procedure: EGD (ESOPHAGOGASTRODUODENOSCOPY) EV screening;  Surgeon: Moises Patterson MD;  Location: [facility];  Service: Endoscopy;  Laterality: N/A;    EYE SURGERY      HERNIA REPAIR      OPEN REDUCTION AND INTERNAL FIXATION (ORIF) OF FRACTURE OF DISTAL RADIUS Left 11/2/2020    Procedure: ORIF, FRACTURE, RADIUS, DISTAL;  Surgeon: Sage Wolf MD;  Location: [facility] OR;  Service: Orthopedics;  Laterality: Left;    OPEN REDUCTION AND INTERNAL FIXATION (ORIF) OF FRACTURE OF PATELLA Right 11/2/2020    Procedure: ORIF, FRACTURE, PATELLA;  Surgeon: Sage Wolf MD;  Location: [facility] OR;  Service: Orthopedics;  Laterality: Right;    OPEN REDUCTION AND INTERNAL FIXATION (ORIF) OF FRACTURE OF PATELLA Right 12/18/2020    Procedure: ORIF, FRACTURE, PATELLA;  Surgeon: Sage Wolf MD;  Location: [facility] OR;  Service: Orthopedics;  Laterality: Right;    ORIF HUMERUS FRACTURE Left 11/5/2020    Procedure: ORIF, FRACTURE, HUMERUS;  Surgeon: Sage Wolf MD;  Location: [facility] OR;  Service:  Orthopedics;  Laterality: Left;    PLACEMENT OF ACELLULAR HUMAN DERMAL ALLOGRAFT Right 11/2/2020    Procedure: APPLICATION, ACELLULAR HUMAN DERMAL ALLOGRAFT;  Surgeon: Sage Wolf MD;  Location: [facility] OR;  Service: Orthopedics;  Laterality: Right;  Right Patella    REMOVAL OF HARDWARE FROM HAND Left 3/11/2021    Procedure: REMOVAL, HARDWARE, HAND;  Surgeon: Sage Wolf MD;  Location: [facility] OR;  Service: Orthopedics;  Laterality: Left;  Removal of dorsal spanning wrist plate left distal radius    REMOVAL OF HARDWARE FROM LOWER EXTREMITY Right 12/18/2020    Procedure: REMOVAL, HARDWARE, LOWER EXTREMITY;  Surgeon: Sage Wolf MD;  Location: [facility] OR;  Service: Orthopedics;  Laterality: Right;       Time Tracking:     PT Received On: 06/29/24  PT Start Time: 1020     PT Stop Time: 1045  PT Total Time (min): 25 min     Billable Minutes: Evaluation 15 and Therapeutic Activity 10    06/29/2024

## 2024-06-30 PROBLEM — I50.9 CONGESTIVE HEART FAILURE: Status: ACTIVE | Noted: 2024-06-30

## 2024-06-30 PROBLEM — K75.81 LIVER CIRRHOSIS SECONDARY TO NASH: Status: ACTIVE | Noted: 2021-07-01

## 2024-06-30 LAB
ADENOVIRUS: NOT DETECTED
ALBUMIN SERPL BCP-MCNC: 2.2 G/DL (ref 3.5–5.2)
ALP SERPL-CCNC: 75 U/L (ref 55–135)
ALT SERPL W/O P-5'-P-CCNC: 18 U/L (ref 10–44)
ANION GAP SERPL CALC-SCNC: 11 MMOL/L (ref 8–16)
ANION GAP SERPL CALC-SCNC: 11 MMOL/L (ref 8–16)
AST SERPL-CCNC: 23 U/L (ref 10–40)
BASOPHILS # BLD AUTO: 0.03 K/UL (ref 0–0.2)
BASOPHILS NFR BLD: 0.5 % (ref 0–1.9)
BILIRUB SERPL-MCNC: 0.8 MG/DL (ref 0.1–1)
BORDETELLA PARAPERTUSSIS (IS1001): NOT DETECTED
BORDETELLA PERTUSSIS (PTXP): NOT DETECTED
BUN SERPL-MCNC: 82 MG/DL (ref 8–23)
BUN SERPL-MCNC: 82 MG/DL (ref 8–23)
CALCIUM SERPL-MCNC: 9.1 MG/DL (ref 8.7–10.5)
CALCIUM SERPL-MCNC: 9.1 MG/DL (ref 8.7–10.5)
CHLAMYDIA PNEUMONIAE: NOT DETECTED
CHLORIDE SERPL-SCNC: 106 MMOL/L (ref 95–110)
CHLORIDE SERPL-SCNC: 106 MMOL/L (ref 95–110)
CO2 SERPL-SCNC: 21 MMOL/L (ref 23–29)
CO2 SERPL-SCNC: 21 MMOL/L (ref 23–29)
CORONAVIRUS 229E, COMMON COLD VIRUS: NOT DETECTED
CORONAVIRUS HKU1, COMMON COLD VIRUS: NOT DETECTED
CORONAVIRUS NL63, COMMON COLD VIRUS: NOT DETECTED
CORONAVIRUS OC43, COMMON COLD VIRUS: NOT DETECTED
CREAT SERPL-MCNC: 2 MG/DL (ref 0.5–1.4)
CREAT SERPL-MCNC: 2 MG/DL (ref 0.5–1.4)
DIFFERENTIAL METHOD BLD: ABNORMAL
EOSINOPHIL # BLD AUTO: 0.1 K/UL (ref 0–0.5)
EOSINOPHIL NFR BLD: 1.8 % (ref 0–8)
ERYTHROCYTE [DISTWIDTH] IN BLOOD BY AUTOMATED COUNT: 12.7 % (ref 11.5–14.5)
EST. GFR  (NO RACE VARIABLE): 26 ML/MIN/1.73 M^2
EST. GFR  (NO RACE VARIABLE): 26 ML/MIN/1.73 M^2
FLUBV RNA NPH QL NAA+NON-PROBE: NOT DETECTED
GLUCOSE SERPL-MCNC: 170 MG/DL (ref 70–110)
GLUCOSE SERPL-MCNC: 170 MG/DL (ref 70–110)
HCT VFR BLD AUTO: 32.4 % (ref 37–48.5)
HGB BLD-MCNC: 10.4 G/DL (ref 12–16)
HPIV1 RNA NPH QL NAA+NON-PROBE: NOT DETECTED
HPIV2 RNA NPH QL NAA+NON-PROBE: NOT DETECTED
HPIV3 RNA NPH QL NAA+NON-PROBE: NOT DETECTED
HPIV4 RNA NPH QL NAA+NON-PROBE: NOT DETECTED
HUMAN METAPNEUMOVIRUS: NOT DETECTED
IMM GRANULOCYTES # BLD AUTO: 0.12 K/UL (ref 0–0.04)
IMM GRANULOCYTES NFR BLD AUTO: 2 % (ref 0–0.5)
INFLUENZA A (SUBTYPES H1,H1-2009,H3): NOT DETECTED
INR PPP: 0.9 (ref 0.8–1.2)
LYMPHOCYTES # BLD AUTO: 0.6 K/UL (ref 1–4.8)
LYMPHOCYTES NFR BLD: 10.4 % (ref 18–48)
MAGNESIUM SERPL-MCNC: 2.5 MG/DL (ref 1.6–2.6)
MCH RBC QN AUTO: 28.8 PG (ref 27–31)
MCHC RBC AUTO-ENTMCNC: 32.1 G/DL (ref 32–36)
MCV RBC AUTO: 90 FL (ref 82–98)
MONOCYTES # BLD AUTO: 0.4 K/UL (ref 0.3–1)
MONOCYTES NFR BLD: 6.3 % (ref 4–15)
MYCOPLASMA PNEUMONIAE: NOT DETECTED
NEUTROPHILS # BLD AUTO: 4.8 K/UL (ref 1.8–7.7)
NEUTROPHILS NFR BLD: 79 % (ref 38–73)
NRBC BLD-RTO: 0 /100 WBC
PHOSPHATE SERPL-MCNC: 3.8 MG/DL (ref 2.7–4.5)
PLATELET # BLD AUTO: 67 K/UL (ref 150–450)
PLATELET BLD QL SMEAR: ABNORMAL
PMV BLD AUTO: 11.4 FL (ref 9.2–12.9)
POCT GLUCOSE: 179 MG/DL (ref 70–110)
POCT GLUCOSE: 191 MG/DL (ref 70–110)
POCT GLUCOSE: 213 MG/DL (ref 70–110)
POCT GLUCOSE: 293 MG/DL (ref 70–110)
POTASSIUM SERPL-SCNC: 4.1 MMOL/L (ref 3.5–5.1)
POTASSIUM SERPL-SCNC: 4.1 MMOL/L (ref 3.5–5.1)
PROCALCITONIN SERPL IA-MCNC: 15.07 NG/ML
PROT SERPL-MCNC: 6.1 G/DL (ref 6–8.4)
PROTHROMBIN TIME: 10.4 SEC (ref 9–12.5)
RBC # BLD AUTO: 3.61 M/UL (ref 4–5.4)
RESPIRATORY INFECTION PANEL SOURCE: ABNORMAL
RSV RNA NPH QL NAA+NON-PROBE: NOT DETECTED
RV+EV RNA NPH QL NAA+NON-PROBE: DETECTED
SARS-COV-2 RNA RESP QL NAA+PROBE: NOT DETECTED
SODIUM SERPL-SCNC: 138 MMOL/L (ref 136–145)
SODIUM SERPL-SCNC: 138 MMOL/L (ref 136–145)
WBC # BLD AUTO: 6.05 K/UL (ref 3.9–12.7)

## 2024-06-30 PROCEDURE — 85025 COMPLETE CBC W/AUTO DIFF WBC: CPT | Performed by: NURSE PRACTITIONER

## 2024-06-30 PROCEDURE — 94761 N-INVAS EAR/PLS OXIMETRY MLT: CPT

## 2024-06-30 PROCEDURE — 87449 NOS EACH ORGANISM AG IA: CPT | Mod: 91 | Performed by: NURSE PRACTITIONER

## 2024-06-30 PROCEDURE — 87385 HISTOPLASMA CAPSUL AG IA: CPT | Performed by: NURSE PRACTITIONER

## 2024-06-30 PROCEDURE — 87798 DETECT AGENT NOS DNA AMP: CPT | Performed by: NURSE PRACTITIONER

## 2024-06-30 PROCEDURE — 85610 PROTHROMBIN TIME: CPT | Performed by: NURSE PRACTITIONER

## 2024-06-30 PROCEDURE — 80053 COMPREHEN METABOLIC PANEL: CPT | Performed by: NURSE PRACTITIONER

## 2024-06-30 PROCEDURE — 94640 AIRWAY INHALATION TREATMENT: CPT

## 2024-06-30 PROCEDURE — 25000003 PHARM REV CODE 250: Performed by: NURSE PRACTITIONER

## 2024-06-30 PROCEDURE — 25000003 PHARM REV CODE 250: Performed by: STUDENT IN AN ORGANIZED HEALTH CARE EDUCATION/TRAINING PROGRAM

## 2024-06-30 PROCEDURE — 83735 ASSAY OF MAGNESIUM: CPT | Performed by: NURSE PRACTITIONER

## 2024-06-30 PROCEDURE — 87205 SMEAR GRAM STAIN: CPT | Performed by: INTERNAL MEDICINE

## 2024-06-30 PROCEDURE — 94664 DEMO&/EVAL PT USE INHALER: CPT

## 2024-06-30 PROCEDURE — 36415 COLL VENOUS BLD VENIPUNCTURE: CPT | Performed by: NURSE PRACTITIONER

## 2024-06-30 PROCEDURE — 94618 PULMONARY STRESS TESTING: CPT

## 2024-06-30 PROCEDURE — 87449 NOS EACH ORGANISM AG IA: CPT | Performed by: NURSE PRACTITIONER

## 2024-06-30 PROCEDURE — 11000001 HC ACUTE MED/SURG PRIVATE ROOM

## 2024-06-30 PROCEDURE — 84145 PROCALCITONIN (PCT): CPT | Performed by: NURSE PRACTITIONER

## 2024-06-30 PROCEDURE — 84100 ASSAY OF PHOSPHORUS: CPT | Performed by: NURSE PRACTITIONER

## 2024-06-30 PROCEDURE — 63600175 PHARM REV CODE 636 W HCPCS: Performed by: NURSE PRACTITIONER

## 2024-06-30 PROCEDURE — 25000242 PHARM REV CODE 250 ALT 637 W/ HCPCS: Performed by: NURSE PRACTITIONER

## 2024-06-30 PROCEDURE — 86403 PARTICLE AGGLUT ANTBDY SCRN: CPT | Performed by: NURSE PRACTITIONER

## 2024-06-30 PROCEDURE — 94799 UNLISTED PULMONARY SVC/PX: CPT | Mod: XB

## 2024-06-30 PROCEDURE — 27000221 HC OXYGEN, UP TO 24 HOURS

## 2024-06-30 PROCEDURE — 87070 CULTURE OTHR SPECIMN AEROBIC: CPT | Performed by: INTERNAL MEDICINE

## 2024-06-30 PROCEDURE — 87899 AGENT NOS ASSAY W/OPTIC: CPT | Performed by: NURSE PRACTITIONER

## 2024-06-30 PROCEDURE — 25000003 PHARM REV CODE 250: Performed by: INTERNAL MEDICINE

## 2024-06-30 PROCEDURE — 99900035 HC TECH TIME PER 15 MIN (STAT)

## 2024-06-30 PROCEDURE — 63600175 PHARM REV CODE 636 W HCPCS: Performed by: INTERNAL MEDICINE

## 2024-06-30 PROCEDURE — 87081 CULTURE SCREEN ONLY: CPT | Performed by: INTERNAL MEDICINE

## 2024-06-30 RX ORDER — FAMOTIDINE 20 MG/1
20 TABLET, FILM COATED ORAL DAILY
Status: DISCONTINUED | OUTPATIENT
Start: 2024-07-01 | End: 2024-07-02 | Stop reason: HOSPADM

## 2024-06-30 RX ADMIN — IPRATROPIUM BROMIDE AND ALBUTEROL SULFATE 3 ML: 2.5; .5 SOLUTION RESPIRATORY (INHALATION) at 07:06

## 2024-06-30 RX ADMIN — PRAVASTATIN SODIUM 20 MG: 20 TABLET ORAL at 08:06

## 2024-06-30 RX ADMIN — VANCOMYCIN HYDROCHLORIDE 2000 MG: 500 INJECTION, POWDER, LYOPHILIZED, FOR SOLUTION INTRAVENOUS at 06:06

## 2024-06-30 RX ADMIN — ARFORMOTEROL TARTRATE 15 MCG: 15 SOLUTION RESPIRATORY (INHALATION) at 07:06

## 2024-06-30 RX ADMIN — METOPROLOL SUCCINATE 25 MG: 25 TABLET, EXTENDED RELEASE ORAL at 08:06

## 2024-06-30 RX ADMIN — CEFEPIME 1 G: 1 INJECTION, POWDER, FOR SOLUTION INTRAMUSCULAR; INTRAVENOUS at 04:06

## 2024-06-30 RX ADMIN — DEXTROSE MONOHYDRATE 500 MG: 50 INJECTION, SOLUTION INTRAVENOUS at 12:06

## 2024-06-30 RX ADMIN — AMLODIPINE BESYLATE 10 MG: 10 TABLET ORAL at 08:06

## 2024-06-30 RX ADMIN — INSULIN ASPART 2 UNITS: 100 INJECTION, SOLUTION INTRAVENOUS; SUBCUTANEOUS at 04:06

## 2024-06-30 RX ADMIN — CEFTRIAXONE 1 G: 1 INJECTION, POWDER, FOR SOLUTION INTRAMUSCULAR; INTRAVENOUS at 02:06

## 2024-06-30 RX ADMIN — ENOXAPARIN SODIUM 30 MG: 40 INJECTION SUBCUTANEOUS at 04:06

## 2024-06-30 RX ADMIN — INSULIN GLARGINE 10 UNITS: 100 INJECTION, SOLUTION SUBCUTANEOUS at 09:06

## 2024-06-30 RX ADMIN — INSULIN ASPART 1 UNITS: 100 INJECTION, SOLUTION INTRAVENOUS; SUBCUTANEOUS at 09:06

## 2024-06-30 NOTE — ASSESSMENT & PLAN NOTE
Body mass index is 39.44 kg/m². Morbid obesity complicates all aspects of disease management from diagnostic modalities to treatment. Weight loss encouraged and health benefits explained to patient.        Problem: Ventilation Defect:  Goal: Ability to achieve and maintain unassisted ventilation or tolerate decreased levels of ventilator support  Outcome: PROGRESSING AS EXPECTED  PICU Ventilation Update    Vent Day: Chronic vent.  [place] Vent Mode: PSIMV (11/30/17 1358)     Rate (breaths/min): 24 (11/30/17 1358)  Aux Vent Rate: 5 (10/04/17 0741)  Vt Target (mL): 24 (11/13/17 1039)  FiO2: 30 (11/30/17 1358)  PEEP/CPAP: 6 (11/30/17 1358)  P Control (PIP): 18 (11/30/17 1358)  Cough: Productive (11/30/17 1358)  Sputum Amount: Small (11/30/17 1358)  Sputum Color: White (11/30/17 1358)  Sputum Consistency: Thick;Thin (11/30/17 1358)    Events/Summary/Plan: Pt. Stable on vent. Will continue to monitor.

## 2024-06-30 NOTE — RESPIRATORY THERAPY
Home Oxygen Evaluation - [facility] - Cardiopulmonary Department      Date Performed: 6/30/2024      1) Patient's Home O2 Sat on room air, while at rest: Room Air SpO2 At Rest: 90 %        If O2 sats on room air at rest are 88% or below, patient qualifies.  Document O2 liter flow needed in Step 2.  If O2 sats are 89% or above, complete Step 3.        2)  If patient is not ambulated and O2 sats are <88%, what is the O2 liter flow required to meet ordered saturation?      If O2 sats on room air while exercising remain 89% or above patient does not qualify, no further testing needed Document N/A in step 3. If O2 sats on room air while exercising are 88% or below, continue to Step 4.    3) Patient's O2 Sat on room air while exercising: Room Air SpO2 During Ambulation: (!) 87 %        4) Patient's O2 Sat while exercising on O2: SpO2 During Ambulation on O2: 92 % at Ambulation O2 LPM: 2 LPM         (Must show improvement from #4 for patients to qualify)

## 2024-06-30 NOTE — ASSESSMENT & PLAN NOTE
Patient is identified as having  diastolic  heart failure that is Acute. CHF is currently uncontrolled due to Weight gain of unknown pounds, Dyspnea not returned to baseline after 40mg lasix doses of IV diuretic, Rales/crackles on pulmonary exam, and Pulmonary edema/pleural effusion on CXR. Latest ECHO performed and demonstrates- No results found for this or any previous visit.  . Continue ACE/ARB and Aldactone and monitor clinical status closely. Monitor on telemetry. Patient is on CHF pathway.  Monitor strict Is&Os and daily weights.  Place on fluid restriction of 1.5 L. Cardiology has been consulted. Continue to stress to patient importance of self efficacy and  on diet for CHF. Last BNP reviewed- and noted below   Recent Labs   Lab 06/28/24  1052   *     Cardiology consulted  BNP improved  Echo with normal EF, grade 2 diastolic dysfunction and Moderate AS  Strict I/Os  Resume ACEi (Aceon) when renal fxn improves    Resume Spironolactone when renal fxn improves    Cardiology recommended f/u in [facility] clinic and p.o. Lasix 20mg and BB

## 2024-06-30 NOTE — ASSESSMENT & PLAN NOTE
Patient with known Cirrhosis with  Co-morbidities are present and inclusive of anemia/pancytopenia.  MELD-Na score calculated; MELD 3.0: 13 at 4/22/2024  8:14 AM  MELD-Na: 12 at 4/22/2024  8:14 AM  Calculated from:  Serum Creatinine: 1.7 mg/dL at 4/22/2024  8:14 AM  Serum Sodium: 140 mmol/L (Using max of 137 mmol/L) at 4/22/2024  8:14 AM  Total Bilirubin: 0.6 mg/dL (Using min of 1 mg/dL) at 4/22/2024  8:14 AM  Serum Albumin: 3.3 g/dL at 4/22/2024  8:14 AM  INR(ratio): 1.0 at 4/22/2024  8:14 AM  Age at listing (hypothetical): 73 years  Sex: Female at 4/22/2024  8:14 AM  Continue chronic meds. Etiology likely NAFLD. Will avoid any hepatotoxic meds, and monitor CBC/CMP/INR for synthetic function.

## 2024-06-30 NOTE — ASSESSMENT & PLAN NOTE
Patient with acute kidney injury/acute renal failure. MIKA is currently  being treated . Baseline creatinine  between 1.3-1.7  - Labs reviewed- Renal function/electrolytes with Estimated Creatinine Clearance: 30.5 mL/min (A) (based on SCr of 2 mg/dL (H)). according to latest data. Monitor urine output and serial BMP and adjust therapy as needed. Avoid nephrotoxins and renally dose meds for GFR listed above.    Hold IV diuretics   Hold home meds Chlorthalidone, Spironolactone, Aceon    Monitor

## 2024-06-30 NOTE — CONSULTS
Pharmacokinetic Initial Assessment: IV Vancomycin    Assessment/Plan:    Initiate intravenous vancomycin with loading dose of 2000 mg once with subsequent doses when random concentrations are less than 20 mcg/mL  Desired empiric serum trough concentration is 15 to 20 mcg/mL  Draw vancomycin random level on 7/1 at 1830.  Pharmacy will continue to follow and monitor vancomycin.      Please contact pharmacy at extension [phone]  with any questions regarding this assessment.     Thank you for the consult,   Ruby Trivedi       Patient brief summary:  Lakshmi Campbell is a 73 y.o. female initiated on antimicrobial therapy with IV Vancomycin for treatment of suspected lower respiratory infection    Drug Allergies:   Review of patient's allergies indicates:   Allergen Reactions    Codeine Nausea Only     Other reaction(s): Nausea  Other reaction(s): Elevated blood pressure       Actual Body Weight:   107.5 kg    Renal Function:   Estimated Creatinine Clearance: 30.5 mL/min (A) (based on SCr of 2 mg/dL (H)).,     Dialysis Method (if applicable):  N/A    CBC (last 72 hours):  Recent Labs   Lab Result Units 06/29/24  0611 06/30/24  0552   WBC K/uL 6.57 6.05   Hemoglobin g/dL 10.2* 10.4*   Hematocrit % 31.1* 32.4*   Platelets K/uL 68* 67*   Gran % % 88.9* 79.0*   Lymph % % 6.4* 10.4*   Mono % % 3.0* 6.3   Eosinophil % % 0.6 1.8   Basophil % % 0.6 0.5   Differential Method  Automated Automated       Metabolic Panel (last 72 hours):  Recent Labs   Lab Result Units 06/28/24  0511 06/29/24  0611 06/30/24  0552   Sodium mmol/L 139 138 138  138   Potassium mmol/L 4.8 4.2 4.1  4.1   Chloride mmol/L 108 107 106  106   CO2 mmol/L 17* 19* 21*  21*   Glucose mg/dL 230* 152* 170*  170*   BUN mg/dL 67* 78* 82*  82*   Creatinine mg/dL 2.6* 2.2* 2.0*  2.0*   Albumin g/dL  --   --  2.2*   Total Bilirubin mg/dL  --   --  0.8   Alkaline Phosphatase U/L  --   --  75   AST U/L  --   --  23   ALT U/L  --   --  18   Magnesium mg/dL  --   "2.3 2.5   Phosphorus mg/dL  --  4.5 3.8       Drug levels (last 3 results):  No results for input(s): "VANCOMYCINRA", "VANCORANDOM", "VANCOMYCINPE", "VANCOPEAK", "VANCOMYCINTR", "VANCOTROUGH" in the last 72 hours.    Microbiologic Results:  Microbiology Results (last 7 days)       Procedure Component Value Units Date/Time    Culture, Respiratory with Gram Stain [[id number]] Collected: 06/30/24 1832    Order Status: Sent Specimen: Sputum Updated: 06/30/24 1832    Culture, MRSA [[id number]] Collected: 06/30/24 1657    Order Status: Sent Specimen: MRSA source from Nares, Left Updated: 06/30/24 1806    Respiratory Infection Panel (PCR), Nasopharyngeal [[id number]] Collected: 06/30/24 1702    Order Status: Sent Specimen: Nasopharyngeal Swab Updated: 06/30/24 1757    Cryptococcal antigen [[id number]] Collected: 06/30/24 1633    Order Status: Sent Specimen: Blood, Venous     Culture, Respiratory with Gram Stain [[id number]] Collected: 06/29/24 1147    Order Status: Completed Specimen: Respiratory from Sputum Updated: 06/30/24 0612     Gram Stain (Respiratory) <10 epithelial cells per low power field.     Gram Stain (Respiratory) Rare WBC's     Gram Stain (Respiratory) Rare Gram positive cocci     Gram Stain (Respiratory) Rare Gram negative rods     Gram Stain (Respiratory) Rare Gram positive rods    Blood culture [[id number]] Collected: 06/28/24 1559    Order Status: Completed Specimen: Blood Updated: 06/29/24 2213     Blood Culture, Routine No Growth to date      No Growth to date    Blood culture [[id number]] Collected: 06/28/24 1559    Order Status: Completed Specimen: Blood Updated: 06/29/24 2213     Blood Culture, Routine No Growth to date      No Growth to date            "

## 2024-06-30 NOTE — ASSESSMENT & PLAN NOTE
Patient with Hypercapnic and Hypoxic Respiratory failure which is Acute.  she is not on home oxygen. Supplemental oxygen was provided and noted- Oxygen Concentration (%):  [28-32] 28    Signs/symptoms of respiratory failure include- tachypnea and respiratory distress. Contributing diagnoses includes - CHF and Pneumonia Labs and images were reviewed. Patient Has recent ABG, which has been reviewed. Will treat underlying causes and adjust management of respiratory failure as follows- supplemental oxygen, neb tx, IV antibiotics.

## 2024-06-30 NOTE — PROGRESS NOTES
Pharmacist Renal Dose Adjustment Note    Lakshmi Campbell is a 73 y.o. female being treated with the medication Cefepime     Patient Data:    Vital Signs (Most Recent):  Temp: 97.7 °F (36.5 °C) (06/30/24 1203)  Pulse: 60 (06/30/24 1203)  Resp: 19 (06/30/24 1203)  BP: (!) 163/72 (06/30/24 1203)  SpO2: (!) 93 % (06/30/24 1203) Vital Signs (72h Range):  Temp:  [97.7 °F (36.5 °C)-100.2 °F (37.9 °C)]   Pulse:  [56-90]   Resp:  [16-22]   BP: (121-175)/(60-95)   SpO2:  [90 %-97 %]      Recent Labs   Lab 06/28/24  0511 06/29/24  0611 06/30/24  0552   CREATININE 2.6* 2.2* 2.0*  2.0*     Serum creatinine: 2 mg/dL (H) 06/30/24 0552  Estimated creatinine clearance: 30.5 mL/min (A)    Medication:Cefepime dose: 1 gram frequency every 8 hours will be changed to medication: Cefepime dose:1 gram frequency:every 12 hours    Pharmacist's Name: Ruby Trivedi  Pharmacist's Extension: [phone]

## 2024-06-30 NOTE — SUBJECTIVE & OBJECTIVE
Interval History: Afebrile. WBC normal. Respiratory status slowly improving- currently on 2 liters NC + Cough. Mild improvement in SOB.     Review of Systems   Constitutional:  Positive for fatigue. Negative for appetite change, chills, diaphoresis, fever and unexpected weight change.   HENT:  Negative for congestion, nosebleeds, sinus pressure and sore throat.    Eyes:  Negative for pain, discharge and visual disturbance.   Respiratory:  Positive for cough and shortness of breath. Negative for chest tightness, wheezing and stridor.    Cardiovascular:  Negative for chest pain, palpitations and leg swelling.   Gastrointestinal:  Negative for abdominal distention, abdominal pain, blood in stool, constipation, diarrhea, nausea and vomiting.   Endocrine: Negative for cold intolerance and heat intolerance.   Genitourinary:  Negative for difficulty urinating, dysuria, flank pain, frequency and urgency.   Musculoskeletal:  Positive for arthralgias and back pain. Negative for joint swelling, myalgias, neck pain and neck stiffness.   Skin:  Negative for rash and wound.   Allergic/Immunologic: Negative for food allergies and immunocompromised state.   Neurological:  Negative for dizziness, seizures, syncope, facial asymmetry, speech difficulty, weakness, light-headedness, numbness and headaches.   Hematological:  Negative for adenopathy.   Psychiatric/Behavioral:  Negative for agitation, confusion and hallucinations.      Objective:     Vital Signs (Most Recent):  Temp: 97.7 °F (36.5 °C) (06/30/24 1203)  Pulse: 60 (06/30/24 1203)  Resp: 19 (06/30/24 1203)  BP: (!) 163/72 (06/30/24 1203)  SpO2: (!) 93 % (06/30/24 1203) Vital Signs (24h Range):  Temp:  [97.7 °F (36.5 °C)-98.3 °F (36.8 °C)] 97.7 °F (36.5 °C)  Pulse:  [56-72] 60  Resp:  [16-20] 19  SpO2:  [91 %-97 %] 93 %  BP: (130-174)/(61-72) 163/72     Weight: 107.5 kg (237 lb)  Body mass index is 39.44 kg/m².    Intake/Output Summary (Last 24 hours) at 6/30/2024 1540  Last  data filed at 6/30/2024 1413  Gross per 24 hour   Intake --   Output 1300 ml   Net -1300 ml         Physical Exam  Constitutional:       General: She is not in acute distress.     Appearance: She is well-developed. She is obese. She is not diaphoretic.   HENT:      Head: Normocephalic and atraumatic.      Nose: Nose normal.   Eyes:      General: No scleral icterus.     Conjunctiva/sclera: Conjunctivae normal.   Neck:      Trachea: No tracheal deviation.   Cardiovascular:      Rate and Rhythm: Normal rate and regular rhythm.      Heart sounds: Normal heart sounds. No murmur heard.     No friction rub. No gallop.   Pulmonary:      Breath sounds: No stridor. Rhonchi (mid to lower lobes R>L) and rales (mid to lower lobes R>L) present.   Chest:      Chest wall: No tenderness.   Abdominal:      General: Bowel sounds are normal. There is no distension.      Palpations: Abdomen is soft. There is no mass.      Tenderness: There is no abdominal tenderness. There is no guarding or rebound.   Musculoskeletal:         General: No tenderness or deformity. Normal range of motion.      Cervical back: Normal range of motion and neck supple.   Skin:     General: Skin is warm and dry.      Coloration: Skin is not pale.      Findings: No erythema or rash.   Neurological:      Mental Status: She is alert and oriented to person, place, and time.      Cranial Nerves: No cranial nerve deficit.      Motor: No abnormal muscle tone.      Coordination: Coordination normal.   Psychiatric:         Behavior: Behavior normal.         Thought Content: Thought content normal.             Significant Labs: All pertinent labs within the past 24 hours have been reviewed.    Significant Imaging: I have reviewed all pertinent imaging results/findings within the past 24 hours.

## 2024-06-30 NOTE — PLAN OF CARE
Discussed poc with pt, pt verbalized understanding    Purposeful rounding every 2hours    VS wnl  Cardiac monitoring in use, pt is NSR, tele monitor # [id number]  Blood glucose monitoring, covered by insulin  Fall precautions in place, remains injury free  Pt denies c/o pain and nausea    Accurate I&Os  Abx given as prescribed  Bed locked at lowest position  Call light within reach    Chart check complete  Will cont with POC

## 2024-06-30 NOTE — ASSESSMENT & PLAN NOTE
Patient was found to have thrombocytopenia, the likely etiology is secondary to cirrhosis/portal hypertension, will monitor the platelets Daily. Will transfuse if platelet count is <10k. Hold DVT prophylaxis if platelets are <50k. The patient's platelet results have been reviewed and are listed below.  Recent Labs   Lab 06/30/24  0552   PLT 67*

## 2024-06-30 NOTE — PLAN OF CARE
Free from falls. Family at bedside. IV abx maintained. No s/s of acute distress. 12hr chart check complete.

## 2024-06-30 NOTE — ASSESSMENT & PLAN NOTE
Patient has a diagnosis of pneumonia. The cause of the pneumonia is unknown at this time. The pneumonia is stable. The patient has the following signs/symptoms of pneumonia: persistent hypoxia , cough, and shortness of breath. The patient does have a current oxygen requirement and the patient does not have a home oxygen requirement. I have reviewed the pertinent imaging. The following cultures have been collected: Blood cultures The culture results are listed below.     Current antimicrobial regimen consists of the antibiotics listed below. Will monitor patient closely and continue current treatment plan unchanged.    Antibiotics (From admission, onward)      Start     Stop Route Frequency Ordered    06/30/24 1400  cefTRIAXone (Rocephin) 1 g in D5W 100 mL IVPB (MB+)         -- IV Every 24 hours (non-standard times) 06/30/24 1326    06/27/24 2300  azithromycin (ZITHROMAX) 500 mg in D5W 250 mL IVPB (Vial-Mate)         07/01/24 2259 IV Every 24 hours (non-standard times) 06/27/24 0058            Microbiology Results (last 7 days)       Procedure Component Value Units Date/Time    Culture, Respiratory with Gram Stain [[id number]] Collected: 06/29/24 1147    Order Status: Completed Specimen: Respiratory from Sputum Updated: 06/30/24 0612     Gram Stain (Respiratory) <10 epithelial cells per low power field.     Gram Stain (Respiratory) Rare WBC's     Gram Stain (Respiratory) Rare Gram positive cocci     Gram Stain (Respiratory) Rare Gram negative rods     Gram Stain (Respiratory) Rare Gram positive rods    Blood culture [[id number]] Collected: 06/28/24 1559    Order Status: Completed Specimen: Blood Updated: 06/29/24 2213     Blood Culture, Routine No Growth to date      No Growth to date    Blood culture [[id number]] Collected: 06/28/24 1559    Order Status: Completed Specimen: Blood Updated: 06/29/24 2213     Blood Culture, Routine No Growth to date      No Growth to date    Respiratory Infection Panel (PCR),  Nasopharyngeal [[id number]]     Order Status: No result Specimen: Nasopharyngeal Swab           Extensive multilobar pneumonia   Cont IV Rocephin and Azithromycin   Acapella and IS added   Encouraged OOB   Ambulatory pulse ox   Will need home oxygen and HH

## 2024-06-30 NOTE — ASSESSMENT & PLAN NOTE
"Patient has a diagnosis of pneumonia. The cause of the pneumonia is unknown at this time. The pneumonia is stable. The patient has the following signs/symptoms of pneumonia: persistent hypoxia , cough, and shortness of breath. The patient does have a current oxygen requirement and the patient does not have a home oxygen requirement. I have reviewed the pertinent imaging. The following cultures have been collected: Blood cultures The culture results are listed below.     Current antimicrobial regimen consists of the antibiotics listed below. Will monitor patient closely and continue current treatment plan unchanged.    Antibiotics (From admission, onward)      Start     Stop Route Frequency Ordered    06/30/24 1715  ceFEPIme (MAXIPIME) 1 g in D5W 100 mL IVPB (MB+)         -- IV Every 12 hours (non-standard times) 06/30/24 1608    06/30/24 1707  vancomycin - pharmacy to dose  (vancomycin IVPB (PEDS and ADULTS))        Placed in "And" Linked Group    -- IV pharmacy to manage frequency 06/30/24 1608    06/27/24 2300  azithromycin (ZITHROMAX) 500 mg in D5W 250 mL IVPB (Vial-Mate)         07/01/24 2259 IV Every 24 hours (non-standard times) 06/27/24 0058            Microbiology Results (last 7 days)       Procedure Component Value Units Date/Time    Culture, Respiratory with Gram Stain [[id number]]     Order Status: No result Specimen: Respiratory     Culture, MRSA [[id number]]     Order Status: No result Specimen: MRSA source from Nares, Left     Culture, Respiratory with Gram Stain [[id number]] Collected: 06/29/24 1147    Order Status: Completed Specimen: Respiratory from Sputum Updated: 06/30/24 0612     Gram Stain (Respiratory) <10 epithelial cells per low power field.     Gram Stain (Respiratory) Rare WBC's     Gram Stain (Respiratory) Rare Gram positive cocci     Gram Stain (Respiratory) Rare Gram negative rods     Gram Stain (Respiratory) Rare Gram positive rods    Blood culture [[id number]] Collected: 06/28/24 " 1559    Order Status: Completed Specimen: Blood Updated: 06/29/24 2213     Blood Culture, Routine No Growth to date      No Growth to date    Blood culture [[id number]] Collected: 06/28/24 1559    Order Status: Completed Specimen: Blood Updated: 06/29/24 2213     Blood Culture, Routine No Growth to date      No Growth to date    Respiratory Infection Panel (PCR), Nasopharyngeal [[id number]]     Order Status: No result Specimen: Nasopharyngeal Swab           Extensive multilobar pneumonia   IV Rocephin and Azithromycin -changed to IV Cefepime, Vanco and Azithromycin due to extensive PNA/Immunocompromised   Acapella and IS added   Encouraged OOB   Ambulatory pulse ox   Will need home oxygen and HH

## 2024-06-30 NOTE — PROGRESS NOTES
[facility] Medicine  Progress Note    Patient Name: Lakshmi Campbell  MRN: [id number]  Patient Class: IP- Inpatient   Admission Date: 6/26/2024  Length of Stay: 3 days  Attending Physician: Madelaine Mercer MD  Primary Care Provider: Keely Silva NP        Subjective:     Principal Problem:Acute hypoxic respiratory failure        HPI:  Lakshmi Campbell is a 73 y.o. female with a PMH  has a past medical history of Cataract, Closed displaced comminuted fracture of right patella (10/28/2020), Closed fracture of surgical neck of left humerus (10/30/2020), Closed left radial fracture (10/30/2020), Diabetes mellitus type I, Hyperlipidemia, Hypertension, Morbid obesity, Right knee pain, and Thyroid disease.  Presented to the ER for evaluation of sudden onset of shortness of breath with nonproductive cough and 1 episode of nonbloody/nonbilious vomiting as a result from coughing.  Patient reports his symptoms worsened when she exerts herself and with lying flat.  Denies any recent illnesses or sick contacts.  Denies history of CHF for COPD.  Reports she was a previous smoker, but quit years ago.  Denies any use of home oxygen for use of CPAP/BiPAP to sleep at night.  Denies swelling in her lower extremities.  Denies fever, aches, chills, sweats, chest pain, palpitations, abdominal pain common bladder/bowel complaints, or any other symptoms at this time.    ER workup revealed CBC to be unremarkable, BUN/creatinine of 45/2.2 with CBG of 293 mg/dL, BNP of 1, 003, troponin of 0.054, UA negative, and ABG revealing pH of 7.237, pCO2 of 27.3, PO2 of 60, and HC03 of 11.6.  Chest x-ray findings consistent with multilobar pneumonia.  EKG revealed sinus rhythm with first-degree AV block with a ventricular rate of 77 beats per minute and a QT/QTC of 374/423.  O2 sats initially 88% on room air, but improved to 97% on 2 liters/minute via nasal cannula.  Patient received a total of 3 DuoNebs, 500 mg azithromycin, 1 g  Rocephin, 40 mg Lasix IV, 125 mg Solu-Medrol IV in ED. hospital Medicine consulted to admit patient for acute hypoxic respiratory failure in setting of new onset CHF and multilobar pneumonia.  Patient in agreement with treatment plan.  Patient admitted under inpatient status.    PCP: Keely Silva    Overview/Hospital Course:  A 73 y.o. female patient with DM, HTN, HLP, CKD3, Thyroid disease, [place] Cirrhosis, Chronic thrombocytopenia, and Morbid obesity admitted for Acute hypoxic respiratory failure, Multilobar pneumonia, and diastolic CHF on IV Rocephin and Azithromycin. WBC normal. Afebrile. Pt required 4 liters o2 NC. CT Chest showed multilobar PNA most extensive in the right upper, right lower and left lower lobes, incidental cholelithiasis. Blood cultures showed NGTD. Sputum culture and RVP pending. Acapella, IS, and OOB ordered. IV abx changed to Cefepime, Vanco and Azithromycin due to extensive PNA    Cardiology consulted. Echo showed moderate AS and diastolic dysfunction. Pt received IV lasix x 3 doses. BNP is down-trending from 1003 to 297. Troponin mildly elevated but flat (0.052>0.055). Cr elevated (1.7>2.6). Diuresis held. Cardiology recommended f/u in [facility] clinic and BB, po lasix as OP     MIKA/CKD3 slowly improving -hold diuresis for now         Interval History: Afebrile. WBC normal. Respiratory status slowly improving- currently on 2 liters NC + Cough. Mild improvement in SOB.     Review of Systems   Constitutional:  Positive for fatigue. Negative for appetite change, chills, diaphoresis, fever and unexpected weight change.   HENT:  Negative for congestion, nosebleeds, sinus pressure and sore throat.    Eyes:  Negative for pain, discharge and visual disturbance.   Respiratory:  Positive for cough and shortness of breath. Negative for chest tightness, wheezing and stridor.    Cardiovascular:  Negative for chest pain, palpitations and leg swelling.   Gastrointestinal:  Negative for abdominal  distention, abdominal pain, blood in stool, constipation, diarrhea, nausea and vomiting.   Endocrine: Negative for cold intolerance and heat intolerance.   Genitourinary:  Negative for difficulty urinating, dysuria, flank pain, frequency and urgency.   Musculoskeletal:  Positive for arthralgias and back pain. Negative for joint swelling, myalgias, neck pain and neck stiffness.   Skin:  Negative for rash and wound.   Allergic/Immunologic: Negative for food allergies and immunocompromised state.   Neurological:  Negative for dizziness, seizures, syncope, facial asymmetry, speech difficulty, weakness, light-headedness, numbness and headaches.   Hematological:  Negative for adenopathy.   Psychiatric/Behavioral:  Negative for agitation, confusion and hallucinations.      Objective:     Vital Signs (Most Recent):  Temp: 97.7 °F (36.5 °C) (06/30/24 1203)  Pulse: 60 (06/30/24 1203)  Resp: 19 (06/30/24 1203)  BP: (!) 163/72 (06/30/24 1203)  SpO2: (!) 93 % (06/30/24 1203) Vital Signs (24h Range):  Temp:  [97.7 °F (36.5 °C)-98.3 °F (36.8 °C)] 97.7 °F (36.5 °C)  Pulse:  [56-72] 60  Resp:  [16-20] 19  SpO2:  [91 %-97 %] 93 %  BP: (130-174)/(61-72) 163/72     Weight: 107.5 kg (237 lb)  Body mass index is 39.44 kg/m².    Intake/Output Summary (Last 24 hours) at 6/30/2024 1540  Last data filed at 6/30/2024 1413  Gross per 24 hour   Intake --   Output 1300 ml   Net -1300 ml         Physical Exam  Constitutional:       General: She is not in acute distress.     Appearance: She is well-developed. She is obese. She is not diaphoretic.   HENT:      Head: Normocephalic and atraumatic.      Nose: Nose normal.   Eyes:      General: No scleral icterus.     Conjunctiva/sclera: Conjunctivae normal.   Neck:      Trachea: No tracheal deviation.   Cardiovascular:      Rate and Rhythm: Normal rate and regular rhythm.      Heart sounds: Normal heart sounds. No murmur heard.     No friction rub. No gallop.   Pulmonary:      Breath sounds: No  stridor. Rhonchi (mid to lower lobes R>L) and rales (mid to lower lobes R>L) present.   Chest:      Chest wall: No tenderness.   Abdominal:      General: Bowel sounds are normal. There is no distension.      Palpations: Abdomen is soft. There is no mass.      Tenderness: There is no abdominal tenderness. There is no guarding or rebound.   Musculoskeletal:         General: No tenderness or deformity. Normal range of motion.      Cervical back: Normal range of motion and neck supple.   Skin:     General: Skin is warm and dry.      Coloration: Skin is not pale.      Findings: No erythema or rash.   Neurological:      Mental Status: She is alert and oriented to person, place, and time.      Cranial Nerves: No cranial nerve deficit.      Motor: No abnormal muscle tone.      Coordination: Coordination normal.   Psychiatric:         Behavior: Behavior normal.         Thought Content: Thought content normal.             Significant Labs: All pertinent labs within the past 24 hours have been reviewed.    Significant Imaging: I have reviewed all pertinent imaging results/findings within the past 24 hours.    Assessment/Plan:      * Acute hypoxic respiratory failure  Patient with Hypercapnic and Hypoxic Respiratory failure which is Acute.  she is not on home oxygen. Supplemental oxygen was provided and noted- Oxygen Concentration (%):  [28-32] 28    Signs/symptoms of respiratory failure include- tachypnea and respiratory distress. Contributing diagnoses includes - CHF and Pneumonia Labs and images were reviewed. Patient Has recent ABG, which has been reviewed. Will treat underlying causes and adjust management of respiratory failure as follows- supplemental oxygen, neb tx, IV antibiotics.        Community acquired pneumonia  Patient has a diagnosis of pneumonia. The cause of the pneumonia is unknown at this time. The pneumonia is stable. The patient has the following signs/symptoms of pneumonia: persistent hypoxia , cough, and  shortness of breath. The patient does have a current oxygen requirement and the patient does not have a home oxygen requirement. I have reviewed the pertinent imaging. The following cultures have been collected: Blood cultures The culture results are listed below.     Current antimicrobial regimen consists of the antibiotics listed below. Will monitor patient closely and continue current treatment plan unchanged.    Antibiotics (From admission, onward)      Start     Stop Route Frequency Ordered    06/30/24 1400  cefTRIAXone (Rocephin) 1 g in D5W 100 mL IVPB (MB+)         -- IV Every 24 hours (non-standard times) 06/30/24 1326    06/27/24 2300  azithromycin (ZITHROMAX) 500 mg in D5W 250 mL IVPB (Vial-Mate)         07/01/24 2259 IV Every 24 hours (non-standard times) 06/27/24 0058            Microbiology Results (last 7 days)       Procedure Component Value Units Date/Time    Culture, Respiratory with Gram Stain [[id number]] Collected: 06/29/24 1147    Order Status: Completed Specimen: Respiratory from Sputum Updated: 06/30/24 0612     Gram Stain (Respiratory) <10 epithelial cells per low power field.     Gram Stain (Respiratory) Rare WBC's     Gram Stain (Respiratory) Rare Gram positive cocci     Gram Stain (Respiratory) Rare Gram negative rods     Gram Stain (Respiratory) Rare Gram positive rods    Blood culture [[id number]] Collected: 06/28/24 1559    Order Status: Completed Specimen: Blood Updated: 06/29/24 2213     Blood Culture, Routine No Growth to date      No Growth to date    Blood culture [[id number]] Collected: 06/28/24 1559    Order Status: Completed Specimen: Blood Updated: 06/29/24 2213     Blood Culture, Routine No Growth to date      No Growth to date    Respiratory Infection Panel (PCR), Nasopharyngeal [[id number]]     Order Status: No result Specimen: Nasopharyngeal Swab           Extensive multilobar pneumonia   IV Rocephin and Azithromycin changed to IV Cefepime, Vanco, and Azithromycin    Acapella and IS added   Encouraged OOB   Sputum cx and RVP pending   Blood cx show NGTD  Ambulatory pulse ox   Will need home oxygen and HH    Acute diastolic congestive heart failure  Patient is identified as having  diastolic  heart failure that is Acute. CHF is currently uncontrolled due to Weight gain of unknown pounds, Dyspnea not returned to baseline after 40mg lasix doses of IV diuretic, Rales/crackles on pulmonary exam, and Pulmonary edema/pleural effusion on CXR. Latest ECHO performed and demonstrates- No results found for this or any previous visit.  . Continue ACE/ARB and Aldactone and monitor clinical status closely. Monitor on telemetry. Patient is on CHF pathway.  Monitor strict Is&Os and daily weights.  Place on fluid restriction of 1.5 L. Cardiology has been consulted. Continue to stress to patient importance of self efficacy and  on diet for CHF. Last BNP reviewed- and noted below   Recent Labs   Lab 06/28/24  1052   *     Cardiology consulted  BNP improved  Echo with normal EF, grade 2 diastolic dysfunction and Moderate AS  Strict I/Os  Resume ACEi (Aceon) when renal fxn improves    Resume Spironolactone when renal fxn improves    Cardiology recommended f/u in [facility] clinic and p.o. Lasix 20mg and BB    Acute kidney injury superimposed on chronic kidney disease  Patient with acute kidney injury/acute renal failure. MIKA is currently  being treated . Baseline creatinine  between 1.3-1.7  - Labs reviewed- Renal function/electrolytes with Estimated Creatinine Clearance: 30.5 mL/min (A) (based on SCr of 2 mg/dL (H)). according to latest data. Monitor urine output and serial BMP and adjust therapy as needed. Avoid nephrotoxins and renally dose meds for GFR listed above.    Hold IV diuretics   Hold home meds Chlorthalidone, Spironolactone, Aceon    Monitor     Liver cirrhosis secondary to YO  Patient with known Cirrhosis with  Co-morbidities are present and inclusive of  anemia/pancytopenia.  MELD-Na score calculated; MELD 3.0: 13 at 4/22/2024  8:14 AM  MELD-Na: 12 at 4/22/2024  8:14 AM  Calculated from:  Serum Creatinine: 1.7 mg/dL at 4/22/2024  8:14 AM  Serum Sodium: 140 mmol/L (Using max of 137 mmol/L) at 4/22/2024  8:14 AM  Total Bilirubin: 0.6 mg/dL (Using min of 1 mg/dL) at 4/22/2024  8:14 AM  Serum Albumin: 3.3 g/dL at 4/22/2024  8:14 AM  INR(ratio): 1.0 at 4/22/2024  8:14 AM  Age at listing (hypothetical): 73 years  Sex: Female at 4/22/2024  8:14 AM  Continue chronic meds. Etiology likely NAFLD. Will avoid any hepatotoxic meds, and monitor CBC/CMP/INR for synthetic function.     Thrombocytopenia  Patient was found to have thrombocytopenia, the likely etiology is secondary to cirrhosis/portal hypertension, will monitor the platelets Daily. Will transfuse if platelet count is <10k. Hold DVT prophylaxis if platelets are <50k. The patient's platelet results have been reviewed and are listed below.  Recent Labs   Lab 06/30/24  0552   PLT 67*         Morbid obesity with BMI of 40.0-44.9, adult  Body mass index is 39.44 kg/m². Morbid obesity complicates all aspects of disease management from diagnostic modalities to treatment. Weight loss encouraged and health benefits explained to patient.         Hyperlipidemia  Patient is chronically on statin.will continue for now. Last Lipid Panel:   Lab Results   Component Value Date    CHOL 71 (L) 06/27/2024    HDL 34 (L) 06/27/2024    LDLCALC 24.0 (L) 06/27/2024    TRIG 65 06/27/2024    CHOLHDL 47.9 06/27/2024     Plan:  -Continue home medication  -low fat/low calorie diet        Essential hypertension  Chronic, controlled. Latest blood pressure and vitals reviewed-     Temp:  [97.7 °F (36.5 °C)-100.2 °F (37.9 °C)]   Pulse:  [76-90]   Resp:  [16-22]   BP: (121-152)/(60-95)   SpO2:  [90 %-95 %] .   Home meds for hypertension were reviewed and noted below.   Hypertension Medications               amLODIPine (NORVASC) 10 MG tablet TAKE 1  TABLET(10 MG) BY MOUTH EVERY DAY    chlorthalidone (HYGROTEN) 25 MG Tab Take 25 mg by mouth once daily.    perindopril erbumine (ACEON) 4 mg tablet Take 4 mg by mouth once daily.     spironolactone (ALDACTONE) 25 MG tablet Take 25 mg by mouth.            While in the hospital, will manage blood pressure as follows; Continue home antihypertensive regimen (norvasc), but hold chlorthalidone, Aldactone, Aceon. Receiving iv Lasix for diuresing of CHF exacerbation    Will utilize p.r.n. blood pressure medication only if patient's blood pressure greater than 160/100 and she develops symptoms such as worsening chest pain or shortness of breath.    Hypothyroidism  Patient has chronic hypothyroidism. TFTs reviewed-   Lab Results   Component Value Date    TSH 0.032 (L) 06/27/2024   . Will continue chronic levothyroxine and adjust for and clinical changes.        Type 2 diabetes mellitus with microalbuminuria, with long-term current use of insulin  Patient's FSGs are uncontrolled due to hyperglycemia on current medication regimen.  Last A1c reviewed-   Lab Results   Component Value Date    HGBA1C 5.9 (H) 06/26/2024     Most recent fingerstick glucose reviewed-   Recent Labs   Lab 06/28/24  2050 06/29/24  0712 06/29/24  1126   POCTGLUCOSE 176* 165* 234*       Current correctional scale  Low  Maintain anti-hyperglycemic dose as follows-   Antihyperglycemics (From admission, onward)      Start     Stop Route Frequency Ordered    06/28/24 2100  insulin glargine U-100 (Lantus) pen 10 Units         -- SubQ Nightly 06/28/24 1402    06/27/24 0203  insulin aspart U-100 pen 0-5 Units         -- SubQ Before meals & nightly PRN 06/27/24 0103          -Continue home long-acting insulin at 20% decrease, titrate up as needed  -Hypoglycemic protocol            VTE Risk Mitigation (From admission, onward)           Ordered     enoxaparin injection 30 mg  Daily         06/27/24 0103     IP VTE HIGH RISK PATIENT  Once         06/27/24 0103      Place sequential compression device  Until discontinued         06/27/24 0103                    Discharge Planning   KYA:      Code Status: Full Code   Is the patient medically ready for discharge?:     Reason for patient still in hospital (select all that apply): Patient trending condition  Discharge Plan A: Home                  Yue Whitfield NP  Department of Hospital Medicine   '[place] [facility]

## 2024-07-01 LAB
ALBUMIN SERPL BCP-MCNC: 2.1 G/DL (ref 3.5–5.2)
ALP SERPL-CCNC: 89 U/L (ref 55–135)
ALT SERPL W/O P-5'-P-CCNC: 17 U/L (ref 10–44)
ANION GAP SERPL CALC-SCNC: 11 MMOL/L (ref 8–16)
ANISOCYTOSIS BLD QL SMEAR: SLIGHT
AST SERPL-CCNC: 20 U/L (ref 10–40)
BASOPHILS # BLD AUTO: ABNORMAL K/UL (ref 0–0.2)
BASOPHILS NFR BLD: 0 % (ref 0–1.9)
BILIRUB SERPL-MCNC: 0.5 MG/DL (ref 0.1–1)
BUN SERPL-MCNC: 71 MG/DL (ref 8–23)
CALCIUM SERPL-MCNC: 9.1 MG/DL (ref 8.7–10.5)
CHLORIDE SERPL-SCNC: 105 MMOL/L (ref 95–110)
CO2 SERPL-SCNC: 20 MMOL/L (ref 23–29)
CREAT SERPL-MCNC: 1.7 MG/DL (ref 0.5–1.4)
CRYPTOC AG SER QL LA: NEGATIVE
DACRYOCYTES BLD QL SMEAR: ABNORMAL
DIFFERENTIAL METHOD BLD: ABNORMAL
EOSINOPHIL # BLD AUTO: ABNORMAL K/UL (ref 0–0.5)
EOSINOPHIL NFR BLD: 0 % (ref 0–8)
ERYTHROCYTE [DISTWIDTH] IN BLOOD BY AUTOMATED COUNT: 12.4 % (ref 11.5–14.5)
EST. GFR  (NO RACE VARIABLE): 31 ML/MIN/1.73 M^2
GLUCOSE SERPL-MCNC: 206 MG/DL (ref 70–110)
HCT VFR BLD AUTO: 31.5 % (ref 37–48.5)
HGB BLD-MCNC: 10.4 G/DL (ref 12–16)
IMM GRANULOCYTES # BLD AUTO: ABNORMAL K/UL (ref 0–0.04)
IMM GRANULOCYTES NFR BLD AUTO: ABNORMAL % (ref 0–0.5)
INR PPP: 1 (ref 0.8–1.2)
LYMPHOCYTES # BLD AUTO: ABNORMAL K/UL (ref 1–4.8)
LYMPHOCYTES NFR BLD: 25 % (ref 18–48)
MAGNESIUM SERPL-MCNC: 2.4 MG/DL (ref 1.6–2.6)
MCH RBC QN AUTO: 28.8 PG (ref 27–31)
MCHC RBC AUTO-ENTMCNC: 33 G/DL (ref 32–36)
MCV RBC AUTO: 87 FL (ref 82–98)
MONOCYTES # BLD AUTO: ABNORMAL K/UL (ref 0.3–1)
MONOCYTES NFR BLD: 6 % (ref 4–15)
NEUTROPHILS NFR BLD: 69 % (ref 38–73)
NRBC BLD-RTO: 0 /100 WBC
PHOSPHATE SERPL-MCNC: 3.3 MG/DL (ref 2.7–4.5)
PLATELET # BLD AUTO: 73 K/UL (ref 150–450)
PLATELET BLD QL SMEAR: ABNORMAL
PMV BLD AUTO: 10.7 FL (ref 9.2–12.9)
POCT GLUCOSE: 205 MG/DL (ref 70–110)
POCT GLUCOSE: 210 MG/DL (ref 70–110)
POCT GLUCOSE: 265 MG/DL (ref 70–110)
POCT GLUCOSE: 337 MG/DL (ref 70–110)
POTASSIUM SERPL-SCNC: 4.1 MMOL/L (ref 3.5–5.1)
PROCALCITONIN SERPL IA-MCNC: 5.89 NG/ML
PROT SERPL-MCNC: 6.2 G/DL (ref 6–8.4)
PROTHROMBIN TIME: 11.1 SEC (ref 9–12.5)
RBC # BLD AUTO: 3.61 M/UL (ref 4–5.4)
SCHISTOCYTES BLD QL SMEAR: PRESENT
SODIUM SERPL-SCNC: 136 MMOL/L (ref 136–145)
VANCOMYCIN SERPL-MCNC: 11.7 UG/ML
WBC # BLD AUTO: 5.19 K/UL (ref 3.9–12.7)

## 2024-07-01 PROCEDURE — 36415 COLL VENOUS BLD VENIPUNCTURE: CPT | Performed by: NURSE PRACTITIONER

## 2024-07-01 PROCEDURE — 63600175 PHARM REV CODE 636 W HCPCS: Performed by: NURSE PRACTITIONER

## 2024-07-01 PROCEDURE — 25000242 PHARM REV CODE 250 ALT 637 W/ HCPCS: Performed by: NURSE PRACTITIONER

## 2024-07-01 PROCEDURE — 80202 ASSAY OF VANCOMYCIN: CPT | Performed by: INTERNAL MEDICINE

## 2024-07-01 PROCEDURE — 84145 PROCALCITONIN (PCT): CPT | Performed by: NURSE PRACTITIONER

## 2024-07-01 PROCEDURE — 97530 THERAPEUTIC ACTIVITIES: CPT | Mod: CQ

## 2024-07-01 PROCEDURE — 85007 BL SMEAR W/DIFF WBC COUNT: CPT | Performed by: NURSE PRACTITIONER

## 2024-07-01 PROCEDURE — 97116 GAIT TRAINING THERAPY: CPT | Mod: CQ

## 2024-07-01 PROCEDURE — 94761 N-INVAS EAR/PLS OXIMETRY MLT: CPT

## 2024-07-01 PROCEDURE — 94664 DEMO&/EVAL PT USE INHALER: CPT

## 2024-07-01 PROCEDURE — 94640 AIRWAY INHALATION TREATMENT: CPT

## 2024-07-01 PROCEDURE — 85027 COMPLETE CBC AUTOMATED: CPT | Performed by: NURSE PRACTITIONER

## 2024-07-01 PROCEDURE — 99900035 HC TECH TIME PER 15 MIN (STAT)

## 2024-07-01 PROCEDURE — 25000003 PHARM REV CODE 250: Performed by: INTERNAL MEDICINE

## 2024-07-01 PROCEDURE — 63600175 PHARM REV CODE 636 W HCPCS: Performed by: INTERNAL MEDICINE

## 2024-07-01 PROCEDURE — 11000001 HC ACUTE MED/SURG PRIVATE ROOM

## 2024-07-01 PROCEDURE — 83735 ASSAY OF MAGNESIUM: CPT | Performed by: NURSE PRACTITIONER

## 2024-07-01 PROCEDURE — 85610 PROTHROMBIN TIME: CPT | Performed by: NURSE PRACTITIONER

## 2024-07-01 PROCEDURE — 36415 COLL VENOUS BLD VENIPUNCTURE: CPT | Performed by: INTERNAL MEDICINE

## 2024-07-01 PROCEDURE — 25000003 PHARM REV CODE 250: Performed by: NURSE PRACTITIONER

## 2024-07-01 PROCEDURE — 25000003 PHARM REV CODE 250: Performed by: STUDENT IN AN ORGANIZED HEALTH CARE EDUCATION/TRAINING PROGRAM

## 2024-07-01 PROCEDURE — 27000221 HC OXYGEN, UP TO 24 HOURS

## 2024-07-01 PROCEDURE — 84100 ASSAY OF PHOSPHORUS: CPT | Performed by: NURSE PRACTITIONER

## 2024-07-01 PROCEDURE — 27000646 HC AEROBIKA DEVICE

## 2024-07-01 PROCEDURE — 80053 COMPREHEN METABOLIC PANEL: CPT | Performed by: NURSE PRACTITIONER

## 2024-07-01 RX ORDER — HYDRALAZINE HYDROCHLORIDE 25 MG/1
25 TABLET, FILM COATED ORAL EVERY 8 HOURS
Status: DISCONTINUED | OUTPATIENT
Start: 2024-07-01 | End: 2024-07-02 | Stop reason: HOSPADM

## 2024-07-01 RX ORDER — ENOXAPARIN SODIUM 100 MG/ML
40 INJECTION SUBCUTANEOUS EVERY 24 HOURS
Status: DISCONTINUED | OUTPATIENT
Start: 2024-07-01 | End: 2024-07-02 | Stop reason: HOSPADM

## 2024-07-01 RX ADMIN — CEFEPIME 1 G: 1 INJECTION, POWDER, FOR SOLUTION INTRAMUSCULAR; INTRAVENOUS at 04:07

## 2024-07-01 RX ADMIN — METOPROLOL SUCCINATE 25 MG: 25 TABLET, EXTENDED RELEASE ORAL at 09:07

## 2024-07-01 RX ADMIN — FAMOTIDINE 20 MG: 20 TABLET ORAL at 09:07

## 2024-07-01 RX ADMIN — INSULIN GLARGINE 10 UNITS: 100 INJECTION, SOLUTION SUBCUTANEOUS at 09:07

## 2024-07-01 RX ADMIN — INSULIN ASPART 4 UNITS: 100 INJECTION, SOLUTION INTRAVENOUS; SUBCUTANEOUS at 11:07

## 2024-07-01 RX ADMIN — INSULIN ASPART 1 UNITS: 100 INJECTION, SOLUTION INTRAVENOUS; SUBCUTANEOUS at 09:07

## 2024-07-01 RX ADMIN — ARFORMOTEROL TARTRATE 15 MCG: 15 SOLUTION RESPIRATORY (INHALATION) at 07:07

## 2024-07-01 RX ADMIN — PRAVASTATIN SODIUM 20 MG: 20 TABLET ORAL at 09:07

## 2024-07-01 RX ADMIN — HYDRALAZINE HYDROCHLORIDE 25 MG: 25 TABLET ORAL at 09:07

## 2024-07-01 RX ADMIN — ARFORMOTEROL TARTRATE 15 MCG: 15 SOLUTION RESPIRATORY (INHALATION) at 08:07

## 2024-07-01 RX ADMIN — AMLODIPINE BESYLATE 10 MG: 10 TABLET ORAL at 09:07

## 2024-07-01 RX ADMIN — DEXTROSE MONOHYDRATE 500 MG: 50 INJECTION, SOLUTION INTRAVENOUS at 12:07

## 2024-07-01 RX ADMIN — INSULIN ASPART 2 UNITS: 100 INJECTION, SOLUTION INTRAVENOUS; SUBCUTANEOUS at 06:07

## 2024-07-01 RX ADMIN — ENOXAPARIN SODIUM 40 MG: 40 INJECTION SUBCUTANEOUS at 04:07

## 2024-07-01 RX ADMIN — INSULIN ASPART 1 UNITS: 100 INJECTION, SOLUTION INTRAVENOUS; SUBCUTANEOUS at 04:07

## 2024-07-01 NOTE — ASSESSMENT & PLAN NOTE
Patient was found to have thrombocytopenia, the likely etiology is secondary to cirrhosis/portal hypertension, will monitor the platelets Daily. Will transfuse if platelet count is <10k. Hold DVT prophylaxis if platelets are <50k. The patient's platelet results have been reviewed and are listed below.  Recent Labs   Lab 07/01/24  0606   PLT 73*

## 2024-07-01 NOTE — SUBJECTIVE & OBJECTIVE
Past Medical History:   Diagnosis Date    Cataract     Closed displaced comminuted fracture of right patella 10/28/2020    Closed fracture of surgical neck of left humerus 10/30/2020    Closed left radial fracture 10/30/2020    Diabetes mellitus type I     Hyperlipidemia     Hypertension     Morbid obesity     Right knee pain     Thyroid disease      Past Surgical History:   Procedure Laterality Date    APPENDECTOMY      BREAST BIOPSY      CATARACT EXTRACTION      COLONOSCOPY N/A 12/21/2021    Procedure: COLONOSCOPY screening;  Surgeon: Moises Patterson MD;  Location: [facility];  Service: Endoscopy;  Laterality: N/A;    ESOPHAGOGASTRODUODENOSCOPY N/A 12/21/2021    Procedure: EGD (ESOPHAGOGASTRODUODENOSCOPY) EV screening;  Surgeon: Moises Patterson MD;  Location: [facility] ENDO;  Service: Endoscopy;  Laterality: N/A;    EYE SURGERY      HERNIA REPAIR      OPEN REDUCTION AND INTERNAL FIXATION (ORIF) OF FRACTURE OF DISTAL RADIUS Left 11/2/2020    Procedure: ORIF, FRACTURE, RADIUS, DISTAL;  Surgeon: Sage Wolf MD;  Location: [facility];  Service: Orthopedics;  Laterality: Left;    OPEN REDUCTION AND INTERNAL FIXATION (ORIF) OF FRACTURE OF PATELLA Right 11/2/2020    Procedure: ORIF, FRACTURE, PATELLA;  Surgeon: Sage Wolf MD;  Location: [facility] OR;  Service: Orthopedics;  Laterality: Right;    OPEN REDUCTION AND INTERNAL FIXATION (ORIF) OF FRACTURE OF PATELLA Right 12/18/2020    Procedure: ORIF, FRACTURE, PATELLA;  Surgeon: Sage Wolf MD;  Location: [facility] OR;  Service: Orthopedics;  Laterality: Right;    ORIF HUMERUS FRACTURE Left 11/5/2020    Procedure: ORIF, FRACTURE, HUMERUS;  Surgeon: Sage Wolf MD;  Location: [facility] OR;  Service: Orthopedics;  Laterality: Left;    PLACEMENT OF ACELLULAR HUMAN DERMAL ALLOGRAFT Right 11/2/2020    Procedure: APPLICATION, ACELLULAR HUMAN DERMAL ALLOGRAFT;  Surgeon: Sage Wolf MD;  Location: [facility] OR;  Service: Orthopedics;   Laterality: Right;  Right Patella    REMOVAL OF HARDWARE FROM HAND Left 3/11/2021    Procedure: REMOVAL, HARDWARE, HAND;  Surgeon: Sage Wolf MD;  Location: [facility] OR;  Service: Orthopedics;  Laterality: Left;  Removal of dorsal spanning wrist plate left distal radius    REMOVAL OF HARDWARE FROM LOWER EXTREMITY Right 2020    Procedure: REMOVAL, HARDWARE, LOWER EXTREMITY;  Surgeon: Sage Wolf MD;  Location: [facility] OR;  Service: Orthopedics;  Laterality: Right;     Review of patient's allergies indicates:   Allergen Reactions    Codeine Nausea Only     Other reaction(s): Nausea  Other reaction(s): Elevated blood pressure     Family History       Problem Relation (Age of Onset)    Diabetes Mother, Father    Leukemia Father          Tobacco Use    Smoking status: Former     Current packs/day: 0.00     Average packs/day: 1 pack/day for 8.0 years (8.0 ttl pk-yrs)     Types: Cigarettes     Start date:      Quit date:      Years since quittin.5    Smokeless tobacco: Never   Substance and Sexual Activity    Alcohol use: Yes     Comment: rarely    Drug use: No    Sexual activity: Not on file       Review of Systems   Constitutional:  Positive for activity change and fatigue. Negative for chills, fever and unexpected weight change.   Respiratory:  Positive for cough and shortness of breath. Negative for wheezing.    Cardiovascular:  Negative for chest pain and leg swelling.   All other systems reviewed and are negative.    Objective:     Vital Signs (Most Recent):  Temp: 98.2 °F (36.8 °C) (24)  Pulse: (!) 59 (24)  Resp: 16 (24)  BP: (!) 177/74 (24)  SpO2: 99 % (24) Vital Signs (24h Range):  Temp:  [97.7 °F (36.5 °C)-98.6 °F (37 °C)] 98.2 °F (36.8 °C)  Pulse:  [59-70] 59  Resp:  [16-20] 16  SpO2:  [90 %-99 %] 99 %  BP: (157-179)/(70-74) 177/74     Weight: 107.5 kg (237 lb)  Body mass index is 39.44 kg/m².      Intake/Output Summary  (Last 24 hours) at 7/1/2024 0948  Last data filed at 7/1/2024 0549  Gross per 24 hour   Intake 1232.15 ml   Output 1600 ml   Net -367.85 ml        Physical Exam  Vitals and nursing note reviewed.   Constitutional:       Appearance: She is obese.   HENT:      Head: Normocephalic.   Eyes:      Conjunctiva/sclera: Conjunctivae normal.   Cardiovascular:      Rate and Rhythm: Normal rate.   Pulmonary:      Effort: Pulmonary effort is normal.      Breath sounds: Normal breath sounds.   Abdominal:      Palpations: Abdomen is soft.   Musculoskeletal:         General: Normal range of motion.      Cervical back: Normal range of motion and neck supple.   Skin:     General: Skin is warm and dry.   Neurological:      Mental Status: She is alert and oriented to person, place, and time.   Psychiatric:         Mood and Affect: Mood normal.        Significant Labs:  CBC/Anemia Profile:  Recent Labs   Lab 06/30/24  0552 07/01/24  0606   WBC 6.05 5.19   HGB 10.4* 10.4*   HCT 32.4* 31.5*   PLT 67* 73*   MCV 90 87   RDW 12.7 12.4   Chemistries:  Recent Labs   Lab 06/30/24  0552 07/01/24  0606     138 136   K 4.1  4.1 4.1     106 105   CO2 21*  21* 20*   BUN 82*  82* 71*   CREATININE 2.0*  2.0* 1.7*   CALCIUM 9.1  9.1 9.1   ALBUMIN 2.2* 2.1*   PROT 6.1 6.2   BILITOT 0.8 0.5   ALKPHOS 75 89   ALT 18 17   AST 23 20   MG 2.5 2.4   PHOS 3.8 3.3     Significant Imaging:   CT: I have reviewed all pertinent results/findings within the past 24 hours and my personal findings are:  Dense multifocal pneumonia right greater than left   Patient informed/Family informed

## 2024-07-01 NOTE — ASSESSMENT & PLAN NOTE
Patient is identified as having  diastolic  heart failure that is Acute. CHF is currently uncontrolled due to Weight gain of unknown pounds, Dyspnea not returned to baseline after 40mg lasix doses of IV diuretic, Rales/crackles on pulmonary exam, and Pulmonary edema/pleural effusion on CXR. Latest ECHO performed and demonstrates- No results found for this or any previous visit.  . Continue ACE/ARB and Aldactone and monitor clinical status closely. Monitor on telemetry. Patient is on CHF pathway.  Monitor strict Is&Os and daily weights.  Place on fluid restriction of 1.5 L. Cardiology has been consulted. Continue to stress to patient importance of self efficacy and  on diet for CHF. Last BNP reviewed- and noted below   Recent Labs   Lab 06/28/24  1052   *     Cardiology consult appreciated  BNP improved  Echo with normal EF, grade 2 diastolic dysfunction and Moderate AS  Strict I/Os  Resume ACEi (Aceon) when renal fxn improves    Resume Spironolactone when renal fxn improves    Cardiology recommended f/u in [facility] clinic and p.o. Lasix 20mg and BB

## 2024-07-01 NOTE — PT/OT/SLP PROGRESS
Physical Therapy  Treatment    Lakshmi Campbell   MRN: [id number]   Admitting Diagnosis: Acute hypoxic respiratory failure    PT Received On: 07/01/24  PT Start Time: 0740     PT Stop Time: 0805    PT Total Time (min): 25 min       Billable Minutes:  Gait Training 15 and Therapeutic Activity 10    Treatment Type: Treatment  PT/PTA: PTA     Number of PTA visits since last PT visit: 1       General Precautions: Standard, fall  Orthopedic Precautions: N/A  Braces: N/A  Respiratory Status: Nasal cannula, flow 2 L/min         Subjective:  Communicated with charge nurse, Maryann, and completed Epic chart review prior to session.  Patient agreed to PT session.     Pain/Comfort  Pain Rating 1: 0/10    Objective:   Patient found with: peripheral IV, oxygen, telemetry, PureWick    Supine > sit EOB: Modified Independent    Forward scoot towards EOB: Modified Independent    STS from EOB > RW: SBA    75ft w/ RW SBA    Stand pivot T/F to chair w/ RW: SBA    Completed x10 reps AROM TE to BLE: LAQ, Hip Flex, AP   Intermittent cues given as needed to maintain correct form during repetitions    Educated patient on importance of increased tolerance to upright position and direct impact on CV endurance and strength. Patient encouraged to sit up in chair/ EOB, for a minimum of 2 consecutive hours, 3x per day. Encouraged patient to perform AROM TE to BLE throughout the day within all available planes of motion. Re enforced importance of utilizing call light to meet needs in room and not attempt to get up without staff assistance. Patient verbalized understanding and agreed to comply.       AM-PAC 6 CLICK MOBILITY  How much help from another person does this patient currently need?   1 = Unable, Total/Dependent Assistance  2 = A lot, Maximum/Moderate Assistance  3 = A little, Minimum/Contact Guard/Supervision  4 = None, Modified [place]/Independent    Turning over in bed (including adjusting bedclothes, sheets and blankets)?: 4  Sitting  down on and standing up from a chair with arms (e.g., wheelchair, bedside commode, etc.): 3  Moving from lying on back to sitting on the side of the bed?: 4  Moving to and from a bed to a chair (including a wheelchair)?: 3  Need to walk in hospital room?: 3  Climbing 3-5 steps with a railing?: 1 (NT)  Basic Mobility Total Score: 18    AM-PAC Raw Score CMS G-Code Modifier Level of Impairment Assistance   6 % Total / Unable   7 - 9 CM 80 - 100% Maximal Assist   10 - 14 CL 60 - 80% Moderate Assist   15 - 19 CK 40 - 60% Moderate Assist   20 - 22 CJ 20 - 40% Minimal Assist   23 CI 1-20% SBA / CGA   24 CH 0% Independent/ Mod I     Patient left up in chair with call button in reach and chair alarm on.    Assessment:  Lakshmi Campbell is a 73 y.o. female with a medical diagnosis of Acute hypoxic respiratory failure and presents with overall decline in functional mobility. Patient would continue to benefit from skilled PT to address functional limitations listed below in order to return to PLOF/decrease caregiver burden.     Rehab identified problem list/impairments: weakness, impaired endurance, impaired functional mobility, gait instability, impaired balance, decreased safety awareness, decreased lower extremity function    Rehab potential is good.    Activity tolerance: Fair    Discharge recommendations: Low Intensity Therapy      Barriers to discharge:      Equipment recommendations: walker, rolling     GOALS:   Multidisciplinary Problems       Physical Therapy Goals          Problem: Physical Therapy    Goal Priority Disciplines Outcome Goal Variances Interventions   Physical Therapy Goal     PT, PT/OT      Description: LTG'S TO BE MET IN 14 DAYS (7-13-24)  PT WILL BE AUSTEN FOR BED MOBILITY  PT WILL BE AUSTEN FOR BED<>CHAIR TF'S  PT WILL  FEET WITH RW AND AUSTEN  PT WILL INC AMPAC SCORE BY 2 POINTS TO PROGRESS GROSS FUNC MOBILITY                         PLAN:    Patient to be seen 3 x/week to address the above  listed problems via gait training, therapeutic activities, therapeutic exercises  Plan of Care expires: 07/13/24  Plan of Care reviewed with: patient, spouse         07/01/2024

## 2024-07-01 NOTE — ASSESSMENT & PLAN NOTE
Patient's FSGs are uncontrolled due to hyperglycemia on current medication regimen.  Last A1c reviewed-   Lab Results   Component Value Date    HGBA1C 5.9 (H) 06/26/2024     Most recent fingerstick glucose reviewed-   Recent Labs   Lab 06/30/24  2115 07/01/24  0647 07/01/24  1143 07/01/24  1619   POCTGLUCOSE 293* 210* 337* 205*       Current correctional scale  Low  Maintain anti-hyperglycemic dose as follows-   Antihyperglycemics (From admission, onward)    Start     Stop Route Frequency Ordered    06/28/24 2100  insulin glargine U-100 (Lantus) pen 10 Units         -- SubQ Nightly 06/28/24 1402    06/27/24 0203  insulin aspart U-100 pen 0-5 Units         -- SubQ Before meals & nightly PRN 06/27/24 0103        -Continue home long-acting insulin at 20% decrease, titrate up as needed  -Hypoglycemic protocol

## 2024-07-01 NOTE — CONSULTS
[facility]  Pulmonology Consult Note      Patient Name: Lakshmi Campbell   MRN: [id number]  Admission Date: 6/26/2024   Hospital Length of Stay: 4 days  Code Status: Full Code    Attending Physician: Madealine Mercer MD    Inpatient consult to Pulmonology  Consult performed by: Melisa Khan NP  Consult ordered by: Madelaine Mercer MD        Subjective:   History of Present Illness:  Lakshmi Campbell is a 73-year-old female with a past medical history significant for cataracts, diabetes mellitus type 1, hyperlipidemia, hypertension, hypothyroidism, and morbid obesity.  Patient presented with sudden onset of shortness of breath with nonproductive cough and associated fatigue and weakness.  Patient reported her symptoms were exacerbated with exertion and lying flat.  At the time of her presentation, she denied any recent illnesses or sick contacts.  Denies history of CHF, asthma, or COPD.  Patient reports a smoking history of a pack per day but states she quit smoking in 1989.  She denies any use of home oxygen or PAP therapy.  Denies history of snoring or prior sleep apnea diagnosis.  Denied swelling in her lower extremities.  Denied any complaints of fever, chills, sweats, chest pain, palpitations, or GI complaints.  Patient endorsed some mild body aches prior to admission.  Chest imaging at the time of presentation demonstrated multilobar pneumonia.  Denies any home or environmental exposures. Recent travel to [place], [place] last week. Patient was admitted by Hospital Medicine and Pulmonary has been consulted for evaluation.    Past Medical History:   Diagnosis Date    Cataract     Closed displaced comminuted fracture of right patella 10/28/2020    Closed fracture of surgical neck of left humerus 10/30/2020    Closed left radial fracture 10/30/2020    Diabetes mellitus type I     Hyperlipidemia     Hypertension     Morbid obesity     Right knee pain     Thyroid disease       Past Surgical History:   Procedure Laterality Date    APPENDECTOMY      BREAST BIOPSY      CATARACT EXTRACTION      COLONOSCOPY N/A 12/21/2021    Procedure: COLONOSCOPY screening;  Surgeon: Moises Patterson MD;  Location: [facility];  Service: Endoscopy;  Laterality: N/A;    ESOPHAGOGASTRODUODENOSCOPY N/A 12/21/2021    Procedure: EGD (ESOPHAGOGASTRODUODENOSCOPY) EV screening;  Surgeon: Moises Patterson MD;  Location: [facility];  Service: Endoscopy;  Laterality: N/A;    EYE SURGERY      HERNIA REPAIR      OPEN REDUCTION AND INTERNAL FIXATION (ORIF) OF FRACTURE OF DISTAL RADIUS Left 11/2/2020    Procedure: ORIF, FRACTURE, RADIUS, DISTAL;  Surgeon: Sage Wolf MD;  Location: [facility] OR;  Service: Orthopedics;  Laterality: Left;    OPEN REDUCTION AND INTERNAL FIXATION (ORIF) OF FRACTURE OF PATELLA Right 11/2/2020    Procedure: ORIF, FRACTURE, PATELLA;  Surgeon: Sage Wolf MD;  Location: [facility] OR;  Service: Orthopedics;  Laterality: Right;    OPEN REDUCTION AND INTERNAL FIXATION (ORIF) OF FRACTURE OF PATELLA Right 12/18/2020    Procedure: ORIF, FRACTURE, PATELLA;  Surgeon: Sage Wolf MD;  Location: [facility] OR;  Service: Orthopedics;  Laterality: Right;    ORIF HUMERUS FRACTURE Left 11/5/2020    Procedure: ORIF, FRACTURE, HUMERUS;  Surgeon: Sage Wolf MD;  Location: [facility] OR;  Service: Orthopedics;  Laterality: Left;    PLACEMENT OF ACELLULAR HUMAN DERMAL ALLOGRAFT Right 11/2/2020    Procedure: APPLICATION, ACELLULAR HUMAN DERMAL ALLOGRAFT;  Surgeon: Sage Wolf MD;  Location: [facility] OR;  Service: Orthopedics;  Laterality: Right;  Right Patella    REMOVAL OF HARDWARE FROM HAND Left 3/11/2021    Procedure: REMOVAL, HARDWARE, HAND;  Surgeon: Sage Wolf MD;  Location: [facility] OR;  Service: Orthopedics;  Laterality: Left;  Removal of dorsal spanning wrist plate left distal radius    REMOVAL OF HARDWARE FROM LOWER EXTREMITY Right 12/18/2020     Procedure: REMOVAL, HARDWARE, LOWER EXTREMITY;  Surgeon: Sage Wolf MD;  Location: [facility];  Service: Orthopedics;  Laterality: Right;     Review of patient's allergies indicates:   Allergen Reactions    Codeine Nausea Only     Other reaction(s): Nausea  Other reaction(s): Elevated blood pressure     Family History       Problem Relation (Age of Onset)    Diabetes Mother, Father    Leukemia Father          Tobacco Use    Smoking status: Former     Current packs/day: 0.00     Average packs/day: 1 pack/day for 8.0 years (8.0 ttl pk-yrs)     Types: Cigarettes     Start date:      Quit date:      Years since quittin.5    Smokeless tobacco: Never   Substance and Sexual Activity    Alcohol use: Yes     Comment: rarely    Drug use: No    Sexual activity: Not on file       Review of Systems   Constitutional:  Positive for activity change and fatigue. Negative for chills, fever and unexpected weight change.   Respiratory:  Positive for cough and shortness of breath. Negative for wheezing.    Cardiovascular:  Negative for chest pain and leg swelling.   All other systems reviewed and are negative.    Objective:     Vital Signs (Most Recent):  Temp: 98.2 °F (36.8 °C) (24)  Pulse: (!) 59 (24)  Resp: 16 (24)  BP: (!) 177/74 (24)  SpO2: 99 % (24) Vital Signs (24h Range):  Temp:  [97.7 °F (36.5 °C)-98.6 °F (37 °C)] 98.2 °F (36.8 °C)  Pulse:  [59-70] 59  Resp:  [16-20] 16  SpO2:  [90 %-99 %] 99 %  BP: (157-179)/(70-74) 177/74     Weight: 107.5 kg (237 lb)  Body mass index is 39.44 kg/m².      Intake/Output Summary (Last 24 hours) at 2024 0948  Last data filed at 2024 0549  Gross per 24 hour   Intake 1232.15 ml   Output 1600 ml   Net -367.85 ml        Physical Exam  Vitals and nursing note reviewed.   Constitutional:       Appearance: She is obese.   HENT:      Head: Normocephalic.   Eyes:      Conjunctiva/sclera: Conjunctivae normal.    Cardiovascular:      Rate and Rhythm: Normal rate.   Pulmonary:      Effort: Pulmonary effort is normal.      Breath sounds: Normal breath sounds.   Abdominal:      Palpations: Abdomen is soft.   Musculoskeletal:         General: Normal range of motion.      Cervical back: Normal range of motion and neck supple.   Skin:     General: Skin is warm and dry.   Neurological:      Mental Status: She is alert and oriented to person, place, and time.   Psychiatric:         Mood and Affect: Mood normal.        Significant Labs:  CBC/Anemia Profile:  Recent Labs   Lab 06/30/24  0552 07/01/24  0606   WBC 6.05 5.19   HGB 10.4* 10.4*   HCT 32.4* 31.5*   PLT 67* 73*   MCV 90 87   RDW 12.7 12.4   Chemistries:  Recent Labs   Lab 06/30/24  0552 07/01/24  0606     138 136   K 4.1  4.1 4.1     106 105   CO2 21*  21* 20*   BUN 82*  82* 71*   CREATININE 2.0*  2.0* 1.7*   CALCIUM 9.1  9.1 9.1   ALBUMIN 2.2* 2.1*   PROT 6.1 6.2   BILITOT 0.8 0.5   ALKPHOS 75 89   ALT 18 17   AST 23 20   MG 2.5 2.4   PHOS 3.8 3.3     Significant Imaging:   CT: I have reviewed all pertinent results/findings within the past 24 hours and my personal findings are:  Dense multifocal pneumonia right greater than left    ABG  Recent Labs   Lab 06/26/24  2203   PH 7.237*   PO2 60*   PCO2 27.3*   HCO3 11.6*   BE -16*     Assessment/Plan:   Acute hypoxic respiratory failure  Rhinovirus infection  Community acquired pneumonia  Patient with acute hypoxic respiratory failure who is not on home oxygen. Supplemental oxygen was provided and currently requiring 2L/NC. Signs/symptoms of respiratory failure include increased work of breathing. Contributing diagnoses includes CHF, Obesity Hypoventilation, and Pneumonia Labs and images were reviewed. Patient has recent ABG, which has been reviewed.     Will treat underlying causes and adjust management of respiratory failure as follows:  CT chest reviewed; noted bilateral multifocal dense consolidations  right greater than left  Respiratory infection panel: +Rhinovirus  Sputum culture pending  MRSA swab pending  Titrate supplemental oxygen to maintain saturations 90% or greater  Continue antibiotics; follow cultures and de-escalate antibiotics as appropriate  Patient reports minimal cough and sputum production  Continue scheduled nebs  Follow fever and WBC trends  Follow chest imaging and ABGs  Outpatient pulmonary referral sent; needs follow-up with repeat imaging    Morbid obesity with BMI of 40.0-44.9, adult  Body mass index is 39.44 kg/m². Morbid obesity complicates all aspects of disease management from diagnostic modalities to treatment. Weight loss encouraged and health benefits explained to patient.     Thank you for your consult. I will follow-up with patient. Please contact us if you have any additional questions.     Melisa Khan NP  Pulmonary and Critical Care  [facility]

## 2024-07-01 NOTE — ASSESSMENT & PLAN NOTE
Patient with known Cirrhosis with  Co-morbidities are present and inclusive of anemia/pancytopenia.  MELD-Na score calculated; MELD 3.0: 15 at 7/1/2024  6:06 AM  MELD-Na: 13 at 7/1/2024  6:06 AM  Calculated from:  Serum Creatinine: 1.7 mg/dL at 7/1/2024  6:06 AM  Serum Sodium: 136 mmol/L at 7/1/2024  6:06 AM  Total Bilirubin: 0.5 mg/dL (Using min of 1 mg/dL) at 7/1/2024  6:06 AM  Serum Albumin: 2.1 g/dL at 7/1/2024  6:06 AM  INR(ratio): 1.0 at 7/1/2024  6:06 AM  Age at listing (hypothetical): 73 years  Sex: Female at 7/1/2024  6:06 AM  Continue chronic meds. Etiology likely NAFLD. Will avoid any hepatotoxic meds, and monitor CBC/CMP/INR for synthetic function.

## 2024-07-01 NOTE — ASSESSMENT & PLAN NOTE
Patient with acute hypoxic respiratory failure who is not on home oxygen. Supplemental oxygen was provided and currently requiring 2L/NC. Signs/symptoms of respiratory failure include increased work of breathing. Contributing diagnoses includes CHF, Obesity Hypoventilation, and Pneumonia Labs and images were reviewed. Patient has recent ABG, which has been reviewed.     Will treat underlying causes and adjust management of respiratory failure as follows:  CT chest reviewed; noted bilateral multifocal dense consolidations right greater than left  Respiratory infection panel: +Rhinovirus  Sputum culture pending  MRSA swab pending  Titrate supplemental oxygen to maintain saturations 90% or greater  Continue antibiotics; follow cultures and de-escalate antibiotics as appropriate  Patient reports minimal cough and sputum production  Continue scheduled nebs  Follow fever and WBC trends  Follow chest imaging and ABGs  Outpatient pulmonary referral sent; needs follow-up with repeat imaging

## 2024-07-01 NOTE — ASSESSMENT & PLAN NOTE
Patient with acute kidney injury/acute renal failure. MIKA is currently  being treated . Baseline creatinine  between 1.3-1.7  - Labs reviewed- Renal function/electrolytes with Estimated Creatinine Clearance: 35.9 mL/min (A) (based on SCr of 1.7 mg/dL (H)). according to latest data. Monitor urine output and serial BMP and adjust therapy as needed. Avoid nephrotoxins and renally dose meds for GFR listed above.    Hold IV diuretics   Hold home meds Chlorthalidone, Spironolactone, Aceon    Monitor   Relevant back to baseline we will reinstitute some antihypertensives

## 2024-07-01 NOTE — PROGRESS NOTES
Pharmacist Renal Dose Adjustment Note    Lakshmi Campbell is a 73 y.o. female being treated with the medication lovenox     Patient Data:    Vital Signs (Most Recent):  Temp: 97.7 °F (36.5 °C) (07/01/24 1219)  Pulse: (!) 55 (07/01/24 1219)  Resp: 18 (07/01/24 1219)  BP: (!) 170/74 (07/01/24 1219)  SpO2: (!) 94 % (07/01/24 1219) Vital Signs (72h Range):  Temp:  [97.7 °F (36.5 °C)-98.7 °F (37.1 °C)]   Pulse:  [55-90]   Resp:  [16-20]   BP: (130-179)/(61-75)   SpO2:  [90 %-99 %]      Recent Labs   Lab 06/29/24  0611 06/30/24  0552 07/01/24  0606   CREATININE 2.2* 2.0*  2.0* 1.7*     Serum creatinine: 1.7 mg/dL (H) 07/01/24 0606  Estimated creatinine clearance: 35.9 mL/min (A)    Medication:lovenox 30mg daily will be changed to lovenox 40mg daily for crcl > 30 ml/min    Pharmacist's Name: Christine Munoz  Pharmacist's Extension: [phone]

## 2024-07-01 NOTE — ASSESSMENT & PLAN NOTE
Chronic, controlled. Latest blood pressure and vitals reviewed-     Temp:  [97.7 °F (36.5 °C)-98.6 °F (37 °C)]   Pulse:  [55-70]   Resp:  [16-20]   BP: (157-197)/(70-79)   SpO2:  [89 %-99 %] .   Home meds for hypertension were reviewed and noted below.   Hypertension Medications               amLODIPine (NORVASC) 10 MG tablet TAKE 1 TABLET(10 MG) BY MOUTH EVERY DAY    chlorthalidone (HYGROTEN) 25 MG Tab Take 25 mg by mouth once daily.    perindopril erbumine (ACEON) 4 mg tablet Take 4 mg by mouth once daily.     spironolactone (ALDACTONE) 25 MG tablet Take 25 mg by mouth.            While in the hospital, will manage blood pressure as follows; Continue home antihypertensive regimen (norvasc), but hold chlorthalidone, Aldactone, Aceon. Receiving iv Lasix for diuresing of CHF exacerbation    Will utilize p.r.n. blood pressure medication only if patient's blood pressure greater than 160/100 and she develops symptoms such as worsening chest pain or shortness of breath.

## 2024-07-01 NOTE — PLAN OF CARE
07/01/24 0854   Rounds   Attendance Provider;Physical therapist;;Charge nurse;Nurse    Discharge Plan A Home Health   Why the patient remains in the hospital Requires continued medical care   Transition of Care Barriers None

## 2024-07-01 NOTE — ASSESSMENT & PLAN NOTE
Body mass index is 39.44 kg/m². Morbid obesity complicates all aspects of disease management from diagnostic modalities to treatment. Weight loss encouraged and health benefits explained to patient.

## 2024-07-01 NOTE — PLAN OF CARE
Free from falls. Family at bedside. IV abx maintained. No s/s of acute distress. 12hrs chart check complete.

## 2024-07-01 NOTE — SUBJECTIVE & OBJECTIVE
Interval History:  Patient seen and examined sitting in chair.  Reports the breathing is much better.  She denies any weakness or debility.  Still with positive cough    Review of Systems   Constitutional:  Positive for fatigue. Negative for fever.   Respiratory:  Positive for cough and shortness of breath.    Cardiovascular:  Negative for chest pain and leg swelling.   Gastrointestinal:  Negative for nausea and vomiting.   Neurological:  Negative for dizziness, weakness and light-headedness.   All other systems reviewed and are negative.    Objective:     Vital Signs (Most Recent):  Temp: 98 °F (36.7 °C) (07/01/24 1711)  Pulse: 63 (07/01/24 1711)  Resp: 16 (07/01/24 1711)  BP: (!) 197/78 (07/01/24 1711)  SpO2: 97 % (07/01/24 1711) Vital Signs (24h Range):  Temp:  [97.7 °F (36.5 °C)-98.6 °F (37 °C)] 98 °F (36.7 °C)  Pulse:  [55-70] 63  Resp:  [16-20] 16  SpO2:  [89 %-99 %] 97 %  BP: (157-197)/(70-79) 197/78     Weight: 107.5 kg (237 lb)  Body mass index is 39.44 kg/m².    Intake/Output Summary (Last 24 hours) at 7/1/2024 1820  Last data filed at 7/1/2024 0549  Gross per 24 hour   Intake 1232.15 ml   Output 900 ml   Net 332.15 ml         Physical Exam  Vitals reviewed.   Constitutional:       Appearance: Normal appearance. She is obese.   HENT:      Head: Normocephalic and atraumatic.      Mouth/Throat:      Mouth: Mucous membranes are moist.      Pharynx: Oropharynx is clear.   Eyes:      Extraocular Movements: Extraocular movements intact.      Conjunctiva/sclera: Conjunctivae normal.   Cardiovascular:      Rate and Rhythm: Normal rate and regular rhythm.      Pulses: Normal pulses.      Heart sounds: Normal heart sounds.   Pulmonary:      Effort: Pulmonary effort is normal.      Breath sounds: Normal breath sounds.   Abdominal:      General: Bowel sounds are normal.      Palpations: Abdomen is soft.   Musculoskeletal:         General: Normal range of motion.      Cervical back: Normal range of motion and neck  supple.   Skin:     General: Skin is warm and dry.   Neurological:      General: No focal deficit present.      Mental Status: She is alert and oriented to person, place, and time. Mental status is at baseline.   Psychiatric:         Mood and Affect: Mood normal.         Behavior: Behavior normal.         Thought Content: Thought content normal.             Significant Labs: All pertinent labs within the past 24 hours have been reviewed.    CBC:   Recent Labs   Lab 06/30/24  0552 07/01/24  0606   WBC 6.05 5.19   HGB 10.4* 10.4*   HCT 32.4* 31.5*   PLT 67* 73*     CMP:   Recent Labs   Lab 06/30/24  0552 07/01/24  0606     138 136   K 4.1  4.1 4.1     106 105   CO2 21*  21* 20*   *  170* 206*   BUN 82*  82* 71*   CREATININE 2.0*  2.0* 1.7*   CALCIUM 9.1  9.1 9.1   PROT 6.1 6.2   ALBUMIN 2.2* 2.1*   BILITOT 0.8 0.5   ALKPHOS 75 89   AST 23 20   ALT 18 17   ANIONGAP 11  11 11     Respiratory Culture:  Respiratory infection panel positive for rhino virus  Recent Labs   Lab 06/30/24  1832   GSRESP <10 epithelial cells per low power field.  Rare WBC's  Rare Gram positive rods  Rare Gram negative rods       Significant Imaging: I have reviewed all pertinent imaging results/findings within the past 24 hours.

## 2024-07-01 NOTE — ASSESSMENT & PLAN NOTE
Patient with Hypercapnic and Hypoxic Respiratory failure which is Acute.  she is not on home oxygen. Supplemental oxygen was provided and noted- Oxygen Concentration (%):  [28] 28    Signs/symptoms of respiratory failure include- tachypnea and respiratory distress. Contributing diagnoses includes - CHF and Pneumonia Labs and images were reviewed. Patient Has recent ABG, which has been reviewed. Will treat underlying causes and adjust management of respiratory failure as follows- supplemental oxygen, neb tx, IV antibiotics.    Multilobar pneumonia treated with antibiotics cultures pending we will deescalate as possible  Pulmonary consult appreciated

## 2024-07-01 NOTE — PROGRESS NOTES
[facility] Medicine  Progress Note    Patient Name: Lakshmi Campbell  MRN: [id number]  Patient Class: IP- Inpatient   Admission Date: 6/26/2024  Length of Stay: 4 days  Attending Physician: Madelaine Mercer MD  Primary Care Provider: Keely Silva NP        Subjective:     Principal Problem:Acute hypoxic respiratory failure        HPI:  Lakshmi Campbell is a 73 y.o. female with a PMH  has a past medical history of Cataract, Closed displaced comminuted fracture of right patella (10/28/2020), Closed fracture of surgical neck of left humerus (10/30/2020), Closed left radial fracture (10/30/2020), Diabetes mellitus type I, Hyperlipidemia, Hypertension, Morbid obesity, Right knee pain, and Thyroid disease.  Presented to the ER for evaluation of sudden onset of shortness of breath with nonproductive cough and 1 episode of nonbloody/nonbilious vomiting as a result from coughing.  Patient reports his symptoms worsened when she exerts herself and with lying flat.  Denies any recent illnesses or sick contacts.  Denies history of CHF for COPD.  Reports she was a previous smoker, but quit years ago.  Denies any use of home oxygen for use of CPAP/BiPAP to sleep at night.  Denies swelling in her lower extremities.  Denies fever, aches, chills, sweats, chest pain, palpitations, abdominal pain common bladder/bowel complaints, or any other symptoms at this time.    ER workup revealed CBC to be unremarkable, BUN/creatinine of 45/2.2 with CBG of 293 mg/dL, BNP of 1, 003, troponin of 0.054, UA negative, and ABG revealing pH of 7.237, pCO2 of 27.3, PO2 of 60, and HC03 of 11.6.  Chest x-ray findings consistent with multilobar pneumonia.  EKG revealed sinus rhythm with first-degree AV block with a ventricular rate of 77 beats per minute and a QT/QTC of 374/423.  O2 sats initially 88% on room air, but improved to 97% on 2 liters/minute via nasal cannula.  Patient received a total of 3 DuoNebs, 500 mg azithromycin, 1 g  Rocephin, 40 mg Lasix IV, 125 mg Solu-Medrol IV in ED. hospital Medicine consulted to admit patient for acute hypoxic respiratory failure in setting of new onset CHF and multilobar pneumonia.  Patient in agreement with treatment plan.  Patient admitted under inpatient status.    PCP: Keely Silva    Overview/Hospital Course:  A 73 y.o. female patient with DM, HTN, HLP, CKD3, Thyroid disease, [place] Cirrhosis, Chronic thrombocytopenia, and Morbid obesity admitted for Acute hypoxic respiratory failure, Multilobar pneumonia, and diastolic CHF on IV Cefepime and Azithromycin. WBC normal. Afebrile. Pt required 4 liters o2 NC. CT Chest showed multilobar PNA most extensive in the right upper, right lower and left lower lobes, incidental cholelithiasis. Blood cultures showed NGTD. Sputum culture and RVP pending. Acapella, IS, and OOB ordered. On 6/30/24, IV abx changed to IV cefepime, Vanco and Azithromycin due to extensive PNA/Immunocompromised     Cardiology consulted. Echo showed moderate AS and diastolic dysfunction. Pt received IV lasix x 3 doses. BNP is down-trending from 1003 to 297. Troponin mildly elevated but flat (0.052>0.055). Cr elevated (1.7>2.6). Diuresis held. Cardiology recommended f/u in [facility] clinic and BB, po lasix as OP     MIKA/CKD3 slowly improving -hold diuresis for now     Interval History:  Patient seen and examined sitting in chair.  Reports the breathing is much better.  She denies any weakness or debility.  Still with positive cough    Review of Systems   Constitutional:  Positive for fatigue. Negative for fever.   Respiratory:  Positive for cough and shortness of breath.    Cardiovascular:  Negative for chest pain and leg swelling.   Gastrointestinal:  Negative for nausea and vomiting.   Neurological:  Negative for dizziness, weakness and light-headedness.   All other systems reviewed and are negative.    Objective:     Vital Signs (Most Recent):  Temp: 98 °F (36.7 °C) (07/01/24 1711)  Pulse:  63 (07/01/24 1711)  Resp: 16 (07/01/24 1711)  BP: (!) 197/78 (07/01/24 1711)  SpO2: 97 % (07/01/24 1711) Vital Signs (24h Range):  Temp:  [97.7 °F (36.5 °C)-98.6 °F (37 °C)] 98 °F (36.7 °C)  Pulse:  [55-70] 63  Resp:  [16-20] 16  SpO2:  [89 %-99 %] 97 %  BP: (157-197)/(70-79) 197/78     Weight: 107.5 kg (237 lb)  Body mass index is 39.44 kg/m².    Intake/Output Summary (Last 24 hours) at 7/1/2024 1820  Last data filed at 7/1/2024 0549  Gross per 24 hour   Intake 1232.15 ml   Output 900 ml   Net 332.15 ml         Physical Exam  Vitals reviewed.   Constitutional:       Appearance: Normal appearance. She is obese.   HENT:      Head: Normocephalic and atraumatic.      Mouth/Throat:      Mouth: Mucous membranes are moist.      Pharynx: Oropharynx is clear.   Eyes:      Extraocular Movements: Extraocular movements intact.      Conjunctiva/sclera: Conjunctivae normal.   Cardiovascular:      Rate and Rhythm: Normal rate and regular rhythm.      Pulses: Normal pulses.      Heart sounds: Normal heart sounds.   Pulmonary:      Effort: Pulmonary effort is normal.      Breath sounds: Normal breath sounds.   Abdominal:      General: Bowel sounds are normal.      Palpations: Abdomen is soft.   Musculoskeletal:         General: Normal range of motion.      Cervical back: Normal range of motion and neck supple.   Skin:     General: Skin is warm and dry.   Neurological:      General: No focal deficit present.      Mental Status: She is alert and oriented to person, place, and time. Mental status is at baseline.   Psychiatric:         Mood and Affect: Mood normal.         Behavior: Behavior normal.         Thought Content: Thought content normal.             Significant Labs: All pertinent labs within the past 24 hours have been reviewed.    CBC:   Recent Labs   Lab 06/30/24 0552 07/01/24 0606   WBC 6.05 5.19   HGB 10.4* 10.4*   HCT 32.4* 31.5*   PLT 67* 73*     CMP:   Recent Labs   Lab 06/30/24 0552 07/01/24 0606     138  136   K 4.1  4.1 4.1     106 105   CO2 21*  21* 20*   *  170* 206*   BUN 82*  82* 71*   CREATININE 2.0*  2.0* 1.7*   CALCIUM 9.1  9.1 9.1   PROT 6.1 6.2   ALBUMIN 2.2* 2.1*   BILITOT 0.8 0.5   ALKPHOS 75 89   AST 23 20   ALT 18 17   ANIONGAP 11  11 11     Respiratory Culture:  Respiratory infection panel positive for rhino virus  Recent Labs   Lab 06/30/24  1832   GSRESP <10 epithelial cells per low power field.  Rare WBC's  Rare Gram positive rods  Rare Gram negative rods       Significant Imaging: I have reviewed all pertinent imaging results/findings within the past 24 hours.    Assessment/Plan:      * Acute hypoxic respiratory failure  Patient with Hypercapnic and Hypoxic Respiratory failure which is Acute.  she is not on home oxygen. Supplemental oxygen was provided and noted- Oxygen Concentration (%):  [28] 28    Signs/symptoms of respiratory failure include- tachypnea and respiratory distress. Contributing diagnoses includes - CHF and Pneumonia Labs and images were reviewed. Patient Has recent ABG, which has been reviewed. Will treat underlying causes and adjust management of respiratory failure as follows- supplemental oxygen, neb tx, IV antibiotics.    Multilobar pneumonia treated with antibiotics cultures pending we will deescalate as possible  Pulmonary consult appreciated        Acute kidney injury superimposed on chronic kidney disease  Patient with acute kidney injury/acute renal failure. MIKA is currently  being treated . Baseline creatinine  between 1.3-1.7  - Labs reviewed- Renal function/electrolytes with Estimated Creatinine Clearance: 35.9 mL/min (A) (based on SCr of 1.7 mg/dL (H)). according to latest data. Monitor urine output and serial BMP and adjust therapy as needed. Avoid nephrotoxins and renally dose meds for GFR listed above.    Hold IV diuretics   Hold home meds Chlorthalidone, Spironolactone, Aceon    Monitor   Relevant back to baseline we will reinstitute some  "antihypertensives    Community acquired pneumonia  Patient has a diagnosis of pneumonia. The cause of the pneumonia is unknown at this time. The pneumonia is stable. The patient has the following signs/symptoms of pneumonia: persistent hypoxia , cough, and shortness of breath. The patient does have a current oxygen requirement and the patient does not have a home oxygen requirement. I have reviewed the pertinent imaging. The following cultures have been collected: Blood cultures The culture results are listed below.     Current antimicrobial regimen consists of the antibiotics listed below. Will monitor patient closely and continue current treatment plan unchanged.    Antibiotics (From admission, onward)      Start     Stop Route Frequency Ordered    06/30/24 1715  ceFEPIme (MAXIPIME) 1 g in D5W 100 mL IVPB (MB+)         -- IV Every 12 hours (non-standard times) 06/30/24 1608    06/30/24 1707  vancomycin - pharmacy to dose  (vancomycin IVPB (PEDS and ADULTS))        Placed in "And" Linked Group    -- IV pharmacy to manage frequency 06/30/24 1608    06/27/24 2300  azithromycin (ZITHROMAX) 500 mg in D5W 250 mL IVPB (Vial-Mate)         07/01/24 2259 IV Every 24 hours (non-standard times) 06/27/24 0058            Microbiology Results (last 7 days)       Procedure Component Value Units Date/Time    Culture, Respiratory with Gram Stain [[id number]]     Order Status: No result Specimen: Respiratory     Culture, MRSA [[id number]]     Order Status: No result Specimen: MRSA source from Nares, Left     Culture, Respiratory with Gram Stain [[id number]] Collected: 06/29/24 1147    Order Status: Completed Specimen: Respiratory from Sputum Updated: 06/30/24 0612     Gram Stain (Respiratory) <10 epithelial cells per low power field.     Gram Stain (Respiratory) Rare WBC's     Gram Stain (Respiratory) Rare Gram positive cocci     Gram Stain (Respiratory) Rare Gram negative rods     Gram Stain (Respiratory) Rare Gram positive rods    " Blood culture [[id number]] Collected: 06/28/24 1559    Order Status: Completed Specimen: Blood Updated: 06/29/24 2213     Blood Culture, Routine No Growth to date      No Growth to date    Blood culture [[id number]] Collected: 06/28/24 1559    Order Status: Completed Specimen: Blood Updated: 06/29/24 2213     Blood Culture, Routine No Growth to date      No Growth to date    Respiratory Infection Panel (PCR), Nasopharyngeal [[id number]]     Order Status: No result Specimen: Nasopharyngeal Swab           Extensive multilobar pneumonia   IV Rocephin and Azithromycin -changed to IV Cefepime, Vanco and Azithromycin due to extensive PNA/Immunocompromised   Acapella and IS added   Encouraged OOB   Ambulatory pulse ox   Will need home oxygen and HH    Acute diastolic congestive heart failure  Patient is identified as having  diastolic  heart failure that is Acute. CHF is currently uncontrolled due to Weight gain of unknown pounds, Dyspnea not returned to baseline after 40mg lasix doses of IV diuretic, Rales/crackles on pulmonary exam, and Pulmonary edema/pleural effusion on CXR. Latest ECHO performed and demonstrates- No results found for this or any previous visit.  . Continue ACE/ARB and Aldactone and monitor clinical status closely. Monitor on telemetry. Patient is on CHF pathway.  Monitor strict Is&Os and daily weights.  Place on fluid restriction of 1.5 L. Cardiology has been consulted. Continue to stress to patient importance of self efficacy and  on diet for CHF. Last BNP reviewed- and noted below   Recent Labs   Lab 06/28/24  1052   *     Cardiology consult appreciated  BNP improved  Echo with normal EF, grade 2 diastolic dysfunction and Moderate AS  Strict I/Os  Resume ACEi (Aceon) when renal fxn improves    Resume Spironolactone when renal fxn improves    Cardiology recommended f/u in [facility] clinic and p.o. Lasix 20mg and BB    Liver cirrhosis secondary to YO  Patient with known Cirrhosis with   Co-morbidities are present and inclusive of anemia/pancytopenia.  MELD-Na score calculated; MELD 3.0: 15 at 7/1/2024  6:06 AM  MELD-Na: 13 at 7/1/2024  6:06 AM  Calculated from:  Serum Creatinine: 1.7 mg/dL at 7/1/2024  6:06 AM  Serum Sodium: 136 mmol/L at 7/1/2024  6:06 AM  Total Bilirubin: 0.5 mg/dL (Using min of 1 mg/dL) at 7/1/2024  6:06 AM  Serum Albumin: 2.1 g/dL at 7/1/2024  6:06 AM  INR(ratio): 1.0 at 7/1/2024  6:06 AM  Age at listing (hypothetical): 73 years  Sex: Female at 7/1/2024  6:06 AM  Continue chronic meds. Etiology likely NAFLD. Will avoid any hepatotoxic meds, and monitor CBC/CMP/INR for synthetic function.     Thrombocytopenia  Patient was found to have thrombocytopenia, the likely etiology is secondary to cirrhosis/portal hypertension, will monitor the platelets Daily. Will transfuse if platelet count is <10k. Hold DVT prophylaxis if platelets are <50k. The patient's platelet results have been reviewed and are listed below.  Recent Labs   Lab 07/01/24  0606   PLT 73*           Morbid obesity with BMI of 40.0-44.9, adult  Body mass index is 39.44 kg/m². Morbid obesity complicates all aspects of disease management from diagnostic modalities to treatment. Weight loss encouraged and health benefits explained to patient.         Hyperlipidemia  Patient is chronically on statin.will continue for now. Last Lipid Panel:   Lab Results   Component Value Date    CHOL 71 (L) 06/27/2024    HDL 34 (L) 06/27/2024    LDLCALC 24.0 (L) 06/27/2024    TRIG 65 06/27/2024    CHOLHDL 47.9 06/27/2024     Plan:  -Continue home medication  -low fat/low calorie diet        Essential hypertension  Chronic, controlled. Latest blood pressure and vitals reviewed-     Temp:  [97.7 °F (36.5 °C)-98.6 °F (37 °C)]   Pulse:  [55-70]   Resp:  [16-20]   BP: (157-197)/(70-79)   SpO2:  [89 %-99 %] .   Home meds for hypertension were reviewed and noted below.   Hypertension Medications               amLODIPine (NORVASC) 10 MG tablet  TAKE 1 TABLET(10 MG) BY MOUTH EVERY DAY    chlorthalidone (HYGROTEN) 25 MG Tab Take 25 mg by mouth once daily.    perindopril erbumine (ACEON) 4 mg tablet Take 4 mg by mouth once daily.     spironolactone (ALDACTONE) 25 MG tablet Take 25 mg by mouth.            While in the hospital, will manage blood pressure as follows; Continue home antihypertensive regimen (norvasc), but hold chlorthalidone, Aldactone, Aceon. Receiving iv Lasix for diuresing of CHF exacerbation    Will utilize p.r.n. blood pressure medication only if patient's blood pressure greater than 160/100 and she develops symptoms such as worsening chest pain or shortness of breath.    Hypothyroidism  Patient has chronic hypothyroidism. TFTs reviewed-   Lab Results   Component Value Date    TSH 0.032 (L) 06/27/2024   . Will continue chronic levothyroxine and adjust for and clinical changes.        Type 2 diabetes mellitus with microalbuminuria, with long-term current use of insulin  Patient's FSGs are uncontrolled due to hyperglycemia on current medication regimen.  Last A1c reviewed-   Lab Results   Component Value Date    HGBA1C 5.9 (H) 06/26/2024     Most recent fingerstick glucose reviewed-   Recent Labs   Lab 06/30/24  2115 07/01/24  0647 07/01/24  1143 07/01/24  1619   POCTGLUCOSE 293* 210* 337* 205*       Current correctional scale  Low  Maintain anti-hyperglycemic dose as follows-   Antihyperglycemics (From admission, onward)      Start     Stop Route Frequency Ordered    06/28/24 2100  insulin glargine U-100 (Lantus) pen 10 Units         -- SubQ Nightly 06/28/24 1402    06/27/24 0203  insulin aspart U-100 pen 0-5 Units         -- SubQ Before meals & nightly PRN 06/27/24 0103          -Continue home long-acting insulin at 20% decrease, titrate up as needed  -Hypoglycemic protocol            VTE Risk Mitigation (From admission, onward)           Ordered     enoxaparin injection 40 mg  Daily         07/01/24 1300     IP VTE HIGH RISK PATIENT  Once          06/27/24 0103     Place sequential compression device  Until discontinued         06/27/24 0103                    Discharge Planning   KYA:      Code Status: Full Code   Is the patient medically ready for discharge?:     Reason for patient still in hospital (select all that apply): Patient trending condition, Laboratory test, Treatment, and Consult recommendations  Discharge Plan A: Home Health                  Madelaine Malik MD  Department of Hospital Medicine   '[facility]

## 2024-07-01 NOTE — PLAN OF CARE
Discussed poc with pt, pt verbalized understanding    Purposeful rounding every 2hours    VS wnl  Cardiac monitoring in use, pt is NSR, tele monitor # [id number]  Blood glucose monitoring   Fall precautions in place, remains injury free  Pt denies c/o pain and shortness of breath    Accurate I&Os  Abx given as prescribed  Bed locked at lowest position  Call light within reach    Chart check complete  Will cont with POC

## 2024-07-01 NOTE — HPI
Lakshmi Campbell is a 73-year-old female with a past medical history significant for cataracts, diabetes mellitus type 1, hyperlipidemia, hypertension, hypothyroidism, and morbid obesity.  Patient presented with sudden onset of shortness of breath with nonproductive cough and associated fatigue and weakness.  Patient reported her symptoms were exacerbated with exertion and lying flat.  At the time of her presentation, she denied any recent illnesses or sick contacts.  Denies history of CHF, asthma, or COPD.  Patient reports a smoking history of a pack per day but states she quit smoking in 1989.  She denies any use of home oxygen or PAP therapy.  Denies history of snoring or prior sleep apnea diagnosis.  Denied swelling in her lower extremities.  Denied any complaints of fever, chills, sweats, chest pain, palpitations, or GI complaints.  Patient endorsed some mild body aches prior to admission.  Chest imaging at the time of presentation demonstrated multilobar pneumonia.  Denies any home or environmental exposures. Recent travel to [place], [place] last week. Patient was admitted by Hospital Medicine and Pulmonary has been consulted for evaluation.    Interval history, f/u chief complaint generalized weakness and fatigue:  7/2: Patient sitting up in bedside chair this morning without acute complaints. Anxious for discharge to home. Minimal cough and sputum production. Denies chest pain. Hemodynamics stable.

## 2024-07-02 VITALS
BODY MASS INDEX: 39.49 KG/M2 | RESPIRATION RATE: 17 BRPM | TEMPERATURE: 98 F | HEIGHT: 65 IN | HEART RATE: 63 BPM | DIASTOLIC BLOOD PRESSURE: 66 MMHG | SYSTOLIC BLOOD PRESSURE: 154 MMHG | WEIGHT: 237 LBS | OXYGEN SATURATION: 91 %

## 2024-07-02 LAB
1,3 BETA GLUCAN SER-MCNC: <31 PG/ML
ALBUMIN SERPL BCP-MCNC: 2.2 G/DL (ref 3.5–5.2)
ALP SERPL-CCNC: 91 U/L (ref 55–135)
ALT SERPL W/O P-5'-P-CCNC: 17 U/L (ref 10–44)
ANION GAP SERPL CALC-SCNC: 11 MMOL/L (ref 8–16)
AST SERPL-CCNC: 16 U/L (ref 10–40)
BACTERIA SPEC AEROBE CULT: NORMAL
BACTERIA SPEC AEROBE CULT: NORMAL
BASOPHILS # BLD AUTO: ABNORMAL K/UL (ref 0–0.2)
BASOPHILS NFR BLD: 0 % (ref 0–1.9)
BILIRUB SERPL-MCNC: 0.5 MG/DL (ref 0.1–1)
BUN SERPL-MCNC: 69 MG/DL (ref 8–23)
CALCIUM SERPL-MCNC: 9.3 MG/DL (ref 8.7–10.5)
CHLORIDE SERPL-SCNC: 107 MMOL/L (ref 95–110)
CO2 SERPL-SCNC: 20 MMOL/L (ref 23–29)
CREAT SERPL-MCNC: 1.6 MG/DL (ref 0.5–1.4)
DIFFERENTIAL METHOD BLD: ABNORMAL
EOSINOPHIL # BLD AUTO: ABNORMAL K/UL (ref 0–0.5)
EOSINOPHIL NFR BLD: 1 % (ref 0–8)
ERYTHROCYTE [DISTWIDTH] IN BLOOD BY AUTOMATED COUNT: 12.5 % (ref 11.5–14.5)
EST. GFR  (NO RACE VARIABLE): 34 ML/MIN/1.73 M^2
FUNGITELL COMMENTS: NEGATIVE
GLUCOSE SERPL-MCNC: 224 MG/DL (ref 70–110)
GRAM STN SPEC: NORMAL
HCT VFR BLD AUTO: 33.4 % (ref 37–48.5)
HGB BLD-MCNC: 11 G/DL (ref 12–16)
IMM GRANULOCYTES # BLD AUTO: ABNORMAL K/UL (ref 0–0.04)
IMM GRANULOCYTES NFR BLD AUTO: ABNORMAL % (ref 0–0.5)
LYMPHOCYTES # BLD AUTO: ABNORMAL K/UL (ref 1–4.8)
LYMPHOCYTES NFR BLD: 9 % (ref 18–48)
MCH RBC QN AUTO: 28.8 PG (ref 27–31)
MCHC RBC AUTO-ENTMCNC: 32.9 G/DL (ref 32–36)
MCV RBC AUTO: 87 FL (ref 82–98)
MONOCYTES # BLD AUTO: ABNORMAL K/UL (ref 0.3–1)
MONOCYTES NFR BLD: 15 % (ref 4–15)
MRSA SPEC QL CULT: NORMAL
MYELOCYTES NFR BLD MANUAL: 2 %
NEUTROPHILS NFR BLD: 72 % (ref 38–73)
NEUTS BAND NFR BLD MANUAL: 1 %
NRBC BLD-RTO: 0 /100 WBC
PLATELET # BLD AUTO: 80 K/UL (ref 150–450)
PMV BLD AUTO: 10.6 FL (ref 9.2–12.9)
POCT GLUCOSE: 348 MG/DL (ref 70–110)
POTASSIUM SERPL-SCNC: 4.1 MMOL/L (ref 3.5–5.1)
PROT SERPL-MCNC: 6.2 G/DL (ref 6–8.4)
RBC # BLD AUTO: 3.82 M/UL (ref 4–5.4)
SODIUM SERPL-SCNC: 138 MMOL/L (ref 136–145)
WBC # BLD AUTO: 6.42 K/UL (ref 3.9–12.7)

## 2024-07-02 PROCEDURE — 80053 COMPREHEN METABOLIC PANEL: CPT | Performed by: INTERNAL MEDICINE

## 2024-07-02 PROCEDURE — 94618 PULMONARY STRESS TESTING: CPT

## 2024-07-02 PROCEDURE — 97116 GAIT TRAINING THERAPY: CPT | Mod: CQ

## 2024-07-02 PROCEDURE — 94761 N-INVAS EAR/PLS OXIMETRY MLT: CPT

## 2024-07-02 PROCEDURE — 99900035 HC TECH TIME PER 15 MIN (STAT)

## 2024-07-02 PROCEDURE — 25000003 PHARM REV CODE 250: Performed by: STUDENT IN AN ORGANIZED HEALTH CARE EDUCATION/TRAINING PROGRAM

## 2024-07-02 PROCEDURE — 27000646 HC AEROBIKA DEVICE

## 2024-07-02 PROCEDURE — 63600175 PHARM REV CODE 636 W HCPCS: Performed by: NURSE PRACTITIONER

## 2024-07-02 PROCEDURE — 25000003 PHARM REV CODE 250: Performed by: NURSE PRACTITIONER

## 2024-07-02 PROCEDURE — 97530 THERAPEUTIC ACTIVITIES: CPT | Mod: CQ

## 2024-07-02 PROCEDURE — 27000221 HC OXYGEN, UP TO 24 HOURS

## 2024-07-02 PROCEDURE — 25000242 PHARM REV CODE 250 ALT 637 W/ HCPCS: Performed by: NURSE PRACTITIONER

## 2024-07-02 PROCEDURE — 63600175 PHARM REV CODE 636 W HCPCS: Performed by: INTERNAL MEDICINE

## 2024-07-02 PROCEDURE — 94640 AIRWAY INHALATION TREATMENT: CPT

## 2024-07-02 PROCEDURE — 25000003 PHARM REV CODE 250: Performed by: INTERNAL MEDICINE

## 2024-07-02 PROCEDURE — 85027 COMPLETE CBC AUTOMATED: CPT | Performed by: INTERNAL MEDICINE

## 2024-07-02 PROCEDURE — 85007 BL SMEAR W/DIFF WBC COUNT: CPT | Performed by: INTERNAL MEDICINE

## 2024-07-02 PROCEDURE — 94664 DEMO&/EVAL PT USE INHALER: CPT

## 2024-07-02 PROCEDURE — 36415 COLL VENOUS BLD VENIPUNCTURE: CPT | Performed by: INTERNAL MEDICINE

## 2024-07-02 RX ORDER — HYDRALAZINE HYDROCHLORIDE 25 MG/1
25 TABLET, FILM COATED ORAL EVERY 8 HOURS
Qty: 90 TABLET | Refills: 0 | Status: SHIPPED | OUTPATIENT
Start: 2024-07-02 | End: 2024-08-01

## 2024-07-02 RX ORDER — METOPROLOL SUCCINATE 25 MG/1
25 TABLET, EXTENDED RELEASE ORAL DAILY
Qty: 30 TABLET | Refills: 0 | Status: SHIPPED | OUTPATIENT
Start: 2024-07-03 | End: 2024-08-02

## 2024-07-02 RX ORDER — GUAIFENESIN 600 MG/1
1200 TABLET, EXTENDED RELEASE ORAL 2 TIMES DAILY
Qty: 40 TABLET | Refills: 0 | Status: SHIPPED | OUTPATIENT
Start: 2024-07-02 | End: 2024-07-12

## 2024-07-02 RX ORDER — CHLORTHALIDONE 25 MG/1
25 TABLET ORAL DAILY
Status: DISCONTINUED | OUTPATIENT
Start: 2024-07-02 | End: 2024-07-02 | Stop reason: HOSPADM

## 2024-07-02 RX ORDER — AMOXICILLIN AND CLAVULANATE POTASSIUM 875; 125 MG/1; MG/1
1 TABLET, FILM COATED ORAL 2 TIMES DAILY
Qty: 20 TABLET | Refills: 0 | Status: SHIPPED | OUTPATIENT
Start: 2024-07-02 | End: 2024-07-12

## 2024-07-02 RX ORDER — SPIRONOLACTONE 25 MG/1
25 TABLET ORAL DAILY
Status: DISCONTINUED | OUTPATIENT
Start: 2024-07-02 | End: 2024-07-02 | Stop reason: HOSPADM

## 2024-07-02 RX ADMIN — PRAVASTATIN SODIUM 20 MG: 20 TABLET ORAL at 08:07

## 2024-07-02 RX ADMIN — INSULIN ASPART 4 UNITS: 100 INJECTION, SOLUTION INTRAVENOUS; SUBCUTANEOUS at 06:07

## 2024-07-02 RX ADMIN — ARFORMOTEROL TARTRATE 15 MCG: 15 SOLUTION RESPIRATORY (INHALATION) at 07:07

## 2024-07-02 RX ADMIN — HYDRALAZINE HYDROCHLORIDE 25 MG: 25 TABLET ORAL at 06:07

## 2024-07-02 RX ADMIN — FAMOTIDINE 20 MG: 20 TABLET ORAL at 08:07

## 2024-07-02 RX ADMIN — SPIRONOLACTONE 25 MG: 25 TABLET, FILM COATED ORAL at 08:07

## 2024-07-02 RX ADMIN — METOPROLOL SUCCINATE 25 MG: 25 TABLET, EXTENDED RELEASE ORAL at 08:07

## 2024-07-02 RX ADMIN — CEFEPIME 1 G: 1 INJECTION, POWDER, FOR SOLUTION INTRAMUSCULAR; INTRAVENOUS at 06:07

## 2024-07-02 RX ADMIN — VANCOMYCIN HYDROCHLORIDE 1500 MG: 1.5 INJECTION, POWDER, LYOPHILIZED, FOR SOLUTION INTRAVENOUS at 12:07

## 2024-07-02 RX ADMIN — POLYETHYLENE GLYCOL 3350 17 G: 17 POWDER, FOR SOLUTION ORAL at 08:07

## 2024-07-02 RX ADMIN — CHLORTHALIDONE 25 MG: 25 TABLET ORAL at 08:07

## 2024-07-02 RX ADMIN — AMLODIPINE BESYLATE 10 MG: 10 TABLET ORAL at 08:07

## 2024-07-02 NOTE — ASSESSMENT & PLAN NOTE
Patient with acute hypoxic respiratory failure who is not on home oxygen. Supplemental oxygen was provided and currently requiring 2L/NC. Signs/symptoms of respiratory failure include increased work of breathing. Contributing diagnoses includes CHF, Obesity Hypoventilation, and Pneumonia Labs and images were reviewed. Patient has recent ABG, which has been reviewed.     Will treat underlying causes and adjust management of respiratory failure as follows:  CT chest reviewed; noted bilateral multifocal dense consolidations right greater than left  Respiratory infection panel: +Rhinovirus  Sputum culture: Normal respiratory letha  MRSA swab: no MRSA isolated  Titrating supplemental oxygen to maintain saturations 90% or greater  Home oxygen evaluation performed  Despite negative respiratory culture and negative MRSA swab; CT imaging demonstrates dense bilateral pneumonia  Patient should complete antibiotic course upon discharge  Augmentin RX provided per primary team  Outpatient pulmonary referral sent; appointment with Dr. Hendrickson scheduled July 10th

## 2024-07-02 NOTE — SUBJECTIVE & OBJECTIVE
Lakshmi Campbell is a 73-year-old female with a past medical history significant for cataracts, diabetes mellitus type 1, hyperlipidemia, hypertension, hypothyroidism, and morbid obesity.  Patient presented with sudden onset of shortness of breath with nonproductive cough and associated fatigue and weakness.  Patient reported her symptoms were exacerbated with exertion and lying flat.  At the time of her presentation, she denied any recent illnesses or sick contacts.  Denies history of CHF, asthma, or COPD.  Patient reports a smoking history of a pack per day but states she quit smoking in 1989.  She denies any use of home oxygen or PAP therapy.  Denies history of snoring or prior sleep apnea diagnosis.  Denied swelling in her lower extremities.  Denied any complaints of fever, chills, sweats, chest pain, palpitations, or GI complaints.  Patient endorsed some mild body aches prior to admission.  Chest imaging at the time of presentation demonstrated multilobar pneumonia.  Denies any home or environmental exposures. Recent travel to [place], [place] last week. Patient was admitted by Hospital Medicine and Pulmonary has been consulted for evaluation.    Interval history, f/u chief complaint generalized weakness and fatigue:  7/2: Patient sitting up in bedside chair this morning without acute complaints. Anxious for discharge to home. Minimal cough and sputum production. No wheezing noted on exam. Denies chest pain. Hemodynamics stable.     Objective:     Vital Signs (Most Recent):  Temp: 97.6 °F (36.4 °C) (07/02/24 0735)  Pulse: 63 (07/02/24 0910)  Resp: 17 (07/02/24 0735)  BP: (!) 154/66 (07/02/24 0735)  SpO2: (!) 91 % (07/02/24 0735) Vital Signs (24h Range):  Temp:  [97.6 °F (36.4 °C)-98 °F (36.7 °C)] 97.6 °F (36.4 °C)  Pulse:  [50-70] 63  Resp:  [16-19] 17  SpO2:  [89 %-97 %] 91 %  BP: (154-197)/(66-83) 154/66   Weight: 107.5 kg (237 lb);  Body mass index is 39.44 kg/m².    Intake/Output Summary (Last 24 hours) at 7/2/2024  0923  Last data filed at 7/2/2024 0422  Gross per 24 hour   Intake --   Output 700 ml   Net -700 ml      Physical Exam  Vitals and nursing note reviewed.   Constitutional:       Appearance: She is obese.   HENT:      Head: Normocephalic.   Eyes:      Conjunctiva/sclera: Conjunctivae normal.   Cardiovascular:      Rate and Rhythm: Normal rate.   Pulmonary:      Effort: Pulmonary effort is normal.      Breath sounds: Normal breath sounds.   Abdominal:      Palpations: Abdomen is soft.   Musculoskeletal:         General: Normal range of motion.      Cervical back: Normal range of motion.   Skin:     General: Skin is warm and dry.   Neurological:      Mental Status: She is alert and oriented to person, place, and time.   Psychiatric:         Mood and Affect: Mood normal.         Review of Systems: Negative except as indicated in HPI    Significant Labs:  CBC/Anemia Profile:  Recent Labs   Lab 07/01/24  0606 07/02/24  0624   WBC 5.19 6.42   HGB 10.4* 11.0*   HCT 31.5* 33.4*   PLT 73* 80*   MCV 87 87   RDW 12.4 12.5   Chemistries:  Recent Labs   Lab 07/01/24  0606 07/02/24  0624    138   K 4.1 4.1    107   CO2 20* 20*   BUN 71* 69*   CREATININE 1.7* 1.6*   CALCIUM 9.1 9.3   ALBUMIN 2.1* 2.2*   PROT 6.2 6.2   BILITOT 0.5 0.5   ALKPHOS 89 91   ALT 17 17   AST 20 16   MG 2.4  --    PHOS 3.3  --

## 2024-07-02 NOTE — DISCHARGE INSTRUCTIONS
Continue to use incentive spirometer and Acapella device  Use oxygen  Mucinex 1200 mg by mouth twice a day for 10 days  Keep follow up appointment with Pulmonary

## 2024-07-02 NOTE — PROGRESS NOTES
[facility]  Pulmonology Progress Note    Patient Name: Lakshmi Campbell   MRN: [id number]  Admission Date: 6/26/2024   Hospital Length of Stay: 5 days  Code Status: Full Code    Attending Provider: Madelaine Mercer MD  Subjective:   History of present illness:  Lakshmi Campbell is a 73-year-old female with a past medical history significant for cataracts, diabetes mellitus type 1, hyperlipidemia, hypertension, hypothyroidism, and morbid obesity.  Patient presented with sudden onset of shortness of breath with nonproductive cough and associated fatigue and weakness.  Patient reported her symptoms were exacerbated with exertion and lying flat.  At the time of her presentation, she denied any recent illnesses or sick contacts.  Denies history of CHF, asthma, or COPD.  Patient reports a smoking history of a pack per day but states she quit smoking in 1989.  She denies any use of home oxygen or PAP therapy.  Denies history of snoring or prior sleep apnea diagnosis.  Denied swelling in her lower extremities.  Denied any complaints of fever, chills, sweats, chest pain, palpitations, or GI complaints.  Patient endorsed some mild body aches prior to admission.  Chest imaging at the time of presentation demonstrated multilobar pneumonia.  Denies any home or environmental exposures. Recent travel to [place], [place] last week. Patient was admitted by Hospital Medicine and Pulmonary has been consulted for evaluation.    Interval history, f/u chief complaint generalized weakness and fatigue:  7/2: Patient sitting up in bedside chair this morning without acute complaints. Anxious for discharge to home. Minimal cough and sputum production. No wheezing noted on exam. Denies chest pain. Hemodynamics stable.     Objective:     Vital Signs (Most Recent):  Temp: 97.6 °F (36.4 °C) (07/02/24 0735)  Pulse: 63 (07/02/24 0910)  Resp: 17 (07/02/24 0735)  BP: (!) 154/66 (07/02/24 0735)  SpO2: (!) 91 % (07/02/24  0735) Vital Signs (24h Range):  Temp:  [97.6 °F (36.4 °C)-98 °F (36.7 °C)] 97.6 °F (36.4 °C)  Pulse:  [50-70] 63  Resp:  [16-19] 17  SpO2:  [89 %-97 %] 91 %  BP: (154-197)/(66-83) 154/66   Weight: 107.5 kg (237 lb);  Body mass index is 39.44 kg/m².    Intake/Output Summary (Last 24 hours) at 7/2/2024 0923  Last data filed at 7/2/2024 0422  Gross per 24 hour   Intake --   Output 700 ml   Net -700 ml      Physical Exam  Vitals and nursing note reviewed.   Constitutional:       Appearance: She is obese.   HENT:      Head: Normocephalic.   Eyes:      Conjunctiva/sclera: Conjunctivae normal.   Cardiovascular:      Rate and Rhythm: Normal rate.   Pulmonary:      Effort: Pulmonary effort is normal.      Breath sounds: Normal breath sounds.   Abdominal:      Palpations: Abdomen is soft.   Musculoskeletal:         General: Normal range of motion.      Cervical back: Normal range of motion.   Skin:     General: Skin is warm and dry.   Neurological:      Mental Status: She is alert and oriented to person, place, and time.   Psychiatric:         Mood and Affect: Mood normal.         Review of Systems: Negative except as indicated in HPI    Significant Labs:  CBC/Anemia Profile:  Recent Labs   Lab 07/01/24  0606 07/02/24  0624   WBC 5.19 6.42   HGB 10.4* 11.0*   HCT 31.5* 33.4*   PLT 73* 80*   MCV 87 87   RDW 12.4 12.5   Chemistries:  Recent Labs   Lab 07/01/24  0606 07/02/24  0624    138   K 4.1 4.1    107   CO2 20* 20*   BUN 71* 69*   CREATININE 1.7* 1.6*   CALCIUM 9.1 9.3   ALBUMIN 2.1* 2.2*   PROT 6.2 6.2   BILITOT 0.5 0.5   ALKPHOS 89 91   ALT 17 17   AST 20 16   MG 2.4  --    PHOS 3.3  --      ABG  Recent Labs   Lab 06/26/24  2203   PH 7.237*   PO2 60*   PCO2 27.3*   HCO3 11.6*   BE -16*     Assessment/Plan:   Acute hypoxic respiratory failure  Community acquired pneumonia  Patient with acute hypoxic respiratory failure who is not on home oxygen. Supplemental oxygen was provided and currently requiring 2L/NC.  Signs/symptoms of respiratory failure include increased work of breathing. Contributing diagnoses includes CHF, Obesity Hypoventilation, and Pneumonia Labs and images were reviewed. Patient has recent ABG, which has been reviewed.     Will treat underlying causes and adjust management of respiratory failure as follows:  CT chest reviewed; noted bilateral multifocal dense consolidations right greater than left  Question underlying OHS denies snoring or apnea while sleeping; may benefit from outpatient sleep evaluation  Case discussed with patient and family; will need repeat imaging in approximately 12 weeks for clearance  Respiratory infection panel: +Rhinovirus  Sputum culture: Normal respiratory letha  MRSA swab: no MRSA isolated  Titrating supplemental oxygen to maintain saturations 90% or greater  Home oxygen evaluation performed  Despite negative respiratory culture and negative MRSA swab; CT imaging demonstrates dense bilateral pneumonia  Patient should complete antibiotic course upon discharge  Augmentin RX provided per primary team  Outpatient pulmonary referral sent; appointment with Dr. Hendrickson scheduled July 10th    Morbid obesity with BMI of 40.0-44.9, adult  Body mass index is 39.44 kg/m². Morbid obesity complicates all aspects of disease management from diagnostic modalities to treatment. Weight loss encouraged and health benefits explained to patient.      Melisa Khan NP  Pulmonary and Critical Care  [facility]   Family

## 2024-07-02 NOTE — PLAN OF CARE
Free from falls. IV abx maintained. Cardiac and blood glucose monitored. No s/s of acute distress. 12hr chard check complete.

## 2024-07-02 NOTE — PLAN OF CARE
[facility] - Med Surg  Discharge Final Note    Primary Care Provider: Keely Silva NP    Expected Discharge Date: 7/2/2024    Final Discharge Note (most recent)       Final Note - 07/02/24 0937          Final Note    Assessment Type Final Discharge Note     Anticipated Discharge Disposition Home-Health Care [facility]     Hospital Resources/Appts/Education Provided Appointments scheduled and added to AVS        Post-Acute Status    Post-Acute Authorization Home Health     Home Health Status Referrals Sent     Coverage bc                              Contact Info       Keely Silva NP   Specialty: Family Medicine   Relationship: PCP - General    [address] [place] [address]   Phone: [phone]       Next Steps: Follow up in 3 day(s)        SW sent an hh referral to [facility]. Pending hh acceptance. Pt will dc home with o2 from [facility].

## 2024-07-02 NOTE — PROGRESS NOTES
Pharmacokinetic Assessment Follow Up: IV Vancomycin    Vancomycin serum concentration assessment(s):    The random level was drawn correctly and can be used to guide therapy at this time. The measurement is within the desired definitive target range of 10 to 20 mcg/mL.    Vancomycin Regimen Plan:    Administer vancomycin 1500 mg IV once and obtain a vancomycin random level at 0000 midnight on 7/3/24.    Drug levels (last 3 results):  Recent Labs   Lab Result Units 07/01/24  2115   Vancomycin, Random ug/mL 11.7       Pharmacy will continue to follow and monitor vancomycin.    Please contact pharmacy at extension [phone] for questions regarding this assessment.    Thank you for the consult,   Jose King       Patient brief summary:  Lakshmi Campbell is a 73 y.o. female initiated on antimicrobial therapy with IV Vancomycin for treatment of lower respiratory infection    The patient's current regimen is pulse dosing.    Drug Allergies:   Review of patient's allergies indicates:   Allergen Reactions    Codeine Nausea Only     Other reaction(s): Nausea  Other reaction(s): Elevated blood pressure       Actual Body Weight:   107.5 kg    Renal Function:   Estimated Creatinine Clearance: 35.9 mL/min (A) (based on SCr of 1.7 mg/dL (H)).,     Dialysis Method (if applicable):  N/A    CBC (last 72 hours):  Recent Labs   Lab Result Units 06/29/24  0611 06/30/24  0552 07/01/24  0606   WBC K/uL 6.57 6.05 5.19   Hemoglobin g/dL 10.2* 10.4* 10.4*   Hematocrit % 31.1* 32.4* 31.5*   Platelets K/uL 68* 67* 73*   Gran % % 88.9* 79.0* 69.0   Lymph % % 6.4* 10.4* 25.0   Mono % % 3.0* 6.3 6.0   Eosinophil % % 0.6 1.8 0.0   Basophil % % 0.6 0.5 0.0   Differential Method  Automated Automated Manual       Metabolic Panel (last 72 hours):  Recent Labs   Lab Result Units 06/29/24  0611 06/30/24  0552 07/01/24  0606   Sodium mmol/L 138 138  138 136   Potassium mmol/L 4.2 4.1  4.1 4.1   Chloride mmol/L 107 106  106 105   CO2 mmol/L 19* 21*  21*  20*   Glucose mg/dL 152* 170*  170* 206*   BUN mg/dL 78* 82*  82* 71*   Creatinine mg/dL 2.2* 2.0*  2.0* 1.7*   Albumin g/dL  --  2.2* 2.1*   Total Bilirubin mg/dL  --  0.8 0.5   Alkaline Phosphatase U/L  --  75 89   AST U/L  --  23 20   ALT U/L  --  18 17   Magnesium mg/dL 2.3 2.5 2.4   Phosphorus mg/dL 4.5 3.8 3.3       Vancomycin Administrations:  vancomycin given in the last 96 hours                     vancomycin 1,500 mg in D5W 250 mL IVPB (Vial-Mate) (mg) 1,500 mg New Bag 07/02/24 0000    vancomycin 2 g in dextrose 5 % 500 mL IVPB (mg) 2,000 mg New Bag 06/30/24 1824                    Microbiologic Results:  Microbiology Results (last 7 days)       Procedure Component Value Units Date/Time    Blood culture [[id number]] Collected: 06/28/24 1559    Order Status: Completed Specimen: Blood Updated: 07/01/24 2212     Blood Culture, Routine No Growth to date      No Growth to date      No Growth to date      No Growth to date    Blood culture [[id number]] Collected: 06/28/24 1559    Order Status: Completed Specimen: Blood Updated: 07/01/24 2212     Blood Culture, Routine No Growth to date      No Growth to date      No Growth to date      No Growth to date    Culture, Respiratory with Gram Stain [[id number]] Collected: 06/29/24 1147    Order Status: Completed Specimen: Respiratory from Sputum Updated: 07/01/24 1104     Respiratory Culture Normal respiratory letha     Gram Stain (Respiratory) <10 epithelial cells per low power field.     Gram Stain (Respiratory) Rare WBC's     Gram Stain (Respiratory) Rare Gram positive cocci     Gram Stain (Respiratory) Rare Gram negative rods     Gram Stain (Respiratory) Rare Gram positive rods    Cryptococcal antigen [[id number]] Collected: 06/30/24 1633    Order Status: Completed Specimen: Blood, Venous Updated: 07/01/24 0900     Cryptococcal Ag, Blood Negative    Culture, Respiratory with Gram Stain [[id number]] Collected: 06/30/24 1832    Order Status: Completed Specimen:  Sputum Updated: 07/01/24 0604     Gram Stain (Respiratory) <10 epithelial cells per low power field.     Gram Stain (Respiratory) Rare WBC's     Gram Stain (Respiratory) Rare Gram positive rods     Gram Stain (Respiratory) Rare Gram negative rods    Culture, MRSA [[id number]] Collected: 06/30/24 1657    Order Status: Sent Specimen: MRSA source from Nares, Left Updated: 07/01/24 0059    Respiratory Infection Panel (PCR), Nasopharyngeal [[id number]]  (Abnormal) Collected: 06/30/24 1702    Order Status: Completed Specimen: Nasopharyngeal Swab Updated: 06/30/24 1935     Respiratory Infection Panel Source NP Swab     Adenovirus Not Detected     Coronavirus 229E, Common Cold Virus Not Detected     Coronavirus HKU1, Common Cold Virus Not Detected     Coronavirus NL63, Common Cold Virus Not Detected     Coronavirus OC43, Common Cold Virus Not Detected     Comment: The Coronavirus strains detected in this test cause the common cold.  These strains are not the COVID-19 (novel Coronavirus)strain   associated with the respiratory disease outbreak.          SARS-CoV2 (COVID-19) Qualitative PCR Not Detected     Human Metapneumovirus Not Detected     Human Rhinovirus/Enterovirus Detected     Influenza A (subtypes H1, H1-2009,H3) Not Detected     Influenza B Not Detected     Parainfluenza Virus 1 Not Detected     Parainfluenza Virus 2 Not Detected     Parainfluenza Virus 3 Not Detected     Parainfluenza Virus 4 Not Detected     Respiratory Syncytial Virus Not Detected     Bordetella Parapertussis (NK7351) Not Detected     Bordetella pertussis (ptxP) Not Detected     Chlamydia pneumoniae Not Detected     Mycoplasma pneumoniae Not Detected     Comment: Respiratory Infection Panel testing performed by Multiplex PCR.       Narrative:      Assay not valid for lower respiratory specimens, alternate  testing required.

## 2024-07-02 NOTE — PT/OT/SLP PROGRESS
Physical Therapy  Treatment    Lakshmi Campbell   MRN: [id number]   Admitting Diagnosis: Acute hypoxic respiratory failure    PT Received On: 07/02/24  PT Start Time: 0730     PT Stop Time: 0755    PT Total Time (min): 25 min       Billable Minutes:  Gait Training 10 and Therapeutic Activity 15    Treatment Type: Treatment  PT/PTA: PTA     Number of PTA visits since last PT visit: 2       General Precautions: Standard, fall  Orthopedic Precautions: N/A  Braces: N/A  Respiratory Status: Nasal cannula, flow 2 L/min  RT present throughout session performing home O2 eval.          Subjective:  Communicated with charge nurse, Maryann, and completed Epic chart review prior to session.  Patient agreed to PT session.     Pain/Comfort  Pain Rating 1: 0/10    Objective:   Patient found with: peripheral IV, telemetry, PureWick, oxygen    Supine > sit EOB: Modified Independent    Forward scoot towards EOB: Modified Independent    STS from EOB > RW: SBA (VC for hand placement)    40ft w/ RW SBA (decreased gait speed overall)    Stand pivot T/F to chair w/ RW: SBA (VC for safety w/ RW mgmt and sequencing of task)    Completed x10 reps AROM TE to BLE: LAQ, Hip Flex, AP   Intermittent cues given as needed to maintain correct form during repetitions    Educated patient on importance of increased tolerance to upright position and direct impact on CV endurance and strength. Patient encouraged to sit up in chair/ EOB, for a minimum of 2 consecutive hours, 3x per day. Encouraged patient to perform AROM TE to BLE throughout the day within all available planes of motion. Re enforced importance of utilizing call light to meet needs in room and not attempt to get up without staff assistance. Patient verbalized understanding and agreed to comply.       AM-PAC 6 CLICK MOBILITY  How much help from another person does this patient currently need?   1 = Unable, Total/Dependent Assistance  2 = A lot, Maximum/Moderate Assistance  3 = A little,  Minimum/Contact Guard/Supervision  4 = None, Modified [place]/Independent    Turning over in bed (including adjusting bedclothes, sheets and blankets)?: 4  Sitting down on and standing up from a chair with arms (e.g., wheelchair, bedside commode, etc.): 3  Moving from lying on back to sitting on the side of the bed?: 4  Moving to and from a bed to a chair (including a wheelchair)?: 3  Need to walk in hospital room?: 3  Climbing 3-5 steps with a railing?: 1 (NT)  Basic Mobility Total Score: 18    AM-PAC Raw Score CMS G-Code Modifier Level of Impairment Assistance   6 % Total / Unable   7 - 9 CM 80 - 100% Maximal Assist   10 - 14 CL 60 - 80% Moderate Assist   15 - 19 CK 40 - 60% Moderate Assist   20 - 22 CJ 20 - 40% Minimal Assist   23 CI 1-20% SBA / CGA   24 CH 0% Independent/ Mod I     Patient left up in chair with call button in reach and chair alarm on.    Assessment:  Lakshmi Campbell is a 73 y.o. female with a medical diagnosis of Acute hypoxic respiratory failure and presents with overall decline in functional mobility. Patient would continue to benefit from skilled PT to address functional limitations listed below in order to return to PLOF/decrease caregiver burden.     Rehab identified problem list/impairments: weakness, impaired endurance, impaired functional mobility, gait instability, impaired balance, decreased safety awareness, decreased lower extremity function, impaired cardiopulmonary response to activity    Rehab potential is good.    Activity tolerance: Fair    Discharge recommendations: Low Intensity Therapy      Barriers to discharge:      Equipment recommendations: walker, rolling     GOALS:   Multidisciplinary Problems       Physical Therapy Goals          Problem: Physical Therapy    Goal Priority Disciplines Outcome Goal Variances Interventions   Physical Therapy Goal     PT, PT/OT      Description: LTG'S TO BE MET IN 14 DAYS (7-13-24)  PT WILL BE AUSTEN FOR BED MOBILITY  PT WILL BE  AUSTEN FOR BED<>CHAIR TF'S  PT WILL  FEET WITH RW AND AUSTEN  PT WILL INC AMPAC SCORE BY 2 POINTS TO PROGRESS GROSS FUNC MOBILITY                         PLAN:    Patient to be seen 3 x/week to address the above listed problems via gait training, therapeutic activities, therapeutic exercises  Plan of Care expires: 07/13/24  Plan of Care reviewed with: patient         07/02/2024

## 2024-07-02 NOTE — CARE UPDATE
Home Oxygen Evaluation - [facility] - Cardiopulmonary Department      Date Performed: 7/2/2024      1) Patient's Home O2 Sat on room air, while at rest: Room Air SpO2 At Rest: (!) 88 %        If O2 sats on room air at rest are 88% or below, patient qualifies.  Document O2 liter flow needed in Step 2.  If O2 sats are 89% or above, complete Step 3.        2)  If patient is not ambulated and O2 sats are <88%, what is the O2 liter flow required to meet ordered saturation?      If O2 sats on room air while exercising remain 89% or above patient does not qualify, no further testing needed Document N/A in step 3. If O2 sats on room air while exercising are 88% or below, continue to Step 4.    3) Patient's O2 Sat on room air while exercising: Room Air SpO2 During Ambulation: (!) 86 %        4) Patient's O2 Sat while exercising on O2: SpO2 During Ambulation on O2: 92 % at Ambulation O2 LPM: 4 LPM         (Must show improvement from #4 for patients to qualify)

## 2024-07-02 NOTE — PLAN OF CARE
Pt ready for discharge. No s/s of distress noted. Pt free from injury. IV discontinued. Tele monitor discontinued. Belongings with pt. Rx delivered to pt's bedside. O2 tank delivered to pt's bedside. Discharge instructions reviewed with pt. Pt verbalized understanding. F/u appts made.

## 2024-07-03 ENCOUNTER — PATIENT OUTREACH (OUTPATIENT)
Dept: ADMINISTRATIVE | Facility: CLINIC | Age: 74
End: 2024-07-03
Payer: MEDICARE

## 2024-07-03 ENCOUNTER — PATIENT OUTREACH (OUTPATIENT)
Dept: ADMINISTRATIVE | Facility: HOSPITAL | Age: 74
End: 2024-07-03
Payer: MEDICARE

## 2024-07-03 DIAGNOSIS — E11.9 TYPE 2 DIABETES MELLITUS WITHOUT COMPLICATION: ICD-10-CM

## 2024-07-03 LAB
BACTERIA BLD CULT: NORMAL
BACTERIA BLD CULT: NORMAL
BACTERIA SPEC AEROBE CULT: NORMAL
BACTERIA SPEC AEROBE CULT: NORMAL
GRAM STN SPEC: NORMAL
HISTOPLASMA/BLASTOMYCES AG VALUE: NOT DETECTED NG/ML
HISTOPLASMA/BLASTOMYCES RESULT: NOT DETECTED
L PNEUMO AG UR QL IA: NEGATIVE

## 2024-07-03 NOTE — PROGRESS NOTES
No answer, LVM.   VBHM Score: 4     Urine Screening  Eye Exam  Mammogram  Foot Exam    Shingles/Zoster Vaccine  RSV Vaccine                  Health Maintenance Topic(s) Outreach Outcomes & Actions Taken:       Additional Notes:                 Care Management, Digital Medicine, and/or Education Referrals    OPCM Risk Score: 30.2                 Additional Notes:

## 2024-07-03 NOTE — PROGRESS NOTES
C3 nurse spoke with Lakshmi Campbell for a TCC post hospital discharge follow up call. The patient has a scheduled HOSFU appointment with Keely Silva NP on 07/16/24 @ 8610.

## 2024-07-05 ENCOUNTER — TELEPHONE (OUTPATIENT)
Dept: CARDIOLOGY | Facility: CLINIC | Age: 74
End: 2024-07-05
Payer: MEDICARE

## 2024-07-05 NOTE — TELEPHONE ENCOUNTER
"[facility]) hospital discharge 48-72 hour phone follow up completed.     Most Recent Hospital Discharge Date: 7/2/24  Last admission Diagnosis/chief complaint:acute resp failure/New onset HF    TCC nurse Navigator spoke with pt        Pt reports the following:  [x]  Shortness of Breath with Activity-improving  []  Shortness of Breath at rest   []  Fatigue  []  Edema   [] Chest pain or tightness  [] Weight Increase since discharge  [] None of the above    Medications:   Discharge medication reviewed with pt.  Pt reports having medication list available and has all medications at home for use per list.     Education:   Confirmed pt received "Home Care Guide for Heart Failure Patients" while admitted.   Reviewed key points as listed below.     Recommend 2 -3 gram sodium restriction and 1500 cc-2000 cc fluid restriction.  Encourage physical activity with graded exercise program.  Requested patient to weigh themselves daily, and to notify us if their weight increases by more than 3 lbs in 1 day or 5 lbs in 3 days.   Reminded patient to use "Daily weight and symptom tracker" to record and guide patient on when and how to call [facility]. PT may also use symptom tracker if no scale available  Pt reports being in the green (color) Zone. If in yellow/red, reminded that they should be calling [facility] today or now.     Watch for these Signs and Symptoms: If any of these occur, contact [facility] immediately:   Increase in shortness of breath with movement   Increase in swelling in your legs and ankles   Weight gain of more than 3 pounds in a day or 5 pounds in 3 days.   Difficulty breathing when you are lying down   Worsening fatigue or tiredness   Stomach bloating, a full feeling or a loss of appetite   Increased coughing--especially when you are lying down      Pt was able to verbalize back to nurse in their own words correct diet/fluid restrictions, necessity for exercise, warning signs and symptoms, " when and how to contact their TCC team.      Pt educated on follow-up plan while in [facility] program to include:   Week 1 -  F/u appt with Provider and HF nurse (Date) appt on 7/8 with Rajwinder WOLFF confirmed.    Week 2-5 - In person/ Virtual/ phone call check ins    Week 5-7 - Pt will discharge from [facility] and transition to longterm care provider (Cardiology/PCP/ Advanced Heart Failure).      Patient active on myChart? Yes      Pt given the following contact information for ease of communication: [phone] (Mon-Fri, 8a-5p) & for urgent issues on the weekend call Gabriel on-call [phone] to page the Cardiology MD on call.  Pt also encouraged utilize [facility] messaging as well.      Will follow up with pt at first clinic visit and HF nurse navigator available for pt questions, issues or concerns.

## 2024-07-08 ENCOUNTER — OFFICE VISIT (OUTPATIENT)
Dept: CARDIOLOGY | Facility: CLINIC | Age: 74
End: 2024-07-08
Payer: MEDICARE

## 2024-07-08 VITALS
SYSTOLIC BLOOD PRESSURE: 140 MMHG | BODY MASS INDEX: 37.69 KG/M2 | HEIGHT: 65 IN | WEIGHT: 226.19 LBS | DIASTOLIC BLOOD PRESSURE: 54 MMHG | HEART RATE: 64 BPM

## 2024-07-08 DIAGNOSIS — I50.31 ACUTE DIASTOLIC CONGESTIVE HEART FAILURE: Primary | ICD-10-CM

## 2024-07-08 PROCEDURE — 3078F DIAST BP <80 MM HG: CPT | Mod: CPTII,S$GLB,, | Performed by: PHYSICIAN ASSISTANT

## 2024-07-08 PROCEDURE — 99496 TRANSJ CARE MGMT HIGH F2F 7D: CPT | Mod: S$GLB,,, | Performed by: PHYSICIAN ASSISTANT

## 2024-07-08 PROCEDURE — 3077F SYST BP >= 140 MM HG: CPT | Mod: CPTII,S$GLB,, | Performed by: PHYSICIAN ASSISTANT

## 2024-07-08 PROCEDURE — 99999 PR PBB SHADOW E&M-EST. PATIENT-LVL II: CPT | Mod: PBBFAC,,, | Performed by: PHYSICIAN ASSISTANT

## 2024-07-08 PROCEDURE — 3066F NEPHROPATHY DOC TX: CPT | Mod: CPTII,S$GLB,, | Performed by: PHYSICIAN ASSISTANT

## 2024-07-08 PROCEDURE — 4010F ACE/ARB THERAPY RXD/TAKEN: CPT | Mod: CPTII,S$GLB,, | Performed by: PHYSICIAN ASSISTANT

## 2024-07-08 PROCEDURE — 3044F HG A1C LEVEL LT 7.0%: CPT | Mod: CPTII,S$GLB,, | Performed by: PHYSICIAN ASSISTANT

## 2024-07-08 PROCEDURE — 3062F POS MACROALBUMINURIA REV: CPT | Mod: CPTII,S$GLB,, | Performed by: PHYSICIAN ASSISTANT

## 2024-07-08 RX ORDER — ACETAMINOPHEN 500 MG
1 TABLET ORAL
COMMUNITY

## 2024-07-08 NOTE — PROGRESS NOTES
HF [facility] Provider Note (Initial Clinic) Consult Note    Date of original referral: 7/2/2024  Age: 73 y.o.  Gender: female  Ethnicity: Not  or /a   Number of admissions for CHF within the preceding year: 1   Duration of CHF:   Type of Congestive Heart Failure: Diastolic   Etiology: Non-ischemic    Social history: none  Enrolled in Infusion suite: no    Diagnostic Labs:   EKG - 06/27/2024  CXR - 06/26/2024  ECHO - 06/27/2024  Stress test -   Stress echo -   Pharmacologic stress -   Cardiac catheterization -    Cardiac MRI -     Lab Results   Component Value Date     07/02/2024     07/01/2024    K 4.1 07/02/2024    K 4.1 07/01/2024     07/02/2024     07/01/2024    CO2 20 (L) 07/02/2024    CO2 20 (L) 07/01/2024     (H) 07/02/2024     (H) 07/01/2024    BUN 69 (H) 07/02/2024    BUN 71 (H) 07/01/2024    CREATININE 1.6 (H) 07/02/2024    CREATININE 1.7 (H) 07/01/2024    CALCIUM 9.3 07/02/2024    CALCIUM 9.1 07/01/2024    PROT 6.2 07/02/2024    PROT 6.2 07/01/2024    ALBUMIN 2.2 (L) 07/02/2024    ALBUMIN 2.1 (L) 07/01/2024    BILITOT 0.5 07/02/2024    BILITOT 0.5 07/01/2024    ALKPHOS 91 07/02/2024    ALKPHOS 89 07/01/2024    AST 16 07/02/2024    AST 20 07/01/2024    ALT 17 07/02/2024    ALT 17 07/01/2024    ANIONGAP 11 07/02/2024    ANIONGAP 11 07/01/2024    ESTGFRAFRICA >60.0 03/03/2022    ESTGFRAFRICA 52 12/23/2021    EGFRNONAA 56.8 (A) 03/03/2022    EGFRNONAA 57 (A) 09/01/2021       Lab Results   Component Value Date    WBC 6.42 07/02/2024    WBC 5.19 07/01/2024    RBC 3.82 (L) 07/02/2024    RBC 3.61 (L) 07/01/2024    HGB 11.0 (L) 07/02/2024    HGB 10.4 (L) 07/01/2024    HCT 33.4 (L) 07/02/2024    HCT 31.5 (L) 07/01/2024    MCV 87 07/02/2024    MCV 87 07/01/2024    MCH 28.8 07/02/2024    MCH 28.8 07/01/2024    MCHC 32.9 07/02/2024    MCHC 33.0 07/01/2024    RDW 12.5 07/02/2024    RDW 12.4 07/01/2024    PLT 80 (L) 07/02/2024    PLT 73 (L) 07/01/2024    MPV 10.6 07/02/2024     MPV 10.7 07/01/2024    IMMGR CANCELED 07/02/2024    IMMGR CANCELED 07/01/2024    IGABS CANCELED 07/02/2024    IGABS CANCELED 07/01/2024    LYMPH CANCELED 07/02/2024    LYMPH 9.0 (L) 07/02/2024    MONO CANCELED 07/02/2024    MONO 15.0 07/02/2024    EOS CANCELED 07/02/2024    EOS CANCELED 07/01/2024    BASO CANCELED 07/02/2024    BASO CANCELED 07/01/2024    NRBC 0 07/02/2024    NRBC 0 07/01/2024    GRAN 72.0 07/02/2024    GRAN 69.0 07/01/2024    EOSINOPHIL 1.0 07/02/2024    EOSINOPHIL 0.0 07/01/2024    BASOPHIL 0.0 07/02/2024    BASOPHIL 0.0 07/01/2024    PLTEST Decreased (A) 07/01/2024    PLTEST Decreased (A) 06/30/2024    ANISO Slight 07/01/2024    ANISO Slight 06/29/2024       Lab Results   Component Value Date     (H) 06/28/2024    BNP 1,003 (H) 06/26/2024    MG 2.4 07/01/2024    MG 2.5 06/30/2024    PHOS 3.3 07/01/2024    PHOS 3.8 06/30/2024    TROPONINI 0.055 (H) 06/27/2024    TROPONINI 0.052 (H) 06/27/2024    HGBA1C 5.9 (H) 06/26/2024    HGBA1C 6.8 (H) 03/19/2024    TSH 0.032 (L) 06/27/2024    TSH 0.02 (L) 03/19/2024    FREET4 1.30 06/27/2024    FREET4 1.41 03/05/2012       Lab Results   Component Value Date    CHOL 71 (L) 06/27/2024    TRIG 65 06/27/2024    HDL 34 (L) 06/27/2024    LDLCALC 24.0 (L) 06/27/2024    CHOLHDL 47.9 06/27/2024    TOTALCHOLEST 2.1 06/27/2024    NONHDLCHOL 37 06/27/2024    COLORU Yellow 06/26/2024    APPEARANCEUA Clear 06/26/2024    PHUR 6.0 06/26/2024    SPECGRAV 1.020 06/26/2024    PROTEINUA 2+ (A) 06/26/2024    GLUCUA 4+ (A) 06/26/2024    KETONESU Negative 06/26/2024    BILIRUBINUA Negative 06/26/2024    OCCULTUA Trace (A) 06/26/2024    NITRITE Negative 06/26/2024    LEUKOCYTESUR Negative 06/26/2024       List all implanted cardiac devices:       Current Outpatient Medications on File Prior to Visit   Medication Sig Dispense Refill    amLODIPine (NORVASC) 10 MG tablet TAKE 1 TABLET(10 MG) BY MOUTH EVERY DAY 90 tablet 1    amoxicillin-clavulanate 875-125mg (AUGMENTIN)  "875-125 mg per tablet Take 1 tablet by mouth 2 (two) times daily. for 10 days 20 tablet 0    benzonatate (TESSALON) 100 MG capsule Take 1 capsule (100 mg total) by mouth 3 (three) times daily as needed for Cough. (Patient not taking: Reported on 7/3/2024) 30 capsule 0    blood sugar diagnostic Strp Use ac bid      cetirizine (ZYRTEC) 10 MG tablet TAKE 1 TABLET BY MOUTH ONCE DAILY 30 tablet 0    chlorthalidone (HYGROTEN) 25 MG Tab Take 25 mg by mouth once daily.      ergocalciferol (ERGOCALCIFEROL) 50,000 unit Cap Take 50,000 Units by mouth every 7 days.      fluticasone propionate (FLONASE) 50 mcg/actuation nasal spray SHAKE LIQUID AND USE 2 SPRAYS(100 MCG) IN EACH NOSTRIL EVERY DAY 48 g 1    guaiFENesin (MUCINEX) 600 mg 12 hr tablet Take 2 tablets (1,200 mg total) by mouth 2 (two) times daily. for 10 days 40 tablet 0    HUMULIN 70/30 U-100 KWIKPEN 100 unit/mL (70-30) InPn pen SMARTSI Unit(s) SUB-Q Every Evening      hydrALAZINE (APRESOLINE) 25 MG tablet Take 1 tablet (25 mg total) by mouth every 8 (eight) hours. 90 tablet 0    insulin syringe-needle U-100 1 mL 29 gauge x 1/2" Syrg Use bid      JARDIANCE 25 mg tablet Take 25 mg by mouth every morning.      lancets 33 gauge Misc Use as BID      levothyroxine (SYNTHROID) 150 MCG tablet Take 1 tablet by mouth once daily.      metFORMIN (GLUCOPHAGE) 500 MG tablet Take 500 mg by mouth 2 (two) times daily.      metoprolol succinate (TOPROL-XL) 25 MG 24 hr tablet Take 1 tablet (25 mg total) by mouth once daily. 30 tablet 0    perindopril erbumine (ACEON) 4 mg tablet Take 4 mg by mouth once daily.  (Patient not taking: Reported on 7/3/2024)      pravastatin (PRAVACHOL) 20 MG tablet Take 20 mg by mouth once daily.   11    spironolactone (ALDACTONE) 25 MG tablet Take 25 mg by mouth.       No current facility-administered medications on file prior to visit.         HPI: Lakshmi Campbell is a 73-year-old female with a past medical history significant for cataracts, diabetes " mellitus type 1, hyperlipidemia, hypertension, hypothyroidism, and morbid obesity. Patient presented with sudden onset of shortness of breath with nonproductive cough and associated fatigue and weakness. Patient reported her symptoms were exacerbated with exertion and lying flat. At the time of her presentation, she denied any recent illnesses or sick contacts. Denies history of CHF, asthma, or COPD. Patient reports a smoking history of a pack per day but states she quit smoking in 1989. She denies any use of home oxygen or PAP therapy. Denies history of snoring or prior sleep apnea diagnosis. Denied swelling in her lower extremities. Denied any complaints of fever, chills, sweats, chest pain, palpitations, or GI complaints. Patient endorsed some mild body aches prior to admission. Chest imaging at the time of presentation demonstrated multilobar pneumonia. Denies any home or environmental exposures. Recent travel to [place], [place] last week. Patient was admitted by Hospital Medicine and Pulmonary has been consulted for evaluation       Transitional Care Note    Family and/or Caretaker present at visit?  No.  Diagnostic tests reviewed/disposition: I have reviewed all completed as well as pending diagnostic tests at the time of discharge.  Disease/illness education: yes  Home health/community services discussion/referrals: Patient does not have home health established from hospital visit.  They do not need home health.  If needed, we will set up home health for the patient.   Establishment or re-establishment of referral orders for community resources: No other necessary community resources.   Discussion with other health care providers: No discussion with other health care providers necessary.         PHYSICAL: There were no vitals filed for this visit.       JVD: no,    Heart rhythm: regular  Cardiac murmur: No    S3: no  S4: no  Lungs: clear  Liver span: 10 cm:   Hepatojugular reflux: no  Edema: no,       ASSESSMENT:  acute diastolic HF    PLAN:      Patient Instructions:   Instruct the patient to notify this clinic if HH, a physician or an advanced care provider wants to change medication one of their HF medications   Activity and Diet restrictions:   Recommend 2-3 gram sodium restriction and 1500cc- 2000cc fluid restriction.  Encourage physical activity with graded exercise program.  Requested patient to weigh themselves daily, and to notify us if their weight increases by more than 3 lbs in 1 day or 5 lbs in 3 days.    Assigned dry weight on home scale: 102 kg  Medication changes (include current dose and changed dose)  Patient is euvolemic on exam, primary diagnosis is PNA not HF  Continue SGLT2i and BB for GDMT  CHF education done  RTC 3 months  Remains on aldactone

## 2024-07-10 ENCOUNTER — OFFICE VISIT (OUTPATIENT)
Dept: PULMONOLOGY | Facility: CLINIC | Age: 74
End: 2024-07-10
Payer: MEDICARE

## 2024-07-10 VITALS
WEIGHT: 227.5 LBS | BODY MASS INDEX: 37.9 KG/M2 | DIASTOLIC BLOOD PRESSURE: 70 MMHG | SYSTOLIC BLOOD PRESSURE: 123 MMHG | HEIGHT: 65 IN | RESPIRATION RATE: 20 BRPM | OXYGEN SATURATION: 92 % | HEART RATE: 70 BPM

## 2024-07-10 DIAGNOSIS — Z92.89 HISTORY OF RECENT HOSPITALIZATION: ICD-10-CM

## 2024-07-10 DIAGNOSIS — R09.02 HYPOXEMIA: Primary | ICD-10-CM

## 2024-07-10 DIAGNOSIS — J18.9 PNEUMONIA OF RIGHT LOWER LOBE DUE TO INFECTIOUS ORGANISM: ICD-10-CM

## 2024-07-10 DIAGNOSIS — B34.8 RHINOVIRUS INFECTION: ICD-10-CM

## 2024-07-10 PROCEDURE — 3062F POS MACROALBUMINURIA REV: CPT | Mod: CPTII,S$GLB,, | Performed by: INTERNAL MEDICINE

## 2024-07-10 PROCEDURE — 4010F ACE/ARB THERAPY RXD/TAKEN: CPT | Mod: CPTII,S$GLB,, | Performed by: INTERNAL MEDICINE

## 2024-07-10 PROCEDURE — 3074F SYST BP LT 130 MM HG: CPT | Mod: CPTII,S$GLB,, | Performed by: INTERNAL MEDICINE

## 2024-07-10 PROCEDURE — 99999 PR PBB SHADOW E&M-EST. PATIENT-LVL V: CPT | Mod: PBBFAC,,, | Performed by: INTERNAL MEDICINE

## 2024-07-10 PROCEDURE — 1111F DSCHRG MED/CURRENT MED MERGE: CPT | Mod: CPTII,S$GLB,, | Performed by: INTERNAL MEDICINE

## 2024-07-10 PROCEDURE — 1160F RVW MEDS BY RX/DR IN RCRD: CPT | Mod: CPTII,S$GLB,, | Performed by: INTERNAL MEDICINE

## 2024-07-10 PROCEDURE — 1159F MED LIST DOCD IN RCRD: CPT | Mod: CPTII,S$GLB,, | Performed by: INTERNAL MEDICINE

## 2024-07-10 PROCEDURE — 3288F FALL RISK ASSESSMENT DOCD: CPT | Mod: CPTII,S$GLB,, | Performed by: INTERNAL MEDICINE

## 2024-07-10 PROCEDURE — 99214 OFFICE O/P EST MOD 30 MIN: CPT | Mod: S$GLB,,, | Performed by: INTERNAL MEDICINE

## 2024-07-10 PROCEDURE — 3008F BODY MASS INDEX DOCD: CPT | Mod: CPTII,S$GLB,, | Performed by: INTERNAL MEDICINE

## 2024-07-10 PROCEDURE — 3066F NEPHROPATHY DOC TX: CPT | Mod: CPTII,S$GLB,, | Performed by: INTERNAL MEDICINE

## 2024-07-10 PROCEDURE — 1101F PT FALLS ASSESS-DOCD LE1/YR: CPT | Mod: CPTII,S$GLB,, | Performed by: INTERNAL MEDICINE

## 2024-07-10 PROCEDURE — 3078F DIAST BP <80 MM HG: CPT | Mod: CPTII,S$GLB,, | Performed by: INTERNAL MEDICINE

## 2024-07-10 PROCEDURE — 3044F HG A1C LEVEL LT 7.0%: CPT | Mod: CPTII,S$GLB,, | Performed by: INTERNAL MEDICINE

## 2024-07-10 NOTE — PROGRESS NOTES
Initial Outpatient Pulmonary Evaluation       SUBJECTIVE:     Chief Complaint   Patient presents with    Pneumonia       History of Present Illness:    Patient is a 73 y.o. female referred for evaluation for hypoxemic respiratory failure complicating a rhino virus infection and possible strap multi focal infiltrate pneumonia treated in the hospital for 6 days end of June and discharged on Augmentin.      She is completing the Augmentin course.  She is using oxygen 24 hours since her discharge.      No significant history of smoking or lung disorders in the past.      Respiratory panel PCR rhinovirus positive and urine strep antigen positive during her admission to the hospital June 2023.    Review of Systems   Respiratory:  Positive for cough, shortness of breath, previous hospitalization due to pulmonary problems, asthma nighttime symptoms and dyspnea on extertion.        Review of patient's allergies indicates:   Allergen Reactions    Codeine Nausea Only     Other reaction(s): Nausea  Other reaction(s): Elevated blood pressure       Current Outpatient Medications   Medication Sig Dispense Refill    amLODIPine (NORVASC) 10 MG tablet TAKE 1 TABLET(10 MG) BY MOUTH EVERY DAY 90 tablet 1    amoxicillin-clavulanate 875-125mg (AUGMENTIN) 875-125 mg per tablet Take 1 tablet by mouth 2 (two) times daily. for 10 days 20 tablet 0    benzonatate (TESSALON) 100 MG capsule Take 1 capsule (100 mg total) by mouth 3 (three) times daily as needed for Cough. 30 capsule 0    blood sugar diagnostic Strp Use ac bid      cetirizine (ZYRTEC) 10 MG tablet TAKE 1 TABLET BY MOUTH ONCE DAILY 30 tablet 0    chlorthalidone (HYGROTEN) 25 MG Tab Take 25 mg by mouth once daily.      cholecalciferol, vitamin D3, (VITAMIN D3) 50 mcg (2,000 unit) Cap capsule Take 1 capsule by mouth.      fluticasone propionate (FLONASE) 50 mcg/actuation nasal spray SHAKE LIQUID AND USE 2 SPRAYS(100 MCG) IN EACH NOSTRIL  "EVERY DAY 48 g 1    guaiFENesin (MUCINEX) 600 mg 12 hr tablet Take 2 tablets (1,200 mg total) by mouth 2 (two) times daily. for 10 days 40 tablet 0    HUMULIN 70/30 U-100 KWIKPEN 100 unit/mL (70-30) InPn pen SMARTSI Unit(s) SUB-Q Every Evening      hydrALAZINE (APRESOLINE) 25 MG tablet Take 1 tablet (25 mg total) by mouth every 8 (eight) hours. 90 tablet 0    insulin syringe-needle U-100 1 mL 29 gauge x 1/2" Syrg Use bid      JARDIANCE 25 mg tablet Take 25 mg by mouth every morning.      lancets 33 gauge Misc Use as BID      levothyroxine (SYNTHROID) 150 MCG tablet Take 1 tablet by mouth once daily.      metFORMIN (GLUCOPHAGE) 500 MG tablet Take 500 mg by mouth 2 (two) times daily.      metoprolol succinate (TOPROL-XL) 25 MG 24 hr tablet Take 1 tablet (25 mg total) by mouth once daily. 30 tablet 0    perindopril erbumine (ACEON) 4 mg tablet Take 4 mg by mouth once daily.      pravastatin (PRAVACHOL) 20 MG tablet Take 20 mg by mouth once daily.   11    spironolactone (ALDACTONE) 25 MG tablet Take 25 mg by mouth.       No current facility-administered medications for this visit.       Past Medical History:   Diagnosis Date    Cataract     Closed displaced comminuted fracture of right patella 10/28/2020    Closed fracture of surgical neck of left humerus 10/30/2020    Closed left radial fracture 10/30/2020    Diabetes mellitus type I     Hyperlipidemia     Hypertension     Morbid obesity     Right knee pain     Thyroid disease      Past Surgical History:   Procedure Laterality Date    APPENDECTOMY      BREAST BIOPSY      CATARACT EXTRACTION      COLONOSCOPY N/A 2021    Procedure: COLONOSCOPY screening;  Surgeon: Moises Patterson MD;  Location: [facility];  Service: Endoscopy;  Laterality: N/A;    ESOPHAGOGASTRODUODENOSCOPY N/A 2021    Procedure: EGD (ESOPHAGOGASTRODUODENOSCOPY) EV screening;  Surgeon: Moises Patterson MD;  Location: [facility];  Service: Endoscopy;  Laterality: N/A;    EYE SURGERY   "    HERNIA REPAIR      OPEN REDUCTION AND INTERNAL FIXATION (ORIF) OF FRACTURE OF DISTAL RADIUS Left 11/2/2020    Procedure: ORIF, FRACTURE, RADIUS, DISTAL;  Surgeon: Sage Wolf MD;  Location: [facility] OR;  Service: Orthopedics;  Laterality: Left;    OPEN REDUCTION AND INTERNAL FIXATION (ORIF) OF FRACTURE OF PATELLA Right 11/2/2020    Procedure: ORIF, FRACTURE, PATELLA;  Surgeon: Sage Wolf MD;  Location: [facility] OR;  Service: Orthopedics;  Laterality: Right;    OPEN REDUCTION AND INTERNAL FIXATION (ORIF) OF FRACTURE OF PATELLA Right 12/18/2020    Procedure: ORIF, FRACTURE, PATELLA;  Surgeon: Sage Wolf MD;  Location: [facility] OR;  Service: Orthopedics;  Laterality: Right;    ORIF HUMERUS FRACTURE Left 11/5/2020    Procedure: ORIF, FRACTURE, HUMERUS;  Surgeon: Sage Wolf MD;  Location: [facility] OR;  Service: Orthopedics;  Laterality: Left;    PLACEMENT OF ACELLULAR HUMAN DERMAL ALLOGRAFT Right 11/2/2020    Procedure: APPLICATION, ACELLULAR HUMAN DERMAL ALLOGRAFT;  Surgeon: Sage Wolf MD;  Location: [facility] OR;  Service: Orthopedics;  Laterality: Right;  Right Patella    REMOVAL OF HARDWARE FROM HAND Left 3/11/2021    Procedure: REMOVAL, HARDWARE, HAND;  Surgeon: Sage Wolf MD;  Location: [facility] OR;  Service: Orthopedics;  Laterality: Left;  Removal of dorsal spanning wrist plate left distal radius    REMOVAL OF HARDWARE FROM LOWER EXTREMITY Right 12/18/2020    Procedure: REMOVAL, HARDWARE, LOWER EXTREMITY;  Surgeon: Sage Wolf MD;  Location: [facility];  Service: Orthopedics;  Laterality: Right;     Family History   Problem Relation Name Age of Onset    Diabetes Mother      Leukemia Father      Diabetes Father       Social History     Tobacco Use    Smoking status: Former     Current packs/day: 0.00     Average packs/day: 1 pack/day for 8.0 years (8.0 ttl pk-yrs)     Types: Cigarettes     Start date: 1981     Quit date: 1989     Years since  "quittin.5    Smokeless tobacco: Never   Substance Use Topics    Alcohol use: Yes     Comment: rarely    Drug use: No          OBJECTIVE:     Vital Signs (Most Recent)  Vital Signs  Pulse: 70  Resp: 20  SpO2: (!) 92 % (2 lITERS)  BP: 123/70  Height and Weight  Height: 5' 5" (165.1 cm)  Weight: 103.2 kg (227 lb 8.2 oz)  BSA (Calculated - sq m): 2.18 sq meters  BMI (Calculated): 37.9  Weight in (lb) to have BMI = 25: 149.9]  Wt Readings from Last 2 Encounters:   07/10/24 103.2 kg (227 lb 8.2 oz)   24 102.6 kg (226 lb 3.1 oz)         Physical Exam:  Physical Exam   Constitutional: She is oriented to person, place, and time. She appears well-developed and well-nourished.   Cardiovascular: Normal rate and regular rhythm.   Pulmonary/Chest: Normal expansion and effort normal.   Neurological: She is alert and oriented to person, place, and time.   Psychiatric: She has a normal mood and affect. Her behavior is normal. Judgment and thought content normal.       Laboratory  Lab Results   Component Value Date    WBC 6.42 2024    RBC 3.82 (L) 2024    HGB 11.0 (L) 2024    HCT 33.4 (L) 2024    MCV 87 2024    MCH 28.8 2024    MCHC 32.9 2024    RDW 12.5 2024    PLT 80 (L) 2024    MPV 10.6 2024    GRAN 72.0 2024    LYMPH CANCELED 2024    LYMPH 9.0 (L) 2024    MONO CANCELED 2024    MONO 15.0 2024    EOS CANCELED 2024    BASO CANCELED 2024    EOSINOPHIL 1.0 2024    BASOPHIL 0.0 2024       BMP  Lab Results   Component Value Date     2024    K 4.1 2024     2024    CO2 20 (L) 2024    BUN 69 (H) 2024    CREATININE 1.6 (H) 2024    CALCIUM 9.3 2024    ANIONGAP 11 2024    ESTGFRAFRICA >60.0 2022    EGFRNONAA 56.8 (A) 2022    AST 16 2024    ALT 17 2024    PROT 6.2 2024       Lab Results   Component Value Date     (H) " "06/28/2024    BNP 1,003 (H) 06/26/2024       Lab Results   Component Value Date    TSH 0.032 (L) 06/27/2024       No results found for: "SEDRATE"    No results found for: "CRP"    No results found for: "IGE"    No results found for: "ASPERGILLUS"  No results found for: "AFUMIGATUSCL"     No results found for: "ACE"    Diagnostic Results:  I have personally reviewed today the following studies :      CT chest without contrast June 2024     TECHNIQUE:  Low dose axial images, sagittal and coronal reformations were obtained from the thoracic inlet to the lung bases. Contrast was not administered.     COMPARISON:  Portable chest x-ray performed 06/26/2024     FINDINGS:  Pulmonary opacity consistent with multi lobar pneumonia is identified involving all lobes of both lungs although lung consolidation is most extensive in the right upper, right lower and left lower lobes.  No pleural effusions are identified.  No pathologic lymph node enlargement is visible in the mediastinum or axilla bilaterally although borderline size lymph nodes are visible in the right paratracheal and precarinal spaces.  The heart is borderline enlarged.  There is dense calcification involving the annulus of the mitral valve.  No chest wall abnormalities are visible.  Images of the upper abdomen demonstrate multiple dependent gallstones in the gallbladder lumen although no secondary findings suggesting acute cholecystitis are visible.     Impression:     Multilobar pneumonia.     Borderline to mild cardiac enlargement.     Incidental cholelithiasis.     ASSESSMENT/PLAN:     Hypoxemia  -     Ambulatory referral/consult to Pulmonology  -     Stress test, pulmonary; Future  -     X-Ray Chest PA And Lateral; Future; Expected date: 08/10/2024    Pneumonia of right lower lobe due to infectious organism  -     Ambulatory referral/consult to Pulmonology  -     Stress test, pulmonary; Future  -     X-Ray Chest PA And Lateral; Future; Expected date: " 08/10/2024    History of recent hospitalization  -     Ambulatory referral/consult to Pulmonology  -     Stress test, pulmonary; Future  -     X-Ray Chest PA And Lateral; Future; Expected date: 08/10/2024    Rhinovirus infection  -     Ambulatory referral/consult to Pulmonology  -     Stress test, pulmonary; Future  -     X-Ray Chest PA And Lateral; Future; Expected date: 08/10/2024        I ambulated the patient today, at rest her O2 sat is 93-95% with ambulation without oxygen decreased to 88%.      Advised to use oxygen on exertion/p.r.n.  And during sleep for now.      6 minute walking test in 1 month plus chest x-ray to follow up on resolution of pneumonia.      Complete Augmentin course.        Follow up in about 1 month (around 8/10/2024).    This note was prepared using voice recognition system and is likely to have sound alike errors that may have been overlooked even after proof reading.  Please call me with any questions    Discussed diagnosis, its evaluation, treatment and usual course. All questions answered.    Thank you for the courtesy of participating in the care of this patient    Ce Hendrickson MD

## 2024-07-16 ENCOUNTER — OFFICE VISIT (OUTPATIENT)
Dept: INTERNAL MEDICINE | Facility: CLINIC | Age: 74
End: 2024-07-16
Payer: MEDICARE

## 2024-07-16 ENCOUNTER — LAB VISIT (OUTPATIENT)
Dept: LAB | Facility: HOSPITAL | Age: 74
End: 2024-07-16
Attending: NURSE PRACTITIONER
Payer: MEDICARE

## 2024-07-16 VITALS
HEART RATE: 50 BPM | BODY MASS INDEX: 38.24 KG/M2 | TEMPERATURE: 97 F | DIASTOLIC BLOOD PRESSURE: 50 MMHG | HEIGHT: 65 IN | OXYGEN SATURATION: 96 % | SYSTOLIC BLOOD PRESSURE: 124 MMHG | WEIGHT: 229.5 LBS

## 2024-07-16 DIAGNOSIS — I10 ESSENTIAL HYPERTENSION: ICD-10-CM

## 2024-07-16 DIAGNOSIS — E11.29 TYPE 2 DIABETES MELLITUS WITH MICROALBUMINURIA, WITH LONG-TERM CURRENT USE OF INSULIN: ICD-10-CM

## 2024-07-16 DIAGNOSIS — Z09 HOSPITAL DISCHARGE FOLLOW-UP: ICD-10-CM

## 2024-07-16 DIAGNOSIS — E87.5 HYPERKALEMIA: Primary | ICD-10-CM

## 2024-07-16 DIAGNOSIS — R80.9 TYPE 2 DIABETES MELLITUS WITH MICROALBUMINURIA, WITH LONG-TERM CURRENT USE OF INSULIN: ICD-10-CM

## 2024-07-16 DIAGNOSIS — I50.31 ACUTE DIASTOLIC CONGESTIVE HEART FAILURE: ICD-10-CM

## 2024-07-16 DIAGNOSIS — D69.6 THROMBOCYTOPENIA: ICD-10-CM

## 2024-07-16 DIAGNOSIS — R80.9 MICROALBUMINURIA: ICD-10-CM

## 2024-07-16 DIAGNOSIS — Z12.31 BREAST CANCER SCREENING BY MAMMOGRAM: ICD-10-CM

## 2024-07-16 DIAGNOSIS — N18.32 STAGE 3B CHRONIC KIDNEY DISEASE: ICD-10-CM

## 2024-07-16 DIAGNOSIS — Z79.4 TYPE 2 DIABETES MELLITUS WITH MICROALBUMINURIA, WITH LONG-TERM CURRENT USE OF INSULIN: ICD-10-CM

## 2024-07-16 DIAGNOSIS — E78.5 HYPERLIPIDEMIA, UNSPECIFIED HYPERLIPIDEMIA TYPE: ICD-10-CM

## 2024-07-16 DIAGNOSIS — E11.3293 MILD NONPROLIFERATIVE DIABETIC RETINOPATHY OF BOTH EYES WITHOUT MACULAR EDEMA ASSOCIATED WITH TYPE 2 DIABETES MELLITUS: ICD-10-CM

## 2024-07-16 DIAGNOSIS — E66.01 MORBID OBESITY WITH BMI OF 40.0-44.9, ADULT: ICD-10-CM

## 2024-07-16 DIAGNOSIS — Z09 HOSPITAL DISCHARGE FOLLOW-UP: Primary | ICD-10-CM

## 2024-07-16 DIAGNOSIS — J96.01 ACUTE HYPOXIC RESPIRATORY FAILURE: ICD-10-CM

## 2024-07-16 DIAGNOSIS — J18.9 COMMUNITY ACQUIRED PNEUMONIA, UNSPECIFIED LATERALITY: ICD-10-CM

## 2024-07-16 DIAGNOSIS — Z78.0 POSTMENOPAUSAL: ICD-10-CM

## 2024-07-16 DIAGNOSIS — E03.9 HYPOTHYROIDISM, UNSPECIFIED TYPE: ICD-10-CM

## 2024-07-16 PROBLEM — N17.9 ACUTE KIDNEY INJURY SUPERIMPOSED ON CHRONIC KIDNEY DISEASE: Status: RESOLVED | Noted: 2024-06-27 | Resolved: 2024-07-16

## 2024-07-16 PROBLEM — N18.9 ACUTE KIDNEY INJURY SUPERIMPOSED ON CHRONIC KIDNEY DISEASE: Status: RESOLVED | Noted: 2024-06-27 | Resolved: 2024-07-16

## 2024-07-16 LAB
ALBUMIN SERPL BCP-MCNC: 3 G/DL (ref 3.5–5.2)
ALP SERPL-CCNC: 66 U/L (ref 55–135)
ALT SERPL W/O P-5'-P-CCNC: 11 U/L (ref 10–44)
ANION GAP SERPL CALC-SCNC: 14 MMOL/L (ref 8–16)
AST SERPL-CCNC: 14 U/L (ref 10–40)
BASOPHILS # BLD AUTO: 0.05 K/UL (ref 0–0.2)
BASOPHILS NFR BLD: 1 % (ref 0–1.9)
BILIRUB SERPL-MCNC: 0.5 MG/DL (ref 0.1–1)
BUN SERPL-MCNC: 32 MG/DL (ref 8–23)
CALCIUM SERPL-MCNC: 9.5 MG/DL (ref 8.7–10.5)
CHLORIDE SERPL-SCNC: 107 MMOL/L (ref 95–110)
CO2 SERPL-SCNC: 19 MMOL/L (ref 23–29)
CREAT SERPL-MCNC: 2.2 MG/DL (ref 0.5–1.4)
DIFFERENTIAL METHOD BLD: ABNORMAL
EOSINOPHIL # BLD AUTO: 0.2 K/UL (ref 0–0.5)
EOSINOPHIL NFR BLD: 3.4 % (ref 0–8)
ERYTHROCYTE [DISTWIDTH] IN BLOOD BY AUTOMATED COUNT: 13.6 % (ref 11.5–14.5)
EST. GFR  (NO RACE VARIABLE): 23.1 ML/MIN/1.73 M^2
GLUCOSE SERPL-MCNC: 170 MG/DL (ref 70–110)
HCT VFR BLD AUTO: 34.5 % (ref 37–48.5)
HGB BLD-MCNC: 11.2 G/DL (ref 12–16)
IMM GRANULOCYTES # BLD AUTO: 0.03 K/UL (ref 0–0.04)
IMM GRANULOCYTES NFR BLD AUTO: 0.6 % (ref 0–0.5)
LYMPHOCYTES # BLD AUTO: 1.4 K/UL (ref 1–4.8)
LYMPHOCYTES NFR BLD: 27.1 % (ref 18–48)
MCH RBC QN AUTO: 29 PG (ref 27–31)
MCHC RBC AUTO-ENTMCNC: 32.5 G/DL (ref 32–36)
MCV RBC AUTO: 89 FL (ref 82–98)
MONOCYTES # BLD AUTO: 0.5 K/UL (ref 0.3–1)
MONOCYTES NFR BLD: 9.7 % (ref 4–15)
NEUTROPHILS # BLD AUTO: 2.9 K/UL (ref 1.8–7.7)
NEUTROPHILS NFR BLD: 58.2 % (ref 38–73)
NRBC BLD-RTO: 0 /100 WBC
PLATELET # BLD AUTO: 135 K/UL (ref 150–450)
PMV BLD AUTO: 10.2 FL (ref 9.2–12.9)
POTASSIUM SERPL-SCNC: 5.4 MMOL/L (ref 3.5–5.1)
PROT SERPL-MCNC: 7 G/DL (ref 6–8.4)
RBC # BLD AUTO: 3.86 M/UL (ref 4–5.4)
SODIUM SERPL-SCNC: 140 MMOL/L (ref 136–145)
WBC # BLD AUTO: 5.05 K/UL (ref 3.9–12.7)

## 2024-07-16 PROCEDURE — 1160F RVW MEDS BY RX/DR IN RCRD: CPT | Mod: CPTII,S$GLB,, | Performed by: NURSE PRACTITIONER

## 2024-07-16 PROCEDURE — 3062F POS MACROALBUMINURIA REV: CPT | Mod: CPTII,S$GLB,, | Performed by: NURSE PRACTITIONER

## 2024-07-16 PROCEDURE — 36415 COLL VENOUS BLD VENIPUNCTURE: CPT | Mod: PO | Performed by: NURSE PRACTITIONER

## 2024-07-16 PROCEDURE — 3044F HG A1C LEVEL LT 7.0%: CPT | Mod: CPTII,S$GLB,, | Performed by: NURSE PRACTITIONER

## 2024-07-16 PROCEDURE — 1126F AMNT PAIN NOTED NONE PRSNT: CPT | Mod: CPTII,S$GLB,, | Performed by: NURSE PRACTITIONER

## 2024-07-16 PROCEDURE — 99999 PR PBB SHADOW E&M-EST. PATIENT-LVL IV: CPT | Mod: PBBFAC,,, | Performed by: NURSE PRACTITIONER

## 2024-07-16 PROCEDURE — 4010F ACE/ARB THERAPY RXD/TAKEN: CPT | Mod: CPTII,S$GLB,, | Performed by: NURSE PRACTITIONER

## 2024-07-16 PROCEDURE — 1101F PT FALLS ASSESS-DOCD LE1/YR: CPT | Mod: CPTII,S$GLB,, | Performed by: NURSE PRACTITIONER

## 2024-07-16 PROCEDURE — 1159F MED LIST DOCD IN RCRD: CPT | Mod: CPTII,S$GLB,, | Performed by: NURSE PRACTITIONER

## 2024-07-16 PROCEDURE — 85025 COMPLETE CBC W/AUTO DIFF WBC: CPT | Mod: PO | Performed by: NURSE PRACTITIONER

## 2024-07-16 PROCEDURE — 80053 COMPREHEN METABOLIC PANEL: CPT | Mod: PO | Performed by: NURSE PRACTITIONER

## 2024-07-16 PROCEDURE — 1111F DSCHRG MED/CURRENT MED MERGE: CPT | Mod: CPTII,S$GLB,, | Performed by: NURSE PRACTITIONER

## 2024-07-16 PROCEDURE — 3074F SYST BP LT 130 MM HG: CPT | Mod: CPTII,S$GLB,, | Performed by: NURSE PRACTITIONER

## 2024-07-16 PROCEDURE — 3288F FALL RISK ASSESSMENT DOCD: CPT | Mod: CPTII,S$GLB,, | Performed by: NURSE PRACTITIONER

## 2024-07-16 PROCEDURE — 99215 OFFICE O/P EST HI 40 MIN: CPT | Mod: S$GLB,,, | Performed by: NURSE PRACTITIONER

## 2024-07-16 PROCEDURE — 3066F NEPHROPATHY DOC TX: CPT | Mod: CPTII,S$GLB,, | Performed by: NURSE PRACTITIONER

## 2024-07-16 PROCEDURE — 3078F DIAST BP <80 MM HG: CPT | Mod: CPTII,S$GLB,, | Performed by: NURSE PRACTITIONER

## 2024-07-16 RX ORDER — METOPROLOL SUCCINATE 25 MG/1
12.5 TABLET, EXTENDED RELEASE ORAL DAILY
Qty: 45 TABLET | Refills: 0 | Status: SHIPPED | OUTPATIENT
Start: 2024-07-16

## 2024-07-16 NOTE — PROGRESS NOTES
Subjective:       Patient ID: Lakshmi Campbell is a 73 y.o. female.    Chief Complaint: Transitional Care (Pneumonia/)    Mrs. Campbell presents to visit for hospital discharge follow-up.  Admitted from 06/28 -07/02 for pneumonia and acute hypoxia.  Since discharge she is completed her Augmentin prescription, and has also followed up with pulmonology and Cardiology already.  She has upcoming appointment with Endocrinology for diabetes and thyroid disease management.  She does have home health established with [facility].  Has reduced her oxygen supplement used to only at night as needed.  Shortness of breath and fatigue has significantly improved.  Labs, imaging, and records reviewed from hospital encounter.          History of present illness and discharge summary copied from hospital encounter:  Lakshmi Campbell is a 73-year-old female with a past medical history significant for cataracts, diabetes mellitus type 1, hyperlipidemia, hypertension, hypothyroidism, and morbid obesity.  Patient presented with sudden onset of shortness of breath with nonproductive cough and associated fatigue and weakness.  Patient reported her symptoms were exacerbated with exertion and lying flat.  At the time of her presentation, she denied any recent illnesses or sick contacts.  Denies history of CHF, asthma, or COPD.  Patient reports a smoking history of a pack per day but states she quit smoking in 1989.  She denies any use of home oxygen or PAP therapy.  Denies history of snoring or prior sleep apnea diagnosis.  Denied swelling in her lower extremities.  Denied any complaints of fever, chills, sweats, chest pain, palpitations, or GI complaints.  Patient endorsed some mild body aches prior to admission.  Chest imaging at the time of presentation demonstrated multilobar pneumonia.  Denies any home or environmental exposures. Recent travel to [place], [place] last week. Patient was admitted by Hospital Medicine and Pulmonary has been  consulted for evaluation.     Interval history, f/u chief complaint generalized weakness and fatigue:  7/2: Patient sitting up in bedside chair this morning without acute complaints. Anxious for discharge to home. Minimal cough and sputum production. No wheezing noted on exam. Denies chest pain. Hemodynamics stable.     Acute hypoxic respiratory failure    Community acquired pneumonia  Patient with acute hypoxic respiratory failure who is not on home oxygen. Supplemental oxygen was provided and currently requiring 2L/NC. Signs/symptoms of respiratory failure include increased work of breathing. Contributing diagnoses includes CHF, Obesity Hypoventilation, and Pneumonia Labs and images were reviewed. Patient has recent ABG, which has been reviewed.      Will treat underlying causes and adjust management of respiratory failure as follows:  CT chest reviewed; noted bilateral multifocal dense consolidations right greater than left  Question underlying OHS denies snoring or apnea while sleeping; may benefit from outpatient sleep evaluation  Case discussed with patient and family; will need repeat imaging in approximately 12 weeks for clearance  Respiratory infection panel: +Rhinovirus  Sputum culture: Normal respiratory letha  MRSA swab: no MRSA isolated  Titrating supplemental oxygen to maintain saturations 90% or greater  Home oxygen evaluation performed  Despite negative respiratory culture and negative MRSA swab; CT imaging demonstrates dense bilateral pneumonia  Patient should complete antibiotic course upon discharge  Augmentin RX provided per primary team  Outpatient pulmonary referral sent; appointment with Dr. Hendrickson scheduled July 10th      Transitional Care Note    Family and/or Caretaker present at visit?  No.  Diagnostic tests reviewed/disposition: I have reviewed all completed as well as pending diagnostic tests at the time of discharge.  Disease/illness education: CAP, acute hypoxic respiratory  failure  Home health/community services discussion/referrals: Patient has home health established at [facility] .   Establishment or re-establishment of referral orders for community resources: No other necessary community resources.   Discussion with other health care providers: No discussion with other health care providers necessary.              Patient Active Problem List   Diagnosis    Type 2 diabetes mellitus with microalbuminuria, with long-term current use of insulin    Hypothyroidism    Essential hypertension    Hyperlipidemia    Microalbuminuria    Morbid obesity with BMI of 40.0-44.9, adult    Left knee pain    Shoulder pain, left    Thrombocytopenia    Wrist stiffness, left    Liver cirrhosis secondary to YO    Lower extremity edema    Abnormal mammogram    Stage 3b chronic kidney disease    Mild nonproliferative diabetic retinopathy of both eyes without macular edema associated with type 2 diabetes mellitus    Vitamin D deficiency    Acute hypoxic respiratory failure    Acute diastolic congestive heart failure    Community acquired pneumonia       Family History   Problem Relation Name Age of Onset    Diabetes Mother      Leukemia Father      Diabetes Father       Past Surgical History:   Procedure Laterality Date    APPENDECTOMY      BREAST BIOPSY      CATARACT EXTRACTION      COLONOSCOPY N/A 12/21/2021    Procedure: COLONOSCOPY screening;  Surgeon: Moises Patterson MD;  Location: [facility];  Service: Endoscopy;  Laterality: N/A;    ESOPHAGOGASTRODUODENOSCOPY N/A 12/21/2021    Procedure: EGD (ESOPHAGOGASTRODUODENOSCOPY) EV screening;  Surgeon: Moises Patterson MD;  Location: [facility];  Service: Endoscopy;  Laterality: N/A;    EYE SURGERY      HERNIA REPAIR      OPEN REDUCTION AND INTERNAL FIXATION (ORIF) OF FRACTURE OF DISTAL RADIUS Left 11/2/2020    Procedure: ORIF, FRACTURE, RADIUS, DISTAL;  Surgeon: Sage Wolf MD;  Location: [facility] OR;  Service: Orthopedics;   Laterality: Left;    OPEN REDUCTION AND INTERNAL FIXATION (ORIF) OF FRACTURE OF PATELLA Right 11/2/2020    Procedure: ORIF, FRACTURE, PATELLA;  Surgeon: Sage Wolf MD;  Location: [facility] OR;  Service: Orthopedics;  Laterality: Right;    OPEN REDUCTION AND INTERNAL FIXATION (ORIF) OF FRACTURE OF PATELLA Right 12/18/2020    Procedure: ORIF, FRACTURE, PATELLA;  Surgeon: Sage Wolf MD;  Location: [facility] OR;  Service: Orthopedics;  Laterality: Right;    ORIF HUMERUS FRACTURE Left 11/5/2020    Procedure: ORIF, FRACTURE, HUMERUS;  Surgeon: Sage Wolf MD;  Location: [facility] OR;  Service: Orthopedics;  Laterality: Left;    PLACEMENT OF ACELLULAR HUMAN DERMAL ALLOGRAFT Right 11/2/2020    Procedure: APPLICATION, ACELLULAR HUMAN DERMAL ALLOGRAFT;  Surgeon: Sage Wolf MD;  Location: [facility] OR;  Service: Orthopedics;  Laterality: Right;  Right Patella    REMOVAL OF HARDWARE FROM HAND Left 3/11/2021    Procedure: REMOVAL, HARDWARE, HAND;  Surgeon: Sage Wolf MD;  Location: [facility] OR;  Service: Orthopedics;  Laterality: Left;  Removal of dorsal spanning wrist plate left distal radius    REMOVAL OF HARDWARE FROM LOWER EXTREMITY Right 12/18/2020    Procedure: REMOVAL, HARDWARE, LOWER EXTREMITY;  Surgeon: Sage Wolf MD;  [facility];  Service: Orthopedics;  Laterality: Right;         Current Outpatient Medications:     amLODIPine (NORVASC) 10 MG tablet, TAKE 1 TABLET(10 MG) BY MOUTH EVERY DAY, Disp: 90 tablet, Rfl: 1    blood sugar diagnostic Strp, Use ac bid, Disp: , Rfl:     cetirizine (ZYRTEC) 10 MG tablet, TAKE 1 TABLET BY MOUTH ONCE DAILY, Disp: 30 tablet, Rfl: 0    chlorthalidone (HYGROTEN) 25 MG Tab, Take 25 mg by mouth once daily., Disp: , Rfl:     cholecalciferol, vitamin D3, (VITAMIN D3) 50 mcg (2,000 unit) Cap capsule, Take 1 capsule by mouth., Disp: , Rfl:     fluticasone propionate (FLONASE) 50 mcg/actuation nasal spray, SHAKE LIQUID AND USE 2  "SPRAYS(100 MCG) IN EACH NOSTRIL EVERY DAY, Disp: 48 g, Rfl: 1    HUMULIN 70/30 U-100 KWIKPEN 100 unit/mL (70-30) InPn pen, SMARTSI Unit(s) SUB-Q Every Evening, Disp: , Rfl:     hydrALAZINE (APRESOLINE) 25 MG tablet, Take 1 tablet (25 mg total) by mouth every 8 (eight) hours., Disp: 90 tablet, Rfl: 0    insulin syringe-needle U-100 1 mL 29 gauge x 1/2" Syrg, Use bid, Disp: , Rfl:     JARDIANCE 25 mg tablet, Take 25 mg by mouth every morning., Disp: , Rfl:     lancets 33 gauge Misc, Use as BID, Disp: , Rfl:     levothyroxine (SYNTHROID) 150 MCG tablet, Take 1 tablet by mouth once daily., Disp: , Rfl:     metFORMIN (GLUCOPHAGE) 500 MG tablet, Take 500 mg by mouth 2 (two) times daily., Disp: , Rfl:     perindopril erbumine (ACEON) 4 mg tablet, Take 4 mg by mouth once daily., Disp: , Rfl:     pravastatin (PRAVACHOL) 20 MG tablet, Take 20 mg by mouth once daily. , Disp: , Rfl: 11    metoprolol succinate (TOPROL-XL) 25 MG 24 hr tablet, Take 0.5 tablets (12.5 mg total) by mouth once daily., Disp: 45 tablet, Rfl: 0    spironolactone (ALDACTONE) 25 MG tablet, Take 25 mg by mouth., Disp: , Rfl:     Review of Systems   Constitutional:  Positive for fatigue (mild, improved). Negative for activity change, appetite change, chills and fever.   Respiratory:  Positive for shortness of breath (improved). Negative for cough, chest tightness and wheezing.    Cardiovascular:  Negative for chest pain, palpitations and leg swelling.   Gastrointestinal:  Negative for abdominal pain, blood in stool, constipation, diarrhea, nausea and vomiting.   Genitourinary:  Negative for dysuria and frequency.   Musculoskeletal:  Negative for gait problem.   Neurological:  Negative for dizziness, light-headedness and headaches.   Psychiatric/Behavioral:  Negative for dysphoric mood. The patient is not nervous/anxious.        Objective:   BP (!) 124/50 (BP Location: Left arm, Patient Position: Sitting, BP Method: Large (Manual))   Pulse (!) 50   Temp " "97.1 °F (36.2 °C) (Tympanic)   Ht 5' 5" (1.651 m)   Wt 104.1 kg (229 lb 8 oz)   SpO2 96%   BMI 38.19 kg/m²      Physical Exam  Constitutional:       General: She is not in acute distress.     Appearance: Normal appearance. She is obese. She is not ill-appearing.   HENT:      Head: Normocephalic.   Cardiovascular:      Rate and Rhythm: Normal rate and regular rhythm.      Heart sounds: Murmur heard.      No friction rub. No gallop.   Pulmonary:      Effort: Pulmonary effort is normal. No respiratory distress.      Breath sounds: Normal breath sounds. No wheezing.   Musculoskeletal:      Right lower leg: No edema.      Left lower leg: No edema.   Skin:     General: Skin is warm and dry.      Coloration: Skin is not pale.      Findings: No erythema.   Neurological:      Mental Status: She is alert and oriented to person, place, and time.   Psychiatric:         Mood and Affect: Mood normal.         Behavior: Behavior normal.         Assessment & Plan     Problem List Items Addressed This Visit          Ophtho    Mild nonproliferative diabetic retinopathy of both eyes without macular edema associated with type 2 diabetes mellitus    Current Assessment & Plan     Due for eye exam. Upcoming appt with [facility] for DM management.             Pulmonary    Acute hypoxic respiratory failure    Current Assessment & Plan     Pulse ox stable. Continues to use home O2 PRN HS. Has already had follow up with pulmonology since discharge.          Community acquired pneumonia    Current Assessment & Plan     Completed abx. Recent appt with pulmonology since hospital discharge as well.             Cardiac/Vascular    Essential hypertension    Current Assessment & Plan     Blood pressure on lower end as well as adriana. Reducing metoprolol to 12.5 mg. Two week HTN nurse visit.          Hyperlipidemia    Current Assessment & Plan     Lipid panel reviewed. On statin.          Acute diastolic congestive heart failure    Current Assessment & " Plan     No signs of decompensation today. On metoprolol. Has already had follow up with cardiology.     Latest ECHO performed and demonstrates- Results for orders placed during the hospital encounter of 06/26/24    Echo    Interpretation Summary    Left Ventricle: The left ventricle is normal in size. Normal wall thickness. There is concentric hypertrophy. Normal wall motion. Ejection fraction by visual approximation is 60%. Grade II diastolic dysfunction.    Right Ventricle: Normal right ventricular cavity size. Wall thickness is normal. Systolic function is normal.    Left Atrium: Left atrium is severely dilated.    Aortic Valve: The aortic valve is a trileaflet valve. Mildly restricted motion. There is moderate stenosis. Aortic valve area by VTI is 1.24 cm². Aortic valve peak velocity is 2.66 m/s. Mean gradient is 17 mmHg. The dimensionless index is 0.45.    Mitral Valve: There is moderate mitral annular calcification present. Mildly restricted motion. There is mild to moderate regurgitation.         Relevant Medications    metoprolol succinate (TOPROL-XL) 25 MG 24 hr tablet       Renal/    Microalbuminuria    Current Assessment & Plan     Labs today. Followed by endo.          Relevant Orders    Microalbumin/Creatinine Ratio, Urine    Stage 3b chronic kidney disease    Current Assessment & Plan     Labs reviewed. Focus on hydration. Labs today.         Relevant Orders    CBC Auto Differential (Completed)    COMPREHENSIVE METABOLIC PANEL (Completed)       Hematology    Thrombocytopenia    Current Assessment & Plan     Labs today. No concerns for bleeding.             Endocrine    Type 2 diabetes mellitus with microalbuminuria, with long-term current use of insulin    Current Assessment & Plan     Upcoming appt with endo.     Lab Results   Component Value Date    HGBA1C 5.9 (H) 06/26/2024            Relevant Orders    Microalbumin/Creatinine Ratio, Urine    Hypothyroidism    Current Assessment & Plan      "Followed by maryann. Recent TSH low, but T4 normal.          Morbid obesity with BMI of 40.0-44.9, adult    Current Assessment & Plan     Counseled on importance of diet and exercise in order to improve overall quality of life, and reduce risk of future comorbidities.  BMI down to 38          Other Visit Diagnoses       Hospital discharge follow-up    -  Primary    Relevant Orders    CBC Auto Differential (Completed)    COMPREHENSIVE METABOLIC PANEL (Completed)    Breast cancer screening by mammogram        Relevant Orders    Mammo Digital Screening Bilat w/ Alec    Postmenopausal        Relevant Orders    DXA Bone Density Axial Skeleton 1 or more sites        No acute concerns on physical exam.  Symptoms seem to have improved.  Repeating labs today for stability.    Follow up in about 2 weeks (around 7/30/2024) for hypertension nurse visit.          Portions of this note may have been created with voice recognition software. Occasional "wrong-word" or "sound-a-like" substitutions may have occurred due to the inherent limitations of voice recognition software. Please, read the note carefully and recognize, using context, where substitutions have occurred.       "

## 2024-07-16 NOTE — ASSESSMENT & PLAN NOTE
Counseled on importance of diet and exercise in order to improve overall quality of life, and reduce risk of future comorbidities.  BMI down to 38

## 2024-07-16 NOTE — ASSESSMENT & PLAN NOTE
No signs of decompensation today. On metoprolol. Has already had follow up with cardiology.     Latest ECHO performed and demonstrates- Results for orders placed during the hospital encounter of 06/26/24    Echo    Interpretation Summary    Left Ventricle: The left ventricle is normal in size. Normal wall thickness. There is concentric hypertrophy. Normal wall motion. Ejection fraction by visual approximation is 60%. Grade II diastolic dysfunction.    Right Ventricle: Normal right ventricular cavity size. Wall thickness is normal. Systolic function is normal.    Left Atrium: Left atrium is severely dilated.    Aortic Valve: The aortic valve is a trileaflet valve. Mildly restricted motion. There is moderate stenosis. Aortic valve area by VTI is 1.24 cm². Aortic valve peak velocity is 2.66 m/s. Mean gradient is 17 mmHg. The dimensionless index is 0.45.    Mitral Valve: There is moderate mitral annular calcification present. Mildly restricted motion. There is mild to moderate regurgitation.

## 2024-07-16 NOTE — ASSESSMENT & PLAN NOTE
Blood pressure on lower end as well as adriana. Reducing metoprolol to 12.5 mg. Two week HTN nurse visit.

## 2024-07-16 NOTE — ASSESSMENT & PLAN NOTE
Pulse ox stable. Continues to use home O2 PRN HS. Has already had follow up with pulmonology since discharge.

## 2024-07-18 ENCOUNTER — LAB VISIT (OUTPATIENT)
Dept: LAB | Facility: HOSPITAL | Age: 74
End: 2024-07-18
Attending: NURSE PRACTITIONER
Payer: MEDICARE

## 2024-07-18 DIAGNOSIS — E87.5 HYPERKALEMIA: ICD-10-CM

## 2024-07-18 LAB — POTASSIUM SERPL-SCNC: 5.7 MMOL/L (ref 3.5–5.1)

## 2024-07-18 PROCEDURE — 36415 COLL VENOUS BLD VENIPUNCTURE: CPT | Mod: PO | Performed by: NURSE PRACTITIONER

## 2024-07-18 PROCEDURE — 84132 ASSAY OF SERUM POTASSIUM: CPT | Mod: PO | Performed by: NURSE PRACTITIONER

## 2024-07-22 ENCOUNTER — TELEPHONE (OUTPATIENT)
Dept: ADMINISTRATIVE | Facility: CLINIC | Age: 74
End: 2024-07-22
Payer: MEDICARE

## 2024-07-22 NOTE — TELEPHONE ENCOUNTER
Called pt, no answer; left message informing pt I was calling to remind pt of her in office EAWV on 7/24/24 and to return my call if she had any questions; left my name and number.

## 2024-07-24 ENCOUNTER — OFFICE VISIT (OUTPATIENT)
Dept: INTERNAL MEDICINE | Facility: CLINIC | Age: 74
End: 2024-07-24
Payer: MEDICARE

## 2024-07-24 VITALS
BODY MASS INDEX: 37.95 KG/M2 | OXYGEN SATURATION: 96 % | TEMPERATURE: 98 F | WEIGHT: 227.75 LBS | HEIGHT: 65 IN | DIASTOLIC BLOOD PRESSURE: 64 MMHG | RESPIRATION RATE: 18 BRPM | HEART RATE: 61 BPM | SYSTOLIC BLOOD PRESSURE: 130 MMHG

## 2024-07-24 DIAGNOSIS — Z79.4 TYPE 2 DIABETES MELLITUS WITH STAGE 3B CHRONIC KIDNEY DISEASE, WITH LONG-TERM CURRENT USE OF INSULIN: ICD-10-CM

## 2024-07-24 DIAGNOSIS — E87.5 HYPERKALEMIA: ICD-10-CM

## 2024-07-24 DIAGNOSIS — K74.60 LIVER CIRRHOSIS SECONDARY TO NASH: ICD-10-CM

## 2024-07-24 DIAGNOSIS — R80.9 MICROALBUMINURIA: ICD-10-CM

## 2024-07-24 DIAGNOSIS — E11.22 TYPE 2 DIABETES MELLITUS WITH STAGE 3B CHRONIC KIDNEY DISEASE, WITH LONG-TERM CURRENT USE OF INSULIN: ICD-10-CM

## 2024-07-24 DIAGNOSIS — I50.32 CHRONIC DIASTOLIC CONGESTIVE HEART FAILURE: ICD-10-CM

## 2024-07-24 DIAGNOSIS — N18.32 STAGE 3B CHRONIC KIDNEY DISEASE: ICD-10-CM

## 2024-07-24 DIAGNOSIS — I10 ESSENTIAL HYPERTENSION: ICD-10-CM

## 2024-07-24 DIAGNOSIS — E11.3293 MILD NONPROLIFERATIVE DIABETIC RETINOPATHY OF BOTH EYES WITHOUT MACULAR EDEMA ASSOCIATED WITH TYPE 2 DIABETES MELLITUS: ICD-10-CM

## 2024-07-24 DIAGNOSIS — D69.2 SENILE PURPURA: ICD-10-CM

## 2024-07-24 DIAGNOSIS — R80.9 TYPE 2 DIABETES MELLITUS WITH MICROALBUMINURIA, WITH LONG-TERM CURRENT USE OF INSULIN: ICD-10-CM

## 2024-07-24 DIAGNOSIS — Z00.00 ENCOUNTER FOR PREVENTIVE HEALTH EXAMINATION: ICD-10-CM

## 2024-07-24 DIAGNOSIS — E66.01 CLASS 2 SEVERE OBESITY DUE TO EXCESS CALORIES WITH SERIOUS COMORBIDITY AND BODY MASS INDEX (BMI) OF 38.0 TO 38.9 IN ADULT: ICD-10-CM

## 2024-07-24 DIAGNOSIS — E55.9 VITAMIN D DEFICIENCY: ICD-10-CM

## 2024-07-24 DIAGNOSIS — Z00.00 ENCOUNTER FOR MEDICARE ANNUAL WELLNESS EXAM: Primary | ICD-10-CM

## 2024-07-24 DIAGNOSIS — Z99.81 DEPENDENCE ON SUPPLEMENTAL OXYGEN: ICD-10-CM

## 2024-07-24 DIAGNOSIS — E78.2 MIXED HYPERLIPIDEMIA: ICD-10-CM

## 2024-07-24 DIAGNOSIS — Z79.4 TYPE 2 DIABETES MELLITUS WITH MICROALBUMINURIA, WITH LONG-TERM CURRENT USE OF INSULIN: ICD-10-CM

## 2024-07-24 DIAGNOSIS — N18.32 TYPE 2 DIABETES MELLITUS WITH STAGE 3B CHRONIC KIDNEY DISEASE, WITH LONG-TERM CURRENT USE OF INSULIN: ICD-10-CM

## 2024-07-24 DIAGNOSIS — E11.29 TYPE 2 DIABETES MELLITUS WITH MICROALBUMINURIA, WITH LONG-TERM CURRENT USE OF INSULIN: ICD-10-CM

## 2024-07-24 DIAGNOSIS — D69.6 THROMBOCYTOPENIA: ICD-10-CM

## 2024-07-24 DIAGNOSIS — E03.9 HYPOTHYROIDISM, UNSPECIFIED TYPE: ICD-10-CM

## 2024-07-24 DIAGNOSIS — K75.81 LIVER CIRRHOSIS SECONDARY TO NASH: ICD-10-CM

## 2024-07-24 PROBLEM — J96.01 ACUTE HYPOXIC RESPIRATORY FAILURE: Status: RESOLVED | Noted: 2024-06-27 | Resolved: 2024-07-24

## 2024-07-24 PROBLEM — E66.812 CLASS 2 SEVERE OBESITY DUE TO EXCESS CALORIES WITH SERIOUS COMORBIDITY AND BODY MASS INDEX (BMI) OF 38.0 TO 38.9 IN ADULT: Status: ACTIVE | Noted: 2020-01-07

## 2024-07-24 PROCEDURE — 3044F HG A1C LEVEL LT 7.0%: CPT | Mod: CPTII,S$GLB,, | Performed by: NURSE PRACTITIONER

## 2024-07-24 PROCEDURE — 4010F ACE/ARB THERAPY RXD/TAKEN: CPT | Mod: CPTII,S$GLB,, | Performed by: NURSE PRACTITIONER

## 2024-07-24 PROCEDURE — 99999 PR PBB SHADOW E&M-EST. PATIENT-LVL V: CPT | Mod: PBBFAC,,, | Performed by: NURSE PRACTITIONER

## 2024-07-24 PROCEDURE — 3288F FALL RISK ASSESSMENT DOCD: CPT | Mod: CPTII,S$GLB,, | Performed by: NURSE PRACTITIONER

## 2024-07-24 PROCEDURE — 3075F SYST BP GE 130 - 139MM HG: CPT | Mod: CPTII,S$GLB,, | Performed by: NURSE PRACTITIONER

## 2024-07-24 PROCEDURE — 3062F POS MACROALBUMINURIA REV: CPT | Mod: CPTII,S$GLB,, | Performed by: NURSE PRACTITIONER

## 2024-07-24 PROCEDURE — 1170F FXNL STATUS ASSESSED: CPT | Mod: CPTII,S$GLB,, | Performed by: NURSE PRACTITIONER

## 2024-07-24 PROCEDURE — 1159F MED LIST DOCD IN RCRD: CPT | Mod: CPTII,S$GLB,, | Performed by: NURSE PRACTITIONER

## 2024-07-24 PROCEDURE — 1160F RVW MEDS BY RX/DR IN RCRD: CPT | Mod: CPTII,S$GLB,, | Performed by: NURSE PRACTITIONER

## 2024-07-24 PROCEDURE — 1126F AMNT PAIN NOTED NONE PRSNT: CPT | Mod: CPTII,S$GLB,, | Performed by: NURSE PRACTITIONER

## 2024-07-24 PROCEDURE — 1101F PT FALLS ASSESS-DOCD LE1/YR: CPT | Mod: CPTII,S$GLB,, | Performed by: NURSE PRACTITIONER

## 2024-07-24 PROCEDURE — 3078F DIAST BP <80 MM HG: CPT | Mod: CPTII,S$GLB,, | Performed by: NURSE PRACTITIONER

## 2024-07-24 PROCEDURE — 3066F NEPHROPATHY DOC TX: CPT | Mod: CPTII,S$GLB,, | Performed by: NURSE PRACTITIONER

## 2024-07-24 PROCEDURE — G0439 PPPS, SUBSEQ VISIT: HCPCS | Mod: S$GLB,,, | Performed by: NURSE PRACTITIONER

## 2024-07-24 PROCEDURE — 1158F ADVNC CARE PLAN TLK DOCD: CPT | Mod: CPTII,S$GLB,, | Performed by: NURSE PRACTITIONER

## 2024-07-24 NOTE — ASSESSMENT & PLAN NOTE
Lab Results   Component Value Date     (L) 07/16/2024     Chronic/stable. Continue to monitor. Followed by PCP.

## 2024-07-24 NOTE — ASSESSMENT & PLAN NOTE
Lab Results   Component Value Date    HGBA1C 5.9 (H) 06/26/2024     Chronic. Due for diabetes eye exam. Continue humulin, Jardiance, and metformin. Followed by maryann.

## 2024-07-24 NOTE — ASSESSMENT & PLAN NOTE
Lab Results   Component Value Date    CHOL 71 (L) 06/27/2024    CHOL 119 10/14/2019    CHOL 165 03/05/2012     Lab Results   Component Value Date    HDL 34 (L) 06/27/2024    HDL 46 10/14/2019    HDL 48 03/05/2012     Lab Results   Component Value Date    LDLCALC 24.0 (L) 06/27/2024    LDLCALC 55 10/14/2019    LDLCALC 97.0 03/05/2012     Lab Results   Component Value Date    TRIG 65 06/27/2024    TRIG 89 10/14/2019    TRIG 102 03/05/2012       Lab Results   Component Value Date    CHOLHDL 47.9 06/27/2024    CHOLHDL 29.1 03/05/2012    CHOLHDL 28.8 09/06/2011     Chronic/stable. Continue pravastatin. Followed by Pcp and cardiology.   (0) swallows foods/liquids without difficulty

## 2024-07-24 NOTE — ASSESSMENT & PLAN NOTE
Lab Results   Component Value Date    HGBA1C 5.9 (H) 06/26/2024     Chronic/stable. DM controlled. Continue current medications. Followed by maryann.

## 2024-07-24 NOTE — ASSESSMENT & PLAN NOTE
Chronic/stable. Continue amlodipine, chlorthalidone, hydralazine, metoprolol, perindopril, and spironolactone. Reducing perindopril dosing due to hyperkalemia. Monitor BP at home. Notify clinic if BP readings consistently >140/90. Repeat potassium level in 1 week. Followed by PCP and cardiology.

## 2024-07-24 NOTE — ASSESSMENT & PLAN NOTE
Chronic. Acute worsening with recent hospitalization. Continue current treatment plan. DM and HTN controlled. Hydration. Avoid nephrotoxic medications. Repeat BMP in 1 week. Followed by nephrology.

## 2024-07-24 NOTE — ASSESSMENT & PLAN NOTE
Lab Results   Component Value Date    K 5.7 (H) 07/18/2024     New onset hyperkalemia. Reduce perindopril to half tablet. Focus on hydration. Repeat potassium level in 1 week.

## 2024-07-24 NOTE — PROGRESS NOTES
"  Lakshmi Campbell presented for a follow-up Medicare AWV today. The following components were reviewed and updated:    Medical history  Family History  Social history  Allergies and Current Medications  Health Risk Assessment  Health Maintenance  Care Team    **See Completed Assessments for Annual Wellness visit with in the encounter summary    The following assessments were completed:  Depression Screening  Cognitive function Screening    Timed Get Up Test  Whisper Test      Opioid documentation:      Patient does not have a current opioid prescription.          Vitals:    07/24/24 1358   BP: 130/64   BP Location: Left arm   Patient Position: Sitting   BP Method: X-Large (Manual)   Pulse: 61   Resp: 18   Temp: 97.5 °F (36.4 °C)   SpO2: 96%   Weight: 103.3 kg (227 lb 11.8 oz)   Height: 5' 5" (1.651 m)     Body mass index is 37.9 kg/m².       Physical Exam  Vitals reviewed.   Constitutional:       General: She is not in acute distress.     Appearance: Normal appearance. She is obese. She is not ill-appearing, toxic-appearing or diaphoretic.   HENT:      Head: Normocephalic.   Eyes:      Conjunctiva/sclera: Conjunctivae normal.   Cardiovascular:      Rate and Rhythm: Normal rate.   Pulmonary:      Effort: Pulmonary effort is normal. No respiratory distress.   Skin:     Findings: Bruising present.   Neurological:      Mental Status: She is alert and oriented to person, place, and time.      Coordination: Coordination normal.      Gait: Gait normal.   Psychiatric:         Mood and Affect: Mood normal.         Behavior: Behavior normal.         Diagnoses and health risks identified today and associated recommendations/orders:  1. Encounter for Medicare annual wellness exam  -     Ambulatory Referral/Consult to Enhanced Annual Wellness Visit (eAWV)    I offered to discuss advanced care planning, including how to pick a person who would make decisions for you if you were unable to make them for yourself, called a health care " power of , and what kind of decisions you might make such as use of life sustaining treatments such as ventilators and tube feeding when faced with a life limiting illness recorded on a living will that they will need to know. (How you want to be cared for as you near the end of your natural life)     X Patient is interested in learning more about how to make advanced directives.  I provided them paperwork and offered to discuss this with them.    2. Encounter for preventive health examination    No concerning findings on exam. Discussed health maintenance due. She will complete RSV and shingles vaccine at pharmacy. Declines COVID vaccine. Declines mammogram and Dexa today. Orders are in, she will schedule when able. Advised to scheduled Dm eye exam with her outside provider. Routine follow up appointment scheduled with PCP.       3. Type 2 diabetes mellitus with microalbuminuria, with long-term current use of insulin  Assessment & Plan:  Lab Results   Component Value Date    HGBA1C 5.9 (H) 06/26/2024     Chronic/stable. DM controlled. Continue current medications. Followed by endo.      4. Type 2 diabetes mellitus with stage 3b chronic kidney disease, with long-term current use of insulin  Assessment & Plan:  Chronic/stable. DM controlled. Continue current medications. Followed by endo and nephrology      5. Mild nonproliferative diabetic retinopathy of both eyes without macular edema associated with type 2 diabetes mellitus  Assessment & Plan:  Lab Results   Component Value Date    HGBA1C 5.9 (H) 06/26/2024     Chronic. Due for diabetes eye exam. Continue humulin, Jardiance, and metformin. Followed by endo.       6. Chronic diastolic congestive heart failure  Assessment & Plan:  Latest ECHO performed and demonstrates- Results for orders placed during the hospital encounter of 06/26/24    Echo    Interpretation Summary    Left Ventricle: The left ventricle is normal in size. Normal wall thickness. There is  concentric hypertrophy. Normal wall motion. Ejection fraction by visual approximation is 60%. Grade II diastolic dysfunction.    Right Ventricle: Normal right ventricular cavity size. Wall thickness is normal. Systolic function is normal.    Left Atrium: Left atrium is severely dilated.    Aortic Valve: The aortic valve is a trileaflet valve. Mildly restricted motion. There is moderate stenosis. Aortic valve area by VTI is 1.24 cm². Aortic valve peak velocity is 2.66 m/s. Mean gradient is 17 mmHg. The dimensionless index is 0.45.    Mitral Valve: There is moderate mitral annular calcification present. Mildly restricted motion. There is mild to moderate regurgitation.    Chronic/stable. No signs of decompensation today in clinic. Continue perindopril, spironolactone, hydralazine, and metoprolol. Followed by cardiology      7. Stage 3b chronic kidney disease  Assessment & Plan:  Chronic. Acute worsening with recent hospitalization. Continue current treatment plan. DM and HTN controlled. Hydration. Avoid nephrotoxic medications. Repeat BMP in 1 week. Followed by nephrology.    Orders:  -     BASIC METABOLIC PANEL; Future; Expected date: 07/31/2024    8. Liver cirrhosis secondary to YO  Assessment & Plan:  Chronic/stable. Continue MELD labs and HCC screening every 6 months. Plans to repeat EGD 2025 for EV screening. Followed by hepatology. Due for follow up appointment. Patient aware and will schedule herself.        9. Class 2 severe obesity due to excess calories with serious comorbidity and body mass index (BMI) of 38.0 to 38.9 in adult  Assessment & Plan:  Counseled on importance of diet and exercise. Encouraged patient to have an active lifestyle with regular exercise with healthy eating.       10. Senile purpura  Assessment & Plan:  No active bleeding. Continue to monitor. Followed by PCP      11. Thrombocytopenia  Assessment & Plan:  Lab Results   Component Value Date     (L) 07/16/2024      Chronic/stable. Continue to monitor. Followed by PCP.      12. Dependence on supplemental oxygen  Assessment & Plan:  Using supplemental oxygen at night. Followed by pulmonology.      13. Hypothyroidism, unspecified type  Assessment & Plan:  Lab Results   Component Value Date    TSH 0.032 (L) 06/27/2024    FREET4 1.30 06/27/2024     Chronic/stable. Continue levothyroxine. Followed by endocrinology.      14. Vitamin D deficiency  Assessment & Plan:  Chronic. Continue vitamin d supplement. Followed by pcp      15. Microalbuminuria  Assessment & Plan:  Chronic/improved. Continue current medications. Followed by endo and nephrology.        16. Essential hypertension  Assessment & Plan:  Chronic/stable. Continue amlodipine, chlorthalidone, hydralazine, metoprolol, perindopril, and spironolactone. Reducing perindopril dosing due to hyperkalemia. Monitor BP at home. Notify clinic if BP readings consistently >140/90. Repeat potassium level in 1 week. Followed by PCP and cardiology.      17. Mixed hyperlipidemia  Assessment & Plan:  Lab Results   Component Value Date    CHOL 71 (L) 06/27/2024    CHOL 119 10/14/2019    CHOL 165 03/05/2012     Lab Results   Component Value Date    HDL 34 (L) 06/27/2024    HDL 46 10/14/2019    HDL 48 03/05/2012     Lab Results   Component Value Date    LDLCALC 24.0 (L) 06/27/2024    LDLCALC 55 10/14/2019    LDLCALC 97.0 03/05/2012     Lab Results   Component Value Date    TRIG 65 06/27/2024    TRIG 89 10/14/2019    TRIG 102 03/05/2012       Lab Results   Component Value Date    CHOLHDL 47.9 06/27/2024    CHOLHDL 29.1 03/05/2012    CHOLHDL 28.8 09/06/2011     Chronic/stable. Continue pravastatin. Followed by Pcp and cardiology.      18. Hyperkalemia  Assessment & Plan:  Lab Results   Component Value Date    K 5.7 (H) 07/18/2024     New onset hyperkalemia. Reduce perindopril to half tablet. Focus on hydration. Repeat potassium level in 1 week.    Orders:  -     POTASSIUM; Future; Expected  date: 07/31/2024        Provided Lakshmi with a 5-10 year written screening schedule and personal prevention plan. Recommendations were developed using the USPSTF age appropriate recommendations. Education, counseling, and referrals were provided as needed.  After Visit Summary printed and given to patient which includes a list of additional screenings\tests needed.    Follow up in about 1 year (around 7/24/2025), or if symptoms worsen or fail to improve, for AWV.      Antonella Boone, MOO-C

## 2024-07-24 NOTE — ASSESSMENT & PLAN NOTE
Chronic/stable. Continue MELD labs and HCC screening every 6 months. Plans to repeat EGD 2025 for EV screening. Followed by hepatology. Due for follow up appointment. Patient aware and will schedule herself.

## 2024-07-24 NOTE — PATIENT INSTRUCTIONS
Counseling and Referral of Other Preventative  (Italic type indicates deductible and co-insurance are waived)    Patient Name: Lakshmi Campbell  Today's Date: 7/24/2024    Reduce perindopril erbumine to half a tablet for elevated potassium level. Repeat potassium in 1 week. Monitor blood pressure to ensure it does not increase to >140/90    Schedule hepatology follow up appointment    Health Maintenance         Date Due Completion Date    RSV Vaccine (Age 60+ and Pregnant patients) (1 - 1-dose 60+ series) Never done ---    Shingles Vaccine (2 of 3) 10/26/2011 8/31/2011    Mammogram 02/09/2023 2/9/2022    Eye Exam 07/01/2023 7/1/2022    COVID-19 Vaccine (3 - 2023-24 season) 09/01/2023 2/5/2021    DEXA Scan 07/08/2024 7/8/2021    Influenza Vaccine (1) 09/01/2024 10/12/2022    Colorectal Cancer Screening 12/21/2024 12/21/2021    Hemoglobin A1c 12/27/2024 6/27/2024    Foot Exam 03/19/2025 3/19/2024 (Done)    Override on 3/19/2024: Done (Completed with Dr. Gail wolf)    Override on 6/25/2021: Done    Override on 10/15/2019: Done (at endo)    Lipid Panel 06/27/2025 6/27/2024    Diabetes Urine Screening 07/16/2025 7/16/2024    TETANUS VACCINE 10/24/2030 10/24/2020          No orders of the defined types were placed in this encounter.    The following information is provided to all patients.  This information is to help you find resources for any of the problems found today that may be affecting your health:                  Living healthy guide: www.[facility].[place].gov      Understanding Diabetes: www.diabetes.org      Eating healthy: www.cdc.gov/healthyweight      CDC home safety checklist: www.cdc.gov/steadi/patient.html      Agency on Aging: www.goea.[place].gov      Alcoholics anonymous (AA): www.aa.org      Physical Activity: www.cheko.nih.gov/jg0soop      Tobacco use: www.quitwithusla.org          No

## 2024-07-24 NOTE — ASSESSMENT & PLAN NOTE
Lab Results   Component Value Date    TSH 0.032 (L) 06/27/2024    FREET4 1.30 06/27/2024     Chronic/stable. Continue levothyroxine. Followed by endocrinology.

## 2024-07-24 NOTE — ASSESSMENT & PLAN NOTE
Latest ECHO performed and demonstrates- Results for orders placed during the hospital encounter of 06/26/24    Echo    Interpretation Summary    Left Ventricle: The left ventricle is normal in size. Normal wall thickness. There is concentric hypertrophy. Normal wall motion. Ejection fraction by visual approximation is 60%. Grade II diastolic dysfunction.    Right Ventricle: Normal right ventricular cavity size. Wall thickness is normal. Systolic function is normal.    Left Atrium: Left atrium is severely dilated.    Aortic Valve: The aortic valve is a trileaflet valve. Mildly restricted motion. There is moderate stenosis. Aortic valve area by VTI is 1.24 cm². Aortic valve peak velocity is 2.66 m/s. Mean gradient is 17 mmHg. The dimensionless index is 0.45.    Mitral Valve: There is moderate mitral annular calcification present. Mildly restricted motion. There is mild to moderate regurgitation.    Chronic/stable. No signs of decompensation today in clinic. Continue perindopril, spironolactone, hydralazine, and metoprolol. Followed by cardiology

## 2024-08-01 ENCOUNTER — LAB VISIT (OUTPATIENT)
Dept: LAB | Facility: HOSPITAL | Age: 74
End: 2024-08-01
Attending: NURSE PRACTITIONER
Payer: MEDICARE

## 2024-08-01 DIAGNOSIS — N18.32 STAGE 3B CHRONIC KIDNEY DISEASE: ICD-10-CM

## 2024-08-01 DIAGNOSIS — E87.5 HYPERKALEMIA: ICD-10-CM

## 2024-08-01 LAB
ANION GAP SERPL CALC-SCNC: 9 MMOL/L (ref 8–16)
BUN SERPL-MCNC: 44 MG/DL (ref 8–23)
CALCIUM SERPL-MCNC: 9.6 MG/DL (ref 8.7–10.5)
CHLORIDE SERPL-SCNC: 107 MMOL/L (ref 95–110)
CO2 SERPL-SCNC: 21 MMOL/L (ref 23–29)
CREAT SERPL-MCNC: 2 MG/DL (ref 0.5–1.4)
EST. GFR  (NO RACE VARIABLE): 25.9 ML/MIN/1.73 M^2
GLUCOSE SERPL-MCNC: 186 MG/DL (ref 70–110)
POTASSIUM SERPL-SCNC: 5.3 MMOL/L (ref 3.5–5.1)
POTASSIUM SERPL-SCNC: 5.3 MMOL/L (ref 3.5–5.1)
SODIUM SERPL-SCNC: 137 MMOL/L (ref 136–145)

## 2024-08-01 PROCEDURE — 80048 BASIC METABOLIC PNL TOTAL CA: CPT | Mod: PO | Performed by: NURSE PRACTITIONER

## 2024-08-01 PROCEDURE — 36415 COLL VENOUS BLD VENIPUNCTURE: CPT | Mod: PO | Performed by: NURSE PRACTITIONER

## 2024-08-03 ENCOUNTER — EXTERNAL HOME HEALTH (OUTPATIENT)
Dept: HOME HEALTH SERVICES | Facility: HOSPITAL | Age: 74
End: 2024-08-03
Payer: MEDICARE

## 2024-08-07 ENCOUNTER — CLINICAL SUPPORT (OUTPATIENT)
Dept: PULMONOLOGY | Facility: CLINIC | Age: 74
End: 2024-08-07
Attending: INTERNAL MEDICINE
Payer: MEDICARE

## 2024-08-07 ENCOUNTER — HOSPITAL ENCOUNTER (OUTPATIENT)
Dept: RADIOLOGY | Facility: HOSPITAL | Age: 74
Discharge: HOME | End: 2024-08-07
Attending: INTERNAL MEDICINE
Payer: MEDICARE

## 2024-08-07 VITALS
RESPIRATION RATE: 12 BRPM | OXYGEN SATURATION: 97 % | SYSTOLIC BLOOD PRESSURE: 168 MMHG | BODY MASS INDEX: 36.99 KG/M2 | HEART RATE: 50 BPM | BODY MASS INDEX: 36.99 KG/M2 | HEIGHT: 65 IN | WEIGHT: 222 LBS | DIASTOLIC BLOOD PRESSURE: 66 MMHG | HEIGHT: 65 IN | WEIGHT: 222 LBS

## 2024-08-07 DIAGNOSIS — B34.8 RHINOVIRUS INFECTION: ICD-10-CM

## 2024-08-07 DIAGNOSIS — R09.02 HYPOXEMIA: ICD-10-CM

## 2024-08-07 DIAGNOSIS — Z92.89 HISTORY OF RECENT HOSPITALIZATION: ICD-10-CM

## 2024-08-07 DIAGNOSIS — R09.02 HYPOXEMIA: Primary | ICD-10-CM

## 2024-08-07 DIAGNOSIS — J18.9 PNEUMONIA OF RIGHT LOWER LOBE DUE TO INFECTIOUS ORGANISM: ICD-10-CM

## 2024-08-07 DIAGNOSIS — R79.89 ELEVATED BRAIN NATRIURETIC PEPTIDE (BNP) LEVEL: ICD-10-CM

## 2024-08-07 DIAGNOSIS — Z91.89 AT RISK FOR SLEEP APNEA: ICD-10-CM

## 2024-08-07 DIAGNOSIS — I50.32 CHRONIC DIASTOLIC (CONGESTIVE) HEART FAILURE: ICD-10-CM

## 2024-08-07 PROCEDURE — 71046 X-RAY EXAM CHEST 2 VIEWS: CPT | Mod: 26,,, | Performed by: RADIOLOGY

## 2024-08-07 PROCEDURE — 71046 X-RAY EXAM CHEST 2 VIEWS: CPT | Mod: TC

## 2024-08-07 PROCEDURE — 99999 PR PBB SHADOW E&M-EST. PATIENT-LVL III: CPT | Mod: PBBFAC,,, | Performed by: INTERNAL MEDICINE

## 2024-08-15 ENCOUNTER — LAB VISIT (OUTPATIENT)
Dept: LAB | Facility: HOSPITAL | Age: 74
End: 2024-08-15
Attending: NURSE PRACTITIONER
Payer: MEDICARE

## 2024-08-15 DIAGNOSIS — E87.5 HYPERKALEMIA: ICD-10-CM

## 2024-08-15 LAB — POTASSIUM SERPL-SCNC: 5.2 MMOL/L (ref 3.5–5.1)

## 2024-08-15 PROCEDURE — 36415 COLL VENOUS BLD VENIPUNCTURE: CPT | Mod: PO | Performed by: NURSE PRACTITIONER

## 2024-08-15 PROCEDURE — 84132 ASSAY OF SERUM POTASSIUM: CPT | Mod: PO | Performed by: NURSE PRACTITIONER

## 2024-08-29 ENCOUNTER — HOSPITAL ENCOUNTER (INPATIENT)
Facility: HOSPITAL | Age: 74
LOS: 4 days | Discharge: HOME HEALTH | DRG: 871 | End: 2024-09-02
Attending: EMERGENCY MEDICINE | Admitting: INTERNAL MEDICINE
Payer: MEDICARE

## 2024-08-29 DIAGNOSIS — I50.32 CHRONIC DIASTOLIC CONGESTIVE HEART FAILURE: ICD-10-CM

## 2024-08-29 DIAGNOSIS — A41.9 SEPSIS, DUE TO UNSPECIFIED ORGANISM, UNSPECIFIED WHETHER ACUTE ORGAN DYSFUNCTION PRESENT: ICD-10-CM

## 2024-08-29 DIAGNOSIS — N17.9 AKI (ACUTE KIDNEY INJURY): Primary | ICD-10-CM

## 2024-08-29 DIAGNOSIS — A41.9 SEVERE SEPSIS: ICD-10-CM

## 2024-08-29 DIAGNOSIS — E11.22 TYPE 2 DIABETES MELLITUS WITH STAGE 3B CHRONIC KIDNEY DISEASE, WITH LONG-TERM CURRENT USE OF INSULIN: ICD-10-CM

## 2024-08-29 DIAGNOSIS — N39.0 URINARY TRACT INFECTION WITH HEMATURIA, SITE UNSPECIFIED: ICD-10-CM

## 2024-08-29 DIAGNOSIS — R41.82 AMS (ALTERED MENTAL STATUS): ICD-10-CM

## 2024-08-29 DIAGNOSIS — Z79.4 TYPE 2 DIABETES MELLITUS WITH STAGE 3B CHRONIC KIDNEY DISEASE, WITH LONG-TERM CURRENT USE OF INSULIN: ICD-10-CM

## 2024-08-29 DIAGNOSIS — I21.4 NSTEMI (NON-ST ELEVATED MYOCARDIAL INFARCTION): ICD-10-CM

## 2024-08-29 DIAGNOSIS — R65.20 SEVERE SEPSIS: ICD-10-CM

## 2024-08-29 DIAGNOSIS — R78.81 BACTEREMIA: ICD-10-CM

## 2024-08-29 DIAGNOSIS — R31.9 URINARY TRACT INFECTION WITH HEMATURIA, SITE UNSPECIFIED: ICD-10-CM

## 2024-08-29 DIAGNOSIS — N18.32 TYPE 2 DIABETES MELLITUS WITH STAGE 3B CHRONIC KIDNEY DISEASE, WITH LONG-TERM CURRENT USE OF INSULIN: ICD-10-CM

## 2024-08-29 DIAGNOSIS — I50.9 HEART FAILURE, UNSPECIFIED HF CHRONICITY, UNSPECIFIED HEART FAILURE TYPE: ICD-10-CM

## 2024-08-29 PROBLEM — N18.9 ACUTE ON CHRONIC KIDNEY FAILURE: Status: ACTIVE | Noted: 2022-10-12

## 2024-08-29 PROBLEM — N30.01 ACUTE CYSTITIS WITH HEMATURIA: Status: ACTIVE | Noted: 2024-08-29

## 2024-08-29 LAB
ALBUMIN SERPL BCP-MCNC: 2.6 G/DL (ref 3.5–5.2)
ALBUMIN SERPL BCP-MCNC: 2.6 G/DL (ref 3.5–5.2)
ALLENS TEST: ABNORMAL
ALP SERPL-CCNC: 59 U/L (ref 55–135)
ALP SERPL-CCNC: 68 U/L (ref 55–135)
ALT SERPL W/O P-5'-P-CCNC: 12 U/L (ref 10–44)
ALT SERPL W/O P-5'-P-CCNC: 14 U/L (ref 10–44)
AMMONIA PLAS-SCNC: 26 UMOL/L (ref 10–50)
ANION GAP SERPL CALC-SCNC: 11 MMOL/L (ref 8–16)
ANION GAP SERPL CALC-SCNC: 15 MMOL/L (ref 8–16)
AST SERPL-CCNC: 25 U/L (ref 10–40)
AST SERPL-CCNC: 30 U/L (ref 10–40)
B-OH-BUTYR BLD STRIP-SCNC: 0.3 MMOL/L (ref 0–0.5)
BACTERIA #/AREA URNS HPF: ABNORMAL /HPF
BASOPHILS # BLD AUTO: 0.02 K/UL (ref 0–0.2)
BASOPHILS NFR BLD: 0.1 % (ref 0–1.9)
BILIRUB SERPL-MCNC: 1.1 MG/DL (ref 0.1–1)
BILIRUB SERPL-MCNC: 1.2 MG/DL (ref 0.1–1)
BILIRUB UR QL STRIP: NEGATIVE
BNP SERPL-MCNC: 1679 PG/ML (ref 0–99)
BUN SERPL-MCNC: 46 MG/DL (ref 8–23)
BUN SERPL-MCNC: 48 MG/DL (ref 8–23)
CALCIUM SERPL-MCNC: 8.3 MG/DL (ref 8.7–10.5)
CALCIUM SERPL-MCNC: 8.7 MG/DL (ref 8.7–10.5)
CHLORIDE SERPL-SCNC: 109 MMOL/L (ref 95–110)
CHLORIDE SERPL-SCNC: 112 MMOL/L (ref 95–110)
CLARITY UR: ABNORMAL
CO2 SERPL-SCNC: 11 MMOL/L (ref 23–29)
CO2 SERPL-SCNC: 16 MMOL/L (ref 23–29)
COLOR UR: YELLOW
CREAT SERPL-MCNC: 2.6 MG/DL (ref 0.5–1.4)
CREAT SERPL-MCNC: 2.6 MG/DL (ref 0.5–1.4)
DELSYS: ABNORMAL
DIFFERENTIAL METHOD BLD: ABNORMAL
EOSINOPHIL # BLD AUTO: 0 K/UL (ref 0–0.5)
EOSINOPHIL NFR BLD: 0 % (ref 0–8)
ERYTHROCYTE [DISTWIDTH] IN BLOOD BY AUTOMATED COUNT: 13.1 % (ref 11.5–14.5)
EST. GFR  (NO RACE VARIABLE): 19 ML/MIN/1.73 M^2
EST. GFR  (NO RACE VARIABLE): 19 ML/MIN/1.73 M^2
FIO2: 21
GLUCOSE SERPL-MCNC: 362 MG/DL (ref 70–110)
GLUCOSE SERPL-MCNC: 448 MG/DL (ref 70–110)
GLUCOSE UR QL STRIP: ABNORMAL
HCO3 UR-SCNC: 13.7 MMOL/L (ref 24–28)
HCT VFR BLD AUTO: 35.6 % (ref 37–48.5)
HCV AB SERPL QL IA: NEGATIVE
HEP C VIRUS HOLD SPECIMEN: NORMAL
HGB BLD-MCNC: 11.5 G/DL (ref 12–16)
HGB UR QL STRIP: ABNORMAL
HIV 1+2 AB+HIV1 P24 AG SERPL QL IA: NEGATIVE
HYALINE CASTS #/AREA URNS LPF: 0 /LPF
IMM GRANULOCYTES # BLD AUTO: 0.23 K/UL (ref 0–0.04)
IMM GRANULOCYTES NFR BLD AUTO: 1.7 % (ref 0–0.5)
INFLUENZA A, MOLECULAR: NEGATIVE
INFLUENZA B, MOLECULAR: NEGATIVE
KETONES UR QL STRIP: NEGATIVE
LACTATE SERPL-SCNC: 4.3 MMOL/L (ref 0.5–2.2)
LACTATE SERPL-SCNC: 4.4 MMOL/L (ref 0.5–2.2)
LACTATE SERPL-SCNC: 6.6 MMOL/L (ref 0.5–2.2)
LEUKOCYTE ESTERASE UR QL STRIP: ABNORMAL
LYMPHOCYTES # BLD AUTO: 0.4 K/UL (ref 1–4.8)
LYMPHOCYTES NFR BLD: 2.9 % (ref 18–48)
MAGNESIUM SERPL-MCNC: 1.7 MG/DL (ref 1.6–2.6)
MCH RBC QN AUTO: 29.2 PG (ref 27–31)
MCHC RBC AUTO-ENTMCNC: 32.3 G/DL (ref 32–36)
MCV RBC AUTO: 90 FL (ref 82–98)
MICROSCOPIC COMMENT: ABNORMAL
MODE: ABNORMAL
MONOCYTES # BLD AUTO: 1 K/UL (ref 0.3–1)
MONOCYTES NFR BLD: 7.5 % (ref 4–15)
NEUTROPHILS # BLD AUTO: 11.9 K/UL (ref 1.8–7.7)
NEUTROPHILS NFR BLD: 87.8 % (ref 38–73)
NITRITE UR QL STRIP: NEGATIVE
NRBC BLD-RTO: 0 /100 WBC
PCO2 BLDA: 29.3 MMHG (ref 35–45)
PH SMN: 7.28 [PH] (ref 7.35–7.45)
PH UR STRIP: 6 [PH] (ref 5–8)
PHOSPHATE SERPL-MCNC: 2.2 MG/DL (ref 2.7–4.5)
PLATELET # BLD AUTO: 73 K/UL (ref 150–450)
PLATELET BLD QL SMEAR: ABNORMAL
PMV BLD AUTO: 11.1 FL (ref 9.2–12.9)
PO2 BLDA: 47 MMHG (ref 40–60)
POC BE: -13 MMOL/L
POC SATURATED O2: 78 % (ref 95–100)
POCT GLUCOSE: 427 MG/DL (ref 70–110)
POTASSIUM SERPL-SCNC: 4.4 MMOL/L (ref 3.5–5.1)
POTASSIUM SERPL-SCNC: 4.5 MMOL/L (ref 3.5–5.1)
PROCALCITONIN SERPL IA-MCNC: 62.91 NG/ML
PROT SERPL-MCNC: 6 G/DL (ref 6–8.4)
PROT SERPL-MCNC: 6 G/DL (ref 6–8.4)
PROT UR QL STRIP: ABNORMAL
RBC # BLD AUTO: 3.94 M/UL (ref 4–5.4)
RBC #/AREA URNS HPF: >100 /HPF (ref 0–4)
SAMPLE: ABNORMAL
SARS-COV-2 RDRP RESP QL NAA+PROBE: NEGATIVE
SITE: ABNORMAL
SODIUM SERPL-SCNC: 136 MMOL/L (ref 136–145)
SODIUM SERPL-SCNC: 138 MMOL/L (ref 136–145)
SP GR UR STRIP: 1.02 (ref 1–1.03)
SPECIMEN SOURCE: NORMAL
TROPONIN I SERPL DL<=0.01 NG/ML-MCNC: 0.51 NG/ML (ref 0–0.03)
URN SPEC COLLECT METH UR: ABNORMAL
UROBILINOGEN UR STRIP-ACNC: NEGATIVE EU/DL
WBC # BLD AUTO: 13.56 K/UL (ref 3.9–12.7)
WBC #/AREA URNS HPF: >100 /HPF (ref 0–5)
WBC CLUMPS URNS QL MICRO: ABNORMAL
YEAST URNS QL MICRO: ABNORMAL

## 2024-08-29 PROCEDURE — 86803 HEPATITIS C AB TEST: CPT | Performed by: EMERGENCY MEDICINE

## 2024-08-29 PROCEDURE — 63600175 PHARM REV CODE 636 W HCPCS: Performed by: EMERGENCY MEDICINE

## 2024-08-29 PROCEDURE — 93005 ELECTROCARDIOGRAM TRACING: CPT

## 2024-08-29 PROCEDURE — 81000 URINALYSIS NONAUTO W/SCOPE: CPT | Performed by: EMERGENCY MEDICINE

## 2024-08-29 PROCEDURE — 93010 ELECTROCARDIOGRAM REPORT: CPT | Mod: ,,, | Performed by: INTERNAL MEDICINE

## 2024-08-29 PROCEDURE — 84484 ASSAY OF TROPONIN QUANT: CPT | Performed by: EMERGENCY MEDICINE

## 2024-08-29 PROCEDURE — 80053 COMPREHEN METABOLIC PANEL: CPT | Performed by: EMERGENCY MEDICINE

## 2024-08-29 PROCEDURE — 83605 ASSAY OF LACTIC ACID: CPT | Mod: 91 | Performed by: EMERGENCY MEDICINE

## 2024-08-29 PROCEDURE — 63600175 PHARM REV CODE 636 W HCPCS: Performed by: NURSE PRACTITIONER

## 2024-08-29 PROCEDURE — 83735 ASSAY OF MAGNESIUM: CPT | Performed by: EMERGENCY MEDICINE

## 2024-08-29 PROCEDURE — 87186 SC STD MICRODIL/AGAR DIL: CPT | Performed by: EMERGENCY MEDICINE

## 2024-08-29 PROCEDURE — 82803 BLOOD GASES ANY COMBINATION: CPT

## 2024-08-29 PROCEDURE — 96365 THER/PROPH/DIAG IV INF INIT: CPT

## 2024-08-29 PROCEDURE — 96366 THER/PROPH/DIAG IV INF ADDON: CPT

## 2024-08-29 PROCEDURE — 87502 INFLUENZA DNA AMP PROBE: CPT | Performed by: EMERGENCY MEDICINE

## 2024-08-29 PROCEDURE — 87154 CUL TYP ID BLD PTHGN 6+ TRGT: CPT | Performed by: EMERGENCY MEDICINE

## 2024-08-29 PROCEDURE — 85025 COMPLETE CBC W/AUTO DIFF WBC: CPT | Performed by: EMERGENCY MEDICINE

## 2024-08-29 PROCEDURE — 87086 URINE CULTURE/COLONY COUNT: CPT | Performed by: EMERGENCY MEDICINE

## 2024-08-29 PROCEDURE — 80053 COMPREHEN METABOLIC PANEL: CPT | Mod: 91 | Performed by: NURSE PRACTITIONER

## 2024-08-29 PROCEDURE — 99900035 HC TECH TIME PER 15 MIN (STAT)

## 2024-08-29 PROCEDURE — 82140 ASSAY OF AMMONIA: CPT | Performed by: EMERGENCY MEDICINE

## 2024-08-29 PROCEDURE — 96367 TX/PROPH/DG ADDL SEQ IV INF: CPT

## 2024-08-29 PROCEDURE — 99285 EMERGENCY DEPT VISIT HI MDM: CPT | Mod: 25

## 2024-08-29 PROCEDURE — 84145 PROCALCITONIN (PCT): CPT | Performed by: EMERGENCY MEDICINE

## 2024-08-29 PROCEDURE — 87040 BLOOD CULTURE FOR BACTERIA: CPT | Mod: 59 | Performed by: EMERGENCY MEDICINE

## 2024-08-29 PROCEDURE — 25000003 PHARM REV CODE 250: Performed by: EMERGENCY MEDICINE

## 2024-08-29 PROCEDURE — 87077 CULTURE AEROBIC IDENTIFY: CPT | Performed by: EMERGENCY MEDICINE

## 2024-08-29 PROCEDURE — 87389 HIV-1 AG W/HIV-1&-2 AB AG IA: CPT | Performed by: EMERGENCY MEDICINE

## 2024-08-29 PROCEDURE — 84100 ASSAY OF PHOSPHORUS: CPT | Performed by: EMERGENCY MEDICINE

## 2024-08-29 PROCEDURE — 83605 ASSAY OF LACTIC ACID: CPT | Mod: 91 | Performed by: NURSE PRACTITIONER

## 2024-08-29 PROCEDURE — 11000001 HC ACUTE MED/SURG PRIVATE ROOM

## 2024-08-29 PROCEDURE — U0002 COVID-19 LAB TEST NON-CDC: HCPCS | Performed by: EMERGENCY MEDICINE

## 2024-08-29 PROCEDURE — 82010 KETONE BODYS QUAN: CPT | Performed by: EMERGENCY MEDICINE

## 2024-08-29 PROCEDURE — 83880 ASSAY OF NATRIURETIC PEPTIDE: CPT | Performed by: EMERGENCY MEDICINE

## 2024-08-29 RX ORDER — INSULIN ASPART 100 [IU]/ML
0-10 INJECTION, SOLUTION INTRAVENOUS; SUBCUTANEOUS EVERY 6 HOURS PRN
Status: DISCONTINUED | OUTPATIENT
Start: 2024-08-29 | End: 2024-08-30

## 2024-08-29 RX ORDER — GLUCAGON 1 MG
1 KIT INJECTION
Status: DISCONTINUED | OUTPATIENT
Start: 2024-08-29 | End: 2024-08-30

## 2024-08-29 RX ORDER — ENOXAPARIN SODIUM 100 MG/ML
30 INJECTION SUBCUTANEOUS EVERY 24 HOURS
Status: DISCONTINUED | OUTPATIENT
Start: 2024-08-30 | End: 2024-08-29

## 2024-08-29 RX ORDER — ASPIRIN 325 MG
325 TABLET ORAL
Status: DISCONTINUED | OUTPATIENT
Start: 2024-08-29 | End: 2024-08-29

## 2024-08-29 RX ORDER — SODIUM CHLORIDE, SODIUM LACTATE, POTASSIUM CHLORIDE, CALCIUM CHLORIDE 600; 310; 30; 20 MG/100ML; MG/100ML; MG/100ML; MG/100ML
INJECTION, SOLUTION INTRAVENOUS CONTINUOUS
Status: DISCONTINUED | OUTPATIENT
Start: 2024-08-29 | End: 2024-08-29

## 2024-08-29 RX ORDER — MAGNESIUM SULFATE HEPTAHYDRATE 40 MG/ML
2 INJECTION, SOLUTION INTRAVENOUS ONCE
Status: COMPLETED | OUTPATIENT
Start: 2024-08-29 | End: 2024-08-29

## 2024-08-29 RX ORDER — IBUPROFEN 200 MG
24 TABLET ORAL
Status: DISCONTINUED | OUTPATIENT
Start: 2024-08-29 | End: 2024-08-30

## 2024-08-29 RX ORDER — IPRATROPIUM BROMIDE AND ALBUTEROL SULFATE 2.5; .5 MG/3ML; MG/3ML
3 SOLUTION RESPIRATORY (INHALATION) EVERY 4 HOURS PRN
Status: DISCONTINUED | OUTPATIENT
Start: 2024-08-29 | End: 2024-09-02 | Stop reason: HOSPADM

## 2024-08-29 RX ORDER — HYDROCODONE BITARTRATE AND ACETAMINOPHEN 5; 325 MG/1; MG/1
1 TABLET ORAL EVERY 8 HOURS PRN
Status: DISCONTINUED | OUTPATIENT
Start: 2024-08-30 | End: 2024-08-29

## 2024-08-29 RX ORDER — IBUPROFEN 200 MG
16 TABLET ORAL
Status: DISCONTINUED | OUTPATIENT
Start: 2024-08-29 | End: 2024-08-30

## 2024-08-29 RX ADMIN — HUMAN INSULIN 10 UNITS: 100 INJECTION, SOLUTION SUBCUTANEOUS at 11:08

## 2024-08-29 RX ADMIN — CEFEPIME 2 G: 2 INJECTION, POWDER, FOR SOLUTION INTRAVENOUS at 05:08

## 2024-08-29 RX ADMIN — MAGNESIUM SULFATE HEPTAHYDRATE 2 G: 40 INJECTION, SOLUTION INTRAVENOUS at 09:08

## 2024-08-29 RX ADMIN — VANCOMYCIN HYDROCHLORIDE 2000 MG: 1 INJECTION, POWDER, LYOPHILIZED, FOR SOLUTION INTRAVENOUS at 06:08

## 2024-08-29 RX ADMIN — SODIUM CHLORIDE 500 ML: 9 INJECTION, SOLUTION INTRAVENOUS at 05:08

## 2024-08-29 RX ADMIN — SODIUM CHLORIDE 500 ML: 9 INJECTION, SOLUTION INTRAVENOUS at 08:08

## 2024-08-29 RX ADMIN — SODIUM CHLORIDE, POTASSIUM CHLORIDE, SODIUM LACTATE AND CALCIUM CHLORIDE: 600; 310; 30; 20 INJECTION, SOLUTION INTRAVENOUS at 11:08

## 2024-08-29 NOTE — ED PROVIDER NOTES
SCRIBE #1 NOTE: I, Oliver Graham, am scribing for, and in the presence of, Karan Berg MD. I have scribed the entire note.       History     Chief Complaint   Patient presents with    General Illness     Patient arrives via [facility] for complaints of lethargy, vomiting, burning during urination, and fevers for two days now.  Patient has no history of UTI and states no one in the household is ill.  Patient denies having any pain or shortness of breath.  [facility] administered 500ml NS and 10mcg of push-dose epi prior to arrival for BP of 82/31.       Review of patient's allergies indicates:   Allergen Reactions    Codeine Nausea Only     Other reaction(s): Nausea  Other reaction(s): Elevated blood pressure         History of Present Illness     HPI    8/29/2024, 4:13 PM  History obtained from the patient      History of Present Illness: Lakshmi Campbell is a 73 y.o. female patient with a PMHx of DM Type 2, thyroid disease, HTN, cataract, obesity, and HLD who presents to the Emergency Department for evaluation of generalized malaise which onset gradually the last few days. Pt fever was 99.2 F at the ED. Symptoms are constant and moderate in severity. No mitigating or exacerbating factors reported. Associated sxs include weakness, fatigue, fever, and dry mouth. Patient denies any rhinorrhea, leg swelling, dysuria, HA, and all other sxs at this time. No prior Tx reported. Pt states she is compliant with all her medications. No further complaints or concerns at this time.       Arrival mode: Ambulance Service    PCP: Keely Silva NP        Past Medical History:  Past Medical History:   Diagnosis Date    Cataract     Closed displaced comminuted fracture of right patella 10/28/2020    Closed fracture of surgical neck of left humerus 10/30/2020    Closed left radial fracture 10/30/2020    Diabetes mellitus type I     Hyperlipidemia     Hypertension     Morbid obesity     Right knee pain     Thyroid disease        Past  Surgical History:  Past Surgical History:   Procedure Laterality Date    APPENDECTOMY      BREAST BIOPSY      CATARACT EXTRACTION      COLONOSCOPY N/A 12/21/2021    Procedure: COLONOSCOPY screening;  Surgeon: Moises Patterson MD;  Location: [facility];  Service: Endoscopy;  Laterality: N/A;    ESOPHAGOGASTRODUODENOSCOPY N/A 12/21/2021    Procedure: EGD (ESOPHAGOGASTRODUODENOSCOPY) EV screening;  Surgeon: Moises Patterson MD;  Location: [facility];  Service: Endoscopy;  Laterality: N/A;    EYE SURGERY      HERNIA REPAIR      OPEN REDUCTION AND INTERNAL FIXATION (ORIF) OF FRACTURE OF DISTAL RADIUS Left 11/2/2020    Procedure: ORIF, FRACTURE, RADIUS, DISTAL;  Surgeon: Sage Wolf MD;  Location: [facility];  Service: Orthopedics;  Laterality: Left;    OPEN REDUCTION AND INTERNAL FIXATION (ORIF) OF FRACTURE OF PATELLA Right 11/2/2020    Procedure: ORIF, FRACTURE, PATELLA;  Surgeon: Sage Wolf MD;  Location: [facility] OR;  Service: Orthopedics;  Laterality: Right;    OPEN REDUCTION AND INTERNAL FIXATION (ORIF) OF FRACTURE OF PATELLA Right 12/18/2020    Procedure: ORIF, FRACTURE, PATELLA;  Surgeon: Sage Wolf MD;  Location: [facility] OR;  Service: Orthopedics;  Laterality: Right;    ORIF HUMERUS FRACTURE Left 11/5/2020    Procedure: ORIF, FRACTURE, HUMERUS;  Surgeon: Sage Wolf MD;  Location: [facility] OR;  Service: Orthopedics;  Laterality: Left;    PLACEMENT OF ACELLULAR HUMAN DERMAL ALLOGRAFT Right 11/2/2020    Procedure: APPLICATION, ACELLULAR HUMAN DERMAL ALLOGRAFT;  Surgeon: Sage Wolf MD;  Location: [facility] OR;  Service: Orthopedics;  Laterality: Right;  Right Patella    REMOVAL OF HARDWARE FROM HAND Left 3/11/2021    Procedure: REMOVAL, HARDWARE, HAND;  Surgeon: Sage Wolf MD;  Location: [facility] OR;  Service: Orthopedics;  Laterality: Left;  Removal of dorsal spanning wrist plate left distal radius    REMOVAL OF HARDWARE FROM LOWER EXTREMITY Right  2020    Procedure: REMOVAL, HARDWARE, LOWER EXTREMITY;  Surgeon: Sage Wolf MD;  Location: [facility] OR;  Service: Orthopedics;  Laterality: Right;         Family History:  Family History   Problem Relation Name Age of Onset    Diabetes Mother      Leukemia Father      Diabetes Father         Social History:  Social History     Tobacco Use    Smoking status: Former     Current packs/day: 0.00     Average packs/day: 1 pack/day for 8.0 years (8.0 ttl pk-yrs)     Types: Cigarettes     Start date:      Quit date:      Years since quittin.6    Smokeless tobacco: Never   Substance and Sexual Activity    Alcohol use: Yes     Comment: rarely    Drug use: No    Sexual activity: Not Currently        Review of Systems     Review of Systems   Constitutional:  Positive for fatigue and fever. Negative for chills.   HENT:  Negative for rhinorrhea and sore throat.    Respiratory:  Negative for cough and shortness of breath.    Cardiovascular:  Negative for chest pain and leg swelling.   Gastrointestinal:  Negative for nausea.   Genitourinary:  Negative for dysuria.   Musculoskeletal:  Negative for back pain.   Skin:  Negative for rash.   Neurological:  Positive for weakness. Negative for headaches.   Hematological:  Does not bruise/bleed easily.   All other systems reviewed and are negative.     Physical Exam     Initial Vitals [24 1532]   BP Pulse Resp Temp SpO2   (!) 94/39 95 20 99.2 °F (37.3 °C) (!) 93 %      MAP       --          Physical Exam  Nursing Notes and Vital Signs Reviewed.  Constitutional: Patient is in no acute distress. Well-developed and well-nourished.  Head: Atraumatic. Normocephalic.  Eyes: PERRL. EOM intact. Conjunctivae are not pale. No scleral icterus.  ENT: Mucous membranes are dry. Oropharynx is clear and symmetric.    Neck: Supple. Full ROM. No lymphadenopathy.  Cardiovascular: Regular rate. Regular rhythm. No murmurs, rubs, or gallops. Distal pulses are 2+ and  symmetric.  Pulmonary/Chest: No respiratory distress. Clear to auscultation bilaterally. No wheezing or rales.  Abdominal: Soft and non-distended.  There is no tenderness.  No rebound, guarding, or rigidity. Good bowel sounds.  Genitourinary: No CVA tenderness  Musculoskeletal: Moves all extremities. No obvious deformities. No edema. No calf tenderness.  Skin: Warm and dry.  Neurological:  Alert, awake, and appropriate.  Normal speech.  No acute focal neurological deficits are appreciated.  Psychiatric: Normal affect. Good eye contact. Appropriate in content.     ED Course   Critical Care    Date/Time: 8/29/2024 5:22 PM    Performed by: Karan Berg MD  Authorized by: Karan Berg MD  Direct patient critical care time: 12 minutes  Additional history critical care time: 9 minutes  Ordering / reviewing critical care time: 14 minutes  Documentation critical care time: 12 minutes  Consulting other physicians critical care time: 5 minutes  Total critical care time (exclusive of procedural time) : 52 minutes  Critical care time was exclusive of separately billable procedures and treating other patients and teaching time.  Critical care was necessary to treat or prevent imminent or life-threatening deterioration of the following conditions: sepsis.  Critical care was time spent personally by me on the following activities: blood draw for specimens, development of treatment plan with patient or surrogate, discussions with consultants, evaluation of patient's response to treatment, examination of patient, obtaining history from patient or surrogate, ordering and performing treatments and interventions, ordering and review of laboratory studies, ordering and review of radiographic studies, pulse oximetry, re-evaluation of patient's condition and review of old charts.        ED Vital Signs:  Vitals:    08/29/24 1532 08/29/24 1606 08/29/24 1630 08/29/24 1638   BP: (!) 94/39 (!) 108/58 (!) 117/56    Pulse: 95 92 88 90  "  Resp: 20 20 20 (!) 25   Temp: 99.2 °F (37.3 °C)      TempSrc: Oral      SpO2: (!) 93% (!) 94% 96% 97%   Weight: 99.8 kg (220 lb)      Height: 5' 5" (1.651 m)       08/29/24 1730 08/29/24 1800 08/29/24 1930 08/29/24 2025   BP: (!) 98/50 (!) 112/53 137/63    Pulse: 84 84 80 76   Resp: 18 (!) 21 19    Temp:       TempSrc:       SpO2: 96% 96% 96% 97%   Weight:       Height:        08/29/24 2030   BP: (!) 144/67   Pulse: 76   Resp: 19   Temp:    TempSrc:    SpO2: 97%   Weight:    Height:        Abnormal Lab Results:  Labs Reviewed   CBC W/ AUTO DIFFERENTIAL - Abnormal       Result Value    WBC 13.56 (*)     RBC 3.94 (*)     Hemoglobin 11.5 (*)     Hematocrit 35.6 (*)     MCV 90      MCH 29.2      MCHC 32.3      RDW 13.1      Platelets 73 (*)     MPV 11.1      Immature Granulocytes 1.7 (*)     Gran # (ANC) 11.9 (*)     Immature Grans (Abs) 0.23 (*)     Lymph # 0.4 (*)     Mono # 1.0      Eos # 0.0      Baso # 0.02      nRBC 0      Gran % 87.8 (*)     Lymph % 2.9 (*)     Mono % 7.5      Eosinophil % 0.0      Basophil % 0.1      Platelet Estimate Decreased (*)     Differential Method Automated     COMPREHENSIVE METABOLIC PANEL - Abnormal    Sodium 138      Potassium 4.4      Chloride 112 (*)     CO2 11 (*)     Glucose 362 (*)     BUN 46 (*)     Creatinine 2.6 (*)     Calcium 8.7      Total Protein 6.0      Albumin 2.6 (*)     Total Bilirubin 1.2 (*)     Alkaline Phosphatase 68      AST 25      ALT 12      eGFR 19 (*)     Anion Gap 15     LACTIC ACID, PLASMA - Abnormal    Lactate (Lactic Acid) 6.6 (*)     Narrative:     la critical result(s) called and verbal readback obtained from perfecto almodovar by ROGER 08/29/2024 16:52   LACTIC ACID, PLASMA - Abnormal    Lactate (Lactic Acid) 4.4 (*)     Narrative:     la critical result(s) called and verbal readback obtained from pillo horn by ROGER 08/29/2024 19:15   URINALYSIS, REFLEX TO URINE CULTURE - Abnormal    Specimen UA Urine, Clean Catch      Color, UA Yellow      " Appearance, UA Cloudy (*)     pH, UA 6.0      Specific Gravity, UA 1.025      Protein, UA 3+ (*)     Glucose, UA 4+ (*)     Ketones, UA Negative      Bilirubin (UA) Negative      Occult Blood UA 3+ (*)     Nitrite, UA Negative      Urobilinogen, UA Negative      Leukocytes, UA 3+ (*)     Narrative:     Specimen Source->Urine   PHOSPHORUS - Abnormal    Phosphorus 2.2 (*)    B-TYPE NATRIURETIC PEPTIDE - Abnormal    BNP 1,679 (*)    TROPONIN I - Abnormal    Troponin I 0.509 (*)    PROCALCITONIN - Abnormal    Procalcitonin 62.91 (*)    URINALYSIS MICROSCOPIC - Abnormal    RBC, UA >100 (*)     WBC, UA >100 (*)     WBC Clumps, UA Many (*)     Bacteria None      Yeast, UA None      Hyaline Casts, UA 0      Microscopic Comment SEE COMMENT      Narrative:     Specimen Source->Urine   ISTAT PROCEDURE - Abnormal    POC PH 7.277 (*)     POC PCO2 29.3 (*)     POC PO2 47      POC HCO3 13.7 (*)     POC BE -13 (*)     POC SATURATED O2 78      Sample VENOUS      Site Other      Allens Test N/A      DelSys Room Air      Mode SPONT      FiO2 21     INFLUENZA A & B BY MOLECULAR    Influenza A, Molecular Negative      Influenza B, Molecular Negative      Flu A & B Source Nasal swab     CULTURE, BLOOD   CULTURE, BLOOD   CULTURE, URINE   HIV 1 / 2 ANTIBODY    HIV 1/2 Ag/Ab Negative      Narrative:     Release to patient->Immediate   HEPATITIS C ANTIBODY    Hepatitis C Ab Negative      Narrative:     Release to patient->Immediate   HEP C VIRUS HOLD SPECIMEN    HEP C Virus Hold Specimen Hold for HCV sendout      Narrative:     Release to patient->Immediate   MAGNESIUM    Magnesium 1.7     SARS-COV-2 RNA AMPLIFICATION, QUAL    SARS-CoV-2 RNA, Amplification, Qual Negative     BETA - HYDROXYBUTYRATE, SERUM    Beta-Hydroxybutyrate 0.3     AMMONIA    Ammonia 26     COMPREHENSIVE METABOLIC PANEL   LACTIC ACID, PLASMA        All Lab Results:  Results for orders placed or performed during the hospital encounter of 08/29/24   Influenza A & B by  Molecular    Specimen: Nasopharyngeal Swab   Result Value Ref Range    Influenza A, Molecular Negative Negative    Influenza B, Molecular Negative Negative    Flu A & B Source Nasal swab    HIV 1/2 Ag/Ab (4th Gen)   Result Value Ref Range    HIV 1/2 Ag/Ab Negative Negative   Hepatitis C Antibody   Result Value Ref Range    Hepatitis C Ab Negative Negative   HCV Virus Hold Specimen   Result Value Ref Range    HEP C Virus Hold Specimen Hold for HCV sendout    CBC auto differential   Result Value Ref Range    WBC 13.56 (H) 3.90 - 12.70 K/uL    RBC 3.94 (L) 4.00 - 5.40 M/uL    Hemoglobin 11.5 (L) 12.0 - 16.0 g/dL    Hematocrit 35.6 (L) 37.0 - 48.5 %    MCV 90 82 - 98 fL    MCH 29.2 27.0 - 31.0 pg    MCHC 32.3 32.0 - 36.0 g/dL    RDW 13.1 11.5 - 14.5 %    Platelets 73 (L) 150 - 450 K/uL    MPV 11.1 9.2 - 12.9 fL    Immature Granulocytes 1.7 (H) 0.0 - 0.5 %    Gran # (ANC) 11.9 (H) 1.8 - 7.7 K/uL    Immature Grans (Abs) 0.23 (H) 0.00 - 0.04 K/uL    Lymph # 0.4 (L) 1.0 - 4.8 K/uL    Mono # 1.0 0.3 - 1.0 K/uL    Eos # 0.0 0.0 - 0.5 K/uL    Baso # 0.02 0.00 - 0.20 K/uL    nRBC 0 0 /100 WBC    Gran % 87.8 (H) 38.0 - 73.0 %    Lymph % 2.9 (L) 18.0 - 48.0 %    Mono % 7.5 4.0 - 15.0 %    Eosinophil % 0.0 0.0 - 8.0 %    Basophil % 0.1 0.0 - 1.9 %    Platelet Estimate Decreased (A)     Differential Method Automated    Comprehensive metabolic panel   Result Value Ref Range    Sodium 138 136 - 145 mmol/L    Potassium 4.4 3.5 - 5.1 mmol/L    Chloride 112 (H) 95 - 110 mmol/L    CO2 11 (L) 23 - 29 mmol/L    Glucose 362 (H) 70 - 110 mg/dL    BUN 46 (H) 8 - 23 mg/dL    Creatinine 2.6 (H) 0.5 - 1.4 mg/dL    Calcium 8.7 8.7 - 10.5 mg/dL    Total Protein 6.0 6.0 - 8.4 g/dL    Albumin 2.6 (L) 3.5 - 5.2 g/dL    Total Bilirubin 1.2 (H) 0.1 - 1.0 mg/dL    Alkaline Phosphatase 68 55 - 135 U/L    AST 25 10 - 40 U/L    ALT 12 10 - 44 U/L    eGFR 19 (A) >60 mL/min/1.73 m^2    Anion Gap 15 8 - 16 mmol/L   Lactic acid, plasma #1   Result Value Ref  Range    Lactate (Lactic Acid) 6.6 (HH) 0.5 - 2.2 mmol/L   Lactic acid, plasma #2   Result Value Ref Range    Lactate (Lactic Acid) 4.4 (HH) 0.5 - 2.2 mmol/L   Urinalysis, Reflex to Urine Culture Urine, Clean Catch    Specimen: Urine   Result Value Ref Range    Specimen UA Urine, Clean Catch     Color, UA Yellow Yellow, Straw, Saray    Appearance, UA Cloudy (A) Clear    pH, UA 6.0 5.0 - 8.0    Specific Gravity, UA 1.025 1.005 - 1.030    Protein, UA 3+ (A) Negative    Glucose, UA 4+ (A) Negative    Ketones, UA Negative Negative    Bilirubin (UA) Negative Negative    Occult Blood UA 3+ (A) Negative    Nitrite, UA Negative Negative    Urobilinogen, UA Negative <2.0 EU/dL    Leukocytes, UA 3+ (A) Negative   Magnesium   Result Value Ref Range    Magnesium 1.7 1.6 - 2.6 mg/dL   Phosphorus   Result Value Ref Range    Phosphorus 2.2 (L) 2.7 - 4.5 mg/dL   Brain natriuretic peptide   Result Value Ref Range    BNP 1,679 (H) 0 - 99 pg/mL   Troponin I   Result Value Ref Range    Troponin I 0.509 (H) 0.000 - 0.026 ng/mL   Procalcitonin   Result Value Ref Range    Procalcitonin 62.91 (H) <0.25 ng/mL   COVID-19 Rapid Screening   Result Value Ref Range    SARS-CoV-2 RNA, Amplification, Qual Negative Negative   Beta-Hydroxybutyrate, Serum   Result Value Ref Range    Beta-Hydroxybutyrate 0.3 0.0 - 0.5 mmol/L   Urinalysis Microscopic   Result Value Ref Range    RBC, UA >100 (H) 0 - 4 /hpf    WBC, UA >100 (H) 0 - 5 /hpf    WBC Clumps, UA Many (A) None-Rare    Bacteria None None-Occ /hpf    Yeast, UA None None    Hyaline Casts, UA 0 0-1/lpf /lpf    Microscopic Comment SEE COMMENT    Ammonia   Result Value Ref Range    Ammonia 26 10 - 50 umol/L   EKG 12-lead   Result Value Ref Range    QRS Duration 88 ms    [facility] QTC Calculation 464 ms   ISTAT PROCEDURE   Result Value Ref Range    POC PH 7.277 (LL) 7.35 - 7.45    POC PCO2 29.3 (L) 35 - 45 mmHg    POC PO2 47 40 - 60 mmHg    POC HCO3 13.7 (L) 24 - 28 mmol/L    POC BE -13 (L) -2 to 2 mmol/L     POC SATURATED O2 78 95 - 100 %    Sample VENOUS     Site Other     Allens Test N/A     DelSys Room Air     Mode SPONT     FiO2 21          Imaging Results:  Imaging Results              CT Head Without Contrast (Final result)  Result time 08/29/24 17:00:16      Final result by [facility] MD (08/29/24 17:00:16)                   Impression:      No acute intracranial abnormality    All CT scans at this facility use dose modulation, iterative reconstructions, and/or weight base dosing when appropriate to reduce radiation dose to as low as reasonably achievable.      Electronically signed by: Heladio Cuellar MD  Date:    08/29/2024  Time:    17:00               Narrative:    EXAMINATION:  CT HEAD WITHOUT CONTRAST    CLINICAL HISTORY:  Mental status change, unknown cause;    TECHNIQUE:  Noncontrast images were obtained    COMPARISON:  None    FINDINGS:  No intracranial acute hemorrhage or acute focal brain parenchymal abnormality is identified.  Atrophy is present.  Calvarium is intact.                                       X-Ray Chest AP Portable (Final result)  Result time 08/29/24 16:24:47      Final result by Boris Mccoy MD (08/29/24 16:24:47)                   Impression:      As above      Electronically signed by: Boris Mccoy  Date:    08/29/2024  Time:    16:24               Narrative:    EXAMINATION:  XR CHEST AP PORTABLE    CLINICAL HISTORY:  Sepsis;    TECHNIQUE:  Single frontal view of the chest was performed.    COMPARISON:  None    FINDINGS:  Cardiomegaly.  Mild perihilar interstitial opacities.  Correlate clinically for low-grade or stable CHF.  Left shoulder prosthesis.  No segmental consolidation.  Subpleural reticular opacities may relate to subsegmental atelectasis or scarring.  Right upper lobe with old deformity of the posterior lateral left upper rib.    No definite acute osseous injury                                       The EKG was ordered, reviewed, and independently  interpreted by the ED provider.  Interpretation time: 16:26  Rate: 86 BPM  Rhythm: Sinus rhythm with 1st degree A-V block  Interpretation: Minimal voltage criteria for LVH, may be normal variant (R in aVL). No STEMI.         The Emergency Provider reviewed the vital signs and test results, which are outlined above.     ED Discussion     5:38 PM: Discussed pt's case with Dr. Nathan (Critical Care) who recommends a repeat lactic in about an hour    7:34 PM: Discussed pt's case with Carline Key NP (Critical Care) who recommends continuing volume resuscitation. Carline Key NP states pt can be admitted to floor.     9:00 PM: Discussed case with Halle Sales MD ([facility] Medicine). Dr. Sales agrees with current care and management of pt and accepts admission.   Admitting Service: Hospital Medicine   Admitting Physician: Dr. Sales  Admit to: [facility] med tele    9:00 PM: Re-evaluated pt. I have discussed test results, shared treatment plan, and the need for admission with patient and family at bedside. Pt and family express understanding at this time and agree with all information. All questions answered. Pt and family have no further questions or concerns at this time. Pt is ready for admit.          ED Course as of 08/29/24 2123   Thu Aug 29, 2024   1613 Differential diagnosis includes DKA, sepsis, viral syndrome, dehydration, intracranial lesion. [BA]      ED Course User Index  [BA] Karan Berg MD     Medical Decision Making  Amount and/or Complexity of Data Reviewed  Labs: ordered. Decision-making details documented in ED Course.  Radiology: ordered. Decision-making details documented in ED Course.  ECG/medicine tests: ordered and independent interpretation performed. Decision-making details documented in ED Course.    Risk  Prescription drug management.  Decision regarding hospitalization.                ED Medication(s):  Medications   vancomycin - pharmacy to dose (has no administration in time range)    sodium chloride 0.9% bolus 500 mL 500 mL (500 mLs Intravenous New Bag 8/29/24 2042)   ceFEPIme (MAXIPIME) 2 g in D5W 100 mL IVPB (MB+) (has no administration in time range)   ceFEPIme (MAXIPIME) 2 g in D5W 100 mL IVPB (MB+) (0 g Intravenous Stopped 8/29/24 1812)   sodium chloride 0.9% bolus 500 mL 500 mL (0 mLs Intravenous Stopped 8/29/24 1828)   vancomycin 2 g in dextrose 5 % 500 mL IVPB (2,000 mg Intravenous New Bag 8/29/24 1828)       New Prescriptions    No medications on file               Scribe Attestation:   Scribe #1: I performed the above scribed service and the documentation accurately describes the services I performed. I attest to the accuracy of the note.     Attending:   Physician Attestation Statement for Scribe #1: I, Karan Berg MD, personally performed the services described in this documentation, as scribed by Oliver Graham, in my presence, and it is both accurate and complete.           Clinical Impression       ICD-10-CM ICD-9-CM   1. MIKA (acute kidney injury)  N17.9 584.9   2. AMS (altered mental status)  R41.82 780.97   3. Urinary tract infection with hematuria, site unspecified  N39.0 599.0    R31.9 599.70   4. Sepsis, due to unspecified organism, unspecified whether acute organ dysfunction present  A41.9 038.9     995.91   5. NSTEMI (non-ST elevated myocardial infarction)  I21.4 410.70   6. Heart failure, unspecified HF chronicity, unspecified heart failure type  I50.9 428.9       Disposition:   Disposition: Admitted  Condition: Stable         Karan Berg MD  08/30/24 0030

## 2024-08-30 PROBLEM — R78.81 BACTEREMIA: Status: ACTIVE | Noted: 2024-08-30

## 2024-08-30 PROBLEM — R19.7 DIARRHEA: Status: ACTIVE | Noted: 2024-08-30

## 2024-08-30 LAB
ACINETOBACTER CALCOACETICUS/BAUMANNII COMPLEX: NOT DETECTED
ALBUMIN SERPL BCP-MCNC: 2.1 G/DL (ref 3.5–5.2)
ALBUMIN SERPL BCP-MCNC: 2.3 G/DL (ref 3.5–5.2)
ALBUMIN SERPL BCP-MCNC: 2.4 G/DL (ref 3.5–5.2)
ALLENS TEST: ABNORMAL
ALP SERPL-CCNC: 108 U/L (ref 55–135)
ALP SERPL-CCNC: 62 U/L (ref 55–135)
ALT SERPL W/O P-5'-P-CCNC: 14 U/L (ref 10–44)
ALT SERPL W/O P-5'-P-CCNC: 14 U/L (ref 10–44)
ANION GAP SERPL CALC-SCNC: 13 MMOL/L (ref 8–16)
ANION GAP SERPL CALC-SCNC: 15 MMOL/L (ref 8–16)
ANION GAP SERPL CALC-SCNC: 9 MMOL/L (ref 8–16)
APTT PPP: 32.2 SEC (ref 21–32)
AST SERPL-CCNC: 35 U/L (ref 10–40)
AST SERPL-CCNC: 37 U/L (ref 10–40)
BACTERIA UR CULT: NORMAL
BACTERIA UR CULT: NORMAL
BACTEROIDES FRAGILIS: NOT DETECTED
BASOPHILS # BLD AUTO: 0.02 K/UL (ref 0–0.2)
BASOPHILS # BLD AUTO: 0.03 K/UL (ref 0–0.2)
BASOPHILS NFR BLD: 0.2 % (ref 0–1.9)
BASOPHILS NFR BLD: 0.2 % (ref 0–1.9)
BILIRUB SERPL-MCNC: 0.9 MG/DL (ref 0.1–1)
BILIRUB SERPL-MCNC: 1 MG/DL (ref 0.1–1)
BUN SERPL-MCNC: 51 MG/DL (ref 8–23)
BUN SERPL-MCNC: 56 MG/DL (ref 8–23)
BUN SERPL-MCNC: 58 MG/DL (ref 8–23)
C DIFF GDH STL QL: NEGATIVE
C DIFF TOX A+B STL QL IA: NEGATIVE
CALCIUM SERPL-MCNC: 8.2 MG/DL (ref 8.7–10.5)
CALCIUM SERPL-MCNC: 8.3 MG/DL (ref 8.7–10.5)
CALCIUM SERPL-MCNC: 8.5 MG/DL (ref 8.7–10.5)
CANDIDA ALBICANS: NOT DETECTED
CANDIDA AURIS: NOT DETECTED
CANDIDA GLABRATA: NOT DETECTED
CANDIDA KRUSEI: NOT DETECTED
CANDIDA PARAPSILOSIS: NOT DETECTED
CANDIDA TROPICALIS: NOT DETECTED
CHLORIDE SERPL-SCNC: 110 MMOL/L (ref 95–110)
CHLORIDE SERPL-SCNC: 110 MMOL/L (ref 95–110)
CHLORIDE SERPL-SCNC: 111 MMOL/L (ref 95–110)
CK SERPL-CCNC: 1004 U/L (ref 20–180)
CK SERPL-CCNC: 770 U/L (ref 20–180)
CO2 SERPL-SCNC: 13 MMOL/L (ref 23–29)
CO2 SERPL-SCNC: 17 MMOL/L (ref 23–29)
CO2 SERPL-SCNC: 20 MMOL/L (ref 23–29)
CREAT SERPL-MCNC: 2.7 MG/DL (ref 0.5–1.4)
CREAT SERPL-MCNC: 2.8 MG/DL (ref 0.5–1.4)
CREAT SERPL-MCNC: 2.8 MG/DL (ref 0.5–1.4)
CREAT UR-MCNC: 186.6 MG/DL (ref 15–325)
CRYPTOCOCCUS NEOFORMANS/GATTII: NOT DETECTED
CTX-M GENE (ESBL PRODUCER): DETECTED
DELSYS: ABNORMAL
DIFFERENTIAL METHOD BLD: ABNORMAL
DIFFERENTIAL METHOD BLD: ABNORMAL
ENTEROBACTER CLOACAE COMPLEX: NOT DETECTED
ENTEROBACTERALES: ABNORMAL
ENTEROCOCCUS FAECALIS: NOT DETECTED
ENTEROCOCCUS FAECIUM: NOT DETECTED
EOSINOPHIL # BLD AUTO: 0 K/UL (ref 0–0.5)
EOSINOPHIL # BLD AUTO: 0 K/UL (ref 0–0.5)
EOSINOPHIL NFR BLD: 0 % (ref 0–8)
EOSINOPHIL NFR BLD: 0.1 % (ref 0–8)
ERYTHROCYTE [DISTWIDTH] IN BLOOD BY AUTOMATED COUNT: 13.2 % (ref 11.5–14.5)
ERYTHROCYTE [DISTWIDTH] IN BLOOD BY AUTOMATED COUNT: 13.4 % (ref 11.5–14.5)
ERYTHROCYTE [DISTWIDTH] IN BLOOD BY AUTOMATED COUNT: 13.4 % (ref 11.5–14.5)
ESCHERICHIA COLI: DETECTED
EST. GFR  (NO RACE VARIABLE): 17 ML/MIN/1.73 M^2
EST. GFR  (NO RACE VARIABLE): 17 ML/MIN/1.73 M^2
EST. GFR  (NO RACE VARIABLE): 18 ML/MIN/1.73 M^2
ESTIMATED AVG GLUCOSE: 154 MG/DL (ref 68–131)
FIO2: 21
GLUCOSE SERPL-MCNC: 259 MG/DL (ref 70–110)
GLUCOSE SERPL-MCNC: 278 MG/DL (ref 70–110)
GLUCOSE SERPL-MCNC: 315 MG/DL (ref 70–110)
GLUCOSE SERPL-MCNC: 343 MG/DL (ref 70–110)
HAEMOPHILUS INFLUENZAE: NOT DETECTED
HBA1C MFR BLD: 7 % (ref 4–5.6)
HCO3 UR-SCNC: 11.1 MMOL/L (ref 24–28)
HCT VFR BLD AUTO: 31.3 % (ref 37–48.5)
HCT VFR BLD AUTO: 33.3 % (ref 37–48.5)
HCT VFR BLD AUTO: 33.3 % (ref 37–48.5)
HCT VFR BLD CALC: 33 %PCV (ref 36–54)
HGB BLD-MCNC: 10.3 G/DL (ref 12–16)
HGB BLD-MCNC: 11 G/DL (ref 12–16)
HGB BLD-MCNC: 11 G/DL (ref 12–16)
IMM GRANULOCYTES # BLD AUTO: 0.44 K/UL (ref 0–0.04)
IMM GRANULOCYTES # BLD AUTO: 0.59 K/UL (ref 0–0.04)
IMM GRANULOCYTES NFR BLD AUTO: 3.7 % (ref 0–0.5)
IMM GRANULOCYTES NFR BLD AUTO: 4.5 % (ref 0–0.5)
IMP GENE (CARBAPENEM RESISTANT): NOT DETECTED
INR PPP: 1.3 (ref 0.8–1.2)
KLEBSIELLA AEROGENES: NOT DETECTED
KLEBSIELLA OXYTOCA: NOT DETECTED
KLEBSIELLA PNEUMONIAE GROUP: NOT DETECTED
KPC RESISTANCE GENE (CARBAPENEM): NOT DETECTED
LACTATE SERPL-SCNC: 1.8 MMOL/L (ref 0.5–2.2)
LACTATE SERPL-SCNC: 2 MMOL/L (ref 0.5–2.2)
LACTATE SERPL-SCNC: 3.6 MMOL/L (ref 0.5–2.2)
LACTATE SERPL-SCNC: 8.1 MMOL/L (ref 0.5–2.2)
LDH SERPL L TO P-CCNC: 188 U/L (ref 110–260)
LISTERIA MONOCYTOGENES: NOT DETECTED
LYMPHOCYTES # BLD AUTO: 0.6 K/UL (ref 1–4.8)
LYMPHOCYTES # BLD AUTO: 0.8 K/UL (ref 1–4.8)
LYMPHOCYTES NFR BLD: 5 % (ref 18–48)
LYMPHOCYTES NFR BLD: 6.5 % (ref 18–48)
MAGNESIUM SERPL-MCNC: 2 MG/DL (ref 1.6–2.6)
MCH RBC QN AUTO: 29.1 PG (ref 27–31)
MCH RBC QN AUTO: 29.2 PG (ref 27–31)
MCH RBC QN AUTO: 29.2 PG (ref 27–31)
MCHC RBC AUTO-ENTMCNC: 32.9 G/DL (ref 32–36)
MCHC RBC AUTO-ENTMCNC: 33 G/DL (ref 32–36)
MCHC RBC AUTO-ENTMCNC: 33 G/DL (ref 32–36)
MCR-1: NOT DETECTED
MCV RBC AUTO: 88 FL (ref 82–98)
MEC A/C AND MREJ (MRSA): ABNORMAL
MEC A/C: ABNORMAL
MODE: ABNORMAL
MONOCYTES # BLD AUTO: 0.7 K/UL (ref 0.3–1)
MONOCYTES # BLD AUTO: 1 K/UL (ref 0.3–1)
MONOCYTES NFR BLD: 5 % (ref 4–15)
MONOCYTES NFR BLD: 8.1 % (ref 4–15)
NDM GENE (CARBAPENEM RESISTANT): NOT DETECTED
NEISSERIA MENINGITIDIS: NOT DETECTED
NEUTROPHILS # BLD AUTO: 10.9 K/UL (ref 1.8–7.7)
NEUTROPHILS # BLD AUTO: 9.8 K/UL (ref 1.8–7.7)
NEUTROPHILS NFR BLD: 83 % (ref 38–73)
NEUTROPHILS NFR BLD: 83.7 % (ref 38–73)
NRBC BLD-RTO: 0 /100 WBC
NRBC BLD-RTO: 0 /100 WBC
OHS QRS DURATION: 88 MS
OHS QTC CALCULATION: 464 MS
OXA-48-LIKE (CARBAPENEM RESISTANT): NOT DETECTED
PATH REV BLD -IMP: NORMAL
PCO2 BLDA: 26.6 MMHG (ref 35–45)
PH SMN: 7.23 [PH] (ref 7.35–7.45)
PHOSPHATE SERPL-MCNC: 3.1 MG/DL (ref 2.7–4.5)
PHOSPHATE SERPL-MCNC: 4 MG/DL (ref 2.7–4.5)
PLATELET # BLD AUTO: 45 K/UL (ref 150–450)
PLATELET # BLD AUTO: 49 K/UL (ref 150–450)
PLATELET # BLD AUTO: 49 K/UL (ref 150–450)
PLATELET BLD QL SMEAR: ABNORMAL
PLATELET BLD QL SMEAR: ABNORMAL
PMV BLD AUTO: 11.4 FL (ref 9.2–12.9)
PMV BLD AUTO: 11.4 FL (ref 9.2–12.9)
PMV BLD AUTO: 12.1 FL (ref 9.2–12.9)
PO2 BLDA: 69 MMHG (ref 80–100)
POC BE: -16 MMOL/L
POC IONIZED CALCIUM: 1.23 MMOL/L (ref 1.06–1.42)
POC SATURATED O2: 90 % (ref 95–100)
POCT GLUCOSE: 203 MG/DL (ref 70–110)
POCT GLUCOSE: 239 MG/DL (ref 70–110)
POCT GLUCOSE: 251 MG/DL (ref 70–110)
POCT GLUCOSE: 260 MG/DL (ref 70–110)
POCT GLUCOSE: 309 MG/DL (ref 70–110)
POCT GLUCOSE: 311 MG/DL (ref 70–110)
POIKILOCYTOSIS BLD QL SMEAR: SLIGHT
POIKILOCYTOSIS BLD QL SMEAR: SLIGHT
POLYCHROMASIA BLD QL SMEAR: ABNORMAL
POLYCHROMASIA BLD QL SMEAR: ABNORMAL
POTASSIUM BLD-SCNC: 3.8 MMOL/L (ref 3.5–5.1)
POTASSIUM SERPL-SCNC: 4 MMOL/L (ref 3.5–5.1)
POTASSIUM SERPL-SCNC: 4.1 MMOL/L (ref 3.5–5.1)
POTASSIUM SERPL-SCNC: 4.5 MMOL/L (ref 3.5–5.1)
PROCALCITONIN SERPL IA-MCNC: 87.67 NG/ML
PROT SERPL-MCNC: 5.5 G/DL (ref 6–8.4)
PROT SERPL-MCNC: 5.7 G/DL (ref 6–8.4)
PROTEUS SPECIES: NOT DETECTED
PROTHROMBIN TIME: 13.6 SEC (ref 9–12.5)
PSEUDOMONAS AERUGINOSA: NOT DETECTED
RBC # BLD AUTO: 3.54 M/UL (ref 4–5.4)
RBC # BLD AUTO: 3.77 M/UL (ref 4–5.4)
RBC # BLD AUTO: 3.77 M/UL (ref 4–5.4)
RETICS/RBC NFR AUTO: 2.9 % (ref 0.5–2.5)
SALMONELLA SP: NOT DETECTED
SAMPLE: ABNORMAL
SERRATIA MARCESCENS: NOT DETECTED
SITE: ABNORMAL
SODIUM BLD-SCNC: 139 MMOL/L (ref 136–145)
SODIUM SERPL-SCNC: 139 MMOL/L (ref 136–145)
SODIUM SERPL-SCNC: 139 MMOL/L (ref 136–145)
SODIUM SERPL-SCNC: 140 MMOL/L (ref 136–145)
STAPHYLOCOCCUS AUREUS: NOT DETECTED
STAPHYLOCOCCUS EPIDERMIDIS: NOT DETECTED
STAPHYLOCOCCUS LUGDUNESIS: NOT DETECTED
STAPHYLOCOCCUS SPECIES: NOT DETECTED
STENOTROPHOMONAS MALTOPHILIA: NOT DETECTED
STREPTOCOCCUS AGALACTIAE: NOT DETECTED
STREPTOCOCCUS PNEUMONIAE: NOT DETECTED
STREPTOCOCCUS PYOGENES: NOT DETECTED
STREPTOCOCCUS SPECIES: NOT DETECTED
T4 FREE SERPL-MCNC: 1.12 NG/DL (ref 0.71–1.51)
TROPONIN I SERPL DL<=0.01 NG/ML-MCNC: 0.61 NG/ML (ref 0–0.03)
TROPONIN I SERPL DL<=0.01 NG/ML-MCNC: 0.73 NG/ML (ref 0–0.03)
TSH SERPL DL<=0.005 MIU/L-ACNC: 0.08 UIU/ML (ref 0.4–4)
VAN A/B (VRE GENE): ABNORMAL
VIM GENE (CARBAPENEM RESISTANT): NOT DETECTED
WBC # BLD AUTO: 11.84 K/UL (ref 3.9–12.7)
WBC # BLD AUTO: 12.98 K/UL (ref 3.9–12.7)
WBC # BLD AUTO: 12.98 K/UL (ref 3.9–12.7)

## 2024-08-30 PROCEDURE — 27000207 HC ISOLATION

## 2024-08-30 PROCEDURE — 84484 ASSAY OF TROPONIN QUANT: CPT

## 2024-08-30 PROCEDURE — 87449 NOS EACH ORGANISM AG IA: CPT | Mod: 91 | Performed by: INTERNAL MEDICINE

## 2024-08-30 PROCEDURE — 84145 PROCALCITONIN (PCT): CPT | Performed by: INTERNAL MEDICINE

## 2024-08-30 PROCEDURE — 63600175 PHARM REV CODE 636 W HCPCS: Performed by: NURSE PRACTITIONER

## 2024-08-30 PROCEDURE — 99900035 HC TECH TIME PER 15 MIN (STAT)

## 2024-08-30 PROCEDURE — 82330 ASSAY OF CALCIUM: CPT

## 2024-08-30 PROCEDURE — 11000001 HC ACUTE MED/SURG PRIVATE ROOM

## 2024-08-30 PROCEDURE — 85007 BL SMEAR W/DIFF WBC COUNT: CPT | Performed by: NURSE PRACTITIONER

## 2024-08-30 PROCEDURE — 80053 COMPREHEN METABOLIC PANEL: CPT | Performed by: NURSE PRACTITIONER

## 2024-08-30 PROCEDURE — 84132 ASSAY OF SERUM POTASSIUM: CPT

## 2024-08-30 PROCEDURE — 63600175 PHARM REV CODE 636 W HCPCS: Performed by: FAMILY MEDICINE

## 2024-08-30 PROCEDURE — 84443 ASSAY THYROID STIM HORMONE: CPT

## 2024-08-30 PROCEDURE — 83735 ASSAY OF MAGNESIUM: CPT | Performed by: NURSE PRACTITIONER

## 2024-08-30 PROCEDURE — 86160 COMPLEMENT ANTIGEN: CPT | Performed by: FAMILY MEDICINE

## 2024-08-30 PROCEDURE — 87449 NOS EACH ORGANISM AG IA: CPT | Mod: 91 | Performed by: FAMILY MEDICINE

## 2024-08-30 PROCEDURE — 83036 HEMOGLOBIN GLYCOSYLATED A1C: CPT | Performed by: NURSE PRACTITIONER

## 2024-08-30 PROCEDURE — 85397 CLOTTING FUNCT ACTIVITY: CPT | Performed by: FAMILY MEDICINE

## 2024-08-30 PROCEDURE — 82550 ASSAY OF CK (CPK): CPT

## 2024-08-30 PROCEDURE — 83010 ASSAY OF HAPTOGLOBIN QUANT: CPT | Performed by: FAMILY MEDICINE

## 2024-08-30 PROCEDURE — 83615 LACTATE (LD) (LDH) ENZYME: CPT | Performed by: FAMILY MEDICINE

## 2024-08-30 PROCEDURE — 85045 AUTOMATED RETICULOCYTE COUNT: CPT | Performed by: FAMILY MEDICINE

## 2024-08-30 PROCEDURE — 25000003 PHARM REV CODE 250: Performed by: FAMILY MEDICINE

## 2024-08-30 PROCEDURE — 84484 ASSAY OF TROPONIN QUANT: CPT | Mod: 91 | Performed by: INTERNAL MEDICINE

## 2024-08-30 PROCEDURE — 82607 VITAMIN B-12: CPT | Performed by: FAMILY MEDICINE

## 2024-08-30 PROCEDURE — 82803 BLOOD GASES ANY COMBINATION: CPT

## 2024-08-30 PROCEDURE — 80069 RENAL FUNCTION PANEL: CPT | Performed by: INTERNAL MEDICINE

## 2024-08-30 PROCEDURE — 25000003 PHARM REV CODE 250: Performed by: INTERNAL MEDICINE

## 2024-08-30 PROCEDURE — 85610 PROTHROMBIN TIME: CPT | Performed by: NURSE PRACTITIONER

## 2024-08-30 PROCEDURE — 83605 ASSAY OF LACTIC ACID: CPT | Performed by: NURSE PRACTITIONER

## 2024-08-30 PROCEDURE — 94761 N-INVAS EAR/PLS OXIMETRY MLT: CPT | Mod: XB

## 2024-08-30 PROCEDURE — 86160 COMPLEMENT ANTIGEN: CPT | Mod: 59 | Performed by: FAMILY MEDICINE

## 2024-08-30 PROCEDURE — 25000003 PHARM REV CODE 250

## 2024-08-30 PROCEDURE — 85730 THROMBOPLASTIN TIME PARTIAL: CPT | Performed by: NURSE PRACTITIONER

## 2024-08-30 PROCEDURE — 87324 CLOSTRIDIUM AG IA: CPT | Performed by: FAMILY MEDICINE

## 2024-08-30 PROCEDURE — 85027 COMPLETE CBC AUTOMATED: CPT | Performed by: NURSE PRACTITIONER

## 2024-08-30 PROCEDURE — 85060 BLOOD SMEAR INTERPRETATION: CPT | Mod: ,,, | Performed by: PATHOLOGY

## 2024-08-30 PROCEDURE — 36415 COLL VENOUS BLD VENIPUNCTURE: CPT | Mod: XB | Performed by: INTERNAL MEDICINE

## 2024-08-30 PROCEDURE — 36415 COLL VENOUS BLD VENIPUNCTURE: CPT | Mod: XB

## 2024-08-30 PROCEDURE — 25000003 PHARM REV CODE 250: Performed by: NURSE PRACTITIONER

## 2024-08-30 PROCEDURE — 85014 HEMATOCRIT: CPT

## 2024-08-30 PROCEDURE — 82550 ASSAY OF CK (CPK): CPT | Mod: 91 | Performed by: INTERNAL MEDICINE

## 2024-08-30 PROCEDURE — 63600175 PHARM REV CODE 636 W HCPCS: Performed by: INTERNAL MEDICINE

## 2024-08-30 PROCEDURE — 84100 ASSAY OF PHOSPHORUS: CPT | Performed by: NURSE PRACTITIONER

## 2024-08-30 PROCEDURE — 82570 ASSAY OF URINE CREATININE: CPT | Performed by: INTERNAL MEDICINE

## 2024-08-30 PROCEDURE — 87081 CULTURE SCREEN ONLY: CPT

## 2024-08-30 PROCEDURE — 82800 BLOOD PH: CPT

## 2024-08-30 PROCEDURE — 36415 COLL VENOUS BLD VENIPUNCTURE: CPT | Performed by: NURSE PRACTITIONER

## 2024-08-30 PROCEDURE — 87427 SHIGA-LIKE TOXIN AG IA: CPT | Mod: 59 | Performed by: FAMILY MEDICINE

## 2024-08-30 PROCEDURE — 85025 COMPLETE CBC W/AUTO DIFF WBC: CPT | Performed by: FAMILY MEDICINE

## 2024-08-30 PROCEDURE — 84295 ASSAY OF SERUM SODIUM: CPT

## 2024-08-30 PROCEDURE — 84439 ASSAY OF FREE THYROXINE: CPT

## 2024-08-30 PROCEDURE — 87046 STOOL CULTR AEROBIC BACT EA: CPT | Performed by: FAMILY MEDICINE

## 2024-08-30 PROCEDURE — 83605 ASSAY OF LACTIC ACID: CPT | Mod: 91

## 2024-08-30 PROCEDURE — 87045 FECES CULTURE AEROBIC BACT: CPT | Performed by: FAMILY MEDICINE

## 2024-08-30 PROCEDURE — 36600 WITHDRAWAL OF ARTERIAL BLOOD: CPT

## 2024-08-30 PROCEDURE — 36415 COLL VENOUS BLD VENIPUNCTURE: CPT | Mod: XB | Performed by: FAMILY MEDICINE

## 2024-08-30 PROCEDURE — 63600175 PHARM REV CODE 636 W HCPCS

## 2024-08-30 RX ORDER — INDOMETHACIN 25 MG/1
100 CAPSULE ORAL ONCE
Status: COMPLETED | OUTPATIENT
Start: 2024-08-30 | End: 2024-08-30

## 2024-08-30 RX ORDER — INSULIN ASPART 100 [IU]/ML
0-15 INJECTION, SOLUTION INTRAVENOUS; SUBCUTANEOUS
Status: DISCONTINUED | OUTPATIENT
Start: 2024-08-30 | End: 2024-09-01

## 2024-08-30 RX ORDER — LEVOTHYROXINE SODIUM 150 UG/1
150 TABLET ORAL
Status: DISCONTINUED | OUTPATIENT
Start: 2024-08-30 | End: 2024-09-02 | Stop reason: HOSPADM

## 2024-08-30 RX ORDER — FAMOTIDINE 20 MG/1
20 TABLET, FILM COATED ORAL DAILY
Status: DISCONTINUED | OUTPATIENT
Start: 2024-08-30 | End: 2024-09-02 | Stop reason: HOSPADM

## 2024-08-30 RX ORDER — INSULIN ASPART 100 [IU]/ML
0-10 INJECTION, SOLUTION INTRAVENOUS; SUBCUTANEOUS
Status: DISCONTINUED | OUTPATIENT
Start: 2024-08-30 | End: 2024-08-30

## 2024-08-30 RX ORDER — FUROSEMIDE 10 MG/ML
40 INJECTION INTRAMUSCULAR; INTRAVENOUS ONCE
Status: DISCONTINUED | OUTPATIENT
Start: 2024-08-30 | End: 2024-08-30

## 2024-08-30 RX ORDER — GLUCAGON 1 MG
1 KIT INJECTION
Status: DISCONTINUED | OUTPATIENT
Start: 2024-08-30 | End: 2024-09-02 | Stop reason: HOSPADM

## 2024-08-30 RX ORDER — FUROSEMIDE 10 MG/ML
40 INJECTION INTRAMUSCULAR; INTRAVENOUS ONCE
Status: COMPLETED | OUTPATIENT
Start: 2024-08-30 | End: 2024-08-30

## 2024-08-30 RX ORDER — IBUPROFEN 200 MG
16 TABLET ORAL
Status: DISCONTINUED | OUTPATIENT
Start: 2024-08-30 | End: 2024-09-02 | Stop reason: HOSPADM

## 2024-08-30 RX ORDER — MUPIROCIN 20 MG/G
OINTMENT TOPICAL 2 TIMES DAILY
Status: DISCONTINUED | OUTPATIENT
Start: 2024-08-30 | End: 2024-09-02 | Stop reason: HOSPADM

## 2024-08-30 RX ORDER — INSULIN GLARGINE 100 [IU]/ML
20 INJECTION, SOLUTION SUBCUTANEOUS 2 TIMES DAILY
Status: DISCONTINUED | OUTPATIENT
Start: 2024-08-30 | End: 2024-09-02 | Stop reason: HOSPADM

## 2024-08-30 RX ORDER — AMLODIPINE BESYLATE 10 MG/1
10 TABLET ORAL DAILY
Status: DISCONTINUED | OUTPATIENT
Start: 2024-08-30 | End: 2024-09-02 | Stop reason: HOSPADM

## 2024-08-30 RX ORDER — CHLORHEXIDINE GLUCONATE ORAL RINSE 1.2 MG/ML
15 SOLUTION DENTAL 2 TIMES DAILY
Status: DISCONTINUED | OUTPATIENT
Start: 2024-08-30 | End: 2024-08-30

## 2024-08-30 RX ORDER — CHOLESTYRAMINE 4 G/9G
1 POWDER, FOR SUSPENSION ORAL 2 TIMES DAILY
Status: DISCONTINUED | OUTPATIENT
Start: 2024-08-30 | End: 2024-09-02 | Stop reason: HOSPADM

## 2024-08-30 RX ORDER — METOPROLOL TARTRATE 1 MG/ML
5 INJECTION, SOLUTION INTRAVENOUS ONCE
Status: COMPLETED | OUTPATIENT
Start: 2024-08-30 | End: 2024-08-30

## 2024-08-30 RX ORDER — PRAVASTATIN SODIUM 20 MG/1
20 TABLET ORAL NIGHTLY
Status: DISCONTINUED | OUTPATIENT
Start: 2024-08-30 | End: 2024-09-02 | Stop reason: HOSPADM

## 2024-08-30 RX ORDER — HYDRALAZINE HYDROCHLORIDE 20 MG/ML
10 INJECTION INTRAMUSCULAR; INTRAVENOUS EVERY 6 HOURS PRN
Status: DISCONTINUED | OUTPATIENT
Start: 2024-08-30 | End: 2024-09-02 | Stop reason: HOSPADM

## 2024-08-30 RX ORDER — SODIUM CHLORIDE, SODIUM LACTATE, POTASSIUM CHLORIDE, CALCIUM CHLORIDE 600; 310; 30; 20 MG/100ML; MG/100ML; MG/100ML; MG/100ML
INJECTION, SOLUTION INTRAVENOUS CONTINUOUS
Status: DISCONTINUED | OUTPATIENT
Start: 2024-08-30 | End: 2024-08-31

## 2024-08-30 RX ORDER — IBUPROFEN 200 MG
24 TABLET ORAL
Status: DISCONTINUED | OUTPATIENT
Start: 2024-08-30 | End: 2024-09-02 | Stop reason: HOSPADM

## 2024-08-30 RX ORDER — INSULIN ASPART 100 [IU]/ML
5 INJECTION, SOLUTION INTRAVENOUS; SUBCUTANEOUS
Status: DISCONTINUED | OUTPATIENT
Start: 2024-08-30 | End: 2024-09-01

## 2024-08-30 RX ADMIN — SODIUM CHLORIDE, POTASSIUM CHLORIDE, SODIUM LACTATE AND CALCIUM CHLORIDE: 600; 310; 30; 20 INJECTION, SOLUTION INTRAVENOUS at 05:08

## 2024-08-30 RX ADMIN — PRAVASTATIN SODIUM 20 MG: 20 TABLET ORAL at 09:08

## 2024-08-30 RX ADMIN — INSULIN GLARGINE 20 UNITS: 100 INJECTION, SOLUTION SUBCUTANEOUS at 08:08

## 2024-08-30 RX ADMIN — CHOLESTYRAMINE 4 G: 4 POWDER, FOR SUSPENSION ORAL at 09:08

## 2024-08-30 RX ADMIN — SODIUM CHLORIDE, POTASSIUM CHLORIDE, SODIUM LACTATE AND CALCIUM CHLORIDE: 600; 310; 30; 20 INJECTION, SOLUTION INTRAVENOUS at 11:08

## 2024-08-30 RX ADMIN — METOROPROLOL TARTRATE 5 MG: 5 INJECTION, SOLUTION INTRAVENOUS at 12:08

## 2024-08-30 RX ADMIN — FAMOTIDINE 20 MG: 20 TABLET ORAL at 09:08

## 2024-08-30 RX ADMIN — INSULIN ASPART 6 UNITS: 100 INJECTION, SOLUTION INTRAVENOUS; SUBCUTANEOUS at 04:08

## 2024-08-30 RX ADMIN — MUPIROCIN: 20 OINTMENT TOPICAL at 09:08

## 2024-08-30 RX ADMIN — SODIUM BICARBONATE 100 MEQ: 84 INJECTION, SOLUTION INTRAVENOUS at 02:08

## 2024-08-30 RX ADMIN — AMLODIPINE BESYLATE 10 MG: 10 TABLET ORAL at 08:08

## 2024-08-30 RX ADMIN — MUPIROCIN: 20 OINTMENT TOPICAL at 08:08

## 2024-08-30 RX ADMIN — FUROSEMIDE 40 MG: 10 INJECTION, SOLUTION INTRAMUSCULAR; INTRAVENOUS at 01:08

## 2024-08-30 RX ADMIN — CHLORHEXIDINE GLUCONATE 0.12% ORAL RINSE 15 ML: 1.2 LIQUID ORAL at 08:08

## 2024-08-30 RX ADMIN — MEROPENEM 1 G: 1 INJECTION INTRAVENOUS at 03:08

## 2024-08-30 RX ADMIN — INSULIN ASPART 6 UNITS: 100 INJECTION, SOLUTION INTRAVENOUS; SUBCUTANEOUS at 06:08

## 2024-08-30 RX ADMIN — INSULIN HUMAN 10 UNITS: 100 INJECTION, SOLUTION PARENTERAL at 04:08

## 2024-08-30 RX ADMIN — INSULIN ASPART 4 UNITS: 100 INJECTION, SOLUTION INTRAVENOUS; SUBCUTANEOUS at 09:08

## 2024-08-30 RX ADMIN — INSULIN GLARGINE 20 UNITS: 100 INJECTION, SOLUTION SUBCUTANEOUS at 09:08

## 2024-08-30 RX ADMIN — INSULIN ASPART 8 UNITS: 100 INJECTION, SOLUTION INTRAVENOUS; SUBCUTANEOUS at 11:08

## 2024-08-30 RX ADMIN — INSULIN ASPART 5 UNITS: 100 INJECTION, SOLUTION INTRAVENOUS; SUBCUTANEOUS at 04:08

## 2024-08-30 RX ADMIN — LEVOTHYROXINE SODIUM 150 MCG: 150 TABLET ORAL at 05:08

## 2024-08-30 NOTE — ASSESSMENT & PLAN NOTE
MIKA is likely due to pre-renal azotemia due to dehydration. Baseline creatinine is  2-2.2 . Most recent creatinine and eGFR are listed below.  Recent Labs     08/29/24  1625 08/29/24  2156 08/30/24  0133   CREATININE 2.6* 2.6* 2.8*   EGFRNORACEVR 19* 19* 17*      Plan  - MIKA is worsening. Will adjust treatment as follows: gentle hydration with IVF  - Avoid nephrotoxins and renally dose meds for GFR listed above  - s/p bicarb push prior to ICU transfer  - Monitor urine output, serial BMP, acid/base status and adjust therapy as needed  - Monitor for volume overload  - diurese as tolerated and if needed

## 2024-08-30 NOTE — SUBJECTIVE & OBJECTIVE
Past Medical History:   Diagnosis Date    Acquired TTP     Cataract     CKD stage 3 secondary to diabetes     Closed displaced comminuted fracture of right patella 10/28/2020    Closed fracture of surgical neck of left humerus 10/30/2020    Closed left radial fracture 10/30/2020    Hyperkalemia     Hyperlipidemia     Hypertension     Hypothyroidism, unspecified     Liver cirrhosis secondary to YO     Morbid obesity     Right knee pain     Thyroid disease     Type 2 diabetes mellitus        Past Surgical History:   Procedure Laterality Date    APPENDECTOMY      BREAST BIOPSY      CATARACT EXTRACTION      COLONOSCOPY N/A 12/21/2021    Procedure: COLONOSCOPY screening;  Surgeon: Moises Patterson MD;  Location: [facility];  Service: Endoscopy;  Laterality: N/A;    ESOPHAGOGASTRODUODENOSCOPY N/A 12/21/2021    Procedure: EGD (ESOPHAGOGASTRODUODENOSCOPY) EV screening;  Surgeon: Moises Patterson MD;  Location: [facility];  Service: Endoscopy;  Laterality: N/A;    EYE SURGERY      HERNIA REPAIR      OPEN REDUCTION AND INTERNAL FIXATION (ORIF) OF FRACTURE OF DISTAL RADIUS Left 11/2/2020    Procedure: ORIF, FRACTURE, RADIUS, DISTAL;  Surgeon: Sage Wolf MD;  Location: [facility];  Service: Orthopedics;  Laterality: Left;    OPEN REDUCTION AND INTERNAL FIXATION (ORIF) OF FRACTURE OF PATELLA Right 11/2/2020    Procedure: ORIF, FRACTURE, PATELLA;  Surgeon: Sage Wolf MD;  Location: [facility] OR;  Service: Orthopedics;  Laterality: Right;    OPEN REDUCTION AND INTERNAL FIXATION (ORIF) OF FRACTURE OF PATELLA Right 12/18/2020    Procedure: ORIF, FRACTURE, PATELLA;  Surgeon: Sage Wolf MD;  Location: [facility] OR;  Service: Orthopedics;  Laterality: Right;    ORIF HUMERUS FRACTURE Left 11/5/2020    Procedure: ORIF, FRACTURE, HUMERUS;  Surgeon: Sage Wolf MD;  Location: [facility] OR;  Service: Orthopedics;  Laterality: Left;    PLACEMENT OF ACELLULAR HUMAN DERMAL ALLOGRAFT Right 11/2/2020     "Procedure: APPLICATION, ACELLULAR HUMAN DERMAL ALLOGRAFT;  Surgeon: Sage Wolf MD;  Location: [facility] OR;  Service: Orthopedics;  Laterality: Right;  Right Patella    REMOVAL OF HARDWARE FROM HAND Left 3/11/2021    Procedure: REMOVAL, HARDWARE, HAND;  Surgeon: Sage Wolf MD;  Location: [facility] OR;  Service: Orthopedics;  Laterality: Left;  Removal of dorsal spanning wrist plate left distal radius    REMOVAL OF HARDWARE FROM LOWER EXTREMITY Right 2020    Procedure: REMOVAL, HARDWARE, LOWER EXTREMITY;  Surgeon: Sage Wolf MD;  Location: [facility] OR;  Service: Orthopedics;  Laterality: Right;       Review of patient's allergies indicates:   Allergen Reactions    Codeine Nausea Only     Other reaction(s): Nausea  Other reaction(s): Elevated blood pressure       No current facility-administered medications on file prior to encounter.     Current Outpatient Medications on File Prior to Encounter   Medication Sig    amLODIPine (NORVASC) 10 MG tablet TAKE 1 TABLET(10 MG) BY MOUTH EVERY DAY    metoprolol succinate (TOPROL-XL) 25 MG 24 hr tablet Take 0.5 tablets (12.5 mg total) by mouth once daily.    blood sugar diagnostic Strp Use ac bid    chlorthalidone (HYGROTEN) 25 MG Tab Take 25 mg by mouth once daily.    cholecalciferol, vitamin D3, (VITAMIN D3) 50 mcg (2,000 unit) Cap capsule Take 1 capsule by mouth.    HUMULIN 70/30 U-100 KWIKPEN 100 unit/mL (70-30) InPn pen SMARTSI Unit(s) SUB-Q Every Evening    hydrALAZINE (APRESOLINE) 25 MG tablet Take 1 tablet (25 mg total) by mouth every 8 (eight) hours.    insulin syringe-needle U-100 1 mL 29 gauge x 1/2" Syrg Use bid    JARDIANCE 25 mg tablet Take 25 mg by mouth every morning.    lancets 33 gauge Misc Use as BID    levothyroxine (SYNTHROID) 150 MCG tablet Take 1 tablet by mouth once daily.    metFORMIN (GLUCOPHAGE) 500 MG tablet Take 500 mg by mouth 2 (two) times daily.    perindopril erbumine (ACEON) 4 mg tablet Take 2 mg by mouth " "once daily.    pravastatin (PRAVACHOL) 20 MG tablet Take 20 mg by mouth once daily.     spironolactone (ALDACTONE) 25 MG tablet Take 25 mg by mouth.     Family History       Problem Relation (Age of Onset)    Diabetes Mother, Father    Leukemia Father          Tobacco Use    Smoking status: Former     Current packs/day: 0.00     Average packs/day: 1 pack/day for 8.0 years (8.0 ttl pk-yrs)     Types: Cigarettes     Start date:      Quit date:      Years since quittin.6    Smokeless tobacco: Never   Substance and Sexual Activity    Alcohol use: Yes     Comment: rarely    Drug use: No    Sexual activity: Not Currently     Review of Systems   Reason unable to perform ROS: limited due to patient participation.   Constitutional:  Positive for activity change, appetite change, chills and fatigue. Negative for diaphoresis and fever.   HENT: Negative.     Eyes: Negative.    Respiratory: Negative.  Negative for cough, shortness of breath and wheezing.    Cardiovascular:  Positive for leg swelling. Negative for chest pain and palpitations.   Gastrointestinal: Negative.  Negative for abdominal distention, abdominal pain, diarrhea, nausea and vomiting.   Endocrine: Negative.    Genitourinary:  Positive for dysuria and urgency.        "Burning"   Musculoskeletal: Negative.    Skin: Negative.    Neurological:  Positive for syncope and weakness. Negative for dizziness, seizures, speech difficulty, light-headedness and headaches.   Psychiatric/Behavioral: Negative.     All other systems reviewed and are negative.    Objective:     Vital Signs (Most Recent):  Temp: 98.2 °F (36.8 °C) (24)  Pulse: 73 (24)  Resp: 17 (24)  BP: (!) 166/74 (24)  SpO2: 97 % (24) Vital Signs (24h Range):  Temp:  [98.2 °F (36.8 °C)-99.2 °F (37.3 °C)] 98.2 °F (36.8 °C)  Pulse:  [73-95] 73  Resp:  [17-25] 17  SpO2:  [93 %-97 %] 97 %  BP: ()/(39-74) 166/74     Weight: 99.8 kg (220 " lb)  Body mass index is 36.61 kg/m².     Physical Exam  Constitutional:       General: She is not in acute distress.     Appearance: She is obese. She is ill-appearing and toxic-appearing. She is not diaphoretic.   HENT:      Head: Normocephalic.      Nose: Nose normal.      Mouth/Throat:      Mouth: Mucous membranes are dry.   Eyes:      Extraocular Movements: Extraocular movements intact.      Pupils: Pupils are equal, round, and reactive to light.   Cardiovascular:      Rate and Rhythm: Normal rate and regular rhythm.      Pulses: Normal pulses.      Heart sounds: Normal heart sounds.   Pulmonary:      Effort: Pulmonary effort is normal. No respiratory distress.      Breath sounds: No wheezing, rhonchi or rales.      Comments: Diminished breath sounds  Chest:      Chest wall: No tenderness.   Abdominal:      General: There is no distension.      Palpations: Abdomen is soft.      Tenderness: There is no abdominal tenderness. There is no right CVA tenderness, left CVA tenderness, guarding or rebound.      Comments: obese   Musculoskeletal:      Cervical back: Neck supple.      Right lower leg: Edema present.      Left lower leg: Edema present.   Skin:     Capillary Refill: Capillary refill takes less than 2 seconds.   Neurological:      Mental Status: She is alert and oriented to person, place, and time.      Motor: Weakness present.      Comments: Generalized weakness  During assessment/interview, I had to constantly tap her to wake her up to get her to focus on answering questions and she was rapidly falling back to sleep during assessment  Lethargic   Psychiatric:      Comments: Flat affect, very limited communication due to somnolence              CRANIAL NERVES     CN III, IV, VI   Pupils are equal, round, and reactive to light.       Significant Labs: All pertinent labs within the past 24 hours have been reviewed.    ABGs:   Recent Labs   Lab 08/29/24  1638   PH 7.277*   PCO2 29.3*   HCO3 13.7*   POCSATURATED  78   BE -13*   PO2 47     Beta hydroxybutyrate is 0.3    Bilirubin:   Recent Labs   Lab 08/29/24  1625   BILITOT 1.2*     CBC:   Recent Labs   Lab 08/29/24  1625   WBC 13.56*   HGB 11.5*   HCT 35.6*   PLT 73*     CMP:   Recent Labs   Lab 08/29/24  1625      K 4.4   *   CO2 11*   *   BUN 46*   CREATININE 2.6*   CALCIUM 8.7   PROT 6.0   ALBUMIN 2.6*   BILITOT 1.2*   ALKPHOS 68   AST 25   ALT 12   ANIONGAP 15     Cardiac Markers:   Recent Labs   Lab 08/29/24  1625   BNP 1,679*     Lactic Acid:   Recent Labs   Lab 08/29/24  1625 08/29/24  1853   LACTATE 6.6* 4.4*   Procalcitonin 62.91    Magnesium:   Recent Labs   Lab 08/29/24  1625   MG 1.7     Troponin:   Recent Labs   Lab 08/29/24  1625   TROPONINI 0.509*     Urine Studies:   Recent Labs   Lab 08/29/24  1649   COLORU Yellow   APPEARANCEUA Cloudy*   PHUR 6.0   SPECGRAV 1.025   PROTEINUA 3+*   GLUCUA 4+*   KETONESU Negative   BILIRUBINUA Negative   OCCULTUA 3+*   NITRITE Negative   UROBILINOGEN Negative   LEUKOCYTESUR 3+*   RBCUA >100*   WBCUA >100*   BACTERIA None   HYALINECASTS 0     Influenza A negative  Influenza B negative  COVID-19 screening negative  Hepatitis-C antibody negative      EKG reviewed -- sinus rhythm first-degree AV block, heart rate 86, LVH, inverted T-waves noted    Significant Imaging: I have reviewed all pertinent imaging results/findings within the past 24 hours.    CT head without contrast:  No intracranial acute hemorrhage or acute focal brain parenchymal abnormality is identified, atrophy is present    CXR reviewed -- cardiomegaly, mild perihilar interstitial opacities, correlate clinically for low-grade or stable CHF, left shoulder prosthesis, subpleural reticular opacities may relate to subsegmental atelectasis versus scarring, right upper lobe with old deformity of posterior lateral left upper rib

## 2024-08-30 NOTE — ASSESSMENT & PLAN NOTE
MIKA is likely due to pre-renal azotemia due to dehydration. Baseline creatinine is  2.0-2.2 . Most recent creatinine and eGFR are listed below.  Recent Labs     08/29/24  1625   CREATININE 2.6*   EGFRNORACEVR 19*      Plan  - MIKA is worsening. Will adjust treatment as follows: gentle hydration with IV fluids  - Avoid nephrotoxins and renally dose meds for GFR listed above  - Monitor urine output, serial BMP, and adjust therapy as needed

## 2024-08-30 NOTE — ASSESSMENT & PLAN NOTE
Patient is identified as having Diastolic (HFpEF) heart failure that is Chronic. CHF is currently controlled. Latest ECHO performed and demonstrates- Results for orders placed during the hospital encounter of 06/26/24    Echo    Interpretation Summary    Left Ventricle: The left ventricle is normal in size. Normal wall thickness. There is concentric hypertrophy. Normal wall motion. Ejection fraction by visual approximation is 60%. Grade II diastolic dysfunction.    Right Ventricle: Normal right ventricular cavity size. Wall thickness is normal. Systolic function is normal.    Left Atrium: Left atrium is severely dilated.    Aortic Valve: The aortic valve is a trileaflet valve. Mildly restricted motion. There is moderate stenosis. Aortic valve area by VTI is 1.24 cm². Aortic valve peak velocity is 2.66 m/s. Mean gradient is 17 mmHg. The dimensionless index is 0.45.    Mitral Valve: There is moderate mitral annular calcification present. Mildly restricted motion. There is mild to moderate regurgitation.  . Continue to monitor clinical status closely. Monitor on telemetry. Patient is off CHF pathway.  Monitor strict Is&Os and daily weights.  Place on fluid restriction of 1.5 L. Cardiology has not been consulted. Continue to stress to patient importance of self efficacy and  on diet for CHF. Last BNP reviewed- and noted below   Recent Labs   Lab 08/29/24  1625   BNP 1,679*   - s/p 40 mg Lasix  - no wheezing, on room air, no dyspnea, no peripheral edema, CXR unchanged after IVF

## 2024-08-30 NOTE — ASSESSMENT & PLAN NOTE
Chronic, controlled. Latest blood pressure and vitals reviewed-     Temp:  [98.2 °F (36.8 °C)-99.2 °F (37.3 °C)]   Pulse:  [73-95]   Resp:  [17-25]   BP: ()/(39-79)   SpO2:  [93 %-98 %] .   Home meds for hypertension were reviewed and noted below.   Hypertension Medications               amLODIPine (NORVASC) 10 MG tablet TAKE 1 TABLET(10 MG) BY MOUTH EVERY DAY    metoprolol succinate (TOPROL-XL) 25 MG 24 hr tablet Take 0.5 tablets (12.5 mg total) by mouth once daily.    chlorthalidone (HYGROTEN) 25 MG Tab Take 25 mg by mouth once daily.    hydrALAZINE (APRESOLINE) 25 MG tablet Take 1 tablet (25 mg total) by mouth every 8 (eight) hours.    perindopril erbumine (ACEON) 4 mg tablet Take 2 mg by mouth once daily.    spironolactone (ALDACTONE) 25 MG tablet Take 25 mg by mouth.            While in the hospital, will manage blood pressure as follows; HOLDING regimen for now due to hypotension/severe sepsis    Will utilize p.r.n. blood pressure medication only if patient's blood pressure greater than 180/110 and she develops symptoms such as worsening chest pain or shortness of breath.

## 2024-08-30 NOTE — PLAN OF CARE
[facility] - Intensive Care (Hospital)  Initial Discharge Assessment       Primary Care Provider: Keely Silva NP    Admission Diagnosis: NSTEMI (non-ST elevated myocardial infarction) [I21.4]  MIKA (acute kidney injury) [N17.9]  Urinary tract infection with hematuria, site unspecified [N39.0, R31.9]  Heart failure, unspecified HF chronicity, unspecified heart failure type [I50.9]  AMS (altered mental status) [R41.82]  Sepsis, due to unspecified organism, unspecified whether acute organ dysfunction present [A41.9]  Severe sepsis [A41.9, R65.20]    Admission Date: 8/29/2024  Expected Discharge Date:     Transition of Care Barriers: None    Payor: BCBS [place] MEDICARE / Plan: [employer] [place] / Product Type: Medicare Advantage /     Extended Emergency Contact Information  Primary Emergency Contact: Dillon Campbell  Address: [address] [place] [address] [place] [address] [place]  Mobile Phone: [phone]  Relation: Spouse    Discharge Plan A: Home Health         [id number] - [facility], [place] - [address] AT [address]  [address]  [place] [facility] [address]  Phone: [phone] Fax: [phone]    [id number] [place] - [address] AT [facility] [address] [address]  [place] [address]  Phone: [phone] Fax: [phone]      Initial Assessment (most recent)       Adult Discharge Assessment - 08/30/24 1008          Discharge Assessment    Assessment Type Discharge Planning Assessment     Confirmed/corrected address, phone number and insurance Yes     Confirmed Demographics Correct on Facesheet     Source of Information patient     When was your last doctors appointment? 08/07/24     Communicated KYA with patient/caregiver Date not available/Unable to determine     Reason For Admission severe sepsis     People in Home spouse     Facility Arrived From: home     Do you expect to return to your current  living situation? Yes     Do you have help at home or someone to help you manage your care at home? Yes     Who are your caregiver(s) and their phone number(s)? spouseLakshmi     Prior to hospitilization cognitive status: Alert/Oriented     Current cognitive status: Alert/Oriented     Walking or Climbing Stairs Difficulty no     Dressing/Bathing Difficulty no     Home Accessibility wheelchair accessible     Home Layout Able to live on 1st floor     Equipment Currently Used at Home glucometer     Readmission within 30 days? No     Patient currently being followed by outpatient case management? No     Do you currently have service(s) that help you manage your care at home? Yes     Name and Contact number of agency [facility]     Is the pt/caregiver preference to resume services with current agency Yes     Do you take prescription medications? Yes     Do you have prescription coverage? Yes     Coverage MCR     Do you have any problems affording any of your prescribed medications? No     Is the patient taking medications as prescribed? yes     Who is going to help you get home at discharge? Dillon Melendez     How do you get to doctors appointments? family or friend will provide     Are you on dialysis? No     Do you take coumadin? No     Discharge Plan A Home Health     DME Needed Upon Discharge  none     Discharge Plan discussed with: Patient     Transition of Care Barriers None                   Anticipated DC dispo: home health - current with [facility] but being discharged   Prior Level of Function: independent with ADLs   People in home: spouseDillon     Comments:  CM met with patient at bedside to introduce role and discuss discharge planning. Spouse and family will be help at home and can provide transport at time of discharge. Home O2 through [facility]. Confirmed demographics, insurance, and emergency contacts. CM discharge needs depends on hospital progress. CM will continue following to assist  with other needs. Discharge plan has been determined by review of patient's clinical status, future medical and therapeutic needs, and coverage/benefits for post-acute care in coordination with multidisciplinary team members.

## 2024-08-30 NOTE — H&P
[facility] Medicine  History & Physical    Patient Name: Lakshmi Campbell  MRN: [id number]  Patient Class: IP- Inpatient  Admission Date: 8/29/2024  Attending Physician: Halle Sales MD   Primary Care Provider: Keely Silva NP         Patient information was obtained from spouse/SO, past medical records, and ER records.     Subjective:     Principal Problem:Severe sepsis    Chief Complaint:   Chief Complaint   Patient presents with    General Illness     Patient arrives via [facility] for complaints of lethargy, vomiting, burning during urination, and fevers for two days now.  Patient has no history of UTI and states no one in the household is ill.  Patient denies having any pain or shortness of breath.  [facility] administered 500ml NS and 10mcg of push-dose epi prior to arrival for BP of 82/31.          HPI: No notes on file    Past Medical History:   Diagnosis Date    Acquired TTP     Cataract     CKD stage 3 secondary to diabetes     Closed displaced comminuted fracture of right patella 10/28/2020    Closed fracture of surgical neck of left humerus 10/30/2020    Closed left radial fracture 10/30/2020    Hyperkalemia     Hyperlipidemia     Hypertension     Hypothyroidism, unspecified     Liver cirrhosis secondary to YO     Morbid obesity     Right knee pain     Thyroid disease     Type 2 diabetes mellitus        Past Surgical History:   Procedure Laterality Date    APPENDECTOMY      BREAST BIOPSY      CATARACT EXTRACTION      COLONOSCOPY N/A 12/21/2021    Procedure: COLONOSCOPY screening;  Surgeon: Moises Patterson MD;  Location: [facility];  Service: Endoscopy;  Laterality: N/A;    ESOPHAGOGASTRODUODENOSCOPY N/A 12/21/2021    Procedure: EGD (ESOPHAGOGASTRODUODENOSCOPY) EV screening;  Surgeon: Moises Patterson MD;  Location: [facility];  Service: Endoscopy;  Laterality: N/A;    EYE SURGERY      HERNIA REPAIR      OPEN REDUCTION AND INTERNAL FIXATION (ORIF) OF FRACTURE OF DISTAL RADIUS Left  11/2/2020    Procedure: ORIF, FRACTURE, RADIUS, DISTAL;  Surgeon: Sage Wolf MD;  Location: [facility] OR;  Service: Orthopedics;  Laterality: Left;    OPEN REDUCTION AND INTERNAL FIXATION (ORIF) OF FRACTURE OF PATELLA Right 11/2/2020    Procedure: ORIF, FRACTURE, PATELLA;  Surgeon: Sage Wolf MD;  Location: [facility] OR;  Service: Orthopedics;  Laterality: Right;    OPEN REDUCTION AND INTERNAL FIXATION (ORIF) OF FRACTURE OF PATELLA Right 12/18/2020    Procedure: ORIF, FRACTURE, PATELLA;  Surgeon: Sage Wolf MD;  Location: [facility] OR;  Service: Orthopedics;  Laterality: Right;    ORIF HUMERUS FRACTURE Left 11/5/2020    Procedure: ORIF, FRACTURE, HUMERUS;  Surgeon: Sage Wolf MD;  Location: [facility] OR;  Service: Orthopedics;  Laterality: Left;    PLACEMENT OF ACELLULAR HUMAN DERMAL ALLOGRAFT Right 11/2/2020    Procedure: APPLICATION, ACELLULAR HUMAN DERMAL ALLOGRAFT;  Surgeon: Sage Wolf MD;  Location: [facility] OR;  Service: Orthopedics;  Laterality: Right;  Right Patella    REMOVAL OF HARDWARE FROM HAND Left 3/11/2021    Procedure: REMOVAL, HARDWARE, HAND;  Surgeon: Sage Wolf MD;  Location: [facility] OR;  Service: Orthopedics;  Laterality: Left;  Removal of dorsal spanning wrist plate left distal radius    REMOVAL OF HARDWARE FROM LOWER EXTREMITY Right 12/18/2020    Procedure: REMOVAL, HARDWARE, LOWER EXTREMITY;  Surgeon: Sage Wolf MD;  Location: [facility];  Service: Orthopedics;  Laterality: Right;       Review of patient's allergies indicates:   Allergen Reactions    Codeine Nausea Only     Other reaction(s): Nausea  Other reaction(s): Elevated blood pressure       No current facility-administered medications on file prior to encounter.     Current Outpatient Medications on File Prior to Encounter   Medication Sig    amLODIPine (NORVASC) 10 MG tablet TAKE 1 TABLET(10 MG) BY MOUTH EVERY DAY    metoprolol succinate (TOPROL-XL) 25 MG 24 hr tablet  "Take 0.5 tablets (12.5 mg total) by mouth once daily.    blood sugar diagnostic Strp Use ac bid    chlorthalidone (HYGROTEN) 25 MG Tab Take 25 mg by mouth once daily.    cholecalciferol, vitamin D3, (VITAMIN D3) 50 mcg (2,000 unit) Cap capsule Take 1 capsule by mouth.    HUMULIN 70/30 U-100 KWIKPEN 100 unit/mL (70-30) InPn pen SMARTSI Unit(s) SUB-Q Every Evening    hydrALAZINE (APRESOLINE) 25 MG tablet Take 1 tablet (25 mg total) by mouth every 8 (eight) hours.    insulin syringe-needle U-100 1 mL 29 gauge x 1/2" Syrg Use bid    JARDIANCE 25 mg tablet Take 25 mg by mouth every morning.    lancets 33 gauge Misc Use as BID    levothyroxine (SYNTHROID) 150 MCG tablet Take 1 tablet by mouth once daily.    metFORMIN (GLUCOPHAGE) 500 MG tablet Take 500 mg by mouth 2 (two) times daily.    perindopril erbumine (ACEON) 4 mg tablet Take 2 mg by mouth once daily.    pravastatin (PRAVACHOL) 20 MG tablet Take 20 mg by mouth once daily.     spironolactone (ALDACTONE) 25 MG tablet Take 25 mg by mouth.     Family History       Problem Relation (Age of Onset)    Diabetes Mother, Father    Leukemia Father          Tobacco Use    Smoking status: Former     Current packs/day: 0.00     Average packs/day: 1 pack/day for 8.0 years (8.0 ttl pk-yrs)     Types: Cigarettes     Start date:      Quit date:      Years since quittin.6    Smokeless tobacco: Never   Substance and Sexual Activity    Alcohol use: Yes     Comment: rarely    Drug use: No    Sexual activity: Not Currently     Review of Systems   Reason unable to perform ROS: limited due to patient participation.   Constitutional:  Positive for activity change, appetite change, chills and fatigue. Negative for diaphoresis and fever.   HENT: Negative.     Eyes: Negative.    Respiratory: Negative.  Negative for cough, shortness of breath and wheezing.    Cardiovascular:  Positive for leg swelling. Negative for chest pain and palpitations.   Gastrointestinal: Negative.  " "Negative for abdominal distention, abdominal pain, diarrhea, nausea and vomiting.   Endocrine: Negative.    Genitourinary:  Positive for dysuria and urgency.        "Burning"   Musculoskeletal: Negative.    Skin: Negative.    Neurological:  Positive for syncope and weakness. Negative for dizziness, seizures, speech difficulty, light-headedness and headaches.   Psychiatric/Behavioral: Negative.     All other systems reviewed and are negative.    Objective:     Vital Signs (Most Recent):  Temp: 98.2 °F (36.8 °C) (08/29/24 2130)  Pulse: 73 (08/29/24 2143)  Resp: 17 (08/29/24 2100)  BP: (!) 166/74 (08/29/24 2100)  SpO2: 97 % (08/29/24 2100) Vital Signs (24h Range):  Temp:  [98.2 °F (36.8 °C)-99.2 °F (37.3 °C)] 98.2 °F (36.8 °C)  Pulse:  [73-95] 73  Resp:  [17-25] 17  SpO2:  [93 %-97 %] 97 %  BP: ()/(39-74) 166/74     Weight: 99.8 kg (220 lb)  Body mass index is 36.61 kg/m².     Physical Exam  Constitutional:       General: She is not in acute distress.     Appearance: She is obese. She is ill-appearing and toxic-appearing. She is not diaphoretic.   HENT:      Head: Normocephalic.      Nose: Nose normal.      Mouth/Throat:      Mouth: Mucous membranes are dry.   Eyes:      Extraocular Movements: Extraocular movements intact.      Pupils: Pupils are equal, round, and reactive to light.   Cardiovascular:      Rate and Rhythm: Normal rate and regular rhythm.      Pulses: Normal pulses.      Heart sounds: Normal heart sounds.   Pulmonary:      Effort: Pulmonary effort is normal. No respiratory distress.      Breath sounds: No wheezing, rhonchi or rales.      Comments: Diminished breath sounds  Chest:      Chest wall: No tenderness.   Abdominal:      General: There is no distension.      Palpations: Abdomen is soft.      Tenderness: There is no abdominal tenderness. There is no right CVA tenderness, left CVA tenderness, guarding or rebound.      Comments: obese   Musculoskeletal:      Cervical back: Neck supple.      " Right lower leg: Edema present.      Left lower leg: Edema present.   Skin:     Capillary Refill: Capillary refill takes less than 2 seconds.   Neurological:      Mental Status: She is alert and oriented to person, place, and time.      Motor: Weakness present.      Comments: Generalized weakness  During assessment/interview, I had to constantly tap her to wake her up to get her to focus on answering questions and she was rapidly falling back to sleep during assessment  Lethargic   Psychiatric:      Comments: Flat affect, very limited communication due to somnolence              CRANIAL NERVES     CN III, IV, VI   Pupils are equal, round, and reactive to light.       Significant Labs: All pertinent labs within the past 24 hours have been reviewed.    ABGs:   Recent Labs   Lab 08/29/24  1638   PH 7.277*   PCO2 29.3*   HCO3 13.7*   POCSATURATED 78   BE -13*   PO2 47     Beta hydroxybutyrate is 0.3    Bilirubin:   Recent Labs   Lab 08/29/24  1625   BILITOT 1.2*     CBC:   Recent Labs   Lab 08/29/24  1625   WBC 13.56*   HGB 11.5*   HCT 35.6*   PLT 73*     CMP:   Recent Labs   Lab 08/29/24  1625      K 4.4   *   CO2 11*   *   BUN 46*   CREATININE 2.6*   CALCIUM 8.7   PROT 6.0   ALBUMIN 2.6*   BILITOT 1.2*   ALKPHOS 68   AST 25   ALT 12   ANIONGAP 15     Cardiac Markers:   Recent Labs   Lab 08/29/24  1625   BNP 1,679*     Lactic Acid:   Recent Labs   Lab 08/29/24  1625 08/29/24  1853   LACTATE 6.6* 4.4*   Procalcitonin 62.91    Magnesium:   Recent Labs   Lab 08/29/24  1625   MG 1.7     Troponin:   Recent Labs   Lab 08/29/24  1625   TROPONINI 0.509*     Urine Studies:   Recent Labs   Lab 08/29/24  1649   COLORU Yellow   APPEARANCEUA Cloudy*   PHUR 6.0   SPECGRAV 1.025   PROTEINUA 3+*   GLUCUA 4+*   KETONESU Negative   BILIRUBINUA Negative   OCCULTUA 3+*   NITRITE Negative   UROBILINOGEN Negative   LEUKOCYTESUR 3+*   RBCUA >100*   WBCUA >100*   BACTERIA None   HYALINECASTS 0     Influenza A  "negative  Influenza B negative  COVID-19 screening negative  Hepatitis-C antibody negative      EKG reviewed -- sinus rhythm first-degree AV block, heart rate 86, LVH, inverted T-waves noted    Significant Imaging: I have reviewed all pertinent imaging results/findings within the past 24 hours.    CT head without contrast:  No intracranial acute hemorrhage or acute focal brain parenchymal abnormality is identified, atrophy is present    CXR reviewed -- cardiomegaly, mild perihilar interstitial opacities, correlate clinically for low-grade or stable CHF, left shoulder prosthesis, subpleural reticular opacities may relate to subsegmental atelectasis versus scarring, right upper lobe with old deformity of posterior lateral left upper rib    CT chest/abdomen/pelvis ordered and is pending    Assessment/Plan:     * Severe sepsis  This patient does have evidence of infective focus  My overall impression is sepsis.  Source: Urinary Tract  Antibiotics given-   Antibiotics (72h ago, onward)      Start     Stop Route Frequency Ordered    08/30/24 1730  ceFEPIme (MAXIPIME) 2 g in D5W 100 mL IVPB (MB+)         -- IV Every 24 hours (non-standard times) 08/29/24 2059 08/29/24 1816  vancomycin - pharmacy to dose  (vancomycin IVPB (PEDS and ADULTS))        Placed in "And" Linked Group    -- IV pharmacy to manage frequency 08/29/24 1717          Initial lactic acid 6.6  Latest lactate reviewed-  Recent Labs   Lab 08/29/24  1853   LACTATE 4.4*    (After fluids given)    Organ dysfunction indicated by Acute kidney injury, Encephalopathy, and Thrombocytopenia     Fluid challenge Contraindicated- Fluid bolus is contraindicated in this patient due to Congestive Heart Failure Partial fluid bolus given    Post- resuscitation assessment Yes Perfusion exam was performed within 6 hours of septic shock presentation after bolus shows Adequate tissue perfusion assessed by non-invasive monitoring       Will Not start Pressors- BP has improved " with IV fluids  Source control achieved by: Broad spectrum antibiotics    Acute cystitis with hematuria  Vanc and cefepime ordered  Follow-up urine culture reports, follow blood culture reports      Acute on chronic kidney failure  MIKA is likely due to pre-renal azotemia due to dehydration. Baseline creatinine is  2.0-2.2 . Most recent creatinine and eGFR are listed below.  Recent Labs     08/29/24  1625   CREATININE 2.6*   EGFRNORACEVR 19*      Plan  - MIKA is worsening. Will adjust treatment as follows: gentle hydration with IV fluids  - Avoid nephrotoxins and renally dose meds for GFR listed above  - Monitor urine output, serial BMP, and adjust therapy as needed      Type 2 diabetes mellitus with stage 3b chronic kidney disease, with long-term current use of insulin  Creatine stable for now. BMP reviewed- noted Estimated Creatinine Clearance: 22.5 mL/min (A) (based on SCr of 2.6 mg/dL (H)). according to latest data. Based on current GFR, CKD stage is stage 3 - GFR 30-59.  Monitor UOP and serial BMP and adjust therapy as needed. Renally dose meds. Avoid nephrotoxic medications and procedures    Serial glucose checks ordered every 6 hours with SSI, check Hgb A1C.    Chronic diastolic congestive heart failure  Patient is identified as having Diastolic (HFpEF) heart failure that is Chronic. CHF is currently controlled. Latest ECHO performed and demonstrates- Results for orders placed during the hospital encounter of 06/26/24    Echo    Interpretation Summary    Left Ventricle: The left ventricle is normal in size. Normal wall thickness. There is concentric hypertrophy. Normal wall motion. Ejection fraction by visual approximation is 60%. Grade II diastolic dysfunction.    Right Ventricle: Normal right ventricular cavity size. Wall thickness is normal. Systolic function is normal.    Left Atrium: Left atrium is severely dilated.    Aortic Valve: The aortic valve is a trileaflet valve. Mildly restricted motion. There is  moderate stenosis. Aortic valve area by VTI is 1.24 cm². Aortic valve peak velocity is 2.66 m/s. Mean gradient is 17 mmHg. The dimensionless index is 0.45.    Mitral Valve: There is moderate mitral annular calcification present. Mildly restricted motion. There is mild to moderate regurgitation.  . Medications are on hold due to hypotension and encephalopathy, and monitor clinical status closely. Monitor on telemetry. Patient is off CHF pathway.  Monitor strict Is&Os and daily weights.   Cardiology has not been consulted. Continue to stress to patient importance of self efficacy and  on diet for CHF. Last BNP reviewed- and noted below   Recent Labs   Lab 08/29/24  1625   BNP 1,679*   Though BNP is elevated and there is some evidence of fluid on CXR -- she appears dry/dehydrated and due to severe sepsis she is requiring IV fluids at this time. Will monitor fluid volume status closely.    Liver cirrhosis secondary to YO  Patient with known Cirrhosis Co-morbidities are present and inclusive of anemia/pancytopenia.  MELD-Na score calculated; MELD 3.0: 14 at 7/2/2024  6:24 AM  MELD-Na: 11 at 7/2/2024  6:24 AM  Calculated from:  Serum Creatinine: 1.6 mg/dL at 7/2/2024  6:24 AM  Serum Sodium: 138 mmol/L (Using max of 137 mmol/L) at 7/2/2024  6:24 AM  Total Bilirubin: 0.5 mg/dL (Using min of 1 mg/dL) at 7/2/2024  6:24 AM  Serum Albumin: 2.2 g/dL at 7/2/2024  6:24 AM  INR(ratio): 1.0 at 7/1/2024  6:06 AM  Age at listing (hypothetical): 73 years  Sex: Female at 7/2/2024  6:24 AM      Continue chronic meds. Etiology likely NAFLD. Will avoid any hepatotoxic meds, and monitor CBC/CMP/INR for synthetic function.     Thrombocytopenia  The likely etiology of thrombocytopenia is sepsis and liver disease. The patients 3 most recent labs are listed below.  Recent Labs     08/29/24  1625   PLT 73*     Plan  - Will transfuse if platelet count is <50k (if undergoing surgical procedure or have active bleeding).  - monitor   platelet counts      Class 2 severe obesity due to excess calories with serious comorbidity and body mass index (BMI) of 36.0 to 36.9 in adult  Body mass index is 36.61 kg/m². Morbid obesity complicates all aspects of disease management from diagnostic modalities to treatment. Weight loss encouraged and health benefits explained to patient.         Essential hypertension  Chronic, controlled. Latest blood pressure and vitals reviewed-     Temp:  [98.2 °F (36.8 °C)-99.2 °F (37.3 °C)]   Pulse:  [73-95]   Resp:  [17-25]   BP: ()/(39-79)   SpO2:  [93 %-98 %] .   Home meds for hypertension were reviewed and noted below.   Hypertension Medications               amLODIPine (NORVASC) 10 MG tablet TAKE 1 TABLET(10 MG) BY MOUTH EVERY DAY    metoprolol succinate (TOPROL-XL) 25 MG 24 hr tablet Take 0.5 tablets (12.5 mg total) by mouth once daily.    chlorthalidone (HYGROTEN) 25 MG Tab Take 25 mg by mouth once daily.    hydrALAZINE (APRESOLINE) 25 MG tablet Take 1 tablet (25 mg total) by mouth every 8 (eight) hours.    perindopril erbumine (ACEON) 4 mg tablet Take 2 mg by mouth once daily.    spironolactone (ALDACTONE) 25 MG tablet Take 25 mg by mouth.            While in the hospital, will manage blood pressure as follows; HOLDING regimen for now due to hypotension/severe sepsis    Will utilize p.r.n. blood pressure medication only if patient's blood pressure greater than 180/110 and she develops symptoms such as worsening chest pain or shortness of breath.    Hypothyroidism  Resume Synthroid when no longer NPO        VTE Risk Mitigation (From admission, onward)           Ordered     enoxaparin injection 30 mg  Every 24 hours         08/29/24 2141     IP VTE HIGH RISK PATIENT  Once         08/29/24 2141     Place sequential compression device  Until discontinued         08/29/24 2141                NO Lovenox due to thrombocytopenia    Pharmacist Renal Dose Adjustment Note    Lakshmi Campbell is a 73 y.o. female being  treated with the medication enoxaparin    Patient Data:    Vital Signs (Most Recent):  Temp: 98.2 °F (36.8 °C) (08/29/24 2130)  Pulse: 73 (08/29/24 2100)  Resp: 17 (08/29/24 2100)  BP: (!) 166/74 (08/29/24 2100)  SpO2: 97 % (08/29/24 2100) Vital Signs (72h Range):  Temp:  [98.2 °F (36.8 °C)-99.2 °F (37.3 °C)]   Pulse:  [73-95]   Resp:  [17-25]   BP: ()/(39-74)   SpO2:  [93 %-97 %]      Recent Labs   Lab 08/29/24  1625   CREATININE 2.6*     Serum creatinine: 2.6 mg/dL (H) 08/29/24 1625  Estimated creatinine clearance: 22.5 mL/min (A)    Enoxaparin 40 mg q24h will be changed to enoxaparin 30 mg q12h for CrCl <30 mL/min.     Pharmacist's Name: Katiuska Gonzáles  Pharmacist's Extension: [phone]       Case discussed with critical care team (Carline Key NP) who feels that patient is stable for floor admission, also discussed with Dr. Halle Washington NP  Department of Hospital Medicine  '[facility]

## 2024-08-30 NOTE — PROGRESS NOTES
Pharmacist Renal Dose Adjustment Note    Lakshmi Campbell is a 73 y.o. female being treated with the medication enoxaparin    Patient Data:    Vital Signs (Most Recent):  Temp: 98.2 °F (36.8 °C) (08/29/24 2130)  Pulse: 73 (08/29/24 2100)  Resp: 17 (08/29/24 2100)  BP: (!) 166/74 (08/29/24 2100)  SpO2: 97 % (08/29/24 2100) Vital Signs (72h Range):  Temp:  [98.2 °F (36.8 °C)-99.2 °F (37.3 °C)]   Pulse:  [73-95]   Resp:  [17-25]   BP: ()/(39-74)   SpO2:  [93 %-97 %]      Recent Labs   Lab 08/29/24  1625   CREATININE 2.6*     Serum creatinine: 2.6 mg/dL (H) 08/29/24 1625  Estimated creatinine clearance: 22.5 mL/min (A)    Enoxaparin 40 mg q24h will be changed to enoxaparin 30 mg q12h for CrCl <30 mL/min.     Pharmacist's Name: Katiuska Gonzáles  Pharmacist's Extension: [phone]

## 2024-08-30 NOTE — SUBJECTIVE & OBJECTIVE
Past Medical History:   Diagnosis Date    Acquired TTP     Cataract     CKD stage 3 secondary to diabetes     Closed displaced comminuted fracture of right patella 10/28/2020    Closed fracture of surgical neck of left humerus 10/30/2020    Closed left radial fracture 10/30/2020    Hyperkalemia     Hyperlipidemia     Hypertension     Hypothyroidism, unspecified     Liver cirrhosis secondary to YO     Morbid obesity     Right knee pain     Thyroid disease     Type 2 diabetes mellitus        Past Surgical History:   Procedure Laterality Date    APPENDECTOMY      BREAST BIOPSY      CATARACT EXTRACTION      COLONOSCOPY N/A 12/21/2021    Procedure: COLONOSCOPY screening;  Surgeon: Moises Patterson MD;  Location: [facility];  Service: Endoscopy;  Laterality: N/A;    ESOPHAGOGASTRODUODENOSCOPY N/A 12/21/2021    Procedure: EGD (ESOPHAGOGASTRODUODENOSCOPY) EV screening;  Surgeon: Moises Patterson MD;  Location: [facility];  Service: Endoscopy;  Laterality: N/A;    EYE SURGERY      HERNIA REPAIR      OPEN REDUCTION AND INTERNAL FIXATION (ORIF) OF FRACTURE OF DISTAL RADIUS Left 11/2/2020    Procedure: ORIF, FRACTURE, RADIUS, DISTAL;  Surgeon: Sage Wolf MD;  Location: [facility];  Service: Orthopedics;  Laterality: Left;    OPEN REDUCTION AND INTERNAL FIXATION (ORIF) OF FRACTURE OF PATELLA Right 11/2/2020    Procedure: ORIF, FRACTURE, PATELLA;  Surgeon: Sage Wolf MD;  Location: [facility] OR;  Service: Orthopedics;  Laterality: Right;    OPEN REDUCTION AND INTERNAL FIXATION (ORIF) OF FRACTURE OF PATELLA Right 12/18/2020    Procedure: ORIF, FRACTURE, PATELLA;  Surgeon: Sage Wolf MD;  Location: [facility] OR;  Service: Orthopedics;  Laterality: Right;    ORIF HUMERUS FRACTURE Left 11/5/2020    Procedure: ORIF, FRACTURE, HUMERUS;  Surgeon: Sage Wolf MD;  Location: [facility] OR;  Service: Orthopedics;  Laterality: Left;    PLACEMENT OF ACELLULAR HUMAN DERMAL ALLOGRAFT Right 11/2/2020     Procedure: APPLICATION, ACELLULAR HUMAN DERMAL ALLOGRAFT;  Surgeon: Sage Wolf MD;  Location: [facility] OR;  Service: Orthopedics;  Laterality: Right;  Right Patella    REMOVAL OF HARDWARE FROM HAND Left 3/11/2021    Procedure: REMOVAL, HARDWARE, HAND;  Surgeon: Sage Wolf MD;  Location: [facility] OR;  Service: Orthopedics;  Laterality: Left;  Removal of dorsal spanning wrist plate left distal radius    REMOVAL OF HARDWARE FROM LOWER EXTREMITY Right 2020    Procedure: REMOVAL, HARDWARE, LOWER EXTREMITY;  Surgeon: Sage Wolf MD;  Location: [facility] OR;  Service: Orthopedics;  Laterality: Right;       Review of patient's allergies indicates:   Allergen Reactions    Codeine Nausea Only     Other reaction(s): Nausea  Other reaction(s): Elevated blood pressure       Family History       Problem Relation (Age of Onset)    Diabetes Mother, Father    Leukemia Father          Tobacco Use    Smoking status: Former     Current packs/day: 0.00     Average packs/day: 1 pack/day for 8.0 years (8.0 ttl pk-yrs)     Types: Cigarettes     Start date:      Quit date:      Years since quittin.6    Smokeless tobacco: Never   Substance and Sexual Activity    Alcohol use: Yes     Comment: rarely    Drug use: No    Sexual activity: Not Currently         Review of Systems   Constitutional:  Positive for fever.        Subjective fevers at home, no fever here   HENT: Negative.     Eyes: Negative.    Respiratory: Negative.     Cardiovascular: Negative.    Gastrointestinal: Negative.    Endocrine: Negative.    Genitourinary:  Positive for decreased urine volume.   Musculoskeletal:  Positive for back pain.   Skin: Negative.    Allergic/Immunologic: Negative.    Neurological: Negative.    Hematological: Negative.    Psychiatric/Behavioral: Negative.       Objective:     Vital Signs (Most Recent):  Temp: 99.3 °F (37.4 °C) (24)  Pulse: (!) 117 (24 0001)  Resp: 20 (24)  BP:  (!) 191/86 (08/30/24 0035)  SpO2: (!) 94 % (08/29/24 2359) Vital Signs (24h Range):  Temp:  [98.2 °F (36.8 °C)-99.3 °F (37.4 °C)] 99.3 °F (37.4 °C)  Pulse:  [] 117  Resp:  [17-25] 20  SpO2:  [93 %-98 %] 94 %  BP: ()/(39-86) 191/86     Weight: 103.4 kg (227 lb 15.3 oz)  Body mass index is 37.93 kg/m².      Intake/Output Summary (Last 24 hours) at 8/30/2024 0415  Last data filed at 8/30/2024 0323  Gross per 24 hour   Intake 1547.51 ml   Output 325 ml   Net 1222.51 ml        Physical Exam  Vitals and nursing note reviewed.   Constitutional:       General: She is awake. She is in acute distress.      Appearance: She is morbidly obese. She is ill-appearing.   HENT:      Head: Normocephalic and atraumatic.      Nose: Nose normal.      Mouth/Throat:      Lips: Pink.      Mouth: Mucous membranes are dry.   Eyes:      Extraocular Movements: Extraocular movements intact.      Conjunctiva/sclera: Conjunctivae normal.      Pupils: Pupils are equal, round, and reactive to light.   Cardiovascular:      Rate and Rhythm: Normal rate and regular rhythm.      Pulses: Normal pulses.      Heart sounds: S1 normal and S2 normal. Murmur heard.      Systolic murmur is present.   Pulmonary:      Effort: Pulmonary effort is normal. No tachypnea, prolonged expiration or respiratory distress.      Breath sounds: Normal breath sounds and air entry.      Comments: Some increased WOB and mild tachypnea on exertion, but settled back out and remained free of dyspnea complaint.  Abdominal:      General: Bowel sounds are normal. There is no distension.      Palpations: Abdomen is soft. There is no fluid wave.      Tenderness: There is no abdominal tenderness. There is no right CVA tenderness, left CVA tenderness, guarding or rebound.   Musculoskeletal:      Cervical back: Normal range of motion and neck supple.      Right lower leg: No edema.      Left lower leg: No edema.   Skin:     General: Skin is warm and dry.      Capillary Refill:  Capillary refill takes less than 2 seconds.      Findings: No petechiae.   Neurological:      General: No focal deficit present.      Mental Status: She is alert.      GCS: GCS eye subscore is 4. GCS verbal subscore is 5. GCS motor subscore is 6.      Cranial Nerves: Cranial nerves 2-12 are intact.   Psychiatric:         Behavior: Behavior normal. Behavior is cooperative.          Vents:       Lines/Drains/Airways       Drain  Duration                  Urethral Catheter 08/30/24 0015 16 Fr. <1 day              Peripheral Intravenous Line  Duration                  Peripheral IV - Single Lumen 08/29/24 1734 20 G Distal;Posterior;Right Forearm <1 day         Peripheral IV - Single Lumen 08/30/24 0355 20 G Anterior;Right Forearm <1 day                    Significant Labs:    CBC/Anemia Profile:  Recent Labs   Lab 08/29/24  1625 08/30/24  0044   WBC 13.56*  --    HGB 11.5*  --    HCT 35.6* 33*   PLT 73*  --    MCV 90  --    RDW 13.1  --         Chemistries:  Recent Labs   Lab 08/29/24  1625 08/29/24  2156 08/30/24  0133    136 139   K 4.4 4.5 4.1   * 109 111*   CO2 11* 16* 13*   BUN 46* 48* 51*   CREATININE 2.6* 2.6* 2.8*   CALCIUM 8.7 8.3* 8.5*   ALBUMIN 2.6* 2.6* 2.4*   PROT 6.0 6.0 5.7*   BILITOT 1.2* 1.1* 0.9   ALKPHOS 68 59 108   ALT 12 14 14   AST 25 30 37   MG 1.7  --   --    PHOS 2.2*  --   --      Lactic Acid:   Recent Labs   Lab 08/29/24  1853 08/29/24  2156 08/30/24  0133   LACTATE 4.4* 4.3* 8.1*     Troponin:   Recent Labs   Lab 08/29/24  1625   TROPONINI 0.509*   BNP  Recent Labs   Lab 08/29/24  1625   BNP 1,679*     Urine Studies:   Recent Labs   Lab 08/29/24  1649   COLORU Yellow   APPEARANCEUA Cloudy*   PHUR 6.0   SPECGRAV 1.025   PROTEINUA 3+*   GLUCUA 4+*   KETONESU Negative   BILIRUBINUA Negative   OCCULTUA 3+*   NITRITE Negative   UROBILINOGEN Negative   LEUKOCYTESUR 3+*   RBCUA >100*   WBCUA >100*   BACTERIA None   HYALINECASTS 0     All pertinent labs within the past 24 hours have been  reviewed.    Significant Imaging:   I have reviewed all pertinent imaging results/findings within the past 24 hours.

## 2024-08-30 NOTE — ASSESSMENT & PLAN NOTE
Chronic, controlled. Latest blood pressure and vitals reviewed-     Temp:  [98.2 °F (36.8 °C)-99.3 °F (37.4 °C)]   Pulse:  []   Resp:  [17-25]   BP: ()/(39-86)   SpO2:  [93 %-98 %] .   Home meds for hypertension were reviewed and noted below.   Hypertension Medications               amLODIPine (NORVASC) 10 MG tablet TAKE 1 TABLET(10 MG) BY MOUTH EVERY DAY    chlorthalidone (HYGROTEN) 25 MG Tab Take 25 mg by mouth once daily.    hydrALAZINE (APRESOLINE) 25 MG tablet Take 1 tablet (25 mg total) by mouth every 8 (eight) hours.    metoprolol succinate (TOPROL-XL) 25 MG 24 hr tablet Take 0.5 tablets (12.5 mg total) by mouth once daily.    perindopril erbumine (ACEON) 4 mg tablet Take 2 mg by mouth once daily.    spironolactone (ALDACTONE) 25 MG tablet Take 25 mg by mouth.            While in the hospital, will manage blood pressure as follows; Adjust home antihypertensive regimen as follows- continue amlodipine, add additional home meds as needed    Will utilize p.r.n. blood pressure medication only if patient's blood pressure greater than 160/100 and she develops symptoms such as worsening chest pain or shortness of breath.

## 2024-08-30 NOTE — ASSESSMENT & PLAN NOTE
Chronic, controlled. Latest blood pressure and vitals reviewed-     Temp:  [97.8 °F (36.6 °C)-99.3 °F (37.4 °C)]   Pulse:  []   Resp:  [17-30]   BP: ()/(39-86)   SpO2:  [93 %-98 %] .   Home meds for hypertension were reviewed and noted below.   Hypertension Medications               amLODIPine (NORVASC) 10 MG tablet TAKE 1 TABLET(10 MG) BY MOUTH EVERY DAY    metoprolol succinate (TOPROL-XL) 25 MG 24 hr tablet Take 0.5 tablets (12.5 mg total) by mouth once daily.    chlorthalidone (HYGROTEN) 25 MG Tab Take 25 mg by mouth once daily.    hydrALAZINE (APRESOLINE) 25 MG tablet Take 1 tablet (25 mg total) by mouth every 8 (eight) hours.    perindopril erbumine (ACEON) 4 mg tablet Take 2 mg by mouth once daily.    spironolactone (ALDACTONE) 25 MG tablet Take 25 mg by mouth.            While in the hospital, will manage blood pressure as follows; HOLDING regimen for now due to hypotension/severe sepsis    Will utilize p.r.n. blood pressure medication only if patient's blood pressure greater than 180/110 and she develops symptoms such as worsening chest pain or shortness of breath.

## 2024-08-30 NOTE — ASSESSMENT & PLAN NOTE
This patient does have evidence of infective focus  My overall impression is sepsis.  Source: Urinary Tract and bacteremia  Antibiotics given-   Antibiotics (72h ago, onward)      Start     Stop Route Frequency Ordered    08/30/24 1615  meropenem (MERREM) 1 g in 0.9% NaCl 100 mL IVPB (MB+)         -- IV Every 12 hours (non-standard times) 08/30/24 1514    08/30/24 0900  mupirocin 2 % ointment         09/04/24 0859 Nasl 2 times daily 08/30/24 0255          Initial lactic acid 6.6  Latest lactate reviewed-  Recent Labs   Lab 08/30/24  1238   LACTATE 2.0      (After fluids given)    Organ dysfunction indicated by Acute kidney injury, Encephalopathy, and Thrombocytopenia     Fluid challenge Contraindicated- Fluid bolus is contraindicated in this patient due to Congestive Heart Failure Partial fluid bolus given    Post- resuscitation assessment Yes Perfusion exam was performed within 6 hours of septic shock presentation after bolus shows Adequate tissue perfusion assessed by non-invasive monitoring       Will Not start Pressors- BP has improved with IV fluids  Source control achieved by: Broad spectrum antibiotics

## 2024-08-30 NOTE — ASSESSMENT & PLAN NOTE
The likely etiology of thrombocytopenia is sepsis and liver disease. The patients 3 most recent labs are listed below.  Recent Labs     08/29/24  1625   PLT 73*     Plan  - Will transfuse if platelet count is <50k (if undergoing surgical procedure or have active bleeding).  - monitor platelet counts/CBC/LFT's  - holding anticoagulation for now

## 2024-08-30 NOTE — SIGNIFICANT EVENT
2355  Patient c/o back pain and nursing staff reports that she has some wheezing and appears to have labored respirations.  Holding off on pain medication at this time, due to change in respiratory status.    I went to bedside and assessed patient.  She now has HR up to 120 and BP rising up to 208/83.  She continues to deny feeling short of breath, but she does have pursed lip breathing and 32/min.  Lungs are diminished with some mild expiratory wheezing.  No urine output has been documented, despite having at least 1500 ml IV fluids (she was given 500 ml per EMS, then two 500 ml fluid bolus while in ED). IV fluids have been discontinued.      STAT CXR ordered  STAT ABG ordered  STAT lactic acid and CMP ordered  Will now likely require IV Lasix  Hassan catheter ordered due to urinary retention    Follow-up at 0045:  Mrs Campbell presents quite a challenge for the staff on the [facility] floor, as she has severe sepsis with metabolic acidosis, lactic acidosis and very susceptible to fluid overload as she has grade II diastolic dysfunction. It appears at this time that she is becoming fluid overloaded -- IV Lasix ordered, very concerning as lactic acid still greater than 4 at last check around 2200      Recent Labs     08/30/24  0044   PH 7.229*   PCO2 26.6*   PO2 69*   HCO3 11.1*   POCSATURATED 90   BE -16*     ABG and metabolic acidosis is worsening.  Bicarb IV push ordered as well.  Lactic acid and repeat chemistry still pending

## 2024-08-30 NOTE — ASSESSMENT & PLAN NOTE
The likely etiology of thrombocytopenia is sepsis and liver disease. The patients 3 most recent labs are listed below.  Recent Labs     08/29/24  1625 08/30/24  0609   PLT 73* 45*       Plan  - Will transfuse if platelet count is <50k (if undergoing surgical procedure or have active bleeding).  Chronic but worsening  Follows hem/onc outpatient   Peripheral smear pending  Ddx TMA (has anemia, thrombocytopenia, and elevated creatinine from baseline)

## 2024-08-30 NOTE — H&P
[facility] Medicine  History & Physical    Patient Name: Lakshmi Campbell  MRN: [id number]  Patient Class: IP- Inpatient  Admission Date: 8/29/2024  Attending Physician: Halle Sales MD   Primary Care Provider: Keely Silva NP         Patient information was obtained from spouse/SO, past medical records, and ER records.     Subjective:     Principal Problem:Severe sepsis    Chief Complaint:   Chief Complaint   Patient presents with    General Illness     Patient arrives via [facility] for complaints of lethargy, vomiting, burning during urination, and fevers for two days now.  Patient has no history of UTI and states no one in the household is ill.  Patient denies having any pain or shortness of breath.  [facility] administered 500ml NS and 10mcg of push-dose epi prior to arrival for BP of 82/31.          HPI:   Patient is a 73-year-old female with past medical history significant for diabetes, liver cirrhosis secondary to YO, hypothyroidism, hypertension, hyperlipidemia, CKD stage 3, thrombocytopenia, cataracts, hyperkalemia, and morbid obesity who presented to ED with complaints of lethargy, fever, and dysuria described as burning.  She  is very somnolent and lethargic.  Information mainly obtained from her  who was sitting at bedside.  He states that for the past 2-3 days she has been laying around and sleeping all day long, she has complained of pain with urination, she has vomited a couple of times and she has been having fever on and off.  She was brought in by EMS and they noted blood pressure of 82/31, per EMS she was given epi IV push and a 500 mL normal saline fluid bolus.  On arrival to ED vital signs-- temperature 99.2°, heart rate 95, respirations 20, blood pressure 94/39, SpO2 93% on room air.    Lab workup showed WBC 13.56, hemoglobin 11.5, hematocrit 35.6, platelets 73, CO2 11, anion gap 15, BUN 46, creatinine 2.6, glucose 362, phosphorus 2.2, albumin 2.6, bilirubin 1.2, normal  LFTs, potassium 4.4, ammonia 26, BNP 1 679, troponin 0.509, lactic acid 6.6, beta hydroxybutyrate 0.3, procalcitonin 62.91,  influenza A negative, influenza B negative, COVID-19 screening is negative.  Urinalysis with cloudy yellow urine 3+ protein, 4+ glucose, 3+ blood, negative nitrite, 3+ leukocyte esterase, greater than 100 RBC, greater than 100 WBC, many white blood cell clumps noted.   Venous blood gas was done which showed pH 7.277, bicarb 13.7. Due to history of congestive heart failure and some fluid noted on imaging, she was cautiously given small IV fluid boluses rather than sepsis protocol fluid bolus.   She was started on vanc and cefepime.  After total of 1 L fluid bolus, lactic acid repeated and  remains elevated at 4.4.  Additional IV fluids has been ordered.  ICU team did evaluate patient, deemed that patient was stable for floor admission due to severe sepsis.    Past Medical History:   Diagnosis Date    Acquired TTP     Cataract     CKD stage 3 secondary to diabetes     Closed displaced comminuted fracture of right patella 10/28/2020    Closed fracture of surgical neck of left humerus 10/30/2020    Closed left radial fracture 10/30/2020    Hyperkalemia     Hyperlipidemia     Hypertension     Hypothyroidism, unspecified     Liver cirrhosis secondary to YO     Morbid obesity     Right knee pain     Thyroid disease     Type 2 diabetes mellitus        Past Surgical History:   Procedure Laterality Date    APPENDECTOMY      BREAST BIOPSY      CATARACT EXTRACTION      COLONOSCOPY N/A 12/21/2021    Procedure: COLONOSCOPY screening;  Surgeon: Moises Patterson MD;  Location: [facility];  Service: Endoscopy;  Laterality: N/A;    ESOPHAGOGASTRODUODENOSCOPY N/A 12/21/2021    Procedure: EGD (ESOPHAGOGASTRODUODENOSCOPY) EV screening;  Surgeon: Moises Patterson MD;  Location: [facility];  Service: Endoscopy;  Laterality: N/A;    EYE SURGERY      HERNIA REPAIR      OPEN REDUCTION AND INTERNAL FIXATION (ORIF)  OF FRACTURE OF DISTAL RADIUS Left 11/2/2020    Procedure: ORIF, FRACTURE, RADIUS, DISTAL;  Surgeon: Sage Wolf MD;  Location: [facility] OR;  Service: Orthopedics;  Laterality: Left;    OPEN REDUCTION AND INTERNAL FIXATION (ORIF) OF FRACTURE OF PATELLA Right 11/2/2020    Procedure: ORIF, FRACTURE, PATELLA;  Surgeon: Sage Wolf MD;  Location: [facility] OR;  Service: Orthopedics;  Laterality: Right;    OPEN REDUCTION AND INTERNAL FIXATION (ORIF) OF FRACTURE OF PATELLA Right 12/18/2020    Procedure: ORIF, FRACTURE, PATELLA;  Surgeon: Sage Wolf MD;  Location: [facility] OR;  Service: Orthopedics;  Laterality: Right;    ORIF HUMERUS FRACTURE Left 11/5/2020    Procedure: ORIF, FRACTURE, HUMERUS;  Surgeon: Sage Wolf MD;  Location: [facility] OR;  Service: Orthopedics;  Laterality: Left;    PLACEMENT OF ACELLULAR HUMAN DERMAL ALLOGRAFT Right 11/2/2020    Procedure: APPLICATION, ACELLULAR HUMAN DERMAL ALLOGRAFT;  Surgeon: Sage Wolf MD;  Location: [facility] OR;  Service: Orthopedics;  Laterality: Right;  Right Patella    REMOVAL OF HARDWARE FROM HAND Left 3/11/2021    Procedure: REMOVAL, HARDWARE, HAND;  Surgeon: Sage Wolf MD;  Location: [facility] OR;  Service: Orthopedics;  Laterality: Left;  Removal of dorsal spanning wrist plate left distal radius    REMOVAL OF HARDWARE FROM LOWER EXTREMITY Right 12/18/2020    Procedure: REMOVAL, HARDWARE, LOWER EXTREMITY;  Surgeon: Sage Wolf MD;  Location: [facility];  Service: Orthopedics;  Laterality: Right;       Review of patient's allergies indicates:   Allergen Reactions    Codeine Nausea Only     Other reaction(s): Nausea  Other reaction(s): Elevated blood pressure       No current facility-administered medications on file prior to encounter.     Current Outpatient Medications on File Prior to Encounter   Medication Sig    amLODIPine (NORVASC) 10 MG tablet TAKE 1 TABLET(10 MG) BY MOUTH EVERY DAY    metoprolol  "succinate (TOPROL-XL) 25 MG 24 hr tablet Take 0.5 tablets (12.5 mg total) by mouth once daily.    blood sugar diagnostic Strp Use ac bid    chlorthalidone (HYGROTEN) 25 MG Tab Take 25 mg by mouth once daily.    cholecalciferol, vitamin D3, (VITAMIN D3) 50 mcg (2,000 unit) Cap capsule Take 1 capsule by mouth.    HUMULIN 70/30 U-100 KWIKPEN 100 unit/mL (70-30) InPn pen SMARTSI Unit(s) SUB-Q Every Evening    hydrALAZINE (APRESOLINE) 25 MG tablet Take 1 tablet (25 mg total) by mouth every 8 (eight) hours.    insulin syringe-needle U-100 1 mL 29 gauge x 1/2" Syrg Use bid    JARDIANCE 25 mg tablet Take 25 mg by mouth every morning.    lancets 33 gauge Misc Use as BID    levothyroxine (SYNTHROID) 150 MCG tablet Take 1 tablet by mouth once daily.    metFORMIN (GLUCOPHAGE) 500 MG tablet Take 500 mg by mouth 2 (two) times daily.    perindopril erbumine (ACEON) 4 mg tablet Take 2 mg by mouth once daily.    pravastatin (PRAVACHOL) 20 MG tablet Take 20 mg by mouth once daily.     spironolactone (ALDACTONE) 25 MG tablet Take 25 mg by mouth.     Family History       Problem Relation (Age of Onset)    Diabetes Mother, Father    Leukemia Father          Tobacco Use    Smoking status: Former     Current packs/day: 0.00     Average packs/day: 1 pack/day for 8.0 years (8.0 ttl pk-yrs)     Types: Cigarettes     Start date:      Quit date:      Years since quittin.6    Smokeless tobacco: Never   Substance and Sexual Activity    Alcohol use: Yes     Comment: rarely    Drug use: No    Sexual activity: Not Currently     Review of Systems   Reason unable to perform ROS: limited due to patient participation.   Constitutional:  Positive for activity change, appetite change, chills and fatigue. Negative for diaphoresis and fever.   HENT: Negative.     Eyes: Negative.    Respiratory: Negative.  Negative for cough, shortness of breath and wheezing.    Cardiovascular:  Positive for leg swelling. Negative for chest pain and " "palpitations.   Gastrointestinal: Negative.  Negative for abdominal distention, abdominal pain, diarrhea, nausea and vomiting.   Endocrine: Negative.    Genitourinary:  Positive for dysuria and urgency.        "Burning"   Musculoskeletal: Negative.    Skin: Negative.    Neurological:  Positive for syncope and weakness. Negative for dizziness, seizures, speech difficulty, light-headedness and headaches.   Psychiatric/Behavioral: Negative.     All other systems reviewed and are negative.    Objective:     Vital Signs (Most Recent):  Temp: 98.2 °F (36.8 °C) (08/29/24 2130)  Pulse: 73 (08/29/24 2143)  Resp: 17 (08/29/24 2100)  BP: (!) 166/74 (08/29/24 2100)  SpO2: 97 % (08/29/24 2100) Vital Signs (24h Range):  Temp:  [98.2 °F (36.8 °C)-99.2 °F (37.3 °C)] 98.2 °F (36.8 °C)  Pulse:  [73-95] 73  Resp:  [17-25] 17  SpO2:  [93 %-97 %] 97 %  BP: ()/(39-74) 166/74     Weight: 99.8 kg (220 lb)  Body mass index is 36.61 kg/m².     Physical Exam  Constitutional:       General: She is not in acute distress.     Appearance: She is obese. She is ill-appearing and toxic-appearing. She is not diaphoretic.   HENT:      Head: Normocephalic.      Nose: Nose normal.      Mouth/Throat:      Mouth: Mucous membranes are dry.   Eyes:      Extraocular Movements: Extraocular movements intact.      Pupils: Pupils are equal, round, and reactive to light.   Cardiovascular:      Rate and Rhythm: Normal rate and regular rhythm.      Pulses: Normal pulses.      Heart sounds: Normal heart sounds.   Pulmonary:      Effort: Pulmonary effort is normal. No respiratory distress.      Breath sounds: No wheezing, rhonchi or rales.      Comments: Diminished breath sounds  Chest:      Chest wall: No tenderness.   Abdominal:      General: There is no distension.      Palpations: Abdomen is soft.      Tenderness: There is no abdominal tenderness. There is no right CVA tenderness, left CVA tenderness, guarding or rebound.      Comments: obese "   Musculoskeletal:      Cervical back: Neck supple.      Right lower leg: Edema present.      Left lower leg: Edema present.   Skin:     Capillary Refill: Capillary refill takes less than 2 seconds.   Neurological:      Mental Status: She is alert and oriented to person, place, and time.      Motor: Weakness present.      Comments: Generalized weakness  During assessment/interview, I had to constantly tap her to wake her up to get her to focus on answering questions and she was rapidly falling back to sleep during assessment  Lethargic   Psychiatric:      Comments: Flat affect, very limited communication due to somnolence              CRANIAL NERVES     CN III, IV, VI   Pupils are equal, round, and reactive to light.       Significant Labs: All pertinent labs within the past 24 hours have been reviewed.    ABGs:   Recent Labs   Lab 08/29/24  1638   PH 7.277*   PCO2 29.3*   HCO3 13.7*   POCSATURATED 78   BE -13*   PO2 47     Beta hydroxybutyrate is 0.3    Bilirubin:   Recent Labs   Lab 08/29/24  1625   BILITOT 1.2*     CBC:   Recent Labs   Lab 08/29/24  1625   WBC 13.56*   HGB 11.5*   HCT 35.6*   PLT 73*     CMP:   Recent Labs   Lab 08/29/24  1625      K 4.4   *   CO2 11*   *   BUN 46*   CREATININE 2.6*   CALCIUM 8.7   PROT 6.0   ALBUMIN 2.6*   BILITOT 1.2*   ALKPHOS 68   AST 25   ALT 12   ANIONGAP 15     Cardiac Markers:   Recent Labs   Lab 08/29/24  1625   BNP 1,679*     Lactic Acid:   Recent Labs   Lab 08/29/24  1625 08/29/24  1853   LACTATE 6.6* 4.4*   Procalcitonin 62.91    Magnesium:   Recent Labs   Lab 08/29/24  1625   MG 1.7     Troponin:   Recent Labs   Lab 08/29/24  1625   TROPONINI 0.509*     Urine Studies:   Recent Labs   Lab 08/29/24  1649   COLORU Yellow   APPEARANCEUA Cloudy*   PHUR 6.0   SPECGRAV 1.025   PROTEINUA 3+*   GLUCUA 4+*   KETONESU Negative   BILIRUBINUA Negative   OCCULTUA 3+*   NITRITE Negative   UROBILINOGEN Negative   LEUKOCYTESUR 3+*   RBCUA >100*   WBCUA >100*  "  BACTERIA None   HYALINECASTS 0     Influenza A negative  Influenza B negative  COVID-19 screening negative  Hepatitis-C antibody negative      EKG reviewed -- sinus rhythm first-degree AV block, heart rate 86, LVH, inverted T-waves noted    Significant Imaging: I have reviewed all pertinent imaging results/findings within the past 24 hours.    CT head without contrast:  No intracranial acute hemorrhage or acute focal brain parenchymal abnormality is identified, atrophy is present    CXR reviewed -- cardiomegaly, mild perihilar interstitial opacities, correlate clinically for low-grade or stable CHF, left shoulder prosthesis, subpleural reticular opacities may relate to subsegmental atelectasis versus scarring, right upper lobe with old deformity of posterior lateral left upper rib    Assessment/Plan:     * Severe sepsis  This patient does have evidence of infective focus  My overall impression is sepsis.  Source: Urinary Tract  Antibiotics given-   Antibiotics (72h ago, onward)      Start     Stop Route Frequency Ordered    08/30/24 1730  ceFEPIme (MAXIPIME) 2 g in D5W 100 mL IVPB (MB+)         -- IV Every 24 hours (non-standard times) 08/29/24 2059 08/29/24 1816  vancomycin - pharmacy to dose  (vancomycin IVPB (PEDS and ADULTS))        Placed in "And" Linked Group    -- IV pharmacy to manage frequency 08/29/24 1717          Initial lactic acid 6.6  Latest lactate reviewed-  Recent Labs   Lab 08/29/24  1853   LACTATE 4.4*    (After fluids given)    Organ dysfunction indicated by Acute kidney injury, Encephalopathy, and Thrombocytopenia     Fluid challenge Contraindicated- Fluid bolus is contraindicated in this patient due to Congestive Heart Failure Partial fluid bolus given    Post- resuscitation assessment Yes Perfusion exam was performed within 6 hours of septic shock presentation after bolus shows Adequate tissue perfusion assessed by non-invasive monitoring       Will Not start Pressors- BP has improved " with IV fluids  Source control achieved by: Broad spectrum antibiotics    Acute cystitis with hematuria  Vanc and cefepime ordered  Follow-up urine culture reports, follow blood culture reports      Acute on chronic kidney failure  MIKA is likely due to pre-renal azotemia due to dehydration. Baseline creatinine is  2.0-2.2 . Most recent creatinine and eGFR are listed below.  Recent Labs     08/29/24  1625   CREATININE 2.6*   EGFRNORACEVR 19*      Plan  - MIKA is worsening. Will adjust treatment as follows: gentle hydration with IV fluids  - Avoid nephrotoxins and renally dose meds for GFR listed above  - Monitor urine output, serial BMP, and adjust therapy as needed      Type 2 diabetes mellitus with stage 3b chronic kidney disease, with long-term current use of insulin  Creatine stable for now. BMP reviewed- noted Estimated Creatinine Clearance: 22.5 mL/min (A) (based on SCr of 2.6 mg/dL (H)). according to latest data. Based on current GFR, CKD stage is stage 3 - GFR 30-59.  Monitor UOP and serial BMP and adjust therapy as needed. Renally dose meds. Avoid nephrotoxic medications and procedures    Serial glucose checks ordered every 6 hours with SSI, check Hgb A1C.    Chronic diastolic congestive heart failure  Patient is identified as having Diastolic (HFpEF) heart failure that is Chronic. CHF is currently controlled. Latest ECHO performed and demonstrates- Results for orders placed during the hospital encounter of 06/26/24    Echo    Interpretation Summary    Left Ventricle: The left ventricle is normal in size. Normal wall thickness. There is concentric hypertrophy. Normal wall motion. Ejection fraction by visual approximation is 60%. Grade II diastolic dysfunction.    Right Ventricle: Normal right ventricular cavity size. Wall thickness is normal. Systolic function is normal.    Left Atrium: Left atrium is severely dilated.    Aortic Valve: The aortic valve is a trileaflet valve. Mildly restricted motion. There is  moderate stenosis. Aortic valve area by VTI is 1.24 cm². Aortic valve peak velocity is 2.66 m/s. Mean gradient is 17 mmHg. The dimensionless index is 0.45.    Mitral Valve: There is moderate mitral annular calcification present. Mildly restricted motion. There is mild to moderate regurgitation.  . Medications are on hold due to hypotension and encephalopathy, and monitor clinical status closely. Monitor on telemetry. Patient is off CHF pathway.  Monitor strict Is&Os and daily weights.   Cardiology has not been consulted. Continue to stress to patient importance of self efficacy and  on diet for CHF. Last BNP reviewed- and noted below   Recent Labs   Lab 08/29/24  1625   BNP 1,679*   Though BNP is elevated and there is some evidence of fluid on CXR -- she appears dry/dehydrated and due to severe sepsis she is requiring IV fluids at this time. Will monitor fluid volume status closely.    Liver cirrhosis secondary to YO  Patient with known Cirrhosis Co-morbidities are present and inclusive of anemia/pancytopenia.  MELD-Na score calculated; MELD 3.0: 14 at 7/2/2024  6:24 AM  MELD-Na: 11 at 7/2/2024  6:24 AM  Calculated from:  Serum Creatinine: 1.6 mg/dL at 7/2/2024  6:24 AM  Serum Sodium: 138 mmol/L (Using max of 137 mmol/L) at 7/2/2024  6:24 AM  Total Bilirubin: 0.5 mg/dL (Using min of 1 mg/dL) at 7/2/2024  6:24 AM  Serum Albumin: 2.2 g/dL at 7/2/2024  6:24 AM  INR(ratio): 1.0 at 7/1/2024  6:06 AM  Age at listing (hypothetical): 73 years  Sex: Female at 7/2/2024  6:24 AM      Continue chronic meds. Etiology likely NAFLD. Will avoid any hepatotoxic meds, and monitor CBC/CMP/INR for synthetic function.     Thrombocytopenia  The likely etiology of thrombocytopenia is sepsis and liver disease. The patients 3 most recent labs are listed below.  Recent Labs     08/29/24  1625   PLT 73*     Plan  - Will transfuse if platelet count is <50k (if undergoing surgical procedure or have active bleeding).  - monitor   platelet counts      Class 2 severe obesity due to excess calories with serious comorbidity and body mass index (BMI) of 36.0 to 36.9 in adult  Body mass index is 36.61 kg/m². Morbid obesity complicates all aspects of disease management from diagnostic modalities to treatment. Weight loss encouraged and health benefits explained to patient.         Essential hypertension  Chronic, controlled. Latest blood pressure and vitals reviewed-     Temp:  [98.2 °F (36.8 °C)-99.2 °F (37.3 °C)]   Pulse:  [73-95]   Resp:  [17-25]   BP: ()/(39-79)   SpO2:  [93 %-98 %] .   Home meds for hypertension were reviewed and noted below.   Hypertension Medications               amLODIPine (NORVASC) 10 MG tablet TAKE 1 TABLET(10 MG) BY MOUTH EVERY DAY    metoprolol succinate (TOPROL-XL) 25 MG 24 hr tablet Take 0.5 tablets (12.5 mg total) by mouth once daily.    chlorthalidone (HYGROTEN) 25 MG Tab Take 25 mg by mouth once daily.    hydrALAZINE (APRESOLINE) 25 MG tablet Take 1 tablet (25 mg total) by mouth every 8 (eight) hours.    perindopril erbumine (ACEON) 4 mg tablet Take 2 mg by mouth once daily.    spironolactone (ALDACTONE) 25 MG tablet Take 25 mg by mouth.            While in the hospital, will manage blood pressure as follows; HOLDING regimen for now due to hypotension/severe sepsis    Will utilize p.r.n. blood pressure medication only if patient's blood pressure greater than 180/110 and she develops symptoms such as worsening chest pain or shortness of breath.    Hypothyroidism  Resume Synthroid when no longer NPO        VTE Risk Mitigation (From admission, onward)           Ordered     IP VTE HIGH RISK PATIENT  Once         08/29/24 2141     Place sequential compression device  Until discontinued         08/29/24 2141                    Pharmacist Renal Dose Adjustment Note    Lakshmi Campbell is a 73 y.o. female being treated with the medication enoxaparin    Patient Data:    Vital Signs (Most Recent):  Temp: 98.2 °F (36.8  °C) (08/29/24 2130)  Pulse: 73 (08/29/24 2100)  Resp: 17 (08/29/24 2100)  BP: (!) 166/74 (08/29/24 2100)  SpO2: 97 % (08/29/24 2100) Vital Signs (72h Range):  Temp:  [98.2 °F (36.8 °C)-99.2 °F (37.3 °C)]   Pulse:  [73-95]   Resp:  [17-25]   BP: ()/(39-74)   SpO2:  [93 %-97 %]      Recent Labs   Lab 08/29/24  1625   CREATININE 2.6*     Serum creatinine: 2.6 mg/dL (H) 08/29/24 1625  Estimated creatinine clearance: 22.5 mL/min (A)    Enoxaparin 40 mg q24h will be changed to enoxaparin 30 mg q12h for CrCl <30 mL/min.     Pharmacist's Name: Katiuska Gonzáles  Pharmacist's Extension: [phone]       Case discussed with Carline Key NP for critical care, who did evaluate patient and deemed patient stable for floor admission.  Case also discussed with Dr. Halle Washington NP  Department of Hospital Medicine  '[facility] Surg

## 2024-08-30 NOTE — ASSESSMENT & PLAN NOTE
Patient is identified as having Diastolic (HFpEF) heart failure that is Chronic. CHF is currently controlled. Latest ECHO performed and demonstrates- Results for orders placed during the hospital encounter of 06/26/24    Echo    Interpretation Summary    Left Ventricle: The left ventricle is normal in size. Normal wall thickness. There is concentric hypertrophy. Normal wall motion. Ejection fraction by visual approximation is 60%. Grade II diastolic dysfunction.    Right Ventricle: Normal right ventricular cavity size. Wall thickness is normal. Systolic function is normal.    Left Atrium: Left atrium is severely dilated.    Aortic Valve: The aortic valve is a trileaflet valve. Mildly restricted motion. There is moderate stenosis. Aortic valve area by VTI is 1.24 cm². Aortic valve peak velocity is 2.66 m/s. Mean gradient is 17 mmHg. The dimensionless index is 0.45.    Mitral Valve: There is moderate mitral annular calcification present. Mildly restricted motion. There is mild to moderate regurgitation.  . Medications are on hold due to hypotension and encephalopathy, and monitor clinical status closely. Monitor on telemetry. Patient is off CHF pathway.  Monitor strict Is&Os and daily weights.   Cardiology has not been consulted. Continue to stress to patient importance of self efficacy and  on diet for CHF. Last BNP reviewed- and noted below   Recent Labs   Lab 08/29/24  1625   BNP 1,679*   Though BNP is elevated and there is some evidence of fluid on CXR -- she appears dry/dehydrated and due to severe sepsis she is requiring IV fluids at this time. Will monitor fluid volume status closely.

## 2024-08-30 NOTE — CONSULTS
[facility] - Intensive Care ([facility])  Critical Care Medicine  Consult Note    Patient Name: Lakshmi Campbell  MRN: [id number]  Admission Date: 8/29/2024  Hospital Length of Stay: 1 days  Code Status: Full Code  Attending Physician: Adrian Nathan MD   Primary Care Provider: Keely Silva NP   Principal Problem: Severe sepsis    Consults  Subjective:     HPI:  Lakshmi Campbell is a 73-year-old female with PMHx of diabetes, liver cirrhosis-YO, G2 diastolic heart failure, hypothyroidism, CKD stage 3, thrombocytopenia, and morbid obesity who presented to ED with complaints of lethargy, subjective fever, and dysuria described as burning. She is very somnolent and lethargic. Information mainly obtained from her  who was sitting at bedside. He states that for the past 2-3 days she has been laying around and sleeping all day long, she has complained of pain with urination, she has vomited a couple of times and she has been having fever on and off. She did not go to the doctor and was treating her UTI with cranberry juice at home for several days. She was brought in by EMS and they noted blood pressure of 82/31, per EMS she was given epi IV push and a 500 mL normal saline fluid bolus.     On arrival to ED vital signs- T 99.2°, HR 95, RR 20, BP 94/39, SpO2 93% on RA. Lab workup showed WBC 13.56, Hgb/Hct 11.5/35.6, platelets 73, CO2 11, AG 15, BUN?Cr 46/2.6 (baseline appears around 1.8, but has was recently hospitalized w/MIKA and Cr 2-3), glucose 362, albumin 2.6, t-bili 1.2, normal LFTs, normal K. Ammonia 26, BNP 1679, but not volume overloaded on exam, troponin 0.509. Lactic acid 6.6, BHB negative. Procalcitonin 62.91, Flu A/B neg, COVID-19 screen negative. UA w/cloudy yellow urine 3+ protein, 4+ glucose, 3+ blood, negative nitrite, 3+ leukocyte esterase, greater than 100 RBC, greater than 100 WBC, many white blood cell clumps noted. Venous blood gas was done which showed pH 7.277, bicarb 13.7. CT head w/naf. CXR  w/cardiomegaly, mild perihilar interstitial opacaties, subsegmental atelectasis or scarring since previous films when admitted for pneumonia. CT abdomen/pelvis showed again residual scarring of subpleural regions, more on R, gallstones, suspected cirrhosis, with no ascites or splenomegaly, and a RLQ retroperitoneal lymph node where malignancy cannot be excluded. Due to history of congestive heart failure and some fluid noted on imaging, she was cautiously given small IV fluid boluses rather than sepsis protocol fluid bolus. She was started on vanc and cefepime. After total of 1 L fluid bolus, lactic acid repeated and improved from 6.0 to. Additional IV fluids were given in the ED. She was evaluated by ICU team prior to admission and deemed stable for floor admission due to severe sepsis. At that time, patient normotensive, responded to loud verbal stimuli, was oriented x3, had no specific complaint, appeared warm, dry, pink, on room air, clear lungs, denied heart failure or cirrhosis history, said she was just tired.     After admission to floor, patient c/o back pain, nursing reported labored respirations with wheezing, HR increase to 120 and BP up to 208/83. There was concern for volume overload, however patient remained on room air and denied dyspnea. CXR essentially unchanged. A berrios was placed, patient given 40 mg lasix with 350cc UOP. ABG did show worsening metabolic acidosis, but CO2 compensating as expected. Lactic acid has risen to 8 and renal function has mildly worsened. She was given a bicarb push. Her glucose also trent into 400's earlier in night and she was treated with IV insulin and improvement into 300's. Decision was made to transfer to ICU for worsening lactic acidosis. CC consulted.           Hospital/ICU Course:  No notes on file    Past Medical History:   Diagnosis Date    Acquired TTP     Cataract     CKD stage 3 secondary to diabetes     Closed displaced comminuted fracture of right patella  10/28/2020    Closed fracture of surgical neck of left humerus 10/30/2020    Closed left radial fracture 10/30/2020    Hyperkalemia     Hyperlipidemia     Hypertension     Hypothyroidism, unspecified     Liver cirrhosis secondary to YO     Morbid obesity     Right knee pain     Thyroid disease     Type 2 diabetes mellitus        Past Surgical History:   Procedure Laterality Date    APPENDECTOMY      BREAST BIOPSY      CATARACT EXTRACTION      COLONOSCOPY N/A 12/21/2021    Procedure: COLONOSCOPY screening;  Surgeon: Moises Patterson MD;  Location: [facility];  Service: Endoscopy;  Laterality: N/A;    ESOPHAGOGASTRODUODENOSCOPY N/A 12/21/2021    Procedure: EGD (ESOPHAGOGASTRODUODENOSCOPY) EV screening;  Surgeon: Moises Patterson MD;  Location: [facility];  Service: Endoscopy;  Laterality: N/A;    EYE SURGERY      HERNIA REPAIR      OPEN REDUCTION AND INTERNAL FIXATION (ORIF) OF FRACTURE OF DISTAL RADIUS Left 11/2/2020    Procedure: ORIF, FRACTURE, RADIUS, DISTAL;  Surgeon: Sage Wolf MD;  Location: [facility];  Service: Orthopedics;  Laterality: Left;    OPEN REDUCTION AND INTERNAL FIXATION (ORIF) OF FRACTURE OF PATELLA Right 11/2/2020    Procedure: ORIF, FRACTURE, PATELLA;  Surgeon: Sage Wolf MD;  Location: [facility] OR;  Service: Orthopedics;  Laterality: Right;    OPEN REDUCTION AND INTERNAL FIXATION (ORIF) OF FRACTURE OF PATELLA Right 12/18/2020    Procedure: ORIF, FRACTURE, PATELLA;  Surgeon: Sage Wolf MD;  Location: [facility] OR;  Service: Orthopedics;  Laterality: Right;    ORIF HUMERUS FRACTURE Left 11/5/2020    Procedure: ORIF, FRACTURE, HUMERUS;  Surgeon: Sage Wolf MD;  Location: [facility] OR;  Service: Orthopedics;  Laterality: Left;    PLACEMENT OF ACELLULAR HUMAN DERMAL ALLOGRAFT Right 11/2/2020    Procedure: APPLICATION, ACELLULAR HUMAN DERMAL ALLOGRAFT;  Surgeon: Sage Wolf MD;  Location: [facility] OR;  Service: Orthopedics;  Laterality: Right;   Right Patella    REMOVAL OF HARDWARE FROM HAND Left 3/11/2021    Procedure: REMOVAL, HARDWARE, HAND;  Surgeon: Sage Wolf MD;  Location: [facility] OR;  Service: Orthopedics;  Laterality: Left;  Removal of dorsal spanning wrist plate left distal radius    REMOVAL OF HARDWARE FROM LOWER EXTREMITY Right 2020    Procedure: REMOVAL, HARDWARE, LOWER EXTREMITY;  Surgeon: Sage Wolf MD;  Location: [facility] OR;  Service: Orthopedics;  Laterality: Right;       Review of patient's allergies indicates:   Allergen Reactions    Codeine Nausea Only     Other reaction(s): Nausea  Other reaction(s): Elevated blood pressure       Family History       Problem Relation (Age of Onset)    Diabetes Mother, Father    Leukemia Father          Tobacco Use    Smoking status: Former     Current packs/day: 0.00     Average packs/day: 1 pack/day for 8.0 years (8.0 ttl pk-yrs)     Types: Cigarettes     Start date:      Quit date:      Years since quittin.6    Smokeless tobacco: Never   Substance and Sexual Activity    Alcohol use: Yes     Comment: rarely    Drug use: No    Sexual activity: Not Currently         Review of Systems   Constitutional:  Positive for fever.        Subjective fevers at home, no fever here   HENT: Negative.     Eyes: Negative.    Respiratory: Negative.     Cardiovascular: Negative.    Gastrointestinal: Negative.    Endocrine: Negative.    Genitourinary:  Positive for decreased urine volume.   Musculoskeletal:  Positive for back pain.   Skin: Negative.    Allergic/Immunologic: Negative.    Neurological: Negative.    Hematological: Negative.    Psychiatric/Behavioral: Negative.       Objective:     Vital Signs (Most Recent):  Temp: 99.3 °F (37.4 °C) (24)  Pulse: (!) 117 (24 0001)  Resp: 20 (24)  BP: (!) 191/86 (24 0035)  SpO2: (!) 94 % (24) Vital Signs (24h Range):  Temp:  [98.2 °F (36.8 °C)-99.3 °F (37.4 °C)] 99.3 °F (37.4 °C)  Pulse:   [] 117  Resp:  [17-25] 20  SpO2:  [93 %-98 %] 94 %  BP: ()/(39-86) 191/86     Weight: 103.4 kg (227 lb 15.3 oz)  Body mass index is 37.93 kg/m².      Intake/Output Summary (Last 24 hours) at 8/30/2024 0415  Last data filed at 8/30/2024 0323  Gross per 24 hour   Intake 1547.51 ml   Output 325 ml   Net 1222.51 ml        Physical Exam  Vitals and nursing note reviewed.   Constitutional:       General: She is awake. She is in acute distress.      Appearance: She is morbidly obese. She is ill-appearing.   HENT:      Head: Normocephalic and atraumatic.      Nose: Nose normal.      Mouth/Throat:      Lips: Pink.      Mouth: Mucous membranes are dry.   Eyes:      Extraocular Movements: Extraocular movements intact.      Conjunctiva/sclera: Conjunctivae normal.      Pupils: Pupils are equal, round, and reactive to light.   Cardiovascular:      Rate and Rhythm: Normal rate and regular rhythm.      Pulses: Normal pulses.      Heart sounds: S1 normal and S2 normal. Murmur heard.      Systolic murmur is present.   Pulmonary:      Effort: Pulmonary effort is normal. No tachypnea, prolonged expiration or respiratory distress.      Breath sounds: Normal breath sounds and air entry.      Comments: Some increased WOB and mild tachypnea on exertion, but settled back out and remained free of dyspnea complaint.  Abdominal:      General: Bowel sounds are normal. There is no distension.      Palpations: Abdomen is soft. There is no fluid wave.      Tenderness: There is no abdominal tenderness. There is no right CVA tenderness, left CVA tenderness, guarding or rebound.   Musculoskeletal:      Cervical back: Normal range of motion and neck supple.      Right lower leg: No edema.      Left lower leg: No edema.   Skin:     General: Skin is warm and dry.      Capillary Refill: Capillary refill takes less than 2 seconds.      Findings: No petechiae.   Neurological:      General: No focal deficit present.      Mental Status: She is  alert.      GCS: GCS eye subscore is 4. GCS verbal subscore is 5. GCS motor subscore is 6.      Cranial Nerves: Cranial nerves 2-12 are intact.   Psychiatric:         Behavior: Behavior normal. Behavior is cooperative.          Vents:       Lines/Drains/Airways       Drain  Duration                  Urethral Catheter 08/30/24 0015 16 Fr. <1 day              Peripheral Intravenous Line  Duration                  Peripheral IV - Single Lumen 08/29/24 1734 20 G Distal;Posterior;Right Forearm <1 day         Peripheral IV - Single Lumen 08/30/24 0355 20 G Anterior;Right Forearm <1 day                    Significant Labs:    CBC/Anemia Profile:  Recent Labs   Lab 08/29/24  1625 08/30/24  0044   WBC 13.56*  --    HGB 11.5*  --    HCT 35.6* 33*   PLT 73*  --    MCV 90  --    RDW 13.1  --         Chemistries:  Recent Labs   Lab 08/29/24  1625 08/29/24  2156 08/30/24  0133    136 139   K 4.4 4.5 4.1   * 109 111*   CO2 11* 16* 13*   BUN 46* 48* 51*   CREATININE 2.6* 2.6* 2.8*   CALCIUM 8.7 8.3* 8.5*   ALBUMIN 2.6* 2.6* 2.4*   PROT 6.0 6.0 5.7*   BILITOT 1.2* 1.1* 0.9   ALKPHOS 68 59 108   ALT 12 14 14   AST 25 30 37   MG 1.7  --   --    PHOS 2.2*  --   --      Lactic Acid:   Recent Labs   Lab 08/29/24  1853 08/29/24  2156 08/30/24  0133   LACTATE 4.4* 4.3* 8.1*     Troponin:   Recent Labs   Lab 08/29/24  1625   TROPONINI 0.509*   BNP  Recent Labs   Lab 08/29/24  1625   BNP 1,679*     Urine Studies:   Recent Labs   Lab 08/29/24  1649   COLORU Yellow   APPEARANCEUA Cloudy*   PHUR 6.0   SPECGRAV 1.025   PROTEINUA 3+*   GLUCUA 4+*   KETONESU Negative   BILIRUBINUA Negative   OCCULTUA 3+*   NITRITE Negative   UROBILINOGEN Negative   LEUKOCYTESUR 3+*   RBCUA >100*   WBCUA >100*   BACTERIA None   HYALINECASTS 0     All pertinent labs within the past 24 hours have been reviewed.    Significant Imaging:   I have reviewed all pertinent imaging results/findings within the past 24 hours.    ABG  Recent Labs   Lab  08/30/24  0044   PH 7.229*   PO2 69*   PCO2 26.6*   HCO3 11.1*   BE -16*     Assessment/Plan:     Cardiac/Vascular  Chronic diastolic congestive heart failure  Patient is identified as having Diastolic (HFpEF) heart failure that is Chronic. CHF is currently controlled. Latest ECHO performed and demonstrates- Results for orders placed during the hospital encounter of 06/26/24    Echo    Interpretation Summary    Left Ventricle: The left ventricle is normal in size. Normal wall thickness. There is concentric hypertrophy. Normal wall motion. Ejection fraction by visual approximation is 60%. Grade II diastolic dysfunction.    Right Ventricle: Normal right ventricular cavity size. Wall thickness is normal. Systolic function is normal.    Left Atrium: Left atrium is severely dilated.    Aortic Valve: The aortic valve is a trileaflet valve. Mildly restricted motion. There is moderate stenosis. Aortic valve area by VTI is 1.24 cm². Aortic valve peak velocity is 2.66 m/s. Mean gradient is 17 mmHg. The dimensionless index is 0.45.    Mitral Valve: There is moderate mitral annular calcification present. Mildly restricted motion. There is mild to moderate regurgitation.  . Continue to monitor clinical status closely. Monitor on telemetry. Patient is off CHF pathway.  Monitor strict Is&Os and daily weights.  Place on fluid restriction of 1.5 L. Cardiology has not been consulted. Continue to stress to patient importance of self efficacy and  on diet for CHF. Last BNP reviewed- and noted below   Recent Labs   Lab 08/29/24  1625   BNP 1,679*   - s/p 40 mg Lasix  - no wheezing, on room air, no dyspnea, no peripheral edema, CXR unchanged after IVF    Essential hypertension  Chronic, controlled. Latest blood pressure and vitals reviewed-     Temp:  [98.2 °F (36.8 °C)-99.3 °F (37.4 °C)]   Pulse:  []   Resp:  [17-25]   BP: ()/(39-86)   SpO2:  [93 %-98 %] .   Home meds for hypertension were reviewed and noted below.    Hypertension Medications               amLODIPine (NORVASC) 10 MG tablet TAKE 1 TABLET(10 MG) BY MOUTH EVERY DAY    chlorthalidone (HYGROTEN) 25 MG Tab Take 25 mg by mouth once daily.    hydrALAZINE (APRESOLINE) 25 MG tablet Take 1 tablet (25 mg total) by mouth every 8 (eight) hours.    metoprolol succinate (TOPROL-XL) 25 MG 24 hr tablet Take 0.5 tablets (12.5 mg total) by mouth once daily.    perindopril erbumine (ACEON) 4 mg tablet Take 2 mg by mouth once daily.    spironolactone (ALDACTONE) 25 MG tablet Take 25 mg by mouth.            While in the hospital, will manage blood pressure as follows; Adjust home antihypertensive regimen as follows- continue amlodipine, add additional home meds as needed    Will utilize p.r.n. blood pressure medication only if patient's blood pressure greater than 160/100 and she develops symptoms such as worsening chest pain or shortness of breath.    Renal/  Acute cystitis with hematuria  - blood/urine cultures pending  - broad spectrum abx  - follow cultures and de-escalate as indicated  - recheck procalcitonin tomorrow  - berrios protocol in place, discontinue ASAP    Acute on chronic kidney failure  MIKA is likely due to pre-renal azotemia due to dehydration. Baseline creatinine is  2-2.2 . Most recent creatinine and eGFR are listed below.  Recent Labs     08/29/24  1625 08/29/24  2156 08/30/24  0133   CREATININE 2.6* 2.6* 2.8*   EGFRNORACEVR 19* 19* 17*      Plan  - MIKA is worsening. Will adjust treatment as follows: gentle hydration with IVF  - Avoid nephrotoxins and renally dose meds for GFR listed above  - s/p bicarb push prior to ICU transfer  - Monitor urine output, serial BMP, acid/base status and adjust therapy as needed  - Monitor for volume overload  - diurese as tolerated and if needed    ID  * Severe sepsis  This patient does have evidence of infective focus  My overall impression is sepsis.  Source: Urinary Tract  Antibiotics given-   Antibiotics (72h ago, onward)  "     Start     Stop Route Frequency Ordered    08/30/24 1730  ceFEPIme (MAXIPIME) 2 g in D5W 100 mL IVPB (MB+)         -- IV Every 24 hours (non-standard times) 08/29/24 2059    08/30/24 0900  mupirocin 2 % ointment         09/04/24 0859 Nasl 2 times daily 08/30/24 0255    08/29/24 1816  vancomycin - pharmacy to dose  (vancomycin IVPB (PEDS and ADULTS))        Placed in "And" Linked Group    -- IV pharmacy to manage frequency 08/29/24 1717          Latest lactate reviewed-  Recent Labs   Lab 08/30/24  0133   LACTATE 8.1*     Organ dysfunction indicated by Acute kidney injury, Thrombocytopenia , and worsening lactic acidosis    Fluid challenge Contraindicated- Fluid bolus is contraindicated in this patient due to Congestive Heart Failure     Post- resuscitation assessment Yes Perfusion exam was performed within 6 hours of septic shock presentation after bolus shows Adequate tissue perfusion assessed by non-invasive monitoring     Will Not start Pressors- Levophed for MAP of 65  Source control achieved by: antibiotics    Hematology  Thrombocytopenia  The likely etiology of thrombocytopenia is sepsis and liver disease. The patients 3 most recent labs are listed below.  Recent Labs     08/29/24  1625   PLT 73*     Plan  - Will transfuse if platelet count is <50k (if undergoing surgical procedure or have active bleeding).  - monitor platelet counts/CBC/LFT's  - holding anticoagulation for now      Endocrine  Type 2 diabetes mellitus with stage 3b chronic kidney disease, with long-term current use of insulin  Creatine stable for now. BMP reviewed- noted Estimated Creatinine Clearance: 21.4 mL/min (A) (based on SCr of 2.8 mg/dL (H)). according to latest data. Based on current GFR, CKD stage is stage 4 - GFR 15-29.  Monitor UOP and serial BMP and adjust therapy as needed. Renally dose meds. Avoid nephrotoxic medications and procedures.  Lab Results   Component Value Date    HGBA1C 5.9 (H) 06/26/2024   - s/p IV insulin x2 " with improvement, but glucose still in 300's  - will add basal insulin and continue q6 glucose checks until stabilized  - diabetic/renal/cardiac diet w/1.5 L fluid restriction  - adjust therapy as indicated    Class 2 severe obesity due to excess calories with serious comorbidity and body mass index (BMI) of 36.0 to 36.9 in adult  Body mass index is 37.93 kg/m². Morbid obesity complicates all aspects of disease management from diagnostic modalities to treatment. Weight loss encouraged and health benefits explained to patient.         Hypothyroidism  - check TSH  - continue home levothyroxine dose    GI  Liver cirrhosis secondary to YO  Patient with known Cirrhosis.   Co-morbidities are present and inclusive of anemia/pancytopenia.  MELD-Na score calculated; MELD 3.0: 14 at 7/2/2024  6:24 AM  MELD-Na: 11 at 7/2/2024  6:24 AM  Calculated from:  Serum Creatinine: 1.6 mg/dL at 7/2/2024  6:24 AM  Serum Sodium: 138 mmol/L (Using max of 137 mmol/L) at 7/2/2024  6:24 AM  Total Bilirubin: 0.5 mg/dL (Using min of 1 mg/dL) at 7/2/2024  6:24 AM  Serum Albumin: 2.2 g/dL at 7/2/2024  6:24 AM  INR(ratio): 1.0 at 7/1/2024  6:06 AM  Age at listing (hypothetical): 73 years  Sex: Female at 7/2/2024  6:24 AM    Continue chronic meds. Etiology likely NAFLD. Will avoid any hepatotoxic meds, and monitor CBC/CMP/INR for synthetic function.         Critical Care Daily Checklist:    A: Awake: RASS Goal/Actual Goal:    Actual:     B: Spontaneous Breathing Trial Performed?     C: SAT & SBT Coordinated?  N/a                      D: Delirium: CAM-ICU     E: Early Mobility Performed? Yes   F: Feeding Goal:    Status:     Current Diet Order   Procedures    Diet diabetic Cardiac (Low Na/Chol), Renal; 1500 Calorie; Fluid - 1500mL; Standard Tray     Order Specific Question:   Additional Diet Options:     Answer:   Cardiac (Low Na/Chol)     Order Specific Question:   Additional Diet Options:     Answer:   Renal     Order Specific Question:   Total  calories:     Answer:   1500 Calorie     Order Specific Question:   Fluid restriction:     Answer:   Fluid - 1500mL     Order Specific Question:   Tray type:     Answer:   Standard Tray      AS: Analgesia/Sedation N/a   T: Thromboembolic Prophylaxis SCD's   H: HOB > 300 Yes   U: Stress Ulcer Prophylaxis (if needed) pepcid   G: Glucose Control SSI/basal   B: Bowel Function     I: Indwelling Catheter (Lines & Hassan) Necessity reviewed   D: De-escalation of Antimicrobials/Pharmacotherapies reviewed    Plan for the day/ETD Transfer to ICU    Code Status:  Family/Goals of Care: Full Code  TBD     Critical Care Time: 40 minutes  Critical secondary to worsening lactic acidosis. Patient has a condition that poses threat to life and bodily function: Acute Renal Failure     Critical care was time spent personally by me on the following activities: development of treatment plan with patient or surrogate and bedside caregivers, discussions with consultants, evaluation of patient's response to treatment, examination of patient, ordering and performing treatments and interventions, ordering and review of laboratory studies, ordering and review of radiographic studies, pulse oximetry, re-evaluation of patient's condition. This critical care time did not overlap with that of any other provider or involve time for any procedures.    Thank you for your consult. I will follow-up with patient. Please contact us if you have any additional questions.     Carline Key NP  Critical Care Medicine  [facility] - Intensive Care ([facility])

## 2024-08-30 NOTE — ASSESSMENT & PLAN NOTE
The likely etiology of thrombocytopenia is sepsis and liver disease. The patients 3 most recent labs are listed below.  Recent Labs     08/29/24  1625   PLT 73*     Plan  - Will transfuse if platelet count is <50k (if undergoing surgical procedure or have active bleeding).  - monitor  platelet counts

## 2024-08-30 NOTE — ASSESSMENT & PLAN NOTE
"This patient does have evidence of infective focus  My overall impression is sepsis.  Source: Urinary Tract  Antibiotics given-   Antibiotics (72h ago, onward)      Start     Stop Route Frequency Ordered    08/30/24 1730  ceFEPIme (MAXIPIME) 2 g in D5W 100 mL IVPB (MB+)         -- IV Every 24 hours (non-standard times) 08/29/24 2059 08/29/24 1816  vancomycin - pharmacy to dose  (vancomycin IVPB (PEDS and ADULTS))        Placed in "And" Linked Group    -- IV pharmacy to manage frequency 08/29/24 1717          Initial lactic acid 6.6  Latest lactate reviewed-  Recent Labs   Lab 08/29/24  1853   LACTATE 4.4*    (After fluids given)    Organ dysfunction indicated by Acute kidney injury, Encephalopathy, and Thrombocytopenia     Fluid challenge Contraindicated- Fluid bolus is contraindicated in this patient due to Congestive Heart Failure Partial fluid bolus given    Post- resuscitation assessment Yes Perfusion exam was performed within 6 hours of septic shock presentation after bolus shows Adequate tissue perfusion assessed by non-invasive monitoring       Will Not start Pressors- BP has improved with IV fluids  Source control achieved by: Broad spectrum antibiotics  "

## 2024-08-30 NOTE — ASSESSMENT & PLAN NOTE
- blood/urine cultures pending  - broad spectrum abx  - follow cultures and de-escalate as indicated  - recheck procalcitonin tomorrow  - berrios protocol in place, discontinue ASAP

## 2024-08-30 NOTE — CONSULTS
"Pharmacokinetic Initial Assessment: IV Vancomycin    Assessment/Plan:    Initiate intravenous vancomycin with loading dose of 2000 mg once with subsequent doses when random concentrations are less than 20 mcg/mL  Desired empiric serum trough concentration is 15 to 20 mcg/mL  Draw vancomycin random level on 1230 at 8/30.  Pharmacy will continue to follow and monitor vancomycin.      Please contact pharmacy at extension [phone] with any questions regarding this assessment.     Thank you for the consult,   Emelina Romero, PharmD 8/29/2024 7:54 PM         Patient brief summary:  Lakshmi Campbell is a 73 y.o. female initiated on antimicrobial therapy with IV Vancomycin for treatment of suspected sepsis    Drug Allergies:   Review of patient's allergies indicates:   Allergen Reactions    Codeine Nausea Only     Other reaction(s): Nausea  Other reaction(s): Elevated blood pressure       Actual Body Weight:   99.8 kg    Renal Function:   Estimated Creatinine Clearance: 22.5 mL/min (A) (based on SCr of 2.6 mg/dL (H)).,     Dialysis Method (if applicable):  N/A    CBC (last 72 hours):  Recent Labs   Lab Result Units 08/29/24  1625   WBC K/uL 13.56*   Hemoglobin g/dL 11.5*   Hematocrit % 35.6*   Platelets K/uL 73*   Gran % % 87.8*   Lymph % % 2.9*   Mono % % 7.5   Eosinophil % % 0.0   Basophil % % 0.1   Differential Method  Automated       Metabolic Panel (last 72 hours):  Recent Labs   Lab Result Units 08/29/24  1625 08/29/24  1649   Sodium mmol/L 138  --    Potassium mmol/L 4.4  --    Chloride mmol/L 112*  --    CO2 mmol/L 11*  --    Glucose mg/dL 362*  --    Glucose, UA   --  4+*   BUN mg/dL 46*  --    Creatinine mg/dL 2.6*  --    Albumin g/dL 2.6*  --    Total Bilirubin mg/dL 1.2*  --    Alkaline Phosphatase U/L 68  --    AST U/L 25  --    ALT U/L 12  --    Magnesium mg/dL 1.7  --    Phosphorus mg/dL 2.2*  --        Drug levels (last 3 results):  No results for input(s): "VANCOMYCINRA", "VANCORANDOM", "VANCOMYCINPE", " ""VANCOPEAK", "VANCOMYCINTR", "VANCOTROUGH" in the last 72 hours.    Microbiologic Results:  Microbiology Results (last 7 days)       Procedure Component Value Units Date/Time    Urine culture [[id number]] Collected: 08/29/24 1649    Order Status: No result Specimen: Urine Updated: 08/29/24 1712    Blood culture x two cultures. Draw prior to antibiotics. [[id number]] Collected: 08/29/24 1645    Order Status: Sent Specimen: Blood from Wrist, Right     Influenza A & B by Molecular [[id number]] Collected: 08/29/24 1619    Order Status: Completed Specimen: Nasopharyngeal Swab Updated: 08/29/24 1643     Influenza A, Molecular Negative     Influenza B, Molecular Negative     Flu A & B Source Nasal swab    Blood culture x two cultures. Draw prior to antibiotics. [[id number]] Collected: 08/29/24 1626    Order Status: Sent Specimen: Blood from Peripheral, Forearm, Right             "

## 2024-08-30 NOTE — HPI
Patient is a 73-year-old female with past medical history significant for diabetes, liver cirrhosis secondary to YO, hypothyroidism, hypertension, hyperlipidemia, CKD stage 3, thrombocytopenia, cataracts, hyperkalemia, and morbid obesity who presented to ED with complaints of lethargy, fever, and dysuria described as burning.  She  is very somnolent and lethargic.  Information mainly obtained from her  who was sitting at bedside.  He states that for the past 2-3 days she has been laying around and sleeping all day long, she has complained of pain with urination, she has vomited a couple of times and she has been having fever on and off.  She was brought in by EMS and they noted blood pressure of 82/31, per EMS she was given epi IV push and a 500 mL normal saline fluid bolus.  On arrival to ED vital signs-- temperature 99.2°, heart rate 95, respirations 20, blood pressure 94/39, SpO2 93% on room air.    Lab workup showed WBC 13.56, hemoglobin 11.5, hematocrit 35.6, platelets 73, CO2 11, anion gap 15, BUN 46, creatinine 2.6, glucose 362, phosphorus 2.2, albumin 2.6, bilirubin 1.2, normal LFTs, potassium 4.4, ammonia 26, BNP 1 679, troponin 0.509, lactic acid 6.6, beta hydroxybutyrate 0.3, procalcitonin 62.91,  influenza A negative, influenza B negative, COVID-19 screening is negative.  Urinalysis with cloudy yellow urine 3+ protein, 4+ glucose, 3+ blood, negative nitrite, 3+ leukocyte esterase, greater than 100 RBC, greater than 100 WBC, many white blood cell clumps noted.   Venous blood gas was done which showed pH 7.277, bicarb 13.7. Due to history of congestive heart failure and some fluid noted on imaging, she was cautiously given small IV fluid boluses rather than sepsis protocol fluid bolus.   She was started on vanc and cefepime.  After total of 1 L fluid bolus, lactic acid repeated and  remains elevated at 4.4.  Additional IV fluids has been ordered.  ICU team did evaluate patient, deemed that  patient was stable for floor admission due to severe sepsis.    Transferred to icu due to worsening respiratory status and lactic acidosis. Received intravenous fluids. On room air. Creatinine remains elevated.     Bacteremia. Infectious disease consulted. Complains of diarrhea. Rule out cdiff.    Hospital medicine consulted for assumption of care.

## 2024-08-30 NOTE — ASSESSMENT & PLAN NOTE
"This patient does have evidence of infective focus  My overall impression is sepsis.  Source: Urinary Tract  Antibiotics given-   Antibiotics (72h ago, onward)      Start     Stop Route Frequency Ordered    08/30/24 1730  ceFEPIme (MAXIPIME) 2 g in D5W 100 mL IVPB (MB+)         -- IV Every 24 hours (non-standard times) 08/29/24 2059    08/30/24 0900  mupirocin 2 % ointment         09/04/24 0859 Nasl 2 times daily 08/30/24 0255    08/29/24 1816  vancomycin - pharmacy to dose  (vancomycin IVPB (PEDS and ADULTS))        Placed in "And" Linked Group    -- IV pharmacy to manage frequency 08/29/24 1717          Latest lactate reviewed-  Recent Labs   Lab 08/30/24  0133   LACTATE 8.1*     Organ dysfunction indicated by Acute kidney injury, Thrombocytopenia , and worsening lactic acidosis    Fluid challenge Contraindicated- Fluid bolus is contraindicated in this patient due to Congestive Heart Failure     Post- resuscitation assessment Yes Perfusion exam was performed within 6 hours of septic shock presentation after bolus shows Adequate tissue perfusion assessed by non-invasive monitoring     Will Not start Pressors- Levophed for MAP of 65  Source control achieved by: antibiotics  "

## 2024-08-30 NOTE — ASSESSMENT & PLAN NOTE
Patient with known Cirrhosis Co-morbidities are present and inclusive of anemia/pancytopenia.  MELD-Na score calculated; MELD 3.0: 14 at 7/2/2024  6:24 AM  MELD-Na: 11 at 7/2/2024  6:24 AM  Calculated from:  Serum Creatinine: 1.6 mg/dL at 7/2/2024  6:24 AM  Serum Sodium: 138 mmol/L (Using max of 137 mmol/L) at 7/2/2024  6:24 AM  Total Bilirubin: 0.5 mg/dL (Using min of 1 mg/dL) at 7/2/2024  6:24 AM  Serum Albumin: 2.2 g/dL at 7/2/2024  6:24 AM  INR(ratio): 1.0 at 7/1/2024  6:06 AM  Age at listing (hypothetical): 73 years  Sex: Female at 7/2/2024  6:24 AM      Continue chronic meds. Etiology likely NAFLD. Will avoid any hepatotoxic meds, and monitor CBC/CMP/INR for synthetic function.

## 2024-08-30 NOTE — EICU
EICU BRIEF INITIAL EVALUATION:       HISTORY:      73-year-old woman transferred to ICU for increased work of breathing and increasing lactic acid.  She was given Lasix  History of type 2 diabetes, cirrhosis-Araiza, diastolic CHF, hypothyroidism, CKD 3, thrombocytopenia, hyperlipidemia, BMI 37    DVT prophylaxis SCDs   GI prophylaxis not indicated    /54, P 83, R 21, O2 sat 94   Resting comfortably on room air      CAMERA ASSESSMENT: Yes      TELEMETRY: Reviewed      NOTES: Reviewed     LABS: Reviewed      IMAGING: Personally reviewed.      DISCUSSED with bedside nurse        ASSESSMENT AND PLAN:       Exacerbation of chronic diastolic CHF-improved with diuresis.  Continue monitoring.  Closely follow volume status    Urosepsis-continue antibiotics, check cultures    MIKA  - avoid nephrotoxins, renally dose medications.  Monitor renal function and urine output.  Monitor volume status      I have reviewed the plan of care for the patient and agree with the plan of care unless noted otherwise above.      CAITLIN Estrada MD  eICU Attending  [phone]    This report has been created through the use of [employer] dictation software. Typographical and content errors may occur with this process. While efforts are made to detect and correct such errors, in some cases errors will persist. For this reason, wording in this document should be considered in the proper context and not strictly verbatim.

## 2024-08-30 NOTE — ASSESSMENT & PLAN NOTE
Patient with known Cirrhosis Co-morbidities are present and inclusive of anemia/pancytopenia.  MELD-Na score calculated; MELD 3.0: 21 at 8/30/2024 12:38 PM  MELD-Na: 19 at 8/30/2024 12:38 PM  Calculated from:  Serum Creatinine: 2.8 mg/dL at 8/30/2024 12:38 PM  Serum Sodium: 139 mmol/L (Using max of 137 mmol/L) at 8/30/2024 12:38 PM  Total Bilirubin: 1.0 mg/dL at 8/30/2024  6:09 AM  Serum Albumin: 2.1 g/dL at 8/30/2024 12:38 PM  INR(ratio): 1.3 at 8/30/2024  6:09 AM  Age at listing (hypothetical): 73 years  Sex: Female at 8/30/2024 12:38 PM      Continue chronic meds. Etiology likely NAFLD. Will avoid any hepatotoxic meds, and monitor CBC/CMP/INR for synthetic function.

## 2024-08-30 NOTE — PROGRESS NOTES
Pharmacist Renal Dose Adjustment Note    Lakshmi Campbell is a 73 y.o. female being treated with the medication cefepime    Patient Data:    Vital Signs (Most Recent):  Temp: 99.2 °F (37.3 °C) (08/29/24 1532)  Pulse: 76 (08/29/24 2030)  Resp: 19 (08/29/24 2030)  BP: (!) 144/67 (08/29/24 2030)  SpO2: 97 % (08/29/24 2030) Vital Signs (72h Range):  Temp:  [99.2 °F (37.3 °C)]   Pulse:  [76-95]   Resp:  [18-25]   BP: ()/(39-67)   SpO2:  [93 %-97 %]      Recent Labs   Lab 08/29/24  1625   CREATININE 2.6*     Serum creatinine: 2.6 mg/dL (H) 08/29/24 1625  Estimated creatinine clearance: 22.5 mL/min (A)    Medication:cefepime 2g every 8 hours will be changed to cefepime 2g every 24 hours for crcl <30 ml/min    Pharmacist's Name: Christine Munoz  Pharmacist's Extension: [phone]

## 2024-08-30 NOTE — SUBJECTIVE & OBJECTIVE
Past Medical History:   Diagnosis Date    Acquired TTP     Cataract     CKD stage 3 secondary to diabetes     Closed displaced comminuted fracture of right patella 10/28/2020    Closed fracture of surgical neck of left humerus 10/30/2020    Closed left radial fracture 10/30/2020    Hyperkalemia     Hyperlipidemia     Hypertension     Hypothyroidism, unspecified     Liver cirrhosis secondary to YO     Morbid obesity     Right knee pain     Thyroid disease     Type 2 diabetes mellitus        Past Surgical History:   Procedure Laterality Date    APPENDECTOMY      BREAST BIOPSY      CATARACT EXTRACTION      COLONOSCOPY N/A 12/21/2021    Procedure: COLONOSCOPY screening;  Surgeon: Moises Patterson MD;  Location: [facility];  Service: Endoscopy;  Laterality: N/A;    ESOPHAGOGASTRODUODENOSCOPY N/A 12/21/2021    Procedure: EGD (ESOPHAGOGASTRODUODENOSCOPY) EV screening;  Surgeon: Moises Patterson MD;  Location: [facility];  Service: Endoscopy;  Laterality: N/A;    EYE SURGERY      HERNIA REPAIR      OPEN REDUCTION AND INTERNAL FIXATION (ORIF) OF FRACTURE OF DISTAL RADIUS Left 11/2/2020    Procedure: ORIF, FRACTURE, RADIUS, DISTAL;  Surgeon: Sage Wolf MD;  Location: [facility];  Service: Orthopedics;  Laterality: Left;    OPEN REDUCTION AND INTERNAL FIXATION (ORIF) OF FRACTURE OF PATELLA Right 11/2/2020    Procedure: ORIF, FRACTURE, PATELLA;  Surgeon: Sage Wolf MD;  Location: [facility] OR;  Service: Orthopedics;  Laterality: Right;    OPEN REDUCTION AND INTERNAL FIXATION (ORIF) OF FRACTURE OF PATELLA Right 12/18/2020    Procedure: ORIF, FRACTURE, PATELLA;  Surgeon: Sage Wolf MD;  Location: [facility] OR;  Service: Orthopedics;  Laterality: Right;    ORIF HUMERUS FRACTURE Left 11/5/2020    Procedure: ORIF, FRACTURE, HUMERUS;  Surgeon: Sage Wolf MD;  Location: [facility] OR;  Service: Orthopedics;  Laterality: Left;    PLACEMENT OF ACELLULAR HUMAN DERMAL ALLOGRAFT Right 11/2/2020     "Procedure: APPLICATION, ACELLULAR HUMAN DERMAL ALLOGRAFT;  Surgeon: Sage Wolf MD;  Location: [facility] OR;  Service: Orthopedics;  Laterality: Right;  Right Patella    REMOVAL OF HARDWARE FROM HAND Left 3/11/2021    Procedure: REMOVAL, HARDWARE, HAND;  Surgeon: Sage Wolf MD;  Location: [facility] OR;  Service: Orthopedics;  Laterality: Left;  Removal of dorsal spanning wrist plate left distal radius    REMOVAL OF HARDWARE FROM LOWER EXTREMITY Right 2020    Procedure: REMOVAL, HARDWARE, LOWER EXTREMITY;  Surgeon: Sage Wolf MD;  Location: [facility] OR;  Service: Orthopedics;  Laterality: Right;       Review of patient's allergies indicates:   Allergen Reactions    Codeine Nausea Only     Other reaction(s): Nausea  Other reaction(s): Elevated blood pressure       No current facility-administered medications on file prior to encounter.     Current Outpatient Medications on File Prior to Encounter   Medication Sig    amLODIPine (NORVASC) 10 MG tablet TAKE 1 TABLET(10 MG) BY MOUTH EVERY DAY    metoprolol succinate (TOPROL-XL) 25 MG 24 hr tablet Take 0.5 tablets (12.5 mg total) by mouth once daily.    blood sugar diagnostic Strp Use ac bid    chlorthalidone (HYGROTEN) 25 MG Tab Take 25 mg by mouth once daily.    cholecalciferol, vitamin D3, (VITAMIN D3) 50 mcg (2,000 unit) Cap capsule Take 1 capsule by mouth.    HUMULIN 70/30 U-100 KWIKPEN 100 unit/mL (70-30) InPn pen SMARTSI Unit(s) SUB-Q Every Evening    hydrALAZINE (APRESOLINE) 25 MG tablet Take 1 tablet (25 mg total) by mouth every 8 (eight) hours.    insulin syringe-needle U-100 1 mL 29 gauge x 1/2" Syrg Use bid    JARDIANCE 25 mg tablet Take 25 mg by mouth every morning.    lancets 33 gauge Misc Use as BID    levothyroxine (SYNTHROID) 150 MCG tablet Take 1 tablet by mouth once daily.    metFORMIN (GLUCOPHAGE) 500 MG tablet Take 500 mg by mouth 2 (two) times daily.    perindopril erbumine (ACEON) 4 mg tablet Take 2 mg by mouth " once daily.    pravastatin (PRAVACHOL) 20 MG tablet Take 20 mg by mouth once daily.     spironolactone (ALDACTONE) 25 MG tablet Take 25 mg by mouth.     Family History       Problem Relation (Age of Onset)    Diabetes Mother, Father    Leukemia Father          Tobacco Use    Smoking status: Former     Current packs/day: 0.00     Average packs/day: 1 pack/day for 8.0 years (8.0 ttl pk-yrs)     Types: Cigarettes     Start date:      Quit date:      Years since quittin.6    Smokeless tobacco: Never   Substance and Sexual Activity    Alcohol use: Yes     Comment: rarely    Drug use: No    Sexual activity: Not Currently     Review of Systems   Constitutional:  Positive for activity change and fatigue. Negative for fever.   Respiratory:  Positive for shortness of breath (improving).    Gastrointestinal:  Positive for diarrhea. Negative for abdominal pain, nausea and vomiting.   Neurological:  Positive for weakness.   Psychiatric/Behavioral:  Negative for agitation, behavioral problems, confusion, decreased concentration and dysphoric mood. The patient is not nervous/anxious.      Objective:     Vital Signs (Most Recent):  Temp: 97.8 °F (36.6 °C) (24 1105)  Pulse: 88 (24 1400)  Resp: (!) 30 (24 1400)  BP: (!) 112/57 (24 1300)  SpO2: 96 % (24 1400) Vital Signs (24h Range):  Temp:  [97.8 °F (36.6 °C)-99.3 °F (37.4 °C)] 97.8 °F (36.6 °C)  Pulse:  [] 88  Resp:  [17-30] 30  SpO2:  [93 %-98 %] 96 %  BP: ()/(39-86) 112/57     Weight: 103.4 kg (227 lb 15.3 oz)  Body mass index is 37.93 kg/m².     Physical Exam  Vitals and nursing note reviewed. Exam conducted with a chaperone present (visitor).   Constitutional:       General: She is not in acute distress.     Appearance: She is obese. She is ill-appearing. She is not toxic-appearing.   HENT:      Head: Normocephalic and atraumatic.   Cardiovascular:      Rate and Rhythm: Normal rate.   Pulmonary:      Effort: Pulmonary effort  is normal. No respiratory distress.   Abdominal:      General: There is distension.      Palpations: Abdomen is soft.      Tenderness: There is no abdominal tenderness.   Musculoskeletal:      Right lower leg: No edema.      Left lower leg: No edema.   Skin:     General: Skin is warm.      Coloration: Skin is pale.   Neurological:      Mental Status: She is alert and oriented to person, place, and time.      Motor: Weakness present.   Psychiatric:         Mood and Affect: Mood normal.         Behavior: Behavior normal.      Significant Labs: All pertinent labs within the past 24 hours have been reviewed.  Blood Culture:   Recent Labs   Lab 08/29/24  1626 08/29/24  1645   LABBLOO Gram stain sharan bottle: Gram negative rods  Results called to and read back by: Adrian Holm RN  08/30/2024  12:36 Gram stain aer bottle: Gram negative rods  Gram stain sharan bottle: Gram negative rods  Results called to and read back by: Adrian Holm RN  08/30/2024  12:36     CBC:   Recent Labs   Lab 08/29/24  1625 08/30/24  0044 08/30/24  0609   WBC 13.56*  --  11.84   HGB 11.5*  --  10.3*   HCT 35.6* 33* 31.3*   PLT 73*  --  45*     CMP:   Recent Labs   Lab 08/29/24  2156 08/30/24  0133 08/30/24  0609 08/30/24  1238    139 140 139   K 4.5 4.1 4.0 4.5    111* 110 110   CO2 16* 13* 17* 20*   * 315* 259* 278*   BUN 48* 51* 56* 58*   CREATININE 2.6* 2.8* 2.7* 2.8*   CALCIUM 8.3* 8.5* 8.2* 8.3*   PROT 6.0 5.7* 5.5*  --    ALBUMIN 2.6* 2.4* 2.3* 2.1*   BILITOT 1.1* 0.9 1.0  --    ALKPHOS 59 108 62  --    AST 30 37 35  --    ALT 14 14 14  --    ANIONGAP 11 15 13 9     Lactic Acid:   Recent Labs   Lab 08/30/24  0133 08/30/24  0609 08/30/24  1238   LACTATE 8.1* 3.6* 2.0     TSH:   Recent Labs   Lab 08/30/24  0609   TSH 0.084*       Urine Studies:   Recent Labs   Lab 08/29/24  1649   COLORU Yellow   APPEARANCEUA Cloudy*   PHUR 6.0   SPECGRAV 1.025   PROTEINUA 3+*   GLUCUA 4+*   KETONESU Negative   BILIRUBINUA Negative   OCCULTUA  3+*   NITRITE Negative   UROBILINOGEN Negative   LEUKOCYTESUR 3+*   RBCUA >100*   WBCUA >100*   BACTERIA None   HYALINECASTS 0       Significant Imaging: I have reviewed all pertinent imaging results/findings within the past 24 hours.  CT: I have reviewed all pertinent results/findings within the past 24 hours and my personal findings are:  ct head with no acute intracranial abnormality, CT chest abdomen pelvis with cirrhosis and interstitial thickening of lungs, gallstones, no ascites  CXR: I have reviewed all pertinent results/findings within the past 24 hours and my personal findings are:  mild diffuse pneumonitis or edema

## 2024-08-30 NOTE — HPI
Lakshmi Campbell is a 73-year-old female with PMHx of diabetes, liver cirrhosis-YO, G2 diastolic heart failure, hypothyroidism, CKD stage 3, thrombocytopenia, and morbid obesity who presented to ED with complaints of lethargy, subjective fever, and dysuria described as burning. She is very somnolent and lethargic. Information mainly obtained from her  who was sitting at bedside. He states that for the past 2-3 days she has been laying around and sleeping all day long, she has complained of pain with urination, she has vomited a couple of times and she has been having fever on and off. She did not go to the doctor and was treating her UTI with cranberry juice at home for several days. She was brought in by EMS and they noted blood pressure of 82/31, per EMS she was given epi IV push and a 500 mL normal saline fluid bolus.     On arrival to ED vital signs- T 99.2°, HR 95, RR 20, BP 94/39, SpO2 93% on RA. Lab workup showed WBC 13.56, Hgb/Hct 11.5/35.6, platelets 73, CO2 11, AG 15, BUN?Cr 46/2.6 (baseline appears around 1.8, but has was recently hospitalized w/MIKA and Cr 2-3), glucose 362, albumin 2.6, t-bili 1.2, normal LFTs, normal K. Ammonia 26, BNP 1679, but not volume overloaded on exam, troponin 0.509. Lactic acid 6.6, BHB negative. Procalcitonin 62.91, Flu A/B neg, COVID-19 screen negative. UA w/cloudy yellow urine 3+ protein, 4+ glucose, 3+ blood, negative nitrite, 3+ leukocyte esterase, greater than 100 RBC, greater than 100 WBC, many white blood cell clumps noted. Venous blood gas was done which showed pH 7.277, bicarb 13.7. CT head w/naf. CXR w/cardiomegaly, mild perihilar interstitial opacaties, subsegmental atelectasis or scarring since previous films when admitted for pneumonia. CT abdomen/pelvis showed again residual scarring of subpleural regions, more on R, gallstones, suspected cirrhosis, with no ascites or splenomegaly, and a RLQ retroperitoneal lymph node where malignancy cannot be excluded. Due  to history of congestive heart failure and some fluid noted on imaging, she was cautiously given small IV fluid boluses rather than sepsis protocol fluid bolus. She was started on vanc and cefepime. After total of 1 L fluid bolus, lactic acid repeated and improved from 6.0 to. Additional IV fluids were given in the ED. She was evaluated by ICU team prior to admission and deemed stable for floor admission due to severe sepsis. At that time, patient normotensive, responded to loud verbal stimuli, was oriented x3, had no specific complaint, appeared warm, dry, pink, on room air, clear lungs, denied heart failure or cirrhosis history, said she was just tired.     After admission to floor, patient c/o back pain, nursing reported labored respirations with wheezing, HR increase to 120 and BP up to 208/83. There was concern for volume overload, however patient remained on room air and denied dyspnea. CXR essentially unchanged. A berrios was placed, patient given 40 mg lasix with 350cc UOP. ABG did show worsening metabolic acidosis, but CO2 compensating as expected. Lactic acid has risen to 8 and renal function has mildly worsened. She was given a bicarb push. Her glucose also trent into 400's earlier in night and she was treated with IV insulin and improvement into 300's. Decision was made to transfer to ICU for worsening lactic acidosis. CC consulted.

## 2024-08-30 NOTE — ASSESSMENT & PLAN NOTE
MIKA is likely due to pre-renal azotemia due to dehydration. Baseline creatinine is  2.0-2.2 . Most recent creatinine and eGFR are listed below.  Recent Labs     08/30/24  0133 08/30/24  0609 08/30/24  1238   CREATININE 2.8* 2.7* 2.8*   EGFRNORACEVR 17* 18* 17*        Plan  - MIKA is worsening. Will adjust treatment as follows: gentle hydration with IV fluids  - Avoid nephrotoxins and renally dose meds for GFR listed above  - Monitor urine output, serial BMP, and adjust therapy as needed  Continue monitoring kidney function  Checking labs for TMA should kidney function worsen

## 2024-08-30 NOTE — ASSESSMENT & PLAN NOTE
Creatine stable for now. BMP reviewed- noted Estimated Creatinine Clearance: 21.4 mL/min (A) (based on SCr of 2.8 mg/dL (H)). according to latest data. Based on current GFR, CKD stage is stage 4 - GFR 15-29.  Monitor UOP and serial BMP and adjust therapy as needed. Renally dose meds. Avoid nephrotoxic medications and procedures.  Lab Results   Component Value Date    HGBA1C 5.9 (H) 06/26/2024   - s/p IV insulin x2 with improvement, but glucose still in 300's  - will add basal insulin and continue q6 glucose checks until stabilized  - diabetic/renal/cardiac diet w/1.5 L fluid restriction  - adjust therapy as indicated

## 2024-08-30 NOTE — ASSESSMENT & PLAN NOTE
Patient with known Cirrhosis.   Co-morbidities are present and inclusive of anemia/pancytopenia.  MELD-Na score calculated; MELD 3.0: 14 at 7/2/2024  6:24 AM  MELD-Na: 11 at 7/2/2024  6:24 AM  Calculated from:  Serum Creatinine: 1.6 mg/dL at 7/2/2024  6:24 AM  Serum Sodium: 138 mmol/L (Using max of 137 mmol/L) at 7/2/2024  6:24 AM  Total Bilirubin: 0.5 mg/dL (Using min of 1 mg/dL) at 7/2/2024  6:24 AM  Serum Albumin: 2.2 g/dL at 7/2/2024  6:24 AM  INR(ratio): 1.0 at 7/1/2024  6:06 AM  Age at listing (hypothetical): 73 years  Sex: Female at 7/2/2024  6:24 AM    Continue chronic meds. Etiology likely NAFLD. Will avoid any hepatotoxic meds, and monitor CBC/CMP/INR for synthetic function.

## 2024-08-30 NOTE — CONSULTS
[facility] - Intensive Care ([facility] Medicine  Consult Note    Patient Name: Lakshmi Campbell  MRN: [id number]  Admission Date: 8/29/2024  Hospital Length of Stay: 1 days  Attending Physician: Adrian Nathan MD   Primary Care Provider: Keely Silva NP           Patient information was obtained from ER records and primary team.     Consults  Subjective:     Principal Problem: Severe sepsis    Chief Complaint:   Chief Complaint   Patient presents with    General Illness     Patient arrives via [facility] for complaints of lethargy, vomiting, burning during urination, and fevers for two days now.  Patient has no history of UTI and states no one in the household is ill.  Patient denies having any pain or shortness of breath.  [facility] administered 500ml NS and 10mcg of push-dose epi prior to arrival for BP of 82/31.          HPI:   Patient is a 73-year-old female with past medical history significant for diabetes, liver cirrhosis secondary to YO, hypothyroidism, hypertension, hyperlipidemia, CKD stage 3, thrombocytopenia, cataracts, hyperkalemia, and morbid obesity who presented to ED with complaints of lethargy, fever, and dysuria described as burning.  She  is very somnolent and lethargic.  Information mainly obtained from her  who was sitting at bedside.  He states that for the past 2-3 days she has been laying around and sleeping all day long, she has complained of pain with urination, she has vomited a couple of times and she has been having fever on and off.  She was brought in by EMS and they noted blood pressure of 82/31, per EMS she was given epi IV push and a 500 mL normal saline fluid bolus.  On arrival to ED vital signs-- temperature 99.2°, heart rate 95, respirations 20, blood pressure 94/39, SpO2 93% on room air.    Lab workup showed WBC 13.56, hemoglobin 11.5, hematocrit 35.6, platelets 73, CO2 11, anion gap 15, BUN 46, creatinine 2.6, glucose 362, phosphorus 2.2, albumin 2.6, bilirubin 1.2,  normal LFTs, potassium 4.4, ammonia 26, BNP 1 679, troponin 0.509, lactic acid 6.6, beta hydroxybutyrate 0.3, procalcitonin 62.91,  influenza A negative, influenza B negative, COVID-19 screening is negative.  Urinalysis with cloudy yellow urine 3+ protein, 4+ glucose, 3+ blood, negative nitrite, 3+ leukocyte esterase, greater than 100 RBC, greater than 100 WBC, many white blood cell clumps noted.   Venous blood gas was done which showed pH 7.277, bicarb 13.7. Due to history of congestive heart failure and some fluid noted on imaging, she was cautiously given small IV fluid boluses rather than sepsis protocol fluid bolus.   She was started on vanc and cefepime.  After total of 1 L fluid bolus, lactic acid repeated and  remains elevated at 4.4.  Additional IV fluids has been ordered.  ICU team did evaluate patient, deemed that patient was stable for floor admission due to severe sepsis.    Transferred to icu due to worsening respiratory status and lactic acidosis. Received intravenous fluids. On room air. Creatinine remains elevated.     Bacteremia. Infectious disease consulted. Complains of diarrhea. Rule out cdiff.    Hospital medicine consulted for assumption of care.      Past Medical History:   Diagnosis Date    Acquired TTP     Cataract     CKD stage 3 secondary to diabetes     Closed displaced comminuted fracture of right patella 10/28/2020    Closed fracture of surgical neck of left humerus 10/30/2020    Closed left radial fracture 10/30/2020    Hyperkalemia     Hyperlipidemia     Hypertension     Hypothyroidism, unspecified     Liver cirrhosis secondary to YO     Morbid obesity     Right knee pain     Thyroid disease     Type 2 diabetes mellitus        Past Surgical History:   Procedure Laterality Date    APPENDECTOMY      BREAST BIOPSY      CATARACT EXTRACTION      COLONOSCOPY N/A 12/21/2021    Procedure: COLONOSCOPY screening;  Surgeon: Moises Patterson MD;  Location: [facility];  Service: Endoscopy;   Laterality: N/A;    ESOPHAGOGASTRODUODENOSCOPY N/A 12/21/2021    Procedure: EGD (ESOPHAGOGASTRODUODENOSCOPY) EV screening;  Surgeon: Moises Patterson MD;  Location: [facility];  Service: Endoscopy;  Laterality: N/A;    EYE SURGERY      HERNIA REPAIR      OPEN REDUCTION AND INTERNAL FIXATION (ORIF) OF FRACTURE OF DISTAL RADIUS Left 11/2/2020    Procedure: ORIF, FRACTURE, RADIUS, DISTAL;  Surgeon: Sage Wolf MD;  Location: [facility] OR;  Service: Orthopedics;  Laterality: Left;    OPEN REDUCTION AND INTERNAL FIXATION (ORIF) OF FRACTURE OF PATELLA Right 11/2/2020    Procedure: ORIF, FRACTURE, PATELLA;  Surgeon: Sage Wolf MD;  Location: [facility] OR;  Service: Orthopedics;  Laterality: Right;    OPEN REDUCTION AND INTERNAL FIXATION (ORIF) OF FRACTURE OF PATELLA Right 12/18/2020    Procedure: ORIF, FRACTURE, PATELLA;  Surgeon: Sage Wolf MD;  Location: [facility];  Service: Orthopedics;  Laterality: Right;    ORIF HUMERUS FRACTURE Left 11/5/2020    Procedure: ORIF, FRACTURE, HUMERUS;  Surgeon: Sage Wolf MD;  Location: [facility] OR;  Service: Orthopedics;  Laterality: Left;    PLACEMENT OF ACELLULAR HUMAN DERMAL ALLOGRAFT Right 11/2/2020    Procedure: APPLICATION, ACELLULAR HUMAN DERMAL ALLOGRAFT;  Surgeon: Sage Wolf MD;  Location: [facility] OR;  Service: Orthopedics;  Laterality: Right;  Right Patella    REMOVAL OF HARDWARE FROM HAND Left 3/11/2021    Procedure: REMOVAL, HARDWARE, HAND;  Surgeon: Sage Wolf MD;  Location: [facility] OR;  Service: Orthopedics;  Laterality: Left;  Removal of dorsal spanning wrist plate left distal radius    REMOVAL OF HARDWARE FROM LOWER EXTREMITY Right 12/18/2020    Procedure: REMOVAL, HARDWARE, LOWER EXTREMITY;  Surgeon: Sage Wolf MD;  Location: [facility] OR;  Service: Orthopedics;  Laterality: Right;       Review of patient's allergies indicates:   Allergen Reactions    Codeine Nausea Only     Other reaction(s):  "Nausea  Other reaction(s): Elevated blood pressure       No current facility-administered medications on file prior to encounter.     Current Outpatient Medications on File Prior to Encounter   Medication Sig    amLODIPine (NORVASC) 10 MG tablet TAKE 1 TABLET(10 MG) BY MOUTH EVERY DAY    metoprolol succinate (TOPROL-XL) 25 MG 24 hr tablet Take 0.5 tablets (12.5 mg total) by mouth once daily.    blood sugar diagnostic Strp Use ac bid    chlorthalidone (HYGROTEN) 25 MG Tab Take 25 mg by mouth once daily.    cholecalciferol, vitamin D3, (VITAMIN D3) 50 mcg (2,000 unit) Cap capsule Take 1 capsule by mouth.    HUMULIN 70/30 U-100 KWIKPEN 100 unit/mL (70-30) InPn pen SMARTSI Unit(s) SUB-Q Every Evening    hydrALAZINE (APRESOLINE) 25 MG tablet Take 1 tablet (25 mg total) by mouth every 8 (eight) hours.    insulin syringe-needle U-100 1 mL 29 gauge x 1/2" Syrg Use bid    JARDIANCE 25 mg tablet Take 25 mg by mouth every morning.    lancets 33 gauge Misc Use as BID    levothyroxine (SYNTHROID) 150 MCG tablet Take 1 tablet by mouth once daily.    metFORMIN (GLUCOPHAGE) 500 MG tablet Take 500 mg by mouth 2 (two) times daily.    perindopril erbumine (ACEON) 4 mg tablet Take 2 mg by mouth once daily.    pravastatin (PRAVACHOL) 20 MG tablet Take 20 mg by mouth once daily.     spironolactone (ALDACTONE) 25 MG tablet Take 25 mg by mouth.     Family History       Problem Relation (Age of Onset)    Diabetes Mother, Father    Leukemia Father          Tobacco Use    Smoking status: Former     Current packs/day: 0.00     Average packs/day: 1 pack/day for 8.0 years (8.0 ttl pk-yrs)     Types: Cigarettes     Start date:      Quit date:      Years since quittin.6    Smokeless tobacco: Never   Substance and Sexual Activity    Alcohol use: Yes     Comment: rarely    Drug use: No    Sexual activity: Not Currently     Review of Systems   Constitutional:  Positive for activity change and fatigue. Negative for fever. "   Respiratory:  Positive for shortness of breath (improving).    Gastrointestinal:  Positive for diarrhea. Negative for abdominal pain, nausea and vomiting.   Neurological:  Positive for weakness.   Psychiatric/Behavioral:  Negative for agitation, behavioral problems, confusion, decreased concentration and dysphoric mood. The patient is not nervous/anxious.      Objective:     Vital Signs (Most Recent):  Temp: 97.8 °F (36.6 °C) (08/30/24 1105)  Pulse: 88 (08/30/24 1400)  Resp: (!) 30 (08/30/24 1400)  BP: (!) 112/57 (08/30/24 1300)  SpO2: 96 % (08/30/24 1400) Vital Signs (24h Range):  Temp:  [97.8 °F (36.6 °C)-99.3 °F (37.4 °C)] 97.8 °F (36.6 °C)  Pulse:  [] 88  Resp:  [17-30] 30  SpO2:  [93 %-98 %] 96 %  BP: ()/(39-86) 112/57     Weight: 103.4 kg (227 lb 15.3 oz)  Body mass index is 37.93 kg/m².     Physical Exam  Vitals and nursing note reviewed. Exam conducted with a chaperone present (visitor).   Constitutional:       General: She is not in acute distress.     Appearance: She is obese. She is ill-appearing. She is not toxic-appearing.   HENT:      Head: Normocephalic and atraumatic.   Cardiovascular:      Rate and Rhythm: Normal rate.   Pulmonary:      Effort: Pulmonary effort is normal. No respiratory distress.   Abdominal:      General: There is distension.      Palpations: Abdomen is soft.      Tenderness: There is no abdominal tenderness.   Musculoskeletal:      Right lower leg: No edema.      Left lower leg: No edema.   Skin:     General: Skin is warm.      Coloration: Skin is pale.   Neurological:      Mental Status: She is alert and oriented to person, place, and time.      Motor: Weakness present.   Psychiatric:         Mood and Affect: Mood normal.         Behavior: Behavior normal.      Significant Labs: All pertinent labs within the past 24 hours have been reviewed.  Blood Culture:   Recent Labs   Lab 08/29/24  1626 08/29/24  1645   LABBLOO Gram stain sharan bottle: Gram negative rods   Results called to and read back by: Adrian Holm RN  08/30/2024  12:36 Gram stain aer bottle: Gram negative rods  Gram stain sharan bottle: Gram negative rods  Results called to and read back by: Adrian Holm RN  08/30/2024  12:36     CBC:   Recent Labs   Lab 08/29/24  1625 08/30/24  0044 08/30/24  0609   WBC 13.56*  --  11.84   HGB 11.5*  --  10.3*   HCT 35.6* 33* 31.3*   PLT 73*  --  45*     CMP:   Recent Labs   Lab 08/29/24  2156 08/30/24  0133 08/30/24  0609 08/30/24  1238    139 140 139   K 4.5 4.1 4.0 4.5    111* 110 110   CO2 16* 13* 17* 20*   * 315* 259* 278*   BUN 48* 51* 56* 58*   CREATININE 2.6* 2.8* 2.7* 2.8*   CALCIUM 8.3* 8.5* 8.2* 8.3*   PROT 6.0 5.7* 5.5*  --    ALBUMIN 2.6* 2.4* 2.3* 2.1*   BILITOT 1.1* 0.9 1.0  --    ALKPHOS 59 108 62  --    AST 30 37 35  --    ALT 14 14 14  --    ANIONGAP 11 15 13 9     Lactic Acid:   Recent Labs   Lab 08/30/24  0133 08/30/24  0609 08/30/24  1238   LACTATE 8.1* 3.6* 2.0     TSH:   Recent Labs   Lab 08/30/24  0609   TSH 0.084*       Urine Studies:   Recent Labs   Lab 08/29/24  1649   COLORU Yellow   APPEARANCEUA Cloudy*   PHUR 6.0   SPECGRAV 1.025   PROTEINUA 3+*   GLUCUA 4+*   KETONESU Negative   BILIRUBINUA Negative   OCCULTUA 3+*   NITRITE Negative   UROBILINOGEN Negative   LEUKOCYTESUR 3+*   RBCUA >100*   WBCUA >100*   BACTERIA None   HYALINECASTS 0       Significant Imaging: I have reviewed all pertinent imaging results/findings within the past 24 hours.  CT: I have reviewed all pertinent results/findings within the past 24 hours and my personal findings are:  ct head with no acute intracranial abnormality, CT chest abdomen pelvis with cirrhosis and interstitial thickening of lungs, gallstones, no ascites  CXR: I have reviewed all pertinent results/findings within the past 24 hours and my personal findings are:  mild diffuse pneumonitis or edema  Assessment/Plan:     * Severe sepsis  This patient does have evidence of infective focus  My  overall impression is sepsis.  Source: Urinary Tract and bacteremia  Antibiotics given-   Antibiotics (72h ago, onward)      Start     Stop Route Frequency Ordered    08/30/24 1615  meropenem (MERREM) 1 g in 0.9% NaCl 100 mL IVPB (MB+)         -- IV Every 12 hours (non-standard times) 08/30/24 1514    08/30/24 0900  mupirocin 2 % ointment         09/04/24 0859 Nasl 2 times daily 08/30/24 0255          Initial lactic acid 6.6  Latest lactate reviewed-  Recent Labs   Lab 08/30/24  1238   LACTATE 2.0      (After fluids given)    Organ dysfunction indicated by Acute kidney injury, Encephalopathy, and Thrombocytopenia     Fluid challenge Contraindicated- Fluid bolus is contraindicated in this patient due to Congestive Heart Failure Partial fluid bolus given    Post- resuscitation assessment Yes Perfusion exam was performed within 6 hours of septic shock presentation after bolus shows Adequate tissue perfusion assessed by non-invasive monitoring       Will Not start Pressors- BP has improved with IV fluids  Source control achieved by: Broad spectrum antibiotics    Diarrhea  Ruling out cdiff        Bacteremia  Infectious disease consulted       Acute cystitis with hematuria  Switch to merrem   Follow-up urine culture reports, follow blood culture reports      Type 2 diabetes mellitus with stage 3b chronic kidney disease, with long-term current use of insulin  Creatine stable for now. BMP reviewed- noted Estimated Creatinine Clearance: 22.5 mL/min (A) (based on SCr of 2.6 mg/dL (H)). according to latest data. Based on current GFR, CKD stage is stage 3 - GFR 30-59.  Monitor UOP and serial BMP and adjust therapy as needed. Renally dose meds. Avoid nephrotoxic medications and procedures    Serial glucose checks ordered every 6 hours with SSI, check Hgb A1C.    Chronic diastolic congestive heart failure  Patient is identified as having Diastolic (HFpEF) heart failure that is Chronic. CHF is currently controlled. Latest ECHO  performed and demonstrates- Results for orders placed during the hospital encounter of 06/26/24    Echo    Interpretation Summary    Left Ventricle: The left ventricle is normal in size. Normal wall thickness. There is concentric hypertrophy. Normal wall motion. Ejection fraction by visual approximation is 60%. Grade II diastolic dysfunction.    Right Ventricle: Normal right ventricular cavity size. Wall thickness is normal. Systolic function is normal.    Left Atrium: Left atrium is severely dilated.    Aortic Valve: The aortic valve is a trileaflet valve. Mildly restricted motion. There is moderate stenosis. Aortic valve area by VTI is 1.24 cm². Aortic valve peak velocity is 2.66 m/s. Mean gradient is 17 mmHg. The dimensionless index is 0.45.    Mitral Valve: There is moderate mitral annular calcification present. Mildly restricted motion. There is mild to moderate regurgitation.  . Medications are on hold due to hypotension and encephalopathy, and monitor clinical status closely. Monitor on telemetry. Patient is off CHF pathway.  Monitor strict Is&Os and daily weights.   Cardiology has not been consulted. Continue to stress to patient importance of self efficacy and  on diet for CHF. Last BNP reviewed- and noted below   Recent Labs   Lab 08/29/24  1625   BNP 1,679*   Though BNP is elevated and there is some evidence of fluid on CXR -- she appears dry/dehydrated and due to severe sepsis she is requiring IV fluids at this time. Will monitor fluid volume status closely.    Acute on chronic kidney failure  MIKA is likely due to pre-renal azotemia due to dehydration. Baseline creatinine is  2.0-2.2 . Most recent creatinine and eGFR are listed below.  Recent Labs     08/30/24  0133 08/30/24  0609 08/30/24  1238   CREATININE 2.8* 2.7* 2.8*   EGFRNORACEVR 17* 18* 17*        Plan  - MIKA is worsening. Will adjust treatment as follows: gentle hydration with IV fluids  - Avoid nephrotoxins and renally dose meds for GFR  listed above  - Monitor urine output, serial BMP, and adjust therapy as needed  Continue monitoring kidney function  Checking labs for TMA should kidney function worsen      Liver cirrhosis secondary to YO  Patient with known Cirrhosis Co-morbidities are present and inclusive of anemia/pancytopenia.  MELD-Na score calculated; MELD 3.0: 21 at 8/30/2024 12:38 PM  MELD-Na: 19 at 8/30/2024 12:38 PM  Calculated from:  Serum Creatinine: 2.8 mg/dL at 8/30/2024 12:38 PM  Serum Sodium: 139 mmol/L (Using max of 137 mmol/L) at 8/30/2024 12:38 PM  Total Bilirubin: 1.0 mg/dL at 8/30/2024  6:09 AM  Serum Albumin: 2.1 g/dL at 8/30/2024 12:38 PM  INR(ratio): 1.3 at 8/30/2024  6:09 AM  Age at listing (hypothetical): 73 years  Sex: Female at 8/30/2024 12:38 PM      Continue chronic meds. Etiology likely NAFLD. Will avoid any hepatotoxic meds, and monitor CBC/CMP/INR for synthetic function.     Thrombocytopenia  The likely etiology of thrombocytopenia is sepsis and liver disease. The patients 3 most recent labs are listed below.  Recent Labs     08/29/24  1625 08/30/24  0609   PLT 73* 45*       Plan  - Will transfuse if platelet count is <50k (if undergoing surgical procedure or have active bleeding).  Chronic but worsening  Follows hem/onc outpatient   Peripheral smear pending  Ddx TMA (has anemia, thrombocytopenia, and elevated creatinine from baseline)        Class 2 severe obesity due to excess calories with serious comorbidity and body mass index (BMI) of 36.0 to 36.9 in adult  Body mass index is 37.93 kg/m². Morbid obesity complicates all aspects of disease management from diagnostic modalities to treatment. Weight loss encouraged and health benefits explained to patient.         Essential hypertension  Chronic, controlled. Latest blood pressure and vitals reviewed-     Temp:  [97.8 °F (36.6 °C)-99.3 °F (37.4 °C)]   Pulse:  []   Resp:  [17-30]   BP: ()/(39-86)   SpO2:  [93 %-98 %] .   Home meds for hypertension  were reviewed and noted below.   Hypertension Medications               amLODIPine (NORVASC) 10 MG tablet TAKE 1 TABLET(10 MG) BY MOUTH EVERY DAY    metoprolol succinate (TOPROL-XL) 25 MG 24 hr tablet Take 0.5 tablets (12.5 mg total) by mouth once daily.    chlorthalidone (HYGROTEN) 25 MG Tab Take 25 mg by mouth once daily.    hydrALAZINE (APRESOLINE) 25 MG tablet Take 1 tablet (25 mg total) by mouth every 8 (eight) hours.    perindopril erbumine (ACEON) 4 mg tablet Take 2 mg by mouth once daily.    spironolactone (ALDACTONE) 25 MG tablet Take 25 mg by mouth.            While in the hospital, will manage blood pressure as follows; HOLDING regimen for now due to hypotension/severe sepsis    Will utilize p.r.n. blood pressure medication only if patient's blood pressure greater than 180/110 and she develops symptoms such as worsening chest pain or shortness of breath.    Hypothyroidism  Resume Synthroid         VTE Risk Mitigation (From admission, onward)           Ordered     IP VTE HIGH RISK PATIENT  Once         08/29/24 2141     Place sequential compression device  Until discontinued         08/29/24 2141                    Critical care time spent on the evaluation and treatment of severe organ dysfunction, review of pertinent labs and imaging studies, discussions with consulting providers and discussions with patient/family: 39 minutes.    Thank you for your consult. I will follow-up with patient. Please contact us if you have any additional questions.    Farshad Reyes MD  Department of Hospital Medicine   [facility] - Intensive Care ([facility])

## 2024-08-30 NOTE — NURSING
Nurses Note -- 4 Eyes      8/30/2024   3:32 AM      Skin assessed during: Admit      [x] No Altered Skin Integrity Present    []Prevention Measures Documented      [] Yes- Altered Skin Integrity Present or Discovered   [] LDA Added if Not in Epic (Describe Wound)   [] New Altered Skin Integrity was Present on Admit and Documented in LDA   [] Wound Image Taken    Wound Care Consulted? No    Attending Nurse:  April    Second RN/Staff Member:  Micaela

## 2024-08-30 NOTE — PLAN OF CARE
Patient moved to ICU overnight r/t increasing LA & increased WOB. Lasix given, no acute change in CXR. IVF adjusted, UA sent. Patient denies any complaints at this time.

## 2024-08-30 NOTE — SIGNIFICANT EVENT
Worsening lactic acidosis. Now up 8.1  Discussed with ICU team.  Transfer patient to ICU at this time.

## 2024-08-30 NOTE — PROGRESS NOTES
Therapy with vancomycin complete and/or consult discontinued by provider.  Pharmacy will sign off, please re-consult as needed.    Thank you for allowing us to participate in this patient's care.   Katherine McArdle, Pharm.D. 8/30/2024 11:54 AM

## 2024-08-30 NOTE — ASSESSMENT & PLAN NOTE
Creatine stable for now. BMP reviewed- noted Estimated Creatinine Clearance: 22.5 mL/min (A) (based on SCr of 2.6 mg/dL (H)). according to latest data. Based on current GFR, CKD stage is stage 3 - GFR 30-59.  Monitor UOP and serial BMP and adjust therapy as needed. Renally dose meds. Avoid nephrotoxic medications and procedures    Serial glucose checks ordered every 6 hours with SSI, check Hgb A1C.

## 2024-08-30 NOTE — PROGRESS NOTES
Pharmacist Renal Dose Adjustment Note    Lakshmi Campbell is a 73 y.o. female being treated with the medication cefepime for UTI.     Patient Data:    Vital Signs (Most Recent):  Temp: 97.8 °F (36.6 °C) (08/30/24 1105)  Pulse: 72 (08/30/24 1100)  Resp: (!) 24 (08/30/24 1100)  BP: (!) 121/56 (08/30/24 1100)  SpO2: 96 % (08/30/24 1100) Vital Signs (72h Range):  Temp:  [97.8 °F (36.6 °C)-99.3 °F (37.4 °C)]   Pulse:  []   Resp:  [17-29]   BP: ()/(39-86)   SpO2:  [93 %-98 %]      Recent Labs   Lab 08/29/24  2156 08/30/24  0133 08/30/24  0609   CREATININE 2.6* 2.8* 2.7*     Serum creatinine: 2.7 mg/dL (H) 08/30/24 0609  Estimated creatinine clearance: 22.1 mL/min (A)    Per protocol for a mild to moderate infection & for CrCl < 30 ml/min, dose will be reduced from 2 gm IV every 24 hours to 1 gm IV every 24 hours.     Pharmacist's Name: Katherine E Mcardle  Pharmacist's Extension: [phone]

## 2024-08-31 LAB
ALBUMIN SERPL BCP-MCNC: 2.1 G/DL (ref 3.5–5.2)
ALP SERPL-CCNC: 60 U/L (ref 55–135)
ALT SERPL W/O P-5'-P-CCNC: 16 U/L (ref 10–44)
ANION GAP SERPL CALC-SCNC: 10 MMOL/L (ref 8–16)
AST SERPL-CCNC: 33 U/L (ref 10–40)
BASOPHILS NFR BLD: 0 % (ref 0–1.9)
BILIRUB SERPL-MCNC: 0.8 MG/DL (ref 0.1–1)
BUN SERPL-MCNC: 61 MG/DL (ref 8–23)
C3 SERPL-MCNC: 91 MG/DL (ref 50–180)
C4 SERPL-MCNC: 23 MG/DL (ref 11–44)
CALCIUM SERPL-MCNC: 8.5 MG/DL (ref 8.7–10.5)
CHLORIDE SERPL-SCNC: 110 MMOL/L (ref 95–110)
CO2 SERPL-SCNC: 18 MMOL/L (ref 23–29)
CREAT SERPL-MCNC: 2.5 MG/DL (ref 0.5–1.4)
DIFFERENTIAL METHOD BLD: ABNORMAL
EOSINOPHIL NFR BLD: 0 % (ref 0–8)
ERYTHROCYTE [DISTWIDTH] IN BLOOD BY AUTOMATED COUNT: 13.2 % (ref 11.5–14.5)
EST. GFR  (NO RACE VARIABLE): 20 ML/MIN/1.73 M^2
GLUCOSE SERPL-MCNC: 167 MG/DL (ref 70–110)
HAPTOGLOB SERPL-MCNC: 133 MG/DL (ref 30–250)
HCT VFR BLD AUTO: 32.5 % (ref 37–48.5)
HGB BLD-MCNC: 10.5 G/DL (ref 12–16)
IMM GRANULOCYTES # BLD AUTO: ABNORMAL K/UL (ref 0–0.04)
IMM GRANULOCYTES NFR BLD AUTO: ABNORMAL % (ref 0–0.5)
LACTATE SERPL-SCNC: 1.2 MMOL/L (ref 0.5–2.2)
LACTATE SERPL-SCNC: 1.2 MMOL/L (ref 0.5–2.2)
LACTATE SERPL-SCNC: 1.3 MMOL/L (ref 0.5–2.2)
LACTATE SERPL-SCNC: 1.5 MMOL/L (ref 0.5–2.2)
LYMPHOCYTES NFR BLD: 7 % (ref 18–48)
MAGNESIUM SERPL-MCNC: 2.2 MG/DL (ref 1.6–2.6)
MCH RBC QN AUTO: 28.5 PG (ref 27–31)
MCHC RBC AUTO-ENTMCNC: 32.3 G/DL (ref 32–36)
MCV RBC AUTO: 88 FL (ref 82–98)
METAMYELOCYTES NFR BLD MANUAL: 4 %
MONOCYTES NFR BLD: 5 % (ref 4–15)
NEUTROPHILS NFR BLD: 77 % (ref 38–73)
NEUTS BAND NFR BLD MANUAL: 7 %
NRBC BLD-RTO: 0 /100 WBC
PHOSPHATE SERPL-MCNC: 3.4 MG/DL (ref 2.7–4.5)
PLATELET # BLD AUTO: 48 K/UL (ref 150–450)
PLATELET BLD QL SMEAR: ABNORMAL
PMV BLD AUTO: 11.2 FL (ref 9.2–12.9)
POCT GLUCOSE: 115 MG/DL (ref 70–110)
POCT GLUCOSE: 129 MG/DL (ref 70–110)
POCT GLUCOSE: 159 MG/DL (ref 70–110)
POCT GLUCOSE: 183 MG/DL (ref 70–110)
POIKILOCYTOSIS BLD QL SMEAR: SLIGHT
POTASSIUM SERPL-SCNC: 4.3 MMOL/L (ref 3.5–5.1)
PROCALCITONIN SERPL IA-MCNC: 172.97 NG/ML
PROT SERPL-MCNC: 5.6 G/DL (ref 6–8.4)
RBC # BLD AUTO: 3.68 M/UL (ref 4–5.4)
SODIUM SERPL-SCNC: 138 MMOL/L (ref 136–145)
VIT B12 SERPL-MCNC: 269 PG/ML (ref 210–950)
WBC # BLD AUTO: 8.78 K/UL (ref 3.9–12.7)

## 2024-08-31 PROCEDURE — 87040 BLOOD CULTURE FOR BACTERIA: CPT | Mod: 59 | Performed by: FAMILY MEDICINE

## 2024-08-31 PROCEDURE — 25000003 PHARM REV CODE 250: Performed by: FAMILY MEDICINE

## 2024-08-31 PROCEDURE — 63600175 PHARM REV CODE 636 W HCPCS: Performed by: FAMILY MEDICINE

## 2024-08-31 PROCEDURE — 11000001 HC ACUTE MED/SURG PRIVATE ROOM

## 2024-08-31 PROCEDURE — 84145 PROCALCITONIN (PCT): CPT

## 2024-08-31 PROCEDURE — 99223 1ST HOSP IP/OBS HIGH 75: CPT | Mod: NSCH,,, | Performed by: INTERNAL MEDICINE

## 2024-08-31 PROCEDURE — 80053 COMPREHEN METABOLIC PANEL: CPT | Performed by: NURSE PRACTITIONER

## 2024-08-31 PROCEDURE — 85027 COMPLETE CBC AUTOMATED: CPT | Performed by: NURSE PRACTITIONER

## 2024-08-31 PROCEDURE — 27000207 HC ISOLATION

## 2024-08-31 PROCEDURE — 83605 ASSAY OF LACTIC ACID: CPT

## 2024-08-31 PROCEDURE — 84100 ASSAY OF PHOSPHORUS: CPT | Performed by: NURSE PRACTITIONER

## 2024-08-31 PROCEDURE — 25000003 PHARM REV CODE 250: Performed by: INTERNAL MEDICINE

## 2024-08-31 PROCEDURE — 25000003 PHARM REV CODE 250

## 2024-08-31 PROCEDURE — 36415 COLL VENOUS BLD VENIPUNCTURE: CPT

## 2024-08-31 PROCEDURE — 83735 ASSAY OF MAGNESIUM: CPT | Performed by: NURSE PRACTITIONER

## 2024-08-31 PROCEDURE — 83605 ASSAY OF LACTIC ACID: CPT | Mod: 91

## 2024-08-31 PROCEDURE — 85007 BL SMEAR W/DIFF WBC COUNT: CPT | Performed by: NURSE PRACTITIONER

## 2024-08-31 RX ORDER — LOPERAMIDE HYDROCHLORIDE 2 MG/1
2 CAPSULE ORAL 4 TIMES DAILY PRN
Status: DISCONTINUED | OUTPATIENT
Start: 2024-08-31 | End: 2024-09-02 | Stop reason: HOSPADM

## 2024-08-31 RX ADMIN — INSULIN ASPART 3 UNITS: 100 INJECTION, SOLUTION INTRAVENOUS; SUBCUTANEOUS at 07:08

## 2024-08-31 RX ADMIN — MEROPENEM 1 G: 1 INJECTION INTRAVENOUS at 04:08

## 2024-08-31 RX ADMIN — INSULIN ASPART 3 UNITS: 100 INJECTION, SOLUTION INTRAVENOUS; SUBCUTANEOUS at 12:08

## 2024-08-31 RX ADMIN — MUPIROCIN: 20 OINTMENT TOPICAL at 09:08

## 2024-08-31 RX ADMIN — PRAVASTATIN SODIUM 20 MG: 20 TABLET ORAL at 09:08

## 2024-08-31 RX ADMIN — MUPIROCIN: 20 OINTMENT TOPICAL at 08:08

## 2024-08-31 RX ADMIN — INSULIN ASPART 5 UNITS: 100 INJECTION, SOLUTION INTRAVENOUS; SUBCUTANEOUS at 04:08

## 2024-08-31 RX ADMIN — INSULIN ASPART 5 UNITS: 100 INJECTION, SOLUTION INTRAVENOUS; SUBCUTANEOUS at 07:08

## 2024-08-31 RX ADMIN — INSULIN ASPART 5 UNITS: 100 INJECTION, SOLUTION INTRAVENOUS; SUBCUTANEOUS at 12:08

## 2024-08-31 RX ADMIN — INSULIN GLARGINE 20 UNITS: 100 INJECTION, SOLUTION SUBCUTANEOUS at 08:08

## 2024-08-31 RX ADMIN — CHOLESTYRAMINE 4 G: 4 POWDER, FOR SUSPENSION ORAL at 09:08

## 2024-08-31 RX ADMIN — CHOLESTYRAMINE 4 G: 4 POWDER, FOR SUSPENSION ORAL at 08:08

## 2024-08-31 RX ADMIN — AMLODIPINE BESYLATE 10 MG: 10 TABLET ORAL at 08:08

## 2024-08-31 RX ADMIN — LEVOTHYROXINE SODIUM 150 MCG: 150 TABLET ORAL at 05:08

## 2024-08-31 RX ADMIN — FAMOTIDINE 20 MG: 20 TABLET ORAL at 08:08

## 2024-08-31 RX ADMIN — INSULIN GLARGINE 20 UNITS: 100 INJECTION, SOLUTION SUBCUTANEOUS at 09:08

## 2024-08-31 NOTE — ASSESSMENT & PLAN NOTE
Chronic, controlled. Latest blood pressure and vitals reviewed-     Temp:  [97 °F (36.1 °C)-98.5 °F (36.9 °C)]   Pulse:  [67-88]   Resp:  [17-39]   BP: (107-136)/(54-96)   SpO2:  [95 %-98 %] .   Home meds for hypertension were reviewed and noted below.   Hypertension Medications               amLODIPine (NORVASC) 10 MG tablet TAKE 1 TABLET(10 MG) BY MOUTH EVERY DAY    metoprolol succinate (TOPROL-XL) 25 MG 24 hr tablet Take 0.5 tablets (12.5 mg total) by mouth once daily.    chlorthalidone (HYGROTEN) 25 MG Tab Take 25 mg by mouth once daily.    hydrALAZINE (APRESOLINE) 25 MG tablet Take 1 tablet (25 mg total) by mouth every 8 (eight) hours.    perindopril erbumine (ACEON) 4 mg tablet Take 2 mg by mouth once daily.    spironolactone (ALDACTONE) 25 MG tablet Take 25 mg by mouth.            While in the hospital, will manage blood pressure as follows; HOLDING regimen for now due to hypotension/severe sepsis    Will utilize p.r.n. blood pressure medication only if patient's blood pressure greater than 180/110 and she develops symptoms such as worsening chest pain or shortness of breath.

## 2024-08-31 NOTE — SUBJECTIVE & OBJECTIVE
Interval History: See hospital course for today      Review of Systems   Constitutional:  Positive for activity change (improving), appetite change (improving) and fatigue. Negative for fever.   Respiratory:  Negative for shortness of breath.    Cardiovascular:  Negative for leg swelling.   Gastrointestinal:  Positive for diarrhea (improving). Negative for abdominal pain, nausea and vomiting.   Genitourinary:  Negative for difficulty urinating and dysuria.        Desires berrios catheter removal   Neurological:  Positive for weakness.   Hematological:  Bruises/bleeds easily.   Psychiatric/Behavioral:  Positive for confusion (improving).      Objective:     Vital Signs (Most Recent):  Temp: 98.5 °F (36.9 °C) (08/31/24 0701)  Pulse: 86 (08/31/24 0801)  Resp: (!) 39 (08/31/24 0801)  BP: (!) 135/96 (08/31/24 0801)  SpO2: 97 % (08/31/24 0801) Vital Signs (24h Range):  Temp:  [97 °F (36.1 °C)-98.5 °F (36.9 °C)] 98.5 °F (36.9 °C)  Pulse:  [67-88] 86  Resp:  [17-39] 39  SpO2:  [95 %-98 %] 97 %  BP: (107-136)/(54-96) 135/96     Weight: 103.4 kg (227 lb 15.3 oz)  Body mass index is 37.93 kg/m².    Intake/Output Summary (Last 24 hours) at 8/31/2024 0951  Last data filed at 8/31/2024 0801  Gross per 24 hour   Intake 691.23 ml   Output 699 ml   Net -7.77 ml         Physical Exam  Vitals and nursing note reviewed. Exam conducted with a chaperone present (visitor).   Constitutional:       General: She is not in acute distress.     Appearance: She is obese. She is ill-appearing. She is not toxic-appearing.   HENT:      Head: Normocephalic and atraumatic.   Cardiovascular:      Rate and Rhythm: Normal rate.   Pulmonary:      Effort: Pulmonary effort is normal. No respiratory distress.   Abdominal:      Palpations: Abdomen is soft.      Tenderness: There is no abdominal tenderness.   Genitourinary:     Comments: berrios  Musculoskeletal:      Right lower leg: No edema.      Left lower leg: No edema.   Skin:     General: Skin is warm.       Capillary Refill: Capillary refill takes less than 2 seconds.      Findings: Bruising present.   Neurological:      Mental Status: She is alert and oriented to person, place, and time.      Motor: Weakness present.   Psychiatric:         Mood and Affect: Mood normal.         Behavior: Behavior normal.             Significant Labs: All pertinent labs within the past 24 hours have been reviewed.  Blood Culture:   Recent Labs   Lab 08/29/24  1626 08/29/24  1645   LABBLOO Gram stain sharan bottle: Gram negative rods  Results called to and read back by: Adrian Holm RN  08/30/2024  12:36  GRAM NEGATIVE MASOUD  Identification pending  For susceptibility see order #[id number]  * Gram stain aer bottle: Gram negative rods  Gram stain sharan bottle: Gram negative rods  Results called to and read back by: Adrian Holm RN  08/30/2024  12:36  GRAM NEGATIVE MASOUD  Identification and susceptibility pending  *     CBC:   Recent Labs   Lab 08/30/24  0609 08/30/24  1618 08/31/24  0527   WBC 11.84 12.98*  12.98* 8.78   HGB 10.3* 11.0*  11.0* 10.5*   HCT 31.3* 33.3*  33.3* 32.5*   PLT 45* 49*  49* 48*     CMP:   Recent Labs   Lab 08/30/24  0133 08/30/24  0609 08/30/24  1238 08/31/24  0527    140 139 138   K 4.1 4.0 4.5 4.3   * 110 110 110   CO2 13* 17* 20* 18*   * 259* 278* 167*   BUN 51* 56* 58* 61*   CREATININE 2.8* 2.7* 2.8* 2.5*   CALCIUM 8.5* 8.2* 8.3* 8.5*   PROT 5.7* 5.5*  --  5.6*   ALBUMIN 2.4* 2.3* 2.1* 2.1*   BILITOT 0.9 1.0  --  0.8   ALKPHOS 108 62  --  60   AST 37 35  --  33   ALT 14 14  --  16   ANIONGAP 15 13 9 10     Lactic Acid:   Recent Labs   Lab 08/30/24  1750 08/31/24  0023 08/31/24  0527   LACTATE 1.8 1.3 1.2     Urine Culture:   Recent Labs   Lab 08/29/24  1649   LABURIN Multiple organisms isolated. None in predominance.  Repeat if  clinically necessary.     Urine Studies:   Recent Labs   Lab 08/29/24  1649   COLORU Yellow   APPEARANCEUA Cloudy*   PHUR 6.0   SPECGRAV 1.025   PROTEINUA 3+*    GLUCUA 4+*   KETONESU Negative   BILIRUBINUA Negative   OCCULTUA 3+*   NITRITE Negative   UROBILINOGEN Negative   LEUKOCYTESUR 3+*   RBCUA >100*   WBCUA >100*   BACTERIA None   HYALINECASTS 0     Procalcitonin 173  C3 and c4 within normal limits     Significant Imaging: I have reviewed all pertinent imaging results/findings within the past 24 hours.  CXR: I have reviewed all pertinent results/findings within the past 24 hours and my personal findings are:  diffuse mild interstitial edema or pneumonitis

## 2024-08-31 NOTE — ASSESSMENT & PLAN NOTE
Patient is identified as having Diastolic (HFpEF) heart failure that is Chronic. CHF is currently controlled. Latest ECHO performed and demonstrates- Results for orders placed during the hospital encounter of 06/26/24    Echo    Interpretation Summary    Left Ventricle: The left ventricle is normal in size. Normal wall thickness. There is concentric hypertrophy. Normal wall motion. Ejection fraction by visual approximation is 60%. Grade II diastolic dysfunction.    Right Ventricle: Normal right ventricular cavity size. Wall thickness is normal. Systolic function is normal.    Left Atrium: Left atrium is severely dilated.    Aortic Valve: The aortic valve is a trileaflet valve. Mildly restricted motion. There is moderate stenosis. Aortic valve area by VTI is 1.24 cm². Aortic valve peak velocity is 2.66 m/s. Mean gradient is 17 mmHg. The dimensionless index is 0.45.    Mitral Valve: There is moderate mitral annular calcification present. Mildly restricted motion. There is mild to moderate regurgitation.  . Medications are on hold due to hypotension and encephalopathy, and monitor clinical status closely. Monitor on telemetry. Patient is off CHF pathway.  Monitor strict Is&Os and daily weights.   Cardiology has not been consulted. Continue to stress to patient importance of self efficacy and  on diet for CHF. Last BNP reviewed- and noted below   Recent Labs   Lab 08/29/24  1625   BNP 1,679*     Though BNP is elevated and there is some evidence of fluid on CXR -- she appears dry/dehydrated and due to severe sepsis she is requiring IV fluids at this time. Will monitor fluid volume status closely.    Discontinue fluids

## 2024-08-31 NOTE — ASSESSMENT & PLAN NOTE
The likely etiology of thrombocytopenia is sepsis and liver disease. The patients 3 most recent labs are listed below.  Recent Labs     08/30/24  0609 08/30/24  1618 08/31/24  0527   PLT 45* 49*  49* 48*       Plan  - Will transfuse if platelet count is <50k (if undergoing surgical procedure or have active bleeding).    Low but stable  Follows hem/onc outpatient   Peripheral smear pending  Ddx TMA (has anemia, thrombocytopenia, and elevated creatinine from baseline)

## 2024-08-31 NOTE — PLAN OF CARE
AAOX4. Denies c/o. Respirations even/unlabored. Tolerating breakfast.    Blood glucose significantly decreased today to more acceptable range.    Bl Cx ordered and drawn.    Hassan catheter removed per order.

## 2024-08-31 NOTE — ASSESSMENT & PLAN NOTE
MIKA is likely due to pre-renal azotemia due to dehydration. Baseline creatinine is  2.0-2.2 . Most recent creatinine and eGFR are listed below.  Recent Labs     08/30/24  0609 08/30/24  1238 08/31/24  0527   CREATININE 2.7* 2.8* 2.5*   EGFRNORACEVR 18* 17* 20*        Plan  - MIKA is worsening. Will adjust treatment as follows: gentle hydration with IV fluids  - Avoid nephrotoxins and renally dose meds for GFR listed above  - Monitor urine output, serial BMP, and adjust therapy as needed    Creatinine improving  Checking labs for TMA should kidney function worsen

## 2024-08-31 NOTE — ASSESSMENT & PLAN NOTE
This patient does have evidence of infective focus  My overall impression is sepsis.  Source: Urinary Tract and bacteremia  Antibiotics given-   Antibiotics (72h ago, onward)    Start     Stop Route Frequency Ordered    08/30/24 1615  meropenem (MERREM) 1 g in 0.9% NaCl 100 mL IVPB (MB+)         -- IV Every 12 hours (non-standard times) 08/30/24 1514    08/30/24 0900  mupirocin 2 % ointment         09/04/24 0859 Nasl 2 times daily 08/30/24 0255        Initial lactic acid 6.6  Latest lactate reviewed-  Recent Labs   Lab 08/31/24  0527   LACTATE 1.2      (After fluids given)    Organ dysfunction indicated by Acute kidney injury, Encephalopathy, and Thrombocytopenia     Fluid challenge Contraindicated- Fluid bolus is contraindicated in this patient due to Congestive Heart Failure Partial fluid bolus given    Post- resuscitation assessment Yes Perfusion exam was performed within 6 hours of septic shock presentation after bolus shows Adequate tissue perfusion assessed by non-invasive monitoring       Will Not start Pressors- BP has improved with IV fluids  Source control achieved by: Broad spectrum antibiotics

## 2024-08-31 NOTE — HOSPITAL COURSE
8/31 admitted for urosepsis, now with bacteremia esbl. Transferred to icu for encephalopathy and respiratory distress, now resolved. Diarrhea improved, negative for cdiff. Infectious disease consulted. Ddx tma with infection, anemia, thrombocytopenia. Labs pending.  9/1 transferred to floor. No acute events overnight. Blood culture negative growth to date x 1 day. Denies diarrhea. Mrsa positive. Infectious disease following. Urine culture with multiple organisms isolated. Does report dysuria symptoms prior to hospitalization    9/2  Repeat blood cultures negative growth to date x 2 days. After discussing with infectious disease, ok to discharge home with intravenous antibiotic(s) invanz for 10 days. Picc line placed and in good position. Case management consulted for intravenous antibiotic(s) and homehealth. Patient and  updated with discharge plan.    No acute distress. No respiratory distress, on room air. Picc line placed, right upper arm. Bruising noted. Obese. AO3.    Patient seen and evaluated by me. Patient was determined to be suitable for discharge. Patient deemed stable for discharge to home with homehealth physical/occupational therapy and nurse practitioner to visit home program.

## 2024-08-31 NOTE — ASSESSMENT & PLAN NOTE
Creatine stable for now. BMP reviewed- noted Estimated Creatinine Clearance: 23.9 mL/min (A) (based on SCr of 2.5 mg/dL (H)). according to latest data. Based on current GFR, CKD stage is stage 3 - GFR 30-59.  Monitor UOP and serial BMP and adjust therapy as needed. Renally dose meds. Avoid nephrotoxic medications and procedures    Sliding scale insulin  Hgb A1C 7

## 2024-08-31 NOTE — PROGRESS NOTES
[facility] - Intensive Care ([facility] Medicine  Progress Note    Patient Name: Lakshmi Campbell  MRN: [id number]  Patient Class: IP- Inpatient   Admission Date: 8/29/2024  Length of Stay: 2 days  Attending Physician: Farshad Reyes MD  Primary Care Provider: Keely Silva NP        Subjective:     Principal Problem:Severe sepsis        HPI:    Patient is a 73-year-old female with past medical history significant for diabetes, liver cirrhosis secondary to YO, hypothyroidism, hypertension, hyperlipidemia, CKD stage 3, thrombocytopenia, cataracts, hyperkalemia, and morbid obesity who presented to ED with complaints of lethargy, fever, and dysuria described as burning.  She  is very somnolent and lethargic.  Information mainly obtained from her  who was sitting at bedside.  He states that for the past 2-3 days she has been laying around and sleeping all day long, she has complained of pain with urination, she has vomited a couple of times and she has been having fever on and off.  She was brought in by EMS and they noted blood pressure of 82/31, per EMS she was given epi IV push and a 500 mL normal saline fluid bolus.  On arrival to ED vital signs-- temperature 99.2°, heart rate 95, respirations 20, blood pressure 94/39, SpO2 93% on room air.    Lab workup showed WBC 13.56, hemoglobin 11.5, hematocrit 35.6, platelets 73, CO2 11, anion gap 15, BUN 46, creatinine 2.6, glucose 362, phosphorus 2.2, albumin 2.6, bilirubin 1.2, normal LFTs, potassium 4.4, ammonia 26, BNP 1 679, troponin 0.509, lactic acid 6.6, beta hydroxybutyrate 0.3, procalcitonin 62.91,  influenza A negative, influenza B negative, COVID-19 screening is negative.  Urinalysis with cloudy yellow urine 3+ protein, 4+ glucose, 3+ blood, negative nitrite, 3+ leukocyte esterase, greater than 100 RBC, greater than 100 WBC, many white blood cell clumps noted.   Venous blood gas was done which showed pH 7.277, bicarb 13.7. Due to history of  congestive heart failure and some fluid noted on imaging, she was cautiously given small IV fluid boluses rather than sepsis protocol fluid bolus.   She was started on vanc and cefepime.  After total of 1 L fluid bolus, lactic acid repeated and  remains elevated at 4.4.  Additional IV fluids has been ordered.  ICU team did evaluate patient, deemed that patient was stable for floor admission due to severe sepsis.    Transferred to icu due to worsening respiratory status and lactic acidosis. Received intravenous fluids. On room air. Creatinine remains elevated.     Bacteremia. Infectious disease consulted. Complains of diarrhea. Rule out cdiff.    Hospital medicine consulted for assumption of care.      Overview/Hospital Course:  8/31 admitted for urosepsis, now with bacteremia esbl. Transferred to icu for encephalopathy and respiratory distress, now resolved. Diarrhea improved, negative for cdiff. Infectious disease consulted. Ddx tma with infection, anemia, thrombocytopenia. Labs pending.    Interval History: See hospital course for today      Review of Systems   Constitutional:  Positive for activity change (improving), appetite change (improving) and fatigue. Negative for fever.   Respiratory:  Negative for shortness of breath.    Cardiovascular:  Negative for leg swelling.   Gastrointestinal:  Positive for diarrhea (improving). Negative for abdominal pain, nausea and vomiting.   Genitourinary:  Negative for difficulty urinating and dysuria.        Desires berrios catheter removal   Neurological:  Positive for weakness.   Hematological:  Bruises/bleeds easily.   Psychiatric/Behavioral:  Positive for confusion (improving).      Objective:     Vital Signs (Most Recent):  Temp: 98.5 °F (36.9 °C) (08/31/24 0701)  Pulse: 86 (08/31/24 0801)  Resp: (!) 39 (08/31/24 0801)  BP: (!) 135/96 (08/31/24 0801)  SpO2: 97 % (08/31/24 0801) Vital Signs (24h Range):  Temp:  [97 °F (36.1 °C)-98.5 °F (36.9 °C)] 98.5 °F (36.9 °C)  Pulse:   [67-88] 86  Resp:  [17-39] 39  SpO2:  [95 %-98 %] 97 %  BP: (107-136)/(54-96) 135/96     Weight: 103.4 kg (227 lb 15.3 oz)  Body mass index is 37.93 kg/m².    Intake/Output Summary (Last 24 hours) at 8/31/2024 0951  Last data filed at 8/31/2024 0801  Gross per 24 hour   Intake 691.23 ml   Output 699 ml   Net -7.77 ml         Physical Exam  Vitals and nursing note reviewed. Exam conducted with a chaperone present (visitor).   Constitutional:       General: She is not in acute distress.     Appearance: She is obese. She is ill-appearing. She is not toxic-appearing.   HENT:      Head: Normocephalic and atraumatic.   Cardiovascular:      Rate and Rhythm: Normal rate.   Pulmonary:      Effort: Pulmonary effort is normal. No respiratory distress.   Abdominal:      Palpations: Abdomen is soft.      Tenderness: There is no abdominal tenderness.   Genitourinary:     Comments: berrios  Musculoskeletal:      Right lower leg: No edema.      Left lower leg: No edema.   Skin:     General: Skin is warm.      Capillary Refill: Capillary refill takes less than 2 seconds.      Findings: Bruising present.   Neurological:      Mental Status: She is alert and oriented to person, place, and time.      Motor: Weakness present.   Psychiatric:         Mood and Affect: Mood normal.         Behavior: Behavior normal.             Significant Labs: All pertinent labs within the past 24 hours have been reviewed.  Blood Culture:   Recent Labs   Lab 08/29/24  1626 08/29/24  1645   LABBLOO Gram stain sharan bottle: Gram negative rods  Results called to and read back by: Adrian Holm RN  08/30/2024  12:36  GRAM NEGATIVE MASOUD  Identification pending  For susceptibility see order #[id number]  * Gram stain aer bottle: Gram negative rods  Gram stain sharan bottle: Gram negative rods  Results called to and read back by: Adrian Holm RN  08/30/2024  12:36  GRAM NEGATIVE MASOUD  Identification and susceptibility pending  *     CBC:   Recent Labs   Lab  08/30/24  0609 08/30/24  1618 08/31/24  0527   WBC 11.84 12.98*  12.98* 8.78   HGB 10.3* 11.0*  11.0* 10.5*   HCT 31.3* 33.3*  33.3* 32.5*   PLT 45* 49*  49* 48*     CMP:   Recent Labs   Lab 08/30/24  0133 08/30/24  0609 08/30/24  1238 08/31/24  0527    140 139 138   K 4.1 4.0 4.5 4.3   * 110 110 110   CO2 13* 17* 20* 18*   * 259* 278* 167*   BUN 51* 56* 58* 61*   CREATININE 2.8* 2.7* 2.8* 2.5*   CALCIUM 8.5* 8.2* 8.3* 8.5*   PROT 5.7* 5.5*  --  5.6*   ALBUMIN 2.4* 2.3* 2.1* 2.1*   BILITOT 0.9 1.0  --  0.8   ALKPHOS 108 62  --  60   AST 37 35  --  33   ALT 14 14  --  16   ANIONGAP 15 13 9 10     Lactic Acid:   Recent Labs   Lab 08/30/24  1750 08/31/24  0023 08/31/24  0527   LACTATE 1.8 1.3 1.2     Urine Culture:   Recent Labs   Lab 08/29/24  1649   LABURIN Multiple organisms isolated. None in predominance.  Repeat if  clinically necessary.     Urine Studies:   Recent Labs   Lab 08/29/24  1649   COLORU Yellow   APPEARANCEUA Cloudy*   PHUR 6.0   SPECGRAV 1.025   PROTEINUA 3+*   GLUCUA 4+*   KETONESU Negative   BILIRUBINUA Negative   OCCULTUA 3+*   NITRITE Negative   UROBILINOGEN Negative   LEUKOCYTESUR 3+*   RBCUA >100*   WBCUA >100*   BACTERIA None   HYALINECASTS 0     Procalcitonin 173  C3 and c4 within normal limits     Significant Imaging: I have reviewed all pertinent imaging results/findings within the past 24 hours.  CXR: I have reviewed all pertinent results/findings within the past 24 hours and my personal findings are:  diffuse mild interstitial edema or pneumonitis     Assessment/Plan:      * Severe sepsis  This patient does have evidence of infective focus  My overall impression is sepsis.  Source: Urinary Tract and bacteremia  Antibiotics given-   Antibiotics (72h ago, onward)      Start     Stop Route Frequency Ordered    08/30/24 1615  meropenem (MERREM) 1 g in 0.9% NaCl 100 mL IVPB (MB+)         -- IV Every 12 hours (non-standard times) 08/30/24 1514    08/30/24 0900  mupirocin  2 % ointment         09/04/24 0859 Nasl 2 times daily 08/30/24 0255          Initial lactic acid 6.6  Latest lactate reviewed-  Recent Labs   Lab 08/31/24  0527   LACTATE 1.2      (After fluids given)    Organ dysfunction indicated by Acute kidney injury, Encephalopathy, and Thrombocytopenia     Fluid challenge Contraindicated- Fluid bolus is contraindicated in this patient due to Congestive Heart Failure Partial fluid bolus given    Post- resuscitation assessment Yes Perfusion exam was performed within 6 hours of septic shock presentation after bolus shows Adequate tissue perfusion assessed by non-invasive monitoring       Will Not start Pressors- BP has improved with IV fluids  Source control achieved by: Broad spectrum antibiotics    Diarrhea  Cdiff negative  Shiga toxin pending  Improving  Cholestyramine, banatrol  imodium prn        Bacteremia  Infectious disease consulted   Cdiff negative  On merrem     Acute cystitis with hematuria  Switch to merrem   Urine culture with multiple organisms      Type 2 diabetes mellitus with stage 3b chronic kidney disease, with long-term current use of insulin  Creatine stable for now. BMP reviewed- noted Estimated Creatinine Clearance: 23.9 mL/min (A) (based on SCr of 2.5 mg/dL (H)). according to latest data. Based on current GFR, CKD stage is stage 3 - GFR 30-59.  Monitor UOP and serial BMP and adjust therapy as needed. Renally dose meds. Avoid nephrotoxic medications and procedures    Sliding scale insulin  Hgb A1C 7    Chronic diastolic congestive heart failure  Patient is identified as having Diastolic (HFpEF) heart failure that is Chronic. CHF is currently controlled. Latest ECHO performed and demonstrates- Results for orders placed during the hospital encounter of 06/26/24    Echo    Interpretation Summary    Left Ventricle: The left ventricle is normal in size. Normal wall thickness. There is concentric hypertrophy. Normal wall motion. Ejection fraction by visual  approximation is 60%. Grade II diastolic dysfunction.    Right Ventricle: Normal right ventricular cavity size. Wall thickness is normal. Systolic function is normal.    Left Atrium: Left atrium is severely dilated.    Aortic Valve: The aortic valve is a trileaflet valve. Mildly restricted motion. There is moderate stenosis. Aortic valve area by VTI is 1.24 cm². Aortic valve peak velocity is 2.66 m/s. Mean gradient is 17 mmHg. The dimensionless index is 0.45.    Mitral Valve: There is moderate mitral annular calcification present. Mildly restricted motion. There is mild to moderate regurgitation.  . Medications are on hold due to hypotension and encephalopathy, and monitor clinical status closely. Monitor on telemetry. Patient is off CHF pathway.  Monitor strict Is&Os and daily weights.   Cardiology has not been consulted. Continue to stress to patient importance of self efficacy and  on diet for CHF. Last BNP reviewed- and noted below   Recent Labs   Lab 08/29/24  1625   BNP 1,679*     Though BNP is elevated and there is some evidence of fluid on CXR -- she appears dry/dehydrated and due to severe sepsis she is requiring IV fluids at this time. Will monitor fluid volume status closely.    Discontinue fluids     Acute on chronic kidney failure  MIKA is likely due to pre-renal azotemia due to dehydration. Baseline creatinine is  2.0-2.2 . Most recent creatinine and eGFR are listed below.  Recent Labs     08/30/24  0609 08/30/24  1238 08/31/24  0527   CREATININE 2.7* 2.8* 2.5*   EGFRNORACEVR 18* 17* 20*        Plan  - MIKA is worsening. Will adjust treatment as follows: gentle hydration with IV fluids  - Avoid nephrotoxins and renally dose meds for GFR listed above  - Monitor urine output, serial BMP, and adjust therapy as needed    Creatinine improving  Checking labs for TMA should kidney function worsen      Liver cirrhosis secondary to YO  Patient with known Cirrhosis Co-morbidities are present and inclusive  of anemia/pancytopenia.  MELD-Na score calculated; MELD 3.0: 20 at 8/31/2024  5:27 AM  MELD-Na: 18 at 8/31/2024  5:27 AM  Calculated from:  Serum Creatinine: 2.5 mg/dL at 8/31/2024  5:27 AM  Serum Sodium: 138 mmol/L (Using max of 137 mmol/L) at 8/31/2024  5:27 AM  Total Bilirubin: 0.8 mg/dL (Using min of 1 mg/dL) at 8/31/2024  5:27 AM  Serum Albumin: 2.1 g/dL at 8/31/2024  5:27 AM  INR(ratio): 1.3 at 8/30/2024  6:09 AM  Age at listing (hypothetical): 73 years  Sex: Female at 8/31/2024  5:27 AM      Continue chronic meds. Etiology likely NAFLD. Will avoid any hepatotoxic meds, and monitor CBC/CMP/INR for synthetic function.     Thrombocytopenia  The likely etiology of thrombocytopenia is sepsis and liver disease. The patients 3 most recent labs are listed below.  Recent Labs     08/30/24  0609 08/30/24  1618 08/31/24  0527   PLT 45* 49*  49* 48*       Plan  - Will transfuse if platelet count is <50k (if undergoing surgical procedure or have active bleeding).    Low but stable  Follows hem/onc outpatient   Peripheral smear pending  Ddx TMA (has anemia, thrombocytopenia, and elevated creatinine from baseline)        Class 2 severe obesity due to excess calories with serious comorbidity and body mass index (BMI) of 36.0 to 36.9 in adult  Body mass index is 37.93 kg/m². Morbid obesity complicates all aspects of disease management from diagnostic modalities to treatment. Weight loss encouraged and health benefits explained to patient.         Essential hypertension  Chronic, controlled. Latest blood pressure and vitals reviewed-     Temp:  [97 °F (36.1 °C)-98.5 °F (36.9 °C)]   Pulse:  [67-88]   Resp:  [17-39]   BP: (107-136)/(54-96)   SpO2:  [95 %-98 %] .   Home meds for hypertension were reviewed and noted below.   Hypertension Medications               amLODIPine (NORVASC) 10 MG tablet TAKE 1 TABLET(10 MG) BY MOUTH EVERY DAY    metoprolol succinate (TOPROL-XL) 25 MG 24 hr tablet Take 0.5 tablets (12.5 mg total) by  mouth once daily.    chlorthalidone (HYGROTEN) 25 MG Tab Take 25 mg by mouth once daily.    hydrALAZINE (APRESOLINE) 25 MG tablet Take 1 tablet (25 mg total) by mouth every 8 (eight) hours.    perindopril erbumine (ACEON) 4 mg tablet Take 2 mg by mouth once daily.    spironolactone (ALDACTONE) 25 MG tablet Take 25 mg by mouth.            While in the hospital, will manage blood pressure as follows; HOLDING regimen for now due to hypotension/severe sepsis    Will utilize p.r.n. blood pressure medication only if patient's blood pressure greater than 180/110 and she develops symptoms such as worsening chest pain or shortness of breath.    Hypothyroidism  Resume Synthroid         VTE Risk Mitigation (From admission, onward)           Ordered     IP VTE HIGH RISK PATIENT  Once         08/29/24 2141     Place sequential compression device  Until discontinued         08/29/24 2141                    Discharge Planning   KYA:      Code Status: Full Code   Is the patient medically ready for discharge?:     Reason for patient still in hospital (select all that apply): Patient new problem, Patient trending condition, Laboratory test, Treatment, Imaging, and Consult recommendations  Discharge Plan A: Home Health            Critical care time spent on the evaluation and treatment of severe organ dysfunction, review of pertinent labs and imaging studies, discussions with consulting providers and discussions with patient/family: 35 minutes.      Farshad Reyes MD  Department of Hospital Medicine   [facility] - Intensive Care ([facility])

## 2024-08-31 NOTE — ASSESSMENT & PLAN NOTE
Patient with known Cirrhosis Co-morbidities are present and inclusive of anemia/pancytopenia.  MELD-Na score calculated; MELD 3.0: 20 at 8/31/2024  5:27 AM  MELD-Na: 18 at 8/31/2024  5:27 AM  Calculated from:  Serum Creatinine: 2.5 mg/dL at 8/31/2024  5:27 AM  Serum Sodium: 138 mmol/L (Using max of 137 mmol/L) at 8/31/2024  5:27 AM  Total Bilirubin: 0.8 mg/dL (Using min of 1 mg/dL) at 8/31/2024  5:27 AM  Serum Albumin: 2.1 g/dL at 8/31/2024  5:27 AM  INR(ratio): 1.3 at 8/30/2024  6:09 AM  Age at listing (hypothetical): 73 years  Sex: Female at 8/31/2024  5:27 AM      Continue chronic meds. Etiology likely NAFLD. Will avoid any hepatotoxic meds, and monitor CBC/CMP/INR for synthetic function.

## 2024-09-01 LAB
ALBUMIN SERPL BCP-MCNC: 1.9 G/DL (ref 3.5–5.2)
ALP SERPL-CCNC: 59 U/L (ref 55–135)
ALT SERPL W/O P-5'-P-CCNC: 14 U/L (ref 10–44)
ANION GAP SERPL CALC-SCNC: 9 MMOL/L (ref 8–16)
AST SERPL-CCNC: 23 U/L (ref 10–40)
BACTERIA BLD CULT: ABNORMAL
BASOPHILS # BLD AUTO: 0.02 K/UL (ref 0–0.2)
BASOPHILS NFR BLD: 0.4 % (ref 0–1.9)
BILIRUB SERPL-MCNC: 0.8 MG/DL (ref 0.1–1)
BUN SERPL-MCNC: 60 MG/DL (ref 8–23)
CALCIUM SERPL-MCNC: 8.3 MG/DL (ref 8.7–10.5)
CHLORIDE SERPL-SCNC: 110 MMOL/L (ref 95–110)
CO2 SERPL-SCNC: 18 MMOL/L (ref 23–29)
CREAT SERPL-MCNC: 2.1 MG/DL (ref 0.5–1.4)
DIFFERENTIAL METHOD BLD: ABNORMAL
E COLI SXT1 STL QL IA: NEGATIVE
E COLI SXT2 STL QL IA: NEGATIVE
EOSINOPHIL # BLD AUTO: 0.1 K/UL (ref 0–0.5)
EOSINOPHIL NFR BLD: 1.3 % (ref 0–8)
ERYTHROCYTE [DISTWIDTH] IN BLOOD BY AUTOMATED COUNT: 13.2 % (ref 11.5–14.5)
EST. GFR  (NO RACE VARIABLE): 24 ML/MIN/1.73 M^2
GLUCOSE SERPL-MCNC: 104 MG/DL (ref 70–110)
HCT VFR BLD AUTO: 29.7 % (ref 37–48.5)
HGB BLD-MCNC: 9.6 G/DL (ref 12–16)
IMM GRANULOCYTES # BLD AUTO: 0.02 K/UL (ref 0–0.04)
IMM GRANULOCYTES NFR BLD AUTO: 0.4 % (ref 0–0.5)
LACTATE SERPL-SCNC: 0.6 MMOL/L (ref 0.5–2.2)
LACTATE SERPL-SCNC: 0.7 MMOL/L (ref 0.5–2.2)
LACTATE SERPL-SCNC: 0.8 MMOL/L (ref 0.5–2.2)
LACTATE SERPL-SCNC: 1.2 MMOL/L (ref 0.5–2.2)
LYMPHOCYTES # BLD AUTO: 0.5 K/UL (ref 1–4.8)
LYMPHOCYTES NFR BLD: 8.8 % (ref 18–48)
MAGNESIUM SERPL-MCNC: 2.1 MG/DL (ref 1.6–2.6)
MCH RBC QN AUTO: 28.9 PG (ref 27–31)
MCHC RBC AUTO-ENTMCNC: 32.3 G/DL (ref 32–36)
MCV RBC AUTO: 90 FL (ref 82–98)
MONOCYTES # BLD AUTO: 0.4 K/UL (ref 0.3–1)
MONOCYTES NFR BLD: 6.6 % (ref 4–15)
MRSA SPEC QL CULT: NORMAL
NEUTROPHILS # BLD AUTO: 4.6 K/UL (ref 1.8–7.7)
NEUTROPHILS NFR BLD: 82.5 % (ref 38–73)
NRBC BLD-RTO: 0 /100 WBC
PHOSPHATE SERPL-MCNC: 3.1 MG/DL (ref 2.7–4.5)
PLATELET # BLD AUTO: 57 K/UL (ref 150–450)
PMV BLD AUTO: 11.2 FL (ref 9.2–12.9)
POCT GLUCOSE: 101 MG/DL (ref 70–110)
POCT GLUCOSE: 117 MG/DL (ref 70–110)
POCT GLUCOSE: 143 MG/DL (ref 70–110)
POCT GLUCOSE: 153 MG/DL (ref 70–110)
POTASSIUM SERPL-SCNC: 4.2 MMOL/L (ref 3.5–5.1)
PROT SERPL-MCNC: 5.4 G/DL (ref 6–8.4)
RBC # BLD AUTO: 3.32 M/UL (ref 4–5.4)
SODIUM SERPL-SCNC: 137 MMOL/L (ref 136–145)
WBC # BLD AUTO: 5.58 K/UL (ref 3.9–12.7)

## 2024-09-01 PROCEDURE — 25000003 PHARM REV CODE 250: Performed by: INTERNAL MEDICINE

## 2024-09-01 PROCEDURE — 97162 PT EVAL MOD COMPLEX 30 MIN: CPT

## 2024-09-01 PROCEDURE — 25000003 PHARM REV CODE 250

## 2024-09-01 PROCEDURE — 36415 COLL VENOUS BLD VENIPUNCTURE: CPT | Performed by: NURSE PRACTITIONER

## 2024-09-01 PROCEDURE — 83605 ASSAY OF LACTIC ACID: CPT

## 2024-09-01 PROCEDURE — 84100 ASSAY OF PHOSPHORUS: CPT | Performed by: NURSE PRACTITIONER

## 2024-09-01 PROCEDURE — 63600175 PHARM REV CODE 636 W HCPCS: Performed by: FAMILY MEDICINE

## 2024-09-01 PROCEDURE — 25000003 PHARM REV CODE 250: Performed by: FAMILY MEDICINE

## 2024-09-01 PROCEDURE — 97116 GAIT TRAINING THERAPY: CPT

## 2024-09-01 PROCEDURE — 36415 COLL VENOUS BLD VENIPUNCTURE: CPT | Mod: XB

## 2024-09-01 PROCEDURE — 80053 COMPREHEN METABOLIC PANEL: CPT | Performed by: NURSE PRACTITIONER

## 2024-09-01 PROCEDURE — 83735 ASSAY OF MAGNESIUM: CPT | Performed by: NURSE PRACTITIONER

## 2024-09-01 PROCEDURE — 83605 ASSAY OF LACTIC ACID: CPT | Mod: 91

## 2024-09-01 PROCEDURE — 11000001 HC ACUTE MED/SURG PRIVATE ROOM

## 2024-09-01 PROCEDURE — 94761 N-INVAS EAR/PLS OXIMETRY MLT: CPT

## 2024-09-01 PROCEDURE — 85025 COMPLETE CBC W/AUTO DIFF WBC: CPT | Performed by: NURSE PRACTITIONER

## 2024-09-01 PROCEDURE — 27000207 HC ISOLATION

## 2024-09-01 RX ADMIN — MUPIROCIN: 20 OINTMENT TOPICAL at 09:09

## 2024-09-01 RX ADMIN — CHOLESTYRAMINE 4 G: 4 POWDER, FOR SUSPENSION ORAL at 09:09

## 2024-09-01 RX ADMIN — INSULIN GLARGINE 20 UNITS: 100 INJECTION, SOLUTION SUBCUTANEOUS at 10:09

## 2024-09-01 RX ADMIN — CHOLESTYRAMINE 4 G: 4 POWDER, FOR SUSPENSION ORAL at 10:09

## 2024-09-01 RX ADMIN — PRAVASTATIN SODIUM 20 MG: 20 TABLET ORAL at 09:09

## 2024-09-01 RX ADMIN — MUPIROCIN: 20 OINTMENT TOPICAL at 10:09

## 2024-09-01 RX ADMIN — MEROPENEM 1 G: 1 INJECTION INTRAVENOUS at 04:09

## 2024-09-01 RX ADMIN — AMLODIPINE BESYLATE 10 MG: 10 TABLET ORAL at 10:09

## 2024-09-01 RX ADMIN — LEVOTHYROXINE SODIUM 150 MCG: 150 TABLET ORAL at 06:09

## 2024-09-01 RX ADMIN — INSULIN ASPART 5 UNITS: 100 INJECTION, SOLUTION INTRAVENOUS; SUBCUTANEOUS at 10:09

## 2024-09-01 RX ADMIN — FAMOTIDINE 20 MG: 20 TABLET ORAL at 10:09

## 2024-09-01 NOTE — ASSESSMENT & PLAN NOTE
MIKA is likely due to pre-renal azotemia due to dehydration. Baseline creatinine is  2.0-2.2 . Most recent creatinine and eGFR are listed below.  Recent Labs     08/30/24  1238 08/31/24  0527 09/01/24  0623   CREATININE 2.8* 2.5* 2.1*   EGFRNORACEVR 17* 20* 24*      Plan  - MIKA is worsening. Will adjust treatment as follows: gentle hydration with IV fluids  - Avoid nephrotoxins and renally dose meds for GFR listed above  - Monitor urine output, serial BMP, and adjust therapy as needed    Creatinine improving

## 2024-09-01 NOTE — PLAN OF CARE
Discussed poc with pt, pt verbalized understanding    Purposeful rounding every 2hours    VS wnl  Cardiac monitoring in use, pt is NSR, tele monitor # [id number]  Blood glucose monitoring   Fall precautions in place, remains injury free  Pt denies c/o pain      Accurate I&Os  Abx given as prescribed  Bed locked at lowest position  Call light within reach    Chart check complete  Will cont with POC

## 2024-09-01 NOTE — PLAN OF CARE
Initial PT eval completed. Patient SBA with all functional mobility, including with gait x40 feet with RW. Recommend low intensity therapy.

## 2024-09-01 NOTE — PROGRESS NOTES
[facility] Medicine  Progress Note    Patient Name: Lakshmi Campbell  MRN: [id number]  Patient Class: IP- Inpatient   Admission Date: 8/29/2024  Length of Stay: 3 days  Attending Physician: Farshad Reyes MD  Primary Care Provider: Keely Silva NP        Subjective:     Principal Problem:Severe sepsis        HPI:    Patient is a 73-year-old female with past medical history significant for diabetes, liver cirrhosis secondary to YO, hypothyroidism, hypertension, hyperlipidemia, CKD stage 3, thrombocytopenia, cataracts, hyperkalemia, and morbid obesity who presented to ED with complaints of lethargy, fever, and dysuria described as burning.  She  is very somnolent and lethargic.  Information mainly obtained from her  who was sitting at bedside.  He states that for the past 2-3 days she has been laying around and sleeping all day long, she has complained of pain with urination, she has vomited a couple of times and she has been having fever on and off.  She was brought in by EMS and they noted blood pressure of 82/31, per EMS she was given epi IV push and a 500 mL normal saline fluid bolus.  On arrival to ED vital signs-- temperature 99.2°, heart rate 95, respirations 20, blood pressure 94/39, SpO2 93% on room air.    Lab workup showed WBC 13.56, hemoglobin 11.5, hematocrit 35.6, platelets 73, CO2 11, anion gap 15, BUN 46, creatinine 2.6, glucose 362, phosphorus 2.2, albumin 2.6, bilirubin 1.2, normal LFTs, potassium 4.4, ammonia 26, BNP 1 679, troponin 0.509, lactic acid 6.6, beta hydroxybutyrate 0.3, procalcitonin 62.91,  influenza A negative, influenza B negative, COVID-19 screening is negative.  Urinalysis with cloudy yellow urine 3+ protein, 4+ glucose, 3+ blood, negative nitrite, 3+ leukocyte esterase, greater than 100 RBC, greater than 100 WBC, many white blood cell clumps noted.   Venous blood gas was done which showed pH 7.277, bicarb 13.7. Due to history of congestive heart failure  and some fluid noted on imaging, she was cautiously given small IV fluid boluses rather than sepsis protocol fluid bolus.   She was started on vanc and cefepime.  After total of 1 L fluid bolus, lactic acid repeated and  remains elevated at 4.4.  Additional IV fluids has been ordered.  ICU team did evaluate patient, deemed that patient was stable for floor admission due to severe sepsis.    Transferred to icu due to worsening respiratory status and lactic acidosis. Received intravenous fluids. On room air. Creatinine remains elevated.     Bacteremia. Infectious disease consulted. Complains of diarrhea. Rule out cdiff.    Hospital medicine consulted for assumption of care.      Overview/Hospital Course:  8/31 admitted for urosepsis, now with bacteremia esbl. Transferred to icu for encephalopathy and respiratory distress, now resolved. Diarrhea improved, negative for cdiff. Infectious disease consulted. Ddx tma with infection, anemia, thrombocytopenia. Labs pending.  9/1 transferred to floor. No acute events overnight. Blood culture negative growth to date x 1 day. Denies diarrhea. Mrsa positive. Infectious disease following. Urine culture with multiple organisms isolated. Does report dysuria symptoms prior to hospitalization    Interval History: See hospital course for today      Review of Systems   Constitutional:  Positive for activity change (improving) and fatigue (improving). Negative for appetite change and fever.   Respiratory:  Negative for shortness of breath.    Gastrointestinal:  Negative for abdominal pain, diarrhea, nausea and vomiting.   Genitourinary:  Positive for dysuria (resolved).   Neurological:  Positive for weakness (improving).   Hematological:  Bruises/bleeds easily.   Psychiatric/Behavioral:  Negative for agitation, behavioral problems, confusion, decreased concentration and dysphoric mood. The patient is not nervous/anxious.      Objective:     Vital Signs (Most Recent):  Temp: 99 °F (37.2  °C) (09/01/24 0738)  Pulse: 77 (09/01/24 0924)  Resp: 16 (09/01/24 0924)  BP: (!) 152/66 (09/01/24 0738)  SpO2: (!) 94 % (09/01/24 0924) Vital Signs (24h Range):  Temp:  [98.2 °F (36.8 °C)-99.2 °F (37.3 °C)] 99 °F (37.2 °C)  Pulse:  [73-96] 77  Resp:  [16-31] 16  SpO2:  [92 %-97 %] 94 %  BP: (129-156)/(59-89) 152/66     Weight: 103.4 kg (227 lb 15.3 oz)  Body mass index is 37.93 kg/m².    Intake/Output Summary (Last 24 hours) at 9/1/2024 1124  Last data filed at 9/1/2024 1040  Gross per 24 hour   Intake --   Output 1050 ml   Net -1050 ml         Physical Exam  Vitals and nursing note reviewed. Exam conducted with a chaperone present (sig other).   Constitutional:       General: She is not in acute distress.     Appearance: She is ill-appearing. She is not toxic-appearing.   HENT:      Head: Normocephalic and atraumatic.   Cardiovascular:      Rate and Rhythm: Normal rate.   Pulmonary:      Effort: Pulmonary effort is normal. No respiratory distress.   Abdominal:      Palpations: Abdomen is soft.      Tenderness: There is no abdominal tenderness.   Musculoskeletal:      Right lower leg: No edema.      Left lower leg: No edema.   Skin:     General: Skin is warm.      Findings: Bruising present.   Neurological:      Mental Status: She is alert and oriented to person, place, and time.      Motor: Weakness present.   Psychiatric:         Mood and Affect: Mood normal.         Behavior: Behavior normal.             Significant Labs: All pertinent labs within the past 24 hours have been reviewed.  Blood Culture:   Recent Labs   Lab 08/31/24  1219 08/31/24  1221   LABBLOO No Growth to date No Growth to date     CBC:   Recent Labs   Lab 08/30/24  1618 08/31/24  0527 09/01/24  0623   WBC 12.98*  12.98* 8.78 5.58   HGB 11.0*  11.0* 10.5* 9.6*   HCT 33.3*  33.3* 32.5* 29.7*   PLT 49*  49* 48* 57*     CMP:   Recent Labs   Lab 08/30/24  1238 08/31/24  0527 09/01/24  0623    138 137   K 4.5 4.3 4.2    110 110   CO2  20* 18* 18*   * 167* 104   BUN 58* 61* 60*   CREATININE 2.8* 2.5* 2.1*   CALCIUM 8.3* 8.5* 8.3*   PROT  --  5.6* 5.4*   ALBUMIN 2.1* 2.1* 1.9*   BILITOT  --  0.8 0.8   ALKPHOS  --  60 59   AST  --  33 23   ALT  --  16 14   ANIONGAP 9 10 9     Urine Culture: multiple organisms isolated    Mrsa isolated     Stool culture nothing significant to date  Cdiff negative       Significant Imaging: I have reviewed all pertinent imaging results/findings within the past 24 hours.    Assessment/Plan:      * Severe sepsis  This patient does have evidence of infective focus  My overall impression is sepsis.  Source: Urinary Tract and bacteremia  Antibiotics given-   Antibiotics (72h ago, onward)      Start     Stop Route Frequency Ordered    08/30/24 1615  meropenem (MERREM) 1 g in 0.9% NaCl 100 mL IVPB (MB+)         -- IV Every 12 hours (non-standard times) 08/30/24 1514    08/30/24 0900  mupirocin 2 % ointment         09/04/24 0859 Nasl 2 times daily 08/30/24 0255          Initial lactic acid 6.6  Latest lactate reviewed-  Recent Labs   Lab 08/31/24  0527   LACTATE 1.2      (After fluids given)    Organ dysfunction indicated by Acute kidney injury, Encephalopathy, and Thrombocytopenia     Fluid challenge Contraindicated- Fluid bolus is contraindicated in this patient due to Congestive Heart Failure Partial fluid bolus given    Post- resuscitation assessment Yes Perfusion exam was performed within 6 hours of septic shock presentation after bolus shows Adequate tissue perfusion assessed by non-invasive monitoring       Will Not start Pressors- BP has improved with IV fluids  Source control achieved by: Broad spectrum antibiotics    Diarrhea  Improving to resolved   Cdiff negative  Shiga toxin negative   Cholestyramine, banatrol  imodium prn        Bacteremia  Mrsa positive  Cdiff negative  Shiga toxin negative  Urine culture with multiple organisms isolated  Source?  Sensitive to merrem   Infectious disease for final  antibiotic regimen for discharge     Acute cystitis with hematuria  Denies pmh recurrent utis  Does not follow urology  Urine culture with multiple organisms  On merrem       Type 2 diabetes mellitus with stage 3b chronic kidney disease, with long-term current use of insulin  Creatine stable for now. BMP reviewed- noted Estimated Creatinine Clearance: 23.9 mL/min (A) (based on SCr of 2.5 mg/dL (H)). according to latest data. Based on current GFR, CKD stage is stage 3 - GFR 30-59.  Monitor UOP and serial BMP and adjust therapy as needed. Renally dose meds. Avoid nephrotoxic medications and procedures    Sliding scale insulin  Hgb A1C 7    Chronic diastolic congestive heart failure  Patient is identified as having Diastolic (HFpEF) heart failure that is Chronic. CHF is currently controlled. Latest ECHO performed and demonstrates- Results for orders placed during the hospital encounter of 06/26/24    Echo    Interpretation Summary    Left Ventricle: The left ventricle is normal in size. Normal wall thickness. There is concentric hypertrophy. Normal wall motion. Ejection fraction by visual approximation is 60%. Grade II diastolic dysfunction.    Right Ventricle: Normal right ventricular cavity size. Wall thickness is normal. Systolic function is normal.    Left Atrium: Left atrium is severely dilated.    Aortic Valve: The aortic valve is a trileaflet valve. Mildly restricted motion. There is moderate stenosis. Aortic valve area by VTI is 1.24 cm². Aortic valve peak velocity is 2.66 m/s. Mean gradient is 17 mmHg. The dimensionless index is 0.45.    Mitral Valve: There is moderate mitral annular calcification present. Mildly restricted motion. There is mild to moderate regurgitation.  . Medications are on hold due to hypotension and encephalopathy, and monitor clinical status closely. Monitor on telemetry. Patient is off CHF pathway.  Monitor strict Is&Os and daily weights.   Cardiology has not been consulted. Continue to  stress to patient importance of self efficacy and  on diet for CHF. Last BNP reviewed- and noted below   Recent Labs   Lab 08/29/24  1625   BNP 1,679*     Though BNP is elevated and there is some evidence of fluid on CXR -- she appears dry/dehydrated and due to severe sepsis she is requiring IV fluids at this time. Will monitor fluid volume status closely.    Discontinue fluids     Acute on chronic kidney failure  MIKA is likely due to pre-renal azotemia due to dehydration. Baseline creatinine is  2.0-2.2 . Most recent creatinine and eGFR are listed below.  Recent Labs     08/30/24  1238 08/31/24  0527 09/01/24  0623   CREATININE 2.8* 2.5* 2.1*   EGFRNORACEVR 17* 20* 24*      Plan  - MIKA is worsening. Will adjust treatment as follows: gentle hydration with IV fluids  - Avoid nephrotoxins and renally dose meds for GFR listed above  - Monitor urine output, serial BMP, and adjust therapy as needed    Creatinine improving        Liver cirrhosis secondary to YO  Patient with known Cirrhosis Co-morbidities are present and inclusive of anemia/pancytopenia.  MELD-Na score calculated; MELD 3.0: 20 at 8/31/2024  5:27 AM  MELD-Na: 18 at 8/31/2024  5:27 AM  Calculated from:  Serum Creatinine: 2.5 mg/dL at 8/31/2024  5:27 AM  Serum Sodium: 138 mmol/L (Using max of 137 mmol/L) at 8/31/2024  5:27 AM  Total Bilirubin: 0.8 mg/dL (Using min of 1 mg/dL) at 8/31/2024  5:27 AM  Serum Albumin: 2.1 g/dL at 8/31/2024  5:27 AM  INR(ratio): 1.3 at 8/30/2024  6:09 AM  Age at listing (hypothetical): 73 years  Sex: Female at 8/31/2024  5:27 AM      Continue chronic meds. Etiology likely NAFLD. Will avoid any hepatotoxic meds, and monitor CBC/CMP/INR for synthetic function.     Thrombocytopenia  The likely etiology of thrombocytopenia is sepsis and liver disease. The patients 3 most recent labs are listed below.  Recent Labs     08/30/24  1618 08/31/24  0527 09/01/24  0623   PLT 49*  49* 48* 57*       Plan  - Will transfuse if platelet  count is <50k (if undergoing surgical procedure or have active bleeding).    Improving but not at baseline  Follows hem/onc outpatient   Peripheral smear pending  Shiga toxin negative   Creatinine continues to improve        Class 2 severe obesity due to excess calories with serious comorbidity and body mass index (BMI) of 36.0 to 36.9 in adult  Body mass index is 37.93 kg/m². Morbid obesity complicates all aspects of disease management from diagnostic modalities to treatment. Weight loss encouraged and health benefits explained to patient.     Physical/occupational therapy       Essential hypertension  Chronic, controlled. Latest blood pressure and vitals reviewed-     Temp:  [98.2 °F (36.8 °C)-99.2 °F (37.3 °C)]   Pulse:  [73-96]   Resp:  [16-31]   BP: (129-156)/(59-89)   SpO2:  [92 %-97 %] .   Home meds for hypertension were reviewed and noted below.   Hypertension Medications               amLODIPine (NORVASC) 10 MG tablet TAKE 1 TABLET(10 MG) BY MOUTH EVERY DAY    metoprolol succinate (TOPROL-XL) 25 MG 24 hr tablet Take 0.5 tablets (12.5 mg total) by mouth once daily.    chlorthalidone (HYGROTEN) 25 MG Tab Take 25 mg by mouth once daily.    hydrALAZINE (APRESOLINE) 25 MG tablet Take 1 tablet (25 mg total) by mouth every 8 (eight) hours.    perindopril erbumine (ACEON) 4 mg tablet Take 2 mg by mouth once daily.    spironolactone (ALDACTONE) 25 MG tablet Take 25 mg by mouth.            While in the hospital, will manage blood pressure as follows; HOLDING regimen for now due to hypotension/severe sepsis; relax normotensive blood pressure range in this patient demographic    Will utilize p.r.n. blood pressure medication only if patient's blood pressure greater than 180/110 and she develops symptoms such as worsening chest pain or shortness of breath.    Hypothyroidism  Resume Synthroid         VTE Risk Mitigation (From admission, onward)           Ordered     IP VTE HIGH RISK PATIENT  Once         08/29/24 7511      Place sequential compression device  Until discontinued         08/29/24 3333                    Discharge Planning   KYA:      Code Status: Full Code   Is the patient medically ready for discharge?:     Reason for patient still in hospital (select all that apply): Patient trending condition, Laboratory test, Treatment, Imaging, Consult recommendations, PT / OT recommendations, and Pending disposition  Discharge Plan A: Home Health                  Farshad Reyes MD  Department of Hospital Medicine   [facility] S[facility]

## 2024-09-01 NOTE — ASSESSMENT & PLAN NOTE
Chronic, controlled. Latest blood pressure and vitals reviewed-     Temp:  [98.2 °F (36.8 °C)-99.2 °F (37.3 °C)]   Pulse:  [73-96]   Resp:  [16-31]   BP: (129-156)/(59-89)   SpO2:  [92 %-97 %] .   Home meds for hypertension were reviewed and noted below.   Hypertension Medications               amLODIPine (NORVASC) 10 MG tablet TAKE 1 TABLET(10 MG) BY MOUTH EVERY DAY    metoprolol succinate (TOPROL-XL) 25 MG 24 hr tablet Take 0.5 tablets (12.5 mg total) by mouth once daily.    chlorthalidone (HYGROTEN) 25 MG Tab Take 25 mg by mouth once daily.    hydrALAZINE (APRESOLINE) 25 MG tablet Take 1 tablet (25 mg total) by mouth every 8 (eight) hours.    perindopril erbumine (ACEON) 4 mg tablet Take 2 mg by mouth once daily.    spironolactone (ALDACTONE) 25 MG tablet Take 25 mg by mouth.            While in the hospital, will manage blood pressure as follows; HOLDING regimen for now due to hypotension/severe sepsis; relax normotensive blood pressure range in this patient demographic    Will utilize p.r.n. blood pressure medication only if patient's blood pressure greater than 180/110 and she develops symptoms such as worsening chest pain or shortness of breath.

## 2024-09-01 NOTE — ASSESSMENT & PLAN NOTE
Body mass index is 37.93 kg/m². Morbid obesity complicates all aspects of disease management from diagnostic modalities to treatment. Weight loss encouraged and health benefits explained to patient.     Physical/occupational therapy

## 2024-09-01 NOTE — PT/OT/SLP EVAL
"Physical Therapy Evaluation    Patient Name:  Lakshmi Campbell   MRN:  [id number]    Recommendations:     Discharge Recommendations: Low Intensity Therapy   Discharge Equipment Recommendations: none   Barriers to discharge: None    Assessment:     Lakshmi Campbell is a 73 y.o. female admitted with a medical diagnosis of Severe sepsis.  She presents with the following impairments/functional limitations: weakness, impaired balance, decreased safety awareness, impaired endurance, impaired self care skills, decreased coordination, decreased ROM, impaired functional mobility, decreased upper extremity function, decreased lower extremity function, gait instability.    Rehab Prognosis: Good; patient would benefit from acute skilled PT services to address these deficits and reach maximum level of function.    Recent Surgery: * No surgery found *      Plan:     During this hospitalization, patient to be seen 3 x/week to address the identified rehab impairments via therapeutic exercises, therapeutic activities, gait training and progress toward the following goals:    Plan of Care Expires:  09/15/24    Subjective     Chief Complaint: "I've been getting up to use the restroom."  Patient/Family Comments/goals: Get better and return home.  Pain/Comfort:  Pain Rating 1: 0/10    Patients cultural, spiritual, Worship conflicts given the current situation: no    Living Environment:    Patient lives with her spouse in a single level home with no steps to enter.  Prior to admission, patients level of function was independent and driving.  Equipment used at home: oxygen, glucometer, cane, straight, walker, rolling, shower chair.  DME owned (not currently used): rolling walker and single point cane.  Upon discharge, patient will have assistance from spouse.    Objective:     Communicated with FROILAN Miller prior to session.  Patient found supine with peripheral IV, PureWick, telemetry  upon PT entry to room.    General Precautions: Standard,  "   Orthopedic Precautions:N/A   Braces: N/A  Respiratory Status: Room air    Exams:  Cognitive Exam:  Patient is oriented to Person, Place, Time, and Situation    Functional Mobility:  Bed Mobility:     Supine to Sit: stand by assistance  Transfers:     Sit to Stand:  stand by assistance with rolling walker  Bed to Chair: stand by assistance with  rolling walker  using  Step Transfer  Gait: 40 feet, SBA with RW      AM-PAC 6 CLICK MOBILITY  Total Score:21       Treatment & Education:    Patient found supine in bed upon PT arrival. PT provided education on role of PT and POC.     Patient completed:    Sup>sit: SBA with use of bed rails     STS: SBA with RW and cues for hand placement on RW    Gait: 40 feet, SBA with RW. Patient ambulating with good delbert and no LOB.    Chair tx: SBA with RW with cues given to reach back for arm rest of chair with lowering body into chair    Patient left up in chair with all lines intact, call button in reach, and spouse present.    GOALS:   Multidisciplinary Problems       Physical Therapy Goals          Problem: Physical Therapy    Goal Priority Disciplines Outcome Goal Variances Interventions   Physical Therapy Goal     PT, PT/OT      Description: Patient will be seen a minimal of 3 out of 7 days a week.    LTG to be met by 09/15/24:     Bed mobility: Mod I  Transfers: Mod I with RW  Gait: Mod I with RW x100 feet                        History:     Past Medical History:   Diagnosis Date    Acquired TTP     Cataract     CKD stage 3 secondary to diabetes     Closed displaced comminuted fracture of right patella 10/28/2020    Closed fracture of surgical neck of left humerus 10/30/2020    Closed left radial fracture 10/30/2020    Hyperkalemia     Hyperlipidemia     Hypertension     Hypothyroidism, unspecified     Liver cirrhosis secondary to YO     Morbid obesity     Right knee pain     Thyroid disease     Type 2 diabetes mellitus        Past Surgical History:   Procedure Laterality  Date    APPENDECTOMY      BREAST BIOPSY      CATARACT EXTRACTION      COLONOSCOPY N/A 12/21/2021    Procedure: COLONOSCOPY screening;  Surgeon: Moises Patterson MD;  Location: [facility] ENDO;  Service: Endoscopy;  Laterality: N/A;    ESOPHAGOGASTRODUODENOSCOPY N/A 12/21/2021    Procedure: EGD (ESOPHAGOGASTRODUODENOSCOPY) EV screening;  Surgeon: Moises Patterson MD;  Location: [facility] ENDO;  Service: Endoscopy;  Laterality: N/A;    EYE SURGERY      HERNIA REPAIR      OPEN REDUCTION AND INTERNAL FIXATION (ORIF) OF FRACTURE OF DISTAL RADIUS Left 11/2/2020    Procedure: ORIF, FRACTURE, RADIUS, DISTAL;  Surgeon: Sage Wolf MD;  Location: [facility] OR;  Service: Orthopedics;  Laterality: Left;    OPEN REDUCTION AND INTERNAL FIXATION (ORIF) OF FRACTURE OF PATELLA Right 11/2/2020    Procedure: ORIF, FRACTURE, PATELLA;  Surgeon: Sage Wolf MD;  Location: [facility] OR;  Service: Orthopedics;  Laterality: Right;    OPEN REDUCTION AND INTERNAL FIXATION (ORIF) OF FRACTURE OF PATELLA Right 12/18/2020    Procedure: ORIF, FRACTURE, PATELLA;  Surgeon: Sage Wolf MD;  Location: [facility] OR;  Service: Orthopedics;  Laterality: Right;    ORIF HUMERUS FRACTURE Left 11/5/2020    Procedure: ORIF, FRACTURE, HUMERUS;  Surgeon: Sage Wolf MD;  Location: [facility] OR;  Service: Orthopedics;  Laterality: Left;    PLACEMENT OF ACELLULAR HUMAN DERMAL ALLOGRAFT Right 11/2/2020    Procedure: APPLICATION, ACELLULAR HUMAN DERMAL ALLOGRAFT;  Surgeon: Sage Wolf MD;  Location: [facility] OR;  Service: Orthopedics;  Laterality: Right;  Right Patella    REMOVAL OF HARDWARE FROM HAND Left 3/11/2021    Procedure: REMOVAL, HARDWARE, HAND;  Surgeon: Sage Wolf MD;  Location: [facility] OR;  Service: Orthopedics;  Laterality: Left;  Removal of dorsal spanning wrist plate left distal radius    REMOVAL OF HARDWARE FROM LOWER EXTREMITY Right 12/18/2020    Procedure: REMOVAL, HARDWARE, LOWER EXTREMITY;  Surgeon:  Sage Wolf MD;  Location: [facility];  Service: Orthopedics;  Laterality: Right;       Time Tracking:     PT Received On: 09/01/24  PT Start Time: 1005     PT Stop Time: 1028  PT Total Time (min): 23 min     Billable Minutes: Evaluation 11 and Gait Training 12      09/01/2024

## 2024-09-01 NOTE — CONSULTS
[facility]  Infectious Disease  Consult Note    Patient Name: Lakshmi Campbell  MRN: [id number]  Admission Date: 8/29/2024  Hospital Length of Stay: 3 days  Attending Physician: Farshad Reyes MD  Primary Care Provider: Keely Silva NP     Isolation Status: Special Contact    Patient information was obtained from patient, past medical records, and ER records.      Consults  Assessment/Plan:     ID  Bacteremia  On meropenem -   Isolate is E coli - will use final cultures to guide regime    Hematology  Thrombocytopenia  Will follow primary team    Endocrine  Type 2 diabetes mellitus with stage 3b chronic kidney disease, with long-term current use of insulin  Insulin regime as per primary team    Hypothyroidism  Follow primary team        Thank you for your consult. I will follow-up with patient. Please contact us if you have any additional questions.    Albaro Jaime MD, [facility]  Infectious Disease  [facility]    Subjective:     Principal Problem: Severe sepsis    HPI: 73-year-old female with past medical history significant for diabetes, liver cirrhosis secondary to YO, hypothyroidism, hypertension, hyperlipidemia, CKD stage 3, thrombocytopenia, cataracts, hyperkalemia, and morbid obesity who presented to ED with complaints of lethargy, fever, and dysuria described as burning.   Labs and imaging test -  . Lab workup showed WBC 13.56, hemoglobin 11.5, hematocrit 35.6, platelets 73,  C difficile assay - neg   Blood culture- E coli- 08/29  Component      Latest Ref Rng 8/29/2024 8/30/2024 8/31/2024   WBC      3.90 - 12.70 K/uL 13.56 (H)  12.98 (H)  8.78    WBC        12.98 (H)     WBC        11.84        Legend:  (H) High    Past Medical History:   Diagnosis Date    Acquired TTP     Cataract     CKD stage 3 secondary to diabetes     Closed displaced comminuted fracture of right patella 10/28/2020    Closed fracture of surgical neck of left humerus 10/30/2020    Closed left radial fracture 10/30/2020     Hyperkalemia     Hyperlipidemia     Hypertension     Hypothyroidism, unspecified     Liver cirrhosis secondary to YO     Morbid obesity     Right knee pain     Thyroid disease     Type 2 diabetes mellitus        Past Surgical History:   Procedure Laterality Date    APPENDECTOMY      BREAST BIOPSY      CATARACT EXTRACTION      COLONOSCOPY N/A 12/21/2021    Procedure: COLONOSCOPY screening;  Surgeon: Moises Patterson MD;  Location: [facility];  Service: Endoscopy;  Laterality: N/A;    ESOPHAGOGASTRODUODENOSCOPY N/A 12/21/2021    Procedure: EGD (ESOPHAGOGASTRODUODENOSCOPY) EV screening;  Surgeon: Moises Patterson MD;  Location: [facility];  Service: Endoscopy;  Laterality: N/A;    EYE SURGERY      HERNIA REPAIR      OPEN REDUCTION AND INTERNAL FIXATION (ORIF) OF FRACTURE OF DISTAL RADIUS Left 11/2/2020    Procedure: ORIF, FRACTURE, RADIUS, DISTAL;  Surgeon: Sage Wolf MD;  Location: [facility];  Service: Orthopedics;  Laterality: Left;    OPEN REDUCTION AND INTERNAL FIXATION (ORIF) OF FRACTURE OF PATELLA Right 11/2/2020    Procedure: ORIF, FRACTURE, PATELLA;  Surgeon: Sage Wolf MD;  Location: [facility] OR;  Service: Orthopedics;  Laterality: Right;    OPEN REDUCTION AND INTERNAL FIXATION (ORIF) OF FRACTURE OF PATELLA Right 12/18/2020    Procedure: ORIF, FRACTURE, PATELLA;  Surgeon: Sage Wolf MD;  Location: [facility] OR;  Service: Orthopedics;  Laterality: Right;    ORIF HUMERUS FRACTURE Left 11/5/2020    Procedure: ORIF, FRACTURE, HUMERUS;  Surgeon: Sage Wolf MD;  Location: [facility] OR;  Service: Orthopedics;  Laterality: Left;    PLACEMENT OF ACELLULAR HUMAN DERMAL ALLOGRAFT Right 11/2/2020    Procedure: APPLICATION, ACELLULAR HUMAN DERMAL ALLOGRAFT;  Surgeon: Sage Wolf MD;  Location: [facility] OR;  Service: Orthopedics;  Laterality: Right;  Right Patella    REMOVAL OF HARDWARE FROM HAND Left 3/11/2021    Procedure: REMOVAL, HARDWARE, HAND;  Surgeon:  "Sage Wolf MD;  Location: [facility];  Service: Orthopedics;  Laterality: Left;  Removal of dorsal spanning wrist plate left distal radius    REMOVAL OF HARDWARE FROM LOWER EXTREMITY Right 2020    Procedure: REMOVAL, HARDWARE, LOWER EXTREMITY;  Surgeon: Sage Wolf MD;  Location: [facility];  Service: Orthopedics;  Laterality: Right;       Review of patient's allergies indicates:   Allergen Reactions    Codeine Nausea Only     Other reaction(s): Nausea  Other reaction(s): Elevated blood pressure       Medications:  Medications Prior to Admission   Medication Sig    amLODIPine (NORVASC) 10 MG tablet TAKE 1 TABLET(10 MG) BY MOUTH EVERY DAY    metoprolol succinate (TOPROL-XL) 25 MG 24 hr tablet Take 0.5 tablets (12.5 mg total) by mouth once daily.    blood sugar diagnostic Strp Use ac bid    chlorthalidone (HYGROTEN) 25 MG Tab Take 25 mg by mouth once daily.    cholecalciferol, vitamin D3, (VITAMIN D3) 50 mcg (2,000 unit) Cap capsule Take 1 capsule by mouth.    HUMULIN 70/30 U-100 KWIKPEN 100 unit/mL (70-30) InPn pen SMARTSI Unit(s) SUB-Q Every Evening    hydrALAZINE (APRESOLINE) 25 MG tablet Take 1 tablet (25 mg total) by mouth every 8 (eight) hours.    insulin syringe-needle U-100 1 mL 29 gauge x 1/2" Syrg Use bid    JARDIANCE 25 mg tablet Take 25 mg by mouth every morning.    lancets 33 gauge Misc Use as BID    levothyroxine (SYNTHROID) 150 MCG tablet Take 1 tablet by mouth once daily.    metFORMIN (GLUCOPHAGE) 500 MG tablet Take 500 mg by mouth 2 (two) times daily.    perindopril erbumine (ACEON) 4 mg tablet Take 2 mg by mouth once daily.    pravastatin (PRAVACHOL) 20 MG tablet Take 20 mg by mouth once daily.     spironolactone (ALDACTONE) 25 MG tablet Take 25 mg by mouth.     Antibiotics (From admission, onward)      Start     Stop Route Frequency Ordered    24 3632  meropenem (MERREM) 1 g in 0.9% NaCl 100 mL IVPB (MB+)         -- IV Every 12 hours (non-standard times) 24 " 1514    24 0900  mupirocin 2 % ointment         24 0859 Nasl 2 times daily 24 0255          Antifungals (From admission, onward)      None          Antivirals (From admission, onward)      None             Immunization History   Administered Date(s) Administered    COVID-19, MRNA, LN-S, PF (Pfizer) (Purple Cap) 01/15/2021, 2021    Influenza 2011    Influenza (FLUAD) - Quadrivalent - Adjuvanted - PF *Preferred* (65+) 10/04/2021, 10/12/2022    Influenza - High Dose - PF (65 years and older) 2019    Influenza - Trivalent - PF (ADULT) 2011    Pneumococcal Conjugate - 13 Valent 2020    Pneumococcal Polysaccharide - 23 Valent 2021    Td (ADULT) 2008    Tdap 10/24/2020    Tuberculin 2020, 2020    Zoster 2011       Family History       Problem Relation (Age of Onset)    Diabetes Mother, Father    Leukemia Father          Social History     Socioeconomic History    Marital status:    Tobacco Use    Smoking status: Former     Current packs/day: 0.00     Average packs/day: 1 pack/day for 8.0 years (8.0 ttl pk-yrs)     Types: Cigarettes     Start date:      Quit date:      Years since quittin.6    Smokeless tobacco: Never   Substance and Sexual Activity    Alcohol use: Yes     Comment: rarely    Drug use: No    Sexual activity: Not Currently     Social Determinants of Health     Financial Resource Strain: Low Risk  (2024)    Overall Financial Resource Strain (CARDIA)     Difficulty of Paying Living Expenses: Not hard at all   Food Insecurity: No Food Insecurity (2024)    Hunger Vital Sign     Worried About Running Out of Food in the Last Year: Never true     Ran Out of Food in the Last Year: Never true   Transportation Needs: No Transportation Needs (2024)    PRAPARE - Transportation     Lack of Transportation (Medical): No     Lack of Transportation (Non-Medical): No   Physical Activity: Inactive (2024)     Exercise Vital Sign     Days of Exercise per Week: 0 days     Minutes of Exercise per Session: 0 min   Stress: No Stress Concern Present (7/24/2024)    [employer] - Occupational Stress Questionnaire     Feeling of Stress : Not at all   Housing Stability: Low Risk  (7/24/2024)    Housing Stability Vital Sign     Unable to Pay for Housing in the Last Year: No     Homeless in the Last Year: No     Review of Systems   Constitutional:  Negative for activity change, appetite change, chills and diaphoresis.   Neurological:  Negative for dizziness and facial asymmetry.     Objective:     Vital Signs (Most Recent):  Temp: 99 °F (37.2 °C) (09/01/24 0738)  Pulse: 73 (09/01/24 0745)  Resp: 18 (09/01/24 0738)  BP: (!) 152/66 (09/01/24 0738)  SpO2: (!) 94 % (09/01/24 0738) Vital Signs (24h Range):  Temp:  [98.2 °F (36.8 °C)-99.2 °F (37.3 °C)] 99 °F (37.2 °C)  Pulse:  [73-96] 73  Resp:  [18-31] 18  SpO2:  [92 %-97 %] 94 %  BP: (129-156)/(59-89) 152/66     Weight: 103.4 kg (227 lb 15.3 oz)  Body mass index is 37.93 kg/m².    Estimated Creatinine Clearance: 28.5 mL/min (A) (based on SCr of 2.1 mg/dL (H)).     Physical Exam  Vitals and nursing note reviewed.   HENT:      Head: Normocephalic.   Eyes:      Pupils: Pupils are equal, round, and reactive to light.   Cardiovascular:      Rate and Rhythm: Normal rate.   Pulmonary:      Effort: Pulmonary effort is normal.   Abdominal:      General: Abdomen is flat.   Musculoskeletal:      Cervical back: Normal range of motion.   Neurological:      Mental Status: She is alert.          Significant Labs: Blood Culture:   Recent Labs   Lab 06/28/24  1559 08/29/24  1626 08/29/24  1645 08/31/24  1219 08/31/24  1221   LABBLOO No growth after 5 days.  No growth after 5 days. Gram stain sharan bottle: Gram negative rods  Results called to and read back by: Adrian Holm RN  08/30/2024  12:36  ESCHERICHIA COLI  For susceptibility see order #[id number]  * Gram stain aer  bottle: Gram negative rods  Gram stain sharan bottle: Gram negative rods  Results called to and read back by: Adrian Holm RN  08/30/2024  12:36  ESCHERICHIA COLI  Susceptibility pending  * No Growth to date No Growth to date     BMP:   Recent Labs   Lab 09/01/24  0623         K 4.2      CO2 18*   BUN 60*   CREATININE 2.1*   CALCIUM 8.3*   MG 2.1     CBC:   Recent Labs   Lab 08/30/24  1618 08/31/24  0527 09/01/24  0623   WBC 12.98*  12.98* 8.78 5.58   HGB 11.0*  11.0* 10.5* 9.6*   HCT 33.3*  33.3* 32.5* 29.7*   PLT 49*  49* 48* 57*     CMP:   Recent Labs   Lab 08/30/24  1238 08/31/24  0527 09/01/24  0623    138 137   K 4.5 4.3 4.2    110 110   CO2 20* 18* 18*   * 167* 104   BUN 58* 61* 60*   CREATININE 2.8* 2.5* 2.1*   CALCIUM 8.3* 8.5* 8.3*   PROT  --  5.6* 5.4*   ALBUMIN 2.1* 2.1* 1.9*   BILITOT  --  0.8 0.8   ALKPHOS  --  60 59   AST  --  33 23   ALT  --  16 14   ANIONGAP 9 10 9     Urine Culture:   Recent Labs   Lab 08/29/24  1649   LABURIN Multiple organisms isolated. None in predominance.  Repeat if  clinically necessary.     All pertinent labs within the past 24 hours have been reviewed.    Significant Imaging: I have reviewed all pertinent imaging results/findings within the past 24 hours.

## 2024-09-01 NOTE — ASSESSMENT & PLAN NOTE
Mrsa positive  Cdiff negative  Shiga toxin negative  Urine culture with multiple organisms isolated  Source?  Sensitive to merrem   Infectious disease for final antibiotic regimen for discharge

## 2024-09-01 NOTE — HPI
73-year-old female with past medical history significant for diabetes, liver cirrhosis secondary to YO, hypothyroidism, hypertension, hyperlipidemia, CKD stage 3, thrombocytopenia, cataracts, hyperkalemia, and morbid obesity who presented to ED with complaints of lethargy, fever, and dysuria described as burning.   Labs and imaging test -  . Lab workup showed WBC 13.56, hemoglobin 11.5, hematocrit 35.6, platelets 73,  C difficile assay - neg   Blood culture- E coli- 08/29  Component      Latest Ref Rng 8/29/2024 8/30/2024 8/31/2024   WBC      3.90 - 12.70 K/uL 13.56 (H)  12.98 (H)  8.78    WBC        12.98 (H)     WBC        11.84        Legend:  (H) High

## 2024-09-01 NOTE — SUBJECTIVE & OBJECTIVE
Past Medical History:   Diagnosis Date    Acquired TTP     Cataract     CKD stage 3 secondary to diabetes     Closed displaced comminuted fracture of right patella 10/28/2020    Closed fracture of surgical neck of left humerus 10/30/2020    Closed left radial fracture 10/30/2020    Hyperkalemia     Hyperlipidemia     Hypertension     Hypothyroidism, unspecified     Liver cirrhosis secondary to YO     Morbid obesity     Right knee pain     Thyroid disease     Type 2 diabetes mellitus        Past Surgical History:   Procedure Laterality Date    APPENDECTOMY      BREAST BIOPSY      CATARACT EXTRACTION      COLONOSCOPY N/A 12/21/2021    Procedure: COLONOSCOPY screening;  Surgeon: Moises Patterson MD;  Location: [facility];  Service: Endoscopy;  Laterality: N/A;    ESOPHAGOGASTRODUODENOSCOPY N/A 12/21/2021    Procedure: EGD (ESOPHAGOGASTRODUODENOSCOPY) EV screening;  Surgeon: Moises Patterson MD;  Location: [facility];  Service: Endoscopy;  Laterality: N/A;    EYE SURGERY      HERNIA REPAIR      OPEN REDUCTION AND INTERNAL FIXATION (ORIF) OF FRACTURE OF DISTAL RADIUS Left 11/2/2020    Procedure: ORIF, FRACTURE, RADIUS, DISTAL;  Surgeon: Sage Wolf MD;  Location: [facility];  Service: Orthopedics;  Laterality: Left;    OPEN REDUCTION AND INTERNAL FIXATION (ORIF) OF FRACTURE OF PATELLA Right 11/2/2020    Procedure: ORIF, FRACTURE, PATELLA;  Surgeon: Sage Wolf MD;  Location: [facility] OR;  Service: Orthopedics;  Laterality: Right;    OPEN REDUCTION AND INTERNAL FIXATION (ORIF) OF FRACTURE OF PATELLA Right 12/18/2020    Procedure: ORIF, FRACTURE, PATELLA;  Surgeon: Sage Wolf MD;  Location: [facility] OR;  Service: Orthopedics;  Laterality: Right;    ORIF HUMERUS FRACTURE Left 11/5/2020    Procedure: ORIF, FRACTURE, HUMERUS;  Surgeon: Sage Wolf MD;  Location: [facility] OR;  Service: Orthopedics;  Laterality: Left;    PLACEMENT OF ACELLULAR HUMAN DERMAL ALLOGRAFT Right 11/2/2020     "Procedure: APPLICATION, ACELLULAR HUMAN DERMAL ALLOGRAFT;  Surgeon: Sage Wolf MD;  Location: [facility] OR;  Service: Orthopedics;  Laterality: Right;  Right Patella    REMOVAL OF HARDWARE FROM HAND Left 3/11/2021    Procedure: REMOVAL, HARDWARE, HAND;  Surgeon: Sage Wolf MD;  Location: [facility] OR;  Service: Orthopedics;  Laterality: Left;  Removal of dorsal spanning wrist plate left distal radius    REMOVAL OF HARDWARE FROM LOWER EXTREMITY Right 2020    Procedure: REMOVAL, HARDWARE, LOWER EXTREMITY;  Surgeon: Sage Wolf MD;  Location: [facility] OR;  Service: Orthopedics;  Laterality: Right;       Review of patient's allergies indicates:   Allergen Reactions    Codeine Nausea Only     Other reaction(s): Nausea  Other reaction(s): Elevated blood pressure       Medications:  Medications Prior to Admission   Medication Sig    amLODIPine (NORVASC) 10 MG tablet TAKE 1 TABLET(10 MG) BY MOUTH EVERY DAY    metoprolol succinate (TOPROL-XL) 25 MG 24 hr tablet Take 0.5 tablets (12.5 mg total) by mouth once daily.    blood sugar diagnostic Strp Use ac bid    chlorthalidone (HYGROTEN) 25 MG Tab Take 25 mg by mouth once daily.    cholecalciferol, vitamin D3, (VITAMIN D3) 50 mcg (2,000 unit) Cap capsule Take 1 capsule by mouth.    HUMULIN 70/30 U-100 KWIKPEN 100 unit/mL (70-30) InPn pen SMARTSI Unit(s) SUB-Q Every Evening    hydrALAZINE (APRESOLINE) 25 MG tablet Take 1 tablet (25 mg total) by mouth every 8 (eight) hours.    insulin syringe-needle U-100 1 mL 29 gauge x 1/2" Syrg Use bid    JARDIANCE 25 mg tablet Take 25 mg by mouth every morning.    lancets 33 gauge Misc Use as BID    levothyroxine (SYNTHROID) 150 MCG tablet Take 1 tablet by mouth once daily.    metFORMIN (GLUCOPHAGE) 500 MG tablet Take 500 mg by mouth 2 (two) times daily.    perindopril erbumine (ACEON) 4 mg tablet Take 2 mg by mouth once daily.    pravastatin (PRAVACHOL) 20 MG tablet Take 20 mg by mouth once daily.     " spironolactone (ALDACTONE) 25 MG tablet Take 25 mg by mouth.     Antibiotics (From admission, onward)      Start     Stop Route Frequency Ordered    24 1615  meropenem (MERREM) 1 g in 0.9% NaCl 100 mL IVPB (MB+)         -- IV Every 12 hours (non-standard times) 24 1514    24 0900  mupirocin 2 % ointment         24 0859 Nasl 2 times daily 24 0255          Antifungals (From admission, onward)      None          Antivirals (From admission, onward)      None             Immunization History   Administered Date(s) Administered    COVID-19, MRNA, LN-S, PF (Pfizer) (Purple Cap) 01/15/2021, 2021    Influenza 2011    Influenza (FLUAD) - Quadrivalent - Adjuvanted - PF *Preferred* (65+) 10/04/2021, 10/12/2022    Influenza - High Dose - PF (65 years and older) 2019    Influenza - Trivalent - PF (ADULT) 2011    Pneumococcal Conjugate - 13 Valent 2020    Pneumococcal Polysaccharide - 23 Valent 2021    Td (ADULT) 2008    Tdap 10/24/2020    Tuberculin 2020, 2020    Zoster 2011       Family History       Problem Relation (Age of Onset)    Diabetes Mother, Father    Leukemia Father          Social History     Socioeconomic History    Marital status:    Tobacco Use    Smoking status: Former     Current packs/day: 0.00     Average packs/day: 1 pack/day for 8.0 years (8.0 ttl pk-yrs)     Types: Cigarettes     Start date:      Quit date:      Years since quittin.6    Smokeless tobacco: Never   Substance and Sexual Activity    Alcohol use: Yes     Comment: rarely    Drug use: No    Sexual activity: Not Currently     Social Determinants of Health     Financial Resource Strain: Low Risk  (2024)    Overall Financial Resource Strain (CARDIA)     Difficulty of Paying Living Expenses: Not hard at all   Food Insecurity: No Food Insecurity (2024)    Hunger Vital Sign     Worried About Running Out of Food in the Last Year: Never  true     Ran Out of Food in the Last Year: Never true   Transportation Needs: No Transportation Needs (7/24/2024)    PRAPARE - Transportation     Lack of Transportation (Medical): No     Lack of Transportation (Non-Medical): No   Physical Activity: Inactive (7/24/2024)    Exercise Vital Sign     Days of Exercise per Week: 0 days     Minutes of Exercise per Session: 0 min   Stress: No Stress Concern Present (7/24/2024)    [employer] - Occupational Stress Questionnaire     Feeling of Stress : Not at all   Housing Stability: Low Risk  (7/24/2024)    Housing Stability Vital Sign     Unable to Pay for Housing in the Last Year: No     Homeless in the Last Year: No     Review of Systems   Constitutional:  Negative for activity change, appetite change, chills and diaphoresis.   Neurological:  Negative for dizziness and facial asymmetry.     Objective:     Vital Signs (Most Recent):  Temp: 99 °F (37.2 °C) (09/01/24 0738)  Pulse: 73 (09/01/24 0745)  Resp: 18 (09/01/24 0738)  BP: (!) 152/66 (09/01/24 0738)  SpO2: (!) 94 % (09/01/24 0738) Vital Signs (24h Range):  Temp:  [98.2 °F (36.8 °C)-99.2 °F (37.3 °C)] 99 °F (37.2 °C)  Pulse:  [73-96] 73  Resp:  [18-31] 18  SpO2:  [92 %-97 %] 94 %  BP: (129-156)/(59-89) 152/66     Weight: 103.4 kg (227 lb 15.3 oz)  Body mass index is 37.93 kg/m².    Estimated Creatinine Clearance: 28.5 mL/min (A) (based on SCr of 2.1 mg/dL (H)).     Physical Exam  Vitals and nursing note reviewed.   HENT:      Head: Normocephalic.   Eyes:      Pupils: Pupils are equal, round, and reactive to light.   Cardiovascular:      Rate and Rhythm: Normal rate.   Pulmonary:      Effort: Pulmonary effort is normal.   Abdominal:      General: Abdomen is flat.   Musculoskeletal:      Cervical back: Normal range of motion.   Neurological:      Mental Status: She is alert.          Significant Labs: Blood Culture:   Recent Labs   Lab 06/28/24  1559 08/29/24  1626 08/29/24  8282  08/31/24  1219 08/31/24  1221   LABBLOO No growth after 5 days.  No growth after 5 days. Gram stain sharan bottle: Gram negative rods  Results called to and read back by: Adrian Holm RN  08/30/2024  12:36  ESCHERICHIA COLI  For susceptibility see order #[id number]  * Gram stain aer bottle: Gram negative rods  Gram stain sharan bottle: Gram negative rods  Results called to and read back by: Adrian Holm RN  08/30/2024  12:36  ESCHERICHIA COLI  Susceptibility pending  * No Growth to date No Growth to date     BMP:   Recent Labs   Lab 09/01/24  0623         K 4.2      CO2 18*   BUN 60*   CREATININE 2.1*   CALCIUM 8.3*   MG 2.1     CBC:   Recent Labs   Lab 08/30/24  1618 08/31/24  0527 09/01/24  0623   WBC 12.98*  12.98* 8.78 5.58   HGB 11.0*  11.0* 10.5* 9.6*   HCT 33.3*  33.3* 32.5* 29.7*   PLT 49*  49* 48* 57*     CMP:   Recent Labs   Lab 08/30/24  1238 08/31/24  0527 09/01/24  0623    138 137   K 4.5 4.3 4.2    110 110   CO2 20* 18* 18*   * 167* 104   BUN 58* 61* 60*   CREATININE 2.8* 2.5* 2.1*   CALCIUM 8.3* 8.5* 8.3*   PROT  --  5.6* 5.4*   ALBUMIN 2.1* 2.1* 1.9*   BILITOT  --  0.8 0.8   ALKPHOS  --  60 59   AST  --  33 23   ALT  --  16 14   ANIONGAP 9 10 9     Urine Culture:   Recent Labs   Lab 08/29/24  1649   LABURIN Multiple organisms isolated. None in predominance.  Repeat if  clinically necessary.     All pertinent labs within the past 24 hours have been reviewed.    Significant Imaging: I have reviewed all pertinent imaging results/findings within the past 24 hours.

## 2024-09-01 NOTE — ASSESSMENT & PLAN NOTE
Denies pmh recurrent utis  Does not follow urology  Urine culture with multiple organisms  On merrem

## 2024-09-01 NOTE — ASSESSMENT & PLAN NOTE
Improving to resolved   Cdiff negative  Shiga toxin negative   Cholestyramine, banatrol  imodium prn

## 2024-09-01 NOTE — ASSESSMENT & PLAN NOTE
The likely etiology of thrombocytopenia is sepsis and liver disease. The patients 3 most recent labs are listed below.  Recent Labs     08/30/24  1618 08/31/24  0527 09/01/24  0623   PLT 49*  49* 48* 57*       Plan  - Will transfuse if platelet count is <50k (if undergoing surgical procedure or have active bleeding).    Improving but not at baseline  Follows hem/onc outpatient   Peripheral smear pending  Shiga toxin negative   Creatinine continues to improve

## 2024-09-01 NOTE — PLAN OF CARE
Discussed plan of care with patient and this patient was able to verbalize understanding.  Patient remains free from injury.  Safety and comfort precautions maintained this shift.   Call light and personal belongings within reach, bed in low position with bed wheels locked.  No s/s of acute distress.   Purposeful rounding continued this shift.  Pain levels  controlled per MD order. IVF infusing.  Cardiac monitoring in place.  Diet orders continued.  Blood glucose monitoring continued this shift.  Vital signs continued per order.  Q2 repositioning   Chart and orders review completed.   Patient education about care completed.     Problem: Adult Inpatient Plan of Care  Goal: Plan of Care Review  Outcome: Progressing  Goal: Patient-Specific Goal (Individualized)  Outcome: Progressing  Goal: Absence of Hospital-Acquired Illness or Injury  Outcome: Progressing  Goal: Optimal Comfort and Wellbeing  Outcome: Progressing  Goal: Readiness for Transition of Care  Outcome: Progressing     Problem: Diabetes Comorbidity  Goal: Blood Glucose Level Within Targeted Range  Outcome: Progressing     Problem: Pneumonia  Goal: Fluid Balance  Outcome: Progressing  Goal: Resolution of Infection Signs and Symptoms  Outcome: Progressing  Goal: Effective Oxygenation and Ventilation  Outcome: Progressing     Problem: Sepsis/Septic Shock  Goal: Optimal Coping  Outcome: Progressing  Goal: Absence of Bleeding  Outcome: Progressing  Goal: Blood Glucose Level Within Targeted Range  Outcome: Progressing  Goal: Absence of Infection Signs and Symptoms  Outcome: Progressing  Goal: Optimal Nutrition Intake  Outcome: Progressing     Problem: Acute Kidney Injury/Impairment  Goal: Fluid and Electrolyte Balance  Outcome: Progressing  Goal: Improved Oral Intake  Outcome: Progressing  Goal: Effective Renal Function  Outcome: Progressing     Problem: Infection  Goal: Absence of Infection Signs and Symptoms  Outcome: Progressing

## 2024-09-02 VITALS
SYSTOLIC BLOOD PRESSURE: 145 MMHG | OXYGEN SATURATION: 93 % | DIASTOLIC BLOOD PRESSURE: 64 MMHG | HEART RATE: 68 BPM | WEIGHT: 227.94 LBS | BODY MASS INDEX: 37.98 KG/M2 | RESPIRATION RATE: 18 BRPM | HEIGHT: 65 IN | TEMPERATURE: 98 F

## 2024-09-02 PROBLEM — A41.9 SEVERE SEPSIS: Status: RESOLVED | Noted: 2024-08-29 | Resolved: 2024-09-02

## 2024-09-02 PROBLEM — R19.7 DIARRHEA: Status: RESOLVED | Noted: 2024-08-30 | Resolved: 2024-09-02

## 2024-09-02 PROBLEM — R65.20 SEVERE SEPSIS: Status: RESOLVED | Noted: 2024-08-29 | Resolved: 2024-09-02

## 2024-09-02 LAB
ALBUMIN SERPL BCP-MCNC: 2 G/DL (ref 3.5–5.2)
ALP SERPL-CCNC: 63 U/L (ref 55–135)
ALT SERPL W/O P-5'-P-CCNC: 19 U/L (ref 10–44)
ANION GAP SERPL CALC-SCNC: 10 MMOL/L (ref 8–16)
AST SERPL-CCNC: 29 U/L (ref 10–40)
BACTERIA STL CULT: NORMAL
BASOPHILS # BLD AUTO: 0.02 K/UL (ref 0–0.2)
BASOPHILS NFR BLD: 0.5 % (ref 0–1.9)
BILIRUB SERPL-MCNC: 0.8 MG/DL (ref 0.1–1)
BNP SERPL-MCNC: 246 PG/ML (ref 0–99)
BUN SERPL-MCNC: 54 MG/DL (ref 8–23)
CALCIUM SERPL-MCNC: 8.2 MG/DL (ref 8.7–10.5)
CHLORIDE SERPL-SCNC: 107 MMOL/L (ref 95–110)
CO2 SERPL-SCNC: 18 MMOL/L (ref 23–29)
CREAT SERPL-MCNC: 1.9 MG/DL (ref 0.5–1.4)
DIFFERENTIAL METHOD BLD: ABNORMAL
EOSINOPHIL # BLD AUTO: 0.1 K/UL (ref 0–0.5)
EOSINOPHIL NFR BLD: 2.8 % (ref 0–8)
ERYTHROCYTE [DISTWIDTH] IN BLOOD BY AUTOMATED COUNT: 13 % (ref 11.5–14.5)
EST. GFR  (NO RACE VARIABLE): 28 ML/MIN/1.73 M^2
GLUCOSE SERPL-MCNC: 133 MG/DL (ref 70–110)
HCT VFR BLD AUTO: 29 % (ref 37–48.5)
HGB BLD-MCNC: 9.6 G/DL (ref 12–16)
IMM GRANULOCYTES # BLD AUTO: 0.02 K/UL (ref 0–0.04)
IMM GRANULOCYTES NFR BLD AUTO: 0.5 % (ref 0–0.5)
LACTATE SERPL-SCNC: 0.6 MMOL/L (ref 0.5–2.2)
LACTATE SERPL-SCNC: 0.7 MMOL/L (ref 0.5–2.2)
LACTATE SERPL-SCNC: 0.8 MMOL/L (ref 0.5–2.2)
LYMPHOCYTES # BLD AUTO: 0.5 K/UL (ref 1–4.8)
LYMPHOCYTES NFR BLD: 10.3 % (ref 18–48)
MAGNESIUM SERPL-MCNC: 2.1 MG/DL (ref 1.6–2.6)
MCH RBC QN AUTO: 29.2 PG (ref 27–31)
MCHC RBC AUTO-ENTMCNC: 33.1 G/DL (ref 32–36)
MCV RBC AUTO: 88 FL (ref 82–98)
MONOCYTES # BLD AUTO: 0.6 K/UL (ref 0.3–1)
MONOCYTES NFR BLD: 14 % (ref 4–15)
NEUTROPHILS # BLD AUTO: 3.1 K/UL (ref 1.8–7.7)
NEUTROPHILS NFR BLD: 71.9 % (ref 38–73)
NRBC BLD-RTO: 0 /100 WBC
PHOSPHATE SERPL-MCNC: 2.7 MG/DL (ref 2.7–4.5)
PLATELET # BLD AUTO: 49 K/UL (ref 150–450)
PMV BLD AUTO: 10.3 FL (ref 9.2–12.9)
POCT GLUCOSE: 129 MG/DL (ref 70–110)
POCT GLUCOSE: 137 MG/DL (ref 70–110)
POCT GLUCOSE: 179 MG/DL (ref 70–110)
POTASSIUM SERPL-SCNC: 4.1 MMOL/L (ref 3.5–5.1)
PROT SERPL-MCNC: 5.5 G/DL (ref 6–8.4)
RBC # BLD AUTO: 3.29 M/UL (ref 4–5.4)
SODIUM SERPL-SCNC: 135 MMOL/L (ref 136–145)
WBC # BLD AUTO: 4.36 K/UL (ref 3.9–12.7)

## 2024-09-02 PROCEDURE — 02HV33Z INSERTION OF INFUSION DEVICE INTO SUPERIOR VENA CAVA, PERCUTANEOUS APPROACH: ICD-10-PCS | Performed by: FAMILY MEDICINE

## 2024-09-02 PROCEDURE — 84100 ASSAY OF PHOSPHORUS: CPT | Performed by: NURSE PRACTITIONER

## 2024-09-02 PROCEDURE — C1751 CATH, INF, PER/CENT/MIDLINE: HCPCS

## 2024-09-02 PROCEDURE — 25000003 PHARM REV CODE 250: Performed by: INTERNAL MEDICINE

## 2024-09-02 PROCEDURE — 83605 ASSAY OF LACTIC ACID: CPT | Mod: 91

## 2024-09-02 PROCEDURE — 97165 OT EVAL LOW COMPLEX 30 MIN: CPT

## 2024-09-02 PROCEDURE — 25000003 PHARM REV CODE 250

## 2024-09-02 PROCEDURE — 83880 ASSAY OF NATRIURETIC PEPTIDE: CPT | Performed by: FAMILY MEDICINE

## 2024-09-02 PROCEDURE — 63600175 PHARM REV CODE 636 W HCPCS: Performed by: FAMILY MEDICINE

## 2024-09-02 PROCEDURE — 36415 COLL VENOUS BLD VENIPUNCTURE: CPT

## 2024-09-02 PROCEDURE — 25000003 PHARM REV CODE 250: Performed by: FAMILY MEDICINE

## 2024-09-02 PROCEDURE — 80053 COMPREHEN METABOLIC PANEL: CPT | Performed by: NURSE PRACTITIONER

## 2024-09-02 PROCEDURE — 36569 INSJ PICC 5 YR+ W/O IMAGING: CPT

## 2024-09-02 PROCEDURE — 85025 COMPLETE CBC W/AUTO DIFF WBC: CPT | Performed by: NURSE PRACTITIONER

## 2024-09-02 PROCEDURE — 83735 ASSAY OF MAGNESIUM: CPT | Performed by: NURSE PRACTITIONER

## 2024-09-02 RX ADMIN — MEROPENEM 1 G: 1 INJECTION INTRAVENOUS at 05:09

## 2024-09-02 RX ADMIN — MUPIROCIN: 20 OINTMENT TOPICAL at 09:09

## 2024-09-02 RX ADMIN — ERTAPENEM 1 G: 1 INJECTION INTRAMUSCULAR; INTRAVENOUS at 09:09

## 2024-09-02 RX ADMIN — INSULIN GLARGINE 20 UNITS: 100 INJECTION, SOLUTION SUBCUTANEOUS at 09:09

## 2024-09-02 RX ADMIN — FAMOTIDINE 20 MG: 20 TABLET ORAL at 09:09

## 2024-09-02 RX ADMIN — LEVOTHYROXINE SODIUM 150 MCG: 150 TABLET ORAL at 05:09

## 2024-09-02 RX ADMIN — AMLODIPINE BESYLATE 10 MG: 10 TABLET ORAL at 09:09

## 2024-09-02 NOTE — PROCEDURES
"Lakshmi Campbell is a 73 y.o. female patient.    Temp: 98.6 °F (37 °C) (09/02/24 0802)  Pulse: 69 (09/02/24 0802)  Resp: 17 (09/02/24 0802)  BP: (!) 150/68 (09/02/24 0802)  SpO2: 95 % (09/02/24 0802)  Weight: 103.4 kg (227 lb 15.3 oz) (08/30/24 0326)  Height: 5' 5" (165.1 cm) (08/30/24 0326)    PICC  Performed by: Jose Rabago RN  Consent Done: Yes  Time out: Immediately prior to procedure a time out was called to verify the correct patient, procedure, equipment, support staff and site/side marked as required  Indications: med administration  Anesthesia: local infiltration  Local anesthetic: lidocaine 1% without epinephrine  Anesthetic Total (mL): 3  Preparation: skin prepped with ChloraPrep  Skin prep agent dried: skin prep agent completely dried prior to procedure  Sterile barriers: all five maximum sterile barriers used - cap, mask, sterile gown, sterile gloves, and large sterile sheet  Hand hygiene: hand hygiene performed prior to central venous catheter insertion  Location details: right brachial  Catheter type: double lumen  Catheter size: 4 Fr  Catheter Length: 36cm    Ultrasound guidance: yes  Vessel Caliber: small and patent, compressibility normal  Vascular Doppler: not done  Needle advanced into vessel with real time Ultrasound guidance.  Guidewire confirmed in vessel.  Sterile sheath used.  no esophageal manometryNumber of attempts: 1  Post-procedure: blood return through all ports, chlorhexidine patch and sterile dressing applied  Estimated blood loss (mL): 0  Specimens: No  Implants: No          Name FROILAN Garcia  9/2/2024    "

## 2024-09-02 NOTE — PLAN OF CARE
[facility]  Discharge Final Note    Primary Care Provider: Keely Silva NP    Expected Discharge Date: 9/2/2024    Final Discharge Note (most recent)       Final Note - 09/02/24 1521          Final Note    Assessment Type Final Discharge Note     Anticipated Discharge Disposition Home-Health Care Svc        Post-Acute Status    Post-Acute Authorization IV Infusion;Home Health     Home Health Status Set-up Complete/Auth obtained     IV Infusion Status Set-up Complete/Auth obtained     Discharge Delays None known at this time                     Important Message from Medicare             Contact Info       Keely Silva NP   Specialty: Family Medicine   Relationship: PCP - General    [address] [address] [address]   Phone: [phone]       Next Steps: Schedule an appointment as soon as possible for a visit in 3 day(s)    Instructions: hospital follow up    Albaro Jaime MD, [facility]   Specialty: Infectious Diseases, Hospitalist    [address] [place] [address]   Phone: [phone]       Next Steps: Schedule an appointment as soon as possible for a visit in 1 month(s)    Instructions: hospital follow up    Bunny Kerns MD   Specialty: Nephrology   Relationship: Consulting Physician    [facility] [address]   [facility] [address]   Phone: [phone]       Next Steps: Schedule an appointment as soon as possible for a visit in 1 week(s)    Instructions: hospital follow up    [facility]    [address]  [facility] [address]   Phone: [phone]       Next Steps: Follow up    Instructions: Infusion    [facility]   Specialty: Home Health Services    [address]  [facility] [address]   Phone: [phone]       Next Steps: Follow up    Instructions: Home Health          Discharge home with [facility] and [facility].

## 2024-09-02 NOTE — NURSING
Pt being discharged with  at bedside . Pt Is going home with PICC for abx for 10 days . Pt was educate on picc care and tx . Educated pt on d/c orders . Pt understood . Awaiting transport via wheelchair

## 2024-09-02 NOTE — PLAN OF CARE
"Met with patient and spouse per nurse request.  Spouse angry at delay in discharge, "how long do we have to wait, they're supposed to be here at 3."  CM apologized for delay and assured patient and spouse that everything had been done to facilitate discharge. No additional questions.   "

## 2024-09-02 NOTE — PLAN OF CARE
09/02/24 0843   Post-Acute Status   Post-Acute Authorization IV Infusion   IV Infusion Status Referral(s) sent   Discharge Plan   Discharge Plan A Home Health   Discharge Plan B Home with family     [facility] closed for holiday.  Dr Jaime notified, ok to send to [facility] infusion services.

## 2024-09-02 NOTE — PT/OT/SLP PROGRESS
Occupational Therapy      Patient Name:  Lakshmi Campbell   MRN:  [id number]    OT eval initiated via chart review. Eval attempted at 1115, however patient was with nursing staff for PICC placement.  Will continue efforts.      AROLDO Farooq  9/2/2024

## 2024-09-02 NOTE — PLAN OF CARE
Discussed poc with pt, pt verbalized understanding  VSS  Purposeful rounding Q2H    Cardiac monitoring in use, pt is NSR, tele monitor # [id number]  Blood glucose monitoring   Fall precautions in place, remains injury free  Pt denies c/o pain     Accurate I&Os  Abx given as prescribed  Bed locked at lowest position  Call light within reach     Chart check complete  Will cont with POC

## 2024-09-02 NOTE — PLAN OF CARE
Outpatient Antibiotic Therapy Plan:     Please send referral to [facility].     1) Infection:  Bacteremia      2) Discharge Antibiotics:     Intravenous antibiotics:  IV Ertapenem one gram daily       3) Therapy Duration:  10 days     Estimated end date of IV antibiotics:    09/12/24  4) Outpatient Weekly Labs:     Order the following labs to be drawn on Mondays:   CBC  CMP   CPK (when on Daptomycin)  ESR  CRP     Please send all labs to [facility] .

## 2024-09-02 NOTE — DISCHARGE SUMMARY
[facility]  Discharge Summary      Patient Name: Lakshmi Campbell  MRN: [id number]  DERECK: [id number]  Patient Class: IP- Inpatient  Admission Date: 8/29/2024  Hospital Length of Stay: 4 days  Discharge Date and Time:  09/02/2024 2:28 PM  Attending Physician: Farshad Reyes MD   Discharging Provider: Farshad Reyes MD  Primary Care Provider: Keely Silva NP    Primary Care Team: Networked reference to record PCT     HPI:     Patient is a 73-year-old female with past medical history significant for diabetes, liver cirrhosis secondary to YO, hypothyroidism, hypertension, hyperlipidemia, CKD stage 3, thrombocytopenia, cataracts, hyperkalemia, and morbid obesity who presented to ED with complaints of lethargy, fever, and dysuria described as burning.  She  is very somnolent and lethargic.  Information mainly obtained from her  who was sitting at bedside.  He states that for the past 2-3 days she has been laying around and sleeping all day long, she has complained of pain with urination, she has vomited a couple of times and she has been having fever on and off.  She was brought in by EMS and they noted blood pressure of 82/31, per EMS she was given epi IV push and a 500 mL normal saline fluid bolus.  On arrival to ED vital signs-- temperature 99.2°, heart rate 95, respirations 20, blood pressure 94/39, SpO2 93% on room air.    Lab workup showed WBC 13.56, hemoglobin 11.5, hematocrit 35.6, platelets 73, CO2 11, anion gap 15, BUN 46, creatinine 2.6, glucose 362, phosphorus 2.2, albumin 2.6, bilirubin 1.2, normal LFTs, potassium 4.4, ammonia 26, BNP 1 679, troponin 0.509, lactic acid 6.6, beta hydroxybutyrate 0.3, procalcitonin 62.91,  influenza A negative, influenza B negative, COVID-19 screening is negative.  Urinalysis with cloudy yellow urine 3+ protein, 4+ glucose, 3+ blood, negative nitrite, 3+ leukocyte esterase, greater than 100 RBC, greater than 100 WBC, many white blood cell  clumps noted.   Venous blood gas was done which showed pH 7.277, bicarb 13.7. Due to history of congestive heart failure and some fluid noted on imaging, she was cautiously given small IV fluid boluses rather than sepsis protocol fluid bolus.   She was started on vanc and cefepime.  After total of 1 L fluid bolus, lactic acid repeated and  remains elevated at 4.4.  Additional IV fluids has been ordered.  ICU team did evaluate patient, deemed that patient was stable for floor admission due to severe sepsis.    Transferred to icu due to worsening respiratory status and lactic acidosis. Received intravenous fluids. On room air. Creatinine remains elevated.     Bacteremia. Infectious disease consulted. Complains of diarrhea. Rule out cdiff.    Hospital medicine consulted for assumption of care.      * No surgery found *      Hospital Course:   8/31 admitted for urosepsis, now with bacteremia esbl. Transferred to icu for encephalopathy and respiratory distress, now resolved. Diarrhea improved, negative for cdiff. Infectious disease consulted. Ddx tma with infection, anemia, thrombocytopenia. Labs pending.  9/1 transferred to floor. No acute events overnight. Blood culture negative growth to date x 1 day. Denies diarrhea. Mrsa positive. Infectious disease following. Urine culture with multiple organisms isolated. Does report dysuria symptoms prior to hospitalization    9/2  Repeat blood cultures negative growth to date x 2 days. After discussing with infectious disease, ok to discharge home with intravenous antibiotic(s) invanz for 10 days. Picc line placed and in good position. Case management consulted for intravenous antibiotic(s) and homehealth. Patient and  updated with discharge plan.    No acute distress. No respiratory distress, on room air. Picc line placed, right upper arm. Bruising noted. Obese. AO3.    Patient seen and evaluated by me. Patient was determined to be suitable for discharge. Patient deemed  stable for discharge to home with homehealth physical/occupational therapy and nurse practitioner to visit home program.       Goals of Care Treatment Preferences:  Code Status: Full Code         Consults:   Consults (From admission, onward)          Status Ordering Provider     Inpatient consult to PICC team (JACOB)  Once        Provider:  (Not yet assigned)    Acknowledged FARSHAD REYES     Inpatient consult to Social Work/Case Management  Once        Provider:  (Not yet assigned)    Acknowledged FARSHAD REYES     Inpatient consult to Infectious Diseases  Once        Provider:  Farshad Reyes MD    Acknowledged FARSHAD REYES     Inpatient consult to Hospitalist  Once        Provider:  (Not yet assigned)    Acknowledged LISA JEONG            No new Assessment & Plan notes have been filed under this hospital service since the last note was generated.  Service: Hospital Medicine    Final Active Diagnoses:    Diagnosis Date Noted POA    Bacteremia [R78.81] 08/30/2024 Yes    Acute cystitis with hematuria [N30.01] 08/29/2024 Yes    Type 2 diabetes mellitus with stage 3b chronic kidney disease, with long-term current use of insulin [E11.22, N18.32, Z79.4] 07/24/2024 Not Applicable    Chronic diastolic congestive heart failure [I50.32] 06/27/2024 Yes    Acute on chronic kidney failure [N17.9, N18.9] 10/12/2022 Yes    Liver cirrhosis secondary to YO [K75.81, K74.60] 07/01/2021 Yes    Thrombocytopenia [D69.6] 03/02/2021 Yes    Class 2 severe obesity due to excess calories with serious comorbidity and body mass index (BMI) of 36.0 to 36.9 in adult [E66.01, Z68.36] 01/07/2020 Not Applicable    Essential hypertension [I10] 11/07/2019 Yes    Hypothyroidism [E03.9] 09/23/2013 Yes      Problems Resolved During this Admission:    Diagnosis Date Noted Date Resolved POA    PRINCIPAL PROBLEM:  Severe sepsis [A41.9, R65.20] 08/29/2024 09/02/2024 Yes    Diarrhea [R19.7] 08/30/2024 09/02/2024 No       Discharged Condition:  stable    Disposition:     Follow Up:   Follow-up Information       Keely Silva NP. Schedule an appointment as soon as possible for a visit in 3 day(s).    Specialty: Family Medicine  Why: hospital follow up  Contact information:  [address] [address] [address] [phone]               Albaro Jaime MD, [facility]. Schedule an appointment as soon as possible for a visit in 1 month(s).    Specialties: Infectious Diseases, Hospitalist  Why: hospital follow up  Contact information:  [address] [facility]  [place] [address] [phone]               Bunny Kerns MD. Schedule an appointment as soon as possible for a visit in 1 week(s).    Specialty: Nephrology  Why: hospital follow up  Contact information:  [address]  [place] [address] [phone]                           Patient Instructions:      Ambulatory referral/consult to [facility]     Ambulatory referral/consult to Home Health   Standing Status: Future   Referral Priority: Routine Referral Type: Home Health   Referral Reason: Specialty Services Required   Requested Specialty: Home Health Services   Number of Visits Requested: 1     Diet Cardiac     Diet diabetic     Activity as tolerated       Significant Diagnostic Studies: Labs: CMP   Recent Labs   Lab 09/01/24  0623 09/02/24  0645    135*   K 4.2 4.1    107   CO2 18* 18*    133*   BUN 60* 54*   CREATININE 2.1* 1.9*   CALCIUM 8.3* 8.2*   PROT 5.4* 5.5*   ALBUMIN 1.9* 2.0*   BILITOT 0.8 0.8   ALKPHOS 59 63   AST 23 29   ALT 14 19   ANIONGAP 9 10   , CBC   Recent Labs   Lab 09/01/24  0623 09/02/24  0646   WBC 5.58 4.36   HGB 9.6* 9.6*   HCT 29.7* 29.0*   PLT 57* 49*   , INR   Lab Results   Component Value Date    INR 1.3 (H) 08/30/2024    INR 1.0 07/01/2024    INR 0.9 06/30/2024   , A1C:   Recent Labs   Lab 03/19/24  0908 06/26/24  2145 08/30/24  0609   HGBA1C 6.8* 5.9* 7.0*   , and All labs within the past 24 hours have been  reviewed  Microbiology: Blood Culture   Lab Results   Component Value Date    LABBLOO No Growth to date 08/31/2024    LABBLOO No Growth to date 08/31/2024   , Urine Culture    Lab Results   Component Value Date    LABURIN  08/29/2024     Multiple organisms isolated. None in predominance.  Repeat if    LABURIN clinically necessary. 08/29/2024   , and mrsa isolated  Cdiff negative  Shiga toxin negative     Radiology: X-Ray: CXR: X-Ray Chest 1 View (CXR):   Results for orders placed or performed during the hospital encounter of 08/29/24   X-Ray Chest 1 View    Narrative    EXAMINATION:  XR CHEST 1 VIEW    CLINICAL HISTORY:  worsening dyspnea;    TECHNIQUE:  Single frontal view of the chest was performed.    COMPARISON:  None    FINDINGS:  Mild diffuse interstitial prominence representing edema or pneumonitis.  Mildly enlarged cardiac silhouette.  Otherwise, unremarkable      Impression    As above.      Electronically signed by: Albert Solis  Date:    08/30/2024  Time:    02:48    and portable chest x-ray   CT scan:  Chest abdomen pelvis without contrast and ct head without contrast    Pending Diagnostic Studies:       Procedure Component Value Units Date/Time    [id number] Evaluation [[id number]] Collected: 08/30/24 1618    Order Status: Sent Lab Status: In process Updated: 08/30/24 1623    Specimen: Blood     Narrative:      Collection has been rescheduled by AW7 at 08/30/2024 15:42 Reason:   Patient care/restroom    Legionella antigen, urine [[id number]] Collected: 08/30/24 0430    Order Status: Sent Lab Status: In process Updated: 08/30/24 1621    Specimen: Urine, Clean Catch            Medications:  Reconciled Home Medications:      Medication List        START taking these medications      0.9% NaCl PgBk 100 mL with ertapenem 1 gram SolR 1 g  Inject 1 g into the vein once daily.            CONTINUE taking these medications      amLODIPine 10 MG tablet  Commonly known as: NORVASC  TAKE 1 TABLET(10 MG) BY  "MOUTH EVERY DAY     blood sugar diagnostic Strp  Use ac bid     chlorthalidone 25 MG Tab  Commonly known as: HYGROTEN  Take 25 mg by mouth once daily.     cholecalciferol (vitamin D3) 50 mcg (2,000 unit) Cap capsule  Commonly known as: VITAMIN D3  Take 1 capsule by mouth.     HumuLIN 70/30 U-100 KwikPen 100 unit/mL (70-30) Inpn pen  Generic drug: insulin NPH/Reg human  SMARTSI Unit(s) SUB-Q Every Evening     insulin syringe-needle U-100 1 mL 29 gauge x 1/2" Syrg  Use bid     JARDIANCE 25 mg tablet  Generic drug: empagliflozin  Take 25 mg by mouth every morning.     lancets 33 gauge Misc  Use as BID     levothyroxine 150 MCG tablet  Commonly known as: SYNTHROID  Take 1 tablet by mouth once daily.     metFORMIN 500 MG tablet  Commonly known as: GLUCOPHAGE  Take 500 mg by mouth 2 (two) times daily.     metoprolol succinate 25 MG 24 hr tablet  Commonly known as: TOPROL-XL  Take 0.5 tablets (12.5 mg total) by mouth once daily.     perindopril erbumine 4 mg tablet  Commonly known as: ACEON  Take 2 mg by mouth once daily.     pravastatin 20 MG tablet  Commonly known as: PRAVACHOL  Take 20 mg by mouth once daily.            STOP taking these medications      hydrALAZINE 25 MG tablet  Commonly known as: APRESOLINE     spironolactone 25 MG tablet  Commonly known as: ALDACTONE              Indwelling Lines/Drains at time of discharge:   Lines/Drains/Airways       Peripherally Inserted Central Catheter Line  Duration             PICC Double Lumen 24 1105 right brachial <1 day              Drain  Duration             Female External Urinary Catheter w/ Suction 24 1908 <1 day                    Time spent on the discharge of patient: 41 minutes         Farshad Reyes MD  Department of Hospital Medicine  [facility]  " Yes

## 2024-09-02 NOTE — PLAN OF CARE
OT evaluation completed.   Bed mobility SBA  Sit to stand SBA with no AD  Ambulated to bathroom HHA   Toileted SBA  Transfer to bedside chair SBA    Rec low intensity therapy at IN

## 2024-09-02 NOTE — PT/OT/SLP EVAL
Occupational Therapy   Evaluation    Name: Lakshmi Campbell  MRN: [id number]  Admitting Diagnosis: Severe sepsis  Recent Surgery: * No surgery found *      Recommendations:     Discharge Recommendations: Low Intensity Therapy  Discharge Equipment Recommendations:  none  Barriers to discharge:       Assessment:     Lakshmi Campbell is a 73 y.o. female with a medical diagnosis of Severe sepsis. Performance deficits affecting function: weakness, gait instability, decreased upper extremity function, decreased lower extremity function, impaired balance, impaired endurance, decreased safety awareness, impaired self care skills, impaired functional mobility.      Rehab Prognosis: Good; patient would benefit from acute skilled OT services to address these deficits and reach maximum level of function.       Plan:     Patient to be seen 2 x/week to address the above listed problems via self-care/home management, therapeutic activities, therapeutic exercises  Plan of Care Expires: 09/16/24  Plan of Care Reviewed with: patient, spouse    Subjective     Chief Complaint: None  Patient/Family Comments/goals: increase independence    Occupational Profile:  Living Environment: Lives with spouse in [facility], no steps to enter  Previous level of function: Independent with self-care and functional mobility  Roles and Routines: drives  Equipment Used at Home: glucometer, oxygen, cane, straight, walker, rolling, shower chair  Assistance upon Discharge: Family    Pain/Comfort:  Pain Rating 1: 0/10  Pain Rating Post-Intervention 1: 0/10    Patients cultural, spiritual, Taoism conflicts given the current situation:      Objective:     Communicated with: Nurse prior to session.  Patient found supine with telemetry, peripheral IV, PureWick upon OT entry to room.    General Precautions: Standard, fall, contact  Orthopedic Precautions: N/A  Braces: N/A  Respiratory Status: Room air    Occupational Performance:    Bed Mobility:    Patient completed  Rolling/Turning to Left with  stand by assistance  Patient completed Scooting/Bridging with stand by assistance  Patient completed Supine to Sit with stand by assistance    Functional Mobility/Transfers:  Patient completed Sit <> Stand Transfer with stand by assistance  with  no assistive device   Patient completed Bed <> Chair Transfer using Stand Pivot technique with stand by assistance with no assistive device  Patient completed Toilet Transfer Stand Pivot technique with contact guard assistance with  hand-held assist  Functional Mobility: Ambulated HHA from bed to bathroom CGA    Activities of Daily Living:  Toileting: stand by assistance standard commode    Cognitive/Visual Perceptual:  Cognitive/Psychosocial Skills:     -       Oriented to: Person, Place, Time, and Situation   -       Follows Commands/attention:Follows multistep  commands  -       Memory: No Deficits noted  -       Safety awareness/insight to disability: impaired     Physical Exam:  Balance:    -       fair+  Upper Extremity Range of Motion:     -       Right Upper Extremity: WFL  -       Left Upper Extremity: WFL  Upper Extremity Strength:    -       Right Upper Extremity: grossly 3+/5  -       Left Upper Extremity: grossly 3+/5    AMPAC 6 Click ADL:  AMPAC Total Score: 19    Treatment & Education:  OT evaluation completed. Pt presents with the above stated deficits, limiting safe and independence ADLs and functional mobility. Pt will benefit from skilled OT services to increase functional independence. Pt encouraged to increase OOB time in chair for increased functional endurance. Pt verbalized understanding. Pt encouraged to utilize call button for nursing assistance throughout the day to transfer to/from bed<>bedside chair. Pt verbalized understanding.    Patient left up in chair with all lines intact, call button in reach, and nursing notified    GOALS:   Multidisciplinary Problems       Occupational Therapy Goals          Problem:  Occupational Therapy    Goal Priority Disciplines Outcome Interventions   Occupational Therapy Goal     OT, PT/OT     Description: Goals to be met by: 9/16/24     Patient will increase functional independence with ADLs by performing:    Toileting from toilet with Supervision for hygiene and clothing management.   Toilet transfer to toilet with Supervision.  Upper extremity exercise program x10 reps per handout, with supervision.                         History:     Past Medical History:   Diagnosis Date    Acquired TTP     Cataract     CKD stage 3 secondary to diabetes     Closed displaced comminuted fracture of right patella 10/28/2020    Closed fracture of surgical neck of left humerus 10/30/2020    Closed left radial fracture 10/30/2020    Hyperkalemia     Hyperlipidemia     Hypertension     Hypothyroidism, unspecified     Liver cirrhosis secondary to YO     Morbid obesity     Right knee pain     Thyroid disease     Type 2 diabetes mellitus          Past Surgical History:   Procedure Laterality Date    APPENDECTOMY      BREAST BIOPSY      CATARACT EXTRACTION      COLONOSCOPY N/A 12/21/2021    Procedure: COLONOSCOPY screening;  Surgeon: Moises Patterson MD;  Location: [facility];  Service: Endoscopy;  Laterality: N/A;    ESOPHAGOGASTRODUODENOSCOPY N/A 12/21/2021    Procedure: EGD (ESOPHAGOGASTRODUODENOSCOPY) EV screening;  Surgeon: Moises Patterson MD;  Location: [facility];  Service: Endoscopy;  Laterality: N/A;    EYE SURGERY      HERNIA REPAIR      OPEN REDUCTION AND INTERNAL FIXATION (ORIF) OF FRACTURE OF DISTAL RADIUS Left 11/2/2020    Procedure: ORIF, FRACTURE, RADIUS, DISTAL;  Surgeon: Sage Wolf MD;  Location: [facility];  Service: Orthopedics;  Laterality: Left;    OPEN REDUCTION AND INTERNAL FIXATION (ORIF) OF FRACTURE OF PATELLA Right 11/2/2020    Procedure: ORIF, FRACTURE, PATELLA;  Surgeon: Sage Wolf MD;  Location: [facility];  Service: Orthopedics;  Laterality: Right;     OPEN REDUCTION AND INTERNAL FIXATION (ORIF) OF FRACTURE OF PATELLA Right 12/18/2020    Procedure: ORIF, FRACTURE, PATELLA;  Surgeon: Sage Wolf MD;  Location: [facility] OR;  Service: Orthopedics;  Laterality: Right;    ORIF HUMERUS FRACTURE Left 11/5/2020    Procedure: ORIF, FRACTURE, HUMERUS;  Surgeon: Sage Wolf MD;  Location: [facility] OR;  Service: Orthopedics;  Laterality: Left;    PLACEMENT OF ACELLULAR HUMAN DERMAL ALLOGRAFT Right 11/2/2020    Procedure: APPLICATION, ACELLULAR HUMAN DERMAL ALLOGRAFT;  Surgeon: Sage Wolf MD;  Location: [facility] OR;  Service: Orthopedics;  Laterality: Right;  Right Patella    REMOVAL OF HARDWARE FROM HAND Left 3/11/2021    Procedure: REMOVAL, HARDWARE, HAND;  Surgeon: Sage Wolf MD;  Location: [facility];  Service: Orthopedics;  Laterality: Left;  Removal of dorsal spanning wrist plate left distal radius    REMOVAL OF HARDWARE FROM LOWER EXTREMITY Right 12/18/2020    Procedure: REMOVAL, HARDWARE, LOWER EXTREMITY;  Surgeon: Sage Wolf MD;  Location: [facility];  Service: Orthopedics;  Laterality: Right;       Time Tracking:     OT Date of Treatment: 09/02/24  OT Start Time: 1320  OT Stop Time: 1335  OT Total Time (min): 15 min    Billable Minutes:Evaluation 15    AROLDO Farooq  9/2/2024

## 2024-09-03 LAB
L PNEUMO AG UR QL IA: NEGATIVE
PATH REV BLD -IMP: NORMAL

## 2024-09-05 ENCOUNTER — PATIENT OUTREACH (OUTPATIENT)
Dept: ADMINISTRATIVE | Facility: CLINIC | Age: 74
End: 2024-09-05
Payer: MEDICARE

## 2024-09-05 LAB
BACTERIA BLD CULT: NORMAL
BACTERIA BLD CULT: NORMAL

## 2024-09-05 NOTE — PROGRESS NOTES
"C3 nurse spoke with Lakshmi Campbell for a TCC post hospital discharge follow up call. The patient does not have a scheduled HOSFU appointment with PCP, but she declined PCP FU at this time, stating "Between my  and myself, we have a whole month of appointments and I will follow up with her after these are done." She noted that she has a follow up "sometime in September" with Bunny Kerns MD (nephrology), but does not have her calendar close by to give the exact date.   "

## 2024-09-12 ENCOUNTER — LAB VISIT (OUTPATIENT)
Dept: LAB | Facility: HOSPITAL | Age: 74
End: 2024-09-12
Attending: INTERNAL MEDICINE
Payer: MEDICARE

## 2024-09-12 DIAGNOSIS — R78.81 BACTEREMIA: ICD-10-CM

## 2024-09-12 LAB
ALBUMIN SERPL BCP-MCNC: 2.7 G/DL (ref 3.5–5.2)
ALP SERPL-CCNC: 64 U/L (ref 55–135)
ALT SERPL W/O P-5'-P-CCNC: 8 U/L (ref 10–44)
ANION GAP SERPL CALC-SCNC: 14 MMOL/L (ref 8–16)
AST SERPL-CCNC: 12 U/L (ref 10–40)
BASOPHILS # BLD AUTO: 0.05 K/UL (ref 0–0.2)
BASOPHILS NFR BLD: 1 % (ref 0–1.9)
BILIRUB SERPL-MCNC: 0.6 MG/DL (ref 0.1–1)
BUN SERPL-MCNC: 19 MG/DL (ref 8–23)
CALCIUM SERPL-MCNC: 8.3 MG/DL (ref 8.7–10.5)
CHLORIDE SERPL-SCNC: 108 MMOL/L (ref 95–110)
CO2 SERPL-SCNC: 17 MMOL/L (ref 23–29)
CREAT SERPL-MCNC: 1.9 MG/DL (ref 0.5–1.4)
CRP SERPL-MCNC: 18.6 MG/L (ref 0–8.2)
DIFFERENTIAL METHOD BLD: ABNORMAL
EOSINOPHIL # BLD AUTO: 0.1 K/UL (ref 0–0.5)
EOSINOPHIL NFR BLD: 1.6 % (ref 0–8)
ERYTHROCYTE [DISTWIDTH] IN BLOOD BY AUTOMATED COUNT: 13.6 % (ref 11.5–14.5)
EST. GFR  (NO RACE VARIABLE): 27.5 ML/MIN/1.73 M^2
GLUCOSE SERPL-MCNC: 173 MG/DL (ref 70–110)
HCT VFR BLD AUTO: 29.6 % (ref 37–48.5)
HGB BLD-MCNC: 9.6 G/DL (ref 12–16)
IMM GRANULOCYTES # BLD AUTO: 0.03 K/UL (ref 0–0.04)
IMM GRANULOCYTES NFR BLD AUTO: 0.6 % (ref 0–0.5)
LYMPHOCYTES # BLD AUTO: 1 K/UL (ref 1–4.8)
LYMPHOCYTES NFR BLD: 19.5 % (ref 18–48)
MCH RBC QN AUTO: 29 PG (ref 27–31)
MCHC RBC AUTO-ENTMCNC: 32.4 G/DL (ref 32–36)
MCV RBC AUTO: 89 FL (ref 82–98)
MONOCYTES # BLD AUTO: 0.5 K/UL (ref 0.3–1)
MONOCYTES NFR BLD: 8.8 % (ref 4–15)
NEUTROPHILS # BLD AUTO: 3.5 K/UL (ref 1.8–7.7)
NEUTROPHILS NFR BLD: 68.5 % (ref 38–73)
NRBC BLD-RTO: 0 /100 WBC
PLATELET # BLD AUTO: 159 K/UL (ref 150–450)
PMV BLD AUTO: 10.1 FL (ref 9.2–12.9)
POTASSIUM SERPL-SCNC: 4 MMOL/L (ref 3.5–5.1)
PROT SERPL-MCNC: 6.4 G/DL (ref 6–8.4)
RBC # BLD AUTO: 3.31 M/UL (ref 4–5.4)
SODIUM SERPL-SCNC: 139 MMOL/L (ref 136–145)
WBC # BLD AUTO: 5.13 K/UL (ref 3.9–12.7)

## 2024-09-12 PROCEDURE — 80053 COMPREHEN METABOLIC PANEL: CPT | Mod: PO | Performed by: INTERNAL MEDICINE

## 2024-09-12 PROCEDURE — 86140 C-REACTIVE PROTEIN: CPT | Mod: PO | Performed by: INTERNAL MEDICINE

## 2024-09-12 PROCEDURE — 85025 COMPLETE CBC W/AUTO DIFF WBC: CPT | Mod: PO | Performed by: INTERNAL MEDICINE

## 2024-09-12 PROCEDURE — 85652 RBC SED RATE AUTOMATED: CPT | Performed by: INTERNAL MEDICINE

## 2024-09-13 LAB — ERYTHROCYTE [SEDIMENTATION RATE] IN BLOOD BY PHOTOMETRIC METHOD: 48 MM/HR (ref 0–36)

## 2024-10-01 ENCOUNTER — EXTERNAL HOME HEALTH (OUTPATIENT)
Dept: HOME HEALTH SERVICES | Facility: HOSPITAL | Age: 74
End: 2024-10-01
Payer: MEDICARE

## 2024-10-11 ENCOUNTER — DOCUMENT SCAN (OUTPATIENT)
Dept: HOME HEALTH SERVICES | Facility: HOSPITAL | Age: 74
End: 2024-10-11
Payer: MEDICARE

## 2024-10-17 DIAGNOSIS — I50.31 ACUTE DIASTOLIC CONGESTIVE HEART FAILURE: ICD-10-CM

## 2024-10-18 ENCOUNTER — TELEPHONE (OUTPATIENT)
Dept: INTERNAL MEDICINE | Facility: CLINIC | Age: 74
End: 2024-10-18
Payer: MEDICARE

## 2024-10-18 ENCOUNTER — OFFICE VISIT (OUTPATIENT)
Dept: INTERNAL MEDICINE | Facility: CLINIC | Age: 74
End: 2024-10-18
Payer: MEDICARE

## 2024-10-18 VITALS
DIASTOLIC BLOOD PRESSURE: 60 MMHG | WEIGHT: 223.13 LBS | HEART RATE: 68 BPM | SYSTOLIC BLOOD PRESSURE: 159 MMHG | TEMPERATURE: 98 F | BODY MASS INDEX: 37.18 KG/M2 | HEIGHT: 65 IN | OXYGEN SATURATION: 96 %

## 2024-10-18 DIAGNOSIS — N18.4 CHRONIC KIDNEY DISEASE, STAGE 4 (SEVERE): ICD-10-CM

## 2024-10-18 DIAGNOSIS — N30.00 ACUTE CYSTITIS WITHOUT HEMATURIA: ICD-10-CM

## 2024-10-18 DIAGNOSIS — E11.22 TYPE 2 DIABETES MELLITUS WITH STAGE 3B CHRONIC KIDNEY DISEASE, WITH LONG-TERM CURRENT USE OF INSULIN: ICD-10-CM

## 2024-10-18 DIAGNOSIS — R39.89 SENSATION OF PRESSURE IN BLADDER AREA: Primary | ICD-10-CM

## 2024-10-18 DIAGNOSIS — Z79.4 TYPE 2 DIABETES MELLITUS WITH STAGE 3B CHRONIC KIDNEY DISEASE, WITH LONG-TERM CURRENT USE OF INSULIN: ICD-10-CM

## 2024-10-18 DIAGNOSIS — J02.9 SORE THROAT: ICD-10-CM

## 2024-10-18 DIAGNOSIS — E11.3293 MILD NONPROLIFERATIVE DIABETIC RETINOPATHY OF BOTH EYES WITHOUT MACULAR EDEMA ASSOCIATED WITH TYPE 2 DIABETES MELLITUS: ICD-10-CM

## 2024-10-18 DIAGNOSIS — I10 ESSENTIAL HYPERTENSION: ICD-10-CM

## 2024-10-18 DIAGNOSIS — N18.32 TYPE 2 DIABETES MELLITUS WITH STAGE 3B CHRONIC KIDNEY DISEASE, WITH LONG-TERM CURRENT USE OF INSULIN: ICD-10-CM

## 2024-10-18 DIAGNOSIS — I50.32 CHRONIC DIASTOLIC CONGESTIVE HEART FAILURE: ICD-10-CM

## 2024-10-18 LAB
BACTERIA #/AREA URNS AUTO: ABNORMAL /HPF
BILIRUB UR QL STRIP: NEGATIVE
CLARITY UR REFRACT.AUTO: ABNORMAL
COLOR UR AUTO: YELLOW
GLUCOSE UR QL STRIP: ABNORMAL
HGB UR QL STRIP: NEGATIVE
HYALINE CASTS UR QL AUTO: 0 /LPF
KETONES UR QL STRIP: NEGATIVE
LEUKOCYTE ESTERASE UR QL STRIP: NEGATIVE
MICROSCOPIC COMMENT: ABNORMAL
NITRITE UR QL STRIP: NEGATIVE
PH UR STRIP: 6 [PH] (ref 5–8)
PROT UR QL STRIP: ABNORMAL
RBC #/AREA URNS AUTO: 2 /HPF (ref 0–4)
SP GR UR STRIP: 1.02 (ref 1–1.03)
URN SPEC COLLECT METH UR: ABNORMAL
UROBILINOGEN UR STRIP-ACNC: NEGATIVE EU/DL
WBC #/AREA URNS AUTO: 80 /HPF (ref 0–5)
WBC CLUMPS UR QL AUTO: ABNORMAL

## 2024-10-18 PROCEDURE — 99214 OFFICE O/P EST MOD 30 MIN: CPT | Mod: S$GLB,,, | Performed by: NURSE PRACTITIONER

## 2024-10-18 PROCEDURE — 1159F MED LIST DOCD IN RCRD: CPT | Mod: CPTII,S$GLB,, | Performed by: NURSE PRACTITIONER

## 2024-10-18 PROCEDURE — 3062F POS MACROALBUMINURIA REV: CPT | Mod: CPTII,S$GLB,, | Performed by: NURSE PRACTITIONER

## 2024-10-18 PROCEDURE — 81000 URINALYSIS NONAUTO W/SCOPE: CPT | Mod: PO | Performed by: NURSE PRACTITIONER

## 2024-10-18 PROCEDURE — 3288F FALL RISK ASSESSMENT DOCD: CPT | Mod: CPTII,S$GLB,, | Performed by: NURSE PRACTITIONER

## 2024-10-18 PROCEDURE — 87186 SC STD MICRODIL/AGAR DIL: CPT | Performed by: NURSE PRACTITIONER

## 2024-10-18 PROCEDURE — 99999 PR PBB SHADOW E&M-EST. PATIENT-LVL IV: CPT | Mod: PBBFAC,,, | Performed by: NURSE PRACTITIONER

## 2024-10-18 PROCEDURE — 1160F RVW MEDS BY RX/DR IN RCRD: CPT | Mod: CPTII,S$GLB,, | Performed by: NURSE PRACTITIONER

## 2024-10-18 PROCEDURE — 1101F PT FALLS ASSESS-DOCD LE1/YR: CPT | Mod: CPTII,S$GLB,, | Performed by: NURSE PRACTITIONER

## 2024-10-18 PROCEDURE — 3066F NEPHROPATHY DOC TX: CPT | Mod: CPTII,S$GLB,, | Performed by: NURSE PRACTITIONER

## 2024-10-18 PROCEDURE — 4010F ACE/ARB THERAPY RXD/TAKEN: CPT | Mod: CPTII,S$GLB,, | Performed by: NURSE PRACTITIONER

## 2024-10-18 PROCEDURE — 3044F HG A1C LEVEL LT 7.0%: CPT | Mod: CPTII,S$GLB,, | Performed by: NURSE PRACTITIONER

## 2024-10-18 PROCEDURE — 3075F SYST BP GE 130 - 139MM HG: CPT | Mod: CPTII,S$GLB,, | Performed by: NURSE PRACTITIONER

## 2024-10-18 PROCEDURE — 87088 URINE BACTERIA CULTURE: CPT | Performed by: NURSE PRACTITIONER

## 2024-10-18 PROCEDURE — 1126F AMNT PAIN NOTED NONE PRSNT: CPT | Mod: CPTII,S$GLB,, | Performed by: NURSE PRACTITIONER

## 2024-10-18 PROCEDURE — 3078F DIAST BP <80 MM HG: CPT | Mod: CPTII,S$GLB,, | Performed by: NURSE PRACTITIONER

## 2024-10-18 PROCEDURE — 3008F BODY MASS INDEX DOCD: CPT | Mod: CPTII,S$GLB,, | Performed by: NURSE PRACTITIONER

## 2024-10-18 PROCEDURE — 87086 URINE CULTURE/COLONY COUNT: CPT | Performed by: NURSE PRACTITIONER

## 2024-10-18 RX ORDER — METOPROLOL SUCCINATE 25 MG/1
TABLET, EXTENDED RELEASE ORAL
Qty: 45 TABLET | Refills: 1 | Status: SHIPPED | OUTPATIENT
Start: 2024-10-18

## 2024-10-18 RX ORDER — SULFAMETHOXAZOLE AND TRIMETHOPRIM 800; 160 MG/1; MG/1
1 TABLET ORAL 2 TIMES DAILY
Qty: 14 TABLET | Refills: 0 | Status: SHIPPED | OUTPATIENT
Start: 2024-10-18 | End: 2024-10-25

## 2024-10-18 NOTE — PROGRESS NOTES
Subjective:       Patient ID: Lakshmi Campbell is a 73 y.o. female.    Chief Complaint: Cystitis      History of Present Illness    CHIEF COMPLAINT:  - Ms. Campbell presents with urinary symptoms and a sore throat.    HPI:  Ms. Campbell reports urinary symptoms for a few days, including pressure after urinating but denies urinary frequency. She notes these symptoms differ from her previous bladder infections. Ms. Campbell is particularly vigilant due to a prior hospitalization for a UTI.    Ms. Campbell developed a sore throat last night. She denies fever or chills associated with the sore throat.    Ms. Campbell is under the care of a nephrologist, whom she sees every 3-4 months for kidney disease management. She also has a pulmonologist for respiratory issues. The pulmonologist had previously discontinued her home oxygen, although the equipment remains at her residence.    Ms. Campbell denies fever, chills, shortness of breath, chest pain, coughing, nausea, vomiting, diarrhea, sinus pressure, nasal congestion, and current use of home oxygen.           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 with type 2 diabetes mellitus    Vitamin D deficiency    Chronic diastolic congestive heart failure    Pneumonia, unspecified organism    Hyperkalemia    Senile purpura    Dependence on supplemental oxygen    Type 2 diabetes mellitus with stage 3b chronic kidney  disease, with long-term current use of insulin    Acute cystitis with hematuria    Bacteremia    Chronic kidney disease, stage 4 (severe)    Sore throat         Past Surgical History:   Procedure Laterality Date    APPENDECTOMY      BREAST BIOPSY      CATARACT EXTRACTION      COLONOSCOPY N/A 12/21/2021    Procedure: COLONOSCOPY screening;  Surgeon: Moises Patterson MD;  Location: [facility];  Service: Endoscopy;  Laterality: N/A;    ESOPHAGOGASTRODUODENOSCOPY N/A 12/21/2021    Procedure: EGD (ESOPHAGOGASTRODUODENOSCOPY) EV screening;  Surgeon: Moises Patterson MD;  Location: [facility];  Service: Endoscopy;  Laterality: N/A;    EYE SURGERY      HERNIA REPAIR      OPEN REDUCTION AND INTERNAL FIXATION (ORIF) OF FRACTURE OF DISTAL RADIUS Left 11/2/2020    Procedure: ORIF, FRACTURE, RADIUS, DISTAL;  Surgeon: Sage Wolf MD;  Location: [facility];  Service: Orthopedics;  Laterality: Left;    OPEN REDUCTION AND INTERNAL FIXATION (ORIF) OF FRACTURE OF PATELLA Right 11/2/2020    Procedure: ORIF, FRACTURE, PATELLA;  Surgeon: Sage Wolf MD;  Location: [facility];  Service: Orthopedics;  Laterality: Right;    OPEN REDUCTION AND INTERNAL FIXATION (ORIF) OF FRACTURE OF PATELLA Right 12/18/2020    Procedure: ORIF, FRACTURE, PATELLA;  Surgeon: Sage Wolf MD;  Location: [facility] OR;  Service: Orthopedics;  Laterality: Right;    ORIF HUMERUS FRACTURE Left 11/5/2020    Procedure: ORIF, FRACTURE, HUMERUS;  Surgeon: Sage Wolf MD;  Location: [facility];  Service: Orthopedics;  Laterality: Left;    PLACEMENT OF ACELLULAR HUMAN DERMAL ALLOGRAFT Right 11/2/2020    Procedure: APPLICATION, ACELLULAR HUMAN DERMAL ALLOGRAFT;  Surgeon: Sage Wolf MD;  Location: [facility] OR;  Service: Orthopedics;  Laterality: Right;  Right Patella    REMOVAL OF HARDWARE FROM HAND Left 3/11/2021    Procedure: REMOVAL, HARDWARE, HAND;  Surgeon: Sage Wolf MD;  Location: [facility];  Service:  "Orthopedics;  Laterality: Left;  Removal of dorsal spanning wrist plate left distal radius    REMOVAL OF HARDWARE FROM LOWER EXTREMITY Right 2020    Procedure: REMOVAL, HARDWARE, LOWER EXTREMITY;  Surgeon: Sage Wolf MD;  Location: [facility];  Service: Orthopedics;  Laterality: Right;         Current Outpatient Medications:     amLODIPine (NORVASC) 10 MG tablet, TAKE 1 TABLET(10 MG) BY MOUTH EVERY DAY, Disp: 90 tablet, Rfl: 1    blood sugar diagnostic Strp, Use ac bid, Disp: , Rfl:     chlorthalidone (HYGROTEN) 25 MG Tab, Take 25 mg by mouth once daily., Disp: , Rfl:     cholecalciferol, vitamin D3, (VITAMIN D3) 50 mcg (2,000 unit) Cap capsule, Take 1 capsule by mouth., Disp: , Rfl:     HUMULIN 70/30 U-100 KWIKPEN 100 unit/mL (70-30) InPn pen, SMARTSI Unit(s) SUB-Q Every Evening, Disp: , Rfl:     insulin syringe-needle U-100 1 mL 29 gauge x 1/2" Syrg, Use bid, Disp: , Rfl:     lancets 33 gauge Misc, Use as BID, Disp: , Rfl:     levothyroxine (SYNTHROID) 150 MCG tablet, Take 1 tablet by mouth once daily., Disp: , Rfl:     metFORMIN (GLUCOPHAGE) 500 MG tablet, Take 500 mg by mouth 2 (two) times daily., Disp: , Rfl:     metoprolol succinate (TOPROL-XL) 25 MG 24 hr tablet, TAKE 1/2 TABLET(12.5 MG) BY MOUTH DAILY, Disp: 45 tablet, Rfl: 1    perindopril erbumine (ACEON) 4 mg tablet, Take 2 mg by mouth once daily., Disp: , Rfl:     pravastatin (PRAVACHOL) 20 MG tablet, Take 20 mg by mouth once daily. , Disp: , Rfl: 11    JARDIANCE 25 mg tablet, Take 25 mg by mouth every morning. (Patient not taking: Reported on 10/18/2024), Disp: , Rfl:     Review of Systems   Constitutional:  Negative for appetite change, chills, fatigue and fever.   HENT:  Positive for postnasal drip and sore throat. Negative for congestion, sinus pressure, sinus pain, sneezing and trouble swallowing.    Respiratory:  Negative for cough and shortness of breath.    Cardiovascular:  Negative for chest pain and palpitations. " "  Gastrointestinal:  Negative for abdominal pain, constipation, diarrhea, nausea and vomiting.   Genitourinary:  Positive for urgency. Negative for dysuria and frequency.   Neurological:  Negative for dizziness, light-headedness and headaches.       Objective:   BP (!) 159/60 (BP Location: Left arm, Patient Position: Sitting)   Pulse 68   Temp 97.7 °F (36.5 °C) (Tympanic)   Ht 5' 5" (1.651 m)   Wt 101.2 kg (223 lb 1.7 oz)   SpO2 96%   BMI 37.13 kg/m²      Physical Exam  Constitutional:       General: She is not in acute distress.     Appearance: Normal appearance. She is obese. She is not ill-appearing.   HENT:      Head: Normocephalic.      Right Ear: Tympanic membrane normal.      Left Ear: Tympanic membrane normal.      Nose: Mucosal edema present. No congestion or rhinorrhea.      Mouth/Throat:      Mouth: Mucous membranes are moist.      Pharynx: Oropharynx is clear. Posterior oropharyngeal erythema (mild) present.      Tonsils: 1+ on the right. 1+ on the left.   Cardiovascular:      Rate and Rhythm: Normal rate and regular rhythm.      Heart sounds: Murmur heard.      No friction rub. No gallop.   Pulmonary:      Effort: Pulmonary effort is normal. No respiratory distress.      Breath sounds: Normal breath sounds. No wheezing.   Abdominal:      Palpations: Abdomen is soft.      Tenderness: There is no abdominal tenderness. There is no right CVA tenderness, left CVA tenderness or guarding.   Musculoskeletal:      Right lower leg: No edema.      Left lower leg: No edema.   Skin:     General: Skin is warm and dry.      Coloration: Skin is not pale.      Findings: No erythema.   Neurological:      Mental Status: She is alert and oriented to person, place, and time.   Psychiatric:         Mood and Affect: Mood normal.         Behavior: Behavior normal.         Assessment & Plan     Problem List Items Addressed This Visit          Ophtho    Mild nonproliferative diabetic retinopathy of both eyes without " macular edema associated with type 2 diabetes mellitus    Current Assessment & Plan     Due for eye exam. Followed by endo. Continue current medications.            ENT    Sore throat    Current Assessment & Plan     Start daily antihistamine. Follow up if no improvement.             Cardiac/Vascular    Essential hypertension    Current Assessment & Plan     Blood pressure stable. Continue current medications.         Chronic diastolic congestive heart failure    Current Assessment & Plan     BBSCTA. No concerns for exacerbation. On metoprolol            Renal/    Chronic kidney disease, stage 4 (severe)    Current Assessment & Plan     Followed by nephrology. Labs reviewed.             Endocrine    Type 2 diabetes mellitus with stage 3b chronic kidney disease, with long-term current use of insulin    Current Assessment & Plan     Lab Results   Component Value Date    HGBA1C 6.4 (H) 09/19/2024     Followed by endo. Stable.           Other Visit Diagnoses       Sensation of pressure in bladder area    -  Primary    Relevant Orders    Urinalysis, Reflex to Urine Culture Urine, Clean Catch (Completed)            Assessment & Plan    MEDICAL DECISION MAKING:  - Assessed urinary symptoms, which started a few days ago, presenting with pressure after urination but without frequency  - Evaluated current medication regimen, noting inability to obtain Jardiance due to insurance coverage issues  - Considered possibility of strep throat due to reported sore throat and observed red spots on tonsils  - Determined to monitor throat symptoms and recommend OTC antihistamine before considering further testing or referral to ENT    PATIENT EDUCATION:  - Explained that throat symptoms could be related to allergies or irritation from postnasal drip  - Discussed the possibility of strep throat and indicators that would necessitate further evaluation or treatment    ACTION ITEMS/LIFESTYLE:  - Ms. Campbell to monitor throat symptoms and  "report if they worsen or if fever develops  - Ms. Campbell to schedule an eye exam for diabetes management    MEDICATIONS:  - Started OTC antihistamine (Zyrtec, Claritin, or Allegra) for throat symptoms    ORDERS:  - Urinalysis ordered to evaluate urinary symptoms    FOLLOW UP:  - Contact the office if throat symptoms worsen or fever develops  - Follow up after urinalysis results for further guidance on urinary symptoms          No follow-ups on file.          Portions of this note may have been created with voice recognition software. Occasional "wrong-word" or "sound-a-like" substitutions may have occurred due to the inherent limitations of voice recognition software. Please, read the note carefully and recognize, using context, where substitutions have occurred.       This note was generated with the assistance of ambient listening technology. Verbal consent was obtained by the patient and accompanying visitor(s) for the recording of patient appointment to facilitate this note. I attest to having reviewed and edited the generated note for accuracy, though some syntax or spelling errors may persist. Please contact the author of this note for any clarification.       " 25-Feb-2021 22:17

## 2024-10-18 NOTE — TELEPHONE ENCOUNTER
----- Message from Li sent at 10/18/2024  2:50 PM CDT -----  .Type:  Patient Returning Call    Who Called:.Lakshmi Campbell   Who Left Message for Patient:  Does the patient know what this is regarding? Test results  Would the patient rather a call back or a response via [facility]? Call back  Best Call Back Number:. [phone]  Additional Information:

## 2024-10-21 LAB — BACTERIA UR CULT: ABNORMAL

## 2024-10-28 VITALS
BODY MASS INDEX: 37.18 KG/M2 | OXYGEN SATURATION: 96 % | HEIGHT: 65 IN | SYSTOLIC BLOOD PRESSURE: 136 MMHG | WEIGHT: 223.13 LBS | DIASTOLIC BLOOD PRESSURE: 62 MMHG | TEMPERATURE: 98 F | HEART RATE: 68 BPM

## 2024-10-28 PROBLEM — J18.9 PNEUMONIA, UNSPECIFIED ORGANISM: Status: RESOLVED | Noted: 2024-06-27 | Resolved: 2024-10-28

## 2024-11-12 ENCOUNTER — TELEPHONE (OUTPATIENT)
Dept: HEMATOLOGY/ONCOLOGY | Facility: CLINIC | Age: 74
End: 2024-11-12
Payer: MEDICARE

## 2024-11-12 ENCOUNTER — OFFICE VISIT (OUTPATIENT)
Dept: PULMONOLOGY | Facility: CLINIC | Age: 74
End: 2024-11-12
Payer: MEDICARE

## 2024-11-12 ENCOUNTER — LAB VISIT (OUTPATIENT)
Dept: LAB | Facility: HOSPITAL | Age: 74
End: 2024-11-12
Attending: INTERNAL MEDICINE
Payer: MEDICARE

## 2024-11-12 VITALS
HEART RATE: 67 BPM | HEIGHT: 65 IN | SYSTOLIC BLOOD PRESSURE: 120 MMHG | OXYGEN SATURATION: 95 % | BODY MASS INDEX: 37.54 KG/M2 | RESPIRATION RATE: 20 BRPM | WEIGHT: 225.31 LBS | DIASTOLIC BLOOD PRESSURE: 72 MMHG

## 2024-11-12 DIAGNOSIS — J45.909 ASTHMA, UNSPECIFIED ASTHMA SEVERITY, UNSPECIFIED WHETHER COMPLICATED, UNSPECIFIED WHETHER PERSISTENT: ICD-10-CM

## 2024-11-12 DIAGNOSIS — R59.0 RETROPERITONEAL LYMPHADENOPATHY: ICD-10-CM

## 2024-11-12 DIAGNOSIS — J45.909 ASTHMA, UNSPECIFIED ASTHMA SEVERITY, UNSPECIFIED WHETHER COMPLICATED, UNSPECIFIED WHETHER PERSISTENT: Primary | ICD-10-CM

## 2024-11-12 DIAGNOSIS — I50.32 CHRONIC DIASTOLIC (CONGESTIVE) HEART FAILURE: ICD-10-CM

## 2024-11-12 DIAGNOSIS — Z91.89 AT RISK FOR SLEEP APNEA: ICD-10-CM

## 2024-11-12 DIAGNOSIS — R79.89 ELEVATED BRAIN NATRIURETIC PEPTIDE (BNP) LEVEL: ICD-10-CM

## 2024-11-12 DIAGNOSIS — Z92.89 HISTORY OF RECENT HOSPITALIZATION: ICD-10-CM

## 2024-11-12 LAB — IGE SERPL-ACNC: 179 IU/ML (ref 0–100)

## 2024-11-12 PROCEDURE — 4010F ACE/ARB THERAPY RXD/TAKEN: CPT | Mod: CPTII,S$GLB,, | Performed by: INTERNAL MEDICINE

## 2024-11-12 PROCEDURE — 3062F POS MACROALBUMINURIA REV: CPT | Mod: CPTII,S$GLB,, | Performed by: INTERNAL MEDICINE

## 2024-11-12 PROCEDURE — 36415 COLL VENOUS BLD VENIPUNCTURE: CPT | Performed by: INTERNAL MEDICINE

## 2024-11-12 PROCEDURE — 3078F DIAST BP <80 MM HG: CPT | Mod: CPTII,S$GLB,, | Performed by: INTERNAL MEDICINE

## 2024-11-12 PROCEDURE — 3066F NEPHROPATHY DOC TX: CPT | Mod: CPTII,S$GLB,, | Performed by: INTERNAL MEDICINE

## 2024-11-12 PROCEDURE — 99999 PR PBB SHADOW E&M-EST. PATIENT-LVL III: CPT | Mod: PBBFAC,,, | Performed by: INTERNAL MEDICINE

## 2024-11-12 PROCEDURE — 3288F FALL RISK ASSESSMENT DOCD: CPT | Mod: CPTII,S$GLB,, | Performed by: INTERNAL MEDICINE

## 2024-11-12 PROCEDURE — 99214 OFFICE O/P EST MOD 30 MIN: CPT | Mod: S$GLB,,, | Performed by: INTERNAL MEDICINE

## 2024-11-12 PROCEDURE — 1159F MED LIST DOCD IN RCRD: CPT | Mod: CPTII,S$GLB,, | Performed by: INTERNAL MEDICINE

## 2024-11-12 PROCEDURE — 3074F SYST BP LT 130 MM HG: CPT | Mod: CPTII,S$GLB,, | Performed by: INTERNAL MEDICINE

## 2024-11-12 PROCEDURE — 1101F PT FALLS ASSESS-DOCD LE1/YR: CPT | Mod: CPTII,S$GLB,, | Performed by: INTERNAL MEDICINE

## 2024-11-12 PROCEDURE — 82785 ASSAY OF IGE: CPT | Performed by: INTERNAL MEDICINE

## 2024-11-12 PROCEDURE — 1160F RVW MEDS BY RX/DR IN RCRD: CPT | Mod: CPTII,S$GLB,, | Performed by: INTERNAL MEDICINE

## 2024-11-12 PROCEDURE — 3044F HG A1C LEVEL LT 7.0%: CPT | Mod: CPTII,S$GLB,, | Performed by: INTERNAL MEDICINE

## 2024-11-12 PROCEDURE — 3008F BODY MASS INDEX DOCD: CPT | Mod: CPTII,S$GLB,, | Performed by: INTERNAL MEDICINE

## 2024-11-12 PROCEDURE — 1126F AMNT PAIN NOTED NONE PRSNT: CPT | Mod: CPTII,S$GLB,, | Performed by: INTERNAL MEDICINE

## 2024-11-12 RX ORDER — ALBUTEROL SULFATE 90 UG/1
2 INHALANT RESPIRATORY (INHALATION) EVERY 6 HOURS PRN
Qty: 20.1 G | Refills: 5 | Status: SHIPPED | OUTPATIENT
Start: 2024-11-12

## 2024-11-12 RX ORDER — SPIRONOLACTONE 25 MG/1
25 TABLET ORAL
COMMUNITY
Start: 2024-10-18

## 2024-11-12 NOTE — PROGRESS NOTES
"                                         Pulmonary Outpatient Follow Up Visit     Subjective:       Patient ID: Lakshmi Campbell is a 73 y.o. female.    Chief Complaint: Hypoxemia       HPI        73-year-old female patient presenting for 3 months  follow-up.      2024 requesting to have her oxygen equipment returned.  Order was placed last visit but she still has her equipment.      Complains of shortness of breath on exertion with strenuous activity.  Complains of nocturnal wheezing and coughing.  Sputum production.    Initially evaluated 2024 for post hospitalization due to viral/streptococcal pneumonia.      Needed O2 on discharge in completed antibiotic therapy.      Feels better.  Home O2 canceled due to improved exercise hypoxemia.      Never smoker.      Diastolic CHF grade 2 and elevated BNP noted during admission.      Undecided about sleep study.  Denies sleep disturbances    Review of Systems   Respiratory:  Positive for previous hospitalization due to pulmonary problems.        Outpatient Encounter Medications as of 2024   Medication Sig Dispense Refill    spironolactone (ALDACTONE) 25 MG tablet Take 25 mg by mouth.      albuterol (VENTOLIN HFA) 90 mcg/actuation inhaler Inhale 2 puffs into the lungs every 6 (six) hours as needed for Wheezing or Shortness of Breath (cough). Rescue 20.1 g 5    amLODIPine (NORVASC) 10 MG tablet TAKE 1 TABLET(10 MG) BY MOUTH EVERY DAY 90 tablet 1    blood sugar diagnostic Strp Use ac bid      chlorthalidone (HYGROTEN) 25 MG Tab Take 25 mg by mouth once daily.      cholecalciferol, vitamin D3, (VITAMIN D3) 50 mcg (2,000 unit) Cap capsule Take 1 capsule by mouth.      HUMULIN 70/30 U-100 KWIKPEN 100 unit/mL (70-30) InPn pen SMARTSI Unit(s) SUB-Q Every Evening      insulin syringe-needle U-100 1 mL 29 gauge x 1/2" Syrg Use bid      JARDIANCE 25 mg tablet Take 25 mg by mouth every morning. (Patient not taking: Reported on 10/18/2024)      lancets 33 gauge Misc " "Use as BID      levothyroxine (SYNTHROID) 150 MCG tablet Take 1 tablet by mouth once daily.      metFORMIN (GLUCOPHAGE) 500 MG tablet Take 500 mg by mouth 2 (two) times daily.      metoprolol succinate (TOPROL-XL) 25 MG 24 hr tablet TAKE 1/2 TABLET(12.5 MG) BY MOUTH DAILY 45 tablet 1    perindopril erbumine (ACEON) 4 mg tablet Take 2 mg by mouth once daily.      pravastatin (PRAVACHOL) 20 MG tablet Take 20 mg by mouth once daily.   11    [] sulfamethoxazole-trimethoprim 800-160mg (BACTRIM DS) 800-160 mg Tab Take 1 tablet by mouth 2 (two) times daily. for 7 days 14 tablet 0    [DISCONTINUED] 0.9% NaCl PgBk 100 mL with ertapenem 1 gram SolR 1 g Inject 1 g into the vein once daily. (Patient not taking: Reported on 10/18/2024)      [DISCONTINUED] metoprolol succinate (TOPROL-XL) 25 MG 24 hr tablet Take 0.5 tablets (12.5 mg total) by mouth once daily. 45 tablet 0     No facility-administered encounter medications on file as of 2024.       Objective:     Vital Signs (Most Recent)  Vital Signs  Pulse: 67  Resp: 20  SpO2: 95 %  BP: 120/72  Pain Score: 0-No pain  Height and Weight  Height: 5' 5" (165.1 cm)  Weight: 102.2 kg (225 lb 5 oz)  BSA (Calculated - sq m): 2.16 sq meters  BMI (Calculated): 37.5  Weight in (lb) to have BMI = 25: 149.9]  Wt Readings from Last 2 Encounters:   24 102.2 kg (225 lb 5 oz)   10/18/24 101.2 kg (223 lb 1.7 oz)       Physical Exam   Constitutional: She is oriented to person, place, and time. She appears well-developed and well-nourished.   Cardiovascular: Normal rate and regular rhythm.   Pulmonary/Chest: Normal expansion and effort normal.   Neurological: She is alert and oriented to person, place, and time.   Psychiatric: She has a normal mood and affect. Her behavior is normal. Judgment and thought content normal.       Laboratory  Lab Results   Component Value Date    WBC 5.13 2024    RBC 3.31 (L) 2024    HGB 9.6 (L) 2024    HCT 29.6 (L) 2024    MCV " "89 09/12/2024    MCH 29.0 09/12/2024    MCHC 32.4 09/12/2024    RDW 13.6 09/12/2024     09/12/2024    MPV 10.1 09/12/2024    GRAN 3.5 09/12/2024    GRAN 68.5 09/12/2024    LYMPH 1.0 09/12/2024    LYMPH 19.5 09/12/2024    MONO 0.5 09/12/2024    MONO 8.8 09/12/2024    EOS 0.1 09/12/2024    BASO 0.05 09/12/2024    EOSINOPHIL 1.6 09/12/2024    BASOPHIL 1.0 09/12/2024       BMP  Lab Results   Component Value Date     09/12/2024    K 4.0 09/12/2024     09/12/2024    CO2 17 (L) 09/12/2024    BUN 19 09/12/2024    CREATININE 1.9 (H) 09/12/2024    CALCIUM 8.3 (L) 09/12/2024    ANIONGAP 14 09/12/2024    ESTGFRAFRICA >60.0 03/03/2022    EGFRNONAA 56.8 (A) 03/03/2022    AST 12 09/12/2024    ALT 8 (L) 09/12/2024    PROT 6.4 09/12/2024       Lab Results   Component Value Date     (H) 09/02/2024    BNP 1,679 (H) 08/29/2024     (H) 06/28/2024    BNP 1,003 (H) 06/26/2024       Lab Results   Component Value Date    TSH 0.39 09/19/2024       Lab Results   Component Value Date    SEDRATE 48 (H) 09/12/2024       Lab Results   Component Value Date    CRP 18.6 (H) 09/12/2024     No results found for: "IGE"     No results found for: "ASPERGILLUS"  No results found for: "AFUMIGATUSCL"     No results found for: "ACE"     Diagnostic Results:  I have personally reviewed today the following studies:  COMPARISON:  CT chest 06/28/2024.     FINDINGS:  Chest: Mild cardiomegaly.  Coronary artery calcifications.  No pericardial effusion.  Few prominent mediastinal lymph nodes largest in the pretracheal region measuring 2.4 by 1.4 cm.  These are similar to previous study.     No pleural effusions.  No consolidation.  There is interstitial coarsening and thickening in the subpleural regions of the lungs bilaterally and more focally in the right upper lobe.  Previously there was extensive areas of consolidation bilaterally.  This could be the residual fibrosis from prior infection.     Abdomen pelvis     Lobulation of the " liver surface suggestive of cirrhosis.  No ascites.  The spleen is enlarged measuring 14.4 cm.  Pancreas appears unremarkable.     Gallstones.  No gallbladder wall thickening or bile duct dilatation.     No hydronephrosis.  No renal stones.  No ureteral stones.  Nonspecific perinephric stranding.  2.6 cm right renal cyst.  The     The aorta and inferior vena cava are unremarkable.  There is a enlarged right retroperitoneal lymph node measuring 2.1 cm.     There are no acute bowel abnormalities.     No evidence of appendicitis.  No evidence of diverticulitis.     Bladder is normal. No abnormal masses or fluid collections in the pelvis.     Skeletal structures are intact.  No acute skeletal findings.     Impression:     Interstitial thickening involving the subpleural regions of the lungs and more focally in the right upper lobe.  This could represent residual scarring from previous pneumonia when compared to previous CT scan.     Mild mediastinal lymph node prominence appears similar to previous exam.     Nonspecific enlarged lower right retroperitoneal lymph node measuring 2.1 cm.  Malignancy must be excluded.     Gallstones.     Cirrhosis of liver suspected.  Splenomegaly.  No ascites.  ECHO 6/2024        Left Ventricle: The left ventricle is normal in size. Normal wall thickness. There is concentric hypertrophy. Normal wall motion. Ejection fraction by visual approximation is 60%. Grade II diastolic dysfunction.    Right Ventricle: Normal right ventricular cavity size. Wall thickness is normal. Systolic function is normal.    Left Atrium: Left atrium is severely dilated.    Aortic Valve: The aortic valve is a trileaflet valve. Mildly restricted motion. There is moderate stenosis. Aortic valve area by VTI is 1.24 cm². Aortic valve peak velocity is 2.66 m/s. Mean gradient is 17 mmHg. The dimensionless index is 0.45.    Mitral Valve: There is moderate mitral annular calcification present. Mildly restricted motion.  There is mild to moderate regurgitation.     Cxr 2024    Drastic improvement of right lung infiltrates    Assessment/Plan:   Asthma, unspecified asthma severity, unspecified whether complicated, unspecified whether persistent  -     Complete PFT with bronchodilator; Future; Expected date: 2025  -     Stress test, pulmonary; Future; Expected date: 2025  -     albuterol (VENTOLIN HFA) 90 mcg/actuation inhaler; Inhale 2 puffs into the lungs every 6 (six) hours as needed for Wheezing or Shortness of Breath (cough). Rescue  Dispense: 20.1 g; Refill: 5  -     Fraction of  Nitric Oxide; Future; Expected date: 2025  -     IgE; Future; Expected date: 2024    History of recent hospitalization    Elevated brain natriuretic peptide (BNP) level    Chronic diastolic (congestive) heart failure    At risk for sleep apnea    Retroperitoneal lymphadenopathy  -     Ambulatory referral/consult to Hematology / Oncology; Future; Expected date: 2024      Has an upcoming appointment with Cardiology.      Trial of albuterol p.r.n.     Check PFT 6 minute and check fraction  nitric oxide.      Check IgE today.      Again discussed home sleep study or in-lab polysomnography to rule out TISHA.  She will decide on proceeding with the test and will let me know.    Hem on follow up for LN       Follow up in about 3 months (around 2025).    This note was prepared using voice recognition system and is likely to have sound alike errors that may have been overlooked even after proof reading.  Please call me with any questions    Discussed diagnosis, its evaluation, treatment and usual course. All questions answered.      Ce Hendrickson MD

## 2024-11-12 NOTE — TELEPHONE ENCOUNTER
Spoke to patient: Scheduled patient to see Dr. Archer on 112/4/24 at 7:20 am at the [place] location.    Patient accepted all time, date, and location

## 2024-11-12 NOTE — TELEPHONE ENCOUNTER
----- Message from Ce Hendrickson MD sent at 11/12/2024 10:57 AM CST -----  Plz schedule follow up , had 2 cm LN on CT abdomen in August        Attempted tp reach pt, no answer. Lvm advised pt to rtc

## 2024-11-14 ENCOUNTER — OFFICE VISIT (OUTPATIENT)
Dept: CARDIOLOGY | Facility: CLINIC | Age: 74
End: 2024-11-14
Payer: MEDICARE

## 2024-11-14 VITALS
HEART RATE: 60 BPM | HEIGHT: 65 IN | BODY MASS INDEX: 37.95 KG/M2 | WEIGHT: 227.75 LBS | DIASTOLIC BLOOD PRESSURE: 60 MMHG | OXYGEN SATURATION: 96 % | SYSTOLIC BLOOD PRESSURE: 120 MMHG

## 2024-11-14 DIAGNOSIS — N18.32 TYPE 2 DIABETES MELLITUS WITH STAGE 3B CHRONIC KIDNEY DISEASE, WITH LONG-TERM CURRENT USE OF INSULIN: Primary | ICD-10-CM

## 2024-11-14 DIAGNOSIS — E11.69 HYPERLIPIDEMIA ASSOCIATED WITH TYPE 2 DIABETES MELLITUS: ICD-10-CM

## 2024-11-14 DIAGNOSIS — E11.22 TYPE 2 DIABETES MELLITUS WITH STAGE 3B CHRONIC KIDNEY DISEASE, WITH LONG-TERM CURRENT USE OF INSULIN: Primary | ICD-10-CM

## 2024-11-14 DIAGNOSIS — N18.4 CHRONIC KIDNEY DISEASE, STAGE 4 (SEVERE): ICD-10-CM

## 2024-11-14 DIAGNOSIS — E11.59 HYPERTENSION ASSOCIATED WITH DIABETES: ICD-10-CM

## 2024-11-14 DIAGNOSIS — I50.32 CHRONIC DIASTOLIC CONGESTIVE HEART FAILURE: ICD-10-CM

## 2024-11-14 DIAGNOSIS — E78.5 HYPERLIPIDEMIA ASSOCIATED WITH TYPE 2 DIABETES MELLITUS: ICD-10-CM

## 2024-11-14 DIAGNOSIS — E03.9 HYPOTHYROIDISM, UNSPECIFIED TYPE: ICD-10-CM

## 2024-11-14 DIAGNOSIS — I15.2 HYPERTENSION ASSOCIATED WITH DIABETES: ICD-10-CM

## 2024-11-14 DIAGNOSIS — Z79.4 TYPE 2 DIABETES MELLITUS WITH STAGE 3B CHRONIC KIDNEY DISEASE, WITH LONG-TERM CURRENT USE OF INSULIN: Primary | ICD-10-CM

## 2024-11-14 PROCEDURE — 3044F HG A1C LEVEL LT 7.0%: CPT | Mod: CPTII,S$GLB,, | Performed by: STUDENT IN AN ORGANIZED HEALTH CARE EDUCATION/TRAINING PROGRAM

## 2024-11-14 PROCEDURE — 3288F FALL RISK ASSESSMENT DOCD: CPT | Mod: CPTII,S$GLB,, | Performed by: STUDENT IN AN ORGANIZED HEALTH CARE EDUCATION/TRAINING PROGRAM

## 2024-11-14 PROCEDURE — 1101F PT FALLS ASSESS-DOCD LE1/YR: CPT | Mod: CPTII,S$GLB,, | Performed by: STUDENT IN AN ORGANIZED HEALTH CARE EDUCATION/TRAINING PROGRAM

## 2024-11-14 PROCEDURE — 3078F DIAST BP <80 MM HG: CPT | Mod: CPTII,S$GLB,, | Performed by: STUDENT IN AN ORGANIZED HEALTH CARE EDUCATION/TRAINING PROGRAM

## 2024-11-14 PROCEDURE — 3008F BODY MASS INDEX DOCD: CPT | Mod: CPTII,S$GLB,, | Performed by: STUDENT IN AN ORGANIZED HEALTH CARE EDUCATION/TRAINING PROGRAM

## 2024-11-14 PROCEDURE — 4010F ACE/ARB THERAPY RXD/TAKEN: CPT | Mod: CPTII,S$GLB,, | Performed by: STUDENT IN AN ORGANIZED HEALTH CARE EDUCATION/TRAINING PROGRAM

## 2024-11-14 PROCEDURE — 99214 OFFICE O/P EST MOD 30 MIN: CPT | Mod: S$GLB,,, | Performed by: STUDENT IN AN ORGANIZED HEALTH CARE EDUCATION/TRAINING PROGRAM

## 2024-11-14 PROCEDURE — 1159F MED LIST DOCD IN RCRD: CPT | Mod: CPTII,S$GLB,, | Performed by: STUDENT IN AN ORGANIZED HEALTH CARE EDUCATION/TRAINING PROGRAM

## 2024-11-14 PROCEDURE — 99999 PR PBB SHADOW E&M-EST. PATIENT-LVL III: CPT | Mod: PBBFAC,,, | Performed by: STUDENT IN AN ORGANIZED HEALTH CARE EDUCATION/TRAINING PROGRAM

## 2024-11-14 PROCEDURE — 3074F SYST BP LT 130 MM HG: CPT | Mod: CPTII,S$GLB,, | Performed by: STUDENT IN AN ORGANIZED HEALTH CARE EDUCATION/TRAINING PROGRAM

## 2024-11-14 PROCEDURE — 3062F POS MACROALBUMINURIA REV: CPT | Mod: CPTII,S$GLB,, | Performed by: STUDENT IN AN ORGANIZED HEALTH CARE EDUCATION/TRAINING PROGRAM

## 2024-11-14 PROCEDURE — 3066F NEPHROPATHY DOC TX: CPT | Mod: CPTII,S$GLB,, | Performed by: STUDENT IN AN ORGANIZED HEALTH CARE EDUCATION/TRAINING PROGRAM

## 2024-11-14 NOTE — PROGRESS NOTES
Section of Cardiology                  Cardiac Clinic Note      HPI:   Lakshmi Campbell is a 73 y.o. female      11/14/24  Seen in hospital 6/24 for CHF new onset  Sees pulmonary, takes inhalers  SOB and wheezing stable  BP stable  Watches salt   Weight stable 220s lbs      No chest pain, syncope, PND, LE swelling          EKG 8/29/24 NSR, 1st deg AVB, LVH    ECHO  Results for orders placed during the hospital encounter of 06/26/24    Echo    Interpretation Summary    Left Ventricle: The left ventricle is normal in size. Normal wall thickness. There is concentric hypertrophy. Normal wall motion. Ejection fraction by visual approximation is 60%. Grade II diastolic dysfunction.    Right Ventricle: Normal right ventricular cavity size. Wall thickness is normal. Systolic function is normal.    Left Atrium: Left atrium is severely dilated.    Aortic Valve: The aortic valve is a trileaflet valve. Mildly restricted motion. There is moderate stenosis. Aortic valve area by VTI is 1.24 cm². Aortic valve peak velocity is 2.66 m/s. Mean gradient is 17 mmHg. The dimensionless index is 0.45.    Mitral Valve: There is moderate mitral annular calcification present. Mildly restricted motion. There is mild to moderate regurgitation.       STRESS TEST No results found for this or any previous visit.       LHC No results found for this or any previous visit.            ROS: All 10 systems reviewed. Please refer to the HPI for pertinent positives. All other systems negative.     Past Medical History  Past Medical History:   Diagnosis Date    Acquired TTP     Cataract     CKD stage 3 secondary to diabetes     Closed displaced comminuted fracture of right patella 10/28/2020    Closed fracture of surgical neck of left humerus 10/30/2020    Closed left radial fracture 10/30/2020    Hyperkalemia     Hyperlipidemia     Hypertension     Hypothyroidism, unspecified     Liver cirrhosis secondary to YO     Morbid obesity     Right  knee pain     Thyroid disease     Type 2 diabetes mellitus        Surgical History  Past Surgical History:   Procedure Laterality Date    APPENDECTOMY      BREAST BIOPSY      CATARACT EXTRACTION      COLONOSCOPY N/A 12/21/2021    Procedure: COLONOSCOPY screening;  Surgeon: Moises Patterson MD;  Location: [facility];  Service: Endoscopy;  Laterality: N/A;    ESOPHAGOGASTRODUODENOSCOPY N/A 12/21/2021    Procedure: EGD (ESOPHAGOGASTRODUODENOSCOPY) EV screening;  Surgeon: Moises Patterson MD;  Location: [facility];  Service: Endoscopy;  Laterality: N/A;    EYE SURGERY      HERNIA REPAIR      OPEN REDUCTION AND INTERNAL FIXATION (ORIF) OF FRACTURE OF DISTAL RADIUS Left 11/2/2020    Procedure: ORIF, FRACTURE, RADIUS, DISTAL;  Surgeon: Sage Wolf MD;  [facility];  Service: Orthopedics;  Laterality: Left;    OPEN REDUCTION AND INTERNAL FIXATION (ORIF) OF FRACTURE OF PATELLA Right 11/2/2020    Procedure: ORIF, FRACTURE, PATELLA;  Surgeon: Sage Wolf MD;  [facility];  Service: Orthopedics;  Laterality: Right;    OPEN REDUCTION AND INTERNAL FIXATION (ORIF) OF FRACTURE OF PATELLA Right 12/18/2020    Procedure: ORIF, FRACTURE, PATELLA;  Surgeon: Sage Wolf MD;  Location: [facility] OR;  Service: Orthopedics;  Laterality: Right;    ORIF HUMERUS FRACTURE Left 11/5/2020    Procedure: ORIF, FRACTURE, HUMERUS;  Surgeon: Sage Wolf MD;  [facility];  Service: Orthopedics;  Laterality: Left;    PLACEMENT OF ACELLULAR HUMAN DERMAL ALLOGRAFT Right 11/2/2020    Procedure: APPLICATION, ACELLULAR HUMAN DERMAL ALLOGRAFT;  Surgeon: Sage Wolf MD;  Location: [facility] OR;  Service: Orthopedics;  Laterality: Right;  Right Patella    REMOVAL OF HARDWARE FROM HAND Left 3/11/2021    Procedure: REMOVAL, HARDWARE, HAND;  Surgeon: Sage Wolf MD;  Location: [facility] OR;  Service: Orthopedics;  Laterality: Left;  Removal of dorsal spanning wrist plate left  distal radius    REMOVAL OF HARDWARE FROM LOWER EXTREMITY Right 2020    Procedure: REMOVAL, HARDWARE, LOWER EXTREMITY;  Surgeon: Sage Wolf MD;  Location: [facility];  Service: Orthopedics;  Laterality: Right;          Allergies:   Review of patient's allergies indicates:   Allergen Reactions    Codeine Nausea Only     Other reaction(s): Nausea  Other reaction(s): Elevated blood pressure       Social History:  Social History     Socioeconomic History    Marital status:    Tobacco Use    Smoking status: Former     Current packs/day: 0.00     Average packs/day: 1 pack/day for 8.0 years (8.0 ttl pk-yrs)     Types: Cigarettes     Start date:      Quit date:      Years since quittin.8    Smokeless tobacco: Never   Substance and Sexual Activity    Alcohol use: Yes     Comment: rarely    Drug use: No    Sexual activity: Not Currently     Social Drivers of Health     Financial Resource Strain: Low Risk  (2024)    Overall Financial Resource Strain (CARDIA)     Difficulty of Paying Living Expenses: Not hard at all   Food Insecurity: No Food Insecurity (2024)    Hunger Vital Sign     Worried About Running Out of Food in the Last Year: Never true     Ran Out of Food in the Last Year: Never true   Transportation Needs: No Transportation Needs (2024)    PRAPARE - Transportation     Lack of Transportation (Medical): No     Lack of Transportation (Non-Medical): No   Physical Activity: Inactive (2024)    Exercise Vital Sign     Days of Exercise per Week: 0 days     Minutes of Exercise per Session: 0 min   Stress: No Stress Concern Present (2024)    [employer] - Occupational Stress Questionnaire     Feeling of Stress : Not at all   Housing Stability: Low Risk  (2024)    Housing Stability Vital Sign     Unable to Pay for Housing in the Last Year: No     Homeless in the Last Year: No       Family History:  family history includes Diabetes  "in her father and mother; Leukemia in her father.    Home Medications:  Current Outpatient Medications on File Prior to Visit   Medication Sig Dispense Refill    albuterol (VENTOLIN HFA) 90 mcg/actuation inhaler Inhale 2 puffs into the lungs every 6 (six) hours as needed for Wheezing or Shortness of Breath (cough). Rescue 20.1 g 5    amLODIPine (NORVASC) 10 MG tablet TAKE 1 TABLET(10 MG) BY MOUTH EVERY DAY 90 tablet 1    blood sugar diagnostic Strp Use ac bid      cholecalciferol, vitamin D3, (VITAMIN D3) 50 mcg (2,000 unit) Cap capsule Take 1 capsule by mouth.      HUMULIN 70/30 U-100 KWIKPEN 100 unit/mL (70-30) InPn pen SMARTSI Unit(s) SUB-Q Every Evening      insulin syringe-needle U-100 1 mL 29 gauge x 1/2" Syrg Use bid      JARDIANCE 25 mg tablet Take 25 mg by mouth every morning.      lancets 33 gauge Misc Use as BID      levothyroxine (SYNTHROID) 150 MCG tablet Take 1 tablet by mouth once daily.      metFORMIN (GLUCOPHAGE) 500 MG tablet Take 500 mg by mouth 2 (two) times daily.      metoprolol succinate (TOPROL-XL) 25 MG 24 hr tablet TAKE 1/2 TABLET(12.5 MG) BY MOUTH DAILY 45 tablet 1    perindopril erbumine (ACEON) 4 mg tablet Take 2 mg by mouth once daily.      pravastatin (PRAVACHOL) 20 MG tablet Take 20 mg by mouth once daily.   11    spironolactone (ALDACTONE) 25 MG tablet Take 25 mg by mouth.      chlorthalidone (HYGROTEN) 25 MG Tab Take 25 mg by mouth once daily.       No current facility-administered medications on file prior to visit.       Physical exam:  /60 (BP Location: Left arm, Patient Position: Sitting)   Pulse 60   Ht 5' 5" (1.651 m)   Wt 103.3 kg (227 lb 11.8 oz)   SpO2 96%   BMI 37.90 kg/m²         General: Pt is a 73 y.o. year old female who is AAOx3, in NAD, is pleasant, well nourished, looks stated age  HEENT: PERRL, EOMI, Oral mucosa pink & moist  CVS  No abnormal cardiac pulsations noted on inspection. JVP not raised. The apical impulse is normal on palpation, and is " located in the left 5th intercostal space in the mid - clavicular line. No palpable thrills or abnormal pulsations noted. RR, S1 - S2 heard, 2/6 systolic murmurs, rubs or gallops appreciated.   PUL : CTA B/L. No wheezes/crackles heard   ABD : BS +, soft. No tenderness elicited   LE : No C/C/E. Distal Pulses palpable B/L         LABS:    Chemistry:   Lab Results   Component Value Date     09/12/2024    K 4.0 09/12/2024     09/12/2024    CO2 17 (L) 09/12/2024    BUN 19 09/12/2024    CREATININE 1.9 (H) 09/12/2024    GLUCOSE 87 12/23/2021    CALCIUM 8.3 (L) 09/12/2024     Cardiac Markers:   Lab Results   Component Value Date    TROPONINI 0.613 (H) 08/30/2024     Cardiac Markers (Last 3):   Lab Results   Component Value Date    TROPONINI 0.613 (H) 08/30/2024    TROPONINI 0.735 (H) 08/30/2024    TROPONINI 0.509 (H) 08/29/2024     CBC:   Lab Results   Component Value Date    WBC 5.13 09/12/2024    HGB 9.6 (L) 09/12/2024    HCT 29.6 (L) 09/12/2024    HCT 33 (L) 08/30/2024    MCV 89 09/12/2024     09/12/2024     Lipids:   Lab Results   Component Value Date    CHOL 71 (L) 06/27/2024    TRIG 65 06/27/2024    TRIG 89 10/14/2019    HDL 34 (L) 06/27/2024    HDL 46 10/14/2019     Coagulation:   Lab Results   Component Value Date    INR 1.3 (H) 08/30/2024    APTT 32.2 (H) 08/30/2024           Assessment    1. Type 2 diabetes mellitus with stage 3b chronic kidney disease, with long-term current use of insulin    2. Chronic kidney disease, stage 4 (severe)    3. Hypertension associated with diabetes    4. Hyperlipidemia associated with type 2 diabetes mellitus    5. Chronic diastolic congestive heart failure    6. Hypothyroidism, unspecified type         Plan:    HFpEF  Echo 6/44 EF 60%, grade 2 diastolic dysfunction, moderate AS, mild-to-moderate MR  Shortness of breath is stable, helped with inhalers   No lower extremity swelling, PND   Continue spironolactone, Jardiance, amlodipine, Toprol-XL,  chlorthalidone    Diabetes   A1c 6.4   Continue therapy  Currently not on ACEi/ARB-> Consider starting given history of CKD/diabetes    CKD   Stable to somewhat improved    Hypertension   Stable   Continue medications as above    Hypothyroidism   Stable   Continue Synthroid     HLD   LDL 55 as of 9/24  Continue pravastatin    Obesity, Body mass index is 37.9 kg/m².   Low salt, low fat diet  Exercise as tolerated, at least 30 min daily     This note was prepared using voice recognition system and is likely to have sound alike errors that may have been overlooked even after proofreading.     I have reviewed all pertinent chart information.  Plans and recommendations have been formulated under my direct supervision. All questions answered and patient voiced understanding.   If symptoms persist go to the ED.    RTC in [address] [place], [place] [id number] Jernigan MD  Cardiology

## 2024-11-15 ENCOUNTER — TELEPHONE (OUTPATIENT)
Dept: ALLERGY | Facility: CLINIC | Age: 74
End: 2024-11-15
Payer: MEDICARE

## 2024-11-15 DIAGNOSIS — J45.909 ASTHMA, UNSPECIFIED ASTHMA SEVERITY, UNSPECIFIED WHETHER COMPLICATED, UNSPECIFIED WHETHER PERSISTENT: Primary | ICD-10-CM

## 2024-11-15 NOTE — TELEPHONE ENCOUNTER
----- Message from ImmusanT sent at 11/15/2024  1:52 PM CST -----  Contact: Lakshmi  .Type:  Sooner Apoointment Request    Caller is requesting a sooner appointment.  Caller declined first available appointment listed below.  Caller will not accept being placed on the waitlist and is requesting a message be sent to doctor.  Name of Caller: Lakshmi   When is the first available appointment? 01/14/2025  Symptoms: short winded  Would the patient rather a call back or a response via [employer]?  Call back   Best Call Back Number: [phone]  Additional Information: Pt has referral in system       Thanks

## 2024-11-19 ENCOUNTER — OFFICE VISIT (OUTPATIENT)
Dept: ALLERGY | Facility: CLINIC | Age: 74
End: 2024-11-19
Payer: MEDICARE

## 2024-11-19 ENCOUNTER — LAB VISIT (OUTPATIENT)
Dept: LAB | Facility: HOSPITAL | Age: 74
End: 2024-11-19
Attending: STUDENT IN AN ORGANIZED HEALTH CARE EDUCATION/TRAINING PROGRAM
Payer: MEDICARE

## 2024-11-19 VITALS
WEIGHT: 225.5 LBS | BODY MASS INDEX: 37.57 KG/M2 | HEIGHT: 65 IN | SYSTOLIC BLOOD PRESSURE: 163 MMHG | OXYGEN SATURATION: 94 % | TEMPERATURE: 98 F | HEART RATE: 52 BPM | DIASTOLIC BLOOD PRESSURE: 57 MMHG

## 2024-11-19 DIAGNOSIS — Z86.19: ICD-10-CM

## 2024-11-19 DIAGNOSIS — R06.02 SHORTNESS OF BREATH: ICD-10-CM

## 2024-11-19 DIAGNOSIS — D50.9 MICROCYTIC ANEMIA: ICD-10-CM

## 2024-11-19 DIAGNOSIS — J45.30 MILD PERSISTENT ASTHMA WITHOUT COMPLICATION: Primary | ICD-10-CM

## 2024-11-19 LAB
BASOPHILS # BLD AUTO: 0.05 K/UL (ref 0–0.2)
BASOPHILS NFR BLD: 0.9 % (ref 0–1.9)
DIFFERENTIAL METHOD BLD: ABNORMAL
EOSINOPHIL # BLD AUTO: 0.2 K/UL (ref 0–0.5)
EOSINOPHIL NFR BLD: 3.5 % (ref 0–8)
ERYTHROCYTE [DISTWIDTH] IN BLOOD BY AUTOMATED COUNT: 14.6 % (ref 11.5–14.5)
FERRITIN SERPL-MCNC: 110 NG/ML (ref 20–300)
HCT VFR BLD AUTO: 34.1 % (ref 37–48.5)
HGB BLD-MCNC: 10.5 G/DL (ref 12–16)
HIV 1+2 AB+HIV1 P24 AG SERPL QL IA: NORMAL
IGA SERPL-MCNC: 539 MG/DL (ref 40–350)
IGG SERPL-MCNC: 1089 MG/DL (ref 650–1600)
IGM SERPL-MCNC: 73 MG/DL (ref 50–300)
IMM GRANULOCYTES # BLD AUTO: 0.03 K/UL (ref 0–0.04)
IMM GRANULOCYTES NFR BLD AUTO: 0.5 % (ref 0–0.5)
IRON SERPL-MCNC: 58 UG/DL (ref 30–160)
LYMPHOCYTES # BLD AUTO: 1.1 K/UL (ref 1–4.8)
LYMPHOCYTES NFR BLD: 18.9 % (ref 18–48)
MCH RBC QN AUTO: 28.5 PG (ref 27–31)
MCHC RBC AUTO-ENTMCNC: 30.8 G/DL (ref 32–36)
MCV RBC AUTO: 93 FL (ref 82–98)
MONOCYTES # BLD AUTO: 0.4 K/UL (ref 0.3–1)
MONOCYTES NFR BLD: 6.4 % (ref 4–15)
NEUTROPHILS # BLD AUTO: 4 K/UL (ref 1.8–7.7)
NEUTROPHILS NFR BLD: 69.8 % (ref 38–73)
NRBC BLD-RTO: 0 /100 WBC
PLATELET # BLD AUTO: 149 K/UL (ref 150–450)
PMV BLD AUTO: 10.7 FL (ref 9.2–12.9)
RBC # BLD AUTO: 3.68 M/UL (ref 4–5.4)
SATURATED IRON: 22 % (ref 20–50)
TOTAL IRON BINDING CAPACITY: 259 UG/DL (ref 250–450)
TRANSFERRIN SERPL-MCNC: 175 MG/DL (ref 200–375)
WBC # BLD AUTO: 5.77 K/UL (ref 3.9–12.7)

## 2024-11-19 PROCEDURE — 3044F HG A1C LEVEL LT 7.0%: CPT | Mod: CPTII,S$GLB,, | Performed by: STUDENT IN AN ORGANIZED HEALTH CARE EDUCATION/TRAINING PROGRAM

## 2024-11-19 PROCEDURE — 1160F RVW MEDS BY RX/DR IN RCRD: CPT | Mod: CPTII,S$GLB,, | Performed by: STUDENT IN AN ORGANIZED HEALTH CARE EDUCATION/TRAINING PROGRAM

## 2024-11-19 PROCEDURE — 82784 ASSAY IGA/IGD/IGG/IGM EACH: CPT | Mod: 59 | Performed by: STUDENT IN AN ORGANIZED HEALTH CARE EDUCATION/TRAINING PROGRAM

## 2024-11-19 PROCEDURE — 82728 ASSAY OF FERRITIN: CPT | Performed by: STUDENT IN AN ORGANIZED HEALTH CARE EDUCATION/TRAINING PROGRAM

## 2024-11-19 PROCEDURE — 3078F DIAST BP <80 MM HG: CPT | Mod: CPTII,S$GLB,, | Performed by: STUDENT IN AN ORGANIZED HEALTH CARE EDUCATION/TRAINING PROGRAM

## 2024-11-19 PROCEDURE — 85025 COMPLETE CBC W/AUTO DIFF WBC: CPT | Performed by: STUDENT IN AN ORGANIZED HEALTH CARE EDUCATION/TRAINING PROGRAM

## 2024-11-19 PROCEDURE — 3062F POS MACROALBUMINURIA REV: CPT | Mod: CPTII,S$GLB,, | Performed by: STUDENT IN AN ORGANIZED HEALTH CARE EDUCATION/TRAINING PROGRAM

## 2024-11-19 PROCEDURE — 3077F SYST BP >= 140 MM HG: CPT | Mod: CPTII,S$GLB,, | Performed by: STUDENT IN AN ORGANIZED HEALTH CARE EDUCATION/TRAINING PROGRAM

## 2024-11-19 PROCEDURE — 94664 DEMO&/EVAL PT USE INHALER: CPT | Mod: S$GLB,,, | Performed by: STUDENT IN AN ORGANIZED HEALTH CARE EDUCATION/TRAINING PROGRAM

## 2024-11-19 PROCEDURE — 82784 ASSAY IGA/IGD/IGG/IGM EACH: CPT | Performed by: STUDENT IN AN ORGANIZED HEALTH CARE EDUCATION/TRAINING PROGRAM

## 2024-11-19 PROCEDURE — 3008F BODY MASS INDEX DOCD: CPT | Mod: CPTII,S$GLB,, | Performed by: STUDENT IN AN ORGANIZED HEALTH CARE EDUCATION/TRAINING PROGRAM

## 2024-11-19 PROCEDURE — 1101F PT FALLS ASSESS-DOCD LE1/YR: CPT | Mod: CPTII,S$GLB,, | Performed by: STUDENT IN AN ORGANIZED HEALTH CARE EDUCATION/TRAINING PROGRAM

## 2024-11-19 PROCEDURE — 1126F AMNT PAIN NOTED NONE PRSNT: CPT | Mod: CPTII,S$GLB,, | Performed by: STUDENT IN AN ORGANIZED HEALTH CARE EDUCATION/TRAINING PROGRAM

## 2024-11-19 PROCEDURE — 4010F ACE/ARB THERAPY RXD/TAKEN: CPT | Mod: CPTII,S$GLB,, | Performed by: STUDENT IN AN ORGANIZED HEALTH CARE EDUCATION/TRAINING PROGRAM

## 2024-11-19 PROCEDURE — 1159F MED LIST DOCD IN RCRD: CPT | Mod: CPTII,S$GLB,, | Performed by: STUDENT IN AN ORGANIZED HEALTH CARE EDUCATION/TRAINING PROGRAM

## 2024-11-19 PROCEDURE — 90732 PPSV23 VACC 2 YRS+ SUBQ/IM: CPT | Mod: S$GLB,,, | Performed by: STUDENT IN AN ORGANIZED HEALTH CARE EDUCATION/TRAINING PROGRAM

## 2024-11-19 PROCEDURE — 99204 OFFICE O/P NEW MOD 45 MIN: CPT | Mod: 25,S$GLB,, | Performed by: STUDENT IN AN ORGANIZED HEALTH CARE EDUCATION/TRAINING PROGRAM

## 2024-11-19 PROCEDURE — 86317 IMMUNOASSAY INFECTIOUS AGENT: CPT | Mod: 59 | Performed by: STUDENT IN AN ORGANIZED HEALTH CARE EDUCATION/TRAINING PROGRAM

## 2024-11-19 PROCEDURE — 99999 PR PBB SHADOW E&M-EST. PATIENT-LVL V: CPT | Mod: PBBFAC,,, | Performed by: STUDENT IN AN ORGANIZED HEALTH CARE EDUCATION/TRAINING PROGRAM

## 2024-11-19 PROCEDURE — 87389 HIV-1 AG W/HIV-1&-2 AB AG IA: CPT | Performed by: STUDENT IN AN ORGANIZED HEALTH CARE EDUCATION/TRAINING PROGRAM

## 2024-11-19 PROCEDURE — G0009 ADMIN PNEUMOCOCCAL VACCINE: HCPCS | Mod: S$GLB,,, | Performed by: STUDENT IN AN ORGANIZED HEALTH CARE EDUCATION/TRAINING PROGRAM

## 2024-11-19 PROCEDURE — 3288F FALL RISK ASSESSMENT DOCD: CPT | Mod: CPTII,S$GLB,, | Performed by: STUDENT IN AN ORGANIZED HEALTH CARE EDUCATION/TRAINING PROGRAM

## 2024-11-19 PROCEDURE — 84466 ASSAY OF TRANSFERRIN: CPT | Performed by: STUDENT IN AN ORGANIZED HEALTH CARE EDUCATION/TRAINING PROGRAM

## 2024-11-19 PROCEDURE — 3066F NEPHROPATHY DOC TX: CPT | Mod: CPTII,S$GLB,, | Performed by: STUDENT IN AN ORGANIZED HEALTH CARE EDUCATION/TRAINING PROGRAM

## 2024-11-19 PROCEDURE — 82785 ASSAY OF IGE: CPT | Performed by: STUDENT IN AN ORGANIZED HEALTH CARE EDUCATION/TRAINING PROGRAM

## 2024-11-19 RX ORDER — BUDESONIDE AND FORMOTEROL FUMARATE DIHYDRATE 160; 4.5 UG/1; UG/1
2 AEROSOL RESPIRATORY (INHALATION) EVERY 12 HOURS
Qty: 10.2 G | Refills: 11 | Status: SHIPPED | OUTPATIENT
Start: 2024-11-19 | End: 2025-11-19

## 2024-11-19 NOTE — ASSESSMENT & PLAN NOTE
- SOB likely multifactorial   - Ordered Symbicort BID   - Above due to benefit from Albuterol in symptoms   - Spirometry unavailable at this appointment   - Educated on proper use of inhalers including an in-person demonstration of proper technique  - Expressed understanding of demonstrated technique  - ED precautions discussed at length   - Will continue to monitor and reassess

## 2024-11-19 NOTE — PROGRESS NOTES
Allergy and Immunology  New Patient Clinic Note    Date: 11/19/2024  Chief Complaint   Patient presents with    Shortness of Breath     Referred by: Ce Hendrickson MD  [address] [place] [address] [place] [id number]    History  Lakshmi Campbell is a 73 y.o. female being seen as a New Patient today.    Rhinitis   - No hx of rhinitis     Chronic or Inducible Urticaria  - No hx of chronic urticaria     Mild Persistent Asthma ?   Shortness of Breath  Grade II Diastolic Dysfunction   - Onset: After admission for PNA   - Symptoms: Shortness of Breath, wheezing   - Medications: Albuterol PRN    - Reported response to Albuterol   - PO Steroids: Denied   - Hospitalization/Intubation: Indirect - PNA with chronic resp failure    - Patient since with resolution   - Suspected triggers include: Environmental v reactive   - Smoking: Denied  - ASA/NSAID use: No issues   - Hx of CRSwNP: Denied   - Control/Albuterol Use: Only using PRN - trial   - Nocturnal symptoms: Denied  - Symptoms appear to be worse on exertion   - Decondition can be playing a role   - Diastolic dysfunction - followed by Cardiology  - No peripheral edema or vascular congestion     CRSwNP  - No hx of CRSwNP     Eczema   - No hx of eczema     Eosinophilic Esophagitis  - No hx of eosinophilic esophagitis     Adverse Food Reaction  - No hx of food allergy     Adverse Drug Reaction  - Codeine: nausea and elevated BP   - No hx of anaphylaxis - tolerated other opioids per patient     Recurrent Infections  - No hx of recurrent infections     Venom Allergy  - No hx of venom allergy     Allergies, PMH, PSH, Social, and Family History were reviewed.    Review of patient's allergies indicates:   Allergen Reactions    Codeine Nausea Only     Other reaction(s): Nausea  Other reaction(s): Elevated blood pressure      Past Medical History:   Diagnosis Date    Acquired TTP     Cataract     CKD stage 3 secondary to diabetes     Closed displaced comminuted fracture of right  patella 10/28/2020    Closed fracture of surgical neck of left humerus 10/30/2020    Closed left radial fracture 10/30/2020    Hyperkalemia     Hyperlipidemia     Hypertension     Hypothyroidism, unspecified     Liver cirrhosis secondary to YO     Morbid obesity     Right knee pain     Thyroid disease     Type 2 diabetes mellitus      Past Surgical History:   Procedure Laterality Date    APPENDECTOMY      BREAST BIOPSY      CATARACT EXTRACTION      COLONOSCOPY N/A 12/21/2021    Procedure: COLONOSCOPY screening;  Surgeon: Moises Patterson MD;  Location: [facility];  Service: Endoscopy;  Laterality: N/A;    ESOPHAGOGASTRODUODENOSCOPY N/A 12/21/2021    Procedure: EGD (ESOPHAGOGASTRODUODENOSCOPY) EV screening;  Surgeon: Moises Patterson MD;  Location: [facility];  Service: Endoscopy;  Laterality: N/A;    EYE SURGERY      HERNIA REPAIR      OPEN REDUCTION AND INTERNAL FIXATION (ORIF) OF FRACTURE OF DISTAL RADIUS Left 11/2/2020    Procedure: ORIF, FRACTURE, RADIUS, DISTAL;  Surgeon: Sage Wolf MD;  Location: [facility];  Service: Orthopedics;  Laterality: Left;    OPEN REDUCTION AND INTERNAL FIXATION (ORIF) OF FRACTURE OF PATELLA Right 11/2/2020    Procedure: ORIF, FRACTURE, PATELLA;  Surgeon: Sage Wolf MD;  Location: [facility] OR;  Service: Orthopedics;  Laterality: Right;    OPEN REDUCTION AND INTERNAL FIXATION (ORIF) OF FRACTURE OF PATELLA Right 12/18/2020    Procedure: ORIF, FRACTURE, PATELLA;  Surgeon: Sage Wolf MD;  Location: [facility] OR;  Service: Orthopedics;  Laterality: Right;    ORIF HUMERUS FRACTURE Left 11/5/2020    Procedure: ORIF, FRACTURE, HUMERUS;  Surgeon: Sage Wolf MD;  Location: [facility] OR;  Service: Orthopedics;  Laterality: Left;    PLACEMENT OF ACELLULAR HUMAN DERMAL ALLOGRAFT Right 11/2/2020    Procedure: APPLICATION, ACELLULAR HUMAN DERMAL ALLOGRAFT;  Surgeon: Sage Wolf MD;  Location: [facility] OR;  Service: Orthopedics;  Laterality:  "Right;  Right Patella    REMOVAL OF HARDWARE FROM HAND Left 3/11/2021    Procedure: REMOVAL, HARDWARE, HAND;  Surgeon: Sage Wolf MD;  Location: [facility] OR;  Service: Orthopedics;  Laterality: Left;  Removal of dorsal spanning wrist plate left distal radius    REMOVAL OF HARDWARE FROM LOWER EXTREMITY Right 2020    Procedure: REMOVAL, HARDWARE, LOWER EXTREMITY;  Surgeon: Sage Wolf MD;  Location: [facility] OR;  Service: Orthopedics;  Laterality: Right;     Social History     Social History Narrative    Not on file     S/he reports that she quit smoking about 35 years ago. Her smoking use included cigarettes. She started smoking about 43 years ago. She has a 8 pack-year smoking history. She has never used smokeless tobacco. She reports current alcohol use. She reports that she does not use drugs.    Current Outpatient Medications on File Prior to Visit   Medication Sig Dispense Refill    albuterol (VENTOLIN HFA) 90 mcg/actuation inhaler Inhale 2 puffs into the lungs every 6 (six) hours as needed for Wheezing or Shortness of Breath (cough). Rescue 20.1 g 5    amLODIPine (NORVASC) 10 MG tablet TAKE 1 TABLET(10 MG) BY MOUTH EVERY DAY 90 tablet 1    blood sugar diagnostic Strp Use ac bid      chlorthalidone (HYGROTEN) 25 MG Tab Take 25 mg by mouth once daily.      cholecalciferol, vitamin D3, (VITAMIN D3) 50 mcg (2,000 unit) Cap capsule Take 1 capsule by mouth.      HUMULIN 70/30 U-100 KWIKPEN 100 unit/mL (70-30) InPn pen SMARTSI Unit(s) SUB-Q Every Evening      insulin syringe-needle U-100 1 mL 29 gauge x 1/2" Syrg Use bid      JARDIANCE 25 mg tablet Take 25 mg by mouth every morning.      lancets 33 gauge Misc Use as BID      levothyroxine (SYNTHROID) 150 MCG tablet Take 1 tablet by mouth once daily.      metFORMIN (GLUCOPHAGE) 500 MG tablet Take 500 mg by mouth 2 (two) times daily.      metoprolol succinate (TOPROL-XL) 25 MG 24 hr tablet TAKE 1/2 TABLET(12.5 MG) BY MOUTH DAILY 45 tablet 1 "    perindopril erbumine (ACEON) 4 mg tablet Take 2 mg by mouth once daily.      pravastatin (PRAVACHOL) 20 MG tablet Take 20 mg by mouth once daily.   11    spironolactone (ALDACTONE) 25 MG tablet Take 25 mg by mouth.       No current facility-administered medications on file prior to visit.     Physical Examination  Vitals:    11/19/24 0828   BP: (!) 163/57   Pulse: (!) 52   Temp: 97.7 °F (36.5 °C)     GENERAL:  female in no apparent distress and well developed and well nourished  HEAD:  Normocephalic, without obvious abnormality, atraumatic  EYES: sclera anicteric, conjunctiva normochromic  EARS: normal TM's and external ear canals both ears  NOSE: without erythema or discharge, clear discharge, turbinates normal    OROPHARYNX: moist mucous membranes without erythema, exudates or petechiae  LYMPH NODES: normal, supple, no lymphadenopathy  LUNGS: clear to auscultation, no wheezes, rales or rhonchi, symmetric air entry.  HEART: normal rate, regular rhythm, normal S1, S2, no murmurs, rubs, clicks or gallops.  ABDOMEN: soft, nontender, nondistended, no masses or organomegaly.  MUSCULOSKELETAL: no gross joint deformity or swelling.  NEURO: alert, oriented, normal speech, no focal findings or movement disorder noted.  SKIN: normal coloration and turgor, no rashes, no suspicious skin lesions noted.     Assessment/Plan:   Problem List Items Addressed This Visit       Mild persistent asthma without complication - Primary    Current Assessment & Plan     - SOB likely multifactorial   - Ordered Symbicort BID   - Above due to benefit from Albuterol in symptoms   - Spirometry unavailable at this appointment   - Educated on proper use of inhalers including an in-person demonstration of proper technique  - Expressed understanding of demonstrated technique  - ED precautions discussed at length   - Will continue to monitor and reassess         Relevant Medications    budesonide-formoterol 160-4.5 mcg (SYMBICORT) 160-4.5  mcg/actuation HFAA    Shortness of breath    Relevant Medications    budesonide-formoterol 160-4.5 mcg (SYMBICORT) 160-4.5 mcg/actuation HFAA    History of infection due to Streptococcus pneumoniae    Overview     - 11/19/2024: Administered PPSV23          Current Assessment & Plan     - Recent admission for PNA   - Humoral evaluation at this time   - Administered PPSV23          Relevant Medications    pneumococcal vaccine (PNEUMOVAX-23) injection 0.5 mL (Completed)    Other Relevant Orders    HIV 1/2 Ag/Ab (4th Gen)    CBC Auto Differential    Streptococcus Pneumoniae IgG Antibody (23 Serotypes), MAID    IgG    IgM    IgA    Allergen Profile, Zone 6    Microcytic anemia    Relevant Orders    IRON AND TIBC    FERRITIN     Follow up:  No follow-ups on file.    Angelita Perkins MD   [facility]  Allergy and Immunology

## 2024-11-22 LAB
A ALTERNATA IGE QN: <0.1 KU/L
A FUMIGATUS IGE QN: <0.1 KU/L
ALLERGEN BOXELDER MAPLE TREE IGE: <0.1 KU/L
ALLERGEN MULBERRY TREE IGE: <0.1 KU/L
ALLERGEN PIGWEED IGE: <0.1 KU/L
ALLERGEN WALNUT TREE IGE: <0.1 KU/L
BERMUDA GRASS IGE QN: <0.1 KU/L
C HERBARUM IGE QN: <0.1 KU/L
CAT DANDER IGE QN: <0.1 KU/L
COMMON RAGWEED IGE QN: <0.1 KU/L
D FARINAE IGE QN: 0.13 KU/L
D PTERONYSS IGE QN: <0.1 KU/L
DEPRECATED TIMOTHY IGE RAST QL: ABNORMAL
DOG DANDER IGE QN: <0.1 KU/L
ELDER IGE QN: <0.1 KU/L
IGE: 343 IU/ML
IMMUNOLOGIST REVIEW: NORMAL
MOUSE URINE PROT IGE QN: <0.1 KU/L
MT JUNIPER IGE QN: <0.1 KU/L
P NOTATUM IGE QN: <0.1 KU/L
PECAN/HICK TREE IGE QN: <0.1 KU/L
RAST ALLERGEN INTERPRETATION: ABNORMAL
RAST CLASS: ABNORMAL
ROACH IGE QN: <0.1 KU/L
S PN DA SERO 19F IGG SER-MCNC: 6.7 MCG/ML
S PNEUM DA 1 IGG SER-MCNC: 0.9 MCG/ML
S PNEUM DA 10A IGG SER-MCNC: 1.9 MCG/ML
S PNEUM DA 11A IGG SER-MCNC: 0.8 MCG/ML
S PNEUM DA 12F IGG SER-MCNC: 1.5 MCG/ML
S PNEUM DA 14 IGG SER-MCNC: 13.8 MCG/ML
S PNEUM DA 15B IGG SER-MCNC: 5.2 MCG/ML
S PNEUM DA 17F IGG SER-MCNC: 1.5 MCG/ML
S PNEUM DA 18C IGG SER-MCNC: 0.3 MCG/ML
S PNEUM DA 19A IGG SER-MCNC: 7 MCG/ML
S PNEUM DA 2 IGG SER-MCNC: 7.1 MCG/ML
S PNEUM DA 20A IGG SER-MCNC: 4.7 MCG/ML
S PNEUM DA 22F IGG SER-MCNC: 2.3 MCG/ML
S PNEUM DA 23F IGG SER-MCNC: 1.9 MCG/ML
S PNEUM DA 3 IGG SER-MCNC: 0.5 MCG/ML
S PNEUM DA 33F IGG SER-MCNC: 5.7 MCG/ML
S PNEUM DA 4 IGG SER-MCNC: 0.4 MCG/ML
S PNEUM DA 5 IGG SER-MCNC: 0.8 MCG/ML
S PNEUM DA 6B IGG SER-MCNC: 2 MCG/ML
S PNEUM DA 7F IGG SER-MCNC: 1.3 MCG/ML
S PNEUM DA 8 IGG SER-MCNC: 3.5 MCG/ML
S PNEUM DA 9N IGG SER-MCNC: 1 MCG/ML
S PNEUM DA 9V IGG SER-MCNC: 1.5 MCG/ML
SILVER BIRCH IGE QN: <0.1 KU/L
TIMOTHY IGE QN: <0.1 KU/L
WHITE ELM IGE QN: <0.1 KU/L
WHITE OAK IGE QN: <0.1 KU/L

## 2024-12-03 DIAGNOSIS — D69.6 THROMBOCYTOPENIA: Primary | ICD-10-CM

## 2024-12-11 PROBLEM — R59.9 ENLARGED LYMPH NODES: Status: ACTIVE | Noted: 2024-12-11

## 2024-12-11 NOTE — PROGRESS NOTES
Subjective:       Patient ID: Lakshmi Campbell is a 73 y.o. female.    Chief Complaint: thrombocytopenia and enlarged lymph node    HPI: Ms. Campbell is a 73 year old female who is following up for her thrombocytopenia. She is also here for review of CT scan done in 24 showing enlarged lymph nodes. Thrombocytopenia likely secondary to hepatosplenomegaly and cirrhosis.   Thrombocytopenia hx: Initially referred to Heme/Onc clinic 10/2020 after suffering a knee injury that required surgery when pre-op testing revealed a platelet count of 76k. She has been seen for routine surveillance since that time.     Today: She states she has been well. She denies any bleeding, weight loss, night sweats, fevers, illnesses. She does bruise easily.     Social History     Socioeconomic History    Marital status:    Tobacco Use    Smoking status: Former     Current packs/day: 0.00     Average packs/day: 1 pack/day for 8.0 years (8.0 ttl pk-yrs)     Types: Cigarettes     Start date:      Quit date:      Years since quittin.9    Smokeless tobacco: Never   Substance and Sexual Activity    Alcohol use: Yes     Comment: rarely    Drug use: No    Sexual activity: Not Currently     Social Drivers of Health     Financial Resource Strain: Low Risk  (2024)    Overall Financial Resource Strain (CARDIA)     Difficulty of Paying Living Expenses: Not hard at all   Food Insecurity: No Food Insecurity (2024)    Hunger Vital Sign     Worried About Running Out of Food in the Last Year: Never true     Ran Out of Food in the Last Year: Never true   Transportation Needs: No Transportation Needs (2024)    PRAPARE - Transportation     Lack of Transportation (Medical): No     Lack of Transportation (Non-Medical): No   Physical Activity: Inactive (2024)    Exercise Vital Sign     Days of Exercise per Week: 0 days     Minutes of Exercise per Session: 0 min   Stress: No Stress Concern Present (2024)    [employer]  [employer] - Occupational Stress Questionnaire     Feeling of Stress : Not at all   Housing Stability: Low Risk  (7/24/2024)    Housing Stability Vital Sign     Unable to Pay for Housing in the Last Year: No     Homeless in the Last Year: No       Past Medical History:   Diagnosis Date    Acquired TTP     Cataract     CKD stage 3 secondary to diabetes     Closed displaced comminuted fracture of right patella 10/28/2020    Closed fracture of surgical neck of left humerus 10/30/2020    Closed left radial fracture 10/30/2020    Hyperkalemia     Hyperlipidemia     Hypertension     Hypothyroidism, unspecified     Liver cirrhosis secondary to YO     Morbid obesity     Right knee pain     Thyroid disease     Type 2 diabetes mellitus        Family History   Problem Relation Name Age of Onset    Diabetes Mother      Leukemia Father      Diabetes Father         Past Surgical History:   Procedure Laterality Date    APPENDECTOMY      BREAST BIOPSY      CATARACT EXTRACTION      COLONOSCOPY N/A 12/21/2021    Procedure: COLONOSCOPY screening;  Surgeon: Moises Patterson MD;  Location: [facility];  Service: Endoscopy;  Laterality: N/A;    ESOPHAGOGASTRODUODENOSCOPY N/A 12/21/2021    Procedure: EGD (ESOPHAGOGASTRODUODENOSCOPY) EV screening;  Surgeon: Moises Patterson MD;  Location: [facility];  Service: Endoscopy;  Laterality: N/A;    EYE SURGERY      HERNIA REPAIR      OPEN REDUCTION AND INTERNAL FIXATION (ORIF) OF FRACTURE OF DISTAL RADIUS Left 11/2/2020    Procedure: ORIF, FRACTURE, RADIUS, DISTAL;  Surgeon: Sage Wolf MD;  Location: [facility];  Service: Orthopedics;  Laterality: Left;    OPEN REDUCTION AND INTERNAL FIXATION (ORIF) OF FRACTURE OF PATELLA Right 11/2/2020    Procedure: ORIF, FRACTURE, PATELLA;  Surgeon: Sage Wolf MD;  Location: [facility];  Service: Orthopedics;  Laterality: Right;    OPEN REDUCTION AND INTERNAL FIXATION (ORIF) OF FRACTURE OF PATELLA Right  12/18/2020    Procedure: ORIF, FRACTURE, PATELLA;  Surgeon: Sage Wolf MD;  Location: [facility] OR;  Service: Orthopedics;  Laterality: Right;    ORIF HUMERUS FRACTURE Left 11/5/2020    Procedure: ORIF, FRACTURE, HUMERUS;  Surgeon: Sage Wolf MD;  Location: [facility] OR;  Service: Orthopedics;  Laterality: Left;    PLACEMENT OF ACELLULAR HUMAN DERMAL ALLOGRAFT Right 11/2/2020    Procedure: APPLICATION, ACELLULAR HUMAN DERMAL ALLOGRAFT;  Surgeon: Sage Wolf MD;  Location: [facility] OR;  Service: Orthopedics;  Laterality: Right;  Right Patella    REMOVAL OF HARDWARE FROM HAND Left 3/11/2021    Procedure: REMOVAL, HARDWARE, HAND;  Surgeon: Sage Wolf MD;  Location: [facility] OR;  Service: Orthopedics;  Laterality: Left;  Removal of dorsal spanning wrist plate left distal radius    REMOVAL OF HARDWARE FROM LOWER EXTREMITY Right 12/18/2020    Procedure: REMOVAL, HARDWARE, LOWER EXTREMITY;  Surgeon: Sage Wolf MD;  Location: [facility] OR;  Service: Orthopedics;  Laterality: Right;       Review of Systems   Constitutional:  Negative for activity change, appetite change, chills, diaphoresis, fatigue, fever and unexpected weight change.   HENT:  Negative for congestion and nosebleeds.    Respiratory:  Negative for cough and shortness of breath.    Cardiovascular:  Negative for chest pain and leg swelling.   Gastrointestinal:  Negative for abdominal pain, anal bleeding, blood in stool, constipation, diarrhea, nausea and vomiting.   Genitourinary:  Negative for hematuria.   Hematological:  Bruises/bleeds easily.         Medication List with Changes/Refills   Current Medications    ALBUTEROL (VENTOLIN HFA) 90 MCG/ACTUATION INHALER    Inhale 2 puffs into the lungs every 6 (six) hours as needed for Wheezing or Shortness of Breath (cough). Rescue    AMLODIPINE (NORVASC) 10 MG TABLET    TAKE 1 TABLET(10 MG) BY MOUTH EVERY DAY    BLOOD SUGAR DIAGNOSTIC STRP    Use ac bid     "BUDESONIDE-FORMOTEROL 160-4.5 MCG (SYMBICORT) 160-4.5 MCG/ACTUATION HFAA    Inhale 2 puffs into the lungs every 12 (twelve) hours. Controller    CHLORTHALIDONE (HYGROTEN) 25 MG TAB    Take 25 mg by mouth once daily.    CHOLECALCIFEROL, VITAMIN D3, (VITAMIN D3) 50 MCG (2,000 UNIT) CAP CAPSULE    Take 1 capsule by mouth.    HUMULIN 70/30 U-100 KWIKPEN 100 UNIT/ML (70-30) INPN PEN    SMARTSI Unit(s) SUB-Q Every Evening    INSULIN SYRINGE-NEEDLE U-100 1 ML 29 GAUGE X 1/2" SYRG    Use bid    JARDIANCE 25 MG TABLET    Take 25 mg by mouth every morning.    LANCETS 33 GAUGE MISC    Use as BID    LEVOTHYROXINE (SYNTHROID) 150 MCG TABLET    Take 1 tablet by mouth once daily.    METFORMIN (GLUCOPHAGE) 500 MG TABLET    Take 500 mg by mouth 2 (two) times daily.    METOPROLOL SUCCINATE (TOPROL-XL) 25 MG 24 HR TABLET    TAKE 1/2 TABLET(12.5 MG) BY MOUTH DAILY    PERINDOPRIL ERBUMINE (ACEON) 4 MG TABLET    Take 2 mg by mouth once daily.    PRAVASTATIN (PRAVACHOL) 20 MG TABLET    Take 20 mg by mouth once daily.     SPIRONOLACTONE (ALDACTONE) 25 MG TABLET    Take 25 mg by mouth.     Objective:     Vitals:    24 1044   BP: (!) 158/62   Pulse: (!) 50   Temp: 97.2 °F (36.2 °C)     Physical Exam  Vitals reviewed.   Constitutional:       General: She is not in acute distress.     Appearance: She is not ill-appearing, toxic-appearing or diaphoretic.   HENT:      Head: Normocephalic and atraumatic.   Cardiovascular:      Rate and Rhythm: Bradycardia present.   Musculoskeletal:      Right lower leg: No edema.      Left lower leg: No edema.   Skin:     General: Skin is warm.      Coloration: Skin is not jaundiced or pale.      Findings: Bruising present. No erythema.   Neurological:      Mental Status: She is alert.   Psychiatric:         Mood and Affect: Mood normal.         Behavior: Behavior normal.         Thought Content: Thought content normal.          Labs/Results:  Lab Results   Component Value Date    WBC 4.42 2024 "    RBC 3.92 (L) 12/12/2024    HGB 11.1 (L) 12/12/2024    HCT 34.1 (L) 12/12/2024    MCV 87 12/12/2024    MCH 28.3 12/12/2024    MCHC 32.6 12/12/2024    RDW 13.9 12/12/2024     (L) 12/12/2024    MPV 10.2 12/12/2024    GRAN 2.8 12/12/2024    GRAN 64.2 12/12/2024    LYMPH 1.1 12/12/2024    LYMPH 24.7 12/12/2024    MONO 0.3 12/12/2024    MONO 7.0 12/12/2024    EOS 0.1 12/12/2024    BASO 0.03 12/12/2024    EOSINOPHIL 2.7 12/12/2024    BASOPHIL 0.7 12/12/2024       Ct c/a/p 8/29/24  Impression:  Interstitial thickening involving the subpleural regions of the lungs and more focally in the right upper lobe.  This could represent residual scarring from previous pneumonia when compared to previous CT scan.  Mild mediastinal lymph node prominence appears similar to previous exam.  Nonspecific enlarged lower right retroperitoneal lymph node measuring 2.1 cm.  Malignancy must be excluded.  Gallstones.  Cirrhosis of liver suspected.  Splenomegaly.  No ascites.    Assessment:     Problem List Items Addressed This Visit       Thrombocytopenia    Liver cirrhosis secondary to YO - Primary    Microcytic anemia    Enlarged lymph nodes    Relevant Orders    NM PET CT FDG Skull Base to Mid Thigh     Plan:     Thrombocytopenia  --plts: 103  --bleeding precautions given  --likely secondary to hepatosplenomegaly and cirrhosis    Enlarged lymph nodes  --enlarged lymph nodes in right retroperitoneal space and mediastinal seen on CT in 8/29/24.   --will obtain pet scan    Follow-Up: pet scan and follow up with MD after for review.     Batsheva Love PA-C  Hematology Oncology    Route Chart for Scheduling    Med Onc Chart Routing      Follow up with physician . Review upon return of pet scan   Follow up with [facility]    Infusion scheduling note    Injection scheduling note    Labs    Imaging PET scan   prior to end of year if possible.   Pharmacy appointment    Other referrals

## 2024-12-12 ENCOUNTER — OFFICE VISIT (OUTPATIENT)
Dept: HEMATOLOGY/ONCOLOGY | Facility: CLINIC | Age: 74
End: 2024-12-12
Payer: MEDICARE

## 2024-12-12 ENCOUNTER — LAB VISIT (OUTPATIENT)
Dept: LAB | Facility: HOSPITAL | Age: 74
End: 2024-12-12
Attending: NURSE PRACTITIONER
Payer: MEDICARE

## 2024-12-12 VITALS
HEIGHT: 65 IN | BODY MASS INDEX: 35.06 KG/M2 | DIASTOLIC BLOOD PRESSURE: 62 MMHG | HEART RATE: 50 BPM | WEIGHT: 210.44 LBS | OXYGEN SATURATION: 96 % | SYSTOLIC BLOOD PRESSURE: 158 MMHG | TEMPERATURE: 97 F

## 2024-12-12 DIAGNOSIS — D69.6 THROMBOCYTOPENIA: ICD-10-CM

## 2024-12-12 DIAGNOSIS — D50.9 MICROCYTIC ANEMIA: ICD-10-CM

## 2024-12-12 DIAGNOSIS — K75.81 LIVER CIRRHOSIS SECONDARY TO NASH: Primary | ICD-10-CM

## 2024-12-12 DIAGNOSIS — R59.9 ENLARGED LYMPH NODES: ICD-10-CM

## 2024-12-12 DIAGNOSIS — K74.60 LIVER CIRRHOSIS SECONDARY TO NASH: Primary | ICD-10-CM

## 2024-12-12 LAB
BASOPHILS # BLD AUTO: 0.03 K/UL (ref 0–0.2)
BASOPHILS NFR BLD: 0.7 % (ref 0–1.9)
DIFFERENTIAL METHOD BLD: ABNORMAL
EOSINOPHIL # BLD AUTO: 0.1 K/UL (ref 0–0.5)
EOSINOPHIL NFR BLD: 2.7 % (ref 0–8)
ERYTHROCYTE [DISTWIDTH] IN BLOOD BY AUTOMATED COUNT: 13.9 % (ref 11.5–14.5)
HCT VFR BLD AUTO: 34.1 % (ref 37–48.5)
HGB BLD-MCNC: 11.1 G/DL (ref 12–16)
IMM GRANULOCYTES # BLD AUTO: 0.03 K/UL (ref 0–0.04)
IMM GRANULOCYTES NFR BLD AUTO: 0.7 % (ref 0–0.5)
LYMPHOCYTES # BLD AUTO: 1.1 K/UL (ref 1–4.8)
LYMPHOCYTES NFR BLD: 24.7 % (ref 18–48)
MCH RBC QN AUTO: 28.3 PG (ref 27–31)
MCHC RBC AUTO-ENTMCNC: 32.6 G/DL (ref 32–36)
MCV RBC AUTO: 87 FL (ref 82–98)
MONOCYTES # BLD AUTO: 0.3 K/UL (ref 0.3–1)
MONOCYTES NFR BLD: 7 % (ref 4–15)
NEUTROPHILS # BLD AUTO: 2.8 K/UL (ref 1.8–7.7)
NEUTROPHILS NFR BLD: 64.2 % (ref 38–73)
NRBC BLD-RTO: 0 /100 WBC
PLATELET # BLD AUTO: 103 K/UL (ref 150–450)
PMV BLD AUTO: 10.2 FL (ref 9.2–12.9)
RBC # BLD AUTO: 3.92 M/UL (ref 4–5.4)
WBC # BLD AUTO: 4.42 K/UL (ref 3.9–12.7)

## 2024-12-12 PROCEDURE — 3066F NEPHROPATHY DOC TX: CPT | Mod: CPTII,S$GLB,,

## 2024-12-12 PROCEDURE — 3288F FALL RISK ASSESSMENT DOCD: CPT | Mod: CPTII,S$GLB,,

## 2024-12-12 PROCEDURE — 3044F HG A1C LEVEL LT 7.0%: CPT | Mod: CPTII,S$GLB,,

## 2024-12-12 PROCEDURE — 36415 COLL VENOUS BLD VENIPUNCTURE: CPT | Performed by: NURSE PRACTITIONER

## 2024-12-12 PROCEDURE — 99214 OFFICE O/P EST MOD 30 MIN: CPT | Mod: S$GLB,,,

## 2024-12-12 PROCEDURE — 3008F BODY MASS INDEX DOCD: CPT | Mod: CPTII,S$GLB,,

## 2024-12-12 PROCEDURE — 1159F MED LIST DOCD IN RCRD: CPT | Mod: CPTII,S$GLB,,

## 2024-12-12 PROCEDURE — 3062F POS MACROALBUMINURIA REV: CPT | Mod: CPTII,S$GLB,,

## 2024-12-12 PROCEDURE — 1101F PT FALLS ASSESS-DOCD LE1/YR: CPT | Mod: CPTII,S$GLB,,

## 2024-12-12 PROCEDURE — 3078F DIAST BP <80 MM HG: CPT | Mod: CPTII,S$GLB,,

## 2024-12-12 PROCEDURE — 4010F ACE/ARB THERAPY RXD/TAKEN: CPT | Mod: CPTII,S$GLB,,

## 2024-12-12 PROCEDURE — 1126F AMNT PAIN NOTED NONE PRSNT: CPT | Mod: CPTII,S$GLB,,

## 2024-12-12 PROCEDURE — 99999 PR PBB SHADOW E&M-EST. PATIENT-LVL IV: CPT | Mod: PBBFAC,,,

## 2024-12-12 PROCEDURE — 85025 COMPLETE CBC W/AUTO DIFF WBC: CPT | Performed by: NURSE PRACTITIONER

## 2024-12-12 PROCEDURE — 3077F SYST BP >= 140 MM HG: CPT | Mod: CPTII,S$GLB,,

## 2024-12-30 ENCOUNTER — HOSPITAL ENCOUNTER (OUTPATIENT)
Dept: RADIOLOGY | Facility: HOSPITAL | Age: 74
Discharge: HOME | End: 2024-12-30
Payer: MEDICARE

## 2024-12-30 DIAGNOSIS — R59.9 ENLARGED LYMPH NODES: ICD-10-CM

## 2024-12-30 PROCEDURE — A9552 F18 FDG: HCPCS

## 2024-12-30 PROCEDURE — 78815 PET IMAGE W/CT SKULL-THIGH: CPT | Mod: 26,PI,, | Performed by: STUDENT IN AN ORGANIZED HEALTH CARE EDUCATION/TRAINING PROGRAM

## 2024-12-30 PROCEDURE — 78815 PET IMAGE W/CT SKULL-THIGH: CPT | Mod: TC

## 2024-12-30 RX ORDER — FLUDEOXYGLUCOSE F18 500 MCI/ML
11.79 INJECTION INTRAVENOUS
Status: COMPLETED | OUTPATIENT
Start: 2024-12-30 | End: 2024-12-30

## 2024-12-30 RX ADMIN — FLUDEOXYGLUCOSE F-18 11.79 MILLICURIE: 500 INJECTION INTRAVENOUS at 09:12

## 2025-01-09 ENCOUNTER — PATIENT OUTREACH (OUTPATIENT)
Dept: ADMINISTRATIVE | Facility: HOSPITAL | Age: 75
End: 2025-01-09
Payer: MEDICARE

## 2025-01-10 ENCOUNTER — OFFICE VISIT (OUTPATIENT)
Dept: HEMATOLOGY/ONCOLOGY | Facility: CLINIC | Age: 75
End: 2025-01-10
Payer: MEDICARE

## 2025-01-10 VITALS
SYSTOLIC BLOOD PRESSURE: 159 MMHG | DIASTOLIC BLOOD PRESSURE: 48 MMHG | HEART RATE: 70 BPM | HEIGHT: 65 IN | WEIGHT: 232.25 LBS | TEMPERATURE: 97 F | OXYGEN SATURATION: 95 % | BODY MASS INDEX: 38.7 KG/M2

## 2025-01-10 DIAGNOSIS — N18.4 CHRONIC KIDNEY DISEASE, STAGE 4 (SEVERE): ICD-10-CM

## 2025-01-10 DIAGNOSIS — I50.32 CHRONIC DIASTOLIC CONGESTIVE HEART FAILURE: ICD-10-CM

## 2025-01-10 DIAGNOSIS — D69.6 THROMBOCYTOPENIA: Primary | ICD-10-CM

## 2025-01-10 DIAGNOSIS — Z99.81 DEPENDENCE ON SUPPLEMENTAL OXYGEN: ICD-10-CM

## 2025-01-10 DIAGNOSIS — E66.01 CLASS 2 SEVERE OBESITY DUE TO EXCESS CALORIES WITH SERIOUS COMORBIDITY AND BODY MASS INDEX (BMI) OF 36.0 TO 36.9 IN ADULT: ICD-10-CM

## 2025-01-10 DIAGNOSIS — E66.812 CLASS 2 SEVERE OBESITY DUE TO EXCESS CALORIES WITH SERIOUS COMORBIDITY AND BODY MASS INDEX (BMI) OF 36.0 TO 36.9 IN ADULT: ICD-10-CM

## 2025-01-10 PROBLEM — Z86.19 HISTORY OF INFECTION DUE TO STREPTOCOCCUS PNEUMONIAE: Status: RESOLVED | Noted: 2024-11-19 | Resolved: 2025-01-10

## 2025-01-10 PROBLEM — R78.81 BACTEREMIA: Status: RESOLVED | Noted: 2024-08-30 | Resolved: 2025-01-10

## 2025-01-10 PROBLEM — N18.9 ACUTE ON CHRONIC KIDNEY FAILURE: Status: RESOLVED | Noted: 2022-10-12 | Resolved: 2025-01-10

## 2025-01-10 PROBLEM — N17.9 ACUTE ON CHRONIC KIDNEY FAILURE: Status: RESOLVED | Noted: 2022-10-12 | Resolved: 2025-01-10

## 2025-01-10 PROCEDURE — 99999 PR PBB SHADOW E&M-EST. PATIENT-LVL IV: CPT | Mod: PBBFAC,,, | Performed by: INTERNAL MEDICINE

## 2025-01-10 NOTE — PROGRESS NOTES
Subjective:       Patient ID: Lakshmi Campbell is a 74 y.o. female.    Chief Complaint: Results    HPI:  74-year-old female history of thrombocytopenia renal insufficiency patient has previous diagnosis of cirrhosis.  Is here for review of increased mediastinal lymphadenopathy with PET scan    Past Medical History:   Diagnosis Date    Acquired TTP     Cataract     CKD stage 3 secondary to diabetes     Closed displaced comminuted fracture of right patella 10/28/2020    Closed fracture of surgical neck of left humerus 10/30/2020    Closed left radial fracture 10/30/2020    Hyperkalemia     Hyperlipidemia     Hypertension     Hypothyroidism, unspecified     Liver cirrhosis secondary to YO     Morbid obesity     Right knee pain     Thyroid disease     Type 2 diabetes mellitus      Family History   Problem Relation Name Age of Onset    Diabetes Mother      Leukemia Father      Diabetes Father       Social History     Socioeconomic History    Marital status:    Tobacco Use    Smoking status: Former     Current packs/day: 0.00     Average packs/day: 1 pack/day for 8.0 years (8.0 ttl pk-yrs)     Types: Cigarettes     Start date:      Quit date:      Years since quittin.0    Smokeless tobacco: Never   Substance and Sexual Activity    Alcohol use: Yes     Comment: rarely    Drug use: No    Sexual activity: Not Currently     Social Drivers of Health     Financial Resource Strain: Low Risk  (2024)    Overall Financial Resource Strain (CARDIA)     Difficulty of Paying Living Expenses: Not hard at all   Food Insecurity: No Food Insecurity (2024)    Hunger Vital Sign     Worried About Running Out of Food in the Last Year: Never true     Ran Out of Food in the Last Year: Never true   Transportation Needs: No Transportation Needs (2024)    PRAPARE - Transportation     Lack of Transportation (Medical): No     Lack of Transportation (Non-Medical): No   Physical Activity: Insufficiently Active  (12/27/2024)    Exercise Vital Sign     Days of Exercise per Week: 1 day     Minutes of Exercise per Session: 20 min   Stress: No Stress Concern Present (12/27/2024)    [employer] [place] [place] - Occupational Stress Questionnaire     Feeling of Stress : Not at all   Housing Stability: Low Risk  (12/27/2024)    Housing Stability Vital Sign     Unable to Pay for Housing in the Last Year: No     Homeless in the Last Year: No     Past Surgical History:   Procedure Laterality Date    APPENDECTOMY      BREAST BIOPSY      CATARACT EXTRACTION      COLONOSCOPY N/A 12/21/2021    Procedure: COLONOSCOPY screening;  Surgeon: Moises Patterson MD;  Location: [facility];  Service: Endoscopy;  Laterality: N/A;    ESOPHAGOGASTRODUODENOSCOPY N/A 12/21/2021    Procedure: EGD (ESOPHAGOGASTRODUODENOSCOPY) EV screening;  Surgeon: Moises Patterson MD;  Location: [facility];  Service: Endoscopy;  Laterality: N/A;    EYE SURGERY      HERNIA REPAIR      OPEN REDUCTION AND INTERNAL FIXATION (ORIF) OF FRACTURE OF DISTAL RADIUS Left 11/2/2020    Procedure: ORIF, FRACTURE, RADIUS, DISTAL;  Surgeon: Sage Wolf MD;  Location: [facility];  Service: Orthopedics;  Laterality: Left;    OPEN REDUCTION AND INTERNAL FIXATION (ORIF) OF FRACTURE OF PATELLA Right 11/2/2020    Procedure: ORIF, FRACTURE, PATELLA;  Surgeon: Sage Wolf MD;  Location: [facility];  Service: Orthopedics;  Laterality: Right;    OPEN REDUCTION AND INTERNAL FIXATION (ORIF) OF FRACTURE OF PATELLA Right 12/18/2020    Procedure: ORIF, FRACTURE, PATELLA;  Surgeon: Sage Wolf MD;  Location: [facility] OR;  Service: Orthopedics;  Laterality: Right;    ORIF HUMERUS FRACTURE Left 11/5/2020    Procedure: ORIF, FRACTURE, HUMERUS;  Surgeon: Sage Wolf MD;  Location: [facility];  Service: Orthopedics;  Laterality: Left;    PLACEMENT OF ACELLULAR HUMAN DERMAL ALLOGRAFT Right 11/2/2020    Procedure: APPLICATION, ACELLULAR HUMAN DERMAL  "ALLOGRAFT;  Surgeon: Sage Wolf MD;  Location: [facility] OR;  Service: Orthopedics;  Laterality: Right;  Right Patella    REMOVAL OF HARDWARE FROM HAND Left 3/11/2021    Procedure: REMOVAL, HARDWARE, HAND;  Surgeon: Saeg Wolf MD;  Location: [facility] OR;  Service: Orthopedics;  Laterality: Left;  Removal of dorsal spanning wrist plate left distal radius    REMOVAL OF HARDWARE FROM LOWER EXTREMITY Right 12/18/2020    Procedure: REMOVAL, HARDWARE, LOWER EXTREMITY;  Surgeon: Sage Wolf MD;  Location: [facility] OR;  Service: Orthopedics;  Laterality: Right;       Labs:  Lab Results   Component Value Date    WBC 4.42 12/12/2024    HGB 11.1 (L) 12/12/2024    HCT 34.1 (L) 12/12/2024    MCV 87 12/12/2024     (L) 12/12/2024     BMP  Lab Results   Component Value Date     09/12/2024    K 4.0 09/12/2024     09/12/2024    CO2 17 (L) 09/12/2024    BUN 19 09/12/2024    CREATININE 1.9 (H) 09/12/2024    CALCIUM 8.3 (L) 09/12/2024    ANIONGAP 14 09/12/2024    ESTGFRAFRICA >60.0 03/03/2022    EGFRNONAA 56.8 (A) 03/03/2022     Lab Results   Component Value Date    ALT 8 (L) 09/12/2024    AST 12 09/12/2024    ALKPHOS 64 09/12/2024    BILITOT 0.6 09/12/2024       Lab Results   Component Value Date    IRON 58 11/19/2024    TIBC 259 11/19/2024    FERRITIN 110 11/19/2024     Lab Results   Component Value Date    KEXMYPWJ11 375 09/19/2024     No results found for: "FOLATE"  Lab Results   Component Value Date    TSH 0.39 09/19/2024         Review of Systems   Constitutional:  Positive for fatigue. Negative for activity change, appetite change, chills, diaphoresis, fever and unexpected weight change.   HENT:  Negative for congestion, dental problem, drooling, ear discharge, ear pain, facial swelling, hearing loss, mouth sores, nosebleeds, postnasal drip, rhinorrhea, sinus pressure, sneezing, sore throat, tinnitus, trouble swallowing and voice change.    Eyes:  Negative for photophobia, pain, " discharge, redness, itching and visual disturbance.   Respiratory:  Positive for shortness of breath. Negative for cough, choking, chest tightness, wheezing and stridor.    Cardiovascular:  Negative for chest pain, palpitations and leg swelling.   Gastrointestinal:  Negative for abdominal distention, abdominal pain, anal bleeding, blood in stool, constipation, diarrhea, nausea, rectal pain and vomiting.   Endocrine: Negative for cold intolerance, heat intolerance, polydipsia, polyphagia and polyuria.   Genitourinary:  Negative for decreased urine volume, difficulty urinating, dyspareunia, dysuria, enuresis, flank pain, frequency, genital sores, hematuria, menstrual problem, pelvic pain, urgency, vaginal bleeding, vaginal discharge and vaginal pain.   Musculoskeletal:  Negative for arthralgias, back pain, gait problem, joint swelling, myalgias, neck pain and neck stiffness.   Skin:  Negative for color change, pallor and rash.   Allergic/Immunologic: Negative for environmental allergies, food allergies and immunocompromised state.   Neurological:  Positive for weakness. Negative for dizziness, tremors, seizures, syncope, facial asymmetry, speech difficulty, light-headedness, numbness and headaches.   Hematological:  Negative for adenopathy. Does not bruise/bleed easily.   Psychiatric/Behavioral:  Negative for agitation, behavioral problems, confusion, decreased concentration, dysphoric mood, hallucinations, self-injury, sleep disturbance and suicidal ideas. The patient is not nervous/anxious and is not hyperactive.        Objective:      Physical Exam  Vitals reviewed.   Constitutional:       General: She is not in acute distress.     Appearance: She is well-developed. She is not diaphoretic.   HENT:      Head: Normocephalic and atraumatic.      Right Ear: External ear normal.      Left Ear: External ear normal.      Nose: Nose normal.      Right Sinus: No maxillary sinus tenderness or frontal sinus tenderness.       Left Sinus: No maxillary sinus tenderness or frontal sinus tenderness.      Mouth/Throat:      Pharynx: No oropharyngeal exudate.   Eyes:      General: Lids are normal. No scleral icterus.        Right eye: No discharge.         Left eye: No discharge.      Conjunctiva/sclera: Conjunctivae normal.      Right eye: Right conjunctiva is not injected. No hemorrhage.     Left eye: Left conjunctiva is not injected. No hemorrhage.     Pupils: Pupils are equal, round, and reactive to light.   Neck:      Thyroid: No thyromegaly.      Vascular: No JVD.      Trachea: No tracheal deviation.   Cardiovascular:      Rate and Rhythm: Normal rate.   Pulmonary:      Effort: Pulmonary effort is normal. No respiratory distress.      Breath sounds: No stridor.   Chest:      Chest wall: No tenderness.   Abdominal:      General: Bowel sounds are normal. There is no distension.      Palpations: Abdomen is soft. There is no hepatomegaly, splenomegaly or mass.      Tenderness: There is no abdominal tenderness. There is no rebound.   Musculoskeletal:         General: No tenderness. Normal range of motion.      Cervical back: Normal range of motion and neck supple.   Lymphadenopathy:      Cervical: No cervical adenopathy.      Upper Body:      Right upper body: No supraclavicular adenopathy.      Left upper body: No supraclavicular adenopathy.   Skin:     General: Skin is dry.      Findings: No erythema or rash.   Neurological:      Mental Status: She is alert and oriented to person, place, and time.      Cranial Nerves: No cranial nerve deficit.      Coordination: Coordination normal.   Psychiatric:         Behavior: Behavior normal.         Thought Content: Thought content normal.         Judgment: Judgment normal.             Assessment:      1. Thrombocytopenia    2. Class 2 severe obesity due to excess calories with serious comorbidity and body mass index (BMI) of 36.0 to 36.9 in adult    3. Chronic kidney disease, stage 4 (severe)    4.  Chronic diastolic congestive heart failure    5. Dependence on supplemental oxygen           Med Onc Chart Routing      Follow up with physician No follow up needed.   Follow up with [facility]    Infusion scheduling note    Injection scheduling note    Labs    Imaging    Pharmacy appointment    Other referrals                   Plan:     Reviewed nonspecific mediastinal adenopathy stable size patient has no PET avid findings.  At this time no follow-up warranted my recommendations are follow-up through primary care physician most likely thrombocytopenia near 100,000 related to hypersplenism secondary to cirrhosis follow-up through primary physician in correlation no further workup from my standpoint warranted patient has a appointment soon to be seen by Cardiology        Augustin Archer Jr, MD FACP

## 2025-01-15 DIAGNOSIS — I10 ESSENTIAL HYPERTENSION: ICD-10-CM

## 2025-01-15 RX ORDER — AMLODIPINE BESYLATE 10 MG/1
TABLET ORAL
Qty: 90 TABLET | Refills: 1 | Status: SHIPPED | OUTPATIENT
Start: 2025-01-15

## 2025-01-17 ENCOUNTER — OFFICE VISIT (OUTPATIENT)
Dept: ALLERGY | Facility: CLINIC | Age: 75
End: 2025-01-17
Payer: MEDICARE

## 2025-01-17 VITALS
OXYGEN SATURATION: 98 % | SYSTOLIC BLOOD PRESSURE: 143 MMHG | BODY MASS INDEX: 38.28 KG/M2 | HEIGHT: 65 IN | HEART RATE: 40 BPM | WEIGHT: 229.75 LBS | TEMPERATURE: 98 F | DIASTOLIC BLOOD PRESSURE: 51 MMHG

## 2025-01-17 DIAGNOSIS — J30.89 ALLERGIC RHINITIS DUE TO DUST MITE: ICD-10-CM

## 2025-01-17 DIAGNOSIS — J45.30 MILD PERSISTENT ASTHMA WITHOUT COMPLICATION: Primary | ICD-10-CM

## 2025-01-17 LAB
FEF 25 75 LLN: 1.07
FEF 25 75 PRE REF: 55.6 %
FEF 25 75 REF: 2.47
FEV05 LLN: 0.84
FEV05 REF: 1.69
FEV1 FVC LLN: 64
FEV1 FVC PRE REF: 101.9 %
FEV1 FVC REF: 78
FEV1 LLN: 1.55
FEV1 PRE REF: 64.2 %
FEV1 REF: 2.16
FEV1FVCZSCORE: 0.19
FEV1ZSCORE: -2.07
FVC LLN: 2.02
FVC PRE REF: 62.3 %
FVC REF: 2.82
FVCZSCORE: -2.22
PEF LLN: 3.72
PEF PRE REF: 34.3 %
PEF REF: 5.48
PRE FEF 25 75: 1.37 L/S (ref 1.07–3.87)
PRE FET 100: 5.35 SEC
PRE FEV05 REF: 55.3 %
PRE FEV1 FVC: 79 % (ref 63.72–89.43)
PRE FEV1: 1.39 L (ref 1.55–2.75)
PRE FEV5: 0.94 L (ref 0.84–2.55)
PRE FVC: 1.76 L (ref 2.02–3.65)
PRE PEF: 1.88 L/S (ref 3.72–7.25)

## 2025-01-17 PROCEDURE — 1159F MED LIST DOCD IN RCRD: CPT | Mod: CPTII,S$GLB,, | Performed by: STUDENT IN AN ORGANIZED HEALTH CARE EDUCATION/TRAINING PROGRAM

## 2025-01-17 PROCEDURE — 94010 BREATHING CAPACITY TEST: CPT | Mod: S$GLB,,, | Performed by: STUDENT IN AN ORGANIZED HEALTH CARE EDUCATION/TRAINING PROGRAM

## 2025-01-17 PROCEDURE — 3077F SYST BP >= 140 MM HG: CPT | Mod: CPTII,S$GLB,, | Performed by: STUDENT IN AN ORGANIZED HEALTH CARE EDUCATION/TRAINING PROGRAM

## 2025-01-17 PROCEDURE — 1126F AMNT PAIN NOTED NONE PRSNT: CPT | Mod: CPTII,S$GLB,, | Performed by: STUDENT IN AN ORGANIZED HEALTH CARE EDUCATION/TRAINING PROGRAM

## 2025-01-17 PROCEDURE — 1160F RVW MEDS BY RX/DR IN RCRD: CPT | Mod: CPTII,S$GLB,, | Performed by: STUDENT IN AN ORGANIZED HEALTH CARE EDUCATION/TRAINING PROGRAM

## 2025-01-17 PROCEDURE — 3008F BODY MASS INDEX DOCD: CPT | Mod: CPTII,S$GLB,, | Performed by: STUDENT IN AN ORGANIZED HEALTH CARE EDUCATION/TRAINING PROGRAM

## 2025-01-17 PROCEDURE — 1101F PT FALLS ASSESS-DOCD LE1/YR: CPT | Mod: CPTII,S$GLB,, | Performed by: STUDENT IN AN ORGANIZED HEALTH CARE EDUCATION/TRAINING PROGRAM

## 2025-01-17 PROCEDURE — 3078F DIAST BP <80 MM HG: CPT | Mod: CPTII,S$GLB,, | Performed by: STUDENT IN AN ORGANIZED HEALTH CARE EDUCATION/TRAINING PROGRAM

## 2025-01-17 PROCEDURE — 3288F FALL RISK ASSESSMENT DOCD: CPT | Mod: CPTII,S$GLB,, | Performed by: STUDENT IN AN ORGANIZED HEALTH CARE EDUCATION/TRAINING PROGRAM

## 2025-01-17 PROCEDURE — 99214 OFFICE O/P EST MOD 30 MIN: CPT | Mod: 25,S$GLB,, | Performed by: STUDENT IN AN ORGANIZED HEALTH CARE EDUCATION/TRAINING PROGRAM

## 2025-01-17 PROCEDURE — 99999 PR PBB SHADOW E&M-EST. PATIENT-LVL IV: CPT | Mod: PBBFAC,,, | Performed by: STUDENT IN AN ORGANIZED HEALTH CARE EDUCATION/TRAINING PROGRAM

## 2025-01-17 PROCEDURE — 4010F ACE/ARB THERAPY RXD/TAKEN: CPT | Mod: CPTII,S$GLB,, | Performed by: STUDENT IN AN ORGANIZED HEALTH CARE EDUCATION/TRAINING PROGRAM

## 2025-01-17 NOTE — PROGRESS NOTES
Allergy and Immunology  Procedure Note     Spirometry  Date FEV1 (L)  (% predicted) FVC (L)  (% predicted) FEV1/ FVC FQU04-76%  (% predicted)   01/17/2025 64% 62% 79% 56%                 Interpretation: Non-obstructive ratio. Flow loops are in consistent.   Possible restrictive component.     Angelita Perkins MD   [facility]  Allergy and Clinical Immunology

## 2025-01-17 NOTE — PROGRESS NOTES
"Allergy and Immunology  Established Patient Clinic Note    Date: 2025  Chief Complaint   Patient presents with    Follow-up     History  Lakshmi Campbell is a 74 y.o. female being seen for follow-up today.    Mild Persistent Asthma  Allergic Rhinitis due to dust mites   - Interval improvement since starting Symbicort BID  - No use of albuterol since starting Symbicort   - Pt reported notable decrease in SOB and increase in exercise tolerance  - No major nasal complaints     Allergies, PMH, PSH, Social, and Family History were reviewed.    Current Outpatient Medications on File Prior to Visit   Medication Sig Dispense Refill    albuterol (VENTOLIN HFA) 90 mcg/actuation inhaler Inhale 2 puffs into the lungs every 6 (six) hours as needed for Wheezing or Shortness of Breath (cough). Rescue 20.1 g 5    amLODIPine (NORVASC) 10 MG tablet TAKE 1 TABLET(10 MG) BY MOUTH EVERY DAY 90 tablet 1    blood sugar diagnostic Strp Use ac bid      budesonide-formoterol 160-4.5 mcg (SYMBICORT) 160-4.5 mcg/actuation HFAA Inhale 2 puffs into the lungs every 12 (twelve) hours. Controller 10.2 g 11    cholecalciferol, vitamin D3, (VITAMIN D3) 50 mcg (2,000 unit) Cap capsule Take 1 capsule by mouth.      HUMULIN 70/30 U-100 KWIKPEN 100 unit/mL (70-30) InPn pen SMARTSI Unit(s) SUB-Q Every Evening      insulin syringe-needle U-100 1 mL 29 gauge x 1/2" Syrg Use bid      JARDIANCE 25 mg tablet Take 25 mg by mouth every morning.      lancets 33 gauge Misc Use as BID      levothyroxine (SYNTHROID) 150 MCG tablet Take 1 tablet by mouth once daily.      metFORMIN (GLUCOPHAGE) 500 MG tablet Take 500 mg by mouth 2 (two) times daily.      metoprolol succinate (TOPROL-XL) 25 MG 24 hr tablet TAKE 1/2 TABLET(12.5 MG) BY MOUTH DAILY 45 tablet 1    perindopril erbumine (ACEON) 4 mg tablet Take 2 mg by mouth once daily.      pravastatin (PRAVACHOL) 20 MG tablet Take 20 mg by mouth once daily.   11    spironolactone (ALDACTONE) 25 MG tablet Take 25 mg " by mouth.      chlorthalidone (HYGROTEN) 25 MG Tab Take 25 mg by mouth once daily.       No current facility-administered medications on file prior to visit.     Physical Examination  Vitals:    01/17/25 0755   BP: (!) 143/51   Pulse: (!) 40   Temp: 97.5 °F (36.4 °C)     GENERAL:  female in no apparent distress and well developed and well nourished  HEAD:  Normocephalic, without obvious abnormality, atraumatic  EYES: sclera anicteric, conjunctiva normochromic  EARS: normal TM's and external ear canals both ears  NOSE: without erythema or discharge, clear discharge, turbinates normal    OROPHARYNX: moist mucous membranes without erythema, exudates or petechiae  LYMPH NODES: normal, supple, no lymphadenopathy  LUNGS: clear to auscultation, no wheezes, rales or rhonchi, symmetric air entry.  HEART: normal rate, regular rhythm, normal S1, S2, no murmurs, rubs, clicks or gallops.  ABDOMEN: soft, nontender, nondistended, no masses or organomegaly.  MUSCULOSKELETAL: no gross joint deformity or swelling.  NEURO: alert, oriented, normal speech, no focal findings or movement disorder noted.  SKIN: normal coloration and turgor, no rashes, no suspicious skin lesions noted.     Assessment/Plan:   Problem List Items Addressed This Visit       Mild persistent asthma without complication - Primary    Relevant Orders    Spirometry without Bronchodilator (Completed)    Allergic rhinitis due to dust mite    Overview     - 11/19/2024: Serum IgE to Zone 6 Aeroallergens positive to dust mites          - Interval improvement since prior appointment   - Continue Symbicort BID and Albuterol PRN   - Educated on ED precautions and inhaler use   - Educated on environmental controls for AR/asthma triggers  - Reviewed spirometry and discussed with patient     Follow up:  Follow up in about 4 months (around 5/17/2025).    Angelita Perkins MD   [facility]  Allergy and Immunology

## 2025-01-24 ENCOUNTER — OFFICE VISIT (OUTPATIENT)
Dept: INTERNAL MEDICINE | Facility: CLINIC | Age: 75
End: 2025-01-24
Payer: MEDICARE

## 2025-01-24 ENCOUNTER — HOSPITAL ENCOUNTER (OUTPATIENT)
Dept: RADIOLOGY | Facility: HOSPITAL | Age: 75
Discharge: HOME | End: 2025-01-24
Attending: NURSE PRACTITIONER
Payer: MEDICARE

## 2025-01-24 VITALS — BODY MASS INDEX: 38.15 KG/M2 | HEIGHT: 65 IN | WEIGHT: 229 LBS

## 2025-01-24 VITALS
WEIGHT: 229.75 LBS | BODY MASS INDEX: 38.28 KG/M2 | SYSTOLIC BLOOD PRESSURE: 148 MMHG | DIASTOLIC BLOOD PRESSURE: 60 MMHG | TEMPERATURE: 98 F | OXYGEN SATURATION: 96 % | HEIGHT: 65 IN | HEART RATE: 44 BPM

## 2025-01-24 DIAGNOSIS — I10 ESSENTIAL HYPERTENSION: ICD-10-CM

## 2025-01-24 DIAGNOSIS — N18.4 CHRONIC KIDNEY DISEASE, STAGE 4 (SEVERE): ICD-10-CM

## 2025-01-24 DIAGNOSIS — R80.9 TYPE 2 DIABETES MELLITUS WITH MICROALBUMINURIA, WITH LONG-TERM CURRENT USE OF INSULIN: ICD-10-CM

## 2025-01-24 DIAGNOSIS — Z79.4 TYPE 2 DIABETES MELLITUS WITH STAGE 3B CHRONIC KIDNEY DISEASE, WITH LONG-TERM CURRENT USE OF INSULIN: Primary | ICD-10-CM

## 2025-01-24 DIAGNOSIS — E11.22 TYPE 2 DIABETES MELLITUS WITH STAGE 3B CHRONIC KIDNEY DISEASE, WITH LONG-TERM CURRENT USE OF INSULIN: Primary | ICD-10-CM

## 2025-01-24 DIAGNOSIS — E11.29 TYPE 2 DIABETES MELLITUS WITH MICROALBUMINURIA, WITH LONG-TERM CURRENT USE OF INSULIN: ICD-10-CM

## 2025-01-24 DIAGNOSIS — I50.32 CHRONIC DIASTOLIC CONGESTIVE HEART FAILURE: ICD-10-CM

## 2025-01-24 DIAGNOSIS — N18.32 TYPE 2 DIABETES MELLITUS WITH STAGE 3B CHRONIC KIDNEY DISEASE, WITH LONG-TERM CURRENT USE OF INSULIN: Primary | ICD-10-CM

## 2025-01-24 DIAGNOSIS — Z12.31 BREAST CANCER SCREENING BY MAMMOGRAM: ICD-10-CM

## 2025-01-24 DIAGNOSIS — E11.3293 MILD NONPROLIFERATIVE DIABETIC RETINOPATHY OF BOTH EYES WITHOUT MACULAR EDEMA ASSOCIATED WITH TYPE 2 DIABETES MELLITUS: ICD-10-CM

## 2025-01-24 DIAGNOSIS — Z79.4 TYPE 2 DIABETES MELLITUS WITH MICROALBUMINURIA, WITH LONG-TERM CURRENT USE OF INSULIN: ICD-10-CM

## 2025-01-24 PROCEDURE — 77067 SCR MAMMO BI INCL CAD: CPT | Mod: 26,,, | Performed by: RADIOLOGY

## 2025-01-24 PROCEDURE — 3078F DIAST BP <80 MM HG: CPT | Mod: CPTII,S$GLB,, | Performed by: NURSE PRACTITIONER

## 2025-01-24 PROCEDURE — 77063 BREAST TOMOSYNTHESIS BI: CPT | Mod: 26,,, | Performed by: RADIOLOGY

## 2025-01-24 PROCEDURE — 3288F FALL RISK ASSESSMENT DOCD: CPT | Mod: CPTII,S$GLB,, | Performed by: NURSE PRACTITIONER

## 2025-01-24 PROCEDURE — 77063 BREAST TOMOSYNTHESIS BI: CPT | Mod: TC,PO

## 2025-01-24 PROCEDURE — 1126F AMNT PAIN NOTED NONE PRSNT: CPT | Mod: CPTII,S$GLB,, | Performed by: NURSE PRACTITIONER

## 2025-01-24 PROCEDURE — 99214 OFFICE O/P EST MOD 30 MIN: CPT | Mod: S$GLB,,, | Performed by: NURSE PRACTITIONER

## 2025-01-24 PROCEDURE — G2211 COMPLEX E/M VISIT ADD ON: HCPCS | Mod: S$GLB,,, | Performed by: NURSE PRACTITIONER

## 2025-01-24 PROCEDURE — 1160F RVW MEDS BY RX/DR IN RCRD: CPT | Mod: CPTII,S$GLB,, | Performed by: NURSE PRACTITIONER

## 2025-01-24 PROCEDURE — 4010F ACE/ARB THERAPY RXD/TAKEN: CPT | Mod: CPTII,S$GLB,, | Performed by: NURSE PRACTITIONER

## 2025-01-24 PROCEDURE — 3008F BODY MASS INDEX DOCD: CPT | Mod: CPTII,S$GLB,, | Performed by: NURSE PRACTITIONER

## 2025-01-24 PROCEDURE — 1101F PT FALLS ASSESS-DOCD LE1/YR: CPT | Mod: CPTII,S$GLB,, | Performed by: NURSE PRACTITIONER

## 2025-01-24 PROCEDURE — 3077F SYST BP >= 140 MM HG: CPT | Mod: CPTII,S$GLB,, | Performed by: NURSE PRACTITIONER

## 2025-01-24 PROCEDURE — 99999 PR PBB SHADOW E&M-EST. PATIENT-LVL IV: CPT | Mod: PBBFAC,,, | Performed by: NURSE PRACTITIONER

## 2025-01-24 PROCEDURE — 1159F MED LIST DOCD IN RCRD: CPT | Mod: CPTII,S$GLB,, | Performed by: NURSE PRACTITIONER

## 2025-01-24 RX ORDER — CARVEDILOL 3.12 MG/1
3.12 TABLET ORAL
COMMUNITY
Start: 2025-01-14 | End: 2026-01-14

## 2025-01-24 NOTE — PROGRESS NOTES
Subjective:       Patient ID: Lakshmi Campbell is a 74 y.o. female.    Chief Complaint: Diabetes and Hypertension      History of Present Illness    CHIEF COMPLAINT:  - Ms. Campbell presents for a routine follow-up visit to review her current health status and discuss ongoing medical management.    HPI:  Ms. Campbell is a 74-year-old female with a history of elevated blood pressure, which has been high recently. Medication recently adjusted from nephrology. She had a recent episode of shortness of breath, prompting a visit to a pulmonologist. She reported dyspnea when walking from her back door to her bedroom, requiring rest to recuperate. The pulmonologist diagnosed her with asthma and prescribed albuterol. Ms. Campbell reports resolution of exertional dyspnea with medication adherence.    Ms. Campbell has a history of heart failure and sees multiple specialists: a cardiologist (appointment on March 17th), a pulmonologist (Dr. Hendrickson), an endocrinologist (Dr. Reynolds, appointment on March 19th), a nephrologist (Dr. Estrada, appointment on April 17th), and an allergist (Dr. Perkins, upcoming appt on May 26th). She also saw a hematology-oncology doctor (Dr. Archer) who advised no follow up needed.    Ms. Campbell reports polyuria, which she attributes to increased fluid intake. She denies dysuria and states that a previous urinary issue has resolved. Ms. Campbell also denies current shortness of breath, dizziness, lightheadedness, headaches, presyncope, fever, chills, fatigue, or vision concerns.      Diabetes Management Status    Statin: Taking  ACE/ARB: Taking    Screening or Prevention Patient's value Goal Complete/Controlled?   HgA1C Testing and Control   Lab Results   Component Value Date    HGBA1C 6.4 (H) 09/19/2024      Annually/Less than 8% Yes   Lipid profile : 09/19/2024 Annually Yes   LDL control Lab Results   Component Value Date    LDLCALC 24.0 (L) 06/27/2024    Annually/Less than 100 mg/dl  Yes   Nephropathy screening Lab  "Results   Component Value Date    LABMICR 529.0 07/16/2024     Lab Results   Component Value Date    PROTEINUA 3+ (A) 10/18/2024     No results found for: "UTPCR"   Annually Yes   Blood pressure BP Readings from Last 1 Encounters:   01/24/25 (!) 148/60    Less than 140/90 No   Dilated retinal exam : 07/01/2022 Annually No   Foot exam   : 03/19/2024 Annually Yes         Patient Active Problem List   Diagnosis    Type 2 diabetes mellitus with microalbuminuria, with long-term current use of insulin    Hypothyroidism    Essential hypertension    Hyperlipidemia    Microalbuminuria    Class 2 severe obesity due to excess calories with serious comorbidity and body mass index (BMI) of 38.0 to 38.9 in adult    Left knee pain    Shoulder pain, left    Thrombocytopenia    Wrist stiffness, left    Liver cirrhosis secondary to YO    Lower extremity edema    Abnormal mammogram    Mild nonproliferative diabetic retinopathy of both eyes without macular edema associated with type 2 diabetes mellitus    Vitamin D deficiency    Chronic diastolic congestive heart failure    Hyperkalemia    Senile purpura    Dependence on supplemental oxygen    Type 2 diabetes mellitus with stage 3b chronic kidney disease, with long-term current use of insulin    Acute cystitis with hematuria    Chronic kidney disease, stage 4 (severe)    Sore throat    Mild persistent asthma without complication    Shortness of breath    Microcytic anemia    Enlarged lymph nodes    Allergic rhinitis due to dust mite         Past Surgical History:   Procedure Laterality Date    APPENDECTOMY      BREAST BIOPSY      CATARACT EXTRACTION      COLONOSCOPY N/A 12/21/2021    Procedure: COLONOSCOPY screening;  Surgeon: Moises Patterson MD;  Location: [facility];  Service: Endoscopy;  Laterality: N/A;    ESOPHAGOGASTRODUODENOSCOPY N/A 12/21/2021    Procedure: EGD (ESOPHAGOGASTRODUODENOSCOPY) EV screening;  Surgeon: Moises Patterson MD;  Location: [facility];  Service: " Endoscopy;  Laterality: N/A;    EYE SURGERY      HERNIA REPAIR      OPEN REDUCTION AND INTERNAL FIXATION (ORIF) OF FRACTURE OF DISTAL RADIUS Left 11/2/2020    Procedure: ORIF, FRACTURE, RADIUS, DISTAL;  Surgeon: Sage Wolf MD;  Location: [facility] OR;  Service: Orthopedics;  Laterality: Left;    OPEN REDUCTION AND INTERNAL FIXATION (ORIF) OF FRACTURE OF PATELLA Right 11/2/2020    Procedure: ORIF, FRACTURE, PATELLA;  Surgeon: Sage Wolf MD;  Location: [facility] OR;  Service: Orthopedics;  Laterality: Right;    OPEN REDUCTION AND INTERNAL FIXATION (ORIF) OF FRACTURE OF PATELLA Right 12/18/2020    Procedure: ORIF, FRACTURE, PATELLA;  Surgeon: Sage Wolf MD;  Location: [facility] OR;  Service: Orthopedics;  Laterality: Right;    ORIF HUMERUS FRACTURE Left 11/5/2020    Procedure: ORIF, FRACTURE, HUMERUS;  Surgeon: Sage Wolf MD;  Location: [facility] OR;  Service: Orthopedics;  Laterality: Left;    PLACEMENT OF ACELLULAR HUMAN DERMAL ALLOGRAFT Right 11/2/2020    Procedure: APPLICATION, ACELLULAR HUMAN DERMAL ALLOGRAFT;  Surgeon: Sage Wolf MD;  Location: [facility] OR;  Service: Orthopedics;  Laterality: Right;  Right Patella    REMOVAL OF HARDWARE FROM HAND Left 3/11/2021    Procedure: REMOVAL, HARDWARE, HAND;  Surgeon: Sage Wolf MD;  Location: [facility];  Service: Orthopedics;  Laterality: Left;  Removal of dorsal spanning wrist plate left distal radius    REMOVAL OF HARDWARE FROM LOWER EXTREMITY Right 12/18/2020    Procedure: REMOVAL, HARDWARE, LOWER EXTREMITY;  Surgeon: Sage Wolf MD;  Location: [facility];  Service: Orthopedics;  Laterality: Right;         Current Outpatient Medications:     albuterol (VENTOLIN HFA) 90 mcg/actuation inhaler, Inhale 2 puffs into the lungs every 6 (six) hours as needed for Wheezing or Shortness of Breath (cough). Rescue, Disp: 20.1 g, Rfl: 5    amLODIPine (NORVASC) 10 MG tablet, TAKE 1 TABLET(10 MG) BY MOUTH EVERY DAY,  "Disp: 90 tablet, Rfl: 1    blood sugar diagnostic Strp, Use ac bid, Disp: , Rfl:     budesonide-formoterol 160-4.5 mcg (SYMBICORT) 160-4.5 mcg/actuation HFAA, Inhale 2 puffs into the lungs every 12 (twelve) hours. Controller, Disp: 10.2 g, Rfl: 11    carvediloL (COREG) 3.125 MG tablet, Take 3.125 mg by mouth., Disp: , Rfl:     cholecalciferol, vitamin D3, (VITAMIN D3) 50 mcg (2,000 unit) Cap capsule, Take 1 capsule by mouth., Disp: , Rfl:     HUMULIN 70/30 U-100 KWIKPEN 100 unit/mL (70-30) InPn pen, SMARTSI Unit(s) SUB-Q Every Evening, Disp: , Rfl:     insulin syringe-needle U-100 1 mL 29 gauge x 1/2" Syrg, Use bid, Disp: , Rfl:     JARDIANCE 25 mg tablet, Take 25 mg by mouth every morning., Disp: , Rfl:     lancets 33 gauge Misc, Use as BID, Disp: , Rfl:     levothyroxine (SYNTHROID) 150 MCG tablet, Take 1 tablet by mouth once daily., Disp: , Rfl:     perindopril erbumine (ACEON) 4 mg tablet, Take 2 mg by mouth once daily., Disp: , Rfl:     pravastatin (PRAVACHOL) 20 MG tablet, Take 20 mg by mouth once daily. , Disp: , Rfl: 11    spironolactone (ALDACTONE) 25 MG tablet, Take 25 mg by mouth., Disp: , Rfl:     chlorthalidone (HYGROTEN) 25 MG Tab, Take 25 mg by mouth once daily., Disp: , Rfl:     Review of Systems   Constitutional:  Negative for appetite change, chills, fatigue and fever.   Eyes:  Negative for visual disturbance.   Respiratory:  Negative for cough and shortness of breath.    Cardiovascular:  Negative for chest pain and palpitations.   Gastrointestinal:  Negative for abdominal pain, diarrhea, nausea and vomiting.   Endocrine: Negative for polydipsia, polyphagia and polyuria.   Genitourinary:  Negative for dysuria.   Neurological:  Negative for dizziness, light-headedness and headaches.   Psychiatric/Behavioral:  Negative for dysphoric mood. The patient is not nervous/anxious.        Objective:   BP (!) 148/60   Pulse (!) 44   Temp 97.7 °F (36.5 °C) (Oral)   Ht 5' 5" (1.651 m)   Wt 104.2 kg (229 " lb 11.5 oz)   SpO2 96%   BMI 38.23 kg/m²      Physical Exam  Constitutional:       General: She is not in acute distress.     Appearance: Normal appearance. She is obese. She is not ill-appearing.   HENT:      Head: Normocephalic.   Cardiovascular:      Rate and Rhythm: Regular rhythm. Bradycardia present.      Heart sounds: Murmur heard.      No friction rub. No gallop.   Pulmonary:      Effort: Pulmonary effort is normal. No respiratory distress.      Breath sounds: Normal breath sounds. No wheezing.   Musculoskeletal:      Right lower leg: No edema.      Left lower leg: No edema.   Skin:     General: Skin is warm and dry.      Coloration: Skin is not pale.      Findings: No erythema.   Neurological:      Mental Status: She is alert and oriented to person, place, and time.   Psychiatric:         Mood and Affect: Mood normal.         Behavior: Behavior normal.         Assessment & Plan     Problem List Items Addressed This Visit          Ophtho    Mild nonproliferative diabetic retinopathy of both eyes without macular edema associated with type 2 diabetes mellitus    Current Assessment & Plan     Due for eye exam.             Cardiac/Vascular    Essential hypertension    Current Assessment & Plan     Blood pressure elevated today. Continue to monitor at home. Bring in update medication list.          Chronic diastolic congestive heart failure    Current Assessment & Plan     Concerns for weight gain.            Renal/    Chronic kidney disease, stage 4 (severe)    Current Assessment & Plan     Followed by nephrology.             Endocrine    Type 2 diabetes mellitus with microalbuminuria, with long-term current use of insulin    Current Assessment & Plan     Labs reviewed.   Lab Results   Component Value Date    HGBA1C 6.4 (H) 09/19/2024   Continue current medications for now.          Type 2 diabetes mellitus with stage 3b chronic kidney disease, with long-term current use of insulin - Primary    Current  "Assessment & Plan     Followed by ron Henley.             Assessment & Plan    MEDICAL DECISION MAKING:  - Evaluated patient's blood pressure, which was elevated during the visit  - Considered fluid overload due to heart failure as a potential concern  - Assessed need for medication adjustments, particularly regarding beta-blockers and diuretics  - Will defer major medication changes to upcoming cardiology and nephrology appointments  - Recommend holding coreg today for bradycardia  - Considered potential increase of Perindopril dosage and addition of kidney-safe diuretic for heart failure management  - Opted not to repeat labs due to recent tests by nephrologist and upcoming endocrinology appointment    MEDICATIONS:  - holding Carvedilol due to low heart rate  - Continued other current medications    FOLLOW UP:  - Follow up in 2 weeks for blood pressure check with nurse  - Contact the office with home blood pressure readings if using a home blood pressure machine          Follow up if symptoms worsen or fail to improve.          Portions of this note may have been created with voice recognition software. Occasional "wrong-word" or "sound-a-like" substitutions may have occurred due to the inherent limitations of voice recognition software. Please, read the note carefully and recognize, using context, where substitutions have occurred.       This note was generated with the assistance of ambient listening technology. Verbal consent was obtained by the patient and accompanying visitor(s) for the recording of patient appointment to facilitate this note. I attest to having reviewed and edited the generated note for accuracy, though some syntax or spelling errors may persist. Please contact the author of this note for any clarification.       "

## 2025-01-30 NOTE — ASSESSMENT & PLAN NOTE
Labs reviewed.   Lab Results   Component Value Date    HGBA1C 6.4 (H) 09/19/2024   Continue current medications for now.

## 2025-02-17 ENCOUNTER — OFFICE VISIT (OUTPATIENT)
Dept: CARDIOLOGY | Facility: CLINIC | Age: 75
End: 2025-02-17
Payer: MEDICARE

## 2025-02-17 ENCOUNTER — PATIENT MESSAGE (OUTPATIENT)
Dept: PULMONOLOGY | Facility: CLINIC | Age: 75
End: 2025-02-17
Payer: MEDICARE

## 2025-02-17 VITALS
SYSTOLIC BLOOD PRESSURE: 152 MMHG | WEIGHT: 215.63 LBS | DIASTOLIC BLOOD PRESSURE: 50 MMHG | HEART RATE: 43 BPM | HEIGHT: 65 IN | BODY MASS INDEX: 35.93 KG/M2

## 2025-02-17 DIAGNOSIS — E78.2 MIXED HYPERLIPIDEMIA: ICD-10-CM

## 2025-02-17 DIAGNOSIS — I50.31 ACUTE DIASTOLIC CONGESTIVE HEART FAILURE: ICD-10-CM

## 2025-02-17 DIAGNOSIS — I50.32 CHRONIC DIASTOLIC CONGESTIVE HEART FAILURE: ICD-10-CM

## 2025-02-17 DIAGNOSIS — R00.1 BRADYCARDIA: Primary | ICD-10-CM

## 2025-02-17 DIAGNOSIS — N18.4 CHRONIC KIDNEY DISEASE, STAGE 4 (SEVERE): ICD-10-CM

## 2025-02-17 DIAGNOSIS — I10 ESSENTIAL HYPERTENSION: ICD-10-CM

## 2025-02-17 PROCEDURE — 99214 OFFICE O/P EST MOD 30 MIN: CPT | Mod: S$GLB,,,

## 2025-02-17 NOTE — PROGRESS NOTES
Subjective:   Patient ID:  Lakshmi Campbell is a 74 y.o. female who presents for evaluation of No chief complaint on file.      HPI 74-year-old female whose current medical conditions include HTN, HLP, diastolic heart failure, CKD previously followed in cardiology clinic by Dr. Jernigan here today for CV follow-up denies any chest pain or CHF symptoms.  She was recently switched to carvedilol from metoprolol for better blood pressure control however since she has complained of worsening fatigue and no energy.  Heart rate in clinic today 40s, blood pressure remains elevated, however diastolic in the 50s.     Echo 6/24 with normal EF, gradeII DD, moderate AS    Past Medical History:   Diagnosis Date    Acquired TTP     Cataract     CKD stage 3 secondary to diabetes     Closed displaced comminuted fracture of right patella 10/28/2020    Closed fracture of surgical neck of left humerus 10/30/2020    Closed left radial fracture 10/30/2020    Hyperkalemia     Hyperlipidemia     Hypertension     Hypothyroidism, unspecified     Liver cirrhosis secondary to YO     Morbid obesity     Right knee pain     Thyroid disease     Type 2 diabetes mellitus        Past Surgical History:   Procedure Laterality Date    APPENDECTOMY      BREAST BIOPSY      CATARACT EXTRACTION      COLONOSCOPY N/A 12/21/2021    Procedure: COLONOSCOPY screening;  Surgeon: Moises Patterson MD;  Location: [facility];  Service: Endoscopy;  Laterality: N/A;    ESOPHAGOGASTRODUODENOSCOPY N/A 12/21/2021    Procedure: EGD (ESOPHAGOGASTRODUODENOSCOPY) EV screening;  Surgeon: Moises Patterson MD;  Location: [facility];  Service: Endoscopy;  Laterality: N/A;    EYE SURGERY      HERNIA REPAIR      OPEN REDUCTION AND INTERNAL FIXATION (ORIF) OF FRACTURE OF DISTAL RADIUS Left 11/2/2020    Procedure: ORIF, FRACTURE, RADIUS, DISTAL;  Surgeon: Sage Wolf MD;  Location: [facility];  Service: Orthopedics;  Laterality: Left;    OPEN REDUCTION AND INTERNAL FIXATION  (ORIF) OF FRACTURE OF PATELLA Right 11/2/2020    Procedure: ORIF, FRACTURE, PATELLA;  Surgeon: Sage Wolf MD;  Location: [facility] OR;  Service: Orthopedics;  Laterality: Right;    OPEN REDUCTION AND INTERNAL FIXATION (ORIF) OF FRACTURE OF PATELLA Right 12/18/2020    Procedure: ORIF, FRACTURE, PATELLA;  Surgeon: Sage Wolf MD;  Location: [facility] OR;  Service: Orthopedics;  Laterality: Right;    ORIF HUMERUS FRACTURE Left 11/5/2020    Procedure: ORIF, FRACTURE, HUMERUS;  Surgeon: Sage Wolf MD;  Location: [facility] OR;  Service: Orthopedics;  Laterality: Left;    PLACEMENT OF ACELLULAR HUMAN DERMAL ALLOGRAFT Right 11/2/2020    Procedure: APPLICATION, ACELLULAR HUMAN DERMAL ALLOGRAFT;  Surgeon: Sage Wolf MD;  Location: [facility] OR;  Service: Orthopedics;  Laterality: Right;  Right Patella    REMOVAL OF HARDWARE FROM HAND Left 3/11/2021    Procedure: REMOVAL, HARDWARE, HAND;  Surgeon: Sage Wolf MD;  Location: [facility] OR;  Service: Orthopedics;  Laterality: Left;  Removal of dorsal spanning wrist plate left distal radius    REMOVAL OF HARDWARE FROM LOWER EXTREMITY Right 12/18/2020    Procedure: REMOVAL, HARDWARE, LOWER EXTREMITY;  Surgeon: Sage Wolf MD;  Location: [facility];  Service: Orthopedics;  Laterality: Right;       Social History[1]    Family History   Problem Relation Name Age of Onset    Diabetes Mother      Leukemia Father      Diabetes Father         Medications Ordered Prior to Encounter[2]   Wt Readings from Last 3 Encounters:   02/17/25 97.8 kg (215 lb 9.8 oz)   01/24/25 103.9 kg (229 lb)   01/24/25 104.2 kg (229 lb 11.5 oz)     Temp Readings from Last 3 Encounters:   01/24/25 97.7 °F (36.5 °C) (Oral)   01/17/25 97.5 °F (36.4 °C) (Temporal)   01/10/25 97.4 °F (36.3 °C) (Temporal)     BP Readings from Last 3 Encounters:   02/17/25 (!) 152/50   01/24/25 (!) 148/60   01/17/25 (!) 143/51     Pulse Readings from Last 3 Encounters:   02/17/25 (!)  43   01/24/25 (!) 44   01/17/25 (!) 40        Review of Systems   Constitutional: Positive for malaise/fatigue.   HENT: Negative.     Eyes: Negative.    Cardiovascular:  Positive for dyspnea on exertion.   Respiratory:  Positive for shortness of breath.    Skin: Negative.    Musculoskeletal: Negative.    Gastrointestinal: Negative.    Genitourinary: Negative.    Neurological: Negative.    Psychiatric/Behavioral: Negative.         Objective:   Physical Exam  Vitals and nursing note reviewed.   Constitutional:       Appearance: Normal appearance.   HENT:      Head: Normocephalic.   Eyes:      Pupils: Pupils are equal, round, and reactive to light.   Cardiovascular:      Rate and Rhythm: Regular rhythm. Bradycardia present.      Heart sounds: S1 normal and S2 normal. Murmur heard.      No S3 or S4 sounds.   Pulmonary:      Effort: Pulmonary effort is normal.      Breath sounds: Normal breath sounds.   Abdominal:      General: Bowel sounds are normal.      Palpations: Abdomen is soft.   Musculoskeletal:         General: Normal range of motion.      Cervical back: Normal range of motion.   Skin:     Capillary Refill: Capillary refill takes less than 2 seconds.   Neurological:      General: No focal deficit present.      Mental Status: She is alert and oriented to person, place, and time.   Psychiatric:         Mood and Affect: Mood normal.         Behavior: Behavior normal.         Thought Content: Thought content normal.         Lab Results   Component Value Date    CHOL 71 (L) 06/27/2024    CHOL 119 10/14/2019    CHOL 165 03/05/2012     Lab Results   Component Value Date    HDL 34 (L) 06/27/2024    HDL 46 10/14/2019    HDL 48 03/05/2012     Lab Results   Component Value Date    LDLCALC 24.0 (L) 06/27/2024    LDLCALC 55 10/14/2019    LDLCALC 97.0 03/05/2012     Lab Results   Component Value Date    TRIG 65 06/27/2024    TRIG 89 10/14/2019    TRIG 102 03/05/2012     Lab Results   Component Value Date    CHOLHDL 47.9  06/27/2024    CHOLHDL 29.1 03/05/2012    CHOLHDL 28.8 09/06/2011       Chemistry        Component Value Date/Time     09/12/2024 1539    K 4.0 09/12/2024 1539     09/12/2024 1539    CO2 17 (L) 09/12/2024 1539    BUN 19 09/12/2024 1539    CREATININE 1.9 (H) 09/12/2024 1539     (H) 09/12/2024 1539        Component Value Date/Time    CALCIUM 8.3 (L) 09/12/2024 1539    ALKPHOS 64 09/12/2024 1539    AST 12 09/12/2024 1539    ALT 8 (L) 09/12/2024 1539    BILITOT 0.6 09/12/2024 1539    ESTGFRAFRICA >60.0 03/03/2022 0954    EGFRNONAA 56.8 (A) 03/03/2022 0954          Lab Results   Component Value Date    TSH 0.39 09/19/2024     Lab Results   Component Value Date    INR 1.3 (H) 08/30/2024    INR 1.0 07/01/2024    INR 0.9 06/30/2024     @RESUFAST(WBC,HGB,HCT,MCV,PLT)  @LABRCNTIP(BNP,BNPTRIAGEBLO)@  CrCl cannot be calculated (Patient's most recent lab result is older than the maximum 7 days allowed.).     Results for orders placed during the hospital encounter of 06/26/24    Echo    Interpretation Summary    Left Ventricle: The left ventricle is normal in size. Normal wall thickness. There is concentric hypertrophy. Normal wall motion. Ejection fraction by visual approximation is 60%. Grade II diastolic dysfunction.    Right Ventricle: Normal right ventricular cavity size. Wall thickness is normal. Systolic function is normal.    Left Atrium: Left atrium is severely dilated.    Aortic Valve: The aortic valve is a trileaflet valve. Mildly restricted motion. There is moderate stenosis. Aortic valve area by VTI is 1.24 cm². Aortic valve peak velocity is 2.66 m/s. Mean gradient is 17 mmHg. The dimensionless index is 0.45.    Mitral Valve: There is moderate mitral annular calcification present. Mildly restricted motion. There is mild to moderate regurgitation.     No results found for this or any previous visit.     Assessment:      1. Essential hypertension    2. Mixed hyperlipidemia    3. Chronic diastolic  "congestive heart failure        Plan:   Essential hypertension    Mixed hyperlipidemia    Chronic diastolic congestive heart failure      DC coreg- symptomatic bradycardia    She is on chlorthalidone, Jardiance, Aldactone as well as an ACEi-recommend eval by Nephrology, appreciate recs    Amlodipine, chlorthalidone, perindopril, Aldactone, low-sodium diet, profile blood pressure can consider hydralazine if needed for better blood pressure control-HTN  Pravastatin, low-fat diet HLP  Aldactone, Jardiance, low-sodium diet, daily weights-CHF    48hr holter  RTC 1m for BP follow up    Radha Sanders, FNP-C [facility], Cardiology         [1]   Social History  Tobacco Use    Smoking status: Former     Current packs/day: 0.00     Average packs/day: 1 pack/day for 8.0 years (8.0 ttl pk-yrs)     Types: Cigarettes     Start date:      Quit date:      Years since quittin.1    Smokeless tobacco: Never   Substance Use Topics    Alcohol use: Yes     Comment: rarely    Drug use: No   [2]   Current Outpatient Medications on File Prior to Visit   Medication Sig Dispense Refill    albuterol (VENTOLIN HFA) 90 mcg/actuation inhaler Inhale 2 puffs into the lungs every 6 (six) hours as needed for Wheezing or Shortness of Breath (cough). Rescue 20.1 g 5    amLODIPine (NORVASC) 10 MG tablet TAKE 1 TABLET(10 MG) BY MOUTH EVERY DAY 90 tablet 1    blood sugar diagnostic Strp Use ac bid      budesonide-formoterol 160-4.5 mcg (SYMBICORT) 160-4.5 mcg/actuation HFAA Inhale 2 puffs into the lungs every 12 (twelve) hours. Controller 10.2 g 11    carvediloL (COREG) 3.125 MG tablet Take 3.125 mg by mouth.      chlorthalidone (HYGROTEN) 25 MG Tab Take 25 mg by mouth once daily.      HUMULIN 70/30 U-100 KWIKPEN 100 unit/mL (70-30) InPn pen SMARTSI Unit(s) SUB-Q Every Evening      insulin syringe-needle U-100 1 mL 29 gauge x 1/2" Syrg Use bid      JARDIANCE 25 mg tablet Take 25 mg by mouth every morning.      lancets 33 gauge Misc Use as " BID      levothyroxine (SYNTHROID) 150 MCG tablet Take 1 tablet by mouth once daily.      perindopril erbumine (ACEON) 4 mg tablet Take 2 mg by mouth once daily.      pravastatin (PRAVACHOL) 20 MG tablet Take 20 mg by mouth once daily.   11    spironolactone (ALDACTONE) 25 MG tablet Take 25 mg by mouth.      cholecalciferol, vitamin D3, (VITAMIN D3) 50 mcg (2,000 unit) Cap capsule Take 1 capsule by mouth. (Patient not taking: Reported on 2/17/2025)       No current facility-administered medications on file prior to visit.

## 2025-02-19 ENCOUNTER — CLINICAL SUPPORT (OUTPATIENT)
Dept: PULMONOLOGY | Facility: CLINIC | Age: 75
End: 2025-02-19
Payer: MEDICARE

## 2025-02-19 ENCOUNTER — HOSPITAL ENCOUNTER (OUTPATIENT)
Dept: CARDIOLOGY | Facility: HOSPITAL | Age: 75
Discharge: HOME | End: 2025-02-19
Payer: MEDICARE

## 2025-02-19 ENCOUNTER — OFFICE VISIT (OUTPATIENT)
Dept: PULMONOLOGY | Facility: CLINIC | Age: 75
End: 2025-02-19
Payer: MEDICARE

## 2025-02-19 VITALS
SYSTOLIC BLOOD PRESSURE: 124 MMHG | BODY MASS INDEX: 36.1 KG/M2 | HEIGHT: 65 IN | OXYGEN SATURATION: 96 % | HEART RATE: 40 BPM | DIASTOLIC BLOOD PRESSURE: 58 MMHG | BODY MASS INDEX: 36.1 KG/M2 | WEIGHT: 216.69 LBS | HEIGHT: 65 IN | RESPIRATION RATE: 18 BRPM | WEIGHT: 216.69 LBS

## 2025-02-19 DIAGNOSIS — Z91.89 AT RISK FOR SLEEP APNEA: ICD-10-CM

## 2025-02-19 DIAGNOSIS — R00.1 BRADYCARDIA: ICD-10-CM

## 2025-02-19 DIAGNOSIS — I50.32 CHRONIC DIASTOLIC (CONGESTIVE) HEART FAILURE: ICD-10-CM

## 2025-02-19 DIAGNOSIS — J45.909 ASTHMA, UNSPECIFIED ASTHMA SEVERITY, UNSPECIFIED WHETHER COMPLICATED, UNSPECIFIED WHETHER PERSISTENT: ICD-10-CM

## 2025-02-19 DIAGNOSIS — R79.89 ELEVATED BRAIN NATRIURETIC PEPTIDE (BNP) LEVEL: ICD-10-CM

## 2025-02-19 DIAGNOSIS — J45.30 MILD PERSISTENT ASTHMA WITHOUT COMPLICATION: Primary | ICD-10-CM

## 2025-02-19 PROCEDURE — 93226 XTRNL ECG REC<48 HR SCAN A/R: CPT

## 2025-02-19 NOTE — PROCEDURES
Clinical Guide to Interpretation or FeNO Levels :    FeNO  (ppb) LOW INTERMEDIATE HIGH   ADULT VALUES < 25 25-50          > 50   Th2-driven Inflammation Unlikely Likely Significant     Patients FeNO level at this visit : __7__ (ppb)    Interpretation of FeNO measurement in adults:  [x] FENO is less than 25 ppb implies non eosinophilic airway inflammation or the absence of airway inflammation.    Comment: Low FENO (<25 ppb) in adult asthmatics with persistent symptoms suggests other etiologies for these symptoms and a lower likelihood of benefit from adding or increasing inhaled glucocorticoids.    [] FENO between 25 and 50 ppb in adults should be interpreted cautiously with reference to the clinical situation (eg, symptomatic, on or off therapy, current smoking).    [] FENO greater than 50 ppb in adults  suggests eosinophilic airway inflammation   Comment: High FENO (>50 ppb) in adult asthmatics even with atypical symptoms suggests glucocorticoid responsiveness. High FENO (>50 ppb) can help identify poor adherence or uncontrolled inflammation in asthma patients with otherwise seemingly controlled asthma.    Discussion:  A FENO less than 25 ppb in adults and less than 20 ppb in children younger than 12 years of age implies noneosinophilic airway inflammation or the absence of airway inflammation.  A FENO greater than 50 ppb in adults or greater than 35 ppb in children suggests eosinophilic airway inflammation.   Values of FENO between 25 and 50 ppb in adults (20 to 35 ppb in children) should be interpreted cautiously with reference to the clinical situation (eg, symptomatic, on or off therapy, current smoking).  A rising FENO with a greater than 20 percent change and more than 25 ppb (20 ppb in children) from a previously stable level suggests increasing eosinophilic airway inflammation, but there are wide inter-individual differences.  A decrease in FENO greater than 20 percent for values over 50 ppb or more than  10 ppb for values less than 50 ppb may be clinically important.  ?FENO in other respiratory diseases - Several other diseases are associated with altered levels of exhaled NO: low levels of FENO have been noted in cystic fibrosis, current smoking, pulmonary hypertension, hypothermia, primary ciliary dyskinesia, and bronchopulmonary dysplasia. Elevated FENO has been noted in atopy, nonasthmatic eosinophilic bronchitis, COPD exacerbations, noncystic fibrosis bronchiectasis, and viral upper respiratory infections.    REFERENCE:  ATS Board of Directors, December 2004, and by the ERS Executive Committee, June 2004. ATS/ERS Recommendations for Standardized Procedures for the Online and Offline Measurement of Exhaled Lower Respiratory Nitric Oxide and Nasal Nitric Oxide. Guideline 2005

## 2025-02-19 NOTE — PROCEDURES
"[facility] 3rd[facility]  Six Minute Walk     SUMMARY     Ordering Provider: Ce Hendrickson MD   Interpreting Provider: Ce Hendrickson MD  Performing nurse/tech/RT: VT, RT  Diagnosis:  (Asthma, unspecified asthma severity, unspecified whether complicated, unspecified whether persistent)  Height: 5' 5" (165.1 cm)  Weight: 98.3 kg (216 lb 11.4 oz)  BMI (Calculated): 36.1                Phase Oxygen Assessment Supplemental O2 Heart   Rate Blood Pressure Darline Dyspnea Scale Rating   Resting 96 % Room Air 40 bpm 149/80 0   Exercise        Minute        1 99 % Room Air 55 bpm     2 97 % Room Air 58 bpm     3 95 % Room Air 63 bpm     4 95 % Room Air 68 bpm     5 95 % Room Air 58 bpm     6  98 % Room Air 59 bpm 197/86 3   Recovery        Minute        1 97 % Room Air 49 bpm     2 97 % Room Air 45 bpm     3 100 % Room Air 42 bpm     4 100 % Room Air 42 bpm (!) 174/101 1     Six Minute Walk Summary  6MWT Status: completed with stops  Patient Reported: Dyspnea (hip pain)     Interpretation:  Did the patient stop or pause?: Yes  How many times did the patient stop or pause?: 1  Stop Time 1: 242  Restart Time 1: 340  Pause Time 1: 98 seconds                             Total Time Walked (Calculated): 262 seconds  Final Partial Lap Distance (feet): 25 feet  Total Distance Meters (Calculated): 190.5 meters  Predicted Distance Meters (Calculated): 362.53 meters  Percentage of Predicted (Calculated): 52.55  Peak VO2 (Calculated): 9.7  Mets: 2.77  Has The Patient Had a Previous Six Minute Walk Test?: Yes       Previous 6MWT Results  Has The Patient Had a Previous Six Minute Walk Test?: Yes  Date of Previous Test: 08/07/24  Total Time Walked: 360 seconds  Total Distance (meters): 304.8  Predicted Distance (meters): 362.82 meters  Percentage of Predicted: 84.01  Percent Change (Calculated): 0.38      "

## 2025-02-19 NOTE — PROGRESS NOTES
Pulmonary Outpatient Follow Up Visit     Subjective:       Patient ID: Lakshmi Campbell is a 74 y.o. female.    Chief Complaint: No chief complaint on file.      HPI        74-year-old female patient presenting for for follow-up.      02/19/2025 no respiratory events since last visit.  Would like to return oxygen.  Does not need O2 on exertion but nocturnal O2 might be needed but she would like to return it against advice.      Not interested in overnight oximetry or polysomnography.        Committed to Symbicort 160 mcg 2 puffs twice daily by Allergy and immunology.  On albuterol p.r.n..  Asthma control questionnaire score 20    11/12/2024 requesting to have her oxygen equipment returned.  Order was placed last visit but she still has her equipment.      Complains of shortness of breath on exertion with strenuous activity.  Complains of nocturnal wheezing and coughing.  Sputum production.    Initially evaluated July 2024 for post hospitalization due to viral/streptococcal pneumonia.      Needed O2 on discharge in completed antibiotic therapy.      Feels better.  Home O2 canceled due to improved exercise hypoxemia.      Quit smoking in the 80's smoked 2 years     Diastolic CHF grade 2 and elevated BNP noted during admission.      Undecided about sleep study.  Denies sleep disturbances    Review of Systems   Respiratory:  Positive for previous hospitalization due to pulmonary problems.        Outpatient Encounter Medications as of 2/19/2025   Medication Sig Dispense Refill    albuterol (VENTOLIN HFA) 90 mcg/actuation inhaler Inhale 2 puffs into the lungs every 6 (six) hours as needed for Wheezing or Shortness of Breath (cough). Rescue 20.1 g 5    amLODIPine (NORVASC) 10 MG tablet TAKE 1 TABLET(10 MG) BY MOUTH EVERY DAY 90 tablet 1    blood sugar diagnostic Strp Use ac bid      budesonide-formoterol 160-4.5 mcg (SYMBICORT) 160-4.5 mcg/actuation HFAA Inhale 2 puffs into the lungs  "every 12 (twelve) hours. Controller 10.2 g 11    chlorthalidone (HYGROTEN) 25 MG Tab Take 25 mg by mouth once daily.      cholecalciferol, vitamin D3, (VITAMIN D3) 50 mcg (2,000 unit) Cap capsule Take 1 capsule by mouth.      HUMULIN 70/30 U-100 KWIKPEN 100 unit/mL (70-30) InPn pen SMARTSI Unit(s) SUB-Q Every Evening      insulin syringe-needle U-100 1 mL 29 gauge x 1/2" Syrg Use bid      JARDIANCE 25 mg tablet Take 25 mg by mouth every morning.      lancets 33 gauge Misc Use as BID      levothyroxine (SYNTHROID) 150 MCG tablet Take 1 tablet by mouth once daily.      perindopril erbumine (ACEON) 4 mg tablet Take 2 mg by mouth once daily.      pravastatin (PRAVACHOL) 20 MG tablet Take 20 mg by mouth once daily.   11    spironolactone (ALDACTONE) 25 MG tablet Take 25 mg by mouth.      [DISCONTINUED] carvediloL (COREG) 3.125 MG tablet Take 3.125 mg by mouth.      [DISCONTINUED] metFORMIN (GLUCOPHAGE) 500 MG tablet Take 500 mg by mouth 2 (two) times daily. (Patient not taking: Reported on 2025)      [DISCONTINUED] metoprolol succinate (TOPROL-XL) 25 MG 24 hr tablet TAKE 1/2 TABLET(12.5 MG) BY MOUTH DAILY (Patient not taking: Reported on 2025) 45 tablet 1     No facility-administered encounter medications on file as of 2025.       Objective:     Vital Signs (Most Recent)  Vital Signs  Pulse: (!) 40  Resp: 18  SpO2: 96 %  BP: (!) 124/58  Pain Score: 0-No pain  Height and Weight  Height: 5' 5" (165.1 cm)  Weight: 98.3 kg (216 lb 11.2 oz)  BSA (Calculated - sq m): 2.12 sq meters  BMI (Calculated): 36.1  Weight in (lb) to have BMI = 25: 149.9]  Wt Readings from Last 2 Encounters:   25 98.3 kg (216 lb 11.2 oz)   25 98.3 kg (216 lb 11.4 oz)       Physical Exam   Constitutional: She is oriented to person, place, and time. She appears well-developed and well-nourished.   Cardiovascular: Normal rate and regular rhythm.   Pulmonary/Chest: Normal expansion and effort normal.   Neurological: She is " alert and oriented to person, place, and time.   Psychiatric: She has a normal mood and affect. Her behavior is normal. Judgment and thought content normal.       Laboratory  Lab Results   Component Value Date    WBC 4.42 12/12/2024    RBC 3.92 (L) 12/12/2024    HGB 11.1 (L) 12/12/2024    HCT 34.1 (L) 12/12/2024    MCV 87 12/12/2024    MCH 28.3 12/12/2024    MCHC 32.6 12/12/2024    RDW 13.9 12/12/2024     (L) 12/12/2024    MPV 10.2 12/12/2024    GRAN 2.8 12/12/2024    GRAN 64.2 12/12/2024    LYMPH 1.1 12/12/2024    LYMPH 24.7 12/12/2024    MONO 0.3 12/12/2024    MONO 7.0 12/12/2024    EOS 0.1 12/12/2024    BASO 0.03 12/12/2024    EOSINOPHIL 2.7 12/12/2024    BASOPHIL 0.7 12/12/2024       BMP  Lab Results   Component Value Date     09/12/2024    K 4.0 09/12/2024     09/12/2024    CO2 17 (L) 09/12/2024    BUN 19 09/12/2024    CREATININE 1.9 (H) 09/12/2024    CALCIUM 8.3 (L) 09/12/2024    ANIONGAP 14 09/12/2024    ESTGFRAFRICA >60.0 03/03/2022    EGFRNONAA 56.8 (A) 03/03/2022    AST 12 09/12/2024    ALT 8 (L) 09/12/2024    PROT 6.4 09/12/2024       Lab Results   Component Value Date     (H) 09/02/2024    BNP 1,679 (H) 08/29/2024     (H) 06/28/2024    BNP 1,003 (H) 06/26/2024       Lab Results   Component Value Date    TSH 0.39 09/19/2024       Lab Results   Component Value Date    SEDRATE 48 (H) 09/12/2024       Lab Results   Component Value Date    CRP 18.6 (H) 09/12/2024     Lab Results   Component Value Date    .0 (H) 11/19/2024     (H) 11/12/2024        Lab Results   Component Value Date    ASPERGILLUS <0.10 11/19/2024     Lab Results   Component Value Date    AFUMIGATUSCL CLASS 0 11/19/2024      Echo 2024      Left Ventricle: The left ventricle is normal in size. Normal wall thickness. There is concentric hypertrophy. Normal wall motion. Ejection fraction by visual approximation is 60%. Grade II diastolic dysfunction.    Right Ventricle: Normal right ventricular  "cavity size. Wall thickness is normal. Systolic function is normal.    Left Atrium: Left atrium is severely dilated.    Aortic Valve: The aortic valve is a trileaflet valve. Mildly restricted motion. There is moderate stenosis. Aortic valve area by VTI is 1.24 cm². Aortic valve peak velocity is 2.66 m/s. Mean gradient is 17 mmHg. The dimensionless index is 0.45.    Mitral Valve: There is moderate mitral annular calcification present. Mildly restricted motion. There is mild to moderate regurgitation.  No results found for: "ACE"     Diagnostic Results:  I have personally reviewed today the following studies:  COMPARISON:  CT chest 06/28/2024.     FINDINGS:  Chest: Mild cardiomegaly.  Coronary artery calcifications.  No pericardial effusion.  Few prominent mediastinal lymph nodes largest in the pretracheal region measuring 2.4 by 1.4 cm.  These are similar to previous study.     No pleural effusions.  No consolidation.  There is interstitial coarsening and thickening in the subpleural regions of the lungs bilaterally and more focally in the right upper lobe.  Previously there was extensive areas of consolidation bilaterally.  This could be the residual fibrosis from prior infection.     Abdomen pelvis     Lobulation of the liver surface suggestive of cirrhosis.  No ascites.  The spleen is enlarged measuring 14.4 cm.  Pancreas appears unremarkable.     Gallstones.  No gallbladder wall thickening or bile duct dilatation.     No hydronephrosis.  No renal stones.  No ureteral stones.  Nonspecific perinephric stranding.  2.6 cm right renal cyst.  The     The aorta and inferior vena cava are unremarkable.  There is a enlarged right retroperitoneal lymph node measuring 2.1 cm.     There are no acute bowel abnormalities.     No evidence of appendicitis.  No evidence of diverticulitis.     Bladder is normal. No abnormal masses or fluid collections in the pelvis.     Skeletal structures are intact.  No acute skeletal " findings.     Impression:     Interstitial thickening involving the subpleural regions of the lungs and more focally in the right upper lobe.  This could represent residual scarring from previous pneumonia when compared to previous CT scan.     Mild mediastinal lymph node prominence appears similar to previous exam.     Nonspecific enlarged lower right retroperitoneal lymph node measuring 2.1 cm.  Malignancy must be excluded.     Gallstones.     Cirrhosis of liver suspected.  Splenomegaly.  No ascites.  ECHO 6/2024        Left Ventricle: The left ventricle is normal in size. Normal wall thickness. There is concentric hypertrophy. Normal wall motion. Ejection fraction by visual approximation is 60%. Grade II diastolic dysfunction.    Right Ventricle: Normal right ventricular cavity size. Wall thickness is normal. Systolic function is normal.    Left Atrium: Left atrium is severely dilated.    Aortic Valve: The aortic valve is a trileaflet valve. Mildly restricted motion. There is moderate stenosis. Aortic valve area by VTI is 1.24 cm². Aortic valve peak velocity is 2.66 m/s. Mean gradient is 17 mmHg. The dimensionless index is 0.45.    Mitral Valve: There is moderate mitral annular calcification present. Mildly restricted motion. There is mild to moderate regurgitation.     Cxr 8/7/2024    Drastic improvement of right lung infiltrates    Assessment/Plan:   Mild persistent asthma without complication    Elevated brain natriuretic peptide (BNP) level    Chronic diastolic (congestive) heart failure    At risk for sleep apnea        Continue albuterol p.r.n. Symbicort 160 mcg 2 puffs twice daily.      Follow-up with Allergy and immunology.      Would like to discontinue O2 against advice.      Optimize therapy for chronic diastolic heart failure.    Follow up in about 6 months (around 8/19/2025).    This note was prepared using voice recognition system and is likely to have sound alike errors that may have been  overlooked even after proof reading.  Please call me with any questions    Discussed diagnosis, its evaluation, treatment and usual course. All questions answered.      Ce Hendrickson MD

## 2025-02-20 LAB
BRPFT: NORMAL
DLCO ADJ PRE: 8.43 ML/(MIN*MMHG)
DLCO SINGLE BREATH LLN: 15.56
DLCO SINGLE BREATH PRE REF: 39.6 %
DLCO SINGLE BREATH REF: 21.29
DLCOC SBVA LLN: 2.79
DLCOC SBVA PRE REF: 68.3 %
DLCOC SBVA REF: 4.18
DLCOC SINGLE BREATH LLN: 15.56
DLCOC SINGLE BREATH PRE REF: 39.6 %
DLCOC SINGLE BREATH REF: 21.29
DLCOVA LLN: 2.79
DLCOVA PRE REF: 68.3 %
DLCOVA PRE: 2.85 ML/(MIN*MMHG*L)
DLCOVA REF: 4.18
DLVAADJ PRE: 2.85 ML/(MIN*MMHG*L)
ERV LLN: -16449.4
ERV PRE REF: 63.4 %
ERV REF: 0.6
FEF 25 75 CHG: -3.4 %
FEF 25 75 LLN: 1.07
FEF 25 75 POST REF: 57.7 %
FEF 25 75 PRE REF: 59.8 %
FEF 25 75 REF: 2.47
FET100 CHG: 10.9 %
FEV1 CHG: -0.3 %
FEV1 FVC CHG: -0.6 %
FEV1 FVC LLN: 64
FEV1 FVC POST REF: 98.1 %
FEV1 FVC PRE REF: 98.7 %
FEV1 FVC REF: 78
FEV1 LLN: 1.55
FEV1 POST REF: 76.6 %
FEV1 PRE REF: 76.8 %
FEV1 REF: 2.16
FRCPLETH LLN: 1.95
FRCPLETH PREREF: 104.9 %
FRCPLETH REF: 2.77
FVC CHG: 0.3 %
FVC LLN: 2.02
FVC POST REF: 77.3 %
FVC PRE REF: 77 %
FVC REF: 2.81
IVC PRE: 1.78 L
IVC SINGLE BREATH LLN: 2.02
IVC SINGLE BREATH PRE REF: 63.2 %
IVC SINGLE BREATH REF: 2.81
MVV LLN: 70
MVV PRE REF: 82.2 %
MVV REF: 83
PEF CHG: -0.7 %
PEF LLN: 3.72
PEF POST REF: 67.3 %
PEF PRE REF: 67.8 %
PEF REF: 5.48
POST FEF 25 75: 1.42 L/S
POST FET 100: 9.04 SEC
POST FEV1 FVC: 76.06 %
POST FEV1: 1.65 L
POST FVC: 2.17 L
POST PEF: 3.69 L/S
PRE DLCO: 8.43 ML/(MIN*MMHG)
PRE ERV: 0.38 L
PRE FEF 25 75: 1.47 L/S
PRE FET 100: 8.15 SEC
PRE FEV1 FVC: 76.54 %
PRE FEV1: 1.66 L
PRE FRC PL: 2.91 L
PRE FVC: 2.17 L
PRE MVV: 68 L/MIN
PRE PEF: 3.71 L/S
PRE RV: 2.35 L
PRE TLC: 4.52 L
RAW LLN: 3.06
RAW PRE REF: 155.6 %
RAW PRE: 4.76 CMH2O*S/L
RAW REF: 3.06
RV LLN: 1.59
RV PRE REF: 108.4 %
RV REF: 2.17
RVTLC LLN: 35
RVTLC PRE REF: 118 %
RVTLC PRE: 52.07 %
RVTLC REF: 44
TLC LLN: 4.11
TLC PRE REF: 88.6 %
TLC REF: 5.1
VA PRE: 2.96 L
VA SINGLE BREATH LLN: 4.95
VA SINGLE BREATH PRE REF: 59.7 %
VA SINGLE BREATH REF: 4.95
VC LLN: 2.02
VC PRE REF: 77 %
VC PRE: 2.17 L
VC REF: 2.81
VTGRAWPRE: 2.58 L

## 2025-02-25 ENCOUNTER — HOSPITAL ENCOUNTER (OUTPATIENT)
Dept: CARDIOLOGY | Facility: HOSPITAL | Age: 75
Discharge: HOME | End: 2025-02-25
Payer: MEDICARE

## 2025-02-25 PROCEDURE — 93227 XTRNL ECG REC<48 HR R&I: CPT | Mod: ,,, | Performed by: INTERNAL MEDICINE

## 2025-02-25 PROCEDURE — 93226 XTRNL ECG REC<48 HR SCAN A/R: CPT

## 2025-03-10 ENCOUNTER — RESULTS FOLLOW-UP (OUTPATIENT)
Dept: CARDIOLOGY | Facility: CLINIC | Age: 75
End: 2025-03-10

## 2025-03-10 ENCOUNTER — PATIENT MESSAGE (OUTPATIENT)
Dept: CARDIOLOGY | Facility: CLINIC | Age: 75
End: 2025-03-10
Payer: MEDICARE

## 2025-03-10 ENCOUNTER — TELEPHONE (OUTPATIENT)
Dept: CARDIOLOGY | Facility: CLINIC | Age: 75
End: 2025-03-10
Payer: MEDICARE

## 2025-03-10 NOTE — TELEPHONE ENCOUNTER
Called the pt as well as sent a portal message pbr        Radha Sanders NP to Radha Sanders Staff (Selected Message)      3/10/25 10:35 AM  Result Note  Holter monitor with bradycardia, continue holding beta-blocker.

## 2025-03-19 DIAGNOSIS — E11.9 TYPE 2 DIABETES MELLITUS WITHOUT COMPLICATION: ICD-10-CM

## 2025-04-01 ENCOUNTER — LAB VISIT (OUTPATIENT)
Dept: LAB | Facility: HOSPITAL | Age: 75
End: 2025-04-01
Payer: MEDICARE

## 2025-04-01 ENCOUNTER — OFFICE VISIT (OUTPATIENT)
Dept: CARDIOLOGY | Facility: CLINIC | Age: 75
End: 2025-04-01
Payer: MEDICARE

## 2025-04-01 VITALS
SYSTOLIC BLOOD PRESSURE: 152 MMHG | WEIGHT: 212.94 LBS | HEIGHT: 65 IN | DIASTOLIC BLOOD PRESSURE: 56 MMHG | BODY MASS INDEX: 35.48 KG/M2 | HEART RATE: 47 BPM

## 2025-04-01 DIAGNOSIS — I50.32 CHRONIC DIASTOLIC CONGESTIVE HEART FAILURE: ICD-10-CM

## 2025-04-01 DIAGNOSIS — I10 ESSENTIAL HYPERTENSION: ICD-10-CM

## 2025-04-01 DIAGNOSIS — E78.2 MIXED HYPERLIPIDEMIA: ICD-10-CM

## 2025-04-01 DIAGNOSIS — I10 ESSENTIAL HYPERTENSION: Primary | ICD-10-CM

## 2025-04-01 PROCEDURE — 4010F ACE/ARB THERAPY RXD/TAKEN: CPT | Mod: CPTII,S$GLB,,

## 2025-04-01 PROCEDURE — 3288F FALL RISK ASSESSMENT DOCD: CPT | Mod: CPTII,S$GLB,,

## 2025-04-01 PROCEDURE — 99214 OFFICE O/P EST MOD 30 MIN: CPT | Mod: S$GLB,,,

## 2025-04-01 PROCEDURE — 36415 COLL VENOUS BLD VENIPUNCTURE: CPT

## 2025-04-01 PROCEDURE — 1126F AMNT PAIN NOTED NONE PRSNT: CPT | Mod: CPTII,S$GLB,,

## 2025-04-01 PROCEDURE — 3077F SYST BP >= 140 MM HG: CPT | Mod: CPTII,S$GLB,,

## 2025-04-01 PROCEDURE — 1160F RVW MEDS BY RX/DR IN RCRD: CPT | Mod: CPTII,S$GLB,,

## 2025-04-01 PROCEDURE — 3044F HG A1C LEVEL LT 7.0%: CPT | Mod: CPTII,S$GLB,,

## 2025-04-01 PROCEDURE — 80048 BASIC METABOLIC PNL TOTAL CA: CPT

## 2025-04-01 PROCEDURE — 1159F MED LIST DOCD IN RCRD: CPT | Mod: CPTII,S$GLB,,

## 2025-04-01 PROCEDURE — 1101F PT FALLS ASSESS-DOCD LE1/YR: CPT | Mod: CPTII,S$GLB,,

## 2025-04-01 PROCEDURE — 3008F BODY MASS INDEX DOCD: CPT | Mod: CPTII,S$GLB,,

## 2025-04-01 PROCEDURE — 99999 PR PBB SHADOW E&M-EST. PATIENT-LVL III: CPT | Mod: PBBFAC,,,

## 2025-04-01 PROCEDURE — 3078F DIAST BP <80 MM HG: CPT | Mod: CPTII,S$GLB,,

## 2025-04-01 RX ORDER — HYDRALAZINE HYDROCHLORIDE 25 MG/1
25 TABLET, FILM COATED ORAL EVERY 12 HOURS
Qty: 60 TABLET | Refills: 11 | Status: SHIPPED | OUTPATIENT
Start: 2025-04-01 | End: 2026-04-01

## 2025-04-01 NOTE — PROGRESS NOTES
Subjective:   Patient ID:  Lakshmi Campbell is a 74 y.o. female who presents for evaluation of No chief complaint on file.      HPI 74-year-old female whose current medical conditions include HTN, HLP, diastolic heart failure, CKD previously followed in cardiology clinic by Dr. Jernigan here today for CV follow-up denies any chest pain or CHF symptoms.  She was recently switched to carvedilol from metoprolol for better blood pressure control however since she has complained of worsening fatigue and no energy.  Heart rate in clinic today 40s, blood pressure remains elevated, however diastolic in the 50s.     Echo 6/24 with normal EF, gradeII DD, moderate AS    4/1/25  Here today for CV follow up, denies any CP, angina or anginal equivalent at this time.  Reports compliance with medications blood pressure elevated in clinic today, heart rate still bradycardic, denies any dizziness or syncope, recent BMP with elevated creatinine 2.52    Past Medical History:   Diagnosis Date    Acquired TTP     Cataract     CKD stage 3 secondary to diabetes     Closed displaced comminuted fracture of right patella 10/28/2020    Closed fracture of surgical neck of left humerus 10/30/2020    Closed left radial fracture 10/30/2020    Hyperkalemia     Hyperlipidemia     Hypertension     Hypothyroidism, unspecified     Liver cirrhosis secondary to YO     Morbid obesity     Right knee pain     Thyroid disease     Type 2 diabetes mellitus        Past Surgical History:   Procedure Laterality Date    APPENDECTOMY      BREAST BIOPSY      CATARACT EXTRACTION      COLONOSCOPY N/A 12/21/2021    Procedure: COLONOSCOPY screening;  Surgeon: Moises Patterson MD;  Location: [facility];  Service: Endoscopy;  Laterality: N/A;    ESOPHAGOGASTRODUODENOSCOPY N/A 12/21/2021    Procedure: EGD (ESOPHAGOGASTRODUODENOSCOPY) EV screening;  Surgeon: Moises Patterson MD;  Location: [facility];  Service: Endoscopy;  Laterality: N/A;    EYE SURGERY      HERNIA REPAIR       OPEN REDUCTION AND INTERNAL FIXATION (ORIF) OF FRACTURE OF DISTAL RADIUS Left 11/2/2020    Procedure: ORIF, FRACTURE, RADIUS, DISTAL;  Surgeon: Sage Wolf MD;  Location: [facility] OR;  Service: Orthopedics;  Laterality: Left;    OPEN REDUCTION AND INTERNAL FIXATION (ORIF) OF FRACTURE OF PATELLA Right 11/2/2020    Procedure: ORIF, FRACTURE, PATELLA;  Surgeon: Sage Wolf MD;  Location: [facility] OR;  Service: Orthopedics;  Laterality: Right;    OPEN REDUCTION AND INTERNAL FIXATION (ORIF) OF FRACTURE OF PATELLA Right 12/18/2020    Procedure: ORIF, FRACTURE, PATELLA;  Surgeon: Sage Wolf MD;  Location: [facility] OR;  Service: Orthopedics;  Laterality: Right;    ORIF HUMERUS FRACTURE Left 11/5/2020    Procedure: ORIF, FRACTURE, HUMERUS;  Surgeon: Sage Wolf MD;  Location: [facility] OR;  Service: Orthopedics;  Laterality: Left;    PLACEMENT OF ACELLULAR HUMAN DERMAL ALLOGRAFT Right 11/2/2020    Procedure: APPLICATION, ACELLULAR HUMAN DERMAL ALLOGRAFT;  Surgeon: Sage Wolf MD;  Location: [facility] OR;  Service: Orthopedics;  Laterality: Right;  Right Patella    REMOVAL OF HARDWARE FROM HAND Left 3/11/2021    Procedure: REMOVAL, HARDWARE, HAND;  Surgeon: Sage Wolf MD;  Location: [facility] OR;  Service: Orthopedics;  Laterality: Left;  Removal of dorsal spanning wrist plate left distal radius    REMOVAL OF HARDWARE FROM LOWER EXTREMITY Right 12/18/2020    Procedure: REMOVAL, HARDWARE, LOWER EXTREMITY;  Surgeon: Sage Wolf MD;  Location: [facility];  Service: Orthopedics;  Laterality: Right;       Social History[1]    Family History   Problem Relation Name Age of Onset    Diabetes Mother      Leukemia Father      Diabetes Father         Medications Ordered Prior to Encounter[2]   Wt Readings from Last 3 Encounters:   04/01/25 96.6 kg (212 lb 15.4 oz)   02/19/25 98.3 kg (216 lb 11.2 oz)   02/19/25 98.3 kg (216 lb 11.4 oz)     Temp Readings from Last 3  Encounters:   01/24/25 97.7 °F (36.5 °C) (Oral)   01/17/25 97.5 °F (36.4 °C) (Temporal)   01/10/25 97.4 °F (36.3 °C) (Temporal)     BP Readings from Last 3 Encounters:   04/01/25 (!) 152/56   02/19/25 (!) 124/58   02/17/25 (!) 152/50     Pulse Readings from Last 3 Encounters:   04/01/25 (!) 47   02/19/25 (!) 40   02/17/25 (!) 43        Review of Systems   Constitutional: Positive for malaise/fatigue.   HENT: Negative.     Eyes: Negative.    Cardiovascular:  Positive for dyspnea on exertion.   Respiratory:  Positive for shortness of breath.         H/o asthma   Skin: Negative.    Musculoskeletal: Negative.    Gastrointestinal: Negative.    Genitourinary: Negative.    Neurological: Negative.    Psychiatric/Behavioral: Negative.         Objective:   Physical Exam  Vitals and nursing note reviewed.   Constitutional:       Appearance: Normal appearance. She is obese.   HENT:      Head: Normocephalic.   Eyes:      Pupils: Pupils are equal, round, and reactive to light.   Cardiovascular:      Rate and Rhythm: Regular rhythm. Bradycardia present.      Heart sounds: S1 normal and S2 normal. Murmur heard.      No S3 or S4 sounds.   Pulmonary:      Effort: Pulmonary effort is normal.      Breath sounds: Normal breath sounds.   Abdominal:      General: Bowel sounds are normal.      Palpations: Abdomen is soft.   Musculoskeletal:         General: Normal range of motion.      Cervical back: Normal range of motion.   Skin:     Capillary Refill: Capillary refill takes less than 2 seconds.   Neurological:      General: No focal deficit present.      Mental Status: She is alert and oriented to person, place, and time.   Psychiatric:         Mood and Affect: Mood normal.         Behavior: Behavior normal.         Thought Content: Thought content normal.         Lab Results   Component Value Date    CHOL 71 (L) 06/27/2024    CHOL 119 10/14/2019    CHOL 165 03/05/2012     Lab Results   Component Value Date    HDL 34 (L) 06/27/2024     HDL 46 10/14/2019    HDL 48 03/05/2012     Lab Results   Component Value Date    LDLCALC 24.0 (L) 06/27/2024    LDLCALC 55 10/14/2019    LDLCALC 97.0 03/05/2012     Lab Results   Component Value Date    TRIG 65 06/27/2024    TRIG 89 10/14/2019    TRIG 102 03/05/2012     Lab Results   Component Value Date    CHOLHDL 47.9 06/27/2024    CHOLHDL 29.1 03/05/2012    CHOLHDL 28.8 09/06/2011       Chemistry        Component Value Date/Time     09/12/2024 1539    K 4.0 09/12/2024 1539     09/12/2024 1539    CO2 17 (L) 09/12/2024 1539    BUN 19 09/12/2024 1539    CREATININE 1.9 (H) 09/12/2024 1539     (H) 09/12/2024 1539        Component Value Date/Time    CALCIUM 8.3 (L) 09/12/2024 1539    ALKPHOS 64 09/12/2024 1539    AST 12 09/12/2024 1539    ALT 8 (L) 09/12/2024 1539    BILITOT 0.6 09/12/2024 1539    ESTGFRAFRICA >60.0 03/03/2022 0954    EGFRNONAA 56.8 (A) 03/03/2022 0954          Lab Results   Component Value Date    TSH 0.17 (L) 03/19/2025     Lab Results   Component Value Date    INR 1.3 (H) 08/30/2024    INR 1.0 07/01/2024    INR 0.9 06/30/2024     @RESUFAST(WBC,HGB,HCT,MCV,PLT)  @LABRCNTIP(BNP,BNPTRIAGEBLO)@  CrCl cannot be calculated (Patient's most recent lab result is older than the maximum 7 days allowed.).     Results for orders placed during the hospital encounter of 06/26/24    Echo    Interpretation Summary    Left Ventricle: The left ventricle is normal in size. Normal wall thickness. There is concentric hypertrophy. Normal wall motion. Ejection fraction by visual approximation is 60%. Grade II diastolic dysfunction.    Right Ventricle: Normal right ventricular cavity size. Wall thickness is normal. Systolic function is normal.    Left Atrium: Left atrium is severely dilated.    Aortic Valve: The aortic valve is a trileaflet valve. Mildly restricted motion. There is moderate stenosis. Aortic valve area by VTI is 1.24 cm². Aortic valve peak velocity is 2.66 m/s. Mean gradient is 17 mmHg.  The dimensionless index is 0.45.    Mitral Valve: There is moderate mitral annular calcification present. Mildly restricted motion. There is mild to moderate regurgitation.     No results found for this or any previous visit.     Assessment:      1. Essential hypertension    2. Mixed hyperlipidemia    3. Chronic diastolic congestive heart failure          Plan:   Essential hypertension  -     Basic metabolic panel; Future; Expected date: 2025  -     hydrALAZINE (APRESOLINE) 25 MG tablet; Take 1 tablet (25 mg total) by mouth every 12 (twelve) hours.  Dispense: 60 tablet; Refill: 11    Mixed hyperlipidemia    Chronic diastolic congestive heart failure  -     Basic metabolic panel; Future; Expected date: 2025        DC coreg- symptomatic bradycardia    She is on chlorthalidone, Jardiance, Aldactone as well as an ACEi-recommend eval by Nephrology, appreciate recs.  We will hold medications for now    Add hydralazine 25 mg b.i.d.    Amlodipine, hydralazine low-sodium diet, profile blood pressure can consider hydralazine if needed for better blood pressure control-HTN  Pravastatin, low-fat diet HLP  low-sodium diet, daily weights-CHF      Check a BMP  Return to clinic in 1 month for blood pressure check    Radha Sanders, FNP-C [facility], Cardiology           [1]   Social History  Tobacco Use    Smoking status: Former     Current packs/day: 0.00     Average packs/day: 1 pack/day for 8.0 years (8.0 ttl pk-yrs)     Types: Cigarettes     Start date:      Quit date:      Years since quittin.2    Smokeless tobacco: Never   Substance Use Topics    Alcohol use: Yes     Comment: rarely    Drug use: No   [2]   Current Outpatient Medications on File Prior to Visit   Medication Sig Dispense Refill    albuterol (VENTOLIN HFA) 90 mcg/actuation inhaler Inhale 2 puffs into the lungs every 6 (six) hours as needed for Wheezing or Shortness of Breath (cough). Rescue 20.1 g 5    amLODIPine (NORVASC) 10 MG tablet  "TAKE 1 TABLET(10 MG) BY MOUTH EVERY DAY 90 tablet 1    blood sugar diagnostic Strp Use ac bid      budesonide-formoterol 160-4.5 mcg (SYMBICORT) 160-4.5 mcg/actuation HFAA Inhale 2 puffs into the lungs every 12 (twelve) hours. Controller 10.2 g 11    cholecalciferol, vitamin D3, (VITAMIN D3) 50 mcg (2,000 unit) Cap capsule Take 1 capsule by mouth.      HUMULIN 70/30 U-100 KWIKPEN 100 unit/mL (70-30) InPn pen SMARTSI Unit(s) SUB-Q Every Evening      insulin syringe-needle U-100 1 mL 29 gauge x 1/2" Syrg Use bid      lancets 33 gauge Misc Use as BID      levothyroxine (SYNTHROID) 150 MCG tablet Take 1 tablet by mouth once daily.      pravastatin (PRAVACHOL) 20 MG tablet Take 20 mg by mouth once daily.   11    [DISCONTINUED] chlorthalidone (HYGROTEN) 25 MG Tab Take 25 mg by mouth once daily.      [DISCONTINUED] JARDIANCE 25 mg tablet Take 25 mg by mouth every morning.      [DISCONTINUED] perindopril erbumine (ACEON) 4 mg tablet Take 2 mg by mouth once daily.      [DISCONTINUED] spironolactone (ALDACTONE) 25 MG tablet Take 25 mg by mouth.       No current facility-administered medications on file prior to visit.     "

## 2025-04-02 ENCOUNTER — HOSPITAL ENCOUNTER (EMERGENCY)
Facility: HOSPITAL | Age: 75
Discharge: HOME | End: 2025-04-02
Attending: EMERGENCY MEDICINE
Payer: MEDICARE

## 2025-04-02 ENCOUNTER — TELEPHONE (OUTPATIENT)
Dept: CARDIOLOGY | Facility: CLINIC | Age: 75
End: 2025-04-02
Payer: MEDICARE

## 2025-04-02 ENCOUNTER — RESULTS FOLLOW-UP (OUTPATIENT)
Dept: CARDIOLOGY | Facility: CLINIC | Age: 75
End: 2025-04-02

## 2025-04-02 VITALS
RESPIRATION RATE: 16 BRPM | TEMPERATURE: 98 F | WEIGHT: 213.63 LBS | SYSTOLIC BLOOD PRESSURE: 173 MMHG | OXYGEN SATURATION: 97 % | DIASTOLIC BLOOD PRESSURE: 75 MMHG | BODY MASS INDEX: 35.59 KG/M2 | HEART RATE: 63 BPM | HEIGHT: 65 IN

## 2025-04-02 DIAGNOSIS — R89.9 ABNORMAL LABORATORY TEST: ICD-10-CM

## 2025-04-02 DIAGNOSIS — I10 HYPERTENSION, UNSPECIFIED TYPE: Primary | ICD-10-CM

## 2025-04-02 DIAGNOSIS — R00.1 BRADYCARDIA: ICD-10-CM

## 2025-04-02 LAB
ANION GAP (OHS): 8 MMOL/L (ref 8–16)
ANION GAP (OHS): 9 MMOL/L (ref 8–16)
BUN SERPL-MCNC: 57 MG/DL (ref 8–23)
BUN SERPL-MCNC: 62 MG/DL (ref 8–23)
CALCIUM SERPL-MCNC: 8.7 MG/DL (ref 8.7–10.5)
CALCIUM SERPL-MCNC: 8.9 MG/DL (ref 8.7–10.5)
CHLORIDE SERPL-SCNC: 111 MMOL/L (ref 95–110)
CHLORIDE SERPL-SCNC: 114 MMOL/L (ref 95–110)
CO2 SERPL-SCNC: 17 MMOL/L (ref 23–29)
CO2 SERPL-SCNC: 19 MMOL/L (ref 23–29)
CREAT SERPL-MCNC: 2.2 MG/DL (ref 0.5–1.4)
CREAT SERPL-MCNC: 2.5 MG/DL (ref 0.5–1.4)
GFR SERPLBLD CREATININE-BSD FMLA CKD-EPI: 20 ML/MIN/1.73/M2
GFR SERPLBLD CREATININE-BSD FMLA CKD-EPI: 23 ML/MIN/1.73/M2
GLUCOSE SERPL-MCNC: 135 MG/DL (ref 70–110)
GLUCOSE SERPL-MCNC: 217 MG/DL (ref 70–110)
POTASSIUM SERPL-SCNC: 5.2 MMOL/L (ref 3.5–5.1)
POTASSIUM SERPL-SCNC: 6.1 MMOL/L (ref 3.5–5.1)
SODIUM SERPL-SCNC: 139 MMOL/L (ref 136–145)
SODIUM SERPL-SCNC: 139 MMOL/L (ref 136–145)

## 2025-04-02 PROCEDURE — 99284 EMERGENCY DEPT VISIT MOD MDM: CPT | Mod: 25,ER

## 2025-04-02 PROCEDURE — 96374 THER/PROPH/DIAG INJ IV PUSH: CPT | Mod: ER

## 2025-04-02 PROCEDURE — 63600175 PHARM REV CODE 636 W HCPCS: Mod: ER | Performed by: EMERGENCY MEDICINE

## 2025-04-02 PROCEDURE — 80048 BASIC METABOLIC PNL TOTAL CA: CPT | Mod: ER | Performed by: EMERGENCY MEDICINE

## 2025-04-02 PROCEDURE — 93010 ELECTROCARDIOGRAM REPORT: CPT | Mod: ,,, | Performed by: INTERNAL MEDICINE

## 2025-04-02 PROCEDURE — 96375 TX/PRO/DX INJ NEW DRUG ADDON: CPT | Mod: ER

## 2025-04-02 PROCEDURE — 93005 ELECTROCARDIOGRAM TRACING: CPT | Mod: ER

## 2025-04-02 RX ORDER — HYDRALAZINE HYDROCHLORIDE 20 MG/ML
10 INJECTION INTRAMUSCULAR; INTRAVENOUS
Status: COMPLETED | OUTPATIENT
Start: 2025-04-02 | End: 2025-04-02

## 2025-04-02 RX ORDER — FUROSEMIDE 10 MG/ML
40 INJECTION INTRAMUSCULAR; INTRAVENOUS
Status: COMPLETED | OUTPATIENT
Start: 2025-04-02 | End: 2025-04-02

## 2025-04-02 RX ADMIN — FUROSEMIDE 40 MG: 10 INJECTION, SOLUTION INTRAMUSCULAR; INTRAVENOUS at 07:04

## 2025-04-02 RX ADMIN — HYDRALAZINE HYDROCHLORIDE 10 MG: 20 INJECTION INTRAMUSCULAR; INTRAVENOUS at 06:04

## 2025-04-02 NOTE — ED PROVIDER NOTES
Encounter Date: 4/2/2025       History     Chief Complaint   Patient presents with    Abnormal Lab     Blood work at cardiologist yesterday. Potassium 6.1. Pt also reports that her HR has been lower than usual. Pt denies having any complaints      Pt had elevated potassium on labs done yesterday and cardiology advised pt come to ER for evaluation and treatment of abnormal lab.  Pt denies any symptoms and states she feels fine.  Pt had coreg stopped and started hydralazine yesterday at Dr. Jernigan[facility] clinic.  States she has not gotten her new medications from the pharmacy and has not taken her htn meds today.      The history is provided by the patient.     Review of patient's allergies indicates:   Allergen Reactions    Codeine Nausea Only     Other reaction(s): Nausea  Other reaction(s): Elevated blood pressure     Past Medical History:   Diagnosis Date    Acquired TTP     Cataract     CKD stage 3 secondary to diabetes     Closed displaced comminuted fracture of right patella 10/28/2020    Closed fracture of surgical neck of left humerus 10/30/2020    Closed left radial fracture 10/30/2020    Hyperkalemia     Hyperlipidemia     Hypertension     Hypothyroidism, unspecified     Liver cirrhosis secondary to YO     Morbid obesity     Right knee pain     Thyroid disease     Type 2 diabetes mellitus      Past Surgical History:   Procedure Laterality Date    APPENDECTOMY      BREAST BIOPSY      CATARACT EXTRACTION      COLONOSCOPY N/A 12/21/2021    Procedure: COLONOSCOPY screening;  Surgeon: Moises Patterson MD;  Location: [facility];  Service: Endoscopy;  Laterality: N/A;    ESOPHAGOGASTRODUODENOSCOPY N/A 12/21/2021    Procedure: EGD (ESOPHAGOGASTRODUODENOSCOPY) EV screening;  Surgeon: Moises Patterson MD;  Location: [facility];  Service: Endoscopy;  Laterality: N/A;    EYE SURGERY      HERNIA REPAIR      OPEN REDUCTION AND INTERNAL FIXATION (ORIF) OF FRACTURE OF DISTAL RADIUS Left 11/2/2020    Procedure: ORIF,  FRACTURE, RADIUS, DISTAL;  Surgeon: Sage Wolf MD;  Location: [facility] OR;  Service: Orthopedics;  Laterality: Left;    OPEN REDUCTION AND INTERNAL FIXATION (ORIF) OF FRACTURE OF PATELLA Right 11/2/2020    Procedure: ORIF, FRACTURE, PATELLA;  Surgeon: Sage Wolf MD;  Location: [facility] OR;  Service: Orthopedics;  Laterality: Right;    OPEN REDUCTION AND INTERNAL FIXATION (ORIF) OF FRACTURE OF PATELLA Right 12/18/2020    Procedure: ORIF, FRACTURE, PATELLA;  Surgeon: Sage Wolf MD;  Location: [facility] OR;  Service: Orthopedics;  Laterality: Right;    ORIF HUMERUS FRACTURE Left 11/5/2020    Procedure: ORIF, FRACTURE, HUMERUS;  Surgeon: Sage Wolf MD;  Location: [facility] OR;  Service: Orthopedics;  Laterality: Left;    PLACEMENT OF ACELLULAR HUMAN DERMAL ALLOGRAFT Right 11/2/2020    Procedure: APPLICATION, ACELLULAR HUMAN DERMAL ALLOGRAFT;  Surgeon: Sage Wolf MD;  Location: [facility] OR;  Service: Orthopedics;  Laterality: Right;  Right Patella    REMOVAL OF HARDWARE FROM HAND Left 3/11/2021    Procedure: REMOVAL, HARDWARE, HAND;  Surgeon: Sage Wolf MD;  Location: [facility] OR;  Service: Orthopedics;  Laterality: Left;  Removal of dorsal spanning wrist plate left distal radius    REMOVAL OF HARDWARE FROM LOWER EXTREMITY Right 12/18/2020    Procedure: REMOVAL, HARDWARE, LOWER EXTREMITY;  Surgeon: Sage Wolf MD;  Location: [facility] OR;  Service: Orthopedics;  Laterality: Right;     Family History   Problem Relation Name Age of Onset    Diabetes Mother      Leukemia Father      Diabetes Father       Social History[1]  Review of Systems   Constitutional:  Negative for fever.   HENT:  Negative for sore throat.    Respiratory:  Negative for shortness of breath.    Cardiovascular:  Negative for chest pain.   Gastrointestinal:  Negative for nausea.   Genitourinary:  Negative for dysuria.   Musculoskeletal:  Negative for back pain.   Skin:  Negative for rash.    Neurological:  Negative for weakness.   Hematological:  Does not bruise/bleed easily.   All other systems reviewed and are negative.      Physical Exam     Initial Vitals   BP Pulse Resp Temp SpO2   04/02/25 1732 04/02/25 1723 04/02/25 1723 04/02/25 1723 04/02/25 1723   (!) 194/81 (!) 58 17 97.6 °F (36.4 °C) 96 %      MAP       --                Physical Exam    Nursing note and vitals reviewed.  Constitutional: She appears well-developed and well-nourished.   HENT:   Head: Normocephalic and atraumatic. Mouth/Throat: No oropharyngeal exudate.   Eyes: Conjunctivae and EOM are normal. Pupils are equal, round, and reactive to light.   Neck: Neck supple. No thyromegaly present.   Normal range of motion.  Cardiovascular:  Regular rhythm, normal heart sounds and intact distal pulses.   Bradycardia present.   Exam reveals no gallop and no friction rub.       No murmur heard.  Pulmonary/Chest: Breath sounds normal. No respiratory distress. She has no wheezes. She has no rhonchi. She exhibits no tenderness.   Abdominal: Abdomen is soft. Bowel sounds are normal. She exhibits no distension. There is no abdominal tenderness. There is no rebound and no guarding.   Musculoskeletal:         General: No tenderness or edema. Normal range of motion.      Cervical back: Normal range of motion and neck supple.     Lymphadenopathy:     She has no cervical adenopathy.   Neurological: She is alert and oriented to person, place, and time. She has normal strength. No cranial nerve deficit or sensory deficit.   Skin: Skin is warm and dry. No rash noted.   Psychiatric: She has a normal mood and affect. Her behavior is normal. Judgment and thought content normal.         ED Course   Procedures  Labs Reviewed   BASIC METABOLIC PANEL - Abnormal       Result Value    Sodium 139      Potassium 5.2 (*)     Chloride 114 (*)     CO2 17 (*)     Glucose 217 (*)     BUN 62 (*)     Creatinine 2.5 (*)     Calcium 8.7      Anion Gap 8      eGFR 20 (*)   "    EKG Readings: (Independently Interpreted)   Initial Reading: No STEMI. Rhythm: Normal Sinus Rhythm. Heart Rate: 45. Ectopy: No Ectopy. Conduction: 1st Degree AV Block. ST Segments: Normal ST Segments. T Waves: Normal. Axis: Left Axis Deviation. Clinical Impression: Normal Sinus Rhythm       Imaging Results    None          Medications   hydrALAZINE injection 10 mg (10 mg Intravenous Given 4/2/25 1836)   furosemide injection 40 mg (40 mg Intravenous Given 4/2/25 1903)     Medical Decision Making  Risk  OTC drugs.  Prescription drug management.  Parenteral controlled substances.  Risk Details: OTC drugs, prescription drugs and controlled substances considered.  Due to patient's symptoms improving and pain controlled pain medications ordered appropriately.  Ddx: lab error, elevated potassium, dehydration, electrolyte abnormality, STEMI among others.               ED Course as of 04/02/25 1923 Wed Apr 02, 2025 1814 Rhythm strip reviewed. Nsr, no stemi. 62 bpm. [LV]      ED Course User Index  [LV] Aristeo Wagner Jr., MD       Vitals:    04/02/25 1723 04/02/25 1732 04/02/25 1748 04/02/25 1803   BP:  (!) 194/81 (!) 184/78 (!) 204/84   Pulse: (!) 58 (!) 53 (!) 49 (!) 54   Resp: 17 (!) 25 19 19   Temp: 97.6 °F (36.4 °C)      TempSrc: Oral      SpO2: 96% 98% 98% 97%   Weight: 96.9 kg (213 lb 10 oz)      Height: 5' 5" (1.651 m)       04/02/25 1809 04/02/25 1818 04/02/25 1823 04/02/25 1832   BP:  (!) 220/84 (!) 198/84 (!) 200/84   Pulse: (!) 45 (!) 57 (!) 51 (!) 56   Resp: 20 (!) 23 (!) 21 19   Temp:       TempSrc:       SpO2: 97% 97% 97% 95%   Weight:       Height:        04/02/25 1841 04/02/25 1903   BP:  (!) 173/75   Pulse: (!) 54 69   Resp: 19    Temp:     TempSrc:     SpO2: 97% 99%   Weight:     Height:         Results for orders placed or performed during the hospital encounter of 04/02/25   Basic metabolic panel    Collection Time: 04/02/25  5:57 PM   Result Value Ref Range    Sodium 139 136 - 145 mmol/L    " Potassium 5.2 (H) 3.5 - 5.1 mmol/L    Chloride 114 (H) 95 - 110 mmol/L    CO2 17 (L) 23 - 29 mmol/L    Glucose 217 (H) 70 - 110 mg/dL    BUN 62 (H) 8 - 23 mg/dL    Creatinine 2.5 (H) 0.5 - 1.4 mg/dL    Calcium 8.7 8.7 - 10.5 mg/dL    Anion Gap 8 8 - 16 mmol/L    eGFR 20 (L) >60 mL/min/1.73/m2         Imaging Results    None         Medications   hydrALAZINE injection 10 mg (10 mg Intravenous Given 4/2/25 1836)   furosemide injection 40 mg (40 mg Intravenous Given 4/2/25 1903)         6:42 PM  - Re-evaluation:  The patient is resting comfortably and is in no acute distress. Discussed test results and notified of K:5.2, EKG shows bradycardia, no peaked T waves. Answered questions at this time. Ordered lasix and hydralazine and will continue to monitor closely.    7:21 PM - Re-evaluation: The patient is resting comfortably and is in no acute distress. She states that her symptoms have improved after treatment within ER.  On reevaluation, again pt denies any acute symptoms.  Bp has improved and pt requesting to go home. Discussed test results, shared treatment plan, specific conditions for return, and importance of follow up with patient and family.  Advised close f/u c pcp/cardiology and to return to ER if any issues arise.  She understands and agrees with the plan as discussed. Answered  her questions at this time. She has remained hemodynamically stable throughout the ED course and is appropriate for discharge home.     Regarding HYPERTENSION, I advised patient to: keep a record of blood pressure results; take your blood pressure medication exactly as directed without skipping doses; avoid medications that contain heart stimulants, including over-the-counter drugs such as decongestants; maintain a healthy weight; cut back on sodium intake (i.e., limit canned, dried, packaged, and fast foods and dont add salt to food); follow the DASH (Dietary Approaches to Stop Hypertension) eating plan which recommends vegetables,  fruits, whole gains, and other heart healthy foods; begin an exercise program that includes  aerobic exercise 3 to 4 times a week for an average of 40 minutes at a time (with approval of cardiologist or primary care provider); limit drinks that contain alcohol and caffeine; control levels of emotional stress; and seek emergency care for any shortness of breath, chest pain, difficulty speaking, confusion, or visual changes.      Pre-hypertension/Hypertension: The pt has been informed that they may have pre-hypertension or hypertension based on a blood pressure reading in the ED. I recommend that the pt call the PCP listed on their discharge instructions or a physician of their choice this week to arrange f/u for further evaluation of possible pre-hypertension or hypertension.     Lakshmi Campbell was given a handout which discussed their disease process, precautions, and instructions for follow-up and therapy.     Follow-up Information       Keely Silva, NP. Schedule an appointment as soon as possible for a visit in 1 week.    Specialty: Family Medicine  Contact information:  [address] [address] [address]  [phone]               [facility] Cardiology [facility]. Schedule an appointment as soon as possible for a visit in 1 week.    Specialty: Cardiology  Why: or with your physician  Contact information:  [address] [facility] [id number] [phone]  Additional information:  Please park on the [address] side and use the Clinic entrance. Check in on the 3rd floor, to the right.             [facility] - Emergency Dept.    Specialty: Emergency Medicine  Why: As needed, If symptoms worsen  Contact information:  [address] [facility] [address]  Emergency Department  [facility] [id number] [phone]                              Medication List        ASK your doctor about these medications      albuterol 90 mcg/actuation inhaler  Commonly known as: VENTOLIN HFA  Inhale 2 puffs into the lungs  "every 6 (six) hours as needed for Wheezing or Shortness of Breath (cough). Rescue     amLODIPine 10 MG tablet  Commonly known as: NORVASC  TAKE 1 TABLET(10 MG) BY MOUTH EVERY DAY     blood sugar diagnostic Strp     budesonide-formoterol 160-4.5 mcg 160-4.5 mcg/actuation Hfaa  Commonly known as: SYMBICORT  Inhale 2 puffs into the lungs every 12 (twelve) hours. Controller     cholecalciferol (vitamin D3) 50 mcg (2,000 unit) Cap capsule  Commonly known as: VITAMIN D3     HumuLIN 70/30 U-100 KwikPen 100 unit/mL (70-30) Inpn pen  Generic drug: insulin NPH/Reg human     hydrALAZINE 25 MG tablet  Commonly known as: APRESOLINE  Take 1 tablet (25 mg total) by mouth every 12 (twelve) hours.     insulin syringe-needle U-100 1 mL 29 gauge x 1/2" Syrg     lancets 33 gauge Misc     levothyroxine 150 MCG tablet  Commonly known as: SYNTHROID     pravastatin 20 MG tablet  Commonly known as: PRAVACHOL             Current Discharge Medication List            ED Diagnosis  1. Hypertension, unspecified type    2. Abnormal laboratory test    3. Bradycardia                            Clinical Impression:  Final diagnoses:  [R89.9] Abnormal laboratory test  [I10] Hypertension, unspecified type (Primary)  [R00.1] Bradycardia          ED Disposition Condition    Discharge Stable          ED Prescriptions    None       Follow-up Information       Follow up With Specialties Details Why Contact Info Additional Information    Keely Silva NP Family Medicine Schedule an appointment as soon as possible for a visit in 1 week  [address] [address] [address]  [phone]       [facility] Cardiology [facility] Cardiology Schedule an appointment as soon as possible for a visit in 1 week or with your physician [address] [facility] [id number] [phone] Please park on the [address] side and use the Clinic entrance. Check in on the 3rd floor, to the right.    [place] - Emergency Dept Emergency Medicine  As " needed, If symptoms worsen [address]  Emergency Department  [facility] [id number] [phone]                  [1]   Social History  Tobacco Use    Smoking status: Former     Current packs/day: 0.00     Average packs/day: 1 pack/day for 8.0 years (8.0 ttl pk-yrs)     Types: Cigarettes     Start date:      Quit date:      Years since quittin.2    Smokeless tobacco: Never   Substance Use Topics    Alcohol use: Yes     Comment: rarely    Drug use: No        Aristeo Wagner Jr., MD  [phone]

## 2025-04-02 NOTE — TELEPHONE ENCOUNTER
The patient has notified of the results and instructions were given.              Potassium elevated, given your heart rate is low as well I recommend ED eval.

## 2025-04-03 ENCOUNTER — PATIENT OUTREACH (OUTPATIENT)
Facility: OTHER | Age: 75
End: 2025-04-03
Payer: MEDICARE

## 2025-04-03 LAB
OHS QRS DURATION: 106 MS
OHS QTC CALCULATION: 382 MS

## 2025-04-03 NOTE — DISCHARGE INSTRUCTIONS
Regarding HYPERTENSION, I advised patient to: keep a record of blood pressure results; take your blood pressure medication exactly as directed without skipping doses; avoid medications that contain heart stimulants, including over-the-counter drugs such as decongestants; maintain a healthy weight; cut back on sodium intake (i.e., limit canned, dried, packaged, and fast foods and dont add salt to food); follow the DASH (Dietary Approaches to Stop Hypertension) eating plan which recommends vegetables, fruits, whole gains, and other heart healthy foods; begin an exercise program that includes  aerobic exercise 3 to 4 times a week for an average of 40 minutes at a time (with approval of cardiologist or primary care provider); limit drinks that contain alcohol and caffeine; control levels of emotional stress; and seek emergency care for any shortness of breath, chest pain, difficulty speaking, confusion, or visual changes.

## 2025-04-04 NOTE — PROGRESS NOTES
Christine Maria  ED Navigator  Emergency Department    [place]: [facility] [place]  Role: Community Health Worker    Date: 2025  Patient Name: Lakshmi Campbell  MRN: [id number]  PCP: Keely Silva NP    Assessment:     Lakshmi Campbell is a 74 y.o. female who has presented to ED for Abnormal laboratory test; Hypertension, unspecified type;  Bradycardia. Patient has visited the ED 1 times in the past 3 months. Patient did not contact PCP.     ED Navigator Initial Assessment    ED Navigator Enrollment Documentation  Consent to Services  Does patient consent to completing the assessment?: Yes  Contact  Method of Initial Contact: Phone  Transportation  Insurance Coverage  Do you have coverage/adequate coverage?: Yes  Specialist Appointment  Did the patient come to the ED to see a specialist?: No  Does the patient have a pending specialist referral?: No  Does the patient have a specialist appointment made?: Yes  PCP Follow Up Appointment  Medications  Is patient able to afford medication?: Yes  Psychological  Food  Communication/Education  Other Financial Concerns  Other Social Barriers/Concerns  Primary Barrier  Scheduled Appointment Date: 25  Plan: Provided information for [facility]  Nurse triage line, [phone] or [phone] ([phone])         Social History     Socioeconomic History    Marital status:    Tobacco Use    Smoking status: Former     Current packs/day: 0.00     Average packs/day: 1 pack/day for 8.0 years (8.0 ttl pk-yrs)     Types: Cigarettes     Start date:      Quit date:      Years since quittin.2    Smokeless tobacco: Never   Substance and Sexual Activity    Alcohol use: Yes     Comment: rarely    Drug use: No    Sexual activity: Not Currently     Social Drivers of Health     Financial Resource Strain: Low Risk  (2025)    Overall Financial Resource Strain (CARDIA)     Difficulty of Paying Living Expenses: Not very hard   Food Insecurity: No Food  Insecurity (4/4/2025)    Hunger Vital Sign     Worried About Running Out of Food in the Last Year: Never true     Ran Out of Food in the Last Year: Never true   Transportation Needs: No Transportation Needs (4/4/2025)    PRAPARE - Transportation     Lack of Transportation (Medical): No     Lack of Transportation (Non-Medical): No   Physical Activity: Inactive (4/4/2025)    Exercise Vital Sign     Days of Exercise per Week: 0 days     Minutes of Exercise per Session: 0 min   Stress: No Stress Concern Present (4/4/2025)    [employer] [place] [employer] - Occupational Stress Questionnaire     Feeling of Stress : Not at all   Housing Stability: Low Risk  (4/4/2025)    Housing Stability Vital Sign     Unable to Pay for Housing in the Last Year: No     Homeless in the Last Year: No       Plan:     Pt was seen in the ED on 4/1/25 for Abnormal laboratory test; Hypertension, unspecified type; Bradycardia. ED Navigator spoke with pt for Post ED visit follow up navigation to assist with scheduling a 7-day Post ED visit follow up appt. Pt states that she had not contacted pcp to schedule a 7-day Post ED visit follow up appt, but is scheduled on 5/5/25 w/cardiology and declined appt assistance with pcp. Pt does not have transportation issues and has no additional needs at this time.  Closing Encounter.    Christine Maria

## 2025-04-11 ENCOUNTER — RESULTS FOLLOW-UP (OUTPATIENT)
Dept: INTERNAL MEDICINE | Facility: CLINIC | Age: 75
End: 2025-04-11

## 2025-04-11 ENCOUNTER — OFFICE VISIT (OUTPATIENT)
Dept: INTERNAL MEDICINE | Facility: CLINIC | Age: 75
End: 2025-04-11
Payer: MEDICARE

## 2025-04-11 ENCOUNTER — HOSPITAL ENCOUNTER (OUTPATIENT)
Dept: RADIOLOGY | Facility: HOSPITAL | Age: 75
Discharge: HOME | End: 2025-04-11
Attending: NURSE PRACTITIONER
Payer: MEDICARE

## 2025-04-11 VITALS
DIASTOLIC BLOOD PRESSURE: 50 MMHG | SYSTOLIC BLOOD PRESSURE: 148 MMHG | HEIGHT: 65 IN | TEMPERATURE: 97 F | BODY MASS INDEX: 37.2 KG/M2 | WEIGHT: 223.31 LBS | RESPIRATION RATE: 16 BRPM | HEART RATE: 60 BPM | OXYGEN SATURATION: 95 %

## 2025-04-11 DIAGNOSIS — E11.29 TYPE 2 DIABETES MELLITUS WITH MICROALBUMINURIA, WITH LONG-TERM CURRENT USE OF INSULIN: ICD-10-CM

## 2025-04-11 DIAGNOSIS — E87.5 HYPERKALEMIA: Primary | ICD-10-CM

## 2025-04-11 DIAGNOSIS — I10 ESSENTIAL HYPERTENSION: ICD-10-CM

## 2025-04-11 DIAGNOSIS — I50.32 CHRONIC DIASTOLIC CONGESTIVE HEART FAILURE: ICD-10-CM

## 2025-04-11 DIAGNOSIS — N18.4 CHRONIC KIDNEY DISEASE, STAGE 4 (SEVERE): ICD-10-CM

## 2025-04-11 DIAGNOSIS — J45.30 MILD PERSISTENT ASTHMA WITHOUT COMPLICATION: ICD-10-CM

## 2025-04-11 DIAGNOSIS — E66.01 CLASS 2 SEVERE OBESITY DUE TO EXCESS CALORIES WITH SERIOUS COMORBIDITY AND BODY MASS INDEX (BMI) OF 38.0 TO 38.9 IN ADULT: ICD-10-CM

## 2025-04-11 DIAGNOSIS — R80.9 TYPE 2 DIABETES MELLITUS WITH MICROALBUMINURIA, WITH LONG-TERM CURRENT USE OF INSULIN: ICD-10-CM

## 2025-04-11 DIAGNOSIS — R06.02 SHORTNESS OF BREATH: ICD-10-CM

## 2025-04-11 DIAGNOSIS — I50.32 CHRONIC DIASTOLIC CONGESTIVE HEART FAILURE: Primary | ICD-10-CM

## 2025-04-11 DIAGNOSIS — E66.812 CLASS 2 SEVERE OBESITY DUE TO EXCESS CALORIES WITH SERIOUS COMORBIDITY AND BODY MASS INDEX (BMI) OF 38.0 TO 38.9 IN ADULT: ICD-10-CM

## 2025-04-11 DIAGNOSIS — E78.5 HYPERLIPIDEMIA, UNSPECIFIED HYPERLIPIDEMIA TYPE: ICD-10-CM

## 2025-04-11 DIAGNOSIS — D69.6 THROMBOCYTOPENIA: ICD-10-CM

## 2025-04-11 DIAGNOSIS — E87.5 HYPERKALEMIA: ICD-10-CM

## 2025-04-11 DIAGNOSIS — E11.3293 MILD NONPROLIFERATIVE DIABETIC RETINOPATHY OF BOTH EYES WITHOUT MACULAR EDEMA ASSOCIATED WITH TYPE 2 DIABETES MELLITUS: ICD-10-CM

## 2025-04-11 DIAGNOSIS — Z79.4 TYPE 2 DIABETES MELLITUS WITH MICROALBUMINURIA, WITH LONG-TERM CURRENT USE OF INSULIN: ICD-10-CM

## 2025-04-11 PROBLEM — J02.9 SORE THROAT: Status: RESOLVED | Noted: 2024-10-18 | Resolved: 2025-04-11

## 2025-04-11 PROCEDURE — 3288F FALL RISK ASSESSMENT DOCD: CPT | Mod: CPTII,S$GLB,, | Performed by: NURSE PRACTITIONER

## 2025-04-11 PROCEDURE — 3077F SYST BP >= 140 MM HG: CPT | Mod: CPTII,S$GLB,, | Performed by: NURSE PRACTITIONER

## 2025-04-11 PROCEDURE — 71046 X-RAY EXAM CHEST 2 VIEWS: CPT | Mod: TC,PO

## 2025-04-11 PROCEDURE — 99214 OFFICE O/P EST MOD 30 MIN: CPT | Mod: S$GLB,,, | Performed by: NURSE PRACTITIONER

## 2025-04-11 PROCEDURE — 1101F PT FALLS ASSESS-DOCD LE1/YR: CPT | Mod: CPTII,S$GLB,, | Performed by: NURSE PRACTITIONER

## 2025-04-11 PROCEDURE — 3044F HG A1C LEVEL LT 7.0%: CPT | Mod: CPTII,S$GLB,, | Performed by: NURSE PRACTITIONER

## 2025-04-11 PROCEDURE — 99999 PR PBB SHADOW E&M-EST. PATIENT-LVL IV: CPT | Mod: PBBFAC,,, | Performed by: NURSE PRACTITIONER

## 2025-04-11 PROCEDURE — 1160F RVW MEDS BY RX/DR IN RCRD: CPT | Mod: CPTII,S$GLB,, | Performed by: NURSE PRACTITIONER

## 2025-04-11 PROCEDURE — 3078F DIAST BP <80 MM HG: CPT | Mod: CPTII,S$GLB,, | Performed by: NURSE PRACTITIONER

## 2025-04-11 PROCEDURE — 1159F MED LIST DOCD IN RCRD: CPT | Mod: CPTII,S$GLB,, | Performed by: NURSE PRACTITIONER

## 2025-04-11 PROCEDURE — 1126F AMNT PAIN NOTED NONE PRSNT: CPT | Mod: CPTII,S$GLB,, | Performed by: NURSE PRACTITIONER

## 2025-04-11 PROCEDURE — G2211 COMPLEX E/M VISIT ADD ON: HCPCS | Mod: S$GLB,,, | Performed by: NURSE PRACTITIONER

## 2025-04-11 PROCEDURE — 71046 X-RAY EXAM CHEST 2 VIEWS: CPT | Mod: 26,,, | Performed by: RADIOLOGY

## 2025-04-11 PROCEDURE — 3008F BODY MASS INDEX DOCD: CPT | Mod: CPTII,S$GLB,, | Performed by: NURSE PRACTITIONER

## 2025-04-11 PROCEDURE — 4010F ACE/ARB THERAPY RXD/TAKEN: CPT | Mod: CPTII,S$GLB,, | Performed by: NURSE PRACTITIONER

## 2025-04-11 RX ORDER — FUROSEMIDE 20 MG/1
20 TABLET ORAL DAILY
Qty: 3 TABLET | Refills: 0 | Status: SHIPPED | OUTPATIENT
Start: 2025-04-11 | End: 2025-04-14 | Stop reason: SDUPTHER

## 2025-04-11 RX ORDER — LEVOTHYROXINE SODIUM 137 UG/1
1 TABLET ORAL DAILY
COMMUNITY
Start: 2025-03-19

## 2025-04-11 NOTE — PROGRESS NOTES
Subjective:       Patient ID: Lakshmi Campbell is a 74 y.o. female.    Chief Complaint: Transitional Care (ED follow up)      History of Present Illness    CHIEF COMPLAINT:  - Ms. Campbell presents for follow-up after a recent ER visit due to high potassium levels and to discuss recent lab results showing declining kidney function.    HPI:  Ms. Campbell was evaluated at the ER on 4/2 as instructed by her cardiologist due to a high potassium level of 6.1. In the ER, she received Lasix and hydralazine. An EKG showed bradycardia with a heart rate around 45. Her potassium level was rechecked in the ER and had dropped to 5.2. Recent nephrology labs from yesterday showed the potassium level at 5.8.    Her creatinine has increased to 2.8, and her GFR has decreased to 16.9, down from 20 in the ER. She has an appointment with the nephrologist scheduled for next Friday (17th).    Her cardiologist recently discontinued some of her medications, including Jardiance and Perindopril. She has been off these medications for a little over a week.     She reports shortness of breath, particularly with movement. This symptom has been recurring; it improved when she was on oxygen following a bout of bilateral pneumonia last year, then returned, leading to a diagnosis of asthma by a pulmonologist.     She has gained approximately 10 lbs in the past week, with some fluctuation noted. She has an eye exam scheduled for May 1st at the [facility].    She denies any swelling in her legs and any concerns for bleeding, only mentioning bruising.      Problem List[1]      Past Surgical History:   Procedure Laterality Date    APPENDECTOMY      BREAST BIOPSY      CATARACT EXTRACTION      COLONOSCOPY N/A 12/21/2021    Procedure: COLONOSCOPY screening;  Surgeon: Moises Patterson MD;  Location: [facility];  Service: Endoscopy;  Laterality: N/A;    ESOPHAGOGASTRODUODENOSCOPY N/A 12/21/2021    Procedure: EGD (ESOPHAGOGASTRODUODENOSCOPY) EV screening;   Surgeon: Moises Patterson MD;  Location: [facility];  Service: Endoscopy;  Laterality: N/A;    EYE SURGERY      HERNIA REPAIR      OPEN REDUCTION AND INTERNAL FIXATION (ORIF) OF FRACTURE OF DISTAL RADIUS Left 11/2/2020    Procedure: ORIF, FRACTURE, RADIUS, DISTAL;  Surgeon: Sage Wolf MD;  Location: [facility] OR;  Service: Orthopedics;  Laterality: Left;    OPEN REDUCTION AND INTERNAL FIXATION (ORIF) OF FRACTURE OF PATELLA Right 11/2/2020    Procedure: ORIF, FRACTURE, PATELLA;  Surgeon: Sage Wolf MD;  Location: [facility] OR;  Service: Orthopedics;  Laterality: Right;    OPEN REDUCTION AND INTERNAL FIXATION (ORIF) OF FRACTURE OF PATELLA Right 12/18/2020    Procedure: ORIF, FRACTURE, PATELLA;  Surgeon: Sage Wolf MD;  Location: [facility] OR;  Service: Orthopedics;  Laterality: Right;    ORIF HUMERUS FRACTURE Left 11/5/2020    Procedure: ORIF, FRACTURE, HUMERUS;  Surgeon: Sage Wolf MD;  Location: [facility] OR;  Service: Orthopedics;  Laterality: Left;    PLACEMENT OF ACELLULAR HUMAN DERMAL ALLOGRAFT Right 11/2/2020    Procedure: APPLICATION, ACELLULAR HUMAN DERMAL ALLOGRAFT;  Surgeon: Sage Wolf MD;  Location: [facility] OR;  Service: Orthopedics;  Laterality: Right;  Right Patella    REMOVAL OF HARDWARE FROM HAND Left 3/11/2021    Procedure: REMOVAL, HARDWARE, HAND;  Surgeon: Sage Wolf MD;  Location: [facility] OR;  Service: Orthopedics;  Laterality: Left;  Removal of dorsal spanning wrist plate left distal radius    REMOVAL OF HARDWARE FROM LOWER EXTREMITY Right 12/18/2020    Procedure: REMOVAL, HARDWARE, LOWER EXTREMITY;  Surgeon: Sage Wolf MD;  Location: [facility] OR;  Service: Orthopedics;  Laterality: Right;       Current Medications[2]    Review of Systems   Constitutional:  Negative for activity change, appetite change, chills, fatigue and fever.   Respiratory:  Positive for shortness of breath. Negative for apnea, cough, chest tightness and  "wheezing.    Cardiovascular:  Negative for chest pain, palpitations and leg swelling.   Gastrointestinal:  Negative for abdominal pain, constipation, diarrhea, nausea and vomiting.   Genitourinary:  Negative for decreased urine volume, dysuria, flank pain, frequency, hematuria and urgency.   Musculoskeletal:  Negative for arthralgias, back pain and gait problem.   Neurological:  Positive for light-headedness. Negative for dizziness, syncope, weakness, numbness and headaches.       Objective:   BP (!) 148/50   Pulse 60   Temp 96.8 °F (36 °C) (Oral)   Resp 16   Ht 5' 5" (1.651 m)   Wt 101.3 kg (223 lb 5.2 oz)   SpO2 95%   BMI 37.16 kg/m²      Physical Exam  Vitals reviewed.   Constitutional:       General: She is not in acute distress.     Appearance: Normal appearance. She is well-developed. She is not ill-appearing or diaphoretic.   HENT:      Head: Normocephalic.   Cardiovascular:      Rate and Rhythm: Normal rate and regular rhythm.      Pulses: Normal pulses.      Heart sounds: Murmur heard.      No friction rub. No gallop.   Pulmonary:      Effort: Pulmonary effort is normal. No respiratory distress.      Breath sounds: Normal breath sounds. No rales.   Musculoskeletal:      Cervical back: Neck supple.   Skin:     General: Skin is warm and dry.      Coloration: Skin is not pale.      Findings: No erythema.      Comments: Scattered bruising to bilateral arms and legs.   Neurological:      Mental Status: She is alert and oriented to person, place, and time.   Psychiatric:         Mood and Affect: Mood normal.         Behavior: Behavior normal.         Assessment & Plan     Problem List Items Addressed This Visit          Ophtho    Mild nonproliferative diabetic retinopathy of both eyes without macular edema associated with type 2 diabetes mellitus    Current Assessment & Plan   Eye exam due, scheduled for May 1.             Pulmonary    Mild persistent asthma without complication    Current Assessment & " Plan   Symbicort BID. Stable. Albuterol prn.          Shortness of breath    Relevant Orders    BNP (Completed)    X-Ray Chest PA And Lateral (Completed)    Comprehensive Metabolic Panel (Completed)       Cardiac/Vascular    Essential hypertension    Current Assessment & Plan   Blood pressure elevated today. Recently had blood pressure medications adjusted by cardiology.          Hyperlipidemia    Current Assessment & Plan   On statin. Lipid panel reviewed.          Chronic diastolic congestive heart failure    Current Assessment & Plan   Labs today. Followed by cardiology.          Relevant Orders    BNP (Completed)       Renal/    Hyperkalemia - Primary    Current Assessment & Plan   Labs today.          Chronic kidney disease, stage 4 (severe)    Current Assessment & Plan   Upcoming appt with nephrology. Labs today.             Hematology    Thrombocytopenia    Current Assessment & Plan   Previously low. Stable on last labs.             Endocrine    Type 2 diabetes mellitus with microalbuminuria, with long-term current use of insulin    Current Assessment & Plan   Lab Results   Component Value Date    HGBA1C 6 (H) 03/19/2025     Continue current medications for now. Stable.         Class 2 severe obesity due to excess calories with serious comorbidity and body mass index (BMI) of 38.0 to 38.9 in adult    Current Assessment & Plan   Difficulty with exercise due to comorbidities.             Assessment & Plan    MEDICAL DECISION MAKING:  - Assessed recent ER visit for hyperkalemia (K+ 6.1) and bradycardia.  - Noted decline in renal function with creatinine up to 2.8 and GFR down to 16.9, approaching dialysis threshold.  - Considered risks of administering Lasix for hyperkalemia management given compromised renal function.  - Evaluated recent medication changes, including discontinuation of Jardiance and Perindopril (ACE inhibitor).  - Noted 10-pound weight gain over a week and associated shortness of breath,  "considering possible fluid retention.    ORDERS:  - Ordered BMP to assess liver function, renal function, and electrolytes.  - Ordered BNP to evaluate for heart failure.  - Ordered repeat potassium level on Monday.    FOLLOW UP:  - Follow up on Monday for lab work to recheck potassium levels.  - Contact the office for possible ER visit if potassium is significantly elevated on Monday's test.  - Follow up on Friday, the 17th as scheduled for nephrology appointment.            Visit today included increased complexity associated with the care of the episodic problem  addressed and managing the longitudinal care of the patient due to the serious and/or complex managed problem(s) .        Portions of this note may have been created with voice recognition software. Occasional "wrong-word" or "sound-a-like" substitutions may have occurred due to the inherent limitations of voice recognition software. Please, read the note carefully and recognize, using context, where substitutions have occurred.       This note was generated with the assistance of ambient listening technology. Verbal consent was obtained by the patient and accompanying visitor(s) for the recording of patient appointment to facilitate this note. I attest to having reviewed and edited the generated note for accuracy, though some syntax or spelling errors may persist. Please contact the author of this note for any clarification.            [1]   Patient Active Problem List  Diagnosis    Type 2 diabetes mellitus with microalbuminuria, with long-term current use of insulin    Hypothyroidism    Essential hypertension    Hyperlipidemia    Microalbuminuria    Class 2 severe obesity due to excess calories with serious comorbidity and body mass index (BMI) of 38.0 to 38.9 in adult    Left knee pain    Shoulder pain, left    Thrombocytopenia    Wrist stiffness, left    Liver cirrhosis secondary to YO    Lower extremity edema    Abnormal mammogram    Mild " "nonproliferative diabetic retinopathy of both eyes without macular edema associated with type 2 diabetes mellitus    Vitamin D deficiency    Chronic diastolic congestive heart failure    Hyperkalemia    Senile purpura    Dependence on supplemental oxygen    Type 2 diabetes mellitus with stage 4 chronic kidney disease, with long-term current use of insulin    Acute cystitis with hematuria    Chronic kidney disease, stage 4 (severe)    Mild persistent asthma without complication    Shortness of breath    Microcytic anemia    Enlarged lymph nodes    Allergic rhinitis due to dust mite   [2]   Current Outpatient Medications:     albuterol (VENTOLIN HFA) 90 mcg/actuation inhaler, Inhale 2 puffs into the lungs every 6 (six) hours as needed for Wheezing or Shortness of Breath (cough). Rescue, Disp: 20.1 g, Rfl: 5    amLODIPine (NORVASC) 10 MG tablet, TAKE 1 TABLET(10 MG) BY MOUTH EVERY DAY, Disp: 90 tablet, Rfl: 1    blood sugar diagnostic Strp, Use ac bid, Disp: , Rfl:     budesonide-formoterol 160-4.5 mcg (SYMBICORT) 160-4.5 mcg/actuation HFAA, Inhale 2 puffs into the lungs every 12 (twelve) hours. Controller, Disp: 10.2 g, Rfl: 11    HUMULIN 70/30 U-100 KWIKPEN 100 unit/mL (70-30) InPn pen, SMARTSI Unit(s) SUB-Q Every Evening, Disp: , Rfl:     hydrALAZINE (APRESOLINE) 25 MG tablet, Take 1 tablet (25 mg total) by mouth every 12 (twelve) hours., Disp: 60 tablet, Rfl: 11    insulin syringe-needle U-100 1 mL 29 gauge x 1/2" Syrg, Use bid, Disp: , Rfl:     lancets 33 gauge Misc, Use as BID, Disp: , Rfl:     levothyroxine (SYNTHROID) 137 MCG Tab tablet, Take 1 tablet by mouth once daily., Disp: , Rfl:     pravastatin (PRAVACHOL) 20 MG tablet, Take 20 mg by mouth once daily. , Disp: , Rfl: 11    cholecalciferol, vitamin D3, (VITAMIN D3) 50 mcg (2,000 unit) Cap capsule, Take 1 capsule by mouth. (Patient not taking: Reported on 2025), Disp: , Rfl:     furosemide (LASIX) 20 MG tablet, Take 1 tablet (20 mg total) by mouth " once daily. for 3 days, Disp: 3 tablet, Rfl: 0

## 2025-04-14 RX ORDER — FUROSEMIDE 20 MG/1
20 TABLET ORAL DAILY
Qty: 3 TABLET | Refills: 0 | Status: ON HOLD | OUTPATIENT
Start: 2025-04-14 | End: 2025-05-15

## 2025-04-17 ENCOUNTER — LAB VISIT (OUTPATIENT)
Dept: LAB | Facility: HOSPITAL | Age: 75
End: 2025-04-17
Attending: NURSE PRACTITIONER
Payer: MEDICARE

## 2025-04-17 DIAGNOSIS — E87.5 HYPERKALEMIA: ICD-10-CM

## 2025-04-17 LAB
ANION GAP (OHS): 8 MMOL/L (ref 8–16)
BUN SERPL-MCNC: 64 MG/DL (ref 8–23)
CALCIUM SERPL-MCNC: 8.9 MG/DL (ref 8.7–10.5)
CHLORIDE SERPL-SCNC: 114 MMOL/L (ref 95–110)
CO2 SERPL-SCNC: 17 MMOL/L (ref 23–29)
CREAT SERPL-MCNC: 2.4 MG/DL (ref 0.5–1.4)
GFR SERPLBLD CREATININE-BSD FMLA CKD-EPI: 21 ML/MIN/1.73/M2
GLUCOSE SERPL-MCNC: 111 MG/DL (ref 70–110)
POTASSIUM SERPL-SCNC: 5.4 MMOL/L (ref 3.5–5.1)
SODIUM SERPL-SCNC: 139 MMOL/L (ref 136–145)

## 2025-04-17 PROCEDURE — 36415 COLL VENOUS BLD VENIPUNCTURE: CPT | Mod: PO

## 2025-04-17 PROCEDURE — 82310 ASSAY OF CALCIUM: CPT

## 2025-04-22 ENCOUNTER — RESULTS FOLLOW-UP (OUTPATIENT)
Dept: INTERNAL MEDICINE | Facility: CLINIC | Age: 75
End: 2025-04-22

## 2025-04-23 NOTE — ASSESSMENT & PLAN NOTE
Lab Results   Component Value Date    HGBA1C 6 (H) 03/19/2025     Continue current medications for now. Stable.

## 2025-05-01 LAB
LEFT EYE DM RETINOPATHY: POSITIVE
RIGHT EYE DM RETINOPATHY: POSITIVE

## 2025-05-09 ENCOUNTER — HOSPITAL ENCOUNTER (INPATIENT)
Facility: HOSPITAL | Age: 75
LOS: 4 days | Discharge: HOME | DRG: 291 | End: 2025-05-15
Attending: EMERGENCY MEDICINE | Admitting: FAMILY MEDICINE
Payer: MEDICARE

## 2025-05-09 DIAGNOSIS — R07.9 CHEST PAIN: ICD-10-CM

## 2025-05-09 DIAGNOSIS — R06.02 SHORTNESS OF BREATH: ICD-10-CM

## 2025-05-09 DIAGNOSIS — R06.02 SOB (SHORTNESS OF BREATH): ICD-10-CM

## 2025-05-09 DIAGNOSIS — I50.9 CHF (CONGESTIVE HEART FAILURE): ICD-10-CM

## 2025-05-09 DIAGNOSIS — I10 ESSENTIAL HYPERTENSION: ICD-10-CM

## 2025-05-09 DIAGNOSIS — I50.32 CHRONIC DIASTOLIC CONGESTIVE HEART FAILURE: ICD-10-CM

## 2025-05-09 DIAGNOSIS — I10 PRIMARY HYPERTENSION: ICD-10-CM

## 2025-05-09 DIAGNOSIS — J96.01 ACUTE RESPIRATORY FAILURE WITH HYPOXIA: ICD-10-CM

## 2025-05-09 DIAGNOSIS — N18.9 CHRONIC KIDNEY DISEASE, UNSPECIFIED CKD STAGE: ICD-10-CM

## 2025-05-09 DIAGNOSIS — I50.30 DIASTOLIC CONGESTIVE HEART FAILURE, UNSPECIFIED HF CHRONICITY: Primary | ICD-10-CM

## 2025-05-09 DIAGNOSIS — J81.0 ACUTE PULMONARY EDEMA: ICD-10-CM

## 2025-05-09 LAB
ABSOLUTE EOSINOPHIL (OHS): 0.09 K/UL
ABSOLUTE MONOCYTE (OHS): 0.35 K/UL (ref 0.3–1)
ABSOLUTE NEUTROPHIL COUNT (OHS): 2.75 K/UL (ref 1.8–7.7)
ALBUMIN SERPL BCP-MCNC: 3.4 G/DL (ref 3.5–5.2)
ALLENS TEST: ABNORMAL
ALP SERPL-CCNC: 81 UNIT/L (ref 40–150)
ALT SERPL W/O P-5'-P-CCNC: 17 UNIT/L (ref 10–44)
ANION GAP (OHS): 13 MMOL/L (ref 8–16)
AST SERPL-CCNC: 22 UNIT/L (ref 11–45)
BACTERIA #/AREA URNS AUTO: ABNORMAL /HPF
BASOPHILS # BLD AUTO: 0.02 K/UL
BASOPHILS NFR BLD AUTO: 0.6 %
BILIRUB SERPL-MCNC: 1.1 MG/DL (ref 0.1–1)
BILIRUB UR QL STRIP.AUTO: NEGATIVE
BNP SERPL-MCNC: 507 PG/ML (ref 0–99)
BUN SERPL-MCNC: 46 MG/DL (ref 8–23)
CALCIUM SERPL-MCNC: 9 MG/DL (ref 8.7–10.5)
CHLORIDE SERPL-SCNC: 109 MMOL/L (ref 95–110)
CLARITY UR: ABNORMAL
CO2 SERPL-SCNC: 21 MMOL/L (ref 23–29)
COLOR UR AUTO: YELLOW
CREAT SERPL-MCNC: 2 MG/DL (ref 0.5–1.4)
DELSYS: ABNORMAL
ERYTHROCYTE [DISTWIDTH] IN BLOOD BY AUTOMATED COUNT: 14.4 % (ref 11.5–14.5)
FIO2: 21
GFR SERPLBLD CREATININE-BSD FMLA CKD-EPI: 26 ML/MIN/1.73/M2
GLUCOSE SERPL-MCNC: 123 MG/DL (ref 70–110)
GLUCOSE UR QL STRIP: ABNORMAL
HCO3 UR-SCNC: 26 MMOL/L (ref 24–28)
HCT VFR BLD AUTO: 29.8 % (ref 37–48.5)
HGB BLD-MCNC: 9.2 GM/DL (ref 12–16)
HGB UR QL STRIP: ABNORMAL
HOLD SPECIMEN: NORMAL
HYALINE CASTS UR QL AUTO: 0 /LPF (ref 0–1)
IMM GRANULOCYTES # BLD AUTO: 0.02 K/UL (ref 0–0.04)
IMM GRANULOCYTES NFR BLD AUTO: 0.6 % (ref 0–0.5)
KETONES UR QL STRIP: NEGATIVE
LEUKOCYTE ESTERASE UR QL STRIP: ABNORMAL
LYMPHOCYTES # BLD AUTO: 0.37 K/UL (ref 1–4.8)
MCH RBC QN AUTO: 29 PG (ref 27–31)
MCHC RBC AUTO-ENTMCNC: 30.9 G/DL (ref 32–36)
MCV RBC AUTO: 94 FL (ref 82–98)
MICROSCOPIC COMMENT: ABNORMAL
MODE: ABNORMAL
NITRITE UR QL STRIP: NEGATIVE
NUCLEATED RBC (/100WBC) (OHS): 0 /100 WBC
PCO2 BLDA: 39.9 MMHG (ref 35–45)
PH SMN: 7.42 [PH] (ref 7.35–7.45)
PH UR STRIP: 6 [PH]
PLATELET # BLD AUTO: 74 K/UL (ref 150–450)
PMV BLD AUTO: 10.1 FL (ref 9.2–12.9)
PO2 BLDA: 57 MMHG (ref 80–100)
POC BE: 1 MMOL/L (ref -2–2)
POC SATURATED O2: 90 % (ref 95–100)
POTASSIUM SERPL-SCNC: 4 MMOL/L (ref 3.5–5.1)
PROT SERPL-MCNC: 7.1 GM/DL (ref 6–8.4)
PROT UR QL STRIP: ABNORMAL
RBC # BLD AUTO: 3.17 M/UL (ref 4–5.4)
RBC #/AREA URNS AUTO: 4 /HPF (ref 0–4)
RELATIVE EOSINOPHIL (OHS): 2.5 %
RELATIVE LYMPHOCYTE (OHS): 10.3 % (ref 18–48)
RELATIVE MONOCYTE (OHS): 9.7 % (ref 4–15)
RELATIVE NEUTROPHIL (OHS): 76.3 % (ref 38–73)
SAMPLE: ABNORMAL
SITE: ABNORMAL
SODIUM SERPL-SCNC: 143 MMOL/L (ref 136–145)
SP GR UR STRIP: 1.01
SQUAMOUS #/AREA URNS AUTO: 2 /HPF
TROPONIN I SERPL DL<=0.01 NG/ML-MCNC: 0.1 NG/ML
UROBILINOGEN UR STRIP-ACNC: NEGATIVE EU/DL
WBC # BLD AUTO: 3.6 K/UL (ref 3.9–12.7)
WBC #/AREA URNS AUTO: >100 /HPF (ref 0–5)
WBC CLUMPS UR QL AUTO: ABNORMAL
YEAST UR QL AUTO: ABNORMAL /HPF

## 2025-05-09 PROCEDURE — 94640 AIRWAY INHALATION TREATMENT: CPT | Mod: XB

## 2025-05-09 PROCEDURE — 85025 COMPLETE CBC W/AUTO DIFF WBC: CPT | Performed by: EMERGENCY MEDICINE

## 2025-05-09 PROCEDURE — 80053 COMPREHEN METABOLIC PANEL: CPT | Performed by: EMERGENCY MEDICINE

## 2025-05-09 PROCEDURE — G0378 HOSPITAL OBSERVATION PER HR: HCPCS

## 2025-05-09 PROCEDURE — 93005 ELECTROCARDIOGRAM TRACING: CPT

## 2025-05-09 PROCEDURE — 84484 ASSAY OF TROPONIN QUANT: CPT | Performed by: EMERGENCY MEDICINE

## 2025-05-09 PROCEDURE — 36600 WITHDRAWAL OF ARTERIAL BLOOD: CPT

## 2025-05-09 PROCEDURE — 96375 TX/PRO/DX INJ NEW DRUG ADDON: CPT

## 2025-05-09 PROCEDURE — 99291 CRITICAL CARE FIRST HOUR: CPT

## 2025-05-09 PROCEDURE — 87086 URINE CULTURE/COLONY COUNT: CPT | Performed by: EMERGENCY MEDICINE

## 2025-05-09 PROCEDURE — 99900035 HC TECH TIME PER 15 MIN (STAT)

## 2025-05-09 PROCEDURE — 63600175 PHARM REV CODE 636 W HCPCS: Performed by: EMERGENCY MEDICINE

## 2025-05-09 PROCEDURE — 81001 URINALYSIS AUTO W/SCOPE: CPT | Performed by: EMERGENCY MEDICINE

## 2025-05-09 PROCEDURE — 25000242 PHARM REV CODE 250 ALT 637 W/ HCPCS: Performed by: EMERGENCY MEDICINE

## 2025-05-09 PROCEDURE — 83880 ASSAY OF NATRIURETIC PEPTIDE: CPT | Performed by: EMERGENCY MEDICINE

## 2025-05-09 PROCEDURE — 82803 BLOOD GASES ANY COMBINATION: CPT

## 2025-05-09 PROCEDURE — 93010 ELECTROCARDIOGRAM REPORT: CPT | Mod: ,,, | Performed by: INTERNAL MEDICINE

## 2025-05-09 PROCEDURE — 25000003 PHARM REV CODE 250: Performed by: EMERGENCY MEDICINE

## 2025-05-09 PROCEDURE — 96374 THER/PROPH/DIAG INJ IV PUSH: CPT

## 2025-05-09 RX ORDER — POLYETHYLENE GLYCOL 3350 17 G/17G
17 POWDER, FOR SOLUTION ORAL 2 TIMES DAILY PRN
Status: DISCONTINUED | OUTPATIENT
Start: 2025-05-09 | End: 2025-05-15 | Stop reason: HOSPADM

## 2025-05-09 RX ORDER — ACETAMINOPHEN 325 MG/1
650 TABLET ORAL EVERY 6 HOURS PRN
Status: DISCONTINUED | OUTPATIENT
Start: 2025-05-09 | End: 2025-05-15 | Stop reason: HOSPADM

## 2025-05-09 RX ORDER — FUROSEMIDE 10 MG/ML
80 INJECTION INTRAMUSCULAR; INTRAVENOUS
Status: COMPLETED | OUTPATIENT
Start: 2025-05-09 | End: 2025-05-09

## 2025-05-09 RX ORDER — FUROSEMIDE 10 MG/ML
40 INJECTION INTRAMUSCULAR; INTRAVENOUS 2 TIMES DAILY
Status: DISCONTINUED | OUTPATIENT
Start: 2025-05-10 | End: 2025-05-11

## 2025-05-09 RX ORDER — ARFORMOTEROL TARTRATE 15 UG/2ML
15 SOLUTION RESPIRATORY (INHALATION) 2 TIMES DAILY
Status: DISCONTINUED | OUTPATIENT
Start: 2025-05-10 | End: 2025-05-15 | Stop reason: HOSPADM

## 2025-05-09 RX ORDER — BUDESONIDE 0.5 MG/2ML
0.5 INHALANT ORAL EVERY 12 HOURS
Status: DISCONTINUED | OUTPATIENT
Start: 2025-05-10 | End: 2025-05-15 | Stop reason: HOSPADM

## 2025-05-09 RX ORDER — ONDANSETRON HYDROCHLORIDE 2 MG/ML
4 INJECTION, SOLUTION INTRAVENOUS EVERY 8 HOURS PRN
Status: DISCONTINUED | OUTPATIENT
Start: 2025-05-09 | End: 2025-05-15 | Stop reason: HOSPADM

## 2025-05-09 RX ORDER — METHYLPREDNISOLONE SOD SUCC 125 MG
125 VIAL (EA) INJECTION
Status: COMPLETED | OUTPATIENT
Start: 2025-05-09 | End: 2025-05-09

## 2025-05-09 RX ORDER — HYDRALAZINE HYDROCHLORIDE 20 MG/ML
5 INJECTION INTRAMUSCULAR; INTRAVENOUS EVERY 6 HOURS PRN
Status: DISCONTINUED | OUTPATIENT
Start: 2025-05-09 | End: 2025-05-15 | Stop reason: HOSPADM

## 2025-05-09 RX ORDER — SODIUM CHLORIDE 0.9 % (FLUSH) 0.9 %
10 SYRINGE (ML) INJECTION
Status: DISCONTINUED | OUTPATIENT
Start: 2025-05-09 | End: 2025-05-15 | Stop reason: HOSPADM

## 2025-05-09 RX ORDER — NITROGLYCERIN 20 MG/G
2 OINTMENT TOPICAL
Status: COMPLETED | OUTPATIENT
Start: 2025-05-09 | End: 2025-05-09

## 2025-05-09 RX ORDER — TALC
6 POWDER (GRAM) TOPICAL NIGHTLY PRN
Status: DISCONTINUED | OUTPATIENT
Start: 2025-05-09 | End: 2025-05-15 | Stop reason: HOSPADM

## 2025-05-09 RX ORDER — PROCHLORPERAZINE EDISYLATE 5 MG/ML
2.5 INJECTION INTRAMUSCULAR; INTRAVENOUS EVERY 8 HOURS PRN
Status: DISCONTINUED | OUTPATIENT
Start: 2025-05-09 | End: 2025-05-15 | Stop reason: HOSPADM

## 2025-05-09 RX ORDER — IPRATROPIUM BROMIDE AND ALBUTEROL SULFATE 2.5; .5 MG/3ML; MG/3ML
3 SOLUTION RESPIRATORY (INHALATION)
Status: COMPLETED | OUTPATIENT
Start: 2025-05-09 | End: 2025-05-09

## 2025-05-09 RX ORDER — HYDRALAZINE HYDROCHLORIDE 20 MG/ML
10 INJECTION INTRAMUSCULAR; INTRAVENOUS
Status: DISPENSED | OUTPATIENT
Start: 2025-05-09 | End: 2025-05-10

## 2025-05-09 RX ADMIN — NITROGLYCERIN 2 INCH: 20 OINTMENT TOPICAL at 08:05

## 2025-05-09 RX ADMIN — METHYLPREDNISOLONE SODIUM SUCCINATE 125 MG: 125 INJECTION, POWDER, FOR SOLUTION INTRAMUSCULAR; INTRAVENOUS at 07:05

## 2025-05-09 RX ADMIN — IPRATROPIUM BROMIDE AND ALBUTEROL SULFATE 3 ML: 2.5; .5 SOLUTION RESPIRATORY (INHALATION) at 07:05

## 2025-05-09 RX ADMIN — FUROSEMIDE 80 MG: 10 INJECTION, SOLUTION INTRAMUSCULAR; INTRAVENOUS at 08:05

## 2025-05-09 NOTE — Clinical Note
AVS virtually reviewed with patient in its entirety with emphasis on diet, medications, follow-up appointments and reasons to return to the ED. Patient also encouraged to utilize their patient portal. Ease and convenience of use reiterated. Education complete and patient voiced understanding. All questions answered. Discharge teaching complete.

## 2025-05-10 PROBLEM — N30.00 ACUTE CYSTITIS WITHOUT HEMATURIA: Status: ACTIVE | Noted: 2024-08-29

## 2025-05-10 PROBLEM — I50.33 ACUTE ON CHRONIC DIASTOLIC CONGESTIVE HEART FAILURE: Status: ACTIVE | Noted: 2024-06-27

## 2025-05-10 PROBLEM — J45.909 ASTHMA: Status: ACTIVE | Noted: 2025-05-10

## 2025-05-10 LAB
ABSOLUTE EOSINOPHIL (OHS): 0 K/UL
ABSOLUTE MONOCYTE (OHS): 0.03 K/UL (ref 0.3–1)
ABSOLUTE NEUTROPHIL COUNT (OHS): 1.98 K/UL (ref 1.8–7.7)
ALBUMIN SERPL BCP-MCNC: 3.2 G/DL (ref 3.5–5.2)
ALP SERPL-CCNC: 78 UNIT/L (ref 40–150)
ALT SERPL W/O P-5'-P-CCNC: 15 UNIT/L (ref 10–44)
ANION GAP (OHS): 12 MMOL/L (ref 8–16)
AST SERPL-CCNC: 19 UNIT/L (ref 11–45)
AV INDEX (PROSTH): 0.45
AV MEAN GRADIENT: 22 MMHG
AV PEAK GRADIENT: 38 MMHG
AV VALVE AREA BY VELOCITY RATIO: 1.2 CM²
AV VALVE AREA: 1.3 CM²
AV VELOCITY RATIO: 0.42
BASOPHILS # BLD AUTO: 0 K/UL
BASOPHILS NFR BLD AUTO: 0 %
BILIRUB SERPL-MCNC: 1 MG/DL (ref 0.1–1)
BSA FOR ECHO PROCEDURE: 2.22 M2
BUN SERPL-MCNC: 55 MG/DL (ref 8–23)
CALCIUM SERPL-MCNC: 8.8 MG/DL (ref 8.7–10.5)
CHLORIDE SERPL-SCNC: 106 MMOL/L (ref 95–110)
CO2 SERPL-SCNC: 23 MMOL/L (ref 23–29)
CREAT SERPL-MCNC: 2.2 MG/DL (ref 0.5–1.4)
CV ECHO LV RWT: 0.5 CM
DOP CALC AO PEAK VEL: 3.1 M/S
DOP CALC AO VTI: 65.7 CM
DOP CALC LVOT AREA: 2.8 CM2
DOP CALC LVOT DIAMETER: 1.9 CM
DOP CALC LVOT PEAK VEL: 1.3 M/S
DOP CALC LVOT STROKE VOLUME: 83.3 CM3
DOP CALCLVOT PEAK VEL VTI: 29.4 CM
E WAVE DECELERATION TIME: 249 MSEC
E/A RATIO: 1.01
E/E' RATIO: 25 M/S
ECHO LV POSTERIOR WALL: 1.3 CM (ref 0.6–1.1)
ERYTHROCYTE [DISTWIDTH] IN BLOOD BY AUTOMATED COUNT: 14.3 % (ref 11.5–14.5)
FRACTIONAL SHORTENING: 28.8 % (ref 28–44)
GFR SERPLBLD CREATININE-BSD FMLA CKD-EPI: 23 ML/MIN/1.73/M2
GLUCOSE SERPL-MCNC: 288 MG/DL (ref 70–110)
HCT VFR BLD AUTO: 29 % (ref 37–48.5)
HGB BLD-MCNC: 8.7 GM/DL (ref 12–16)
IMM GRANULOCYTES # BLD AUTO: 0.01 K/UL (ref 0–0.04)
IMM GRANULOCYTES NFR BLD AUTO: 0.5 % (ref 0–0.5)
INTERVENTRICULAR SEPTUM: 1.3 CM (ref 0.6–1.1)
IVC DIAMETER: 1.44 CM
IVRT: 110 MSEC
LA MAJOR: 6.7 CM
LA MINOR: 5.8 CM
LEFT ATRIUM SIZE: 3.7 CM
LEFT INTERNAL DIMENSION IN SYSTOLE: 3.7 CM (ref 2.1–4)
LEFT VENTRICLE DIASTOLIC VOLUME INDEX: 62.26 ML/M2
LEFT VENTRICLE DIASTOLIC VOLUME: 132 ML
LEFT VENTRICLE MASS INDEX: 131.3 G/M2
LEFT VENTRICLE SYSTOLIC VOLUME INDEX: 26.4 ML/M2
LEFT VENTRICLE SYSTOLIC VOLUME: 56 ML
LEFT VENTRICULAR INTERNAL DIMENSION IN DIASTOLE: 5.2 CM (ref 3.5–6)
LEFT VENTRICULAR MASS: 278.4 G
LV LATERAL E/E' RATIO: 22.1 M/S
LV SEPTAL E/E' RATIO: 28.4 M/S
LVED V (TEICH): 131.6 ML
LVES V (TEICH): 56.12 ML
LVOT MG: 4.01 MMHG
LVOT MV: 0.95 CM/S
LYMPHOCYTES # BLD AUTO: 0.13 K/UL (ref 1–4.8)
MAGNESIUM SERPL-MCNC: 2.6 MG/DL (ref 1.6–2.6)
MCH RBC QN AUTO: 28.7 PG (ref 27–31)
MCHC RBC AUTO-ENTMCNC: 30 G/DL (ref 32–36)
MCV RBC AUTO: 96 FL (ref 82–98)
MV PEAK A VEL: 1.97 M/S
MV PEAK E VEL: 1.99 M/S
MV STENOSIS PRESSURE HALF TIME: 72.28 MS
MV VALVE AREA P 1/2 METHOD: 3.04 CM2
NUCLEATED RBC (/100WBC) (OHS): 0 /100 WBC
PHOSPHATE SERPL-MCNC: 6.5 MG/DL (ref 2.7–4.5)
PLATELET # BLD AUTO: 66 K/UL (ref 150–450)
PMV BLD AUTO: 10.1 FL (ref 9.2–12.9)
POCT GLUCOSE: 229 MG/DL (ref 70–110)
POCT GLUCOSE: 258 MG/DL (ref 70–110)
POCT GLUCOSE: 271 MG/DL (ref 70–110)
POCT GLUCOSE: 287 MG/DL (ref 70–110)
POTASSIUM SERPL-SCNC: 4.7 MMOL/L (ref 3.5–5.1)
PROT SERPL-MCNC: 6.8 GM/DL (ref 6–8.4)
PV MV: 0.96 M/S
PV PEAK GRADIENT: 9 MMHG
PV PEAK VELOCITY: 1.53 M/S
RA MAJOR: 5.74 CM
RA PRESSURE ESTIMATED: 3 MMHG
RBC # BLD AUTO: 3.03 M/UL (ref 4–5.4)
RELATIVE EOSINOPHIL (OHS): 0 %
RELATIVE LYMPHOCYTE (OHS): 6 % (ref 18–48)
RELATIVE MONOCYTE (OHS): 1.4 % (ref 4–15)
RELATIVE NEUTROPHIL (OHS): 92.1 % (ref 38–73)
SODIUM SERPL-SCNC: 141 MMOL/L (ref 136–145)
STJ: 2.2 CM
TDI LATERAL: 0.09 M/S
TDI SEPTAL: 0.07 M/S
TDI: 0.08 M/S
TRICUSPID ANNULAR PLANE SYSTOLIC EXCURSION: 2.1 CM
TROPONIN I SERPL DL<=0.01 NG/ML-MCNC: 0.15 NG/ML
TROPONIN I SERPL DL<=0.01 NG/ML-MCNC: 0.17 NG/ML
WBC # BLD AUTO: 2.15 K/UL (ref 3.9–12.7)
Z-SCORE OF LEFT VENTRICULAR DIMENSION IN END DIASTOLE: -2.53
Z-SCORE OF LEFT VENTRICULAR DIMENSION IN END SYSTOLE: -0.79

## 2025-05-10 PROCEDURE — 94640 AIRWAY INHALATION TREATMENT: CPT | Mod: XB

## 2025-05-10 PROCEDURE — 99900035 HC TECH TIME PER 15 MIN (STAT)

## 2025-05-10 PROCEDURE — 36415 COLL VENOUS BLD VENIPUNCTURE: CPT | Performed by: STUDENT IN AN ORGANIZED HEALTH CARE EDUCATION/TRAINING PROGRAM

## 2025-05-10 PROCEDURE — 84484 ASSAY OF TROPONIN QUANT: CPT | Mod: 91 | Performed by: STUDENT IN AN ORGANIZED HEALTH CARE EDUCATION/TRAINING PROGRAM

## 2025-05-10 PROCEDURE — 63600175 PHARM REV CODE 636 W HCPCS: Performed by: STUDENT IN AN ORGANIZED HEALTH CARE EDUCATION/TRAINING PROGRAM

## 2025-05-10 PROCEDURE — 25000003 PHARM REV CODE 250: Performed by: STUDENT IN AN ORGANIZED HEALTH CARE EDUCATION/TRAINING PROGRAM

## 2025-05-10 PROCEDURE — 83735 ASSAY OF MAGNESIUM: CPT | Performed by: STUDENT IN AN ORGANIZED HEALTH CARE EDUCATION/TRAINING PROGRAM

## 2025-05-10 PROCEDURE — 84484 ASSAY OF TROPONIN QUANT: CPT | Performed by: EMERGENCY MEDICINE

## 2025-05-10 PROCEDURE — 84100 ASSAY OF PHOSPHORUS: CPT | Performed by: STUDENT IN AN ORGANIZED HEALTH CARE EDUCATION/TRAINING PROGRAM

## 2025-05-10 PROCEDURE — 96372 THER/PROPH/DIAG INJ SC/IM: CPT | Performed by: STUDENT IN AN ORGANIZED HEALTH CARE EDUCATION/TRAINING PROGRAM

## 2025-05-10 PROCEDURE — G0378 HOSPITAL OBSERVATION PER HR: HCPCS

## 2025-05-10 PROCEDURE — 94761 N-INVAS EAR/PLS OXIMETRY MLT: CPT

## 2025-05-10 PROCEDURE — 27000221 HC OXYGEN, UP TO 24 HOURS

## 2025-05-10 PROCEDURE — 25000242 PHARM REV CODE 250 ALT 637 W/ HCPCS: Performed by: STUDENT IN AN ORGANIZED HEALTH CARE EDUCATION/TRAINING PROGRAM

## 2025-05-10 PROCEDURE — 80053 COMPREHEN METABOLIC PANEL: CPT | Performed by: STUDENT IN AN ORGANIZED HEALTH CARE EDUCATION/TRAINING PROGRAM

## 2025-05-10 PROCEDURE — 85025 COMPLETE CBC W/AUTO DIFF WBC: CPT | Performed by: STUDENT IN AN ORGANIZED HEALTH CARE EDUCATION/TRAINING PROGRAM

## 2025-05-10 RX ORDER — IBUPROFEN 200 MG
16 TABLET ORAL
Status: DISCONTINUED | OUTPATIENT
Start: 2025-05-10 | End: 2025-05-15 | Stop reason: HOSPADM

## 2025-05-10 RX ORDER — GLUCAGON 1 MG
1 KIT INJECTION
Status: DISCONTINUED | OUTPATIENT
Start: 2025-05-10 | End: 2025-05-15 | Stop reason: HOSPADM

## 2025-05-10 RX ORDER — AMLODIPINE BESYLATE 10 MG/1
10 TABLET ORAL DAILY
Status: DISCONTINUED | OUTPATIENT
Start: 2025-05-10 | End: 2025-05-15 | Stop reason: HOSPADM

## 2025-05-10 RX ORDER — PRAVASTATIN SODIUM 20 MG/1
20 TABLET ORAL DAILY
Status: DISCONTINUED | OUTPATIENT
Start: 2025-05-10 | End: 2025-05-15 | Stop reason: HOSPADM

## 2025-05-10 RX ORDER — HYDRALAZINE HYDROCHLORIDE 25 MG/1
25 TABLET, FILM COATED ORAL EVERY 12 HOURS
Status: DISCONTINUED | OUTPATIENT
Start: 2025-05-10 | End: 2025-05-15 | Stop reason: HOSPADM

## 2025-05-10 RX ORDER — IBUPROFEN 200 MG
24 TABLET ORAL
Status: DISCONTINUED | OUTPATIENT
Start: 2025-05-10 | End: 2025-05-15 | Stop reason: HOSPADM

## 2025-05-10 RX ORDER — INSULIN ASPART 100 [IU]/ML
0-10 INJECTION, SOLUTION INTRAVENOUS; SUBCUTANEOUS
Status: DISCONTINUED | OUTPATIENT
Start: 2025-05-10 | End: 2025-05-15 | Stop reason: HOSPADM

## 2025-05-10 RX ADMIN — MEROPENEM 2 G: 2 INJECTION, POWDER, FOR SOLUTION INTRAVENOUS at 04:05

## 2025-05-10 RX ADMIN — BUDESONIDE INHALATION 0.5 MG: 0.5 SUSPENSION RESPIRATORY (INHALATION) at 07:05

## 2025-05-10 RX ADMIN — ARFORMOTEROL TARTRATE 15 MCG: 15 SOLUTION RESPIRATORY (INHALATION) at 07:05

## 2025-05-10 RX ADMIN — PRAVASTATIN SODIUM 20 MG: 20 TABLET ORAL at 08:05

## 2025-05-10 RX ADMIN — INSULIN ASPART 6 UNITS: 100 INJECTION, SOLUTION INTRAVENOUS; SUBCUTANEOUS at 11:05

## 2025-05-10 RX ADMIN — INSULIN ASPART 6 UNITS: 100 INJECTION, SOLUTION INTRAVENOUS; SUBCUTANEOUS at 04:05

## 2025-05-10 RX ADMIN — LEVOTHYROXINE SODIUM 137 MCG: 0.11 TABLET ORAL at 04:05

## 2025-05-10 RX ADMIN — ARFORMOTEROL TARTRATE 15 MCG: 15 SOLUTION RESPIRATORY (INHALATION) at 08:05

## 2025-05-10 RX ADMIN — Medication 6 MG: at 09:05

## 2025-05-10 RX ADMIN — INSULIN ASPART 2 UNITS: 100 INJECTION, SOLUTION INTRAVENOUS; SUBCUTANEOUS at 09:05

## 2025-05-10 RX ADMIN — INSULIN ASPART 6 UNITS: 100 INJECTION, SOLUTION INTRAVENOUS; SUBCUTANEOUS at 06:05

## 2025-05-10 RX ADMIN — FUROSEMIDE 40 MG: 10 INJECTION, SOLUTION INTRAMUSCULAR; INTRAVENOUS at 04:05

## 2025-05-10 RX ADMIN — HYDRALAZINE HYDROCHLORIDE 25 MG: 25 TABLET ORAL at 08:05

## 2025-05-10 RX ADMIN — FUROSEMIDE 40 MG: 10 INJECTION, SOLUTION INTRAMUSCULAR; INTRAVENOUS at 08:05

## 2025-05-10 RX ADMIN — AMLODIPINE BESYLATE 10 MG: 10 TABLET ORAL at 08:05

## 2025-05-10 RX ADMIN — BUDESONIDE INHALATION 0.5 MG: 0.5 SUSPENSION RESPIRATORY (INHALATION) at 08:05

## 2025-05-10 RX ADMIN — HYDRALAZINE HYDROCHLORIDE 25 MG: 25 TABLET ORAL at 09:05

## 2025-05-10 NOTE — ASSESSMENT & PLAN NOTE
Creatine stable for now. BMP reviewed- noted Estimated Creatinine Clearance: 30 mL/min (A) (based on SCr of 2 mg/dL (H)). according to latest data. Based on current GFR, CKD stage is stage 4 - GFR 15-29.  Monitor UOP and serial BMP and adjust therapy as needed. Renally dose meds. Avoid nephrotoxic medications and procedures.

## 2025-05-10 NOTE — ASSESSMENT & PLAN NOTE
Urinalysis on admission concerning for UTI.  In setting of constitutional symptoms, difficult to rule out asymptomatic bacteriuria. Hx of ESBL UTI      Recent Labs   Lab 05/09/25 2046   COLORU Yellow   APPEARANCEUA Hazy*   PHUR 6.0   SPECGRAV 1.010   PROTEINUA 2+*   GLUCUA 4+*   BILIRUBINUA Negative   OCCULTUA 1+*   NITRITE Negative   UROBILINOGEN Negative   LEUKOCYTESUR 3+*   RBCUA 4   WBCUA >100*   BACTERIA Rare   HYALINECASTS 0     PLAN  Followup urine cultures, deescalate antibiotics as appropriate  Antibiotics:  Antibiotics (From admission, onward)      Start     Stop Route Frequency Ordered    05/10/25 9159  meropenem 2 g in 0.9% NaCl 100 mL IVPB (MB+)         -- IV Every 12 hours (non-standard times) 05/10/25 0396

## 2025-05-10 NOTE — ASSESSMENT & PLAN NOTE
Urinalysis on admission concerning for UTI.  In setting of constitutional symptoms, difficult to rule out asymptomatic bacteriuria. Hx of ESBL UTI      Recent Labs   Lab 05/09/25 2046   COLORU Yellow   APPEARANCEUA Hazy*   PHUR 6.0   SPECGRAV 1.010   PROTEINUA 2+*   GLUCUA 4+*   BILIRUBINUA Negative   OCCULTUA 1+*   NITRITE Negative   UROBILINOGEN Negative   LEUKOCYTESUR 3+*   RBCUA 4   WBCUA >100*   BACTERIA Rare   HYALINECASTS 0     PLAN  Followup urine cultures, deescalate antibiotics as appropriate  Antibiotics:  Antibiotics (From admission, onward)      Start     Stop Route Frequency Ordered    05/10/25 0870  meropenem 2 g in 0.9% NaCl 100 mL IVPB (MB+)         -- IV Every 12 hours (non-standard times) 05/10/25 2709

## 2025-05-10 NOTE — ASSESSMENT & PLAN NOTE
Normoglycemic on admission    Home Diabetes Medications              HUMULIN 70/30 U-100 KWIKPEN 100 unit/mL (70-30) InPn pen SMARTSI Unit(s) SUB-Q Every Evening  *Reports using 60 units at bedtime     Plan:  Antihyperglycemics (From admission, onward)      Start     Stop Route Frequency Ordered    05/10/25 0402  insulin aspart U-100 pen 0-10 Units         -- SubQ Before meals & nightly PRN 05/10/25 0302        - POCT glucose ACHS  - Glucose 127 on admission. Will monitor glucose results and initiate scheduled insulin regimen accordingly if necessitated  - Hold oral hypoglycemic agents while patient is in the hospital.

## 2025-05-10 NOTE — HPI
Ms. Lakshmi Campbell is a 74 y.o. female who  has a medical history of Acquired TTP, Cataract, CKD stage 3 secondary to diabetes, Closed displaced comminuted fracture of right patella, Closed fracture of surgical neck of left humerus, Closed left radial fracture, Hyperkalemia, Hyperlipidemia, Hypertension, Hypothyroidism, Liver cirrhosis secondary to YO, Morbid obesity, Right knee pain, Thyroid disease, and Type 2 diabetes mellitus.    She  presented to the ED for evaluation of worsening shortness of breath over the past 2 weeks.  She reports associated symptoms of dyspnea on exertion, mild cough, lower extremity swelling, abdominal swelling, orthopnea. Denies cough, chest pain, abdominal pain, lightheadedness, dizziness, urinary symptoms.  She reports being prescribed a 3 day course of furosemide that by her PCP for leg swelling last month.  She has also follow up with her nephrologist over the past month due to progressive CKD concerns.  Over the past month, her GDMT medications were adjusted due to hyperkalemia, ACE inhibitor/spironolactone were discontinued and SGLT2 inhibitor dose decreased.    ED course notable for vitals with hypertension, tachypnea, hypoxia.  Lab work notable for WBC 3.6, hemoglobin 9.2, platelets 74, bicarb 21, BUN 46, creatinine 2, glucose 123, , troponin 0.103.  Urinalysis concerning for WBC >100, leukocyte esterase 3+.

## 2025-05-10 NOTE — H&P
[facility] Medicine  History & Physical    Patient Name: Lakshmi Campbell  MRN: [id number]  Patient Class: OP- Observation  Admission Date: 5/9/2025  Attending Physician: Salvatore Alatorre DO   Primary Care Provider: Keely Silva NP         Patient information was obtained from patient, past medical records, and ER records.     Subjective:     Principal Problem:Acute hypoxemic respiratory failure    Chief Complaint:   Chief Complaint   Patient presents with    Shortness of Breath     Pt presents with c/o SOB x2 weeks that has been worsening. Pt denies fever or cough. PMHx asthma. Pt states she has used her inhalers at home, but has not had any significant relief.         HPI: Ms. Lakshmi Campbell is a 74 y.o. female who  has a medical history of Acquired TTP, Cataract, CKD stage 3 secondary to diabetes, Closed displaced comminuted fracture of right patella, Closed fracture of surgical neck of left humerus, Closed left radial fracture, Hyperkalemia, Hyperlipidemia, Hypertension, Hypothyroidism, Liver cirrhosis secondary to YO, Morbid obesity, Right knee pain, Thyroid disease, and Type 2 diabetes mellitus.    She  presented to the ED for evaluation of worsening shortness of breath over the past 2 weeks.  She reports associated symptoms of dyspnea on exertion, mild cough, lower extremity swelling, abdominal swelling, orthopnea. Denies cough, chest pain, abdominal pain, lightheadedness, dizziness, urinary symptoms.  She reports being prescribed a 3 day course of furosemide that by her PCP for leg swelling last month.  She has also follow up with her nephrologist over the past month due to progressive CKD concerns.  Over the past month, her GDMT medications were adjusted due to hyperkalemia, ACE inhibitor/spironolactone were discontinued and SGLT2 inhibitor dose decreased.    ED course notable for vitals with hypertension, tachypnea, hypoxia.  Lab work notable for WBC 3.6, hemoglobin  9.2, platelets 74, bicarb 21, BUN 46, creatinine 2, glucose 123, , troponin 0.103.  Urinalysis concerning for WBC >100, leukocyte esterase 3+.      Past Medical History:   Diagnosis Date    Acquired TTP     Cataract     CKD stage 3 secondary to diabetes     Closed displaced comminuted fracture of right patella 10/28/2020    Closed fracture of surgical neck of left humerus 10/30/2020    Closed left radial fracture 10/30/2020    Hyperkalemia     Hyperlipidemia     Hypertension     Hypothyroidism, unspecified     Liver cirrhosis secondary to YO     Morbid obesity     Right knee pain     Thyroid disease     Type 2 diabetes mellitus        Past Surgical History:   Procedure Laterality Date    APPENDECTOMY      BREAST BIOPSY      CATARACT EXTRACTION      COLONOSCOPY N/A 12/21/2021    Procedure: COLONOSCOPY screening;  Surgeon: Moises Patterson MD;  Location: [facility];  Service: Endoscopy;  Laterality: N/A;    ESOPHAGOGASTRODUODENOSCOPY N/A 12/21/2021    Procedure: EGD (ESOPHAGOGASTRODUODENOSCOPY) EV screening;  Surgeon: Moises Patterson MD;  Location: [facility];  Service: Endoscopy;  Laterality: N/A;    EYE SURGERY      HERNIA REPAIR      OPEN REDUCTION AND INTERNAL FIXATION (ORIF) OF FRACTURE OF DISTAL RADIUS Left 11/2/2020    Procedure: ORIF, FRACTURE, RADIUS, DISTAL;  Surgeon: Sage Wolf MD;  Location: [facility] OR;  Service: Orthopedics;  Laterality: Left;    OPEN REDUCTION AND INTERNAL FIXATION (ORIF) OF FRACTURE OF PATELLA Right 11/2/2020    Procedure: ORIF, FRACTURE, PATELLA;  Surgeon: Sage Wolf MD;  Location: [facility] OR;  Service: Orthopedics;  Laterality: Right;    OPEN REDUCTION AND INTERNAL FIXATION (ORIF) OF FRACTURE OF PATELLA Right 12/18/2020    Procedure: ORIF, FRACTURE, PATELLA;  Surgeon: Sage Wolf MD;  Location: [facility] OR;  Service: Orthopedics;  Laterality: Right;    ORIF HUMERUS FRACTURE Left 11/5/2020    Procedure: ORIF, FRACTURE, HUMERUS;  Surgeon:  Sage Wolf MD;  Location: [facility] OR;  Service: Orthopedics;  Laterality: Left;    PLACEMENT OF ACELLULAR HUMAN DERMAL ALLOGRAFT Right 2020    Procedure: APPLICATION, ACELLULAR HUMAN DERMAL ALLOGRAFT;  Surgeon: Sage Wolf MD;  Location: [facility] OR;  Service: Orthopedics;  Laterality: Right;  Right Patella    REMOVAL OF HARDWARE FROM HAND Left 3/11/2021    Procedure: REMOVAL, HARDWARE, HAND;  Surgeon: Sage Wolf MD;  Location: [facility] OR;  Service: Orthopedics;  Laterality: Left;  Removal of dorsal spanning wrist plate left distal radius    REMOVAL OF HARDWARE FROM LOWER EXTREMITY Right 2020    Procedure: REMOVAL, HARDWARE, LOWER EXTREMITY;  Surgeon: Sage Wolf MD;  Location: [facility] OR;  Service: Orthopedics;  Laterality: Right;       Review of patient's allergies indicates:   Allergen Reactions    Codeine Nausea Only     Other reaction(s): Nausea  Other reaction(s): Elevated blood pressure       No current facility-administered medications on file prior to encounter.     Current Outpatient Medications on File Prior to Encounter   Medication Sig    albuterol (VENTOLIN HFA) 90 mcg/actuation inhaler Inhale 2 puffs into the lungs every 6 (six) hours as needed for Wheezing or Shortness of Breath (cough). Rescue    amLODIPine (NORVASC) 10 MG tablet TAKE 1 TABLET(10 MG) BY MOUTH EVERY DAY    blood sugar diagnostic Strp Use ac bid    budesonide-formoterol 160-4.5 mcg (SYMBICORT) 160-4.5 mcg/actuation HFAA Inhale 2 puffs into the lungs every 12 (twelve) hours. Controller    cholecalciferol, vitamin D3, (VITAMIN D3) 50 mcg (2,000 unit) Cap capsule Take 1 capsule by mouth. (Patient not taking: Reported on 2025)    furosemide (LASIX) 20 MG tablet Take 1 tablet (20 mg total) by mouth once daily. for 3 days    HUMULIN 70/30 U-100 KWIKPEN 100 unit/mL (70-30) InPn pen SMARTSI Unit(s) SUB-Q Every Evening    hydrALAZINE (APRESOLINE) 25 MG tablet Take 1 tablet (25 mg  "total) by mouth every 12 (twelve) hours.    insulin syringe-needle U-100 1 mL 29 gauge x 1/2" Syrg Use bid    lancets 33 gauge Misc Use as BID    levothyroxine (SYNTHROID) 137 MCG Tab tablet Take 1 tablet by mouth once daily.    pravastatin (PRAVACHOL) 20 MG tablet Take 20 mg by mouth once daily.      Family History       Problem Relation (Age of Onset)    Diabetes Mother, Father    Leukemia Father          Tobacco Use    Smoking status: Former     Current packs/day: 0.00     Average packs/day: 1 pack/day for 8.0 years (8.0 ttl pk-yrs)     Types: Cigarettes     Start date:      Quit date:      Years since quittin.3    Smokeless tobacco: Never   Substance and Sexual Activity    Alcohol use: Yes     Comment: rarely    Drug use: No    Sexual activity: Not Currently     Review of Systems   Constitutional:  Positive for fatigue. Negative for chills and fever.   HENT:  Negative for congestion and rhinorrhea.    Respiratory:  Positive for shortness of breath. Negative for cough and wheezing.    Cardiovascular:  Positive for leg swelling. Negative for chest pain.   Gastrointestinal:  Positive for abdominal distention. Negative for abdominal pain, constipation, diarrhea, nausea and vomiting.   Genitourinary:  Negative for difficulty urinating and dysuria.   Skin:  Negative for rash and wound.   Neurological:  Negative for dizziness and headaches.     Objective:     Vital Signs (Most Recent):  Temp: 97.5 °F (36.4 °C) (05/10/25 0038)  Pulse: 86 (05/10/25 0038)  Resp: 16 (05/10/25 0038)  BP: (!) 154/70 (05/10/25 0038)  SpO2: (!) 94 % (05/10/25 0038) Vital Signs (24h Range):  Temp:  [97.5 °F (36.4 °C)-98.1 °F (36.7 °C)] 97.5 °F (36.4 °C)  Pulse:  [71-87] 86  Resp:  [16-22] 16  SpO2:  [87 %-100 %] 94 %  BP: (154-189)/(70-79) 154/70     Weight: 107 kg (235 lb 14.3 oz)  Body mass index is 39.25 kg/m².     Physical Exam  Vitals and nursing note reviewed.   Constitutional:       General: She is not in acute distress.     " Appearance: She is obese. She is ill-appearing. She is not toxic-appearing or diaphoretic.      Interventions: Nasal cannula in place.   HENT:      Head: Normocephalic and atraumatic.      Mouth/Throat:      Mouth: Mucous membranes are moist.   Eyes:      General: No scleral icterus.        Right eye: No discharge.         Left eye: No discharge.   Cardiovascular:      Rate and Rhythm: Normal rate and regular rhythm.      Heart sounds: Normal heart sounds.   Pulmonary:      Effort: Pulmonary effort is normal. No respiratory distress.      Breath sounds: Decreased breath sounds and rales present. No wheezing or rhonchi.   Abdominal:      General: Bowel sounds are normal. There is distension.      Palpations: Abdomen is soft.      Tenderness: There is no abdominal tenderness. There is no guarding or rebound.   Musculoskeletal:      Cervical back: No rigidity.      Right lower leg: Edema present.      Left lower leg: Edema present.   Skin:     General: Skin is warm and dry.      Coloration: Skin is not jaundiced.   Neurological:      Mental Status: She is alert and oriented to person, place, and time. Mental status is at baseline.   Psychiatric:         Mood and Affect: Mood normal.         Behavior: Behavior normal.                Significant Labs: All pertinent labs within the past 24 hours have been reviewed.  Recent Results (from the past 24 hours)   Comprehensive metabolic panel    Collection Time: 05/09/25  7:26 PM   Result Value Ref Range    Sodium 143 136 - 145 mmol/L    Potassium 4.0 3.5 - 5.1 mmol/L    Chloride 109 95 - 110 mmol/L    CO2 21 (L) 23 - 29 mmol/L    Glucose 123 (H) 70 - 110 mg/dL    BUN 46 (H) 8 - 23 mg/dL    Creatinine 2.0 (H) 0.5 - 1.4 mg/dL    Calcium 9.0 8.7 - 10.5 mg/dL    Protein Total 7.1 6.0 - 8.4 gm/dL    Albumin 3.4 (L) 3.5 - 5.2 g/dL    Bilirubin Total 1.1 (H) 0.1 - 1.0 mg/dL    ALP 81 40 - 150 unit/L    AST 22 11 - 45 unit/L    ALT 17 10 - 44 unit/L    Anion Gap 13 8 - 16 mmol/L     eGFR 26 (L) >60 mL/min/1.73/m2   Troponin I #1    Collection Time: 05/09/25  7:26 PM   Result Value Ref Range    Troponin-I 0.103 (H) <=0.026 ng/mL   BNP    Collection Time: 05/09/25  7:26 PM   Result Value Ref Range     (H) 0 - 99 pg/mL   CBC with Differential    Collection Time: 05/09/25  7:26 PM   Result Value Ref Range    WBC 3.60 (L) 3.90 - 12.70 K/uL    RBC 3.17 (L) 4.00 - 5.40 M/uL    HGB 9.2 (L) 12.0 - 16.0 gm/dL    HCT 29.8 (L) 37.0 - 48.5 %    MCV 94 82 - 98 fL    MCH 29.0 27.0 - 31.0 pg    MCHC 30.9 (L) 32.0 - 36.0 g/dL    RDW 14.4 11.5 - 14.5 %    Platelet Count 74 (L) 150 - 450 K/uL    MPV 10.1 9.2 - 12.9 fL    Nucleated RBC 0 <=0 /100 WBC    Neut % 76.3 (H) 38 - 73 %    Lymph % 10.3 (L) 18 - 48 %    Mono % 9.7 4 - 15 %    Eos % 2.5 <=8 %    Basophil % 0.6 <=1.9 %    Imm Grans % 0.6 (H) 0.0 - 0.5 %    Neut # 2.75 1.8 - 7.7 K/uL    Lymph # 0.37 (L) 1 - 4.8 K/uL    Mono # 0.35 0.3 - 1 K/uL    Eos # 0.09 <=0.5 K/uL    Baso # 0.02 <=0.2 K/uL    Imm Grans # 0.02 0.00 - 0.04 K/uL   ISTAT PROCEDURE    Collection Time: 05/09/25  7:36 PM   Result Value Ref Range    POC PH 7.421 7.35 - 7.45    POC PCO2 39.9 35 - 45 mmHg    POC PO2 57 (LL) 80 - 100 mmHg    POC HCO3 26.0 24 - 28 mmol/L    POC BE 1 -2 to 2 mmol/L    POC SATURATED O2 90 95 - 100 %    Sample ARTERIAL     Site RR     Allens Test Pass     DelSys Room Air     Mode SPONT     FiO2 21    Urinalysis, Reflex to Urine Culture Urine, Clean Catch    Collection Time: 05/09/25  8:46 PM    Specimen: Urine   Result Value Ref Range    Color, UA Yellow Straw, Saray, Yellow, Light-Orange    Appearance, UA Hazy (A) Clear    pH, UA 6.0 5.0 - 8.0    Spec Grav UA 1.010 1.005 - 1.030    Protein, UA 2+ (A) Negative    Glucose, UA 4+ (A) Negative    Ketones, UA Negative Negative    Bilirubin, UA Negative Negative    Blood, UA 1+ (A) Negative    Nitrites, UA Negative Negative    Urobilinogen, UA Negative <2.0 EU/dL    Leukocyte Esterase, UA 3+ (A) Negative   GREY TOP  URINE HOLD    Collection Time: 05/09/25  8:46 PM   Result Value Ref Range    Extra Tube Hold for add-ons.    Urinalysis Microscopic    Collection Time: 05/09/25  8:46 PM   Result Value Ref Range    RBC, UA 4 0 - 4 /HPF    WBC, UA >100 (H) 0 - 5 /HPF    WBC Clumps, UA Many (A) None, Rare    Bacteria, UA Rare None, Rare, Occasional /HPF    Yeast, UA None None /HPF    Squamous Epithelial Cells, UA 2 /HPF    Hyaline Casts, UA 0 0 - 1 /LPF    Microscopic Comment     Troponin I #2    Collection Time: 05/10/25  1:51 AM   Result Value Ref Range    Troponin-I 0.147 (H) <=0.026 ng/mL       Significant Imaging: I have reviewed all pertinent imaging results/findings within the past 24 hours.  X-Ray Chest AP Portable   Final Result    Findings likely related to congestive heart failure.  Correlate.  Follow-up is recommended.      Finalized on: 5/9/2025 7:41 PM By:  Pierre Cuellar MD   [facility]# [id number]      2025-05-09 19:43:[id number]     [facility]            Assessment/Plan:     Assessment & Plan  Acute hypoxemic respiratory failure  Acute on chronic diastolic congestive heart failure  Shortness of breath  -Patient with Hypoxic Respiratory failure which is Acute.  she is not on home oxygen currently.  Prior history of temporary home supplemental oxygen requirement after a hospitalization in July 2024 for pneumonia/CHF and home oxygen that was discontinued by the end of 2024. Patient is identified as having Diastolic (HFpEF) heart failure that is Acute on Chronic. CHF is currently uncontrolled due to volume overload due to: Continued edema of extremities, Dyspnea, Rales/crackles on pulmonary exam, and Pulmonary edema/pleural effusion on CXR.     Signs/symptoms of respiratory failure include- increased work of breathing. Contributing diagnoses includes - CHF Labs and images were reviewed.     Recent imaging notable for :  X-Ray Chest AP Portable   Final Result    Findings likely related to congestive heart failure.  Correlate.  Follow-up is  recommended.      Finalized on: 5/9/2025 7:41 PM By:  Pierre Cuellar MD        - Reviewed most recent ECHO performed and demonstrates- Results for orders placed during the hospital encounter of 06/26/24  Echo  Interpretation Summary    Left Ventricle: The left ventricle is normal in size. Normal wall thickness. There is concentric hypertrophy. Normal wall motion. Ejection fraction by visual approximation is 60%. Grade II diastolic dysfunction.    Right Ventricle: Normal right ventricular cavity size. Wall thickness is normal. Systolic function is normal.    Left Atrium: Left atrium is severely dilated.    Aortic Valve: The aortic valve is a trileaflet valve. Mildly restricted motion. There is moderate stenosis. Aortic valve area by VTI is 1.24 cm². Aortic valve peak velocity is 2.66 m/s. Mean gradient is 17 mmHg. The dimensionless index is 0.45.    Mitral Valve: There is moderate mitral annular calcification present. Mildly restricted motion. There is mild to moderate regurgitation.      Recent Labs reviewed-  Recent Labs   Lab 05/09/25  1926 05/10/25  0151   TROPONINI 0.103* 0.147*   *  --          Intake/Output Summary (Last 24 hours) at 5/10/2025 0328  Last data filed at 5/10/2025 0254  Gross per 24 hour   Intake 240 ml   Output 700 ml   Net -460 ml     Net IO Since Admission: -460 mL [05/10/25 0328]    Home GDMT per Chart Review:           amLODIPine (NORVASC) 10 MG tablet TAKE 1 TABLET(10 MG) BY MOUTH EVERY DAY    hydrALAZINE (APRESOLINE) 25 MG tablet Take 1 tablet (25 mg total) by mouth every 12 (twelve) hours.         Plan:  - Current GDMT meds: hydrALAZINE injection 10 mg, ED 1 Time, Intravenous  hydrALAZINE injection 5 mg, Every 6 hours PRN, Intravenous  furosemide injection 40 mg, 2 times daily, Intravenous  hydrALAZINE tablet 25 mg, Every 12 hours, Oral  -Titrate(increase/decrease) diuretic dosing to target euvolemia. Current Diuretic regimen:  Diuretics (From admission, onward)      Start     Stop  Route Frequency Ordered    05/10/25 0800  furosemide injection 40 mg         -- IV 2 times daily 25        -Followup Transthoracic Echocardiogram  -Wean supplemental oxygen as tolerated to target SaO2>90%  - Cycle troponin to rule out ischemic etiology  - Monitor on telemetry.   -Patient is on CHF pathway.    - Cardiac diet with Fluid restriction at 1.5L with strict I/Os and daily standing weights      Type 2 diabetes mellitus with microalbuminuria, with long-term current use of insulin  Type 2 diabetes mellitus with stage 4 chronic kidney disease, with long-term current use of insulin  Normoglycemic on admission    Home Diabetes Medications              HUMULIN 70/30 U-100 KWIKPEN 100 unit/mL (70-30) InPn pen SMARTSI Unit(s) SUB-Q Every Evening  *Reports using 60 units at bedtime     Plan:  Antihyperglycemics (From admission, onward)      Start     Stop Route Frequency Ordered    05/10/25 0402  insulin aspart U-100 pen 0-10 Units         -- SubQ Before meals & nightly PRN 05/10/25 0302        - POCT glucose ACHS  - Glucose 127 on admission. Will monitor glucose results and initiate scheduled insulin regimen accordingly if necessitated  - Hold oral hypoglycemic agents while patient is in the hospital.    Hypothyroidism   Recent thyroid labs reviewed:  Lab Results   Component Value Date    TSH 0.17 (L) 2025    FREET4 1.12 2024     Hx of Hypothyroidism    PLAN:   Continue home levothyroxine  Essential hypertension  Hypertensive on admission    Home meds for hypertension were reviewed and noted below          amLODIPine (NORVASC) 10 MG tablet TAKE 1 TABLET(10 MG) BY MOUTH EVERY DAY    hydrALAZINE (APRESOLINE) 25 MG tablet Take 1 tablet (25 mg total) by mouth every 12 (twelve) hours.     Patients blood pressure range in the last 24 hours was: BP  Min: 154/70  Max: 189/79.    PLAN:  -While in the hospital, will manage blood pressure as follows; Continue home antihypertensive regimen  -The  patient's inpatient anti-hypertensive regimen is listed below:  Current Antihypertensives  hydrALAZINE injection 10 mg, ED 1 Time, Intravenous  hydrALAZINE injection 5 mg, Every 6 hours PRN, Intravenous  furosemide injection 40 mg, 2 times daily, Intravenous  amLODIPine tablet 10 mg, Daily, Oral  hydrALAZINE tablet 25 mg, Every 12 hours, Oral  -Will utilize p.r.n. blood pressure medication only if patient's blood pressure greater than 180/110 and she develops symptoms such as worsening chest pain or shortness of breath.    Dyslipidemia  Recent Lipid Panel reviewed-  Lab Results   Component Value Date    HDL 34 (L) 06/27/2024    LDLCALC 24.0 (L) 06/27/2024    TRIG 65 06/27/2024    CHOL 71 (L) 06/27/2024      -Home medications:           pravastatin (PRAVACHOL) 20 MG tablet Take 20 mg by mouth once daily.      PLAN  Continue home statin    Acute cystitis without hematuria  History of ESBL E. coli infection  Urinalysis on admission concerning for UTI.  In setting of constitutional symptoms, difficult to rule out asymptomatic bacteriuria. Hx of ESBL UTI      Recent Labs   Lab 05/09/25 2046   COLORU Yellow   APPEARANCEUA Hazy*   PHUR 6.0   SPECGRAV 1.010   PROTEINUA 2+*   GLUCUA 4+*   BILIRUBINUA Negative   OCCULTUA 1+*   NITRITE Negative   UROBILINOGEN Negative   LEUKOCYTESUR 3+*   RBCUA 4   WBCUA >100*   BACTERIA Rare   HYALINECASTS 0     PLAN  Followup urine cultures, deescalate antibiotics as appropriate  Antibiotics:  Antibiotics (From admission, onward)      Start     Stop Route Frequency Ordered    05/10/25 0445  meropenem 2 g in 0.9% NaCl 100 mL IVPB (MB+)         -- IV Every 12 hours (non-standard times) 05/10/25 0330              Chronic kidney disease, stage 4 (severe)  Creatine stable for now. BMP reviewed- noted Estimated Creatinine Clearance: 30 mL/min (A) (based on SCr of 2 mg/dL (H)). according to latest data. Based on current GFR, CKD stage is stage 4 - GFR 15-29.  Monitor UOP and serial BMP and adjust  therapy as needed. Renally dose meds. Avoid nephrotoxic medications and procedures.  Asthma  Chronic.  Stable  Follows with pulmonology outpatient    PLAN:  Home medication: Symbicort.  --Arformeterol/Budesonide while inpatient      VTE Risk Mitigation (From admission, onward)           Ordered     IP VTE HIGH RISK PATIENT  Once         05/09/25 2212     Place sequential compression device  Until discontinued         05/09/25 2212                       On 05/10/2025, patient should be placed in hospital observation services under my care.             Salvatore Alatorre DO  Department of Hospital Medicine  [facility] - Telemetry ([facility])    Voice recognition software was used in the creation of this note/communication and any sound-alike/typographical errors which may have occurred despite initial review prior to signing should be taken in context when interpreting.  If such errors prevent a clear understanding of the note/communication, please contact the provider/office for clarification.

## 2025-05-10 NOTE — ASSESSMENT & PLAN NOTE
-Patient with Hypoxic Respiratory failure which is Acute.  she is not on home oxygen currently.  Prior history of temporary home supplemental oxygen requirement after a hospitalization in July 2024 for pneumonia/CHF and home oxygen that was discontinued by the end of 2024. Patient is identified as having Diastolic (HFpEF) heart failure that is Acute on Chronic. CHF is currently uncontrolled due to volume overload due to: Continued edema of extremities, Dyspnea, Rales/crackles on pulmonary exam, and Pulmonary edema/pleural effusion on CXR.     Signs/symptoms of respiratory failure include- increased work of breathing. Contributing diagnoses includes - CHF Labs and images were reviewed.     Recent imaging notable for :  X-Ray Chest AP Portable   Final Result    Findings likely related to congestive heart failure.  Correlate.  Follow-up is recommended.      Finalized on: 5/9/2025 7:41 PM By:  Pierre Cuellar MD        - Reviewed most recent ECHO performed and demonstrates- Results for orders placed during the hospital encounter of 06/26/24  Echo  Interpretation Summary    Left Ventricle: The left ventricle is normal in size. Normal wall thickness. There is concentric hypertrophy. Normal wall motion. Ejection fraction by visual approximation is 60%. Grade II diastolic dysfunction.    Right Ventricle: Normal right ventricular cavity size. Wall thickness is normal. Systolic function is normal.    Left Atrium: Left atrium is severely dilated.    Aortic Valve: The aortic valve is a trileaflet valve. Mildly restricted motion. There is moderate stenosis. Aortic valve area by VTI is 1.24 cm². Aortic valve peak velocity is 2.66 m/s. Mean gradient is 17 mmHg. The dimensionless index is 0.45.    Mitral Valve: There is moderate mitral annular calcification present. Mildly restricted motion. There is mild to moderate regurgitation.      Recent Labs reviewed-  Recent Labs   Lab 05/09/25  1926 05/10/25  0151 05/10/25  0651   TROPONINI  0.103* 0.147* 0.168*   *  --   --          Intake/Output Summary (Last 24 hours) at 5/10/2025 1658  Last data filed at 5/10/2025 1000  Gross per 24 hour   Intake 598 ml   Output 1850 ml   Net -1252 ml     Home GDMT per Chart Review:           amLODIPine (NORVASC) 10 MG tablet TAKE 1 TABLET(10 MG) BY MOUTH EVERY DAY    hydrALAZINE (APRESOLINE) 25 MG tablet Take 1 tablet (25 mg total) by mouth every 12 (twelve) hours.         Plan:  - Current GDMT meds: hydrALAZINE injection 5 mg, Every 6 hours PRN, Intravenous  furosemide injection 40 mg, 2 times daily, Intravenous  hydrALAZINE tablet 25 mg, Every 12 hours, Oral  -Titrate(increase/decrease) diuretic dosing to target euvolemia. Current Diuretic regimen:  Diuretics (From admission, onward)       Diuretics (From admission, onward)      Start     Stop Route Frequency Ordered    05/10/25 0800  furosemide injection 40 mg         -- IV 2 times daily 05/09/25 2212        - Cycle troponin to rule out ischemic etiology  - Monitor on telemetry.   -Patient is on CHF pathway.    - Cardiac diet with Fluid restriction at 1.5L with strict I/Os and daily standing weights  - I/O -1.2L      -Patient with Hypoxic Respiratory failure which is Acute.  she is not on home oxygen currently.  Prior history of temporary home supplemental oxygen requirement after a hospitalization in July 2024 for pneumonia/CHF and home oxygen that was discontinued by the end of 2024. Patient is identified as having Diastolic (HFpEF) heart failure that is Acute on Chronic. CHF is currently uncontrolled due to volume overload due to: Continued edema of extremities, Dyspnea, Rales/crackles on pulmonary exam, and Pulmonary edema/pleural effusion on CXR.     Signs/symptoms of respiratory failure include- increased work of breathing. Contributing diagnoses includes - CHF Labs and images were reviewed.     Recent imaging notable for :  X-Ray Chest AP Portable   Final Result    Findings likely related to  congestive heart failure.  Correlate.  Follow-up is recommended.      Finalized on: 5/9/2025 7:41 PM By:  Pierre Cuellar MD        - Reviewed most recent ECHO performed and demonstrates- Results for orders placed during the hospital encounter of 06/26/24  Echo  Interpretation Summary    Left Ventricle: The left ventricle is normal in size. Normal wall thickness. There is concentric hypertrophy. Normal wall motion. Ejection fraction by visual approximation is 60%. Grade II diastolic dysfunction.    Right Ventricle: Normal right ventricular cavity size. Wall thickness is normal. Systolic function is normal.    Left Atrium: Left atrium is severely dilated.    Aortic Valve: The aortic valve is a trileaflet valve. Mildly restricted motion. There is moderate stenosis. Aortic valve area by VTI is 1.24 cm². Aortic valve peak velocity is 2.66 m/s. Mean gradient is 17 mmHg. The dimensionless index is 0.45.    Mitral Valve: There is moderate mitral annular calcification present. Mildly restricted motion. There is mild to moderate regurgitation.      Recent Labs reviewed-  Recent Labs   Lab   Recent Labs   Lab 05/09/25  1926 05/10/25  0151 05/10/25  0651   TROPONINI 0.103* 0.147* 0.168*   *  --   --     05/10/25  0151 05/10/25  0651   TROPONINI  0.147* 0.168*   BNP   --   --      Intake/Output Summary (Last 24 hours) at 5/10/2025 1658  Last data filed at 5/10/2025 1000  Gross per 24 hour   Intake 598 ml   Output 1850 ml   Net -1252 ml     Net -1252 ml     Net IO Since Admission: -1,252 mL [05/10/25 1658]    Home GDMT per Chart Review:           amLODIPine (NORVASC) 10 MG tablet TAKE 1 TABLET(10 MG) BY MOUTH EVERY DAY    hydrALAZINE (APRESOLINE) 25 MG tablet Take 1 tablet (25 mg total) by mouth every 12 (twelve) hours.         Plan:  - Current GDMT meds: hydrALAZINE injection 5 mg, Every 6 hours PRN, Intravenous  furosemide injection 40 mg, 2 times daily, Intravenous  hydrALAZINE tablet 25 mg, Every 12 hours, Oral  Diuretics  (From admission, onward)      Start     Stop Route Frequency Ordered    05/10/25 0800  furosemide injection 40 mg         -- IV 2 times daily 05/09/25 2212             -Followup Transthoracic Echocardiogram  -Wean supplemental oxygen as tolerated to target SaO2>90%  - Cycle troponin to rule out ischemic etiology  - Monitor on telemetry.   -Patient is on CHF pathway.    - Cardiac diet with Fluid restriction at 1.5L with strict I/Os and daily standing weights

## 2025-05-10 NOTE — PROGRESS NOTES
[facility]  Progress Note    Patient Name: Lakshmi Campbell  MRN: [id number]  Patient Class: OP- Observation   Admission Date: 5/9/2025  Length of Stay: 0 days  Attending Physician: Abel Caraballo MD  Primary Care Provider: Keely Silva NP        Subjective     Principal Problem:Acute hypoxemic respiratory failure        HPI:  Ms. Lakshmi Campbell is a 74 y.o. female who  has a medical history of Acquired TTP, Cataract, CKD stage 3 secondary to diabetes, Closed displaced comminuted fracture of right patella, Closed fracture of surgical neck of left humerus, Closed left radial fracture, Hyperkalemia, Hyperlipidemia, Hypertension, Hypothyroidism, Liver cirrhosis secondary to YO, Morbid obesity, Right knee pain, Thyroid disease, and Type 2 diabetes mellitus.    She  presented to the ED for evaluation of worsening shortness of breath over the past 2 weeks.  She reports associated symptoms of dyspnea on exertion, mild cough, lower extremity swelling, abdominal swelling, orthopnea. Denies cough, chest pain, abdominal pain, lightheadedness, dizziness, urinary symptoms.  She reports being prescribed a 3 day course of furosemide that by her PCP for leg swelling last month.  She has also follow up with her nephrologist over the past month due to progressive CKD concerns.  Over the past month, her GDMT medications were adjusted due to hyperkalemia, ACE inhibitor/spironolactone were discontinued and SGLT2 inhibitor dose decreased.    ED course notable for vitals with hypertension, tachypnea, hypoxia.  Lab work notable for WBC 3.6, hemoglobin 9.2, platelets 74, bicarb 21, BUN 46, creatinine 2, glucose 123, , troponin 0.103.  Urinalysis concerning for WBC >100, leukocyte esterase 3+.      Overview/Hospital Course:  Patient admitted with CHF exacerbation, pneumonia and UTI.  Patient is continued on IV antibiotics and IV diuresis.  She states she is doing better on hospital day.  She  remains on NC o2 at 2L - wean as tolerated.       Interval History: F/u CHF, PNA, and UTI.  Symptoms improving and o2 weaning as tolerated.  Patient denies any chest pain and is noted to have some swelling in the calves b/l but no swelling in the feet.    Review of Systems  Objective:     Vital Signs (Most Recent):  Temp: 97.8 °F (36.6 °C) (05/10/25 1602)  Pulse: 77 (05/10/25 1602)  Resp: 19 (05/10/25 1602)  BP: 138/79 (05/10/25 1602)  SpO2: (!) 92 % (05/10/25 1602) Vital Signs (24h Range):  Temp:  [97.5 °F (36.4 °C)-98.1 °F (36.7 °C)] 97.8 °F (36.6 °C)  Pulse:  [71-88] 77  Resp:  [16-22] 19  SpO2:  [87 %-100 %] 92 %  BP: (138-189)/(68-81) 138/79     Weight: 107 kg (235 lb 14.3 oz)  Body mass index is 39.25 kg/m².    Intake/Output Summary (Last 24 hours) at 5/10/2025 1648  Last data filed at 5/10/2025 1000  Gross per 24 hour   Intake 598 ml   Output 1850 ml   Net -1252 ml         Physical Exam  Vitals and nursing note reviewed.   Constitutional:       Appearance: Normal appearance.   HENT:      Head: Normocephalic and atraumatic.      Nose: Nose normal.      Mouth/Throat:      Mouth: Mucous membranes are moist.   Eyes:      Extraocular Movements: Extraocular movements intact.      Conjunctiva/sclera: Conjunctivae normal.   Cardiovascular:      Rate and Rhythm: Normal rate and regular rhythm.      Pulses: Normal pulses.      Heart sounds: Normal heart sounds.   Pulmonary:      Effort: Respiratory distress present.      Breath sounds: Normal breath sounds.      Comments: On 2L NC  Abdominal:      General: Abdomen is flat. Bowel sounds are normal.      Palpations: Abdomen is soft.   Musculoskeletal:      Cervical back: Normal range of motion and neck supple.      Comments: B/l calf pitting edema.   Skin:     General: Skin is warm.      Capillary Refill: Capillary refill takes less than 2 seconds.   Neurological:      Mental Status: She is alert and oriented to person, place, and time. Mental status is at baseline.    Psychiatric:         Mood and Affect: Mood normal.         Behavior: Behavior normal.               Significant Labs: All pertinent labs within the past 24 hours have been reviewed.  Recent Lab Results  (Last 5 results in the past 24 hours)        05/10/25  1640   05/10/25  1136   05/10/25  1136   05/10/25  0651   05/10/25  0617        Albumin       3.2         ALP       78         ALT       15         Anion Gap       12         Ao peak shane     3.1           Ao VTI     65.7           AST       19         AV valve area     1.3           LAYO by Velocity Ratio     1.2           AV mean gradient     22           AV index (prosthetic)     0.45           AV peak gradient     38           AV Velocity Ratio     0.42           Baso #       0.00         Basophil %       0.0         BILIRUBIN TOTAL       1.0  Comment: For infants and newborns, interpretation of results should be based   on gestational age, weight and in agreement with clinical   observations.    Premature Infant recommended reference ranges:   0-24 hours:  <8.0 mg/dL   24-48 hours: <12.0 mg/dL   3-5 days:    <15.0 mg/dL   6-29 days:   <15.0 mg/dL         BSA     2.22           BUN       55         Calcium       8.8         Chloride       106         CO2       23         Creatinine       2.2         Left Ventricle Relative Wall Thickness     0.50           E/A ratio     1.01           E/E' ratio     25           eGFR       23  Comment: Estimated GFR calculated using the CKD-EPI creatinine (2021) equation.         Eos #       0.00         Eos %       0.0         E wave deceleration time     249           FS     28.8           Glucose       288         Gran # (ANC)       1.98         Hematocrit       29.0         Hemoglobin       8.7         Immature Grans (Abs)       0.01  Comment: Mild elevation in immature granulocytes is non specific and can be seen in a variety of conditions including stress response, acute inflammation, trauma and pregnancy. Correlation  with other laboratory and clinical findings is essential.         Immature Granulocytes       0.5         IVC diameter     1.44           IVRT     110           IVSd     1.3           Left Atrium Major Axis     6.7           Left Atrium Minor Axis     5.8           LA size     3.7           LVOT area     2.8           LV LATERAL E/E' RATIO     22.1           LV SEPTAL E/E' RATIO     28.4           LV EDV BP     132           LV Diastolic Volume Index     62.26           Left Ventricular End Diastolic Volume by Teichholz Method     131.60           Left Ventricular End Systolic Volume by Teichholz Method     56.12           LVIDd     5.2           LVIDs     3.7           LV mass     278.4           LV Mass Index     131.3           Left Ventricular Outflow Tract Mean Gradient     4.01           Left Ventricular Outflow Tract Mean Velocity     0.95           LVOT diameter     1.9           LVOT peak tenzin     1.3           LVOT stroke volume     83.3           LVOT peak VTI     29.4           LV ESV BP     56           LV Systolic Volume Index     26.4           Lymph #       0.13         Lymph %       6.0         Magnesium        2.6         MCH       28.7         MCHC       30.0         MCV       96         Mean e'     0.08           Mono #       0.03         Mono %       1.4         MPV       10.1         MV valve area p 1/2 method     3.04           MV Peak A Tenzin     1.97           MV Peak E Tenzin     1.99           MV stenosis pressure 1/2 time     72.28           Neut %       92.1         nRBC       0         Phosphorus Level       6.5         Platelet Count       66         POCT Glucose 258   287       271       Potassium       4.7         PROTEIN TOTAL       6.8         Pulmonary Valve Mean Velocity     0.96           PV peak gradient     9           PV PEAK VELOCITY     1.53           PW     1.3           RA Major Axis     5.74           Est. RA pres     3           RBC       3.03         RDW       14.3          Sodium       141         STJ     2.2           TAPSE     2.1           TDI SEPTAL     0.07           TDI LATERAL     0.09           Troponin I       0.168  Comment: The reference interval for Troponin I represents the 99th percentile cutoff for our facility and is consistent with 3rd generation assay performance.         WBC       2.15         ZLVIDD     -2.53           ZLVIDS     -0.79                                  Significant Imaging: I have reviewed all pertinent imaging results/findings within the past 24 hours.      Assessment & Plan  Acute hypoxemic respiratory failure  Acute on chronic diastolic congestive heart failure  -Patient with Hypoxic Respiratory failure which is Acute.  she is not on home oxygen currently.  Prior history of temporary home supplemental oxygen requirement after a hospitalization in July 2024 for pneumonia/CHF and home oxygen that was discontinued by the end of 2024. Patient is identified as having Diastolic (HFpEF) heart failure that is Acute on Chronic. CHF is currently uncontrolled due to volume overload due to: Continued edema of extremities, Dyspnea, Rales/crackles on pulmonary exam, and Pulmonary edema/pleural effusion on CXR.     Signs/symptoms of respiratory failure include- increased work of breathing. Contributing diagnoses includes - CHF Labs and images were reviewed.     Recent imaging notable for :  X-Ray Chest AP Portable   Final Result    Findings likely related to congestive heart failure.  Correlate.  Follow-up is recommended.      Finalized on: 5/9/2025 7:41 PM By:  Pierre Cuellar MD        - Reviewed most recent ECHO performed and demonstrates- Results for orders placed during the hospital encounter of 06/26/24  Echo  Interpretation Summary    Left Ventricle: The left ventricle is normal in size. Normal wall thickness. There is concentric hypertrophy. Normal wall motion. Ejection fraction by visual approximation is 60%. Grade II diastolic dysfunction.    Right  Ventricle: Normal right ventricular cavity size. Wall thickness is normal. Systolic function is normal.    Left Atrium: Left atrium is severely dilated.    Aortic Valve: The aortic valve is a trileaflet valve. Mildly restricted motion. There is moderate stenosis. Aortic valve area by VTI is 1.24 cm². Aortic valve peak velocity is 2.66 m/s. Mean gradient is 17 mmHg. The dimensionless index is 0.45.    Mitral Valve: There is moderate mitral annular calcification present. Mildly restricted motion. There is mild to moderate regurgitation.      Recent Labs reviewed-  Recent Labs   Lab 05/09/25  1926 05/10/25  0151 05/10/25  0651   TROPONINI 0.103* 0.147* 0.168*   *  --   --          Intake/Output Summary (Last 24 hours) at 5/10/2025 1658  Last data filed at 5/10/2025 1000  Gross per 24 hour   Intake 598 ml   Output 1850 ml   Net -1252 ml     Home GDMT per Chart Review:           amLODIPine (NORVASC) 10 MG tablet TAKE 1 TABLET(10 MG) BY MOUTH EVERY DAY    hydrALAZINE (APRESOLINE) 25 MG tablet Take 1 tablet (25 mg total) by mouth every 12 (twelve) hours.         Plan:  - Current GDMT meds: hydrALAZINE injection 5 mg, Every 6 hours PRN, Intravenous  furosemide injection 40 mg, 2 times daily, Intravenous  hydrALAZINE tablet 25 mg, Every 12 hours, Oral  -Titrate(increase/decrease) diuretic dosing to target euvolemia. Current Diuretic regimen:  Diuretics (From admission, onward)       Diuretics (From admission, onward)      Start     Stop Route Frequency Ordered    05/10/25 0800  furosemide injection 40 mg         -- IV 2 times daily 05/09/25 2212        - Cycle troponin to rule out ischemic etiology  - Monitor on telemetry.   -Patient is on CHF pathway.    - Cardiac diet with Fluid restriction at 1.5L with strict I/Os and daily standing weights  - I/O -1.2L      -Patient with Hypoxic Respiratory failure which is Acute.  she is not on home oxygen currently.  Prior history of temporary home supplemental oxygen  requirement after a hospitalization in July 2024 for pneumonia/CHF and home oxygen that was discontinued by the end of 2024. Patient is identified as having Diastolic (HFpEF) heart failure that is Acute on Chronic. CHF is currently uncontrolled due to volume overload due to: Continued edema of extremities, Dyspnea, Rales/crackles on pulmonary exam, and Pulmonary edema/pleural effusion on CXR.     Signs/symptoms of respiratory failure include- increased work of breathing. Contributing diagnoses includes - CHF Labs and images were reviewed.     Recent imaging notable for :  X-Ray Chest AP Portable   Final Result    Findings likely related to congestive heart failure.  Correlate.  Follow-up is recommended.      Finalized on: 5/9/2025 7:41 PM By:  Pierre Cuellar MD        - Reviewed most recent ECHO performed and demonstrates- Results for orders placed during the hospital encounter of 06/26/24  Echo  Interpretation Summary    Left Ventricle: The left ventricle is normal in size. Normal wall thickness. There is concentric hypertrophy. Normal wall motion. Ejection fraction by visual approximation is 60%. Grade II diastolic dysfunction.    Right Ventricle: Normal right ventricular cavity size. Wall thickness is normal. Systolic function is normal.    Left Atrium: Left atrium is severely dilated.    Aortic Valve: The aortic valve is a trileaflet valve. Mildly restricted motion. There is moderate stenosis. Aortic valve area by VTI is 1.24 cm². Aortic valve peak velocity is 2.66 m/s. Mean gradient is 17 mmHg. The dimensionless index is 0.45.    Mitral Valve: There is moderate mitral annular calcification present. Mildly restricted motion. There is mild to moderate regurgitation.      Recent Labs reviewed-  Recent Labs   Lab   Recent Labs   Lab 05/09/25  1926 05/10/25  0151 05/10/25  0651   TROPONINI 0.103* 0.147* 0.168*   *  --   --     05/10/25  0151 05/10/25  0651   TROPONINI  0.147* 0.168*   BNP   --   --       Intake/Output Summary (Last 24 hours) at 5/10/2025 1658  Last data filed at 5/10/2025 1000  Gross per 24 hour   Intake 598 ml   Output 1850 ml   Net -1252 ml     Net -1252 ml     Net IO Since Admission: -1,252 mL [05/10/25 1658]    Home GDMT per Chart Review:           amLODIPine (NORVASC) 10 MG tablet TAKE 1 TABLET(10 MG) BY MOUTH EVERY DAY    hydrALAZINE (APRESOLINE) 25 MG tablet Take 1 tablet (25 mg total) by mouth every 12 (twelve) hours.         Plan:  - Current GDMT meds: hydrALAZINE injection 5 mg, Every 6 hours PRN, Intravenous  furosemide injection 40 mg, 2 times daily, Intravenous  hydrALAZINE tablet 25 mg, Every 12 hours, Oral  Diuretics (From admission, onward)      Start     Stop Route Frequency Ordered    05/10/25 0800  furosemide injection 40 mg         -- IV 2 times daily 25             -Followup Transthoracic Echocardiogram  -Wean supplemental oxygen as tolerated to target SaO2>90%  - Cycle troponin to rule out ischemic etiology  - Monitor on telemetry.   -Patient is on CHF pathway.    - Cardiac diet with Fluid restriction at 1.5L with strict I/Os and daily standing weights      Type 2 diabetes mellitus with microalbuminuria, with long-term current use of insulin  Type 2 diabetes mellitus with stage 4 chronic kidney disease, with long-term current use of insulin  Normoglycemic on admission    Home Diabetes Medications              HUMULIN 70/30 U-100 KWIKPEN 100 unit/mL (70-30) InPn pen SMARTSI Unit(s) SUB-Q Every Evening  *Reports using 60 units at bedtime     Plan:  Antihyperglycemics (From admission, onward)      Start     Stop Route Frequency Ordered    05/10/25 0402  insulin aspart U-100 pen 0-10 Units         -- SubQ Before meals & nightly PRN 05/10/25 0302        - POCT glucose ACHS  - Glucose 127 on admission. Will monitor glucose results and initiate scheduled insulin regimen accordingly if necessitated  - Hold oral hypoglycemic agents while patient is in the  hospital.    Hypothyroidism   Recent thyroid labs reviewed:  Lab Results   Component Value Date    TSH 0.17 (L) 03/19/2025    FREET4 1.12 08/30/2024     Hx of Hypothyroidism    PLAN:   Continue home levothyroxine  Essential hypertension  Hypertensive on admission    Home meds for hypertension were reviewed and noted below          amLODIPine (NORVASC) 10 MG tablet TAKE 1 TABLET(10 MG) BY MOUTH EVERY DAY    hydrALAZINE (APRESOLINE) 25 MG tablet Take 1 tablet (25 mg total) by mouth every 12 (twelve) hours.     Patients blood pressure range in the last 24 hours was: BP  Min: 138/79  Max: 189/79.    PLAN:  -While in the hospital, will manage blood pressure as follows; Continue home antihypertensive regimen  -The patient's inpatient anti-hypertensive regimen is listed below:  Current Antihypertensives  hydrALAZINE injection 5 mg, Every 6 hours PRN, Intravenous  furosemide injection 40 mg, 2 times daily, Intravenous  amLODIPine tablet 10 mg, Daily, Oral  hydrALAZINE tablet 25 mg, Every 12 hours, Oral  -Will utilize p.r.n. blood pressure medication only if patient's blood pressure greater than 180/110 and she develops symptoms such as worsening chest pain or shortness of breath.    Dyslipidemia  Recent Lipid Panel reviewed-  Lab Results   Component Value Date    HDL 34 (L) 06/27/2024    LDLCALC 24.0 (L) 06/27/2024    TRIG 65 06/27/2024    CHOL 71 (L) 06/27/2024      -Home medications:           pravastatin (PRAVACHOL) 20 MG tablet Take 20 mg by mouth once daily.      PLAN  Continue home statin    Acute cystitis without hematuria  History of ESBL E. coli infection  Urinalysis on admission concerning for UTI.  In setting of constitutional symptoms, difficult to rule out asymptomatic bacteriuria. Hx of ESBL UTI      Recent Labs   Lab 05/09/25 2046   COLORU Yellow   APPEARANCEUA Hazy*   PHUR 6.0   SPECGRAV 1.010   PROTEINUA 2+*   GLUCUA 4+*   BILIRUBINUA Negative   OCCULTUA 1+*   NITRITE Negative   UROBILINOGEN Negative    LEUKOCYTESUR 3+*   RBCUA 4   WBCUA >100*   BACTERIA Rare   HYALINECASTS 0     PLAN  Followup urine cultures, deescalate antibiotics as appropriate  Antibiotics:  Antibiotics (From admission, onward)      Start     Stop Route Frequency Ordered    05/10/25 0445  meropenem 2 g in 0.9% NaCl 100 mL IVPB (MB+)         -- IV Every 12 hours (non-standard times) 05/10/25 0330              Chronic kidney disease, stage 4 (severe)  Creatine stable for now. BMP reviewed- noted Estimated Creatinine Clearance: 27.3 mL/min (A) (based on SCr of 2.2 mg/dL (H)). according to latest data. Based on current GFR, CKD stage is stage 4 - GFR 15-29.  Monitor UOP and serial BMP and adjust therapy as needed. Renally dose meds. Avoid nephrotoxic medications and procedures.  Asthma  Chronic.  Stable  Follows with pulmonology outpatient    PLAN:  Home medication: Symbicort.  --Arformeterol/Budesonide while inpatient    VTE Risk Mitigation (From admission, onward)           Ordered     IP VTE HIGH RISK PATIENT  Once         05/09/25 2212     Place sequential compression device  Until discontinued         05/09/25 2212                    Discharge Planning   KYA:      Code Status: Full Code   Medical Readiness for Discharge Date:                            Abel Caraballo MD  Department of Hospital Medicine   [facility] - Telemetry ([facility])

## 2025-05-10 NOTE — ASSESSMENT & PLAN NOTE
Creatine stable for now. BMP reviewed- noted Estimated Creatinine Clearance: 27.3 mL/min (A) (based on SCr of 2.2 mg/dL (H)). according to latest data. Based on current GFR, CKD stage is stage 4 - GFR 15-29.  Monitor UOP and serial BMP and adjust therapy as needed. Renally dose meds. Avoid nephrotoxic medications and procedures.

## 2025-05-10 NOTE — ASSESSMENT & PLAN NOTE
Urinalysis on admission concerning for UTI.  In setting of constitutional symptoms, difficult to rule out asymptomatic bacteriuria. Hx of ESBL UTI      Recent Labs   Lab 05/09/25 2046   COLORU Yellow   APPEARANCEUA Hazy*   PHUR 6.0   SPECGRAV 1.010   PROTEINUA 2+*   GLUCUA 4+*   BILIRUBINUA Negative   OCCULTUA 1+*   NITRITE Negative   UROBILINOGEN Negative   LEUKOCYTESUR 3+*   RBCUA 4   WBCUA >100*   BACTERIA Rare   HYALINECASTS 0     PLAN  Followup urine cultures, deescalate antibiotics as appropriate  Antibiotics:  Antibiotics (From admission, onward)      Start     Stop Route Frequency Ordered    05/10/25 1715  meropenem 2 g in 0.9% NaCl 100 mL IVPB (MB+)         -- IV Every 12 hours (non-standard times) 05/10/25 7006

## 2025-05-10 NOTE — CONSULTS
Food & Nutrition Education      Diet Education: Cardiac, Diabetic, Fluid restriction diet   Learners: Patient      Nutrition Education provided with handouts:   Heart Healthy Consistent Carbohydrate Nutrition Therapy   Fluid Restricted Nutrition Therapy   (nutritioncaremanual.org)      Comments:   PMH: CKD3, DM, HTN, HLD, cirrhosis    74 y.o. female admitted for acute hypoxemic respiratory failure. RD consulted for a low sodium, consistent carb, 1500 ml fluid restricted diet education. Pt is currently getting a Low Sodium, 1500 ml fluid restricted diet. RD spoke w/ pt and family at bedside. Pt reported to have a poor appetite and fair meal intakes since admit. Pt reported to not have followed any diets at home, eating mostly quick foods like sandwiches, having a poor appetite PTA, UBW of 213#, # 5/9.     RD educated patient on low sodium, general healthful diet r/t recent hospital diagnosis. Recommended a well-balanced diet with a variety of fresh foods, fruits and vegetables (5 cups/day), whole grains (3 oz/day), and fat-free or low-fat dairy. Discussed reading food packages, food labels, and nutrition facts labels to identify nutrient content of foods.      Discussed the importance of limiting sodium to less than 2,000 mg per day. Recommended salt free seasonings and other herbs and spices in meals to enhance flavor without additional sodium.      Discussed dietary sources of cholesterol, the importance of incorporating healthy fats into the diet, and avoiding saturated and trans fats for heart health. For a generally healthy diet, aim for total fat less than 25-35% of calories.      Discussed the importance of fiber (especially soluble fiber), dietary sources, and a goal intake of >20-30g/day.      Discussed the importance of carbohydrates in the diet, but with diabetes, focusing on consistent carb intake throughout the day with emphasis on protein and fiber.      Discussed 1500 ml fluid restriction  per MD and dietary sources of fluid. RD recommended using a cup with measurements for fluids and to try to consume small sips spread throughout the day rather than a lot at one time.     Pt expressed understanding and appreciation for RD and education material provided.    Nutrition Related Social Determinants of Health: SDOH: Adequate food in home environment and None Identified    Visual NFPE performed, pt appears nourished.   All questions and concerns answered.   Provided handout with dietitian's contact information.   *Please re-consult as needed.      Thank you,     Kelsey Carlson RDN, LDN

## 2025-05-10 NOTE — ASSESSMENT & PLAN NOTE
Urinalysis on admission concerning for UTI.  In setting of constitutional symptoms, difficult to rule out asymptomatic bacteriuria. Hx of ESBL UTI      Recent Labs   Lab 05/09/25 2046   COLORU Yellow   APPEARANCEUA Hazy*   PHUR 6.0   SPECGRAV 1.010   PROTEINUA 2+*   GLUCUA 4+*   BILIRUBINUA Negative   OCCULTUA 1+*   NITRITE Negative   UROBILINOGEN Negative   LEUKOCYTESUR 3+*   RBCUA 4   WBCUA >100*   BACTERIA Rare   HYALINECASTS 0     PLAN  Followup urine cultures, deescalate antibiotics as appropriate  Antibiotics:  Antibiotics (From admission, onward)      Start     Stop Route Frequency Ordered    05/10/25 1154  meropenem 2 g in 0.9% NaCl 100 mL IVPB (MB+)         -- IV Every 12 hours (non-standard times) 05/10/25 2339

## 2025-05-10 NOTE — SUBJECTIVE & OBJECTIVE
Past Medical History:   Diagnosis Date    Acquired TTP     Cataract     CKD stage 3 secondary to diabetes     Closed displaced comminuted fracture of right patella 10/28/2020    Closed fracture of surgical neck of left humerus 10/30/2020    Closed left radial fracture 10/30/2020    Hyperkalemia     Hyperlipidemia     Hypertension     Hypothyroidism, unspecified     Liver cirrhosis secondary to YO     Morbid obesity     Right knee pain     Thyroid disease     Type 2 diabetes mellitus        Past Surgical History:   Procedure Laterality Date    APPENDECTOMY      BREAST BIOPSY      CATARACT EXTRACTION      COLONOSCOPY N/A 12/21/2021    Procedure: COLONOSCOPY screening;  Surgeon: Moises Patterson MD;  Location: [facility];  Service: Endoscopy;  Laterality: N/A;    ESOPHAGOGASTRODUODENOSCOPY N/A 12/21/2021    Procedure: EGD (ESOPHAGOGASTRODUODENOSCOPY) EV screening;  Surgeon: Moises Patterson MD;  Location: [facility];  Service: Endoscopy;  Laterality: N/A;    EYE SURGERY      HERNIA REPAIR      OPEN REDUCTION AND INTERNAL FIXATION (ORIF) OF FRACTURE OF DISTAL RADIUS Left 11/2/2020    Procedure: ORIF, FRACTURE, RADIUS, DISTAL;  Surgeon: Sage Wolf MD;  Location: [facility];  Service: Orthopedics;  Laterality: Left;    OPEN REDUCTION AND INTERNAL FIXATION (ORIF) OF FRACTURE OF PATELLA Right 11/2/2020    Procedure: ORIF, FRACTURE, PATELLA;  Surgeon: Sage Wolf MD;  Location: [facility] OR;  Service: Orthopedics;  Laterality: Right;    OPEN REDUCTION AND INTERNAL FIXATION (ORIF) OF FRACTURE OF PATELLA Right 12/18/2020    Procedure: ORIF, FRACTURE, PATELLA;  Surgeon: Sage Wolf MD;  Location: [facility] OR;  Service: Orthopedics;  Laterality: Right;    ORIF HUMERUS FRACTURE Left 11/5/2020    Procedure: ORIF, FRACTURE, HUMERUS;  Surgeon: Sage Wolf MD;  Location: [facility] OR;  Service: Orthopedics;  Laterality: Left;    PLACEMENT OF ACELLULAR HUMAN DERMAL ALLOGRAFT Right 11/2/2020     "Procedure: APPLICATION, ACELLULAR HUMAN DERMAL ALLOGRAFT;  Surgeon: Sage Wolf MD;  Location: [facility] OR;  Service: Orthopedics;  Laterality: Right;  Right Patella    REMOVAL OF HARDWARE FROM HAND Left 3/11/2021    Procedure: REMOVAL, HARDWARE, HAND;  Surgeon: Sage Wolf MD;  Location: [facility] OR;  Service: Orthopedics;  Laterality: Left;  Removal of dorsal spanning wrist plate left distal radius    REMOVAL OF HARDWARE FROM LOWER EXTREMITY Right 2020    Procedure: REMOVAL, HARDWARE, LOWER EXTREMITY;  Surgeon: Sage Wolf MD;  Location: [facility] OR;  Service: Orthopedics;  Laterality: Right;       Review of patient's allergies indicates:   Allergen Reactions    Codeine Nausea Only     Other reaction(s): Nausea  Other reaction(s): Elevated blood pressure       No current facility-administered medications on file prior to encounter.     Current Outpatient Medications on File Prior to Encounter   Medication Sig    albuterol (VENTOLIN HFA) 90 mcg/actuation inhaler Inhale 2 puffs into the lungs every 6 (six) hours as needed for Wheezing or Shortness of Breath (cough). Rescue    amLODIPine (NORVASC) 10 MG tablet TAKE 1 TABLET(10 MG) BY MOUTH EVERY DAY    blood sugar diagnostic Strp Use ac bid    budesonide-formoterol 160-4.5 mcg (SYMBICORT) 160-4.5 mcg/actuation HFAA Inhale 2 puffs into the lungs every 12 (twelve) hours. Controller    cholecalciferol, vitamin D3, (VITAMIN D3) 50 mcg (2,000 unit) Cap capsule Take 1 capsule by mouth. (Patient not taking: Reported on 2025)    furosemide (LASIX) 20 MG tablet Take 1 tablet (20 mg total) by mouth once daily. for 3 days    HUMULIN 70/30 U-100 KWIKPEN 100 unit/mL (70-30) InPn pen SMARTSI Unit(s) SUB-Q Every Evening    hydrALAZINE (APRESOLINE) 25 MG tablet Take 1 tablet (25 mg total) by mouth every 12 (twelve) hours.    insulin syringe-needle U-100 1 mL 29 gauge x 1/2" Syrg Use bid    lancets 33 gauge Misc Use as BID    levothyroxine " (SYNTHROID) 137 MCG Tab tablet Take 1 tablet by mouth once daily.    pravastatin (PRAVACHOL) 20 MG tablet Take 20 mg by mouth once daily.      Family History       Problem Relation (Age of Onset)    Diabetes Mother, Father    Leukemia Father          Tobacco Use    Smoking status: Former     Current packs/day: 0.00     Average packs/day: 1 pack/day for 8.0 years (8.0 ttl pk-yrs)     Types: Cigarettes     Start date:      Quit date:      Years since quittin.3    Smokeless tobacco: Never   Substance and Sexual Activity    Alcohol use: Yes     Comment: rarely    Drug use: No    Sexual activity: Not Currently     Review of Systems   Constitutional:  Positive for fatigue. Negative for chills and fever.   HENT:  Negative for congestion and rhinorrhea.    Respiratory:  Positive for shortness of breath. Negative for cough and wheezing.    Cardiovascular:  Positive for leg swelling. Negative for chest pain.   Gastrointestinal:  Positive for abdominal distention. Negative for abdominal pain, constipation, diarrhea, nausea and vomiting.   Genitourinary:  Negative for difficulty urinating and dysuria.   Skin:  Negative for rash and wound.   Neurological:  Negative for dizziness and headaches.     Objective:     Vital Signs (Most Recent):  Temp: 97.5 °F (36.4 °C) (05/10/25 0038)  Pulse: 86 (05/10/25 0038)  Resp: 16 (05/10/25 0038)  BP: (!) 154/70 (05/10/25 0038)  SpO2: (!) 94 % (05/10/25 0038) Vital Signs (24h Range):  Temp:  [97.5 °F (36.4 °C)-98.1 °F (36.7 °C)] 97.5 °F (36.4 °C)  Pulse:  [71-87] 86  Resp:  [16-22] 16  SpO2:  [87 %-100 %] 94 %  BP: (154-189)/(70-79) 154/70     Weight: 107 kg (235 lb 14.3 oz)  Body mass index is 39.25 kg/m².     Physical Exam  Vitals and nursing note reviewed.   Constitutional:       General: She is not in acute distress.     Appearance: She is obese. She is ill-appearing. She is not toxic-appearing or diaphoretic.      Interventions: Nasal cannula in place.   HENT:      Head:  Normocephalic and atraumatic.      Mouth/Throat:      Mouth: Mucous membranes are moist.   Eyes:      General: No scleral icterus.        Right eye: No discharge.         Left eye: No discharge.   Cardiovascular:      Rate and Rhythm: Normal rate and regular rhythm.      Heart sounds: Normal heart sounds.   Pulmonary:      Effort: Pulmonary effort is normal. No respiratory distress.      Breath sounds: Decreased breath sounds and rales present. No wheezing or rhonchi.   Abdominal:      General: Bowel sounds are normal. There is distension.      Palpations: Abdomen is soft.      Tenderness: There is no abdominal tenderness. There is no guarding or rebound.   Musculoskeletal:      Cervical back: No rigidity.      Right lower leg: Edema present.      Left lower leg: Edema present.   Skin:     General: Skin is warm and dry.      Coloration: Skin is not jaundiced.   Neurological:      Mental Status: She is alert and oriented to person, place, and time. Mental status is at baseline.   Psychiatric:         Mood and Affect: Mood normal.         Behavior: Behavior normal.                Significant Labs: All pertinent labs within the past 24 hours have been reviewed.  Recent Results (from the past 24 hours)   Comprehensive metabolic panel    Collection Time: 05/09/25  7:26 PM   Result Value Ref Range    Sodium 143 136 - 145 mmol/L    Potassium 4.0 3.5 - 5.1 mmol/L    Chloride 109 95 - 110 mmol/L    CO2 21 (L) 23 - 29 mmol/L    Glucose 123 (H) 70 - 110 mg/dL    BUN 46 (H) 8 - 23 mg/dL    Creatinine 2.0 (H) 0.5 - 1.4 mg/dL    Calcium 9.0 8.7 - 10.5 mg/dL    Protein Total 7.1 6.0 - 8.4 gm/dL    Albumin 3.4 (L) 3.5 - 5.2 g/dL    Bilirubin Total 1.1 (H) 0.1 - 1.0 mg/dL    ALP 81 40 - 150 unit/L    AST 22 11 - 45 unit/L    ALT 17 10 - 44 unit/L    Anion Gap 13 8 - 16 mmol/L    eGFR 26 (L) >60 mL/min/1.73/m2   Troponin I #1    Collection Time: 05/09/25  7:26 PM   Result Value Ref Range    Troponin-I 0.103 (H) <=0.026 ng/mL   BNP     Collection Time: 05/09/25  7:26 PM   Result Value Ref Range     (H) 0 - 99 pg/mL   CBC with Differential    Collection Time: 05/09/25  7:26 PM   Result Value Ref Range    WBC 3.60 (L) 3.90 - 12.70 K/uL    RBC 3.17 (L) 4.00 - 5.40 M/uL    HGB 9.2 (L) 12.0 - 16.0 gm/dL    HCT 29.8 (L) 37.0 - 48.5 %    MCV 94 82 - 98 fL    MCH 29.0 27.0 - 31.0 pg    MCHC 30.9 (L) 32.0 - 36.0 g/dL    RDW 14.4 11.5 - 14.5 %    Platelet Count 74 (L) 150 - 450 K/uL    MPV 10.1 9.2 - 12.9 fL    Nucleated RBC 0 <=0 /100 WBC    Neut % 76.3 (H) 38 - 73 %    Lymph % 10.3 (L) 18 - 48 %    Mono % 9.7 4 - 15 %    Eos % 2.5 <=8 %    Basophil % 0.6 <=1.9 %    Imm Grans % 0.6 (H) 0.0 - 0.5 %    Neut # 2.75 1.8 - 7.7 K/uL    Lymph # 0.37 (L) 1 - 4.8 K/uL    Mono # 0.35 0.3 - 1 K/uL    Eos # 0.09 <=0.5 K/uL    Baso # 0.02 <=0.2 K/uL    Imm Grans # 0.02 0.00 - 0.04 K/uL   ISTAT PROCEDURE    Collection Time: 05/09/25  7:36 PM   Result Value Ref Range    POC PH 7.421 7.35 - 7.45    POC PCO2 39.9 35 - 45 mmHg    POC PO2 57 (LL) 80 - 100 mmHg    POC HCO3 26.0 24 - 28 mmol/L    POC BE 1 -2 to 2 mmol/L    POC SATURATED O2 90 95 - 100 %    Sample ARTERIAL     Site RR     Allens Test Pass     DelSys Room Air     Mode SPONT     FiO2 21    Urinalysis, Reflex to Urine Culture Urine, Clean Catch    Collection Time: 05/09/25  8:46 PM    Specimen: Urine   Result Value Ref Range    Color, UA Yellow Straw, Saray, Yellow, Light-Orange    Appearance, UA Hazy (A) Clear    pH, UA 6.0 5.0 - 8.0    Spec Grav UA 1.010 1.005 - 1.030    Protein, UA 2+ (A) Negative    Glucose, UA 4+ (A) Negative    Ketones, UA Negative Negative    Bilirubin, UA Negative Negative    Blood, UA 1+ (A) Negative    Nitrites, UA Negative Negative    Urobilinogen, UA Negative <2.0 EU/dL    Leukocyte Esterase, UA 3+ (A) Negative   GREY TOP URINE HOLD    Collection Time: 05/09/25  8:46 PM   Result Value Ref Range    Extra Tube Hold for add-ons.    Urinalysis Microscopic    Collection Time:  05/09/25  8:46 PM   Result Value Ref Range    RBC, UA 4 0 - 4 /HPF    WBC, UA >100 (H) 0 - 5 /HPF    WBC Clumps, UA Many (A) None, Rare    Bacteria, UA Rare None, Rare, Occasional /HPF    Yeast, UA None None /HPF    Squamous Epithelial Cells, UA 2 /HPF    Hyaline Casts, UA 0 0 - 1 /LPF    Microscopic Comment     Troponin I #2    Collection Time: 05/10/25  1:51 AM   Result Value Ref Range    Troponin-I 0.147 (H) <=0.026 ng/mL       Significant Imaging: I have reviewed all pertinent imaging results/findings within the past 24 hours.  X-Ray Chest AP Portable   Final Result    Findings likely related to congestive heart failure.  Correlate.  Follow-up is recommended.      Finalized on: 5/9/2025 7:41 PM By:  Pierre Cuellar MD   [facility]# [id number]      2025-05-09 19:43:[id number]     [facility]

## 2025-05-10 NOTE — ASSESSMENT & PLAN NOTE
Chronic.  Stable  Follows with pulmonology outpatient    PLAN:  Home medication: Symbicort.  --Arformeterol/Budesonide while inpatient

## 2025-05-10 NOTE — ASSESSMENT & PLAN NOTE
Recent Lipid Panel reviewed-  Lab Results   Component Value Date    HDL 34 (L) 06/27/2024    LDLCALC 24.0 (L) 06/27/2024    TRIG 65 06/27/2024    CHOL 71 (L) 06/27/2024      -Home medications:           pravastatin (PRAVACHOL) 20 MG tablet Take 20 mg by mouth once daily.      PLAN  Continue home statin

## 2025-05-10 NOTE — PLAN OF CARE
POC reviewed with pt. Pt verbalizes understanding of POC. No questions at this time.  AAOx3. NADN.  NSR on cardiac monitor.  Unable to wean O2 throughout shift today sats 90-92% on 2L NC.   IV diuresis.  Pt. Refused to get OOB and sit up in chair or ambulate. Educated on the risk of skin injury and encouraged to turn Q2 and shift weight in bed.    Pt remains free of falls.  No complaints at this time.  Safety measures in place. Will continue to monitor.  Informed pt to call for assistance before getting up. Pt verbalizes understanding.  Hourly rounding and chart check complete.       Problem: Skin Injury Risk Increased  Goal: Skin Health and Integrity  Outcome: Progressing     Problem: Gas Exchange Impaired  Goal: Optimal Gas Exchange  Outcome: Progressing     Problem: Nonalliance  Goal: Collaboration with the Healthcare Team  Outcome: Progressing     Problem: Activity Intolerance  Goal: Enhanced Capacity and Energy  Outcome: Progressing

## 2025-05-10 NOTE — ASSESSMENT & PLAN NOTE
Recent thyroid labs reviewed:  Lab Results   Component Value Date    TSH 0.17 (L) 03/19/2025    FREET4 1.12 08/30/2024     Hx of Hypothyroidism    PLAN:   Continue home levothyroxine

## 2025-05-10 NOTE — SUBJECTIVE & OBJECTIVE
Interval History: F/u CHF, PNA, and UTI.  Symptoms improving and o2 weaning as tolerated.  Patient denies any chest pain and is noted to have some swelling in the calves b/l but no swelling in the feet.    Review of Systems  Objective:     Vital Signs (Most Recent):  Temp: 97.8 °F (36.6 °C) (05/10/25 1602)  Pulse: 77 (05/10/25 1602)  Resp: 19 (05/10/25 1602)  BP: 138/79 (05/10/25 1602)  SpO2: (!) 92 % (05/10/25 1602) Vital Signs (24h Range):  Temp:  [97.5 °F (36.4 °C)-98.1 °F (36.7 °C)] 97.8 °F (36.6 °C)  Pulse:  [71-88] 77  Resp:  [16-22] 19  SpO2:  [87 %-100 %] 92 %  BP: (138-189)/(68-81) 138/79     Weight: 107 kg (235 lb 14.3 oz)  Body mass index is 39.25 kg/m².    Intake/Output Summary (Last 24 hours) at 5/10/2025 1648  Last data filed at 5/10/2025 1000  Gross per 24 hour   Intake 598 ml   Output 1850 ml   Net -1252 ml         Physical Exam  Vitals and nursing note reviewed.   Constitutional:       Appearance: Normal appearance.   HENT:      Head: Normocephalic and atraumatic.      Nose: Nose normal.      Mouth/Throat:      Mouth: Mucous membranes are moist.   Eyes:      Extraocular Movements: Extraocular movements intact.      Conjunctiva/sclera: Conjunctivae normal.   Cardiovascular:      Rate and Rhythm: Normal rate and regular rhythm.      Pulses: Normal pulses.      Heart sounds: Normal heart sounds.   Pulmonary:      Effort: Respiratory distress present.      Breath sounds: Normal breath sounds.      Comments: On 2L NC  Abdominal:      General: Abdomen is flat. Bowel sounds are normal.      Palpations: Abdomen is soft.   Musculoskeletal:      Cervical back: Normal range of motion and neck supple.      Comments: B/l calf pitting edema.   Skin:     General: Skin is warm.      Capillary Refill: Capillary refill takes less than 2 seconds.   Neurological:      Mental Status: She is alert and oriented to person, place, and time. Mental status is at baseline.   Psychiatric:         Mood and Affect: Mood normal.          Behavior: Behavior normal.               Significant Labs: All pertinent labs within the past 24 hours have been reviewed.  Recent Lab Results  (Last 5 results in the past 24 hours)        05/10/25  1640   05/10/25  1136   05/10/25  1136   05/10/25  0651   05/10/25  0617        Albumin       3.2         ALP       78         ALT       15         Anion Gap       12         Ao peak shane     3.1           Ao VTI     65.7           AST       19         AV valve area     1.3           LAYO by Velocity Ratio     1.2           AV mean gradient     22           AV index (prosthetic)     0.45           AV peak gradient     38           AV Velocity Ratio     0.42           Baso #       0.00         Basophil %       0.0         BILIRUBIN TOTAL       1.0  Comment: For infants and newborns, interpretation of results should be based   on gestational age, weight and in agreement with clinical   observations.    Premature Infant recommended reference ranges:   0-24 hours:  <8.0 mg/dL   24-48 hours: <12.0 mg/dL   3-5 days:    <15.0 mg/dL   6-29 days:   <15.0 mg/dL         BSA     2.22           BUN       55         Calcium       8.8         Chloride       106         CO2       23         Creatinine       2.2         Left Ventricle Relative Wall Thickness     0.50           E/A ratio     1.01           E/E' ratio     25           eGFR       23  Comment: Estimated GFR calculated using the CKD-EPI creatinine (2021) equation.         Eos #       0.00         Eos %       0.0         E wave deceleration time     249           FS     28.8           Glucose       288         Gran # (ANC)       1.98         Hematocrit       29.0         Hemoglobin       8.7         Immature Grans (Abs)       0.01  Comment: Mild elevation in immature granulocytes is non specific and can be seen in a variety of conditions including stress response, acute inflammation, trauma and pregnancy. Correlation with other laboratory and clinical findings is  essential.         Immature Granulocytes       0.5         IVC diameter     1.44           IVRT     110           IVSd     1.3           Left Atrium Major Axis     6.7           Left Atrium Minor Axis     5.8           LA size     3.7           LVOT area     2.8           LV LATERAL E/E' RATIO     22.1           LV SEPTAL E/E' RATIO     28.4           LV EDV BP     132           LV Diastolic Volume Index     62.26           Left Ventricular End Diastolic Volume by Teichholz Method     131.60           Left Ventricular End Systolic Volume by Teichholz Method     56.12           LVIDd     5.2           LVIDs     3.7           LV mass     278.4           LV Mass Index     131.3           Left Ventricular Outflow Tract Mean Gradient     4.01           Left Ventricular Outflow Tract Mean Velocity     0.95           LVOT diameter     1.9           LVOT peak tenzin     1.3           LVOT stroke volume     83.3           LVOT peak VTI     29.4           LV ESV BP     56           LV Systolic Volume Index     26.4           Lymph #       0.13         Lymph %       6.0         Magnesium        2.6         MCH       28.7         MCHC       30.0         MCV       96         Mean e'     0.08           Mono #       0.03         Mono %       1.4         MPV       10.1         MV valve area p 1/2 method     3.04           MV Peak A Tenzin     1.97           MV Peak E Tenzin     1.99           MV stenosis pressure 1/2 time     72.28           Neut %       92.1         nRBC       0         Phosphorus Level       6.5         Platelet Count       66         POCT Glucose 258   287       271       Potassium       4.7         PROTEIN TOTAL       6.8         Pulmonary Valve Mean Velocity     0.96           PV peak gradient     9           PV PEAK VELOCITY     1.53           PW     1.3           RA Major Axis     5.74           Est. RA pres     3           RBC       3.03         RDW       14.3         Sodium       141         STJ     2.2            TAPSE     2.1           TDI SEPTAL     0.07           TDI LATERAL     0.09           Troponin I       0.168  Comment: The reference interval for Troponin I represents the 99th percentile cutoff for our facility and is consistent with 3rd generation assay performance.         WBC       2.15         ZLVIDD     -2.53           ZLVIDS     -0.79                                  Significant Imaging: I have reviewed all pertinent imaging results/findings within the past 24 hours.

## 2025-05-10 NOTE — NURSING TRANSFER
Nursing Transfer Note        Nurse giving handoff: FROILAN Rivera  Nurse receiving handoff:FROILAN Mackenzie    Reason patient is being transferred: admission    Transfer To: 255    Transfer via stretcher    Transfer with 2LNC to O2    Transported by nurse      Medicines sent: no    Any special needs or follow-up needed: admission provider    Patient belongings transferred with patient: Yes    Chart send with patient: Yes    Patient reassessed at: 5/9 @ 2251    Upon arrival to floor: cardiac monitor applied, patient oriented to room, call bell in reach, and bed in lowest position

## 2025-05-10 NOTE — ASSESSMENT & PLAN NOTE
-Patient with Hypoxic Respiratory failure which is Acute.  she is not on home oxygen currently.  Prior history of temporary home supplemental oxygen requirement after a hospitalization in July 2024 for pneumonia/CHF and home oxygen that was discontinued by the end of 2024. Patient is identified as having Diastolic (HFpEF) heart failure that is Acute on Chronic. CHF is currently uncontrolled due to volume overload due to: Continued edema of extremities, Dyspnea, Rales/crackles on pulmonary exam, and Pulmonary edema/pleural effusion on CXR.     Signs/symptoms of respiratory failure include- increased work of breathing. Contributing diagnoses includes - CHF Labs and images were reviewed.     Recent imaging notable for :  X-Ray Chest AP Portable   Final Result    Findings likely related to congestive heart failure.  Correlate.  Follow-up is recommended.      Finalized on: 5/9/2025 7:41 PM By:  Pierre Cuellar MD        - Reviewed most recent ECHO performed and demonstrates- Results for orders placed during the hospital encounter of 06/26/24  Echo  Interpretation Summary    Left Ventricle: The left ventricle is normal in size. Normal wall thickness. There is concentric hypertrophy. Normal wall motion. Ejection fraction by visual approximation is 60%. Grade II diastolic dysfunction.    Right Ventricle: Normal right ventricular cavity size. Wall thickness is normal. Systolic function is normal.    Left Atrium: Left atrium is severely dilated.    Aortic Valve: The aortic valve is a trileaflet valve. Mildly restricted motion. There is moderate stenosis. Aortic valve area by VTI is 1.24 cm². Aortic valve peak velocity is 2.66 m/s. Mean gradient is 17 mmHg. The dimensionless index is 0.45.    Mitral Valve: There is moderate mitral annular calcification present. Mildly restricted motion. There is mild to moderate regurgitation.      Recent Labs reviewed-  Recent Labs   Lab 05/09/25  1926 05/10/25  0151   TROPONINI 0.103* 0.147*   BNP  507*  --          Intake/Output Summary (Last 24 hours) at 5/10/2025 0328  Last data filed at 5/10/2025 0254  Gross per 24 hour   Intake 240 ml   Output 700 ml   Net -460 ml     Net IO Since Admission: -460 mL [05/10/25 0328]    Home GDMT per Chart Review:           amLODIPine (NORVASC) 10 MG tablet TAKE 1 TABLET(10 MG) BY MOUTH EVERY DAY    hydrALAZINE (APRESOLINE) 25 MG tablet Take 1 tablet (25 mg total) by mouth every 12 (twelve) hours.         Plan:  - Current GDMT meds: hydrALAZINE injection 10 mg, ED 1 Time, Intravenous  hydrALAZINE injection 5 mg, Every 6 hours PRN, Intravenous  furosemide injection 40 mg, 2 times daily, Intravenous  hydrALAZINE tablet 25 mg, Every 12 hours, Oral  -Titrate(increase/decrease) diuretic dosing to target euvolemia. Current Diuretic regimen:  Diuretics (From admission, onward)      Start     Stop Route Frequency Ordered    05/10/25 0800  furosemide injection 40 mg         -- IV 2 times daily 05/09/25 2212        -Followup Transthoracic Echocardiogram  -Wean supplemental oxygen as tolerated to target SaO2>90%  - Cycle troponin to rule out ischemic etiology  - Monitor on telemetry.   -Patient is on CHF pathway.    - Cardiac diet with Fluid restriction at 1.5L with strict I/Os and daily standing weights

## 2025-05-10 NOTE — HOSPITAL COURSE
Patient admitted with CHF exacerbation, pneumonia and UTI.  Patient is continued on IV antibiotics and IV diuresis.  She states she is doing better on hospital day.  She remains on NC o2 at 2L - wean as tolerated.  She had worsening of her renal function on hospital day 2 and creatinine at this time is 2.8.  IV Lasix has been discontinued and renal function will be monitored.  Patient has swelling in her lower extremities have completely resolved and chest CT was ordered this morning which showed some bilateral pleural effusions but no overt vascular congestion.  We will consider gentle hydration if renal function does not improve by tomorrow.  Patient has also been attempted to wean from nasal cannula O2 but this has been unsuccessful.  At baseline patient is on room air but currently requiring 2 L nasal cannula with saturations in the low 90s.  Patient's urine output has not decreased but we will be monitored closely. Diuresis resumed but patient still required oxygen. Home oxygen arranged patient as patient was 86% on room air. Patient seen and examined, stable for discharge.

## 2025-05-10 NOTE — ED PROVIDER NOTES
SCRIBE #1 NOTE: I, Gary Amato, am scribing for, and in the presence of, Ana Davis MD. I have scribed the entire note.       History     Chief Complaint   Patient presents with    Shortness of Breath     Pt presents with c/o SOB x2 weeks that has been worsening. Pt denies fever or cough. PMHx asthma. Pt states she has used her inhalers at home, but has not had any significant relief.      Review of patient's allergies indicates:   Allergen Reactions    Codeine Nausea Only     Other reaction(s): Nausea  Other reaction(s): Elevated blood pressure         History of Present Illness     HPI    5/9/2025, 9:02 PM  History obtained from the patient and medical records      History of Present Illness: Lakshmi Campbell is a 74 y.o. female patient with a PMHx of Hypertension, Hyperlipidemia, Hyperkalemia, Hypothyroidism, morbid obesity, Type II DM, and acquired TTP, CKD stage 3 who presents to the Emergency Department for evaluation of SOB which began two weeks ago. Symptoms are constant in severity and continuing to worsen. No mitigating factors reported. Exertion is an exacerbating factor. Associated sxs include weight gain, mild cough, BLE swelling, and orthopnea. Patient sleeps with two or three pillows. Patient denies any fever, nausea, vomiting, dizziness, recent travels, urinary issues, rhinorrhea, congestion, sore throat. Patient states that she is not on oxygen at home. No Prior Tx.  No further complaints or concerns at this time.       Arrival mode: Personal Transportation    PCP: Keely Silva NP        Past Medical History:  Past Medical History:   Diagnosis Date    Acquired TTP     Cataract     CKD stage 3 secondary to diabetes     Closed displaced comminuted fracture of right patella 10/28/2020    Closed fracture of surgical neck of left humerus 10/30/2020    Closed left radial fracture 10/30/2020    Hyperkalemia     Hyperlipidemia     Hypertension     Hypothyroidism, unspecified     Liver cirrhosis  secondary to YO     Morbid obesity     Right knee pain     Thyroid disease     Type 2 diabetes mellitus        Past Surgical History:  Past Surgical History:   Procedure Laterality Date    APPENDECTOMY      BREAST BIOPSY      CATARACT EXTRACTION      COLONOSCOPY N/A 12/21/2021    Procedure: COLONOSCOPY screening;  Surgeon: Moises Patterson MD;  Location: [facility];  Service: Endoscopy;  Laterality: N/A;    ESOPHAGOGASTRODUODENOSCOPY N/A 12/21/2021    Procedure: EGD (ESOPHAGOGASTRODUODENOSCOPY) EV screening;  Surgeon: Moises Patterson MD;  Location: [facility];  Service: Endoscopy;  Laterality: N/A;    EYE SURGERY      HERNIA REPAIR      OPEN REDUCTION AND INTERNAL FIXATION (ORIF) OF FRACTURE OF DISTAL RADIUS Left 11/2/2020    Procedure: ORIF, FRACTURE, RADIUS, DISTAL;  Surgeon: Sage Wolf MD;  Location: [facility];  Service: Orthopedics;  Laterality: Left;    OPEN REDUCTION AND INTERNAL FIXATION (ORIF) OF FRACTURE OF PATELLA Right 11/2/2020    Procedure: ORIF, FRACTURE, PATELLA;  Surgeon: Sage Wolf MD;  Location: [facility];  Service: Orthopedics;  Laterality: Right;    OPEN REDUCTION AND INTERNAL FIXATION (ORIF) OF FRACTURE OF PATELLA Right 12/18/2020    Procedure: ORIF, FRACTURE, PATELLA;  Surgeon: Sage Wolf MD;  Location: [facility] OR;  Service: Orthopedics;  Laterality: Right;    ORIF HUMERUS FRACTURE Left 11/5/2020    Procedure: ORIF, FRACTURE, HUMERUS;  Surgeon: Sage Wolf MD;  Location: [facility] OR;  Service: Orthopedics;  Laterality: Left;    PLACEMENT OF ACELLULAR HUMAN DERMAL ALLOGRAFT Right 11/2/2020    Procedure: APPLICATION, ACELLULAR HUMAN DERMAL ALLOGRAFT;  Surgeon: Sage Wolf MD;  Location: [facility] OR;  Service: Orthopedics;  Laterality: Right;  Right Patella    REMOVAL OF HARDWARE FROM HAND Left 3/11/2021    Procedure: REMOVAL, HARDWARE, HAND;  Surgeon: Sage Wolf MD;  Location: [facility];  Service: Orthopedics;  Laterality:  Left;  Removal of dorsal spanning wrist plate left distal radius    REMOVAL OF HARDWARE FROM LOWER EXTREMITY Right 2020    Procedure: REMOVAL, HARDWARE, LOWER EXTREMITY;  Surgeon: Sage Wolf MD;  Location: [facility];  Service: Orthopedics;  Laterality: Right;         Family History:  Family History   Problem Relation Name Age of Onset    Diabetes Mother      Leukemia Father      Diabetes Father         Social History:  Social History     Tobacco Use    Smoking status: Former     Current packs/day: 0.00     Average packs/day: 1 pack/day for 8.0 years (8.0 ttl pk-yrs)     Types: Cigarettes     Start date:      Quit date:      Years since quittin.3    Smokeless tobacco: Never   Substance and Sexual Activity    Alcohol use: Yes     Comment: rarely    Drug use: No    Sexual activity: Not Currently        Review of Systems     Review of Systems   Constitutional:  Positive for unexpected weight change (Weight gain). Negative for fever.   HENT:  Negative for congestion, rhinorrhea and sore throat.    Respiratory:  Positive for cough and shortness of breath.         (+) orthopnea   Cardiovascular:  Positive for leg swelling (BLE). Negative for chest pain.   Gastrointestinal:  Negative for nausea and vomiting.   Genitourinary:  Negative for difficulty urinating and dysuria.   Musculoskeletal:  Negative for back pain.   Skin:  Negative for rash.   Neurological:  Negative for dizziness and weakness.   Hematological:  Does not bruise/bleed easily.      Physical Exam     Initial Vitals [25 1904]   BP Pulse Resp Temp SpO2   (!) 165/74 82 (!) 22 98.1 °F (36.7 °C) (!) 87 %      MAP       --          Physical Exam  Nursing Notes and Vital Signs Reviewed.  Constitutional: Patient is in moderate distress. Well-developed and well-nourished.  Head: Atraumatic. Normocephalic.  Eyes: PERRL. EOM intact. Conjunctivae are not pale. No scleral icterus.  ENT: Mucous membranes are moist. Oropharynx is clear  and symmetric.    Neck: Supple. Full ROM. No lymphadenopathy.  Cardiovascular: Regular rate. Regular rhythm. No murmurs, rubs, or gallops. Distal pulses are 2+ and symmetric.  Pulmonary/Chest: Pt is tachypneic and in respiratory distress. Bilateral basilar rales.  Abdominal: Soft and non-distended.  There is no tenderness.  No rebound, guarding, or rigidity. Good bowel sounds.  Genitourinary: No CVA tenderness.  Musculoskeletal: Moves all extremities. No obvious deformities. 2+ edema in BLE. No calf tenderness.  Skin: Warm and dry.  Neurological:  Alert, awake, and appropriate.  Normal speech.  No acute focal neurological deficits are appreciated.  Psychiatric: Normal affect. Good eye contact. Appropriate in content.     ED Course   Critical Care    Date/Time: 5/9/2025 9:43 PM    Performed by: Ana Davis MD  Authorized by: Ana Davis MD  Direct patient critical care time: 5 minutes  Additional history critical care time: 10 minutes  Ordering / reviewing critical care time: 7 minutes  Documentation critical care time: 8 minutes  Consulting other physicians critical care time: 5 minutes  Total critical care time (exclusive of procedural time) : 35 minutes  Critical care time was exclusive of separately billable procedures and treating other patients and teaching time.  Critical care was necessary to treat or prevent imminent or life-threatening deterioration of the following conditions: hypoxic respiratory failure.  Critical care was time spent personally by me on the following activities: blood draw for specimens, development of treatment plan with patient or surrogate, discussions with consultants, interpretation of cardiac output measurements, evaluation of patient's response to treatment, examination of patient, obtaining history from patient or surrogate, ordering and performing treatments and interventions, ordering and review of laboratory studies, ordering and review of radiographic studies, pulse  "oximetry, re-evaluation of patient's condition and review of old charts.        ED Vital Signs:  Vitals:    05/09/25 1931 05/09/25 1936 05/09/25 1944 05/1950   BP:  (!) 181/79     Pulse: 79 78 71 79   Resp: (!) 22 18 18 18   Temp:       TempSrc:       SpO2: 95% 99% 100% 100%   Weight: 107.4 kg (236 lb 12.8 oz)      Height:        05/09/25 2000 05/09/25 2033 05/09/25 2135 05/09/25 2202   BP:  (!) 189/79     Pulse: 79 83 84 86   Resp:  20 20 20   Temp:       TempSrc:       SpO2:  97% 95% 95%   Weight:       Height:        05/09/25 2203 05/09/25 2251 05/09/25 2300 05/10/25 0038   BP: (!) 167/70 (!) 180/73  (!) 154/70   Pulse:  87 87 86   Resp:  (!) 22  16   Temp:  97.9 °F (36.6 °C)  97.5 °F (36.4 °C)   TempSrc:  Oral  Axillary   SpO2:  98%  (!) 94%   Weight:  107 kg (235 lb 14.3 oz)     Height:  5' 5" (1.651 m)      05/10/25 0300 05/10/25 0406 05/10/25 0500   BP:  (!) 158/69    Pulse: 85 88 86   Resp:  18    Temp:  97.8 °F (36.6 °C)    TempSrc:  Oral    SpO2:  (!) 93%    Weight:      Height:          Abnormal Lab Results:  Labs Reviewed   COMPREHENSIVE METABOLIC PANEL - Abnormal       Result Value    Sodium 143      Potassium 4.0      Chloride 109      CO2 21 (*)     Glucose 123 (*)     BUN 46 (*)     Creatinine 2.0 (*)     Calcium 9.0      Protein Total 7.1      Albumin 3.4 (*)     Bilirubin Total 1.1 (*)     ALP 81      AST 22      ALT 17      Anion Gap 13      eGFR 26 (*)    TROPONIN I - Abnormal    Troponin-I 0.103 (*)    B-TYPE NATRIURETIC PEPTIDE - Abnormal     (*)    CBC WITH DIFFERENTIAL - Abnormal    WBC 3.60 (*)     RBC 3.17 (*)     HGB 9.2 (*)     HCT 29.8 (*)     MCV 94      MCH 29.0      MCHC 30.9 (*)     RDW 14.4      Platelet Count 74 (*)     MPV 10.1      Nucleated RBC 0      Neut % 76.3 (*)     Lymph % 10.3 (*)     Mono % 9.7      Eos % 2.5      Basophil % 0.6      Imm Grans % 0.6 (*)     Neut # 2.75      Lymph # 0.37 (*)     Mono # 0.35      Eos # 0.09      Baso # 0.02      Imm Grans # " 0.02     URINALYSIS, REFLEX TO URINE CULTURE - Abnormal    Color, UA Yellow      Appearance, UA Hazy (*)     pH, UA 6.0      Spec Grav UA 1.010      Protein, UA 2+ (*)     Glucose, UA 4+ (*)     Ketones, UA Negative      Bilirubin, UA Negative      Blood, UA 1+ (*)     Nitrites, UA Negative      Urobilinogen, UA Negative      Leukocyte Esterase, UA 3+ (*)    URINALYSIS MICROSCOPIC - Abnormal    RBC, UA 4      WBC, UA >100 (*)     WBC Clumps, UA Many (*)     Bacteria, UA Rare      Yeast, UA None      Squamous Epithelial Cells, UA 2      Hyaline Casts, UA 0      Microscopic Comment       ISTAT PROCEDURE - Abnormal    POC PH 7.421      POC PCO2 39.9      POC PO2 57 (*)     POC HCO3 26.0      POC BE 1      POC SATURATED O2 90      Sample ARTERIAL      Site RR      Allens Test Pass      DelSys Room Air      Mode SPONT      FiO2 21     CULTURE, URINE   CBC W/ AUTO DIFFERENTIAL    Narrative:     The following orders were created for panel order CBC auto differential.  Procedure                               Abnormality         Status                     ---------                               -----------         ------                     CBC with Differential[[id number]]       Abnormal            Final result                 Please view results for these tests on the individual orders.        All Lab Results:  Results for orders placed or performed during the hospital encounter of 05/09/25   Comprehensive metabolic panel    Collection Time: 05/09/25  7:26 PM   Result Value Ref Range    Sodium 143 136 - 145 mmol/L    Potassium 4.0 3.5 - 5.1 mmol/L    Chloride 109 95 - 110 mmol/L    CO2 21 (L) 23 - 29 mmol/L    Glucose 123 (H) 70 - 110 mg/dL    BUN 46 (H) 8 - 23 mg/dL    Creatinine 2.0 (H) 0.5 - 1.4 mg/dL    Calcium 9.0 8.7 - 10.5 mg/dL    Protein Total 7.1 6.0 - 8.4 gm/dL    Albumin 3.4 (L) 3.5 - 5.2 g/dL    Bilirubin Total 1.1 (H) 0.1 - 1.0 mg/dL    ALP 81 40 - 150 unit/L    AST 22 11 - 45 unit/L    ALT 17 10 - 44 unit/L     Anion Gap 13 8 - 16 mmol/L    eGFR 26 (L) >60 mL/min/1.73/m2   Troponin I #1    Collection Time: 05/09/25  7:26 PM   Result Value Ref Range    Troponin-I 0.103 (H) <=0.026 ng/mL   BNP    Collection Time: 05/09/25  7:26 PM   Result Value Ref Range     (H) 0 - 99 pg/mL   CBC with Differential    Collection Time: 05/09/25  7:26 PM   Result Value Ref Range    WBC 3.60 (L) 3.90 - 12.70 K/uL    RBC 3.17 (L) 4.00 - 5.40 M/uL    HGB 9.2 (L) 12.0 - 16.0 gm/dL    HCT 29.8 (L) 37.0 - 48.5 %    MCV 94 82 - 98 fL    MCH 29.0 27.0 - 31.0 pg    MCHC 30.9 (L) 32.0 - 36.0 g/dL    RDW 14.4 11.5 - 14.5 %    Platelet Count 74 (L) 150 - 450 K/uL    MPV 10.1 9.2 - 12.9 fL    Nucleated RBC 0 <=0 /100 WBC    Neut % 76.3 (H) 38 - 73 %    Lymph % 10.3 (L) 18 - 48 %    Mono % 9.7 4 - 15 %    Eos % 2.5 <=8 %    Basophil % 0.6 <=1.9 %    Imm Grans % 0.6 (H) 0.0 - 0.5 %    Neut # 2.75 1.8 - 7.7 K/uL    Lymph # 0.37 (L) 1 - 4.8 K/uL    Mono # 0.35 0.3 - 1 K/uL    Eos # 0.09 <=0.5 K/uL    Baso # 0.02 <=0.2 K/uL    Imm Grans # 0.02 0.00 - 0.04 K/uL   ISTAT PROCEDURE    Collection Time: 05/09/25  7:36 PM   Result Value Ref Range    POC PH 7.421 7.35 - 7.45    POC PCO2 39.9 35 - 45 mmHg    POC PO2 57 (LL) 80 - 100 mmHg    POC HCO3 26.0 24 - 28 mmol/L    POC BE 1 -2 to 2 mmol/L    POC SATURATED O2 90 95 - 100 %    Sample ARTERIAL     Site RR     Allens Test Pass     DelSys Room Air     Mode SPONT     FiO2 21    Urinalysis, Reflex to Urine Culture Urine, Clean Catch    Collection Time: 05/09/25  8:46 PM    Specimen: Urine   Result Value Ref Range    Color, UA Yellow Straw, Saray, Yellow, Light-Orange    Appearance, UA Hazy (A) Clear    pH, UA 6.0 5.0 - 8.0    Spec Grav UA 1.010 1.005 - 1.030    Protein, UA 2+ (A) Negative    Glucose, UA 4+ (A) Negative    Ketones, UA Negative Negative    Bilirubin, UA Negative Negative    Blood, UA 1+ (A) Negative    Nitrites, UA Negative Negative    Urobilinogen, UA Negative <2.0 EU/dL    Leukocyte Esterase,  UA 3+ (A) Negative   GREY TOP URINE HOLD    Collection Time: 05/09/25  8:46 PM   Result Value Ref Range    Extra Tube Hold for add-ons.    Urinalysis Microscopic    Collection Time: 05/09/25  8:46 PM   Result Value Ref Range    RBC, UA 4 0 - 4 /HPF    WBC, UA >100 (H) 0 - 5 /HPF    WBC Clumps, UA Many (A) None, Rare    Bacteria, UA Rare None, Rare, Occasional /HPF    Yeast, UA None None /HPF    Squamous Epithelial Cells, UA 2 /HPF    Hyaline Casts, UA 0 0 - 1 /LPF    Microscopic Comment     Troponin I #2    Collection Time: 05/10/25  1:51 AM   Result Value Ref Range    Troponin-I 0.147 (H) <=0.026 ng/mL       Imaging Results:  Imaging Results              X-Ray Chest AP Portable (Final result)  Result time 05/09/25 19:41:25      Final result by Heladio Cuellar ([facility]), MD (05/09/25 19:41:25)                   Impression:     Findings likely related to congestive heart failure.  Correlate.  Follow-up is recommended.    Finalized on: 5/9/2025 7:41 PM By:  Pierre Cuellar MD  [facility]# [id number]      2025-05-09 19:43:[id number]     [facility]               Narrative:    EXAM: XR CHEST AP PORTABLE    CLINICAL HISTORY: Shortness of breath    FINDINGS:  Comparison chest 04/11/2025.    Atherosclerosis of thoracic aorta.    Cardiomegaly.    Moderate increased interstitial lung markings bilaterally.  Possible left pleural effusion.                                         The EKG was ordered, reviewed, and independently interpreted by the ED provider.  Interpretation time: 19:07:44  Rate: 86 BPM  Rhythm: normal sinus rhythm  Interpretation: Anterolateral infarct. (Cited on or before 02-Apr-2025). Abnormal ECG. No STEMI.  When compared to EKG performed on 02-Apr-2025 17:45, Vent. Rate has increased by 41 bpm. Questionable change in initial forces of Lateral leads. QT has lengthened.    The Emergency Provider reviewed the vital signs and test results, which are outlined above.     ED Discussion     9:01 PM: Discussed case with Owen Alatorre DO  ([facility]). Dr. Alatorre agrees with current care and management of pt and accepts admission.   Admitting Service: Hospital Medicine  Admitting Physician: Dr. Alatorre  Admit to: obs/med tele    9:19 PM: Re-evaluated pt.  I have discussed test results, shared treatment plan, and the need for admission with patient/family/caretaker at bedside. Pt and/or family/caretaker express understanding at this time and agree with all information. All questions answered. Pt/caretaker/family member(s) have no further questions or concerns at this time. Pt is ready for admit.                Medical Decision Making  Amount and/or Complexity of Data Reviewed  External Data Reviewed: notes.     Details: Reviewed echo from 6/27/24 which revealed EF 60% and grade II diastolic dysfunction.  Labs: ordered. Decision-making details documented in ED Course.  Radiology: ordered. Decision-making details documented in ED Course.  ECG/medicine tests: ordered and independent interpretation performed. Decision-making details documented in ED Course.    Risk  Prescription drug management.  Decision regarding hospitalization.    Critical Care  Total time providing critical care: 35 minutes                ED Medication(s):  Medications   hydrALAZINE injection 10 mg ( Intravenous Canceled Entry 5/9/25 2200)   sodium chloride 0.9% flush 10 mL (has no administration in time range)   hydrALAZINE injection 5 mg (has no administration in time range)   polyethylene glycol packet 17 g (has no administration in time range)   acetaminophen tablet 650 mg (has no administration in time range)   prochlorperazine injection Soln 2.5 mg (has no administration in time range)   ondansetron injection 4 mg (has no administration in time range)   melatonin tablet 6 mg (has no administration in time range)   furosemide injection 40 mg (has no administration in time range)   arformoteroL nebulizer solution 15 mcg (has no administration in time range)   budesonide  nebulizer solution 0.5 mg (has no administration in time range)   glucose chewable tablet 16 g (has no administration in time range)   glucose chewable tablet 24 g (has no administration in time range)   dextrose 50% injection 12.5 g (has no administration in time range)   dextrose 50% injection 25 g (has no administration in time range)   glucagon (human recombinant) injection 1 mg (has no administration in time range)   insulin aspart U-100 pen 0-10 Units (has no administration in time range)   amLODIPine tablet 10 mg (has no administration in time range)   hydrALAZINE tablet 25 mg (has no administration in time range)   levothyroxine tablet 137 mcg (137 mcg Oral Given 5/10/25 0418)   pravastatin tablet 20 mg (has no administration in time range)   meropenem 2 g in 0.9% NaCl 100 mL IVPB (MB+) (0 g Intravenous Stopped 5/10/25 0518)   albuterol-ipratropium 2.5 mg-0.5 mg/3 mL nebulizer solution 3 mL (3 mLs Nebulization Given 5/1950)   methylPREDNISolone sodium succinate injection 125 mg (125 mg Intravenous Given 5/9/25 1929)   furosemide injection 80 mg (80 mg Intravenous Given 5/9/25 2003)   nitroGLYCERIN 2% TD oint ointment 2 inch (2 inches Transdermal Given 5/9/25 2044)       Current Discharge Medication List                  Scribe Attestation:   Scribe #1: I performed the above scribed service and the documentation accurately describes the services I performed. I attest to the accuracy of the note.     Attending:   Physician Attestation Statement for Scribe #1: I, Ana Davis MD, personally performed the services described in this documentation, as scribed by Gary Amato, in my presence, and it is both accurate and complete.           Clinical Impression       ICD-10-CM ICD-9-CM   1. Diastolic congestive heart failure, unspecified HF chronicity  I50.30 428.30     428.0   2. SOB (shortness of breath)  R06.02 786.05   3. Chest pain  R07.9 786.50   4. Chronic kidney disease, unspecified CKD stage  N18.9  585.9   5. Primary hypertension  I10 401.9   6. Acute pulmonary edema  J81.0 518.4   7. Acute respiratory failure with hypoxia  J96.01 518.81   8. CHF (congestive heart failure)  I50.9 428.0       Disposition:   Disposition: Admitted  Condition: Ana Ordoñez MD  05/10/25 0559

## 2025-05-10 NOTE — ASSESSMENT & PLAN NOTE
Hypertensive on admission    Home meds for hypertension were reviewed and noted below          amLODIPine (NORVASC) 10 MG tablet TAKE 1 TABLET(10 MG) BY MOUTH EVERY DAY    hydrALAZINE (APRESOLINE) 25 MG tablet Take 1 tablet (25 mg total) by mouth every 12 (twelve) hours.     Patients blood pressure range in the last 24 hours was: BP  Min: 154/70  Max: 189/79.    PLAN:  -While in the hospital, will manage blood pressure as follows; Continue home antihypertensive regimen  -The patient's inpatient anti-hypertensive regimen is listed below:  Current Antihypertensives  hydrALAZINE injection 10 mg, ED 1 Time, Intravenous  hydrALAZINE injection 5 mg, Every 6 hours PRN, Intravenous  furosemide injection 40 mg, 2 times daily, Intravenous  amLODIPine tablet 10 mg, Daily, Oral  hydrALAZINE tablet 25 mg, Every 12 hours, Oral  -Will utilize p.r.n. blood pressure medication only if patient's blood pressure greater than 180/110 and she develops symptoms such as worsening chest pain or shortness of breath.

## 2025-05-10 NOTE — ASSESSMENT & PLAN NOTE
Hypertensive on admission    Home meds for hypertension were reviewed and noted below          amLODIPine (NORVASC) 10 MG tablet TAKE 1 TABLET(10 MG) BY MOUTH EVERY DAY    hydrALAZINE (APRESOLINE) 25 MG tablet Take 1 tablet (25 mg total) by mouth every 12 (twelve) hours.     Patients blood pressure range in the last 24 hours was: BP  Min: 138/79  Max: 189/79.    PLAN:  -While in the hospital, will manage blood pressure as follows; Continue home antihypertensive regimen  -The patient's inpatient anti-hypertensive regimen is listed below:  Current Antihypertensives  hydrALAZINE injection 5 mg, Every 6 hours PRN, Intravenous  furosemide injection 40 mg, 2 times daily, Intravenous  amLODIPine tablet 10 mg, Daily, Oral  hydrALAZINE tablet 25 mg, Every 12 hours, Oral  -Will utilize p.r.n. blood pressure medication only if patient's blood pressure greater than 180/110 and she develops symptoms such as worsening chest pain or shortness of breath.

## 2025-05-11 LAB
ABSOLUTE EOSINOPHIL (OHS): 0.09 K/UL
ABSOLUTE MONOCYTE (OHS): 0.65 K/UL (ref 0.3–1)
ABSOLUTE NEUTROPHIL COUNT (OHS): 4.34 K/UL (ref 1.8–7.7)
ALBUMIN SERPL BCP-MCNC: 3.1 G/DL (ref 3.5–5.2)
ALP SERPL-CCNC: 74 UNIT/L (ref 40–150)
ALT SERPL W/O P-5'-P-CCNC: 17 UNIT/L (ref 10–44)
ANION GAP (OHS): 13 MMOL/L (ref 8–16)
AST SERPL-CCNC: 17 UNIT/L (ref 11–45)
BASOPHILS # BLD AUTO: 0.01 K/UL
BASOPHILS NFR BLD AUTO: 0.2 %
BILIRUB SERPL-MCNC: 1 MG/DL (ref 0.1–1)
BUN SERPL-MCNC: 69 MG/DL (ref 8–23)
CALCIUM SERPL-MCNC: 8.6 MG/DL (ref 8.7–10.5)
CHLORIDE SERPL-SCNC: 103 MMOL/L (ref 95–110)
CO2 SERPL-SCNC: 24 MMOL/L (ref 23–29)
CREAT SERPL-MCNC: 2.8 MG/DL (ref 0.5–1.4)
ERYTHROCYTE [DISTWIDTH] IN BLOOD BY AUTOMATED COUNT: 14.4 % (ref 11.5–14.5)
GFR SERPLBLD CREATININE-BSD FMLA CKD-EPI: 17 ML/MIN/1.73/M2
GLUCOSE SERPL-MCNC: 192 MG/DL (ref 70–110)
HCT VFR BLD AUTO: 29.3 % (ref 37–48.5)
HGB BLD-MCNC: 8.8 GM/DL (ref 12–16)
IMM GRANULOCYTES # BLD AUTO: 0.02 K/UL (ref 0–0.04)
IMM GRANULOCYTES NFR BLD AUTO: 0.4 % (ref 0–0.5)
LYMPHOCYTES # BLD AUTO: 0.54 K/UL (ref 1–4.8)
MAGNESIUM SERPL-MCNC: 2.5 MG/DL (ref 1.6–2.6)
MCH RBC QN AUTO: 28.1 PG (ref 27–31)
MCHC RBC AUTO-ENTMCNC: 30 G/DL (ref 32–36)
MCV RBC AUTO: 94 FL (ref 82–98)
NUCLEATED RBC (/100WBC) (OHS): 0 /100 WBC
OHS QRS DURATION: 92 MS
OHS QTC CALCULATION: 457 MS
PHOSPHATE SERPL-MCNC: 5.4 MG/DL (ref 2.7–4.5)
PLATELET # BLD AUTO: 90 K/UL (ref 150–450)
PLATELET BLD QL SMEAR: ABNORMAL
PMV BLD AUTO: 10.4 FL (ref 9.2–12.9)
POCT GLUCOSE: 180 MG/DL (ref 70–110)
POCT GLUCOSE: 189 MG/DL (ref 70–110)
POCT GLUCOSE: 189 MG/DL (ref 70–110)
POCT GLUCOSE: 193 MG/DL (ref 70–110)
POTASSIUM SERPL-SCNC: 4.2 MMOL/L (ref 3.5–5.1)
PROT SERPL-MCNC: 6.8 GM/DL (ref 6–8.4)
RBC # BLD AUTO: 3.13 M/UL (ref 4–5.4)
RELATIVE EOSINOPHIL (OHS): 1.6 %
RELATIVE LYMPHOCYTE (OHS): 9.6 % (ref 18–48)
RELATIVE MONOCYTE (OHS): 11.5 % (ref 4–15)
RELATIVE NEUTROPHIL (OHS): 76.7 % (ref 38–73)
SODIUM SERPL-SCNC: 140 MMOL/L (ref 136–145)
WBC # BLD AUTO: 5.65 K/UL (ref 3.9–12.7)

## 2025-05-11 PROCEDURE — 21400001 HC TELEMETRY ROOM

## 2025-05-11 PROCEDURE — 25000242 PHARM REV CODE 250 ALT 637 W/ HCPCS: Performed by: STUDENT IN AN ORGANIZED HEALTH CARE EDUCATION/TRAINING PROGRAM

## 2025-05-11 PROCEDURE — 94761 N-INVAS EAR/PLS OXIMETRY MLT: CPT

## 2025-05-11 PROCEDURE — 27000221 HC OXYGEN, UP TO 24 HOURS

## 2025-05-11 PROCEDURE — 94640 AIRWAY INHALATION TREATMENT: CPT | Mod: XB

## 2025-05-11 PROCEDURE — 83735 ASSAY OF MAGNESIUM: CPT | Performed by: STUDENT IN AN ORGANIZED HEALTH CARE EDUCATION/TRAINING PROGRAM

## 2025-05-11 PROCEDURE — 36415 COLL VENOUS BLD VENIPUNCTURE: CPT | Performed by: STUDENT IN AN ORGANIZED HEALTH CARE EDUCATION/TRAINING PROGRAM

## 2025-05-11 PROCEDURE — 99900035 HC TECH TIME PER 15 MIN (STAT)

## 2025-05-11 PROCEDURE — 63600175 PHARM REV CODE 636 W HCPCS: Performed by: FAMILY MEDICINE

## 2025-05-11 PROCEDURE — 80053 COMPREHEN METABOLIC PANEL: CPT | Performed by: STUDENT IN AN ORGANIZED HEALTH CARE EDUCATION/TRAINING PROGRAM

## 2025-05-11 PROCEDURE — 84100 ASSAY OF PHOSPHORUS: CPT | Performed by: STUDENT IN AN ORGANIZED HEALTH CARE EDUCATION/TRAINING PROGRAM

## 2025-05-11 PROCEDURE — 63600175 PHARM REV CODE 636 W HCPCS: Performed by: STUDENT IN AN ORGANIZED HEALTH CARE EDUCATION/TRAINING PROGRAM

## 2025-05-11 PROCEDURE — 85025 COMPLETE CBC W/AUTO DIFF WBC: CPT | Performed by: STUDENT IN AN ORGANIZED HEALTH CARE EDUCATION/TRAINING PROGRAM

## 2025-05-11 PROCEDURE — 25000003 PHARM REV CODE 250: Performed by: STUDENT IN AN ORGANIZED HEALTH CARE EDUCATION/TRAINING PROGRAM

## 2025-05-11 RX ORDER — MEROPENEM 1 G/1
1 INJECTION, POWDER, FOR SOLUTION INTRAVENOUS
Status: DISCONTINUED | OUTPATIENT
Start: 2025-05-11 | End: 2025-05-13

## 2025-05-11 RX ADMIN — INSULIN ASPART 2 UNITS: 100 INJECTION, SOLUTION INTRAVENOUS; SUBCUTANEOUS at 04:05

## 2025-05-11 RX ADMIN — MEROPENEM 1 G: 1 INJECTION INTRAVENOUS at 04:05

## 2025-05-11 RX ADMIN — PRAVASTATIN SODIUM 20 MG: 20 TABLET ORAL at 08:05

## 2025-05-11 RX ADMIN — BUDESONIDE INHALATION 0.5 MG: 0.5 SUSPENSION RESPIRATORY (INHALATION) at 08:05

## 2025-05-11 RX ADMIN — AMLODIPINE BESYLATE 10 MG: 10 TABLET ORAL at 08:05

## 2025-05-11 RX ADMIN — ARFORMOTEROL TARTRATE 15 MCG: 15 SOLUTION RESPIRATORY (INHALATION) at 08:05

## 2025-05-11 RX ADMIN — MEROPENEM 2 G: 2 INJECTION, POWDER, FOR SOLUTION INTRAVENOUS at 05:05

## 2025-05-11 RX ADMIN — LEVOTHYROXINE SODIUM 137 MCG: 0.11 TABLET ORAL at 05:05

## 2025-05-11 RX ADMIN — HYDRALAZINE HYDROCHLORIDE 25 MG: 25 TABLET ORAL at 08:05

## 2025-05-11 RX ADMIN — INSULIN ASPART 2 UNITS: 100 INJECTION, SOLUTION INTRAVENOUS; SUBCUTANEOUS at 11:05

## 2025-05-11 RX ADMIN — Medication 6 MG: at 09:05

## 2025-05-11 RX ADMIN — HYDRALAZINE HYDROCHLORIDE 25 MG: 25 TABLET ORAL at 09:05

## 2025-05-11 NOTE — ASSESSMENT & PLAN NOTE
Urinalysis on admission concerning for UTI.  In setting of constitutional symptoms, difficult to rule out asymptomatic bacteriuria. Hx of ESBL UTI      Recent Labs   Lab 05/09/25 2046   COLORU Yellow   APPEARANCEUA Hazy*   PHUR 6.0   SPECGRAV 1.010   PROTEINUA 2+*   GLUCUA 4+*   BILIRUBINUA Negative   OCCULTUA 1+*   NITRITE Negative   UROBILINOGEN Negative   LEUKOCYTESUR 3+*   RBCUA 4   WBCUA >100*   BACTERIA Rare   HYALINECASTS 0     PLAN  Followup urine cultures, deescalate antibiotics as appropriate  Antibiotics:  Antibiotics (From admission, onward)      Start     Stop Route Frequency Ordered    05/11/25 1730  meropenem injection 1 g         -- IV Every 12 hours (non-standard times) 05/11/25 0927

## 2025-05-11 NOTE — PLAN OF CARE
A255/A255 ROLANDO  Lakshmi Campbell is a 74 y.o.female admitted on 5/9/2025 for Acute hypoxemic respiratory failure   Code Status: Full Code MRN: [id number]   Review of patient's allergies indicates:   Allergen Reactions    Codeine Nausea Only     Other reaction(s): Nausea  Other reaction(s): Elevated blood pressure     Past Medical History:   Diagnosis Date    Acquired TTP     Cataract     CKD stage 3 secondary to diabetes     Closed displaced comminuted fracture of right patella 10/28/2020    Closed fracture of surgical neck of left humerus 10/30/2020    Closed left radial fracture 10/30/2020    Hyperkalemia     Hyperlipidemia     Hypertension     Hypothyroidism, unspecified     Liver cirrhosis secondary to YO     Morbid obesity     Right knee pain     Thyroid disease     Type 2 diabetes mellitus       PRN meds    acetaminophen, 650 mg, Q6H PRN  dextrose 50%, 12.5 g, PRN  dextrose 50%, 25 g, PRN  glucagon (human recombinant), 1 mg, PRN  glucose, 16 g, PRN  glucose, 24 g, PRN  hydrALAZINE, 5 mg, Q6H PRN  insulin aspart U-100, 0-10 Units, QID (AC + HS) PRN  melatonin, 6 mg, Nightly PRN  ondansetron, 4 mg, Q8H PRN  polyethylene glycol, 17 g, BID PRN  prochlorperazine, 2.5 mg, Q8H PRN  sodium chloride 0.9%, 10 mL, PRN      Pt oriented x4.    Plan of care reviewed. Patient verbalizes understanding.   Pt moving/turning independently with standby assist.  Bed low, side rails up x 2, wheels locked, call light in reach.   Patient instructed to call for assistance.  Patient education provided.  Chart check completed. Will continue plan of care.      Orientation: oriented x 4  Ignacio Coma Scale Score: 15     Lead Monitored: Lead II Rhythm: normal sinus rhythm    Cardiac/Telemetry Box Number: [id number]    Last Bowel Movement: 05/09/25  Diet Low Sodium, 2gm Fluid - 1500mL     Schuyler Score: 17  Fall Risk Score: 13  Accucheck []   Freq?      Lines/Drains/Airways       Drain  Duration             Female External Urinary Catheter w/ Suction  05/10/25 1901 <1 day              Peripheral Intravenous Line  Duration                  Peripheral IV - Single Lumen 05/09/25 1922 20 G Right Antecubital 1 day

## 2025-05-11 NOTE — ASSESSMENT & PLAN NOTE
Hypertensive on admission    Home meds for hypertension were reviewed and noted below        amLODIPine (NORVASC) 10 MG tablet  TAKE 1 TABLET(10 MG) BY MOUTH EVERY DAY  hydrALAZINE (APRESOLINE) 25 MG tablet  Take 1 tablet (25 mg total) by mouth every 12 (twelve) hours.    Patients blood pressure range in the last 24 hours was: BP  Min: 127/52  Max: 189/79.    PLAN:  -While in the hospital, will manage blood pressure as follows; Continue home antihypertensive regimen  -The patient's inpatient anti-hypertensive regimen is listed below:  Current Antihypertensives  hydrALAZINE injection 5 mg, Every 6 hours PRN, Intravenous  amLODIPine tablet 10 mg, Daily, Oral  hydrALAZINE tablet 25 mg, Every 12 hours, Oral  -Will utilize p.r.n. blood pressure medication only if patient's blood pressure greater than 180/110 and she develops symptoms such as worsening chest pain or shortness of breath.

## 2025-05-11 NOTE — SUBJECTIVE & OBJECTIVE
Interval History:  Follow up for CHF exacerbation and MIKA.  Patient's renal function has decompensated from 2.0-2.8 while being on IV Lasix which has been discontinued this morning.  She has diuresed well and no longer has bilateral lower extremity swelling.  We will repeat BMP in the a.m..    Review of Systems  Objective:     Vital Signs (Most Recent):  Temp: 97.7 °F (36.5 °C) (05/11/25 1140)  Pulse: 65 (05/11/25 1316)  Resp: 18 (05/11/25 1140)  BP: 135/63 (05/11/25 1140)  SpO2: (!) 93 % (05/11/25 1316) Vital Signs (24h Range):  Temp:  [97 °F (36.1 °C)-97.9 °F (36.6 °C)] 97.7 °F (36.5 °C)  Pulse:  [65-80] 65  Resp:  [16-20] 18  SpO2:  [90 %-96 %] 93 %  BP: (127-140)/(52-80) 135/63     Weight: 107 kg (235 lb 14.3 oz)  Body mass index is 39.25 kg/m².    Intake/Output Summary (Last 24 hours) at 5/11/2025 1427  Last data filed at 5/11/2025 1110  Gross per 24 hour   Intake 300 ml   Output 400 ml   Net -100 ml         Physical Exam  Vitals and nursing note reviewed.   Constitutional:       Appearance: Normal appearance.   HENT:      Head: Normocephalic and atraumatic.      Nose: Nose normal.      Mouth/Throat:      Mouth: Mucous membranes are moist.   Eyes:      Extraocular Movements: Extraocular movements intact.      Conjunctiva/sclera: Conjunctivae normal.   Cardiovascular:      Rate and Rhythm: Normal rate and regular rhythm.      Pulses: Normal pulses.      Heart sounds: Normal heart sounds.   Pulmonary:      Effort: Pulmonary effort is normal.      Breath sounds: Normal breath sounds.   Abdominal:      General: Abdomen is flat. Bowel sounds are normal.      Palpations: Abdomen is soft.   Musculoskeletal:      Cervical back: Normal range of motion and neck supple.      Right lower leg: No edema.      Left lower leg: No edema.   Skin:     General: Skin is warm.      Capillary Refill: Capillary refill takes less than 2 seconds.   Neurological:      Mental Status: She is alert and oriented to person, place, and time.  Mental status is at baseline.   Psychiatric:         Mood and Affect: Mood normal.         Behavior: Behavior normal.               Significant Labs: All pertinent labs within the past 24 hours have been reviewed.  Recent Lab Results  (Last 5 results in the past 24 hours)        05/11/25  1110   05/11/25  0530   05/11/25  0522   05/10/25  2126   05/10/25  1640        Albumin     3.1           ALP     74           ALT     17           Anion Gap     13           AST     17           Baso #     0.01           Basophil %     0.2           BILIRUBIN TOTAL     1.0  Comment: For infants and newborns, interpretation of results should be based   on gestational age, weight and in agreement with clinical   observations.    Premature Infant recommended reference ranges:   0-24 hours:  <8.0 mg/dL   24-48 hours: <12.0 mg/dL   3-5 days:    <15.0 mg/dL   6-29 days:   <15.0 mg/dL           BUN     69           Calcium     8.6           Chloride     103           CO2     24           Creatinine     2.8           eGFR     17  Comment: Estimated GFR calculated using the CKD-EPI creatinine (2021) equation.           Eos #     0.09           Eos %     1.6           Glucose     192           Gran # (ANC)     4.34           Hematocrit     29.3           Hemoglobin     8.8           Immature Grans (Abs)     0.02  Comment: Mild elevation in immature granulocytes is non specific and can be seen in a variety of conditions including stress response, acute inflammation, trauma and pregnancy. Correlation with other laboratory and clinical findings is essential.           Immature Granulocytes     0.4           Lymph #     0.54           Lymph %     9.6           Magnesium      2.5           MCH     28.1           MCHC     30.0           MCV     94           Mono #     0.65           Mono %     11.5           MPV     10.4           Neut %     76.7           nRBC     0           Phosphorus Level     5.4           Platelet Estimate     Decreased            Platelet Count     90           POCT Glucose 193   189     229   258       Potassium     4.2           PROTEIN TOTAL     6.8           RBC     3.13           RDW     14.4           Sodium     140           WBC     5.65                                  Significant Imaging: I have reviewed all pertinent imaging results/findings within the past 24 hours.

## 2025-05-11 NOTE — PROGRESS NOTES
[facility]  Progress Note    Patient Name: Lakshmi Campbell  MRN: [id number]  Patient Class: IP- Inpatient   Admission Date: 5/9/2025  Length of Stay: 0 days  Attending Physician: Abel Caraballo MD  Primary Care Provider: Keely Silva NP        Subjective     Principal Problem:Acute hypoxemic respiratory failure        HPI:  Ms. Lakshmi Campbell is a 74 y.o. female who  has a medical history of Acquired TTP, Cataract, CKD stage 3 secondary to diabetes, Closed displaced comminuted fracture of right patella, Closed fracture of surgical neck of left humerus, Closed left radial fracture, Hyperkalemia, Hyperlipidemia, Hypertension, Hypothyroidism, Liver cirrhosis secondary to YO, Morbid obesity, Right knee pain, Thyroid disease, and Type 2 diabetes mellitus.    She  presented to the ED for evaluation of worsening shortness of breath over the past 2 weeks.  She reports associated symptoms of dyspnea on exertion, mild cough, lower extremity swelling, abdominal swelling, orthopnea. Denies cough, chest pain, abdominal pain, lightheadedness, dizziness, urinary symptoms.  She reports being prescribed a 3 day course of furosemide that by her PCP for leg swelling last month.  She has also follow up with her nephrologist over the past month due to progressive CKD concerns.  Over the past month, her GDMT medications were adjusted due to hyperkalemia, ACE inhibitor/spironolactone were discontinued and SGLT2 inhibitor dose decreased.    ED course notable for vitals with hypertension, tachypnea, hypoxia.  Lab work notable for WBC 3.6, hemoglobin 9.2, platelets 74, bicarb 21, BUN 46, creatinine 2, glucose 123, , troponin 0.103.  Urinalysis concerning for WBC >100, leukocyte esterase 3+.      Overview/Hospital Course:  Patient admitted with CHF exacerbation, pneumonia and UTI.  Patient is continued on IV antibiotics and IV diuresis.  She states she is doing better on hospital day.  She  remains on NC o2 at 2L - wean as tolerated.  She had worsening of her renal function on hospital day 2 and creatinine at this time is 2.8.  IV Lasix has been discontinued and renal function will be monitored.  Patient has swelling in her lower extremities have completely resolved and chest CT was ordered this morning which showed some bilateral pleural effusions but no overt vascular congestion.  We will consider gentle hydration if renal function does not improve by tomorrow.  Patient has also been attempted to wean from nasal cannula O2 but this has been unsuccessful.  At baseline patient is on room air but currently requiring 2 L nasal cannula with saturations in the low 90s.  Patient's urine output has not decreased but we will be monitored closely.      Interval History:  Follow up for CHF exacerbation and MIKA.  Patient's renal function has decompensated from 2.0-2.8 while being on IV Lasix which has been discontinued this morning.  She has diuresed well and no longer has bilateral lower extremity swelling.  We will repeat BMP in the a.m..    Review of Systems  Objective:    Vital Signs (Most Recent):  Temp: 97.7 °F (36.5 °C) (05/11/25 1140)  Pulse: 65 (05/11/25 1316)  Resp: 18 (05/11/25 1140)  BP: 135/63 (05/11/25 1140)  SpO2: (!) 93 % (05/11/25 1316)  Vital Signs (24h Range):  Temp:  [97 °F (36.1 °C)-97.9 °F (36.6 °C)] 97.7 °F (36.5 °C)  Pulse:  [65-80] 65  Resp:  [16-20] 18  SpO2:  [90 %-96 %] 93 %  BP: (127-140)/(52-80) 135/63    Weight: 107 kg (235 lb 14.3 oz)  Body mass index is 39.25 kg/m².    Intake/Output Summary (Last 24 hours) at 5/11/2025 1427  Last data filed at 5/11/2025 1110  Gross per 24 hour  Intake  300 ml  Output  400 ml  Net  -100 ml        Physical Exam  Vitals and nursing note reviewed.   Constitutional:       Appearance: Normal appearance.   HENT:      Head: Normocephalic and atraumatic.      Nose: Nose normal.      Mouth/Throat:      Mouth: Mucous membranes are moist.   Eyes:       Extraocular Movements: Extraocular movements intact.      Conjunctiva/sclera: Conjunctivae normal.   Cardiovascular:      Rate and Rhythm: Normal rate and regular rhythm.      Pulses: Normal pulses.      Heart sounds: Normal heart sounds.   Pulmonary:      Effort: Pulmonary effort is normal.      Breath sounds: Normal breath sounds.   Abdominal:      General: Abdomen is flat. Bowel sounds are normal.      Palpations: Abdomen is soft.   Musculoskeletal:      Cervical back: Normal range of motion and neck supple.      Right lower leg: No edema.      Left lower leg: No edema.   Skin:     General: Skin is warm.      Capillary Refill: Capillary refill takes less than 2 seconds.   Neurological:      Mental Status: She is alert and oriented to person, place, and time. Mental status is at baseline.   Psychiatric:         Mood and Affect: Mood normal.         Behavior: Behavior normal.               Significant Labs: All pertinent labs within the past 24 hours have been reviewed.  Recent Lab Results  (Last 5 results in the past 24 hours)       05/11/25  1110     05/11/25  0530     05/11/25  0522     05/10/25  2126     05/10/25  1640        Albumin  3.1  ALP  74  ALT  17  Anion Gap  13  AST  17  Baso #  0.01  Basophil %  0.2  BILIRUBIN TOTAL  1.0  Comment: For infants and newborns, interpretation of results should be based   on gestational age, weight and in agreement with clinical   observations.    Premature Infant recommended reference ranges:   0-24 hours:  <8.0 mg/dL   24-48 hours: <12.0 mg/dL   3-5 days:    <15.0 mg/dL   6-29 days:   <15.0 mg/dL  BUN  69  Calcium  8.6  Chloride  103  CO2  24  Creatinine  2.8  eGFR  17  Comment: Estimated GFR calculated using the CKD-EPI creatinine (2021) equation.  Eos #  0.09  Eos %  1.6  Glucose  192  Gran # (ANC)  4.34  Hematocrit  29.3  Hemoglobin  8.8  Immature Grans (Abs)  0.02  Comment: Mild elevation in immature granulocytes is non specific and can be seen in a variety of  conditions including stress response, acute inflammation, trauma and pregnancy. Correlation with other laboratory and clinical findings is essential.  Immature Granulocytes  0.4  Lymph #  0.54  Lymph %  9.6  Magnesium   2.5  MCH  28.1  MCHC  30.0  MCV  94  Mono #  0.65  Mono %  11.5  MPV  10.4  Neut %  76.7  nRBC  0  Phosphorus Level  5.4  Platelet Estimate  Decreased  Platelet Count  90  POCT Glucose  193  189  229  258  Potassium  4.2  PROTEIN TOTAL  6.8  RBC  3.13  RDW  14.4  Sodium  140  WBC  5.65        Significant Imaging: I have reviewed all pertinent imaging results/findings within the past 24 hours.      Assessment & Plan  Acute hypoxemic respiratory failure  Acute on chronic diastolic congestive heart failure  -Patient with Hypoxic Respiratory failure which is Acute.  she is not on home oxygen currently.  Prior history of temporary home supplemental oxygen requirement after a hospitalization in July 2024 for pneumonia/CHF and home oxygen that was discontinued by the end of 2024. Patient is identified as having Diastolic (HFpEF) heart failure that is Acute on Chronic. CHF is currently uncontrolled due to volume overload due to: Continued edema of extremities, Dyspnea, Rales/crackles on pulmonary exam, and Pulmonary edema/pleural effusion on CXR.     Signs/symptoms of respiratory failure include- increased work of breathing. Contributing diagnoses includes - CHF Labs and images were reviewed.     Recent imaging notable for :  X-Ray Chest AP Portable  Final Result   Findings likely related to congestive heart failure.  Correlate.  Follow-up is recommended.  Finalized on: 5/9/2025 7:41 PM By:  Pierre Cuellar MD    - Reviewed most recent ECHO performed and demonstrates- Results for orders placed during the hospital encounter of 06/26/24  Echo  Interpretation Summary    Left Ventricle: The left ventricle is normal in size. Normal wall thickness. There is concentric hypertrophy. Normal wall motion. Ejection fraction  by visual approximation is 60%. Grade II diastolic dysfunction.    Right Ventricle: Normal right ventricular cavity size. Wall thickness is normal. Systolic function is normal.    Left Atrium: Left atrium is severely dilated.    Aortic Valve: The aortic valve is a trileaflet valve. Mildly restricted motion. There is moderate stenosis. Aortic valve area by VTI is 1.24 cm². Aortic valve peak velocity is 2.66 m/s. Mean gradient is 17 mmHg. The dimensionless index is 0.45.    Mitral Valve: There is moderate mitral annular calcification present. Mildly restricted motion. There is mild to moderate regurgitation.      Recent Labs reviewed-  Recent Labs   Lab 05/09/25  1926 05/10/25  0151 05/10/25  0651   TROPONINI 0.103* 0.147* 0.168*   *  --   --        Intake/Output Summary (Last 24 hours) at 5/11/2025 1428  Last data filed at 5/11/2025 1110  Gross per 24 hour   Intake 300 ml   Output 400 ml   Net -100 ml     Intake/Output Summary (Last 24 hours) at 5/11/2025 1428  Last data filed at 5/11/2025 1110  Gross per 24 hour   Intake 300 ml   Output 400 ml   Net -100 ml     Home GDMT per Chart Review:         amLODIPine (NORVASC) 10 MG tablet  TAKE 1 TABLET(10 MG) BY MOUTH EVERY DAY  hydrALAZINE (APRESOLINE) 25 MG tablet  Take 1 tablet (25 mg total) by mouth every 12 (twelve) hours.        Plan:  - Current GDMT meds: hydrALAZINE injection 5 mg, Every 6 hours PRN, Intravenous  hydrALAZINE tablet 25 mg, Every 12 hours, Oral  -Titrate(increase/decrease) diuretic dosing to target euvolemia. Current Diuretic regimen:  - Cycle troponin to rule out ischemic etiology  - Monitor on telemetry.   -Patient is on CHF pathway.    - Cardiac diet with Fluid restriction at 1.5L with strict I/Os and daily standing weights  - I/O -1.2L  - CT chest 05/11/2025:  Bilateral pleural effusions noted but no vascular congestion.    Type 2 diabetes mellitus with microalbuminuria, with long-term current use of insulin  Type 2 diabetes mellitus with  stage 4 chronic kidney disease, with long-term current use of insulin  Normoglycemic on admission    Home Diabetes Medications       HUMULIN 70/30 U-100 KWIKPEN 100 unit/mL (70-30) InPn pen  SMARTSI Unit(s) SUB-Q Every Evening  *Reports using 60 units at bedtime    Plan:  Antihyperglycemics (From admission, onward)      Start     Stop Route Frequency Ordered    05/10/25 0402  insulin aspart U-100 pen 0-10 Units         -- SubQ Before meals & nightly PRN 05/10/25 0302        - POCT glucose ACHS  - Glucose 127 on admission. Will monitor glucose results and initiate scheduled insulin regimen accordingly if necessitated  - Hold oral hypoglycemic agents while patient is in the hospital.    Hypothyroidism   Recent thyroid labs reviewed:  Lab Results   Component Value Date    TSH 0.17 (L) 2025    FREET4 1.12 2024     Hx of Hypothyroidism    PLAN:   Continue home levothyroxine  Essential hypertension  Hypertensive on admission    Home meds for hypertension were reviewed and noted below        amLODIPine (NORVASC) 10 MG tablet  TAKE 1 TABLET(10 MG) BY MOUTH EVERY DAY  hydrALAZINE (APRESOLINE) 25 MG tablet  Take 1 tablet (25 mg total) by mouth every 12 (twelve) hours.    Patients blood pressure range in the last 24 hours was: BP  Min: 127/52  Max: 189/79.    PLAN:  -While in the hospital, will manage blood pressure as follows; Continue home antihypertensive regimen  -The patient's inpatient anti-hypertensive regimen is listed below:  Current Antihypertensives  hydrALAZINE injection 5 mg, Every 6 hours PRN, Intravenous  amLODIPine tablet 10 mg, Daily, Oral  hydrALAZINE tablet 25 mg, Every 12 hours, Oral  -Will utilize p.r.n. blood pressure medication only if patient's blood pressure greater than 180/110 and she develops symptoms such as worsening chest pain or shortness of breath.    Dyslipidemia  Recent Lipid Panel reviewed-  Lab Results   Component Value Date    HDL 34 (L) 2024    LDLCALC 24.0 (L)  06/27/2024    TRIG 65 06/27/2024    CHOL 71 (L) 06/27/2024      -Home medications:         pravastatin (PRAVACHOL) 20 MG tablet  Take 20 mg by mouth once daily.     PLAN  Continue home statin    Acute cystitis without hematuria  History of ESBL E. coli infection  Urinalysis on admission concerning for UTI.  In setting of constitutional symptoms, difficult to rule out asymptomatic bacteriuria. Hx of ESBL UTI      Recent Labs   Lab 05/09/25 2046   COLORU Yellow   APPEARANCEUA Hazy*   PHUR 6.0   SPECGRAV 1.010   PROTEINUA 2+*   GLUCUA 4+*   BILIRUBINUA Negative   OCCULTUA 1+*   NITRITE Negative   UROBILINOGEN Negative   LEUKOCYTESUR 3+*   RBCUA 4   WBCUA >100*   BACTERIA Rare   HYALINECASTS 0     PLAN  Followup urine cultures, deescalate antibiotics as appropriate  Antibiotics:  Antibiotics (From admission, onward)      Start     Stop Route Frequency Ordered    05/11/25 1730  meropenem injection 1 g         -- IV Every 12 hours (non-standard times) 05/11/25 0945              Chronic kidney disease, stage 4 (severe)  Creatine stable for now. BMP reviewed- noted Estimated Creatinine Clearance: 21.4 mL/min (A) (based on SCr of 2.8 mg/dL (H)). according to latest data. Based on current GFR, CKD stage is stage 4 - GFR 15-29.  Monitor UOP and serial BMP and adjust therapy as needed. Renally dose meds. Avoid nephrotoxic medications and procedures.  Asthma  Chronic.  Stable  Follows with pulmonology outpatient    PLAN:  Home medication: Symbicort.  --Arformeterol/Budesonide while inpatient    VTE Risk Mitigation (From admission, onward)          Ordered  IP VTE HIGH RISK PATIENT  Once       05/09/25 2212  Place sequential compression device  Until discontinued       05/09/25 2212        Discharge Planning   KYA:      Code Status: Full Code   Medical Readiness for Discharge Date:                            Abel Caraballo MD  Department of Hospital Medicine   [facility] - Telemetry ([facility])

## 2025-05-11 NOTE — ASSESSMENT & PLAN NOTE
-Patient with Hypoxic Respiratory failure which is Acute.  she is not on home oxygen currently.  Prior history of temporary home supplemental oxygen requirement after a hospitalization in July 2024 for pneumonia/CHF and home oxygen that was discontinued by the end of 2024. Patient is identified as having Diastolic (HFpEF) heart failure that is Acute on Chronic. CHF is currently uncontrolled due to volume overload due to: Continued edema of extremities, Dyspnea, Rales/crackles on pulmonary exam, and Pulmonary edema/pleural effusion on CXR.     Signs/symptoms of respiratory failure include- increased work of breathing. Contributing diagnoses includes - CHF Labs and images were reviewed.     Recent imaging notable for :  X-Ray Chest AP Portable  Final Result   Findings likely related to congestive heart failure.  Correlate.  Follow-up is recommended.  Finalized on: 5/9/2025 7:41 PM By:  Pierre Cuellar MD    - Reviewed most recent ECHO performed and demonstrates- Results for orders placed during the hospital encounter of 06/26/24  Echo  Interpretation Summary    Left Ventricle: The left ventricle is normal in size. Normal wall thickness. There is concentric hypertrophy. Normal wall motion. Ejection fraction by visual approximation is 60%. Grade II diastolic dysfunction.    Right Ventricle: Normal right ventricular cavity size. Wall thickness is normal. Systolic function is normal.    Left Atrium: Left atrium is severely dilated.    Aortic Valve: The aortic valve is a trileaflet valve. Mildly restricted motion. There is moderate stenosis. Aortic valve area by VTI is 1.24 cm². Aortic valve peak velocity is 2.66 m/s. Mean gradient is 17 mmHg. The dimensionless index is 0.45.    Mitral Valve: There is moderate mitral annular calcification present. Mildly restricted motion. There is mild to moderate regurgitation.      Recent Labs reviewed-  Recent Labs   Lab 05/09/25  1926 05/10/25  0151 05/10/25  0651   TROPONINI 0.103* 0.147*  0.168*   *  --   --        Intake/Output Summary (Last 24 hours) at 5/11/2025 1428  Last data filed at 5/11/2025 1110  Gross per 24 hour   Intake 300 ml   Output 400 ml   Net -100 ml     Intake/Output Summary (Last 24 hours) at 5/11/2025 1428  Last data filed at 5/11/2025 1110  Gross per 24 hour   Intake 300 ml   Output 400 ml   Net -100 ml     Home GDMT per Chart Review:         amLODIPine (NORVASC) 10 MG tablet  TAKE 1 TABLET(10 MG) BY MOUTH EVERY DAY  hydrALAZINE (APRESOLINE) 25 MG tablet  Take 1 tablet (25 mg total) by mouth every 12 (twelve) hours.        Plan:  - Current GDMT meds: hydrALAZINE injection 5 mg, Every 6 hours PRN, Intravenous  hydrALAZINE tablet 25 mg, Every 12 hours, Oral  -Titrate(increase/decrease) diuretic dosing to target euvolemia. Current Diuretic regimen:  - Cycle troponin to rule out ischemic etiology  - Monitor on telemetry.   -Patient is on CHF pathway.    - Cardiac diet with Fluid restriction at 1.5L with strict I/Os and daily standing weights  - I/O -1.2L  - CT chest 05/11/2025:  Bilateral pleural effusions noted but no vascular congestion.

## 2025-05-11 NOTE — ASSESSMENT & PLAN NOTE
Creatine stable for now. BMP reviewed- noted Estimated Creatinine Clearance: 21.4 mL/min (A) (based on SCr of 2.8 mg/dL (H)). according to latest data. Based on current GFR, CKD stage is stage 4 - GFR 15-29.  Monitor UOP and serial BMP and adjust therapy as needed. Renally dose meds. Avoid nephrotoxic medications and procedures.

## 2025-05-11 NOTE — PLAN OF CARE
POC reviewed with pt. Pt verbalizes understanding of POC. No questions at this time.  AAOx3.   NSR on cardiac monitor.  Pt remains free of falls.  No complaints at this time.  Pt encouraged to get OOB for all meals.   Safety measures in place. Will continue to monitor.  Informed pt to call for assistance before getting up. Pt verbalizes understanding.  Hourly rounding and chart check complete.   Ct of the chest showed bilateral pleural effussions without acute alveolar opacity.    Problem: Fall Injury Risk  Goal: Absence of Fall and Fall-Related Injury  Outcome: Progressing     Problem: Fatigue  Goal: Improved Activity Tolerance  Outcome: Progressing     Problem: Activity Intolerance  Goal: Enhanced Capacity and Energy  Outcome: Progressing     Problem: [facility]  Goal: Collaboration with the Healthcare Team  Outcome: Progressing

## 2025-05-11 NOTE — ASSESSMENT & PLAN NOTE
Urinalysis on admission concerning for UTI.  In setting of constitutional symptoms, difficult to rule out asymptomatic bacteriuria. Hx of ESBL UTI      Recent Labs   Lab 05/09/25 2046   COLORU Yellow   APPEARANCEUA Hazy*   PHUR 6.0   SPECGRAV 1.010   PROTEINUA 2+*   GLUCUA 4+*   BILIRUBINUA Negative   OCCULTUA 1+*   NITRITE Negative   UROBILINOGEN Negative   LEUKOCYTESUR 3+*   RBCUA 4   WBCUA >100*   BACTERIA Rare   HYALINECASTS 0     PLAN  Followup urine cultures, deescalate antibiotics as appropriate  Antibiotics:  Antibiotics (From admission, onward)      Start     Stop Route Frequency Ordered    05/11/25 1730  meropenem injection 1 g         -- IV Every 12 hours (non-standard times) 05/11/25 0990

## 2025-05-11 NOTE — PROGRESS NOTES
Pharmacist Renal Dose Adjustment Note    Lakshmi Campbell is a 74 y.o. female being treated with the medication meropenem.    Patient Data:    Vital Signs (Most Recent):  Temp: 97 °F (36.1 °C) (05/11/25 0845)  Pulse: 74 (05/11/25 0845)  Resp: 18 (05/11/25 0845)  BP: (!) 140/63 (05/11/25 0845)  SpO2: (!) 90 % (05/11/25 0845) Vital Signs (72h Range):  Temp:  [97 °F (36.1 °C)-98.1 °F (36.7 °C)]   Pulse:  [71-88]   Resp:  [16-22]   BP: (127-189)/(52-81)   SpO2:  [87 %-100 %]      Recent Labs   Lab 05/09/25  1926 05/10/25  0651 05/11/25  0522   CREATININE 2.0* 2.2* 2.8*     Serum creatinine: 2.8 mg/dL (H) 05/11/25 0522  Estimated creatinine clearance: 21.4 mL/min (A)    Meropenem 2 g IV every 12 hours will be changed to meropenem 1 g IV every 12 hours per pharmacy renal dosing protocol for CrCl 10-25 mL/min.    Pharmacist's Name: Teresa Gallego

## 2025-05-12 ENCOUNTER — DOCUMENTATION ONLY (OUTPATIENT)
Dept: CARDIOLOGY | Facility: CLINIC | Age: 75
End: 2025-05-12
Payer: MEDICARE

## 2025-05-12 DIAGNOSIS — J45.909 ASTHMA: Primary | ICD-10-CM

## 2025-05-12 LAB
ABSOLUTE EOSINOPHIL (OHS): 0.13 K/UL
ABSOLUTE MONOCYTE (OHS): 0.37 K/UL (ref 0.3–1)
ABSOLUTE NEUTROPHIL COUNT (OHS): 2.63 K/UL (ref 1.8–7.7)
ALBUMIN SERPL BCP-MCNC: 3.1 G/DL (ref 3.5–5.2)
ALP SERPL-CCNC: 71 UNIT/L (ref 40–150)
ALT SERPL W/O P-5'-P-CCNC: 14 UNIT/L (ref 10–44)
ANION GAP (OHS): 12 MMOL/L (ref 8–16)
AST SERPL-CCNC: 19 UNIT/L (ref 11–45)
BASOPHILS # BLD AUTO: 0.01 K/UL
BASOPHILS NFR BLD AUTO: 0.3 %
BILIRUB SERPL-MCNC: 1.1 MG/DL (ref 0.1–1)
BUN SERPL-MCNC: 78 MG/DL (ref 8–23)
CALCIUM SERPL-MCNC: 8.6 MG/DL (ref 8.7–10.5)
CHLORIDE SERPL-SCNC: 103 MMOL/L (ref 95–110)
CO2 SERPL-SCNC: 24 MMOL/L (ref 23–29)
CREAT SERPL-MCNC: 2.7 MG/DL (ref 0.5–1.4)
ERYTHROCYTE [DISTWIDTH] IN BLOOD BY AUTOMATED COUNT: 14.3 % (ref 11.5–14.5)
GFR SERPLBLD CREATININE-BSD FMLA CKD-EPI: 18 ML/MIN/1.73/M2
GLUCOSE SERPL-MCNC: 168 MG/DL (ref 70–110)
HCT VFR BLD AUTO: 29.3 % (ref 37–48.5)
HGB BLD-MCNC: 8.7 GM/DL (ref 12–16)
IMM GRANULOCYTES # BLD AUTO: 0.01 K/UL (ref 0–0.04)
IMM GRANULOCYTES NFR BLD AUTO: 0.3 % (ref 0–0.5)
LYMPHOCYTES # BLD AUTO: 0.25 K/UL (ref 1–4.8)
MAGNESIUM SERPL-MCNC: 2.5 MG/DL (ref 1.6–2.6)
MCH RBC QN AUTO: 27.8 PG (ref 27–31)
MCHC RBC AUTO-ENTMCNC: 29.7 G/DL (ref 32–36)
MCV RBC AUTO: 94 FL (ref 82–98)
NUCLEATED RBC (/100WBC) (OHS): 0 /100 WBC
PHOSPHATE SERPL-MCNC: 5 MG/DL (ref 2.7–4.5)
PLATELET # BLD AUTO: 72 K/UL (ref 150–450)
PMV BLD AUTO: 10.5 FL (ref 9.2–12.9)
POCT GLUCOSE: 137 MG/DL (ref 70–110)
POCT GLUCOSE: 138 MG/DL (ref 70–110)
POCT GLUCOSE: 169 MG/DL (ref 70–110)
POCT GLUCOSE: 200 MG/DL (ref 70–110)
POTASSIUM SERPL-SCNC: 4.1 MMOL/L (ref 3.5–5.1)
PROT SERPL-MCNC: 6.6 GM/DL (ref 6–8.4)
RBC # BLD AUTO: 3.13 M/UL (ref 4–5.4)
RELATIVE EOSINOPHIL (OHS): 3.8 %
RELATIVE LYMPHOCYTE (OHS): 7.4 % (ref 18–48)
RELATIVE MONOCYTE (OHS): 10.9 % (ref 4–15)
RELATIVE NEUTROPHIL (OHS): 77.3 % (ref 38–73)
SODIUM SERPL-SCNC: 139 MMOL/L (ref 136–145)
WBC # BLD AUTO: 3.4 K/UL (ref 3.9–12.7)

## 2025-05-12 PROCEDURE — 94640 AIRWAY INHALATION TREATMENT: CPT

## 2025-05-12 PROCEDURE — 21400001 HC TELEMETRY ROOM

## 2025-05-12 PROCEDURE — 85025 COMPLETE CBC W/AUTO DIFF WBC: CPT | Performed by: STUDENT IN AN ORGANIZED HEALTH CARE EDUCATION/TRAINING PROGRAM

## 2025-05-12 PROCEDURE — 94761 N-INVAS EAR/PLS OXIMETRY MLT: CPT

## 2025-05-12 PROCEDURE — 25000242 PHARM REV CODE 250 ALT 637 W/ HCPCS: Performed by: STUDENT IN AN ORGANIZED HEALTH CARE EDUCATION/TRAINING PROGRAM

## 2025-05-12 PROCEDURE — 63600175 PHARM REV CODE 636 W HCPCS: Performed by: FAMILY MEDICINE

## 2025-05-12 PROCEDURE — 83735 ASSAY OF MAGNESIUM: CPT | Performed by: STUDENT IN AN ORGANIZED HEALTH CARE EDUCATION/TRAINING PROGRAM

## 2025-05-12 PROCEDURE — 99900035 HC TECH TIME PER 15 MIN (STAT)

## 2025-05-12 PROCEDURE — 80053 COMPREHEN METABOLIC PANEL: CPT | Performed by: STUDENT IN AN ORGANIZED HEALTH CARE EDUCATION/TRAINING PROGRAM

## 2025-05-12 PROCEDURE — 63600175 PHARM REV CODE 636 W HCPCS: Performed by: STUDENT IN AN ORGANIZED HEALTH CARE EDUCATION/TRAINING PROGRAM

## 2025-05-12 PROCEDURE — 27000221 HC OXYGEN, UP TO 24 HOURS

## 2025-05-12 PROCEDURE — 63600175 PHARM REV CODE 636 W HCPCS: Performed by: INTERNAL MEDICINE

## 2025-05-12 PROCEDURE — 84100 ASSAY OF PHOSPHORUS: CPT | Performed by: STUDENT IN AN ORGANIZED HEALTH CARE EDUCATION/TRAINING PROGRAM

## 2025-05-12 PROCEDURE — 25000003 PHARM REV CODE 250: Performed by: STUDENT IN AN ORGANIZED HEALTH CARE EDUCATION/TRAINING PROGRAM

## 2025-05-12 PROCEDURE — 36415 COLL VENOUS BLD VENIPUNCTURE: CPT | Performed by: STUDENT IN AN ORGANIZED HEALTH CARE EDUCATION/TRAINING PROGRAM

## 2025-05-12 RX ORDER — FUROSEMIDE 10 MG/ML
60 INJECTION INTRAMUSCULAR; INTRAVENOUS ONCE
Status: COMPLETED | OUTPATIENT
Start: 2025-05-12 | End: 2025-05-12

## 2025-05-12 RX ADMIN — INSULIN ASPART 2 UNITS: 100 INJECTION, SOLUTION INTRAVENOUS; SUBCUTANEOUS at 06:05

## 2025-05-12 RX ADMIN — MEROPENEM 1 G: 1 INJECTION INTRAVENOUS at 05:05

## 2025-05-12 RX ADMIN — ARFORMOTEROL TARTRATE 15 MCG: 15 SOLUTION RESPIRATORY (INHALATION) at 08:05

## 2025-05-12 RX ADMIN — Medication 6 MG: at 09:05

## 2025-05-12 RX ADMIN — AMLODIPINE BESYLATE 10 MG: 10 TABLET ORAL at 09:05

## 2025-05-12 RX ADMIN — PRAVASTATIN SODIUM 20 MG: 20 TABLET ORAL at 09:05

## 2025-05-12 RX ADMIN — BUDESONIDE INHALATION 0.5 MG: 0.5 SUSPENSION RESPIRATORY (INHALATION) at 08:05

## 2025-05-12 RX ADMIN — LEVOTHYROXINE SODIUM 137 MCG: 0.11 TABLET ORAL at 05:05

## 2025-05-12 RX ADMIN — INSULIN ASPART 2 UNITS: 100 INJECTION, SOLUTION INTRAVENOUS; SUBCUTANEOUS at 05:05

## 2025-05-12 RX ADMIN — FUROSEMIDE 60 MG: 10 INJECTION, SOLUTION INTRAMUSCULAR; INTRAVENOUS at 06:05

## 2025-05-12 RX ADMIN — HYDRALAZINE HYDROCHLORIDE 25 MG: 25 TABLET ORAL at 09:05

## 2025-05-12 RX ADMIN — MEROPENEM 1 G: 1 INJECTION INTRAVENOUS at 06:05

## 2025-05-12 NOTE — ASSESSMENT & PLAN NOTE
-Patient with Hypoxic Respiratory failure which is Acute.  she is not on home oxygen currently.  Prior history of temporary home supplemental oxygen requirement after a hospitalization in July 2024 for pneumonia/CHF and home oxygen that was discontinued by the end of 2024. Patient is identified as having Diastolic (HFpEF) heart failure that is Acute on Chronic. CHF is currently uncontrolled due to volume overload due to: Continued edema of extremities, Dyspnea, Rales/crackles on pulmonary exam, and Pulmonary edema/pleural effusion on CXR.     Signs/symptoms of respiratory failure include- increased work of breathing. Contributing diagnoses includes - CHF Labs and images were reviewed.     Recent imaging notable for :  X-Ray Chest AP Portable  Final Result   Findings likely related to congestive heart failure.  Correlate.  Follow-up is recommended.  Finalized on: 5/9/2025 7:41 PM By:  Pierre Cuellar MD    - Reviewed most recent ECHO performed and demonstrates- Results for orders placed during the hospital encounter of 06/26/24  Echo  Interpretation Summary    Left Ventricle: The left ventricle is normal in size. Normal wall thickness. There is concentric hypertrophy. Normal wall motion. Ejection fraction by visual approximation is 60%. Grade II diastolic dysfunction.    Right Ventricle: Normal right ventricular cavity size. Wall thickness is normal. Systolic function is normal.    Left Atrium: Left atrium is severely dilated.    Aortic Valve: The aortic valve is a trileaflet valve. Mildly restricted motion. There is moderate stenosis. Aortic valve area by VTI is 1.24 cm². Aortic valve peak velocity is 2.66 m/s. Mean gradient is 17 mmHg. The dimensionless index is 0.45.    Mitral Valve: There is moderate mitral annular calcification present. Mildly restricted motion. There is mild to moderate regurgitation.                Plan:  - Current GDMT meds: hydrALAZINE injection 5 mg, Every 6 hours PRN, Intravenous  hydrALAZINE  tablet 25 mg, Every 12 hours, Oral  furosemide injection 60 mg, Once, Intravenous  -Titrate(increase/decrease) diuretic dosing to target euvolemia. Current Diuretic regimen:  - Cycle troponin to rule out ischemic etiology  - Monitor on telemetry.   -Patient is on CHF pathway.    - Cardiac diet with Fluid restriction at 1.5L with strict I/Os and daily standing weights  - I/O -1.2L  - CT chest 05/11/2025:  Bilateral pleural effusions noted but no vascular congestion.

## 2025-05-12 NOTE — ASSESSMENT & PLAN NOTE
Urinalysis on admission concerning for UTI.  In setting of constitutional symptoms, difficult to rule out asymptomatic bacteriuria. Hx of ESBL UTI    Followup urine cultures, deescalate antibiotics as appropriate  Antibiotics:  Antibiotics (From admission, onward)      Start     Stop Route Frequency Ordered    05/11/25 1730  meropenem injection 1 g         -- IV Every 12 hours (non-standard times) 05/11/25 0762

## 2025-05-12 NOTE — SUBJECTIVE & OBJECTIVE
Interval History: f/u CHF shortness of breath still present    Review of Systems  Objective:     Vital Signs (Most Recent):  Temp: 97.7 °F (36.5 °C) (05/12/25 1630)  Pulse: 77 (05/12/25 1630)  Resp: 20 (05/12/25 1630)  BP: (!) 157/67 (05/12/25 1630)  SpO2: 95 % (05/12/25 1630) Vital Signs (24h Range):  Temp:  [97.5 °F (36.4 °C)-97.7 °F (36.5 °C)] 97.7 °F (36.5 °C)  Pulse:  [69-92] 77  Resp:  [18-20] 20  SpO2:  [92 %-96 %] 95 %  BP: (131-169)/(63-81) 157/67     Weight: 107 kg (235 lb 14.3 oz)  Body mass index is 39.25 kg/m².    Intake/Output Summary (Last 24 hours) at 5/12/2025 1631  Last data filed at 5/12/2025 1501  Gross per 24 hour   Intake 720 ml   Output --   Net 720 ml         Physical Exam  HENT:      Head: Normocephalic and atraumatic.   Cardiovascular:      Rate and Rhythm: Normal rate and regular rhythm.      Heart sounds: No murmur heard.  Pulmonary:      Effort: Pulmonary effort is normal. No respiratory distress.      Breath sounds: Examination of the right-lower field reveals decreased breath sounds. Examination of the left-lower field reveals decreased breath sounds. Decreased breath sounds present. No wheezing.   Abdominal:      General: Bowel sounds are normal. There is no distension.      Palpations: Abdomen is soft.      Tenderness: There is no abdominal tenderness.   Musculoskeletal:         General: No swelling.   Skin:     General: Skin is warm and dry.   Neurological:      Mental Status: She is alert and oriented to person, place, and time. Mental status is at baseline.               Significant Labs: All pertinent labs within the past 24 hours have been reviewed.  Recent Lab Results  (Last 5 results in the past 24 hours)        05/12/25  1131   05/12/25  0501   05/12/25  0414   05/11/25  2137   05/11/25  1634        Albumin     3.1           ALP     71           ALT     14           Anion Gap     12           AST     19           Baso #     0.01           Basophil %     0.3            BILIRUBIN TOTAL     1.1  Comment: For infants and newborns, interpretation of results should be based   on gestational age, weight and in agreement with clinical   observations.    Premature Infant recommended reference ranges:   0-24 hours:  <8.0 mg/dL   24-48 hours: <12.0 mg/dL   3-5 days:    <15.0 mg/dL   6-29 days:   <15.0 mg/dL           BUN     78           Calcium     8.6           Chloride     103           CO2     24           Creatinine     2.7           eGFR     18  Comment: Estimated GFR calculated using the CKD-EPI creatinine (2021) equation.           Eos #     0.13           Eos %     3.8           Glucose     168           Gran # (ANC)     2.63           Hematocrit     29.3           Hemoglobin     8.7           Immature Grans (Abs)     0.01  Comment: Mild elevation in immature granulocytes is non specific and can be seen in a variety of conditions including stress response, acute inflammation, trauma and pregnancy. Correlation with other laboratory and clinical findings is essential.           Immature Granulocytes     0.3           Lymph #     0.25           Lymph %     7.4           Magnesium      2.5           MCH     27.8           MCHC     29.7           MCV     94           Mono #     0.37           Mono %     10.9           MPV     10.5           Neut %     77.3           nRBC     0           Phosphorus Level     5.0           Platelet Count     72           POCT Glucose 138   169     189   180       Potassium     4.1           PROTEIN TOTAL     6.6           RBC     3.13           RDW     14.3           Sodium     139           WBC     3.40                                  Significant Imaging: I have reviewed all pertinent imaging results/findings within the past 24 hours.

## 2025-05-12 NOTE — PLAN OF CARE
Plan of care reviewed with patient with verbal understanding. Chart and orders reviewed.  Pt remains free from falls this shift, Safety precautions in place.   Pt currently resting comfortably in bed.   Purposeful rounding with bed in lowest position, side rails up, call light in reach.  Will continue to monitor until end of shift.       Problem: Adult Inpatient Plan of Care  Goal: Plan of Care Review  Outcome: Progressing  Flowsheets (Taken 5/12/2025 0427)  Plan of Care Reviewed With: patient  Goal: Patient-Specific Goal (Individualized)  Outcome: Progressing  Flowsheets (Taken 5/12/2025 0427)  Individualized Care Needs: Pt request help to the bathroom when asked.  Anxieties, Fears or Concerns: none voiced     Problem: Diabetes Comorbidity  Goal: Blood Glucose Level Within Targeted Range  Outcome: Progressing     Problem: Fall Injury Risk  Goal: Absence of Fall and Fall-Related Injury  Outcome: Progressing     Problem: Fatigue  Goal: Improved Activity Tolerance  Outcome: Progressing     Problem: Self-Care Deficit  Goal: Improved Ability to Complete Activities of Daily Living  Outcome: Progressing

## 2025-05-12 NOTE — ASSESSMENT & PLAN NOTE
Creatine stable for now. BMP reviewed- noted Estimated Creatinine Clearance: 22.2 mL/min (A) (based on SCr of 2.7 mg/dL (H)). according to latest data. Based on current GFR, CKD stage is stage 4 - GFR 15-29.  Monitor UOP and serial BMP and adjust therapy as needed. Renally dose meds. Avoid nephrotoxic medications and procedures.

## 2025-05-12 NOTE — PROGRESS NOTES
[facility] Medicine  Progress Note    Patient Name: Lakshmi Campbell  MRN: [id number]  Patient Class: IP- Inpatient   Admission Date: 5/9/2025  Length of Stay: 1 days  Attending Physician: Kevin Thomas MD  Primary Care Provider: Keely Silva NP        Subjective     Principal Problem:Acute hypoxemic respiratory failure        HPI:  Ms. Lakshmi Campbell is a 74 y.o. female who  has a medical history of Acquired TTP, Cataract, CKD stage 3 secondary to diabetes, Closed displaced comminuted fracture of right patella, Closed fracture of surgical neck of left humerus, Closed left radial fracture, Hyperkalemia, Hyperlipidemia, Hypertension, Hypothyroidism, Liver cirrhosis secondary to YO, Morbid obesity, Right knee pain, Thyroid disease, and Type 2 diabetes mellitus.    She  presented to the ED for evaluation of worsening shortness of breath over the past 2 weeks.  She reports associated symptoms of dyspnea on exertion, mild cough, lower extremity swelling, abdominal swelling, orthopnea. Denies cough, chest pain, abdominal pain, lightheadedness, dizziness, urinary symptoms.  She reports being prescribed a 3 day course of furosemide that by her PCP for leg swelling last month.  She has also follow up with her nephrologist over the past month due to progressive CKD concerns.  Over the past month, her GDMT medications were adjusted due to hyperkalemia, ACE inhibitor/spironolactone were discontinued and SGLT2 inhibitor dose decreased.    ED course notable for vitals with hypertension, tachypnea, hypoxia.  Lab work notable for WBC 3.6, hemoglobin 9.2, platelets 74, bicarb 21, BUN 46, creatinine 2, glucose 123, , troponin 0.103.  Urinalysis concerning for WBC >100, leukocyte esterase 3+.      Overview/Hospital Course:  Patient admitted with CHF exacerbation, pneumonia and UTI.  Patient is continued on IV antibiotics and IV diuresis.  She states she is doing better on hospital day.  She  remains on NC o2 at 2L - wean as tolerated.  She had worsening of her renal function on hospital day 2 and creatinine at this time is 2.8.  IV Lasix has been discontinued and renal function will be monitored.  Patient has swelling in her lower extremities have completely resolved and chest CT was ordered this morning which showed some bilateral pleural effusions but no overt vascular congestion.  We will consider gentle hydration if renal function does not improve by tomorrow.  Patient has also been attempted to wean from nasal cannula O2 but this has been unsuccessful.  At baseline patient is on room air but currently requiring 2 L nasal cannula with saturations in the low 90s.  Patient's urine output has not decreased but we will be monitored closely.      Interval History: f/u CHF shortness of breath still present    Review of Systems  Objective:     Vital Signs (Most Recent):  Temp: 97.7 °F (36.5 °C) (05/12/25 1630)  Pulse: 77 (05/12/25 1630)  Resp: 20 (05/12/25 1630)  BP: (!) 157/67 (05/12/25 1630)  SpO2: 95 % (05/12/25 1630) Vital Signs (24h Range):  Temp:  [97.5 °F (36.4 °C)-97.7 °F (36.5 °C)] 97.7 °F (36.5 °C)  Pulse:  [69-92] 77  Resp:  [18-20] 20  SpO2:  [92 %-96 %] 95 %  BP: (131-169)/(63-81) 157/67     Weight: 107 kg (235 lb 14.3 oz)  Body mass index is 39.25 kg/m².    Intake/Output Summary (Last 24 hours) at 5/12/2025 1631  Last data filed at 5/12/2025 1501  Gross per 24 hour   Intake 720 ml   Output --   Net 720 ml         Physical Exam  HENT:      Head: Normocephalic and atraumatic.   Cardiovascular:      Rate and Rhythm: Normal rate and regular rhythm.      Heart sounds: No murmur heard.  Pulmonary:      Effort: Pulmonary effort is normal. No respiratory distress.      Breath sounds: Examination of the right-lower field reveals decreased breath sounds. Examination of the left-lower field reveals decreased breath sounds. Decreased breath sounds present. No wheezing.   Abdominal:      General: Bowel  sounds are normal. There is no distension.      Palpations: Abdomen is soft.      Tenderness: There is no abdominal tenderness.   Musculoskeletal:         General: No swelling.   Skin:     General: Skin is warm and dry.   Neurological:      Mental Status: She is alert and oriented to person, place, and time. Mental status is at baseline.               Significant Labs: All pertinent labs within the past 24 hours have been reviewed.  Recent Lab Results  (Last 5 results in the past 24 hours)        05/12/25  1131   05/12/25  0501   05/12/25  0414   05/11/25  2137   05/11/25  1634        Albumin     3.1           ALP     71           ALT     14           Anion Gap     12           AST     19           Baso #     0.01           Basophil %     0.3           BILIRUBIN TOTAL     1.1  Comment: For infants and newborns, interpretation of results should be based   on gestational age, weight and in agreement with clinical   observations.    Premature Infant recommended reference ranges:   0-24 hours:  <8.0 mg/dL   24-48 hours: <12.0 mg/dL   3-5 days:    <15.0 mg/dL   6-29 days:   <15.0 mg/dL           BUN     78           Calcium     8.6           Chloride     103           CO2     24           Creatinine     2.7           eGFR     18  Comment: Estimated GFR calculated using the CKD-EPI creatinine (2021) equation.           Eos #     0.13           Eos %     3.8           Glucose     168           Gran # (ANC)     2.63           Hematocrit     29.3           Hemoglobin     8.7           Immature Grans (Abs)     0.01  Comment: Mild elevation in immature granulocytes is non specific and can be seen in a variety of conditions including stress response, acute inflammation, trauma and pregnancy. Correlation with other laboratory and clinical findings is essential.           Immature Granulocytes     0.3           Lymph #     0.25           Lymph %     7.4           Magnesium      2.5           MCH     27.8           MCHC     29.7            MCV     94           Mono #     0.37           Mono %     10.9           MPV     10.5           Neut %     77.3           nRBC     0           Phosphorus Level     5.0           Platelet Count     72           POCT Glucose 138   169     189   180       Potassium     4.1           PROTEIN TOTAL     6.6           RBC     3.13           RDW     14.3           Sodium     139           WBC     3.40                                  Significant Imaging: I have reviewed all pertinent imaging results/findings within the past 24 hours.      Assessment & Plan  Acute hypoxemic respiratory failure  Acute on chronic diastolic congestive heart failure  -Patient with Hypoxic Respiratory failure which is Acute.  she is not on home oxygen currently.  Prior history of temporary home supplemental oxygen requirement after a hospitalization in July 2024 for pneumonia/CHF and home oxygen that was discontinued by the end of 2024. Patient is identified as having Diastolic (HFpEF) heart failure that is Acute on Chronic. CHF is currently uncontrolled due to volume overload due to: Continued edema of extremities, Dyspnea, Rales/crackles on pulmonary exam, and Pulmonary edema/pleural effusion on CXR.     Signs/symptoms of respiratory failure include- increased work of breathing. Contributing diagnoses includes - CHF Labs and images were reviewed.     Recent imaging notable for :  X-Ray Chest AP Portable  Final Result   Findings likely related to congestive heart failure.  Correlate.  Follow-up is recommended.  Finalized on: 5/9/2025 7:41 PM By:  Pierre Cuellar MD    - Reviewed most recent ECHO performed and demonstrates- Results for orders placed during the hospital encounter of 06/26/24  Echo  Interpretation Summary    Left Ventricle: The left ventricle is normal in size. Normal wall thickness. There is concentric hypertrophy. Normal wall motion. Ejection fraction by visual approximation is 60%. Grade II diastolic dysfunction.    Right  Ventricle: Normal right ventricular cavity size. Wall thickness is normal. Systolic function is normal.    Left Atrium: Left atrium is severely dilated.    Aortic Valve: The aortic valve is a trileaflet valve. Mildly restricted motion. There is moderate stenosis. Aortic valve area by VTI is 1.24 cm². Aortic valve peak velocity is 2.66 m/s. Mean gradient is 17 mmHg. The dimensionless index is 0.45.    Mitral Valve: There is moderate mitral annular calcification present. Mildly restricted motion. There is mild to moderate regurgitation.                Plan:  - Current GDMT meds: hydrALAZINE injection 5 mg, Every 6 hours PRN, Intravenous  hydrALAZINE tablet 25 mg, Every 12 hours, Oral  furosemide injection 60 mg, Once, Intravenous  -Titrate(increase/decrease) diuretic dosing to target euvolemia. Current Diuretic regimen:  - Cycle troponin to rule out ischemic etiology  - Monitor on telemetry.   -Patient is on CHF pathway.    - Cardiac diet with Fluid restriction at 1.5L with strict I/Os and daily standing weights  - I/O -1.2L  - CT chest 05/11/2025:  Bilateral pleural effusions noted but no vascular congestion.        Type 2 diabetes mellitus with microalbuminuria, with long-term current use of insulin  Patient's FSGs are controlled on current medication regimen.  Last A1c reviewed-   Lab Results   Component Value Date    HGBA1C 6 (H) 03/19/2025     Most recent fingerstick glucose reviewed-   Recent Labs   Lab 05/11/25  2137 05/12/25  0501 05/12/25  1131   POCTGLUCOSE 189* 169* 138*     Current correctional scale  Medium  Maintain anti-hyperglycemic dose as follows-   Antihyperglycemics (From admission, onward)      Start     Stop Route Frequency Ordered    05/10/25 0402  insulin aspart U-100 pen 0-10 Units         -- SubQ Before meals & nightly PRN 05/10/25 0302          Hold Oral hypoglycemics while patient is in the hospital.    Hypothyroidism   Recent thyroid labs reviewed:  Lab Results   Component Value Date     TSH 0.17 (L) 03/19/2025    FREET4 1.12 08/30/2024     Hx of Hypothyroidism    PLAN:   Continue home levothyroxine  Essential hypertension  Hypertensive on admission    Home meds for hypertension were reviewed and noted below        amLODIPine (NORVASC) 10 MG tablet  TAKE 1 TABLET(10 MG) BY MOUTH EVERY DAY  hydrALAZINE (APRESOLINE) 25 MG tablet  Take 1 tablet (25 mg total) by mouth every 12 (twelve) hours.    Patients blood pressure range in the last 24 hours was: BP  Min: 127/52  Max: 189/79.    PLAN:  -While in the hospital, will manage blood pressure as follows; Continue home antihypertensive regimen  -The patient's inpatient anti-hypertensive regimen is listed below:  Current Antihypertensives  hydrALAZINE injection 5 mg, Every 6 hours PRN, Intravenous  amLODIPine tablet 10 mg, Daily, Oral  hydrALAZINE tablet 25 mg, Every 12 hours, Oral  furosemide injection 60 mg, Once, Intravenous  -Will utilize p.r.n. blood pressure medication only if patient's blood pressure greater than 180/110 and she develops symptoms such as worsening chest pain or shortness of breath.    Dyslipidemia  Recent Lipid Panel reviewed-  Lab Results   Component Value Date    HDL 34 (L) 06/27/2024    LDLCALC 24.0 (L) 06/27/2024    TRIG 65 06/27/2024    CHOL 71 (L) 06/27/2024      -Home medications:         pravastatin (PRAVACHOL) 20 MG tablet  Take 20 mg by mouth once daily.     PLAN  Continue home statin    Acute cystitis without hematuria  Urinalysis on admission concerning for UTI.  In setting of constitutional symptoms, difficult to rule out asymptomatic bacteriuria. Hx of ESBL UTI    Followup urine cultures, deescalate antibiotics as appropriate  Antibiotics:  Antibiotics (From admission, onward)      Start     Stop Route Frequency Ordered    05/11/25 1730  meropenem injection 1 g         -- IV Every 12 hours (non-standard times) 05/11/25 0945              Chronic kidney disease, stage 4 (severe)  Creatine stable for now. BMP reviewed-  noted Estimated Creatinine Clearance: 22.2 mL/min (A) (based on SCr of 2.7 mg/dL (H)). according to latest data. Based on current GFR, CKD stage is stage 4 - GFR 15-29.  Monitor UOP and serial BMP and adjust therapy as needed. Renally dose meds. Avoid nephrotoxic medications and procedures.  Asthma  Chronic.  Stable  Follows with pulmonology outpatient    PLAN:  Home medication: Symbicort.  --Arformeterol/Budesonide while inpatient    VTE Risk Mitigation (From admission, onward)           Ordered     IP VTE HIGH RISK PATIENT  Once         05/09/25 2212     Place sequential compression device  Until discontinued         05/09/25 2212                    Discharge Planning   KYA:      Code Status: Full Code   Medical Readiness for Discharge Date:   Discharge Plan A: Home                        Kevin Thomas MD  Department of Hospital Medicine   '[place] - Telemetry ([facility])

## 2025-05-12 NOTE — PROGRESS NOTES
"[facility] Failure [facility]) nurse navigator notified of HFTCC candidate in need of education and introduction to 4-6 week program.      PT aao x 3 while lying in bed. Introduced self to pt as HFTCC nurse navigator.     Patient given "Home Care Guide for Heart Failure Patients" , "Heart Failure [facility]itional Care [facility]" flyer and "Daily weight and symptom tracker".  Encouraged pt to review information.      Reviewed the following key points of HFTCC program with pt and family:   1.) Take your medications as directed.    2.) Weight yourself daily   3.) Follow low salt and limited fluid diet.    4.) Stop smoking and start exercising   5.) Go to your appointments and call your team.      Pt reminded to follow Symptom tracker and to call at the onset of symptoms according to tracker.     Reviewed plan for follow up once discharged to include phone calls, in person and virtual visits to assist pt optimizing their heart failure medication regimen and encouraging healthy lifestyle modifications.  Reminded pt that program will assist them over the next 4-6 weeks and then patient will be transferred to long term care provider .  Reminded pt how to contact HFTCC navigator via phone and or via [employer].     Pt given appointment or instructed appointment will be printed on hospital discharge paperwork.     Pt also reminded HF nurse will call 48-72 hours after discharge to check on them.     PT verbalize read back of information given.  Encouraged pt and family to read over information often and contact team with any questions or concerns.      "

## 2025-05-12 NOTE — PLAN OF CARE
[facility] - Telemetry (Hospital)  Initial Discharge Assessment       Primary Care Provider: Keely Silva NP    Admission Diagnosis: CHF (congestive heart failure) [I50.9]  Primary hypertension [I10]  Acute pulmonary edema [J81.0]  SOB (shortness of breath) [R06.02]  Acute respiratory failure with hypoxia [J96.01]  Chest pain [R07.9]  Chronic kidney disease, unspecified CKD stage [N18.9]  Diastolic congestive heart failure, unspecified HF chronicity [I50.30]    Admission Date: 5/9/2025  Expected Discharge Date:     Transition of Care Barriers: None    Payor: BCBS [place] MEDICARE / Plan: [employer] [place] / Product Type: Medicare Advantage /     Extended Emergency Contact Information  Primary Emergency Contact: Dillon Campbell  Address: [address] [place] [address] [address] [place] [address] [place]  Mobile Phone: [phone]  Relation: Spouse    Discharge Plan A: Home         [id number] - [place], [place] - [address] AT [facility] [place] [facility]  [address]  [place] [address]  Phone: [phone] Fax: [phone]    [id number] - [place] [address] AT [facility] & [place] [address]  [place] [address]  Phone: [phone] Fax: [phone]      Initial Assessment (most recent)       Adult Discharge Assessment - 05/12/25 0956          Discharge Assessment    Assessment Type Discharge Planning Assessment     Confirmed/corrected address, phone number and insurance Yes     Confirmed Demographics Correct on Facesheet     Source of Information patient     Communicated KYA with patient/caregiver Date not available/Unable to determine     Reason For Admission Acute hypoxemic resp failure     People in Home spouse;grandchild(karuna)     Facility Arrived From: Home     Do you expect to return to your current living situation? Yes     Do you have help at home or someone to help you manage your care at home? Yes     Who are  your caregiver(s) and their phone number(s)? Spouse     Prior to hospitilization cognitive status: Alert/Oriented     Current cognitive status: Alert/Oriented     Walking or Climbing Stairs Difficulty no     Dressing/Bathing Difficulty no     Equipment Currently Used at Home none     Readmission within 30 days? No     Patient currently being followed by outpatient case management? No     Do you currently have service(s) that help you manage your care at home? No     Do you take prescription medications? Yes     Do you have prescription coverage? Yes     Do you have any problems affording any of your prescribed medications? No     Is the patient taking medications as prescribed? yes     How do you get to doctors appointments? family or friend will provide;car, drives self     Are you on dialysis? No     Do you take coumadin? No     Discharge Plan A Home     DME Needed Upon Discharge  none     Discharge Plan discussed with: Patient     Transition of Care Barriers None                   SW met with patient and spouse at bedside for assessment. Pt independent, lives at home with spouse and two grandsons. Pt reports independence with ADLs without DME. Spouse will be help at home and  transport at [place].     Pt's whiteboard updated with CM contact and anticipated discharge disposition. SW to remain available as needed.

## 2025-05-12 NOTE — ASSESSMENT & PLAN NOTE
Hypertensive on admission    Home meds for hypertension were reviewed and noted below        amLODIPine (NORVASC) 10 MG tablet  TAKE 1 TABLET(10 MG) BY MOUTH EVERY DAY  hydrALAZINE (APRESOLINE) 25 MG tablet  Take 1 tablet (25 mg total) by mouth every 12 (twelve) hours.    Patients blood pressure range in the last 24 hours was: BP  Min: 127/52  Max: 189/79.    PLAN:  -While in the hospital, will manage blood pressure as follows; Continue home antihypertensive regimen  -The patient's inpatient anti-hypertensive regimen is listed below:  Current Antihypertensives  hydrALAZINE injection 5 mg, Every 6 hours PRN, Intravenous  amLODIPine tablet 10 mg, Daily, Oral  hydrALAZINE tablet 25 mg, Every 12 hours, Oral  furosemide injection 60 mg, Once, Intravenous  -Will utilize p.r.n. blood pressure medication only if patient's blood pressure greater than 180/110 and she develops symptoms such as worsening chest pain or shortness of breath.

## 2025-05-13 LAB
ABSOLUTE EOSINOPHIL (OHS): 0.21 K/UL
ABSOLUTE MONOCYTE (OHS): 0.44 K/UL (ref 0.3–1)
ABSOLUTE NEUTROPHIL COUNT (OHS): 2.4 K/UL (ref 1.8–7.7)
ALBUMIN SERPL BCP-MCNC: 3 G/DL (ref 3.5–5.2)
ALP SERPL-CCNC: 66 UNIT/L (ref 40–150)
ALT SERPL W/O P-5'-P-CCNC: 13 UNIT/L (ref 10–44)
ANION GAP (OHS): 12 MMOL/L (ref 8–16)
AST SERPL-CCNC: 16 UNIT/L (ref 11–45)
BACTERIA UR CULT: ABNORMAL
BASOPHILS # BLD AUTO: 0.01 K/UL
BASOPHILS NFR BLD AUTO: 0.3 %
BILIRUB SERPL-MCNC: 1.1 MG/DL (ref 0.1–1)
BUN SERPL-MCNC: 77 MG/DL (ref 8–23)
CALCIUM SERPL-MCNC: 8.5 MG/DL (ref 8.7–10.5)
CHLORIDE SERPL-SCNC: 103 MMOL/L (ref 95–110)
CO2 SERPL-SCNC: 25 MMOL/L (ref 23–29)
CREAT SERPL-MCNC: 2.5 MG/DL (ref 0.5–1.4)
ERYTHROCYTE [DISTWIDTH] IN BLOOD BY AUTOMATED COUNT: 14.1 % (ref 11.5–14.5)
GFR SERPLBLD CREATININE-BSD FMLA CKD-EPI: 20 ML/MIN/1.73/M2
GLUCOSE SERPL-MCNC: 141 MG/DL (ref 70–110)
HCT VFR BLD AUTO: 28 % (ref 37–48.5)
HGB BLD-MCNC: 8.6 GM/DL (ref 12–16)
IMM GRANULOCYTES # BLD AUTO: 0.01 K/UL (ref 0–0.04)
IMM GRANULOCYTES NFR BLD AUTO: 0.3 % (ref 0–0.5)
LYMPHOCYTES # BLD AUTO: 0.3 K/UL (ref 1–4.8)
MAGNESIUM SERPL-MCNC: 2.5 MG/DL (ref 1.6–2.6)
MCH RBC QN AUTO: 28 PG (ref 27–31)
MCHC RBC AUTO-ENTMCNC: 30.7 G/DL (ref 32–36)
MCV RBC AUTO: 91 FL (ref 82–98)
NUCLEATED RBC (/100WBC) (OHS): 0 /100 WBC
PHOSPHATE SERPL-MCNC: 4.7 MG/DL (ref 2.7–4.5)
PLATELET # BLD AUTO: 66 K/UL (ref 150–450)
PMV BLD AUTO: 10.6 FL (ref 9.2–12.9)
POCT GLUCOSE: 131 MG/DL (ref 70–110)
POCT GLUCOSE: 137 MG/DL (ref 70–110)
POCT GLUCOSE: 176 MG/DL (ref 70–110)
POCT GLUCOSE: 189 MG/DL (ref 70–110)
POTASSIUM SERPL-SCNC: 4.1 MMOL/L (ref 3.5–5.1)
PROT SERPL-MCNC: 6.4 GM/DL (ref 6–8.4)
RBC # BLD AUTO: 3.07 M/UL (ref 4–5.4)
RELATIVE EOSINOPHIL (OHS): 6.2 %
RELATIVE LYMPHOCYTE (OHS): 8.9 % (ref 18–48)
RELATIVE MONOCYTE (OHS): 13.1 % (ref 4–15)
RELATIVE NEUTROPHIL (OHS): 71.2 % (ref 38–73)
SODIUM SERPL-SCNC: 140 MMOL/L (ref 136–145)
WBC # BLD AUTO: 3.37 K/UL (ref 3.9–12.7)

## 2025-05-13 PROCEDURE — 21400001 HC TELEMETRY ROOM

## 2025-05-13 PROCEDURE — 94640 AIRWAY INHALATION TREATMENT: CPT

## 2025-05-13 PROCEDURE — 27000221 HC OXYGEN, UP TO 24 HOURS

## 2025-05-13 PROCEDURE — 25000242 PHARM REV CODE 250 ALT 637 W/ HCPCS: Performed by: STUDENT IN AN ORGANIZED HEALTH CARE EDUCATION/TRAINING PROGRAM

## 2025-05-13 PROCEDURE — 80053 COMPREHEN METABOLIC PANEL: CPT | Performed by: STUDENT IN AN ORGANIZED HEALTH CARE EDUCATION/TRAINING PROGRAM

## 2025-05-13 PROCEDURE — 83735 ASSAY OF MAGNESIUM: CPT | Performed by: STUDENT IN AN ORGANIZED HEALTH CARE EDUCATION/TRAINING PROGRAM

## 2025-05-13 PROCEDURE — 25000003 PHARM REV CODE 250: Performed by: STUDENT IN AN ORGANIZED HEALTH CARE EDUCATION/TRAINING PROGRAM

## 2025-05-13 PROCEDURE — 85025 COMPLETE CBC W/AUTO DIFF WBC: CPT | Performed by: STUDENT IN AN ORGANIZED HEALTH CARE EDUCATION/TRAINING PROGRAM

## 2025-05-13 PROCEDURE — 63600175 PHARM REV CODE 636 W HCPCS: Performed by: FAMILY MEDICINE

## 2025-05-13 PROCEDURE — 94761 N-INVAS EAR/PLS OXIMETRY MLT: CPT

## 2025-05-13 PROCEDURE — 84100 ASSAY OF PHOSPHORUS: CPT | Performed by: STUDENT IN AN ORGANIZED HEALTH CARE EDUCATION/TRAINING PROGRAM

## 2025-05-13 PROCEDURE — 36415 COLL VENOUS BLD VENIPUNCTURE: CPT | Performed by: STUDENT IN AN ORGANIZED HEALTH CARE EDUCATION/TRAINING PROGRAM

## 2025-05-13 PROCEDURE — 63600175 PHARM REV CODE 636 W HCPCS: Performed by: INTERNAL MEDICINE

## 2025-05-13 PROCEDURE — 99900035 HC TECH TIME PER 15 MIN (STAT)

## 2025-05-13 PROCEDURE — 25000003 PHARM REV CODE 250: Performed by: INTERNAL MEDICINE

## 2025-05-13 RX ORDER — FUROSEMIDE 10 MG/ML
60 INJECTION INTRAMUSCULAR; INTRAVENOUS ONCE
Status: COMPLETED | OUTPATIENT
Start: 2025-05-13 | End: 2025-05-13

## 2025-05-13 RX ORDER — CEPHALEXIN 500 MG/1
500 CAPSULE ORAL 3 TIMES DAILY
Status: DISCONTINUED | OUTPATIENT
Start: 2025-05-13 | End: 2025-05-13

## 2025-05-13 RX ORDER — CEPHALEXIN 500 MG/1
500 CAPSULE ORAL 2 TIMES DAILY
Status: DISCONTINUED | OUTPATIENT
Start: 2025-05-13 | End: 2025-05-15

## 2025-05-13 RX ADMIN — LEVOTHYROXINE SODIUM 137 MCG: 0.11 TABLET ORAL at 05:05

## 2025-05-13 RX ADMIN — ARFORMOTEROL TARTRATE 15 MCG: 15 SOLUTION RESPIRATORY (INHALATION) at 08:05

## 2025-05-13 RX ADMIN — BUDESONIDE INHALATION 0.5 MG: 0.5 SUSPENSION RESPIRATORY (INHALATION) at 08:05

## 2025-05-13 RX ADMIN — HYDRALAZINE HYDROCHLORIDE 25 MG: 25 TABLET ORAL at 09:05

## 2025-05-13 RX ADMIN — PRAVASTATIN SODIUM 20 MG: 20 TABLET ORAL at 09:05

## 2025-05-13 RX ADMIN — Medication 6 MG: at 08:05

## 2025-05-13 RX ADMIN — CEPHALEXIN 500 MG: 500 CAPSULE ORAL at 08:05

## 2025-05-13 RX ADMIN — HYDRALAZINE HYDROCHLORIDE 25 MG: 25 TABLET ORAL at 08:05

## 2025-05-13 RX ADMIN — FUROSEMIDE 60 MG: 10 INJECTION, SOLUTION INTRAMUSCULAR; INTRAVENOUS at 02:05

## 2025-05-13 RX ADMIN — AMLODIPINE BESYLATE 10 MG: 10 TABLET ORAL at 09:05

## 2025-05-13 RX ADMIN — MEROPENEM 1 G: 1 INJECTION INTRAVENOUS at 05:05

## 2025-05-13 RX ADMIN — INSULIN ASPART 2 UNITS: 100 INJECTION, SOLUTION INTRAVENOUS; SUBCUTANEOUS at 05:05

## 2025-05-13 NOTE — ASSESSMENT & PLAN NOTE
Creatine stable for now. BMP reviewed- noted Estimated Creatinine Clearance: 24 mL/min (A) (based on SCr of 2.5 mg/dL (H)). according to latest data. Based on current GFR, CKD stage is stage 4 - GFR 15-29.  Monitor UOP and serial BMP and adjust therapy as needed. Renally dose meds. Avoid nephrotoxic medications and procedures.

## 2025-05-13 NOTE — SUBJECTIVE & OBJECTIVE
Interval History: f.u CHF noted to be hypoxic on room air, additional IV lasix given. Plan chest x-ray in am    Review of Systems  Objective:     Vital Signs (Most Recent):  Temp: 97.8 °F (36.6 °C) (05/13/25 1204)  Pulse: 72 (05/13/25 1515)  Resp: 18 (05/13/25 1204)  BP: (!) 153/67 (05/13/25 1204)  SpO2: (!) 92 % (05/13/25 1204) Vital Signs (24h Range):  Temp:  [96.1 °F (35.6 °C)-98.6 °F (37 °C)] 97.8 °F (36.6 °C)  Pulse:  [64-88] 72  Resp:  [16-20] 18  SpO2:  [91 %-96 %] 92 %  BP: (140-176)/(67-87) 153/67     Weight: 107 kg (235 lb 14.3 oz)  Body mass index is 39.25 kg/m².    Intake/Output Summary (Last 24 hours) at 5/13/2025 1624  Last data filed at 5/13/2025 1230  Gross per 24 hour   Intake 720 ml   Output 3 ml   Net 717 ml         Physical Exam  HENT:      Head: Normocephalic and atraumatic.   Cardiovascular:      Rate and Rhythm: Normal rate and regular rhythm.      Heart sounds: No murmur heard.  Pulmonary:      Effort: Pulmonary effort is normal. No respiratory distress.      Breath sounds: Decreased breath sounds present. No wheezing.   Abdominal:      General: Bowel sounds are normal. There is no distension.      Palpations: Abdomen is soft.      Tenderness: There is no abdominal tenderness.   Musculoskeletal:         General: No swelling.   Skin:     General: Skin is warm and dry.   Neurological:      Mental Status: She is alert and oriented to person, place, and time. Mental status is at baseline.               Significant Labs: All pertinent labs within the past 24 hours have been reviewed.  Recent Lab Results  (Last 5 results in the past 24 hours)        05/13/25  1621   05/13/25  1144   05/13/25  0608   05/13/25  0503   05/12/25  2143        Albumin       3.0         ALP       66         ALT       13         Anion Gap       12         AST       16         Baso #       0.01         Basophil %       0.3         BILIRUBIN TOTAL       1.1  Comment: For infants and newborns, interpretation of results should  be based   on gestational age, weight and in agreement with clinical   observations.    Premature Infant recommended reference ranges:   0-24 hours:  <8.0 mg/dL   24-48 hours: <12.0 mg/dL   3-5 days:    <15.0 mg/dL   6-29 days:   <15.0 mg/dL         BUN       77         Calcium       8.5         Chloride       103         CO2       25         Creatinine       2.5         eGFR       20  Comment: Estimated GFR calculated using the CKD-EPI creatinine (2021) equation.         Eos #       0.21         Eos %       6.2         Glucose       141         Gran # (ANC)       2.40         Hematocrit       28.0         Hemoglobin       8.6         Immature Grans (Abs)       0.01  Comment: Mild elevation in immature granulocytes is non specific and can be seen in a variety of conditions including stress response, acute inflammation, trauma and pregnancy. Correlation with other laboratory and clinical findings is essential.         Immature Granulocytes       0.3         Lymph #       0.30         Lymph %       8.9         Magnesium        2.5         MCH       28.0         MCHC       30.7         MCV       91         Mono #       0.44         Mono %       13.1         MPV       10.6         Neut %       71.2         nRBC       0         Phosphorus Level       4.7         Platelet Count       66         POCT Glucose 189   137   131     137       Potassium       4.1         PROTEIN TOTAL       6.4         RBC       3.07         RDW       14.1         Sodium       140         WBC       3.37                                Significant Imaging: I have reviewed all pertinent imaging results/findings within the past 24 hours.

## 2025-05-13 NOTE — PROGRESS NOTES
Pharmacist Renal Dose Adjustment Note    Lakshmi Campbell is a 74 y.o. female being treated with the medication cephalexin.    Patient Data:    Vital Signs (Most Recent):  Temp: 97.8 °F (36.6 °C) (05/13/25 1204)  Pulse: 73 (05/13/25 1204)  Resp: 18 (05/13/25 1204)  BP: (!) 153/67 (05/13/25 1204)  SpO2: (!) 92 % (05/13/25 1204) Vital Signs (72h Range):  Temp:  [96.1 °F (35.6 °C)-98.6 °F (37 °C)]   Pulse:  [64-92]   Resp:  [16-20]   BP: (127-176)/(52-87)   SpO2:  [90 %-96 %]      Recent Labs   Lab 05/11/25  0522 05/12/25  0414 05/13/25  0503   CREATININE 2.8* 2.7* 2.5*     Serum creatinine: 2.5 mg/dL (H) 05/13/25 0503  Estimated creatinine clearance: 24 mL/min (A)    Cephalexin 500 mg PO TID will be changed to cephalexin 500 mg PO BID per pharmacy renal dosing protocol for CrCl 15-30 for mild- moderate infection.    Pharmacist's Name: Teresa Gallego

## 2025-05-13 NOTE — ASSESSMENT & PLAN NOTE
Urinalysis on admission concerning for UTI.  In setting of constitutional symptoms, difficult to rule out asymptomatic bacteriuria. Hx of ESBL UTI    Followup urine cultures, deescalate antibiotics as appropriate  Antibiotics:  Antibiotics (From admission, onward)      Start     Stop Route Frequency Ordered    05/13/25 2100  cephALEXin capsule 500 mg         -- Oral 2 times daily 05/13/25 9457

## 2025-05-13 NOTE — PROGRESS NOTES
Home Oxygen Evaluation - [facility] - Cardiopulmonary Department      Date Performed: 2025      1) Patient's Home O2 Sat on room air, while at rest: Room Air SpO2 At Rest: (!) 86 %        If O2 sats on room air at rest are 88% or below, patient qualifies.  Document O2 liter flow needed in Step 2.  If O2 sats are 89% or above, complete Step 3.        2)  If patient is not ambulated and O2 sats are <88%, what is the O2 liter flow required to meet ordered saturation?      If O2 sats on room air while exercising remain 89% or above patient does not qualify, no further testing needed Document N/A in step 3. If O2 sats on room air while exercising are 88% or below, continue to Step 4.    3) Patient's O2 Sat on room air while exercisin) Patient's O2 Sat while exercising on O2: SpO2 During Ambulation on O2: 96 % at Ambulation O2 LPM: 2 LPM         (Must show improvement from #4 for patients to qualify)

## 2025-05-13 NOTE — PROGRESS NOTES
[facility] Medicine  Progress Note    Patient Name: Lakshmi Campbell  MRN: [id number]  Patient Class: IP- Inpatient   Admission Date: 5/9/2025  Length of Stay: 2 days  Attending Physician: Kevin Thomas MD  Primary Care Provider: Keely Silva NP        Subjective     Principal Problem:Acute hypoxemic respiratory failure        HPI:  Ms. Lakshmi Campbell is a 74 y.o. female who  has a medical history of Acquired TTP, Cataract, CKD stage 3 secondary to diabetes, Closed displaced comminuted fracture of right patella, Closed fracture of surgical neck of left humerus, Closed left radial fracture, Hyperkalemia, Hyperlipidemia, Hypertension, Hypothyroidism, Liver cirrhosis secondary to YO, Morbid obesity, Right knee pain, Thyroid disease, and Type 2 diabetes mellitus.    She  presented to the ED for evaluation of worsening shortness of breath over the past 2 weeks.  She reports associated symptoms of dyspnea on exertion, mild cough, lower extremity swelling, abdominal swelling, orthopnea. Denies cough, chest pain, abdominal pain, lightheadedness, dizziness, urinary symptoms.  She reports being prescribed a 3 day course of furosemide that by her PCP for leg swelling last month.  She has also follow up with her nephrologist over the past month due to progressive CKD concerns.  Over the past month, her GDMT medications were adjusted due to hyperkalemia, ACE inhibitor/spironolactone were discontinued and SGLT2 inhibitor dose decreased.    ED course notable for vitals with hypertension, tachypnea, hypoxia.  Lab work notable for WBC 3.6, hemoglobin 9.2, platelets 74, bicarb 21, BUN 46, creatinine 2, glucose 123, , troponin 0.103.  Urinalysis concerning for WBC >100, leukocyte esterase 3+.      Overview/Hospital Course:  Patient admitted with CHF exacerbation, pneumonia and UTI.  Patient is continued on IV antibiotics and IV diuresis.  She states she is doing better on hospital day.  She  remains on NC o2 at 2L - wean as tolerated.  She had worsening of her renal function on hospital day 2 and creatinine at this time is 2.8.  IV Lasix has been discontinued and renal function will be monitored.  Patient has swelling in her lower extremities have completely resolved and chest CT was ordered this morning which showed some bilateral pleural effusions but no overt vascular congestion.  We will consider gentle hydration if renal function does not improve by tomorrow.  Patient has also been attempted to wean from nasal cannula O2 but this has been unsuccessful.  At baseline patient is on room air but currently requiring 2 L nasal cannula with saturations in the low 90s.  Patient's urine output has not decreased but we will be monitored closely.      Interval History: f.u CHF noted to be hypoxic on room air, additional IV lasix given. Plan chest x-ray in am    Review of Systems  Objective:     Vital Signs (Most Recent):  Temp: 97.8 °F (36.6 °C) (05/13/25 1204)  Pulse: 72 (05/13/25 1515)  Resp: 18 (05/13/25 1204)  BP: (!) 153/67 (05/13/25 1204)  SpO2: (!) 92 % (05/13/25 1204) Vital Signs (24h Range):  Temp:  [96.1 °F (35.6 °C)-98.6 °F (37 °C)] 97.8 °F (36.6 °C)  Pulse:  [64-88] 72  Resp:  [16-20] 18  SpO2:  [91 %-96 %] 92 %  BP: (140-176)/(67-87) 153/67     Weight: 107 kg (235 lb 14.3 oz)  Body mass index is 39.25 kg/m².    Intake/Output Summary (Last 24 hours) at 5/13/2025 1624  Last data filed at 5/13/2025 1230  Gross per 24 hour   Intake 720 ml   Output 3 ml   Net 717 ml         Physical Exam  HENT:      Head: Normocephalic and atraumatic.   Cardiovascular:      Rate and Rhythm: Normal rate and regular rhythm.      Heart sounds: No murmur heard.  Pulmonary:      Effort: Pulmonary effort is normal. No respiratory distress.      Breath sounds: Decreased breath sounds present. No wheezing.   Abdominal:      General: Bowel sounds are normal. There is no distension.      Palpations: Abdomen is soft.       Tenderness: There is no abdominal tenderness.   Musculoskeletal:         General: No swelling.   Skin:     General: Skin is warm and dry.   Neurological:      Mental Status: She is alert and oriented to person, place, and time. Mental status is at baseline.               Significant Labs: All pertinent labs within the past 24 hours have been reviewed.  Recent Lab Results  (Last 5 results in the past 24 hours)        05/13/25  1621   05/13/25  1144   05/13/25  0608   05/13/25  0503   05/12/25  2143        Albumin       3.0         ALP       66         ALT       13         Anion Gap       12         AST       16         Baso #       0.01         Basophil %       0.3         BILIRUBIN TOTAL       1.1  Comment: For infants and newborns, interpretation of results should be based   on gestational age, weight and in agreement with clinical   observations.    Premature Infant recommended reference ranges:   0-24 hours:  <8.0 mg/dL   24-48 hours: <12.0 mg/dL   3-5 days:    <15.0 mg/dL   6-29 days:   <15.0 mg/dL         BUN       77         Calcium       8.5         Chloride       103         CO2       25         Creatinine       2.5         eGFR       20  Comment: Estimated GFR calculated using the CKD-EPI creatinine (2021) equation.         Eos #       0.21         Eos %       6.2         Glucose       141         Gran # (ANC)       2.40         Hematocrit       28.0         Hemoglobin       8.6         Immature Grans (Abs)       0.01  Comment: Mild elevation in immature granulocytes is non specific and can be seen in a variety of conditions including stress response, acute inflammation, trauma and pregnancy. Correlation with other laboratory and clinical findings is essential.         Immature Granulocytes       0.3         Lymph #       0.30         Lymph %       8.9         Magnesium        2.5         MCH       28.0         MCHC       30.7         MCV       91         Mono #       0.44         Mono %       13.1          MPV       10.6         Neut %       71.2         nRBC       0         Phosphorus Level       4.7         Platelet Count       66         POCT Glucose 189   137   131     137       Potassium       4.1         PROTEIN TOTAL       6.4         RBC       3.07         RDW       14.1         Sodium       140         WBC       3.37                                Significant Imaging: I have reviewed all pertinent imaging results/findings within the past 24 hours.      Assessment & Plan  Acute hypoxemic respiratory failure  Acute on chronic diastolic congestive heart failure  -Patient with Hypoxic Respiratory failure which is Acute.  she is not on home oxygen currently.  Prior history of temporary home supplemental oxygen requirement after a hospitalization in July 2024 for pneumonia/CHF and home oxygen that was discontinued by the end of 2024. Patient is identified as having Diastolic (HFpEF) heart failure that is Acute on Chronic. CHF is currently uncontrolled due to volume overload due to: Continued edema of extremities, Dyspnea, Rales/crackles on pulmonary exam, and Pulmonary edema/pleural effusion on CXR.     Signs/symptoms of respiratory failure include- increased work of breathing. Contributing diagnoses includes - CHF Labs and images were reviewed.     Recent imaging notable for :  X-Ray Chest AP Portable  Final Result   Findings likely related to congestive heart failure.  Correlate.  Follow-up is recommended.  Finalized on: 5/9/2025 7:41 PM By:  Pierre Cuellar MD    - Reviewed most recent ECHO performed and demonstrates- Results for orders placed during the hospital encounter of 06/26/24  Echo  Interpretation Summary    Left Ventricle: The left ventricle is normal in size. Normal wall thickness. There is concentric hypertrophy. Normal wall motion. Ejection fraction by visual approximation is 60%. Grade II diastolic dysfunction.    Right Ventricle: Normal right ventricular cavity size. Wall thickness is normal. Systolic  function is normal.    Left Atrium: Left atrium is severely dilated.    Aortic Valve: The aortic valve is a trileaflet valve. Mildly restricted motion. There is moderate stenosis. Aortic valve area by VTI is 1.24 cm². Aortic valve peak velocity is 2.66 m/s. Mean gradient is 17 mmHg. The dimensionless index is 0.45.    Mitral Valve: There is moderate mitral annular calcification present. Mildly restricted motion. There is mild to moderate regurgitation.                Plan:  - Current GDMT meds: hydrALAZINE injection 5 mg, Every 6 hours PRN, Intravenous  hydrALAZINE tablet 25 mg, Every 12 hours, Oral  -Titrate(increase/decrease) diuretic dosing to target euvolemia. Current Diuretic regimen:  - Cycle troponin to rule out ischemic etiology  - Monitor on telemetry.   -Patient is on CHF pathway.    - Cardiac diet with Fluid restriction at 1.5L with strict I/Os and daily standing weights  - I/O -1.2L  - CT chest 05/11/2025:  Bilateral pleural effusions noted but no vascular congestion.        Type 2 diabetes mellitus with microalbuminuria, with long-term current use of insulin  Patient's FSGs are controlled on current medication regimen.  Last A1c reviewed-   Lab Results   Component Value Date    HGBA1C 6 (H) 03/19/2025     Most recent fingerstick glucose reviewed-   Recent Labs   Lab 05/12/25  2143 05/13/25  0608 05/13/25  1144 05/13/25  1621   POCTGLUCOSE 137* 131* 137* 189*     Current correctional scale  Medium  Maintain anti-hyperglycemic dose as follows-   Antihyperglycemics (From admission, onward)      Start     Stop Route Frequency Ordered    05/10/25 0402  insulin aspart U-100 pen 0-10 Units         -- SubQ Before meals & nightly PRN 05/10/25 0302          Hold Oral hypoglycemics while patient is in the hospital.    Hypothyroidism   Recent thyroid labs reviewed:  Lab Results   Component Value Date    TSH 0.17 (L) 03/19/2025    FREET4 1.12 08/30/2024     Hx of Hypothyroidism    PLAN:   Continue home  levothyroxine  Essential hypertension  Hypertensive on admission    Home meds for hypertension were reviewed and noted below        amLODIPine (NORVASC) 10 MG tablet  TAKE 1 TABLET(10 MG) BY MOUTH EVERY DAY  hydrALAZINE (APRESOLINE) 25 MG tablet  Take 1 tablet (25 mg total) by mouth every 12 (twelve) hours.    Patients blood pressure range in the last 24 hours was: BP  Min: 127/52  Max: 189/79.    PLAN:  -While in the hospital, will manage blood pressure as follows; Continue home antihypertensive regimen  -The patient's inpatient anti-hypertensive regimen is listed below:  Current Antihypertensives  hydrALAZINE injection 5 mg, Every 6 hours PRN, Intravenous  amLODIPine tablet 10 mg, Daily, Oral  hydrALAZINE tablet 25 mg, Every 12 hours, Oral  -Will utilize p.r.n. blood pressure medication only if patient's blood pressure greater than 180/110 and she develops symptoms such as worsening chest pain or shortness of breath.    Dyslipidemia  Recent Lipid Panel reviewed-  Lab Results   Component Value Date    HDL 34 (L) 06/27/2024    LDLCALC 24.0 (L) 06/27/2024    TRIG 65 06/27/2024    CHOL 71 (L) 06/27/2024      -Home medications:         pravastatin (PRAVACHOL) 20 MG tablet  Take 20 mg by mouth once daily.     PLAN  Continue home statin    Acute cystitis without hematuria  Urinalysis on admission concerning for UTI.  In setting of constitutional symptoms, difficult to rule out asymptomatic bacteriuria. Hx of ESBL UTI    Followup urine cultures, deescalate antibiotics as appropriate  Antibiotics:  Antibiotics (From admission, onward)      Start     Stop Route Frequency Ordered    05/13/25 2100  cephALEXin capsule 500 mg         -- Oral 2 times daily 05/13/25 1308              Chronic kidney disease, stage 4 (severe)  Creatine stable for now. BMP reviewed- noted Estimated Creatinine Clearance: 24 mL/min (A) (based on SCr of 2.5 mg/dL (H)). according to latest data. Based on current GFR, CKD stage is stage 4 - GFR 15-29.   Monitor UOP and serial BMP and adjust therapy as needed. Renally dose meds. Avoid nephrotoxic medications and procedures.  Asthma  Chronic.  Stable  Follows with pulmonology outpatient    PLAN:  Home medication: Symbicort.  --Arformeterol/Budesonide while inpatient    VTE Risk Mitigation (From admission, onward)           Ordered     IP VTE HIGH RISK PATIENT  Once         05/09/25 2212     Place sequential compression device  Until discontinued         05/09/25 2212                    Discharge Planning   KYA:      Code Status: Full Code   Medical Readiness for Discharge Date:   Discharge Plan A: Home                        Kevin Thomas MD  Department of Hospital Medicine   [facility] - Telemetry ([facility])

## 2025-05-13 NOTE — ASSESSMENT & PLAN NOTE
-Patient with Hypoxic Respiratory failure which is Acute.  she is not on home oxygen currently.  Prior history of temporary home supplemental oxygen requirement after a hospitalization in July 2024 for pneumonia/CHF and home oxygen that was discontinued by the end of 2024. Patient is identified as having Diastolic (HFpEF) heart failure that is Acute on Chronic. CHF is currently uncontrolled due to volume overload due to: Continued edema of extremities, Dyspnea, Rales/crackles on pulmonary exam, and Pulmonary edema/pleural effusion on CXR.     Signs/symptoms of respiratory failure include- increased work of breathing. Contributing diagnoses includes - CHF Labs and images were reviewed.     Recent imaging notable for :  X-Ray Chest AP Portable  Final Result   Findings likely related to congestive heart failure.  Correlate.  Follow-up is recommended.  Finalized on: 5/9/2025 7:41 PM By:  Pierre Cuellar MD    - Reviewed most recent ECHO performed and demonstrates- Results for orders placed during the hospital encounter of 06/26/24  Echo  Interpretation Summary    Left Ventricle: The left ventricle is normal in size. Normal wall thickness. There is concentric hypertrophy. Normal wall motion. Ejection fraction by visual approximation is 60%. Grade II diastolic dysfunction.    Right Ventricle: Normal right ventricular cavity size. Wall thickness is normal. Systolic function is normal.    Left Atrium: Left atrium is severely dilated.    Aortic Valve: The aortic valve is a trileaflet valve. Mildly restricted motion. There is moderate stenosis. Aortic valve area by VTI is 1.24 cm². Aortic valve peak velocity is 2.66 m/s. Mean gradient is 17 mmHg. The dimensionless index is 0.45.    Mitral Valve: There is moderate mitral annular calcification present. Mildly restricted motion. There is mild to moderate regurgitation.                Plan:  - Current GDMT meds: hydrALAZINE injection 5 mg, Every 6 hours PRN, Intravenous  hydrALAZINE  tablet 25 mg, Every 12 hours, Oral  -Titrate(increase/decrease) diuretic dosing to target euvolemia. Current Diuretic regimen:  - Cycle troponin to rule out ischemic etiology  - Monitor on telemetry.   -Patient is on CHF pathway.    - Cardiac diet with Fluid restriction at 1.5L with strict I/Os and daily standing weights  - I/O -1.2L  - CT chest 05/11/2025:  Bilateral pleural effusions noted but no vascular congestion.

## 2025-05-13 NOTE — PLAN OF CARE
Problem: Adult Inpatient Plan of Care  Goal: Plan of Care Review  Outcome: Progressing  Goal: Patient-Specific Goal (Individualized)  Outcome: Progressing  Goal: Absence of Hospital-Acquired Illness or Injury  Outcome: Progressing  Goal: Optimal Comfort and Wellbeing  Outcome: Progressing  Goal: Readiness for Transition of Care  Outcome: Progressing     Problem: Diabetes Comorbidity  Goal: Blood Glucose Level Within Targeted Range  Outcome: Progressing     Problem: Skin Injury Risk Increased  Goal: Skin Health and Integrity  Outcome: Progressing     Problem: Gas Exchange Impaired  Goal: Optimal Gas Exchange  Outcome: Progressing     Problem: Fall Injury Risk  Goal: Absence of Fall and Fall-Related Injury  Outcome: Progressing     Problem: Fatigue  Goal: Improved Activity Tolerance  Outcome: Progressing     Problem: Activity Intolerance  Goal: Enhanced Capacity and Energy  Outcome: Progressing     Problem: Nonalliance  Goal: Collaboration with the Healthcare Team  Outcome: Progressing     Problem: Self-Care Deficit  Goal: Improved Ability to Complete Activities of Daily Living  Outcome: Progressing

## 2025-05-13 NOTE — ASSESSMENT & PLAN NOTE
Patient's FSGs are controlled on current medication regimen.  Last A1c reviewed-   Lab Results   Component Value Date    HGBA1C 6 (H) 03/19/2025     Most recent fingerstick glucose reviewed-   Recent Labs   Lab 05/12/25  2143 05/13/25  0608 05/13/25  1144 05/13/25  1621   POCTGLUCOSE 137* 131* 137* 189*     Current correctional scale  Medium  Maintain anti-hyperglycemic dose as follows-   Antihyperglycemics (From admission, onward)      Start     Stop Route Frequency Ordered    05/10/25 0402  insulin aspart U-100 pen 0-10 Units         -- SubQ Before meals & nightly PRN 05/10/25 0302          Hold Oral hypoglycemics while patient is in the hospital.

## 2025-05-14 LAB
ABSOLUTE EOSINOPHIL (OHS): 0.27 K/UL
ABSOLUTE MONOCYTE (OHS): 0.43 K/UL (ref 0.3–1)
ABSOLUTE NEUTROPHIL COUNT (OHS): 2.49 K/UL (ref 1.8–7.7)
ALBUMIN SERPL BCP-MCNC: 2.9 G/DL (ref 3.5–5.2)
ALP SERPL-CCNC: 67 UNIT/L (ref 40–150)
ALT SERPL W/O P-5'-P-CCNC: 13 UNIT/L (ref 10–44)
ANION GAP (OHS): 12 MMOL/L (ref 8–16)
AST SERPL-CCNC: 16 UNIT/L (ref 11–45)
BASOPHILS # BLD AUTO: 0.01 K/UL
BASOPHILS NFR BLD AUTO: 0.3 %
BILIRUB SERPL-MCNC: 1.1 MG/DL (ref 0.1–1)
BUN SERPL-MCNC: 77 MG/DL (ref 8–23)
CALCIUM SERPL-MCNC: 8.6 MG/DL (ref 8.7–10.5)
CHLORIDE SERPL-SCNC: 103 MMOL/L (ref 95–110)
CO2 SERPL-SCNC: 26 MMOL/L (ref 23–29)
CREAT SERPL-MCNC: 2.3 MG/DL (ref 0.5–1.4)
ERYTHROCYTE [DISTWIDTH] IN BLOOD BY AUTOMATED COUNT: 14 % (ref 11.5–14.5)
GFR SERPLBLD CREATININE-BSD FMLA CKD-EPI: 22 ML/MIN/1.73/M2
GLUCOSE SERPL-MCNC: 129 MG/DL (ref 70–110)
HCT VFR BLD AUTO: 27.4 % (ref 37–48.5)
HGB BLD-MCNC: 8.7 GM/DL (ref 12–16)
IMM GRANULOCYTES # BLD AUTO: 0.02 K/UL (ref 0–0.04)
IMM GRANULOCYTES NFR BLD AUTO: 0.6 % (ref 0–0.5)
LYMPHOCYTES # BLD AUTO: 0.3 K/UL (ref 1–4.8)
MAGNESIUM SERPL-MCNC: 2.4 MG/DL (ref 1.6–2.6)
MCH RBC QN AUTO: 28.5 PG (ref 27–31)
MCHC RBC AUTO-ENTMCNC: 31.8 G/DL (ref 32–36)
MCV RBC AUTO: 90 FL (ref 82–98)
NUCLEATED RBC (/100WBC) (OHS): 0 /100 WBC
PHOSPHATE SERPL-MCNC: 4.1 MG/DL (ref 2.7–4.5)
PLATELET # BLD AUTO: 58 K/UL (ref 150–450)
PMV BLD AUTO: 10.5 FL (ref 9.2–12.9)
POCT GLUCOSE: 132 MG/DL (ref 70–110)
POCT GLUCOSE: 157 MG/DL (ref 70–110)
POCT GLUCOSE: 159 MG/DL (ref 70–110)
POCT GLUCOSE: 181 MG/DL (ref 70–110)
POTASSIUM SERPL-SCNC: 4.1 MMOL/L (ref 3.5–5.1)
PROT SERPL-MCNC: 6.3 GM/DL (ref 6–8.4)
RBC # BLD AUTO: 3.05 M/UL (ref 4–5.4)
RELATIVE EOSINOPHIL (OHS): 7.7 %
RELATIVE LYMPHOCYTE (OHS): 8.5 % (ref 18–48)
RELATIVE MONOCYTE (OHS): 12.2 % (ref 4–15)
RELATIVE NEUTROPHIL (OHS): 70.7 % (ref 38–73)
SODIUM SERPL-SCNC: 141 MMOL/L (ref 136–145)
WBC # BLD AUTO: 3.52 K/UL (ref 3.9–12.7)

## 2025-05-14 PROCEDURE — 25000242 PHARM REV CODE 250 ALT 637 W/ HCPCS: Performed by: STUDENT IN AN ORGANIZED HEALTH CARE EDUCATION/TRAINING PROGRAM

## 2025-05-14 PROCEDURE — 99900035 HC TECH TIME PER 15 MIN (STAT)

## 2025-05-14 PROCEDURE — 63600175 PHARM REV CODE 636 W HCPCS: Performed by: INTERNAL MEDICINE

## 2025-05-14 PROCEDURE — 80053 COMPREHEN METABOLIC PANEL: CPT | Performed by: STUDENT IN AN ORGANIZED HEALTH CARE EDUCATION/TRAINING PROGRAM

## 2025-05-14 PROCEDURE — 84100 ASSAY OF PHOSPHORUS: CPT | Performed by: STUDENT IN AN ORGANIZED HEALTH CARE EDUCATION/TRAINING PROGRAM

## 2025-05-14 PROCEDURE — 25000003 PHARM REV CODE 250: Performed by: STUDENT IN AN ORGANIZED HEALTH CARE EDUCATION/TRAINING PROGRAM

## 2025-05-14 PROCEDURE — 25000003 PHARM REV CODE 250: Performed by: INTERNAL MEDICINE

## 2025-05-14 PROCEDURE — 27000221 HC OXYGEN, UP TO 24 HOURS

## 2025-05-14 PROCEDURE — 21400001 HC TELEMETRY ROOM

## 2025-05-14 PROCEDURE — 94640 AIRWAY INHALATION TREATMENT: CPT

## 2025-05-14 PROCEDURE — 94761 N-INVAS EAR/PLS OXIMETRY MLT: CPT

## 2025-05-14 PROCEDURE — 85025 COMPLETE CBC W/AUTO DIFF WBC: CPT | Performed by: STUDENT IN AN ORGANIZED HEALTH CARE EDUCATION/TRAINING PROGRAM

## 2025-05-14 PROCEDURE — 36415 COLL VENOUS BLD VENIPUNCTURE: CPT | Performed by: STUDENT IN AN ORGANIZED HEALTH CARE EDUCATION/TRAINING PROGRAM

## 2025-05-14 PROCEDURE — 83735 ASSAY OF MAGNESIUM: CPT | Performed by: STUDENT IN AN ORGANIZED HEALTH CARE EDUCATION/TRAINING PROGRAM

## 2025-05-14 RX ORDER — FUROSEMIDE 10 MG/ML
60 INJECTION INTRAMUSCULAR; INTRAVENOUS ONCE
Status: COMPLETED | OUTPATIENT
Start: 2025-05-14 | End: 2025-05-14

## 2025-05-14 RX ADMIN — ARFORMOTEROL TARTRATE 15 MCG: 15 SOLUTION RESPIRATORY (INHALATION) at 08:05

## 2025-05-14 RX ADMIN — AMLODIPINE BESYLATE 10 MG: 10 TABLET ORAL at 08:05

## 2025-05-14 RX ADMIN — HYDRALAZINE HYDROCHLORIDE 25 MG: 25 TABLET ORAL at 08:05

## 2025-05-14 RX ADMIN — FUROSEMIDE 60 MG: 10 INJECTION, SOLUTION INTRAMUSCULAR; INTRAVENOUS at 04:05

## 2025-05-14 RX ADMIN — LEVOTHYROXINE SODIUM 137 MCG: 0.11 TABLET ORAL at 05:05

## 2025-05-14 RX ADMIN — CEPHALEXIN 500 MG: 500 CAPSULE ORAL at 08:05

## 2025-05-14 RX ADMIN — HYDRALAZINE HYDROCHLORIDE 25 MG: 25 TABLET ORAL at 10:05

## 2025-05-14 RX ADMIN — Medication 6 MG: at 10:05

## 2025-05-14 RX ADMIN — BUDESONIDE INHALATION 0.5 MG: 0.5 SUSPENSION RESPIRATORY (INHALATION) at 08:05

## 2025-05-14 RX ADMIN — BUDESONIDE INHALATION 0.5 MG: 0.5 SUSPENSION RESPIRATORY (INHALATION) at 07:05

## 2025-05-14 RX ADMIN — PRAVASTATIN SODIUM 20 MG: 20 TABLET ORAL at 08:05

## 2025-05-14 RX ADMIN — INSULIN ASPART 1 UNITS: 100 INJECTION, SOLUTION INTRAVENOUS; SUBCUTANEOUS at 10:05

## 2025-05-14 RX ADMIN — INSULIN ASPART 2 UNITS: 100 INJECTION, SOLUTION INTRAVENOUS; SUBCUTANEOUS at 04:05

## 2025-05-14 RX ADMIN — CEPHALEXIN 500 MG: 500 CAPSULE ORAL at 10:05

## 2025-05-14 NOTE — RESPIRATORY THERAPY
05/14/25 0813   Patient Assessment/Suction   Level of Consciousness (AVPU) alert   Respiratory Effort Normal;Unlabored   Expansion/Accessory Muscles/Retractions expansion symmetric;no retractions;no use of accessory muscles   All Lung Fields Breath Sounds Anterior:;Lateral:;clear;equal bilaterally   Rhythm/Pattern, Respiratory depth regular;pattern regular;unlabored;no shortness of breath reported   Cough Frequency infrequent   Cough Type good;nonproductive   Skin Integrity   $ Wound Care Tech Time 15 min   Area Observed Left;Right;Behind ear;Cheek;Nares   Skin Appearance without discoloration   PRE-TX-O2   Device (Oxygen Therapy) nasal cannula with humidification   $ Is the patient on Low Flow Oxygen? Yes   Flow (L/min) (Oxygen Therapy) 1   SpO2 (!) 94 %   Pulse Oximetry Type Intermittent   $ Pulse Oximetry - Multiple Charge Pulse Oximetry - Multiple   Pulse 78   Resp 16   Positioning HOB elevated 45 degrees   Aerosol Therapy   $ Aerosol Therapy Charges Aerosol Treatment   Respiratory Treatment Status (SVN) given  (brovana)   Treatment Route (SVN) air;mask   Patient Position HOB elevated;semi-Llanes's   Post Treatment Assessment (SVN) breath sounds unchanged   Signs of Intolerance (SVN) none   Breath Sounds Post-Respiratory Treatment   Post-treatment Heart Rate (beats/min) 78   Post-treatment Resp Rate (breaths/min) 16

## 2025-05-14 NOTE — PLAN OF CARE
A255/A255 ROLANDO  Lakshmi Campbell is a 74 y.o.female admitted on 5/9/2025 for Acute hypoxemic respiratory failure   Code Status: Full Code MRN: [id number]   Review of patient's allergies indicates:   Allergen Reactions    Codeine Nausea Only     Other reaction(s): Nausea  Other reaction(s): Elevated blood pressure     Past Medical History:   Diagnosis Date    Acquired TTP     Cataract     CKD stage 3 secondary to diabetes     Closed displaced comminuted fracture of right patella 10/28/2020    Closed fracture of surgical neck of left humerus 10/30/2020    Closed left radial fracture 10/30/2020    Hyperkalemia     Hyperlipidemia     Hypertension     Hypothyroidism, unspecified     Liver cirrhosis secondary to YO     Morbid obesity     Right knee pain     Thyroid disease     Type 2 diabetes mellitus       PRN meds    acetaminophen, 650 mg, Q6H PRN  dextrose 50%, 12.5 g, PRN  dextrose 50%, 25 g, PRN  glucagon (human recombinant), 1 mg, PRN  glucose, 16 g, PRN  glucose, 24 g, PRN  hydrALAZINE, 5 mg, Q6H PRN  insulin aspart U-100, 0-10 Units, QID (AC + HS) PRN  melatonin, 6 mg, Nightly PRN  ondansetron, 4 mg, Q8H PRN  polyethylene glycol, 17 g, BID PRN  prochlorperazine, 2.5 mg, Q8H PRN  sodium chloride 0.9%, 10 mL, PRN      Chart check completed. Will continue plan of care.      Orientation: oriented x 4  Ignacio Coma Scale Score: 15     Lead Monitored: Lead II Rhythm: normal sinus rhythm    Cardiac/Telemetry Box Number: [id number]  VTE Core Measure: Pharmacological prophylaxis initiated/maintained Last Bowel Movement: 05/09/25  Diet Low Sodium, 2gm Fluid - 1500mL     Schuyler Score: 19  Fall Risk Score: 10  Accucheck []   Freq?      Lines/Drains/Airways       Peripheral Intravenous Line  Duration                  Peripheral IV - Single Lumen 05/12/25 0619 20 G Anterior;Left Forearm 2 days

## 2025-05-14 NOTE — ASSESSMENT & PLAN NOTE
Urinalysis on admission concerning for UTI.  In setting of constitutional symptoms, difficult to rule out asymptomatic bacteriuria. Hx of ESBL UTI    Followup urine cultures, deescalated antibiotics 5/13  Antibiotics:  Antibiotics (From admission, onward)      Start     Stop Route Frequency Ordered    05/13/25 2100  cephALEXin capsule 500 mg         -- Oral 2 times daily 05/13/25 5860

## 2025-05-14 NOTE — PROGRESS NOTES
[facility] Medicine  Progress Note    Patient Name: Lakshmi Campbell  MRN: [id number]  Patient Class: IP- Inpatient   Admission Date: 5/9/2025  Length of Stay: 3 days  Attending Physician: Kevin Thomas MD  Primary Care Provider: Keely Silva NP        Subjective     Principal Problem:Acute hypoxemic respiratory failure        HPI:  Ms. Lakshmi Campbell is a 74 y.o. female who  has a medical history of Acquired TTP, Cataract, CKD stage 3 secondary to diabetes, Closed displaced comminuted fracture of right patella, Closed fracture of surgical neck of left humerus, Closed left radial fracture, Hyperkalemia, Hyperlipidemia, Hypertension, Hypothyroidism, Liver cirrhosis secondary to YO, Morbid obesity, Right knee pain, Thyroid disease, and Type 2 diabetes mellitus.    She  presented to the ED for evaluation of worsening shortness of breath over the past 2 weeks.  She reports associated symptoms of dyspnea on exertion, mild cough, lower extremity swelling, abdominal swelling, orthopnea. Denies cough, chest pain, abdominal pain, lightheadedness, dizziness, urinary symptoms.  She reports being prescribed a 3 day course of furosemide that by her PCP for leg swelling last month.  She has also follow up with her nephrologist over the past month due to progressive CKD concerns.  Over the past month, her GDMT medications were adjusted due to hyperkalemia, ACE inhibitor/spironolactone were discontinued and SGLT2 inhibitor dose decreased.    ED course notable for vitals with hypertension, tachypnea, hypoxia.  Lab work notable for WBC 3.6, hemoglobin 9.2, platelets 74, bicarb 21, BUN 46, creatinine 2, glucose 123, , troponin 0.103.  Urinalysis concerning for WBC >100, leukocyte esterase 3+.      Overview/Hospital Course:  Patient admitted with CHF exacerbation, pneumonia and UTI.  Patient is continued on IV antibiotics and IV diuresis.  She states she is doing better on hospital day.  She  remains on NC o2 at 2L - wean as tolerated.  She had worsening of her renal function on hospital day 2 and creatinine at this time is 2.8.  IV Lasix has been discontinued and renal function will be monitored.  Patient has swelling in her lower extremities have completely resolved and chest CT was ordered this morning which showed some bilateral pleural effusions but no overt vascular congestion.  We will consider gentle hydration if renal function does not improve by tomorrow.  Patient has also been attempted to wean from nasal cannula O2 but this has been unsuccessful.  At baseline patient is on room air but currently requiring 2 L nasal cannula with saturations in the low 90s.  Patient's urine output has not decreased but we will be monitored closely.      Interval History: f/u CHF  needs additional diuresis oxygenation improving    Review of Systems  Objective:     Vital Signs (Most Recent):  Temp: 98.2 °F (36.8 °C) (05/14/25 1552)  Pulse: 73 (05/14/25 1714)  Resp: 16 (05/14/25 1552)  BP: (!) 148/61 (05/14/25 1552)  SpO2: (!) 92 % (05/14/25 1714) Vital Signs (24h Range):  Temp:  [97.6 °F (36.4 °C)-98.2 °F (36.8 °C)] 98.2 °F (36.8 °C)  Pulse:  [68-80] 73  Resp:  [16-18] 16  SpO2:  [90 %-98 %] 92 %  BP: (135-190)/(50-74) 148/61     Weight: 112.9 kg (248 lb 14.4 oz)  Body mass index is 41.42 kg/m².  No intake or output data in the 24 hours ending 05/14/25 1824      Physical Exam  HENT:      Head: Normocephalic and atraumatic.   Cardiovascular:      Rate and Rhythm: Normal rate and regular rhythm.      Heart sounds: No murmur heard.  Pulmonary:      Effort: Pulmonary effort is normal. No respiratory distress.      Breath sounds: Decreased breath sounds present. No wheezing.   Abdominal:      General: Bowel sounds are normal. There is no distension.      Palpations: Abdomen is soft.      Tenderness: There is no abdominal tenderness.   Musculoskeletal:         General: No swelling.   Skin:     General: Skin is warm and  dry.   Neurological:      Mental Status: She is alert and oriented to person, place, and time. Mental status is at baseline.               Significant Labs: All pertinent labs within the past 24 hours have been reviewed.  Recent Lab Results  (Last 5 results in the past 24 hours)        05/14/25  1618   05/14/25  1130   05/14/25  0537   05/14/25  0418   05/13/25  2046        Albumin       2.9         ALP       67         ALT       13         Anion Gap       12         AST       16         Baso #       0.01         Basophil %       0.3         BILIRUBIN TOTAL       1.1  Comment: For infants and newborns, interpretation of results should be based   on gestational age, weight and in agreement with clinical   observations.    Premature Infant recommended reference ranges:   0-24 hours:  <8.0 mg/dL   24-48 hours: <12.0 mg/dL   3-5 days:    <15.0 mg/dL   6-29 days:   <15.0 mg/dL         BUN       77         Calcium       8.6         Chloride       103         CO2       26         Creatinine       2.3         eGFR       22  Comment: Estimated GFR calculated using the CKD-EPI creatinine (2021) equation.         Eos #       0.27         Eos %       7.7         Glucose       129         Gran # (ANC)       2.49         Hematocrit       27.4         Hemoglobin       8.7         Immature Grans (Abs)       0.02  Comment: Mild elevation in immature granulocytes is non specific and can be seen in a variety of conditions including stress response, acute inflammation, trauma and pregnancy. Correlation with other laboratory and clinical findings is essential.         Immature Granulocytes       0.6         Lymph #       0.30         Lymph %       8.5         Magnesium        2.4         MCH       28.5         MCHC       31.8         MCV       90         Mono #       0.43         Mono %       12.2         MPV       10.5         Neut %       70.7         nRBC       0         Phosphorus Level       4.1         Platelet Count       58          POCT Glucose 159   157   132     176       Potassium       4.1         PROTEIN TOTAL       6.3         RBC       3.05         RDW       14.0         Sodium       141         WBC       3.52                                Significant Imaging: I have reviewed all pertinent imaging results/findings within the past 24 hours.      Assessment & Plan  Acute hypoxemic respiratory failure  Acute on chronic diastolic congestive heart failure  -Patient with Hypoxic Respiratory failure which is Acute.  she is not on home oxygen currently.  Prior history of temporary home supplemental oxygen requirement after a hospitalization in July 2024 for pneumonia/CHF and home oxygen that was discontinued by the end of 2024. Patient is identified as having Diastolic (HFpEF) heart failure that is Acute on Chronic. CHF is currently uncontrolled due to volume overload due to: Continued edema of extremities, Dyspnea, Rales/crackles on pulmonary exam, and Pulmonary edema/pleural effusion on CXR.     Signs/symptoms of respiratory failure include- increased work of breathing. Contributing diagnoses includes - CHF Labs and images were reviewed.     Recent imaging notable for :  X-Ray Chest AP Portable  Final Result   Findings likely related to congestive heart failure.  Correlate.  Follow-up is recommended.  Finalized on: 5/9/2025 7:41 PM By:  Pierre Cuellar MD    - Reviewed most recent ECHO performed and demonstrates- Results for orders placed during the hospital encounter of 06/26/24  Echo  Interpretation Summary    Left Ventricle: The left ventricle is normal in size. Normal wall thickness. There is concentric hypertrophy. Normal wall motion. Ejection fraction by visual approximation is 60%. Grade II diastolic dysfunction.    Right Ventricle: Normal right ventricular cavity size. Wall thickness is normal. Systolic function is normal.    Left Atrium: Left atrium is severely dilated.    Aortic Valve: The aortic valve is a trileaflet valve. Mildly  restricted motion. There is moderate stenosis. Aortic valve area by VTI is 1.24 cm². Aortic valve peak velocity is 2.66 m/s. Mean gradient is 17 mmHg. The dimensionless index is 0.45.    Mitral Valve: There is moderate mitral annular calcification present. Mildly restricted motion. There is mild to moderate regurgitation.                Plan:  - Current GDMT meds: hydrALAZINE injection 5 mg, Every 6 hours PRN, Intravenous  hydrALAZINE tablet 25 mg, Every 12 hours, Oral  -Titrate(increase/decrease) diuretic dosing to target euvolemia. Current Diuretic regimen:  - Cycle troponin to rule out ischemic etiology  - Monitor on telemetry.   -Patient is on CHF pathway.    - Cardiac diet with Fluid restriction at 1.5L with strict I/Os and daily standing weights  - I/O -1.2L  - CT chest 05/11/2025:  Bilateral pleural effusions noted but no vascular congestion.        Type 2 diabetes mellitus with microalbuminuria, with long-term current use of insulin  Patient's FSGs are controlled on current medication regimen.  Last A1c reviewed-   Lab Results   Component Value Date    HGBA1C 6 (H) 03/19/2025     Most recent fingerstick glucose reviewed-   Recent Labs   Lab 05/13/25  2046 05/14/25  0537 05/14/25  1130 05/14/25  1618   POCTGLUCOSE 176* 132* 157* 159*     Current correctional scale  Medium  Maintain anti-hyperglycemic dose as follows-   Antihyperglycemics (From admission, onward)      Start     Stop Route Frequency Ordered    05/10/25 0402  insulin aspart U-100 pen 0-10 Units         -- SubQ Before meals & nightly PRN 05/10/25 0302          Hold Oral hypoglycemics while patient is in the hospital.    Hypothyroidism   Recent thyroid labs reviewed:  Lab Results   Component Value Date    TSH 0.17 (L) 03/19/2025    FREET4 1.12 08/30/2024     Hx of Hypothyroidism    PLAN:   Continue home levothyroxine  Essential hypertension  Hypertensive on admission    Home meds for hypertension were reviewed and noted below        amLODIPine  (NORVASC) 10 MG tablet  TAKE 1 TABLET(10 MG) BY MOUTH EVERY DAY  hydrALAZINE (APRESOLINE) 25 MG tablet  Take 1 tablet (25 mg total) by mouth every 12 (twelve) hours.    Patients blood pressure range in the last 24 hours was: BP  Min: 127/52  Max: 190/74.    PLAN:  -While in the hospital, will manage blood pressure as follows; Continue home antihypertensive regimen  -The patient's inpatient anti-hypertensive regimen is listed below:  Current Antihypertensives  hydrALAZINE injection 5 mg, Every 6 hours PRN, Intravenous  amLODIPine tablet 10 mg, Daily, Oral  hydrALAZINE tablet 25 mg, Every 12 hours, Oral  -Will utilize p.r.n. blood pressure medication only if patient's blood pressure greater than 180/110 and she develops symptoms such as worsening chest pain or shortness of breath.    Dyslipidemia  Recent Lipid Panel reviewed-  Lab Results   Component Value Date    HDL 34 (L) 06/27/2024    LDLCALC 24.0 (L) 06/27/2024    TRIG 65 06/27/2024    CHOL 71 (L) 06/27/2024      -Home medications:         pravastatin (PRAVACHOL) 20 MG tablet  Take 20 mg by mouth once daily.     PLAN  Continue home statin    Acute cystitis without hematuria  Urinalysis on admission concerning for UTI.  In setting of constitutional symptoms, difficult to rule out asymptomatic bacteriuria. Hx of ESBL UTI    Followup urine cultures, deescalated antibiotics 5/13  Antibiotics:  Antibiotics (From admission, onward)      Start     Stop Route Frequency Ordered    05/13/25 2100  cephALEXin capsule 500 mg         -- Oral 2 times daily 05/13/25 1308              Chronic kidney disease, stage 4 (severe)  Creatine stable for now. BMP reviewed- noted Estimated Creatinine Clearance: 26.9 mL/min (A) (based on SCr of 2.3 mg/dL (H)). according to latest data. Based on current GFR, CKD stage is stage 4 - GFR 15-29.  Monitor UOP and serial BMP and adjust therapy as needed. Renally dose meds. Avoid nephrotoxic medications and procedures.  Asthma  Chronic.   Stable  Follows with pulmonology outpatient    PLAN:  Home medication: Symbicort.  --Arformeterol/Budesonide while inpatient    VTE Risk Mitigation (From admission, onward)           Ordered     IP VTE HIGH RISK PATIENT  Once         05/09/25 2212     Place sequential compression device  Until discontinued         05/09/25 2212                    Discharge Planning   KYA:      Code Status: Full Code   Medical Readiness for Discharge Date:   Discharge Plan A: Home                        Kevin Thomas MD  Department of Hospital Medicine   [facility] - Telemetry ([facility])

## 2025-05-14 NOTE — SUBJECTIVE & OBJECTIVE
Interval History: f/u CHF  needs additional diuresis oxygenation improving    Review of Systems  Objective:     Vital Signs (Most Recent):  Temp: 98.2 °F (36.8 °C) (05/14/25 1552)  Pulse: 73 (05/14/25 1714)  Resp: 16 (05/14/25 1552)  BP: (!) 148/61 (05/14/25 1552)  SpO2: (!) 92 % (05/14/25 1714) Vital Signs (24h Range):  Temp:  [97.6 °F (36.4 °C)-98.2 °F (36.8 °C)] 98.2 °F (36.8 °C)  Pulse:  [68-80] 73  Resp:  [16-18] 16  SpO2:  [90 %-98 %] 92 %  BP: (135-190)/(50-74) 148/61     Weight: 112.9 kg (248 lb 14.4 oz)  Body mass index is 41.42 kg/m².  No intake or output data in the 24 hours ending 05/14/25 1824      Physical Exam  HENT:      Head: Normocephalic and atraumatic.   Cardiovascular:      Rate and Rhythm: Normal rate and regular rhythm.      Heart sounds: No murmur heard.  Pulmonary:      Effort: Pulmonary effort is normal. No respiratory distress.      Breath sounds: Decreased breath sounds present. No wheezing.   Abdominal:      General: Bowel sounds are normal. There is no distension.      Palpations: Abdomen is soft.      Tenderness: There is no abdominal tenderness.   Musculoskeletal:         General: No swelling.   Skin:     General: Skin is warm and dry.   Neurological:      Mental Status: She is alert and oriented to person, place, and time. Mental status is at baseline.               Significant Labs: All pertinent labs within the past 24 hours have been reviewed.  Recent Lab Results  (Last 5 results in the past 24 hours)        05/14/25  1618   05/14/25  1130   05/14/25  0537   05/14/25  0418   05/13/25  2046        Albumin       2.9         ALP       67         ALT       13         Anion Gap       12         AST       16         Baso #       0.01         Basophil %       0.3         BILIRUBIN TOTAL       1.1  Comment: For infants and newborns, interpretation of results should be based   on gestational age, weight and in agreement with clinical   observations.    Premature Infant recommended  reference ranges:   0-24 hours:  <8.0 mg/dL   24-48 hours: <12.0 mg/dL   3-5 days:    <15.0 mg/dL   6-29 days:   <15.0 mg/dL         BUN       77         Calcium       8.6         Chloride       103         CO2       26         Creatinine       2.3         eGFR       22  Comment: Estimated GFR calculated using the CKD-EPI creatinine (2021) equation.         Eos #       0.27         Eos %       7.7         Glucose       129         Gran # (ANC)       2.49         Hematocrit       27.4         Hemoglobin       8.7         Immature Grans (Abs)       0.02  Comment: Mild elevation in immature granulocytes is non specific and can be seen in a variety of conditions including stress response, acute inflammation, trauma and pregnancy. Correlation with other laboratory and clinical findings is essential.         Immature Granulocytes       0.6         Lymph #       0.30         Lymph %       8.5         Magnesium        2.4         MCH       28.5         MCHC       31.8         MCV       90         Mono #       0.43         Mono %       12.2         MPV       10.5         Neut %       70.7         nRBC       0         Phosphorus Level       4.1         Platelet Count       58         POCT Glucose 159   157   132     176       Potassium       4.1         PROTEIN TOTAL       6.3         RBC       3.05         RDW       14.0         Sodium       141         WBC       3.52                                Significant Imaging: I have reviewed all pertinent imaging results/findings within the past 24 hours.

## 2025-05-14 NOTE — ASSESSMENT & PLAN NOTE
Recent thyroid labs reviewed:  Lab Results   Component Value Date    TSH 0.17 (L) 03/19/2025    FREET4 1.12 08/30/2024     Hx of Hypothyroidism    PLAN:   Continue home levothyroxine   No/HCP given & explained

## 2025-05-14 NOTE — ASSESSMENT & PLAN NOTE
Hypertensive on admission    Home meds for hypertension were reviewed and noted below        amLODIPine (NORVASC) 10 MG tablet  TAKE 1 TABLET(10 MG) BY MOUTH EVERY DAY  hydrALAZINE (APRESOLINE) 25 MG tablet  Take 1 tablet (25 mg total) by mouth every 12 (twelve) hours.    Patients blood pressure range in the last 24 hours was: BP  Min: 127/52  Max: 190/74.    PLAN:  -While in the hospital, will manage blood pressure as follows; Continue home antihypertensive regimen  -The patient's inpatient anti-hypertensive regimen is listed below:  Current Antihypertensives  hydrALAZINE injection 5 mg, Every 6 hours PRN, Intravenous  amLODIPine tablet 10 mg, Daily, Oral  hydrALAZINE tablet 25 mg, Every 12 hours, Oral  -Will utilize p.r.n. blood pressure medication only if patient's blood pressure greater than 180/110 and she develops symptoms such as worsening chest pain or shortness of breath.

## 2025-05-14 NOTE — ASSESSMENT & PLAN NOTE
Creatine stable for now. BMP reviewed- noted Estimated Creatinine Clearance: 26.9 mL/min (A) (based on SCr of 2.3 mg/dL (H)). according to latest data. Based on current GFR, CKD stage is stage 4 - GFR 15-29.  Monitor UOP and serial BMP and adjust therapy as needed. Renally dose meds. Avoid nephrotoxic medications and procedures.

## 2025-05-14 NOTE — ASSESSMENT & PLAN NOTE
Patient's FSGs are controlled on current medication regimen.  Last A1c reviewed-   Lab Results   Component Value Date    HGBA1C 6 (H) 03/19/2025     Most recent fingerstick glucose reviewed-   Recent Labs   Lab 05/13/25  2046 05/14/25  0537 05/14/25  1130 05/14/25  1618   POCTGLUCOSE 176* 132* 157* 159*     Current correctional scale  Medium  Maintain anti-hyperglycemic dose as follows-   Antihyperglycemics (From admission, onward)      Start     Stop Route Frequency Ordered    05/10/25 0402  insulin aspart U-100 pen 0-10 Units         -- SubQ Before meals & nightly PRN 05/10/25 0302          Hold Oral hypoglycemics while patient is in the hospital.

## 2025-05-14 NOTE — PLAN OF CARE
Problem: Diabetes Comorbidity  Goal: Blood Glucose Level Within Targeted Range  Outcome: Progressing     Problem: Skin Injury Risk Increased  Goal: Skin Health and Integrity  Outcome: Progressing     Problem: Fall Injury Risk  Goal: Absence of Fall and Fall-Related Injury  Outcome: Progressing     Problem: Self-Care Deficit  Goal: Improved Ability to Complete Activities of Daily Living  Outcome: Progressing     Problem: Bariatric Environmental Safety  Goal: Safety Maintained with Care  Outcome: Progressing

## 2025-05-15 VITALS
OXYGEN SATURATION: 95 % | WEIGHT: 247.38 LBS | TEMPERATURE: 98 F | DIASTOLIC BLOOD PRESSURE: 72 MMHG | BODY MASS INDEX: 41.22 KG/M2 | SYSTOLIC BLOOD PRESSURE: 170 MMHG | HEIGHT: 65 IN | HEART RATE: 75 BPM | RESPIRATION RATE: 15 BRPM

## 2025-05-15 LAB
ABSOLUTE EOSINOPHIL (OHS): 0.24 K/UL
ABSOLUTE MONOCYTE (OHS): 0.34 K/UL (ref 0.3–1)
ABSOLUTE NEUTROPHIL COUNT (OHS): 2.51 K/UL (ref 1.8–7.7)
ALBUMIN SERPL BCP-MCNC: 2.9 G/DL (ref 3.5–5.2)
ALP SERPL-CCNC: 66 UNIT/L (ref 40–150)
ALT SERPL W/O P-5'-P-CCNC: 11 UNIT/L (ref 10–44)
ANION GAP (OHS): 11 MMOL/L (ref 8–16)
AST SERPL-CCNC: 15 UNIT/L (ref 11–45)
BASOPHILS # BLD AUTO: 0.01 K/UL
BASOPHILS NFR BLD AUTO: 0.3 %
BILIRUB SERPL-MCNC: 0.9 MG/DL (ref 0.1–1)
BUN SERPL-MCNC: 73 MG/DL (ref 8–23)
CALCIUM SERPL-MCNC: 8.5 MG/DL (ref 8.7–10.5)
CHLORIDE SERPL-SCNC: 103 MMOL/L (ref 95–110)
CO2 SERPL-SCNC: 27 MMOL/L (ref 23–29)
CREAT SERPL-MCNC: 2 MG/DL (ref 0.5–1.4)
ERYTHROCYTE [DISTWIDTH] IN BLOOD BY AUTOMATED COUNT: 13.9 % (ref 11.5–14.5)
GFR SERPLBLD CREATININE-BSD FMLA CKD-EPI: 26 ML/MIN/1.73/M2
GLUCOSE SERPL-MCNC: 159 MG/DL (ref 70–110)
HCT VFR BLD AUTO: 26.7 % (ref 37–48.5)
HGB BLD-MCNC: 8.4 GM/DL (ref 12–16)
IMM GRANULOCYTES # BLD AUTO: 0.02 K/UL (ref 0–0.04)
IMM GRANULOCYTES NFR BLD AUTO: 0.6 % (ref 0–0.5)
LYMPHOCYTES # BLD AUTO: 0.34 K/UL (ref 1–4.8)
MAGNESIUM SERPL-MCNC: 2.3 MG/DL (ref 1.6–2.6)
MCH RBC QN AUTO: 28.4 PG (ref 27–31)
MCHC RBC AUTO-ENTMCNC: 31.5 G/DL (ref 32–36)
MCV RBC AUTO: 90 FL (ref 82–98)
NUCLEATED RBC (/100WBC) (OHS): 0 /100 WBC
PHOSPHATE SERPL-MCNC: 3.9 MG/DL (ref 2.7–4.5)
PLATELET # BLD AUTO: 64 K/UL (ref 150–450)
PMV BLD AUTO: 10.5 FL (ref 9.2–12.9)
POCT GLUCOSE: 154 MG/DL (ref 70–110)
POCT GLUCOSE: 172 MG/DL (ref 70–110)
POTASSIUM SERPL-SCNC: 3.8 MMOL/L (ref 3.5–5.1)
PROT SERPL-MCNC: 5.9 GM/DL (ref 6–8.4)
RBC # BLD AUTO: 2.96 M/UL (ref 4–5.4)
RELATIVE EOSINOPHIL (OHS): 6.9 %
RELATIVE LYMPHOCYTE (OHS): 9.8 % (ref 18–48)
RELATIVE MONOCYTE (OHS): 9.8 % (ref 4–15)
RELATIVE NEUTROPHIL (OHS): 72.6 % (ref 38–73)
SODIUM SERPL-SCNC: 141 MMOL/L (ref 136–145)
WBC # BLD AUTO: 3.46 K/UL (ref 3.9–12.7)

## 2025-05-15 PROCEDURE — 25000003 PHARM REV CODE 250: Performed by: INTERNAL MEDICINE

## 2025-05-15 PROCEDURE — 36415 COLL VENOUS BLD VENIPUNCTURE: CPT | Performed by: STUDENT IN AN ORGANIZED HEALTH CARE EDUCATION/TRAINING PROGRAM

## 2025-05-15 PROCEDURE — 94761 N-INVAS EAR/PLS OXIMETRY MLT: CPT

## 2025-05-15 PROCEDURE — 94640 AIRWAY INHALATION TREATMENT: CPT

## 2025-05-15 PROCEDURE — 84100 ASSAY OF PHOSPHORUS: CPT | Performed by: STUDENT IN AN ORGANIZED HEALTH CARE EDUCATION/TRAINING PROGRAM

## 2025-05-15 PROCEDURE — 25000242 PHARM REV CODE 250 ALT 637 W/ HCPCS: Performed by: STUDENT IN AN ORGANIZED HEALTH CARE EDUCATION/TRAINING PROGRAM

## 2025-05-15 PROCEDURE — 82040 ASSAY OF SERUM ALBUMIN: CPT | Performed by: STUDENT IN AN ORGANIZED HEALTH CARE EDUCATION/TRAINING PROGRAM

## 2025-05-15 PROCEDURE — 27000221 HC OXYGEN, UP TO 24 HOURS

## 2025-05-15 PROCEDURE — 83735 ASSAY OF MAGNESIUM: CPT | Performed by: STUDENT IN AN ORGANIZED HEALTH CARE EDUCATION/TRAINING PROGRAM

## 2025-05-15 PROCEDURE — 85025 COMPLETE CBC W/AUTO DIFF WBC: CPT | Performed by: STUDENT IN AN ORGANIZED HEALTH CARE EDUCATION/TRAINING PROGRAM

## 2025-05-15 PROCEDURE — 25000003 PHARM REV CODE 250: Performed by: STUDENT IN AN ORGANIZED HEALTH CARE EDUCATION/TRAINING PROGRAM

## 2025-05-15 RX ORDER — CEPHALEXIN 500 MG/1
500 CAPSULE ORAL EVERY 8 HOURS
Status: DISCONTINUED | OUTPATIENT
Start: 2025-05-15 | End: 2025-05-15 | Stop reason: HOSPADM

## 2025-05-15 RX ORDER — FUROSEMIDE 20 MG/1
20 TABLET ORAL DAILY
Qty: 30 TABLET | Refills: 0 | Status: SHIPPED | OUTPATIENT
Start: 2025-05-15 | End: 2025-05-20 | Stop reason: SDUPTHER

## 2025-05-15 RX ADMIN — PRAVASTATIN SODIUM 20 MG: 20 TABLET ORAL at 08:05

## 2025-05-15 RX ADMIN — CEPHALEXIN 500 MG: 500 CAPSULE ORAL at 08:05

## 2025-05-15 RX ADMIN — BUDESONIDE INHALATION 0.5 MG: 0.5 SUSPENSION RESPIRATORY (INHALATION) at 07:05

## 2025-05-15 RX ADMIN — AMLODIPINE BESYLATE 10 MG: 10 TABLET ORAL at 08:05

## 2025-05-15 RX ADMIN — ARFORMOTEROL TARTRATE 15 MCG: 15 SOLUTION RESPIRATORY (INHALATION) at 07:05

## 2025-05-15 RX ADMIN — LEVOTHYROXINE SODIUM 137 MCG: 0.11 TABLET ORAL at 05:05

## 2025-05-15 RX ADMIN — HYDRALAZINE HYDROCHLORIDE 25 MG: 25 TABLET ORAL at 08:05

## 2025-05-15 RX ADMIN — INSULIN ASPART 2 UNITS: 100 INJECTION, SOLUTION INTRAVENOUS; SUBCUTANEOUS at 06:05

## 2025-05-15 RX ADMIN — INSULIN ASPART 2 UNITS: 100 INJECTION, SOLUTION INTRAVENOUS; SUBCUTANEOUS at 11:05

## 2025-05-15 NOTE — PLAN OF CARE
Problem: Adult Inpatient Plan of Care  Goal: Plan of Care Review  Outcome: Progressing  Goal: Patient-Specific Goal (Individualized)  Outcome: Progressing  Goal: Absence of Hospital-Acquired Illness or Injury  Outcome: Progressing  Goal: Optimal Comfort and Wellbeing  Outcome: Progressing  Goal: Readiness for Transition of Care  Outcome: Progressing     Problem: Diabetes Comorbidity  Goal: Blood Glucose Level Within Targeted Range  Outcome: Progressing     Problem: Skin Injury Risk Increased  Goal: Skin Health and Integrity  Outcome: Progressing     Problem: Gas Exchange Impaired  Goal: Optimal Gas Exchange  Outcome: Progressing     Problem: Fall Injury Risk  Goal: Absence of Fall and Fall-Related Injury  Outcome: Progressing     Problem: Fatigue  Goal: Improved Activity Tolerance  Outcome: Progressing     Problem: Activity Intolerance  Goal: Enhanced Capacity and Energy  Outcome: Progressing     Problem: Nonalliance  Goal: Collaboration with the Healthcare Team  Outcome: Progressing     Problem: Self-Care Deficit  Goal: Improved Ability to Complete Activities of Daily Living  Outcome: Progressing     Problem: Bariatric Environmental Safety  Goal: Safety Maintained with Care  Outcome: Progressing

## 2025-05-15 NOTE — PLAN OF CARE
[facility] - Telemetry (Hospital)  Discharge Final Note    Primary Care Provider: Keely Silva NP    Expected Discharge Date: 5/15/2025    Final Discharge Note (most recent)       Final Note - 05/15/25 1135          Final Note    Assessment Type Final Discharge Note (P)      Anticipated Discharge Disposition Home or Self Care (P)      Hospital Resources/Appts/Education Provided Appointments scheduled and added to AVS (P)         Post-Acute Status    Post-Acute Authorization HME (P)      HME Status Set-up Complete/Auth obtained (P)    [facility]    Discharge Delays None known at this time (P)                      Important Message from Medicare  Important Message from Medicare regarding Discharge Appeal Rights: Given to patient/caregiver, Explained to patient/caregiver, Signed/date by patient/caregiver     Date IMM was signed: 05/15/25  Time IMM was signed: 1885

## 2025-05-15 NOTE — PLAN OF CARE
05/15/25 1134   Post-Acute Status   Post-Acute Authorization HME   E Status Set-up Complete/Auth obtained   Discharge Plan   Discharge Plan A Home with family     Home 02 ordered; approved by insurance.  Portable tank delivered to patient.

## 2025-05-15 NOTE — PROGRESS NOTES
Pharmacist Renal Dose Adjustment Note    Lakshmi Campbell is a 74 y.o. female being treated with the medication cephalexin 500 mg bid    Patient Data:    Vital Signs (Most Recent):  Temp: 98.9 °F (37.2 °C) (05/15/25 0709)  Pulse: 72 (05/15/25 0734)  Resp: 20 (05/15/25 0734)  BP: (!) 174/73 (05/15/25 0709)  SpO2: (!) 93 % (05/15/25 0734) Vital Signs (72h Range):  Temp:  [96.1 °F (35.6 °C)-98.9 °F (37.2 °C)]   Pulse:  []   Resp:  [16-20]   BP: (131-190)/(50-87)   SpO2:  [90 %-98 %]      Recent Labs   Lab 05/13/25  0503 05/14/25  0418 05/15/25  0502   CREATININE 2.5* 2.3* 2.0*     Serum creatinine: 2 mg/dL (H) 05/15/25 0502  Estimated creatinine clearance: 30.8 mL/min (A)    Medication:cephalexin PO dose: 500 frequency bid will be changed to medication:cephalexin dose:500 frequency:tid    Pharmacist's Name: Adrian Ramos  Pharmacist's Extension: [phone]

## 2025-05-15 NOTE — DISCHARGE INSTRUCTIONS
Our goal at [facility] is to always give you outstanding care and exceptional service. You may receive a survey from [employer] by mail, text or e-mail in the next 24-48 hours asking about the care you received with us. The survey should only take 5-10 minutes to complete and is very important to us.     Your feedback provides us with a way to recognize our staff who work tirelessly to provide the best care! Also, your responses help us learn how to improve when your experience was below our aspiration of excellence. We are always looking for ways to improve your stay. We WILL use your feedback to continue making improvements to help us provide the highest quality care. We keep your personal information and feedback confidential. We appreciate your time completing this survey and can't wait to hear from you!!!    We look forward to your continued care with us! Thanks so much for choosing [facility] for your healthcare needs!

## 2025-05-16 ENCOUNTER — PATIENT OUTREACH (OUTPATIENT)
Dept: ADMINISTRATIVE | Facility: CLINIC | Age: 75
End: 2025-05-16
Payer: MEDICARE

## 2025-05-16 NOTE — PROGRESS NOTES
C3 nurse attempted to contact patient for a TCC post hospital discharge follow-up call. The patient declined call at this time. Phone answered and picked up x2 and hung up. No answer at home number and no voicemail options.

## 2025-05-18 NOTE — DISCHARGE SUMMARY
[facility] Medicine  Discharge Summary      Patient Name: Lakshmi Campbell  MRN: [id number]  [facility]: [id number]  Patient Class: IP- Inpatient  Admission Date: 5/9/2025  Hospital Length of Stay: 4 days  Discharge Date and Time: 5/15/2025  1:27 PM  Attending Physician: Allison att. providers found   Discharging Provider: Kevin Thomas MD  Primary Care Provider: Keely Silva NP    Primary Care Team: Networked reference to record PCT     HPI:   Ms. Lakshmi Campbell is a 74 y.o. female who  has a medical history of Acquired TTP, Cataract, CKD stage 3 secondary to diabetes, Closed displaced comminuted fracture of right patella, Closed fracture of surgical neck of left humerus, Closed left radial fracture, Hyperkalemia, Hyperlipidemia, Hypertension, Hypothyroidism, Liver cirrhosis secondary to YO, Morbid obesity, Right knee pain, Thyroid disease, and Type 2 diabetes mellitus.    She  presented to the ED for evaluation of worsening shortness of breath over the past 2 weeks.  She reports associated symptoms of dyspnea on exertion, mild cough, lower extremity swelling, abdominal swelling, orthopnea. Denies cough, chest pain, abdominal pain, lightheadedness, dizziness, urinary symptoms.  She reports being prescribed a 3 day course of furosemide that by her PCP for leg swelling last month.  She has also follow up with her nephrologist over the past month due to progressive CKD concerns.  Over the past month, her GDMT medications were adjusted due to hyperkalemia, ACE inhibitor/spironolactone were discontinued and SGLT2 inhibitor dose decreased.    ED course notable for vitals with hypertension, tachypnea, hypoxia.  Lab work notable for WBC 3.6, hemoglobin 9.2, platelets 74, bicarb 21, BUN 46, creatinine 2, glucose 123, , troponin 0.103.  Urinalysis concerning for WBC >100, leukocyte esterase 3+.      * No surgery found *      Hospital Course:   Patient admitted with CHF exacerbation, pneumonia  and UTI.  Patient is continued on IV antibiotics and IV diuresis.  She states she is doing better on hospital day.  She remains on NC o2 at 2L - wean as tolerated.  She had worsening of her renal function on hospital day 2 and creatinine at this time is 2.8.  IV Lasix has been discontinued and renal function will be monitored.  Patient has swelling in her lower extremities have completely resolved and chest CT was ordered this morning which showed some bilateral pleural effusions but no overt vascular congestion.  We will consider gentle hydration if renal function does not improve by tomorrow.  Patient has also been attempted to wean from nasal cannula O2 but this has been unsuccessful.  At baseline patient is on room air but currently requiring 2 L nasal cannula with saturations in the low 90s.  Patient's urine output has not decreased but we will be monitored closely. Diuresis resumed but patient still required oxygen. Home oxygen arranged patient as patient was 86% on room air. Patient seen and examined, stable for discharge.     Goals of Care Treatment Preferences:  Code Status: Full Code      SDOH Screening:  The patient was screened for utility difficulties, food insecurity, transport difficulties, housing insecurity, and interpersonal safety and there were no concerns identified this admission.     Consults:   Consults (From admission, onward)          Status Ordering Provider     Inpatient consult to Registered Dietitian/Nutritionist  Once        Provider:  (Not yet assigned)    Completed LOLIS PANDA            Final Active Diagnoses:    Diagnosis Date Noted POA    PRINCIPAL PROBLEM:  Acute hypoxemic respiratory failure [J96.01] 06/27/2024 Yes    Asthma [J45.909] 05/10/2025 Yes    Chronic kidney disease, stage 4 (severe) [N18.4] 10/18/2024 Yes    Acute cystitis without hematuria [N30.00] 08/29/2024 Yes    Acute on chronic diastolic congestive heart failure [I50.33] 06/27/2024 Yes    Essential  "hypertension [I10] 11/07/2019 Yes    Dyslipidemia [E78.5] 11/07/2019 Yes    Type 2 diabetes mellitus with microalbuminuria, with long-term current use of insulin [E11.29, R80.9, Z79.4] 09/23/2013 Not Applicable    Hypothyroidism [E03.9] 09/23/2013 Yes      Problems Resolved During this Admission:       Discharged Condition: stable    Disposition: Home or Self Care    Follow Up:    Patient Instructions:      OXYGEN FOR HOME USE     Order Specific Question Answer Comments   Liter Flow 2    Duration Continuous    Qualifying Test Performed at: Rest    Oxygen saturation: 86    Portable mode: continuous    Route nasal cannula    Device: home concentrator with portable tanks    Length of need (in months): 3 mos    Patient condition with qualifying saturation CHF    Height: 5' 5" (1.651 m)    Weight: 112.2 kg (247 lb 5.7 oz)    Alternative treatment measures have been tried or considered and deemed clinically ineffective. Yes      ACCEPT - Ambulatory referral/consult to [facility]   Standing Status: Future   Referral Priority: Routine Referral Type: Consultation   Referral Reason: Specialty Services Required   Requested Specialty: Cardiology   Number of Visits Requested: 1     Activity as tolerated       Significant Diagnostic Studies: Radiology:   Imaging Results              X-Ray Chest AP Portable (Final result)  Result time 05/09/25 19:41:25      Final result by Heladio CuellarJoseph), MD (05/09/25 19:41:25)                   Impression:     Findings likely related to congestive heart failure.  Correlate.  Follow-up is recommended.    Finalized on: 5/9/2025 7:41 PM By:  Pierre Cuellar MD  [facility]# [id number]      2025-05-09 19:43:[id number]     [facility]               Narrative:    EXAM: XR CHEST AP PORTABLE    CLINICAL HISTORY: Shortness of breath    FINDINGS:  Comparison chest 04/11/2025.    Atherosclerosis of thoracic aorta.    Cardiomegaly.    Moderate increased interstitial lung markings bilaterally.  " "Possible left pleural effusion.                                          Pending Diagnostic Studies:       None           Medications:  Reconciled Home Medications:      Medication List        CONTINUE taking these medications      albuterol 90 mcg/actuation inhaler  Commonly known as: VENTOLIN HFA  Inhale 2 puffs into the lungs every 6 (six) hours as needed for Wheezing or Shortness of Breath (cough). Rescue     amLODIPine 10 MG tablet  Commonly known as: NORVASC  TAKE 1 TABLET(10 MG) BY MOUTH EVERY DAY     blood sugar diagnostic Strp  Use ac bid     budesonide-formoterol 160-4.5 mcg 160-4.5 mcg/actuation Hfaa  Commonly known as: SYMBICORT  Inhale 2 puffs into the lungs every 12 (twelve) hours. Controller     furosemide 20 MG tablet  Commonly known as: LASIX  Take 1 tablet (20 mg total) by mouth once daily.     HumuLIN 70/30 U-100 KwikPen 100 unit/mL (70-30) Inpn pen  Generic drug: insulin NPH/Reg human  SMARTSI Unit(s) SUB-Q Every Evening     hydrALAZINE 25 MG tablet  Commonly known as: APRESOLINE  Take 1 tablet (25 mg total) by mouth every 12 (twelve) hours.     insulin syringe-needle U-100 1 mL 29 gauge x 1/2" Syrg  Use bid     lancets 33 gauge Misc  Use as BID     levothyroxine 137 MCG Tab tablet  Commonly known as: SYNTHROID  Take 1 tablet by mouth once daily.     pravastatin 20 MG tablet  Commonly known as: PRAVACHOL  Take 20 mg by mouth once daily.            ASK your doctor about these medications      cholecalciferol (vitamin D3) 50 mcg (2,000 unit) Cap capsule  Commonly known as: VITAMIN D3  Take 1 capsule by mouth.              Indwelling Lines/Drains at time of discharge:   Lines/Drains/Airways       None                   Time spent on the discharge of patient: 31 minutes         Kevin Thomas MD  Department of Hospital Medicine  [facility] Telemetry ([facility])  "

## 2025-05-19 ENCOUNTER — PATIENT OUTREACH (OUTPATIENT)
Dept: ADMINISTRATIVE | Facility: HOSPITAL | Age: 75
End: 2025-05-19
Payer: MEDICARE

## 2025-05-19 ENCOUNTER — TELEPHONE (OUTPATIENT)
Dept: CARDIOLOGY | Facility: CLINIC | Age: 75
End: 2025-05-19
Payer: MEDICARE

## 2025-05-19 ENCOUNTER — OFFICE VISIT (OUTPATIENT)
Dept: INTERNAL MEDICINE | Facility: CLINIC | Age: 75
End: 2025-05-19
Payer: MEDICARE

## 2025-05-19 VITALS
DIASTOLIC BLOOD PRESSURE: 66 MMHG | TEMPERATURE: 97 F | HEART RATE: 63 BPM | OXYGEN SATURATION: 93 % | HEIGHT: 65 IN | SYSTOLIC BLOOD PRESSURE: 132 MMHG | WEIGHT: 222.25 LBS | BODY MASS INDEX: 37.03 KG/M2

## 2025-05-19 DIAGNOSIS — E66.812 CLASS 2 SEVERE OBESITY DUE TO EXCESS CALORIES WITH SERIOUS COMORBIDITY AND BODY MASS INDEX (BMI) OF 36.0 TO 36.9 IN ADULT: ICD-10-CM

## 2025-05-19 DIAGNOSIS — I50.33 ACUTE ON CHRONIC DIASTOLIC CONGESTIVE HEART FAILURE: ICD-10-CM

## 2025-05-19 DIAGNOSIS — Z79.4 TYPE 2 DIABETES MELLITUS WITH STAGE 4 CHRONIC KIDNEY DISEASE, WITH LONG-TERM CURRENT USE OF INSULIN: ICD-10-CM

## 2025-05-19 DIAGNOSIS — Z79.4 TYPE 2 DIABETES MELLITUS WITH MICROALBUMINURIA, WITH LONG-TERM CURRENT USE OF INSULIN: ICD-10-CM

## 2025-05-19 DIAGNOSIS — K74.60 LIVER CIRRHOSIS SECONDARY TO NASH: ICD-10-CM

## 2025-05-19 DIAGNOSIS — K75.81 LIVER CIRRHOSIS SECONDARY TO NASH: ICD-10-CM

## 2025-05-19 DIAGNOSIS — E11.29 TYPE 2 DIABETES MELLITUS WITH MICROALBUMINURIA, WITH LONG-TERM CURRENT USE OF INSULIN: ICD-10-CM

## 2025-05-19 DIAGNOSIS — R80.9 TYPE 2 DIABETES MELLITUS WITH MICROALBUMINURIA, WITH LONG-TERM CURRENT USE OF INSULIN: ICD-10-CM

## 2025-05-19 DIAGNOSIS — I10 ESSENTIAL HYPERTENSION: ICD-10-CM

## 2025-05-19 DIAGNOSIS — D50.9 MICROCYTIC ANEMIA: ICD-10-CM

## 2025-05-19 DIAGNOSIS — Z09 HOSPITAL DISCHARGE FOLLOW-UP: Primary | ICD-10-CM

## 2025-05-19 DIAGNOSIS — J96.01 ACUTE HYPOXEMIC RESPIRATORY FAILURE: ICD-10-CM

## 2025-05-19 DIAGNOSIS — D69.6 THROMBOCYTOPENIA: ICD-10-CM

## 2025-05-19 DIAGNOSIS — E11.22 TYPE 2 DIABETES MELLITUS WITH STAGE 4 CHRONIC KIDNEY DISEASE, WITH LONG-TERM CURRENT USE OF INSULIN: ICD-10-CM

## 2025-05-19 DIAGNOSIS — I35.0 MODERATE AORTIC STENOSIS: ICD-10-CM

## 2025-05-19 DIAGNOSIS — E66.01 CLASS 2 SEVERE OBESITY DUE TO EXCESS CALORIES WITH SERIOUS COMORBIDITY AND BODY MASS INDEX (BMI) OF 36.0 TO 36.9 IN ADULT: ICD-10-CM

## 2025-05-19 DIAGNOSIS — N18.4 TYPE 2 DIABETES MELLITUS WITH STAGE 4 CHRONIC KIDNEY DISEASE, WITH LONG-TERM CURRENT USE OF INSULIN: ICD-10-CM

## 2025-05-19 DIAGNOSIS — N18.4 CHRONIC KIDNEY DISEASE, STAGE 4 (SEVERE): ICD-10-CM

## 2025-05-19 DIAGNOSIS — N30.00 ACUTE CYSTITIS WITHOUT HEMATURIA: ICD-10-CM

## 2025-05-19 PROCEDURE — 1111F DSCHRG MED/CURRENT MED MERGE: CPT | Mod: CPTII,S$GLB,, | Performed by: NURSE PRACTITIONER

## 2025-05-19 PROCEDURE — 3044F HG A1C LEVEL LT 7.0%: CPT | Mod: CPTII,S$GLB,, | Performed by: NURSE PRACTITIONER

## 2025-05-19 PROCEDURE — 1101F PT FALLS ASSESS-DOCD LE1/YR: CPT | Mod: CPTII,S$GLB,, | Performed by: NURSE PRACTITIONER

## 2025-05-19 PROCEDURE — 1159F MED LIST DOCD IN RCRD: CPT | Mod: CPTII,S$GLB,, | Performed by: NURSE PRACTITIONER

## 2025-05-19 PROCEDURE — 1126F AMNT PAIN NOTED NONE PRSNT: CPT | Mod: CPTII,S$GLB,, | Performed by: NURSE PRACTITIONER

## 2025-05-19 PROCEDURE — 3075F SYST BP GE 130 - 139MM HG: CPT | Mod: CPTII,S$GLB,, | Performed by: NURSE PRACTITIONER

## 2025-05-19 PROCEDURE — 1160F RVW MEDS BY RX/DR IN RCRD: CPT | Mod: CPTII,S$GLB,, | Performed by: NURSE PRACTITIONER

## 2025-05-19 PROCEDURE — 3078F DIAST BP <80 MM HG: CPT | Mod: CPTII,S$GLB,, | Performed by: NURSE PRACTITIONER

## 2025-05-19 PROCEDURE — 3288F FALL RISK ASSESSMENT DOCD: CPT | Mod: CPTII,S$GLB,, | Performed by: NURSE PRACTITIONER

## 2025-05-19 PROCEDURE — 99495 TRANSJ CARE MGMT MOD F2F 14D: CPT | Mod: S$GLB,,, | Performed by: NURSE PRACTITIONER

## 2025-05-19 PROCEDURE — 99999 PR PBB SHADOW E&M-EST. PATIENT-LVL IV: CPT | Mod: PBBFAC,,, | Performed by: NURSE PRACTITIONER

## 2025-05-19 PROCEDURE — 4010F ACE/ARB THERAPY RXD/TAKEN: CPT | Mod: CPTII,S$GLB,, | Performed by: NURSE PRACTITIONER

## 2025-05-19 RX ORDER — EMPAGLIFLOZIN 10 MG/1
10 TABLET, FILM COATED ORAL EVERY MORNING
COMMUNITY

## 2025-05-19 RX ORDER — SODIUM BICARBONATE 650 MG/1
650 TABLET ORAL 2 TIMES DAILY
COMMUNITY
Start: 2025-04-17 | End: 2026-04-17

## 2025-05-19 RX ORDER — CHLORTHALIDONE 25 MG/1
25 TABLET ORAL DAILY
COMMUNITY
Start: 2025-04-24

## 2025-05-19 NOTE — PROGRESS NOTES
Subjective:       Patient ID: Lakshmi Campbell is a 74 y.o. female.    Chief Complaint: Transitional Care      History of Present Illness    CHIEF COMPLAINT:  - Ms. Campbell presents for a hospital follow-up visit after being discharged on 05/15 for shortness of breath and swelling.    HPI:  Ms. Campebll was admitted to the hospital on 05/09 and discharged on 05/15 due to shortness of breath that had been ongoing for 2 weeks prior to admission, worsening with exertion. She also had a mild cough, lower extremity swelling, and abdominal swelling. Before hospitalization, her Lasix dosage had been increased for 3 days the previous month, and she had been evaluated by a nephrologist shortly after. During the hospital stay, a chest XR showed findings concerning for congestive heart failure. She was treated with methylprednisolone IV, 3 breathing treatments, Lasix, and nitroglycerin applied to her chest. She was also diagnosed with a UTI without symptoms and received antibiotics during her stay. An echocardiogram revealed ventricular wall thickness and moderate aortic valve stenosis, with an EF of 55-60%. She was discharged with home oxygen, which she uses while sleeping but not continuously during the day. Since discharge, her shortness of breath has improved significantly. Previously, she had difficulty walking from her bedroom to the dining room without needing to sit down and catch her breath, but now she can walk outside and sit for extended periods. She reports easy bruising, particularly on her arms, which has been an ongoing issue. She has a history of anemia and low platelets, for which she has been evaluated by hematology in the past, with her last appointment in January where no follow-up was deemed necessary.    She denies having symptoms of UTI despite the diagnosis. She denies any blood in her stool or urine.    Has appropriate follow up with pulmonology and cardiology.        HPI and discharge summary copied from  hospital encounter:   Ms. Lakshmi Campbell is a 74 y.o. female who  has a medical history of Acquired ITP, Cataract, CKD stage 3 secondary to diabetes, Closed displaced comminuted fracture of right patella, Closed fracture of surgical neck of left humerus, Closed left radial fracture, Hyperkalemia, Hyperlipidemia, Hypertension, Hypothyroidism, Liver cirrhosis secondary to YO, Morbid obesity, Right knee pain, Thyroid disease, and Type 2 diabetes mellitus.     She  presented to the ED for evaluation of worsening shortness of breath over the past 2 weeks.  She reports associated symptoms of dyspnea on exertion, mild cough, lower extremity swelling, abdominal swelling, orthopnea. Denies cough, chest pain, abdominal pain, lightheadedness, dizziness, urinary symptoms.  She reports being prescribed a 3 day course of furosemide that by her PCP for leg swelling last month.  She has also follow up with her nephrologist over the past month due to progressive CKD concerns.  Over the past month, her GDMT medications were adjusted due to hyperkalemia, ACE inhibitor/spironolactone were discontinued and SGLT2 inhibitor dose decreased.     ED course notable for vitals with hypertension, tachypnea, hypoxia.  Lab work notable for WBC 3.6, hemoglobin 9.2, platelets 74, bicarb 21, BUN 46, creatinine 2, glucose 123, , troponin 0.103.  Urinalysis concerning for WBC >100, leukocyte esterase 3+.        * No surgery found *       Hospital Course:   Patient admitted with CHF exacerbation, pneumonia and UTI.  Patient is continued on IV antibiotics and IV diuresis.  She states she is doing better on hospital day.  She remains on NC o2 at 2L - wean as tolerated.  She had worsening of her renal function on hospital day 2 and creatinine at this time is 2.8.  IV Lasix has been discontinued and renal function will be monitored.  Patient has swelling in her lower extremities have completely resolved and chest CT was ordered this morning which  showed some bilateral pleural effusions but no overt vascular congestion.  We will consider gentle hydration if renal function does not improve by tomorrow.  Patient has also been attempted to wean from nasal cannula O2 but this has been unsuccessful.  At baseline patient is on room air but currently requiring 2 L nasal cannula with saturations in the low 90s.  Patient's urine output has not decreased but we will be monitored closely. Diuresis resumed but patient still required oxygen. Home oxygen arranged patient as patient was 86% on room air. Patient seen and examined, stable for discharge.        X-Ray Chest AP Portable (Final result)  Result time 05/09/25 19:41:25            Final result by Hel[facility]io [facility]), MD (05/09/25 19:41:25)                           Impression:      Findings likely related to congestive heart failure.  Correlate.  Follow-up is recommended.     Finalized on: 5/9/2025 7:41 PM By:  Pierre Cuellar MD  [facility]# [id number]      2025-05-09 19:43:[id number]     [facility]                     Narrative:     EXAM: XR CHEST AP PORTABLE     CLINICAL HISTORY: Shortness of breath     FINDINGS:  Comparison chest 04/11/2025.     Atherosclerosis of thoracic aorta.     Cardiomegaly.     Moderate increased interstitial lung markings bilaterally.  Possible left pleural effusion.                             Transitional Care Note    Family and/or Caretaker present at visit?  No.  Diagnostic tests reviewed/disposition: I have reviewed all completed as well as pending diagnostic tests at the time of discharge.  Disease/illness education: Acute respiratory failure, CHF.   Home health/community services discussion/referrals: Patient does not have home health established from hospital visit.  They do not need home health.  If needed, we will set up home health for the patient.   Establishment or re-establishment of referral orders for community resources: No other necessary community resources.   Discussion with other  health care providers: No discussion with other health care providers necessary.         Problem List[1]      Past Surgical History:   Procedure Laterality Date    APPENDECTOMY      BREAST BIOPSY      CATARACT EXTRACTION      COLONOSCOPY N/A 12/21/2021    Procedure: COLONOSCOPY screening;  Surgeon: Moises Patterson MD;  Location: [facility];  Service: Endoscopy;  Laterality: N/A;    ESOPHAGOGASTRODUODENOSCOPY N/A 12/21/2021    Procedure: EGD (ESOPHAGOGASTRODUODENOSCOPY) EV screening;  Surgeon: Moises Patterson MD;  Location: [facility];  Service: Endoscopy;  Laterality: N/A;    EYE SURGERY      HERNIA REPAIR      OPEN REDUCTION AND INTERNAL FIXATION (ORIF) OF FRACTURE OF DISTAL RADIUS Left 11/2/2020    Procedure: ORIF, FRACTURE, RADIUS, DISTAL;  Surgeon: Sage Wolf MD;  Location: [facility];  Service: Orthopedics;  Laterality: Left;    OPEN REDUCTION AND INTERNAL FIXATION (ORIF) OF FRACTURE OF PATELLA Right 11/2/2020    Procedure: ORIF, FRACTURE, PATELLA;  Surgeon: Sage Wolf MD;  Location: [facility];  Service: Orthopedics;  Laterality: Right;    OPEN REDUCTION AND INTERNAL FIXATION (ORIF) OF FRACTURE OF PATELLA Right 12/18/2020    Procedure: ORIF, FRACTURE, PATELLA;  Surgeon: Sage Wolf MD;  Location: [facility];  Service: Orthopedics;  Laterality: Right;    ORIF HUMERUS FRACTURE Left 11/5/2020    Procedure: ORIF, FRACTURE, HUMERUS;  Surgeon: Sage Wolf MD;  Location: [facility];  Service: Orthopedics;  Laterality: Left;    PLACEMENT OF ACELLULAR HUMAN DERMAL ALLOGRAFT Right 11/2/2020    Procedure: APPLICATION, ACELLULAR HUMAN DERMAL ALLOGRAFT;  Surgeon: Sage Wolf MD;  Location: [facility];  Service: Orthopedics;  Laterality: Right;  Right Patella    REMOVAL OF HARDWARE FROM HAND Left 3/11/2021    Procedure: REMOVAL, HARDWARE, HAND;  Surgeon: Sage Wolf MD;  Location: [facility];  Service: Orthopedics;  Laterality: Left;  Removal of dorsal  "spanning wrist plate left distal radius    REMOVAL OF HARDWARE FROM LOWER EXTREMITY Right 12/18/2020    Procedure: REMOVAL, HARDWARE, LOWER EXTREMITY;  Surgeon: Sage Wolf MD;  Location: [facility];  Service: Orthopedics;  Laterality: Right;       Current Medications[2]    Review of Systems   Constitutional:  Negative for activity change, appetite change, chills, fatigue and fever.   Respiratory:  Positive for shortness of breath (improved since discharge). Negative for cough, chest tightness and wheezing.    Cardiovascular:  Negative for chest pain, palpitations and leg swelling.   Gastrointestinal:  Negative for abdominal pain, constipation, diarrhea, nausea and vomiting.   Genitourinary:  Negative for dysuria, flank pain, frequency, hematuria and urgency.   Neurological:  Negative for dizziness, syncope, weakness, light-headedness, numbness and headaches.   Hematological:  Bruises/bleeds easily.       Objective:   /66 (BP Location: Left arm, Patient Position: Sitting)   Pulse 63   Temp 96.5 °F (35.8 °C) (Tympanic)   Ht 5' 5" (1.651 m)   Wt 100.8 kg (222 lb 3.6 oz)   SpO2 (!) 93%   BMI 36.98 kg/m²      Physical Exam  Vitals reviewed.   Constitutional:       General: She is not in acute distress.     Appearance: She is well-developed. She is not ill-appearing or diaphoretic.   HENT:      Head: Normocephalic.   Cardiovascular:      Rate and Rhythm: Normal rate and regular rhythm.      Pulses: Normal pulses.      Heart sounds: Murmur heard.      No friction rub. No gallop.   Pulmonary:      Effort: Pulmonary effort is normal. No respiratory distress.      Breath sounds: Normal breath sounds. No rales.   Musculoskeletal:      Cervical back: Neck supple.   Skin:     General: Skin is warm and dry.      Coloration: Skin is not pale.      Findings: No erythema.      Comments: Scattered bruising to bilateral arms and legs.   Neurological:      Mental Status: She is alert and oriented to person, " place, and time.   Psychiatric:         Mood and Affect: Mood normal.         Behavior: Behavior normal.         Assessment & Plan     Problem List Items Addressed This Visit          Pulmonary    Acute hypoxemic respiratory failure    Current Assessment & Plan   Continue with plan to see pulmonology and cardiology. Has supplemental O2 at home.             Cardiac/Vascular    Essential hypertension    Current Assessment & Plan   Blood pressure stable. Continue current medications.         Acute on chronic diastolic congestive heart failure    Current Assessment & Plan   Symptoms improved. Continue follow up with cardiology as scheduled. Echo reviewed.          Moderate aortic stenosis    Current Assessment & Plan   Appt with cardiology. Echo reviewed.             Renal/    Acute cystitis without hematuria    Current Assessment & Plan   Antibiotics completed during hospital encounter.          Chronic kidney disease, stage 4 (severe)    Current Assessment & Plan   Stable. Labs reviewed. Upcoming appt with nephrology.            Hematology    Thrombocytopenia    Current Assessment & Plan   Labs reviewed. Previously followed by hem/onc.             Oncology    Microcytic anemia    Current Assessment & Plan   Labs reviewed. Stable.            Endocrine    Type 2 diabetes mellitus with microalbuminuria, with long-term current use of insulin    Current Assessment & Plan   Followed by endo. Previously well controlled.          Class 2 severe obesity due to excess calories with serious comorbidity and body mass index (BMI) of 36.0 to 36.9 in adult    Current Assessment & Plan   Monitor weight with CHF.         Type 2 diabetes mellitus with stage 4 chronic kidney disease, with long-term current use of insulin    Current Assessment & Plan   Lab Results   Component Value Date    HGBA1C 6 (H) 03/19/2025     Stable. Followed by endo.             GI    Liver cirrhosis secondary to YO    Current Assessment & Plan   Labs  "reviewed. Chronic.           Other Visit Diagnoses         Hospital discharge follow-up    -  Primary            Assessment & Plan    MEDICAL DECISION MAKING:  - Reviewed hospital discharge from 5/15, noting admission for shortness of breath, cough, and lower extremity swelling.  - Assessed chest XR showing concerns for congestive heart failure.  - Evaluated lab results: potassium normal, creatinine low but at baseline, BNP elevated (507 from 457 previously).  - UTI diagnosed and treated during hospital stay.  - Reviewed echocardiogram results: EF 55-60%, moderate aortic valve stenosis.  - Renal function improved during hospital stay but still in stage 4 range.  - Reviewed recent A1C of 6.0 performed by Dr. Prince.  - Assessed current oxygen use and improvement in shortness of breath.    PATIENT EDUCATION:  - Explained BNP as a heart failure indicator.  - Discussed aortic valve stenosis as narrowing of the aortic valve.  - Explained that anemia can cause fatigue and is associated with renal disease.    ACTION ITEMS/LIFESTYLE:  - Ms. Campbell to reconsider getting a colonoscopy for colon cancer screening.    FOLLOW UP:  - Contact the office if any symptoms worsen or change.          Follow up in about 6 months (around 11/19/2025), or if symptoms worsen or fail to improve.          Portions of this note may have been created with voice recognition software. Occasional "wrong-word" or "sound-a-like" substitutions may have occurred due to the inherent limitations of voice recognition software. Please, read the note carefully and recognize, using context, where substitutions have occurred.       This note was generated with the assistance of ambient listening technology. Verbal consent was obtained by the patient and accompanying visitor(s) for the recording of patient appointment to facilitate this note. I attest to having reviewed and edited the generated note for accuracy, though some syntax or spelling errors may " persist. Please contact the author of this note for any clarification.            [1]   Patient Active Problem List  Diagnosis    Type 2 diabetes mellitus with microalbuminuria, with long-term current use of insulin    Hypothyroidism    Essential hypertension    Dyslipidemia    Microalbuminuria    Class 2 severe obesity due to excess calories with serious comorbidity and body mass index (BMI) of 36.0 to 36.9 in adult    Left knee pain    Shoulder pain, left    Thrombocytopenia    Wrist stiffness, left    Liver cirrhosis secondary to YO    Lower extremity edema    Abnormal mammogram    Mild nonproliferative diabetic retinopathy of both eyes without macular edema associated with type 2 diabetes mellitus    Vitamin D deficiency    Acute hypoxemic respiratory failure    Acute on chronic diastolic congestive heart failure    Hyperkalemia    Senile purpura    Dependence on supplemental oxygen    Type 2 diabetes mellitus with stage 4 chronic kidney disease, with long-term current use of insulin    Acute cystitis without hematuria    Chronic kidney disease, stage 4 (severe)    Mild persistent asthma without complication    Shortness of breath    History of ESBL E. coli infection    Microcytic anemia    Enlarged lymph nodes    Allergic rhinitis due to dust mite    Asthma    Moderate aortic stenosis   [2]   Current Outpatient Medications:     albuterol (VENTOLIN HFA) 90 mcg/actuation inhaler, Inhale 2 puffs into the lungs every 6 (six) hours as needed for Wheezing or Shortness of Breath (cough). Rescue, Disp: 20.1 g, Rfl: 5    amLODIPine (NORVASC) 10 MG tablet, TAKE 1 TABLET(10 MG) BY MOUTH EVERY DAY, Disp: 90 tablet, Rfl: 1    blood sugar diagnostic Strp, Use ac bid, Disp: , Rfl:     budesonide-formoterol 160-4.5 mcg (SYMBICORT) 160-4.5 mcg/actuation HFAA, Inhale 2 puffs into the lungs every 12 (twelve) hours. Controller, Disp: 10.2 g, Rfl: 11    chlorthalidone (HYGROTEN) 25 MG Tab, Take 25 mg by mouth once daily., Disp: ,  "Rfl:     furosemide (LASIX) 20 MG tablet, Take 1 tablet (20 mg total) by mouth once daily., Disp: 30 tablet, Rfl: 0    HUMULIN 70/30 U-100 KWIKPEN 100 unit/mL (70-30) InPn pen, SMARTSI Unit(s) SUB-Q Every Evening, Disp: , Rfl:     hydrALAZINE (APRESOLINE) 25 MG tablet, Take 1 tablet (25 mg total) by mouth every 12 (twelve) hours., Disp: 60 tablet, Rfl: 11    insulin syringe-needle U-100 1 mL 29 gauge x 1/2" Syrg, Use bid, Disp: , Rfl:     lancets 33 gauge Misc, Use as BID, Disp: , Rfl:     levothyroxine (SYNTHROID) 137 MCG Tab tablet, Take 1 tablet by mouth once daily., Disp: , Rfl:     pravastatin (PRAVACHOL) 20 MG tablet, Take 20 mg by mouth once daily. , Disp: , Rfl: 11    sodium bicarbonate 650 MG tablet, Take 650 mg by mouth 2 (two) times daily., Disp: , Rfl:     cholecalciferol, vitamin D3, (VITAMIN D3) 50 mcg (2,000 unit) Cap capsule, Take 1 capsule by mouth. (Patient not taking: Reported on 2025), Disp: , Rfl:     JARDIANCE 10 mg tablet, Take 10 mg by mouth every morning., Disp: , Rfl:     "

## 2025-05-19 NOTE — TELEPHONE ENCOUNTER
"[facility]) hospital discharge 48-72 hour phone follow up completed.     Most Recent Hospital Discharge Date: 5/15/25  Last admission Diagnosis/chief complaint:Acute Resp F    TCC nurse Navigator spoke with pt    Current Patient reported weight: 222 lbs    Current Patient reported blood pressure and heart rate: No B/P Cuff    Pt reports the following:  [x]  Shortness of Breath with Activity  []  Shortness of Breath at rest   []  Fatigue  []  Edema   [] Chest pain or tightness  [] Weight Increase since discharge  [] None of the above    Medications:   Discharge medication reviewed with pt.  Pt reports having medication list available and has all medications at home for use per list.     Education:   Confirmed pt received "Home Care Guide for Heart Failure Patients" while admitted.   Reviewed key points as listed below.     Recommend 2 -3 gram sodium restriction and 1500 cc-2000 cc fluid restriction.  Encourage physical activity with graded exercise program.  Requested patient to weigh themselves daily, and to notify us if their weight increases by more than 3 lbs in 1 day or 5 lbs in 3 days.   Reminded patient to use "Daily weight and symptom tracker" to record and guide patient on when and how to call [facility]. PT may also use symptom tracker if no scale available  Pt reports being in the yellow(color) Zone. If in yellow/red, reminded that they should be calling H[facility] today or now.     Watch for these Signs and Symptoms: If any of these occur, contact [facility] immediately:   Increase in shortness of breath with movement   Increase in swelling in your legs and ankles   Weight gain of more than 3 pounds in a day or 5 pounds in 3 days.   Difficulty breathing when you are lying down   Worsening fatigue or tiredness   Stomach bloating, a full feeling or a loss of appetite   Increased coughing--especially when you are lying down      Pt was able to verbalize back to nurse in their own words " correct diet/fluid restrictions, necessity for exercise, warning signs and symptoms, when and how to contact their TCC team.      Pt educated on follow-up plan while in [facility] program to include:   Week 1 -  F/u appt with Provider and HF nurse (Date) 5/20/25 at 11 am with Rajwinder Caraballo PA-C   Week 2-5 - In person/ Virtual/ phone call check ins    Week 5-7 - Pt will discharge from [facility] and transition to longterm care provider (Cardiology/PCP/ Advanced Heart Failure).      Patient active on myChart? Yes      Pt given the following contact information for ease of communication: [phone] (Mon-Fri, 8a-5p) & for urgent issues on the weekend call [facility] on-call [phone] to page the Cardiology MD on call.  Pt also encouraged utilize [facility] messaging as well.      Will follow up with pt at first clinic visit and HF nurse navigator available for pt questions, issues or concerns.

## 2025-05-20 ENCOUNTER — OFFICE VISIT (OUTPATIENT)
Dept: CARDIOLOGY | Facility: CLINIC | Age: 75
End: 2025-05-20
Payer: MEDICARE

## 2025-05-20 VITALS
BODY MASS INDEX: 36.87 KG/M2 | HEART RATE: 80 BPM | SYSTOLIC BLOOD PRESSURE: 144 MMHG | HEIGHT: 65 IN | DIASTOLIC BLOOD PRESSURE: 50 MMHG | WEIGHT: 221.31 LBS

## 2025-05-20 DIAGNOSIS — R06.02 SHORTNESS OF BREATH: ICD-10-CM

## 2025-05-20 DIAGNOSIS — I10 ESSENTIAL HYPERTENSION: ICD-10-CM

## 2025-05-20 DIAGNOSIS — I50.33 ACUTE ON CHRONIC DIASTOLIC CONGESTIVE HEART FAILURE: Primary | ICD-10-CM

## 2025-05-20 DIAGNOSIS — I50.32 CHRONIC DIASTOLIC CONGESTIVE HEART FAILURE: ICD-10-CM

## 2025-05-20 DIAGNOSIS — I50.30 DIASTOLIC CONGESTIVE HEART FAILURE, UNSPECIFIED HF CHRONICITY: ICD-10-CM

## 2025-05-20 PROCEDURE — 1126F AMNT PAIN NOTED NONE PRSNT: CPT | Mod: CPTII,S$GLB,, | Performed by: PHYSICIAN ASSISTANT

## 2025-05-20 PROCEDURE — 1111F DSCHRG MED/CURRENT MED MERGE: CPT | Mod: CPTII,S$GLB,, | Performed by: PHYSICIAN ASSISTANT

## 2025-05-20 PROCEDURE — 3078F DIAST BP <80 MM HG: CPT | Mod: CPTII,S$GLB,, | Performed by: PHYSICIAN ASSISTANT

## 2025-05-20 PROCEDURE — 4010F ACE/ARB THERAPY RXD/TAKEN: CPT | Mod: CPTII,S$GLB,, | Performed by: PHYSICIAN ASSISTANT

## 2025-05-20 PROCEDURE — 3008F BODY MASS INDEX DOCD: CPT | Mod: CPTII,S$GLB,, | Performed by: PHYSICIAN ASSISTANT

## 2025-05-20 PROCEDURE — 3077F SYST BP >= 140 MM HG: CPT | Mod: CPTII,S$GLB,, | Performed by: PHYSICIAN ASSISTANT

## 2025-05-20 PROCEDURE — 3044F HG A1C LEVEL LT 7.0%: CPT | Mod: CPTII,S$GLB,, | Performed by: PHYSICIAN ASSISTANT

## 2025-05-20 PROCEDURE — 99999 PR PBB SHADOW E&M-EST. PATIENT-LVL II: CPT | Mod: PBBFAC,,, | Performed by: PHYSICIAN ASSISTANT

## 2025-05-20 PROCEDURE — 99214 OFFICE O/P EST MOD 30 MIN: CPT | Mod: S$GLB,,, | Performed by: PHYSICIAN ASSISTANT

## 2025-05-20 RX ORDER — SPIRONOLACTONE 25 MG/1
25 TABLET ORAL
COMMUNITY

## 2025-05-20 RX ORDER — FUROSEMIDE 20 MG/1
20 TABLET ORAL DAILY
Qty: 30 TABLET | Refills: 3 | Status: SHIPPED | OUTPATIENT
Start: 2025-05-20

## 2025-05-20 RX ORDER — PERINDOPRIL ERBUMINE 2 MG/1
2 TABLET ORAL
COMMUNITY

## 2025-05-20 RX ORDER — HYDROCHLOROTHIAZIDE 25 MG/1
25 TABLET ORAL
COMMUNITY

## 2025-05-20 NOTE — PROGRESS NOTES
HF TCC Provider Note (Follow-up) Consult Note      HPI:  Ms. Lakshmi Campbell is a 74 y.o. female who  has a medical history of Acquired TTP, Cataract, CKD stage 3 secondary to diabetes, Closed displaced comminuted fracture of right patella, Closed fracture of surgical neck of left humerus, Closed left radial fracture, Hyperkalemia, Hyperlipidemia, Hypertension, Hypothyroidism, Liver cirrhosis secondary to YO, Morbid obesity, Right knee pain, Thyroid disease, and Type 2 diabetes mellitus.     She  presented to the ED for evaluation of worsening shortness of breath over the past 2 weeks.  She reports associated symptoms of dyspnea on exertion, mild cough, lower extremity swelling, abdominal swelling, orthopnea. Denies cough, chest pain, abdominal pain, lightheadedness, dizziness, urinary symptoms.  She reports being prescribed a 3 day course of furosemide that by her PCP for leg swelling last month.  She has also follow up with her nephrologist over the past month due to progressive CKD concerns.  Over the past month, her GDMT medications were adjusted due to hyperkalemia, ACE inhibitor/spironolactone were discontinued and SGLT2 inhibitor dose decreased.     ED course notable for vitals with hypertension, tachypnea, hypoxia.  Lab work notable for WBC 3.6, hemoglobin 9.2, platelets 74, bicarb 21, BUN 46, creatinine 2, glucose 123, , troponin 0.103.  Urinalysis concerning for WBC >100, leukocyte esterase 3+.       Patient admitted with CHF exacerbation, pneumonia and UTI.  Patient is continued on IV antibiotics and IV diuresis.  She states she is doing better on hospital day.  She remains on NC o2 at 2L - wean as tolerated.  She had worsening of her renal function on hospital day 2 and creatinine at this time is 2.8.  IV Lasix has been discontinued and renal function will be monitored.  Patient has swelling in her lower extremities have completely resolved and chest CT was ordered this morning which showed some  bilateral pleural effusions but no overt vascular congestion.  We will consider gentle hydration if renal function does not improve by tomorrow.  Patient has also been attempted to wean from nasal cannula O2 but this has been unsuccessful.  At baseline patient is on room air but currently requiring 2 L nasal cannula with saturations in the low 90s.  Patient's urine output has not decreased but we will be monitored closely. Diuresis resumed but patient still required oxygen. Home oxygen arranged patient as patient was 86% on room air. Patient seen and examined, stable for discharge.     On lasix 20 mg daily    No longer has SOB, walked in to clinic with no issues    On lasix daily    Was on prn prior     PHYSICAL:   Vitals:    05/20/25 1053   BP: (!) 144/50   Pulse: 80          JVD: no,    Heart rhythm: regular  Cardiac murmur: No    S3: no  S4: no  Lungs: clear  Liver span: 10 cm:   Hepatojugular reflux: no  Edema: no,       ASSESSMENT:    PLAN:      Patient Instructions:   Instruct the patient to notify this clinic if HH, a physician or an advanced care provider wants to change medication one of their HF medications   Activity and Diet restrictions:   Recommend 2-3 gram sodium restriction and 1500cc- 2000cc fluid restriction.  Encourage physical activity with graded exercise program.  Requested patient to weigh themselves daily, and to notify us if their weight increases by more than 3 lbs in 1 day or 5 lbs in 3 days.    Assigned dry weight on home scale: daily weight  Medication changes (include current dose and changed dose)  CHF education done  Ok to take prn lasix for weight gain  Continue GDMT  Refill meds  RTC 4 weeks    Upcoming labs and date anticipated:

## 2025-06-02 ENCOUNTER — HOSPITAL ENCOUNTER (INPATIENT)
Facility: HOSPITAL | Age: 75
LOS: 8 days | Discharge: HOSPICE | DRG: 871 | End: 2025-06-10
Attending: EMERGENCY MEDICINE | Admitting: STUDENT IN AN ORGANIZED HEALTH CARE EDUCATION/TRAINING PROGRAM
Payer: MEDICARE

## 2025-06-02 DIAGNOSIS — R65.20 SEPSIS WITH ENCEPHALOPATHY: ICD-10-CM

## 2025-06-02 DIAGNOSIS — I50.33 ACUTE ON CHRONIC DIASTOLIC CONGESTIVE HEART FAILURE: ICD-10-CM

## 2025-06-02 DIAGNOSIS — A41.9 SEPSIS: Primary | ICD-10-CM

## 2025-06-02 DIAGNOSIS — B95.5 STREPTOCOCCAL BACTEREMIA: ICD-10-CM

## 2025-06-02 DIAGNOSIS — I44.7 LBBB (LEFT BUNDLE BRANCH BLOCK): ICD-10-CM

## 2025-06-02 DIAGNOSIS — Z13.6 SCREENING FOR CARDIOVASCULAR CONDITION: ICD-10-CM

## 2025-06-02 DIAGNOSIS — I45.5 SINUS PAUSE: ICD-10-CM

## 2025-06-02 DIAGNOSIS — G93.41 SEPSIS WITH ENCEPHALOPATHY: ICD-10-CM

## 2025-06-02 DIAGNOSIS — A41.9 SEPSIS, DUE TO UNSPECIFIED ORGANISM, UNSPECIFIED WHETHER ACUTE ORGAN DYSFUNCTION PRESENT: ICD-10-CM

## 2025-06-02 DIAGNOSIS — R41.82 AMS (ALTERED MENTAL STATUS): ICD-10-CM

## 2025-06-02 DIAGNOSIS — R78.81 STREPTOCOCCAL BACTEREMIA: ICD-10-CM

## 2025-06-02 DIAGNOSIS — R34 ANURIA: ICD-10-CM

## 2025-06-02 DIAGNOSIS — L03.115 CELLULITIS OF RIGHT LEG: ICD-10-CM

## 2025-06-02 DIAGNOSIS — K74.60 HEPATIC CIRRHOSIS, UNSPECIFIED HEPATIC CIRRHOSIS TYPE, UNSPECIFIED WHETHER ASCITES PRESENT: ICD-10-CM

## 2025-06-02 DIAGNOSIS — I24.9 ACS (ACUTE CORONARY SYNDROME): ICD-10-CM

## 2025-06-02 DIAGNOSIS — A41.9 SEPSIS WITH ENCEPHALOPATHY: ICD-10-CM

## 2025-06-02 DIAGNOSIS — N17.9 AKI (ACUTE KIDNEY INJURY): ICD-10-CM

## 2025-06-02 DIAGNOSIS — R78.81 BACTEREMIA: ICD-10-CM

## 2025-06-02 DIAGNOSIS — R41.82 ALTERED MENTAL STATUS, UNSPECIFIED ALTERED MENTAL STATUS TYPE: ICD-10-CM

## 2025-06-02 LAB
ABSOLUTE EOSINOPHIL (OHS): 0 K/UL
ABSOLUTE MONOCYTE (OHS): 0.15 K/UL (ref 0.3–1)
ABSOLUTE NEUTROPHIL COUNT (OHS): 5.31 K/UL (ref 1.8–7.7)
ALBUMIN SERPL BCP-MCNC: 2.2 G/DL (ref 3.5–5.2)
ALP SERPL-CCNC: 82 UNIT/L (ref 40–150)
ALT SERPL W/O P-5'-P-CCNC: 21 UNIT/L (ref 10–44)
AMMONIA PLAS-SCNC: 31 UMOL/L (ref 10–50)
AMORPH CRY URNS QL MICRO: ABNORMAL
ANION GAP (OHS): 17 MMOL/L (ref 8–16)
APTT PPP: 27.5 SECONDS (ref 21–32)
APTT PPP: 29.6 SECONDS (ref 21–32)
AST SERPL-CCNC: 34 UNIT/L (ref 11–45)
BACTERIA #/AREA URNS HPF: ABNORMAL /HPF
BASOPHILS # BLD AUTO: 0.04 K/UL
BASOPHILS NFR BLD AUTO: 0.7 %
BILIRUB SERPL-MCNC: 0.8 MG/DL (ref 0.1–1)
BILIRUB UR QL STRIP.AUTO: NEGATIVE
BNP SERPL-MCNC: 1212 PG/ML (ref 0–99)
BUN SERPL-MCNC: 53 MG/DL (ref 8–23)
CALCIUM SERPL-MCNC: 8.4 MG/DL (ref 8.7–10.5)
CHLORIDE SERPL-SCNC: 99 MMOL/L (ref 95–110)
CLARITY UR: CLEAR
CO2 SERPL-SCNC: 24 MMOL/L (ref 23–29)
COLOR UR AUTO: YELLOW
CREAT SERPL-MCNC: 3.1 MG/DL (ref 0.5–1.4)
ERYTHROCYTE [DISTWIDTH] IN BLOOD BY AUTOMATED COUNT: 13.3 % (ref 11.5–14.5)
GFR SERPLBLD CREATININE-BSD FMLA CKD-EPI: 15 ML/MIN/1.73/M2
GLUCOSE SERPL-MCNC: 271 MG/DL (ref 70–110)
GLUCOSE UR QL STRIP: ABNORMAL
GRAN CASTS #/AREA URNS LPF: 2 /LPF (ref ?–0)
HCT VFR BLD AUTO: 47.6 % (ref 37–48.5)
HGB BLD-MCNC: 15.3 GM/DL (ref 12–16)
HGB UR QL STRIP: ABNORMAL
HOLD SPECIMEN: NORMAL
HOLD SPECIMEN: NORMAL
HYALINE CASTS #/AREA URNS LPF: 0 /LPF (ref 0–1)
IMM GRANULOCYTES # BLD AUTO: 0.02 K/UL (ref 0–0.04)
IMM GRANULOCYTES NFR BLD AUTO: 0.4 % (ref 0–0.5)
INR PPP: 1.1 (ref 0.8–1.2)
KETONES UR QL STRIP: NEGATIVE
LACTATE SERPL-SCNC: 2.2 MMOL/L (ref 0.5–2.2)
LACTATE SERPL-SCNC: 3 MMOL/L (ref 0.5–2.2)
LEUKOCYTE ESTERASE UR QL STRIP: NEGATIVE
LYMPHOCYTES # BLD AUTO: 0.12 K/UL (ref 1–4.8)
MAGNESIUM SERPL-MCNC: 1.7 MG/DL (ref 1.6–2.6)
MCH RBC QN AUTO: 27.9 PG (ref 27–31)
MCHC RBC AUTO-ENTMCNC: 32.1 G/DL (ref 32–36)
MCV RBC AUTO: 87 FL (ref 82–98)
MICROSCOPIC COMMENT: ABNORMAL
NITRITE UR QL STRIP: NEGATIVE
NUCLEATED RBC (/100WBC) (OHS): 0 /100 WBC
PH UR STRIP: 6 [PH]
PLATELET # BLD AUTO: 54 K/UL (ref 150–450)
PLATELET BLD QL SMEAR: ABNORMAL
PMV BLD AUTO: 10 FL (ref 9.2–12.9)
POTASSIUM SERPL-SCNC: 3.5 MMOL/L (ref 3.5–5.1)
PROCALCITONIN SERPL-MCNC: 19.94 NG/ML
PROT SERPL-MCNC: 6.8 GM/DL (ref 6–8.4)
PROT UR QL STRIP: ABNORMAL
PROTHROMBIN TIME: 12.8 SECONDS (ref 9–12.5)
RBC # BLD AUTO: 5.49 M/UL (ref 4–5.4)
RBC #/AREA URNS HPF: 2 /HPF (ref 0–4)
RELATIVE EOSINOPHIL (OHS): 0 %
RELATIVE LYMPHOCYTE (OHS): 2.1 % (ref 18–48)
RELATIVE MONOCYTE (OHS): 2.7 % (ref 4–15)
RELATIVE NEUTROPHIL (OHS): 94.1 % (ref 38–73)
SODIUM SERPL-SCNC: 140 MMOL/L (ref 136–145)
SP GR UR STRIP: 1.02
SQUAMOUS #/AREA URNS HPF: 6 /HPF
TROPONIN I SERPL DL<=0.01 NG/ML-MCNC: 4.29 NG/ML
TROPONIN I SERPL DL<=0.01 NG/ML-MCNC: 4.77 NG/ML
UROBILINOGEN UR STRIP-ACNC: NEGATIVE EU/DL
WBC # BLD AUTO: 5.64 K/UL (ref 3.9–12.7)
WBC #/AREA URNS HPF: 5 /HPF (ref 0–5)
YEAST URNS QL MICRO: ABNORMAL /HPF

## 2025-06-02 PROCEDURE — 85027 COMPLETE CBC AUTOMATED: CPT | Mod: ER | Performed by: EMERGENCY MEDICINE

## 2025-06-02 PROCEDURE — 93010 ELECTROCARDIOGRAM REPORT: CPT | Mod: ,,, | Performed by: INTERNAL MEDICINE

## 2025-06-02 PROCEDURE — 96374 THER/PROPH/DIAG INJ IV PUSH: CPT | Mod: ER

## 2025-06-02 PROCEDURE — 83605 ASSAY OF LACTIC ACID: CPT | Performed by: EMERGENCY MEDICINE

## 2025-06-02 PROCEDURE — 83880 ASSAY OF NATRIURETIC PEPTIDE: CPT | Mod: ER | Performed by: EMERGENCY MEDICINE

## 2025-06-02 PROCEDURE — 87154 CUL TYP ID BLD PTHGN 6+ TRGT: CPT | Performed by: EMERGENCY MEDICINE

## 2025-06-02 PROCEDURE — 63600175 PHARM REV CODE 636 W HCPCS: Performed by: STUDENT IN AN ORGANIZED HEALTH CARE EDUCATION/TRAINING PROGRAM

## 2025-06-02 PROCEDURE — 93005 ELECTROCARDIOGRAM TRACING: CPT | Mod: ER

## 2025-06-02 PROCEDURE — 82140 ASSAY OF AMMONIA: CPT | Performed by: NURSE PRACTITIONER

## 2025-06-02 PROCEDURE — 86803 HEPATITIS C AB TEST: CPT | Performed by: EMERGENCY MEDICINE

## 2025-06-02 PROCEDURE — 99291 CRITICAL CARE FIRST HOUR: CPT | Mod: ER

## 2025-06-02 PROCEDURE — 63600175 PHARM REV CODE 636 W HCPCS: Mod: ER | Performed by: EMERGENCY MEDICINE

## 2025-06-02 PROCEDURE — 25000003 PHARM REV CODE 250: Mod: ER | Performed by: EMERGENCY MEDICINE

## 2025-06-02 PROCEDURE — 84145 PROCALCITONIN (PCT): CPT | Mod: ER | Performed by: EMERGENCY MEDICINE

## 2025-06-02 PROCEDURE — 87389 HIV-1 AG W/HIV-1&-2 AB AG IA: CPT | Performed by: EMERGENCY MEDICINE

## 2025-06-02 PROCEDURE — 87040 BLOOD CULTURE FOR BACTERIA: CPT | Performed by: EMERGENCY MEDICINE

## 2025-06-02 PROCEDURE — 85610 PROTHROMBIN TIME: CPT | Mod: ER | Performed by: EMERGENCY MEDICINE

## 2025-06-02 PROCEDURE — 83735 ASSAY OF MAGNESIUM: CPT | Mod: ER | Performed by: EMERGENCY MEDICINE

## 2025-06-02 PROCEDURE — 84484 ASSAY OF TROPONIN QUANT: CPT | Mod: ER | Performed by: EMERGENCY MEDICINE

## 2025-06-02 PROCEDURE — 63600175 PHARM REV CODE 636 W HCPCS: Performed by: EMERGENCY MEDICINE

## 2025-06-02 PROCEDURE — 21400001 HC TELEMETRY ROOM

## 2025-06-02 PROCEDURE — 84484 ASSAY OF TROPONIN QUANT: CPT | Performed by: NURSE PRACTITIONER

## 2025-06-02 PROCEDURE — 36415 COLL VENOUS BLD VENIPUNCTURE: CPT | Performed by: EMERGENCY MEDICINE

## 2025-06-02 PROCEDURE — 25000003 PHARM REV CODE 250: Performed by: EMERGENCY MEDICINE

## 2025-06-02 PROCEDURE — 36415 COLL VENOUS BLD VENIPUNCTURE: CPT | Performed by: NURSE PRACTITIONER

## 2025-06-02 PROCEDURE — 51702 INSERT TEMP BLADDER CATH: CPT | Mod: ER

## 2025-06-02 PROCEDURE — 83605 ASSAY OF LACTIC ACID: CPT | Mod: ER | Performed by: EMERGENCY MEDICINE

## 2025-06-02 PROCEDURE — 81003 URINALYSIS AUTO W/O SCOPE: CPT | Mod: ER | Performed by: EMERGENCY MEDICINE

## 2025-06-02 PROCEDURE — 96361 HYDRATE IV INFUSION ADD-ON: CPT | Mod: ER

## 2025-06-02 PROCEDURE — 85730 THROMBOPLASTIN TIME PARTIAL: CPT | Mod: ER | Performed by: EMERGENCY MEDICINE

## 2025-06-02 PROCEDURE — 80053 COMPREHEN METABOLIC PANEL: CPT | Mod: ER | Performed by: EMERGENCY MEDICINE

## 2025-06-02 PROCEDURE — 27000221 HC OXYGEN, UP TO 24 HOURS: Mod: ER

## 2025-06-02 RX ORDER — CEFEPIME HYDROCHLORIDE 2 G/1
2 INJECTION, POWDER, FOR SOLUTION INTRAVENOUS
Status: COMPLETED | OUTPATIENT
Start: 2025-06-02 | End: 2025-06-02

## 2025-06-02 RX ORDER — HEPARIN SODIUM,PORCINE/D5W 25000/250
0-40 INTRAVENOUS SOLUTION INTRAVENOUS CONTINUOUS
Status: DISCONTINUED | OUTPATIENT
Start: 2025-06-02 | End: 2025-06-02

## 2025-06-02 RX ORDER — MUPIROCIN 20 MG/G
OINTMENT TOPICAL
Status: COMPLETED | OUTPATIENT
Start: 2025-06-02 | End: 2025-06-02

## 2025-06-02 RX ORDER — GLUCAGON 1 MG
1 KIT INJECTION
Status: DISCONTINUED | OUTPATIENT
Start: 2025-06-03 | End: 2025-06-08

## 2025-06-02 RX ORDER — ACETAMINOPHEN 325 MG/1
650 TABLET ORAL EVERY 6 HOURS PRN
Status: DISCONTINUED | OUTPATIENT
Start: 2025-06-02 | End: 2025-06-04

## 2025-06-02 RX ORDER — INSULIN ASPART 100 [IU]/ML
0-10 INJECTION, SOLUTION INTRAVENOUS; SUBCUTANEOUS EVERY 6 HOURS PRN
Status: DISCONTINUED | OUTPATIENT
Start: 2025-06-02 | End: 2025-06-03

## 2025-06-02 RX ORDER — HEPARIN SODIUM,PORCINE/D5W 25000/250
0-40 INTRAVENOUS SOLUTION INTRAVENOUS CONTINUOUS
Status: DISCONTINUED | OUTPATIENT
Start: 2025-06-02 | End: 2025-06-04

## 2025-06-02 RX ORDER — HYDRALAZINE HYDROCHLORIDE 20 MG/ML
10 INJECTION INTRAMUSCULAR; INTRAVENOUS EVERY 6 HOURS PRN
Status: DISCONTINUED | OUTPATIENT
Start: 2025-06-03 | End: 2025-06-09

## 2025-06-02 RX ORDER — CEFEPIME HYDROCHLORIDE 1 G/1
1 INJECTION, POWDER, FOR SOLUTION INTRAMUSCULAR; INTRAVENOUS
Status: DISCONTINUED | OUTPATIENT
Start: 2025-06-03 | End: 2025-06-04

## 2025-06-02 RX ORDER — ACETAMINOPHEN 325 MG/1
650 TABLET ORAL
Status: COMPLETED | OUTPATIENT
Start: 2025-06-02 | End: 2025-06-02

## 2025-06-02 RX ADMIN — ACETAMINOPHEN 650 MG: 325 TABLET ORAL at 07:06

## 2025-06-02 RX ADMIN — CEFEPIME 2 G: 2 INJECTION, POWDER, FOR SOLUTION INTRAVENOUS at 07:06

## 2025-06-02 RX ADMIN — SODIUM CHLORIDE, POTASSIUM CHLORIDE, SODIUM LACTATE AND CALCIUM CHLORIDE 1000 ML: 600; 310; 30; 20 INJECTION, SOLUTION INTRAVENOUS at 07:06

## 2025-06-02 RX ADMIN — HEPARIN SODIUM 12 UNITS/KG/HR: 10000 INJECTION, SOLUTION INTRAVENOUS at 10:06

## 2025-06-02 RX ADMIN — VANCOMYCIN HYDROCHLORIDE 2000 MG: 2 INJECTION, POWDER, LYOPHILIZED, FOR SOLUTION INTRAVENOUS at 10:06

## 2025-06-02 RX ADMIN — HEPARIN SODIUM 12 UNITS/KG/HR: 10000 INJECTION, SOLUTION INTRAVENOUS at 08:06

## 2025-06-02 RX ADMIN — MUPIROCIN: 20 OINTMENT TOPICAL at 07:06

## 2025-06-02 NOTE — ED PROVIDER NOTES
"Encounter Date: 6/2/2025       History     Chief Complaint   Patient presents with    Altered Mental Status     Per  Saturday patient began "not feeling well." Pt disoriented to birthday, time, and situation. Per  pt has been using the bathroom on self. Pt and spouse poor historian.     The history is provided by the patient and the spouse.   Rash   This is a new problem. The current episode started several days ago. The problem has been gradually worsening. The rash is present on the right lower leg. The pain is at a severity of 2/10. The pain has been Constant since onset. Associated symptoms include weeping.   Pt with erythematous rash to right lower extremity for several days, drainage from leg wound. Spouse reports pt has been confused, disoriented, weak, decreased intake.    Review of patient's allergies indicates:   Allergen Reactions    Codeine Nausea Only     Other reaction(s): Nausea  Other reaction(s): Elevated blood pressure     Past Medical History:   Diagnosis Date    Acquired TTP     Cataract     CKD stage 3 secondary to diabetes     Closed displaced comminuted fracture of right patella 10/28/2020    Closed fracture of surgical neck of left humerus 10/30/2020    Closed left radial fracture 10/30/2020    Hyperkalemia     Hyperlipidemia     Hypertension     Hypothyroidism, unspecified     Liver cirrhosis secondary to YO     Morbid obesity     Right knee pain     Thyroid disease     Type 2 diabetes mellitus      Past Surgical History:   Procedure Laterality Date    APPENDECTOMY      BREAST BIOPSY      CATARACT EXTRACTION      COLONOSCOPY N/A 12/21/2021    Procedure: COLONOSCOPY screening;  Surgeon: Moises Patterson MD;  Location: [facility];  Service: Endoscopy;  Laterality: N/A;    ESOPHAGOGASTRODUODENOSCOPY N/A 12/21/2021    Procedure: EGD (ESOPHAGOGASTRODUODENOSCOPY) EV screening;  Surgeon: Moises Patterson MD;  Location: [facility];  Service: Endoscopy;  Laterality: N/A;    EYE " SURGERY      HERNIA REPAIR      OPEN REDUCTION AND INTERNAL FIXATION (ORIF) OF FRACTURE OF DISTAL RADIUS Left 11/2/2020    Procedure: ORIF, FRACTURE, RADIUS, DISTAL;  Surgeon: Sage Wolf MD;  Location: [facility] OR;  Service: Orthopedics;  Laterality: Left;    OPEN REDUCTION AND INTERNAL FIXATION (ORIF) OF FRACTURE OF PATELLA Right 11/2/2020    Procedure: ORIF, FRACTURE, PATELLA;  Surgeon: Sage Wolf MD;  Location: [facility] OR;  Service: Orthopedics;  Laterality: Right;    OPEN REDUCTION AND INTERNAL FIXATION (ORIF) OF FRACTURE OF PATELLA Right 12/18/2020    Procedure: ORIF, FRACTURE, PATELLA;  Surgeon: Sage Wolf MD;  Location: [facility] OR;  Service: Orthopedics;  Laterality: Right;    ORIF HUMERUS FRACTURE Left 11/5/2020    Procedure: ORIF, FRACTURE, HUMERUS;  Surgeon: Sage Wolf MD;  Location: [facility] OR;  Service: Orthopedics;  Laterality: Left;    PLACEMENT OF ACELLULAR HUMAN DERMAL ALLOGRAFT Right 11/2/2020    Procedure: APPLICATION, ACELLULAR HUMAN DERMAL ALLOGRAFT;  Surgeon: Sage Wolf MD;  Location: [facility] OR;  Service: Orthopedics;  Laterality: Right;  Right Patella    REMOVAL OF HARDWARE FROM HAND Left 3/11/2021    Procedure: REMOVAL, HARDWARE, HAND;  Surgeon: Sage Wolf MD;  Location: [facility] OR;  Service: Orthopedics;  Laterality: Left;  Removal of dorsal spanning wrist plate left distal radius    REMOVAL OF HARDWARE FROM LOWER EXTREMITY Right 12/18/2020    Procedure: REMOVAL, HARDWARE, LOWER EXTREMITY;  Surgeon: Sage Wolf MD;  Location: [facility];  Service: Orthopedics;  Laterality: Right;     Family History   Problem Relation Name Age of Onset    Diabetes Mother      Leukemia Father      Diabetes Father       Social History[1]  Review of Systems   Constitutional:  Positive for activity change.   HENT:  Negative for congestion.    Respiratory:  Negative for shortness of breath.    Cardiovascular:  Negative for chest pain.    Gastrointestinal:  Negative for abdominal pain.   Genitourinary:  Negative for dysuria.   Skin:  Positive for rash and wound.   Neurological:  Positive for dizziness, weakness and light-headedness.   Psychiatric/Behavioral:  Positive for confusion.        Physical Exam     Initial Vitals [06/02/25 1804]   BP Pulse Resp Temp SpO2   (!) 166/75 97 20 100 °F (37.8 °C) 95 %      MAP       --         Physical Exam    Nursing note and vitals reviewed.  Constitutional: She appears well-developed. No distress.   Generalized weakness, lethargy   HENT:   Head: Normocephalic and atraumatic. Mouth/Throat: Oropharynx is clear and moist.   Eyes: Conjunctivae and EOM are normal. Pupils are equal, round, and reactive to light.   Neck: Neck supple.   Normal range of motion.  Cardiovascular:  Normal rate, regular rhythm and normal heart sounds.           Pulmonary/Chest: No respiratory distress. She has rales.   Abdominal: Abdomen is soft. Bowel sounds are normal. She exhibits no distension. There is no abdominal tenderness.   Musculoskeletal:         General: Normal range of motion.      Cervical back: Normal range of motion and neck supple.     Neurological: She is alert and oriented to person, place, and time. She has normal strength.   Skin: Skin is warm and dry. Rash noted. There is erythema.   Erythematous rash right leg with central wound draining serous fluid   Psychiatric: She has a normal mood and affect. Thought content normal.         ED Course   Critical Care    Date/Time: 6/2/2025 8:09 PM    Performed by: Sage Best MD  Authorized by: Sage Best MD  Direct patient critical care time: 12 minutes  Additional history critical care time: 10 minutes  Ordering / reviewing critical care time: 10 minutes  Documentation critical care time: 12 minutes  Consulting other physicians critical care time: 6 minutes  Consult with family critical care time: 5 minutes  Total critical care time (exclusive of  procedural time) : 55 minutes  Critical care time was exclusive of separately billable procedures and treating other patients.  Critical care was necessary to treat or prevent imminent or life-threatening deterioration of the following conditions: sepsis, cardiac failure and circulatory failure.  Critical care was time spent personally by me on the following activities: blood draw for specimens, development of treatment plan with patient or surrogate, discussions with consultants, evaluation of patient's response to treatment, examination of patient, obtaining history from patient or surrogate, ordering and performing treatments and interventions, ordering and review of laboratory studies, ordering and review of radiographic studies, pulse oximetry, re-evaluation of patient's condition and review of old charts.        Labs Reviewed   COMPREHENSIVE METABOLIC PANEL - Abnormal       Result Value    Sodium 140      Potassium 3.5      Chloride 99      CO2 24      Glucose 271 (*)     BUN 53 (*)     Creatinine 3.1 (*)     Calcium 8.4 (*)     Protein Total 6.8      Albumin 2.2 (*)     Bilirubin Total 0.8      ALP 82      AST 34      ALT 21      Anion Gap 17 (*)     eGFR 15 (*)    LACTIC ACID, PLASMA - Abnormal    Lactic Acid Level 3.0 (*)     Narrative:     Falsely low lactic acid results can be found in samples containing >=13.0 mg/dL total bilirubin and/or >=3.5 mg/dL direct bilirubin.    URINALYSIS, REFLEX TO URINE CULTURE - Abnormal    Color, UA Yellow      Appearance, UA Clear      pH, UA 6.0      Spec Grav UA 1.020      Protein, UA 3+ (*)     Glucose, UA 3+ (*)     Ketones, UA Negative      Bilirubin, UA Negative      Blood, UA 3+ (*)     Nitrites, UA Negative      Urobilinogen, UA Negative      Leukocyte Esterase, UA Negative     B-TYPE NATRIURETIC PEPTIDE - Abnormal    BNP 1,212 (*)    TROPONIN I - Abnormal    Troponin-I 4.293 (*)    PROCALCITONIN - Abnormal    Procalcitonin 19.94 (*)    CBC WITH DIFFERENTIAL -  Abnormal    WBC 5.64      RBC 5.49 (*)     HGB 15.3      HCT 47.6      MCV 87      MCH 27.9      MCHC 32.1      RDW 13.3      Platelet Count 54 (*)     MPV 10.0      Nucleated RBC 0     URINALYSIS MICROSCOPIC - Abnormal    RBC, UA 2      WBC, UA 5      Bacteria, UA Occasional      Yeast, UA None      Squamous Epithelial Cells, UA 6      Hyaline Casts, UA 0      Granular Casts 2 (*)     Amorphous, UA Few      Microscopic Comment       MAGNESIUM - Normal    Magnesium  1.7     CULTURE, BLOOD   CULTURE, BLOOD   HEPATITIS C ANTIBODY   HEP C VIRUS HOLD SPECIMEN   HIV 1 / 2 ANTIBODY   CBC W/ AUTO DIFFERENTIAL    Narrative:     The following orders were created for panel order CBC auto differential.  Procedure                               Abnormality         Status                     ---------                               -----------         ------                     CBC with Differential[[id number]]       Abnormal            Preliminary result         Manual Differential[[id number]]                             In process                   Please view results for these tests on the individual orders.   MANUAL DIFFERENTIAL   GREY TOP URINE HOLD   APTT   APTT   PROTIME-INR     EKG Readings: (Independently Interpreted)   Rhythm: Sinus Tachycardia. Heart Rate: 104. Ectopy: PACs. Conduction: LBBB. Axis: Left Axis Deviation. Clinical Impression: Sinus Tachycardia       Imaging Results              X-Ray Tibia Fibula 2 View Right (Final result)  Result time 06/02/25 19:19:24      Final result by Bennett Kapoor DO (06/02/25 19:19:24)                   Narrative:    Exam: XR TIBIA FIBULA 2 VIEW RIGHT    Comparison: None    Clinical Indication:  Cellulitis    Findings: No acute bone or joint abnormality.  Edema within the subcutaneous tissues.  No soft tissue gas.  Previous ORIF of a patellar fracture.  Vascular calcification.    Finalized on: 6/2/2025 7:19 PM By:  Bennett Kapoor  [facility]# [id number]      2025-06-02 19:21:26.835      [facility]                                     CT Head Without Contrast (Final result)  Result time 06/02/25 18:45:46      Final result by Albaro Whittaker MD (06/02/25 18:45:46)                   Impression:     No acute intracranial findings.    All CT scans at [this location] are performed using dose modulation techniques as appropriate to a performed exam including the following:  Automated exposure control; adjustment of the mA and/or kV according to patient size (this includes techniques or standardized protocols for targeted exams where dose is matched to indication / reason for exam; i.e. extremities or head); use of iterative reconstruction technique.    Finalized on: 6/2/2025 6:45 PM By:  Albaro Whittaker MD  [facility]# [id number]      2025-06-02 18:47:55.006     [facility]               Narrative:    EXAM:  CT HEAD WITHOUT CONTRAST    CLINICAL HISTORY: Altered mental status.    TECHNIQUE: Noncontrast CT scan of the head was performed.    COMPARISON: None    FINDINGS:  No intracranial hemorrhage is identified.  No acute intracranial findings.  The calvarium is intact.  The visualized paranasal sinuses are clear.                                         X-Ray Chest AP Portable (Final result)  Result time 06/02/25 18:49:51      Final result by Bennett Kapoor DO (06/02/25 18:49:51)                   Narrative:    Exam: XR CHEST AP PORTABLE    Comparison: 05/09/2025    Clinical Indication:  Sepsis    Findings: Heart size is at upper limits of normal.  Pulmonary venous congestion, similar to prior.    Left retrocardiac atelectasis or consolidation.  Otherwise  No focal consolidation, pleural effusions or pneumothorax.    No acute angulated or displaced fractures. Previous ORIF of a left humeral fracture.    Finalized on: 6/2/2025 6:49 PM By:  Bennett Kapoor  [facility]# [id number]      2025-06-02 18:51:55.237     [facility]                                     Medications   vancomycin - pharmacy to dose (has no administration in time range)    heparin 25,000 units in dextrose 5% (100 units/ml) IV bolus from bag LOW INTENSITY nomogram - [facility] (has no administration in time range)   heparin 25,000 units in dextrose 5% 250 mL (100 units/mL) infusion LOW INTENSITY nomogram - [facility] (has no administration in time range)   heparin 25,000 units in dextrose 5% (100 units/ml) IV bolus from bag LOW INTENSITY nomogram - [facility] (has no administration in time range)   heparin 25,000 units in dextrose 5% (100 units/ml) IV bolus from bag LOW INTENSITY nomogram - [facility] (has no administration in time range)   ceFEPIme injection 2 g (2 g Intravenous Given 6/2/25 1924)   lactated ringers bolus 1,000 mL (0 mLs Intravenous Stopped 6/2/25 1955)   mupirocin 2 % ointment ( Topical (Top) Given 6/2/25 1939)   acetaminophen tablet 650 mg (650 mg Oral Given 6/2/25 1939)     Medical Decision Making  DDx Cellulitis, Sepsis, UTI, CVA, MIKA, acs, CHF    Problems Addressed:  ACS (acute coronary syndrome): undiagnosed new problem with uncertain prognosis  Acute on chronic diastolic congestive heart failure: chronic illness or injury with exacerbation, progression, or side effects of treatment  AMS (altered mental status): acute illness or injury  Cellulitis of right leg: acute illness or injury  Screening for cardiovascular condition: acute illness or injury  Sepsis: acute illness or injury    Amount and/or Complexity of Data Reviewed  Labs: ordered.  Radiology: ordered.  ECG/medicine tests: ordered and independent interpretation performed. Decision-making details documented in ED Course.  Discussion of management or test interpretation with external provider(s): Cardiology Consult- discussed case with Dr Rabago, agrees c plan to begin Heparin drip for ACS trop/bnp/ecg reviewed. Pt being admitted for Sepsis/Cellulitis of RLE. Hx TTP/    Risk  OTC drugs.  Prescription drug management.  Decision regarding hospitalization.  Risk Details: Diastolic HF with elevated BNP/trop. Fluid Bolus of 500 ml LR  given instead of standard bolus for Sepsis secondary to ACS/CHF                                      Clinical Impression:  Final diagnoses:  [Z13.6] Screening for cardiovascular condition  [R41.82] AMS (altered mental status)  [L03.115] Cellulitis of right leg  [A41.9] Sepsis (Primary)  [I50.33] Acute on chronic diastolic congestive heart failure  [I24.9] ACS (acute coronary syndrome)          ED Disposition Condition    Admit                       [1]   Social History  Tobacco Use    Smoking status: Former     Current packs/day: 0.00     Average packs/day: 1 pack/day for 8.0 years (8.0 ttl pk-yrs)     Types: Cigarettes     Start date:      Quit date:      Years since quittin.4    Smokeless tobacco: Never   Substance Use Topics    Alcohol use: Yes     Comment: rarely    Drug use: No        Sage Best MD  25

## 2025-06-03 PROBLEM — L03.115 CELLULITIS OF RIGHT LOWER EXTREMITY: Status: ACTIVE | Noted: 2025-06-03

## 2025-06-03 PROBLEM — I21.4 NSTEMI (NON-ST ELEVATED MYOCARDIAL INFARCTION): Status: ACTIVE | Noted: 2025-06-03

## 2025-06-03 LAB
ABSOLUTE EOSINOPHIL (OHS): 0 K/UL
ABSOLUTE MONOCYTE (OHS): 0.41 K/UL (ref 0.3–1)
ABSOLUTE NEUTROPHIL COUNT (OHS): 6.24 K/UL (ref 1.8–7.7)
ABSOLUTE NEUTROPHIL MANUAL (OHS): 7.1 K/UL
ACB COMPLEX DNA BLD POS QL NAA+NON-PROBE: NOT DETECTED
ALBUMIN SERPL BCP-MCNC: 1.8 G/DL (ref 3.5–5.2)
ALP SERPL-CCNC: 69 UNIT/L (ref 40–150)
ALT SERPL W/O P-5'-P-CCNC: 20 UNIT/L (ref 10–44)
ANION GAP (OHS): 15 MMOL/L (ref 8–16)
APTT PPP: 46.4 SECONDS (ref 21–32)
APTT PPP: 53.2 SECONDS (ref 21–32)
APTT PPP: 71.7 SECONDS (ref 21–32)
AST SERPL-CCNC: 28 UNIT/L (ref 11–45)
B FRAGILIS DNA BLD POS QL NAA+PROBE: NOT DETECTED
BASOPHILS # BLD AUTO: 0.03 K/UL
BASOPHILS NFR BLD AUTO: 0.4 %
BILIRUB SERPL-MCNC: 0.7 MG/DL (ref 0.1–1)
BSA FOR ECHO PROCEDURE: 2.15 M2
BUN SERPL-MCNC: 61 MG/DL (ref 8–23)
C ALBICANS DNA BLD POS QL NAA+PROBE: NOT DETECTED
C AURIS DNA BLD POS QL NAA+NON-PROBE: NOT DETECTED
C GATTII+NEOFOR DNA CSF QL NAA+NON-PROBE: NOT DETECTED
C GLABRATA DNA BLD POS QL NAA+PROBE: NOT DETECTED
C KRUSEI DNA BLD POS QL NAA+PROBE: NOT DETECTED
C PARAP DNA BLD POS QL NAA+PROBE: NOT DETECTED
C TROPICLS DNA BLD POS QL NAA+PROBE: NOT DETECTED
CALCIUM SERPL-MCNC: 7.9 MG/DL (ref 8.7–10.5)
CHLORIDE SERPL-SCNC: 100 MMOL/L (ref 95–110)
CO2 SERPL-SCNC: 21 MMOL/L (ref 23–29)
COLISTIN RES MCR-1 ISLT/SPM QL: ABNORMAL
CREAT SERPL-MCNC: 3 MG/DL (ref 0.5–1.4)
CV ECHO LV RWT: 0.55 CM
E CLOAC COMP DNA BLD POS QL NAA+PROBE: NOT DETECTED
E COLI DNA BLD POS QL NAA+PROBE: NOT DETECTED
E FAECALIS+OTHR E SP RRNA BLD POS FISH: NOT DETECTED
E FAECIUM HSP60 BLD POS QL PROBE: NOT DETECTED
ECHO LV POSTERIOR WALL: 1.3 CM (ref 0.6–1.1)
EJECTION FRACTION: 60 %
ENTEROBACTERALES DNA BLD POS NAA+N-PRB: NOT DETECTED
ERYTHROCYTE [DISTWIDTH] IN BLOOD BY AUTOMATED COUNT: 13.3 % (ref 11.5–14.5)
ERYTHROCYTE [DISTWIDTH] IN BLOOD BY AUTOMATED COUNT: 13.4 % (ref 11.5–14.5)
ESBL CFT TO CFT-CLAV IC RTO BD POS IMP: ABNORMAL
FRACTIONAL SHORTENING: 31.9 % (ref 28–44)
GFR SERPLBLD CREATININE-BSD FMLA CKD-EPI: 16 ML/MIN/1.73/M2
GLUCOSE SERPL-MCNC: 258 MG/DL (ref 70–110)
GP B STREP DNA CSF QL NAA+NON-PROBE: NOT DETECTED
HAEM INFLU DNA CSF QL NAA+NON-PROBE: NOT DETECTED
HCT VFR BLD AUTO: 30.5 % (ref 37–48.5)
HCT VFR BLD AUTO: 31 % (ref 37–48.5)
HCV AB SERPL QL IA: NEGATIVE
HGB BLD-MCNC: 9.6 GM/DL (ref 12–16)
HGB BLD-MCNC: 9.8 GM/DL (ref 12–16)
HIV 1+2 AB+HIV1 P24 AG SERPL QL IA: NEGATIVE
HOLD SPECIMEN: NORMAL
IMM GRANULOCYTES # BLD AUTO: 0.1 K/UL (ref 0–0.04)
IMM GRANULOCYTES NFR BLD AUTO: 1.4 % (ref 0–0.5)
IMP CARBAPENEMASE ISLT QL IA.RAPID: ABNORMAL
INTERVENTRICULAR SEPTUM: 1.4 CM (ref 0.6–1.1)
K OXYTOCA OMPA BLD POS QL PROBE: NOT DETECTED
KLEBSIELLA SP DNA BLD POS QL NAA+NON-PRB: NOT DETECTED
KLEBSIELLA SP DNA BLD POS QL NAA+NON-PRB: NOT DETECTED
KPC CARBAPENEMASE ISLT QL IA.RAPID: ABNORMAL
L MONOCYTOG DNA CSF QL NAA+NON-PROBE: NOT DETECTED
LEFT INTERNAL DIMENSION IN SYSTOLE: 3.2 CM (ref 2.1–4)
LEFT VENTRICLE DIASTOLIC VOLUME INDEX: 50.24 ML/M2
LEFT VENTRICLE DIASTOLIC VOLUME: 104 ML
LEFT VENTRICLE MASS INDEX: 121.4 G/M2
LEFT VENTRICLE SYSTOLIC VOLUME INDEX: 19.3 ML/M2
LEFT VENTRICLE SYSTOLIC VOLUME: 40 ML
LEFT VENTRICULAR INTERNAL DIMENSION IN DIASTOLE: 4.7 CM (ref 3.5–6)
LEFT VENTRICULAR MASS: 251.4 G
LVED V (TEICH): 104.33 ML
LVES V (TEICH): 40.03 ML
LYMPHOCYTES # BLD AUTO: 0.26 K/UL (ref 1–4.8)
LYMPHOCYTES NFR BLD MANUAL: 4 % (ref 18–48)
MCH RBC QN AUTO: 28 PG (ref 27–31)
MCH RBC QN AUTO: 28.1 PG (ref 27–31)
MCHC RBC AUTO-ENTMCNC: 31.5 G/DL (ref 32–36)
MCHC RBC AUTO-ENTMCNC: 31.6 G/DL (ref 32–36)
MCV RBC AUTO: 89 FL (ref 82–98)
MCV RBC AUTO: 89 FL (ref 82–98)
MECA+MECC NOSE QL NAA+PROBE: ABNORMAL
MECA+MECC+MREJ ISLT/SPM QL: ABNORMAL
METAMYELOCYTES NFR BLD MANUAL: 2 %
MONOCYTES NFR BLD MANUAL: 4 % (ref 4–15)
N MEN DNA CSF QL NAA+NON-PROBE: NOT DETECTED
NDM CARBAPENEMASE ISLT QL IA.RAPID: ABNORMAL
NEUTROPHILS NFR BLD MANUAL: 84 % (ref 38–73)
NEUTS BAND NFR BLD MANUAL: 6 %
NUCLEATED RBC (/100WBC) (OHS): 0 /100 WBC
NUCLEATED RBC (/100WBC) (OHS): 0 /100 WBC
OHS QRS DURATION: 106 MS
OHS QRS DURATION: 144 MS
OHS QTC CALCULATION: 453 MS
OHS QTC CALCULATION: 481 MS
OXA-48-LIKE CRBPNASE ISLT QL IA.RAPID: ABNORMAL
P AERUGINOSA DNA BLD POS QL NAA+PROBE: NOT DETECTED
PLATELET # BLD AUTO: 71 K/UL (ref 150–450)
PLATELET # BLD AUTO: 72 K/UL (ref 150–450)
PLATELET BLD QL SMEAR: ABNORMAL
PLATELET BLD QL SMEAR: ABNORMAL
PMV BLD AUTO: 10.7 FL (ref 9.2–12.9)
PMV BLD AUTO: 10.9 FL (ref 9.2–12.9)
POCT GLUCOSE: 262 MG/DL (ref 70–110)
POCT GLUCOSE: 266 MG/DL (ref 70–110)
POCT GLUCOSE: 342 MG/DL (ref 70–110)
POTASSIUM SERPL-SCNC: 3.3 MMOL/L (ref 3.5–5.1)
PROT SERPL-MCNC: 6.1 GM/DL (ref 6–8.4)
PROTEUS SP DNA BLD POS QL NAA+PROBE: NOT DETECTED
RA PRESSURE ESTIMATED: 3 MMHG
RBC # BLD AUTO: 3.42 M/UL (ref 4–5.4)
RBC # BLD AUTO: 3.5 M/UL (ref 4–5.4)
RELATIVE EOSINOPHIL (OHS): 0 %
RELATIVE LYMPHOCYTE (OHS): 3.7 % (ref 18–48)
RELATIVE MONOCYTE (OHS): 5.8 % (ref 4–15)
RELATIVE NEUTROPHIL (OHS): 88.7 % (ref 38–73)
S AUREUS DNA BLD POS QL NAA+PROBE: NOT DETECTED
S ENT+BONG DNA STL QL NAA+NON-PROBE: NOT DETECTED
S EPIDERMIDIS HSP60 BLD POS QL PROBE: NOT DETECTED
S LUGDUNENSIS SODA BLD POS QL PROBE: NOT DETECTED
S MALTOPH DNA BLD POS QL NAA+PROBE: NOT DETECTED
S MARCESCENS DNA BLD POS QL NAA+PROBE: NOT DETECTED
S PNEUM DNA CSF QL NAA+NON-PROBE: NOT DETECTED
S PYOGENES HSP60 BLD POS QL PROBE: DETECTED
SODIUM SERPL-SCNC: 136 MMOL/L (ref 136–145)
STAPH SP TUF BLD POS QL PROBE: NOT DETECTED
STREPTOCOCCUS SP TUF BLD POS QL PROBE: ABNORMAL
TROPONIN I SERPL DL<=0.01 NG/ML-MCNC: 3.54 NG/ML
TROPONIN I SERPL DL<=0.01 NG/ML-MCNC: 3.8 NG/ML
VAN(A+B+C1+C2) GENES ISLT/SPM: ABNORMAL
VANCOMYCIN SERPL-MCNC: 21.8 UG/ML (ref ?–80)
VIM CARBAPENEMASE ISLT QL IA.RAPID: ABNORMAL
WBC # BLD AUTO: 7.04 K/UL (ref 3.9–12.7)
WBC # BLD AUTO: 7.92 K/UL (ref 3.9–12.7)
Z-SCORE OF LEFT VENTRICULAR DIMENSION IN END DIASTOLE: -2.92
Z-SCORE OF LEFT VENTRICULAR DIMENSION IN END SYSTOLE: -1.46

## 2025-06-03 PROCEDURE — 80202 ASSAY OF VANCOMYCIN: CPT | Performed by: EMERGENCY MEDICINE

## 2025-06-03 PROCEDURE — 27000221 HC OXYGEN, UP TO 24 HOURS

## 2025-06-03 PROCEDURE — 93010 ELECTROCARDIOGRAM REPORT: CPT | Mod: ,,, | Performed by: INTERNAL MEDICINE

## 2025-06-03 PROCEDURE — 25000242 PHARM REV CODE 250 ALT 637 W/ HCPCS: Performed by: STUDENT IN AN ORGANIZED HEALTH CARE EDUCATION/TRAINING PROGRAM

## 2025-06-03 PROCEDURE — 93005 ELECTROCARDIOGRAM TRACING: CPT

## 2025-06-03 PROCEDURE — 94640 AIRWAY INHALATION TREATMENT: CPT

## 2025-06-03 PROCEDURE — 25000003 PHARM REV CODE 250: Performed by: NURSE PRACTITIONER

## 2025-06-03 PROCEDURE — 85027 COMPLETE CBC AUTOMATED: CPT | Performed by: NURSE PRACTITIONER

## 2025-06-03 PROCEDURE — 36415 COLL VENOUS BLD VENIPUNCTURE: CPT | Performed by: NURSE PRACTITIONER

## 2025-06-03 PROCEDURE — 80053 COMPREHEN METABOLIC PANEL: CPT | Performed by: NURSE PRACTITIONER

## 2025-06-03 PROCEDURE — 99223 1ST HOSP IP/OBS HIGH 75: CPT | Mod: NSCH,,, | Performed by: INTERNAL MEDICINE

## 2025-06-03 PROCEDURE — 36415 COLL VENOUS BLD VENIPUNCTURE: CPT | Performed by: STUDENT IN AN ORGANIZED HEALTH CARE EDUCATION/TRAINING PROGRAM

## 2025-06-03 PROCEDURE — 99222 1ST HOSP IP/OBS MODERATE 55: CPT | Mod: ,,, | Performed by: INTERNAL MEDICINE

## 2025-06-03 PROCEDURE — 63600175 PHARM REV CODE 636 W HCPCS: Performed by: STUDENT IN AN ORGANIZED HEALTH CARE EDUCATION/TRAINING PROGRAM

## 2025-06-03 PROCEDURE — 84484 ASSAY OF TROPONIN QUANT: CPT | Performed by: NURSE PRACTITIONER

## 2025-06-03 PROCEDURE — 85730 THROMBOPLASTIN TIME PARTIAL: CPT | Performed by: STUDENT IN AN ORGANIZED HEALTH CARE EDUCATION/TRAINING PROGRAM

## 2025-06-03 PROCEDURE — 63600175 PHARM REV CODE 636 W HCPCS: Performed by: NURSE PRACTITIONER

## 2025-06-03 PROCEDURE — 21400001 HC TELEMETRY ROOM

## 2025-06-03 PROCEDURE — 85025 COMPLETE CBC W/AUTO DIFF WBC: CPT | Performed by: EMERGENCY MEDICINE

## 2025-06-03 RX ORDER — INSULIN ASPART 100 [IU]/ML
0-10 INJECTION, SOLUTION INTRAVENOUS; SUBCUTANEOUS
Status: DISCONTINUED | OUTPATIENT
Start: 2025-06-03 | End: 2025-06-05

## 2025-06-03 RX ORDER — ARFORMOTEROL TARTRATE 15 UG/2ML
15 SOLUTION RESPIRATORY (INHALATION) 2 TIMES DAILY
Status: DISCONTINUED | OUTPATIENT
Start: 2025-06-03 | End: 2025-06-09

## 2025-06-03 RX ORDER — IPRATROPIUM BROMIDE AND ALBUTEROL SULFATE 2.5; .5 MG/3ML; MG/3ML
3 SOLUTION RESPIRATORY (INHALATION) EVERY 4 HOURS PRN
Status: DISCONTINUED | OUTPATIENT
Start: 2025-06-03 | End: 2025-06-09

## 2025-06-03 RX ORDER — FLUTICASONE FUROATE AND VILANTEROL 100; 25 UG/1; UG/1
1 POWDER RESPIRATORY (INHALATION) DAILY
Status: DISCONTINUED | OUTPATIENT
Start: 2025-06-03 | End: 2025-06-03 | Stop reason: CLARIF

## 2025-06-03 RX ORDER — INSULIN ASPART 100 [IU]/ML
0-10 INJECTION, SOLUTION INTRAVENOUS; SUBCUTANEOUS
Status: DISCONTINUED | OUTPATIENT
Start: 2025-06-04 | End: 2025-06-03

## 2025-06-03 RX ORDER — SODIUM BICARBONATE 650 MG/1
650 TABLET ORAL 2 TIMES DAILY
Status: DISCONTINUED | OUTPATIENT
Start: 2025-06-03 | End: 2025-06-04

## 2025-06-03 RX ORDER — BUDESONIDE 0.5 MG/2ML
0.5 INHALANT ORAL EVERY 12 HOURS
Status: DISCONTINUED | OUTPATIENT
Start: 2025-06-03 | End: 2025-06-09

## 2025-06-03 RX ADMIN — CEFEPIME 1 G: 1 INJECTION, POWDER, FOR SOLUTION INTRAMUSCULAR; INTRAVENOUS at 09:06

## 2025-06-03 RX ADMIN — CEFEPIME 1 G: 1 INJECTION, POWDER, FOR SOLUTION INTRAMUSCULAR; INTRAVENOUS at 07:06

## 2025-06-03 RX ADMIN — ARFORMOTEROL TARTRATE 15 MCG: 15 SOLUTION RESPIRATORY (INHALATION) at 07:06

## 2025-06-03 RX ADMIN — BUDESONIDE INHALATION 0.5 MG: 0.5 SUSPENSION RESPIRATORY (INHALATION) at 07:06

## 2025-06-03 RX ADMIN — LEVOTHYROXINE SODIUM 137 MCG: 0.11 TABLET ORAL at 07:06

## 2025-06-03 RX ADMIN — INSULIN ASPART 4 UNITS: 100 INJECTION, SOLUTION INTRAVENOUS; SUBCUTANEOUS at 09:06

## 2025-06-03 RX ADMIN — INSULIN ASPART 6 UNITS: 100 INJECTION, SOLUTION INTRAVENOUS; SUBCUTANEOUS at 02:06

## 2025-06-03 RX ADMIN — SODIUM BICARBONATE 650 MG: 650 TABLET ORAL at 08:06

## 2025-06-03 RX ADMIN — HEPARIN SODIUM 10 UNITS/KG/HR: 10000 INJECTION, SOLUTION INTRAVENOUS at 07:06

## 2025-06-03 RX ADMIN — ARFORMOTEROL TARTRATE 15 MCG: 15 SOLUTION RESPIRATORY (INHALATION) at 08:06

## 2025-06-03 RX ADMIN — INSULIN ASPART 6 UNITS: 100 INJECTION, SOLUTION INTRAVENOUS; SUBCUTANEOUS at 05:06

## 2025-06-03 RX ADMIN — HEPARIN SODIUM 10 UNITS/KG/HR: 10000 INJECTION, SOLUTION INTRAVENOUS at 03:06

## 2025-06-03 RX ADMIN — BUDESONIDE INHALATION 0.5 MG: 0.5 SUSPENSION RESPIRATORY (INHALATION) at 08:06

## 2025-06-03 NOTE — ASSESSMENT & PLAN NOTE
"This patient does have evidence of infective focus  My overall impression is sepsis with Acute kidney injury, Acute heart failure, Encephalopathy, and Thrombocytopenia .  Source: Skin and Soft Tissue Infection: Cellulitis  Antibiotics given-   Antibiotics (72h ago, onward)      Start     Stop Route Frequency Ordered    06/03/25 0800  ceFEPIme injection 1 g         -- IV Every 12 hours (non-standard times) 06/02/25 2154    06/1950  vancomycin - pharmacy to dose  (vancomycin IVPB (PEDS and ADULTS))        Placed in "And" Linked Group    -- IV pharmacy to manage frequency 06/02/25 1850          Latest lactate reviewed-  Recent Labs   Lab 06/02/25  2242   LACTATE 2.2     Organ dysfunction indicated by Acute kidney injury, Encephalopathy, and Thrombocytopenia     Fluid challenge Contraindicated- Fluid bolus is contraindicated in this patient due to Congestive Heart Failure     Post- resuscitation assessment Yes - I attest a sepsis perfusion exam was performed within 6 hours of sepsis, severe sepsis, or septic shock presentation, following fluid resuscitation.      Will Not start Pressors  Source control achieved by: Broad spectrum antibiotics  "

## 2025-06-03 NOTE — PROGRESS NOTES
Pharmacist Renal Dose Adjustment Note    Lakshmi Campbell is a 74 y.o. female being treated with the medication cefepime.    Patient Data:    Vital Signs (Most Recent):  Temp: (!) 102.7 °F (39.3 °C) (06/02/25 2017)  Pulse: 89 (06/02/25 2040)  Resp: 18 (06/02/25 2040)  BP: 132/62 (06/02/25 2031)  SpO2: 96 % (06/02/25 2031) Vital Signs (72h Range):  Temp:  [100 °F (37.8 °C)-102.7 °F (39.3 °C)]   Pulse:  []   Resp:  [18-37]   BP: (132-180)/(58-81)   SpO2:  [82 %-99 %]      Recent Labs   Lab 06/02/25 1859   CREATININE 3.1*     Serum creatinine: 3.1 mg/dL (H) 06/02/25 1859  Estimated creatinine clearance: 18.7 mL/min (A)    Cefepime 2 g IV every 8 hours will be changed to cefepime 1 g IV every 12 hours for the treatment of skin and soft tissue infection with CrCl 11-29 ml/min.    Pharmacist's Name: Jose King  Pharmacist's Extension: [phone]

## 2025-06-03 NOTE — PLAN OF CARE
[facility] - Telemetry (Hospital)  Initial Discharge Assessment       Primary Care Provider: Keely Silva NP    Admission Diagnosis: Screening for cardiovascular condition [Z13.6]  ACS (acute coronary syndrome) [I24.9]  Acute on chronic diastolic congestive heart failure [I50.33]  Cellulitis of right leg [L03.115]  Sepsis [A41.9]  AMS (altered mental status) [R41.82]    Admission Date: 6/2/2025  Expected Discharge Date:     Transition of Care Barriers: (P) None    Payor: BCBS [place] MEDICARE / Plan: [employer] [place] / Product Type: Medicare Advantage /     Extended Emergency Contact Information  Primary Emergency Contact: Dillon Campbell  Address: [id number] [place] [address] [address] [place] [address] [place]  Mobile Phone: [phone]  Relation: Spouse    Discharge Plan A: (P) Home  Discharge Plan B: (P) Home      [id number] - [place], [place] - [address] AT [place] & [facility]  [address]  [place] [address]  Phone: [phone] Fax: [phone]    [id number] [place]  [address] AT [facility] & [place] [address]   [place] [address]  Phone: [phone] Fax: [phone]      Initial Assessment (most recent)       Adult Discharge Assessment - 06/03/25 1119          Discharge Assessment    Assessment Type Discharge Planning Assessment (P)      Source of Information patient (P)      Communicated KYA with patient/caregiver No (P)      People in Home spouse (P)      Name(s) of People in Home Dillon Campbell spouse [phone] (P)      Facility Arrived From: Home (P)      Do you expect to return to your current living situation? Yes (P)      Do you have help at home or someone to help you manage your care at home? Yes (P)      Who are your caregiver(s) and their phone number(s)? Dillon Campbell spouse [phone] (P)      Prior to hospitilization cognitive status: Alert/Oriented (P)      Current cognitive  status: Alert/Oriented (P)      Walking or Climbing Stairs Difficulty no (P)      Dressing/Bathing Difficulty no (P)      Home Accessibility wheelchair accessible (P)      Home Layout Able to live on 1st floor (P)      Equipment Currently Used at Home none (P)      Readmission within 30 days? Yes (P)      Patient currently being followed by outpatient case management? No (P)      Do you currently have service(s) that help you manage your care at home? No (P)      Do you take prescription medications? Yes (P)      Do you have prescription coverage? Yes (P)      Do you have any problems affording any of your prescribed medications? No (P)      Who is going to help you get home at discharge? Family (P)      How do you get to doctors appointments? family or friend will provide (P)      Are you on dialysis? No (P)      Do you take coumadin? No (P)      Discharge Plan A Home (P)      Discharge Plan B Home (P)      DME Needed Upon Discharge  none (P)      Discharge Plan discussed with: Patient (P)      Transition of Care Barriers None (P)         Physical Activity    On average, how many days per week do you engage in moderate to strenuous exercise (like a brisk walk)? 0 days (P)      On average, how many minutes do you engage in exercise at this level? 0 min (P)         Housing Stability    In the last 12 months, was there a time when you were not able to pay the mortgage or rent on time? No (P)      At any time in the past 12 months, were you homeless or living in a shelter (including now)? No (P)         Transportation Needs    In the past 12 months, has lack of transportation kept you from medical appointments or from getting medications? No (P)      In the past 12 months, has lack of transportation kept you from meetings, work, or from getting things needed for daily living? No (P)         Food Insecurity    Within the past 12 months, you worried that your food would run out before you got the money to buy more. Never  true (P)      Within the past 12 months, the food you bought just didn't last and you didn't have money to get more. Never true (P)         Alcohol Use    Q1: How often do you have a drink containing alcohol? Never (P)      Q2: How many drinks containing alcohol do you have on a typical day when you are drinking? Patient does not drink (P)      Q3: How often do you have six or more drinks on one occasion? Never (P)         HashParade    In the past 12 months has the electric, gas, oil, or water company threatened to shut off services in your home? No (P)         Health Literacy    How often do you need to have someone help you when you read instructions, pamphlets, or other written material from your doctor or pharmacy? Never (P)                    CM spoke with patient to explain role and discuss discharge planning. Patient lives at the correct address on face sheet.     Level of function: Functioning daily ADLs with no HME assistance.    PCP: Keely Mcghee NP    Anticipated DC Dispo: Home    Patient is not on dialysis. Patient is not taking coumadin.    Needs will depend on hospital progress; CM following for needs

## 2025-06-03 NOTE — HPI
" Patient is a 74-year-old female with past medical history significant for type 2 insulin dependent diabetes mellitus, CKD stage 4, hypertension, hypothyroidism, liver cirrhosis secondary to YO, diastolic heart failure, aortic stenosis, hyperkalemia, thrombocytopenia, cataracts, hyperlipidemia, and multiple previous fractures as well as recent admit from  05/09/2025 through 5/15/2025 due to CHF exacerbation, pneumonia, hypoxemic respiratory failure, and UTI (treated with Merrem followed by cephalexin as well as IV Lasix, Duonebs and IV Solu-Medrol -- had to be discharged with home O2 which she has been using while sleeping. She presented to [facility] ED with complaint "not feeling well." Her  reports that she has been disoriented/confused and having incontinent episodes. He is very poor historian and patient is confused, so information mainly obtained via chart review. She did say yes when asked if dizzy/lightheaded/weak. She has had decreased activity and decreased appetite. while in ED she had temperature up to 102.7, tachycardia up to 115, blood pressure as high as 180/81 and SpO2 at one point down to 82% and was placed on nasal cannula at 2 L. lab workup showed WBC 5.64, hemoglobin 15.3, hematocrit 47.6, platelets 54, PT 12.8, INR 1.1, PTT 27.5, sodium 140, potassium 3.5, chloride 99, CO2 24, anion gap 17, BUN 53, creatinine 3.1, glucose 271, calcium 8.4, magnesium 1.7, alk-phos 82, albumin 2.2, AST 34, ALT 21,  BNP 12 12, troponin 4.293, lactic acid 3.0, procalcitonin 19.94, UA with no evidence of infection, blood cultures x2 pending.  CT head without contrast was done and there was no acute intracranial findings.  Chest x-ray shows pulmonary venous congestion, similar to prior with left retrocardiac atelectasis versus consolidation.  X-ray of right tibia /fibula shows no acute bone or joint abnormality, does note edema within  subcutaneous tissues, no soft tissue gas.  While in ED, she was given " acetaminophen, IV cefepime, LR fluid bolus x1 L, IV vancomycin, and started on heparin drip. Hospital Medicine was consulted for admission due to sepsis, cellulitis, encephalopathy, worsening renal failure, and NSTEMI.

## 2025-06-03 NOTE — SUBJECTIVE & OBJECTIVE
Past Medical History:   Diagnosis Date    Acquired TTP     Cataract     CKD stage 3 secondary to diabetes     Closed displaced comminuted fracture of right patella 10/28/2020    Closed fracture of surgical neck of left humerus 10/30/2020    Closed left radial fracture 10/30/2020    Hyperkalemia     Hyperlipidemia     Hypertension     Hypothyroidism, unspecified     Liver cirrhosis secondary to YO     Morbid obesity     Right knee pain     Thyroid disease     Type 2 diabetes mellitus        Past Surgical History:   Procedure Laterality Date    APPENDECTOMY      BREAST BIOPSY      CATARACT EXTRACTION      COLONOSCOPY N/A 12/21/2021    Procedure: COLONOSCOPY screening;  Surgeon: Moises Patterson MD;  Location: [facility];  Service: Endoscopy;  Laterality: N/A;    ESOPHAGOGASTRODUODENOSCOPY N/A 12/21/2021    Procedure: EGD (ESOPHAGOGASTRODUODENOSCOPY) EV screening;  Surgeon: Moises Patterson MD;  Location: [facility];  Service: Endoscopy;  Laterality: N/A;    EYE SURGERY      HERNIA REPAIR      OPEN REDUCTION AND INTERNAL FIXATION (ORIF) OF FRACTURE OF DISTAL RADIUS Left 11/2/2020    Procedure: ORIF, FRACTURE, RADIUS, DISTAL;  Surgeon: Sage Wolf MD;  Location: [facility];  Service: Orthopedics;  Laterality: Left;    OPEN REDUCTION AND INTERNAL FIXATION (ORIF) OF FRACTURE OF PATELLA Right 11/2/2020    Procedure: ORIF, FRACTURE, PATELLA;  Surgeon: Sage Wolf MD;  Location: [facility] OR;  Service: Orthopedics;  Laterality: Right;    OPEN REDUCTION AND INTERNAL FIXATION (ORIF) OF FRACTURE OF PATELLA Right 12/18/2020    Procedure: ORIF, FRACTURE, PATELLA;  Surgeon: Sage Wolf MD;  Location: [facility] OR;  Service: Orthopedics;  Laterality: Right;    ORIF HUMERUS FRACTURE Left 11/5/2020    Procedure: ORIF, FRACTURE, HUMERUS;  Surgeon: Sage Wolf MD;  Location: [facility] OR;  Service: Orthopedics;  Laterality: Left;    PLACEMENT OF ACELLULAR HUMAN DERMAL ALLOGRAFT Right 11/2/2020     Procedure: APPLICATION, ACELLULAR HUMAN DERMAL ALLOGRAFT;  Surgeon: Sage Wolf MD;  Location: [facility] OR;  Service: Orthopedics;  Laterality: Right;  Right Patella    REMOVAL OF HARDWARE FROM HAND Left 3/11/2021    Procedure: REMOVAL, HARDWARE, HAND;  Surgeon: Sage Wolf MD;  Location: [facility] OR;  Service: Orthopedics;  Laterality: Left;  Removal of dorsal spanning wrist plate left distal radius    REMOVAL OF HARDWARE FROM LOWER EXTREMITY Right 2020    Procedure: REMOVAL, HARDWARE, LOWER EXTREMITY;  Surgeon: Sage Wolf MD;  Location: [facility] OR;  Service: Orthopedics;  Laterality: Right;       Review of patient's allergies indicates:   Allergen Reactions    Codeine Nausea Only     Other reaction(s): Nausea  Other reaction(s): Elevated blood pressure       No current facility-administered medications on file prior to encounter.     Current Outpatient Medications on File Prior to Encounter   Medication Sig    albuterol (VENTOLIN HFA) 90 mcg/actuation inhaler Inhale 2 puffs into the lungs every 6 (six) hours as needed for Wheezing or Shortness of Breath (cough). Rescue    amLODIPine (NORVASC) 10 MG tablet TAKE 1 TABLET(10 MG) BY MOUTH EVERY DAY    blood sugar diagnostic Strp Use ac bid    budesonide-formoterol 160-4.5 mcg (SYMBICORT) 160-4.5 mcg/actuation HFAA Inhale 2 puffs into the lungs every 12 (twelve) hours. Controller    chlorthalidone (HYGROTEN) 25 MG Tab Take 25 mg by mouth once daily.    cholecalciferol, vitamin D3, (VITAMIN D3) 50 mcg (2,000 unit) Cap capsule Take 1 capsule by mouth.    furosemide (LASIX) 20 MG tablet Take 1 tablet (20 mg total) by mouth once daily.    HUMULIN 70/30 U-100 KWIKPEN 100 unit/mL (70-30) InPn pen SMARTSI Unit(s) SUB-Q Every Evening    hydrALAZINE (APRESOLINE) 25 MG tablet Take 1 tablet (25 mg total) by mouth every 12 (twelve) hours.    hydroCHLOROthiazide (HYDRODIURIL) 25 MG tablet Take 25 mg by mouth.    insulin syringe-needle U-100 1  "mL 29 gauge x 1/2" Syrg Use bid    JARDIANCE 10 mg tablet Take 10 mg by mouth every morning.    lancets 33 gauge Misc Use as BID    levothyroxine (SYNTHROID) 137 MCG Tab tablet Take 1 tablet by mouth once daily.    perindopril (ACEON) 2 MG Tab Take 2 mg by mouth.    pravastatin (PRAVACHOL) 20 MG tablet Take 20 mg by mouth once daily.     sodium bicarbonate 650 MG tablet Take 650 mg by mouth 2 (two) times daily.    spironolactone (ALDACTONE) 25 MG tablet Take 25 mg by mouth.     Family History       Problem Relation (Age of Onset)    Diabetes Mother, Father    Leukemia Father          Tobacco Use    Smoking status: Former     Current packs/day: 0.00     Average packs/day: 1 pack/day for 8.0 years (8.0 ttl pk-yrs)     Types: Cigarettes     Start date:      Quit date:      Years since quittin.4    Smokeless tobacco: Never   Substance and Sexual Activity    Alcohol use: Yes     Comment: rarely    Drug use: No    Sexual activity: Not Currently     Review of Systems   Reason unable to perform ROS: limited due to confusion, info provided from family and per ED note.   Constitutional:  Positive for appetite change, chills, fatigue and fever.   HENT: Negative.     Eyes: Negative.    Respiratory:  Negative for cough, chest tightness, shortness of breath and wheezing.    Cardiovascular:  Positive for leg swelling. Negative for chest pain and palpitations.   Gastrointestinal: Negative.  Negative for abdominal distention, abdominal pain, constipation, diarrhea, nausea and vomiting.   Endocrine: Negative.    Genitourinary:  Positive for urgency. Negative for dysuria and flank pain.        Urinary incontinence - multiple episodes   Musculoskeletal: Negative.    Skin:  Positive for color change and wound.        Right lower leg -- wound with redness around it   Neurological:  Positive for dizziness, weakness and light-headedness. Negative for headaches.        Rolled out of bed and fell on floor several days ago "   Psychiatric/Behavioral:  Positive for confusion.    All other systems reviewed and are negative.    Objective:     Vital Signs (Most Recent):  Temp: 99.4 °F (37.4 °C) (06/02/25 2156)  Pulse: 75 (06/02/25 2217)  Resp: 19 (06/02/25 2156)  BP: 117/66 (06/02/25 2156)  SpO2: (!) 90 % (06/02/25 2156) Vital Signs (24h Range):  Temp:  [99.4 °F (37.4 °C)-102.7 °F (39.3 °C)] 99.4 °F (37.4 °C)  Pulse:  [] 75  Resp:  [18-37] 19  SpO2:  [82 %-99 %] 90 %  BP: (117-180)/(58-81) 117/66     Weight: 100.4 kg (221 lb 5.5 oz)  Body mass index is 36.83 kg/m².     Physical Exam  Vitals reviewed.   Constitutional:       General: She is not in acute distress.     Appearance: She is obese. She is ill-appearing. She is not diaphoretic.   HENT:      Head: Normocephalic.      Nose: Nose normal.      Mouth/Throat:      Mouth: Mucous membranes are moist.      Pharynx: Oropharynx is clear.   Eyes:      Extraocular Movements: Extraocular movements intact.      Pupils: Pupils are equal, round, and reactive to light.   Cardiovascular:      Rate and Rhythm: Normal rate and regular rhythm.      Heart sounds: Normal heart sounds.   Pulmonary:      Effort: Pulmonary effort is normal. No respiratory distress.      Breath sounds: No wheezing or rales.      Comments: Lungs diminished  Chest:      Chest wall: No tenderness.   Abdominal:      General: There is no distension.      Palpations: Abdomen is soft.      Tenderness: There is no abdominal tenderness. There is no right CVA tenderness, left CVA tenderness, guarding or rebound.      Comments: obese   Musculoskeletal:         General: Normal range of motion.      Cervical back: Neck supple.      Right lower leg: Edema present.      Left lower leg: Edema present.   Skin:     General: Skin is warm and dry.      Capillary Refill: Capillary refill takes 2 to 3 seconds.      Findings: Bruising and erythema present.      Comments: RLE - wound with erythema surrounding it  Generalized bruising,  petechiae also noted to extremities   Neurological:      Mental Status: She is alert.      Motor: Weakness present.      Comments: Confused, oriented only to person and place -- very limited answers to questions mainly yes/no only -- follows commands and HAMMONDS with generalized weakness   Psychiatric:      Comments: Flat affect              CRANIAL NERVES     CN III, IV, VI   Pupils are equal, round, and reactive to light.       Significant Labs: All pertinent labs within the past 24 hours have been reviewed.  Recent Lab Results         06/02/25 2015 06/02/25  1942 06/02/25  1859        Procalcitonin     19.94  Comment: A concentration < 0.25 ng/mL represents a low risk of bacterial infection.  Procalcitonin may not be accurate among patients with localized   infection, recent trauma or major surgery, immunosuppressed state,   invasive fungal infection, renal dysfunction. Decisions regarding   initiation or continuation of antibiotic therapy should not be based   solely on procalcitonin levels.       Albumin     2.2       ALP     82       ALT     21       Amorphous, UA   Few         Anion Gap     17       Appearance, UA   Clear         PTT 29.6  Comment: Refer to local heparin nomogram for intensity/dose specific therapeutic range.            27.5  Comment: Refer to local heparin nomogram for intensity/dose specific therapeutic range.           AST     34       Bacteria, UA   Occasional         Baso #     0.04       Basophil %     0.7       Bilirubin (UA)   Negative         BILIRUBIN TOTAL     0.8  Comment: For infants and newborns, interpretation of results should be based   on gestational age, weight and in agreement with clinical   observations.    Premature Infant recommended reference ranges:   0-24 hours:  <8.0 mg/dL   24-48 hours: <12.0 mg/dL   3-5 days:    <15.0 mg/dL   6-29 days:   <15.0 mg/dL       BNP     1,212  Comment: Values of less than 100 pg/ml are consistent with non-CHF populations.        BUN      53       Calcium     8.4       Chloride     99       CO2     24       Color, UA   Yellow         Creatinine     3.1       eGFR     15  Comment: Estimated GFR calculated using the CKD-EPI creatinine (2021) equation.       Eos #     0.00       Eos %     0.0       Glucose     271       Glucose, UA   3+         Gran # (ANC)     5.31       Granular Casts, UA   2         Hematocrit     47.6       Hemoglobin     15.3       Extra Tube   Hold for add-ons.  Comment: Auto resulted.      Hold for add-ons.  Comment: Auto resulted.          Hyaline Casts, UA   0         Immature Grans (Abs)     0.02  Comment: Mild elevation in immature granulocytes is non specific and can be seen in a variety of conditions including stress response, acute inflammation, trauma and pregnancy. Correlation with other laboratory and clinical findings is essential.       Immature Granulocytes     0.4       INR 1.1  Comment: Coumadin Therapy:    2.0 - 3.0 for INR for all indicators except mechanical heart valves    and antiphospholipid syndromes which should use 2.5 - 3.5.           Ketones, UA   Negative         Lactic Acid Level     3.0       Leukocyte Esterase, UA   Negative         Lymph #     0.12       Lymph %     2.1       Magnesium      1.7       MCH     27.9       MCHC     32.1       MCV     87       Microscopic Comment     Comment: Other formed elements not mentioned in the report are not present in the microscopic examination.         Mono #     0.15       Mono %     2.7       MPV     10.0       Neut %     94.1       NITRITE UA   Negative         nRBC     0       Blood, UA   3+         pH, UA   6.0         Platelet Estimate     Decreased       Platelet Count     54       Potassium     3.5       PROTEIN TOTAL     6.8       Protein, UA   3+  Comment: Recommend a 24 hour urine protein or a urine protein/creatinine ratio if globulin induced proteinuria is clinically suspected.         PT 12.8           RBC     5.49       RBC, UA   2         RDW      13.3       Sodium     140       Spec Grav UA   1.020         Squam Epithel, UA   6         Troponin I     4.293  Comment: The reference interval for Troponin I represents the 99th percentile cutoff for our facility and is consistent with 3rd generation assay performance.       Urobilinogen, UA   Negative         WBC, UA   5         WBC     5.64       Yeast, UA   None               EKG reviewed--poor tracing noted, sinus tachycardia with PACs, left bundle-branch block, heart rate 104,     Significant Imaging: I have reviewed all pertinent imaging results/findings within the past 24 hours.    CT head without contrast--no acute intracranial findings    Chest x-ray--pulmonary venous congestion, left retrocardiac atelectasis versus consolidation, no acute angulated or displaced fractures, previous ORIF of left humeral fracture    X-ray tibia fibula Right--no acute bone or joint abnormality, edema within subcutaneous tissues, no soft tissue gas, previous ORIF of patellar fracture, vascular calcification

## 2025-06-03 NOTE — PLAN OF CARE
A223/A223 ROLANDO  Lakshmi Campbell is a 74 y.o.female admitted on 6/2/2025 for Sepsis with encephalopathy   Code Status: Prior MRN: [id number]   Review of patient's allergies indicates:   Allergen Reactions    Codeine Nausea Only     Other reaction(s): Nausea  Other reaction(s): Elevated blood pressure     Past Medical History:   Diagnosis Date    Acquired TTP     Cataract     CKD stage 3 secondary to diabetes     Closed displaced comminuted fracture of right patella 10/28/2020    Closed fracture of surgical neck of left humerus 10/30/2020    Closed left radial fracture 10/30/2020    Hyperkalemia     Hyperlipidemia     Hypertension     Hypothyroidism, unspecified     Liver cirrhosis secondary to YO     Morbid obesity     Right knee pain     Thyroid disease     Type 2 diabetes mellitus       PRN meds    acetaminophen, 650 mg, Q6H PRN  albuterol-ipratropium, 3 mL, Q4H PRN  dextrose 50%, 12.5 g, PRN  glucagon (human recombinant), 1 mg, PRN  heparin (PORCINE), 39.8 Units/kg (Adjusted), PRN  heparin (PORCINE), 30 Units/kg (Adjusted), PRN  hydrALAZINE, 10 mg, Q6H PRN  insulin aspart U-100, 0-10 Units, Q6H PRN  vancomycin - pharmacy to dose, , pharmacy to manage frequency      Chart check completed. Will continue plan of care.      Orientation: oriented x 4  [place] Coma Scale Score: 15     Lead Monitored: Lead II Rhythm: normal sinus rhythm    Cardiac/Telemetry Box Number: [id number]  VTE Core Measure: Pharmacological prophylaxis initiated/maintained Last Bowel Movement: 06/02/25  Diet Adult Regular Standard Tray  Voiding Characteristics: urethral catheter (bladder)  Schuyler Score: 15  Fall Risk Score: 16  Accucheck []   Freq?      Lines/Drains/Airways       Peripheral Intravenous Line  Duration                  Peripheral IV - Single Lumen 06/02/25 1859 20 G 1 3/4 in Yes Right Antecubital <1 day         Peripheral IV - Single Lumen 06/02/25 1940 20 G 1 3/4 in Yes Anterior;Distal;Left Upper Arm <1 day         Peripheral IV - Single  Lumen 06/02/25 2000 20 G Yes Posterior;Right Hand <1 day                       Problem: Adult Inpatient Plan of Care  Goal: Plan of Care Review  Outcome: Progressing  Goal: Patient-Specific Goal (Individualized)  Outcome: Progressing  Goal: Absence of Hospital-Acquired Illness or Injury  Outcome: Progressing  Goal: Optimal Comfort and Wellbeing  Outcome: Progressing  Goal: Readiness for Transition of Care  Outcome: Progressing     Problem: Skin Injury Risk Increased  Goal: Skin Health and Integrity  Outcome: Progressing     Problem: Infection  Goal: Absence of Infection Signs and Symptoms  Outcome: Progressing     Problem: Diabetes Comorbidity  Goal: Blood Glucose Level Within Targeted Range  Outcome: Progressing     Problem: Sepsis/Septic Shock  Goal: Optimal Coping  Outcome: Progressing  Goal: Absence of Bleeding  Outcome: Progressing  Goal: Blood Glucose Level Within Targeted Range  Outcome: Progressing  Goal: Absence of Infection Signs and Symptoms  Outcome: Progressing  Goal: Optimal Nutrition Intake  Outcome: Progressing     Problem: Wound  Goal: Optimal Coping  Outcome: Progressing  Goal: Optimal Functional Ability  Outcome: Progressing  Goal: Absence of Infection Signs and Symptoms  Outcome: Progressing  Goal: Improved Oral Intake  Outcome: Progressing  Goal: Optimal Pain Control and Function  Outcome: Progressing  Goal: Skin Health and Integrity  Outcome: Progressing  Goal: Optimal Wound Healing  Outcome: Progressing

## 2025-06-03 NOTE — ASSESSMENT & PLAN NOTE
Body mass index is 36.83 kg/m². Morbid obesity complicates all aspects of disease management from diagnostic modalities to treatment. Weight loss encouraged and health benefits explained to patient.

## 2025-06-03 NOTE — PLAN OF CARE
Problem: Adult Inpatient Plan of Care  Goal: Plan of Care Review  6/3/2025 0413 by Avril Smith RN  Outcome: Progressing  6/3/2025 0412 by Avril Smith RN  Outcome: Progressing  Goal: Patient-Specific Goal (Individualized)  6/3/2025 0413 by Avril Smith RN  Outcome: Progressing  6/3/2025 0412 by Avril Smith RN  Outcome: Progressing  Goal: Absence of Hospital-Acquired Illness or Injury  6/3/2025 0413 by Avril Smith RN  Outcome: Progressing  6/3/2025 0412 by Avril Smith RN  Outcome: Progressing  Goal: Optimal Comfort and Wellbeing  6/3/2025 0413 by Avril Smith RN  Outcome: Progressing  6/3/2025 0412 by Avril Smith RN  Outcome: Progressing  Goal: Readiness for Transition of Care  6/3/2025 0413 by Avril Smith RN  Outcome: Progressing  6/3/2025 0412 by Avril Smith RN  Outcome: Progressing     Problem: Skin Injury Risk Increased  Goal: Skin Health and Integrity  6/3/2025 0413 by Avril Smith RN  Outcome: Progressing  6/3/2025 0412 by Avril Smith RN  Outcome: Progressing     Problem: Infection  Goal: Absence of Infection Signs and Symptoms  6/3/2025 0413 by Avril Smith RN  Outcome: Progressing  6/3/2025 0412 by Avril Smith RN  Outcome: Progressing     Problem: Diabetes Comorbidity  Goal: Blood Glucose Level Within Targeted Range  6/3/2025 0413 by Avril Smith, RN  Outcome: Progressing  6/3/2025 0412 by Avril Smith RN  Outcome: Progressing     Problem: Sepsis/Septic Shock  Goal: Optimal Coping  6/3/2025 0413 by Avril Smith RN  Outcome: Progressing  6/3/2025 0412 by Avril Smith RN  Outcome: Progressing  Goal: Absence of Bleeding  6/3/2025 0413 by Avril Smith RN  Outcome: Progressing  6/3/2025 0412 by Avril Smith RN  Outcome: Progressing  Goal: Blood Glucose Level Within Targeted Range  6/3/2025 0413 by Avril Smith, RN  Outcome: Progressing  6/3/2025 0412 by Avril Smith RN  Outcome: Progressing  Goal: Absence of Infection Signs and Symptoms  6/3/2025 0413 by May,  FROILAN Mackenzie  Outcome: Progressing  6/3/2025 0412 by Avril Smith RN  Outcome: Progressing  Goal: Optimal Nutrition Intake  6/3/2025 0413 by Avril Smith RN  Outcome: Progressing  6/3/2025 0412 by Avril Smith RN  Outcome: Progressing

## 2025-06-03 NOTE — PROGRESS NOTES
Pharmacokinetic Initial Assessment: IV Vancomycin    Assessment/Plan:    Initiate intravenous vancomycin with loading dose of 2000 mg once with subsequent doses when random concentrations are less than 20 mcg/mL  Desired empiric serum trough concentration is 10 to 20 mcg/mL  Draw vancomycin random level on 6/3/25 at 2230.  Pharmacy will continue to follow and monitor vancomycin.      Please contact pharmacy at extension [phone] with any questions regarding this assessment.     Thank you for the consult,   Jose King       Patient brief summary:  Lakshmi Campbell is a 74 y.o. female initiated on antimicrobial therapy with IV Vancomycin for treatment of suspected skin & soft tissue infection    Drug Allergies:   Review of patient's allergies indicates:   Allergen Reactions    Codeine Nausea Only     Other reaction(s): Nausea  Other reaction(s): Elevated blood pressure       Actual Body Weight:   100.4 kg    Renal Function:   Estimated Creatinine Clearance: 18.7 mL/min (A) (based on SCr of 3.1 mg/dL (H)).,     Dialysis Method (if applicable):  N/A    CBC (last 72 hours):  Recent Labs   Lab Result Units 06/02/25 1859   WBC K/uL 5.64   HGB gm/dL 15.3   HCT % 47.6   Platelet Count K/uL 54*   Lymph % % 2.1*   Mono % % 2.7*   Eos % % 0.0   Basophil % % 0.7       Metabolic Panel (last 72 hours):  Recent Labs   Lab Result Units 06/02/25  1859 06/02/25  1942   Sodium mmol/L 140  --    Potassium mmol/L 3.5  --    Chloride mmol/L 99  --    CO2 mmol/L 24  --    Glucose mg/dL 271*  --    Glucose, UA   --  3+*   BUN mg/dL 53*  --    Creatinine mg/dL 3.1*  --    Albumin g/dL 2.2*  --    Bilirubin Total mg/dL 0.8  --    ALP unit/L 82  --    AST unit/L 34  --    ALT unit/L 21  --    Magnesium  mg/dL 1.7  --        Microbiologic Results:  Microbiology Results (last 7 days)       Procedure Component Value Units Date/Time    Blood culture x two cultures. Draw prior to antibiotics. [[id number]] Collected: 06/02/25 1923    Order Status:  Sent Specimen: Blood from Peripheral, Forearm, Left Updated: 06/02/25 1944    Blood culture x two cultures. Draw prior to antibiotics. [[id number]] Collected: 06/02/25 1858    Order Status: Sent Specimen: Blood from Peripheral, Antecubital, Right Updated: 06/02/25 1909

## 2025-06-03 NOTE — ASSESSMENT & PLAN NOTE
Creatine stable for now. BMP reviewed- noted Estimated Creatinine Clearance: 18.7 mL/min (A) (based on SCr of 3.1 mg/dL (H)). according to latest data. Based on current GFR, CKD stage is stage 4 - GFR 15-29.  Monitor UOP and serial BMP and adjust therapy as needed. Renally dose meds. Avoid nephrotoxic medications and procedures.

## 2025-06-03 NOTE — ASSESSMENT & PLAN NOTE
Patient's blood pressure range in the last 24 hours was: BP  Min: 117/66  Max: 180/81.The patient's inpatient anti-hypertensive regimen is listed below:  Current Antihypertensives  hydrALAZINE injection 10 mg, Every 6 hours PRN, Intravenous    Plan  - BP fluctuating, monitor and holding medications due to worsening renal function for now

## 2025-06-03 NOTE — ASSESSMENT & PLAN NOTE
Patient has Diastolic (HFpEF) heart failure that is Chronic. On presentation their CHF was controlled and she was given 1 L fluid bolus due to sepsis. Most recent BNP and echo results are listed below.  Recent Labs     06/02/25  1859   BNP 1,212*     Latest ECHO  Results for orders placed during the hospital encounter of 05/09/25    Echo    Interpretation Summary    Left Ventricle: The left ventricle is normal in size. Mildly increased ventricular mass. Mildly increased wall thickness. There is mild concentric hypertrophy. There is normal systolic function with a visually estimated ejection fraction of 55 - 60%. Grade II diastolic dysfunction.    Right Ventricle: The right ventricle is normal in size Wall thickness is normal. Systolic function is normal.    Left Atrium: The left atrium is dilated    Aortic Valve: There is moderate aortic valve sclerosis. There is moderate stenosis. Aortic valve area by VTI is 1.3 cm². Aortic valve peak velocity is 3.1 m/s. Mean gradient is 22 mmHg. The dimensionless index is 0.45.    Mitral Valve: Mildly thickened subvalvular apparatus. There is severe mitral annular calcification.    IVC/SVC: Normal venous pressure at 3 mmHg.    Current Heart Failure Medications  hydrALAZINE injection 10 mg, Every 6 hours PRN, Intravenous    Plan  - Monitor strict I&Os and daily weights.    - Place on telemetry  - Low sodium diet  - Place on fluid restriction of 1.5 L.   - Cardiology has been consulted  - The patient's volume status is at their baseline

## 2025-06-03 NOTE — ASSESSMENT & PLAN NOTE
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 The patients 3 most recent labs are listed below.  Recent Labs     06/02/25  1859 06/03/25  0227   PLT 54* 71*     Plan  - Will need to stop heparin if any overt bleeding and monitor platelet count closely

## 2025-06-03 NOTE — CONSULTS
[facility] - Tele[facility] ([facility])  Cardiology  Consult Note    Patient Name: Lakshmi Campbell  MRN: [id number]  Admission Date: 6/2/2025  Hospital Length of Stay: 1 days  Code Status: Prior   Attending Provider: Kylee Caraballo MD   Consulting Provider: Shayne Brandon MD  Primary Care Physician: Keely Silva NP  Principal Problem:Sepsis with encephalopathy    Patient information was obtained from patient and ER records.     Inpatient consult to Cardiology  Consult performed by: Shayne Brandon MD  Consult ordered by: Daksha Washington NP        Subjective:     Chief Complaint:  troponin     HPI:   Pt is a 74 y/u female with PMhx for HTN, HLP, diastolic heart failure, CKD , aortic stenosis admitted for sepsis with encephalopathy.  EKG sinus tachycardia with LBBB at 104 bpm. BNP 1212. CXR (-). Pt discharged about 2 weeks ago for CHF,  Troponin is 4.15      Past Medical History:   Diagnosis Date    Acquired TTP     Cataract     CKD stage 3 secondary to diabetes     Closed displaced comminuted fracture of right patella 10/28/2020    Closed fracture of surgical neck of left humerus 10/30/2020    Closed left radial fracture 10/30/2020    Hyperkalemia     Hyperlipidemia     Hypertension     Hypothyroidism, unspecified     Liver cirrhosis secondary to YO     Morbid obesity     Right knee pain     Thyroid disease     Type 2 diabetes mellitus        Past Surgical History:   Procedure Laterality Date    APPENDECTOMY      BREAST BIOPSY      CATARACT EXTRACTION      COLONOSCOPY N/A 12/21/2021    Procedure: COLONOSCOPY screening;  Surgeon: Moises Patterson MD;  Location: [facility];  Service: Endoscopy;  Laterality: N/A;    ESOPHAGOGASTRODUODENOSCOPY N/A 12/21/2021    Procedure: EGD (ESOPHAGOGASTRODUODENOSCOPY) EV screening;  Surgeon: Moises Patterson MD;  Location: [facility];  Service: Endoscopy;  Laterality: N/A;    EYE SURGERY      HERNIA REPAIR      OPEN REDUCTION AND INTERNAL FIXATION (ORIF) OF FRACTURE OF  DISTAL RADIUS Left 11/2/2020    Procedure: ORIF, FRACTURE, RADIUS, DISTAL;  Surgeon: Sage Wolf MD;  Location: [facility] OR;  Service: Orthopedics;  Laterality: Left;    OPEN REDUCTION AND INTERNAL FIXATION (ORIF) OF FRACTURE OF PATELLA Right 11/2/2020    Procedure: ORIF, FRACTURE, PATELLA;  Surgeon: Sage Wolf MD;  Location: [facility] OR;  Service: Orthopedics;  Laterality: Right;    OPEN REDUCTION AND INTERNAL FIXATION (ORIF) OF FRACTURE OF PATELLA Right 12/18/2020    Procedure: ORIF, FRACTURE, PATELLA;  Surgeon: Sage Wolf MD;  Location: [facility] OR;  Service: Orthopedics;  Laterality: Right;    ORIF HUMERUS FRACTURE Left 11/5/2020    Procedure: ORIF, FRACTURE, HUMERUS;  Surgeon: Sage Wolf MD;  Location: [facility] OR;  Service: Orthopedics;  Laterality: Left;    PLACEMENT OF ACELLULAR HUMAN DERMAL ALLOGRAFT Right 11/2/2020    Procedure: APPLICATION, ACELLULAR HUMAN DERMAL ALLOGRAFT;  Surgeon: Sage Wolf MD;  Location: [facility] OR;  Service: Orthopedics;  Laterality: Right;  Right Patella    REMOVAL OF HARDWARE FROM HAND Left 3/11/2021    Procedure: REMOVAL, HARDWARE, HAND;  Surgeon: Sage Wolf MD;  Location: [facility] OR;  Service: Orthopedics;  Laterality: Left;  Removal of dorsal spanning wrist plate left distal radius    REMOVAL OF HARDWARE FROM LOWER EXTREMITY Right 12/18/2020    Procedure: REMOVAL, HARDWARE, LOWER EXTREMITY;  Surgeon: Sage Wolf MD;  Location: [facility];  Service: Orthopedics;  Laterality: Right;       Review of patient's allergies indicates:   Allergen Reactions    Codeine Nausea Only     Other reaction(s): Nausea  Other reaction(s): Elevated blood pressure       No current facility-administered medications on file prior to encounter.     Current Outpatient Medications on File Prior to Encounter   Medication Sig    albuterol (VENTOLIN HFA) 90 mcg/actuation inhaler Inhale 2 puffs into the lungs every 6 (six) hours as needed  "for Wheezing or Shortness of Breath (cough). Rescue    amLODIPine (NORVASC) 10 MG tablet TAKE 1 TABLET(10 MG) BY MOUTH EVERY DAY    blood sugar diagnostic Strp Use ac bid    budesonide-formoterol 160-4.5 mcg (SYMBICORT) 160-4.5 mcg/actuation HFAA Inhale 2 puffs into the lungs every 12 (twelve) hours. Controller    chlorthalidone (HYGROTEN) 25 MG Tab Take 25 mg by mouth once daily.    cholecalciferol, vitamin D3, (VITAMIN D3) 50 mcg (2,000 unit) Cap capsule Take 1 capsule by mouth.    furosemide (LASIX) 20 MG tablet Take 1 tablet (20 mg total) by mouth once daily.    HUMULIN 70/30 U-100 KWIKPEN 100 unit/mL (70-30) InPn pen SMARTSI Unit(s) SUB-Q Every Evening    hydrALAZINE (APRESOLINE) 25 MG tablet Take 1 tablet (25 mg total) by mouth every 12 (twelve) hours.    hydroCHLOROthiazide (HYDRODIURIL) 25 MG tablet Take 25 mg by mouth.    insulin syringe-needle U-100 1 mL 29 gauge x 1/2" Syrg Use bid    JARDIANCE 10 mg tablet Take 10 mg by mouth every morning.    lancets 33 gauge Misc Use as BID    levothyroxine (SYNTHROID) 137 MCG Tab tablet Take 1 tablet by mouth once daily.    perindopril (ACEON) 2 MG Tab Take 2 mg by mouth.    pravastatin (PRAVACHOL) 20 MG tablet Take 20 mg by mouth once daily.     sodium bicarbonate 650 MG tablet Take 650 mg by mouth 2 (two) times daily.    spironolactone (ALDACTONE) 25 MG tablet Take 25 mg by mouth.     Family History       Problem Relation (Age of Onset)    Diabetes Mother, Father    Leukemia Father          Tobacco Use    Smoking status: Former     Current packs/day: 0.00     Average packs/day: 1 pack/day for 8.0 years (8.0 ttl pk-yrs)     Types: Cigarettes     Start date:      Quit date:      Years since quittin.4    Smokeless tobacco: Never   Substance and Sexual Activity    Alcohol use: Yes     Comment: rarely    Drug use: No    Sexual activity: Not Currently     Review of Systems   Constitutional: Negative. Negative for weight gain.   HENT: Negative.     Eyes: " Negative.    Cardiovascular: Negative.  Negative for chest pain, leg swelling and palpitations.   Respiratory: Negative.  Negative for shortness of breath.    Endocrine: Negative.    Hematologic/Lymphatic: Negative.    Skin: Negative.    Musculoskeletal:  Negative for muscle weakness.   Gastrointestinal: Negative.    Genitourinary: Negative.    Neurological: Negative.  Negative for dizziness.   Psychiatric/Behavioral: Negative.     Allergic/Immunologic: Negative.    All other systems reviewed and are negative.    Objective:     Vital Signs (Most Recent):  Temp: 99 °F (37.2 °C) (06/03/25 0742)  Pulse: 79 (06/03/25 0938)  Resp: 18 (06/03/25 0754)  BP: (!) 126/57 (06/03/25 0742)  SpO2: (!) 87 % (06/03/25 0754) Vital Signs (24h Range):  Temp:  [98.9 °F (37.2 °C)-102.7 °F (39.3 °C)] 99 °F (37.2 °C)  Pulse:  [] 79  Resp:  [18-37] 18  SpO2:  [82 %-99 %] 87 %  BP: (117-180)/(57-81) 126/57     Weight: 100.4 kg (221 lb 5.5 oz)  Body mass index is 36.83 kg/m².    SpO2: (!) 87 %         Intake/Output Summary (Last 24 hours) at 6/3/2025 1016  Last data filed at 6/3/2025 0615  Gross per 24 hour   Intake 501.35 ml   Output 200 ml   Net 301.35 ml       Lines/Drains/Airways       Drain  Duration                  Urethral Catheter 06/02/25 1942 Silicone <1 day              Peripheral Intravenous Line  Duration                  Peripheral IV - Single Lumen 06/02/25 1859 20 G 1 3/4 in Yes Right Antecubital <1 day         Peripheral IV - Single Lumen 06/02/25 1940 20 G 1 3/4 in Yes Anterior;Distal;Left Upper Arm <1 day         Peripheral IV - Single Lumen 06/02/25 2000 20 G Yes Posterior;Right Hand <1 day                     Physical Exam  Vitals and nursing note reviewed.   Constitutional:       Appearance: She is well-developed.   HENT:      Head: Normocephalic and atraumatic.   Eyes:      Conjunctiva/sclera: Conjunctivae normal.      Pupils: Pupils are equal, round, and reactive to light.   Cardiovascular:      Rate and Rhythm:  Normal rate and regular rhythm.      Pulses: Intact distal pulses.      Heart sounds: Normal heart sounds.   Pulmonary:      Effort: Pulmonary effort is normal.      Breath sounds: Normal breath sounds.   Abdominal:      General: Bowel sounds are normal.      Palpations: Abdomen is soft.   Musculoskeletal:         General: Normal range of motion.      Cervical back: Normal range of motion and neck supple.      Right lower leg: Edema present.      Left lower leg: Edema present.   Skin:     General: Skin is warm and dry.   Neurological:      Mental Status: She is alert and oriented to person, place, and time.          Significant Labs: All pertinent lab results from the last 24 hours have been reviewed.    Significant Imaging: X-Ray: CXR: X-Ray Chest 1 View (CXR): No results found for this visit on 06/02/25.  Assessment and Plan:     NSTEMI (non-ST elevated myocardial infarction)  Pt is a 74 y/u female with PMhx for HTN, HLP, diastolic heart failure, CKD , aortic stenosis admitted for sepsis with encephalopathy.  EKG sinus tachycardia with LBBB at 104 bpm. BNP 1212. CXR (-). Pt discharged about 2 weeks ago for CHF,  Troponin is 4.15    Troponin may be from demand ischemia, continue serial enzymes. Check echo for SWMA. Continue IV heparin  Repeat EKG with controlled rate. CHF appears stable, restart diuretics once more stable from sepsis        VTE Risk Mitigation (From admission, onward)           Ordered     heparin 25,000 units in dextrose 5% 250 mL (100 units/mL) infusion LOW INTENSITY nomogram - [facility]  Continuous        Question:  Begin at (units/kg/hr)  Answer:  12    06/02/25 2151     heparin 25,000 units in dextrose 5% (100 units/ml) IV bolus from bag LOW INTENSITY nomogram - [facility]  As needed (PRN)        Question:  Heparin Infusion Adjustment (DO NOT MODIFY ANSWER)  Answer:  [email or web address]\Images\Pharmacy\HeparinInfusions\heparin LOW INTENSITY nomogram for [facility] [id number]    06/02/25 2151     heparin 25,000  units in dextrose 5% (100 units/ml) IV bolus from bag LOW INTENSITY nomogram - [facility]  As needed (PRN)        Question:  Heparin Infusion Adjustment (DO NOT MODIFY ANSWER)  Answer:  [email or web address]\Images\Pharmacy\HeparinInfusions\heparin LOW INTENSITY nomogram for [facility] [id number].pdf    06/02/25 7095                    Thank you for your consult. I will follow-up with patient. Please contact us if you have any additional questions.    Shayne Brandon MD  Cardiology   [facility] - Telemetry ([facility])

## 2025-06-03 NOTE — PLAN OF CARE
Problem: Adult Inpatient Plan of Care  Goal: Plan of Care Review  Outcome: Progressing  Goal: Patient-Specific Goal (Individualized)  Outcome: Progressing  Goal: Absence of Hospital-Acquired Illness or Injury  Outcome: Progressing  Goal: Optimal Comfort and Wellbeing  Outcome: Progressing  Goal: Readiness for Transition of Care  Outcome: Progressing     Problem: Skin Injury Risk Increased  Goal: Skin Health and Integrity  Outcome: Progressing     Problem: Infection  Goal: Absence of Infection Signs and Symptoms  Outcome: Progressing     Problem: Diabetes Comorbidity  Goal: Blood Glucose Level Within Targeted Range  Outcome: Progressing     Problem: Sepsis/Septic Shock  Goal: Optimal Coping  Outcome: Progressing  Goal: Absence of Bleeding  Outcome: Progressing  Goal: Blood Glucose Level Within Targeted Range  Outcome: Progressing  Goal: Absence of Infection Signs and Symptoms  Outcome: Progressing  Goal: Optimal Nutrition Intake  Outcome: Progressing

## 2025-06-03 NOTE — SUBJECTIVE & OBJECTIVE
Past Medical History:   Diagnosis Date    Acquired TTP     Cataract     CKD stage 3 secondary to diabetes     Closed displaced comminuted fracture of right patella 10/28/2020    Closed fracture of surgical neck of left humerus 10/30/2020    Closed left radial fracture 10/30/2020    Hyperkalemia     Hyperlipidemia     Hypertension     Hypothyroidism, unspecified     Liver cirrhosis secondary to YO     Morbid obesity     Right knee pain     Thyroid disease     Type 2 diabetes mellitus        Past Surgical History:   Procedure Laterality Date    APPENDECTOMY      BREAST BIOPSY      CATARACT EXTRACTION      COLONOSCOPY N/A 12/21/2021    Procedure: COLONOSCOPY screening;  Surgeon: Moises Patterson MD;  Location: [facility];  Service: Endoscopy;  Laterality: N/A;    ESOPHAGOGASTRODUODENOSCOPY N/A 12/21/2021    Procedure: EGD (ESOPHAGOGASTRODUODENOSCOPY) EV screening;  Surgeon: Moises Patterson MD;  Location: [facility];  Service: Endoscopy;  Laterality: N/A;    EYE SURGERY      HERNIA REPAIR      OPEN REDUCTION AND INTERNAL FIXATION (ORIF) OF FRACTURE OF DISTAL RADIUS Left 11/2/2020    Procedure: ORIF, FRACTURE, RADIUS, DISTAL;  Surgeon: Sage Wolf MD;  Location: [facility];  Service: Orthopedics;  Laterality: Left;    OPEN REDUCTION AND INTERNAL FIXATION (ORIF) OF FRACTURE OF PATELLA Right 11/2/2020    Procedure: ORIF, FRACTURE, PATELLA;  Surgeon: Sgae Wolf MD;  Location: [facility] OR;  Service: Orthopedics;  Laterality: Right;    OPEN REDUCTION AND INTERNAL FIXATION (ORIF) OF FRACTURE OF PATELLA Right 12/18/2020    Procedure: ORIF, FRACTURE, PATELLA;  Surgeon: Sage Wolf MD;  Location: [facility] OR;  Service: Orthopedics;  Laterality: Right;    ORIF HUMERUS FRACTURE Left 11/5/2020    Procedure: ORIF, FRACTURE, HUMERUS;  Surgeon: Sage Wolf MD;  Location: [facility] OR;  Service: Orthopedics;  Laterality: Left;    PLACEMENT OF ACELLULAR HUMAN DERMAL ALLOGRAFT Right 11/2/2020     Procedure: APPLICATION, ACELLULAR HUMAN DERMAL ALLOGRAFT;  Surgeon: Sage Wolf MD;  Location: [facility] OR;  Service: Orthopedics;  Laterality: Right;  Right Patella    REMOVAL OF HARDWARE FROM HAND Left 3/11/2021    Procedure: REMOVAL, HARDWARE, HAND;  Surgeon: Sage Wolf MD;  Location: [facility] OR;  Service: Orthopedics;  Laterality: Left;  Removal of dorsal spanning wrist plate left distal radius    REMOVAL OF HARDWARE FROM LOWER EXTREMITY Right 2020    Procedure: REMOVAL, HARDWARE, LOWER EXTREMITY;  Surgeon: Sage Wolf MD;  Location: [facility] OR;  Service: Orthopedics;  Laterality: Right;       Review of patient's allergies indicates:   Allergen Reactions    Codeine Nausea Only     Other reaction(s): Nausea  Other reaction(s): Elevated blood pressure       No current facility-administered medications on file prior to encounter.     Current Outpatient Medications on File Prior to Encounter   Medication Sig    albuterol (VENTOLIN HFA) 90 mcg/actuation inhaler Inhale 2 puffs into the lungs every 6 (six) hours as needed for Wheezing or Shortness of Breath (cough). Rescue    amLODIPine (NORVASC) 10 MG tablet TAKE 1 TABLET(10 MG) BY MOUTH EVERY DAY    blood sugar diagnostic Strp Use ac bid    budesonide-formoterol 160-4.5 mcg (SYMBICORT) 160-4.5 mcg/actuation HFAA Inhale 2 puffs into the lungs every 12 (twelve) hours. Controller    chlorthalidone (HYGROTEN) 25 MG Tab Take 25 mg by mouth once daily.    cholecalciferol, vitamin D3, (VITAMIN D3) 50 mcg (2,000 unit) Cap capsule Take 1 capsule by mouth.    furosemide (LASIX) 20 MG tablet Take 1 tablet (20 mg total) by mouth once daily.    HUMULIN 70/30 U-100 KWIKPEN 100 unit/mL (70-30) InPn pen SMARTSI Unit(s) SUB-Q Every Evening    hydrALAZINE (APRESOLINE) 25 MG tablet Take 1 tablet (25 mg total) by mouth every 12 (twelve) hours.    hydroCHLOROthiazide (HYDRODIURIL) 25 MG tablet Take 25 mg by mouth.    insulin syringe-needle U-100 1  "mL 29 gauge x 1/2" Syrg Use bid    JARDIANCE 10 mg tablet Take 10 mg by mouth every morning.    lancets 33 gauge Misc Use as BID    levothyroxine (SYNTHROID) 137 MCG Tab tablet Take 1 tablet by mouth once daily.    perindopril (ACEON) 2 MG Tab Take 2 mg by mouth.    pravastatin (PRAVACHOL) 20 MG tablet Take 20 mg by mouth once daily.     sodium bicarbonate 650 MG tablet Take 650 mg by mouth 2 (two) times daily.    spironolactone (ALDACTONE) 25 MG tablet Take 25 mg by mouth.     Family History       Problem Relation (Age of Onset)    Diabetes Mother, Father    Leukemia Father          Tobacco Use    Smoking status: Former     Current packs/day: 0.00     Average packs/day: 1 pack/day for 8.0 years (8.0 ttl pk-yrs)     Types: Cigarettes     Start date:      Quit date:      Years since quittin.4    Smokeless tobacco: Never   Substance and Sexual Activity    Alcohol use: Yes     Comment: rarely    Drug use: No    Sexual activity: Not Currently     Review of Systems   Constitutional: Negative. Negative for weight gain.   HENT: Negative.     Eyes: Negative.    Cardiovascular: Negative.  Negative for chest pain, leg swelling and palpitations.   Respiratory: Negative.  Negative for shortness of breath.    Endocrine: Negative.    Hematologic/Lymphatic: Negative.    Skin: Negative.    Musculoskeletal:  Negative for muscle weakness.   Gastrointestinal: Negative.    Genitourinary: Negative.    Neurological: Negative.  Negative for dizziness.   Psychiatric/Behavioral: Negative.     Allergic/Immunologic: Negative.    All other systems reviewed and are negative.    Objective:     Vital Signs (Most Recent):  Temp: 99 °F (37.2 °C) (25 0742)  Pulse: 79 (25 0938)  Resp: 18 (25 0754)  BP: (!) 126/57 (25 0742)  SpO2: (!) 87 % (25 0754) Vital Signs (24h Range):  Temp:  [98.9 °F (37.2 °C)-102.7 °F (39.3 °C)] 99 °F (37.2 °C)  Pulse:  [] 79  Resp:  [18-37] 18  SpO2:  [82 %-99 %] 87 %  BP: " (117-180)/(57-81) 126/57     Weight: 100.4 kg (221 lb 5.5 oz)  Body mass index is 36.83 kg/m².    SpO2: (!) 87 %         Intake/Output Summary (Last 24 hours) at 6/3/2025 1016  Last data filed at 6/3/2025 0615  Gross per 24 hour   Intake 501.35 ml   Output 200 ml   Net 301.35 ml       Lines/Drains/Airways       Drain  Duration                  Urethral Catheter 06/02/25 1942 Silicone <1 day              Peripheral Intravenous Line  Duration                  Peripheral IV - Single Lumen 06/02/25 1859 20 G 1 3/4 in Yes Right Antecubital <1 day         Peripheral IV - Single Lumen 06/02/25 1940 20 G 1 3/4 in Yes Anterior;Distal;Left Upper Arm <1 day         Peripheral IV - Single Lumen 06/02/25 2000 20 G Yes Posterior;Right Hand <1 day                     Physical Exam  Vitals and nursing note reviewed.   Constitutional:       Appearance: She is well-developed.   HENT:      Head: Normocephalic and atraumatic.   Eyes:      Conjunctiva/sclera: Conjunctivae normal.      Pupils: Pupils are equal, round, and reactive to light.   Cardiovascular:      Rate and Rhythm: Normal rate and regular rhythm.      Pulses: Intact distal pulses.      Heart sounds: Normal heart sounds.   Pulmonary:      Effort: Pulmonary effort is normal.      Breath sounds: Normal breath sounds.   Abdominal:      General: Bowel sounds are normal.      Palpations: Abdomen is soft.   Musculoskeletal:         General: Normal range of motion.      Cervical back: Normal range of motion and neck supple.      Right lower leg: Edema present.      Left lower leg: Edema present.   Skin:     General: Skin is warm and dry.   Neurological:      Mental Status: She is alert and oriented to person, place, and time.          Significant Labs: All pertinent lab results from the last 24 hours have been reviewed.    Significant Imaging: X-Ray: CXR: X-Ray Chest 1 View (CXR): No results found for this visit on 06/02/25.

## 2025-06-03 NOTE — ASSESSMENT & PLAN NOTE
Pt is a 74 y/u female with PMhx for HTN, HLP, diastolic heart failure, CKD , aortic stenosis admitted for sepsis with encephalopathy.  EKG sinus tachycardia with LBBB at 104 bpm. BNP 1212. CXR (-). Pt discharged about 2 weeks ago for CHF,  Troponin is 4.15    Troponin may be from demand ischemia, continue serial enzymes. Check echo for SWMA. Continue IV heparin  Repeat EKG with controlled rate. CHF appears stable, restart diuretics once more stable from sepsis

## 2025-06-03 NOTE — ASSESSMENT & PLAN NOTE
Heparin drip  Consult cardiology  Denies chest pain, but troponin 4.293 with EKG changes noted  Continue to trend troponin

## 2025-06-03 NOTE — H&P
"  [facility]  [facility] & Physical    Patient Name: Lakshmi Campbell  MRN: [id number]  Patient Class: IP- Inpatient  Admission Date: 6/2/2025  Attending Physician: Kylee Caraballo MD   Primary Care Provider: Keely Silva NP         Patient information was obtained from patient, spouse/SO, and ER records.     Subjective:     Principal Problem:Sepsis with encephalopathy    Chief Complaint:   Chief Complaint   Patient presents with    Altered Mental Status     Per  Saturday patient began "not feeling well." Pt disoriented to birthday, time, and situation. Per  pt has been using the bathroom on self. Pt and spouse poor historian.        HPI:  Patient is a 74-year-old female with past medical history significant for type 2 insulin dependent diabetes mellitus, CKD stage 4, hypertension, hypothyroidism, liver cirrhosis secondary to YO, diastolic heart failure, aortic stenosis, hyperkalemia, thrombocytopenia, cataracts, hyperlipidemia, and multiple previous fractures as well as recent admit from  05/09/2025 through 5/15/2025 due to CHF exacerbation, pneumonia, hypoxemic respiratory failure, and UTI (treated with Merrem followed by cephalexin as well as IV Lasix, Duonebs and IV Solu-Medrol -- had to be discharged with home O2 which she has been using while sleeping. She presented to [facility] ED with complaint "not feeling well." Her  reports that she has been disoriented/confused and having incontinent episodes. He is very poor historian and patient is confused, so information mainly obtained via chart review. She did say yes when asked if dizzy/lightheaded/weak. She has had decreased activity and decreased appetite. while in ED she had temperature up to 102.7, tachycardia up to 115, blood pressure as high as 180/81 and SpO2 at one point down to 82% and was placed on nasal cannula at 2 L. lab workup showed WBC 5.64, hemoglobin 15.3, hematocrit 47.6, platelets 54, " PT 12.8, INR 1.1, PTT 27.5, sodium 140, potassium 3.5, chloride 99, CO2 24, anion gap 17, BUN 53, creatinine 3.1, glucose 271, calcium 8.4, magnesium 1.7, alk-phos 82, albumin 2.2, AST 34, ALT 21,  BNP 12 12, troponin 4.293, lactic acid 3.0, procalcitonin 19.94, UA with no evidence of infection, blood cultures x2 pending.  CT head without contrast was done and there was no acute intracranial findings.  Chest x-ray shows pulmonary venous congestion, similar to prior with left retrocardiac atelectasis versus consolidation.  X-ray of right tibia /fibula shows no acute bone or joint abnormality, does note edema within  subcutaneous tissues, no soft tissue gas.  While in ED, she was given acetaminophen, IV cefepime, LR fluid bolus x1 L, IV vancomycin, and started on heparin drip. Hospital Medicine was consulted for admission due to sepsis, cellulitis, encephalopathy, worsening renal failure, and NSTEMI.    Past Medical History:   Diagnosis Date    Acquired TTP     Cataract     CKD stage 3 secondary to diabetes     Closed displaced comminuted fracture of right patella 10/28/2020    Closed fracture of surgical neck of left humerus 10/30/2020    Closed left radial fracture 10/30/2020    Hyperkalemia     Hyperlipidemia     Hypertension     Hypothyroidism, unspecified     Liver cirrhosis secondary to YO     Morbid obesity     Right knee pain     Thyroid disease     Type 2 diabetes mellitus        Past Surgical History:   Procedure Laterality Date    APPENDECTOMY      BREAST BIOPSY      CATARACT EXTRACTION      COLONOSCOPY N/A 12/21/2021    Procedure: COLONOSCOPY screening;  Surgeon: Moises Patterson MD;  Location: [facility];  Service: Endoscopy;  Laterality: N/A;    ESOPHAGOGASTRODUODENOSCOPY N/A 12/21/2021    Procedure: EGD (ESOPHAGOGASTRODUODENOSCOPY) EV screening;  Surgeon: Moises Patterson MD;  Location: [facility];  Service: Endoscopy;  Laterality: N/A;    EYE SURGERY      HERNIA REPAIR      OPEN REDUCTION AND  INTERNAL FIXATION (ORIF) OF FRACTURE OF DISTAL RADIUS Left 11/2/2020    Procedure: ORIF, FRACTURE, RADIUS, DISTAL;  Surgeon: Sage Wolf MD;  Location: [facility] OR;  Service: Orthopedics;  Laterality: Left;    OPEN REDUCTION AND INTERNAL FIXATION (ORIF) OF FRACTURE OF PATELLA Right 11/2/2020    Procedure: ORIF, FRACTURE, PATELLA;  Surgeon: Sage Wolf MD;  Location: [facility] OR;  Service: Orthopedics;  Laterality: Right;    OPEN REDUCTION AND INTERNAL FIXATION (ORIF) OF FRACTURE OF PATELLA Right 12/18/2020    Procedure: ORIF, FRACTURE, PATELLA;  Surgeon: Sage Wolf MD;  Location: [facility] OR;  Service: Orthopedics;  Laterality: Right;    ORIF HUMERUS FRACTURE Left 11/5/2020    Procedure: ORIF, FRACTURE, HUMERUS;  Surgeon: Sage Wolf MD;  Location: [facility] OR;  Service: Orthopedics;  Laterality: Left;    PLACEMENT OF ACELLULAR HUMAN DERMAL ALLOGRAFT Right 11/2/2020    Procedure: APPLICATION, ACELLULAR HUMAN DERMAL ALLOGRAFT;  Surgeon: Sage Wolf MD;  Location: [facility] OR;  Service: Orthopedics;  Laterality: Right;  Right Patella    REMOVAL OF HARDWARE FROM HAND Left 3/11/2021    Procedure: REMOVAL, HARDWARE, HAND;  Surgeon: Sage Wolf MD;  Location: [facility] OR;  Service: Orthopedics;  Laterality: Left;  Removal of dorsal spanning wrist plate left distal radius    REMOVAL OF HARDWARE FROM LOWER EXTREMITY Right 12/18/2020    Procedure: REMOVAL, HARDWARE, LOWER EXTREMITY;  Surgeon: Sage Wolf MD;  Location: [facility];  Service: Orthopedics;  Laterality: Right;       Review of patient's allergies indicates:   Allergen Reactions    Codeine Nausea Only     Other reaction(s): Nausea  Other reaction(s): Elevated blood pressure       No current facility-administered medications on file prior to encounter.     Current Outpatient Medications on File Prior to Encounter   Medication Sig    albuterol (VENTOLIN HFA) 90 mcg/actuation inhaler Inhale 2 puffs into  "the lungs every 6 (six) hours as needed for Wheezing or Shortness of Breath (cough). Rescue    amLODIPine (NORVASC) 10 MG tablet TAKE 1 TABLET(10 MG) BY MOUTH EVERY DAY    blood sugar diagnostic Strp Use ac bid    budesonide-formoterol 160-4.5 mcg (SYMBICORT) 160-4.5 mcg/actuation HFAA Inhale 2 puffs into the lungs every 12 (twelve) hours. Controller    chlorthalidone (HYGROTEN) 25 MG Tab Take 25 mg by mouth once daily.    cholecalciferol, vitamin D3, (VITAMIN D3) 50 mcg (2,000 unit) Cap capsule Take 1 capsule by mouth.    furosemide (LASIX) 20 MG tablet Take 1 tablet (20 mg total) by mouth once daily.    HUMULIN 70/30 U-100 KWIKPEN 100 unit/mL (70-30) InPn pen SMARTSI Unit(s) SUB-Q Every Evening    hydrALAZINE (APRESOLINE) 25 MG tablet Take 1 tablet (25 mg total) by mouth every 12 (twelve) hours.    hydroCHLOROthiazide (HYDRODIURIL) 25 MG tablet Take 25 mg by mouth.    insulin syringe-needle U-100 1 mL 29 gauge x 1/2" Syrg Use bid    JARDIANCE 10 mg tablet Take 10 mg by mouth every morning.    lancets 33 gauge Misc Use as BID    levothyroxine (SYNTHROID) 137 MCG Tab tablet Take 1 tablet by mouth once daily.    perindopril (ACEON) 2 MG Tab Take 2 mg by mouth.    pravastatin (PRAVACHOL) 20 MG tablet Take 20 mg by mouth once daily.     sodium bicarbonate 650 MG tablet Take 650 mg by mouth 2 (two) times daily.    spironolactone (ALDACTONE) 25 MG tablet Take 25 mg by mouth.     Family History       Problem Relation (Age of Onset)    Diabetes Mother, Father    Leukemia Father          Tobacco Use    Smoking status: Former     Current packs/day: 0.00     Average packs/day: 1 pack/day for 8.0 years (8.0 ttl pk-yrs)     Types: Cigarettes     Start date:      Quit date:      Years since quittin.4    Smokeless tobacco: Never   Substance and Sexual Activity    Alcohol use: Yes     Comment: rarely    Drug use: No    Sexual activity: Not Currently     Review of Systems   Reason unable to perform ROS: limited due " to confusion, info provided from family and per ED note.   Constitutional:  Positive for appetite change, chills, fatigue and fever.   HENT: Negative.     Eyes: Negative.    Respiratory:  Negative for cough, chest tightness, shortness of breath and wheezing.    Cardiovascular:  Positive for leg swelling. Negative for chest pain and palpitations.   Gastrointestinal: Negative.  Negative for abdominal distention, abdominal pain, constipation, diarrhea, nausea and vomiting.   Endocrine: Negative.    Genitourinary:  Positive for urgency. Negative for dysuria and flank pain.        Urinary incontinence - multiple episodes   Musculoskeletal: Negative.    Skin:  Positive for color change and wound.        Right lower leg -- wound with redness around it   Neurological:  Positive for dizziness, weakness and light-headedness. Negative for headaches.        Rolled out of bed and fell on floor several days ago   Psychiatric/Behavioral:  Positive for confusion.    All other systems reviewed and are negative.    Objective:     Vital Signs (Most Recent):  Temp: 99.4 °F (37.4 °C) (06/02/25 2156)  Pulse: 75 (06/02/25 2217)  Resp: 19 (06/02/25 2156)  BP: 117/66 (06/02/25 2156)  SpO2: (!) 90 % (06/02/25 2156) Vital Signs (24h Range):  Temp:  [99.4 °F (37.4 °C)-102.7 °F (39.3 °C)] 99.4 °F (37.4 °C)  Pulse:  [] 75  Resp:  [18-37] 19  SpO2:  [82 %-99 %] 90 %  BP: (117-180)/(58-81) 117/66     Weight: 100.4 kg (221 lb 5.5 oz)  Body mass index is 36.83 kg/m².     Physical Exam  Vitals reviewed.   Constitutional:       General: She is not in acute distress.     Appearance: She is obese. She is ill-appearing. She is not diaphoretic.   HENT:      Head: Normocephalic.      Nose: Nose normal.      Mouth/Throat:      Mouth: Mucous membranes are moist.      Pharynx: Oropharynx is clear.   Eyes:      Extraocular Movements: Extraocular movements intact.      Pupils: Pupils are equal, round, and reactive to light.   Cardiovascular:      Rate and  Rhythm: Normal rate and regular rhythm.      Heart sounds: Normal heart sounds.   Pulmonary:      Effort: Pulmonary effort is normal. No respiratory distress.      Breath sounds: No wheezing or rales.      Comments: Lungs diminished  Chest:      Chest wall: No tenderness.   Abdominal:      General: There is no distension.      Palpations: Abdomen is soft.      Tenderness: There is no abdominal tenderness. There is no right CVA tenderness, left CVA tenderness, guarding or rebound.      Comments: obese   Musculoskeletal:         General: Normal range of motion.      Cervical back: Neck supple.      Right lower leg: Edema present.      Left lower leg: Edema present.   Skin:     General: Skin is warm and dry.      Capillary Refill: Capillary refill takes 2 to 3 seconds.      Findings: Bruising and erythema present.      Comments: RLE - wound with erythema surrounding it  Generalized bruising, petechiae also noted to extremities   Neurological:      Mental Status: She is alert.      Motor: Weakness present.      Comments: Confused, oriented only to person and place -- very limited answers to questions mainly yes/no only -- follows commands and HAMMONDS with generalized weakness   Psychiatric:      Comments: Flat affect              CRANIAL NERVES     CN III, IV, VI   Pupils are equal, round, and reactive to light.       Significant Labs: All pertinent labs within the past 24 hours have been reviewed.  Recent Lab Results         06/02/25 2015 06/02/25  1942   06/02/25  1859        Procalcitonin     19.94  Comment: A concentration < 0.25 ng/mL represents a low risk of bacterial infection.  Procalcitonin may not be accurate among patients with localized   infection, recent trauma or major surgery, immunosuppressed state,   invasive fungal infection, renal dysfunction. Decisions regarding   initiation or continuation of antibiotic therapy should not be based   solely on procalcitonin levels.       Albumin     2.2       ALP      82       ALT     21       Amorphous, UA   Few         Anion Gap     17       Appearance, UA   Clear         PTT 29.6  Comment: Refer to local heparin nomogram for intensity/dose specific therapeutic range.            27.5  Comment: Refer to local heparin nomogram for intensity/dose specific therapeutic range.           AST     34       Bacteria, UA   Occasional         Baso #     0.04       Basophil %     0.7       Bilirubin (UA)   Negative         BILIRUBIN TOTAL     0.8  Comment: For infants and newborns, interpretation of results should be based   on gestational age, weight and in agreement with clinical   observations.    Premature Infant recommended reference ranges:   0-24 hours:  <8.0 mg/dL   24-48 hours: <12.0 mg/dL   3-5 days:    <15.0 mg/dL   6-29 days:   <15.0 mg/dL       BNP     1,212  Comment: Values of less than 100 pg/ml are consistent with non-CHF populations.        BUN     53       Calcium     8.4       Chloride     99       CO2     24       Color, UA   Yellow         Creatinine     3.1       eGFR     15  Comment: Estimated GFR calculated using the CKD-EPI creatinine (2021) equation.       Eos #     0.00       Eos %     0.0       Glucose     271       Glucose, UA   3+         Gran # (ANC)     5.31       Granular Casts, UA   2         Hematocrit     47.6       Hemoglobin     15.3       Extra Tube   Hold for add-ons.  Comment: Auto resulted.      Hold for add-ons.  Comment: Auto resulted.          Hyaline Casts, UA   0         Immature Grans (Abs)     0.02  Comment: Mild elevation in immature granulocytes is non specific and can be seen in a variety of conditions including stress response, acute inflammation, trauma and pregnancy. Correlation with other laboratory and clinical findings is essential.       Immature Granulocytes     0.4       INR 1.1  Comment: Coumadin Therapy:    2.0 - 3.0 for INR for all indicators except mechanical heart valves    and antiphospholipid syndromes which should use 2.5  - 3.5.           Ketones, UA   Negative         Lactic Acid Level     3.0       Leukocyte Esterase, UA   Negative         Lymph #     0.12       Lymph %     2.1       Magnesium      1.7       MCH     27.9       MCHC     32.1       MCV     87       Microscopic Comment     Comment: Other formed elements not mentioned in the report are not present in the microscopic examination.         Mono #     0.15       Mono %     2.7       MPV     10.0       Neut %     94.1       NITRITE UA   Negative         nRBC     0       Blood, UA   3+         pH, UA   6.0         Platelet Estimate     Decreased       Platelet Count     54       Potassium     3.5       PROTEIN TOTAL     6.8       Protein, UA   3+  Comment: Recommend a 24 hour urine protein or a urine protein/creatinine ratio if globulin induced proteinuria is clinically suspected.         PT 12.8           RBC     5.49       RBC, UA   2         RDW     13.3       Sodium     140       Spec Grav UA   1.020         Squam Epithel, UA   6         Troponin I     4.293  Comment: The reference interval for Troponin I represents the 99th percentile cutoff for our facility and is consistent with 3rd generation assay performance.       Urobilinogen, UA   Negative         WBC, UA   5         WBC     5.64       Yeast, UA   None               EKG reviewed--poor tracing noted, sinus tachycardia with PACs, left bundle-branch block, heart rate 104,     Significant Imaging: I have reviewed all pertinent imaging results/findings within the past 24 hours.    CT head without contrast--no acute intracranial findings    Chest x-ray--pulmonary venous congestion, left retrocardiac atelectasis versus consolidation, no acute angulated or displaced fractures, previous ORIF of left humeral fracture    X-ray tibia fibula Right--no acute bone or joint abnormality, edema within subcutaneous tissues, no soft tissue gas, previous ORIF of patellar fracture, vascular calcification    Assessment/Plan:  "    Assessment & Plan  Sepsis with encephalopathy  This patient does have evidence of infective focus  My overall impression is sepsis with Acute kidney injury, Acute heart failure, Encephalopathy, and Thrombocytopenia .  Source: Skin and Soft Tissue Infection: Cellulitis  Antibiotics given-   Antibiotics (72h ago, onward)      Start     Stop Route Frequency Ordered    06/03/25 0800  ceFEPIme injection 1 g         -- IV Every 12 hours (non-standard times) 06/02/25 2154    06/1950  vancomycin - pharmacy to dose  (vancomycin IVPB (PEDS and ADULTS))        Placed in "And" Linked Group    -- IV pharmacy to manage frequency 06/02/25 1850          Latest lactate reviewed-  Recent Labs   Lab 06/02/25  2242   LACTATE 2.2     Organ dysfunction indicated by Acute kidney injury, Encephalopathy, and Thrombocytopenia     Fluid challenge Contraindicated- Fluid bolus is contraindicated in this patient due to Congestive Heart Failure     Post- resuscitation assessment Yes - I attest a sepsis perfusion exam was performed within 6 hours of sepsis, severe sepsis, or septic shock presentation, following fluid resuscitation.      Will Not start Pressors  Source control achieved by: Broad spectrum antibiotics  Type 2 diabetes mellitus with microalbuminuria, with long-term current use of insulin  Serial glucose checks   SSI ordered    Hypothyroidism  Resume Synthroid    Essential hypertension  Patient's blood pressure range in the last 24 hours was: BP  Min: 117/66  Max: 180/81.The patient's inpatient anti-hypertensive regimen is listed below:  Current Antihypertensives  hydrALAZINE injection 10 mg, Every 6 hours PRN, Intravenous    Plan  - BP fluctuating, monitor and holding medications due to worsening renal function for now  Class 2 severe obesity due to excess calories with serious comorbidity and body mass index (BMI) of 36.0 to 36.9 in adult  Body mass index is 36.83 kg/m². Morbid obesity complicates all aspects of disease " management from diagnostic modalities to treatment. Weight loss encouraged and health benefits explained to patient.       Thrombocytopenia  The likely etiology of thrombocytopenia is chronic ITP. The patients 3 most recent labs are listed below.  Recent Labs     06/02/25 1859 06/03/25  0227   PLT 54* 71*     Plan  - Will need to stop heparin if any overt bleeding and monitor platelet count closely    Chronic diastolic congestive heart failure  Patient has Diastolic (HFpEF) heart failure that is Chronic. On presentation their CHF was controlled and she was given 1 L fluid bolus due to sepsis. Most recent BNP and echo results are listed below.  Recent Labs     06/02/25 1859   BNP 1,212*     Latest ECHO  Results for orders placed during the hospital encounter of 05/09/25    Echo    Interpretation Summary    Left Ventricle: The left ventricle is normal in size. Mildly increased ventricular mass. Mildly increased wall thickness. There is mild concentric hypertrophy. There is normal systolic function with a visually estimated ejection fraction of 55 - 60%. Grade II diastolic dysfunction.    Right Ventricle: The right ventricle is normal in size Wall thickness is normal. Systolic function is normal.    Left Atrium: The left atrium is dilated    Aortic Valve: There is moderate aortic valve sclerosis. There is moderate stenosis. Aortic valve area by VTI is 1.3 cm². Aortic valve peak velocity is 3.1 m/s. Mean gradient is 22 mmHg. The dimensionless index is 0.45.    Mitral Valve: Mildly thickened subvalvular apparatus. There is severe mitral annular calcification.    IVC/SVC: Normal venous pressure at 3 mmHg.    Current Heart Failure Medications  hydrALAZINE injection 10 mg, Every 6 hours PRN, Intravenous    Plan  - Monitor strict I&Os and daily weights.    - Place on telemetry  - Low sodium diet  - Place on fluid restriction of 1.5 L.   - Cardiology has been consulted  - The patient's volume status is at their  baseline      Chronic kidney disease, stage 4 (severe)  Creatine stable for now. BMP reviewed- noted Estimated Creatinine Clearance: 18.7 mL/min (A) (based on SCr of 3.1 mg/dL (H)). according to latest data. Based on current GFR, CKD stage is stage 4 - GFR 15-29.  Monitor UOP and serial BMP and adjust therapy as needed. Renally dose meds. Avoid nephrotoxic medications and procedures.  Mild persistent asthma without complication  Continue home inhaler- symbicort  PRN Duo-nebs ordered    Cellulitis of right lower extremity  Broad spectrum antibiotics ordered  Consult wound care     NSTEMI (non-ST elevated myocardial infarction)  Heparin drip  Consult cardiology  Denies chest pain, but troponin 4.293 with EKG changes noted  Continue to trend troponin      VTE Risk Mitigation (From admission, onward)           Ordered     heparin 25,000 units in dextrose 5% 250 mL (100 units/mL) infusion LOW INTENSITY nomogram - [facility]  Continuous        Question:  Begin at (units/kg/hr)  Answer:  12    06/02/25 2151     heparin 25,000 units in dextrose 5% (100 units/ml) IV bolus from bag LOW INTENSITY nomogram - [facility]  As needed (PRN)        Question:  Heparin Infusion Adjustment (DO NOT MODIFY ANSWER)  Answer:  [email or web address]\epic\Images\Pharmacy\HeparinInfusions\heparin LOW INTENSITY nomogram for [facility] [id number]    06/02/25 2151     heparin 25,000 units in dextrose 5% (100 units/ml) IV bolus from bag LOW INTENSITY nomogram - [facility]  As needed (PRN)        Question:  Heparin Infusion Adjustment (DO NOT MODIFY ANSWER)  Answer:  [email or web address]\epic\Images\Pharmacy\HeparinInfusions\heparin LOW INTENSITY nomogram for [facility] [id number]    06/02/25 2151                        This patient's case has been reviewed by my supervising physician, Dr. Kylee Caraballo.  Supervising physician will provide additional orders and recommendations at his or her discretion.      Daksha Washington NP  Department of Hospital Medicine  [facility] ([facility])

## 2025-06-03 NOTE — CONSULTS
[facility] - Telemetry ([facility])  Wound Care    Patient Name:  Lakshmi Campbell   MRN:  [id number]  Date: 6/3/2025  Diagnosis: Sepsis with encephalopathy    History:     Past Medical History:   Diagnosis Date    Acquired TTP     Cataract     CKD stage 3 secondary to diabetes     Closed displaced comminuted fracture of right patella 10/28/2020    Closed fracture of surgical neck of left humerus 10/30/2020    Closed left radial fracture 10/30/2020    Hyperkalemia     Hyperlipidemia     Hypertension     Hypothyroidism, unspecified     Liver cirrhosis secondary to YO     Morbid obesity     Right knee pain     Thyroid disease     Type 2 diabetes mellitus        Social History[1]    Precautions:     Allergies as of 06/02/2025 - Reviewed 06/02/2025   Allergen Reaction Noted    Codeine Nausea Only 09/23/2013       WO Assessment Details/Treatment     Consulted on this 75 y/o F patient due to present on admission wounds. Patient admitted with sepsis with encephalopathy and has PMH significant for DM2, CKD4, HTN, hypothyroid, YO, aortic stenosis, CHF.  She is awake and alert, but falls asleep during care. Follows commands, s/o at bedside.   Skin tears noted to right lateral elbow, Left upper arm, left lower arm, and left shin,  does report recent fall at home. These skin tears are cleansed with vashe, patted dry, and foam dressings applied. Recommend change weekly and prn.   MASD is noted to lower abdomen fold with scattered areas in crease of partial thickness tissue loss. Cleansed with bath wipes and moisture barrier paste applied in thin layer.  Right buttock/sacrum stage 1 PI noted with nonblanchable redness surrounded by blanchable redness. Painted with cavilon and sacral foam dressing applied.  Right shin venous ulcer noted with surrounding redness and edema, suspect cellulitis. Ulceration measures 5x2x0.2cm, wound bed is moist pink to edges, with layer of yellow adherent fibrin/slough covering majority of wound.  Cleansed with vashe and allowed to dwell. Intact sajan wound skin painted with cavilon. Hydrofera blue classic cut to size, wet with saline, and applied to wound, secured with foam dressing. Recommend change by nursing staff twice weekly, Tu/Fri.  Heels intact with no redness. Recommend float with pillow.     Patient is on Isotour pressure-redistribution mattress. Recommend apply BEBE pump.    Recommendations made to primary team for wound care, moisture management, pressure injury prevention interventions . Orders placed.        06/03/25 0850   WOCN Assessment   WOCN Total Time (mins) 45   Visit Date 06/03/25   Visit Time 0850   Consult Type New   WOCN Speciality Wound   Wound skin tear;venous;pressure;At risk for pressure Injury   Intervention assessed;applied;chart review;coordination of care;team conference;orders   Teaching on-going   Pressure Injury Prevention    Check Moisture Management Pad Done   Sacral Foam Dressing Peel back sacral foam dressing, assess skin and reapply   Heel protection technique Pillow   Check Medical Devices Done        Wound 06/03/25 Pressure Injury Right Buttocks   Date First Assessed: 06/03/25   Present on Original Admission: Yes  Primary Wound Type: Pressure Injury  Side: Right  Location: Buttocks   Wound Image    Pressure Injury Stage 1   Dressing Appearance Open to air   Drainage Amount None   Appearance Red   Tissue loss description Not applicable   Red (%), Wound Tissue Color 100 %   Periwound Area Intact   Wound Edges Defined   Wound Length (cm) 8 cm   Wound Width (cm) 5 cm   Wound Surface Area (cm^2) 31.42 cm^2   Care Cleansed with:;Antimicrobial agent;Applied:;Skin Barrier   Dressing Foam   Dressing Change Due 06/06/25        Wound 06/03/25 Venous Ulcer Right anterior;lower Leg   Date First Assessed: 06/03/25   Present on Original Admission: Yes  Primary Wound Type: Venous Ulcer  Side: Right  Orientation: anterior;lower  Location: Leg   Wound Image     Dressing Appearance  Intact;Moist drainage   Drainage Amount Small   Drainage Characteristics/Odor Serous   Appearance Pink;Yellow;Fibrin;Slough;Moist   Tissue loss description Full thickness   Red (%), Wound Tissue Color 25 %   Yellow (%), Wound Tissue Color 75 %   Periwound Area Intact;Redness;Swelling   Wound Edges Defined   Wound Length (cm) 5 cm   Wound Width (cm) 2 cm   Wound Depth (cm) 0.2 cm   Wound Volume (cm^3) 1.047 cm^3   Wound Surface Area (cm^2) 7.85 cm^2   Care Cleansed with:;Antimicrobial agent;Applied:;Skin Barrier   Dressing Methylene blue/gentian violet;Silicone;Foam  (hydrofera blue classic, mepilex)   Dressing Change Due 06/06/25        Wound 06/03/25 Skin Tear Left anterior;lower Leg   Date First Assessed: 06/03/25   Present on Original Admission: Yes  Primary Wound Type: Skin Tear  Side: Left  Orientation: anterior;lower  Location: Leg   Wound Image    Dressing Appearance Intact   Drainage Amount Small   Drainage Characteristics/Odor Serous   Appearance Red;Pink;Moist   Tissue loss description Partial thickness   Red (%), Wound Tissue Color 100 %   Periwound Area Ecchymotic   Wound Edges Jagged   Wound Length (cm) 3.5 cm   Wound Width (cm) 1.3 cm   Wound Depth (cm) 0.1 cm   Wound Volume (cm^3) 0.238 cm^3   Wound Surface Area (cm^2) 3.57 cm^2   Care Cleansed with:;Antimicrobial agent;Applied:;Skin Barrier   Dressing Foam;Silicone   Dressing Change Due 06/10/25        Wound 06/03/25 Skin Tear Left upper Arm   Date First Assessed: 06/03/25   Present on Original Admission: Yes  Primary Wound Type: Skin Tear  Side: Left  Orientation: upper  Location: Arm   Wound Image    Dressing Appearance Intact   Drainage Amount Small   Drainage Characteristics/Odor Serosanguineous   Appearance Red   Tissue loss description Partial thickness   Red (%), Wound Tissue Color 100 %   Periwound Area Intact   Wound Edges Defined   Wound Length (cm) 2.2 cm   Wound Width (cm) 1.3 cm   Wound Depth (cm) 0.1 cm   Wound Volume (cm^3) 0.15 cm^3    Wound Surface Area (cm^2) 2.25 cm^2   Care Cleansed with:;Antimicrobial agent;Applied:;Skin Barrier   Dressing Foam;Silicone   Dressing Change Due 06/10/25        Wound 06/03/25 Skin Tear Left lower Arm   Date First Assessed: 06/03/25   Present on Original Admission: Yes  Primary Wound Type: Skin Tear  Side: Left  Orientation: lower  Location: Arm   Wound Image    Dressing Appearance Open to air   Drainage Amount Small   Drainage Characteristics/Odor Serosanguineous   Appearance Red;Pink;Moist   Tissue loss description Partial thickness   Red (%), Wound Tissue Color 100 %   Periwound Area Intact   Wound Edges Defined   Wound Length (cm) 3.5 cm   Wound Width (cm) 4 cm   Wound Depth (cm) 0.1 cm   Wound Volume (cm^3) 0.733 cm^3   Wound Surface Area (cm^2) 11 cm^2   Care Cleansed with:;Antimicrobial agent;Applied:;Skin Barrier   Dressing Silicone;Foam   Dressing Change Due 06/10/25        Wound 06/03/25 Skin Tear Right Elbow   Date First Assessed: 06/03/25   Present on Original Admission: Yes  Primary Wound Type: Skin Tear  Side: Right  Location: Elbow   Wound Image    Dressing Appearance Open to air   Drainage Amount None   Appearance Black;Eschar   Tissue loss description Full thickness   Black (%), Wound Tissue Color 100 %   Wound Edges Defined   Wound Length (cm) 1.2 cm   Wound Width (cm) 1.4 cm   Wound Depth (cm) 0.1 cm   Wound Volume (cm^3) 0.088 cm^3   Wound Surface Area (cm^2) 1.32 cm^2   Care Cleansed with:;Antimicrobial agent;Applied:;Skin Barrier   Dressing Foam;Silicone   Dressing Change Due 06/10/25        Wound 06/03/25 Moisture associated dermatitis lower Abdomen   Date First Assessed: 06/03/25   Present on Original Admission: Yes  Primary Wound Type: Moisture associated dermatitis  Orientation: lower  Location: Abdomen   Wound Image    Dressing Appearance Open to air   Drainage Amount Scant   Drainage Characteristics/Odor Serosanguineous   Appearance Red;Pink;Moist   Tissue loss description Partial  thickness   Care Cleansed with:;Antimicrobial agent;Applied:;Skin Barrier   Dressing Paste       2025         [1]   Social History  Socioeconomic History    Marital status:    Tobacco Use    Smoking status: Former     Current packs/day: 0.00     Average packs/day: 1 pack/day for 8.0 years (8.0 ttl pk-yrs)     Types: Cigarettes     Start date:      Quit date:      Years since quittin.4    Smokeless tobacco: Never   Substance and Sexual Activity    Alcohol use: Yes     Comment: rarely    Drug use: No    Sexual activity: Not Currently     Social Drivers of Health     Financial Resource Strain: Low Risk  (6/3/2025)    Overall Financial Resource Strain (CARDIA)     Difficulty of Paying Living Expenses: Not hard at all   Food Insecurity: No Food Insecurity (6/3/2025)    Hunger Vital Sign     Worried About Running Out of Food in the Last Year: Never true     Ran Out of Food in the Last Year: Never true   Transportation Needs: No Transportation Needs (6/3/2025)    PRAPARE - Transportation     Lack of Transportation (Medical): No     Lack of Transportation (Non-Medical): No   Physical Activity: Inactive (2025)    Exercise Vital Sign     Days of Exercise per Week: 0 days     Minutes of Exercise per Session: 0 min   Stress: No Stress Concern Present (6/3/2025)    [employer] - Occupational Stress Questionnaire     Feeling of Stress : Not at all   Housing Stability: Low Risk  (6/3/2025)    Housing Stability Vital Sign     Unable to Pay for Housing in the Last Year: No     Homeless in the Last Year: No

## 2025-06-03 NOTE — HOSPITAL COURSE
Patient admitted to Hospital Medicine for Sepsis in the setting of Cellulitis. IV antibiotics continued. Blood cultures obtained with PCR positive for Streptococcus pyogenes (Group A). Cefepime continued and blood cultures repeated. ID consulted. Encephalopathy resolved. Troponin elevated and Heparin infusion initiated- Cardiology consulted. Hx of CHF noted with imaging supportive of pulmonary venous congestion, similar to prior. Left retrocardiac atelectasis or consolidation. BNP elevated- will initiate diuretic once stable. H/H stable. Lactic acid 3> 2.2. Procalcitonin elevated. Supplemental oxygen continued as needed. PT/OT evaluation pending. On 6/4/2025, mild confusion overnight resolved spontaneously and patient remained alert and oriented during the day. Potassium replaced and Magnesium stable with increased oral intake encouraged. Imaging supports pulmonary vascular congestion perihilar interstitial changes with small bilateral pleural effusions. Lasix given. Troponin trended down and Heparin infusion stopped with Metoprolol initiated per Cardiology. BUN/Cr trended upward. PT/OT evaluation completed with moderate intensity therapy recommended. CM consulted to assist with discharge planning. Antibiotics adjusted to Rocephin and Clindamycin per ID. Repeat blood cultures to be obtained in am. On 6/5/2025, atrial flutter controlled on cardiac monitor and Heparin infusion resumed per Cardiology. Potassium replaced. Speech therapy consulted for evaluation due to difficulty swallowing and NPO status continued. H/H stable. Kidney function worsening noted.  Intermittent confusion at night noted. On 6/6/2025, pt able to respond to verbal stimuli and oriented to person but becoming more lethargic with worsening encephalopathy. CT of head showed no evidence of acute hemorrhage, focal infarction or midline shift. The ventricles and extra-axial fluid spaces normal for age. Patient remains NPO due intermittent signs of  aspiration per Speech therapy. Atrial fibrillation with pauses (3-5 seconds) noted on monitor with heparin infusion continued. Potassium 4.5/Mag 2.1 and Cardiology following.  Accessory muscle use while breathing noted with 3L NC in place.  Repeat chest x-ray showed CHF with no significant interval changes.  CT of chest showed   worsening bilateral pleural effusions and worsening adjacent atelectasis/consolidation, most significantly involving the left lower lobe and to lesser degree left upper lobe.  Multifocal pneumonia with parapneumonic effusions vs. primary effusions with adjacent compressive atelectasis. Small to moderate pericardial effusion has developed in the interim as well.Worsening kidney function noted with minimal output reported in the setting of diuresis. Nephrology following with HD recommended at this time for volume control.  Repeat blood cultures with no growth to date. Imaging reviewed and patient evaluated per pulmonology the patient transferred to ICU for critical care management.

## 2025-06-03 NOTE — HPI
Pt is a 74 y/u female with PMhx for HTN, HLP, diastolic heart failure, CKD , aortic stenosis admitted for sepsis with encephalopathy.  EKG sinus tachycardia with LBBB at 104 bpm. BNP 1212. CXR (-). Pt discharged about 2 weeks ago for CHF,  Troponin is 4.15

## 2025-06-04 PROBLEM — B95.5 STREPTOCOCCAL BACTEREMIA: Status: ACTIVE | Noted: 2024-08-30

## 2025-06-04 LAB
ABSOLUTE NEUTROPHIL MANUAL (OHS): 7.2 K/UL
ALBUMIN SERPL BCP-MCNC: 1.8 G/DL (ref 3.5–5.2)
ALP SERPL-CCNC: 73 UNIT/L (ref 40–150)
ALT SERPL W/O P-5'-P-CCNC: 16 UNIT/L (ref 10–44)
ANION GAP (OHS): 15 MMOL/L (ref 8–16)
APTT PPP: 30.4 SECONDS (ref 21–32)
AST SERPL-CCNC: 21 UNIT/L (ref 11–45)
BILIRUB SERPL-MCNC: 0.7 MG/DL (ref 0.1–1)
BUN SERPL-MCNC: 76 MG/DL (ref 8–23)
CALCIUM SERPL-MCNC: 8 MG/DL (ref 8.7–10.5)
CHLORIDE SERPL-SCNC: 97 MMOL/L (ref 95–110)
CO2 SERPL-SCNC: 20 MMOL/L (ref 23–29)
CREAT SERPL-MCNC: 3.3 MG/DL (ref 0.5–1.4)
ERYTHROCYTE [DISTWIDTH] IN BLOOD BY AUTOMATED COUNT: 13.6 % (ref 11.5–14.5)
GFR SERPLBLD CREATININE-BSD FMLA CKD-EPI: 14 ML/MIN/1.73/M2
GLUCOSE SERPL-MCNC: 325 MG/DL (ref 70–110)
HCT VFR BLD AUTO: 31.4 % (ref 37–48.5)
HGB BLD-MCNC: 9.7 GM/DL (ref 12–16)
LYMPHOCYTES NFR BLD MANUAL: 10 % (ref 18–48)
MAGNESIUM SERPL-MCNC: 2 MG/DL (ref 1.6–2.6)
MCH RBC QN AUTO: 27.8 PG (ref 27–31)
MCHC RBC AUTO-ENTMCNC: 30.9 G/DL (ref 32–36)
MCV RBC AUTO: 90 FL (ref 82–98)
MONOCYTES NFR BLD MANUAL: 4 % (ref 4–15)
NEUTROPHILS NFR BLD MANUAL: 86 % (ref 38–73)
NUCLEATED RBC (/100WBC) (OHS): 0 /100 WBC
PLATELET # BLD AUTO: 75 K/UL (ref 150–450)
PLATELET BLD QL SMEAR: ABNORMAL
PMV BLD AUTO: 12.1 FL (ref 9.2–12.9)
POCT GLUCOSE: 276 MG/DL (ref 70–110)
POCT GLUCOSE: 280 MG/DL (ref 70–110)
POCT GLUCOSE: 292 MG/DL (ref 70–110)
POCT GLUCOSE: 337 MG/DL (ref 70–110)
POTASSIUM SERPL-SCNC: 3.1 MMOL/L (ref 3.5–5.1)
PROT SERPL-MCNC: 6.4 GM/DL (ref 6–8.4)
RBC # BLD AUTO: 3.49 M/UL (ref 4–5.4)
SODIUM SERPL-SCNC: 132 MMOL/L (ref 136–145)
VANCOMYCIN SERPL-MCNC: 20.9 UG/ML (ref ?–80)
WBC # BLD AUTO: 8.32 K/UL (ref 3.9–12.7)

## 2025-06-04 PROCEDURE — 99232 SBSQ HOSP IP/OBS MODERATE 35: CPT | Mod: ,,, | Performed by: INTERNAL MEDICINE

## 2025-06-04 PROCEDURE — 94761 N-INVAS EAR/PLS OXIMETRY MLT: CPT

## 2025-06-04 PROCEDURE — 63600175 PHARM REV CODE 636 W HCPCS: Performed by: INTERNAL MEDICINE

## 2025-06-04 PROCEDURE — 80202 ASSAY OF VANCOMYCIN: CPT | Performed by: EMERGENCY MEDICINE

## 2025-06-04 PROCEDURE — 97166 OT EVAL MOD COMPLEX 45 MIN: CPT

## 2025-06-04 PROCEDURE — 99900035 HC TECH TIME PER 15 MIN (STAT)

## 2025-06-04 PROCEDURE — 94640 AIRWAY INHALATION TREATMENT: CPT

## 2025-06-04 PROCEDURE — 27000221 HC OXYGEN, UP TO 24 HOURS

## 2025-06-04 PROCEDURE — 63600175 PHARM REV CODE 636 W HCPCS: Performed by: NURSE PRACTITIONER

## 2025-06-04 PROCEDURE — 97162 PT EVAL MOD COMPLEX 30 MIN: CPT

## 2025-06-04 PROCEDURE — 97530 THERAPEUTIC ACTIVITIES: CPT

## 2025-06-04 PROCEDURE — 21400001 HC TELEMETRY ROOM

## 2025-06-04 PROCEDURE — 25000242 PHARM REV CODE 250 ALT 637 W/ HCPCS: Performed by: NURSE PRACTITIONER

## 2025-06-04 PROCEDURE — 82040 ASSAY OF SERUM ALBUMIN: CPT | Performed by: NURSE PRACTITIONER

## 2025-06-04 PROCEDURE — 85025 COMPLETE CBC W/AUTO DIFF WBC: CPT | Performed by: EMERGENCY MEDICINE

## 2025-06-04 PROCEDURE — 25000003 PHARM REV CODE 250: Performed by: NURSE PRACTITIONER

## 2025-06-04 PROCEDURE — 85730 THROMBOPLASTIN TIME PARTIAL: CPT | Performed by: STUDENT IN AN ORGANIZED HEALTH CARE EDUCATION/TRAINING PROGRAM

## 2025-06-04 PROCEDURE — 83735 ASSAY OF MAGNESIUM: CPT | Performed by: NURSE PRACTITIONER

## 2025-06-04 PROCEDURE — 25000242 PHARM REV CODE 250 ALT 637 W/ HCPCS: Performed by: STUDENT IN AN ORGANIZED HEALTH CARE EDUCATION/TRAINING PROGRAM

## 2025-06-04 PROCEDURE — 36415 COLL VENOUS BLD VENIPUNCTURE: CPT | Performed by: NURSE PRACTITIONER

## 2025-06-04 PROCEDURE — 94760 N-INVAS EAR/PLS OXIMETRY 1: CPT

## 2025-06-04 PROCEDURE — 36415 COLL VENOUS BLD VENIPUNCTURE: CPT | Performed by: EMERGENCY MEDICINE

## 2025-06-04 RX ORDER — CLINDAMYCIN PHOSPHATE 600 MG/50ML
600 INJECTION, SOLUTION INTRAVENOUS
Status: COMPLETED | OUTPATIENT
Start: 2025-06-04 | End: 2025-06-07

## 2025-06-04 RX ORDER — HYDROXYZINE PAMOATE 25 MG/1
50 CAPSULE ORAL EVERY 8 HOURS PRN
Status: DISCONTINUED | OUTPATIENT
Start: 2025-06-04 | End: 2025-06-04

## 2025-06-04 RX ORDER — POTASSIUM CHLORIDE 20 MEQ/1
40 TABLET, EXTENDED RELEASE ORAL ONCE
Status: COMPLETED | OUTPATIENT
Start: 2025-06-04 | End: 2025-06-04

## 2025-06-04 RX ORDER — CEFTRIAXONE 2 G/1
2 INJECTION, POWDER, FOR SOLUTION INTRAMUSCULAR; INTRAVENOUS
Status: DISCONTINUED | OUTPATIENT
Start: 2025-06-04 | End: 2025-06-09

## 2025-06-04 RX ORDER — FUROSEMIDE 10 MG/ML
40 INJECTION INTRAMUSCULAR; INTRAVENOUS ONCE
Status: COMPLETED | OUTPATIENT
Start: 2025-06-04 | End: 2025-06-04

## 2025-06-04 RX ORDER — ACETAMINOPHEN 650 MG/1
650 SUPPOSITORY RECTAL EVERY 6 HOURS PRN
Status: DISCONTINUED | OUTPATIENT
Start: 2025-06-04 | End: 2025-06-09

## 2025-06-04 RX ORDER — ONDANSETRON HYDROCHLORIDE 2 MG/ML
4 INJECTION, SOLUTION INTRAVENOUS EVERY 6 HOURS PRN
Status: DISCONTINUED | OUTPATIENT
Start: 2025-06-04 | End: 2025-06-10 | Stop reason: HOSPADM

## 2025-06-04 RX ORDER — INSULIN GLARGINE 100 [IU]/ML
10 INJECTION, SOLUTION SUBCUTANEOUS NIGHTLY
Status: DISCONTINUED | OUTPATIENT
Start: 2025-06-04 | End: 2025-06-05

## 2025-06-04 RX ORDER — SODIUM CHLORIDE 9 MG/ML
INJECTION, SOLUTION INTRAVENOUS CONTINUOUS
Status: DISCONTINUED | OUTPATIENT
Start: 2025-06-04 | End: 2025-06-05

## 2025-06-04 RX ADMIN — INSULIN ASPART 6 UNITS: 100 INJECTION, SOLUTION INTRAVENOUS; SUBCUTANEOUS at 11:06

## 2025-06-04 RX ADMIN — ARFORMOTEROL TARTRATE 15 MCG: 15 SOLUTION RESPIRATORY (INHALATION) at 08:06

## 2025-06-04 RX ADMIN — SODIUM BICARBONATE 650 MG: 650 TABLET ORAL at 09:06

## 2025-06-04 RX ADMIN — SODIUM CHLORIDE: 9 INJECTION, SOLUTION INTRAVENOUS at 10:06

## 2025-06-04 RX ADMIN — IPRATROPIUM BROMIDE AND ALBUTEROL SULFATE 3 ML: 2.5; .5 SOLUTION RESPIRATORY (INHALATION) at 06:06

## 2025-06-04 RX ADMIN — BUDESONIDE INHALATION 0.5 MG: 0.5 SUSPENSION RESPIRATORY (INHALATION) at 08:06

## 2025-06-04 RX ADMIN — BUDESONIDE INHALATION 0.5 MG: 0.5 SUSPENSION RESPIRATORY (INHALATION) at 07:06

## 2025-06-04 RX ADMIN — CLINDAMYCIN PHOSPHATE 600 MG: 600 INJECTION, SOLUTION INTRAVENOUS at 09:06

## 2025-06-04 RX ADMIN — CEFTRIAXONE 2 G: 2 INJECTION, POWDER, FOR SOLUTION INTRAMUSCULAR; INTRAVENOUS at 09:06

## 2025-06-04 RX ADMIN — POTASSIUM CHLORIDE 40 MEQ: 1500 TABLET, EXTENDED RELEASE ORAL at 01:06

## 2025-06-04 RX ADMIN — CLINDAMYCIN PHOSPHATE 600 MG: 600 INJECTION, SOLUTION INTRAVENOUS at 05:06

## 2025-06-04 RX ADMIN — ARFORMOTEROL TARTRATE 15 MCG: 15 SOLUTION RESPIRATORY (INHALATION) at 07:06

## 2025-06-04 RX ADMIN — SODIUM BICARBONATE 650 MG: 650 TABLET ORAL at 08:06

## 2025-06-04 RX ADMIN — INSULIN ASPART 8 UNITS: 100 INJECTION, SOLUTION INTRAVENOUS; SUBCUTANEOUS at 06:06

## 2025-06-04 RX ADMIN — ONDANSETRON 4 MG: 2 INJECTION INTRAMUSCULAR; INTRAVENOUS at 09:06

## 2025-06-04 RX ADMIN — INSULIN GLARGINE 10 UNITS: 100 INJECTION, SOLUTION SUBCUTANEOUS at 09:06

## 2025-06-04 RX ADMIN — HYDROXYZINE PAMOATE 50 MG: 25 CAPSULE ORAL at 08:06

## 2025-06-04 RX ADMIN — INSULIN ASPART 6 UNITS: 100 INJECTION, SOLUTION INTRAVENOUS; SUBCUTANEOUS at 04:06

## 2025-06-04 RX ADMIN — FUROSEMIDE 40 MG: 10 INJECTION, SOLUTION INTRAVENOUS at 09:06

## 2025-06-04 RX ADMIN — LEVOTHYROXINE SODIUM 137 MCG: 0.11 TABLET ORAL at 05:06

## 2025-06-04 RX ADMIN — HYDROXYZINE PAMOATE 50 MG: 25 CAPSULE ORAL at 04:06

## 2025-06-04 RX ADMIN — INSULIN ASPART 3 UNITS: 100 INJECTION, SOLUTION INTRAVENOUS; SUBCUTANEOUS at 09:06

## 2025-06-04 NOTE — ASSESSMENT & PLAN NOTE
Patient's blood pressure range in the last 24 hours was: BP  Min: 121/63  Max: 140/70.The patient's inpatient anti-hypertensive regimen is listed below:  Current Antihypertensives  hydrALAZINE injection 10 mg, Every 6 hours PRN, Intravenous    Plan  - BP fluctuating, monitor and holding medications due to worsening renal function for now

## 2025-06-04 NOTE — ASSESSMENT & PLAN NOTE
Heparin drip  Consult cardiology  -likely due to demand in the setting of sepsis and CHF   Denies chest pain, but troponin 4.293> 3.542 with EKG changes noted  Continue to trend troponin  -Echo obtained with results reviewed

## 2025-06-04 NOTE — SUBJECTIVE & OBJECTIVE
Past Medical History:   Diagnosis Date    Acquired TTP     Cataract     CKD stage 3 secondary to diabetes     Closed displaced comminuted fracture of right patella 10/28/2020    Closed fracture of surgical neck of left humerus 10/30/2020    Closed left radial fracture 10/30/2020    Hyperkalemia     Hyperlipidemia     Hypertension     Hypothyroidism, unspecified     Liver cirrhosis secondary to YO     Morbid obesity     Right knee pain     Thyroid disease     Type 2 diabetes mellitus        Past Surgical History:   Procedure Laterality Date    APPENDECTOMY      BREAST BIOPSY      CATARACT EXTRACTION      COLONOSCOPY N/A 12/21/2021    Procedure: COLONOSCOPY screening;  Surgeon: Moises Patterson MD;  Location: [facility];  Service: Endoscopy;  Laterality: N/A;    ESOPHAGOGASTRODUODENOSCOPY N/A 12/21/2021    Procedure: EGD (ESOPHAGOGASTRODUODENOSCOPY) EV screening;  Surgeon: Moises Patterson MD;  Location: [facility];  Service: Endoscopy;  Laterality: N/A;    EYE SURGERY      HERNIA REPAIR      OPEN REDUCTION AND INTERNAL FIXATION (ORIF) OF FRACTURE OF DISTAL RADIUS Left 11/2/2020    Procedure: ORIF, FRACTURE, RADIUS, DISTAL;  Surgeon: Sage Wolf MD;  Location: [facility];  Service: Orthopedics;  Laterality: Left;    OPEN REDUCTION AND INTERNAL FIXATION (ORIF) OF FRACTURE OF PATELLA Right 11/2/2020    Procedure: ORIF, FRACTURE, PATELLA;  Surgeon: Sage Wolf MD;  Location: [facility] OR;  Service: Orthopedics;  Laterality: Right;    OPEN REDUCTION AND INTERNAL FIXATION (ORIF) OF FRACTURE OF PATELLA Right 12/18/2020    Procedure: ORIF, FRACTURE, PATELLA;  Surgeon: Sage Wolf MD;  Location: [facility] OR;  Service: Orthopedics;  Laterality: Right;    ORIF HUMERUS FRACTURE Left 11/5/2020    Procedure: ORIF, FRACTURE, HUMERUS;  Surgeon: Sage Wolf MD;  Location: [facility] OR;  Service: Orthopedics;  Laterality: Left;    PLACEMENT OF ACELLULAR HUMAN DERMAL ALLOGRAFT Right 11/2/2020     "Procedure: APPLICATION, ACELLULAR HUMAN DERMAL ALLOGRAFT;  Surgeon: Sage Wolf MD;  Location: [facility] OR;  Service: Orthopedics;  Laterality: Right;  Right Patella    REMOVAL OF HARDWARE FROM HAND Left 3/11/2021    Procedure: REMOVAL, HARDWARE, HAND;  Surgeon: Sage Wolf MD;  Location: [facility] OR;  Service: Orthopedics;  Laterality: Left;  Removal of dorsal spanning wrist plate left distal radius    REMOVAL OF HARDWARE FROM LOWER EXTREMITY Right 2020    Procedure: REMOVAL, HARDWARE, LOWER EXTREMITY;  Surgeon: Sage Wolf MD;  Location: [facility] OR;  Service: Orthopedics;  Laterality: Right;       Review of patient's allergies indicates:   Allergen Reactions    Codeine Nausea Only     Other reaction(s): Nausea  Other reaction(s): Elevated blood pressure       Medications:  Medications Prior to Admission   Medication Sig    albuterol (VENTOLIN HFA) 90 mcg/actuation inhaler Inhale 2 puffs into the lungs every 6 (six) hours as needed for Wheezing or Shortness of Breath (cough). Rescue    amLODIPine (NORVASC) 10 MG tablet TAKE 1 TABLET(10 MG) BY MOUTH EVERY DAY    blood sugar diagnostic Strp Use ac bid    budesonide-formoterol 160-4.5 mcg (SYMBICORT) 160-4.5 mcg/actuation HFAA Inhale 2 puffs into the lungs every 12 (twelve) hours. Controller    chlorthalidone (HYGROTEN) 25 MG Tab Take 25 mg by mouth once daily.    cholecalciferol, vitamin D3, (VITAMIN D3) 50 mcg (2,000 unit) Cap capsule Take 1 capsule by mouth.    furosemide (LASIX) 20 MG tablet Take 1 tablet (20 mg total) by mouth once daily.    HUMULIN 70/30 U-100 KWIKPEN 100 unit/mL (70-30) InPn pen SMARTSI Unit(s) SUB-Q Every Evening    hydrALAZINE (APRESOLINE) 25 MG tablet Take 1 tablet (25 mg total) by mouth every 12 (twelve) hours.    hydroCHLOROthiazide (HYDRODIURIL) 25 MG tablet Take 25 mg by mouth.    insulin syringe-needle U-100 1 mL 29 gauge x 1/2" Syrg Use bid    JARDIANCE 10 mg tablet Take 10 mg by mouth every " "morning.    lancets 33 gauge Misc Use as BID    levothyroxine (SYNTHROID) 137 MCG Tab tablet Take 1 tablet by mouth once daily.    perindopril (ACEON) 2 MG Tab Take 2 mg by mouth.    pravastatin (PRAVACHOL) 20 MG tablet Take 20 mg by mouth once daily.     sodium bicarbonate 650 MG tablet Take 650 mg by mouth 2 (two) times daily.    spironolactone (ALDACTONE) 25 MG tablet Take 25 mg by mouth.     Antibiotics (From admission, onward)      Start     Stop Route Frequency Ordered    25 0830  cefTRIAXone injection 2 g         -- IV Every 24 hours (non-standard times) 25 0722    25 1950  vancomycin - pharmacy to dose  (vancomycin IVPB (PEDS and ADULTS))        Placed in "And" Linked Group    -- IV pharmacy to manage frequency 25 1850          Antifungals (From admission, onward)      None          Antivirals (From admission, onward)      None             Immunization History   Administered Date(s) Administered    COVID-19, MRNA, LN-S, PF (Pfizer) (Purple Cap) 01/15/2021, 2021    Influenza 2011    Influenza (FLUAD) - Quadrivalent - Adjuvanted - PF *Preferred* (65+) 10/04/2021, 10/12/2022    Influenza - Trivalent - Fluarix, Flulaval, Fluzone, Afluria - PF 2011    Influenza - Trivalent - Fluzone High Dose - PF (65 years and older) 2019    Pneumococcal Conjugate - 13 Valent 2020    Pneumococcal Polysaccharide - 23 Valent 2021, 2024    Td (ADULT) 2008    Tdap 10/24/2020    Tuberculin 2020, 2020    Zoster 2011       Family History       Problem Relation (Age of Onset)    Diabetes Mother, Father    Leukemia Father          Social History     Socioeconomic History    Marital status:    Tobacco Use    Smoking status: Former     Current packs/day: 0.00     Average packs/day: 1 pack/day for 8.0 years (8.0 ttl pk-yrs)     Types: Cigarettes     Start date:      Quit date:      Years since quittin.4    Smokeless tobacco: Never "   Substance and Sexual Activity    Alcohol use: Yes     Comment: rarely    Drug use: No    Sexual activity: Not Currently     Social Drivers of Health     Financial Resource Strain: Low Risk  (6/3/2025)    Overall Financial Resource Strain (CARDIA)     Difficulty of Paying Living Expenses: Not hard at all   Food Insecurity: No Food Insecurity (6/3/2025)    Hunger Vital Sign     Worried About Running Out of Food in the Last Year: Never true     Ran Out of Food in the Last Year: Never true   Transportation Needs: No Transportation Needs (6/3/2025)    PRAPARE - Transportation     Lack of Transportation (Medical): No     Lack of Transportation (Non-Medical): No   Physical Activity: Inactive (6/3/2025)    Exercise Vital Sign     Days of Exercise per Week: 0 days     Minutes of Exercise per Session: 0 min   Stress: No Stress Concern Present (6/3/2025)    [employer] - Occupational Stress Questionnaire     Feeling of Stress : Not at all   Housing Stability: Low Risk  (6/3/2025)    Housing Stability Vital Sign     Unable to Pay for Housing in the Last Year: No     Homeless in the Last Year: No     Review of Systems   Unable to perform ROS: Mental status change   Constitutional:  Negative for activity change and appetite change.     Objective:     Vital Signs (Most Recent):  Temp: 98.3 °F (36.8 °C) (06/04/25 0337)  Pulse: 86 (06/04/25 0605)  Resp: 18 (06/04/25 0605)  BP: 130/60 (06/04/25 0337)  SpO2: (!) 94 % (06/04/25 0605) Vital Signs (24h Range):  Temp:  [98.2 °F (36.8 °C)-99 °F (37.2 °C)] 98.3 °F (36.8 °C)  Pulse:  [] 86  Resp:  [16-19] 18  SpO2:  [87 %-96 %] 94 %  BP: (111-130)/(56-63) 130/60     Weight: 94.7 kg (208 lb 12.4 oz)  Body mass index is 34.74 kg/m².    Estimated Creatinine Clearance: 18.7 mL/min (A) (based on SCr of 3 mg/dL (H)).     Physical Exam  Vitals and nursing note reviewed.   Constitutional:       Appearance: She is ill-appearing.   HENT:      Head: Normocephalic.    Cardiovascular:      Rate and Rhythm: Normal rate.   Pulmonary:      Effort: Pulmonary effort is normal.   Musculoskeletal:         General: No swelling.   Skin:     General: Skin is warm.      Findings: Bruising and erythema present.   Neurological:      Mental Status: She is disoriented.                Significant Labs:  Microbiology Results (last 7 days)       Procedure Component Value Units Date/Time    Rapid Organism ID by PCR (from Blood culture) [[id number]]  (Abnormal) Collected: 06/02/25 1923    Order Status: Completed Specimen: Blood from Peripheral, Forearm, Left Updated: 06/03/25 1435     Enterococcus faecalis Not Detected     Enterococcus faecium Not Detected     Listeria monocytogenes Not Detected     Staphylococcus spp. Not Detected     Staphylococcus aureus Not Detected     Staphylococcus epidermidis Not Detected     Staphylococcus lugdunensis Not Detected     Streptococcus spp. See Species for ID     Streptococcus agalactiae (Group B) Not Detected     Streptococcus pneumoniae Not Detected     Streptococcus pyogenes (Group A) Detected     Acinetobacter calcoaceticus/baumannii complex Not Detected     Bacteroides fragilis Not Detected     Enterobacterales Not Detected     Enterobacter cloacae complex Not Detected     Escherichia coli Not Detected     Klebsiella aerogenes Not Detected     Klebsiella oxytoca Not Detected     Klebsiella pneumoniae group Not Detected     Proteus spp. Not Detected     Salmonella spp. Not Detected     Serratia marcescens Not Detected     Haemophilus influenzae Not Detected     Neisseria meningitidis Not Detected     Pseudomonas aeruginosa Not Detected     Stenotrophomonas maltophilia Not Detected     Candida albicans Not Detected     Candida auris Not Detected     Candida glabrata Not Detected     Candida krusei Not Detected     Candida parapsilosis Not Detected     Candida tropicalis Not Detected     Cryptococcus neoformans/gattii Not Detected     CTX-M (ESBL )  N/A     Comment: Note: Antimicrobial resistance can occur via multiple mechanisms. A Not Detected result for antimicrobial resistance gene(s) does not indicate antimicrobial susceptibility. Subculturing is required for species identification and susceptibility testing of   isolates.        IMP (Cabapenemase ) N/A     Comment: Note: Antimicrobial resistance can occur via multiple mechanisms. A Not Detected result for antimicrobial resistance gene(s) does not indicate antimicrobial susceptibility. Subculturing is required for species identification and susceptibility testing of   isolates.        KPC resistance gene (Carbapenemase ) N/A     Comment: Note: Antimicrobial resistance can occur via multiple mechanisms. A Not Detected result for antimicrobial resistance gene(s) does not indicate antimicrobial susceptibility. Subculturing is required for species identification and susceptibility testing of   isolates.        mcr-1 N/A     Comment: Note: Antimicrobial resistance can occur via multiple mechanisms. A Not Detected result for antimicrobial resistance gene(s) does not indicate antimicrobial susceptibility. Subculturing is required for species identification and susceptibility testing of   isolates.        mecA ID N/A     Comment: Note: Antimicrobial resistance can occur via multiple mechanisms. A Not Detected result for antimicrobial resistance gene(s) does not indicate antimicrobial susceptibility. Subculturing is required for species identification and susceptibility testing of   isolates.        mecA/C and MREJ (MRSA) gene N/A     Comment: Note: Antimicrobial resistance can occur via multiple mechanisms. A Not Detected result for antimicrobial resistance gene(s) does not indicate antimicrobial susceptibility. Subculturing is required for species identification and susceptibility testing of   isolates.        NDM (Carbapenemase ) N/A     Comment: Note: Antimicrobial resistance can occur  via multiple mechanisms. A Not Detected result for antimicrobial resistance gene(s) does not indicate antimicrobial susceptibility. Subculturing is required for species identification and susceptibility testing of   isolates.        OXA-48-like (Carbapenemase ) N/A     Comment: Note: Antimicrobial resistance can occur via multiple mechanisms. A Not Detected result for antimicrobial resistance gene(s) does not indicate antimicrobial susceptibility. Subculturing is required for species identification and susceptibility testing of   isolates.        Nargis/B (VRE gene) N/A     Comment: Note: Antimicrobial resistance can occur via multiple mechanisms. A Not Detected result for antimicrobial resistance gene(s) does not indicate antimicrobial susceptibility. Subculturing is required for species identification and susceptibility testing of   isolates.        VIM (Carbapenemase ) N/A     Comment: Note: Antimicrobial resistance can occur via multiple mechanisms. A Not Detected result for antimicrobial resistance gene(s) does not indicate antimicrobial susceptibility. Subculturing is required for species identification and susceptibility testing of   isolates.       Blood culture x two cultures. Draw prior to antibiotics. [[id number]]  (Abnormal) Collected: 06/02/25 1859    Order Status: Completed Specimen: Blood from Peripheral, Antecubital, Right Updated: 06/03/25 1301     Blood Culture Positive - Aerobic and Anaerobic Bottles     GRAM STAIN Gram positive cocci in chains resembling Strep     Comment: Aerobic & Anaerobic Bottles Positive        Blood culture x two cultures. Draw prior to antibiotics. [[id number]]  (Normal) Collected: 06/02/25 1923    Order Status: Completed Specimen: Blood from Peripheral, Forearm, Left Updated: 06/03/25 1001     Blood Culture No Growth After 6 Hours             BMP:   Recent Labs   Lab 06/02/25 1859 06/03/25  0657   * 258*    136   K 3.5 3.3*   CL 99 100   CO2 24  "21*   BUN 53* 61*   CREATININE 3.1* 3.0*   CALCIUM 8.4* 7.9*   MG 1.7  --      CBC:   Recent Labs   Lab 06/02/25  1859 06/03/25  0227 06/03/25  0533   WBC 5.64 7.04 7.92   HGB 15.3 9.6* 9.8*   HCT 47.6 30.5* 31.0*   PLT 54* 71* 72*     CMP:   Recent Labs   Lab 06/02/25 1859 06/03/25  0657    136   K 3.5 3.3*   CL 99 100   CO2 24 21*   * 258*   BUN 53* 61*   CREATININE 3.1* 3.0*   CALCIUM 8.4* 7.9*   PROT 6.8 6.1   ALBUMIN 2.2* 1.8*   BILITOT 0.8 0.7   ALKPHOS 82 69   AST 34 28   ALT 21 20   ANIONGAP 17* 15     Wound Culture: No results for input(s): "LABAERO" in the last 4320 hours.  All pertinent labs within the past 24 hours have been reviewed.    Significant Imaging: I have reviewed all pertinent imaging results/findings within the past 24 hours.  "

## 2025-06-04 NOTE — ASSESSMENT & PLAN NOTE
Blood culture done-0 6/0 2 strep pyogenes.  Sepsis likely the right lower extremities cellulitis.  TTE did not show vegetation.  We will use Rocephin and will add p.o. Zyvox.  She is now on vancomycin and cefepime

## 2025-06-04 NOTE — PLAN OF CARE
PT eval complete. Patient currently requires Max A x 2 for bed mobility and Mod A x 2 for sit <> stand and transfers. PT recommends moderate intensity therapy upon discharge.

## 2025-06-04 NOTE — NURSING
Notified by monitor tech pt off monitor. Pt found to have removed clothing, monitor and PIV (for the second time during this shift). New IV placed. Autumn NP notified. See chart for orders.

## 2025-06-04 NOTE — ASSESSMENT & PLAN NOTE
Patient has Diastolic (HFpEF) heart failure that is Chronic. On presentation their CHF was controlled and she was given 1 L fluid bolus due to sepsis. Most recent BNP and echo results are listed below.  Recent Labs     06/02/25  1859   BNP 1,212*     Latest ECHO  Results for orders placed during the hospital encounter of 05/09/25    Echo    Interpretation Summary    Left Ventricle: The left ventricle is normal in size. Mildly increased ventricular mass. Mildly increased wall thickness. There is mild concentric hypertrophy. There is normal systolic function with a visually estimated ejection fraction of 55 - 60%. Grade II diastolic dysfunction.    Right Ventricle: The right ventricle is normal in size Wall thickness is normal. Systolic function is normal.    Left Atrium: The left atrium is dilated    Aortic Valve: There is moderate aortic valve sclerosis. There is moderate stenosis. Aortic valve area by VTI is 1.3 cm². Aortic valve peak velocity is 3.1 m/s. Mean gradient is 22 mmHg. The dimensionless index is 0.45.    Mitral Valve: Mildly thickened subvalvular apparatus. There is severe mitral annular calcification.    IVC/SVC: Normal venous pressure at 3 mmHg.    Current Heart Failure Medications  hydrALAZINE injection 10 mg, Every 6 hours PRN, Intravenous    Plan  - Monitor strict I&Os and daily weights.    - Place on telemetry  - Low sodium diet  - Place on fluid restriction of 1.5 L.   - Cardiology has been consulted  - The patient's volume status is at their baseline  -imaging supports pulmonary congestion - Lasix IV x 1 dose given

## 2025-06-04 NOTE — SUBJECTIVE & OBJECTIVE
Interval History: pt in bed upon exam with no signs of acute distress upon exam. Heparin infusion continued. Antibiotics continued with bacteremia noted and ID consulted.     Review of Systems   Constitutional:  Positive for activity change.   HENT: Negative.     Respiratory:  Negative for shortness of breath and wheezing.    Cardiovascular:  Positive for leg swelling.   Gastrointestinal: Negative.    Genitourinary: Negative.    Skin:  Positive for color change and wound.   Neurological:  Positive for weakness.     Objective:     Vital Signs (Most Recent):  Temp: 98.2 °F (36.8 °C) (06/03/25 1607)  Pulse: 79 (06/03/25 1607)  Resp: 19 (06/03/25 1607)  BP: (!) 111/56 (06/03/25 1607)  SpO2: 95 % (06/03/25 1607) Vital Signs (24h Range):  Temp:  [98.2 °F (36.8 °C)-102.7 °F (39.3 °C)] 98.2 °F (36.8 °C)  Pulse:  [] 79  Resp:  [18-37] 19  SpO2:  [82 %-99 %] 95 %  BP: (111-180)/(56-81) 111/56     Weight: 100.4 kg (221 lb 5.5 oz)  Body mass index is 36.83 kg/m².    Intake/Output Summary (Last 24 hours) at 6/3/2025 1901  Last data filed at 6/3/2025 1634  Gross per 24 hour   Intake 501.35 ml   Output 300 ml   Net 201.35 ml         Physical Exam  HENT:      Mouth/Throat:      Mouth: Mucous membranes are dry.      Pharynx: Oropharynx is clear.   Cardiovascular:      Rate and Rhythm: Normal rate and regular rhythm.   Pulmonary:      Effort: Pulmonary effort is normal.      Comments: Decreased bibasilar   Abdominal:      General: Bowel sounds are normal. There is no distension.      Palpations: Abdomen is soft.   Musculoskeletal:      Right lower leg: Edema present.   Skin:     Findings: Bruising, erythema (RLE) and lesion present.      Comments: Multiple abrasions to extremities. Pressure ulcer to buttocks. Dermatitis to pannis   Neurological:      Mental Status: She is alert and oriented to person, place, and time.   Psychiatric:         Mood and Affect: Mood normal.               Significant Labs: All pertinent labs within  the past 24 hours have been reviewed.    Significant Imaging: I have reviewed all pertinent imaging results/findings within the past 24 hours.

## 2025-06-04 NOTE — CONSULTS
[facility])  Infectious Disease  Consult Note    Patient Name: Lakshmi Campbell  MRN: [id number]  Admission Date: 6/2/2025  Hospital Length of Stay: 2 days  Attending Physician: Abel Caraballo MD  Primary Care Provider: Keely Silva NP     Isolation Status: No active isolations    Patient information was obtained from patient, past medical records, and ER records.      Consults  Assessment/Plan:     Cardiac/Vascular  Essential hypertension  BP control per primary team    Renal/  Chronic kidney disease, stage 4 (severe)  We will monitor closely avoid nephrotoxic medications    ID  Cellulitis of right lower extremity  Now on vancomycin cefepime.  We will switch to ceftriaxone in a.m..  We will monitor clinical response  We will add empiric clindamycin for toxin inhibition  .  Unable to add Zyvox due to thrombocytopenia    Streptococcal bacteremia   Blood culture done-0 6/0 2 strep pyogenes.  Sepsis likely the right lower extremities cellulitis.  TTE did not show vegetation.  We will use Rocephin add Clindamycin.  She is now on vancomycin and cefepime    Hematology  Thrombocytopenia  We will monitor for bleeding  .  Transfuse as needed    Endocrine  Class 2 severe obesity due to excess calories with serious comorbidity and body mass index (BMI) of 36.0 to 36.9 in adult  Weight loss regimen on discharge    Type 2 diabetes mellitus with microalbuminuria, with long-term current use of insulin   Insulin regimen as per primary team        Thank you for your consult. I will follow-up with patient. Please contact us if you have any additional questions.    Albaro Jaime MD, [facility]  Infectious Disease  [facility] ([facility])    Subjective:     Principal Problem: Sepsis with encephalopathy    HPI: 74-year-old female with past medical history significant for type 2 insulin dependent diabetes mellitus, CKD stage 4, hypertension, hypothyroidism, liver cirrhosis secondary to YO, diastolic heart  failure, aortic stenosis, hyperkalemia, thrombocytopenia, cataracts, hyperlipidemia, and multiple previous fractures .  She was brought in by her  due to worsening confusion and disorientation.  At this time she remains drowsy.  Since admission labs and imaging tests reviewed-  CBC-WBC 5.64,  BUN 53, creatinine 3.1, lactic acid 3.0,  CT head without contrast was done and there was no acute intracranial findings. Chest x-ray shows pulmonary venous congestion, similar to prior with left retrocardiac atelectasis versus consolidation. X-ray of right tibia /fibula shows no acute bone or joint abnormality, does note edema within subcutaneous tissues, no soft tissue gas.   Since admission blood cultures are now positive for strep pyogenes          Past Medical History:   Diagnosis Date    Acquired TTP     Cataract     CKD stage 3 secondary to diabetes     Closed displaced comminuted fracture of right patella 10/28/2020    Closed fracture of surgical neck of left humerus 10/30/2020    Closed left radial fracture 10/30/2020    Hyperkalemia     Hyperlipidemia     Hypertension     Hypothyroidism, unspecified     Liver cirrhosis secondary to YO     Morbid obesity     Right knee pain     Thyroid disease     Type 2 diabetes mellitus        Past Surgical History:   Procedure Laterality Date    APPENDECTOMY      BREAST BIOPSY      CATARACT EXTRACTION      COLONOSCOPY N/A 12/21/2021    Procedure: COLONOSCOPY screening;  Surgeon: Moises Patterson MD;  Location: [facility];  Service: Endoscopy;  Laterality: N/A;    ESOPHAGOGASTRODUODENOSCOPY N/A 12/21/2021    Procedure: EGD (ESOPHAGOGASTRODUODENOSCOPY) EV screening;  Surgeon: Moises Patterson MD;  Location: [facility];  Service: Endoscopy;  Laterality: N/A;    EYE SURGERY      HERNIA REPAIR      OPEN REDUCTION AND INTERNAL FIXATION (ORIF) OF FRACTURE OF DISTAL RADIUS Left 11/2/2020    Procedure: ORIF, FRACTURE, RADIUS, DISTAL;  Surgeon: Sage Wolf MD;   Location: [facility] OR;  Service: Orthopedics;  Laterality: Left;    OPEN REDUCTION AND INTERNAL FIXATION (ORIF) OF FRACTURE OF PATELLA Right 11/2/2020    Procedure: ORIF, FRACTURE, PATELLA;  Surgeon: Sage Wolf MD;  Location: [facility] OR;  Service: Orthopedics;  Laterality: Right;    OPEN REDUCTION AND INTERNAL FIXATION (ORIF) OF FRACTURE OF PATELLA Right 12/18/2020    Procedure: ORIF, FRACTURE, PATELLA;  Surgeon: Sage Wolf MD;  Location: [facility] OR;  Service: Orthopedics;  Laterality: Right;    ORIF HUMERUS FRACTURE Left 11/5/2020    Procedure: ORIF, FRACTURE, HUMERUS;  Surgeon: Sage Wolf MD;  Location: [facility] OR;  Service: Orthopedics;  Laterality: Left;    PLACEMENT OF ACELLULAR HUMAN DERMAL ALLOGRAFT Right 11/2/2020    Procedure: APPLICATION, ACELLULAR HUMAN DERMAL ALLOGRAFT;  Surgeon: Sage Wolf MD;  Location: [facility] OR;  Service: Orthopedics;  Laterality: Right;  Right Patella    REMOVAL OF HARDWARE FROM HAND Left 3/11/2021    Procedure: REMOVAL, HARDWARE, HAND;  Surgeon: Sage Wolf MD;  Location: [facility] OR;  Service: Orthopedics;  Laterality: Left;  Removal of dorsal spanning wrist plate left distal radius    REMOVAL OF HARDWARE FROM LOWER EXTREMITY Right 12/18/2020    Procedure: REMOVAL, HARDWARE, LOWER EXTREMITY;  Surgeon: Sage Wolf MD;  Location: [facility];  Service: Orthopedics;  Laterality: Right;       Review of patient's allergies indicates:   Allergen Reactions    Codeine Nausea Only     Other reaction(s): Nausea  Other reaction(s): Elevated blood pressure       Medications:  Medications Prior to Admission   Medication Sig    albuterol (VENTOLIN HFA) 90 mcg/actuation inhaler Inhale 2 puffs into the lungs every 6 (six) hours as needed for Wheezing or Shortness of Breath (cough). Rescue    amLODIPine (NORVASC) 10 MG tablet TAKE 1 TABLET(10 MG) BY MOUTH EVERY DAY    blood sugar diagnostic Strp Use ac bid    budesonide-formoterol 160-4.5  "mcg (SYMBICORT) 160-4.5 mcg/actuation HFAA Inhale 2 puffs into the lungs every 12 (twelve) hours. Controller    chlorthalidone (HYGROTEN) 25 MG Tab Take 25 mg by mouth once daily.    cholecalciferol, vitamin D3, (VITAMIN D3) 50 mcg (2,000 unit) Cap capsule Take 1 capsule by mouth.    furosemide (LASIX) 20 MG tablet Take 1 tablet (20 mg total) by mouth once daily.    HUMULIN 70/30 U-100 KWIKPEN 100 unit/mL (70-30) InPn pen SMARTSI Unit(s) SUB-Q Every Evening    hydrALAZINE (APRESOLINE) 25 MG tablet Take 1 tablet (25 mg total) by mouth every 12 (twelve) hours.    hydroCHLOROthiazide (HYDRODIURIL) 25 MG tablet Take 25 mg by mouth.    insulin syringe-needle U-100 1 mL 29 gauge x 1/2" Syrg Use bid    JARDIANCE 10 mg tablet Take 10 mg by mouth every morning.    lancets 33 gauge Misc Use as BID    levothyroxine (SYNTHROID) 137 MCG Tab tablet Take 1 tablet by mouth once daily.    perindopril (ACEON) 2 MG Tab Take 2 mg by mouth.    pravastatin (PRAVACHOL) 20 MG tablet Take 20 mg by mouth once daily.     sodium bicarbonate 650 MG tablet Take 650 mg by mouth 2 (two) times daily.    spironolactone (ALDACTONE) 25 MG tablet Take 25 mg by mouth.     Antibiotics (From admission, onward)      Start     Stop Route Frequency Ordered    25 0830  cefTRIAXone injection 2 g         -- IV Every 24 hours (non-standard times) 25 0722    25 1950  vancomycin - pharmacy to dose  (vancomycin IVPB (PEDS and ADULTS))        Placed in "And" Linked Group    -- IV pharmacy to manage frequency 25 1850          Antifungals (From admission, onward)      None          Antivirals (From admission, onward)      None             Immunization History   Administered Date(s) Administered    COVID-19, MRNA, LN-S, PF (Pfizer) (Purple Cap) 01/15/2021, 2021    Influenza 2011    Influenza (FLUAD) - Quadrivalent - Adjuvanted - PF *Preferred* (65+) 10/04/2021, 10/12/2022    Influenza - Trivalent - Fluarix, Flulaval, Fluzone, " Afluria - PF 2011    Influenza - Trivalent - Fluzone High Dose - PF (65 years and older) 2019    Pneumococcal Conjugate - 13 Valent 2020    Pneumococcal Polysaccharide - 23 Valent 2021, 2024    Td (ADULT) 2008    Tdap 10/24/2020    Tuberculin 2020, 2020    Zoster 2011       Family History       Problem Relation (Age of Onset)    Diabetes Mother, Father    Leukemia Father          Social History     Socioeconomic History    Marital status:    Tobacco Use    Smoking status: Former     Current packs/day: 0.00     Average packs/day: 1 pack/day for 8.0 years (8.0 ttl pk-yrs)     Types: Cigarettes     Start date:      Quit date:      Years since quittin.4    Smokeless tobacco: Never   Substance and Sexual Activity    Alcohol use: Yes     Comment: rarely    Drug use: No    Sexual activity: Not Currently     Social Drivers of Health     Financial Resource Strain: Low Risk  (6/3/2025)    Overall Financial Resource Strain (CARDIA)     Difficulty of Paying Living Expenses: Not hard at all   Food Insecurity: No Food Insecurity (6/3/2025)    Hunger Vital Sign     Worried About Running Out of Food in the Last Year: Never true     Ran Out of Food in the Last Year: Never true   Transportation Needs: No Transportation Needs (6/3/2025)    PRAPARE - Transportation     Lack of Transportation (Medical): No     Lack of Transportation (Non-Medical): No   Physical Activity: Inactive (6/3/2025)    Exercise Vital Sign     Days of Exercise per Week: 0 days     Minutes of Exercise per Session: 0 min   Stress: No Stress Concern Present (6/3/2025)    [employer] [place] [employer] - Occupational Stress Questionnaire     Feeling of Stress : Not at all   Housing Stability: Low Risk  (6/3/2025)    Housing Stability Vital Sign     Unable to Pay for Housing in the Last Year: No     Homeless in the Last Year: No     Review of Systems   Unable to perform ROS: Mental  status change   Constitutional:  Negative for activity change and appetite change.     Objective:     Vital Signs (Most Recent):  Temp: 98.3 °F (36.8 °C) (06/04/25 0337)  Pulse: 86 (06/04/25 0605)  Resp: 18 (06/04/25 0605)  BP: 130/60 (06/04/25 0337)  SpO2: (!) 94 % (06/04/25 0605) Vital Signs (24h Range):  Temp:  [98.2 °F (36.8 °C)-99 °F (37.2 °C)] 98.3 °F (36.8 °C)  Pulse:  [] 86  Resp:  [16-19] 18  SpO2:  [87 %-96 %] 94 %  BP: (111-130)/(56-63) 130/60     Weight: 94.7 kg (208 lb 12.4 oz)  Body mass index is 34.74 kg/m².    Estimated Creatinine Clearance: 18.7 mL/min (A) (based on SCr of 3 mg/dL (H)).     Physical Exam  Vitals and nursing note reviewed.   Constitutional:       Appearance: She is ill-appearing.   HENT:      Head: Normocephalic.   Cardiovascular:      Rate and Rhythm: Normal rate.   Pulmonary:      Effort: Pulmonary effort is normal.   Musculoskeletal:         General: No swelling.   Skin:     General: Skin is warm.      Findings: Bruising and erythema present.   Neurological:      Mental Status: She is disoriented.                Significant Labs:  Microbiology Results (last 7 days)       Procedure Component Value Units Date/Time    Rapid Organism ID by PCR (from Blood culture) [[id number]]  (Abnormal) Collected: 06/02/25 1923    Order Status: Completed Specimen: Blood from Peripheral, Forearm, Left Updated: 06/03/25 1435     Enterococcus faecalis Not Detected     Enterococcus faecium Not Detected     Listeria monocytogenes Not Detected     Staphylococcus spp. Not Detected     Staphylococcus aureus Not Detected     Staphylococcus epidermidis Not Detected     Staphylococcus lugdunensis Not Detected     Streptococcus spp. See Species for ID     Streptococcus agalactiae (Group B) Not Detected     Streptococcus pneumoniae Not Detected     Streptococcus pyogenes (Group A) Detected     Acinetobacter calcoaceticus/baumannii complex Not Detected     Bacteroides fragilis Not Detected      Enterobacterales Not Detected     Enterobacter cloacae complex Not Detected     Escherichia coli Not Detected     Klebsiella aerogenes Not Detected     Klebsiella oxytoca Not Detected     Klebsiella pneumoniae group Not Detected     Proteus spp. Not Detected     Salmonella spp. Not Detected     Serratia marcescens Not Detected     Haemophilus influenzae Not Detected     Neisseria meningitidis Not Detected     Pseudomonas aeruginosa Not Detected     Stenotrophomonas maltophilia Not Detected     Candida albicans Not Detected     Candida auris Not Detected     Candida glabrata Not Detected     Candida krusei Not Detected     Candida parapsilosis Not Detected     Candida tropicalis Not Detected     Cryptococcus neoformans/gattii Not Detected     CTX-M (ESBL ) N/A     Comment: Note: Antimicrobial resistance can occur via multiple mechanisms. A Not Detected result for antimicrobial resistance gene(s) does not indicate antimicrobial susceptibility. Subculturing is required for species identification and susceptibility testing of   isolates.        IMP (Cabapenemase ) N/A     Comment: Note: Antimicrobial resistance can occur via multiple mechanisms. A Not Detected result for antimicrobial resistance gene(s) does not indicate antimicrobial susceptibility. Subculturing is required for species identification and susceptibility testing of   isolates.        KPC resistance gene (Carbapenemase ) N/A     Comment: Note: Antimicrobial resistance can occur via multiple mechanisms. A Not Detected result for antimicrobial resistance gene(s) does not indicate antimicrobial susceptibility. Subculturing is required for species identification and susceptibility testing of   isolates.        mcr-1 N/A     Comment: Note: Antimicrobial resistance can occur via multiple mechanisms. A Not Detected result for antimicrobial resistance gene(s) does not indicate antimicrobial susceptibility. Subculturing is required for  species identification and susceptibility testing of   isolates.        mecA ID N/A     Comment: Note: Antimicrobial resistance can occur via multiple mechanisms. A Not Detected result for antimicrobial resistance gene(s) does not indicate antimicrobial susceptibility. Subculturing is required for species identification and susceptibility testing of   isolates.        mecA/C and MREJ (MRSA) gene N/A     Comment: Note: Antimicrobial resistance can occur via multiple mechanisms. A Not Detected result for antimicrobial resistance gene(s) does not indicate antimicrobial susceptibility. Subculturing is required for species identification and susceptibility testing of   isolates.        NDM (Carbapenemase ) N/A     Comment: Note: Antimicrobial resistance can occur via multiple mechanisms. A Not Detected result for antimicrobial resistance gene(s) does not indicate antimicrobial susceptibility. Subculturing is required for species identification and susceptibility testing of   isolates.        OXA-48-like (Carbapenemase ) N/A     Comment: Note: Antimicrobial resistance can occur via multiple mechanisms. A Not Detected result for antimicrobial resistance gene(s) does not indicate antimicrobial susceptibility. Subculturing is required for species identification and susceptibility testing of   isolates.        Nargis/B (VRE gene) N/A     Comment: Note: Antimicrobial resistance can occur via multiple mechanisms. A Not Detected result for antimicrobial resistance gene(s) does not indicate antimicrobial susceptibility. Subculturing is required for species identification and susceptibility testing of   isolates.        VIM (Carbapenemase ) N/A     Comment: Note: Antimicrobial resistance can occur via multiple mechanisms. A Not Detected result for antimicrobial resistance gene(s) does not indicate antimicrobial susceptibility. Subculturing is required for species identification and susceptibility testing of    "isolates.       Blood culture x two cultures. Draw prior to antibiotics. [[id number]]  (Abnormal) Collected: 06/02/25 1859    Order Status: Completed Specimen: Blood from Peripheral, Antecubital, Right Updated: 06/03/25 1301     Blood Culture Positive - Aerobic and Anaerobic Bottles     GRAM STAIN Gram positive cocci in chains resembling Strep     Comment: Aerobic & Anaerobic Bottles Positive        Blood culture x two cultures. Draw prior to antibiotics. [[id number]]  (Normal) Collected: 06/02/25 1923    Order Status: Completed Specimen: Blood from Peripheral, Forearm, Left Updated: 06/03/25 1001     Blood Culture No Growth After 6 Hours             BMP:   Recent Labs   Lab 06/02/25 1859 06/03/25  0657   * 258*    136   K 3.5 3.3*   CL 99 100   CO2 24 21*   BUN 53* 61*   CREATININE 3.1* 3.0*   CALCIUM 8.4* 7.9*   MG 1.7  --      CBC:   Recent Labs   Lab 06/02/25 1859 06/03/25  0227 06/03/25  0533   WBC 5.64 7.04 7.92   HGB 15.3 9.6* 9.8*   HCT 47.6 30.5* 31.0*   PLT 54* 71* 72*     CMP:   Recent Labs   Lab 06/02/25 1859 06/03/25  0657    136   K 3.5 3.3*   CL 99 100   CO2 24 21*   * 258*   BUN 53* 61*   CREATININE 3.1* 3.0*   CALCIUM 8.4* 7.9*   PROT 6.8 6.1   ALBUMIN 2.2* 1.8*   BILITOT 0.8 0.7   ALKPHOS 82 69   AST 34 28   ALT 21 20   ANIONGAP 17* 15     Wound Culture: No results for input(s): "LABAERO" in the last 4320 hours.  All pertinent labs within the past 24 hours have been reviewed.    Significant Imaging: I have reviewed all pertinent imaging results/findings within the past 24 hours.              "

## 2025-06-04 NOTE — ASSESSMENT & PLAN NOTE
Body mass index is 34.74 kg/m². Morbid obesity complicates all aspects of disease management from diagnostic modalities to treatment. Weight loss encouraged and health benefits explained to patient.

## 2025-06-04 NOTE — ASSESSMENT & PLAN NOTE
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 The patients 3 most recent labs are listed below.  Recent Labs     06/02/25  1859 06/03/25  0227 06/03/25  0533   PLT 54* 71* 72*     Plan  - Will need to stop heparin if any overt bleeding and monitor platelet count closely

## 2025-06-04 NOTE — ASSESSMENT & PLAN NOTE
Now on vancomycin cefepime.  We will switch to ceftriaxone in a.m..  We will monitor clinical response  We will add empiric clindamycin for toxin inhibition  .  Unable to add Zyvox due to thrombocytopenia

## 2025-06-04 NOTE — HOSPITAL COURSE
6-4-25  monitor HR 90s , EKG back to baseline NSR. BBB is rate related. Echo nml no SWMA, troponin down to 3, probably demand ischemia. Can stop heparin, start low dose b blockers    6-5-25  Pt now in afib/flutter at controlled rate, can restart heparin, some pauses noted but K is low, needs repletion, echo nml EF, continue tx for sepsis    6-7-25  Monitor shows afib in low 100s. Pt is hemodynamically stable. Continue treatment for sepsis.      6-8-25  Monitor shows afib in 80s-100s. Pt had HD yesterday. No pauses noted. Continue anticoagulation.     6/9/2025-Patient seen and examined today during HD with family present. Unresponsive, ill-appearing. HR variable in 80-mid 100's. Recurrent pause noted earlier this AM, unable to tolerate any AV prachi/rate-control agents.

## 2025-06-04 NOTE — ASSESSMENT & PLAN NOTE
This patient does have evidence of infective focus  My overall impression is sepsis with Acute kidney injury, Acute heart failure, Encephalopathy, and Thrombocytopenia .  Source: Skin and Soft Tissue Infection: Cellulitis  Antibiotics given-   Antibiotics (72h ago, onward)      Start     Stop Route Frequency Ordered    06/04/25 0915  cefTRIAXone injection 2 g         -- IV Every 24 hours (non-standard times) 06/04/25 0722    06/04/25 0900  clindamycin in D5W 600 mg/50 mL IVPB 600 mg         06/07/25 0859 IV Every 8 hours (non-standard times) 06/04/25 0755          Latest lactate reviewed-  Recent Labs   Lab 06/02/25  2242   LACTATE 2.2     Organ dysfunction indicated by Acute kidney injury, Encephalopathy, and Thrombocytopenia     Fluid challenge Contraindicated- Fluid bolus is contraindicated in this patient due to Congestive Heart Failure     Post- resuscitation assessment Yes - I attest a sepsis perfusion exam was performed within 6 hours of sepsis, severe sepsis, or septic shock presentation, following fluid resuscitation.      Will Not start Pressors  Source control achieved by: Broad spectrum antibiotics  6/3/2025- encephalopathy improved - blood cultures with PCR + -ID consulted- will obtain repeat cultures   -6/4/2025- antibiotics transitioned to Rocephin and Clindamycin- ID following- repeat blood cultures in am

## 2025-06-04 NOTE — PROGRESS NOTES
Pharmacokinetic Assessment Follow Up: IV Vancomycin    Vancomycin serum concentration assessment(s):    The random level was drawn correctly and can be used to guide therapy at this time. The measurement is above the desired definitive target range of 10 to 20 mcg/mL.    Vancomycin Regimen Plan:    Re-dose when the random level is less than 20 mcg/mL, next level to be drawn at 0600 on 6/4/25.    Drug levels (last 3 results):  Recent Labs   Lab Result Units 06/03/25  2238   Vancomycin Random ug/ml 21.8       Pharmacy will continue to follow and monitor vancomycin.    Please contact pharmacy at extension [phone] for questions regarding this assessment.    Thank you for the consult,   Jose King       Patient brief summary:  Lakshmi Campbell is a 74 y.o. female initiated on antimicrobial therapy with IV Vancomycin for treatment of skin & soft tissue infection    The patient's current regimen is pulse dosing.    Drug Allergies:   Review of patient's allergies indicates:   Allergen Reactions    Codeine Nausea Only     Other reaction(s): Nausea  Other reaction(s): Elevated blood pressure       Actual Body Weight:   100.4 kg    Renal Function:   Estimated Creatinine Clearance: 19.3 mL/min (A) (based on SCr of 3 mg/dL (H)).,     Dialysis Method (if applicable):  N/A    CBC (last 72 hours):  Recent Labs   Lab Result Units 06/02/25 1859 06/03/25  0227 06/03/25  0533   WBC K/uL 5.64 7.04 7.92   HGB gm/dL 15.3 9.6* 9.8*   HCT % 47.6 30.5* 31.0*   Platelet Count K/uL 54* 71* 72*   Lymph % % 2.1* 3.7*  --    Lymphocyte % %  --   --  4.0*   Mono % % 2.7* 5.8  --    Monocyte % %  --   --  4.0   Eos % % 0.0 0.0  --    Basophil % % 0.7 0.4  --        Metabolic Panel (last 72 hours):  Recent Labs   Lab Result Units 06/02/25  1859 06/02/25  1942 06/03/25  0657   Sodium mmol/L 140  --  136   Potassium mmol/L 3.5  --  3.3*   Chloride mmol/L 99  --  100   CO2 mmol/L 24  --  21*   Glucose mg/dL 271*  --  258*   Glucose, UA   --  3+*  --     BUN mg/dL 53*  --  61*   Creatinine mg/dL 3.1*  --  3.0*   Albumin g/dL 2.2*  --  1.8*   Bilirubin Total mg/dL 0.8  --  0.7   ALP unit/L 82  --  69   AST unit/L 34  --  28   ALT unit/L 21  --  20   Magnesium  mg/dL 1.7  --   --        Vancomycin Administrations:  vancomycin given in the last 96 hours                     vancomycin 2,000 mg in 0.9% NaCl 500 mL IVPB (admixture device) (mg) 2,000 mg New Bag 06/02/25 2223                    Microbiologic Results:  Microbiology Results (last 7 days)       Procedure Component Value Units Date/Time    Rapid Organism ID by PCR (from Blood culture) [[id number]]  (Abnormal) Collected: 06/02/25 1923    Order Status: Completed Specimen: Blood from Peripheral, Forearm, Left Updated: 06/03/25 1435     Enterococcus faecalis Not Detected     Enterococcus faecium Not Detected     Listeria monocytogenes Not Detected     Staphylococcus spp. Not Detected     Staphylococcus aureus Not Detected     Staphylococcus epidermidis Not Detected     Staphylococcus lugdunensis Not Detected     Streptococcus spp. See Species for ID     Streptococcus agalactiae (Group B) Not Detected     Streptococcus pneumoniae Not Detected     Streptococcus pyogenes (Group A) Detected     Acinetobacter calcoaceticus/baumannii complex Not Detected     Bacteroides fragilis Not Detected     Enterobacterales Not Detected     Enterobacter cloacae complex Not Detected     Escherichia coli Not Detected     Klebsiella aerogenes Not Detected     Klebsiella oxytoca Not Detected     Klebsiella pneumoniae group Not Detected     Proteus spp. Not Detected     Salmonella spp. Not Detected     Serratia marcescens Not Detected     Haemophilus influenzae Not Detected     Neisseria meningitidis Not Detected     Pseudomonas aeruginosa Not Detected     Stenotrophomonas maltophilia Not Detected     Candida albicans Not Detected     Candida auris Not Detected     Candida glabrata Not Detected     Candida krusei Not Detected      Thao parapsilosis Not Detected     Candida tropicalis Not Detected     Cryptococcus neoformans/gattii Not Detected     CTX-M (ESBL ) N/A     Comment: Note: Antimicrobial resistance can occur via multiple mechanisms. A Not Detected result for antimicrobial resistance gene(s) does not indicate antimicrobial susceptibility. Subculturing is required for species identification and susceptibility testing of   isolates.        IMP (Cabapenemase ) N/A     Comment: Note: Antimicrobial resistance can occur via multiple mechanisms. A Not Detected result for antimicrobial resistance gene(s) does not indicate antimicrobial susceptibility. Subculturing is required for species identification and susceptibility testing of   isolates.        KPC resistance gene (Carbapenemase ) N/A     Comment: Note: Antimicrobial resistance can occur via multiple mechanisms. A Not Detected result for antimicrobial resistance gene(s) does not indicate antimicrobial susceptibility. Subculturing is required for species identification and susceptibility testing of   isolates.        mcr-1 N/A     Comment: Note: Antimicrobial resistance can occur via multiple mechanisms. A Not Detected result for antimicrobial resistance gene(s) does not indicate antimicrobial susceptibility. Subculturing is required for species identification and susceptibility testing of   isolates.        mecA ID N/A     Comment: Note: Antimicrobial resistance can occur via multiple mechanisms. A Not Detected result for antimicrobial resistance gene(s) does not indicate antimicrobial susceptibility. Subculturing is required for species identification and susceptibility testing of   isolates.        mecA/C and MREJ (MRSA) gene N/A     Comment: Note: Antimicrobial resistance can occur via multiple mechanisms. A Not Detected result for antimicrobial resistance gene(s) does not indicate antimicrobial susceptibility. Subculturing is required for species  identification and susceptibility testing of   isolates.        NDM (Carbapenemase ) N/A     Comment: Note: Antimicrobial resistance can occur via multiple mechanisms. A Not Detected result for antimicrobial resistance gene(s) does not indicate antimicrobial susceptibility. Subculturing is required for species identification and susceptibility testing of   isolates.        OXA-48-like (Carbapenemase ) N/A     Comment: Note: Antimicrobial resistance can occur via multiple mechanisms. A Not Detected result for antimicrobial resistance gene(s) does not indicate antimicrobial susceptibility. Subculturing is required for species identification and susceptibility testing of   isolates.        Nargis/B (VRE gene) N/A     Comment: Note: Antimicrobial resistance can occur via multiple mechanisms. A Not Detected result for antimicrobial resistance gene(s) does not indicate antimicrobial susceptibility. Subculturing is required for species identification and susceptibility testing of   isolates.        VIM (Carbapenemase ) N/A     Comment: Note: Antimicrobial resistance can occur via multiple mechanisms. A Not Detected result for antimicrobial resistance gene(s) does not indicate antimicrobial susceptibility. Subculturing is required for species identification and susceptibility testing of   isolates.       Blood culture x two cultures. Draw prior to antibiotics. [[id number]]  (Abnormal) Collected: 06/02/25 1859    Order Status: Completed Specimen: Blood from Peripheral, Antecubital, Right Updated: 06/03/25 1301     Blood Culture Positive - Aerobic and Anaerobic Bottles     GRAM STAIN Gram positive cocci in chains resembling Strep     Comment: Aerobic & Anaerobic Bottles Positive        Blood culture x two cultures. Draw prior to antibiotics. [[id number]]  (Normal) Collected: 06/02/25 1923    Order Status: Completed Specimen: Blood from Peripheral, Forearm, Left Updated: 06/03/25 1001     Blood Culture No  Growth After 6 Hours

## 2025-06-04 NOTE — SUBJECTIVE & OBJECTIVE
Interval History: pt in bed upon exam and noted to be lethargic with tachypnea in am which resolved upon repeat evaluation. Pt oriented upon exam. BUN/Cr trending up. Imaging supports pulmonary congestion and Lasix IV given x 1 dose. Antibiotics adjusted per ID. Repeat blood cultures in am.     Review of Systems   Constitutional:  Positive for activity change.   HENT: Negative.     Respiratory:  Positive for shortness of breath. Negative for wheezing.    Cardiovascular:  Positive for leg swelling.   Gastrointestinal: Negative.    Genitourinary: Negative.    Skin:  Positive for color change and wound.   Neurological:  Positive for weakness.   Psychiatric/Behavioral:  Positive for confusion.      Objective:     Vital Signs (Most Recent):  Temp: 98.3 °F (36.8 °C) (06/04/25 1545)  Pulse: 75 (06/04/25 1545)  Resp: 18 (06/04/25 1545)  BP: (!) 127/59 (06/04/25 1545)  SpO2: (!) 94 % (06/04/25 1545) Vital Signs (24h Range):  Temp:  [98 °F (36.7 °C)-98.6 °F (37 °C)] 98.3 °F (36.8 °C)  Pulse:  [] 75  Resp:  [16-20] 18  SpO2:  [91 %-97 %] 94 %  BP: (121-140)/(56-70) 127/59     Weight: 94.7 kg (208 lb 12.4 oz)  Body mass index is 34.74 kg/m².    Intake/Output Summary (Last 24 hours) at 6/4/2025 1754  Last data filed at 6/4/2025 1230  Gross per 24 hour   Intake 260 ml   Output --   Net 260 ml         Physical Exam  HENT:      Mouth/Throat:      Mouth: Mucous membranes are dry.      Pharynx: Oropharynx is clear.   Cardiovascular:      Rate and Rhythm: Normal rate and regular rhythm.   Pulmonary:      Effort: Pulmonary effort is normal.      Comments: Decreased bibasilar   Abdominal:      General: Bowel sounds are normal. There is no distension.      Palpations: Abdomen is soft.   Musculoskeletal:      Right lower leg: Edema present.   Skin:     Findings: Bruising, erythema (RLE) and lesion present.      Comments: Multiple abrasions to extremities. Pressure ulcer to buttocks. Dermatitis to pannis   Neurological:      Mental  Status: She is alert and oriented to person, place, and time.   Psychiatric:         Mood and Affect: Mood normal.               Significant Labs: All pertinent labs within the past 24 hours have been reviewed.    Significant Imaging: I have reviewed all pertinent imaging results/findings within the past 24 hours.

## 2025-06-04 NOTE — PT/OT/SLP EVAL
Occupational Therapy   Evaluation    Name: Lakshmi Campbell  MRN: [id number]  Admitting Diagnosis: Sepsis with encephalopathy  Recent Surgery: * No surgery found *      Recommendations:     Discharge Recommendations: Moderate Intensity Therapy  Discharge Equipment Recommendations:  to be determined by next level of care  Barriers to discharge:  None    Assessment:     Lakshmi Campbell is a 74 y.o. female with a medical diagnosis of Sepsis with encephalopathy.  She presents with performance deficits affecting function: weakness, impaired endurance, impaired self care skills, impaired functional mobility, gait instability, impaired balance, impaired cognition, decreased coordination, decreased upper extremity function, decreased lower extremity function, decreased safety awareness, edema, impaired skin.      Rehab Prognosis: Fair and Questionable; patient would benefit from acute skilled OT services to address these deficits and reach maximum level of function.       Plan:     Patient to be seen 2 x/week to address the above listed problems via self-care/home management, therapeutic activities, therapeutic exercises  Plan of Care Expires: 06/18/25  Plan of Care Reviewed with: patient, spouse    Subjective     Chief Complaint: None reported by pt.  frustrated by mittens placed upon patient. Pt's  worried about her ability to eat /c them on. Sitter informed  she attempted to feed pt this AM but pt did not want to eat.   Patient/Family Comments/goals:  wants her to get back to her baseline independence that she was exhibiting prior to admit.  would also like mittens removed.     Occupational Profile: Information received from  due to pt's decreased cognition during session.   Living Environment: Pt lives /c  in a H. Pt has a tub/shower combo.   Previous level of function: Pt was (I) /c all ADLs and community mobility prior to admit.   Roles and Routines: Pt is retired and does  "drive but  states, "Why drive when she has me."   Equipment Used at Home: walker, rolling, cane, straight, shower chair (Pt's  states she does not use these AE)  Assistance upon Discharge: Pt lives /c  and he can provide assistance.     Pain/Comfort:  Pain Rating 1:  (Pt asked if she had pain but could not answer)    Objective:   EPIC chart review prior to session.  Patient found HOB elevated with oxygen, telemetry, peripheral IV, bed alarm, restraints (Sitter in room) upon OT entry to room.    General Precautions: Standard, fall  Orthopedic Precautions: N/A  Braces: N/A  Respiratory Status: Nasal cannula, flow 2.5 L/min    Occupational Performance:  Bed Mobility:    Patient completed Rolling/Turning to Right with maximal assistance and 2 persons  Patient completed Scooting/Bridging with minimum assistance and v/c for foot placement  Patient completed Supine to Sit with maximal assistance /c v/c and tactile cues for foot placement and trunk positioning    Functional Mobility/Transfers:  Patient completed Sit <> Stand Transfer with moderate assistance and of 2 persons  with  no assistive device   Patient completed Bed <> Chair Transfer using Step Transfer technique with moderate assistance with no assistive device  Functional Mobility: Pt's overall functional mobility impaired 2' decreased cognition and difficulty using hands for stability 2' mittens. Required several v/c to reorient pt so she can engage in bed mobility and functional transfers.   No AE used due to pt's inability to  2' mittens placed.     Activities of Daily Living:  Feeding:  total assistance Not able to formally assess; Limited assessment of ADLs due to mittens. Will continue to assess over stay.   Lower Body Dressing: maximal assistance  Pt was able to lift feet for therapist to jakub socks with HOB elevated.     Cognitive/Visual Perceptual:  Cognitive/Psychosocial Skills:     -       Oriented to: Person and Place   -      "  Follows Commands/attention:Easily distracted  -       Communication: Pt unable to answer most questions asked. Only verbalizes one word answers when speaks.   -       Safety awareness/insight to disability: Pt had mittens during session due to pulling out NC and IVs    Physical Exam:   Gait belt -yes  Balance:    -       Static sitting: Mod A for v/c cues for correct foot placement and trunk stability   Static standing: Mod A x2 /c v/c for balance and safety   Dynamic standing: Unable to assess  Skin integrity: Erythema present due to cellulitis  Edema:  Mild swelling due to cellulitis  Upper Extremity Range of Motion:     -       Right Upper Extremity: WFL  -       Left Upper Extremity: WFL  Upper Extremity Strength:    -       Right Upper Extremity: shoulder 3/5; elbow 4-/5  -       Left Upper Extremity: shoulder 3/5; elbow 4-/5   Strength: Unable to assess due to mittens    AMPAC 6 Click ADL:  AMPAC Total Score: 12    Treatment & Education:  Pt educated on OT roles and responsibilities. Pt and  educated on OOB activity for increased mobility and strengthening. Pt required max verbal cues during session due to waxing and waning cognition. Pt and  educated on using call light for RN. Although sitter was in room upon OT exit, pt educated on call don't fall for safety. OT educated  about needs for mittens for pt safety. Pt and  agreeable to POC.     Patient left up in chair with all lines intact, call button in reach, and chair alarm on and sitter present.    GOALS:   Multidisciplinary Problems       Occupational Therapy Goals          Problem: Occupational Therapy    Goal Priority Disciplines Outcome Interventions   Occupational Therapy Goal     OT, PT/OT     Description: Goals to be met by: 06/18/25     Patient will increase functional independence with ADLs by performing:    Toileting from toilet with Minimal Assistance for hygiene and clothing management.   Stand pivot transfers  with Contact Guard Assistance.  Increased functional strength by 1/2 MMT grade.                          DME Justifications:  TBD at next level of care.     History:     Past Medical History:   Diagnosis Date    Acquired TTP     Cataract     CKD stage 3 secondary to diabetes     Closed displaced comminuted fracture of right patella 10/28/2020    Closed fracture of surgical neck of left humerus 10/30/2020    Closed left radial fracture 10/30/2020    Hyperkalemia     Hyperlipidemia     Hypertension     Hypothyroidism, unspecified     Liver cirrhosis secondary to YO     Morbid obesity     Right knee pain     Thyroid disease     Type 2 diabetes mellitus          Past Surgical History:   Procedure Laterality Date    APPENDECTOMY      BREAST BIOPSY      CATARACT EXTRACTION      COLONOSCOPY N/A 12/21/2021    Procedure: COLONOSCOPY screening;  Surgeon: Moises Patterson MD;  Location: [facility];  Service: Endoscopy;  Laterality: N/A;    ESOPHAGOGASTRODUODENOSCOPY N/A 12/21/2021    Procedure: EGD (ESOPHAGOGASTRODUODENOSCOPY) EV screening;  Surgeon: Moises Patterson MD;  Location: [facility];  Service: Endoscopy;  Laterality: N/A;    EYE SURGERY      HERNIA REPAIR      OPEN REDUCTION AND INTERNAL FIXATION (ORIF) OF FRACTURE OF DISTAL RADIUS Left 11/2/2020    Procedure: ORIF, FRACTURE, RADIUS, DISTAL;  Surgeon: Sage Wolf MD;  Location: [facility] OR;  Service: Orthopedics;  Laterality: Left;    OPEN REDUCTION AND INTERNAL FIXATION (ORIF) OF FRACTURE OF PATELLA Right 11/2/2020    Procedure: ORIF, FRACTURE, PATELLA;  Surgeon: Sage Wolf MD;  Location: [facility] OR;  Service: Orthopedics;  Laterality: Right;    OPEN REDUCTION AND INTERNAL FIXATION (ORIF) OF FRACTURE OF PATELLA Right 12/18/2020    Procedure: ORIF, FRACTURE, PATELLA;  Surgeon: Sage Wolf MD;  Location: [facility] OR;  Service: Orthopedics;  Laterality: Right;    ORIF HUMERUS FRACTURE Left 11/5/2020    Procedure: ORIF, FRACTURE,  HUMERUS;  Surgeon: Sage oWlf MD;  Location: [facility] OR;  Service: Orthopedics;  Laterality: Left;    PLACEMENT OF ACELLULAR HUMAN DERMAL ALLOGRAFT Right 11/2/2020    Procedure: APPLICATION, ACELLULAR HUMAN DERMAL ALLOGRAFT;  Surgeon: Sage Wolf MD;  Location: [facility] OR;  Service: Orthopedics;  Laterality: Right;  Right Patella    REMOVAL OF HARDWARE FROM HAND Left 3/11/2021    Procedure: REMOVAL, HARDWARE, HAND;  Surgeon: Sage Wolf MD;  Location: [facility] OR;  Service: Orthopedics;  Laterality: Left;  Removal of dorsal spanning wrist plate left distal radius    REMOVAL OF HARDWARE FROM LOWER EXTREMITY Right 12/18/2020    Procedure: REMOVAL, HARDWARE, LOWER EXTREMITY;  Surgeon: Sage Wolf MD;  Location: [facility];  Service: Orthopedics;  Laterality: Right;       Time Tracking:     OT Date of Treatment: 06/04/25  OT Start Time: 1000  OT Stop Time: 1040  OT Total Time (min): 40 min    Billable Minutes:Evaluation 10  Therapeutic Activity 30    ESTEFANIA Osorio  6/4/2025

## 2025-06-04 NOTE — ASSESSMENT & PLAN NOTE
"This patient does have evidence of infective focus  My overall impression is sepsis with Acute kidney injury, Acute heart failure, Encephalopathy, and Thrombocytopenia .  Source: Skin and Soft Tissue Infection: Cellulitis  Antibiotics given-   Antibiotics (72h ago, onward)      Start     Stop Route Frequency Ordered    06/03/25 0800  ceFEPIme injection 1 g         -- IV Every 12 hours (non-standard times) 06/02/25 2154    06/1950  vancomycin - pharmacy to dose  (vancomycin IVPB (PEDS and ADULTS))        Placed in "And" Linked Group    -- IV pharmacy to manage frequency 06/02/25 1850          Latest lactate reviewed-  Recent Labs   Lab 06/02/25  2242   LACTATE 2.2     Organ dysfunction indicated by Acute kidney injury, Encephalopathy, and Thrombocytopenia     Fluid challenge Contraindicated- Fluid bolus is contraindicated in this patient due to Congestive Heart Failure     Post- resuscitation assessment Yes - I attest a sepsis perfusion exam was performed within 6 hours of sepsis, severe sepsis, or septic shock presentation, following fluid resuscitation.      Will Not start Pressors  Source control achieved by: Broad spectrum antibiotics  6/3/2025- encephalopathy improved - blood cultures with PCR + -ID consulted- will obtain repeat cultures   "

## 2025-06-04 NOTE — ASSESSMENT & PLAN NOTE
Heparin drip- discontinued on 6/4/2025  Consult cardiology  -likely due to demand in the setting of sepsis and CHF   Denies chest pain, but troponin 4.293> 3.542 with EKG changes noted  Continue to trend troponin  -Echo obtained with results reviewed   -Metoprolol initiated

## 2025-06-04 NOTE — PLAN OF CARE
A223/A223 ROLANDO  Lakshmi Campbell is a 74 y.o.female admitted on 6/2/2025 for Sepsis with encephalopathy   Code Status: Full Code MRN: [id number]   Review of patient's allergies indicates:   Allergen Reactions    Codeine Nausea Only     Other reaction(s): Nausea  Other reaction(s): Elevated blood pressure     Past Medical History:   Diagnosis Date    Acquired TTP     Cataract     CKD stage 3 secondary to diabetes     Closed displaced comminuted fracture of right patella 10/28/2020    Closed fracture of surgical neck of left humerus 10/30/2020    Closed left radial fracture 10/30/2020    Hyperkalemia     Hyperlipidemia     Hypertension     Hypothyroidism, unspecified     Liver cirrhosis secondary to YO     Morbid obesity     Right knee pain     Thyroid disease     Type 2 diabetes mellitus       PRN meds    acetaminophen, 650 mg, Q6H PRN  albuterol-ipratropium, 3 mL, Q4H PRN  dextrose 50%, 12.5 g, PRN  glucagon (human recombinant), 1 mg, PRN  hydrALAZINE, 10 mg, Q6H PRN  hydrOXYzine pamoate, 50 mg, Q8H PRN      Chart check completed. Will continue plan of care.      Orientation: other (see comments) (chris pt stopped answering questions)  [place] Coma Scale Score: 15     Lead Monitored: Lead II Rhythm: normal sinus rhythm Frequency/Ectopy: PACs  Cardiac/Telemetry Box Number: [id number]  VTE Core Measure: Pharmacological prophylaxis initiated/maintained Last Bowel Movement: 06/02/25  Diet Heart Healthy Consistent Carbohydrate; 2000 Calories (up to 75 gm per meal); Fluid - 1500mL  Voiding Characteristics: incontinence  Schuyler Score: 13  Fall Risk Score: 18  Accucheck [x]   Freq? achs     Lines/Drains/Airways       Peripheral Intravenous Line  Duration                  Peripheral IV - Single Lumen 06/04/25 0333 Left Hand <1 day         Peripheral IV - Single Lumen 06/04/25 0912 22 G Left Antecubital <1 day

## 2025-06-04 NOTE — ASSESSMENT & PLAN NOTE
-IV antibiotics transitioned to Rocephin and Clindamycin   -blood cultures grew gram Positive cocci in chains resembling Strep   -repeat blood cultures in am   -Infectious Disease consulted

## 2025-06-04 NOTE — ASSESSMENT & PLAN NOTE
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 The patients 3 most recent labs are listed below.  Recent Labs     06/03/25  0227 06/03/25  0533 06/04/25  0622   PLT 71* 72* 75*     Plan  - Will need to stop heparin if any overt bleeding and monitor platelet count closely  -stable- Heparin infusion stopped on 6/4/2025

## 2025-06-04 NOTE — SUBJECTIVE & OBJECTIVE
Interval History:     Review of Systems   Constitutional: Negative. Negative for weight gain.   HENT: Negative.     Eyes: Negative.    Cardiovascular: Negative.  Negative for chest pain, leg swelling and palpitations.   Respiratory: Negative.  Negative for shortness of breath.    Endocrine: Negative.    Hematologic/Lymphatic: Negative.    Skin: Negative.    Musculoskeletal:  Negative for muscle weakness.   Gastrointestinal: Negative.    Genitourinary: Negative.    Neurological: Negative.  Negative for dizziness.   Psychiatric/Behavioral: Negative.     Allergic/Immunologic: Negative.    All other systems reviewed and are negative.    Objective:     Vital Signs (Most Recent):  Temp: 98.4 °F (36.9 °C) (06/04/25 0748)  Pulse: 100 (06/04/25 0805)  Resp: 20 (06/04/25 0805)  BP: (!) 140/70 (06/04/25 0748)  SpO2: 97 % (06/04/25 0805) Vital Signs (24h Range):  Temp:  [98.2 °F (36.8 °C)-99 °F (37.2 °C)] 98.4 °F (36.9 °C)  Pulse:  [] 100  Resp:  [16-20] 20  SpO2:  [91 %-97 %] 97 %  BP: (111-140)/(56-70) 140/70     Weight: 94.7 kg (208 lb 12.4 oz)  Body mass index is 34.74 kg/m².     SpO2: 97 %         Intake/Output Summary (Last 24 hours) at 6/4/2025 1015  Last data filed at 6/4/2025 0844  Gross per 24 hour   Intake 60 ml   Output 100 ml   Net -40 ml       Lines/Drains/Airways       Peripheral Intravenous Line  Duration                  Peripheral IV - Single Lumen 06/04/25 0333 Left Hand <1 day         Peripheral IV - Single Lumen 06/04/25 0912 22 G Left Antecubital <1 day                       Physical Exam       Significant Labs: All pertinent lab results from the last 24 hours have been reviewed.    Significant Imaging: X-Ray: CXR: X-Ray Chest 1 View (CXR):   Results for orders placed or performed during the hospital encounter of 06/02/25   X-Ray Chest 1 View    Narrative    EXAMINATION:  XR CHEST 1 VIEW    CLINICAL HISTORY:  shortness of breath;    TECHNIQUE:  Single frontal view of the chest was  performed.    COMPARISON:  Chest radiograph 06/02/2025 with priors    FINDINGS:  Cardiac leads project over the chest.  Cardiomediastinal silhouette is enlarged, but unchanged.  Similar pulmonary vascular congestion perihilar interstitial changes.  No new pulmonary infiltrate or consolidation.  Small bilateral pleural effusions.  No pneumothorax.  The visualized osseous structures appear intact.      Impression    CHF findings.      Electronically signed by: Ori Nagy  Date:    06/04/2025  Time:    09:23

## 2025-06-04 NOTE — ASSESSMENT & PLAN NOTE
Creatine stable for now. BMP reviewed- noted Estimated Creatinine Clearance: 19.3 mL/min (A) (based on SCr of 3 mg/dL (H)). according to latest data. Based on current GFR, CKD stage is stage 4 - GFR 15-29.  Monitor UOP and serial BMP and adjust therapy as needed. Renally dose meds. Avoid nephrotoxic medications and procedures.

## 2025-06-04 NOTE — ASSESSMENT & PLAN NOTE
Patient's blood pressure range in the last 24 hours was: BP  Min: 111/56  Max: 180/81.The patient's inpatient anti-hypertensive regimen is listed below:  Current Antihypertensives  hydrALAZINE injection 10 mg, Every 6 hours PRN, Intravenous    Plan  - BP fluctuating, monitor and holding medications due to worsening renal function for now

## 2025-06-04 NOTE — PROGRESS NOTES
"[facility] Medicine  Progress Note    Patient Name: Lakshmi Campbell  MRN: [id number]  Patient Class: IP- Inpatient   Admission Date: 6/2/2025  Length of Stay: 2 days  Attending Physician: Abel Caraballo MD  Primary Care Provider: Keely Silva NP        Subjective     Principal Problem:Sepsis with encephalopathy        HPI:   Patient is a 74-year-old female with past medical history significant for type 2 insulin dependent diabetes mellitus, CKD stage 4, hypertension, hypothyroidism, liver cirrhosis secondary to YO, diastolic heart failure, aortic stenosis, hyperkalemia, thrombocytopenia, cataracts, hyperlipidemia, and multiple previous fractures as well as recent admit from  05/09/2025 through 5/15/2025 due to CHF exacerbation, pneumonia, hypoxemic respiratory failure, and UTI (treated with Merrem followed by cephalexin as well as IV Lasix, Duonebs and IV Solu-Medrol -- had to be discharged with home O2 which she has been using while sleeping. She presented to [facility] ED with complaint "not feeling well." Her  reports that she has been disoriented/confused and having incontinent episodes. He is very poor historian and patient is confused, so information mainly obtained via chart review. She did say yes when asked if dizzy/lightheaded/weak. She has had decreased activity and decreased appetite. while in ED she had temperature up to 102.7, tachycardia up to 115, blood pressure as high as 180/81 and SpO2 at one point down to 82% and was placed on nasal cannula at 2 L. lab workup showed WBC 5.64, hemoglobin 15.3, hematocrit 47.6, platelets 54, PT 12.8, INR 1.1, PTT 27.5, sodium 140, potassium 3.5, chloride 99, CO2 24, anion gap 17, BUN 53, creatinine 3.1, glucose 271, calcium 8.4, magnesium 1.7, alk-phos 82, albumin 2.2, AST 34, ALT 21,  BNP 12 12, troponin 4.293, lactic acid 3.0, procalcitonin 19.94, UA with no evidence of infection, blood cultures x2 pending.  CT head " without contrast was done and there was no acute intracranial findings.  Chest x-ray shows pulmonary venous congestion, similar to prior with left retrocardiac atelectasis versus consolidation.  X-ray of right tibia /fibula shows no acute bone or joint abnormality, does note edema within  subcutaneous tissues, no soft tissue gas.  While in ED, she was given acetaminophen, IV cefepime, LR fluid bolus x1 L, IV vancomycin, and started on heparin drip. Hospital Medicine was consulted for admission due to sepsis, cellulitis, encephalopathy, worsening renal failure, and NSTEMI.    Overview/Hospital Course:  Patient admitted to Hospital Medicine for Sepsis in the setting of Cellulitis. IV antibiotics continued. Blood cultures obtained with PCR positive for Streptococcus pyogenes (Group A). Cefepime continued and blood cultures repeated. ID consulted. Encephalopathy resolved. Troponin elevated and Heparin infusion initiated- Cardiology consulted. Hx of CHF noted with imaging supportive of pulmonary venous congestion, similar to prior. Left retrocardiac atelectasis or consolidation. BNP elevated- will initiate diuretic once stable. H/H stable. Lactic acid 3> 2.2. Procalcitonin elevated. Supplemental oxygen continued as needed. PT/OT evaluation pending. On 6/4/2025, mild confusion overnight resolved spontaneously and patient remained alert and oriented during the day. Potassium replaced and Magnesium stable with increased oral intake encouraged. Imaging supports pulmonary vascular congestion perihilar interstitial changes with small bilateral pleural effusions. Lasix given. Troponin trended down and Heparin infusion stopped with Metoprolol initiated per Cardiology. BUN/Cr trended upward. PT/OT evaluation completed with moderate intensity therapy recommended. CM consulted to assist with discharge planning. Antibiotics adjusted to Rocephin and Clindamycin per ID. Repeat blood cultures to be obtained in am.     Interval History:  pt in bed upon exam and noted to be lethargic with tachypnea in am which resolved upon repeat evaluation. Pt oriented upon exam. BUN/Cr trending up. Imaging supports pulmonary congestion and Lasix IV given x 1 dose. Antibiotics adjusted per ID. Repeat blood cultures in am.     Review of Systems   Constitutional:  Positive for activity change.   HENT: Negative.     Respiratory:  Positive for shortness of breath. Negative for wheezing.    Cardiovascular:  Positive for leg swelling.   Gastrointestinal: Negative.    Genitourinary: Negative.    Skin:  Positive for color change and wound.   Neurological:  Positive for weakness.   Psychiatric/Behavioral:  Positive for confusion.      Objective:     Vital Signs (Most Recent):  Temp: 98.3 °F (36.8 °C) (06/04/25 1545)  Pulse: 75 (06/04/25 1545)  Resp: 18 (06/04/25 1545)  BP: (!) 127/59 (06/04/25 1545)  SpO2: (!) 94 % (06/04/25 1545) Vital Signs (24h Range):  Temp:  [98 °F (36.7 °C)-98.6 °F (37 °C)] 98.3 °F (36.8 °C)  Pulse:  [] 75  Resp:  [16-20] 18  SpO2:  [91 %-97 %] 94 %  BP: (121-140)/(56-70) 127/59     Weight: 94.7 kg (208 lb 12.4 oz)  Body mass index is 34.74 kg/m².    Intake/Output Summary (Last 24 hours) at 6/4/2025 1754  Last data filed at 6/4/2025 1230  Gross per 24 hour   Intake 260 ml   Output --   Net 260 ml         Physical Exam  HENT:      Mouth/Throat:      Mouth: Mucous membranes are dry.      Pharynx: Oropharynx is clear.   Cardiovascular:      Rate and Rhythm: Normal rate and regular rhythm.   Pulmonary:      Effort: Pulmonary effort is normal.      Comments: Decreased bibasilar   Abdominal:      General: Bowel sounds are normal. There is no distension.      Palpations: Abdomen is soft.   Musculoskeletal:      Right lower leg: Edema present.   Skin:     Findings: Bruising, erythema (RLE) and lesion present.      Comments: Multiple abrasions to extremities. Pressure ulcer to buttocks. Dermatitis to pannis   Neurological:      Mental Status: She is  alert and oriented to person, place, and time.   Psychiatric:         Mood and Affect: Mood normal.               Significant Labs: All pertinent labs within the past 24 hours have been reviewed.    Significant Imaging: I have reviewed all pertinent imaging results/findings within the past 24 hours.      Assessment & Plan  Sepsis with encephalopathy  This patient does have evidence of infective focus  My overall impression is sepsis with Acute kidney injury, Acute heart failure, Encephalopathy, and Thrombocytopenia .  Source: Skin and Soft Tissue Infection: Cellulitis  Antibiotics given-   Antibiotics (72h ago, onward)      Start     Stop Route Frequency Ordered    06/04/25 0915  cefTRIAXone injection 2 g         -- IV Every 24 hours (non-standard times) 06/04/25 0722    06/04/25 0900  clindamycin in D5W 600 mg/50 mL IVPB 600 mg         06/07/25 0859 IV Every 8 hours (non-standard times) 06/04/25 0755          Latest lactate reviewed-  Recent Labs   Lab 06/02/25  2242   LACTATE 2.2     Organ dysfunction indicated by Acute kidney injury, Encephalopathy, and Thrombocytopenia     Fluid challenge Contraindicated- Fluid bolus is contraindicated in this patient due to Congestive Heart Failure     Post- resuscitation assessment Yes - I attest a sepsis perfusion exam was performed within 6 hours of sepsis, severe sepsis, or septic shock presentation, following fluid resuscitation.      Will Not start Pressors  Source control achieved by: Broad spectrum antibiotics  6/3/2025- encephalopathy improved - blood cultures with PCR + -ID consulted- will obtain repeat cultures   -6/4/2025- antibiotics transitioned to Rocephin and Clindamycin- ID following- repeat blood cultures in am   Type 2 diabetes mellitus with microalbuminuria, with long-term current use of insulin  Serial glucose checks   SSI ordered  -Lantus 10 units nightly initiated     Hypothyroidism  Resume Synthroid    Essential hypertension  Patient's blood pressure  range in the last 24 hours was: BP  Min: 121/63  Max: 140/70.The patient's inpatient anti-hypertensive regimen is listed below:  Current Antihypertensives  hydrALAZINE injection 10 mg, Every 6 hours PRN, Intravenous    Plan  - BP fluctuating, monitor and holding medications due to worsening renal function for now  Class 2 severe obesity due to excess calories with serious comorbidity and body mass index (BMI) of 36.0 to 36.9 in adult  Body mass index is 34.74 kg/m². Morbid obesity complicates all aspects of disease management from diagnostic modalities to treatment. Weight loss encouraged and health benefits explained to patient.       Thrombocytopenia  The likely etiology of thrombocytopenia is chronic ITP. The patients 3 most recent labs are listed below.  Recent Labs     06/03/25  0227 06/03/25  0533 06/04/25  0622   PLT 71* 72* 75*     Plan  - Will need to stop heparin if any overt bleeding and monitor platelet count closely  -stable- Heparin infusion stopped on 6/4/2025  Chronic diastolic congestive heart failure  Patient has Diastolic (HFpEF) heart failure that is Chronic. On presentation their CHF was controlled and she was given 1 L fluid bolus due to sepsis. Most recent BNP and echo results are listed below.  Recent Labs     06/02/25  1859   BNP 1,212*     Latest ECHO  Results for orders placed during the hospital encounter of 05/09/25    Echo    Interpretation Summary    Left Ventricle: The left ventricle is normal in size. Mildly increased ventricular mass. Mildly increased wall thickness. There is mild concentric hypertrophy. There is normal systolic function with a visually estimated ejection fraction of 55 - 60%. Grade II diastolic dysfunction.    Right Ventricle: The right ventricle is normal in size Wall thickness is normal. Systolic function is normal.    Left Atrium: The left atrium is dilated    Aortic Valve: There is moderate aortic valve sclerosis. There is moderate stenosis. Aortic valve area  by VTI is 1.3 cm². Aortic valve peak velocity is 3.1 m/s. Mean gradient is 22 mmHg. The dimensionless index is 0.45.    Mitral Valve: Mildly thickened subvalvular apparatus. There is severe mitral annular calcification.    IVC/SVC: Normal venous pressure at 3 mmHg.    Current Heart Failure Medications  hydrALAZINE injection 10 mg, Every 6 hours PRN, Intravenous    Plan  - Monitor strict I&Os and daily weights.    - Place on telemetry  - Low sodium diet  - Place on fluid restriction of 1.5 L.   - Cardiology has been consulted  - The patient's volume status is at their baseline  -imaging supports pulmonary congestion - Lasix IV x 1 dose given     Chronic kidney disease, stage 4 (severe)  Creatine stable for now. BMP reviewed- noted Estimated Creatinine Clearance: 17 mL/min (A) (based on SCr of 3.3 mg/dL (H)). according to latest data. Based on current GFR, CKD stage is stage 4 - GFR 15-29.  Monitor UOP and serial BMP and adjust therapy as needed. Renally dose meds. Avoid nephrotoxic medications and procedures.  Mild persistent asthma without complication  Continue home inhaler- symbicort  PRN Duo-nebs ordered    Cellulitis of right lower extremity  Broad spectrum antibiotics ordered  wound care consulted     NSTEMI (non-ST elevated myocardial infarction)  Heparin drip- discontinued on 6/4/2025  Consult cardiology  -likely due to demand in the setting of sepsis and CHF   Denies chest pain, but troponin 4.293> 3.542 with EKG changes noted  Continue to trend troponin  -Echo obtained with results reviewed   -Metoprolol initiated    Streptococcal bacteremia  -IV antibiotics transitioned to Rocephin and Clindamycin   -blood cultures grew gram Positive cocci in chains resembling Strep   -repeat blood cultures in am   -Infectious Disease consulted     VTE Risk Mitigation (From admission, onward)      None            Discharge Planning   KYA: 6/7/2025     Code Status: Full Code   Medical Readiness for Discharge Date:    Discharge Plan A: Home                Please place Justification for DME        Ceci Pelaez NP  Department of Hospital Medicine   [facility] ([facility])

## 2025-06-04 NOTE — PROGRESS NOTES
[facility] ([facility])  Cardiology  Progress Note    Patient Name: Lakshmi Campbell  MRN: [id number]  Admission Date: 6/2/2025  Hospital Length of Stay: 2 days  Code Status: Full Code   Attending Physician: Abel Caraballo MD   Primary Care Physician: Keely Silva NP  Expected Discharge Date: 6/6/2025  Principal Problem:Sepsis with encephalopathy    Subjective:     Hospital Course:   6-4-25  monitor HR 90s , EKG back to baseline NSR. BBB is rate related. Echo nml no SWMA, troponin down to 3, probably demand ischemia. Can stop heparin, start low dose b blockers    Interval History:     Review of Systems   Constitutional: Negative. Negative for weight gain.   HENT: Negative.     Eyes: Negative.    Cardiovascular: Negative.  Negative for chest pain, leg swelling and palpitations.   Respiratory: Negative.  Negative for shortness of breath.    Endocrine: Negative.    Hematologic/Lymphatic: Negative.    Skin: Negative.    Musculoskeletal:  Negative for muscle weakness.   Gastrointestinal: Negative.    Genitourinary: Negative.    Neurological: Negative.  Negative for dizziness.   Psychiatric/Behavioral: Negative.     Allergic/Immunologic: Negative.    All other systems reviewed and are negative.    Objective:     Vital Signs (Most Recent):  Temp: 98.4 °F (36.9 °C) (06/04/25 0748)  Pulse: 100 (06/04/25 0805)  Resp: 20 (06/04/25 0805)  BP: (!) 140/70 (06/04/25 0748)  SpO2: 97 % (06/04/25 0805) Vital Signs (24h Range):  Temp:  [98.2 °F (36.8 °C)-99 °F (37.2 °C)] 98.4 °F (36.9 °C)  Pulse:  [] 100  Resp:  [16-20] 20  SpO2:  [91 %-97 %] 97 %  BP: (111-140)/(56-70) 140/70     Weight: 94.7 kg (208 lb 12.4 oz)  Body mass index is 34.74 kg/m².     SpO2: 97 %         Intake/Output Summary (Last 24 hours) at 6/4/2025 1015  Last data filed at 6/4/2025 0844  Gross per 24 hour   Intake 60 ml   Output 100 ml   Net -40 ml       Lines/Drains/Airways       Peripheral Intravenous Line  Duration                  Peripheral IV -  Single Lumen 06/04/25 0333 Left Hand <1 day         Peripheral IV - Single Lumen 06/04/25 0912 22 G Left Antecubital <1 day                       Physical Exam       Significant Labs: All pertinent lab results from the last 24 hours have been reviewed.    Significant Imaging: X-Ray: CXR: X-Ray Chest 1 View (CXR):   Results for orders placed or performed during the hospital encounter of 06/02/25   X-Ray Chest 1 View    Narrative    EXAMINATION:  XR CHEST 1 VIEW    CLINICAL HISTORY:  shortness of breath;    TECHNIQUE:  Single frontal view of the chest was performed.    COMPARISON:  Chest radiograph 06/02/2025 with priors    FINDINGS:  Cardiac leads project over the chest.  Cardiomediastinal silhouette is enlarged, but unchanged.  Similar pulmonary vascular congestion perihilar interstitial changes.  No new pulmonary infiltrate or consolidation.  Small bilateral pleural effusions.  No pneumothorax.  The visualized osseous structures appear intact.      Impression    CHF findings.      Electronically signed by: Ori Nagy  Date:    06/04/2025  Time:    09:23     Assessment and Plan:     Brief HPI:     NSTEMI (non-ST elevated myocardial infarction)  Pt is a 74 y/u female with PMhx for HTN, HLP, diastolic heart failure, CKD , aortic stenosis admitted for sepsis with encephalopathy.  EKG sinus tachycardia with LBBB at 104 bpm. BNP 1212. CXR (-). Pt discharged about 2 weeks ago for CHF,  Troponin is 4.15    Troponin may be from demand ischemia, continue serial enzymes. Check echo for SWMA. Continue IV heparin  Repeat EKG with controlled rate. CHF appears stable, restart diuretics once more stable from sepsis        VTE Risk Mitigation (From admission, onward)           Ordered     heparin 25,000 units in dextrose 5% (100 units/ml) IV bolus from bag LOW INTENSITY nomogram - [facility]  As needed (PRN)        Question:  Heparin Infusion Adjustment (DO NOT MODIFY ANSWER)  Answer:   [email or web address]\Images\Pharmacy\HeparinInfusions\heparin LOW INTENSITY nomogram for [facility] [id number]    06/04/25 0722     heparin 25,000 units in dextrose 5% 250 mL (100 units/mL) infusion LOW INTENSITY nomogram - [facility]  Continuous        Question:  Begin at (units/kg/hr)  Answer:  12    06/02/25 2151     heparin 25,000 units in dextrose 5% (100 units/ml) IV bolus from bag LOW INTENSITY nomogram - [facility]  As needed (PRN)        Question:  Heparin Infusion Adjustment (DO NOT MODIFY ANSWER)  Answer:  [email or web address]\Images\Pharmacy\HeparinInfusions\heparin LOW INTENSITY nomogram for [facility] [email or web address]    06/02/25 2151                    Shayne Brandon MD  Cardiology  [facility] ([facility])

## 2025-06-04 NOTE — PLAN OF CARE
Pt tolerated therapy fair. Pt's cognition waxed and waned throughout session. Pt completed supine <> sit Max A x2, scoot Min A, STS t/f mod A x2, and step t/f to chair Mod A x2. Recommending moderate intensity therapy at d/c.

## 2025-06-04 NOTE — PROGRESS NOTES
"[facility]  Progress Note    Patient Name: Lakshmi Campbell  MRN: [id number]  Patient Class: IP- Inpatient   Admission Date: 6/2/2025  Length of Stay: 1 days  Attending Physician: Abel Caraballo MD  Primary Care Provider: Keely Silva NP        Subjective     Principal Problem:Sepsis with encephalopathy      HPI:   Patient is a 74-year-old female with past medical history significant for type 2 insulin dependent diabetes mellitus, CKD stage 4, hypertension, hypothyroidism, liver cirrhosis secondary to YO, diastolic heart failure, aortic stenosis, hyperkalemia, thrombocytopenia, cataracts, hyperlipidemia, and multiple previous fractures as well as recent admit from  05/09/2025 through 5/15/2025 due to CHF exacerbation, pneumonia, hypoxemic respiratory failure, and UTI (treated with Merrem followed by cephalexin as well as IV Lasix, Duonebs and IV Solu-Medrol -- had to be discharged with home O2 which she has been using while sleeping. She presented to [facility] ED with complaint "not feeling well." Her  reports that she has been disoriented/confused and having incontinent episodes. He is very poor historian and patient is confused, so information mainly obtained via chart review. She did say yes when asked if dizzy/lightheaded/weak. She has had decreased activity and decreased appetite. while in ED she had temperature up to 102.7, tachycardia up to 115, blood pressure as high as 180/81 and SpO2 at one point down to 82% and was placed on nasal cannula at 2 L. lab workup showed WBC 5.64, hemoglobin 15.3, hematocrit 47.6, platelets 54, PT 12.8, INR 1.1, PTT 27.5, sodium 140, potassium 3.5, chloride 99, CO2 24, anion gap 17, BUN 53, creatinine 3.1, glucose 271, calcium 8.4, magnesium 1.7, alk-phos 82, albumin 2.2, AST 34, ALT 21,  BNP 12 12, troponin 4.293, lactic acid 3.0, procalcitonin 19.94, UA with no evidence of infection, blood cultures x2 pending.  CT head without " contrast was done and there was no acute intracranial findings.  Chest x-ray shows pulmonary venous congestion, similar to prior with left retrocardiac atelectasis versus consolidation.  X-ray of right tibia /fibula shows no acute bone or joint abnormality, does note edema within  subcutaneous tissues, no soft tissue gas.  While in ED, she was given acetaminophen, IV cefepime, LR fluid bolus x1 L, IV vancomycin, and started on heparin drip. Hospital Medicine was consulted for admission due to sepsis, cellulitis, encephalopathy, worsening renal failure, and NSTEMI.    Overview/Hospital Course:  Patient admitted to Hospital Medicine for Sepsis in the setting of Cellulitis. IV antibiotics continued. Blood cultures obtained with PCR positive for Streptococcus pyogenes (Group A). Cefepime continued and blood cultures repeated. ID consulted. Encephalopathy resolved. Troponin elevated and Heparin infusion initiated- Cardiology consulted. Hx of CHF noted with imaging supportive of pulmonary venous congestion, similar to prior. Left retrocardiac atelectasis or consolidation. BNP elevated- will initiate diuretic once stable. H/H stable. Lactic acid 3> 2.2. Procalcitonin elevated. Supplemental oxygen continued as needed. PT/OT evaluation pending.     Interval History: pt in bed upon exam with no signs of acute distress upon exam. Heparin infusion continued. Antibiotics continued with bacteremia noted and ID consulted.     Review of Systems   Constitutional:  Positive for activity change.   HENT: Negative.     Respiratory:  Negative for shortness of breath and wheezing.    Cardiovascular:  Positive for leg swelling.   Gastrointestinal: Negative.    Genitourinary: Negative.    Skin:  Positive for color change and wound.   Neurological:  Positive for weakness.     Objective:     Vital Signs (Most Recent):  Temp: 98.2 °F (36.8 °C) (06/03/25 1607)  Pulse: 79 (06/03/25 1607)  Resp: 19 (06/03/25 1607)  BP: (!) 111/56 (06/03/25  1607)  SpO2: 95 % (06/03/25 1607) Vital Signs (24h Range):  Temp:  [98.2 °F (36.8 °C)-102.7 °F (39.3 °C)] 98.2 °F (36.8 °C)  Pulse:  [] 79  Resp:  [18-37] 19  SpO2:  [82 %-99 %] 95 %  BP: (111-180)/(56-81) 111/56     Weight: 100.4 kg (221 lb 5.5 oz)  Body mass index is 36.83 kg/m².    Intake/Output Summary (Last 24 hours) at 6/3/2025 1901  Last data filed at 6/3/2025 1634  Gross per 24 hour   Intake 501.35 ml   Output 300 ml   Net 201.35 ml         Physical Exam  HENT:      Mouth/Throat:      Mouth: Mucous membranes are dry.      Pharynx: Oropharynx is clear.   Cardiovascular:      Rate and Rhythm: Normal rate and regular rhythm.   Pulmonary:      Effort: Pulmonary effort is normal.      Comments: Decreased bibasilar   Abdominal:      General: Bowel sounds are normal. There is no distension.      Palpations: Abdomen is soft.   Musculoskeletal:      Right lower leg: Edema present.   Skin:     Findings: Bruising, erythema (RLE) and lesion present.      Comments: Multiple abrasions to extremities. Pressure ulcer to buttocks. Dermatitis to pannis   Neurological:      Mental Status: She is alert and oriented to person, place, and time.   Psychiatric:         Mood and Affect: Mood normal.               Significant Labs: All pertinent labs within the past 24 hours have been reviewed.    Significant Imaging: I have reviewed all pertinent imaging results/findings within the past 24 hours.      Assessment & Plan  Sepsis with encephalopathy  This patient does have evidence of infective focus  My overall impression is sepsis with Acute kidney injury, Acute heart failure, Encephalopathy, and Thrombocytopenia .  Source: Skin and Soft Tissue Infection: Cellulitis  Antibiotics given-   Antibiotics (72h ago, onward)      Start     Stop Route Frequency Ordered    06/03/25 0800  ceFEPIme injection 1 g         -- IV Every 12 hours (non-standard times) 06/02/25 2154    06/1950  vancomycin - pharmacy to dose  (vancomycin IVPB  "(PEDS and ADULTS))        Placed in "And" Linked Group    -- IV pharmacy to manage frequency 06/02/25 1850          Latest lactate reviewed-  Recent Labs   Lab 06/02/25  2242   LACTATE 2.2     Organ dysfunction indicated by Acute kidney injury, Encephalopathy, and Thrombocytopenia     Fluid challenge Contraindicated- Fluid bolus is contraindicated in this patient due to Congestive Heart Failure     Post- resuscitation assessment Yes - I attest a sepsis perfusion exam was performed within 6 hours of sepsis, severe sepsis, or septic shock presentation, following fluid resuscitation.      Will Not start Pressors  Source control achieved by: Broad spectrum antibiotics  6/3/2025- encephalopathy improved - blood cultures with PCR + -ID consulted- will obtain repeat cultures   Type 2 diabetes mellitus with microalbuminuria, with long-term current use of insulin  Serial glucose checks   SSI ordered    Hypothyroidism  Resume Synthroid    Essential hypertension  Patient's blood pressure range in the last 24 hours was: BP  Min: 111/56  Max: 180/81.The patient's inpatient anti-hypertensive regimen is listed below:  Current Antihypertensives  hydrALAZINE injection 10 mg, Every 6 hours PRN, Intravenous    Plan  - BP fluctuating, monitor and holding medications due to worsening renal function for now  Class 2 severe obesity due to excess calories with serious comorbidity and body mass index (BMI) of 36.0 to 36.9 in adult  Body mass index is 36.83 kg/m². Morbid obesity complicates all aspects of disease management from diagnostic modalities to treatment. Weight loss encouraged and health benefits explained to patient.       Thrombocytopenia  The likely etiology of thrombocytopenia is chronic ITP. The patients 3 most recent labs are listed below.  Recent Labs     06/02/25  1859 06/03/25  0227 06/03/25  0533   PLT 54* 71* 72*     Plan  - Will need to stop heparin if any overt bleeding and monitor platelet count closely    Chronic " diastolic congestive heart failure  Patient has Diastolic (HFpEF) heart failure that is Chronic. On presentation their CHF was controlled and she was given 1 L fluid bolus due to sepsis. Most recent BNP and echo results are listed below.  Recent Labs     06/02/25  1859   BNP 1,212*     Latest ECHO  Results for orders placed during the hospital encounter of 05/09/25    Echo    Interpretation Summary    Left Ventricle: The left ventricle is normal in size. Mildly increased ventricular mass. Mildly increased wall thickness. There is mild concentric hypertrophy. There is normal systolic function with a visually estimated ejection fraction of 55 - 60%. Grade II diastolic dysfunction.    Right Ventricle: The right ventricle is normal in size Wall thickness is normal. Systolic function is normal.    Left Atrium: The left atrium is dilated    Aortic Valve: There is moderate aortic valve sclerosis. There is moderate stenosis. Aortic valve area by VTI is 1.3 cm². Aortic valve peak velocity is 3.1 m/s. Mean gradient is 22 mmHg. The dimensionless index is 0.45.    Mitral Valve: Mildly thickened subvalvular apparatus. There is severe mitral annular calcification.    IVC/SVC: Normal venous pressure at 3 mmHg.    Current Heart Failure Medications  hydrALAZINE injection 10 mg, Every 6 hours PRN, Intravenous    Plan  - Monitor strict I&Os and daily weights.    - Place on telemetry  - Low sodium diet  - Place on fluid restriction of 1.5 L.   - Cardiology has been consulted  - The patient's volume status is at their baseline      Chronic kidney disease, stage 4 (severe)  Creatine stable for now. BMP reviewed- noted Estimated Creatinine Clearance: 19.3 mL/min (A) (based on SCr of 3 mg/dL (H)). according to latest data. Based on current GFR, CKD stage is stage 4 - GFR 15-29.  Monitor UOP and serial BMP and adjust therapy as needed. Renally dose meds. Avoid nephrotoxic medications and procedures.  Mild persistent asthma without  complication  Continue home inhaler- symbicort  PRN Duo-nebs ordered    Cellulitis of right lower extremity  Broad spectrum antibiotics ordered  Consult wound care     NSTEMI (non-ST elevated myocardial infarction)  Heparin drip  Consult cardiology  -likely due to demand in the setting of sepsis and CHF   Denies chest pain, but troponin 4.293> 3.542 with EKG changes noted  Continue to trend troponin  -Echo obtained with results reviewed     VTE Risk Mitigation (From admission, onward)           Ordered     heparin 25,000 units in dextrose 5% 250 mL (100 units/mL) infusion LOW INTENSITY nomogram - [facility]  Continuous        Question:  Begin at (units/kg/hr)  Answer:  12    06/02/25 2151     heparin 25,000 units in dextrose 5% (100 units/ml) IV bolus from bag LOW INTENSITY nomogram - [facility]  As needed (PRN)        Question:  Heparin Infusion Adjustment (DO NOT MODIFY ANSWER)  Answer:  \\[facility]\epic\Images\Pharmacy\HeparinInfusions\heparin LOW INTENSITY nomogram for [facility] [id number]    06/02/25 2151     heparin 25,000 units in dextrose 5% (100 units/ml) IV bolus from bag LOW INTENSITY nomogram - [facility]  As needed (PRN)        Question:  Heparin Infusion Adjustment (DO NOT MODIFY ANSWER)  Answer:  \\[facility] [facility]\epic\Images\Pharmacy\HeparinInfusions\heparin LOW INTENSITY nomogram for [facility] [id number].pdf    06/02/25 2151                    Discharge Planning   KYA: 6/6/2025     Code Status: Prior   Medical Readiness for Discharge Date:   Discharge Plan A: Home                Please place Justification for DME        Ceci Pelaez NP  Department of Hospital Medicine   [facility] - Telemetry ([facility])

## 2025-06-04 NOTE — PROGRESS NOTES
Pharmacokinetic Assessment Follow Up: IV Vancomycin    Vancomycin serum concentration assessment(s):    The random level was drawn correctly and can be used to guide therapy at this time. The measurement is above the desired definitive target range of 15 to 20 mcg/mL.    Vancomycin Regimen Plan:    Therapy with Vancomycin complete and/or consult discontinued by provider.  Pharmacy will sign off, please re-consult as needed.     Drug levels (last 3 results):  Recent Labs   Lab Result Units 06/03/25 2238 06/04/25  0622   Vancomycin Random ug/ml 21.8 20.9       Pharmacy will continue to follow and monitor vancomycin.    Please contact pharmacy at extension [phone] for questions regarding this assessment.    Thank you for the consult,   Anna Dubose       Patient brief summary:  Lakshmi Campbell is a 74 y.o. female initiated on antimicrobial therapy with IV Vancomycin for treatment of bacteremia    The patient's current regimen is completed    Drug Allergies:   Review of patient's allergies indicates:   Allergen Reactions    Codeine Nausea Only     Other reaction(s): Nausea  Other reaction(s): Elevated blood pressure       Actual Body Weight:   94.7kg    Renal Function:   Estimated Creatinine Clearance: 18.7 mL/min (A) (based on SCr of 3 mg/dL (H)).,     Dialysis Method (if applicable):  N/A    CBC (last 72 hours):  Recent Labs   Lab Result Units 06/02/25 1859 06/03/25  0227 06/03/25  0533 06/04/25  0622   WBC K/uL 5.64 7.04 7.92 8.32   HGB gm/dL 15.3 9.6* 9.8* 9.7*   HCT % 47.6 30.5* 31.0* 31.4*   Platelet Count K/uL 54* 71* 72* 75*   Lymph % % 2.1* 3.7*  --   --    Lymphocyte % %  --   --  4.0* 10.0*   Mono % % 2.7* 5.8  --   --    Monocyte % %  --   --  4.0 4.0   Eos % % 0.0 0.0  --   --    Basophil % % 0.7 0.4  --   --        Metabolic Panel (last 72 hours):  Recent Labs   Lab Result Units 06/02/25  1859 06/02/25  1942 06/03/25  0657   Sodium mmol/L 140  --  136   Potassium mmol/L 3.5  --  3.3*   Chloride mmol/L  99  --  100   CO2 mmol/L 24  --  21*   Glucose mg/dL 271*  --  258*   Glucose, UA   --  3+*  --    BUN mg/dL 53*  --  61*   Creatinine mg/dL 3.1*  --  3.0*   Albumin g/dL 2.2*  --  1.8*   Bilirubin Total mg/dL 0.8  --  0.7   ALP unit/L 82  --  69   AST unit/L 34  --  28   ALT unit/L 21  --  20   Magnesium  mg/dL 1.7  --   --        Vancomycin Administrations:  vancomycin given in the last 96 hours                     vancomycin 2,000 mg in 0.9% NaCl 500 mL IVPB (admixture device) (mg) 2,000 mg New Bag 06/02/25 2223                    Microbiologic Results:  Microbiology Results (last 7 days)       Procedure Component Value Units Date/Time    Rapid Organism ID by PCR (from Blood culture) [[id number]]  (Abnormal) Collected: 06/02/25 1923    Order Status: Completed Specimen: Blood from Peripheral, Forearm, Left Updated: 06/03/25 1435     Enterococcus faecalis Not Detected     Enterococcus faecium Not Detected     Listeria monocytogenes Not Detected     Staphylococcus spp. Not Detected     Staphylococcus aureus Not Detected     Staphylococcus epidermidis Not Detected     Staphylococcus lugdunensis Not Detected     Streptococcus spp. See Species for ID     Streptococcus agalactiae (Group B) Not Detected     Streptococcus pneumoniae Not Detected     Streptococcus pyogenes (Group A) Detected     Acinetobacter calcoaceticus/baumannii complex Not Detected     Bacteroides fragilis Not Detected     Enterobacterales Not Detected     Enterobacter cloacae complex Not Detected     Escherichia coli Not Detected     Klebsiella aerogenes Not Detected     Klebsiella oxytoca Not Detected     Klebsiella pneumoniae group Not Detected     Proteus spp. Not Detected     Salmonella spp. Not Detected     Serratia marcescens Not Detected     Haemophilus influenzae Not Detected     Neisseria meningitidis Not Detected     Pseudomonas aeruginosa Not Detected     Stenotrophomonas maltophilia Not Detected     Candida albicans Not Detected      Candida auris Not Detected     Candida glabrata Not Detected     Candida krusei Not Detected     Candida parapsilosis Not Detected     Candida tropicalis Not Detected     Cryptococcus neoformans/gattii Not Detected     CTX-M (ESBL ) N/A     Comment: Note: Antimicrobial resistance can occur via multiple mechanisms. A Not Detected result for antimicrobial resistance gene(s) does not indicate antimicrobial susceptibility. Subculturing is required for species identification and susceptibility testing of   isolates.        IMP (Cabapenemase ) N/A     Comment: Note: Antimicrobial resistance can occur via multiple mechanisms. A Not Detected result for antimicrobial resistance gene(s) does not indicate antimicrobial susceptibility. Subculturing is required for species identification and susceptibility testing of   isolates.        KPC resistance gene (Carbapenemase ) N/A     Comment: Note: Antimicrobial resistance can occur via multiple mechanisms. A Not Detected result for antimicrobial resistance gene(s) does not indicate antimicrobial susceptibility. Subculturing is required for species identification and susceptibility testing of   isolates.        mcr-1 N/A     Comment: Note: Antimicrobial resistance can occur via multiple mechanisms. A Not Detected result for antimicrobial resistance gene(s) does not indicate antimicrobial susceptibility. Subculturing is required for species identification and susceptibility testing of   isolates.        mecA ID N/A     Comment: Note: Antimicrobial resistance can occur via multiple mechanisms. A Not Detected result for antimicrobial resistance gene(s) does not indicate antimicrobial susceptibility. Subculturing is required for species identification and susceptibility testing of   isolates.        mecA/C and MREJ (MRSA) gene N/A     Comment: Note: Antimicrobial resistance can occur via multiple mechanisms. A Not Detected result for antimicrobial resistance  gene(s) does not indicate antimicrobial susceptibility. Subculturing is required for species identification and susceptibility testing of   isolates.        NDM (Carbapenemase ) N/A     Comment: Note: Antimicrobial resistance can occur via multiple mechanisms. A Not Detected result for antimicrobial resistance gene(s) does not indicate antimicrobial susceptibility. Subculturing is required for species identification and susceptibility testing of   isolates.        OXA-48-like (Carbapenemase ) N/A     Comment: Note: Antimicrobial resistance can occur via multiple mechanisms. A Not Detected result for antimicrobial resistance gene(s) does not indicate antimicrobial susceptibility. Subculturing is required for species identification and susceptibility testing of   isolates.        Nargis/B (VRE gene) N/A     Comment: Note: Antimicrobial resistance can occur via multiple mechanisms. A Not Detected result for antimicrobial resistance gene(s) does not indicate antimicrobial susceptibility. Subculturing is required for species identification and susceptibility testing of   isolates.        VIM (Carbapenemase ) N/A     Comment: Note: Antimicrobial resistance can occur via multiple mechanisms. A Not Detected result for antimicrobial resistance gene(s) does not indicate antimicrobial susceptibility. Subculturing is required for species identification and susceptibility testing of   isolates.       Blood culture x two cultures. Draw prior to antibiotics. [[id number]]  (Abnormal) Collected: 06/02/25 1859    Order Status: Completed Specimen: Blood from Peripheral, Antecubital, Right Updated: 06/03/25 1301     Blood Culture Positive - Aerobic and Anaerobic Bottles     GRAM STAIN Gram positive cocci in chains resembling Strep     Comment: Aerobic & Anaerobic Bottles Positive        Blood culture x two cultures. Draw prior to antibiotics. [[id number]]  (Normal) Collected: 06/02/25 1923    Order Status:  Completed Specimen: Blood from Peripheral, Forearm, Left Updated: 06/03/25 1001     Blood Culture No Growth After 6 Hours

## 2025-06-04 NOTE — ASSESSMENT & PLAN NOTE
Creatine stable for now. BMP reviewed- noted Estimated Creatinine Clearance: 17 mL/min (A) (based on SCr of 3.3 mg/dL (H)). according to latest data. Based on current GFR, CKD stage is stage 4 - GFR 15-29.  Monitor UOP and serial BMP and adjust therapy as needed. Renally dose meds. Avoid nephrotoxic medications and procedures.

## 2025-06-04 NOTE — PT/OT/SLP EVAL
"Physical Therapy Evaluation and Treatment    Patient Name:  Lakshmi Campbell   MRN:  [id number]    Recommendations:     Discharge Recommendations: Moderate Intensity Therapy   Discharge Equipment Recommendations: to be determined by next level of care   Barriers to discharge: None    Assessment:     Lakshmi Campbell is a 74 y.o. female admitted with a medical diagnosis of Sepsis with encephalopathy.  She presents with the following impairments/functional limitations: weakness, impaired endurance, impaired self care skills, impaired functional mobility, gait instability, impaired balance, impaired cognition, decreased coordination, decreased upper extremity function, decreased lower extremity function.    Rehab Prognosis: Fair; patient would benefit from acute skilled PT services to address these deficits and reach maximum level of function.    Recent Surgery: * No surgery found *      Plan:     During this hospitalization, patient to be seen   to address the identified rehab impairments via therapeutic activities and progress toward the following goals:    Plan of Care Expires:  06/18/25    Subjective     Chief Complaint: None stated  Patient/Family Comments/goals: "I can roll"  Pain/Comfort:  Pain Rating 1: other (see comments) (Facial grimacing with PROM to LEs during sidelying to EOB sitting transfer)  Pain Addressed 1: Reposition    Patients cultural, spiritual, Alevism conflicts given the current situation:      Living Environment:  Patient lives with  in a 1 story house with no steps to enter with their two adult grandchildren. Patient is retired and drives PTA.   Prior to admission, patients level of function was IND in all ADLs and ambulating community distances.  Equipment used at home: none.  DME owned (not currently used): none.  Upon discharge, patient will have assistance from  [employer].    Objective:     Communicated with [facility] prior to session.  Patient found supine with oxygen, " telemetry, peripheral IV, bed alarm, restraints ([place])  upon PT entry to room.    General Precautions: Standard, fall  Orthopedic Precautions:N/A   Braces: N/A  Respiratory Status: Nasal cannula, flow 2.5 L/min    Exams:  Cognitive Exam:  Patient is oriented to Person  Patient demonstrated very slow cognitive delay with commands  Patient required constant redirection with tasks  Patient required repetitive tactile and verbal cueing in order to complete bed mobility and transfers  RLE ROM: WFL  RLE Strength: PT attempted MMT, but patient was unable to participate due to cognitive deficits  LLE ROM: WFL  LLE Strength: PT attempted MMT, but patient was unable to participate due to cognitive deficits    Functional Mobility:  Bed Mobility:     Lethargy inconsistent throughout bed mobility and transfers  Rolling Right: Max A x 2   Tactile cueing required to keep patient aroused and on task  Supine to Sit: Max A x 2  Log roll encouraged   Scooting: Min A  Tactile cues and verbal cues required  Transfers:     Sit to Stand:  Mod A x 2 with rolling walker  Stand to Chair: step transfer Mod A x 2  Balance:   Patient demonstrated fair sitting balance once EOB with CGA  Patient demonstrated poor standing balance with RW     AM-PAC 6 CLICK MOBILITY  Total Score:11     Treatment & Education:  Patient and  educated in:  - role of PT and POC  - risk for falls due to generalized weakness and slowed cognitive processing (Call don't Fall)  - calling for assistance with all needs including calling for transfers and using the restroom    Patient left up in chair with call button in reach and chair alarm on.    GOALS:   Multidisciplinary Problems       Physical Therapy Goals          Problem: Physical Therapy    Goal Priority Disciplines Outcome Interventions   Physical Therapy Goal     PT, PT/OT     Description: LTG goals to be met in 14 days (6/18/25).  Patient will require Min A for bed mobility.  Patient will require Min A  for sit <> stand.  Patient will require Min A for stand <> chair transfer.  Patient will ambulate 200' Min A with RW.   Patient will increase AM-PAC score by 2 points to progress with functional mobility.                       DME Justifications:  No DME recommended requiring DME justifications    History:     Past Medical History:   Diagnosis Date    Acquired TTP     Cataract     CKD stage 3 secondary to diabetes     Closed displaced comminuted fracture of right patella 10/28/2020    Closed fracture of surgical neck of left humerus 10/30/2020    Closed left radial fracture 10/30/2020    Hyperkalemia     Hyperlipidemia     Hypertension     Hypothyroidism, unspecified     Liver cirrhosis secondary to YO     Morbid obesity     Right knee pain     Thyroid disease     Type 2 diabetes mellitus        Past Surgical History:   Procedure Laterality Date    APPENDECTOMY      BREAST BIOPSY      CATARACT EXTRACTION      COLONOSCOPY N/A 12/21/2021    Procedure: COLONOSCOPY screening;  Surgeon: Moises Patterson MD;  Location: [facility];  Service: Endoscopy;  Laterality: N/A;    ESOPHAGOGASTRODUODENOSCOPY N/A 12/21/2021    Procedure: EGD (ESOPHAGOGASTRODUODENOSCOPY) EV screening;  Surgeon: Moises Patterson MD;  Location: [facility];  Service: Endoscopy;  Laterality: N/A;    EYE SURGERY      HERNIA REPAIR      OPEN REDUCTION AND INTERNAL FIXATION (ORIF) OF FRACTURE OF DISTAL RADIUS Left 11/2/2020    Procedure: ORIF, FRACTURE, RADIUS, DISTAL;  Surgeon: Sage Wolf MD;  Location: [facility];  Service: Orthopedics;  Laterality: Left;    OPEN REDUCTION AND INTERNAL FIXATION (ORIF) OF FRACTURE OF PATELLA Right 11/2/2020    Procedure: ORIF, FRACTURE, PATELLA;  Surgeon: Sage Wolf MD;  Location: [facility];  Service: Orthopedics;  Laterality: Right;    OPEN REDUCTION AND INTERNAL FIXATION (ORIF) OF FRACTURE OF PATELLA Right 12/18/2020    Procedure: ORIF, FRACTURE, PATELLA;  Surgeon: Sage Wolf  MD;  Location: [facility] OR;  Service: Orthopedics;  Laterality: Right;    ORIF HUMERUS FRACTURE Left 11/5/2020    Procedure: ORIF, FRACTURE, HUMERUS;  Surgeon: Sage Wolf MD;  Location: [facility] OR;  Service: Orthopedics;  Laterality: Left;    PLACEMENT OF ACELLULAR HUMAN DERMAL ALLOGRAFT Right 11/2/2020    Procedure: APPLICATION, ACELLULAR HUMAN DERMAL ALLOGRAFT;  Surgeon: Sage Wolf MD;  Location: [facility] OR;  Service: Orthopedics;  Laterality: Right;  Right Patella    REMOVAL OF HARDWARE FROM HAND Left 3/11/2021    Procedure: REMOVAL, HARDWARE, HAND;  Surgeon: Sage Wolf MD;  Location: [facility] OR;  Service: Orthopedics;  Laterality: Left;  Removal of dorsal spanning wrist plate left distal radius    REMOVAL OF HARDWARE FROM LOWER EXTREMITY Right 12/18/2020    Procedure: REMOVAL, HARDWARE, LOWER EXTREMITY;  Surgeon: Sage Wolf MD;  Location: [facility];  Service: Orthopedics;  Laterality: Right;       Time Tracking:     PT Received On: 06/04/25  PT Start Time: 1000     PT Stop Time: 1040  PT Total Time (min): 40 min     Billable Minutes: Evaluation 15 and Therapeutic Activity 25 06/04/2025

## 2025-06-04 NOTE — PT/OT/SLP PROGRESS
Occupational Therapy      Patient Name:  Lakshmi Campbell   MRN:  [id number]    OT eval orders received. Chart review completed. Presented to room at 0900 and patient with nurse for PIV placement. Will continue efforts.    Sharlene Allen OT  6/4/2025  0900

## 2025-06-05 PROBLEM — I48.92 ATRIAL FLUTTER: Status: ACTIVE | Noted: 2025-06-05

## 2025-06-05 LAB
ABSOLUTE EOSINOPHIL (OHS): 0.11 K/UL
ABSOLUTE MONOCYTE (OHS): 0.36 K/UL (ref 0.3–1)
ABSOLUTE NEUTROPHIL COUNT (OHS): 3.74 K/UL (ref 1.8–7.7)
ALBUMIN SERPL BCP-MCNC: 1.8 G/DL (ref 3.5–5.2)
ALLENS TEST: ABNORMAL
ALLENS TEST: ABNORMAL
ALP SERPL-CCNC: 71 UNIT/L (ref 40–150)
ALT SERPL W/O P-5'-P-CCNC: 19 UNIT/L (ref 10–44)
ANION GAP (OHS): 13 MMOL/L (ref 8–16)
ANION GAP (OHS): 14 MMOL/L (ref 8–16)
ANION GAP (OHS): 15 MMOL/L (ref 8–16)
APTT PPP: 26.9 SECONDS (ref 21–32)
APTT PPP: 30 SECONDS (ref 21–32)
APTT PPP: 42.3 SECONDS (ref 21–32)
AST SERPL-CCNC: 28 UNIT/L (ref 11–45)
BACTERIA BLD CULT: ABNORMAL
BASOPHILS # BLD AUTO: 0.01 K/UL
BASOPHILS NFR BLD AUTO: 0.2 %
BILIRUB SERPL-MCNC: 0.5 MG/DL (ref 0.1–1)
BUN SERPL-MCNC: 83 MG/DL (ref 8–23)
BUN SERPL-MCNC: 88 MG/DL (ref 8–23)
BUN SERPL-MCNC: 88 MG/DL (ref 8–23)
CALCIUM SERPL-MCNC: 8 MG/DL (ref 8.7–10.5)
CALCIUM SERPL-MCNC: 8.1 MG/DL (ref 8.7–10.5)
CALCIUM SERPL-MCNC: 8.2 MG/DL (ref 8.7–10.5)
CHLORIDE SERPL-SCNC: 99 MMOL/L (ref 95–110)
CO2 SERPL-SCNC: 21 MMOL/L (ref 23–29)
CO2 SERPL-SCNC: 22 MMOL/L (ref 23–29)
CO2 SERPL-SCNC: 22 MMOL/L (ref 23–29)
CREAT SERPL-MCNC: 3.6 MG/DL (ref 0.5–1.4)
CREAT SERPL-MCNC: 3.9 MG/DL (ref 0.5–1.4)
CREAT SERPL-MCNC: 4 MG/DL (ref 0.5–1.4)
DELSYS: ABNORMAL
DELSYS: ABNORMAL
ERYTHROCYTE [DISTWIDTH] IN BLOOD BY AUTOMATED COUNT: 13.5 % (ref 11.5–14.5)
FIO2: 32
FIO2: 32
FLOW: 3
FLOW: 3
GFR SERPLBLD CREATININE-BSD FMLA CKD-EPI: 11 ML/MIN/1.73/M2
GFR SERPLBLD CREATININE-BSD FMLA CKD-EPI: 12 ML/MIN/1.73/M2
GFR SERPLBLD CREATININE-BSD FMLA CKD-EPI: 13 ML/MIN/1.73/M2
GLUCOSE SERPL-MCNC: 164 MG/DL (ref 70–110)
GLUCOSE SERPL-MCNC: 206 MG/DL (ref 70–110)
GLUCOSE SERPL-MCNC: 207 MG/DL (ref 70–110)
GLUCOSE SERPL-MCNC: 228 MG/DL (ref 70–110)
GRAM STN SPEC: ABNORMAL
GRAM STN SPEC: ABNORMAL
HCO3 UR-SCNC: 23.8 MMOL/L (ref 24–28)
HCO3 UR-SCNC: 26.7 MMOL/L (ref 24–28)
HCT VFR BLD AUTO: 32.3 % (ref 37–48.5)
HCT VFR BLD CALC: 28 %PCV (ref 36–54)
HGB BLD-MCNC: 9.8 GM/DL (ref 12–16)
IMM GRANULOCYTES # BLD AUTO: 0.05 K/UL (ref 0–0.04)
IMM GRANULOCYTES NFR BLD AUTO: 1 % (ref 0–0.5)
INR PPP: 1 (ref 0.8–1.2)
LACTATE SERPL-SCNC: 1.4 MMOL/L (ref 0.5–2.2)
LYMPHOCYTES # BLD AUTO: 0.51 K/UL (ref 1–4.8)
MAGNESIUM SERPL-MCNC: 2.1 MG/DL (ref 1.6–2.6)
MCH RBC QN AUTO: 27.4 PG (ref 27–31)
MCHC RBC AUTO-ENTMCNC: 30.3 G/DL (ref 32–36)
MCV RBC AUTO: 90 FL (ref 82–98)
MODE: ABNORMAL
MODE: ABNORMAL
NUCLEATED RBC (/100WBC) (OHS): 0 /100 WBC
PCO2 BLDA: 38 MMHG (ref 35–45)
PCO2 BLDA: 44.4 MMHG (ref 35–45)
PH SMN: 7.39 [PH] (ref 7.35–7.45)
PH SMN: 7.4 [PH] (ref 7.35–7.45)
PLATELET # BLD AUTO: 75 K/UL (ref 150–450)
PMV BLD AUTO: 11.9 FL (ref 9.2–12.9)
PO2 BLDA: 79 MMHG (ref 80–100)
PO2 BLDA: 89 MMHG (ref 80–100)
POC BE: -1 MMOL/L (ref -2–2)
POC BE: 2 MMOL/L (ref -2–2)
POC IONIZED CALCIUM: 1.11 MMOL/L (ref 1.06–1.42)
POC SATURATED O2: 95 % (ref 95–100)
POC SATURATED O2: 97 % (ref 95–100)
POCT GLUCOSE: 159 MG/DL (ref 70–110)
POCT GLUCOSE: 179 MG/DL (ref 70–110)
POCT GLUCOSE: 262 MG/DL (ref 70–110)
POTASSIUM BLD-SCNC: 3.3 MMOL/L (ref 3.5–5.1)
POTASSIUM SERPL-SCNC: 3.4 MMOL/L (ref 3.5–5.1)
POTASSIUM SERPL-SCNC: 3.9 MMOL/L (ref 3.5–5.1)
POTASSIUM SERPL-SCNC: 4.1 MMOL/L (ref 3.5–5.1)
PROT SERPL-MCNC: 7 GM/DL (ref 6–8.4)
PROTHROMBIN TIME: 11.5 SECONDS (ref 9–12.5)
RBC # BLD AUTO: 3.58 M/UL (ref 4–5.4)
RELATIVE EOSINOPHIL (OHS): 2.3 %
RELATIVE LYMPHOCYTE (OHS): 10.7 % (ref 18–48)
RELATIVE MONOCYTE (OHS): 7.5 % (ref 4–15)
RELATIVE NEUTROPHIL (OHS): 78.3 % (ref 38–73)
SAMPLE: ABNORMAL
SAMPLE: ABNORMAL
SITE: ABNORMAL
SITE: ABNORMAL
SODIUM BLD-SCNC: 137 MMOL/L (ref 136–145)
SODIUM SERPL-SCNC: 134 MMOL/L (ref 136–145)
SODIUM SERPL-SCNC: 135 MMOL/L (ref 136–145)
SODIUM SERPL-SCNC: 135 MMOL/L (ref 136–145)
TROPONIN I SERPL DL<=0.01 NG/ML-MCNC: 0.98 NG/ML
WBC # BLD AUTO: 4.78 K/UL (ref 3.9–12.7)

## 2025-06-05 PROCEDURE — 93010 ELECTROCARDIOGRAM REPORT: CPT | Mod: ,,, | Performed by: INTERNAL MEDICINE

## 2025-06-05 PROCEDURE — 85610 PROTHROMBIN TIME: CPT | Performed by: NURSE PRACTITIONER

## 2025-06-05 PROCEDURE — 92523 SPEECH SOUND LANG COMPREHEN: CPT

## 2025-06-05 PROCEDURE — 99900035 HC TECH TIME PER 15 MIN (STAT)

## 2025-06-05 PROCEDURE — 80053 COMPREHEN METABOLIC PANEL: CPT | Performed by: NURSE PRACTITIONER

## 2025-06-05 PROCEDURE — 92610 EVALUATE SWALLOWING FUNCTION: CPT

## 2025-06-05 PROCEDURE — 94640 AIRWAY INHALATION TREATMENT: CPT

## 2025-06-05 PROCEDURE — 87040 BLOOD CULTURE FOR BACTERIA: CPT | Performed by: NURSE PRACTITIONER

## 2025-06-05 PROCEDURE — 97530 THERAPEUTIC ACTIVITIES: CPT

## 2025-06-05 PROCEDURE — 36415 COLL VENOUS BLD VENIPUNCTURE: CPT | Performed by: NURSE PRACTITIONER

## 2025-06-05 PROCEDURE — 83735 ASSAY OF MAGNESIUM: CPT | Performed by: NURSE PRACTITIONER

## 2025-06-05 PROCEDURE — 93005 ELECTROCARDIOGRAM TRACING: CPT

## 2025-06-05 PROCEDURE — 83605 ASSAY OF LACTIC ACID: CPT | Performed by: NURSE PRACTITIONER

## 2025-06-05 PROCEDURE — 25000242 PHARM REV CODE 250 ALT 637 W/ HCPCS: Performed by: STUDENT IN AN ORGANIZED HEALTH CARE EDUCATION/TRAINING PROGRAM

## 2025-06-05 PROCEDURE — 94760 N-INVAS EAR/PLS OXIMETRY 1: CPT

## 2025-06-05 PROCEDURE — 82310 ASSAY OF CALCIUM: CPT | Performed by: NURSE PRACTITIONER

## 2025-06-05 PROCEDURE — 85730 THROMBOPLASTIN TIME PARTIAL: CPT | Performed by: FAMILY MEDICINE

## 2025-06-05 PROCEDURE — 99232 SBSQ HOSP IP/OBS MODERATE 35: CPT | Mod: 25,,, | Performed by: INTERNAL MEDICINE

## 2025-06-05 PROCEDURE — 85025 COMPLETE CBC W/AUTO DIFF WBC: CPT | Performed by: NURSE PRACTITIONER

## 2025-06-05 PROCEDURE — 99232 SBSQ HOSP IP/OBS MODERATE 35: CPT | Mod: NSCH,,, | Performed by: INTERNAL MEDICINE

## 2025-06-05 PROCEDURE — 25000003 PHARM REV CODE 250: Performed by: NURSE PRACTITIONER

## 2025-06-05 PROCEDURE — 63600175 PHARM REV CODE 636 W HCPCS: Performed by: NURSE PRACTITIONER

## 2025-06-05 PROCEDURE — 99222 1ST HOSP IP/OBS MODERATE 55: CPT | Mod: ,,, | Performed by: INTERNAL MEDICINE

## 2025-06-05 PROCEDURE — 27000221 HC OXYGEN, UP TO 24 HOURS

## 2025-06-05 PROCEDURE — 21400001 HC TELEMETRY ROOM

## 2025-06-05 PROCEDURE — 63600175 PHARM REV CODE 636 W HCPCS: Performed by: INTERNAL MEDICINE

## 2025-06-05 PROCEDURE — 63600175 PHARM REV CODE 636 W HCPCS: Performed by: FAMILY MEDICINE

## 2025-06-05 PROCEDURE — 84484 ASSAY OF TROPONIN QUANT: CPT | Performed by: NURSE PRACTITIONER

## 2025-06-05 PROCEDURE — 85730 THROMBOPLASTIN TIME PARTIAL: CPT | Performed by: NURSE PRACTITIONER

## 2025-06-05 PROCEDURE — 97110 THERAPEUTIC EXERCISES: CPT

## 2025-06-05 RX ORDER — HEPARIN SODIUM,PORCINE/D5W 25000/250
0-40 INTRAVENOUS SOLUTION INTRAVENOUS CONTINUOUS
Status: DISCONTINUED | OUTPATIENT
Start: 2025-06-05 | End: 2025-06-09

## 2025-06-05 RX ORDER — POTASSIUM CHLORIDE 7.45 MG/ML
10 INJECTION INTRAVENOUS
Status: DISCONTINUED | OUTPATIENT
Start: 2025-06-05 | End: 2025-06-05

## 2025-06-05 RX ORDER — FUROSEMIDE 10 MG/ML
40 INJECTION INTRAMUSCULAR; INTRAVENOUS ONCE
Status: COMPLETED | OUTPATIENT
Start: 2025-06-05 | End: 2025-06-05

## 2025-06-05 RX ORDER — INSULIN ASPART 100 [IU]/ML
0-5 INJECTION, SOLUTION INTRAVENOUS; SUBCUTANEOUS EVERY 6 HOURS PRN
Status: DISCONTINUED | OUTPATIENT
Start: 2025-06-05 | End: 2025-06-09

## 2025-06-05 RX ORDER — DILTIAZEM HYDROCHLORIDE 5 MG/ML
10 INJECTION INTRAVENOUS ONCE
Status: COMPLETED | OUTPATIENT
Start: 2025-06-05 | End: 2025-06-05

## 2025-06-05 RX ORDER — POTASSIUM CHLORIDE 7.45 MG/ML
10 INJECTION INTRAVENOUS
Status: COMPLETED | OUTPATIENT
Start: 2025-06-05 | End: 2025-06-05

## 2025-06-05 RX ORDER — GLUCAGON 1 MG
1 KIT INJECTION
Status: DISCONTINUED | OUTPATIENT
Start: 2025-06-05 | End: 2025-06-09

## 2025-06-05 RX ORDER — FUROSEMIDE 10 MG/ML
80 INJECTION INTRAMUSCULAR; INTRAVENOUS ONCE
Status: COMPLETED | OUTPATIENT
Start: 2025-06-05 | End: 2025-06-05

## 2025-06-05 RX ADMIN — CLINDAMYCIN PHOSPHATE 600 MG: 600 INJECTION, SOLUTION INTRAVENOUS at 04:06

## 2025-06-05 RX ADMIN — DILTIAZEM HYDROCHLORIDE 10 MG: 5 INJECTION, SOLUTION INTRAVENOUS at 05:06

## 2025-06-05 RX ADMIN — INSULIN ASPART 6 UNITS: 100 INJECTION, SOLUTION INTRAVENOUS; SUBCUTANEOUS at 12:06

## 2025-06-05 RX ADMIN — HEPARIN SODIUM 12 UNITS/KG/HR: 10000 INJECTION, SOLUTION INTRAVENOUS at 09:06

## 2025-06-05 RX ADMIN — CLINDAMYCIN PHOSPHATE 600 MG: 600 INJECTION, SOLUTION INTRAVENOUS at 02:06

## 2025-06-05 RX ADMIN — POTASSIUM CHLORIDE 10 MEQ: 10 INJECTION, SOLUTION INTRAVENOUS at 06:06

## 2025-06-05 RX ADMIN — FUROSEMIDE 40 MG: 10 INJECTION, SOLUTION INTRAVENOUS at 04:06

## 2025-06-05 RX ADMIN — ARFORMOTEROL TARTRATE 15 MCG: 15 SOLUTION RESPIRATORY (INHALATION) at 07:06

## 2025-06-05 RX ADMIN — POTASSIUM CHLORIDE 10 MEQ: 10 INJECTION, SOLUTION INTRAVENOUS at 08:06

## 2025-06-05 RX ADMIN — CLINDAMYCIN PHOSPHATE 600 MG: 600 INJECTION, SOLUTION INTRAVENOUS at 12:06

## 2025-06-05 RX ADMIN — BUDESONIDE INHALATION 0.5 MG: 0.5 SUSPENSION RESPIRATORY (INHALATION) at 07:06

## 2025-06-05 RX ADMIN — FUROSEMIDE 80 MG: 10 INJECTION, SOLUTION INTRAVENOUS at 07:06

## 2025-06-05 RX ADMIN — POTASSIUM CHLORIDE 10 MEQ: 10 INJECTION, SOLUTION INTRAVENOUS at 09:06

## 2025-06-05 RX ADMIN — CEFTRIAXONE 2 G: 2 INJECTION, POWDER, FOR SOLUTION INTRAMUSCULAR; INTRAVENOUS at 09:06

## 2025-06-05 NOTE — ASSESSMENT & PLAN NOTE
Heparin drip- discontinued on 6/4/2025  Consult cardiology  -likely due to demand in the setting of sepsis and CHF   Denies chest pain, but troponin 4.293> 3.542>0.979 with EKG changes noted  -Echo obtained with results reviewed and EF 60%  -Metoprolol per Cardiology

## 2025-06-05 NOTE — ASSESSMENT & PLAN NOTE
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 The patients 3 most recent labs are listed below.  Recent Labs     06/03/25  0533 06/04/25  0622 06/05/25  0449   PLT 72* 75* 75*     Plan  - Will need to stop heparin if any overt bleeding and monitor platelet count closely  -stable- Heparin infusion stopped on 6/4/2025  -Heparin infusion resumed in the setting of Atrial flutter

## 2025-06-05 NOTE — ASSESSMENT & PLAN NOTE
Patient has Diastolic (HFpEF) heart failure that is Chronic. On presentation their CHF was controlled and she was given 1 L fluid bolus due to sepsis. Most recent BNP and echo results are listed below. In the setting of Atrial flutter   Recent Labs     06/02/25  1859   BNP 1,212*     Latest ECHO  Results for orders placed during the hospital encounter of 05/09/25    Echo    Interpretation Summary    Left Ventricle: The left ventricle is normal in size. Mildly increased ventricular mass. Mildly increased wall thickness. There is mild concentric hypertrophy. There is normal systolic function with a visually estimated ejection fraction of 55 - 60%. Grade II diastolic dysfunction.    Right Ventricle: The right ventricle is normal in size Wall thickness is normal. Systolic function is normal.    Left Atrium: The left atrium is dilated    Aortic Valve: There is moderate aortic valve sclerosis. There is moderate stenosis. Aortic valve area by VTI is 1.3 cm². Aortic valve peak velocity is 3.1 m/s. Mean gradient is 22 mmHg. The dimensionless index is 0.45.    Mitral Valve: Mildly thickened subvalvular apparatus. There is severe mitral annular calcification.    IVC/SVC: Normal venous pressure at 3 mmHg.    Current Heart Failure Medications  hydrALAZINE injection 10 mg, Every 6 hours PRN, Intravenous    Plan  - Monitor strict I&Os and daily weights.    - Place on telemetry  - Low sodium diet  - Place on fluid restriction of 1.5 L.   - Cardiology has been consulted  - The patient's volume status is at their baseline  -imaging supports pulmonary congestion - Lasix IV x 1 dose given on 6/4/2025   -will diurese as appropriate

## 2025-06-05 NOTE — ASSESSMENT & PLAN NOTE
Creatine stable for now. BMP reviewed- noted Estimated Creatinine Clearance: 15.8 mL/min (A) (based on SCr of 3.6 mg/dL (H)). according to latest data. Based on current GFR, CKD stage is stage 4 - GFR 15-29.  Monitor UOP and serial BMP and adjust therapy as needed. Renally dose meds. Avoid nephrotoxic medications and procedures.  -Nephrology consulted

## 2025-06-05 NOTE — PLAN OF CARE
Confusion persists and limits her participation in therapy. She required Max A x 2 persons for sit to stand transfer from chair and tolerated standing <2mins. Continue POC.

## 2025-06-05 NOTE — ASSESSMENT & PLAN NOTE
Patient's blood pressure range in the last 24 hours was: BP  Min: 99/53  Max: 142/64.The patient's inpatient anti-hypertensive regimen is listed below:  Current Antihypertensives  hydrALAZINE injection 10 mg, Every 6 hours PRN, Intravenous    Plan  - BP fluctuating, monitor and holding medications due to worsening renal function for now

## 2025-06-05 NOTE — PLAN OF CARE
06/05/25 1101   Post-Acute Status   Post-Acute Authorization Placement   Post-Acute Placement Status Patient List Provided   Discharge Plan   Discharge Plan A Skilled Nursing Facility   Discharge Plan B Skilled Nursing Facility     Patient and family provided list of Skilled facilities.

## 2025-06-05 NOTE — SIGNIFICANT EVENT
Patient continues to have irregular heart rhythm with pauses and 12 lead EKG showed atrial flutter with variable AV block and nonspecific T-wave abnormality, heart rate 108.  When viewing monitor she is having frequent PVCs and has had multiple pauses lasting up to 4 seconds.    Stat labs are still pending results    Recent Labs     06/05/25  0509   PH 7.388   PCO2 44.4   PO2 79*   HCO3 26.7   POCSATURATED 95   BE 2     Potassium per ABG 3.3-- will give IV potassium replacement as she is currently NPO    Reached out to Dr. Brandon (cardiology) regarding EKG changes and he recommended to restart heparin drip and to give IV Cardizem 10 mg.  Orders placed and discussed with nursing staff.

## 2025-06-05 NOTE — PROGRESS NOTES
[facility] - Tele[facility] ([facility])  Cardiology  Progress Note    Patient Name: Lakshmi Campbell  MRN: [id number]  Admission Date: 6/2/2025  Hospital Length of Stay: 3 days  Code Status: Full Code   Attending Physician: Abel Caraballo MD   Primary Care Physician: Keely Silva NP  Expected Discharge Date: 6/7/2025  Principal Problem:Sepsis with encephalopathy    Subjective:     Hospital Course:   6-4-25  monitor HR 90s , EKG back to baseline NSR. BBB is rate related. Echo nml no SWMA, troponin down to 3, probably demand ischemia. Can stop heparin, start low dose b blockers    6-5-25  Pt now in afib/flutter at controlled rate, can restart heparin, some pauses noted but K is low, needs repletion, echo nml EF, continue tx for sepsis    Interval History:     Review of Systems   Constitutional: Negative. Negative for weight gain.   HENT: Negative.     Eyes: Negative.    Cardiovascular: Negative.  Negative for chest pain, leg swelling and palpitations.   Respiratory: Negative.  Negative for shortness of breath.    Endocrine: Negative.    Hematologic/Lymphatic: Negative.    Skin: Negative.    Musculoskeletal:  Negative for muscle weakness.   Gastrointestinal: Negative.    Genitourinary: Negative.    Neurological: Negative.  Negative for dizziness.   Psychiatric/Behavioral: Negative.     Allergic/Immunologic: Negative.    All other systems reviewed and are negative.    Objective:     Vital Signs (Most Recent):  Temp: 97.7 °F (36.5 °C) (06/05/25 0714)  Pulse: 98 (06/05/25 0800)  Resp: 17 (06/05/25 0714)  BP: (!) 120/56 (06/05/25 0714)  SpO2: 96 % (06/05/25 0714) Vital Signs (24h Range):  Temp:  [97.7 °F (36.5 °C)-98.3 °F (36.8 °C)] 97.7 °F (36.5 °C)  Pulse:  [] 98  Resp:  [17-19] 17  SpO2:  [94 %-97 %] 96 %  BP: ()/(53-64) 120/56     Weight: 96.5 kg (212 lb 11.9 oz)  Body mass index is 35.4 kg/m².     SpO2: 96 %         Intake/Output Summary (Last 24 hours) at 6/5/2025 0922  Last data filed at 6/5/2025 0903  Gross  per 24 hour   Intake 200 ml   Output --   Net 200 ml       Lines/Drains/Airways       Peripheral Intravenous Line  Duration                  Peripheral IV - Single Lumen 06/04/25 0333 Left Hand 1 day         Peripheral IV - Single Lumen 06/04/25 0912 22 G Left Antecubital 1 day         Peripheral IV - Single Lumen 06/05/25 0624 20 G Anterior;Right Forearm <1 day                       Physical Exam       Significant Labs: All pertinent lab results from the last 24 hours have been reviewed.    Significant Imaging: X-Ray: CXR: X-Ray Chest 1 View (CXR):   Results for orders placed or performed during the hospital encounter of 06/02/25   X-Ray Chest 1 View    Narrative    EXAM:    XR CHEST 1 VIEW    CLINICAL HISTORY:    Shortness of breath    FINDINGS:  Enlarged heart.  Increased interstitial attenuation concerning for edema.  No pleural fluid or pneumothorax on the right.  Possible small left pleural effusion.        Impression     As above    Finalized on: 6/5/2025 6:35 AM By:  Chepe Barnes MD  [facility]# [id number]      2025-06-05 06:37:56.803     [facility]     Assessment and Plan:     Brief HPI:     NSTEMI (non-ST elevated myocardial infarction)  Pt is a 74 y/u female with PMhx for HTN, HLP, diastolic heart failure, CKD , aortic stenosis admitted for sepsis with encephalopathy.  EKG sinus tachycardia with LBBB at 104 bpm. BNP 1212. CXR (-). Pt discharged about 2 weeks ago for CHF,  Troponin is 4.15    Troponin may be from demand ischemia, continue serial enzymes. Check echo for SWMA. Continue IV heparin  Repeat EKG with controlled rate. CHF appears stable, restart diuretics once more stable from sepsis        VTE Risk Mitigation (From admission, onward)           Ordered     heparin 25,000 units in dextrose 5% (100 units/ml) IV bolus from bag LOW INTENSITY nomogram - [facility]  As needed (PRN)        Question:  Heparin Infusion Adjustment (DO NOT MODIFY ANSWER)  Answer:  [email or web address]\epic\Images\Pharmacy\HeparinInfusions\heparin  LOW INTENSITY nomogram for [facility] [id number]    06/05/25 0541     heparin 25,000 units in dextrose 5% (100 units/ml) IV bolus from bag LOW INTENSITY nomogram - [facility]  As needed (PRN)        Question:  Heparin Infusion Adjustment (DO NOT MODIFY ANSWER)  Answer:  [email or web address]\epic\Images\Pharmacy\HeparinInfusions\heparin LOW INTENSITY nomogram for [facility] [id number]    06/05/25 0541     heparin 25,000 units in dextrose 5% 250 mL (100 units/mL) infusion LOW INTENSITY nomogram - [facility]  Continuous        Question:  Begin at (units/kg/hr)  Answer:  12 06/05/25 0541                    Shayne Brandon MD  Cardiology  [facility] - Telemetry ([facility])

## 2025-06-05 NOTE — ASSESSMENT & PLAN NOTE
-IV antibiotics transitioned to Rocephin and Clindamycin   -blood cultures grew gram Positive cocci in chains resembling Strep   -repeat blood cultures obtained  Echo obtained with results reviewed    -Infectious Disease consulted

## 2025-06-05 NOTE — SUBJECTIVE & OBJECTIVE
Interval History: pt sitting up in chair upon exam and oriented to person/place but did not answer questions about time. Potassium replaced. NPO continued post speech evaluation. A flutter (controlled) on monitor. Heparin infusion continued. BUN/Cr trending upward. Blood cultures obtained.     Review of Systems   Constitutional:  Positive for activity change.   HENT: Negative.     Respiratory:  Positive for shortness of breath. Negative for wheezing.    Cardiovascular:  Positive for leg swelling.   Gastrointestinal: Negative.    Genitourinary: Negative.    Skin:  Positive for color change and wound.   Neurological:  Positive for weakness.   Psychiatric/Behavioral:  Positive for confusion.      Objective:     Vital Signs (Most Recent):  Temp: 98.2 °F (36.8 °C) (06/05/25 1152)  Pulse: 92 (06/05/25 1152)  Resp: 18 (06/05/25 1152)  BP: (!) 109/59 (06/05/25 1152)  SpO2: 96 % (06/05/25 1152) Vital Signs (24h Range):  Temp:  [97.7 °F (36.5 °C)-98.3 °F (36.8 °C)] 98.2 °F (36.8 °C)  Pulse:  [] 92  Resp:  [17-19] 18  SpO2:  [94 %-97 %] 96 %  BP: ()/(53-64) 109/59     Weight: 96.5 kg (212 lb 11.9 oz)  Body mass index is 35.4 kg/m².    Intake/Output Summary (Last 24 hours) at 6/5/2025 1205  Last data filed at 6/5/2025 0903  Gross per 24 hour   Intake 200 ml   Output --   Net 200 ml         Physical Exam  HENT:      Mouth/Throat:      Mouth: Mucous membranes are dry.      Pharynx: Oropharynx is clear.   Cardiovascular:      Rate and Rhythm: Normal rate. Rhythm irregular.   Pulmonary:      Effort: Tachypnea present.      Breath sounds: Decreased air movement present. Examination of the right-lower field reveals decreased breath sounds. Examination of the left-lower field reveals decreased breath sounds. Decreased breath sounds present.      Comments: Decreased bibasilar   Abdominal:      General: Bowel sounds are normal. There is no distension.      Palpations: Abdomen is soft.   Musculoskeletal:      Right lower leg:  Edema present.   Skin:     Findings: Bruising, erythema (RLE) and lesion present.      Comments: Multiple abrasions to extremities. Pressure ulcer to buttocks. Dermatitis to pannis   Neurological:      Mental Status: She is alert and oriented to person, place, and time.   Psychiatric:         Mood and Affect: Mood normal.               Significant Labs: All pertinent labs within the past 24 hours have been reviewed.    Significant Imaging: I have reviewed all pertinent imaging results/findings within the past 24 hours.

## 2025-06-05 NOTE — PROGRESS NOTES
"[facility]  [facility]    Patient Name: Lakshmi Campbell  MRN: [id number]  Patient Class: IP- Inpatient   Admission Date: 6/2/2025  Length of Stay: 3 days  Attending Physician: Abel Caraballo MD  Primary Care Provider: Keely Silva NP      Subjective     Principal Problem:Sepsis with encephalopathy      HPI:   Patient is a 74-year-old female with past medical history significant for type 2 insulin dependent diabetes mellitus, CKD stage 4, hypertension, hypothyroidism, liver cirrhosis secondary to YO, diastolic heart failure, aortic stenosis, hyperkalemia, thrombocytopenia, cataracts, hyperlipidemia, and multiple previous fractures as well as recent admit from  05/09/2025 through 5/15/2025 due to CHF exacerbation, pneumonia, hypoxemic respiratory failure, and UTI (treated with Merrem followed by cephalexin as well as IV Lasix, Duonebs and IV Solu-Medrol -- had to be discharged with home O2 which she has been using while sleeping. She presented to [facility] ED with complaint "not feeling well." Her  reports that she has been disoriented/confused and having incontinent episodes. He is very poor historian and patient is confused, so information mainly obtained via chart review. She did say yes when asked if dizzy/lightheaded/weak. She has had decreased activity and decreased appetite. while in ED she had temperature up to 102.7, tachycardia up to 115, blood pressure as high as 180/81 and SpO2 at one point down to 82% and was placed on nasal cannula at 2 L. lab workup showed WBC 5.64, hemoglobin 15.3, hematocrit 47.6, platelets 54, PT 12.8, INR 1.1, PTT 27.5, sodium 140, potassium 3.5, chloride 99, CO2 24, anion gap 17, BUN 53, creatinine 3.1, glucose 271, calcium 8.4, magnesium 1.7, alk-phos 82, albumin 2.2, AST 34, ALT 21,  BNP 12 12, troponin 4.293, lactic acid 3.0, procalcitonin 19.94, UA with no evidence of infection, blood cultures x2 pending.  CT head without " contrast was done and there was no acute intracranial findings.  Chest x-ray shows pulmonary venous congestion, similar to prior with left retrocardiac atelectasis versus consolidation.  X-ray of right tibia /fibula shows no acute bone or joint abnormality, does note edema within  subcutaneous tissues, no soft tissue gas.  While in ED, she was given acetaminophen, IV cefepime, LR fluid bolus x1 L, IV vancomycin, and started on heparin drip. Hospital Medicine was consulted for admission due to sepsis, cellulitis, encephalopathy, worsening renal failure, and NSTEMI.    Overview/Hospital Course:  Patient admitted to Hospital Medicine for Sepsis in the setting of Cellulitis. IV antibiotics continued. Blood cultures obtained with PCR positive for Streptococcus pyogenes (Group A). Cefepime continued and blood cultures repeated. ID consulted. Encephalopathy resolved. Troponin elevated and Heparin infusion initiated- Cardiology consulted. Hx of CHF noted with imaging supportive of pulmonary venous congestion, similar to prior. Left retrocardiac atelectasis or consolidation. BNP elevated- will initiate diuretic once stable. H/H stable. Lactic acid 3> 2.2. Procalcitonin elevated. Supplemental oxygen continued as needed. PT/OT evaluation pending. On 6/4/2025, mild confusion overnight resolved spontaneously and patient remained alert and oriented during the day. Potassium replaced and Magnesium stable with increased oral intake encouraged. Imaging supports pulmonary vascular congestion perihilar interstitial changes with small bilateral pleural effusions. Lasix given. Troponin trended down and Heparin infusion stopped with Metoprolol initiated per Cardiology. BUN/Cr trended upward. PT/OT evaluation completed with moderate intensity therapy recommended. CM consulted to assist with discharge planning. Antibiotics adjusted to Rocephin and Clindamycin per ID. Repeat blood cultures to be obtained in am. On 6/5/2025, atrial flutter  controlled on cardiac monitor and Heparin infusion resumed per Cardiology. Potassium replaced. Speech therapy consulted for evaluation due to difficulty swallowing and NPO status continued. H/H stable. Kidney function worsening noted.  Intermittent confusion at night noted.    Interval History: pt sitting up in chair upon exam and oriented to person/place but did not answer questions about time. Potassium replaced. NPO continued post speech evaluation. A flutter (controlled) on monitor. Heparin infusion continued. BUN/Cr trending upward. Blood cultures obtained.     Review of Systems   Constitutional:  Positive for activity change.   HENT: Negative.     Respiratory:  Positive for shortness of breath. Negative for wheezing.    Cardiovascular:  Positive for leg swelling.   Gastrointestinal: Negative.    Genitourinary: Negative.    Skin:  Positive for color change and wound.   Neurological:  Positive for weakness.   Psychiatric/Behavioral:  Positive for confusion.      Objective:     Vital Signs (Most Recent):  Temp: 98.2 °F (36.8 °C) (06/05/25 1152)  Pulse: 92 (06/05/25 1152)  Resp: 18 (06/05/25 1152)  BP: (!) 109/59 (06/05/25 1152)  SpO2: 96 % (06/05/25 1152) Vital Signs (24h Range):  Temp:  [97.7 °F (36.5 °C)-98.3 °F (36.8 °C)] 98.2 °F (36.8 °C)  Pulse:  [] 92  Resp:  [17-19] 18  SpO2:  [94 %-97 %] 96 %  BP: ()/(53-64) 109/59     Weight: 96.5 kg (212 lb 11.9 oz)  Body mass index is 35.4 kg/m².    Intake/Output Summary (Last 24 hours) at 6/5/2025 1205  Last data filed at 6/5/2025 0903  Gross per 24 hour   Intake 200 ml   Output --   Net 200 ml         Physical Exam  HENT:      Mouth/Throat:      Mouth: Mucous membranes are dry.      Pharynx: Oropharynx is clear.   Cardiovascular:      Rate and Rhythm: Normal rate. Rhythm irregular.   Pulmonary:      Effort: Tachypnea present.      Breath sounds: Decreased air movement present. Examination of the right-lower field reveals decreased breath sounds.  Examination of the left-lower field reveals decreased breath sounds. Decreased breath sounds present.      Comments: Decreased bibasilar   Abdominal:      General: Bowel sounds are normal. There is no distension.      Palpations: Abdomen is soft.   Musculoskeletal:      Right lower leg: Edema present.   Skin:     Findings: Bruising, erythema (RLE) and lesion present.      Comments: Multiple abrasions to extremities. Pressure ulcer to buttocks. Dermatitis to pannis   Neurological:      Mental Status: She is alert and oriented to person, place, and time.   Psychiatric:         Mood and Affect: Mood normal.               Significant Labs: All pertinent labs within the past 24 hours have been reviewed.    Significant Imaging: I have reviewed all pertinent imaging results/findings within the past 24 hours.      Assessment & Plan  Sepsis with encephalopathy  This patient does have evidence of infective focus  My overall impression is sepsis with Acute kidney injury, Acute heart failure, Encephalopathy, and Thrombocytopenia .  Source: Skin and Soft Tissue Infection: Cellulitis  Antibiotics given-   Antibiotics (72h ago, onward)      Start     Stop Route Frequency Ordered    06/04/25 0915  cefTRIAXone injection 2 g         -- IV Every 24 hours (non-standard times) 06/04/25 0722    06/04/25 0900  clindamycin in D5W 600 mg/50 mL IVPB 600 mg         06/07/25 0859 IV Every 8 hours (non-standard times) 06/04/25 0755          Latest lactate reviewed-  Recent Labs   Lab 06/05/25  0633   LACTATE 1.4     Organ dysfunction indicated by Acute kidney injury, Encephalopathy, and Thrombocytopenia     Fluid challenge Contraindicated- Fluid bolus is contraindicated in this patient due to Congestive Heart Failure     Post- resuscitation assessment Yes - I attest a sepsis perfusion exam was performed within 6 hours of sepsis, severe sepsis, or septic shock presentation, following fluid resuscitation.      Will Not start Pressors  Source  control achieved by: Broad spectrum antibiotics  6/3/2025- encephalopathy improved - blood cultures with PCR + -ID consulted- will obtain repeat cultures   -6/4/2025- antibiotics transitioned to Rocephin and Clindamycin- ID following- repeat blood cultures in am   -6/5/2025- antibiotics continued- repeat blood cultures obtained   Type 2 diabetes mellitus with microalbuminuria, with long-term current use of insulin  Serial glucose checks   SSI ordered  -Lantus held in the setting of NPO status    Hypothyroidism  Resume Synthroid    Essential hypertension  Patient's blood pressure range in the last 24 hours was: BP  Min: 99/53  Max: 142/64.The patient's inpatient anti-hypertensive regimen is listed below:  Current Antihypertensives  hydrALAZINE injection 10 mg, Every 6 hours PRN, Intravenous    Plan  - BP fluctuating, monitor and holding medications due to worsening renal function for now  Class 2 severe obesity due to excess calories with serious comorbidity and body mass index (BMI) of 36.0 to 36.9 in adult  Body mass index is 35.4 kg/m². Morbid obesity complicates all aspects of disease management from diagnostic modalities to treatment. Weight loss encouraged and health benefits explained to patient.       Thrombocytopenia  The likely etiology of thrombocytopenia is chronic ITP. The patients 3 most recent labs are listed below.  Recent Labs     06/03/25  0533 06/04/25  0622 06/05/25  0449   PLT 72* 75* 75*     Plan  - Will need to stop heparin if any overt bleeding and monitor platelet count closely  -stable- Heparin infusion stopped on 6/4/2025  -Heparin infusion resumed in the setting of Atrial flutter   Chronic diastolic congestive heart failure  Patient has Diastolic (HFpEF) heart failure that is Chronic. On presentation their CHF was controlled and she was given 1 L fluid bolus due to sepsis. Most recent BNP and echo results are listed below. In the setting of Atrial flutter   Recent Labs     06/02/25  0858    BNP 1,212*     Latest ECHO  Results for orders placed during the hospital encounter of 05/09/25    Echo    Interpretation Summary    Left Ventricle: The left ventricle is normal in size. Mildly increased ventricular mass. Mildly increased wall thickness. There is mild concentric hypertrophy. There is normal systolic function with a visually estimated ejection fraction of 55 - 60%. Grade II diastolic dysfunction.    Right Ventricle: The right ventricle is normal in size Wall thickness is normal. Systolic function is normal.    Left Atrium: The left atrium is dilated    Aortic Valve: There is moderate aortic valve sclerosis. There is moderate stenosis. Aortic valve area by VTI is 1.3 cm². Aortic valve peak velocity is 3.1 m/s. Mean gradient is 22 mmHg. The dimensionless index is 0.45.    Mitral Valve: Mildly thickened subvalvular apparatus. There is severe mitral annular calcification.    IVC/SVC: Normal venous pressure at 3 mmHg.    Current Heart Failure Medications  hydrALAZINE injection 10 mg, Every 6 hours PRN, Intravenous    Plan  - Monitor strict I&Os and daily weights.    - Place on telemetry  - Low sodium diet  - Place on fluid restriction of 1.5 L.   - Cardiology has been consulted  - The patient's volume status is at their baseline  -imaging supports pulmonary congestion - Lasix IV x 1 dose given on 6/4/2025   -will diurese as appropriate     Chronic kidney disease, stage 4 (severe)  Creatine stable for now. BMP reviewed- noted Estimated Creatinine Clearance: 15.8 mL/min (A) (based on SCr of 3.6 mg/dL (H)). according to latest data. Based on current GFR, CKD stage is stage 4 - GFR 15-29.  Monitor UOP and serial BMP and adjust therapy as needed. Renally dose meds. Avoid nephrotoxic medications and procedures.  -Nephrology consulted   Mild persistent asthma without complication  Continue home inhaler- symbicort  PRN Duo-nebs ordered    Cellulitis of right lower extremity  Broad spectrum antibiotics  ordered  wound care consulted     NSTEMI (non-ST elevated myocardial infarction)  Heparin drip- discontinued on 6/4/2025  Consult cardiology  -likely due to demand in the setting of sepsis and CHF   Denies chest pain, but troponin 4.293> 3.542>0.979 with EKG changes noted  -Echo obtained with results reviewed and EF 60%  -Metoprolol per Cardiology     Streptococcal bacteremia  -IV antibiotics transitioned to Rocephin and Clindamycin   -blood cultures grew gram Positive cocci in chains resembling Strep   -repeat blood cultures obtained  Echo obtained with results reviewed    -Infectious Disease consulted     Atrial flutter  -EKG showed Atrial flutter with variable A-V block (controlled) -Nonspecific T wave abnormality -Abnormal ECG   -Cardiology following   -Heparin infusion resumed   -Cardizem 10 mg x 1 dose given   -Potassium of 3.4 replaced - repeat BMP pending     VTE Risk Mitigation (From admission, onward)           Ordered     heparin 25,000 units in dextrose 5% (100 units/ml) IV bolus from bag LOW INTENSITY nomogram - [facility]  As needed (PRN)        Question:  Heparin Infusion Adjustment (DO NOT MODIFY ANSWER)  Answer:  \\[email or web address]\epic\Images\Pharmacy\HeparinInfusions\heparin LOW INTENSITY nomogram for [facility] [id number].pdf    06/05/25 0541     heparin 25,000 units in dextrose 5% (100 units/ml) IV bolus from bag LOW INTENSITY nomogram - [facility]  As needed (PRN)        Question:  Heparin Infusion Adjustment (DO NOT MODIFY ANSWER)  Answer:  \[email or web address]\epic\Images\Pharmacy\HeparinInfusions\heparin LOW INTENSITY nomogram for [facility] [id number].pdf    06/05/25 0541     heparin 25,000 units in dextrose 5% 250 mL (100 units/mL) infusion LOW INTENSITY nomogram - [facility]  Continuous        Question:  Begin at (units/kg/hr)  Answer:  12    06/05/25 0541                    Discharge Planning   KYA: 6/9/2025     Code Status: Full Code   Medical Readiness for Discharge Date:   Discharge Plan A: Skilled Nursing Facility                Please  place Justification for DME        Ceci Pelaez NP  Department of Hospital Medicine   [facility] - Telemetry ([facility])

## 2025-06-05 NOTE — PT/OT/SLP EVAL
"Speech Language Pathology Evaluation  Cognitive/Bedside Swallow    Patient Name:  Lakshmi Campbell   MRN:  [id number]  Admitting Diagnosis: Sepsis with encephalopathy    Recommendations:                  General Recommendations:  Ongoing bedside swallow evaluation  Diet recommendations:  NPO, NPO   Aspiration Precautions: Frequent oral care and Strict aspiration precautions   General Precautions: Standard, aspiration  Communication strategies:  provide increased time to answer    Assessment:     Lakshmi Campbell is a 74 y.o. female with a PMHx significant for CKD stage 4, cirrhosis s/t YO, and multiple previous fractures with recent admission from 5/9-5/15 for CHF, PNA, and UTI. Pt presented to the ED s/t "not feeling well" and AMS;  was also poor historian. At bedside pt is sitting up in chair w family present. Pt had episode of "choking" on PM meds last night resulting in bout of emesis. Pt w bilateral soft mittens in place; she was restless, easily distracted, and increasingly agitated towards end of sessions. Pt presents w functional OM. Cognitive-linguistic deficits appreciated. CSE significant for intermittent overt s/s of aspiration/dysphagia w swallow negatively impacted by cognitive deficits and deficits in JAIRO. ST recommends pt to remain NPO at this time with ongoing assessment.     History:     Past Medical History:   Diagnosis Date    Acquired TTP     Cataract     CKD stage 3 secondary to diabetes     Closed displaced comminuted fracture of right patella 10/28/2020    Closed fracture of surgical neck of left humerus 10/30/2020    Closed left radial fracture 10/30/2020    Hyperkalemia     Hyperlipidemia     Hypertension     Hypothyroidism, unspecified     Liver cirrhosis secondary to YO     Morbid obesity     Right knee pain     Thyroid disease     Type 2 diabetes mellitus        Past Surgical History:   Procedure Laterality Date    APPENDECTOMY      BREAST BIOPSY      CATARACT EXTRACTION      " COLONOSCOPY N/A 12/21/2021    Procedure: COLONOSCOPY screening;  Surgeon: Moises Patterson MD;  Location: [facility];  Service: Endoscopy;  Laterality: N/A;    ESOPHAGOGASTRODUODENOSCOPY N/A 12/21/2021    Procedure: EGD (ESOPHAGOGASTRODUODENOSCOPY) EV screening;  Surgeon: Moises Patterson MD;  Location: [facility] ENDO;  Service: Endoscopy;  Laterality: N/A;    EYE SURGERY      HERNIA REPAIR      OPEN REDUCTION AND INTERNAL FIXATION (ORIF) OF FRACTURE OF DISTAL RADIUS Left 11/2/2020    Procedure: ORIF, FRACTURE, RADIUS, DISTAL;  Surgeon: Sage Wolf MD;  Location: [facility] OR;  Service: Orthopedics;  Laterality: Left;    OPEN REDUCTION AND INTERNAL FIXATION (ORIF) OF FRACTURE OF PATELLA Right 11/2/2020    Procedure: ORIF, FRACTURE, PATELLA;  Surgeon: Sage Wolf MD;  Location: [facility] OR;  Service: Orthopedics;  Laterality: Right;    OPEN REDUCTION AND INTERNAL FIXATION (ORIF) OF FRACTURE OF PATELLA Right 12/18/2020    Procedure: ORIF, FRACTURE, PATELLA;  Surgeon: Sage Wolf MD;  Location: [facility] OR;  Service: Orthopedics;  Laterality: Right;    ORIF HUMERUS FRACTURE Left 11/5/2020    Procedure: ORIF, FRACTURE, HUMERUS;  Surgeon: Sage Wolf MD;  Location: [facility] OR;  Service: Orthopedics;  Laterality: Left;    PLACEMENT OF ACELLULAR HUMAN DERMAL ALLOGRAFT Right 11/2/2020    Procedure: APPLICATION, ACELLULAR HUMAN DERMAL ALLOGRAFT;  Surgeon: Sage Wolf MD;  Location: [facility] OR;  Service: Orthopedics;  Laterality: Right;  Right Patella    REMOVAL OF HARDWARE FROM HAND Left 3/11/2021    Procedure: REMOVAL, HARDWARE, HAND;  Surgeon: Sage Wolf MD;  Location: [facility] OR;  Service: Orthopedics;  Laterality: Left;  Removal of dorsal spanning wrist plate left distal radius    REMOVAL OF HARDWARE FROM LOWER EXTREMITY Right 12/18/2020    Procedure: REMOVAL, HARDWARE, LOWER EXTREMITY;  Surgeon: Sage Wolf MD;  Location: [facility] OR;  Service:  Orthopedics;  Laterality: Right;       Social History: Patient lives with her  in [place], [place].    Prior Intubation HX:  NA this admission    Modified Barium Swallow: NA per chart review    EXAM:    XR CHEST 1 VIEW on 06/05/2025:     FINDINGS:  Enlarged heart.  Increased interstitial attenuation concerning for edema.  No pleural fluid or pneumothorax on the right.  Possible small left pleural effusion.     Impression:   As above     Finalized on: 6/5/2025 6:35 AM By:  Chepe Barnes MD  [facility]# [id number]      2025-06-05 06:37:[id number]     [facility]    EXAM:  CT HEAD WITHOUT CONTRAST on 6/5/2025:     FINDINGS:  No intracranial hemorrhage is identified.  No acute intracranial findings.  The calvarium is intact.  The visualized paranasal sinuses are clear.     Impression:   No acute intracranial findings.    Finalized on: 6/2/2025 6:45 PM By:  Albaro Whittaker MD  [facility]# [id number]      2025-06-02 18:47:55.006     [facility]    Prior diet: family endorses pt consuming regular diet prior to admission.    Subjective     Pt seen seated in bedside chair w family present for ST eval. Bilateral soft mittens in place. Pt restless.  Patient goals: return to PLOF     Respiratory Status: Nasal cannula; see chart    Objective:     Cognitive Status:    Pt oriented to self with attention and memory deficits.       Receptive Language:   Comprehension:   Intermittently able to follow simple directions for OME. Breakdowns with increasing complexity.     Pragmatics:    Flat affect, distract able, increasing agitation throughout    Expressive Language:  Verbal:    Able to verbalize acute wants/needs. Intermittent off topic utterances, fluent speech.       Motor Speech:  WFL    Voice:   Quality Hoarse and with pitch breaks. Family reports this is not baseline.  endorses chronic TC.    Oral Musculature Evaluation  Oral Musculature: WFL  Dentition: present and adequate  Secretion Management: adequate  Mucosal Quality: good  Mandibular Strength  and Mobility: WFL  Oral Labial Strength and Mobility: WFL  Lingual Strength and Mobility: WFL  Velar Elevation: WFL  Buccal Strength and Mobility: WFL  Volitional Cough: present  Volitional Swallow: appreciated  Voice Prior to PO Intake: clear; hoarse w intermittent pitch breaks    Bedside Swallow Eval:     Clinical Swallow Examination:   Of note, patient was fed by SLP throughout evaluation. Patient presented with:     CONSISTENCY  NOTES   THIN (IDDSI 0) Cup/straw sips   Intermittent TC and coughing post deglutition. Unable to report if globus sensation present.      PUREE (IDDSI 4/Extremely Thick)   TSP/TBSP bites of pudding Oral holding, delayed initiation. TC post deglutition.    SOLID (IDDSI 7/Regular) Bite of Argenis Doone cookie    Deferred s/t safety concerns and pt cognitive deficits not appropriate.      Thickened liquids were not used in this assessment. Jethro (2018) reported that thickened liquids have no sound evidence at reducing the risk of pneumonia in patients with dysphagia and can cause harm by increasing their risk of dehydration. It also presents an increased risk of UTI, electrolyte imbalance, constipation, fecal impaction, cognitive impairment, functional decline and even death (Langmore, 2002; Shruthi, 2016).  Thickened liquids are associated with risks including dehydration, increased pharyngeal residue, potential interference with medication absorption, and decreased quality of life (Chanell, 2013). Thickened liquids are also more likely to be silently aspirated than thin liquids (Russell et al., 2018). This supports the assertion that we should confirm a patient requires thickened liquids with an instrumental swallow study prior to recommending them.    References:   Chanell MARSHALL (2013). Thickening agents used for dysphagia management: Effect on bioavailability of water, medication and feelings of satiety. Nutrition Journal, 12, 54. https://doi.org/10.1186/2798-9054-59-54    ROLANDO Wells,  WALTER Vera, DOYLE Cooley, & ROLANDO Prajapati (2018). Cough response to aspiration in thin and thick fluids during FEES in hospitalized inpatients. International journal of language & communication disorders, 53(5), 909-918. https://doi.org/10.1111/4339-9195.20166    INTERPRETATION AND RISK ASSESSMENT:  Clinical swallow evaluation (CSE) revealed oral phase characterized by lingual, labial, and buccal strength and range of motion adequate for lip closure, bolus preparation and propulsion. The patient had no anterior loss of the bolus with complete closure of the lips around the utensils. Patient with intermittent overt clinical sign/symptoms of aspiration. Contributing risk factors for dysphagia include deficits in alertness and cognitive deficits.    Goals:   Multidisciplinary Problems       SLP Goals          Problem: SLP    Goal Priority Disciplines Outcome   SLP Goal     SLP    Description: TPW engage in ongoing CSE.     TPW safely consume least restrictive PO diet.                        Plan:     Patient to be seen:  2 x/week, 3 x/week   Plan of Care expires:  06/12/25  Plan of Care reviewed with:  patient, spouse, family   SLP Follow-Up:  Yes       Discharge recommendations:  Therapy Intensity Recommendations at Discharge: Moderate Intensity Therapy   Barriers to Discharge:  None    Time Tracking:     SLP Treatment Date:   06/05/25  Speech Start Time:  0940  Speech Stop Time:  0955     Speech Total Time (min):  15 min    Billable Minutes: Eval 6  and Eval Swallow and Oral Function 9    Mary Interiano MA, L-SLP, CCC-SLP  Speech Language Pathologist  06/05/2025

## 2025-06-05 NOTE — PT/OT/SLP PROGRESS
"Occupational Therapy   Treatment    Name: Lakshmi Campbell  MRN: [id number]  Admitting Diagnosis:  Sepsis with encephalopathy       Recommendations:     Discharge Recommendations: Moderate Intensity Therapy  Discharge Equipment Recommendations:  to be determined by next level of care  Barriers to discharge:  Decreased caregiver support    Assessment:     Lakshmi Campbell is a 74 y.o. female with a medical diagnosis of Sepsis with encephalopathy.  She presents with Confusion which limits her participation in therapy. She required Max A x 2 persons for sit to stand transfer from chair and tolerated standing <2mins. Continue POC. Performance deficits affecting function are weakness, impaired endurance, impaired self care skills, impaired functional mobility, gait instability, impaired balance, impaired cognition, decreased coordination, decreased upper extremity function, decreased lower extremity function, decreased safety awareness, edema, impaired skin.     Rehab Prognosis:  Fair; patient would benefit from acute skilled OT services to address these deficits and reach maximum level of function.       Plan:     Patient to be seen 2 x/week to address the above listed problems via self-care/home management, therapeutic activities, therapeutic exercises  Plan of Care Expires: 06/18/25  Plan of Care Reviewed with: patient, family    Subjective     Chief Complaint: "I want these off." (Referring to mitten restraints)  Patient/Family Comments/goals: To return to baseline mentation; maximize independence with ADLs/ambulation.   Pain/Comfort:  Pain Rating 1: 0/10    Objective:     Communicated with: nurse prior to session.  Patient found up in chair with peripheral IV, oxygen, telemetry, chair check, restraints (mittens) upon OT entry to room.    General Precautions: Standard, fall    Orthopedic Precautions:N/A  Braces: N/A  Respiratory Status: Nasal cannula, flow 3 L/min     Occupational Performance:     Bed Mobility:    Not tested " as patient was seated in chair upon arrival.     Functional Mobility/Transfers:  Patient completed Sit <> Stand Transfer with maximal assistance and of 2 persons  with  hand-held assist   Required several attempts to stand including verbal cuing, forward rocking to build momentum, tactile cuing posteriorly, and physical assist.     Activities of Daily Living:  Footwear: Maximal assist to don/doff R sock      AMPAC 6 Click ADL: 8    Treatment & Education:  Patient unable to follow commands to raise arms to don gait belt without maximal verbal cuing for attention and handover hand assist. Several attempts to stand (see above). UB resistance exercises performed to BUE requiring similar cuing. 10 x elbow flexion / extension performed with inc time.   R foot noted to have erythema and edema. RN notified.     Patient left up in chair with all lines intact, call button in reach, chair alarm on, nurse notified, and family present    GOALS:   Multidisciplinary Problems       Occupational Therapy Goals          Problem: Occupational Therapy    Goal Priority Disciplines Outcome Interventions   Occupational Therapy Goal     OT, PT/OT Ongoing    Description: Goals to be met by: 06/18/25     Patient will increase functional independence with ADLs by performing:    Toileting from toilet with Minimal Assistance for hygiene and clothing management.   Stand pivot transfers with Contact Guard Assistance.  Increased functional strength by 1/2 MMT grade.                          DME Justifications:  No DME recommended requiring DME justifications    Time Tracking:     OT Date of Treatment: 06/05/25  OT Start Time: 0957  OT Stop Time: 1023  OT Total Time (min): 26 min    Billable Minutes:Therapeutic Activity 15  Therapeutic Exercise 11    OT/ELEUTERIO: OT     Number of ELEUTERIO visits since last OT visit: 0    6/5/2025

## 2025-06-05 NOTE — PLAN OF CARE
Patient's cognitive deficits continue to limit progress with therapy. Patient's bed mob, transfers, and static standing with RW were Mod A x 2. All bed mobility and transfers required verbal and tactile cueing to keep patient cognitively on task.

## 2025-06-05 NOTE — ASSESSMENT & PLAN NOTE
-EKG showed Atrial flutter with variable A-V block (controlled) -Nonspecific T wave abnormality -Abnormal ECG   -Cardiology following   -Heparin infusion resumed   -Cardizem 10 mg x 1 dose given   -Potassium of 3.4 replaced - repeat BMP pending

## 2025-06-05 NOTE — CONSULTS
Nephrology Consultation  Consulting Physician:    Reason for consultation:    Informants:      History of Present Illness   The patient is a 74 y.o. female with PMhx for CKD 4, followed by Dr. Pratt, HTN, HLP, diastolic heart failure, aortic stenosis admitted for sepsis with encephalopathy.  Blood cx positive for strep. She is on abx. Her Scr bumped to 3.9 from BL of 3.0 and renal consulted.       PMHx:    Past Medical History:   Diagnosis Date    Acquired TTP     Cataract     CKD stage 3 secondary to diabetes     Closed displaced comminuted fracture of right patella 10/28/2020    Closed fracture of surgical neck of left humerus 10/30/2020    Closed left radial fracture 10/30/2020    Hyperkalemia     Hyperlipidemia     Hypertension     Hypothyroidism, unspecified     Liver cirrhosis secondary to YO     Morbid obesity     Right knee pain     Thyroid disease     Type 2 diabetes mellitus          PSHx:  Past Surgical History:   Procedure Laterality Date    APPENDECTOMY      BREAST BIOPSY      CATARACT EXTRACTION      COLONOSCOPY N/A 12/21/2021    Procedure: COLONOSCOPY screening;  Surgeon: Moises Patterson MD;  Location: [facility];  Service: Endoscopy;  Laterality: N/A;    ESOPHAGOGASTRODUODENOSCOPY N/A 12/21/2021    Procedure: EGD (ESOPHAGOGASTRODUODENOSCOPY) EV screening;  Surgeon: Moises Patterson MD;  Location: [facility];  Service: Endoscopy;  Laterality: N/A;    EYE SURGERY      HERNIA REPAIR      OPEN REDUCTION AND INTERNAL FIXATION (ORIF) OF FRACTURE OF DISTAL RADIUS Left 11/2/2020    Procedure: ORIF, FRACTURE, RADIUS, DISTAL;  Surgeon: Sage Wolf MD;  Location: [facility];  Service: Orthopedics;  Laterality: Left;    OPEN REDUCTION AND INTERNAL FIXATION (ORIF) OF FRACTURE OF PATELLA Right 11/2/2020    Procedure: ORIF, FRACTURE, PATELLA;  Surgeon: Sage Wolf MD;  Location: [facility] OR;  Service: Orthopedics;  Laterality: Right;    OPEN REDUCTION AND INTERNAL FIXATION (ORIF) OF  FRACTURE OF PATELLA Right 12/18/2020    Procedure: ORIF, FRACTURE, PATELLA;  Surgeon: Sage Wolf MD;  Location: [facility] OR;  Service: Orthopedics;  Laterality: Right;    ORIF HUMERUS FRACTURE Left 11/5/2020    Procedure: ORIF, FRACTURE, HUMERUS;  Surgeon: Sage Wolf MD;  Location: [facility] OR;  Service: Orthopedics;  Laterality: Left;    PLACEMENT OF ACELLULAR HUMAN DERMAL ALLOGRAFT Right 11/2/2020    Procedure: APPLICATION, ACELLULAR HUMAN DERMAL ALLOGRAFT;  Surgeon: Sage Wolf MD;  Location: [facility] OR;  Service: Orthopedics;  Laterality: Right;  Right Patella    REMOVAL OF HARDWARE FROM HAND Left 3/11/2021    Procedure: REMOVAL, HARDWARE, HAND;  Surgeon: Sage Wolf MD;  Location: [facility] OR;  Service: Orthopedics;  Laterality: Left;  Removal of dorsal spanning wrist plate left distal radius    REMOVAL OF HARDWARE FROM LOWER EXTREMITY Right 12/18/2020    Procedure: REMOVAL, HARDWARE, LOWER EXTREMITY;  Surgeon: Sage Wolf MD;  Location: [facility] OR;  Service: Orthopedics;  Laterality: Right;         SocHx:    Social History[1]      FamHx:    Family History   Problem Relation Name Age of Onset    Diabetes Mother      Leukemia Father      Diabetes Father           ROS:    Review of Systems   All other systems reviewed and are negative.       Allergies:    is allergic to codeine.    Current medications:   Scheduled Meds:   arformoteroL  15 mcg Nebulization BID    budesonide  0.5 mg Nebulization Q12H    cefTRIAXone (Rocephin) IV (PEDS and ADULTS)  2 g Intravenous Q24H    clindamycin IV (PEDS and ADULTS)  600 mg Intravenous Q8H    insulin aspart U-100  0-10 Units Subcutaneous QID (WM & HS)    levothyroxine  137 mcg Oral Before breakfast     Continuous Infusions:   heparin (porcine) in D5W  0-40 Units/kg/hr (Adjusted) Intravenous Continuous 8.7 mL/hr at 06/05/25 0920 12 Units/kg/hr at 06/05/25 0920     PRN Meds:.  Current Facility-Administered Medications:      "acetaminophen, 650 mg, Rectal, Q6H PRN    albuterol-ipratropium, 3 mL, Nebulization, Q4H PRN    dextrose 50%, 12.5 g, Intravenous, PRN    glucagon (human recombinant), 1 mg, Intramuscular, PRN    heparin (PORCINE), 54.9 Units/kg (Adjusted), Intravenous, PRN    heparin (PORCINE), 30 Units/kg (Adjusted), Intravenous, PRN    hydrALAZINE, 10 mg, Intravenous, Q6H PRN    ondansetron, 4 mg, Intravenous, Q6H PRN       Physical Examination    VS/Measurements   BP (!) 109/59 (BP Location: Left arm, Patient Position: Sitting)   Pulse 92   Temp 98.2 °F (36.8 °C) (Oral)   Resp 18   Ht 5' 5" (1.651 m)   Wt 96.5 kg (212 lb 11.9 oz)   SpO2 96%   BMI 35.40 kg/m²     Physical Exam  Constitutional:       Appearance: She is obese. She is not ill-appearing.   HENT:      Head: Normocephalic.      Nose: Nose normal.   Eyes:      Extraocular Movements: Extraocular movements intact.      Pupils: Pupils are equal, round, and reactive to light.   Cardiovascular:      Rate and Rhythm: Normal rate. Rhythm irregular.      Pulses: Normal pulses.      Heart sounds: Normal heart sounds.   Pulmonary:      Effort: Pulmonary effort is normal.   Abdominal:      General: Abdomen is flat.      Palpations: Abdomen is soft.   Musculoskeletal:         General: No swelling.   Skin:     General: Skin is warm.      Capillary Refill: Capillary refill takes less than 2 seconds.      Coloration: Skin is not jaundiced.   Neurological:      Mental Status: She is disoriented.              Laboratory Results   Today's Lab Results :    Recent Results (from the past 24 hours)   POCT glucose    Collection Time: 06/04/25  4:57 PM   Result Value Ref Range    POCT Glucose 280 (H) 70 - 110 mg/dL   POCT glucose    Collection Time: 06/04/25  9:05 PM   Result Value Ref Range    POCT Glucose 276 (H) 70 - 110 mg/dL   EKG 12-lead    Collection Time: 06/05/25  4:48 AM   Result Value Ref Range    QRS Duration 100 ms    [facility] QTC Calculation 498 ms   Magnesium    Collection " Time: 06/05/25  4:49 AM   Result Value Ref Range    Magnesium  2.1 1.6 - 2.6 mg/dL   Comprehensive Metabolic Panel    Collection Time: 06/05/25  4:49 AM   Result Value Ref Range    Sodium 135 (L) 136 - 145 mmol/L    Potassium 3.4 (L) 3.5 - 5.1 mmol/L    Chloride 99 95 - 110 mmol/L    CO2 21 (L) 23 - 29 mmol/L    Glucose 207 (H) 70 - 110 mg/dL    BUN 83 (H) 8 - 23 mg/dL    Creatinine 3.6 (H) 0.5 - 1.4 mg/dL    Calcium 8.2 (L) 8.7 - 10.5 mg/dL    Protein Total 7.0 6.0 - 8.4 gm/dL    Albumin 1.8 (L) 3.5 - 5.2 g/dL    Bilirubin Total 0.5 0.1 - 1.0 mg/dL    ALP 71 40 - 150 unit/L    AST 28 11 - 45 unit/L    ALT 19 10 - 44 unit/L    Anion Gap 15 8 - 16 mmol/L    eGFR 13 (L) >60 mL/min/1.73/m2   CBC with Differential    Collection Time: 06/05/25  4:49 AM   Result Value Ref Range    WBC 4.78 3.90 - 12.70 K/uL    RBC 3.58 (L) 4.00 - 5.40 M/uL    HGB 9.8 (L) 12.0 - 16.0 gm/dL    HCT 32.3 (L) 37.0 - 48.5 %    MCV 90 82 - 98 fL    MCH 27.4 27.0 - 31.0 pg    MCHC 30.3 (L) 32.0 - 36.0 g/dL    RDW 13.5 11.5 - 14.5 %    Platelet Count 75 (L) 150 - 450 K/uL    MPV 11.9 9.2 - 12.9 fL    Nucleated RBC 0 <=0 /100 WBC    Neut % 78.3 (H) 38 - 73 %    Lymph % 10.7 (L) 18 - 48 %    Mono % 7.5 4 - 15 %    Eos % 2.3 <=8 %    Basophil % 0.2 <=1.9 %    Imm Grans % 1.0 (H) 0.0 - 0.5 %    Neut # 3.74 1.8 - 7.7 K/uL    Lymph # 0.51 (L) 1 - 4.8 K/uL    Mono # 0.36 0.3 - 1 K/uL    Eos # 0.11 <=0.5 K/uL    Baso # 0.01 <=0.2 K/uL    Imm Grans # 0.05 (H) 0.00 - 0.04 K/uL   Troponin I    Collection Time: 06/05/25  4:49 AM   Result Value Ref Range    Troponin-I 0.979 (H) <=0.026 ng/mL   ISTAT PROCEDURE    Collection Time: 06/05/25  5:09 AM   Result Value Ref Range    POC PH 7.388 7.35 - 7.45    POC PCO2 44.4 35 - 45 mmHg    POC PO2 79 (L) 80 - 100 mmHg    POC HCO3 26.7 24 - 28 mmol/L    POC BE 2 -2 to 2 mmol/L    POC SATURATED O2 95 95 - 100 %    POC Glucose 206 (H) 70 - 110 mg/dL    POC Sodium 137 136 - 145 mmol/L    POC Potassium 3.3 (L) 3.5 - 5.1  mmol/L    POC Ionized Calcium 1.11 1.06 - 1.42 mmol/L    POC Hematocrit 28 (L) 36 - 54 %PCV    Sample ARTERIAL     Site RR     Allens Test Pass     DelSys Nasal Can     Mode SPONT     Flow 3     FiO2 32    Lactic Acid, Plasma    Collection Time: 06/05/25  6:33 AM   Result Value Ref Range    Lactic Acid Level 1.4 0.5 - 2.2 mmol/L   APTT    Collection Time: 06/05/25  6:33 AM   Result Value Ref Range    PTT 26.9 21.0 - 32.0 seconds   Protime-INR    Collection Time: 06/05/25  6:33 AM   Result Value Ref Range    PT 11.5 9.0 - 12.5 seconds    INR 1.0 0.8 - 1.2   POCT glucose    Collection Time: 06/05/25 11:53 AM   Result Value Ref Range    POCT Glucose 262 (H) 70 - 110 mg/dL   APTT    Collection Time: 06/05/25  1:09 PM   Result Value Ref Range    PTT 42.3 (H) 21.0 - 32.0 seconds   Basic metabolic panel    Collection Time: 06/05/25  1:09 PM   Result Value Ref Range    Sodium 135 (L) 136 - 145 mmol/L    Potassium 3.9 3.5 - 5.1 mmol/L    Chloride 99 95 - 110 mmol/L    CO2 22 (L) 23 - 29 mmol/L    Glucose 228 (H) 70 - 110 mg/dL    BUN 88 (H) 8 - 23 mg/dL    Creatinine 3.9 (H) 0.5 - 1.4 mg/dL    Calcium 8.1 (L) 8.7 - 10.5 mg/dL    Anion Gap 14 8 - 16 mmol/L    eGFR 12 (L) >60 mL/min/1.73/m2        Assessment and Plan   MIKA/CKD: due to sepsis    --no acute need for RRT    --strict I/O    --avoid all nephrotoxins    --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 Cards following    DM 2: SSI    HTN: meds on hold    --per hospital medicine team      ________________________________________________  Fito BORREGO Sim         [1]   Social History  Socioeconomic History    Marital status:    Tobacco Use    Smoking status: Former     Current packs/day: 0.00     Average packs/day: 1 pack/day for 8.0 years (8.0 ttl pk-yrs)     Types: Cigarettes     Start date:       Quit date:      Years since quittin.4    Smokeless tobacco: Never   Substance and Sexual Activity    Alcohol use: Yes     Comment: rarely    Drug use: No    Sexual activity: Not Currently     Social Drivers of Health     Financial Resource Strain: Low Risk  (6/3/2025)    Overall Financial Resource Strain (CARDIA)     Difficulty of Paying Living Expenses: Not hard at all   Food Insecurity: No Food Insecurity (6/3/2025)    Hunger Vital Sign     Worried About Running Out of Food in the Last Year: Never true     Ran Out of Food in the Last Year: Never true   Transportation Needs: No Transportation Needs (6/3/2025)    PRAPARE - Transportation     Lack of Transportation (Medical): No     Lack of Transportation (Non-Medical): No   Physical Activity: Inactive (6/3/2025)    Exercise Vital Sign     Days of Exercise per Week: 0 days     Minutes of Exercise per Session: 0 min   Stress: No Stress Concern Present (6/3/2025)    [employer] - Occupational Stress Questionnaire     Feeling of Stress : Not at all   Housing Stability: Low Risk  (6/3/2025)    Housing Stability Vital Sign     Unable to Pay for Housing in the Last Year: No     Homeless in the Last Year: No

## 2025-06-05 NOTE — ASSESSMENT & PLAN NOTE
This patient does have evidence of infective focus  My overall impression is sepsis with Acute kidney injury, Acute heart failure, Encephalopathy, and Thrombocytopenia .  Source: Skin and Soft Tissue Infection: Cellulitis  Antibiotics given-   Antibiotics (72h ago, onward)      Start     Stop Route Frequency Ordered    06/04/25 0915  cefTRIAXone injection 2 g         -- IV Every 24 hours (non-standard times) 06/04/25 0722    06/04/25 0900  clindamycin in D5W 600 mg/50 mL IVPB 600 mg         06/07/25 0859 IV Every 8 hours (non-standard times) 06/04/25 0755          Latest lactate reviewed-  Recent Labs   Lab 06/05/25  0633   LACTATE 1.4     Organ dysfunction indicated by Acute kidney injury, Encephalopathy, and Thrombocytopenia     Fluid challenge Contraindicated- Fluid bolus is contraindicated in this patient due to Congestive Heart Failure     Post- resuscitation assessment Yes - I attest a sepsis perfusion exam was performed within 6 hours of sepsis, severe sepsis, or septic shock presentation, following fluid resuscitation.      Will Not start Pressors  Source control achieved by: Broad spectrum antibiotics  6/3/2025- encephalopathy improved - blood cultures with PCR + -ID consulted- will obtain repeat cultures   -6/4/2025- antibiotics transitioned to Rocephin and Clindamycin- ID following- repeat blood cultures in am   -6/5/2025- antibiotics continued- repeat blood cultures obtained

## 2025-06-05 NOTE — NURSING
Notified by MT pt had pause on monitor. RN to bedside. Pt denies any CP/SOB. Remains agitated. On monitor, pt with PVCs and frequent pauses. Autumn NP notified. See chart for orders.

## 2025-06-05 NOTE — ASSESSMENT & PLAN NOTE
Body mass index is 35.4 kg/m². Morbid obesity complicates all aspects of disease management from diagnostic modalities to treatment. Weight loss encouraged and health benefits explained to patient.

## 2025-06-05 NOTE — NURSING
After administering PO meds, pt had a coughing fit followed by a bout of emesis. Pt in high fowlers at the time. Pt in no acute distress. Autumn NP notified. Nausea relieved after administering zofran. Pt left in upright position,  educated pt to remain NPO.

## 2025-06-05 NOTE — SIGNIFICANT EVENT
"RN reported patient having pauses per telemetry -- very irregular, viewing monitor she is having pauses and very frequent PVC's.    On assessment remains confused and pulling at medical devices with mittens in place, Lungs diminished, but appears to have increased work of breathing, saturating normally on 3L/min and heart rate irregular. She does not answer questions for me just nods "no" to just about everything asked of her.    Earlier in shift, patient had difficulty swallowing her night time pills, so made her NPO and consult for ST ordered.    Will check AM labs now including chemistry, magnesium, and CBC    STAT EKG ordered  STAT CXR ordered  STAT ABG ordered    Discussed with Dr. Kylee Caraballo.  "

## 2025-06-05 NOTE — CARE UPDATE
Patient re-evaluated with critical care at bedside as patient has increased work of breathing.  Dr. Nathan recommended ABG and Lasix 80 mg IV x1 to help with diuresis as patient is using accessory muscles to breathe.  We will monitor renal function in a.m. as this may decompensate with the increased dose of Lasix but patient does appear to be dyspneic at this time.  We will also re-evaluate based on ABG results.

## 2025-06-05 NOTE — SUBJECTIVE & OBJECTIVE
Interval History:     Review of Systems   Constitutional: Negative. Negative for weight gain.   HENT: Negative.     Eyes: Negative.    Cardiovascular: Negative.  Negative for chest pain, leg swelling and palpitations.   Respiratory: Negative.  Negative for shortness of breath.    Endocrine: Negative.    Hematologic/Lymphatic: Negative.    Skin: Negative.    Musculoskeletal:  Negative for muscle weakness.   Gastrointestinal: Negative.    Genitourinary: Negative.    Neurological: Negative.  Negative for dizziness.   Psychiatric/Behavioral: Negative.     Allergic/Immunologic: Negative.    All other systems reviewed and are negative.    Objective:     Vital Signs (Most Recent):  Temp: 97.7 °F (36.5 °C) (06/05/25 0714)  Pulse: 98 (06/05/25 0800)  Resp: 17 (06/05/25 0714)  BP: (!) 120/56 (06/05/25 0714)  SpO2: 96 % (06/05/25 0714) Vital Signs (24h Range):  Temp:  [97.7 °F (36.5 °C)-98.3 °F (36.8 °C)] 97.7 °F (36.5 °C)  Pulse:  [] 98  Resp:  [17-19] 17  SpO2:  [94 %-97 %] 96 %  BP: ()/(53-64) 120/56     Weight: 96.5 kg (212 lb 11.9 oz)  Body mass index is 35.4 kg/m².     SpO2: 96 %         Intake/Output Summary (Last 24 hours) at 6/5/2025 0922  Last data filed at 6/5/2025 0903  Gross per 24 hour   Intake 200 ml   Output --   Net 200 ml       Lines/Drains/Airways       Peripheral Intravenous Line  Duration                  Peripheral IV - Single Lumen 06/04/25 0333 Left Hand 1 day         Peripheral IV - Single Lumen 06/04/25 0912 22 G Left Antecubital 1 day         Peripheral IV - Single Lumen 06/05/25 0624 20 G Anterior;Right Forearm <1 day                       Physical Exam       Significant Labs: All pertinent lab results from the last 24 hours have been reviewed.    Significant Imaging: X-Ray: CXR: X-Ray Chest 1 View (CXR):   Results for orders placed or performed during the hospital encounter of 06/02/25   X-Ray Chest 1 View    Narrative    EXAM:    XR CHEST 1 VIEW    CLINICAL HISTORY:    Shortness of  breath    FINDINGS:  Enlarged heart.  Increased interstitial attenuation concerning for edema.  No pleural fluid or pneumothorax on the right.  Possible small left pleural effusion.        Impression     As above    Finalized on: 6/5/2025 6:35 AM By:  Chepe Barnes MD  [facility]# [id number]      2025-06-05 06:37:[id number]     [facility]

## 2025-06-05 NOTE — PLAN OF CARE
06/05/25 1102   Post-Acute Status   Post-Acute Authorization Placement   Post-Acute Placement Status Referrals Sent   Discharge Plan   Discharge Plan A Skilled Nursing Facility   Discharge Plan B Skilled Nursing Facility     Referrals sent to SNF facilities on behalf of client.

## 2025-06-05 NOTE — NURSING
Called Lab in reference to ptt draw due for 1530. I was told that it was drawn two hours prior. STAT ptt ordered for due time at 1535.

## 2025-06-05 NOTE — PT/OT/SLP PROGRESS
"Physical Therapy Treatment    Patient Name:  Lakshmi Campbell   MRN:  [id number]    Recommendations:     Discharge Recommendations: Moderate Intensity Therapy  Discharge Equipment Recommendations: to be determined by next level of care  Barriers to discharge: None    Assessment:     Lakshmi Campbell is a 74 y.o. female admitted with a medical diagnosis of Sepsis with encephalopathy.  She presents with the following impairments/functional limitations: weakness, impaired endurance, impaired self care skills, impaired functional mobility, gait instability, impaired balance, impaired cognition, decreased coordination, decreased upper extremity function, decreased lower extremity function.    Rehab Prognosis: Fair; patient would benefit from acute skilled PT services to address these deficits and reach maximum level of function.    Recent Surgery: * No surgery found *      Plan:     During this hospitalization, patient to be seen   to address the identified rehab impairments via therapeutic activities and progress toward the following goals:    Plan of Care Expires:  06/18/25    Subjective     Chief Complaint: None stated  Patient/Family Comments/goals: Patient states "Why do I have these on" in regard to mitten restraints on B hands  Patient is easily distracted requiring constant redirection and tactile/verbal cues to stay on task  Patient is visibly restless and agitated regarding presence of mitten restraints donned BUE  Per the patient's , patient refuses to answer questions or follow commands when she is agitated  Patient is limited in following commands due to cognitive impairment    Pain/Comfort:  Pain Rating 1: 0/10    Objective:     Communicated with EPIC prior to session.  Patient found supine with oxygen, peripheral IV, telemetry, restraints upon PT entry to room.     General Precautions: Standard, fall  Orthopedic Precautions: N/A  Braces: N/A  Respiratory Status: Nasal cannula, flow 3 L/min     Functional " "Mobility:  Bed Mobility: moderate assistance  Rolling Right: moderate assistance  Seated scoot: Min A  Patient required verbal cueing to assume stable sitting balance on EOB  Sup to sit: Mod A x 2  Sit to Stand: Mod A x 2 with RW  Patient required verbal cueing to shift weight anteriorly to initiate stand  Balance:  Sitting balance: Fair  Restlessness observable by anterior-posterior weight shifts during seated balance  Standing balance with RW: Fair  Patient required repetitive cueing and redirection to maintain COG while standing and prevent leaning posteriorly  Gait: Patient was cued to step forward with RW, but patient opted to march in place for ~3 min before deciding to step transfer to the chair. Patient required repetitive cueing and redirection.  Stand <> chair step transfer: Mod A x 2    AM-PAC 6 CLICK MOBILITY  Turning over in bed (including adjusting bedclothes, sheets and blankets)?: 2  Sitting down on and standing up from a chair with arms (e.g., wheelchair, bedside commode, etc.): 2  Moving from lying on back to sitting on the side of the bed?: 2  Moving to and from a bed to a chair (including a wheelchair)?: 2  Need to walk in hospital room?: 2  Climbing 3-5 steps with a railing?: 1 (NT)  Basic Mobility Total Score: 11       Treatment & Education:  Role of PT and POC in acute care hospital setting  Continue provided therapeutic exercises throughout the day to increase tolerance to activity and blood flow: 10 repetitions of seated hip flexion, LAQs, heel slides, and APs  Increase amount of time out of bed and seated in chair to patient tolerance  Patient was educated on risk for falls due to generalized weakness ("Call don't Fall").  Patient was encouraged to call for assistance with all needs, such as transfers or trips to the bathroom.      Patient left up in chair with call button in reach..    GOALS:   Multidisciplinary Problems       Physical Therapy Goals          Problem: Physical Therapy    " Goal Priority Disciplines Outcome Interventions   Physical Therapy Goal     PT, PT/OT Progressing    Description: LTG goals to be met in 14 days (6/18/25).  Patient will require Min A for bed mobility.  Patient will require Min A for sit <> stand.  Patient will require Min A for stand <> chair transfer.  Patient will ambulate 200' Min A with RW.   Patient will increase AM-PAC score by 2 points to progress with functional mobility.                       DME Justifications:  No DME recommended requiring DME justifications    Time Tracking:     PT Received On: 06/04/25  PT Start Time: 1000     PT Stop Time: 1040  PT Total Time (min): 40 min     Billable Minutes: Therapeutic Activity 40    Treatment Type: Treatment  PT/PTA: PT     Number of PTA visits since last PT visit: 0     06/05/2025

## 2025-06-06 PROBLEM — R41.82 ALTERED MENTAL STATUS: Status: ACTIVE | Noted: 2025-06-06

## 2025-06-06 PROBLEM — N17.9 ACUTE RENAL FAILURE SUPERIMPOSED ON STAGE 5 CHRONIC KIDNEY DISEASE, NOT ON CHRONIC DIALYSIS: Status: ACTIVE | Noted: 2025-06-06

## 2025-06-06 PROBLEM — I48.91 ATRIAL FIBRILLATION: Status: ACTIVE | Noted: 2025-06-06

## 2025-06-06 PROBLEM — N18.5 ACUTE RENAL FAILURE SUPERIMPOSED ON STAGE 5 CHRONIC KIDNEY DISEASE, NOT ON CHRONIC DIALYSIS: Status: ACTIVE | Noted: 2025-06-06

## 2025-06-06 PROBLEM — R34 ANURIA: Status: ACTIVE | Noted: 2025-06-06

## 2025-06-06 PROBLEM — N17.9 AKI (ACUTE KIDNEY INJURY): Status: ACTIVE | Noted: 2025-06-06

## 2025-06-06 PROBLEM — R78.81 BACTEREMIA: Status: ACTIVE | Noted: 2025-06-06

## 2025-06-06 PROBLEM — A41.9 SEPSIS: Status: ACTIVE | Noted: 2025-06-06

## 2025-06-06 PROBLEM — I50.33 ACUTE ON CHRONIC DIASTOLIC CONGESTIVE HEART FAILURE: Status: ACTIVE | Noted: 2025-06-06

## 2025-06-06 LAB
ABSOLUTE EOSINOPHIL (OHS): 0.04 K/UL
ABSOLUTE EOSINOPHIL (OHS): 0.12 K/UL
ABSOLUTE MONOCYTE (OHS): 0.33 K/UL (ref 0.3–1)
ABSOLUTE MONOCYTE (OHS): 0.47 K/UL (ref 0.3–1)
ABSOLUTE NEUTROPHIL COUNT (OHS): 6.03 K/UL (ref 1.8–7.7)
ABSOLUTE NEUTROPHIL COUNT (OHS): 7.25 K/UL (ref 1.8–7.7)
ALBUMIN SERPL BCP-MCNC: 1.9 G/DL (ref 3.5–5.2)
ALBUMIN SERPL BCP-MCNC: 2 G/DL (ref 3.5–5.2)
ALP SERPL-CCNC: 75 UNIT/L (ref 40–150)
ALP SERPL-CCNC: 83 UNIT/L (ref 40–150)
ALT SERPL W/O P-5'-P-CCNC: 18 UNIT/L (ref 10–44)
ALT SERPL W/O P-5'-P-CCNC: 19 UNIT/L (ref 10–44)
ANION GAP (OHS): 18 MMOL/L (ref 8–16)
ANION GAP (OHS): 18 MMOL/L (ref 8–16)
APTT PPP: 32.3 SECONDS (ref 21–32)
APTT PPP: 35.8 SECONDS (ref 21–32)
APTT PPP: 35.8 SECONDS (ref 21–32)
APTT PPP: 69.2 SECONDS (ref 21–32)
AST SERPL-CCNC: 19 UNIT/L (ref 11–45)
AST SERPL-CCNC: 27 UNIT/L (ref 11–45)
BASOPHILS # BLD AUTO: 0.03 K/UL
BASOPHILS # BLD AUTO: 0.03 K/UL
BASOPHILS NFR BLD AUTO: 0.4 %
BASOPHILS NFR BLD AUTO: 0.4 %
BILIRUB SERPL-MCNC: 0.4 MG/DL (ref 0.1–1)
BILIRUB SERPL-MCNC: 0.4 MG/DL (ref 0.1–1)
BUN SERPL-MCNC: 92 MG/DL (ref 8–23)
BUN SERPL-MCNC: 92 MG/DL (ref 8–23)
CALCIUM SERPL-MCNC: 8 MG/DL (ref 8.7–10.5)
CALCIUM SERPL-MCNC: 8.1 MG/DL (ref 8.7–10.5)
CHLORIDE SERPL-SCNC: 98 MMOL/L (ref 95–110)
CHLORIDE SERPL-SCNC: 99 MMOL/L (ref 95–110)
CO2 SERPL-SCNC: 16 MMOL/L (ref 23–29)
CO2 SERPL-SCNC: 18 MMOL/L (ref 23–29)
CREAT SERPL-MCNC: 4.2 MG/DL (ref 0.5–1.4)
CREAT SERPL-MCNC: 4.4 MG/DL (ref 0.5–1.4)
ERYTHROCYTE [DISTWIDTH] IN BLOOD BY AUTOMATED COUNT: 13.8 % (ref 11.5–14.5)
ERYTHROCYTE [DISTWIDTH] IN BLOOD BY AUTOMATED COUNT: 14 % (ref 11.5–14.5)
GFR SERPLBLD CREATININE-BSD FMLA CKD-EPI: 10 ML/MIN/1.73/M2
GFR SERPLBLD CREATININE-BSD FMLA CKD-EPI: 11 ML/MIN/1.73/M2
GLUCOSE SERPL-MCNC: 177 MG/DL (ref 70–110)
GLUCOSE SERPL-MCNC: 206 MG/DL (ref 70–110)
HCT VFR BLD AUTO: 29.6 % (ref 37–48.5)
HCT VFR BLD AUTO: 29.8 % (ref 37–48.5)
HGB BLD-MCNC: 9.1 GM/DL (ref 12–16)
HGB BLD-MCNC: 9.2 GM/DL (ref 12–16)
IMM GRANULOCYTES # BLD AUTO: 0.05 K/UL (ref 0–0.04)
IMM GRANULOCYTES # BLD AUTO: 0.08 K/UL (ref 0–0.04)
IMM GRANULOCYTES NFR BLD AUTO: 0.7 % (ref 0–0.5)
IMM GRANULOCYTES NFR BLD AUTO: 1 % (ref 0–0.5)
INR PPP: 1.1 (ref 0.8–1.2)
LYMPHOCYTES # BLD AUTO: 0.45 K/UL (ref 1–4.8)
LYMPHOCYTES # BLD AUTO: 0.69 K/UL (ref 1–4.8)
MCH RBC QN AUTO: 28.3 PG (ref 27–31)
MCH RBC QN AUTO: 28.3 PG (ref 27–31)
MCHC RBC AUTO-ENTMCNC: 30.7 G/DL (ref 32–36)
MCHC RBC AUTO-ENTMCNC: 30.9 G/DL (ref 32–36)
MCV RBC AUTO: 92 FL (ref 82–98)
MCV RBC AUTO: 92 FL (ref 82–98)
NUCLEATED RBC (/100WBC) (OHS): 0 /100 WBC
NUCLEATED RBC (/100WBC) (OHS): 0 /100 WBC
OHS QRS DURATION: 100 MS
OHS QTC CALCULATION: 498 MS
PLATELET # BLD AUTO: 102 K/UL (ref 150–450)
PLATELET # BLD AUTO: 104 K/UL (ref 150–450)
PLATELET BLD QL SMEAR: ABNORMAL
PMV BLD AUTO: 12 FL (ref 9.2–12.9)
PMV BLD AUTO: 12.5 FL (ref 9.2–12.9)
POCT GLUCOSE: 170 MG/DL (ref 70–110)
POCT GLUCOSE: 256 MG/DL (ref 70–110)
POTASSIUM SERPL-SCNC: 4.5 MMOL/L (ref 3.5–5.1)
POTASSIUM SERPL-SCNC: 4.7 MMOL/L (ref 3.5–5.1)
PROT SERPL-MCNC: 7.5 GM/DL (ref 6–8.4)
PROT SERPL-MCNC: 7.7 GM/DL (ref 6–8.4)
PROTHROMBIN TIME: 12.4 SECONDS (ref 9–12.5)
RBC # BLD AUTO: 3.21 M/UL (ref 4–5.4)
RBC # BLD AUTO: 3.25 M/UL (ref 4–5.4)
RELATIVE EOSINOPHIL (OHS): 0.5 %
RELATIVE EOSINOPHIL (OHS): 1.6 %
RELATIVE LYMPHOCYTE (OHS): 5.5 % (ref 18–48)
RELATIVE LYMPHOCYTE (OHS): 9.3 % (ref 18–48)
RELATIVE MONOCYTE (OHS): 4 % (ref 4–15)
RELATIVE MONOCYTE (OHS): 6.4 % (ref 4–15)
RELATIVE NEUTROPHIL (OHS): 81.6 % (ref 38–73)
RELATIVE NEUTROPHIL (OHS): 88.6 % (ref 38–73)
SODIUM SERPL-SCNC: 132 MMOL/L (ref 136–145)
SODIUM SERPL-SCNC: 135 MMOL/L (ref 136–145)
SODIUM UR-SCNC: <20 MMOL/L (ref 20–250)
WBC # BLD AUTO: 7.39 K/UL (ref 3.9–12.7)
WBC # BLD AUTO: 8.18 K/UL (ref 3.9–12.7)

## 2025-06-06 PROCEDURE — 92507 TX SP LANG VOICE COMM INDIV: CPT

## 2025-06-06 PROCEDURE — 85025 COMPLETE CBC W/AUTO DIFF WBC: CPT | Performed by: INTERNAL MEDICINE

## 2025-06-06 PROCEDURE — 80053 COMPREHEN METABOLIC PANEL: CPT | Performed by: INTERNAL MEDICINE

## 2025-06-06 PROCEDURE — 94640 AIRWAY INHALATION TREATMENT: CPT

## 2025-06-06 PROCEDURE — 5A1D70Z PERFORMANCE OF URINARY FILTRATION, INTERMITTENT, LESS THAN 6 HOURS PER DAY: ICD-10-PCS | Performed by: INTERNAL MEDICINE

## 2025-06-06 PROCEDURE — 51702 INSERT TEMP BLADDER CATH: CPT

## 2025-06-06 PROCEDURE — 36415 COLL VENOUS BLD VENIPUNCTURE: CPT | Performed by: FAMILY MEDICINE

## 2025-06-06 PROCEDURE — 85610 PROTHROMBIN TIME: CPT | Performed by: INTERNAL MEDICINE

## 2025-06-06 PROCEDURE — 20000000 HC ICU ROOM

## 2025-06-06 PROCEDURE — 63600175 PHARM REV CODE 636 W HCPCS: Performed by: INTERNAL MEDICINE

## 2025-06-06 PROCEDURE — 87340 HEPATITIS B SURFACE AG IA: CPT | Performed by: INTERNAL MEDICINE

## 2025-06-06 PROCEDURE — 80053 COMPREHEN METABOLIC PANEL: CPT | Performed by: NURSE PRACTITIONER

## 2025-06-06 PROCEDURE — 63600175 PHARM REV CODE 636 W HCPCS: Performed by: NURSE PRACTITIONER

## 2025-06-06 PROCEDURE — 99233 SBSQ HOSP IP/OBS HIGH 50: CPT | Mod: NSCH,,, | Performed by: INTERNAL MEDICINE

## 2025-06-06 PROCEDURE — 86706 HEP B SURFACE ANTIBODY: CPT | Performed by: INTERNAL MEDICINE

## 2025-06-06 PROCEDURE — 85025 COMPLETE CBC W/AUTO DIFF WBC: CPT | Performed by: NURSE PRACTITIONER

## 2025-06-06 PROCEDURE — 25000242 PHARM REV CODE 250 ALT 637 W/ HCPCS: Performed by: STUDENT IN AN ORGANIZED HEALTH CARE EDUCATION/TRAINING PROGRAM

## 2025-06-06 PROCEDURE — 86704 HEP B CORE ANTIBODY TOTAL: CPT | Performed by: INTERNAL MEDICINE

## 2025-06-06 PROCEDURE — 90935 HEMODIALYSIS ONE EVALUATION: CPT | Mod: ,,, | Performed by: INTERNAL MEDICINE

## 2025-06-06 PROCEDURE — 02HV33Z INSERTION OF INFUSION DEVICE INTO SUPERIOR VENA CAVA, PERCUTANEOUS APPROACH: ICD-10-PCS | Performed by: INTERNAL MEDICINE

## 2025-06-06 PROCEDURE — 27000923 HC TRIALYSIS CATHETER, ANY SIZE

## 2025-06-06 PROCEDURE — 97530 THERAPEUTIC ACTIVITIES: CPT

## 2025-06-06 PROCEDURE — 27000221 HC OXYGEN, UP TO 24 HOURS

## 2025-06-06 PROCEDURE — 99900035 HC TECH TIME PER 15 MIN (STAT)

## 2025-06-06 PROCEDURE — 84300 ASSAY OF URINE SODIUM: CPT | Performed by: INTERNAL MEDICINE

## 2025-06-06 PROCEDURE — 25000242 PHARM REV CODE 250 ALT 637 W/ HCPCS: Performed by: NURSE PRACTITIONER

## 2025-06-06 PROCEDURE — 94761 N-INVAS EAR/PLS OXIMETRY MLT: CPT

## 2025-06-06 PROCEDURE — 92526 ORAL FUNCTION THERAPY: CPT

## 2025-06-06 PROCEDURE — 36415 COLL VENOUS BLD VENIPUNCTURE: CPT | Performed by: NURSE PRACTITIONER

## 2025-06-06 PROCEDURE — 80100014 HC HEMODIALYSIS 1:1

## 2025-06-06 PROCEDURE — 85730 THROMBOPLASTIN TIME PARTIAL: CPT | Performed by: FAMILY MEDICINE

## 2025-06-06 RX ORDER — FUROSEMIDE 10 MG/ML
100 INJECTION INTRAMUSCULAR; INTRAVENOUS ONCE
Status: DISCONTINUED | OUTPATIENT
Start: 2025-06-06 | End: 2025-06-07

## 2025-06-06 RX ORDER — HEPARIN SODIUM 1000 [USP'U]/ML
2400 INJECTION, SOLUTION INTRAVENOUS; SUBCUTANEOUS
Status: DISCONTINUED | OUTPATIENT
Start: 2025-06-06 | End: 2025-06-09

## 2025-06-06 RX ADMIN — HEPARIN SODIUM 2400 UNITS: 1000 INJECTION, SOLUTION INTRAVENOUS; SUBCUTANEOUS at 08:06

## 2025-06-06 RX ADMIN — HEPARIN SODIUM 15 UNITS/KG/HR: 10000 INJECTION, SOLUTION INTRAVENOUS at 02:06

## 2025-06-06 RX ADMIN — BUDESONIDE INHALATION 0.5 MG: 0.5 SUSPENSION RESPIRATORY (INHALATION) at 08:06

## 2025-06-06 RX ADMIN — ARFORMOTEROL TARTRATE 15 MCG: 15 SOLUTION RESPIRATORY (INHALATION) at 07:06

## 2025-06-06 RX ADMIN — CLINDAMYCIN PHOSPHATE 600 MG: 600 INJECTION, SOLUTION INTRAVENOUS at 10:06

## 2025-06-06 RX ADMIN — ARFORMOTEROL TARTRATE 15 MCG: 15 SOLUTION RESPIRATORY (INHALATION) at 08:06

## 2025-06-06 RX ADMIN — CEFTRIAXONE 2 G: 2 INJECTION, POWDER, FOR SOLUTION INTRAMUSCULAR; INTRAVENOUS at 09:06

## 2025-06-06 RX ADMIN — INSULIN ASPART 3 UNITS: 100 INJECTION, SOLUTION INTRAVENOUS; SUBCUTANEOUS at 11:06

## 2025-06-06 RX ADMIN — IPRATROPIUM BROMIDE AND ALBUTEROL SULFATE 3 ML: 2.5; .5 SOLUTION RESPIRATORY (INHALATION) at 08:06

## 2025-06-06 RX ADMIN — HEPARIN SODIUM 17 UNITS/KG/HR: 10000 INJECTION, SOLUTION INTRAVENOUS at 10:06

## 2025-06-06 RX ADMIN — CLINDAMYCIN PHOSPHATE 600 MG: 600 INJECTION, SOLUTION INTRAVENOUS at 03:06

## 2025-06-06 RX ADMIN — HEPARIN SODIUM 19 UNITS/KG/HR: 10000 INJECTION, SOLUTION INTRAVENOUS at 11:06

## 2025-06-06 RX ADMIN — BUDESONIDE INHALATION 0.5 MG: 0.5 SUSPENSION RESPIRATORY (INHALATION) at 07:06

## 2025-06-06 NOTE — ASSESSMENT & PLAN NOTE
"Patient has Diastolic (HFpEF) heart failure that is Acute on chronic. On presentation their CHF was decompensated. Evidence of decompensated CHF on presentation includes: elevated JVD, weight gain, and shortness of breath. The etiology of their decompensation is likely renal failure. Most recent BNP and echo results are listed below.  No results for input(s): "BNP" in the last 72 hours.  Latest ECHO  Results for orders placed during the hospital encounter of 06/02/25    Echo    Interpretation Summary    Left Ventricle: The left ventricle is normal in size. Moderately increased wall thickness. There is concentric hypertrophy. There is normal systolic function. Ejection fraction is approximately 60%.    Right Ventricle: The right ventricle is normal in size Wall thickness is normal. Systolic function is normal.    Left Atrium: The left atrium is mildly dilated    IVC/SVC: Normal venous pressure at 3 mmHg.    Current Heart Failure Medications  hydrALAZINE injection 10 mg, Every 6 hours PRN, Intravenous  furosemide injection 100 mg, Once, Intravenous    Plan  - Monitor strict I&Os and daily weights.    - Place on telemetry  - Low sodium diet  - Place on fluid restriction of 1.5 L.   - Cardiology has been consulted  - The patient's volume status is worsening as indicated by shortness of breath. Will adjust treatment as follows: dialysis  - dialysis for volume removal      "

## 2025-06-06 NOTE — ASSESSMENT & PLAN NOTE
Patient's blood pressure range in the last 24 hours was: BP  Min: 116/59  Max: 147/60.The patient's inpatient anti-hypertensive regimen is listed below:  Current Antihypertensives  hydrALAZINE injection 10 mg, Every 6 hours PRN, Intravenous  furosemide injection 100 mg, Once, Intravenous    Plan  - BP fluctuating, monitor and holding medications due to worsening renal function  -oral antihypertensive medications held due to NPO status

## 2025-06-06 NOTE — ASSESSMENT & PLAN NOTE
Patient has Diastolic (HFpEF) heart failure that is Acute on chronic. On presentation their CHF was decompensated. Evidence of decompensated CHF on presentation includes: edema and shortness of breath. The etiology of their decompensation is likely increased fluid intake and MIKA and Atrial fibrillation. Most recent BNP and echo results are listed below.    Latest ECHO  Results for orders placed during the hospital encounter of 06/02/25    Echo    Interpretation Summary    Left Ventricle: The left ventricle is normal in size. Moderately increased wall thickness. There is concentric hypertrophy. There is normal systolic function. Ejection fraction is approximately 60%.    Right Ventricle: The right ventricle is normal in size Wall thickness is normal. Systolic function is normal.    Left Atrium: The left atrium is mildly dilated    IVC/SVC: Normal venous pressure at 3 mmHg.    Current Heart Failure Medications  hydrALAZINE injection 10 mg, Every 6 hours PRN, Intravenous  furosemide injection 100 mg, Once, Intravenous    Plan  - Monitor strict I&Os and daily weights.    - Place on telemetry  - Low sodium diet  - Place on fluid restriction of 1.5 L.   - Cardiology has been consulted  - The patient's volume status is worsening as indicated by edema and shortness of breath. Will adjust treatment as follows: Diuresis and Nephrology to place Vas Cath for Dialysis  - CT of chest showed worsening pleural effusions and development of pericardial effusion

## 2025-06-06 NOTE — ASSESSMENT & PLAN NOTE
Acute on Chronic Renal Failure  ATN in the setting of sepsis  Nephrology following   Trialysis placed 6/6 and plan to start dialysis  Avoid nephrotoxins and renally dose meds

## 2025-06-06 NOTE — SUBJECTIVE & OBJECTIVE
Past Medical History:   Diagnosis Date    Acquired TTP     Cataract     CKD stage 3 secondary to diabetes     Closed displaced comminuted fracture of right patella 10/28/2020    Closed fracture of surgical neck of left humerus 10/30/2020    Closed left radial fracture 10/30/2020    Hyperkalemia     Hyperlipidemia     Hypertension     Hypothyroidism, unspecified     Liver cirrhosis secondary to YO     Morbid obesity     Right knee pain     Thyroid disease     Type 2 diabetes mellitus        Past Surgical History:   Procedure Laterality Date    APPENDECTOMY      BREAST BIOPSY      CATARACT EXTRACTION      COLONOSCOPY N/A 12/21/2021    Procedure: COLONOSCOPY screening;  Surgeon: Moises Patterson MD;  Location: [facility];  Service: Endoscopy;  Laterality: N/A;    ESOPHAGOGASTRODUODENOSCOPY N/A 12/21/2021    Procedure: EGD (ESOPHAGOGASTRODUODENOSCOPY) EV screening;  Surgeon: Moises Patterson MD;  Location: [facility];  Service: Endoscopy;  Laterality: N/A;    EYE SURGERY      HERNIA REPAIR      OPEN REDUCTION AND INTERNAL FIXATION (ORIF) OF FRACTURE OF DISTAL RADIUS Left 11/2/2020    Procedure: ORIF, FRACTURE, RADIUS, DISTAL;  Surgeon: Sage Wolf MD;  Location: [facility];  Service: Orthopedics;  Laterality: Left;    OPEN REDUCTION AND INTERNAL FIXATION (ORIF) OF FRACTURE OF PATELLA Right 11/2/2020    Procedure: ORIF, FRACTURE, PATELLA;  Surgeon: Sage Wolf MD;  Location: [facility];  Service: Orthopedics;  Laterality: Right;    OPEN REDUCTION AND INTERNAL FIXATION (ORIF) OF FRACTURE OF PATELLA Right 12/18/2020    Procedure: ORIF, FRACTURE, PATELLA;  Surgeon: Sage Wolf MD;  Location: [facility] OR;  Service: Orthopedics;  Laterality: Right;    ORIF HUMERUS FRACTURE Left 11/5/2020    Procedure: ORIF, FRACTURE, HUMERUS;  Surgeon: Sage Wolf MD;  Location: [facility];  Service: Orthopedics;  Laterality: Left;    PLACEMENT OF ACELLULAR HUMAN DERMAL ALLOGRAFT Right 11/2/2020     Procedure: APPLICATION, ACELLULAR HUMAN DERMAL ALLOGRAFT;  Surgeon: Sage Wolf MD;  Location: [facility] OR;  Service: Orthopedics;  Laterality: Right;  Right Patella    REMOVAL OF HARDWARE FROM HAND Left 3/11/2021    Procedure: REMOVAL, HARDWARE, HAND;  Surgeon: Sage Wolf MD;  Location: [facility] OR;  Service: Orthopedics;  Laterality: Left;  Removal of dorsal spanning wrist plate left distal radius    REMOVAL OF HARDWARE FROM LOWER EXTREMITY Right 2020    Procedure: REMOVAL, HARDWARE, LOWER EXTREMITY;  Surgeon: Sage Wolf MD;  Location: [facility] OR;  Service: Orthopedics;  Laterality: Right;       Review of patient's allergies indicates:   Allergen Reactions    Codeine Nausea Only     Other reaction(s): Nausea  Other reaction(s): Elevated blood pressure       Family History       Problem Relation (Age of Onset)    Diabetes Mother, Father    Leukemia Father          Tobacco Use    Smoking status: Former     Current packs/day: 0.00     Average packs/day: 1 pack/day for 8.0 years (8.0 ttl pk-yrs)     Types: Cigarettes     Start date:      Quit date:      Years since quittin.4    Smokeless tobacco: Never   Substance and Sexual Activity    Alcohol use: Yes     Comment: rarely    Drug use: No    Sexual activity: Not Currently         Review of Systems   Unable to perform ROS: Mental status change     Objective:     Vital Signs (Most Recent):  Temp: 97.7 °F (36.5 °C) (25 1630)  Pulse: 88 (25 1630)  Resp: (!) 22 (25 1630)  BP: 124/60 (25 1630)  SpO2: (!) 92 % (25 1630) Vital Signs (24h Range):  Temp:  [97.1 °F (36.2 °C)-97.9 °F (36.6 °C)] 97.7 °F (36.5 °C)  Pulse:  [68-94] 88  Resp:  [18-25] 22  SpO2:  [92 %-98 %] 92 %  BP: (116-147)/(59-77) 124/60     Weight: 98.4 kg (216 lb 14.9 oz)  Body mass index is 36.1 kg/m².      Intake/Output Summary (Last 24 hours) at 2025 1703  Last data filed at 2025 0600  Gross per 24 hour   Intake 546.53 ml    Output 30 ml   Net 516.53 ml        Physical Exam  Vitals and nursing note reviewed.   Constitutional:       Comments: Lethargic, but arousable to voice.  Answers basic questions, though quite delayed.   HENT:      Head: Normocephalic and atraumatic.   Eyes:      General: No scleral icterus.     Extraocular Movements: Extraocular movements intact.      Conjunctiva/sclera: Conjunctivae normal.      Pupils: Pupils are equal, round, and reactive to light.   Cardiovascular:      Rate and Rhythm: Normal rate and regular rhythm.      Pulses: Normal pulses.      Heart sounds: No murmur heard.  Pulmonary:      Effort: Pulmonary effort is normal. No respiratory distress.      Breath sounds: No wheezing, rhonchi or rales.   Abdominal:      General: Abdomen is flat. There is no distension.      Palpations: Abdomen is soft.      Tenderness: There is no abdominal tenderness.   Musculoskeletal:      Cervical back: Normal range of motion and neck supple. No rigidity or tenderness.      Right lower leg: Edema present.      Left lower leg: Edema present.   Skin:     Comments: RLE erythema and tenderness   Neurological:      Comments: Answers basic questions, but slowly.  Follows commands, but requires multiple promptings.          Vents:  Oxygen Concentration (%): 32 (06/05/25 1931)    Lines/Drains/Airways       Peripheral Intravenous Line  Duration                  Peripheral IV - Single Lumen 06/04/25 0333 Left Hand 2 days         Peripheral IV - Single Lumen 06/05/25 0624 20 G Anterior;Right Forearm 1 day                    Significant Labs:    CBC/Anemia Profile:  Recent Labs   Lab 06/05/25  0449 06/05/25  0509 06/06/25  0720   WBC 4.78  --  7.39   HGB 9.8*  --  9.2*   HCT 32.3* 28* 29.8*   PLT 75*  --  102*   MCV 90  --  92   RDW 13.5  --  13.8        Chemistries:  Recent Labs   Lab 06/05/25  0449 06/05/25  1309 06/05/25  2155 06/06/25  0720   * 135* 134* 135*   K 3.4* 3.9 4.1 4.5   CL 99 99 99 99   CO2 21* 22* 22* 18*    BUN 83* 88* 88* 92*   CREATININE 3.6* 3.9* 4.0* 4.2*   CALCIUM 8.2* 8.1* 8.0* 8.0*   ALBUMIN 1.8*  --   --  1.9*   PROT 7.0  --   --  7.5   BILITOT 0.5  --   --  0.4   ALKPHOS 71  --   --  75   ALT 19  --   --  19   AST 28  --   --  27   MG 2.1  --   --   --        All pertinent labs within the past 24 hours have been reviewed.    Significant Imaging:   I have reviewed all pertinent imaging results/findings within the past 24 hours.

## 2025-06-06 NOTE — PLAN OF CARE
..Plan of care reviewed with patient with verbal understanding. Chart and orders reviewed Pt remains free from falls this shift, Safety precautions in place. Pt currently resting comfortably in bed. Purposeful rounding with bed in lowest position, side rails up, call light in reach. Will continue to monitor until end of shift.     Problem: Adult Inpatient Plan of Care  Goal: Plan of Care Review  Outcome: Progressing  Goal: Patient-Specific Goal (Individualized)  Outcome: Progressing  Goal: Absence of Hospital-Acquired Illness or Injury  Outcome: Progressing  Goal: Optimal Comfort and Wellbeing  Outcome: Progressing  Goal: Readiness for Transition of Care  Outcome: Progressing     Problem: Skin Injury Risk Increased  Goal: Skin Health and Integrity  Outcome: Progressing     Problem: Infection  Goal: Absence of Infection Signs and Symptoms  Outcome: Progressing     Problem: Diabetes Comorbidity  Goal: Blood Glucose Level Within Targeted Range  Outcome: Progressing     Problem: Sepsis/Septic Shock  Goal: Optimal Coping  Outcome: Progressing  Goal: Absence of Bleeding  Outcome: Progressing  Goal: Blood Glucose Level Within Targeted Range  Outcome: Progressing     Problem: Wound  Goal: Optimal Coping  Outcome: Progressing

## 2025-06-06 NOTE — ASSESSMENT & PLAN NOTE
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 The patients 3 most recent labs are listed below.  Recent Labs     06/04/25  0622 06/05/25  0449 06/06/25  0720   PLT 75* 75* 102*     Plan  - Will need to stop heparin if any overt bleeding and monitor platelet count closely  -stable- Heparin infusion stopped on 6/4/2025-  -Heparin infusion resumed on 6/5/2025 in the setting of Atrial fibrillation

## 2025-06-06 NOTE — PROCEDURES
"Lakshmi Campbell is a 74 y.o. female patient.    Temp: 97.7 °F (36.5 °C) (06/06/25 1630)  Pulse: 88 (06/06/25 1630)  Resp: (!) 22 (06/06/25 1630)  BP: 124/60 (06/06/25 1630)  SpO2: (!) 92 % (06/06/25 1630)  Weight: 98.4 kg (216 lb 14.9 oz) (06/06/25 1630)  Height: 5' 5" (165.1 cm) (06/03/25 1032)     Trialysis catheter    Date/Time: 6/6/2025 5:45 PM    Performed by: Myles Agrawal PA-C  Authorized by: Myles Agrawal PA-C    Location procedure was performed:  [facility] PULMONARY MED[facility]INE  Pre-operative diagnosis:  Acute renal failure  Post-operative diagnosis:  Acute renal failure  Consent Done ?:  Yes  Indications:  Hemodialysis  Anesthesia:  Local infiltration  Local anesthetic:  Lidocaine 1% without epinephrine  Anesthetic total (ml):  4  Preparation:  Skin prepped with ChloraPrep  Location:  Right internal jugular  Catheter type:  Triple lumen  Catheter size:  13 Fr  Inserted Catheter Length (cm):  15  Ultrasound guidance: Yes    Comprressibility:  Normal  Needle advanced into vessel with real time ultrasound guidance.    Guidewire confirmed in vessel.    Steril sheath on probe.    Sterile gel used.  Manometry: No    Number of attempts:  1  Securement:  Line sutured, chlorhexidine patch, sterile dressing applied and blood return through all ports  Complications: No    Specimens: No    Implants: No    Guidewire: guidewire removed intact    XRay:  Placement verified by x-ray    Myles Agrawal PA-C  Critical Care Medicine   [facility] ([facility])   6/6/2025    "

## 2025-06-06 NOTE — ASSESSMENT & PLAN NOTE
MIKA is likely due to Sepsis. Baseline creatinine is 2-2.5. Most recent creatinine and eGFR are listed below.  Recent Labs     06/05/25  1309 06/05/25  2155 06/06/25  0720   CREATININE 3.9* 4.0* 4.2*   EGFRNORACEVR 12* 11* 11*      Plan  - MIKA is worsening. Will adjust treatment as follows: per Nephrology recommendation- Vas cath placement for HD   - Avoid nephrotoxins and renally dose meds for GFR listed above  - Monitor urine output, serial BMP, and adjust therapy as needed  - Nephrology following

## 2025-06-06 NOTE — ASSESSMENT & PLAN NOTE
This patient does have evidence of infective focus  My overall impression is sepsis with Acute kidney injury, Acute respiratory failure, Encephalopathy, and Thrombocytopenia .  Source: Skin and Soft Tissue Infection: Cellulitis  Antibiotics given-   Antibiotics (72h ago, onward)      Start     Stop Route Frequency Ordered    06/04/25 0915  cefTRIAXone injection 2 g         -- IV Every 24 hours (non-standard times) 06/04/25 0722    06/04/25 0900  clindamycin in D5W 600 mg/50 mL IVPB 600 mg         06/07/25 0859 IV Every 8 hours (non-standard times) 06/04/25 0755          Latest lactate reviewed-  Recent Labs   Lab 06/05/25  0633   LACTATE 1.4     Organ dysfunction indicated by Acute kidney injury, Encephalopathy, and Thrombocytopenia     Source control achieved by: Rocephin/Clinda    ID following  Hemodynamics stable  Blood Cx 6/5 NGTD

## 2025-06-06 NOTE — PROGRESS NOTES
"[facility] ([facility] Medicine  Progress Note    Patient Name: Lakshmi Campbell  MRN: [id number]  Patient Class: IP- Inpatient   Admission Date: 6/2/2025  Length of Stay: 4 days  Attending Physician: Dr. Abel Caraballo   Primary Care Provider: Keely Silva NP      Subjective     Principal Problem:Sepsis with encephalopathy    HPI:   Patient is a 74-year-old female with past medical history significant for type 2 insulin dependent diabetes mellitus, CKD stage 4, hypertension, hypothyroidism, liver cirrhosis secondary to YO, diastolic heart failure, aortic stenosis, hyperkalemia, thrombocytopenia, cataracts, hyperlipidemia, and multiple previous fractures as well as recent admit from  05/09/2025 through 5/15/2025 due to CHF exacerbation, pneumonia, hypoxemic respiratory failure, and UTI (treated with Merrem followed by cephalexin as well as IV Lasix, Duonebs and IV Solu-Medrol -- had to be discharged with home O2 which she has been using while sleeping. She presented to [facility] ED with complaint "not feeling well." Her  reports that she has been disoriented/confused and having incontinent episodes. He is very poor historian and patient is confused, so information mainly obtained via chart review. She did say yes when asked if dizzy/lightheaded/weak. She has had decreased activity and decreased appetite. while in ED she had temperature up to 102.7, tachycardia up to 115, blood pressure as high as 180/81 and SpO2 at one point down to 82% and was placed on nasal cannula at 2 L. lab workup showed WBC 5.64, hemoglobin 15.3, hematocrit 47.6, platelets 54, PT 12.8, INR 1.1, PTT 27.5, sodium 140, potassium 3.5, chloride 99, CO2 24, anion gap 17, BUN 53, creatinine 3.1, glucose 271, calcium 8.4, magnesium 1.7, alk-phos 82, albumin 2.2, AST 34, ALT 21,  BNP 12 12, troponin 4.293, lactic acid 3.0, procalcitonin 19.94, UA with no evidence of infection, blood cultures x2 pending.  CT head without " contrast was done and there was no acute intracranial findings.  Chest x-ray shows pulmonary venous congestion, similar to prior with left retrocardiac atelectasis versus consolidation.  X-ray of right tibia /fibula shows no acute bone or joint abnormality, does note edema within  subcutaneous tissues, no soft tissue gas.  While in ED, she was given acetaminophen, IV cefepime, LR fluid bolus x1 L, IV vancomycin, and started on heparin drip. Hospital Medicine was consulted for admission due to sepsis, cellulitis, encephalopathy, worsening renal failure, and NSTEMI.    Overview/Hospital Course:  Patient admitted to Hospital Medicine for Sepsis in the setting of Cellulitis. IV antibiotics continued. Blood cultures obtained with PCR positive for Streptococcus pyogenes (Group A). Cefepime continued and blood cultures repeated. ID consulted. Encephalopathy resolved. Troponin elevated and Heparin infusion initiated- Cardiology consulted. Hx of CHF noted with imaging supportive of pulmonary venous congestion, similar to prior. Left retrocardiac atelectasis or consolidation. BNP elevated- will initiate diuretic once stable. H/H stable. Lactic acid 3> 2.2. Procalcitonin elevated. Supplemental oxygen continued as needed. PT/OT evaluation pending. On 6/4/2025, mild confusion overnight resolved spontaneously and patient remained alert and oriented during the day. Potassium replaced and Magnesium stable with increased oral intake encouraged. Imaging supports pulmonary vascular congestion perihilar interstitial changes with small bilateral pleural effusions. Lasix given. Troponin trended down and Heparin infusion stopped with Metoprolol initiated per Cardiology. BUN/Cr trended upward. PT/OT evaluation completed with moderate intensity therapy recommended. CM consulted to assist with discharge planning. Antibiotics adjusted to Rocephin and Clindamycin per ID. Repeat blood cultures to be obtained in am. On 6/5/2025, atrial flutter  controlled on cardiac monitor and Heparin infusion resumed per Cardiology. Potassium replaced. Speech therapy consulted for evaluation due to difficulty swallowing and NPO status continued. H/H stable. Kidney function worsening noted.  Intermittent confusion at night noted. On 6/6/2025, pt able to respond to verbal stimuli and oriented to person but becoming more lethargic with worsening encephalopathy. CT of head showed no evidence of acute hemorrhage, focal infarction or midline shift. The ventricles and extra-axial fluid spaces normal for age. Patient remains NPO due intermittent signs of aspiration per Speech therapy. Atrial fibrillation with pauses (3-5 seconds) noted on monitor with heparin infusion continued. Potassium 4.5/Mag 2.1 and Cardiology following.  Accessory muscle use while breathing noted with 3L NC in place.  Repeat chest x-ray showed CHF with no significant interval changes.  CT of chest showed   worsening bilateral pleural effusions and worsening adjacent atelectasis/consolidation, most significantly involving the left lower lobe and to lesser degree left upper lobe.  Multifocal pneumonia with parapneumonic effusions vs. primary effusions with adjacent compressive atelectasis. Small to moderate pericardial effusion has developed in the interim as well.Worsening kidney function noted with minimal output reported in the setting of diuresis. Nephrology following with HD recommended at this time for volume control.  Repeat blood cultures with no growth to date. Imaging reviewed and patient evaluated per pulmonology the patient transferred to ICU for critical care management.    Interval History: pt becoming more lethargic on exam with accessory muscle use appreciated upon exam. Afib on monitor w/ pauses- Heparin infusion. Kidney function worsening with CT of chest showing worsening of pleural effusions and developing pericardial effusion. Pt transferred to ICU for critical care management.  "    Review of Systems   Unable to perform ROS: Acuity of condition     Objective:     Vital Signs (Most Recent):  Temp: 97.4 °F (36.3 °C) (06/06/25 1611)  Pulse: 85 (06/06/25 1611)  Resp: 20 (06/06/25 1611)  BP: (!) 116/59 (06/06/25 1611)  SpO2: 98 % (06/06/25 1611) Vital Signs (24h Range):  Temp:  [97.1 °F (36.2 °C)-97.9 °F (36.6 °C)] 97.4 °F (36.3 °C)  Pulse:  [68-94] 85  Resp:  [18-25] 20  SpO2:  [93 %-98 %] 98 %  BP: (116-147)/(59-77) 116/59     Weight: 96.5 kg (212 lb 11.9 oz)  Body mass index is 35.4 kg/m².    Intake/Output Summary (Last 24 hours) at 6/6/2025 1626  Last data filed at 6/6/2025 0600  Gross per 24 hour   Intake 546.53 ml   Output 30 ml   Net 516.53 ml         Physical Exam  Constitutional:       Appearance: She is obese. She is ill-appearing.      Interventions: Nasal cannula in place.   HENT:      Mouth/Throat:      Mouth: Mucous membranes are dry.      Pharynx: Oropharynx is clear.   Cardiovascular:      Rate and Rhythm: Normal rate. Rhythm irregular.      Comments: Atrial fibrillation on monitor with 3-5 second pauses  Pulmonary:      Effort: Accessory muscle usage present.      Breath sounds: Examination of the right-lower field reveals decreased breath sounds. Examination of the left-lower field reveals decreased breath sounds. Decreased breath sounds present.   Abdominal:      General: Bowel sounds are normal.      Palpations: Abdomen is soft.   Musculoskeletal:      Right lower leg: Edema present.      Comments: Generalized weakness    Skin:     General: Skin is warm.      Findings: Erythema present.      Comments: Erythema to RLE with mepilex in place    Neurological:      Mental Status: She is lethargic and disoriented.      Motor: Weakness present.      Comments: Oriented to person and able to answer some "yes/no" questions               Significant Labs: All pertinent labs within the past 24 hours have been reviewed.    Significant Imaging: I have reviewed all pertinent imaging " results/findings within the past 24 hours.      Assessment & Plan  Sepsis with encephalopathy  This patient does have evidence of infective focus  My overall impression is sepsis with Acute kidney injury, Acute heart failure, Encephalopathy, and Thrombocytopenia .  Source: Skin and Soft Tissue Infection: Cellulitis  Antibiotics given-   Antibiotics (72h ago, onward)      Start     Stop Route Frequency Ordered    06/04/25 0915  cefTRIAXone injection 2 g         -- IV Every 24 hours (non-standard times) 06/04/25 0722    06/04/25 0900  clindamycin in D5W 600 mg/50 mL IVPB 600 mg         06/07/25 0859 IV Every 8 hours (non-standard times) 06/04/25 0755          Latest lactate reviewed-  Recent Labs   Lab 06/05/25  0633   LACTATE 1.4     Organ dysfunction indicated by Acute kidney injury, Encephalopathy, and Thrombocytopenia     Fluid challenge Contraindicated- Fluid bolus is contraindicated in this patient due to Congestive Heart Failure     Post- resuscitation assessment Yes - I attest a sepsis perfusion exam was performed within 6 hours of sepsis, severe sepsis, or septic shock presentation, following fluid resuscitation.      Will Not start Pressors  Source control achieved by: Broad spectrum antibiotics  6/3/2025- encephalopathy improved - blood cultures with PCR + -ID consulted- will obtain repeat cultures   -6/4/2025- antibiotics transitioned to Rocephin and Clindamycin- ID following- repeat blood cultures in am   -6/5/2025- antibiotics continued- repeat blood cultures obtained   -6/6/2025- antibiotics continued- repeat blood cultures with NGTD- worsening encephalopathy- CT of head negative for acute abnormality  Type 2 diabetes mellitus with microalbuminuria, with long-term current use of insulin  Serial glucose checks   SSI for NPO ordered   -Lantus held in the setting of NPO status    Hypothyroidism  Resume Synthroid    Essential hypertension  Patient's blood pressure range in the last 24 hours was: BP  Min:  "116/59  Max: 147/60.The patient's inpatient anti-hypertensive regimen is listed below:  Current Antihypertensives  hydrALAZINE injection 10 mg, Every 6 hours PRN, Intravenous  furosemide injection 100 mg, Once, Intravenous    Plan  - BP fluctuating, monitor and holding medications due to worsening renal function  -oral antihypertensive medications held due to NPO status   Class 2 severe obesity due to excess calories with serious comorbidity and body mass index (BMI) of 36.0 to 36.9 in adult  Body mass index is 36.1 kg/m². Morbid obesity complicates all aspects of disease management from diagnostic modalities to treatment. Weight loss encouraged and health benefits explained to patient.       Thrombocytopenia  The likely etiology of thrombocytopenia is chronic ITP. The patients 3 most recent labs are listed below.  Recent Labs     06/04/25  0622 06/05/25  0449 06/06/25  0720   PLT 75* 75* 102*     Plan  - Will need to stop heparin if any overt bleeding and monitor platelet count closely  -stable- Heparin infusion stopped on 6/4/2025-  -Heparin infusion resumed on 6/5/2025 in the setting of Atrial fibrillation   acute on Chronic diastolic congestive heart failure  Patient has Diastolic (HFpEF) heart failure that is Chronic. On presentation their CHF was controlled and she was given 1 L fluid bolus due to sepsis. Most recent BNP and echo results are listed below. In the setting of Atrial flutter   No results for input(s): "BNP" in the last 72 hours.    Latest ECHO  Results for orders placed during the hospital encounter of 05/09/25    Echo    Interpretation Summary    Left Ventricle: The left ventricle is normal in size. Mildly increased ventricular mass. Mildly increased wall thickness. There is mild concentric hypertrophy. There is normal systolic function with a visually estimated ejection fraction of 55 - 60%. Grade II diastolic dysfunction.    Right Ventricle: The right ventricle is normal in size Wall thickness " is normal. Systolic function is normal.    Left Atrium: The left atrium is dilated    Aortic Valve: There is moderate aortic valve sclerosis. There is moderate stenosis. Aortic valve area by VTI is 1.3 cm². Aortic valve peak velocity is 3.1 m/s. Mean gradient is 22 mmHg. The dimensionless index is 0.45.    Mitral Valve: Mildly thickened subvalvular apparatus. There is severe mitral annular calcification.    IVC/SVC: Normal venous pressure at 3 mmHg.    Current Heart Failure Medications  hydrALAZINE injection 10 mg, Every 6 hours PRN, Intravenous  furosemide injection 100 mg, Once, Intravenous    Plan  - Monitor strict I&Os and daily weights.    - Place on telemetry  - Low sodium diet  - Place on fluid restriction of 1.5 L.   - Cardiology has been consulted  - The patient's volume status is at their baseline  -imaging supports pulmonary congestion - Lasix IV x 1 dose given on 6/4/2025   -will diurese as appropriate     Chronic kidney disease, stage 4 (severe)  Creatine stable for now. BMP reviewed- noted Estimated Creatinine Clearance: 13.7 mL/min (A) (based on SCr of 4.2 mg/dL (H)). according to latest data. Based on current GFR, CKD stage is stage 4 - GFR 15-29.  Monitor UOP and serial BMP and adjust therapy as needed. Renally dose meds. Avoid nephrotoxic medications and procedures.  -kidney function worsening  -see plan for acute on chronic renal failure  -Nephrology following  Mild persistent asthma without complication  Continue home inhaler- symbicort  PRN Duo-nebs ordered    Cellulitis of right lower extremity  Broad spectrum antibiotics ordered  wound care consulted     NSTEMI (non-ST elevated myocardial infarction)  Heparin drip- discontinued on 6/4/2025  Consult cardiology  -likely due to demand in the setting of sepsis and CHF   Denies chest pain, but troponin 4.293> 3.542>0.979 with EKG changes noted  -Echo obtained with results reviewed and EF 60%    Streptococcal bacteremia  -IV antibiotics transitioned  to Rocephin and Clindamycin   -blood cultures grew gram positive cocci in chains resembling Strep   -repeat blood cultures negative for 24 hours   Echo obtained with results reviewed    -Infectious Disease consulted     Atrial flutter  -EKG showed Atrial flutter with variable A-V block (controlled) -Nonspecific T wave abnormality -Abnormal ECG   -Cardiology following   -Heparin infusion resumed   -Cardizem 10 mg x 1 dose given   -Potassium of 3.4 replaced - repeat BMP pending     Anuria  -minimal UOP in the setting of diuresis and worsening renal failure  -Vas cath placement for HD  -Nephrology following     Altered mental status  -in the setting of Sepsis, acute on chronic CHF, and acute on chronic renal failure   -neuro checks  -see plan for Sepsis with encephalapthy    Acute on chronic diastolic congestive heart failure  Patient has Diastolic (HFpEF) heart failure that is Acute on chronic. On presentation their CHF was decompensated. Evidence of decompensated CHF on presentation includes: edema and shortness of breath. The etiology of their decompensation is likely increased fluid intake and MIKA and Atrial fibrillation. Most recent BNP and echo results are listed below.    Latest ECHO  Results for orders placed during the hospital encounter of 06/02/25    Echo    Interpretation Summary    Left Ventricle: The left ventricle is normal in size. Moderately increased wall thickness. There is concentric hypertrophy. There is normal systolic function. Ejection fraction is approximately 60%.    Right Ventricle: The right ventricle is normal in size Wall thickness is normal. Systolic function is normal.    Left Atrium: The left atrium is mildly dilated    IVC/SVC: Normal venous pressure at 3 mmHg.    Current Heart Failure Medications  hydrALAZINE injection 10 mg, Every 6 hours PRN, Intravenous  furosemide injection 100 mg, Once, Intravenous    Plan  - Monitor strict I&Os and daily weights.    - Place on telemetry  - Low  sodium diet  - Place on fluid restriction of 1.5 L.   - Cardiology has been consulted  - The patient's volume status is worsening as indicated by edema and shortness of breath. Will adjust treatment as follows: Diuresis and Nephrology to place Vas Cath for Dialysis  - CT of chest showed worsening pleural effusions and development of pericardial effusion   Atrial fibrillation  Patient has paroxysmal (<7 days) atrial fibrillation. Patient is currently in atrial fibrillation. [id number] Score: 4. The patients heart rate in the last 24 hours is as follows:  Pulse  Min: 63  Max: 94     Antiarrhythmics       Anticoagulants  heparin 25,000 units in dextrose 5% 250 mL (100 units/mL) infusion LOW INTENSITY nomogram - [facility], Continuous, Intravenous  heparin 25,000 units in dextrose 5% (100 units/ml) IV bolus from bag LOW INTENSITY nomogram - [facility], As needed (PRN), Intravenous  heparin 25,000 units in dextrose 5% (100 units/ml) IV bolus from bag LOW INTENSITY nomogram - [facility], As needed (PRN), Intravenous    Plan  - Replete lytes with a goal of K>4, Mg >2  - Patient is anticoagulated, see medications listed above.  - Patient's afib is currently controlled   - Cardiology following- Potassium 4.5/Mag 2.1  -Cardiology following   Acute renal failure superimposed on stage 5 chronic kidney disease, not on chronic dialysis  MIKA is likely due to Sepsis. Baseline creatinine is 2-2.5. Most recent creatinine and eGFR are listed below.  Recent Labs     06/05/25  1309 06/05/25  2155 06/06/25  0720   CREATININE 3.9* 4.0* 4.2*   EGFRNORACEVR 12* 11* 11*      Plan  - MIKA is worsening. Will adjust treatment as follows: per Nephrology recommendation- Vas cath placement for HD   - Avoid nephrotoxins and renally dose meds for GFR listed above  - Monitor urine output, serial BMP, and adjust therapy as needed  - Nephrology following    VTE Risk Mitigation (From admission, onward)           Ordered     heparin 25,000 units in dextrose 5% (100 units/ml)  IV bolus from bag LOW INTENSITY nomogram - [facility]  As needed (PRN)        Question:  Heparin Infusion Adjustment (DO NOT MODIFY ANSWER)  Answer:  [email or web address]\Images\Pharmacy\HeparinInfusions\heparin LOW INTENSITY nomogram for [facility] [id number]    06/05/25 0541     heparin 25,000 units in dextrose 5% (100 units/ml) IV bolus from bag LOW INTENSITY nomogram - [facility]  As needed (PRN)        Question:  Heparin Infusion Adjustment (DO NOT MODIFY ANSWER)  Answer:  [email or web address]\Images\Pharmacy\HeparinInfusions\heparin LOW INTENSITY nomogram for [facility] [id number]    06/05/25 0541     heparin 25,000 units in dextrose 5% 250 mL (100 units/mL) infusion LOW INTENSITY nomogram - [facility]  Continuous        Question:  Begin at (units/kg/hr)  Answer:  12    06/05/25 0541                    Discharge Planning   KYA: 6/7/2025     Code Status: Full Code   Medical Readiness for Discharge Date:   Discharge Plan A: Skilled Nursing Facility   Discharge Delays: None known at this time          Please place Justification for DME        Ceci Pelaez NP  Department of Hospital Medicine   [facility] - [facility] ([facility])

## 2025-06-06 NOTE — ASSESSMENT & PLAN NOTE
Body mass index is 36.1 kg/m². Morbid obesity complicates all aspects of disease management from diagnostic modalities to treatment. Weight loss encouraged and health benefits explained to patient.

## 2025-06-06 NOTE — HPI
Ms Campbell is a 73 y/o WF with DM, CKD Stage IV, HTN, hypothyroidism, YO cirrhosis, dCHF, and HLD who is currently admitted to the Hospitalist service after presenting 6/2 with confusion.  Recent admission 5/9 to 5/15 for CHF exacerbation, PNA, and UTI.  Went home on oxygen.      This admission, she has been treated for volume overload with diuresis, along with UTI, and Strep bacteremia.  Course has been complicated by progressive renal failure with oliguria despite diuresis.  She comes to the ICU this afternoon due to ongoing lethargy and need for dialysis.  Oxygenation is stable on 3L and hemodynamics are stable.

## 2025-06-06 NOTE — ASSESSMENT & PLAN NOTE
Srouce is likely 2/2 to sepsis.  The patients 3 most recent labs are listed below.  Recent Labs     06/05/25  0449 06/06/25  0720 06/06/25  1659   PLT 75* 102* 104*     Plan  - Will transfuse if platelet count is <20k.  - Monitor

## 2025-06-06 NOTE — ASSESSMENT & PLAN NOTE
-in the setting of Sepsis, acute on chronic CHF, and acute on chronic renal failure   -neuro checks  -see plan for Sepsis with encephalapthy

## 2025-06-06 NOTE — EICU
"Intervention Initiated From:  [facility] / EICU    Peter intervened regarding:  Other    Comments: Virtual ICU Admission    Admit Date: 2025  LOS: 4  Code Status: Full Code   : 1950  Bed: A 15/A 15:     Diagnosis: Sepsis with encephalopathy    Patient  has a past medical history of Acquired TTP, Cataract, CKD stage 3 secondary to diabetes, Closed displaced comminuted fracture of right patella, Closed fracture of surgical neck of left humerus, Closed left radial fracture, Hyperkalemia, Hyperlipidemia, Hypertension, Hypothyroidism, unspecified, Liver cirrhosis secondary to YO, Morbid obesity, Right knee pain, Thyroid disease, and Type 2 diabetes mellitus.    Last VS: /60   Pulse 88   Temp 97.7 °F (36.5 °C) (Oral)   Resp (!) 22   Ht 5' 5" (1.651 m)   Wt 98.4 kg (216 lb 14.9 oz)   SpO2 (!) 92%   BMI 36.10 kg/m²       VICU Review    VICU nurse assessment :  [place] completed, LDA documentation reconciliation completed, and VTE prophylaxis review    Skin breakdown present on admission              "

## 2025-06-06 NOTE — ASSESSMENT & PLAN NOTE
"Patient has Diastolic (HFpEF) heart failure that is Chronic. On presentation their CHF was controlled and she was given 1 L fluid bolus due to sepsis. Most recent BNP and echo results are listed below. In the setting of Atrial flutter   No results for input(s): "BNP" in the last 72 hours.    Latest ECHO  Results for orders placed during the hospital encounter of 05/09/25    Echo    Interpretation Summary    Left Ventricle: The left ventricle is normal in size. Mildly increased ventricular mass. Mildly increased wall thickness. There is mild concentric hypertrophy. There is normal systolic function with a visually estimated ejection fraction of 55 - 60%. Grade II diastolic dysfunction.    Right Ventricle: The right ventricle is normal in size Wall thickness is normal. Systolic function is normal.    Left Atrium: The left atrium is dilated    Aortic Valve: There is moderate aortic valve sclerosis. There is moderate stenosis. Aortic valve area by VTI is 1.3 cm². Aortic valve peak velocity is 3.1 m/s. Mean gradient is 22 mmHg. The dimensionless index is 0.45.    Mitral Valve: Mildly thickened subvalvular apparatus. There is severe mitral annular calcification.    IVC/SVC: Normal venous pressure at 3 mmHg.    Current Heart Failure Medications  hydrALAZINE injection 10 mg, Every 6 hours PRN, Intravenous  furosemide injection 100 mg, Once, Intravenous    Plan  - Monitor strict I&Os and daily weights.    - Place on telemetry  - Low sodium diet  - Place on fluid restriction of 1.5 L.   - Cardiology has been consulted  - The patient's volume status is at their baseline  -imaging supports pulmonary congestion - Lasix IV x 1 dose given on 6/4/2025   -will diurese as appropriate     "

## 2025-06-06 NOTE — PLAN OF CARE
Patient appears more distracted and confused than in previous sessions. Patient required constant redirection in order to stay on task and an inability to follow commands due to cognitive impairment. Patient is Total A for bed mobility and Max A x 2 for sit to stand and transfers.

## 2025-06-06 NOTE — CONSULTS
[facility] - Intensive Care ([facility])  Critical Care Medicine  Consult Note    Patient Name: Lakshmi Campbell  MRN: [id number]  Admission Date: 6/2/2025  Hospital Length of Stay: 4 days  Code Status: Full Code  Attending Physician: Adrian Nathan MD   Primary Care Provider: Keely Silva NP   Principal Problem: Sepsis with encephalopathy    Consults  Subjective:     HPI:  Ms Campbell is a 75 y/o WF with DM, CKD Stage IV, HTN, hypothyroidism, YO cirrhosis, dCHF, and HLD who is currently admitted to the Hospitalist service after presenting 6/2 with confusion.  Recent admission 5/9 to 5/15 for CHF exacerbation, PNA, and UTI.  Went home on oxygen.      This admission, she has been treated for volume overload with diuresis, along with UTI, and Strep bacteremia.  Course has been complicated by progressive renal failure with oliguria despite diuresis.  She comes to the ICU this afternoon due to ongoing lethargy and need for dialysis.  Oxygenation is stable on 3L and hemodynamics are stable.    Hospital/ICU Course:  No notes on file    Past Medical History:   Diagnosis Date    Acquired TTP     Cataract     CKD stage 3 secondary to diabetes     Closed displaced comminuted fracture of right patella 10/28/2020    Closed fracture of surgical neck of left humerus 10/30/2020    Closed left radial fracture 10/30/2020    Hyperkalemia     Hyperlipidemia     Hypertension     Hypothyroidism, unspecified     Liver cirrhosis secondary to YO     Morbid obesity     Right knee pain     Thyroid disease     Type 2 diabetes mellitus        Past Surgical History:   Procedure Laterality Date    APPENDECTOMY      BREAST BIOPSY      CATARACT EXTRACTION      COLONOSCOPY N/A 12/21/2021    Procedure: COLONOSCOPY screening;  Surgeon: Moises Patterson MD;  Location: [facility];  Service: Endoscopy;  Laterality: N/A;    ESOPHAGOGASTRODUODENOSCOPY N/A 12/21/2021    Procedure: EGD (ESOPHAGOGASTRODUODENOSCOPY) EV screening;  Surgeon: Moises BASILIO  MD Leonardo;  Location: [facility] ENDO;  Service: Endoscopy;  Laterality: N/A;    EYE SURGERY      HERNIA REPAIR      OPEN REDUCTION AND INTERNAL FIXATION (ORIF) OF FRACTURE OF DISTAL RADIUS Left 11/2/2020    Procedure: ORIF, FRACTURE, RADIUS, DISTAL;  Surgeon: Sage Wolf MD;  Location: [facility] OR;  Service: Orthopedics;  Laterality: Left;    OPEN REDUCTION AND INTERNAL FIXATION (ORIF) OF FRACTURE OF PATELLA Right 11/2/2020    Procedure: ORIF, FRACTURE, PATELLA;  Surgeon: Sage Wolf MD;  Location: [facility] OR;  Service: Orthopedics;  Laterality: Right;    OPEN REDUCTION AND INTERNAL FIXATION (ORIF) OF FRACTURE OF PATELLA Right 12/18/2020    Procedure: ORIF, FRACTURE, PATELLA;  Surgeon: Sage Wolf MD;  Location: [facility] OR;  Service: Orthopedics;  Laterality: Right;    ORIF HUMERUS FRACTURE Left 11/5/2020    Procedure: ORIF, FRACTURE, HUMERUS;  Surgeon: Sage Wolf MD;  Location: [facility] OR;  Service: Orthopedics;  Laterality: Left;    PLACEMENT OF ACELLULAR HUMAN DERMAL ALLOGRAFT Right 11/2/2020    Procedure: APPLICATION, ACELLULAR HUMAN DERMAL ALLOGRAFT;  Surgeon: Sage Wolf MD;  Location: [facility] OR;  Service: Orthopedics;  Laterality: Right;  Right Patella    REMOVAL OF HARDWARE FROM HAND Left 3/11/2021    Procedure: REMOVAL, HARDWARE, HAND;  Surgeon: Sage Wolf MD;  Location: [facility] OR;  Service: Orthopedics;  Laterality: Left;  Removal of dorsal spanning wrist plate left distal radius    REMOVAL OF HARDWARE FROM LOWER EXTREMITY Right 12/18/2020    Procedure: REMOVAL, HARDWARE, LOWER EXTREMITY;  Surgeon: Sage Wolf MD;  Location: [facility] OR;  Service: Orthopedics;  Laterality: Right;       Review of patient's allergies indicates:   Allergen Reactions    Codeine Nausea Only     Other reaction(s): Nausea  Other reaction(s): Elevated blood pressure       Family History       Problem Relation (Age of Onset)    Diabetes Mother, Father    Leukemia  Father          Tobacco Use    Smoking status: Former     Current packs/day: 0.00     Average packs/day: 1 pack/day for 8.0 years (8.0 ttl pk-yrs)     Types: Cigarettes     Start date:      Quit date:      Years since quittin.4    Smokeless tobacco: Never   Substance and Sexual Activity    Alcohol use: Yes     Comment: rarely    Drug use: No    Sexual activity: Not Currently         Review of Systems   Unable to perform ROS: Mental status change     Objective:     Vital Signs (Most Recent):  Temp: 97.7 °F (36.5 °C) (25 1630)  Pulse: 88 (25 1630)  Resp: (!) 22 (25 1630)  BP: 124/60 (25 1630)  SpO2: (!) 92 % (25 1630) Vital Signs (24h Range):  Temp:  [97.1 °F (36.2 °C)-97.9 °F (36.6 °C)] 97.7 °F (36.5 °C)  Pulse:  [68-94] 88  Resp:  [18-25] 22  SpO2:  [92 %-98 %] 92 %  BP: (116-147)/(59-77) 124/60     Weight: 98.4 kg (216 lb 14.9 oz)  Body mass index is 36.1 kg/m².      Intake/Output Summary (Last 24 hours) at 2025 1703  Last data filed at 2025 0600  Gross per 24 hour   Intake 546.53 ml   Output 30 ml   Net 516.53 ml        Physical Exam  Vitals and nursing note reviewed.   Constitutional:       Comments: Lethargic, but arousable to voice.  Answers basic questions, though quite delayed.   HENT:      Head: Normocephalic and atraumatic.   Eyes:      General: No scleral icterus.     Extraocular Movements: Extraocular movements intact.      Conjunctiva/sclera: Conjunctivae normal.      Pupils: Pupils are equal, round, and reactive to light.   Cardiovascular:      Rate and Rhythm: Normal rate and regular rhythm.      Pulses: Normal pulses.      Heart sounds: No murmur heard.  Pulmonary:      Effort: Pulmonary effort is normal. No respiratory distress.      Breath sounds: No wheezing, rhonchi or rales.   Abdominal:      General: Abdomen is flat. There is no distension.      Palpations: Abdomen is soft.      Tenderness: There is no abdominal tenderness.   Musculoskeletal:       Cervical back: Normal range of motion and neck supple. No rigidity or tenderness.      Right lower leg: Edema present.      Left lower leg: Edema present.   Skin:     Comments: RLE erythema and tenderness   Neurological:      Comments: Answers basic questions, but slowly.  Follows commands, but requires multiple promptings.          Vents:  Oxygen Concentration (%): 32 (06/05/25 1931)    Lines/Drains/Airways       Peripheral Intravenous Line  Duration                  Peripheral IV - Single Lumen 06/04/25 0333 Left Hand 2 days         Peripheral IV - Single Lumen 06/05/25 0624 20 G Anterior;Right Forearm 1 day                    Significant Labs:    CBC/Anemia Profile:  Recent Labs   Lab 06/05/25 0449 06/05/25  0509 06/06/25  0720   WBC 4.78  --  7.39   HGB 9.8*  --  9.2*   HCT 32.3* 28* 29.8*   PLT 75*  --  102*   MCV 90  --  92   RDW 13.5  --  13.8        Chemistries:  Recent Labs   Lab 06/05/25 0449 06/05/25  1309 06/05/25  2155 06/06/25  0720   * 135* 134* 135*   K 3.4* 3.9 4.1 4.5   CL 99 99 99 99   CO2 21* 22* 22* 18*   BUN 83* 88* 88* 92*   CREATININE 3.6* 3.9* 4.0* 4.2*   CALCIUM 8.2* 8.1* 8.0* 8.0*   ALBUMIN 1.8*  --   --  1.9*   PROT 7.0  --   --  7.5   BILITOT 0.5  --   --  0.4   ALKPHOS 71  --   --  75   ALT 19  --   --  19   AST 28  --   --  27   MG 2.1  --   --   --        All pertinent labs within the past 24 hours have been reviewed.    Significant Imaging:   I have reviewed all pertinent imaging results/findings within the past 24 hours.    ABG  Recent Labs   Lab 06/05/25 1933   PH 7.404   PO2 89   PCO2 38.0   HCO3 23.8*   BE -1     Assessment/Plan:     Assessment & Plan  Sepsis with encephalopathy  Cellulitis of right lower extremity  Streptococcal bacteremia  This patient does have evidence of infective focus  My overall impression is sepsis with Acute kidney injury, Acute respiratory failure, Encephalopathy, and Thrombocytopenia .  Source: Skin and Soft Tissue Infection:  Cellulitis  Antibiotics given-   Antibiotics (72h ago, onward)      Start     Stop Route Frequency Ordered    06/04/25 0915  cefTRIAXone injection 2 g         -- IV Every 24 hours (non-standard times) 06/04/25 0722    06/04/25 0900  clindamycin in D5W 600 mg/50 mL IVPB 600 mg         06/07/25 0859 IV Every 8 hours (non-standard times) 06/04/25 0755          Latest lactate reviewed-  Recent Labs   Lab 06/05/25  0633   LACTATE 1.4     Organ dysfunction indicated by Acute kidney injury, Encephalopathy, and Thrombocytopenia     Source control achieved by: Rocephin/Clinda    ID following  Hemodynamics stable  Blood Cx 6/5 NGTD      Type 2 diabetes mellitus with microalbuminuria, with long-term current use of insulin  SSI/accuchecks      Hypothyroidism  Continue Synthroid      Essential hypertension  Patient's blood pressure range in the last 24 hours was: BP  Min: 116/59  Max: 147/60.The patient's inpatient anti-hypertensive regimen is listed below:  Current Antihypertensives  hydrALAZINE injection 10 mg, Every 6 hours PRN, Intravenous  furosemide injection 100 mg, Once, Intravenous    Plan  - BP is controlled, no changes needed to their regimen      Class 2 severe obesity due to excess calories with serious comorbidity and body mass index (BMI) of 36.0 to 36.9 in adult  Body mass index is 36.1 kg/m². Morbid obesity complicates all aspects of disease management from diagnostic modalities to treatment. Weight loss encouraged and health benefits explained to patient.       Thrombocytopenia  Srouce is likely 2/2 to sepsis.  The patients 3 most recent labs are listed below.  Recent Labs     06/05/25  0449 06/06/25  0720 06/06/25  1659   PLT 75* 102* 104*     Plan  - Will transfuse if platelet count is <20k.  - Monitor      acute on Chronic diastolic congestive heart failure  Patient has Diastolic (HFpEF) heart failure that is Acute on chronic. On presentation their CHF was decompensated. Evidence of decompensated CHF on  "presentation includes: elevated JVD, weight gain, and shortness of breath. The etiology of their decompensation is likely renal failure. Most recent BNP and echo results are listed below.  No results for input(s): "BNP" in the last 72 hours.  Latest ECHO  Results for orders placed during the hospital encounter of 06/02/25    Echo    Interpretation Summary    Left Ventricle: The left ventricle is normal in size. Moderately increased wall thickness. There is concentric hypertrophy. There is normal systolic function. Ejection fraction is approximately 60%.    Right Ventricle: The right ventricle is normal in size Wall thickness is normal. Systolic function is normal.    Left Atrium: The left atrium is mildly dilated    IVC/SVC: Normal venous pressure at 3 mmHg.    Current Heart Failure Medications  hydrALAZINE injection 10 mg, Every 6 hours PRN, Intravenous  furosemide injection 100 mg, Once, Intravenous    Plan  - Monitor strict I&Os and daily weights.    - Place on telemetry  - Low sodium diet  - Place on fluid restriction of 1.5 L.   - Cardiology has been consulted  - The patient's volume status is worsening as indicated by shortness of breath. Will adjust treatment as follows: dialysis  - dialysis for volume removal      Chronic kidney disease, stage 4 (severe)  Acute on Chronic Renal Failure  ATN in the setting of sepsis  Nephrology following   Trialysis placed 6/6 and plan to start dialysis  Avoid nephrotoxins and renally dose meds    Altered mental status  Likely multifactorial in the setting of sepsis and renal failure  Will hopefully improve with dialysis  Delirium precautions       Critical Care Time: 48 minutes  Critical secondary to high risk monitoring     Critical care was time spent personally by me on the following activities: development of treatment plan with patient or surrogate and bedside caregivers, discussions with consultants, evaluation of patient's response to treatment, examination of " patient, ordering and performing treatments and interventions, ordering and review of laboratory studies, ordering and review of radiographic studies, pulse oximetry, re-evaluation of patient's condition. This critical care time did not overlap with that of any other provider or involve time for any procedures.    Thank you for your consult. I will follow-up with patient. Please contact us if you have any additional questions.     Adrian Nathan MD  Critical Care Medicine  [facility] - Intensive [facility])

## 2025-06-06 NOTE — ASSESSMENT & PLAN NOTE
Blood culture done-0 6/0 2 strep pyogenes.  Sepsis likely the right lower extremities cellulitis.  TTE did not show vegetation.  We will use Rocephin and will add p.o. Zyvox.  She is now on vancomycin and cefepime  06/05-  We will follow repeat blood cultures.  We will plan to complete 2 weeks of ceftriaxone from negative blood..

## 2025-06-06 NOTE — ASSESSMENT & PLAN NOTE
Creatine stable for now. BMP reviewed- noted Estimated Creatinine Clearance: 13.7 mL/min (A) (based on SCr of 4.2 mg/dL (H)). according to latest data. Based on current GFR, CKD stage is stage 4 - GFR 15-29.  Monitor UOP and serial BMP and adjust therapy as needed. Renally dose meds. Avoid nephrotoxic medications and procedures.  -kidney function worsening  -see plan for acute on chronic renal failure  -Nephrology following

## 2025-06-06 NOTE — ASSESSMENT & PLAN NOTE
Patient has paroxysmal (<7 days) atrial fibrillation. Patient is currently in atrial fibrillation. [id number] Score: 4. The patients heart rate in the last 24 hours is as follows:  Pulse  Min: 63  Max: 94     Antiarrhythmics       Anticoagulants  heparin 25,000 units in dextrose 5% 250 mL (100 units/mL) infusion LOW INTENSITY nomogram - [facility], Continuous, Intravenous  heparin 25,000 units in dextrose 5% (100 units/ml) IV bolus from bag LOW INTENSITY nomogram - [facility], As needed (PRN), Intravenous  heparin 25,000 units in dextrose 5% (100 units/ml) IV bolus from bag LOW INTENSITY nomogram - [facility], As needed (PRN), Intravenous    Plan  - Replete lytes with a goal of K>4, Mg >2  - Patient is anticoagulated, see medications listed above.  - Patient's afib is currently controlled   - Cardiology following- Potassium 4.5/Mag 2.1  -Cardiology following

## 2025-06-06 NOTE — PT/OT/SLP PROGRESS
Physical Therapy Treatment    Patient Name:  Lakshmi Campbell   MRN:  [id number]    Recommendations:     Discharge Recommendations: Moderate Intensity Therapy  Discharge Equipment Recommendations: to be determined by next level of care  Barriers to discharge: None    Assessment:     Lakshmi Campbell is a 74 y.o. female admitted with a medical diagnosis of Sepsis with encephalopathy.  She presents with the following impairments/functional limitations: weakness, impaired endurance, impaired self care skills, impaired functional mobility, gait instability, impaired balance, impaired cognition, decreased coordination, decreased upper extremity function, decreased lower extremity function, decreased safety awareness.    Rehab Prognosis: Fair; patient would benefit from acute skilled PT services to address these deficits and reach maximum level of function.    Recent Surgery: * No surgery found *      Plan:     During this hospitalization, patient to be seen 3 x/week to address the identified rehab impairments via therapeutic activities and progress toward the following goals:    Plan of Care Expires:  06/18/25    Subjective     Chief Complaint: None stated   Patient/Family Comments/goals: Patient's  states that the patient slept through the night  Patient is easily distracted requiring constant redirection  Patient continues to require tactile/verbal cues to stay on task   Patient also continues to demonstrate slowed processing with task demands     Pain/Comfort:  Pain Rating 1: 0/10      Objective:     Communicated with [facility] prior to session.  Patient found HOB elevated with telemetry, oxygen, peripheral IV upon PT entry to room.     General Precautions: Standard, fall  Orthopedic Precautions: N/A  Braces: N/A  Respiratory Status: Nasal cannula, flow 3 L/min     Functional Mobility:  Bed Mobility: total assistance  Rolling Right: total assistance  Patient required cueing for leg and hand placements during roll as well as  "the use of the linens and pads. Patient demonstrated AROM with her arms, but was total assistance with LE placement and the actual roll itself  Transfers: Max A x 2  Balance:  EOB sitting balance was fair with CGA. Patient required cueing to maintain center of gravity. Patient tolerated EOB sitting well for approximately 15-20 min. Patient was able to tolerate this position well with slight lateral and posterior leans. Patient also demonstrated a positive response to music via observable tapping of her foot to the beat of the song.   Sit to Stand: Max A x 2  Patient required cueing for anterior weight shift, hand placement, and foot placement  Squat pivot transfer to the chair:Max A x 2  Patient demonstrated effort in order to transfer to the chair, but continued to require Max A x 2 due to slowed processing. NP requested return to bed due to decreased cognition/mentation/unsafe situation at that time. PT transferred the patient back to the bed.   Squat pivot transfer to the bed: Max A x 2   Patient required multiple attempts for STS. The patient repeatedly stated "stop" in response to STS initiation. After several attempts, the patient was able to be transferred back to the bed.  TherEx:  Approximately 10 x LAQs on the LLE  Patient required verbal and tactile cueing with each repetition in order to achieve approximately 10 repetitions. When verbally instructed to attempt LAQs on the RLE, the patient was unable to follow command. No other seated exercises were performed due to today's confusion.     **All functional mobility and participation in therapy continues to be limited by continuation of the patient being confused, easily distracted, and needing constant cueing for task demands.     AM-PAC 6 CLICK MOBILITY  Turning over in bed (including adjusting bedclothes, sheets and blankets)?: 1  Sitting down on and standing up from a chair with arms (e.g., wheelchair, bedside commode, etc.): 2  Moving from lying on " "back to sitting on the side of the bed?: 1  Moving to and from a bed to a chair (including a wheelchair)?: 2  Need to walk in hospital room?: 1  Climbing 3-5 steps with a railing?: 1 (NT)  Basic Mobility Total Score: 8     Patient Education:  The patient and her family were educated in the following:  Role of PT and POC in acute care hospital setting  Continue provided therapeutic exercises throughout the day to increase tolerance to activity and blood flow: 10 repetitions of seated hip flexion, LAQs, heel slides, and APs  Increase amount of time out of bed and seated in chair to patient tolerance  Patient was educated on risk for falls due to generalized weakness ("Call don't Fall").  Patient was encouraged to call for assistance with all needs, such as transfers or trips to the bathroom.    Patient left HOB elevated with call button in reach..    GOALS:   Multidisciplinary Problems       Physical Therapy Goals          Problem: Physical Therapy    Goal Priority Disciplines Outcome Interventions   Physical Therapy Goal     PT, PT/OT Not Progressing    Description: LTG goals to be met in 14 days (6/18/25).  Patient will require Min A for bed mobility.  Patient will require Min A for sit <> stand.  Patient will require Min A for stand <> chair transfer.  Patient will ambulate 200' Min A with RW.   Patient will increase AM-PAC score by 2 points to progress with functional mobility.                       DME Justifications:  No DME recommended requiring DME justifications    Time Tracking:     PT Received On: 06/06/25  PT Start Time: 0910     PT Stop Time: 1010  PT Total Time (min): 60 min     Billable Minutes: Therapeutic Activity 60    Treatment Type: Treatment  PT/PTA: PT     Number of PTA visits since last PT visit: 0     06/06/2025  "

## 2025-06-06 NOTE — SUBJECTIVE & OBJECTIVE
"Interval History:   74-year-old woman with strep pyogenes bacteremia.  She has intermittent confusional episodes.    Review of Systems   Constitutional:  Negative for activity change, appetite change, chills, diaphoresis and fatigue.     Objective:     Vital Signs (Most Recent):  Temp: 97.9 °F (36.6 °C) (06/05/25 2345)  Pulse: 82 (06/06/25 0434)  Resp: 20 (06/05/25 2345)  BP: 122/60 (06/05/25 2345)  SpO2: 95 % (06/06/25 0300) Vital Signs (24h Range):  Temp:  [97.6 °F (36.4 °C)-98.2 °F (36.8 °C)] 97.9 °F (36.6 °C)  Pulse:  [] 82  Resp:  [17-20] 20  SpO2:  [93 %-97 %] 95 %  BP: (109-129)/(55-61) 122/60     Weight: 96.5 kg (212 lb 11.9 oz)  Body mass index is 35.4 kg/m².    Estimated Creatinine Clearance: 14.2 mL/min (A) (based on SCr of 4 mg/dL (H)).     Physical Exam  Vitals and nursing note reviewed.   Cardiovascular:      Rate and Rhythm: Normal rate.   Pulmonary:      Effort: Pulmonary effort is normal.   Abdominal:      General: There is no distension.   Skin:     Coloration: Skin is not jaundiced.   Neurological:      Mental Status: She is disoriented.      Motor: Weakness present.          Significant Labs: Blood Culture: No results for input(s): "LABBLOO" in the last 4320 hours.  BMP:   Recent Labs   Lab 06/05/25  0449 06/05/25  1309 06/05/25  2155   *   < > 164*   *   < > 134*   K 3.4*   < > 4.1   CL 99   < > 99   CO2 21*   < > 22*   BUN 83*   < > 88*   CREATININE 3.6*   < > 4.0*   CALCIUM 8.2*   < > 8.0*   MG 2.1  --   --     < > = values in this interval not displayed.     CBC:   Recent Labs   Lab 06/04/25  0622 06/05/25  0449 06/05/25  0509   WBC 8.32 4.78  --    HGB 9.7* 9.8*  --    HCT 31.4* 32.3* 28*   PLT 75* 75*  --      CMP:   Recent Labs   Lab 06/04/25  0622 06/05/25  0449 06/05/25  1309 06/05/25  2155   * 135* 135* 134*   K 3.1* 3.4* 3.9 4.1   CL 97 99 99 99   CO2 20* 21* 22* 22*   * 207* 228* 164*   BUN 76* 83* 88* 88*   CREATININE 3.3* 3.6* 3.9* 4.0*   CALCIUM 8.0* " 8.2* 8.1* 8.0*   PROT 6.4 7.0  --   --    ALBUMIN 1.8* 1.8*  --   --    BILITOT 0.7 0.5  --   --    ALKPHOS 73 71  --   --    AST 21 28  --   --    ALT 16 19  --   --    ANIONGAP 15 15 14 13     All pertinent labs within the past 24 hours have been reviewed.    Significant Imaging: I have reviewed all pertinent imaging results/findings within the past 24 hours.

## 2025-06-06 NOTE — ASSESSMENT & PLAN NOTE
This patient does have evidence of infective focus  My overall impression is sepsis with Acute kidney injury, Acute heart failure, Encephalopathy, and Thrombocytopenia .  Source: Skin and Soft Tissue Infection: Cellulitis  Antibiotics given-   Antibiotics (72h ago, onward)      Start     Stop Route Frequency Ordered    06/04/25 0915  cefTRIAXone injection 2 g         -- IV Every 24 hours (non-standard times) 06/04/25 0722    06/04/25 0900  clindamycin in D5W 600 mg/50 mL IVPB 600 mg         06/07/25 0859 IV Every 8 hours (non-standard times) 06/04/25 0755          Latest lactate reviewed-  Recent Labs   Lab 06/05/25  0633   LACTATE 1.4     Organ dysfunction indicated by Acute kidney injury, Encephalopathy, and Thrombocytopenia     Fluid challenge Contraindicated- Fluid bolus is contraindicated in this patient due to Congestive Heart Failure     Post- resuscitation assessment Yes - I attest a sepsis perfusion exam was performed within 6 hours of sepsis, severe sepsis, or septic shock presentation, following fluid resuscitation.      Will Not start Pressors  Source control achieved by: Broad spectrum antibiotics  6/3/2025- encephalopathy improved - blood cultures with PCR + -ID consulted- will obtain repeat cultures   -6/4/2025- antibiotics transitioned to Rocephin and Clindamycin- ID following- repeat blood cultures in am   -6/5/2025- antibiotics continued- repeat blood cultures obtained   -6/6/2025- antibiotics continued- repeat blood cultures with NGTD- worsening encephalopathy- CT of head negative for acute abnormality

## 2025-06-06 NOTE — ASSESSMENT & PLAN NOTE
Patient's blood pressure range in the last 24 hours was: BP  Min: 116/59  Max: 147/60.The patient's inpatient anti-hypertensive regimen is listed below:  Current Antihypertensives  hydrALAZINE injection 10 mg, Every 6 hours PRN, Intravenous  furosemide injection 100 mg, Once, Intravenous    Plan  - BP is controlled, no changes needed to their regimen

## 2025-06-06 NOTE — PLAN OF CARE
06/06/25 1313   Rounds   Attendance Provider;Nurse ;Charge nurse;Physical therapist;Occupational therapist   Discharge Plan A Skilled Nursing Facility   Why the patient remains in the hospital Requires continued medical care   Transition of Care Barriers None

## 2025-06-06 NOTE — ASSESSMENT & PLAN NOTE
Heparin drip- discontinued on 6/4/2025  Consult cardiology  -likely due to demand in the setting of sepsis and CHF   Denies chest pain, but troponin 4.293> 3.542>0.979 with EKG changes noted  -Echo obtained with results reviewed and EF 60%

## 2025-06-06 NOTE — ASSESSMENT & PLAN NOTE
-IV antibiotics transitioned to Rocephin and Clindamycin   -blood cultures grew gram positive cocci in chains resembling Strep   -repeat blood cultures negative for 24 hours   Echo obtained with results reviewed    -Infectious Disease consulted

## 2025-06-06 NOTE — PT/OT/SLP PROGRESS
Speech Language Pathology Treatment    Patient Name:  Lakshmi Campbell   MRN:  [id number]  Admitting Diagnosis: Sepsis with encephalopathy    Recommendations:                 General Recommendations:  Ongoing swallowing assessment as cognition improves  Diet recommendations:  NPO with consideration of temporary alternative means of nutrition at this time  Aspiration Precautions: Frequent oral care, Ice chips sparingly, and Standard aspiration precautions   General Precautions: Standard, aspiration  Communication strategies:  provide increased time to answer    Assessment:     Lakshmi Campbell is a 74 y.o. female admitted to [facility] with dx sepsis with MIKA, CHF, NSTEMI, Atrial flutter, cellulitis of right LE, streptococcal bacteremia and encephalopathy.  She presents with fluctuating mentation with severe cognitive-linguistic deficits.  Nonverbal/NR at times with inconsistent ability to follow basic commands. Generalized weakness with poor head/neck control, consistent throat clearing and reduced laryngeal excursions noted throughout bedside CSE.  She is recommended to remain NPO with consideration of temporary alternative means of nutrition in conjunction with ongoing swallowing assessment, pending improvement in cognition.    Subjective     Pt seen bedside for ST. No c/o pain.  PT in room positioning pt for ST assessment.  Pt's family in room also--updated on ST POC.  Patient goals: Return to PLOF, eat/drink     Pain/Comfort:  Pain Rating 1: 0/10  Pain Rating Post-Intervention 1: 0/10  Pain Rating 2: 0/10  Pain Rating Post-Intervention 2: 0/10    Respiratory Status: NC    Objective:     Has the patient been evaluated by SLP for swallowing?   Yes  Keep patient NPO? Yes   Current Respiratory Status: NC    Tx feeds of thin liquids (ice chips/tsp, cup and straw) and pureed solids---required significant feeding assist s/t UE weakness, cognitive impairment.  Consistent throat clear post-deglutition with weak laryngeal excursions.   Dry cough noted in the absence of po intake.    Less verbal today with NR to questions/tasks at time.  Severe cognitive deficits noted, easily distracted requiring frequent redirection.  Oriented to name only.    Goals:   Multidisciplinary Problems       SLP Goals          Problem: SLP    Goal Priority Disciplines Outcome   SLP Goal     SLP Progressing   Description: TPW engage in ongoing CSE.     TPW safely consume least restrictive PO diet.                        Plan:     Patient to be seen:  2 x/week, 3 x/week   Plan of Care expires:  06/12/25  Plan of Care reviewed with:  patient, spouse, daughter   SLP Follow-Up:  Yes       Discharge recommendations:  Moderate Intensity Therapy   Barriers to Discharge:  None    Time Tracking:     SLP Treatment Date:   06/06/25  Speech Start Time:  0930  Speech Stop Time:  1000     Speech Total Time (min):  30 min    Billable Minutes: Speech Therapy Individual 15 minutes and Treatment Swallowing Dysfunction 15 minutes    06/06/2025

## 2025-06-06 NOTE — PLAN OF CARE
06/06/25 0934   Post-Acute Status   Post-Acute Authorization Placement   Post-Acute Placement Status Referrals Sent   Patient choice form signed by patient/caregiver List from [employer]   Discharge Delays None known at this time   Discharge Plan   Discharge Plan A Skilled Nursing Facility     CM spoke with patient regarding SNF placement. Currently only one accepting SNF ([facility]). Patient states she is unsure and would like CM to speak with her granddaughter about choice. CM sent some additional SNF referrals to see if any could accept patient. CM to follow up with patient's granddaughter.    Update 2:20 PM: CM spoke with patient's granddaughter regarding SNF choice. Currently two accepting facilities but family is not happy with either one. They really want patient to go to [facility] or [facility] SNF.     CM contacted [facility] who states they cannot accommodate patient at this time due to her acuity. CM contacted [facility] SN[facility]; awaiting response.

## 2025-06-06 NOTE — ASSESSMENT & PLAN NOTE
-minimal UOP in the setting of diuresis and worsening renal failure  -Vas cath placement for HD  -Nephrology following

## 2025-06-06 NOTE — SUBJECTIVE & OBJECTIVE
Interval History: pt becoming more lethargic on exam with accessory muscle use appreciated upon exam. Afib on monitor w/ pauses- Heparin infusion. Kidney function worsening with CT of chest showing worsening of pleural effusions and developing pericardial effusion. Pt transferred to ICU for critical care management.     Review of Systems   Unable to perform ROS: Acuity of condition     Objective:     Vital Signs (Most Recent):  Temp: 97.4 °F (36.3 °C) (06/06/25 1611)  Pulse: 85 (06/06/25 1611)  Resp: 20 (06/06/25 1611)  BP: (!) 116/59 (06/06/25 1611)  SpO2: 98 % (06/06/25 1611) Vital Signs (24h Range):  Temp:  [97.1 °F (36.2 °C)-97.9 °F (36.6 °C)] 97.4 °F (36.3 °C)  Pulse:  [68-94] 85  Resp:  [18-25] 20  SpO2:  [93 %-98 %] 98 %  BP: (116-147)/(59-77) 116/59     Weight: 96.5 kg (212 lb 11.9 oz)  Body mass index is 35.4 kg/m².    Intake/Output Summary (Last 24 hours) at 6/6/2025 1626  Last data filed at 6/6/2025 0600  Gross per 24 hour   Intake 546.53 ml   Output 30 ml   Net 516.53 ml         Physical Exam  Constitutional:       Appearance: She is obese. She is ill-appearing.      Interventions: Nasal cannula in place.   HENT:      Mouth/Throat:      Mouth: Mucous membranes are dry.      Pharynx: Oropharynx is clear.   Cardiovascular:      Rate and Rhythm: Normal rate. Rhythm irregular.      Comments: Atrial fibrillation on monitor with 3-5 second pauses  Pulmonary:      Effort: Accessory muscle usage present.      Breath sounds: Examination of the right-lower field reveals decreased breath sounds. Examination of the left-lower field reveals decreased breath sounds. Decreased breath sounds present.   Abdominal:      General: Bowel sounds are normal.      Palpations: Abdomen is soft.   Musculoskeletal:      Right lower leg: Edema present.      Comments: Generalized weakness    Skin:     General: Skin is warm.      Findings: Erythema present.      Comments: Erythema to RLE with mepilex in place    Neurological:       "Mental Status: She is lethargic and disoriented.      Motor: Weakness present.      Comments: Oriented to person and able to answer some "yes/no" questions               Significant Labs: All pertinent labs within the past 24 hours have been reviewed.    Significant Imaging: I have reviewed all pertinent imaging results/findings within the past 24 hours.  "

## 2025-06-06 NOTE — PROGRESS NOTES
[facility])  Infectious Disease  Progress Note    Patient Name: Lakshmi Campbell  MRN: [id number]  Admission Date: 6/2/2025  Length of Stay: 4 days  Attending Physician: Abel Caraballo MD  Primary Care Provider: Keely Silva NP    Isolation Status: No active isolations  Assessment/Plan:      Cardiac/Vascular  Essential hypertension  BP control per primary team    Renal/  Chronic kidney disease, stage 4 (severe)  We will monitor closely avoid nephrotoxic medications    ID  Streptococcal bacteremia   Blood culture done-0 6/0 2 strep pyogenes.  Sepsis likely the right lower extremities cellulitis.  TTE did not show vegetation.  We will use Rocephin and will add p.o. Zyvox.  She is now on vancomycin and cefepime  06/05-  We will follow repeat blood cultures.  We will plan to complete 2 weeks of ceftriaxone from negative blood..    Endocrine  Type 2 diabetes mellitus with microalbuminuria, with long-term current use of insulin   Insulin regimen as per primary team        Anticipated Disposition:     Thank you for your consult. I will follow-up with patient. Please contact us if you have any additional questions.    Albaro Jaime MD, [facility]  Infectious Disease  [facility])    Subjective:     Principal Problem:Sepsis with encephalopathy    HPI: 74-year-old female with past medical history significant for type 2 insulin dependent diabetes mellitus, CKD stage 4, hypertension, hypothyroidism, liver cirrhosis secondary to YO, diastolic heart failure, aortic stenosis, hyperkalemia, thrombocytopenia, cataracts, hyperlipidemia, and multiple previous fractures .  She was brought in by her  due to worsening confusion and disorientation.  At this time she remains drowsy.  Since admission labs and imaging tests reviewed-  CBC-WBC 5.64,  BUN 53, creatinine 3.1, lactic acid 3.0,  CT head without contrast was done and there was no acute intracranial findings. Chest x-ray shows pulmonary  "venous congestion, similar to prior with left retrocardiac atelectasis versus consolidation. X-ray of right tibia /fibula shows no acute bone or joint abnormality, does note edema within subcutaneous tissues, no soft tissue gas.   Since admission blood cultures are now positive for strep pyogenes        Interval History:   74-year-old woman with strep pyogenes bacteremia.  She has intermittent confusional episodes.    Review of Systems   Constitutional:  Negative for activity change, appetite change, chills, diaphoresis and fatigue.     Objective:     Vital Signs (Most Recent):  Temp: 97.9 °F (36.6 °C) (06/05/25 2345)  Pulse: 82 (06/06/25 0434)  Resp: 20 (06/05/25 2345)  BP: 122/60 (06/05/25 2345)  SpO2: 95 % (06/06/25 0300) Vital Signs (24h Range):  Temp:  [97.6 °F (36.4 °C)-98.2 °F (36.8 °C)] 97.9 °F (36.6 °C)  Pulse:  [] 82  Resp:  [17-20] 20  SpO2:  [93 %-97 %] 95 %  BP: (109-129)/(55-61) 122/60     Weight: 96.5 kg (212 lb 11.9 oz)  Body mass index is 35.4 kg/m².    Estimated Creatinine Clearance: 14.2 mL/min (A) (based on SCr of 4 mg/dL (H)).     Physical Exam  Vitals and nursing note reviewed.   Cardiovascular:      Rate and Rhythm: Normal rate.   Pulmonary:      Effort: Pulmonary effort is normal.   Abdominal:      General: There is no distension.   Skin:     Coloration: Skin is not jaundiced.   Neurological:      Mental Status: She is disoriented.      Motor: Weakness present.          Significant Labs: Blood Culture: No results for input(s): "LABBLOO" in the last 4320 hours.  BMP:   Recent Labs   Lab 06/05/25  0449 06/05/25  1309 06/05/25  2155   *   < > 164*   *   < > 134*   K 3.4*   < > 4.1   CL 99   < > 99   CO2 21*   < > 22*   BUN 83*   < > 88*   CREATININE 3.6*   < > 4.0*   CALCIUM 8.2*   < > 8.0*   MG 2.1  --   --     < > = values in this interval not displayed.     CBC:   Recent Labs   Lab 06/04/25  0622 06/05/25  0449 06/05/25  0509   WBC 8.32 4.78  --    HGB 9.7* 9.8*  --    HCT " 31.4* 32.3* 28*   PLT 75* 75*  --      CMP:   Recent Labs   Lab 06/04/25  0622 06/05/25  0449 06/05/25  1309 06/05/25  2155   * 135* 135* 134*   K 3.1* 3.4* 3.9 4.1   CL 97 99 99 99   CO2 20* 21* 22* 22*   * 207* 228* 164*   BUN 76* 83* 88* 88*   CREATININE 3.3* 3.6* 3.9* 4.0*   CALCIUM 8.0* 8.2* 8.1* 8.0*   PROT 6.4 7.0  --   --    ALBUMIN 1.8* 1.8*  --   --    BILITOT 0.7 0.5  --   --    ALKPHOS 73 71  --   --    AST 21 28  --   --    ALT 16 19  --   --    ANIONGAP 15 15 14 13     All pertinent labs within the past 24 hours have been reviewed.    Significant Imaging: I have reviewed all pertinent imaging results/findings within the past 24 hours.

## 2025-06-06 NOTE — PROGRESS NOTES
[facility] - Telemetry ([facility])  Nephrology  Progress Note    Patient Name: Lakshmi Campbell  MRN: [id number]  Admission Date: 6/2/2025  Hospital Length of Stay: 4 days  Attending Provider: Abel Caraballo MD   Primary Care Physician: Keely Silva NP  Principal Problem:Sepsis with encephalopathy  Consult for MIKA on CKD   Dr. Kerns       Subjective:   CHART REVIEWED  73 yo female with CKD and multiple medical problems here with bacteremia and MIKA, recent hospital stay for CHF     June 6  - negligible UOP   - opens eyes to verbal stimulus   - family at bedside, , grand dtr and sister    Review of patient's allergies indicates:   Allergen Reactions    Codeine Nausea Only     Other reaction(s): Nausea  Other reaction(s): Elevated blood pressure     Current Facility-Administered Medications   Medication Frequency    acetaminophen suppository 650 mg Q6H PRN    albuterol-ipratropium 2.5 mg-0.5 mg/3 mL nebulizer solution 3 mL Q4H PRN    arformoteroL nebulizer solution 15 mcg BID    budesonide nebulizer solution 0.5 mg Q12H    cefTRIAXone injection 2 g Q24H    clindamycin in D5W 600 mg/50 mL IVPB 600 mg Q8H    dextrose 50% injection 12.5 g PRN    dextrose 50% injection 12.5 g PRN    glucagon (human recombinant) injection 1 mg PRN    glucagon (human recombinant) injection 1 mg PRN    heparin 25,000 units in dextrose 5% (100 units/ml) IV bolus from bag LOW INTENSITY nomogram - [facility] PRN    heparin 25,000 units in dextrose 5% (100 units/ml) IV bolus from bag LOW INTENSITY nomogram - [facility] PRN    heparin 25,000 units in dextrose 5% 250 mL (100 units/mL) infusion LOW INTENSITY nomogram - [facility] Continuous    hydrALAZINE injection 10 mg Q6H PRN    insulin aspart U-100 pen 0-5 Units Q6H PRN    levothyroxine tablet 137 mcg Before breakfast    ondansetron injection 4 mg Q6H PRN       Objective:     Vital Signs (Most Recent):  Temp: 97.1 °F (36.2 °C) (06/06/25 1216)  Pulse: 92 (06/06/25 1216)  Resp: 20 (06/06/25 1216)  BP: 127/60  (06/06/25 1216)  SpO2: (!) 94 % (06/06/25 1216) Vital Signs (24h Range):  Temp:  [97.1 °F (36.2 °C)-98.2 °F (36.8 °C)] 97.1 °F (36.2 °C)  Pulse:  [68-94] 92  Resp:  [17-25] 20  SpO2:  [93 %-97 %] 94 %  BP: (121-147)/(58-77) 127/60     Weight: 96.5 kg (212 lb 11.9 oz) (06/04/25 1930)  Body mass index is 35.4 kg/m².  Body surface area is 2.1 meters squared.    I/O last 3 completed shifts:  In: 546.5 [I.V.:276.9; IV Piggyback:269.6]  Out: 30 [Urine:30]    Physical Exam  Vitals and nursing note reviewed.   Constitutional:       Appearance: She is obese. She is ill-appearing.   Cardiovascular:      Rate and Rhythm: Normal rate.   Pulmonary:      Effort: Respiratory distress present.      Breath sounds: Wheezing present.   Abdominal:      Palpations: Abdomen is soft.   Musculoskeletal:      Right lower leg: Edema present.      Left lower leg: Edema present.   Neurological:      Mental Status: She is lethargic.         Significant Labs:labs reviewed since admit and historical CKD labs     Significant Imaging: CXR and Chest CT reviewed     Assessment/Plan:     Active Diagnoses:    Diagnosis Date Noted POA    PRINCIPAL PROBLEM:  Sepsis with encephalopathy [A41.9, R65.20, G93.41] 08/29/2024 Yes    Atrial flutter [I48.92] 06/05/2025 No    Cellulitis of right lower extremity [L03.115] 06/03/2025 Yes    NSTEMI (non-ST elevated myocardial infarction) [I21.4] 06/03/2025 Yes    Mild persistent asthma without complication [J45.30] 11/19/2024 Yes    Chronic kidney disease, stage 4 (severe) [N18.4] 10/18/2024 Yes    Streptococcal bacteremia [R78.81, B95.5] 08/30/2024 Yes    Chronic diastolic congestive heart failure [I50.32] 06/27/2024 Yes    Thrombocytopenia [D69.6] 03/02/2021 Yes    Class 2 severe obesity due to excess calories with serious comorbidity and body mass index (BMI) of 36.0 to 36.9 in adult [E66.812, E66.01, Z68.36] 01/07/2020 Not Applicable    Essential hypertension [I10] 11/07/2019 Yes    Hypothyroidism [E03.9]  09/23/2013 Yes    Type 2 diabetes mellitus with microalbuminuria, with long-term current use of insulin [E11.29, R80.9, Z79.4] 09/23/2013 Not Applicable      Problems Resolved During this Admission:     MIKA on CKD and anuric  - long discussion with family at bedside. They feel she would be agreeable to temporary dialysis, (and if it were to be long term) Discussed with HM and CCM, pt will transfer to ICU, dialysis catheter placed and start dialysis today.    - high dose Lasix X 1 will help gauge severity of MIKA  - etiology most likely ATN but she does carry a history of cirrhosis and need to consider HRS     Relative hypotension  - history of HTN maintained on 4 HTN agents  - monitor closely, may need vasopressor support for dialysis     Gap acidosis  - check for lactate but likely from MIKA and underlying CKD    - bicarb bath with dialysis today     Pt is unable to consent thus her  at bedside has provided us with consent to proceed with dialysis after risks reviewed, although not doing dialysis will result in worsening respiratory failure, acidosis and probable death. The  agrees that the benefits outweighs possible risks      Harvey Medina MD  Nephrology  [facility] - Tele[facility] ([facility])

## 2025-06-06 NOTE — ASSESSMENT & PLAN NOTE
Likely multifactorial in the setting of sepsis and renal failure  Will hopefully improve with dialysis  Delirium precautions

## 2025-06-07 LAB
ABSOLUTE EOSINOPHIL (OHS): 0.08 K/UL
ABSOLUTE MONOCYTE (OHS): 0.36 K/UL (ref 0.3–1)
ABSOLUTE NEUTROPHIL COUNT (OHS): 7.58 K/UL (ref 1.8–7.7)
ALBUMIN SERPL BCP-MCNC: 1.8 G/DL (ref 3.5–5.2)
ALBUMIN SERPL BCP-MCNC: 1.8 G/DL (ref 3.5–5.2)
ALP SERPL-CCNC: 72 UNIT/L (ref 40–150)
ALP SERPL-CCNC: 82 UNIT/L (ref 40–150)
ALT SERPL W/O P-5'-P-CCNC: 12 UNIT/L (ref 10–44)
ALT SERPL W/O P-5'-P-CCNC: 14 UNIT/L (ref 10–44)
ANION GAP (OHS): 11 MMOL/L (ref 8–16)
ANION GAP (OHS): 13 MMOL/L (ref 8–16)
ANION GAP (OHS): 18 MMOL/L (ref 8–16)
APTT PPP: 38.1 SECONDS (ref 21–32)
APTT PPP: 45 SECONDS (ref 21–32)
APTT PPP: 66.7 SECONDS (ref 21–32)
APTT PPP: 95.5 SECONDS (ref 21–32)
AST SERPL-CCNC: 15 UNIT/L (ref 11–45)
AST SERPL-CCNC: 16 UNIT/L (ref 11–45)
BASOPHILS # BLD AUTO: 0.01 K/UL
BASOPHILS NFR BLD AUTO: 0.1 %
BILIRUB SERPL-MCNC: 0.3 MG/DL (ref 0.1–1)
BILIRUB SERPL-MCNC: 0.4 MG/DL (ref 0.1–1)
BUN SERPL-MCNC: 21 MG/DL (ref 8–23)
BUN SERPL-MCNC: 51 MG/DL (ref 8–23)
BUN SERPL-MCNC: 52 MG/DL (ref 8–23)
CALCIUM SERPL-MCNC: 7.5 MG/DL (ref 8.7–10.5)
CALCIUM SERPL-MCNC: 7.6 MG/DL (ref 8.7–10.5)
CALCIUM SERPL-MCNC: 7.6 MG/DL (ref 8.7–10.5)
CHLORIDE SERPL-SCNC: 101 MMOL/L (ref 95–110)
CHLORIDE SERPL-SCNC: 99 MMOL/L (ref 95–110)
CHLORIDE SERPL-SCNC: 99 MMOL/L (ref 95–110)
CO2 SERPL-SCNC: 22 MMOL/L (ref 23–29)
CO2 SERPL-SCNC: 22 MMOL/L (ref 23–29)
CO2 SERPL-SCNC: 26 MMOL/L (ref 23–29)
CREAT SERPL-MCNC: 2.1 MG/DL (ref 0.5–1.4)
CREAT SERPL-MCNC: 3.2 MG/DL (ref 0.5–1.4)
CREAT SERPL-MCNC: 3.4 MG/DL (ref 0.5–1.4)
ERYTHROCYTE [DISTWIDTH] IN BLOOD BY AUTOMATED COUNT: 13.8 % (ref 11.5–14.5)
GFR SERPLBLD CREATININE-BSD FMLA CKD-EPI: 14 ML/MIN/1.73/M2
GFR SERPLBLD CREATININE-BSD FMLA CKD-EPI: 15 ML/MIN/1.73/M2
GFR SERPLBLD CREATININE-BSD FMLA CKD-EPI: 24 ML/MIN/1.73/M2
GLUCOSE SERPL-MCNC: 133 MG/DL (ref 70–110)
GLUCOSE SERPL-MCNC: 173 MG/DL (ref 70–110)
GLUCOSE SERPL-MCNC: 188 MG/DL (ref 70–110)
HBV CORE AB SERPL QL IA: NORMAL
HBV SURFACE AB SER-ACNC: <3 MIU/ML
HBV SURFACE AB SERPL IA-ACNC: NORMAL M[IU]/ML
HBV SURFACE AG SERPL QL IA: NORMAL
HCT VFR BLD AUTO: 28.4 % (ref 37–48.5)
HGB BLD-MCNC: 8.6 GM/DL (ref 12–16)
IMM GRANULOCYTES # BLD AUTO: 0.12 K/UL (ref 0–0.04)
IMM GRANULOCYTES NFR BLD AUTO: 1.4 % (ref 0–0.5)
LYMPHOCYTES # BLD AUTO: 0.53 K/UL (ref 1–4.8)
MAGNESIUM SERPL-MCNC: 2 MG/DL (ref 1.6–2.6)
MAGNESIUM SERPL-MCNC: 2.1 MG/DL (ref 1.6–2.6)
MCH RBC QN AUTO: 27.6 PG (ref 27–31)
MCHC RBC AUTO-ENTMCNC: 30.3 G/DL (ref 32–36)
MCV RBC AUTO: 91 FL (ref 82–98)
NUCLEATED RBC (/100WBC) (OHS): 0 /100 WBC
PHOSPHATE SERPL-MCNC: 5.7 MG/DL (ref 2.7–4.5)
PHOSPHATE SERPL-MCNC: 6.4 MG/DL (ref 2.7–4.5)
PLATELET # BLD AUTO: 109 K/UL (ref 150–450)
PMV BLD AUTO: 10.9 FL (ref 9.2–12.9)
POCT GLUCOSE: 156 MG/DL (ref 70–110)
POCT GLUCOSE: 187 MG/DL (ref 70–110)
POTASSIUM SERPL-SCNC: 3.6 MMOL/L (ref 3.5–5.1)
POTASSIUM SERPL-SCNC: 3.8 MMOL/L (ref 3.5–5.1)
POTASSIUM SERPL-SCNC: 4 MMOL/L (ref 3.5–5.1)
PROT SERPL-MCNC: 7.1 GM/DL (ref 6–8.4)
PROT SERPL-MCNC: 7.1 GM/DL (ref 6–8.4)
RBC # BLD AUTO: 3.12 M/UL (ref 4–5.4)
RELATIVE EOSINOPHIL (OHS): 0.9 %
RELATIVE LYMPHOCYTE (OHS): 6.1 % (ref 18–48)
RELATIVE MONOCYTE (OHS): 4.1 % (ref 4–15)
RELATIVE NEUTROPHIL (OHS): 87.4 % (ref 38–73)
SODIUM SERPL-SCNC: 134 MMOL/L (ref 136–145)
SODIUM SERPL-SCNC: 138 MMOL/L (ref 136–145)
SODIUM SERPL-SCNC: 139 MMOL/L (ref 136–145)
WBC # BLD AUTO: 8.68 K/UL (ref 3.9–12.7)

## 2025-06-07 PROCEDURE — 83735 ASSAY OF MAGNESIUM: CPT | Performed by: INTERNAL MEDICINE

## 2025-06-07 PROCEDURE — 27000221 HC OXYGEN, UP TO 24 HOURS

## 2025-06-07 PROCEDURE — 84100 ASSAY OF PHOSPHORUS: CPT | Performed by: INTERNAL MEDICINE

## 2025-06-07 PROCEDURE — 80100014 HC HEMODIALYSIS 1:1

## 2025-06-07 PROCEDURE — 99233 SBSQ HOSP IP/OBS HIGH 50: CPT | Mod: ,,, | Performed by: INTERNAL MEDICINE

## 2025-06-07 PROCEDURE — 99900035 HC TECH TIME PER 15 MIN (STAT)

## 2025-06-07 PROCEDURE — 63600175 PHARM REV CODE 636 W HCPCS: Performed by: INTERNAL MEDICINE

## 2025-06-07 PROCEDURE — 36415 COLL VENOUS BLD VENIPUNCTURE: CPT | Performed by: INTERNAL MEDICINE

## 2025-06-07 PROCEDURE — 85730 THROMBOPLASTIN TIME PARTIAL: CPT | Performed by: INTERNAL MEDICINE

## 2025-06-07 PROCEDURE — 94761 N-INVAS EAR/PLS OXIMETRY MLT: CPT

## 2025-06-07 PROCEDURE — 85025 COMPLETE CBC W/AUTO DIFF WBC: CPT | Performed by: INTERNAL MEDICINE

## 2025-06-07 PROCEDURE — 80053 COMPREHEN METABOLIC PANEL: CPT | Performed by: INTERNAL MEDICINE

## 2025-06-07 PROCEDURE — 99232 SBSQ HOSP IP/OBS MODERATE 35: CPT | Mod: ,,, | Performed by: INTERNAL MEDICINE

## 2025-06-07 PROCEDURE — 94640 AIRWAY INHALATION TREATMENT: CPT

## 2025-06-07 PROCEDURE — 25000242 PHARM REV CODE 250 ALT 637 W/ HCPCS: Performed by: STUDENT IN AN ORGANIZED HEALTH CARE EDUCATION/TRAINING PROGRAM

## 2025-06-07 PROCEDURE — 20000000 HC ICU ROOM

## 2025-06-07 PROCEDURE — 63600175 PHARM REV CODE 636 W HCPCS: Performed by: NURSE PRACTITIONER

## 2025-06-07 RX ADMIN — CLINDAMYCIN PHOSPHATE 600 MG: 600 INJECTION, SOLUTION INTRAVENOUS at 07:06

## 2025-06-07 RX ADMIN — ARFORMOTEROL TARTRATE 15 MCG: 15 SOLUTION RESPIRATORY (INHALATION) at 07:06

## 2025-06-07 RX ADMIN — HEPARIN SODIUM 2400 UNITS: 1000 INJECTION, SOLUTION INTRAVENOUS; SUBCUTANEOUS at 12:06

## 2025-06-07 RX ADMIN — CEFTRIAXONE 2 G: 2 INJECTION, POWDER, FOR SOLUTION INTRAMUSCULAR; INTRAVENOUS at 09:06

## 2025-06-07 RX ADMIN — HEPARIN SODIUM 19 UNITS/KG/HR: 10000 INJECTION, SOLUTION INTRAVENOUS at 09:06

## 2025-06-07 RX ADMIN — BUDESONIDE INHALATION 0.5 MG: 0.5 SUSPENSION RESPIRATORY (INHALATION) at 07:06

## 2025-06-07 NOTE — PROGRESS NOTES
[facility] - Telemetry ([facility])  Cardiology  Progress Note     Patient Name: Lakshmi Campbell  MRN: [id number]  Admission Date: 6/2/2025  Hospital Length of Stay: 3 days  Code Status: Full Code   Attending Physician: Abel Caraballo MD   Primary Care Physician: Keely Silva NP  Expected Discharge Date: 6/7/2025  Principal Problem:Sepsis with encephalopathy     Subjective:      Hospital Course:   6-4-25  monitor HR 90s , EKG back to baseline NSR. BBB is rate related. Echo nml no SWMA, troponin down to 3, probably demand ischemia. Can stop heparin, start low dose b blockers     6-5-25  Pt now in afib/flutter at controlled rate, can restart heparin, some pauses noted but K is low, needs repletion, echo nml EF, continue tx for sepsis    6-7-25  Monitor shows afib in low 100s. Pt is hemodynamically stable. Continue treatment for sepsis.      Interval History:      Review of Systems   Constitutional: Negative. Negative for weight gain.   HENT: Negative.     Eyes: Negative.    Cardiovascular: Negative.  Negative for chest pain, leg swelling and palpitations.   Respiratory: Negative.  Negative for shortness of breath.    Endocrine: Negative.    Hematologic/Lymphatic: Negative.    Skin: Negative.    Musculoskeletal:  Negative for muscle weakness.   Gastrointestinal: Negative.    Genitourinary: Negative.    Neurological: Negative.  Negative for dizziness.   Psychiatric/Behavioral: Negative.     Allergic/Immunologic: Negative.    All other systems reviewed and are negative.     Objective:      Vital Signs (Most Recent):  Temp: 97.7 °F (36.5 °C) (06/05/25 0714)  Pulse: 98 (06/05/25 0800)  Resp: 17 (06/05/25 0714)  BP: (!) 120/56 (06/05/25 0714)  SpO2: 96 % (06/05/25 0714) Vital Signs (24h Range):  Temp:  [97.7 °F (36.5 °C)-98.3 °F (36.8 °C)] 97.7 °F (36.5 °C)  Pulse:  [] 98  Resp:  [17-19] 17  SpO2:  [94 %-97 %] 96 %  BP: ()/(53-64) 120/56      Weight: 96.5 kg (212 lb 11.9 oz)  Body mass index is 35.4 kg/m².      SpO2: 96 %           Intake/Output Summary (Last 24 hours) at 6/5/2025 0922  Last data filed at 6/5/2025 0903      Gross per 24 hour   Intake 200 ml   Output --   Net 200 ml         Lines/Drains/Airways         Peripheral Intravenous Line  Duration                     Peripheral IV - Single Lumen 06/04/25 0333 Left Hand 1 day          Peripheral IV - Single Lumen 06/04/25 0912 22 G Left Antecubital 1 day          Peripheral IV - Single Lumen 06/05/25 0624 20 G Anterior;Right Forearm <1 day                             Physical Exam        Significant Labs: All pertinent lab results from the last 24 hours have been reviewed.     Significant Imaging: X-Ray: CXR: X-Ray Chest 1 View (CXR):       Results for orders placed or performed during the hospital encounter of 06/02/25   X-Ray Chest 1 View     Narrative     EXAM:    XR CHEST 1 VIEW     CLINICAL HISTORY:    Shortness of breath     FINDINGS:  Enlarged heart.  Increased interstitial attenuation concerning for edema.  No pleural fluid or pneumothorax on the right.  Possible small left pleural effusion.           Impression      As above     Finalized on: 6/5/2025 6:35 AM By:  Chepe Banres MD  [facility]# [id number]      2025-06-05 06:37:[id number]     [facility]      Assessment and Plan:      Brief HPI:      NSTEMI (non-ST elevated myocardial infarction)  Pt is a 74 y/u female with PMhx for HTN, HLP, diastolic heart failure, CKD , aortic stenosis admitted for sepsis with encephalopathy.  EKG sinus tachycardia with LBBB at 104 bpm. BNP 1212. CXR (-). Pt discharged about 2 weeks ago for CHF,  Troponin is 4.15     Troponin may be from demand ischemia, continue serial enzymes. Check echo for SWMA. Continue IV heparin  Repeat EKG with controlled rate. CHF appears stable, restart diuretics once more stable from sepsis           VTE Risk Mitigation (From admission, onward)              Ordered       heparin 25,000 units in dextrose 5% (100 units/ml) IV bolus from bag LOW INTENSITY  nomogram - [facility]  As needed (PRN)        Question:  Heparin Infusion Adjustment (DO NOT MODIFY ANSWER)  Answer:  [email or web address]\epic\Images\Pharmacy\HeparinInfusions\heparin LOW INTENSITY nomogram for [facility] [id number]    06/05/25 0541       heparin 25,000 units in dextrose 5% (100 units/ml) IV bolus from bag LOW INTENSITY nomogram - [facility]  As needed (PRN)        Question:  Heparin Infusion Adjustment (DO NOT MODIFY ANSWER)  Answer:  [email or web address]\epic\Images\Pharmacy\HeparinInfusions\heparin LOW INTENSITY nomogram for [facility] [id number]    06/05/25 0541       heparin 25,000 units in dextrose 5% 250 mL (100 units/mL) infusion LOW INTENSITY nomogram - [facility]  Continuous        Question:  Begin at (units/kg/hr)  Answer:  12    06/05/25 0541

## 2025-06-07 NOTE — HOSPITAL COURSE
6/6 - transfer to ICU due to ongoing encephalopathy and need for dialysis.  Trialysis placed and dialysis tolerated well.    6/7 - answered a few questions for me today, but still slow.  Planning dialysis again this morning.      6/8 - remains lethargic today.  Not answering questions for me.  Oxygenation and hemodynamics are stable.  Unable to get MRI due to orthopedic hardware.    6/9 S/p HD  Vitals are improved. Remains encephalopathic   and family at the bedside    06/10/2025: Per family, patient transitioned to comfort care yesterday with plans to DC to IP hospice today.

## 2025-06-07 NOTE — ASSESSMENT & PLAN NOTE
Srouce is likely 2/2 to sepsis.  The patients 3 most recent labs are listed below.  Recent Labs     06/06/25  0720 06/06/25  1659 06/07/25  0752   * 104* 109*       Plan  - Will transfuse if platelet count is <20k.  - Monitor

## 2025-06-07 NOTE — ASSESSMENT & PLAN NOTE
This patient does have evidence of infective focus  My overall impression is sepsis with Acute kidney injury, Acute respiratory failure, Encephalopathy, and Thrombocytopenia .  Source: Skin and Soft Tissue Infection: Cellulitis  Antibiotics given-   Antibiotics (72h ago, onward)    Start     Stop Route Frequency Ordered    06/04/25 0915  cefTRIAXone injection 2 g         -- IV Every 24 hours (non-standard times) 06/04/25 0722        Latest lactate reviewed-  Recent Labs   Lab 06/05/25  0633   LACTATE 1.4       Organ dysfunction indicated by Acute kidney injury, Encephalopathy, and Thrombocytopenia     Source control achieved by: Rocephin/Clinda    ID following  Hemodynamics stable  Blood Cx 6/5 NGTD

## 2025-06-07 NOTE — PROGRESS NOTES
ST presented to bedside this AM for tx. Pt in dialysis. Communicated w RN, she endorsed pt continues w inappropriate/fluctuating JAIRO. ST will continue to follow for ongoing assessment of swallow function, likely in AM.     Mary Interiano MA, L-SLP, CCC-SLP  Speech Language Pathologist  6/2/2025

## 2025-06-07 NOTE — PLAN OF CARE
Duration: 2.5 hours 1st tx.    Stable/unstable: stable    Ultrafiltration volume: 2 liters net    Notes: Tolerated, No access issues, Dr. Medina visited during hd.

## 2025-06-07 NOTE — PLAN OF CARE
Duration: 3.5 hours 2nd tx.    Stable/unstable: stable    Ultrafiltration volume: 1.5 liters net    Notes: Tolerated, No access issues, Dr. Medina visited during hd

## 2025-06-07 NOTE — ASSESSMENT & PLAN NOTE
Likely multifactorial in the setting of sepsis and renal failure  Improving somewhat with dialysis.  Continue to monitor.  Delirium precautions

## 2025-06-07 NOTE — PROGRESS NOTES
[facility] - Intensive Care ([facility])  Critical Care Medicine  Progress Note    Patient Name: Lakshmi Campbell  MRN: [id number]  Admission Date: 6/2/2025  Hospital Length of Stay: 5 days  Code Status: Full Code  Attending Provider: Adrian Nathan MD  Primary Care Provider: Keely Silva NP   Principal Problem: Sepsis with encephalopathy    Subjective:     HPI:  Ms Campbell is a 75 y/o WF with DM, CKD Stage IV, HTN, hypothyroidism, YO cirrhosis, dCHF, and HLD who is currently admitted to the Hospitalist service after presenting 6/2 with confusion.  Recent admission 5/9 to 5/15 for CHF exacerbation, PNA, and UTI.  Went home on oxygen.      This admission, she has been treated for volume overload with diuresis, along with UTI, and Strep bacteremia.  Course has been complicated by progressive renal failure with oliguria despite diuresis.  She comes to the ICU this afternoon due to ongoing lethargy and need for dialysis.  Oxygenation is stable on 3L and hemodynamics are stable.    Hospital/ICU Course:  6/6 - transfer to ICU due to ongoing encephalopathy and need for dialysis.  Trialysis placed and dialysis tolerated well.    6/7 - answered a few questions for me today, but still slow.  Planning dialysis again this morning.      Interval History: No acute events.  More alert for me this morning and answered a few basic questions, though still slow.  Did not answer orientation questions.  Tolerated dialysis yesterday.  Labs pending, but planning dialysis today.      Objective:     Vital Signs (Most Recent):  Temp: 97.6 °F (36.4 °C) (06/07/25 0301)  Pulse: 80 (06/07/25 0747)  Resp: 20 (06/07/25 0747)  BP: (!) 112/58 (06/07/25 0600)  SpO2: 98 % (06/07/25 0747) Vital Signs (24h Range):  Temp:  [97.1 °F (36.2 °C)-97.7 °F (36.5 °C)] 97.6 °F (36.4 °C)  Pulse:  [] 80  Resp:  [16-46] 20  SpO2:  [84 %-100 %] 98 %  BP: ()/() 112/58     Weight: 98.4 kg (216 lb 14.9 oz)  Body mass index is 36.1  kg/m².      Intake/Output Summary (Last 24 hours) at 6/7/2025 0900  Last data filed at 6/7/2025 0701  Gross per 24 hour   Intake 454.2 ml   Output 2560 ml   Net -2105.8 ml        Physical Exam  Vitals and nursing note reviewed.   Constitutional:       General: She is not in acute distress.     Comments: Lethargic, but arousable to voice.  Answered a few questions, though slowly.   Cardiovascular:      Rate and Rhythm: Normal rate and regular rhythm.      Pulses: Normal pulses.      Heart sounds: No murmur heard.  Pulmonary:      Effort: Pulmonary effort is normal. No respiratory distress.      Breath sounds: No wheezing, rhonchi or rales.   Abdominal:      General: Abdomen is flat. There is no distension.      Palpations: Abdomen is soft.      Tenderness: There is no abdominal tenderness.   Musculoskeletal:      Right lower leg: Edema present.      Left lower leg: Edema present.           Review of Systems    Vents:  Oxygen Concentration (%): 32 (06/07/25 0747)    Lines/Drains/Airways       Central Venous Catheter Line  Duration             Trialysis (Dialysis) Catheter 06/06/25 1745 right internal jugular <1 day              Drain  Duration                  Urethral Catheter 06/06/25 1645 <1 day              Peripheral Intravenous Line  Duration                  Peripheral IV - Single Lumen 06/04/25 0333 Left Hand 3 days         Peripheral IV - Single Lumen 06/05/25 0624 20 G Anterior;Right Forearm 2 days                    Significant Labs:    CBC/Anemia Profile:  Recent Labs   Lab 06/06/25  0720 06/06/25  1659 06/07/25  0752   WBC 7.39 8.18 8.68   HGB 9.2* 9.1* 8.6*   HCT 29.8* 29.6* 28.4*   * 104* 109*   MCV 92 92 91   RDW 13.8 14.0 13.8        Chemistries:  Recent Labs   Lab 06/05/25  2155 06/06/25  0720 06/06/25  1659   * 135* 132*   K 4.1 4.5 4.7   CL 99 99 98   CO2 22* 18* 16*   BUN 88* 92* 92*   CREATININE 4.0* 4.2* 4.4*   CALCIUM 8.0* 8.0* 8.1*   ALBUMIN  --  1.9* 2.0*   PROT  --  7.5 7.7  "  BILITOT  --  0.4 0.4   ALKPHOS  --  75 83   ALT  --  19 18   AST  --  27 19       All pertinent labs within the past 24 hours have been reviewed.    Significant Imaging:  I have reviewed all pertinent imaging results/findings within the past 24 hours.    ABG  Recent Labs   Lab 06/05/25  1933   PH 7.404   PO2 89   PCO2 38.0   HCO3 23.8*   BE -1     Assessment/Plan:     Neuro  Altered mental status  Likely multifactorial in the setting of sepsis and renal failure  Improving somewhat with dialysis.  Continue to monitor.  Delirium precautions    Cardiac/Vascular  acute on Chronic diastolic congestive heart failure  Patient has Diastolic (HFpEF) heart failure that is Acute on chronic. On presentation their CHF was decompensated. Evidence of decompensated CHF on presentation includes: elevated JVD, weight gain, and shortness of breath. The etiology of their decompensation is likely renal failure. Most recent BNP and echo results are listed below.  No results for input(s): "BNP" in the last 72 hours.  Latest ECHO  Results for orders placed during the hospital encounter of 06/02/25    Echo    Interpretation Summary    Left Ventricle: The left ventricle is normal in size. Moderately increased wall thickness. There is concentric hypertrophy. There is normal systolic function. Ejection fraction is approximately 60%.    Right Ventricle: The right ventricle is normal in size Wall thickness is normal. Systolic function is normal.    Left Atrium: The left atrium is mildly dilated    IVC/SVC: Normal venous pressure at 3 mmHg.    Current Heart Failure Medications  hydrALAZINE injection 10 mg, Every 6 hours PRN, Intravenous    Plan  - Monitor strict I&Os and daily weights.    - Place on telemetry  - Low sodium diet  - Place on fluid restriction of 1.5 L.   - Cardiology has been consulted  - The patient's volume status is worsening as indicated by shortness of breath. Will adjust treatment as follows: dialysis  - dialysis for volume " removal        Essential hypertension  Patient's blood pressure range in the last 24 hours was: BP  Min: 95/54  Max: 165/61.The patient's inpatient anti-hypertensive regimen is listed below:  Current Antihypertensives  hydrALAZINE injection 10 mg, Every 6 hours PRN, Intravenous    Plan  - BP is controlled, no changes needed to their regimen        Renal/  Chronic kidney disease, stage 4 (severe)  Acute on Chronic Renal Failure  ATN in the setting of sepsis  Nephrology following   Trialysis placed 6/6 and plan to start dialysis  Avoid nephrotoxins and renally dose meds    6/7 - planning dialysis again today    ID  * Sepsis with encephalopathy  This patient does have evidence of infective focus  My overall impression is sepsis with Acute kidney injury, Acute respiratory failure, Encephalopathy, and Thrombocytopenia .  Source: Skin and Soft Tissue Infection: Cellulitis  Antibiotics given-   Antibiotics (72h ago, onward)      Start     Stop Route Frequency Ordered    06/04/25 0915  cefTRIAXone injection 2 g         -- IV Every 24 hours (non-standard times) 06/04/25 0722          Latest lactate reviewed-  Recent Labs   Lab 06/05/25  0633   LACTATE 1.4       Organ dysfunction indicated by Acute kidney injury, Encephalopathy, and Thrombocytopenia     Source control achieved by: Rocephin/Clinda    ID following  Hemodynamics stable  Blood Cx 6/5 NGTD        Cellulitis of right lower extremity  This patient does have evidence of infective focus  My overall impression is sepsis with Acute kidney injury, Acute respiratory failure, Encephalopathy, and Thrombocytopenia .  Source: Skin and Soft Tissue Infection: Cellulitis  Antibiotics given-   Antibiotics (72h ago, onward)      Start     Stop Route Frequency Ordered    06/04/25 0915  cefTRIAXone injection 2 g         -- IV Every 24 hours (non-standard times) 06/04/25 0722          Latest lactate reviewed-  Recent Labs   Lab 06/05/25  0633   LACTATE 1.4       Organ dysfunction  indicated by Acute kidney injury, Encephalopathy, and Thrombocytopenia     Source control achieved by: Rocephin/Clinda    ID following  Hemodynamics stable  Blood Cx 6/5 NGTD        Streptococcal bacteremia  This patient does have evidence of infective focus  My overall impression is sepsis with Acute kidney injury, Acute respiratory failure, Encephalopathy, and Thrombocytopenia .  Source: Skin and Soft Tissue Infection: Cellulitis  Antibiotics given-   Antibiotics (72h ago, onward)      Start     Stop Route Frequency Ordered    06/04/25 0915  cefTRIAXone injection 2 g         -- IV Every 24 hours (non-standard times) 06/04/25 0722          Latest lactate reviewed-  Recent Labs   Lab 06/05/25  0633   LACTATE 1.4       Organ dysfunction indicated by Acute kidney injury, Encephalopathy, and Thrombocytopenia     Source control achieved by: Rocephin/Clinda    ID following  Hemodynamics stable  Blood Cx 6/5 NGTD        Hematology  Thrombocytopenia  Srouce is likely 2/2 to sepsis.  The patients 3 most recent labs are listed below.  Recent Labs     06/06/25  0720 06/06/25  1659 06/07/25  0752   * 104* 109*       Plan  - Will transfuse if platelet count is <20k.  - Monitor        Endocrine  Class 2 severe obesity due to excess calories with serious comorbidity and body mass index (BMI) of 36.0 to 36.9 in adult  Body mass index is 36.1 kg/m². Morbid obesity complicates all aspects of disease management from diagnostic modalities to treatment. Weight loss encouraged and health benefits explained to patient.         Hypothyroidism  Continue Synthroid        Type 2 diabetes mellitus with microalbuminuria, with long-term current use of insulin  SSI/accuchecks          Critical Care Time: 32 minutes  Critical secondary to high risk monitoring      Critical care was time spent personally by me on the following activities: development of treatment plan with patient or surrogate and bedside caregivers, discussions with  consultants, evaluation of patient's response to treatment, examination of patient, ordering and performing treatments and interventions, ordering and review of laboratory studies, ordering and review of radiographic studies, pulse oximetry, re-evaluation of patient's condition. This critical care time did not overlap with that of any other provider or involve time for any procedures.     Adrian Nathan MD  Critical Care Medicine  Critical access hospital - Intensive Care [facility])

## 2025-06-07 NOTE — ASSESSMENT & PLAN NOTE
Blood culture done-0 6/0 2 strep pyogenes.  Sepsis likely the right lower extremities cellulitis.  TTE did not show vegetation.  We will use Rocephin and will add p.o. Zyvox.  She is now on vancomycin and cefepime  06/05-  We will follow repeat blood cultures.  We will plan to complete 2 weeks of ceftriaxone from negative blood..    06/07-we will continue ceftriaxone.  Planned end of treatment is 06/17/2025  We will need close monitoring

## 2025-06-07 NOTE — ASSESSMENT & PLAN NOTE
We will monitor closely avoid nephrotoxic medications    06/07-  Follow Nephrology for renal replacement therapy.

## 2025-06-07 NOTE — ASSESSMENT & PLAN NOTE
Patient's blood pressure range in the last 24 hours was: BP  Min: 95/54  Max: 165/61.The patient's inpatient anti-hypertensive regimen is listed below:  Current Antihypertensives  hydrALAZINE injection 10 mg, Every 6 hours PRN, Intravenous    Plan  - BP is controlled, no changes needed to their regimen

## 2025-06-07 NOTE — PLAN OF CARE
Received pt from [facility] 1630. LAVERNE pt orientation d/t pt lethargy. GCS 12. Trialysis catheter placed at the bedside by MARIELLA Bueno; HD in process. VSS. Afib on monitor; bradycardic at times; providers aware. Afebrile. 3LNC. Hassan placed 1645; 30mL UOP since placement. No BM since assumption of care. Accuchecks ACHS; no coverage. Family updated at the bedside.    Pt resting comfortably in bed with side rails up x3; locked and lowered with alarm set. Monitor alarms on and audible.

## 2025-06-07 NOTE — ASSESSMENT & PLAN NOTE
Acute on Chronic Renal Failure  ATN in the setting of sepsis  Nephrology following   Trialysis placed 6/6 and plan to start dialysis  Avoid nephrotoxins and renally dose meds    6/7 - planning dialysis again today

## 2025-06-07 NOTE — EICU
Intervention Initiated From:  COR / WILBURU    Peter intervened regarding:  Rounding (Video assessment)    VICU Night Rounds Checklist  24H Vital Sign Range:  Temp:  [97.1 °F (36.2 °C)-97.9 °F (36.6 °C)]   Pulse:  []   Resp:  [17-46]   BP: (108-152)/()   SpO2:  [84 %-98 %]     Video rounds and LDA reconciliation

## 2025-06-07 NOTE — SUBJECTIVE & OBJECTIVE
"Interval History:   74-year-old woman with strep pyogenes bacteremia.  She has intermittent confusional episodes.    06/07- she remains confused.  Was transferred to ICU    Review of Systems   Constitutional:  Negative for activity change, appetite change, chills, diaphoresis and fatigue.     Objective:     Vital Signs (Most Recent):  Temp: 97.6 °F (36.4 °C) (06/07/25 0301)  Pulse: 88 (06/07/25 1307)  Resp: (!) 24 (06/07/25 1307)  BP: (!) 147/81 (06/07/25 1307)  SpO2: 95 % (06/07/25 1307) Vital Signs (24h Range):  Temp:  [97.3 °F (36.3 °C)-97.7 °F (36.5 °C)] 97.6 °F (36.4 °C)  Pulse:  [] 88  Resp:  [16-46] 24  SpO2:  [84 %-100 %] 95 %  BP: ()/() 147/81     Weight: 98.4 kg (216 lb 14.9 oz)  Body mass index is 36.1 kg/m².    Estimated Creatinine Clearance: 16.9 mL/min (A) (based on SCr of 3.4 mg/dL (H)).     Physical Exam  Vitals and nursing note reviewed.   Cardiovascular:      Rate and Rhythm: Normal rate.   Pulmonary:      Effort: Pulmonary effort is normal.   Abdominal:      General: There is no distension.   Skin:     Coloration: Skin is not jaundiced.   Neurological:      Mental Status: She is disoriented.      Motor: Weakness present.          Significant Labs: Blood Culture: No results for input(s): "LABBLOO" in the last 4320 hours.  BMP:   Recent Labs   Lab 06/07/25  0826   *      K 4.0   CL 99   CO2 22*   BUN 51*   CREATININE 3.4*   CALCIUM 7.6*   MG 2.1     CBC:   Recent Labs   Lab 06/06/25  0720 06/06/25  1659 06/07/25  0752   WBC 7.39 8.18 8.68   HGB 9.2* 9.1* 8.6*   HCT 29.8* 29.6* 28.4*   * 104* 109*     CMP:   Recent Labs   Lab 06/06/25  1659 06/07/25  0345 06/07/25  0826   * 138 139   K 4.7 3.8 4.0   CL 98 99 99   CO2 16* 26 22*   * 173* 188*   BUN 92* 52* 51*   CREATININE 4.4* 3.2* 3.4*   CALCIUM 8.1* 7.6* 7.6*   PROT 7.7 7.1 7.1   ALBUMIN 2.0* 1.8* 1.8*   BILITOT 0.4 0.4 0.3   ALKPHOS 83 82 72   AST 19 16 15   ALT 18 14 12   ANIONGAP 18* 13 18* "     All pertinent labs within the past 24 hours have been reviewed.    Significant Imaging: I have reviewed all pertinent imaging results/findings within the past 24 hours.

## 2025-06-07 NOTE — SUBJECTIVE & OBJECTIVE
Interval History: No acute events.  More alert for me this morning and answered a few basic questions, though still slow.  Did not answer orientation questions.  Tolerated dialysis yesterday.  Labs pending, but planning dialysis today.      Objective:     Vital Signs (Most Recent):  Temp: 97.6 °F (36.4 °C) (06/07/25 0301)  Pulse: 80 (06/07/25 0747)  Resp: 20 (06/07/25 0747)  BP: (!) 112/58 (06/07/25 0600)  SpO2: 98 % (06/07/25 0747) Vital Signs (24h Range):  Temp:  [97.1 °F (36.2 °C)-97.7 °F (36.5 °C)] 97.6 °F (36.4 °C)  Pulse:  [] 80  Resp:  [16-46] 20  SpO2:  [84 %-100 %] 98 %  BP: ()/() 112/58     Weight: 98.4 kg (216 lb 14.9 oz)  Body mass index is 36.1 kg/m².      Intake/Output Summary (Last 24 hours) at 6/7/2025 0900  Last data filed at 6/7/2025 0701  Gross per 24 hour   Intake 454.2 ml   Output 2560 ml   Net -2105.8 ml        Physical Exam  Vitals and nursing note reviewed.   Constitutional:       General: She is not in acute distress.     Comments: Lethargic, but arousable to voice.  Answered a few questions, though slowly.   Cardiovascular:      Rate and Rhythm: Normal rate and regular rhythm.      Pulses: Normal pulses.      Heart sounds: No murmur heard.  Pulmonary:      Effort: Pulmonary effort is normal. No respiratory distress.      Breath sounds: No wheezing, rhonchi or rales.   Abdominal:      General: Abdomen is flat. There is no distension.      Palpations: Abdomen is soft.      Tenderness: There is no abdominal tenderness.   Musculoskeletal:      Right lower leg: Edema present.      Left lower leg: Edema present.           Review of Systems    Vents:  Oxygen Concentration (%): 32 (06/07/25 0747)    Lines/Drains/Airways       Central Venous Catheter Line  Duration             Trialysis (Dialysis) Catheter 06/06/25 1745 right internal jugular <1 day              Drain  Duration                  Urethral Catheter 06/06/25 1645 <1 day              Peripheral Intravenous Line   Duration                  Peripheral IV - Single Lumen 06/04/25 0333 Left Hand 3 days         Peripheral IV - Single Lumen 06/05/25 0624 20 G Anterior;Right Forearm 2 days                    Significant Labs:    CBC/Anemia Profile:  Recent Labs   Lab 06/06/25  0720 06/06/25  1659 06/07/25  0752   WBC 7.39 8.18 8.68   HGB 9.2* 9.1* 8.6*   HCT 29.8* 29.6* 28.4*   * 104* 109*   MCV 92 92 91   RDW 13.8 14.0 13.8        Chemistries:  Recent Labs   Lab 06/05/25  2155 06/06/25  0720 06/06/25  1659   * 135* 132*   K 4.1 4.5 4.7   CL 99 99 98   CO2 22* 18* 16*   BUN 88* 92* 92*   CREATININE 4.0* 4.2* 4.4*   CALCIUM 8.0* 8.0* 8.1*   ALBUMIN  --  1.9* 2.0*   PROT  --  7.5 7.7   BILITOT  --  0.4 0.4   ALKPHOS  --  75 83   ALT  --  19 18   AST  --  27 19       All pertinent labs within the past 24 hours have been reviewed.    Significant Imaging:  I have reviewed all pertinent imaging results/findings within the past 24 hours.

## 2025-06-07 NOTE — PLAN OF CARE
Duration: 3 hours    Stable/unstable: stable    Ultrafiltration volume: 1.5 liters net    Notes: Tolerated, No access issues, Dr. Medina visited during hd

## 2025-06-07 NOTE — PROGRESS NOTES
[facility] - Intensive [facility])  Infectious Disease  Progress Note    Patient Name: Lakshmi Campbell  MRN: [id number]  Admission Date: 6/2/2025  Length of Stay: 5 days  Attending Physician: Adrian Nathan MD  Primary Care Provider: Keely Silva NP    Isolation Status: No active isolations  Assessment/Plan:      Renal/  Chronic kidney disease, stage 4 (severe)  We will monitor closely avoid nephrotoxic medications    06/07-  Follow Nephrology for renal replacement therapy.    ID  Streptococcal bacteremia   Blood culture done-0 6/0 2 strep pyogenes.  Sepsis likely the right lower extremities cellulitis.  TTE did not show vegetation.  We will use Rocephin and will add p.o. Zyvox.  She is now on vancomycin and cefepime  06/05-  We will follow repeat blood cultures.  We will plan to complete 2 weeks of ceftriaxone from negative blood..    06/07-we will continue ceftriaxone.  Planned end of treatment is 06/17/2025  We will need close monitoring    Endocrine  Type 2 diabetes mellitus with microalbuminuria, with long-term current use of insulin   Insulin regimen as per primary team        Anticipated Disposition:     Thank you for your consult. I will follow-up with patient. Please contact us if you have any additional questions.    Albaro Jaime MD, [facility]  Infectious Disease  [facility] - Intensive [facility])    Subjective:     Principal Problem:Sepsis with encephalopathy    HPI: 74-year-old female with past medical history significant for type 2 insulin dependent diabetes mellitus, CKD stage 4, hypertension, hypothyroidism, liver cirrhosis secondary to YO, diastolic heart failure, aortic stenosis, hyperkalemia, thrombocytopenia, cataracts, hyperlipidemia, and multiple previous fractures .  She was brought in by her  due to worsening confusion and disorientation.  At this time she remains drowsy.  Since admission labs and imaging tests reviewed-  CBC-WBC 5.64,  BUN 53, creatinine 3.1, lactic acid  "3.0,  CT head without contrast was done and there was no acute intracranial findings. Chest x-ray shows pulmonary venous congestion, similar to prior with left retrocardiac atelectasis versus consolidation. X-ray of right tibia /fibula shows no acute bone or joint abnormality, does note edema within subcutaneous tissues, no soft tissue gas.   Since admission blood cultures are now positive for strep pyogenes        Interval History:   74-year-old woman with strep pyogenes bacteremia.  She has intermittent confusional episodes.    06/07- she remains confused.  Was transferred to ICU    Review of Systems   Constitutional:  Negative for activity change, appetite change, chills, diaphoresis and fatigue.     Objective:     Vital Signs (Most Recent):  Temp: 97.6 °F (36.4 °C) (06/07/25 0301)  Pulse: 88 (06/07/25 1307)  Resp: (!) 24 (06/07/25 1307)  BP: (!) 147/81 (06/07/25 1307)  SpO2: 95 % (06/07/25 1307) Vital Signs (24h Range):  Temp:  [97.3 °F (36.3 °C)-97.7 °F (36.5 °C)] 97.6 °F (36.4 °C)  Pulse:  [] 88  Resp:  [16-46] 24  SpO2:  [84 %-100 %] 95 %  BP: ()/() 147/81     Weight: 98.4 kg (216 lb 14.9 oz)  Body mass index is 36.1 kg/m².    Estimated Creatinine Clearance: 16.9 mL/min (A) (based on SCr of 3.4 mg/dL (H)).     Physical Exam  Vitals and nursing note reviewed.   Cardiovascular:      Rate and Rhythm: Normal rate.   Pulmonary:      Effort: Pulmonary effort is normal.   Abdominal:      General: There is no distension.   Skin:     Coloration: Skin is not jaundiced.   Neurological:      Mental Status: She is disoriented.      Motor: Weakness present.          Significant Labs: Blood Culture: No results for input(s): "LABBLOO" in the last 4320 hours.  BMP:   Recent Labs   Lab 06/07/25  0826   *      K 4.0   CL 99   CO2 22*   BUN 51*   CREATININE 3.4*   CALCIUM 7.6*   MG 2.1     CBC:   Recent Labs   Lab 06/06/25  0720 06/06/25  1659 06/07/25  0752   WBC 7.39 8.18 8.68   HGB 9.2* 9.1* " 8.6*   HCT 29.8* 29.6* 28.4*   * 104* 109*     CMP:   Recent Labs   Lab 06/06/25  1659 06/07/25  0345 06/07/25  0826   * 138 139   K 4.7 3.8 4.0   CL 98 99 99   CO2 16* 26 22*   * 173* 188*   BUN 92* 52* 51*   CREATININE 4.4* 3.2* 3.4*   CALCIUM 8.1* 7.6* 7.6*   PROT 7.7 7.1 7.1   ALBUMIN 2.0* 1.8* 1.8*   BILITOT 0.4 0.4 0.3   ALKPHOS 83 82 72   AST 19 16 15   ALT 18 14 12   ANIONGAP 18* 13 18*     All pertinent labs within the past 24 hours have been reviewed.    Significant Imaging: I have reviewed all pertinent imaging results/findings within the past 24 hours.

## 2025-06-07 NOTE — PROGRESS NOTES
[facility] - Telemetry ([facility])  Nephrology  Progress Note    Patient Name: Lakshmi Campbell  MRN: [id number]  Admission Date: 6/2/2025  Hospital Length of Stay: 5 days  Attending Provider: Adrian Nathan MD   Primary Care Physician: Keely Silva NP  Principal Problem:Sepsis with encephalopathy  Consult for MIKA on CKD   Dr. Kerns       Subjective:   CHART REVIEWED  75 yo female with CKD and multiple medical problems here with bacteremia and MIKA, recent hospital stay for CHF     June 6  - negligible UOP   - opens eyes to verbal stimulus   - family at bedside, , grand dtr and sister    June 7  - s/p HD last pm for 2.5 hours and seen near end of 3.5 hour treatment   - opens eyes more readily but not conversing with me     Review of patient's allergies indicates:   Allergen Reactions    Codeine Nausea Only     Other reaction(s): Nausea  Other reaction(s): Elevated blood pressure     Current Facility-Administered Medications   Medication Frequency    acetaminophen suppository 650 mg Q6H PRN    albuterol-ipratropium 2.5 mg-0.5 mg/3 mL nebulizer solution 3 mL Q4H PRN    arformoteroL nebulizer solution 15 mcg BID    budesonide nebulizer solution 0.5 mg Q12H    cefTRIAXone injection 2 g Q24H    dextrose 50% injection 12.5 g PRN    dextrose 50% injection 12.5 g PRN    glucagon (human recombinant) injection 1 mg PRN    glucagon (human recombinant) injection 1 mg PRN    heparin (porcine) injection 2,400 Units PRN    heparin 25,000 units in dextrose 5% (100 units/ml) IV bolus from bag LOW INTENSITY nomogram - [facility] PRN    heparin 25,000 units in dextrose 5% (100 units/ml) IV bolus from bag LOW INTENSITY nomogram - [facility] PRN    heparin 25,000 units in dextrose 5% 250 mL (100 units/mL) infusion LOW INTENSITY nomogram - [facility] Continuous    hydrALAZINE injection 10 mg Q6H PRN    insulin aspart U-100 pen 0-5 Units Q6H PRN    levothyroxine tablet 137 mcg Before breakfast    ondansetron injection 4 mg Q6H PRN       Objective:      Vital Signs (Most Recent):  Temp: 97.6 °F (36.4 °C) (06/07/25 0301)  Pulse: 80 (06/07/25 0747)  Resp: 20 (06/07/25 0747)  BP: (!) 112/58 (06/07/25 0600)  SpO2: 98 % (06/07/25 0747) Vital Signs (24h Range):  Temp:  [97.3 °F (36.3 °C)-97.7 °F (36.5 °C)] 97.6 °F (36.4 °C)  Pulse:  [] 80  Resp:  [16-46] 20  SpO2:  [84 %-100 %] 98 %  BP: ()/() 112/58     Weight: 98.4 kg (216 lb 14.9 oz) (06/06/25 1630)  Body mass index is 36.1 kg/m².  Body surface area is 2.12 meters squared.    I/O last 3 completed shifts:  In: 638.7 [I.V.:490.1; IV Piggyback:148.5]  Out: 2590 [Urine:90; Other:2500]    Physical Exam  Vitals and nursing note reviewed.   Constitutional:       Appearance: She is obese. She is ill-appearing.   Cardiovascular:      Rate and Rhythm: Normal rate.   Pulmonary:      Effort: No respiratory distress.      Breath sounds: Wheezing present.   Abdominal:      Palpations: Abdomen is soft.   Musculoskeletal:      Right lower leg: Edema (much imprved) present.      Left lower leg: Edema (much im[proved) present.   Neurological:      Mental Status: She is lethargic.         Significant Labs:labs reviewed     Significant Imaging:     Assessment/Plan:     Active Diagnoses:    Diagnosis Date Noted POA    PRINCIPAL PROBLEM:  Sepsis with encephalopathy [A41.9, R65.20, G93.41] 08/29/2024 Yes    Anuria [R34] 06/06/2025 No    Altered mental status [R41.82] 06/06/2025 No    Acute on chronic diastolic congestive heart failure [I50.33] 06/06/2025 No    Atrial fibrillation [I48.91] 06/06/2025 Unknown    Acute renal failure superimposed on stage 5 chronic kidney disease, not on chronic dialysis [N17.9, N18.5] 06/06/2025 Unknown    Atrial flutter [I48.92] 06/05/2025 No    Cellulitis of right lower extremity [L03.115] 06/03/2025 Yes    NSTEMI (non-ST elevated myocardial infarction) [I21.4] 06/03/2025 Yes    Mild persistent asthma without complication [J45.30] 11/19/2024 Yes    Chronic kidney disease, stage 4  (severe) [N18.4] 10/18/2024 Yes    Streptococcal bacteremia [R78.81, B95.5] 08/30/2024 Yes    acute on Chronic diastolic congestive heart failure [I50.32] 06/27/2024 Yes    Thrombocytopenia [D69.6] 03/02/2021 Yes    Class 2 severe obesity due to excess calories with serious comorbidity and body mass index (BMI) of 36.0 to 36.9 in adult [E66.812, E66.01, Z68.36] 01/07/2020 Not Applicable    Essential hypertension [I10] 11/07/2019 Yes    Hypothyroidism [E03.9] 09/23/2013 Yes    Type 2 diabetes mellitus with microalbuminuria, with long-term current use of insulin [E11.29, R80.9, Z79.4] 09/23/2013 Not Applicable      Problems Resolved During this Admission:     MIKA on CKD and anuric  - no UOP  - s/p 1st HD last evening (June 6) via RIJ temp catheter (appreciate [facility] placement of catheter)   - most likely hold HD tomorrow and monitor UOP, though may need treatment. Reassess in am   - etiology most likely ATN but she does carry a history of cirrhosis and need to consider HRS (noted urine Na+ < 20)   - check ultrasound  - monitor labs closely, repeat this pm     Sepsis, Strep Group A bacteremia   - ABX, supportive care     Metabolic Encephalopathy  - likely multifactorial. If all uremia I would expect her mental status to be more improved than current     Relative hypotension  - history of HTN maintained on 4 HTN agents  - monitor BP closely, thus far tolerating dialysis without BP support     Gap acidosis  - serum bicarbonate improved, last pm ABG reviewed   - lactate not done yesterday   - check labs this evening    ?cirrhosis  - check ultrasound    Diastolic CHF/NSTEMI/fib/flutter   - remains on heparin gtts     Volume status  - net negative 3.5L from UF with dialysis past 15 hours     dw dialysis nurse      Harvey Medina MD  Nephrology  [facility] - Telemetry ([facility])

## 2025-06-07 NOTE — ASSESSMENT & PLAN NOTE
"Patient has Diastolic (HFpEF) heart failure that is Acute on chronic. On presentation their CHF was decompensated. Evidence of decompensated CHF on presentation includes: elevated JVD, weight gain, and shortness of breath. The etiology of their decompensation is likely renal failure. Most recent BNP and echo results are listed below.  No results for input(s): "BNP" in the last 72 hours.  Latest ECHO  Results for orders placed during the hospital encounter of 06/02/25    Echo    Interpretation Summary    Left Ventricle: The left ventricle is normal in size. Moderately increased wall thickness. There is concentric hypertrophy. There is normal systolic function. Ejection fraction is approximately 60%.    Right Ventricle: The right ventricle is normal in size Wall thickness is normal. Systolic function is normal.    Left Atrium: The left atrium is mildly dilated    IVC/SVC: Normal venous pressure at 3 mmHg.    Current Heart Failure Medications  hydrALAZINE injection 10 mg, Every 6 hours PRN, Intravenous    Plan  - Monitor strict I&Os and daily weights.    - Place on telemetry  - Low sodium diet  - Place on fluid restriction of 1.5 L.   - Cardiology has been consulted  - The patient's volume status is worsening as indicated by shortness of breath. Will adjust treatment as follows: dialysis  - dialysis for volume removal      "

## 2025-06-08 LAB
ABSOLUTE EOSINOPHIL (OHS): 0.09 K/UL
ABSOLUTE MONOCYTE (OHS): 0.49 K/UL (ref 0.3–1)
ABSOLUTE NEUTROPHIL COUNT (OHS): 8.67 K/UL (ref 1.8–7.7)
AMMONIA PLAS-SCNC: 42 UMOL/L (ref 10–50)
ANION GAP (OHS): 17 MMOL/L (ref 8–16)
APTT PPP: 43.6 SECONDS (ref 21–32)
APTT PPP: 49.8 SECONDS (ref 21–32)
APTT PPP: 89 SECONDS (ref 21–32)
BASOPHILS # BLD AUTO: 0.01 K/UL
BASOPHILS NFR BLD AUTO: 0.1 %
BUN SERPL-MCNC: 26 MG/DL (ref 8–23)
CALCIUM SERPL-MCNC: 7.8 MG/DL (ref 8.7–10.5)
CHLORIDE SERPL-SCNC: 100 MMOL/L (ref 95–110)
CO2 SERPL-SCNC: 18 MMOL/L (ref 23–29)
CREAT SERPL-MCNC: 2.8 MG/DL (ref 0.5–1.4)
ERYTHROCYTE [DISTWIDTH] IN BLOOD BY AUTOMATED COUNT: 14 % (ref 11.5–14.5)
GFR SERPLBLD CREATININE-BSD FMLA CKD-EPI: 17 ML/MIN/1.73/M2
GLUCOSE SERPL-MCNC: 151 MG/DL (ref 70–110)
HCT VFR BLD AUTO: 28.5 % (ref 37–48.5)
HGB BLD-MCNC: 8.6 GM/DL (ref 12–16)
IMM GRANULOCYTES # BLD AUTO: 0.26 K/UL (ref 0–0.04)
IMM GRANULOCYTES NFR BLD AUTO: 2.6 % (ref 0–0.5)
LYMPHOCYTES # BLD AUTO: 0.6 K/UL (ref 1–4.8)
MAGNESIUM SERPL-MCNC: 1.8 MG/DL (ref 1.6–2.6)
MCH RBC QN AUTO: 27.9 PG (ref 27–31)
MCHC RBC AUTO-ENTMCNC: 30.2 G/DL (ref 32–36)
MCV RBC AUTO: 93 FL (ref 82–98)
NUCLEATED RBC (/100WBC) (OHS): 0 /100 WBC
PHOSPHATE SERPL-MCNC: 5.7 MG/DL (ref 2.7–4.5)
PLATELET # BLD AUTO: 143 K/UL (ref 150–450)
PMV BLD AUTO: 10.4 FL (ref 9.2–12.9)
POCT GLUCOSE: 147 MG/DL (ref 70–110)
POCT GLUCOSE: 153 MG/DL (ref 70–110)
POCT GLUCOSE: 181 MG/DL (ref 70–110)
POCT GLUCOSE: 189 MG/DL (ref 70–110)
POCT GLUCOSE: 192 MG/DL (ref 70–110)
POTASSIUM SERPL-SCNC: 3.9 MMOL/L (ref 3.5–5.1)
RBC # BLD AUTO: 3.08 M/UL (ref 4–5.4)
RELATIVE EOSINOPHIL (OHS): 0.9 %
RELATIVE LYMPHOCYTE (OHS): 5.9 % (ref 18–48)
RELATIVE MONOCYTE (OHS): 4.8 % (ref 4–15)
RELATIVE NEUTROPHIL (OHS): 85.7 % (ref 38–73)
SODIUM SERPL-SCNC: 135 MMOL/L (ref 136–145)
T4 FREE SERPL-MCNC: 0.51 NG/DL (ref 0.71–1.51)
T4 FREE SERPL-MCNC: 0.51 NG/DL (ref 0.71–1.51)
TSH SERPL-ACNC: 0.77 UIU/ML (ref 0.4–4)
WBC # BLD AUTO: 10.12 K/UL (ref 3.9–12.7)

## 2025-06-08 PROCEDURE — 82140 ASSAY OF AMMONIA: CPT | Performed by: INTERNAL MEDICINE

## 2025-06-08 PROCEDURE — 85025 COMPLETE CBC W/AUTO DIFF WBC: CPT

## 2025-06-08 PROCEDURE — 94640 AIRWAY INHALATION TREATMENT: CPT

## 2025-06-08 PROCEDURE — 80048 BASIC METABOLIC PNL TOTAL CA: CPT

## 2025-06-08 PROCEDURE — 84439 ASSAY OF FREE THYROXINE: CPT | Performed by: INTERNAL MEDICINE

## 2025-06-08 PROCEDURE — 63600175 PHARM REV CODE 636 W HCPCS: Performed by: INTERNAL MEDICINE

## 2025-06-08 PROCEDURE — 63600175 PHARM REV CODE 636 W HCPCS: Performed by: NURSE PRACTITIONER

## 2025-06-08 PROCEDURE — 97530 THERAPEUTIC ACTIVITIES: CPT | Mod: CQ

## 2025-06-08 PROCEDURE — 99233 SBSQ HOSP IP/OBS HIGH 50: CPT | Mod: ,,, | Performed by: INTERNAL MEDICINE

## 2025-06-08 PROCEDURE — 25000242 PHARM REV CODE 250 ALT 637 W/ HCPCS: Performed by: STUDENT IN AN ORGANIZED HEALTH CARE EDUCATION/TRAINING PROGRAM

## 2025-06-08 PROCEDURE — 27000221 HC OXYGEN, UP TO 24 HOURS

## 2025-06-08 PROCEDURE — 20000000 HC ICU ROOM

## 2025-06-08 PROCEDURE — 99900035 HC TECH TIME PER 15 MIN (STAT)

## 2025-06-08 PROCEDURE — 83735 ASSAY OF MAGNESIUM: CPT

## 2025-06-08 PROCEDURE — 85730 THROMBOPLASTIN TIME PARTIAL: CPT | Performed by: INTERNAL MEDICINE

## 2025-06-08 PROCEDURE — 84100 ASSAY OF PHOSPHORUS: CPT

## 2025-06-08 PROCEDURE — 94761 N-INVAS EAR/PLS OXIMETRY MLT: CPT

## 2025-06-08 PROCEDURE — 99232 SBSQ HOSP IP/OBS MODERATE 35: CPT | Mod: ,,, | Performed by: INTERNAL MEDICINE

## 2025-06-08 RX ORDER — ROCURONIUM BROMIDE 10 MG/ML
INJECTION, SOLUTION INTRAVENOUS
Status: DISPENSED
Start: 2025-06-08 | End: 2025-06-09

## 2025-06-08 RX ORDER — ETOMIDATE 2 MG/ML
INJECTION INTRAVENOUS
Status: DISPENSED
Start: 2025-06-08 | End: 2025-06-09

## 2025-06-08 RX ADMIN — GLYCOPYRROLATE 0.2 MG: 0.2 INJECTION, SOLUTION INTRAMUSCULAR; INTRAVITREAL at 04:06

## 2025-06-08 RX ADMIN — CEFTRIAXONE 2 G: 2 INJECTION, POWDER, FOR SOLUTION INTRAMUSCULAR; INTRAVENOUS at 12:06

## 2025-06-08 RX ADMIN — BUDESONIDE INHALATION 0.5 MG: 0.5 SUSPENSION RESPIRATORY (INHALATION) at 07:06

## 2025-06-08 RX ADMIN — ARFORMOTEROL TARTRATE 15 MCG: 15 SOLUTION RESPIRATORY (INHALATION) at 07:06

## 2025-06-08 RX ADMIN — HEPARIN SODIUM 16 UNITS/KG/HR: 10000 INJECTION, SOLUTION INTRAVENOUS at 08:06

## 2025-06-08 RX ADMIN — ARFORMOTEROL TARTRATE 15 MCG: 15 SOLUTION RESPIRATORY (INHALATION) at 08:06

## 2025-06-08 RX ADMIN — BUDESONIDE INHALATION 0.5 MG: 0.5 SUSPENSION RESPIRATORY (INHALATION) at 08:06

## 2025-06-08 NOTE — ASSESSMENT & PLAN NOTE
Body mass index is 35.26 kg/m². Morbid obesity complicates all aspects of disease management from diagnostic modalities to treatment. Weight loss encouraged and health benefits explained to patient.

## 2025-06-08 NOTE — SUBJECTIVE & OBJECTIVE
Interval History: No acute events.  Tolerated dialysis yesterday.  Minimal UOP.  Not answering questions today.  Lethargic.  Hemodynamics and oxygenation are stable.      Objective:     Vital Signs (Most Recent):  Temp: 97.4 °F (36.3 °C) (06/08/25 0301)  Pulse: 95 (06/08/25 0809)  Resp: 20 (06/08/25 0809)  BP: 131/70 (06/08/25 0600)  SpO2: 96 % (06/08/25 0809) Vital Signs (24h Range):  Temp:  [97.4 °F (36.3 °C)-97.8 °F (36.6 °C)] 97.4 °F (36.3 °C)  Pulse:  [71-96] 95  Resp:  [17-48] 20  SpO2:  [95 %-98 %] 96 %  BP: (100-156)/(53-81) 131/70     Weight: 96.1 kg (211 lb 13.8 oz)  Body mass index is 35.26 kg/m².      Intake/Output Summary (Last 24 hours) at 6/8/2025 1303  Last data filed at 6/8/2025 1100  Gross per 24 hour   Intake 272.96 ml   Output 2075 ml   Net -1802.04 ml        Physical Exam  Vitals and nursing note reviewed.   Constitutional:       Comments: Lethargic, not answering questions today   Cardiovascular:      Rate and Rhythm: Normal rate and regular rhythm.      Pulses: Normal pulses.      Heart sounds: No murmur heard.  Pulmonary:      Effort: Pulmonary effort is normal. No respiratory distress.      Breath sounds: No wheezing, rhonchi or rales.   Abdominal:      General: Abdomen is flat. There is no distension.      Palpations: Abdomen is soft.      Tenderness: There is no abdominal tenderness.   Musculoskeletal:      Right lower leg: Edema present.      Left lower leg: Edema present.   Neurological:      Comments: Moves all extremities spontaneously and opens eyes to voice, but not answering questions today           Review of Systems    Vents:  Oxygen Concentration (%): 32 (06/08/25 0809)    Lines/Drains/Airways       Central Venous Catheter Line  Duration             Trialysis (Dialysis) Catheter 06/06/25 1745 right internal jugular 1 day              Drain  Duration                  Urethral Catheter 06/06/25 1645 1 day              Peripheral Intravenous Line  Duration                   Peripheral IV - Single Lumen 06/04/25 0333 Left Hand 4 days         Peripheral IV - Single Lumen 06/05/25 0624 20 G Anterior;Right Forearm 3 days                    Significant Labs:    CBC/Anemia Profile:  Recent Labs   Lab 06/06/25  1659 06/07/25  0752 06/08/25  0336   WBC 8.18 8.68 10.12   HGB 9.1* 8.6* 8.6*   HCT 29.6* 28.4* 28.5*   * 109* 143*   MCV 92 91 93   RDW 14.0 13.8 14.0        Chemistries:  Recent Labs   Lab 06/06/25  1659 06/07/25  0345 06/07/25  0826 06/07/25  1903 06/08/25  0336   * 138 139 134* 135*   K 4.7 3.8 4.0 3.6 3.9   CL 98 99 99 101 100   CO2 16* 26 22* 22* 18*   BUN 92* 52* 51* 21 26*   CREATININE 4.4* 3.2* 3.4* 2.1* 2.8*   CALCIUM 8.1* 7.6* 7.6* 7.5* 7.8*   ALBUMIN 2.0* 1.8* 1.8*  --   --    PROT 7.7 7.1 7.1  --   --    BILITOT 0.4 0.4 0.3  --   --    ALKPHOS 83 82 72  --   --    ALT 18 14 12  --   --    AST 19 16 15  --   --    MG  --  2.0 2.1  --  1.8   PHOS  --  5.7* 6.4*  --  5.7*       All pertinent labs within the past 24 hours have been reviewed.    Significant Imaging:  I have reviewed all pertinent imaging results/findings within the past 24 hours.

## 2025-06-08 NOTE — PROGRESS NOTES
[facility] - Telemetry ([facility])  Nephrology  Progress Note    Patient Name: Lakshmi Campbell  MRN: [id number]  Admission Date: 6/2/2025  Hospital Length of Stay: 6 days  Attending Provider: Adrian Nathan MD   Primary Care Physician: Keely Silva NP  Principal Problem:Sepsis with encephalopathy  Consult for MIKA on CKD   Dr. Kerns       Subjective:   CHART REVIEWED  73 yo female with CKD and multiple medical problems here with bacteremia and MIKA, recent hospital stay for CHF     June 6  - negligible UOP   - opens eyes to verbal stimulus   - family at bedside, , grand dtr and sister    June 7  - s/p HD last pm for 2.5 hours and seen near end of 3.5 hour treatment   - opens eyes more readily but not conversing with me     June 8  - no acute events  - s/p HD yesterday for 3.5 hours   - opens eyes to name     Review of patient's allergies indicates:   Allergen Reactions    Codeine Nausea Only     Other reaction(s): Nausea  Other reaction(s): Elevated blood pressure     Current Facility-Administered Medications   Medication Frequency    acetaminophen suppository 650 mg Q6H PRN    albuterol-ipratropium 2.5 mg-0.5 mg/3 mL nebulizer solution 3 mL Q4H PRN    arformoteroL nebulizer solution 15 mcg BID    budesonide nebulizer solution 0.5 mg Q12H    cefTRIAXone injection 2 g Q24H    dextrose 50% injection 12.5 g PRN    dextrose 50% injection 12.5 g PRN    glucagon (human recombinant) injection 1 mg PRN    glucagon (human recombinant) injection 1 mg PRN    heparin (porcine) injection 2,400 Units PRN    heparin 25,000 units in dextrose 5% (100 units/ml) IV bolus from bag LOW INTENSITY nomogram - [facility] PRN    heparin 25,000 units in dextrose 5% (100 units/ml) IV bolus from bag LOW INTENSITY nomogram - [facility] PRN    heparin 25,000 units in dextrose 5% 250 mL (100 units/mL) infusion LOW INTENSITY nomogram - [facility] Continuous    hydrALAZINE injection 10 mg Q6H PRN    insulin aspart U-100 pen 0-5 Units Q6H PRN    levothyroxine  tablet 137 mcg Before breakfast    ondansetron injection 4 mg Q6H PRN       Objective:     Vital Signs (Most Recent):  Temp: 97.4 °F (36.3 °C) (06/08/25 0301)  Pulse: 95 (06/08/25 0809)  Resp: 20 (06/08/25 0809)  BP: 131/70 (06/08/25 0600)  SpO2: 96 % (06/08/25 0809) Vital Signs (24h Range):  Temp:  [97.4 °F (36.3 °C)-97.8 °F (36.6 °C)] 97.4 °F (36.3 °C)  Pulse:  [71-96] 95  Resp:  [17-48] 20  SpO2:  [95 %-98 %] 96 %  BP: (100-156)/(53-81) 131/70     Weight: 96.1 kg (211 lb 13.8 oz) (06/08/25 0600)  Body mass index is 35.26 kg/m².  Body surface area is 2.1 meters squared.    I/O last 3 completed shifts:  In: 456.8 [I.V.:456.8]  Out: 4605 [Urine:105; Other:4500]    Physical Exam  Vitals and nursing note reviewed.   Constitutional:       Appearance: She is obese. She is ill-appearing.   Cardiovascular:      Rate and Rhythm: Normal rate.   Pulmonary:      Effort: No respiratory distress.      Breath sounds: Wheezing present.   Abdominal:      Palpations: Abdomen is soft.   Musculoskeletal:      Right lower leg: Edema (much imprved) present.      Left lower leg: Edema (much im[proved) present.   Neurological:      Mental Status: She is lethargic.         Significant Labs:labs reviewed     Significant Imaging:     Assessment/Plan:     Active Diagnoses:    Diagnosis Date Noted POA    PRINCIPAL PROBLEM:  Sepsis with encephalopathy [A41.9, R65.20, G93.41] 08/29/2024 Yes    Anuria [R34] 06/06/2025 No    Altered mental status [R41.82] 06/06/2025 No    Acute on chronic diastolic congestive heart failure [I50.33] 06/06/2025 No    Atrial fibrillation [I48.91] 06/06/2025 Unknown    Acute renal failure superimposed on stage 5 chronic kidney disease, not on chronic dialysis [N17.9, N18.5] 06/06/2025 Unknown    Atrial flutter [I48.92] 06/05/2025 No    Cellulitis of right lower extremity [L03.115] 06/03/2025 Yes    NSTEMI (non-ST elevated myocardial infarction) [I21.4] 06/03/2025 Yes    Mild persistent asthma without complication  [J45.30] 11/19/2024 Yes    Chronic kidney disease, stage 4 (severe) [N18.4] 10/18/2024 Yes    Streptococcal bacteremia [R78.81, B95.5] 08/30/2024 Yes    acute on Chronic diastolic congestive heart failure [I50.32] 06/27/2024 Yes    Thrombocytopenia [D69.6] 03/02/2021 Yes    Class 2 severe obesity due to excess calories with serious comorbidity and body mass index (BMI) of 36.0 to 36.9 in adult [E66.812, E66.01, Z68.36] 01/07/2020 Not Applicable    Essential hypertension [I10] 11/07/2019 Yes    Hypothyroidism [E03.9] 09/23/2013 Yes    Type 2 diabetes mellitus with microalbuminuria, with long-term current use of insulin [E11.29, R80.9, Z79.4] 09/23/2013 Not Applicable      Problems Resolved During this Admission:     MIKA on CKD and anuric  - tiny increase of urine noted  - s/p 1st HD Friday 6/6 for 2.5 hours and 2nd HD for 3.5 hours on Saturday 6/7  - plan for 4 hours of HD tomorrow   - etiology most likely ATN but she does carry a history of cirrhosis and need to consider HRS (noted urine Na+ < 20)   - renal vasculature is fine on Doppler u/s   - monitor labs closely, repeat this pm     Hyperphosphatemia  - monitor, no need for acute treatment   Hypocalcemia  - corrects to normal range     Sepsis, Strep Group A bacteremia   - ABX, supportive care   - repeat blcxs 6/5 NGTD    Metabolic Encephalopathy  - likely multifactorial. If all uremia I would expect her mental status to be more improved than current. Discuss with CCM consideration to hepatic component     Relative hypotension  - history of HTN maintained on 4 HTN agents  - monitor BP closely,  tolerated dialysis without BP support     Gap acidosis  - plan for HD tomorrow     Cirrhosis, followed by Hepatology in the past with last visit October 2023  - possible hepatic encephalopathy contributing to MS    Diastolic CHF/NSTEMI/fib/flutter   - remains on heparin gtts     Volume status  - net negative 3.5L from UF with dialysis          Harvey Medina,  MD  Nephrology

## 2025-06-08 NOTE — EICU
Intervention Initiated From:  [facility] / [facility]    Peter intervened regarding:  Rounding (Video assessment)    VICU Night Rounds Checklist  24H Vital Sign Range:  Temp:  [97.5 °F (36.4 °C)-97.9 °F (36.6 °C)]   Pulse:  []   Resp:  [15-48]   BP: ()/()   SpO2:  [93 %-100 %]     Video rounds and LDA reconciliation

## 2025-06-08 NOTE — ACP (ADVANCE CARE PLANNING)
Advance Care Planning     Date: 06/08/2025    Today a voluntary meeting took place: ICU    Patient Participation: Patient is unable to participate     Attendees (Name and  Relationship to patient): , Sister, Granddaughter    Staff attendees (Name and  Role): Adrian Nathan MD    ACP Conversation (General):   This afternoon, Ms Campbell had a approx 15 second pause in her hear rhythm followed by an almost 8 second pause after a few beats.  She received a few chest compressions before moving her arms and staff stopped to check for pulse.  Following this, her mental status is much worse.  Concern she is not protecting her airway.    I was able to update her family at bedside regarding this most recent event and out possible avenues going forward.  They expressed that they want to give her a chance to recover, but they do not want her to suffer unduly.  We discussed that placing her on life support would not fix any of her problems (arrtyhmia, renal failure, encephalopathy) and would actually likely make it harder for her to wake up given the sedation that generally comes along with intubation.  Her  and granddaughter were able to express that they would not want her placed on life support.  Similarly, they would not want her to have additional chest compression or shocks if her heart were to stop again.  They would like us to continue current management, otherwise, to give her a chance to recovery. But if despite all of our effort she were to still worsen, then they would like her to pass comfortably in that instance.    Code Status: DNR; status confirmed/order placed in chart     ACP Documents: None    Goals of care: The family endorses that what is most important right now is to focus on improvement in condition but with limits to invasive therapies    Accordingly, we have decided that the best plan to meet the patient's goals includes continuing with treatment      Length of ACP   conversation in  minutes: 18 minutes

## 2025-06-08 NOTE — PT/OT/SLP PROGRESS
Physical Therapy  Treatment    Lakshmi Campbell   MRN: [id number]   Admitting Diagnosis: Sepsis with encephalopathy    PT Received On: 06/08/25  PT Start Time: 1039     PT Stop Time: 1054    PT Total Time (min): 15 min       Billable Minutes:  Therapeutic Activity 15    Treatment Type: Treatment  PT/PTA: PTA     Number of PTA visits since last PT visit: 1       General Precautions: Standard, fall  Orthopedic Precautions: N/A  Braces: N/A  Respiratory Status: Nasal cannula, flow 3 L/min         Subjective:  Communicated with nurse Strange and completed Epic chart review prior to session. Nurse reports pt has not been able to arouse.          Objective:   Patient found with: peripheral IV, oxygen, telemetry, pressure relief boots (combative gloves)    Attempted to arouse pt with verbal cues and sternal rub, unsuccessful    PROM TO BLE X15EA: ANKLE DF/PF, ANKLE IV/EV, HIP ABD/ADD, HEEL SLIDES. Some resistance noted in RUE with with PROM.    PROM TO BUE X15EA: ELBOW FLEX/EXT, SHLD FLEX/EXT    Treatment and Education:  No education provided due to pt being somnolent.    AM-PAC 6 CLICK MOBILITY  How much help from another person does this patient currently need?   1 = Unable, Total/Dependent Assistance  2 = A lot, Maximum/Moderate Assistance  3 = A little, Minimum/Contact Guard/Supervision  4 = None, Modified [place]/Independent    Turning over in bed (including adjusting bedclothes, sheets and blankets)?: 1  Sitting down on and standing up from a chair with arms (e.g., wheelchair, bedside commode, etc.): 1  Moving from lying on back to sitting on the side of the bed?: 1  Moving to and from a bed to a chair (including a wheelchair)?: 1  Need to walk in hospital room?: 1  Climbing 3-5 steps with a railing?: 1  Basic Mobility Total Score: 6    AM-PAC Raw Score CMS G-Code Modifier Level of Impairment Assistance   6 % Total / Unable   7 - 9 CM 80 - 100% Maximal Assist   10 - 14 CL 60 - 80% Moderate Assist   15 - 19 CK  40 - 60% Moderate Assist   20 - 22 CJ 20 - 40% Minimal Assist   23 CI 1-20% SBA / CGA   24 CH 0% Independent/ Mod I     Patient left supine with all lines intact, call button in reach, nurse notified, and family present.    Assessment:  Lakshmi Campbell is a 74 y.o. female with a medical diagnosis of Sepsis with encephalopathy and presents with the following functional limitations. Pt somnolent during session.     Rehab identified problem list/impairments: weakness, impaired endurance, impaired self care skills, impaired functional mobility, gait instability, impaired balance, impaired cognition, decreased coordination, decreased upper extremity function, decreased lower extremity function, decreased safety awareness    Rehab potential is fair.    Activity tolerance: Fair    Discharge recommendations: Moderate Intensity Therapy      Barriers to discharge:      Equipment recommendations: to be determined by next level of care     GOALS:   Multidisciplinary Problems       Physical Therapy Goals          Problem: Physical Therapy    Goal Priority Disciplines Outcome Interventions   Physical Therapy Goal     PT, PT/OT Not Progressing    Description: LTG goals to be met in 14 days (6/18/25).  Patient will require Min A for bed mobility.  Patient will require Min A for sit <> stand.  Patient will require Min A for stand <> chair transfer.  Patient will ambulate 200' Min A with RW.   Patient will increase AM-PAC score by 2 points to progress with functional mobility.                       DME Justifications:  No DME recommended requiring DME justifications    PLAN:    Patient to be seen 3 x/week to address the above listed problems via therapeutic activities  Plan of Care expires: 06/18/25  Plan of Care reviewed with: patient, family         06/08/2025

## 2025-06-08 NOTE — ASSESSMENT & PLAN NOTE
Patient's blood pressure range in the last 24 hours was: BP  Min: 100/60  Max: 156/64.The patient's inpatient anti-hypertensive regimen is listed below:  Current Antihypertensives  hydrALAZINE injection 10 mg, Every 6 hours PRN, Intravenous    Plan  - BP is controlled, no changes needed to their regimen

## 2025-06-08 NOTE — EICU
Intervention Initiated From:  [facility] / EICU    Peter intervened regarding:  Rounding (Video assessment)    Comments: Virtual ICU Quality Rounds    Admit Date: 6/2/2025  Hospital Day: 6    ICU Day: 1d 17h    24H Vital Sign Range:  Temp:  [97.4 °F (36.3 °C)-97.8 °F (36.6 °C)]   Pulse:  [71-97]   Resp:  [17-48]   BP: ()/(48-90)   SpO2:  [93 %-99 %]     VICU Surveillance Screening    Daily review of  line necessity(optional): Central line(s) in place and Urinary catheter in place    Central line type (optional): Trialysis    Central line indications : Dialysis/CRRT    Hassan Indications : Critically ill in the intensive care unit requiring hourly urine output monitoring     LDA reconciliation : Yes

## 2025-06-08 NOTE — ASSESSMENT & PLAN NOTE
Srouce is likely 2/2 to sepsis.  The patients 3 most recent labs are listed below.  Recent Labs     06/06/25  1659 06/07/25  0752 06/08/25  0336   * 109* 143*       Plan  - Will transfuse if platelet count is <20k.  - Monitor

## 2025-06-08 NOTE — ASSESSMENT & PLAN NOTE
Acute on Chronic Renal Failure  ATN in the setting of sepsis  Nephrology following   Trialysis placed 6/6 and plan to start dialysis  Avoid nephrotoxins and renally dose meds    6/7 - planning dialysis again today    6/8 - further RRT per Nephro

## 2025-06-08 NOTE — PROGRESS NOTES
ST presented to bedside in ICU for tx. Family present. Pt somnolent, unable to be aroused despite sternal rub and verbal cues. RN endorses pt in this state all morning. Pt not appropriate for PO trials at this time. ST will follow-up to continue POC.     Mary Interiano MA, L-SLP, CCC-SLP  Speech Language Pathologist  6/8/2025

## 2025-06-08 NOTE — ASSESSMENT & PLAN NOTE
Likely multifactorial in the setting of sepsis and renal failure  Improving somewhat with dialysis.  Continue to monitor.  Delirium precautions    6/8 - has failed to make much improvement in mental status despite correction of uremia.  Unable to get MRI due to orthopedic hardware.  Will recheck ammonia (normal 6 days ago), TFTs (unable to take Synthroid due to NPO).    I think the overall picture is one of hypoactive delirium at this point, but will try to rule out a few other things.

## 2025-06-08 NOTE — PROGRESS NOTES
[facility] - Telemetry ([facility])  Cardiology  Progress Note     Patient Name: Lakshmi Campbell  MRN: [id number]  Admission Date: 6/2/2025  Hospital Length of Stay: 3 days  Code Status: Full Code   Attending Physician: Abel Caraballo MD   Primary Care Physician: Keely Silva NP  Expected Discharge Date: 6/7/2025  Principal Problem:Sepsis with encephalopathy     Subjective:      Hospital Course:   6-4-25  monitor HR 90s , EKG back to baseline NSR. BBB is rate related. Echo nml no SWMA, troponin down to 3, probably demand ischemia. Can stop heparin, start low dose b blockers     6-5-25  Pt now in afib/flutter at controlled rate, can restart heparin, some pauses noted but K is low, needs repletion, echo nml EF, continue tx for sepsis     6-7-25  Monitor shows afib in low 100s. Pt is hemodynamically stable. Continue treatment for sepsis.     6-8-25  Monitor shows afib in 80s-100s. Pt had HD yesterday. No pauses noted. Continue anticoagulation.      Interval History:      Review of Systems   Constitutional: Negative. Negative for weight gain.   HENT: Negative.     Eyes: Negative.    Cardiovascular: Negative.  Negative for chest pain, leg swelling and palpitations.   Respiratory: Negative.  Negative for shortness of breath.    Endocrine: Negative.    Hematologic/Lymphatic: Negative.    Skin: Negative.    Musculoskeletal:  Negative for muscle weakness.   Gastrointestinal: Negative.    Genitourinary: Negative.    Neurological: Negative.  Negative for dizziness.   Psychiatric/Behavioral: Negative.     Allergic/Immunologic: Negative.    All other systems reviewed and are negative.     Objective:      Vital Signs (Most Recent):  Temp: 97.7 °F (36.5 °C) (06/05/25 0714)  Pulse: 98 (06/05/25 0800)  Resp: 17 (06/05/25 0714)  BP: (!) 120/56 (06/05/25 0714)  SpO2: 96 % (06/05/25 0714) Vital Signs (24h Range):  Temp:  [97.7 °F (36.5 °C)-98.3 °F (36.8 °C)] 97.7 °F (36.5 °C)  Pulse:  [] 98  Resp:  [17-19] 17  SpO2:  [94 %-97 %] 96  %  BP: ()/(53-64) 120/56      Weight: 96.5 kg (212 lb 11.9 oz)  Body mass index is 35.4 kg/m².     SpO2: 96 %           Intake/Output Summary (Last 24 hours) at 6/5/2025 0922  Last data filed at 6/5/2025 0903        Gross per 24 hour   Intake 200 ml   Output --   Net 200 ml         Lines/Drains/Airways         Peripheral Intravenous Line  Duration                     Peripheral IV - Single Lumen 06/04/25 0333 Left Hand 1 day          Peripheral IV - Single Lumen 06/04/25 0912 22 G Left Antecubital 1 day          Peripheral IV - Single Lumen 06/05/25 0624 20 G Anterior;Right Forearm <1 day                             Physical Exam        Significant Labs: All pertinent lab results from the last 24 hours have been reviewed.     Significant Imaging: X-Ray: CXR: X-Ray Chest 1 View (CXR):         Results for orders placed or performed during the hospital encounter of 06/02/25   X-Ray Chest 1 View     Narrative     EXAM:    XR CHEST 1 VIEW     CLINICAL HISTORY:    Shortness of breath     FINDINGS:  Enlarged heart.  Increased interstitial attenuation concerning for edema.  No pleural fluid or pneumothorax on the right.  Possible small left pleural effusion.           Impression      As above     Finalized on: 6/5/2025 6:35 AM By:  Chepe Barnes MD  [facility]# [id number]      2025-06-05 06:37:[id number]     [facility]      Assessment and Plan:      Brief HPI:      NSTEMI (non-ST elevated myocardial infarction)  Pt is a 74 y/u female with PMhx for HTN, HLP, diastolic heart failure, CKD , aortic stenosis admitted for sepsis with encephalopathy.  EKG sinus tachycardia with LBBB at 104 bpm. BNP 1212. CXR (-). Pt discharged about 2 weeks ago for CHF,  Troponin is 4.15     Troponin may be from demand ischemia, continue serial enzymes. Check echo for SWMA. Continue IV heparin  Repeat EKG with controlled rate. CHF appears stable, restart diuretics once more stable from sepsis           VTE Risk Mitigation (From admission, onward)               Ordered       heparin 25,000 units in dextrose 5% (100 units/ml) IV bolus from bag LOW INTENSITY nomogram - [facility]  As needed (PRN)        Question:  Heparin Infusion Adjustment (DO NOT MODIFY ANSWER)  Answer:  [email or web address]\epic\Images\Pharmacy\HeparinInfusions\heparin LOW INTENSITY nomogram for [facility] [id number]    06/05/25 0541       heparin 25,000 units in dextrose 5% (100 units/ml) IV bolus from bag LOW INTENSITY nomogram - [facility]  As needed (PRN)        Question:  Heparin Infusion Adjustment (DO NOT MODIFY ANSWER)  Answer:  [email or web address]\epic\Images\Pharmacy\HeparinInfusions\heparin LOW INTENSITY nomogram for [facility] [id number]    06/05/25 0541       heparin 25,000 units in dextrose 5% 250 mL (100 units/mL) infusion LOW INTENSITY nomogram - [facility]  Continuous        Question:  Begin at (units/kg/hr)  Answer:  12    06/05/25 0546

## 2025-06-08 NOTE — ASSESSMENT & PLAN NOTE
"This patient does have evidence of infective focus  My overall impression is sepsis with Acute kidney injury, Acute respiratory failure, Encephalopathy, and Thrombocytopenia .  Source: Skin and Soft Tissue Infection: Cellulitis  Antibiotics given-   Antibiotics (72h ago, onward)      Start     Stop Route Frequency Ordered    06/04/25 0915  cefTRIAXone injection 2 g         -- IV Every 24 hours (non-standard times) 06/04/25 0722          Latest lactate reviewed-  No results for input(s): "LACTATE", "POCLAC" in the last 72 hours.    Organ dysfunction indicated by Acute kidney injury, Encephalopathy, and Thrombocytopenia     Source control achieved by: Rocephin/Clinda    ID following  Hemodynamics stable  Blood Cx 6/5 NGTD    6/8 - has failed to make much improvement in mental status despite correction of uremia.  Unable to get MRI due to orthopedic hardware.  Will recheck ammonia (normal 6 days ago), TFTs (unable to take Synthroid due to NPO).    I think the overall picture is one of hypoactive delirium at this point, but will try to rule out a few other things.    "

## 2025-06-08 NOTE — ASSESSMENT & PLAN NOTE
"This patient does have evidence of infective focus  My overall impression is sepsis with Acute kidney injury, Acute respiratory failure, Encephalopathy, and Thrombocytopenia .  Source: Skin and Soft Tissue Infection: Cellulitis  Antibiotics given-   Antibiotics (72h ago, onward)    Start     Stop Route Frequency Ordered    06/04/25 0915  cefTRIAXone injection 2 g         -- IV Every 24 hours (non-standard times) 06/04/25 0722        Latest lactate reviewed-  No results for input(s): "LACTATE", "POCLAC" in the last 72 hours.    Organ dysfunction indicated by Acute kidney injury, Encephalopathy, and Thrombocytopenia     Source control achieved by: Rocephin/Clinda    ID following  Hemodynamics stable  Blood Cx 6/5 NGTD      "

## 2025-06-08 NOTE — PROGRESS NOTES
[facility] - Intensive Care ([facility])  Critical Care Medicine  Progress Note    Patient Name: Lakshmi Campbell  MRN: [id number]  Admission Date: 6/2/2025  Hospital Length of Stay: 6 days  Code Status: Full Code  Attending Provider: Adrian Nathan MD  Primary Care Provider: Keely Silva NP   Principal Problem: Sepsis with encephalopathy    Subjective:     HPI:  Ms Campbell is a 75 y/o WF with DM, CKD Stage IV, HTN, hypothyroidism, YO cirrhosis, dCHF, and HLD who is currently admitted to the Hospitalist service after presenting 6/2 with confusion.  Recent admission 5/9 to 5/15 for CHF exacerbation, PNA, and UTI.  Went home on oxygen.      This admission, she has been treated for volume overload with diuresis, along with UTI, and Strep bacteremia.  Course has been complicated by progressive renal failure with oliguria despite diuresis.  She comes to the ICU this afternoon due to ongoing lethargy and need for dialysis.  Oxygenation is stable on 3L and hemodynamics are stable.    Hospital/ICU Course:  6/6 - transfer to ICU due to ongoing encephalopathy and need for dialysis.  Trialysis placed and dialysis tolerated well.    6/7 - answered a few questions for me today, but still slow.  Planning dialysis again this morning.      6/8 - remains lethargic today.  Not answering questions for me.  Oxygenation and hemodynamics are stable.  Unable to get MRI due to orthopedic hardware.    Interval History: No acute events.  Tolerated dialysis yesterday.  Minimal UOP.  Not answering questions today.  Lethargic.  Hemodynamics and oxygenation are stable.      Objective:     Vital Signs (Most Recent):  Temp: 97.4 °F (36.3 °C) (06/08/25 0301)  Pulse: 95 (06/08/25 0809)  Resp: 20 (06/08/25 0809)  BP: 131/70 (06/08/25 0600)  SpO2: 96 % (06/08/25 0809) Vital Signs (24h Range):  Temp:  [97.4 °F (36.3 °C)-97.8 °F (36.6 °C)] 97.4 °F (36.3 °C)  Pulse:  [71-96] 95  Resp:  [17-48] 20  SpO2:  [95 %-98 %] 96 %  BP: (100-156)/(53-81) 131/70      Weight: 96.1 kg (211 lb 13.8 oz)  Body mass index is 35.26 kg/m².      Intake/Output Summary (Last 24 hours) at 6/8/2025 1303  Last data filed at 6/8/2025 1100  Gross per 24 hour   Intake 272.96 ml   Output 2075 ml   Net -1802.04 ml        Physical Exam  Vitals and nursing note reviewed.   Constitutional:       Comments: Lethargic, not answering questions today   Cardiovascular:      Rate and Rhythm: Normal rate and regular rhythm.      Pulses: Normal pulses.      Heart sounds: No murmur heard.  Pulmonary:      Effort: Pulmonary effort is normal. No respiratory distress.      Breath sounds: No wheezing, rhonchi or rales.   Abdominal:      General: Abdomen is flat. There is no distension.      Palpations: Abdomen is soft.      Tenderness: There is no abdominal tenderness.   Musculoskeletal:      Right lower leg: Edema present.      Left lower leg: Edema present.   Neurological:      Comments: Moves all extremities spontaneously and opens eyes to voice, but not answering questions today           Review of Systems    Vents:  Oxygen Concentration (%): 32 (06/08/25 0809)    Lines/Drains/Airways       Central Venous Catheter Line  Duration             Trialysis (Dialysis) Catheter 06/06/25 1745 right internal jugular 1 day              Drain  Duration                  Urethral Catheter 06/06/25 1645 1 day              Peripheral Intravenous Line  Duration                  Peripheral IV - Single Lumen 06/04/25 0333 Left Hand 4 days         Peripheral IV - Single Lumen 06/05/25 0624 20 G Anterior;Right Forearm 3 days                    Significant Labs:    CBC/Anemia Profile:  Recent Labs   Lab 06/06/25  1659 06/07/25  0752 06/08/25  0336   WBC 8.18 8.68 10.12   HGB 9.1* 8.6* 8.6*   HCT 29.6* 28.4* 28.5*   * 109* 143*   MCV 92 91 93   RDW 14.0 13.8 14.0        Chemistries:  Recent Labs   Lab 06/06/25  1659 06/07/25  0345 06/07/25  0826 06/07/25  1903 06/08/25  0336   * 138 139 134* 135*   K 4.7 3.8 4.0 3.6  "3.9   CL 98 99 99 101 100   CO2 16* 26 22* 22* 18*   BUN 92* 52* 51* 21 26*   CREATININE 4.4* 3.2* 3.4* 2.1* 2.8*   CALCIUM 8.1* 7.6* 7.6* 7.5* 7.8*   ALBUMIN 2.0* 1.8* 1.8*  --   --    PROT 7.7 7.1 7.1  --   --    BILITOT 0.4 0.4 0.3  --   --    ALKPHOS 83 82 72  --   --    ALT 18 14 12  --   --    AST 19 16 15  --   --    MG  --  2.0 2.1  --  1.8   PHOS  --  5.7* 6.4*  --  5.7*       All pertinent labs within the past 24 hours have been reviewed.    Significant Imaging:  I have reviewed all pertinent imaging results/findings within the past 24 hours.    ABG  Recent Labs   Lab 06/05/25  1933   PH 7.404   PO2 89   PCO2 38.0   HCO3 23.8*   BE -1     Assessment/Plan:     Neuro  Altered mental status  Likely multifactorial in the setting of sepsis and renal failure  Improving somewhat with dialysis.  Continue to monitor.  Delirium precautions    6/8 - has failed to make much improvement in mental status despite correction of uremia.  Unable to get MRI due to orthopedic hardware.  Will recheck ammonia (normal 6 days ago), TFTs (unable to take Synthroid due to NPO).    I think the overall picture is one of hypoactive delirium at this point, but will try to rule out a few other things.    Cardiac/Vascular  acute on Chronic diastolic congestive heart failure  Patient has Diastolic (HFpEF) heart failure that is Acute on chronic. On presentation their CHF was decompensated. Evidence of decompensated CHF on presentation includes: elevated JVD, weight gain, and shortness of breath. The etiology of their decompensation is likely renal failure. Most recent BNP and echo results are listed below.  No results for input(s): "BNP" in the last 72 hours.  Latest ECHO  Results for orders placed during the hospital encounter of 06/02/25    Echo    Interpretation Summary    Left Ventricle: The left ventricle is normal in size. Moderately increased wall thickness. There is concentric hypertrophy. There is normal systolic function. Ejection " "fraction is approximately 60%.    Right Ventricle: The right ventricle is normal in size Wall thickness is normal. Systolic function is normal.    Left Atrium: The left atrium is mildly dilated    IVC/SVC: Normal venous pressure at 3 mmHg.    Current Heart Failure Medications  hydrALAZINE injection 10 mg, Every 6 hours PRN, Intravenous    Plan  - Monitor strict I&Os and daily weights.    - Place on telemetry  - Low sodium diet  - Place on fluid restriction of 1.5 L.   - Cardiology has been consulted  - The patient's volume status is worsening as indicated by shortness of breath. Will adjust treatment as follows: dialysis  - dialysis for volume removal        Essential hypertension  Patient's blood pressure range in the last 24 hours was: BP  Min: 100/60  Max: 156/64.The patient's inpatient anti-hypertensive regimen is listed below:  Current Antihypertensives  hydrALAZINE injection 10 mg, Every 6 hours PRN, Intravenous    Plan  - BP is controlled, no changes needed to their regimen        Renal/  Chronic kidney disease, stage 4 (severe)  Acute on Chronic Renal Failure  ATN in the setting of sepsis  Nephrology following   Trialysis placed 6/6 and plan to start dialysis  Avoid nephrotoxins and renally dose meds    6/7 - planning dialysis again today    6/8 - further RRT per Nephro    ID  * Sepsis with encephalopathy  This patient does have evidence of infective focus  My overall impression is sepsis with Acute kidney injury, Acute respiratory failure, Encephalopathy, and Thrombocytopenia .  Source: Skin and Soft Tissue Infection: Cellulitis  Antibiotics given-   Antibiotics (72h ago, onward)      Start     Stop Route Frequency Ordered    06/04/25 0915  cefTRIAXone injection 2 g         -- IV Every 24 hours (non-standard times) 06/04/25 0722          Latest lactate reviewed-  No results for input(s): "LACTATE", "POCLAC" in the last 72 hours.    Organ dysfunction indicated by Acute kidney injury, Encephalopathy, and " "Thrombocytopenia     Source control achieved by: Rocephin/Clinda    ID following  Hemodynamics stable  Blood Cx 6/5 NGTD    6/8 - has failed to make much improvement in mental status despite correction of uremia.  Unable to get MRI due to orthopedic hardware.  Will recheck ammonia (normal 6 days ago), TFTs (unable to take Synthroid due to NPO).    I think the overall picture is one of hypoactive delirium at this point, but will try to rule out a few other things.      Cellulitis of right lower extremity  This patient does have evidence of infective focus  My overall impression is sepsis with Acute kidney injury, Acute respiratory failure, Encephalopathy, and Thrombocytopenia .  Source: Skin and Soft Tissue Infection: Cellulitis  Antibiotics given-   Antibiotics (72h ago, onward)      Start     Stop Route Frequency Ordered    06/04/25 0915  cefTRIAXone injection 2 g         -- IV Every 24 hours (non-standard times) 06/04/25 0722          Latest lactate reviewed-  No results for input(s): "LACTATE", "POCLAC" in the last 72 hours.    Organ dysfunction indicated by Acute kidney injury, Encephalopathy, and Thrombocytopenia     Source control achieved by: Rocephin/Clinda    ID following  Hemodynamics stable  Blood Cx 6/5 NGTD        Streptococcal bacteremia  This patient does have evidence of infective focus  My overall impression is sepsis with Acute kidney injury, Acute respiratory failure, Encephalopathy, and Thrombocytopenia .  Source: Skin and Soft Tissue Infection: Cellulitis  Antibiotics given-   Antibiotics (72h ago, onward)      Start     Stop Route Frequency Ordered    06/04/25 0915  cefTRIAXone injection 2 g         -- IV Every 24 hours (non-standard times) 06/04/25 0722          Latest lactate reviewed-  No results for input(s): "LACTATE", "POCLAC" in the last 72 hours.    Organ dysfunction indicated by Acute kidney injury, Encephalopathy, and Thrombocytopenia     Source control achieved by: " Rocephin/Clinda    ID following  Hemodynamics stable  Blood Cx 6/5 NGTD        Hematology  Thrombocytopenia  Srouce is likely 2/2 to sepsis.  The patients 3 most recent labs are listed below.  Recent Labs     06/06/25  1659 06/07/25  0752 06/08/25  0336   * 109* 143*       Plan  - Will transfuse if platelet count is <20k.  - Monitor        Endocrine  Class 2 severe obesity due to excess calories with serious comorbidity and body mass index (BMI) of 36.0 to 36.9 in adult  Body mass index is 35.26 kg/m². Morbid obesity complicates all aspects of disease management from diagnostic modalities to treatment. Weight loss encouraged and health benefits explained to patient.         Hypothyroidism  Resume Synthroid when able to take PO  Will re-check TFTs today        Type 2 diabetes mellitus with microalbuminuria, with long-term current use of insulin  SSI/accuchecks          Critical Care Time: 33 minutes  Critical secondary to high risk monitoring      Critical care was time spent personally by me on the following activities: development of treatment plan with patient or surrogate and bedside caregivers, discussions with consultants, evaluation of patient's response to treatment, examination of patient, ordering and performing treatments and interventions, ordering and review of laboratory studies, ordering and review of radiographic studies, pulse oximetry, re-evaluation of patient's condition. This critical care time did not overlap with that of any other provider or involve time for any procedures.     dArian Nathan MD  Critical Care Medicine  [facility] - Intensive Care ([facility])

## 2025-06-09 LAB
ABSOLUTE EOSINOPHIL (OHS): 0.04 K/UL
ABSOLUTE MONOCYTE (OHS): 0.53 K/UL (ref 0.3–1)
ABSOLUTE NEUTROPHIL COUNT (OHS): 5.49 K/UL (ref 1.8–7.7)
ANION GAP (OHS): 15 MMOL/L (ref 8–16)
APTT PPP: 37.6 SECONDS (ref 21–32)
APTT PPP: 54.9 SECONDS (ref 21–32)
BASOPHILS # BLD AUTO: 0 K/UL
BASOPHILS NFR BLD AUTO: 0 %
BUN SERPL-MCNC: 40 MG/DL (ref 8–23)
CALCIUM SERPL-MCNC: 7.5 MG/DL (ref 8.7–10.5)
CHLORIDE SERPL-SCNC: 101 MMOL/L (ref 95–110)
CO2 SERPL-SCNC: 19 MMOL/L (ref 23–29)
CREAT SERPL-MCNC: 4.4 MG/DL (ref 0.5–1.4)
ERYTHROCYTE [DISTWIDTH] IN BLOOD BY AUTOMATED COUNT: 14 % (ref 11.5–14.5)
GFR SERPLBLD CREATININE-BSD FMLA CKD-EPI: 10 ML/MIN/1.73/M2
GLUCOSE SERPL-MCNC: 154 MG/DL (ref 70–110)
HCT VFR BLD AUTO: 27.2 % (ref 37–48.5)
HGB BLD-MCNC: 8.1 GM/DL (ref 12–16)
IMM GRANULOCYTES # BLD AUTO: 0.26 K/UL (ref 0–0.04)
IMM GRANULOCYTES NFR BLD AUTO: 3.7 % (ref 0–0.5)
LYMPHOCYTES # BLD AUTO: 0.75 K/UL (ref 1–4.8)
MAGNESIUM SERPL-MCNC: 2 MG/DL (ref 1.6–2.6)
MCH RBC QN AUTO: 27.6 PG (ref 27–31)
MCHC RBC AUTO-ENTMCNC: 29.8 G/DL (ref 32–36)
MCV RBC AUTO: 93 FL (ref 82–98)
NUCLEATED RBC (/100WBC) (OHS): 0 /100 WBC
PLATELET # BLD AUTO: 132 K/UL (ref 150–450)
PMV BLD AUTO: 10.2 FL (ref 9.2–12.9)
POCT GLUCOSE: 133 MG/DL (ref 70–110)
POCT GLUCOSE: 174 MG/DL (ref 70–110)
POCT GLUCOSE: 200 MG/DL (ref 70–110)
POTASSIUM SERPL-SCNC: 4.4 MMOL/L (ref 3.5–5.1)
RBC # BLD AUTO: 2.94 M/UL (ref 4–5.4)
RELATIVE EOSINOPHIL (OHS): 0.6 %
RELATIVE LYMPHOCYTE (OHS): 10.6 % (ref 18–48)
RELATIVE MONOCYTE (OHS): 7.5 % (ref 4–15)
RELATIVE NEUTROPHIL (OHS): 77.6 % (ref 38–73)
SODIUM SERPL-SCNC: 135 MMOL/L (ref 136–145)
WBC # BLD AUTO: 7.07 K/UL (ref 3.9–12.7)

## 2025-06-09 PROCEDURE — 63600175 PHARM REV CODE 636 W HCPCS: Performed by: INTERNAL MEDICINE

## 2025-06-09 PROCEDURE — 90935 HEMODIALYSIS ONE EVALUATION: CPT

## 2025-06-09 PROCEDURE — 83735 ASSAY OF MAGNESIUM: CPT | Performed by: INTERNAL MEDICINE

## 2025-06-09 PROCEDURE — 99232 SBSQ HOSP IP/OBS MODERATE 35: CPT | Mod: ,,, | Performed by: INTERNAL MEDICINE

## 2025-06-09 PROCEDURE — 63600175 PHARM REV CODE 636 W HCPCS: Mod: TB | Performed by: SPECIALIST

## 2025-06-09 PROCEDURE — 80048 BASIC METABOLIC PNL TOTAL CA: CPT | Performed by: INTERNAL MEDICINE

## 2025-06-09 PROCEDURE — 63600175 PHARM REV CODE 636 W HCPCS: Performed by: NURSE PRACTITIONER

## 2025-06-09 PROCEDURE — 85730 THROMBOPLASTIN TIME PARTIAL: CPT | Performed by: INTERNAL MEDICINE

## 2025-06-09 PROCEDURE — 94761 N-INVAS EAR/PLS OXIMETRY MLT: CPT

## 2025-06-09 PROCEDURE — 27000221 HC OXYGEN, UP TO 24 HOURS

## 2025-06-09 PROCEDURE — 99233 SBSQ HOSP IP/OBS HIGH 50: CPT | Mod: ,,, | Performed by: INTERNAL MEDICINE

## 2025-06-09 PROCEDURE — 20000000 HC ICU ROOM

## 2025-06-09 PROCEDURE — 99900035 HC TECH TIME PER 15 MIN (STAT)

## 2025-06-09 PROCEDURE — 85025 COMPLETE CBC W/AUTO DIFF WBC: CPT | Performed by: INTERNAL MEDICINE

## 2025-06-09 PROCEDURE — 85730 THROMBOPLASTIN TIME PARTIAL: CPT | Performed by: SPECIALIST

## 2025-06-09 RX ORDER — BUMETANIDE 0.25 MG/ML
3 INJECTION, SOLUTION INTRAMUSCULAR; INTRAVENOUS ONCE
Status: COMPLETED | OUTPATIENT
Start: 2025-06-09 | End: 2025-06-09

## 2025-06-09 RX ORDER — INSULIN ASPART 100 [IU]/ML
0-5 INJECTION, SOLUTION INTRAVENOUS; SUBCUTANEOUS EVERY 6 HOURS PRN
Status: DISCONTINUED | OUTPATIENT
Start: 2025-06-09 | End: 2025-06-09

## 2025-06-09 RX ORDER — GLYCOPYRROLATE 0.2 MG/ML
0.2 INJECTION INTRAMUSCULAR; INTRAVENOUS 3 TIMES DAILY PRN
Status: DISCONTINUED | OUTPATIENT
Start: 2025-06-09 | End: 2025-06-10 | Stop reason: HOSPADM

## 2025-06-09 RX ORDER — FAMOTIDINE 10 MG/ML
20 INJECTION, SOLUTION INTRAVENOUS DAILY
Status: DISCONTINUED | OUTPATIENT
Start: 2025-06-09 | End: 2025-06-09

## 2025-06-09 RX ORDER — LORAZEPAM 2 MG/ML
2 INJECTION INTRAMUSCULAR
Status: DISCONTINUED | OUTPATIENT
Start: 2025-06-09 | End: 2025-06-10 | Stop reason: HOSPADM

## 2025-06-09 RX ORDER — MORPHINE SULFATE 4 MG/ML
2 INJECTION, SOLUTION INTRAMUSCULAR; INTRAVENOUS
Status: COMPLETED | OUTPATIENT
Start: 2025-06-09 | End: 2025-06-10

## 2025-06-09 RX ADMIN — HEPARIN SODIUM 2400 UNITS: 1000 INJECTION, SOLUTION INTRAVENOUS; SUBCUTANEOUS at 12:06

## 2025-06-09 RX ADMIN — MORPHINE SULFATE 2 MG: 4 INJECTION INTRAVENOUS at 11:06

## 2025-06-09 RX ADMIN — BUMETANIDE 3 MG: 0.25 INJECTION INTRAMUSCULAR; INTRAVENOUS at 11:06

## 2025-06-09 RX ADMIN — GLYCOPYRROLATE 0.2 MG: 0.2 INJECTION, SOLUTION INTRAMUSCULAR; INTRAVITREAL at 03:06

## 2025-06-09 RX ADMIN — CEFTRIAXONE 2 G: 2 INJECTION, POWDER, FOR SOLUTION INTRAMUSCULAR; INTRAVENOUS at 11:06

## 2025-06-09 NOTE — PT/OT/SLP PROGRESS
Occupational Therapy      Patient Name:  Lakshmi Campbell   MRN:  [id number]    OT eval orders received. Chart review completed. Presented to room at 0900 and patient on HD. Will continue efforts.    Sharlene Allen, CHAI  6/9/2025  [id number]

## 2025-06-09 NOTE — PT/OT/SLP PROGRESS
Physical Therapy      Patient Name:  Lakshmi Campbell   MRN:  [id number]    Presented to room at 0900 and patient on HD. Will continue efforts.     Tara Salcedo, PT  6/9/2025

## 2025-06-09 NOTE — PHYSICIAN QUERY
Please further specify the respiratory failure diagnosis:    Acute Respiratory Failure with Hypoxia

## 2025-06-09 NOTE — PLAN OF CARE
Kvng spoke with pt's family regarding an snf choice. Pt's family stated they want to continue treating pt. Pt's family also stated they had to resuscitate pt lasnight. Pending dc plan.

## 2025-06-09 NOTE — ASSESSMENT & PLAN NOTE
-Remains in aflutter with variable rate  -Recurrent sinus pause this AM  -Unable to tolerate any AV prachi/rate-controlling agent  -Continue heparin gtt as long as H/H and plt stable and no active bleeding  -Not candidate for PPM with ongoing bacteremia/AMS  -Remains critically ill

## 2025-06-09 NOTE — PLAN OF CARE
LAVERNE pt orientation d/t pt being verbally unresponsive. GCS 6. VSS. Afebrile. Afib/flutter on monitor. 3LNC. Hassan in place; 25mL UOP this shift. No BM this shift. Accuchecks Q6; no coverage needed.     Pt had approximately 15sec pause in heart rhythm, followed by an almost 8sec pause. No pulse was noted. CPR was initiated with a few compressions given and pt moving her arms; compressions stopped for pulse check with palpable pulse noted. Provider, MD Jac, spoke with family at the bedside regarding the event and family decided to transition pt to DNR status. Family requests that should they need to return to hospital due to further pt deterioration, Arpita, pt's granddaughter be called first; phone number in chart confirmed.    Pt resting comfortably in bed with side rails up x3; locked and lowered with alarm set. Monitor alarms on and audible.

## 2025-06-09 NOTE — EICU
Intervention Initiated From:  [facility] / EICU    Peter intervened regarding:  Rounding (Video assessment)    VICU Night Rounds Checklist  24H Vital Sign Range:  Temp:  [97.4 °F (36.3 °C)-97.6 °F (36.4 °C)]   Pulse:  [66-96]   Resp:  [17-38]   BP: ()/()   SpO2:  [92 %-100 %]     Video rounds and LDA reconciliation    Nursing orders placed : IP NATALIE Peripheral IV Access

## 2025-06-09 NOTE — PROGRESS NOTES
ST updated progress note: Pt with continued medical decline and NR/unable to actively participate in dysphagia intervention.  Pt/family have opted to transition with comfort measures/IP hospice at this time. ST will sign off. Please re-consult if need.

## 2025-06-09 NOTE — ASSESSMENT & PLAN NOTE
Resume Synthroid when able to take PO  Will re-check TFTs today    6/9 Will resume half the dose IV tomorrow

## 2025-06-09 NOTE — ASSESSMENT & PLAN NOTE
"This patient does have evidence of infective focus  My overall impression is sepsis with Acute kidney injury, Acute respiratory failure, Encephalopathy, and Thrombocytopenia .  Source: Skin and Soft Tissue Infection: Cellulitis  Antibiotics given-   Antibiotics (72h ago, onward)      Start     Stop Route Frequency Ordered    06/04/25 0915  cefTRIAXone injection 2 g         -- IV Every 24 hours (non-standard times) 06/04/25 0722          Latest lactate reviewed-  No results for input(s): "LACTATE", "POCLAC" in the last 72 hours.    Organ dysfunction indicated by Acute kidney injury, Encephalopathy, and Thrombocytopenia     Source control achieved by: Rocephin/Clinda    ID following  Hemodynamics stable  Blood Cx 6/5 NGTD    6/8 - has failed to make much improvement in mental status despite correction of uremia.  Unable to get MRI due to orthopedic hardware.  Will recheck ammonia (normal 6 days ago), TFTs (unable to take Synthroid due to NPO).    I think the overall picture is one of hypoactive delirium at this point, but will try to rule out a few other things.    6/9 BP now better    "

## 2025-06-09 NOTE — ACP (ADVANCE CARE PLANNING)
Advance Care Planning     Date: 06/09/2025    Code Status  In light of the patients advanced and life limiting illness,I engaged the the family and healthcare power of   in a voluntary conversation about the patient's preferences for care  at the very end of life. The patient wishes to have a natural, peaceful death.  Along those lines, the patient does not wish to have CPR or other invasive treatments performed when her heart and/or breathing stops. I communicated to the healthcare power of   that a DNR order would be placed in her medical record to reflect this preference.     is aligned with palliative care / comfort care and inpatient hospice.    A total of 15 min was spent on advance care planning, goals of care discussion, emotional support, formulating and communicating prognosis and exploring burden/benefit of various approaches of treatment. This discussion occurred on a fully voluntary basis with the verbal consent of the patient and/or family.

## 2025-06-09 NOTE — PROGRESS NOTES
[facility] - Intensive Care ([facility])  Cardiology  Progress Note    Patient Name: Lakshmi Campbell  MRN: [id number]  Admission Date: 6/2/2025  Hospital Length of Stay: 7 days  Code Status: DNR   Attending Physician: Nemo Amato MD   Primary Care Physician: Keely Silva NP  Expected Discharge Date: 6/7/2025  Principal Problem:Sepsis with encephalopathy    Subjective:   HPI:  Pt is a 74 y/u female with PMhx for HTN, HLP, diastolic heart failure, CKD , aortic stenosis admitted for sepsis with encephalopathy.  EKG sinus tachycardia with LBBB at 104 bpm. BNP 1212. CXR (-). Pt discharged about 2 weeks ago for CHF,  Troponin is 4.15    Hospital Course:   6-4-25  monitor HR 90s , EKG back to baseline NSR. BBB is rate related. Echo nml no SWMA, troponin down to 3, probably demand ischemia. Can stop heparin, start low dose b blockers    6-5-25  Pt now in afib/flutter at controlled rate, can restart heparin, some pauses noted but K is low, needs repletion, echo nml EF, continue tx for sepsis    6-7-25  Monitor shows afib in low 100s. Pt is hemodynamically stable. Continue treatment for sepsis.      6-8-25  Monitor shows afib in 80s-100s. Pt had HD yesterday. No pauses noted. Continue anticoagulation.     6/9/2025-Patient seen and examined today during HD with family present. Unresponsive, ill-appearing. HR variable in 80-mid 100's. Recurrent pause noted earlier this AM, unable to tolerate any AV prachi/rate-control agents.           Review of Systems   Unable to perform ROS: Intubated     Objective:     Vital Signs (Most Recent):  Temp: 97.4 °F (36.3 °C) (06/09/25 0301)  Pulse: 75 (06/09/25 0601)  Resp: (!) 22 (06/09/25 0601)  BP: (!) 121/58 (06/09/25 1144)  SpO2: 100 % (06/09/25 0601) Vital Signs (24h Range):  Temp:  [96.9 °F (36.1 °C)-97.4 °F (36.3 °C)] 97.4 °F (36.3 °C)  Pulse:  [61-92] 75  Resp:  [17-34] 22  SpO2:  [92 %-100 %] 100 %  BP: (101-123)/(51-79) 121/58     Weight: 96.1 kg (211 lb 13.8 oz)  Body mass  "index is 35.26 kg/m².     SpO2: 100 %         Intake/Output Summary (Last 24 hours) at 6/9/2025 1256  Last data filed at 6/9/2025 1229  Gross per 24 hour   Intake 237.34 ml   Output 1025 ml   Net -787.66 ml       Lines/Drains/Airways       Central Venous Catheter Line  Duration             Trialysis (Dialysis) Catheter 06/06/25 1745 right internal jugular 2 days              Drain  Duration                  Urethral Catheter 06/06/25 1645 2 days              Peripheral Intravenous Line  Duration                  Peripheral IV - Single Lumen 06/04/25 0333 Left Hand 5 days         Peripheral IV - Single Lumen 06/05/25 0624 20 G Anterior;Right Forearm 4 days                       Physical Exam  Vitals and nursing note reviewed.   Constitutional:       Appearance: She is ill-appearing.      Comments: Intubated    Cardiovascular:      Rate and Rhythm: Tachycardia present. Rhythm irregularly irregular.      Heart sounds: S1 normal and S2 normal.   Pulmonary:      Comments: Coarse BS anteriorly  Musculoskeletal:      Right lower leg: Edema present.      Left lower leg: Edema present.   Neurological:      Comments: Unresponsive            Significant Labs: CMP   Recent Labs   Lab 06/07/25  1903 06/08/25  0336 06/09/25  0357   * 135* 135*   K 3.6 3.9 4.4    100 101   CO2 22* 18* 19*   * 151* 154*   BUN 21 26* 40*   CREATININE 2.1* 2.8* 4.4*   CALCIUM 7.5* 7.8* 7.5*   ANIONGAP 11 17* 15   , CBC   Recent Labs   Lab 06/08/25  0336 06/09/25  0357   WBC 10.12 7.07   HGB 8.6* 8.1*   HCT 28.5* 27.2*   * 132*   , Troponin No results for input(s): "TROPONINIHS" in the last 48 hours., and All pertinent lab results from the last 24 hours have been reviewed.    Significant Imaging: Echocardiogram: Transthoracic echo (TTE) complete ([facility] Only):   Results for orders placed or performed during the hospital encounter of 06/02/25   Echo   Result Value Ref Range    BSA 2.15 m2    LVIDd 4.7 3.5 - 6.0 cm    LV " Systolic Volume 40 mL    LV Systolic Volume Index 19.3 mL/m2    LVIDs 3.2 2.1 - 4.0 cm    LV Diastolic Volume 104 mL    LV Diastolic Volume Index 50.24 mL/m2    Left Ventricular End Systolic Volume by Teichholz Method 40.03 mL    Left Ventricular End Diastolic Volume by Teichholz Method 104.33 mL    IVS 1.4 (A) 0.6 - 1.1 cm    FS 31.9 28 - 44 %    Left Ventricle Relative Wall Thickness 0.55 cm    PW 1.3 (A) 0.6 - 1.1 cm    LV mass 251.4 g    LV Mass Index 121.4 g/m2    ZLVIDS -1.46     ZLVIDD -2.92     EF 60 %    Est. RA pres 3 mmHg    Narrative      Left Ventricle: The left ventricle is normal in size. Moderately   increased wall thickness. There is concentric hypertrophy. There is normal   systolic function. Ejection fraction is approximately 60%.    Right Ventricle: The right ventricle is normal in size Wall thickness   is normal. Systolic function is normal.    Left Atrium: The left atrium is mildly dilated    IVC/SVC: Normal venous pressure at 3 mmHg.      and EKG: REviewed  Assessment and Plan:   Patient who presents with sepsis/bacteremia/aflutter/sinus pauses/elevated troponin. Remains critically ill/unresponsive. HR variable. Continue supportive care, rec's below.    * Sepsis with encephalopathy  -Per primary team  -Continue abx/supportive care; remains critically ill    Acute renal failure superimposed on stage 5 chronic kidney disease, not on chronic dialysis  -Per nephrology    Atrial flutter  -Remains in aflutter with variable rate  -Recurrent sinus pause this AM  -Unable to tolerate any AV prachi/rate-controlling agent  -Continue heparin gtt as long as H/H and plt stable and no active bleeding  -Not candidate for PPM with ongoing bacteremia/AMS  -Remains critically ill    NSTEMI (non-ST elevated myocardial infarction)  Pt is a 74 y/u female with PMhx for HTN, HLP, diastolic heart failure, CKD , aortic stenosis admitted for sepsis with encephalopathy.  EKG sinus tachycardia with LBBB at 104 bpm. BNP  1212. CXR (-). Pt discharged about 2 weeks ago for CHF,  Troponin is 4.15    Troponin may be from demand ischemia, continue serial enzymes. Check echo for SWMA. Continue IV heparin  Repeat EKG with controlled rate. CHF appears stable, restart diuretics once more stable from sepsis    6/9/2025  -Remains ill  -Not stable enough for any CV workup/procedures  -OMT as tolerated    Streptococcal bacteremia  -Per primary team, ID  -Continue abx/supportive care        VTE Risk Mitigation (From admission, onward)           Ordered     heparin (porcine) injection 2,400 Units  As needed (PRN)         06/06/25 1822     heparin 25,000 units in dextrose 5% (100 units/ml) IV bolus from bag LOW INTENSITY nomogram - [facility]  As needed (PRN)        Question:  Heparin Infusion Adjustment (DO NOT MODIFY ANSWER)  Answer:  [email or web address]\epic\Images\Pharmacy\HeparinInfusions\heparin LOW INTENSITY nomogram for [facility] [id number]    06/05/25 0541     heparin 25,000 units in dextrose 5% (100 units/ml) IV bolus from bag LOW INTENSITY nomogram - [facility]  As needed (PRN)        Question:  Heparin Infusion Adjustment (DO NOT MODIFY ANSWER)  Answer:  [email or web address]\epic\Images\Pharmacy\HeparinInfusions\heparin LOW INTENSITY nomogram for [facility] [id number]    06/05/25 0541     heparin 25,000 units in dextrose 5% 250 mL (100 units/mL) infusion LOW INTENSITY nomogram - [facility]  Continuous        Question:  Begin at (units/kg/hr)  Answer:  12    06/05/25 0541                    Katy Ferro PA-C  Cardiology  [facility] - [facility] ([facility])

## 2025-06-09 NOTE — PROGRESS NOTES
[facility] - Telemetry ([facility])  Nephrology  Progress Note    Patient Name: Lakshmi Campbell  MRN: [id number]  Admission Date: 6/2/2025  Hospital Length of Stay: 7 days  Attending Provider: Nemo Amato MD   Primary Care Physician: Keely Silva NP  Principal Problem:Sepsis with encephalopathy  Consult for MIKA on CKD   Dr. Kerns       Subjective:   CHART REVIEWED  73 yo female with CKD and multiple medical problems here with bacteremia and MIKA, recent hospital stay for CHF     June 6  - negligible UOP   - opens eyes to verbal stimulus   - family at bedside, , grand dtr and sister    June 7  - s/p HD last pm for 2.5 hours and seen near end of 3.5 hour treatment   - opens eyes more readily but not conversing with me     June 8  - no acute events  - s/p HD yesterday for 3.5 hours   - opens eyes to name     June 9  - seen on dialysis with afib and fluctuating RVR from   - family at bedside,  reports she has not awoken for him today in a meaningful way     Review of patient's allergies indicates:   Allergen Reactions    Codeine Nausea Only     Other reaction(s): Nausea  Other reaction(s): Elevated blood pressure     Current Facility-Administered Medications   Medication Frequency    acetaminophen suppository 650 mg Q6H PRN    albuterol-ipratropium 2.5 mg-0.5 mg/3 mL nebulizer solution 3 mL Q4H PRN    cefTRIAXone injection 2 g Q24H    dextrose 50% injection 12.5 g PRN    glucagon (human recombinant) injection 1 mg PRN    heparin (porcine) injection 2,400 Units PRN    heparin 25,000 units in dextrose 5% (100 units/ml) IV bolus from bag LOW INTENSITY nomogram - [facility] PRN    heparin 25,000 units in dextrose 5% (100 units/ml) IV bolus from bag LOW INTENSITY nomogram - [facility] PRN    heparin 25,000 units in dextrose 5% 250 mL (100 units/mL) infusion LOW INTENSITY nomogram - [facility] Continuous    hydrALAZINE injection 10 mg Q6H PRN    insulin aspart U-100 pen 0-5 Units Q6H PRN    levothyroxine tablet 137  mcg Before breakfast    ondansetron injection 4 mg Q6H PRN       Objective:     Vital Signs (Most Recent):  Temp: 97.4 °F (36.3 °C) (06/09/25 0301)  Pulse: 75 (06/09/25 0601)  Resp: (!) 22 (06/09/25 0601)  BP: (!) 121/58 (06/09/25 1144)  SpO2: 100 % (06/09/25 0601) Vital Signs (24h Range):  Temp:  [96.9 °F (36.1 °C)-97.4 °F (36.3 °C)] 97.4 °F (36.3 °C)  Pulse:  [61-92] 75  Resp:  [17-34] 22  SpO2:  [92 %-100 %] 100 %  BP: (101-123)/(51-79) 121/58     Weight: 96.1 kg (211 lb 13.8 oz) (06/08/25 0600)  Body mass index is 35.26 kg/m².  Body surface area is 2.1 meters squared.    I/O last 3 completed shifts:  In: 415.7 [I.V.:415.7]  Out: 50 [Urine:50]    Physical Exam  Vitals and nursing note reviewed.   Constitutional:       Appearance: She is obese. She is ill-appearing.      Comments: Responds to pain    Cardiovascular:      Rate and Rhythm: Normal rate.   Pulmonary:      Effort: Pulmonary effort is normal. No respiratory distress.      Breath sounds: No wheezing.   Abdominal:      Palpations: Abdomen is soft.   Musculoskeletal:      Right lower leg: Edema (much imprved) present.      Left lower leg: Edema (much im[proved) present.         Significant Labs:labs reviewed     Significant Imaging:     Assessment/Plan:     Active Diagnoses:    Diagnosis Date Noted POA    PRINCIPAL PROBLEM:  Sepsis with encephalopathy [A41.9, R65.20, G93.41] 08/29/2024 Yes    Anuria [R34] 06/06/2025 No    Altered mental status [R41.82] 06/06/2025 No    Acute on chronic diastolic congestive heart failure [I50.33] 06/06/2025 No    Atrial fibrillation [I48.91] 06/06/2025 Unknown    Acute renal failure superimposed on stage 5 chronic kidney disease, not on chronic dialysis [N17.9, N18.5] 06/06/2025 Unknown    Atrial flutter [I48.92] 06/05/2025 No    Cellulitis of right lower extremity [L03.115] 06/03/2025 Yes    NSTEMI (non-ST elevated myocardial infarction) [I21.4] 06/03/2025 Yes    Mild persistent asthma without complication [J45.30]  11/19/2024 Yes    Chronic kidney disease, stage 4 (severe) [N18.4] 10/18/2024 Yes    Streptococcal bacteremia [R78.81, B95.5] 08/30/2024 Yes    acute on Chronic diastolic congestive heart failure [I50.32] 06/27/2024 Yes    Thrombocytopenia [D69.6] 03/02/2021 Yes    Class 2 severe obesity due to excess calories with serious comorbidity and body mass index (BMI) of 36.0 to 36.9 in adult [E66.812, E66.01, Z68.36] 01/07/2020 Not Applicable    Essential hypertension [I10] 11/07/2019 Yes    Hypothyroidism [E03.9] 09/23/2013 Yes    Type 2 diabetes mellitus with microalbuminuria, with long-term current use of insulin [E11.29, R80.9, Z79.4] 09/23/2013 Not Applicable      Problems Resolved During this Admission:     MIKA on CKD   - anuric   - s/p 1st HD Friday 6/6 for 2.5 hours and 2nd HD for 3.5 hours on Saturday 6/7  - currently dialyzing with 2 hours into treatment and no improvement in MS, she is also on low blood flow rate and minimal UF and is tolerating poorly with afbi and RVR . She will not meet UF goal which is 2L   - etiology most likely ATN but she does carry a history of cirrhosis and need to consider HRS (noted urine Na+ < 20)   - renal vasculature is fine on Doppler u/s   - monitor labs closely    Hyperphosphatemia  - monitor, no need for acute treatment   Hypocalcemia  - corrects to normal range     Sepsis, Strep Group A bacteremia   - ABX, supportive care   - repeat blcxs 6/5 NGTD    Metabolic Encephalopathy  - likely multifactorial. If all uremia I would expect her mental status to be more improved than current., does not seem to be solely hepatic encephalopathy either      Relative hypotension  - history of HTN maintained on 4 HTN agents  - monitor BP closely,  tolerated dialysis without BP support thus far, although marginally tolerating today     Metabolic acidosis  - HD today     Cirrhosis, followed by Hepatology in the past with last visit October 2023  - possible hepatic encephalopathy contributing to  MS    Diastolic CHF/NSTEMI/fib/flutter   - remains on heparin gtts     Volume status  - remains overall net negative fluid balance about 900mL before HD today      Discussed with family at bedside with  in attendance that her MS is not all uremia related, and that dialysis has not improved this aspect of her multiple acute on chronic medical problems, I also explained that dialysis is stressful for her as evidenced by her RVR response     Harvey Medina MD  Nephrology

## 2025-06-09 NOTE — ASSESSMENT & PLAN NOTE
Pt is a 74 y/u female with PMhx for HTN, HLP, diastolic heart failure, CKD , aortic stenosis admitted for sepsis with encephalopathy.  EKG sinus tachycardia with LBBB at 104 bpm. BNP 1212. CXR (-). Pt discharged about 2 weeks ago for CHF,  Troponin is 4.15    Troponin may be from demand ischemia, continue serial enzymes. Check echo for SWMA. Continue IV heparin  Repeat EKG with controlled rate. CHF appears stable, restart diuretics once more stable from sepsis    6/9/2025  -Remains ill  -Not stable enough for any CV workup/procedures  -OMT as tolerated

## 2025-06-09 NOTE — ASSESSMENT & PLAN NOTE
"Patient has Diastolic (HFpEF) heart failure that is Acute on chronic. On presentation their CHF was decompensated. Evidence of decompensated CHF on presentation includes: elevated JVD, weight gain, and shortness of breath. The etiology of their decompensation is likely renal failure. Most recent BNP and echo results are listed below.  No results for input(s): "BNP" in the last 72 hours.  Latest ECHO  Results for orders placed during the hospital encounter of 06/02/25    Echo    Interpretation Summary    Left Ventricle: The left ventricle is normal in size. Moderately increased wall thickness. There is concentric hypertrophy. There is normal systolic function. Ejection fraction is approximately 60%.    Right Ventricle: The right ventricle is normal in size Wall thickness is normal. Systolic function is normal.    Left Atrium: The left atrium is mildly dilated    IVC/SVC: Normal venous pressure at 3 mmHg.    Current Heart Failure Medications  hydrALAZINE injection 10 mg, Every 6 hours PRN, Intravenous    Plan  - Monitor strict I&Os and daily weights.    - Place on telemetry  - Low sodium diet  - Place on fluid restriction of 1.5 L.   - Cardiology has been consulted  - The patient's volume status is worsening as indicated by shortness of breath. Will adjust treatment as follows: dialysis  - dialysis for volume removal    6/9 Worsening CxR  S/p high dose bumex with minimal output  S/p HD      "

## 2025-06-09 NOTE — PROGRESS NOTES
[facility] ([facility])  Critical Care Medicine  Progress Note    Patient Name: Lakshmi Campbell  MRN: [id number]  Admission Date: 6/2/2025  Hospital Length of Stay: 7 days  Code Status: DNR  Attending Provider: Nemo Amato MD  Primary Care Provider: Keely Silva NP   Principal Problem: Sepsis with encephalopathy    Subjective:     HPI:  Ms Campbell is a 73 y/o WF with DM, CKD Stage IV, HTN, hypothyroidism, YO cirrhosis, dCHF, and HLD who is currently admitted to the Hospitalist service after presenting 6/2 with confusion.  Recent admission 5/9 to 5/15 for CHF exacerbation, PNA, and UTI.  Went home on oxygen.      This admission, she has been treated for volume overload with diuresis, along with UTI, and Strep bacteremia.  Course has been complicated by progressive renal failure with oliguria despite diuresis.  She comes to the ICU this afternoon due to ongoing lethargy and need for dialysis.  Oxygenation is stable on 3L and hemodynamics are stable.    Hospital/ICU Course:  6/6 - transfer to ICU due to ongoing encephalopathy and need for dialysis.  Trialysis placed and dialysis tolerated well.    6/7 - answered a few questions for me today, but still slow.  Planning dialysis again this morning.      6/8 - remains lethargic today.  Not answering questions for me.  Oxygenation and hemodynamics are stable.  Unable to get MRI due to orthopedic hardware.    6/9 S/p HD  Vitals are improved. Remains encephalopathic   and family at the bedside          Objective:     Vital Signs (Most Recent):  Temp: 97.4 °F (36.3 °C) (06/09/25 0301)  Pulse: 75 (06/09/25 0601)  Resp: (!) 22 (06/09/25 0601)  BP: (!) 121/58 (06/09/25 1144)  SpO2: 100 % (06/09/25 0601) Vital Signs (24h Range):  Temp:  [96.9 °F (36.1 °C)-97.4 °F (36.3 °C)] 97.4 °F (36.3 °C)  Pulse:  [61-92] 75  Resp:  [17-34] 22  SpO2:  [95 %-100 %] 100 %  BP: (101-123)/(51-79) 121/58     Weight: 96.1 kg (211 lb 13.8 oz)  Body mass index is 35.26  kg/m².      Intake/Output Summary (Last 24 hours) at 6/9/2025 1414  Last data filed at 6/9/2025 1229  Gross per 24 hour   Intake 217.3 ml   Output 1025 ml   Net -807.7 ml        Physical Exam  Vitals and nursing note reviewed.   Constitutional:       General: She is in acute distress.      Appearance: She is obese. She is ill-appearing. She is not toxic-appearing or diaphoretic.   HENT:      Head: Normocephalic.   Eyes:      Pupils: Pupils are equal, round, and reactive to light.   Cardiovascular:      Rate and Rhythm: Tachycardia present.      Pulses: Normal pulses.   Pulmonary:      Effort: Respiratory distress present.   Abdominal:      Palpations: Abdomen is soft.   Neurological:      General: No focal deficit present.      Mental Status: She is alert.           Review of Systems   Unable to perform ROS: Acuity of condition       Vents:  Oxygen Concentration (%): 32 (06/08/25 1937)    Lines/Drains/Airways       Central Venous Catheter Line  Duration             Trialysis (Dialysis) Catheter 06/06/25 1745 right internal jugular 2 days              Drain  Duration                  Urethral Catheter 06/06/25 1645 2 days              Peripheral Intravenous Line  Duration                  Peripheral IV - Single Lumen 06/04/25 0333 Left Hand 5 days         Peripheral IV - Single Lumen 06/05/25 0624 20 G Anterior;Right Forearm 4 days                    Significant Labs:    CBC/Anemia Profile:  Recent Labs   Lab 06/08/25  0336 06/09/25  0357   WBC 10.12 7.07   HGB 8.6* 8.1*   HCT 28.5* 27.2*   * 132*   MCV 93 93   RDW 14.0 14.0        Chemistries:  Recent Labs   Lab 06/07/25  1903 06/08/25  0336 06/09/25  0357   * 135* 135*   K 3.6 3.9 4.4    100 101   CO2 22* 18* 19*   BUN 21 26* 40*   CREATININE 2.1* 2.8* 4.4*   CALCIUM 7.5* 7.8* 7.5*   MG  --  1.8 2.0   PHOS  --  5.7*  --        All pertinent labs within the past 24 hours have been reviewed.    Significant Imaging:  I have reviewed all pertinent  "imaging results/findings within the past 24 hours.    ABG  Recent Labs   Lab 06/05/25  1933   PH 7.404   PO2 89   PCO2 38.0   HCO3 23.8*   BE -1     Assessment/Plan:     Neuro  Altered mental status  Likely multifactorial in the setting of sepsis and renal failure  Improving somewhat with dialysis.  Continue to monitor.  Delirium precautions    6/8 - has failed to make much improvement in mental status despite correction of uremia.  Unable to get MRI due to orthopedic hardware.  Will recheck ammonia (normal 6 days ago), TFTs (unable to take Synthroid due to NPO).    I think the overall picture is one of hypoactive delirium at this point, but will try to rule out a few other things.    Cardiac/Vascular  acute on Chronic diastolic congestive heart failure  Patient has Diastolic (HFpEF) heart failure that is Acute on chronic. On presentation their CHF was decompensated. Evidence of decompensated CHF on presentation includes: elevated JVD, weight gain, and shortness of breath. The etiology of their decompensation is likely renal failure. Most recent BNP and echo results are listed below.  No results for input(s): "BNP" in the last 72 hours.  Latest ECHO  Results for orders placed during the hospital encounter of 06/02/25    Echo    Interpretation Summary    Left Ventricle: The left ventricle is normal in size. Moderately increased wall thickness. There is concentric hypertrophy. There is normal systolic function. Ejection fraction is approximately 60%.    Right Ventricle: The right ventricle is normal in size Wall thickness is normal. Systolic function is normal.    Left Atrium: The left atrium is mildly dilated    IVC/SVC: Normal venous pressure at 3 mmHg.    Current Heart Failure Medications  hydrALAZINE injection 10 mg, Every 6 hours PRN, Intravenous    Plan  - Monitor strict I&Os and daily weights.    - Place on telemetry  - Low sodium diet  - Place on fluid restriction of 1.5 L.   - Cardiology has been consulted  - " "The patient's volume status is worsening as indicated by shortness of breath. Will adjust treatment as follows: dialysis  - dialysis for volume removal    6/9 Worsening CxR  S/p high dose bumex with minimal output  S/p HD        Essential hypertension  Patient's blood pressure range in the last 24 hours was: BP  Min: 100/60  Max: 156/64.The patient's inpatient anti-hypertensive regimen is listed below:  Current Antihypertensives  hydrALAZINE injection 10 mg, Every 6 hours PRN, Intravenous    Plan  - BP is controlled, no changes needed to their regimen        Renal/  Chronic kidney disease, stage 4 (severe)  Acute on Chronic Renal Failure  ATN in the setting of sepsis  Nephrology following   Trialysis placed 6/6 and plan to start dialysis  Avoid nephrotoxins and renally dose meds    6/7 - planning dialysis again today    6/8 - further RRT per Nephro    ID  * Sepsis with encephalopathy  This patient does have evidence of infective focus  My overall impression is sepsis with Acute kidney injury, Acute respiratory failure, Encephalopathy, and Thrombocytopenia .  Source: Skin and Soft Tissue Infection: Cellulitis  Antibiotics given-   Antibiotics (72h ago, onward)      Start     Stop Route Frequency Ordered    06/04/25 0915  cefTRIAXone injection 2 g         -- IV Every 24 hours (non-standard times) 06/04/25 0722          Latest lactate reviewed-  No results for input(s): "LACTATE", "POCLAC" in the last 72 hours.    Organ dysfunction indicated by Acute kidney injury, Encephalopathy, and Thrombocytopenia     Source control achieved by: Rocephin/Clinda    ID following  Hemodynamics stable  Blood Cx 6/5 NGTD    6/8 - has failed to make much improvement in mental status despite correction of uremia.  Unable to get MRI due to orthopedic hardware.  Will recheck ammonia (normal 6 days ago), TFTs (unable to take Synthroid due to NPO).    I think the overall picture is one of hypoactive delirium at this point, but will try to " "rule out a few other things.    6/9 BP now better      Cellulitis of right lower extremity  This patient does have evidence of infective focus  My overall impression is sepsis with Acute kidney injury, Acute respiratory failure, Encephalopathy, and Thrombocytopenia .  Source: Skin and Soft Tissue Infection: Cellulitis  Antibiotics given-   Antibiotics (72h ago, onward)      Start     Stop Route Frequency Ordered    06/04/25 0915  cefTRIAXone injection 2 g         -- IV Every 24 hours (non-standard times) 06/04/25 0722          Latest lactate reviewed-  No results for input(s): "LACTATE", "POCLAC" in the last 72 hours.    Organ dysfunction indicated by Acute kidney injury, Encephalopathy, and Thrombocytopenia     Source control achieved by: Rocephin/Clinda    ID following  Hemodynamics stable  Blood Cx 6/5 NGTD        Streptococcal bacteremia  This patient does have evidence of infective focus  My overall impression is sepsis with Acute kidney injury, Acute respiratory failure, Encephalopathy, and Thrombocytopenia .  Source: Skin and Soft Tissue Infection: Cellulitis  Antibiotics given-   Antibiotics (72h ago, onward)      Start     Stop Route Frequency Ordered    06/04/25 0915  cefTRIAXone injection 2 g         -- IV Every 24 hours (non-standard times) 06/04/25 0722          Latest lactate reviewed-  No results for input(s): "LACTATE", "POCLAC" in the last 72 hours.    Organ dysfunction indicated by Acute kidney injury, Encephalopathy, and Thrombocytopenia     Source control achieved by: Rocephin/Clinda    ID following  Hemodynamics stable  Blood Cx 6/5 NGTD        Hematology  Thrombocytopenia  Srouce is likely 2/2 to sepsis.  The patients 3 most recent labs are listed below.  Recent Labs     06/06/25  1659 06/07/25  0752 06/08/25  0336   * 109* 143*       Plan  - Will transfuse if platelet count is <20k.  - Monitor        Endocrine  Class 2 severe obesity due to excess calories with serious comorbidity and " body mass index (BMI) of 36.0 to 36.9 in adult  Body mass index is 35.26 kg/m². Morbid obesity complicates all aspects of disease management from diagnostic modalities to treatment. Weight loss encouraged and health benefits explained to patient.         Hypothyroidism  Resume Synthroid when able to take PO  Will re-check TFTs today    6/9 Will resume half the dose IV tomorrow        Type 2 diabetes mellitus with microalbuminuria, with long-term current use of insulin  SSI/accuchecks        Critical Care Time: 38 minutes  Critical secondary to Patient has a condition that poses threat to life and bodily function: Severe Respiratory Distress      Critical care was time spent personally by me on the following activities: development of treatment plan with patient or surrogate and bedside caregivers, discussions with consultants, evaluation of patient's response to treatment, examination of patient, ordering and performing treatments and interventions, ordering and review of laboratory studies, ordering and review of radiographic studies, pulse oximetry, re-evaluation of patient's condition. This critical care time did not overlap with that of any other provider or involve time for any procedures.     Nemo Amato MD  Critical Care Medicine  [facility] - Intensive Care ([facility])

## 2025-06-09 NOTE — NURSING
06/09/25 1229   Post-Hemodialysis Assessment   Blood Volume Processed (Liters) 67.8 L   Dialyzer Clearance Clear   Duration of Treatment 180 minutes   Total UF (mL) 1000 mL   Patient Response to Treatment Pt tolerated HD tx fairly well, until termination of HD tx was reccomended by MD. MD orders to stop dialysis if HR elevated and sustained in the 130's, which occured. Total tx length was 3hrs; resulting in 1 liter UF goal.   Post-Hemodialysis Comments Pt deaccessed per P and P

## 2025-06-09 NOTE — PHYSICIAN QUERY
Please clarify the integumentary diagnosis related to the documentation of the right lower leg:  Venous ulcer

## 2025-06-09 NOTE — SUBJECTIVE & OBJECTIVE
Objective:     Vital Signs (Most Recent):  Temp: 97.4 °F (36.3 °C) (06/09/25 0301)  Pulse: 75 (06/09/25 0601)  Resp: (!) 22 (06/09/25 0601)  BP: (!) 121/58 (06/09/25 1144)  SpO2: 100 % (06/09/25 0601) Vital Signs (24h Range):  Temp:  [96.9 °F (36.1 °C)-97.4 °F (36.3 °C)] 97.4 °F (36.3 °C)  Pulse:  [61-92] 75  Resp:  [17-34] 22  SpO2:  [95 %-100 %] 100 %  BP: (101-123)/(51-79) 121/58     Weight: 96.1 kg (211 lb 13.8 oz)  Body mass index is 35.26 kg/m².      Intake/Output Summary (Last 24 hours) at 6/9/2025 1414  Last data filed at 6/9/2025 1229  Gross per 24 hour   Intake 217.3 ml   Output 1025 ml   Net -807.7 ml        Physical Exam  Vitals and nursing note reviewed.   Constitutional:       General: She is in acute distress.      Appearance: She is obese. She is ill-appearing. She is not toxic-appearing or diaphoretic.   HENT:      Head: Normocephalic.   Eyes:      Pupils: Pupils are equal, round, and reactive to light.   Cardiovascular:      Rate and Rhythm: Tachycardia present.      Pulses: Normal pulses.   Pulmonary:      Effort: Respiratory distress present.   Abdominal:      Palpations: Abdomen is soft.   Neurological:      General: No focal deficit present.      Mental Status: She is alert.           Review of Systems   Unable to perform ROS: Acuity of condition       Vents:  Oxygen Concentration (%): 32 (06/08/25 1937)    Lines/Drains/Airways       Central Venous Catheter Line  Duration             Trialysis (Dialysis) Catheter 06/06/25 1745 right internal jugular 2 days              Drain  Duration                  Urethral Catheter 06/06/25 1645 2 days              Peripheral Intravenous Line  Duration                  Peripheral IV - Single Lumen 06/04/25 0333 Left Hand 5 days         Peripheral IV - Single Lumen 06/05/25 0624 20 G Anterior;Right Forearm 4 days                    Significant Labs:    CBC/Anemia Profile:  Recent Labs   Lab 06/08/25  0336 06/09/25  0357   WBC 10.12 7.07   HGB 8.6* 8.1*    HCT 28.5* 27.2*   * 132*   MCV 93 93   RDW 14.0 14.0        Chemistries:  Recent Labs   Lab 06/07/25  1903 06/08/25  0336 06/09/25  0357   * 135* 135*   K 3.6 3.9 4.4    100 101   CO2 22* 18* 19*   BUN 21 26* 40*   CREATININE 2.1* 2.8* 4.4*   CALCIUM 7.5* 7.8* 7.5*   MG  --  1.8 2.0   PHOS  --  5.7*  --        All pertinent labs within the past 24 hours have been reviewed.    Significant Imaging:  I have reviewed all pertinent imaging results/findings within the past 24 hours.

## 2025-06-09 NOTE — PLAN OF CARE
06/09/25 0846   Rounds   Attendance Provider;;Charge nurse;Physical therapist;Occupational therapist   Discharge Plan A Skilled Nursing Facility   Why the patient remains in the hospital Requires continued medical care   Transition of Care Barriers None

## 2025-06-09 NOTE — SUBJECTIVE & OBJECTIVE
Review of Systems   Unable to perform ROS: Intubated     Objective:     Vital Signs (Most Recent):  Temp: 97.4 °F (36.3 °C) (06/09/25 0301)  Pulse: 75 (06/09/25 0601)  Resp: (!) 22 (06/09/25 0601)  BP: (!) 121/58 (06/09/25 1144)  SpO2: 100 % (06/09/25 0601) Vital Signs (24h Range):  Temp:  [96.9 °F (36.1 °C)-97.4 °F (36.3 °C)] 97.4 °F (36.3 °C)  Pulse:  [61-92] 75  Resp:  [17-34] 22  SpO2:  [92 %-100 %] 100 %  BP: (101-123)/(51-79) 121/58     Weight: 96.1 kg (211 lb 13.8 oz)  Body mass index is 35.26 kg/m².     SpO2: 100 %         Intake/Output Summary (Last 24 hours) at 6/9/2025 1256  Last data filed at 6/9/2025 1229  Gross per 24 hour   Intake 237.34 ml   Output 1025 ml   Net -787.66 ml       Lines/Drains/Airways       Central Venous Catheter Line  Duration             Trialysis (Dialysis) Catheter 06/06/25 1745 right internal jugular 2 days              Drain  Duration                  Urethral Catheter 06/06/25 1645 2 days              Peripheral Intravenous Line  Duration                  Peripheral IV - Single Lumen 06/04/25 0333 Left Hand 5 days         Peripheral IV - Single Lumen 06/05/25 0624 20 G Anterior;Right Forearm 4 days                       Physical Exam  Vitals and nursing note reviewed.   Constitutional:       Appearance: She is ill-appearing.      Comments: Intubated    Cardiovascular:      Rate and Rhythm: Tachycardia present. Rhythm irregularly irregular.      Heart sounds: S1 normal and S2 normal. No murmur heard.  Pulmonary:      Comments: Coarse BS anteriorly  Musculoskeletal:      Right lower leg: Edema present.      Left lower leg: Edema present.   Neurological:      Comments: Unresponsive            Significant Labs: CMP   Recent Labs   Lab 06/07/25  1903 06/08/25  0336 06/09/25  0357   * 135* 135*   K 3.6 3.9 4.4    100 101   CO2 22* 18* 19*   * 151* 154*   BUN 21 26* 40*   CREATININE 2.1* 2.8* 4.4*   CALCIUM 7.5* 7.8* 7.5*   ANIONGAP 11 17* 15   , CBC   Recent Labs  "  Lab 06/08/25  0336 06/09/25  0357   WBC 10.12 7.07   HGB 8.6* 8.1*   HCT 28.5* 27.2*   * 132*   , Troponin No results for input(s): "TROPONINIHS" in the last 48 hours., and All pertinent lab results from the last 24 hours have been reviewed.    Significant Imaging: Echocardiogram: Transthoracic echo (TTE) complete (Cupid Only):   Results for orders placed or performed during the hospital encounter of 06/02/25   Echo   Result Value Ref Range    BSA 2.15 m2    LVIDd 4.7 3.5 - 6.0 cm    LV Systolic Volume 40 mL    LV Systolic Volume Index 19.3 mL/m2    LVIDs 3.2 2.1 - 4.0 cm    LV Diastolic Volume 104 mL    LV Diastolic Volume Index 50.24 mL/m2    Left Ventricular End Systolic Volume by Teichholz Method 40.03 mL    Left Ventricular End Diastolic Volume by Teichholz Method 104.33 mL    IVS 1.4 (A) 0.6 - 1.1 cm    FS 31.9 28 - 44 %    Left Ventricle Relative Wall Thickness 0.55 cm    PW 1.3 (A) 0.6 - 1.1 cm    LV mass 251.4 g    LV Mass Index 121.4 g/m2    ZLVIDS -1.46     ZLVIDD -2.92     EF 60 %    Est. RA pres 3 mmHg    Narrative      Left Ventricle: The left ventricle is normal in size. Moderately   increased wall thickness. There is concentric hypertrophy. There is normal   systolic function. Ejection fraction is approximately 60%.    Right Ventricle: The right ventricle is normal in size Wall thickness   is normal. Systolic function is normal.    Left Atrium: The left atrium is mildly dilated    IVC/SVC: Normal venous pressure at 3 mmHg.      and EKG: REviewed  "

## 2025-06-10 VITALS
HEIGHT: 65 IN | HEART RATE: 94 BPM | TEMPERATURE: 98 F | WEIGHT: 211.88 LBS | OXYGEN SATURATION: 95 % | RESPIRATION RATE: 11 BRPM | BODY MASS INDEX: 35.3 KG/M2 | SYSTOLIC BLOOD PRESSURE: 123 MMHG | DIASTOLIC BLOOD PRESSURE: 67 MMHG

## 2025-06-10 LAB
BACTERIA BLD CULT: NORMAL
BACTERIA BLD CULT: NORMAL

## 2025-06-10 PROCEDURE — 27000221 HC OXYGEN, UP TO 24 HOURS

## 2025-06-10 PROCEDURE — 94761 N-INVAS EAR/PLS OXIMETRY MLT: CPT

## 2025-06-10 PROCEDURE — 63600175 PHARM REV CODE 636 W HCPCS: Mod: JZ,TB | Performed by: NURSE PRACTITIONER

## 2025-06-10 RX ADMIN — MORPHINE SULFATE 2 MG: 4 INJECTION INTRAVENOUS at 12:06

## 2025-06-10 RX ADMIN — GLYCOPYRROLATE 0.2 MG: 0.2 INJECTION, SOLUTION INTRAMUSCULAR; INTRAVITREAL at 05:06

## 2025-06-10 RX ADMIN — MORPHINE SULFATE 2 MG: 4 INJECTION INTRAVENOUS at 05:06

## 2025-06-10 RX ADMIN — GLYCOPYRROLATE 0.2 MG: 0.2 INJECTION, SOLUTION INTRAMUSCULAR; INTRAVITREAL at 11:06

## 2025-06-10 RX ADMIN — MORPHINE SULFATE 2 MG: 4 INJECTION INTRAVENOUS at 08:06

## 2025-06-10 NOTE — EICU
Intervention Initiated From:  [facility] / KEREN Green intervened regarding:  Chart review only. Comfort measures status noted.

## 2025-06-10 NOTE — EICU
Intervention Initiated From:  [facility] / EICU    Peter intervened regarding:  Rounding (Video assessment)    Virtual ICU Quality Rounds    Admit Date: 6/2/2025  Hospital Day: 8    ICU Day: 3d 14h    24H Vital Sign Range:  Temp:  [97.5 °F (36.4 °C)-98 °F (36.7 °C)]   Pulse:  []   Resp:  [17-44]   BP: (100-136)/(50-85)   SpO2:  [99 %-100 %]     VICU Surveillance Screening    Daily review of  line necessity(optional): Central line(s) in place and Urinary catheter in place    Central line type (optional): Triple lumen catheter    Central line indications : Poor IV access    Berrios Indications : Critically ill in the intensive care unit requiring hourly urine output monitoring     LDA reconciliation : Yes        Central line best practices : Consider removal if patient has no central line indications for over 24hrs    Berrios best practices : Nurse driven berrios removal protocol ordered, Prompt to consider removal if no urine output in past 24hrs or ESRD, Prompt alternatives(external catheters, in/ou cath, bladder scanning), Initiate bladder management algorithm for patients with urinary retention, Berrios securement device in place with an intact seal visualized, and Sheet clips are used to prevent dependent loops, keep the bag below the bladder, and keep the bag off the ground

## 2025-06-10 NOTE — PLAN OF CARE
LAVERNE pt orientation d/t pt being verbally unresponsive. GCS 6. VSS. Afebrile. Afib/flutter on monitor, with frequent episodes of sustained SVT; provider notified. 3LNC. Hassan in place; 10mL UOP this shift. No BM this shift.       MD Lm spoke with the family today regarding goals of care for the pt. Family chose to transition pt to comfort-focused care and to withdraw from treatment. Comfort PRNs ordered; see MAR. Family requesting arrangements for inpatient hospice, preferably at [facility] or at [facility]. JOAO Rivera notified; plans to meet with family tomorrow morning.     Pt resting comfortably in bed with side rails up x3; locked and lowered with alarm set. Monitor alarms on and audible. Family plans to remain at the bedside overnight.

## 2025-06-10 NOTE — PLAN OF CARE
06/10/25 0845   Post-Acute Status   Post-Acute Authorization Hospice   Hospice Status Referrals Sent   Discharge Plan   Discharge Plan A Inpatient Hospice     DIANA spoke with pt's famil;y regarding IP hospice. Pt's family would like [facility]. DIANA reached out to [facility] who will come speak with family.   11;14am Consents have been signed with family.

## 2025-06-10 NOTE — PROGRESS NOTES
Wound Care f/u scheduled for today is deferred as patient has transitioned to comfort focused care with family at bedside, awaiting discharge to  hospice. Discussed with primary nurse Mignon.

## 2025-06-10 NOTE — PT/OT/SLP PROGRESS
Physical Therapy      Patient Name:  Lakshmi Campbell   MRN:  [id number]    Patient transitioned to comfort care. Discharge orders received. D/C PT.     Tara Salcedo, PT  6/10/2025  0685

## 2025-06-10 NOTE — PLAN OF CARE
[facility] - Intensive Care (Hospital)  Discharge Final Note    Primary Care Provider: Keely Silva NP    Expected Discharge Date: 6/10/2025    Final Discharge Note (most recent)       Final Note - 06/10/25 1114          Final Note    Assessment Type Final Discharge Note     Anticipated Discharge Disposition Hospice/Medical Facility        Post-Acute Status    Post-Acute Authorization Hospice     Hospice Status Set-up Complete/Auth obtained                            After-discharge care                Destination       [facility]   Service: Inpatient Hospice    [address] [facility] [address]   Phone: [phone]                     Pt is discharging to [facility] for IP Hospice.

## 2025-06-10 NOTE — PT/OT/SLP PROGRESS
Occupational Therapy Discharge      Patient Name:  Lakshmi Campbell   MRN:  [id number]    Patient transitioned to comfort care. Discharge orders received. D/C OT.    Sharlene Allen OT  6/10/2025  0628

## 2025-06-10 NOTE — DISCHARGE SUMMARY
[facility] ([facility])  Critical Care Medicine  Discharge Summary      Patient Name: Lakshmi Campbell  MRN: [id number]  Admission Date: 6/2/2025  Hospital Length of Stay: 8 days  Discharge Date and Time: 6/10/2025  1:25 PM  Attending Physician: No att. providers found   Discharging Provider: Christine Tolbert NP  Primary Care Provider: Keely Silva NP  Reason for Admission: Sepsis    HPI:   Ms Campbell is a 75 y/o WF with DM, CKD Stage IV, HTN, hypothyroidism, YO cirrhosis, dCHF, and HLD who is currently admitted to the Hospitalist service after presenting 6/2 with confusion.  Recent admission 5/9 to 5/15 for CHF exacerbation, PNA, and UTI.  Went home on oxygen.      This admission, she has been treated for volume overload with diuresis, along with UTI, and Strep bacteremia.  Course has been complicated by progressive renal failure with oliguria despite diuresis.  She comes to the ICU this afternoon due to ongoing lethargy and need for dialysis.  Oxygenation is stable on 3L and hemodynamics are stable.    * No surgery found *    Indwelling Lines/Drains at Time of Discharge:   Lines/Drains/Airways       Central Venous Catheter Line  Duration             Trialysis (Dialysis) Catheter 06/06/25 1745 right internal jugular 3 days              Drain  Duration                  Urethral Catheter 06/06/25 1645 3 days                  Hospital Course:   6/6 - transfer to ICU due to ongoing encephalopathy and need for dialysis.  Trialysis placed and dialysis tolerated well.    6/7 - answered a few questions for me today, but still slow.  Planning dialysis again this morning.      6/8 - remains lethargic today.  Not answering questions for me.  Oxygenation and hemodynamics are stable.  Unable to get MRI due to orthopedic hardware.    6/9 S/p HD  Vitals are improved. Remains encephalopathic   and family at the bedside    06/10/2025: Per family, patient transitioned to comfort care yesterday with plans to DC  to  hospice today.    Consults (From admission, onward)          Status Ordering Provider     Inpatient consult to Social Work  Once        Provider:  (Not yet assigned)    Completed EN GRAY     Inpatient consult to Nephrology  Once        Provider:  Fito Iglesias MD    Completed MASHA SOTO     Inpatient consult to Social Work/Case Management  Once        Provider:  (Not yet assigned)    Completed MASHA SOTO     Inpatient consult to Infectious Diseases  Once        Provider:  Albaro Jaime MD, JORDAN Jonse     Inpatient consult to Cardiology  Once        Provider:  Shayne Brandon MD    Completed GAEL VERNON          Significant Labs:  None    Significant Imaging:  I have reviewed all pertinent imaging results/findings within the past 24 hours.    Pending Diagnostic Studies:       Procedure Component Value Units Date/Time    Hepatitis B Surface Antibody, Qual/Quant [[id number]] Collected: 06/06/25 1659    Order Status: Sent Lab Status: In process Updated: 06/06/25 1719    Specimen: Blood           Final Active Diagnoses:    Diagnosis Date Noted POA    PRINCIPAL PROBLEM:  Sepsis with encephalopathy [A41.9, R65.20, G93.41] 08/29/2024 Yes    Anuria [R34] 06/06/2025 No    Altered mental status [R41.82] 06/06/2025 No    Acute on chronic diastolic congestive heart failure [I50.33] 06/06/2025 No    Atrial fibrillation [I48.91] 06/06/2025 Unknown    Acute renal failure superimposed on stage 5 chronic kidney disease, not on chronic dialysis [N17.9, N18.5] 06/06/2025 Unknown    Atrial flutter [I48.92] 06/05/2025 No    Cellulitis of right lower extremity [L03.115] 06/03/2025 Yes    NSTEMI (non-ST elevated myocardial infarction) [I21.4] 06/03/2025 Yes    Mild persistent asthma without complication [J45.30] 11/19/2024 Yes    Chronic kidney disease, stage 4 (severe) [N18.4] 10/18/2024 Yes    Streptococcal bacteremia [R78.81, B95.5] 08/30/2024 Yes    acute on Chronic diastolic  "congestive heart failure [I50.32] 06/27/2024 Yes    Thrombocytopenia [D69.6] 03/02/2021 Yes    Class 2 severe obesity due to excess calories with serious comorbidity and body mass index (BMI) of 36.0 to 36.9 in adult [E66.812, E66.01, Z68.36] 01/07/2020 Not Applicable    Essential hypertension [I10] 11/07/2019 Yes    Hypothyroidism [E03.9] 09/23/2013 Yes    Type 2 diabetes mellitus with microalbuminuria, with long-term current use of insulin [E11.29, R80.9, Z79.4] 09/23/2013 Not Applicable      Problems Resolved During this Admission:     Assessment & Plan  Sepsis with encephalopathy  Cellulitis of right lower extremity  Streptococcal bacteremia  This patient does have evidence of infective focus  My overall impression is sepsis with Acute kidney injury, Acute respiratory failure, Encephalopathy, and Thrombocytopenia .  Source: Skin and Soft Tissue Infection: Cellulitis  Antibiotics given-   Antibiotics (72h ago, onward)      None          Organ dysfunction indicated by Acute kidney injury, Encephalopathy, and Thrombocytopenia   No results for input(s): "LACTATE", "POCLAC" in the last 72 hours.  Source control achieved by: Rocephin/Clinda    ID following  Antibiotics (72h ago, onward)      None          ID following  No results for input(s): "LACTATE", "POCLAC" in the last 72 hours.  Blood Cx 6/5 NGTD    Type 2 diabetes mellitus with microalbuminuria, with long-term current use of insulin  Comfort Care    Acute on Chronic diastolic congestive heart failure  Patient has Diastolic (HFpEF) heart failure that is Acute on chronic. On presentation their CHF was decompensated. Evidence of decompensated CHF on presentation includes: elevated JVD, weight gain, and shortness of breath. The etiology of their decompensation is likely renal failure. Most recent BNP and echo results are listed below.  No results for input(s): "BNP" in the last 72 hours.  Latest ECHO  Results for orders placed during the hospital encounter of " "06/02/25    Echo    Interpretation Summary    Left Ventricle: The left ventricle is normal in size. Moderately increased wall thickness. There is concentric hypertrophy. There is normal systolic function. Ejection fraction is approximately 60%.    Right Ventricle: The right ventricle is normal in size Wall thickness is normal. Systolic function is normal.    Left Atrium: The left atrium is mildly dilated    IVC/SVC: Normal venous pressure at 3 mmHg.    Antibiotics (72h ago, onward)      None        Trialysis placed 6/6 and plan to start dialysis  Avoid nephrotoxins and renally dose meds  No results for input(s): "LACTATE", "POCLAC" in the last 72 hours.  6/7 - planning dialysis again today  6/8 - further RRT per Nephro  6/9: HD stopped per family request- transitioned to comfort care  6/10: To IP hospice    Altered mental status  Metabolic Encephalopathy  Likely multifactorial in the setting of sepsis and renal failure  Improving somewhat with dialysis.  Continue to monitor.  Delirium precautions    6/8 - has failed to make much improvement in mental status despite correction of uremia.  Unable to get MRI due to orthopedic hardware.  Will recheck ammonia (normal 6 days ago), TFTs (unable to take Synthroid due to NPO).    I think the overall picture is one of hypoactive delirium at this point, but will try to rule out a few other things.    6/10: Comfort care, DC to  hospice    Acute on chronic diastolic congestive heart failure  Patient has Diastolic (HFpEF) heart failure that is Acute on chronic. On presentation their CHF was decompensated. Evidence of decompensated CHF on presentation includes: edema and shortness of breath. Most recent BNP and echo results are listed below.  No results for input(s): "BNP" in the last 72 hours.  Latest ECHO  Results for orders placed during the hospital encounter of 06/02/25    Echo    Interpretation Summary    Left Ventricle: The left ventricle is normal in size. Moderately " increased wall thickness. There is concentric hypertrophy. There is normal systolic function. Ejection fraction is approximately 60%.    Right Ventricle: The right ventricle is normal in size Wall thickness is normal. Systolic function is normal.    Left Atrium: The left atrium is mildly dilated    IVC/SVC: Normal venous pressure at 3 mmHg.    6/9 Worsening CxR  S/p high dose bumex with minimal output  S/p HD    6/10: Comfort care, DC to IP hospice    Acute renal failure superimposed on stage 5 chronic kidney disease, not on chronic dialysis  MIKA is likely due to pre-renal azotemia due to intravascular volume depletion. Baseline creatinine is 2.2. Most recent creatinine and eGFR are listed below.  Recent Labs     06/07/25  1903 06/08/25  0336 06/09/25  0357   CREATININE 2.1* 2.8* 4.4*   EGFRNORACEVR 24* 17* 10*      Plan  - MIKA is worsening  - HD stopped per family request- transitioned to comfort care- DC to IP hospice today    Discharged Condition: stable    Disposition: Hospice/Medical Facility     Contact information for after-discharge care       Destination       [facility] .    Service: Inpatient Hospice  Contact information:  [address] [place] [id number] [phone]                                 Patient Instructions:   No discharge procedures on file.  Medications:  None    Per Hospice facility.     Christine Tolbert NP  Critical Care Medicine  [facility] ([facility])

## 2025-06-11 LAB
W HEPATITIS B SURFACE ANTIBODY, QUALITATIVE: NEGATIVE
W HEPATITIS B SURFACE ANTIBODY, QUANTITATIVE: <3 MIU/ML

## 2025-07-22 ENCOUNTER — TELEPHONE (OUTPATIENT)
Dept: INTERNAL MEDICINE | Facility: CLINIC | Age: 75
End: 2025-07-22
Payer: MEDICARE

## 2025-07-22 NOTE — TELEPHONE ENCOUNTER
Copied from CRM #[id number]. Topic: General Inquiry - Patient Advice  >> Jul 21, 2025  4:05 PM Lulu wrote:  Type:  Patient Requesting a call back     Who Called:Malissa   What is the call back request regarding?:caller states that pt has passed away  Would the patient rather a call back or a response via [facility]?call  Best Call Back Number:  Additional Information:

## (undated) DEVICE — SLING ARM ULTRA III PAD LG

## (undated) DEVICE — SEE MEDLINE ITEM 157131

## (undated) DEVICE — GLOVE BIOGEL SZ 8 1/2

## (undated) DEVICE — SUT VICRYL 2-0 CT-2 VCP269H

## (undated) DEVICE — BIT DRILL QC 1.8X110MM

## (undated) DEVICE — DRAPE INCISE IOBAN 2 23X17IN

## (undated) DEVICE — SPLINT PLASTER F.S 4INX15IN

## (undated) DEVICE — COVER LIGHT HANDLE 80/CA

## (undated) DEVICE — SUPPORT ULNA NERVE PROTECTOR

## (undated) DEVICE — SUT PROLENE 3-0 PS-1 BL 18

## (undated) DEVICE — WIRE KIRSCHNER 1.25X150MM
Type: IMPLANTABLE DEVICE | Site: KNEE | Status: NON-FUNCTIONAL
Removed: 2020-12-18

## (undated) DEVICE — WIRE KIRSCHNER T2 3X285MM SS
Type: IMPLANTABLE DEVICE | Site: HUMERUS | Status: NON-FUNCTIONAL
Removed: 2020-11-05

## (undated) DEVICE — PADDING CAST 6IN DELTA ROLL

## (undated) DEVICE — UNDERGLOVES BIOGEL PI SIZE 8

## (undated) DEVICE — BLADE SURG CARBON STEEL #10

## (undated) DEVICE — SEE MEDLINE ITEM 146308

## (undated) DEVICE — SOL 9P NACL IRR PIC IL

## (undated) DEVICE — DRAPE SURG W/TWL 17 5/8X23

## (undated) DEVICE — STOCKINETTE IMPERVIOUS MEDIUM

## (undated) DEVICE — TOURNIQUET SB QC DP 18X4IN

## (undated) DEVICE — SUT ETHILON 2-0 BLK PS-2

## (undated) DEVICE — DRAPE MOBILE C-ARM

## (undated) DEVICE — PAD ABD 8X10 STERILE

## (undated) DEVICE — BANDAGE ELASTIC 3X5 VELCRO ST

## (undated) DEVICE — PAD CAST SPECIALIST STRL 6

## (undated) DEVICE — FIBERTAPE 17IN W/STR NEEDLE

## (undated) DEVICE — SUT 0 36IN COATED VICRYL VI

## (undated) DEVICE — TOURNIQUET SB QC DP 34X4IN

## (undated) DEVICE — SEE MEDLINE ITEM 157027

## (undated) DEVICE — MANIFOLD 4 PORT

## (undated) DEVICE — BIT DRILL TWIST AO 1.4X27X87MM

## (undated) DEVICE — DRAPE STERI U-SHAPED 47X51IN

## (undated) DEVICE — RETRIEVER SUTURE HEWSON DISP

## (undated) DEVICE — KIT TRIMANO

## (undated) DEVICE — DRESSING XEROFORM FOIL PK 1X8

## (undated) DEVICE — SUT VICRYL 2-0 36 CT-1

## (undated) DEVICE — COVER CAMERA OPERATING ROOM

## (undated) DEVICE — DRAPE C-ARMOR EQUIPMENT COVER

## (undated) DEVICE — DRESSING XEROFORM 1X8IN

## (undated) DEVICE — UNDERGLOVES BIOGEL PI SIZE 8.5

## (undated) DEVICE — SUT CTD VICRYL 2-0 VIL BR C

## (undated) DEVICE — PAD CAST SPECIALIST STRL 4

## (undated) DEVICE — CLOSURE SKIN STERI STRIP 1/2X4

## (undated) DEVICE — SUT MONOCRYL 4-0 PS-1 UND

## (undated) DEVICE — APPLICATOR CHLORAPREP ORN 26ML

## (undated) DEVICE — UNDERGLOVES BIOGEL PI SIZE 7.5

## (undated) DEVICE — GAUZE SPONGE 4X4 12PLY

## (undated) DEVICE — GOWN SURG 2XL DISP TIE BACK

## (undated) DEVICE — BIT DRILL TWST CANN ASNIS 2.7M

## (undated) DEVICE — FIBERTAPE COLLAGEN COATED

## (undated) DEVICE — BANDAGE ACE DOUBLE STER 6IN

## (undated) DEVICE — INSTRUMENT FRAZIER 10FR W/VENT

## (undated) DEVICE — DRESSING TRANS 2X2 TEGADERM

## (undated) DEVICE — SEE MEDLINE ITEM 157216

## (undated) DEVICE — SEE MEDLINE ITEM 152529

## (undated) DEVICE — BIT DRILL OVERDRILL AO 2.7MM

## (undated) DEVICE — BIT DRILL AO 3.5X230MM STERILE

## (undated) DEVICE — ELECTRODE REM PLYHSV RETURN 9

## (undated) DEVICE — SPONGE LAP 18X18 PREWASHED

## (undated) DEVICE — SPONGE DERMACEA GAUZE 4X4

## (undated) DEVICE — BANDAGE ACE ELASTIC 6"

## (undated) DEVICE — SEE MEDLINE ITEM 157117

## (undated) DEVICE — TUBE EXCHANGE 8 MM TEFLON STRL
Type: IMPLANTABLE DEVICE | Site: HUMERUS | Status: NON-FUNCTIONAL
Removed: 2020-11-05

## (undated) DEVICE — GLOVE SURGICAL LATEX SZ 8

## (undated) DEVICE — GLOVE SURG BIOGEL LATEX SZ 7.5

## (undated) DEVICE — SEE MEDLINE ITEM 152515

## (undated) DEVICE — SUT VICRYL PLUS 0 CT1 36IN

## (undated) DEVICE — SEE MEDLINE ITEM 152523

## (undated) DEVICE — WIRE KIRSCHNER 1.25X150MM
Type: IMPLANTABLE DEVICE | Site: PATELLA | Status: NON-FUNCTIONAL
Removed: 2020-11-02

## (undated) DEVICE — SCREW PROXIMAL LOCK 5X37.5MM
Type: IMPLANTABLE DEVICE | Site: HUMERUS | Status: NON-FUNCTIONAL
Removed: 2020-11-05

## (undated) DEVICE — GLOVE SURGICAL LATEX SZ 7

## (undated) DEVICE — POSITIONER HEAD DONUT 9IN FOAM

## (undated) DEVICE — SEE MEDLINE ITEM 146231

## (undated) DEVICE — DRAPE PLASTIC U 60X72

## (undated) DEVICE — TAPE SURGICAL MICROFOAM 3IN

## (undated) DEVICE — TIP SUCTION YANKAUER

## (undated) DEVICE — PAD CAST SPECIALIST STRL 3

## (undated) DEVICE — SOL NACL IRR 1000ML BTL

## (undated) DEVICE — ADHESIVE MASTISOL VIAL 48/BX

## (undated) DEVICE — Device

## (undated) DEVICE — CORD BIPOLAR ELECTROSURGICAL

## (undated) DEVICE — SEE MEDLINE ITEM 152622

## (undated) DEVICE — STOCKINET TUBULAR 1 PLY 6X60IN

## (undated) DEVICE — SUT VICRYL 2-0 27 CT-1

## (undated) DEVICE — SYR 10CC LUER LOCK

## (undated) DEVICE — SEE MEDLINE ITEM 157166

## (undated) DEVICE — SOL IRR NACL .9% 3000ML

## (undated) DEVICE — SEE MEDLINE ITEM 146345

## (undated) DEVICE — SEE MEDLINE ITEM 146298

## (undated) DEVICE — DRAPE STERI INSTRUMENT 1018

## (undated) DEVICE — STAPLER SKIN PROXIMATE WIDE

## (undated) DEVICE — SEE MEDLINE ITEM 152522

## (undated) DEVICE — IMMOBILIZER KNEE 22IN

## (undated) DEVICE — BLADE SURG #15 CARBON STEEL

## (undated) DEVICE — BIT DRILL AO 2X135MM SCALED

## (undated) DEVICE — ALCOHOL 70% ISOP RUBBING 4OZ

## (undated) DEVICE — SUT VICRYL PLUS 0 CT1 18IN

## (undated) DEVICE — SUT FIBERWIRE 2 CLLGN COAT

## (undated) DEVICE — SUT PDS II O CTX MONO 60

## (undated) DEVICE — SUT FIBERWIRE #5 1/2 X 38

## (undated) DEVICE — BRACE KNEE T SCOPE PREMIER

## (undated) DEVICE — KIT TRIMANO CHIN

## (undated) DEVICE — NDL SAFETY 25G X 1.5 ECLIPSE

## (undated) DEVICE — SUT 1 18IN COATED VICRYL U

## (undated) DEVICE — COVER OVERHEAD SURG LT BLUE